# Patient Record
Sex: MALE | Race: WHITE | NOT HISPANIC OR LATINO | Employment: OTHER | URBAN - METROPOLITAN AREA
[De-identification: names, ages, dates, MRNs, and addresses within clinical notes are randomized per-mention and may not be internally consistent; named-entity substitution may affect disease eponyms.]

---

## 2018-01-24 ENCOUNTER — TELEPHONE (OUTPATIENT)
Dept: CARDIAC SURGERY | Facility: CLINIC | Age: 68
End: 2018-01-24

## 2018-02-08 ENCOUNTER — HOSPITAL ENCOUNTER (OUTPATIENT)
Dept: GENERAL RADIOLOGY | Facility: HOSPITAL | Age: 68
Discharge: HOME | End: 2018-02-08
Attending: INTERNAL MEDICINE | Admitting: INTERNAL MEDICINE

## 2018-02-09 ENCOUNTER — HOSPITAL ENCOUNTER (OUTPATIENT)
Dept: RESPIRATORY THERAPY | Facility: HOSPITAL | Age: 68
Discharge: HOME | End: 2018-02-09
Attending: INTERNAL MEDICINE | Admitting: INTERNAL MEDICINE

## 2018-02-26 ENCOUNTER — OUTSIDE FACILITY SERVICE (OUTPATIENT)
Dept: CARDIAC SURGERY | Facility: CLINIC | Age: 68
End: 2018-02-26

## 2018-02-26 PROCEDURE — 33405 REPLACEMENT AORTIC VALVE OPN: CPT | Performed by: PHYSICIAN ASSISTANT

## 2018-02-26 PROCEDURE — 33511 CABG VEIN TWO: CPT | Performed by: THORACIC SURGERY (CARDIOTHORACIC VASCULAR SURGERY)

## 2018-02-26 PROCEDURE — 33405 REPLACEMENT AORTIC VALVE OPN: CPT | Performed by: THORACIC SURGERY (CARDIOTHORACIC VASCULAR SURGERY)

## 2018-02-26 PROCEDURE — 33508 ENDOSCOPIC VEIN HARVEST: CPT | Performed by: THORACIC SURGERY (CARDIOTHORACIC VASCULAR SURGERY)

## 2018-02-26 PROCEDURE — 33508 ENDOSCOPIC VEIN HARVEST: CPT | Performed by: PHYSICIAN ASSISTANT

## 2018-02-26 PROCEDURE — 33511 CABG VEIN TWO: CPT | Performed by: PHYSICIAN ASSISTANT

## 2018-04-13 ENCOUNTER — HOSPITAL ENCOUNTER (OUTPATIENT)
Dept: RESPIRATORY THERAPY | Facility: HOSPITAL | Age: 68
Discharge: HOME | End: 2018-04-13
Attending: NURSE PRACTITIONER | Admitting: NURSE PRACTITIONER

## 2018-05-21 ENCOUNTER — OFFICE VISIT (OUTPATIENT)
Dept: CARDIAC SURGERY | Facility: CLINIC | Age: 68
End: 2018-05-21

## 2018-05-21 VITALS
SYSTOLIC BLOOD PRESSURE: 142 MMHG | RESPIRATION RATE: 20 BRPM | TEMPERATURE: 97.2 F | HEIGHT: 69 IN | WEIGHT: 180.4 LBS | BODY MASS INDEX: 26.72 KG/M2 | OXYGEN SATURATION: 97 % | DIASTOLIC BLOOD PRESSURE: 86 MMHG | HEART RATE: 58 BPM

## 2018-05-21 DIAGNOSIS — Z95.2 S/P AORTIC VALVE REPLACEMENT: Primary | ICD-10-CM

## 2018-05-21 DIAGNOSIS — Z95.1 S/P CABG (CORONARY ARTERY BYPASS GRAFT): ICD-10-CM

## 2018-05-21 PROCEDURE — 99024 POSTOP FOLLOW-UP VISIT: CPT | Performed by: THORACIC SURGERY (CARDIOTHORACIC VASCULAR SURGERY)

## 2018-11-26 ENCOUNTER — HOSPITAL ENCOUNTER (OUTPATIENT)
Dept: LAB | Facility: HOSPITAL | Age: 68
Discharge: HOME | End: 2018-11-26
Attending: INTERNAL MEDICINE | Admitting: INTERNAL MEDICINE

## 2018-11-26 LAB
ALBUMIN SERPL-MCNC: 2.7 G/DL (ref 3.5–4.8)
ALP SERPL-CCNC: 114 IU/L (ref 32–91)
ALT SERPL-CCNC: 19 IU/L (ref 17–63)
ANION GAP SERPL CALC-SCNC: 11.8 MMOL/L (ref 10–20)
AST SERPL-CCNC: 22 IU/L (ref 15–41)
BILIRUB DIRECT SERPL-MCNC: 0.1 MG/DL (ref 0.1–0.5)
BILIRUB SERPL-MCNC: 0.4 MG/DL (ref 0.3–1.2)
BUN SERPL-MCNC: 10 MG/DL (ref 8–20)
BUN/CREAT SERPL: 12.5 (ref 6.2–20.3)
CALCIUM SERPL-MCNC: 8.5 MG/DL (ref 8.9–10.3)
CHLORIDE SERPL-SCNC: 101 MMOL/L (ref 101–111)
CHOLEST SERPL-MCNC: 82 MG/DL
CHOLEST/HDLC SERPL: 2.7 {RATIO}
CK SERPL-CCNC: 43 IU/L (ref 49–397)
CONV CO2: 24 MMOL/L (ref 22–32)
CONV LDL CHOLESTEROL DIRECT: 44 MG/DL (ref 0–100)
CONV TOTAL PROTEIN: 7 G/DL (ref 6.1–7.9)
CREAT UR-MCNC: 0.8 MG/DL (ref 0.7–1.2)
GLUCOSE SERPL-MCNC: 109 MG/DL (ref 65–99)
HDLC SERPL-MCNC: 30 MG/DL
LDLC/HDLC SERPL: 1.5 {RATIO}
LIPID INTERPRETATION: ABNORMAL
POTASSIUM SERPL-SCNC: 3.8 MMOL/L (ref 3.6–5.1)
SODIUM SERPL-SCNC: 133 MMOL/L (ref 136–144)
TRIGL SERPL-MCNC: 42 MG/DL
VLDLC SERPL CALC-MCNC: 8.3 MG/DL

## 2019-02-22 NOTE — TELEPHONE ENCOUNTER
After several attempts to reach the patient I finally spoke to him today. Explained I was calling to work out a date for surgery (CABG) with Dr. Maza. He states that he was going to see his cardiologist today and he would call me back after he saw him.

## 2019-05-23 ENCOUNTER — HOSPITAL ENCOUNTER (OUTPATIENT)
Dept: LAB | Facility: HOSPITAL | Age: 69
Discharge: HOME | End: 2019-05-23
Attending: INTERNAL MEDICINE | Admitting: INTERNAL MEDICINE

## 2019-05-23 LAB
ALBUMIN SERPL-MCNC: 3.6 G/DL (ref 3.5–4.8)
ALP SERPL-CCNC: 120 IU/L (ref 32–91)
ALT SERPL-CCNC: 21 IU/L (ref 17–63)
ANION GAP SERPL CALC-SCNC: 13.8 MMOL/L (ref 10–20)
AST SERPL-CCNC: 23 IU/L (ref 15–41)
BILIRUB DIRECT SERPL-MCNC: 0.2 MG/DL (ref 0.1–0.5)
BILIRUB SERPL-MCNC: 0.8 MG/DL (ref 0.3–1.2)
BUN SERPL-MCNC: 9 MG/DL (ref 8–20)
BUN/CREAT SERPL: 11.3 (ref 6.2–20.3)
CALCIUM SERPL-MCNC: 8.9 MG/DL (ref 8.9–10.3)
CHLORIDE SERPL-SCNC: 100 MMOL/L (ref 101–111)
CHOLEST SERPL-MCNC: 89 MG/DL
CHOLEST/HDLC SERPL: 2.3 {RATIO}
CK SERPL-CCNC: 76 IU/L (ref 49–397)
CONV CO2: 25 MMOL/L (ref 22–32)
CONV LDL CHOLESTEROL DIRECT: 41 MG/DL (ref 0–100)
CONV TOTAL PROTEIN: 6.9 G/DL (ref 6.1–7.9)
CREAT UR-MCNC: 0.8 MG/DL (ref 0.7–1.2)
GLUCOSE SERPL-MCNC: 94 MG/DL (ref 65–99)
HDLC SERPL-MCNC: 39 MG/DL
LDLC/HDLC SERPL: 1.1 {RATIO}
LIPID INTERPRETATION: ABNORMAL
POTASSIUM SERPL-SCNC: 3.8 MMOL/L (ref 3.6–5.1)
SODIUM SERPL-SCNC: 135 MMOL/L (ref 136–144)
TRIGL SERPL-MCNC: 67 MG/DL
VLDLC SERPL CALC-MCNC: 8.5 MG/DL

## 2019-05-31 ENCOUNTER — CONVERSION ENCOUNTER (OUTPATIENT)
Dept: CARDIOLOGY | Facility: CLINIC | Age: 69
End: 2019-05-31

## 2019-06-04 VITALS
WEIGHT: 180.75 LBS | HEART RATE: 58 BPM | DIASTOLIC BLOOD PRESSURE: 84 MMHG | SYSTOLIC BLOOD PRESSURE: 140 MMHG | BODY MASS INDEX: 26.69 KG/M2 | OXYGEN SATURATION: 96 %

## 2019-06-06 NOTE — PROGRESS NOTES
Visit Type:  Follow-up Visit  Primary Care Provider:  [facility]    Chief Complaint:  cad 6mo f/u .    History of Present Illness:  68-year-old white male patient recently admitted to the hospital with an episode of acute influenza flu   patient had an echocardiogram which showed severe aortic stenosis transesophageal echocardiogram showed severe aortic valve calcification with stenosis and LV function was normal   2017   cardiac catheterization 2017 showed left main has no disease diagonal artery ostium 70% disease proximal LAD 20% circumflex artery has a focal lesion causing anywhere from 50-70% disease proximal RCA has somewhat diffuse disease causing 50-60%   stenosis   the right common femoral artery has 60-70% calcified lesion    2018 patient underwent I aortic valve replacement with a bioprosthetic valve   vein graft to the marginal branch and diagonal artery    Patient unfortunately continues to smoke so I advised him to stop smoking     his lipids well controlled   recheck blood pressure 140/84   follow-up in 6 months  with labs and EKG          Vital Signs:    Patient Profile:    68 Years Old Male  Height:     70 inches  Weight:     180.75 pounds  (Measured Weight:  180 lbs. 12 oz.)  BMI:        25.93  Pulse rate: 58 / minute  O2 Sat:     96 %    BP sittin / 84  (left arm)  Cuff size:  regular   Vitals Entered By: Adrianne Quinones CMA (May 31, 2019 10:38 AM)    Medications:  Medications were reviewed with the patient during this visit.    Allergies:   No Known Allergies  Allergies were reviewed with the patient during this visit.    Active Medications (reviewed today):  ATORVASTATIN CALCIUM 20 MG ORAL TABLET (ATORVASTATIN CALCIUM) Take 1 tablet by mouth daily at bedtime  HYDRALAZINE HCL 50 MG ORAL TABLET (HYDRALAZINE HCL) Take one (1) tablet by mouth twice a day  AMLODIPINE BESYLATE 5 MG ORAL TABLET (AMLODIPINE BESYLATE) Take 1 tablet by mouth daily  ASPIR-LOW 81 MG ORAL TABLET  DELAYED RELEASE (ASPIRIN) Take 1 tablet by mouth daily  METOPROLOL TARTRATE 25 MG ORAL TABLET (METOPROLOL TARTRATE) Take one (1) tablet by mouth twice a day    Current Allergies (reviewed today):  No known allergies    Current Medications (including medications started today):   ATORVASTATIN CALCIUM 20 MG ORAL TABLET (ATORVASTATIN CALCIUM) Take 1 tablet by mouth daily at bedtime  HYDRALAZINE HCL 50 MG ORAL TABLET (HYDRALAZINE HCL) Take one (1) tablet by mouth twice a day  AMLODIPINE BESYLATE 5 MG ORAL TABLET (AMLODIPINE BESYLATE) Take 1 tablet by mouth daily  ASPIR-LOW 81 MG ORAL TABLET DELAYED RELEASE (ASPIRIN) Take 1 tablet by mouth daily  METOPROLOL TARTRATE 25 MG ORAL TABLET (METOPROLOL TARTRATE) Take one (1) tablet by mouth twice a day      Past Medical History:     Reviewed history from 2018 and no changes required:        Aortic Stenosis-Severe        Hypertension        No Drug Allergies? T        COPD        Coronary Artery Disease        Peripheral Vascular Disease        Congestive Heart Failure~~Diastolic    Past Surgical History:     Reviewed history from 2018 and no changes required:        Heart Catherization (2017)        C A B G: x2 Vessels; OM1 & Diagonal II (2018)        Aortic Valve Repair (2018)    Family History Summary:      Reviewed history Last on 2018 and no changes required:2019  Daughter - Has Family History of Heart Disease - Stroke and Seizures at 36 y/o - Entered On: 2018    General Comments - FH:  father unknown heart issues,  age 60 cancer  mother congestive heart failure, still alive     Social History:     Reviewed history from 2018 and no changes required:        Patient is a former smoker.        Passive Smoke: Y        Alcohol Use: N        Drug Use: N        Regular Exercise: N            Social History Summary:  Patient is a former smoker.  Passive Smoke: Y  Alcohol Use: N  Drug Use: N  Regular Exercise: N  Social  History Reviewed: 05/31/2019          Risk Factors:     Smoked Tobacco Use:  Former smoker     Cigarettes:  Yes -- 5cigs daily pack(s) per day,Smokeless Tobacco Use:  Never  Passive smoke exposure:  yes  Drug use:  no  Caffeine use:  4+ drinks per day  Alcohol use:  no  Exercise:  no    Family History Risk Factors:     Family History of MI in females < 65 years old:  yes     Family History of MI in males < 55 years old:  no        Review of Systems   General: No fatigue or tiredness  Eyes: No redness  Ear/Nose/Throat: No discharge  Cardiovascular: No chest pain, no palpitations   Respiratory: No shortness of breath  Gastrointestinal: No nausea or vomiting  Genitourinary: No Bleeding  Musculoskeletal: No arthralgia or myalgia  Skin: No rash, no edema   Neurologic: No numbness or tingling, no syncope   Psychiatric: No anxiety or depression  Hematologic/Lymphatic: No abnormal bleeding      Physical Exam    General:      well developed, well nourished, in no acute distress.    Head:      normocephalic and atraumatic.    Neck:      no bruit.    Lungs:      clear bilaterally to auscultation.    Heart:       1/6 systolic murmur at the base  regular rhythm, no rubs and no gallops.    Abdomen:      non-tender.    Msk:       within normal  Pulses:      pulses normal in all 4 extremities.    Extremities:      no pedal edema.    Neurologic:       non focal  Psych:      alert and cooperative; normal mood and affect; normal attention span and concentration.      Diabetes Management Exam:      Foot Exam (with socks and/or shoes not present):        Pulses:           pulses normal in all 4 extremities.        Blood Pressure:  Today's BP: 140/84 mm Hg    Labwork:   Most Recent Lab Results:   LDL: 41 mg/dL 05/23/2019        Impression & Recommendations:    Problem # 1:  Hx of aortic valve replacement (ICD-V43.3) (FMK49-Y77.2)   stable  Orders:  20790-Hrt Vst-Est Level III (CPT-25268)      Problem # 2:  CABG S/P (ICD-V45.81)  (PUF85-Q81.1)   stable  His updated medication list for this problem includes:     Hydralazine Hcl 50 Mg Oral Tablet (Hydralazine hcl) ..... Take one (1) tablet by mouth twice a day     Amlodipine Besylate 5 Mg Oral Tablet (Amlodipine besylate) ..... Take 1 tablet by mouth daily     Aspir-low 81 Mg Oral Tablet Delayed Release (Aspirin) ..... Take 1 tablet by mouth daily     Metoprolol Tartrate 25 Mg Oral Tablet (Metoprolol tartrate) ..... Take one (1) tablet by mouth twice a day    Orders:  35118-Mjr Vst-Est Level III (CPT-86645)      Problem # 3:  Renal insufficiency (ICD-585.9) (WRO70-Z55.9)   followed by PCP  Orders:  38917-Bbm Vst-Est Level III (CPT-59973)      Problem # 4:  Hypertension (YNU84-L23)   needs aggressive blood pressure control  His updated medication list for this problem includes:     Hydralazine Hcl 50 Mg Oral Tablet (Hydralazine hcl) ..... Take one (1) tablet by mouth twice a day     Amlodipine Besylate 5 Mg Oral Tablet (Amlodipine besylate) ..... Take 1 tablet by mouth daily     Metoprolol Tartrate 25 Mg Oral Tablet (Metoprolol tartrate) ..... Take one (1) tablet by mouth twice a day    Orders:  85911-Vek Vst-Est Level III (CPT-64934)      Medications Added to Medication List This Visit:  1)  Amlodipine Besylate 5 Mg Oral Tablet (Amlodipine besylate) .... Take 1 tablet by mouth daily                  Medication Administration    Orders Added:  1)  58285-Kvw Vst-Est Level III [CPT-10511]  ]      Electronically signed by Iglesia Springer MD on 05/31/2019 at 11:27 AM  ________________________________________________________________________       Disclaimer: Converted Note message may not contain all data elements that existed in the legacy source system. Please see [facility] for the original note details.

## 2019-07-22 RX ORDER — HYDRALAZINE HYDROCHLORIDE 50 MG/1
50 TABLET, FILM COATED ORAL EVERY 12 HOURS
Qty: 180 TABLET | Refills: 1 | Status: SHIPPED | OUTPATIENT
Start: 2019-07-22 | End: 2020-01-01

## 2019-07-22 RX ORDER — HYDRALAZINE HYDROCHLORIDE 50 MG/1
TABLET, FILM COATED ORAL EVERY 12 HOURS
COMMUNITY
Start: 2018-09-07 | End: 2019-07-22 | Stop reason: SDUPTHER

## 2019-11-27 DIAGNOSIS — I10 ESSENTIAL HYPERTENSION: Primary | ICD-10-CM

## 2019-11-29 ENCOUNTER — LAB (OUTPATIENT)
Dept: LAB | Facility: HOSPITAL | Age: 69
End: 2019-11-29

## 2019-11-29 DIAGNOSIS — I10 ESSENTIAL HYPERTENSION: ICD-10-CM

## 2019-11-29 LAB
ALBUMIN SERPL-MCNC: 3.9 G/DL (ref 3.5–5.2)
ALP SERPL-CCNC: 125 U/L (ref 39–117)
ALT SERPL W P-5'-P-CCNC: 15 U/L (ref 1–41)
ANION GAP SERPL CALCULATED.3IONS-SCNC: 10.4 MMOL/L (ref 5–15)
AST SERPL-CCNC: 15 U/L (ref 1–40)
BILIRUB CONJ SERPL-MCNC: 0.2 MG/DL (ref 0.2–0.3)
BILIRUB INDIRECT SERPL-MCNC: 0.3 MG/DL
BILIRUB SERPL-MCNC: 0.5 MG/DL (ref 0.2–1.2)
BUN BLD-MCNC: 11 MG/DL (ref 8–23)
BUN/CREAT SERPL: 13.8 (ref 7–25)
CALCIUM SPEC-SCNC: 8.8 MG/DL (ref 8.6–10.5)
CHLORIDE SERPL-SCNC: 97 MMOL/L (ref 98–107)
CHOLEST SERPL-MCNC: 79 MG/DL (ref 0–200)
CK SERPL-CCNC: 45 U/L (ref 20–200)
CO2 SERPL-SCNC: 25.6 MMOL/L (ref 22–29)
CREAT BLD-MCNC: 0.8 MG/DL (ref 0.76–1.27)
GFR SERPL CREATININE-BSD FRML MDRD: 96 ML/MIN/1.73
GLUCOSE BLD-MCNC: 84 MG/DL (ref 65–99)
HDLC SERPL-MCNC: 33 MG/DL (ref 40–60)
LDLC SERPL CALC-MCNC: 36 MG/DL (ref 0–100)
LDLC/HDLC SERPL: 1.1 {RATIO}
POTASSIUM BLD-SCNC: 4 MMOL/L (ref 3.5–5.2)
PROT SERPL-MCNC: 7.3 G/DL (ref 6–8.5)
SODIUM BLD-SCNC: 133 MMOL/L (ref 136–145)
TRIGL SERPL-MCNC: 48 MG/DL (ref 0–150)
VLDLC SERPL-MCNC: 9.6 MG/DL (ref 5–40)

## 2019-11-29 PROCEDURE — 80048 BASIC METABOLIC PNL TOTAL CA: CPT

## 2019-11-29 PROCEDURE — 82550 ASSAY OF CK (CPK): CPT

## 2019-11-29 PROCEDURE — 80061 LIPID PANEL: CPT

## 2019-11-29 PROCEDURE — 80076 HEPATIC FUNCTION PANEL: CPT

## 2019-11-29 PROCEDURE — 36415 COLL VENOUS BLD VENIPUNCTURE: CPT

## 2019-12-02 ENCOUNTER — OFFICE VISIT (OUTPATIENT)
Dept: CARDIOLOGY | Facility: CLINIC | Age: 69
End: 2019-12-02

## 2019-12-02 VITALS
OXYGEN SATURATION: 97 % | WEIGHT: 170 LBS | SYSTOLIC BLOOD PRESSURE: 148 MMHG | HEIGHT: 69 IN | HEART RATE: 53 BPM | DIASTOLIC BLOOD PRESSURE: 82 MMHG | BODY MASS INDEX: 25.18 KG/M2

## 2019-12-02 DIAGNOSIS — I25.10 CORONARY ARTERY DISEASE INVOLVING NATIVE CORONARY ARTERY OF NATIVE HEART WITHOUT ANGINA PECTORIS: Primary | ICD-10-CM

## 2019-12-02 DIAGNOSIS — E78.2 MIXED HYPERLIPIDEMIA: ICD-10-CM

## 2019-12-02 DIAGNOSIS — I35.0 NONRHEUMATIC AORTIC VALVE STENOSIS: ICD-10-CM

## 2019-12-02 DIAGNOSIS — I10 ESSENTIAL HYPERTENSION: ICD-10-CM

## 2019-12-02 PROCEDURE — 99213 OFFICE O/P EST LOW 20 MIN: CPT | Performed by: INTERNAL MEDICINE

## 2019-12-02 NOTE — PROGRESS NOTES
"    Subjective:     Encounter Date:12/02/2019      Patient ID: Axel Ramirez is a 69 y.o. male.    Chief Complaint: follow-up for CAD  History of Present Illness     69-year-old white male patient recently admitted to the hospital with an episode of acute influenza flu   patient had an echocardiogram which showed severe aortic stenosis transesophageal echocardiogram showed severe aortic valve calcification with stenosis and LV function was normal   December 2017   cardiac catheterization December 2017 showed left main has no disease diagonal artery ostium 70% disease proximal LAD 20% circumflex artery has a focal lesion causing anywhere from 50-70% disease proximal RCA has somewhat diffuse disease causing 50-60%   stenosis   the right common femoral artery has 60-70% calcified lesion     February 2018 patient underwent I aortic valve replacement with a bioprosthetic valve   vein graft to the marginal branch and diagonal artery     Patient unfortunately continues to smoke so I advised him to stop smoking      his lipids well controlled     follow-up in 6 months  with labs and EKG      Past Medical History:   Diagnosis Date   • Aortic stenosis    • Congestive heart failure (CHF) (CMS/HCC)     DIASTOLIC   • COPD (chronic obstructive pulmonary disease) (CMS/HCC)    • Coronary artery disease    • Hypertension    • Myocardial infarction (CMS/HCC)    • Peripheral vascular disease (CMS/HCC)    • Valvular disease      Past Surgical History:   Procedure Laterality Date   • AORTIC VALVE REPAIR/REPLACEMENT  02/26/2018    Dr Maza   • CARDIAC CATHETERIZATION  12/29/2017   • CORONARY ARTERY BYPASS GRAFT  02/26/2018    Dr Maza   • TIBIA FRACTURE SURGERY       /82 (BP Location: Left arm, Patient Position: Sitting, Cuff Size: Adult)   Pulse 53   Ht 175.3 cm (69\")   Wt 77.1 kg (170 lb)   SpO2 97%   BMI 25.10 kg/m²   Family History   Problem Relation Age of Onset   • Coronary artery disease Mother    • Cancer Father    • " Stroke Daughter 35   • Seizures Daughter 35       Current Outpatient Medications:   •  amLODIPine (NORVASC) 10 MG tablet, Daily., Disp: , Rfl:   •  aspirin (ASPIR-LOW) 81 MG EC tablet, Daily., Disp: , Rfl:   •  atorvastatin (LIPITOR) 20 MG tablet, ATORVASTATIN CALCIUM 20 MG TABS, Disp: , Rfl:   •  hydrALAZINE (APRESOLINE) 50 MG tablet, Take 1 tablet by mouth Every 12 (Twelve) Hours., Disp: 180 tablet, Rfl: 1  •  lisinopril-hydrochlorothiazide (PRINZIDE,ZESTORETIC) 20-12.5 MG per tablet, Daily., Disp: , Rfl:   •  metoprolol tartrate (LOPRESSOR) 25 MG tablet, Every 12 (Twelve) Hours., Disp: , Rfl:   Social History     Socioeconomic History   • Marital status:      Spouse name: Not on file   • Number of children: Not on file   • Years of education: Not on file   • Highest education level: Not on file   Tobacco Use   • Smoking status: Current Every Day Smoker     Packs/day: 1.00     Types: Cigarettes   • Smokeless tobacco: Never Used   Substance and Sexual Activity   • Alcohol use: Yes   • Drug use: No   • Sexual activity: Defer     No Known Allergies  Review of Systems   Constitution: Negative for fever and malaise/fatigue.   HENT: Negative for congestion and hearing loss.    Eyes: Negative for double vision and visual disturbance.   Cardiovascular: Negative for chest pain, claudication, dyspnea on exertion, leg swelling and syncope.   Respiratory: Negative for cough and shortness of breath.    Endocrine: Negative for cold intolerance.   Skin: Negative for color change and rash.   Musculoskeletal: Negative for arthritis and joint pain.   Gastrointestinal: Negative for abdominal pain and heartburn.   Genitourinary: Negative for hematuria.   Neurological: Negative for excessive daytime sleepiness and dizziness.   Psychiatric/Behavioral: Negative for depression. The patient is not nervous/anxious.    All other systems reviewed and are negative.             Objective:     Physical Exam   Constitutional: He is  oriented to person, place, and time. He appears well-developed and well-nourished.   HENT:   Head: Normocephalic and atraumatic.   Eyes: Conjunctivae are normal.   Neck: JVD present.   Cardiovascular: Normal rate and normal heart sounds.   No murmur heard.  Pulmonary/Chest: Effort normal and breath sounds normal.   Musculoskeletal: He exhibits no edema or deformity.   Neurological: He is alert and oriented to person, place, and time.   Skin: Skin is warm.   Psychiatric: He has a normal mood and affect.       Procedures    Lab Review:       Assessment:          Diagnosis Plan   1. Coronary artery disease involving native coronary artery of native heart without angina pectoris  CK    Comprehensive Metabolic Panel    Lipid Panel   2. Nonrheumatic aortic valve stenosis  CK    Comprehensive Metabolic Panel    Lipid Panel   3. Mixed hyperlipidemia  CK    Comprehensive Metabolic Panel    Lipid Panel   4. Essential hypertension  CK    Comprehensive Metabolic Panel    Lipid Panel          Plan:         CAD status post CABG stable  Aortic valve stenosis status post bioprosthetic valve replacement stable  Continue aggressive control of hypertension dyslipidemia  Patient was advised to stop smoking

## 2020-01-01 ENCOUNTER — APPOINTMENT (OUTPATIENT)
Dept: RADIATION ONCOLOGY | Facility: HOSPITAL | Age: 70
End: 2020-01-01

## 2020-01-01 ENCOUNTER — APPOINTMENT (OUTPATIENT)
Dept: GENERAL RADIOLOGY | Facility: HOSPITAL | Age: 70
End: 2020-01-01

## 2020-01-01 ENCOUNTER — HOSPITAL ENCOUNTER (OUTPATIENT)
Dept: RADIATION ONCOLOGY | Facility: HOSPITAL | Age: 70
Discharge: HOME | End: 2020-11-24

## 2020-01-01 ENCOUNTER — HOSPITAL ENCOUNTER (OUTPATIENT)
Dept: RADIATION ONCOLOGY | Facility: HOSPITAL | Age: 70
Discharge: HOME | End: 2020-12-09

## 2020-01-01 ENCOUNTER — ANESTHESIA EVENT (OUTPATIENT)
Dept: PERIOP | Facility: HOSPITAL | Age: 70
End: 2020-01-01

## 2020-01-01 ENCOUNTER — APPOINTMENT (OUTPATIENT)
Dept: CT IMAGING | Facility: HOSPITAL | Age: 70
End: 2020-01-01

## 2020-01-01 ENCOUNTER — HOSPITAL ENCOUNTER (OUTPATIENT)
Dept: RADIATION ONCOLOGY | Facility: HOSPITAL | Age: 70
Discharge: HOME | End: 2020-12-18

## 2020-01-01 ENCOUNTER — HOSPITAL ENCOUNTER (OUTPATIENT)
Dept: RADIATION ONCOLOGY | Facility: HOSPITAL | Age: 70
Discharge: HOME | End: 2020-12-02

## 2020-01-01 ENCOUNTER — ANESTHESIA (OUTPATIENT)
Dept: GASTROENTEROLOGY | Facility: HOSPITAL | Age: 70
End: 2020-01-01

## 2020-01-01 ENCOUNTER — READMISSION MANAGEMENT (OUTPATIENT)
Dept: CALL CENTER | Facility: HOSPITAL | Age: 70
End: 2020-01-01

## 2020-01-01 ENCOUNTER — HOSPITAL ENCOUNTER (OUTPATIENT)
Dept: RADIATION ONCOLOGY | Facility: HOSPITAL | Age: 70
Discharge: HOME | End: 2020-12-10

## 2020-01-01 ENCOUNTER — HOSPITAL ENCOUNTER (OUTPATIENT)
Dept: RADIATION ONCOLOGY | Facility: HOSPITAL | Age: 70
Discharge: HOME | End: 2020-12-17

## 2020-01-01 ENCOUNTER — HOSPITAL ENCOUNTER (OUTPATIENT)
Dept: RADIATION ONCOLOGY | Facility: HOSPITAL | Age: 70
Discharge: HOME | End: 2020-12-30

## 2020-01-01 ENCOUNTER — ANESTHESIA EVENT (OUTPATIENT)
Dept: GASTROENTEROLOGY | Facility: HOSPITAL | Age: 70
End: 2020-01-01

## 2020-01-01 ENCOUNTER — HOSPITAL ENCOUNTER (OUTPATIENT)
Dept: RADIATION ONCOLOGY | Facility: HOSPITAL | Age: 70
Discharge: HOME | End: 2020-12-14

## 2020-01-01 ENCOUNTER — TELEPHONE (OUTPATIENT)
Dept: RADIATION ONCOLOGY | Facility: HOSPITAL | Age: 70
End: 2020-01-01

## 2020-01-01 ENCOUNTER — HOSPITAL ENCOUNTER (OUTPATIENT)
Dept: RADIATION ONCOLOGY | Facility: HOSPITAL | Age: 70
Discharge: HOME | End: 2020-12-22

## 2020-01-01 ENCOUNTER — HOSPITAL ENCOUNTER (OUTPATIENT)
Dept: RADIATION ONCOLOGY | Facility: HOSPITAL | Age: 70
Setting detail: RADIATION/ONCOLOGY SERIES
End: 2020-01-01

## 2020-01-01 ENCOUNTER — HOSPITAL ENCOUNTER (OUTPATIENT)
Dept: RADIATION ONCOLOGY | Facility: HOSPITAL | Age: 70
Discharge: HOME | End: 2020-11-17

## 2020-01-01 ENCOUNTER — HOSPITAL ENCOUNTER (OUTPATIENT)
Dept: RADIATION ONCOLOGY | Facility: HOSPITAL | Age: 70
Discharge: HOME | End: 2020-11-18

## 2020-01-01 ENCOUNTER — HOSPITAL ENCOUNTER (OUTPATIENT)
Dept: RADIATION ONCOLOGY | Facility: HOSPITAL | Age: 70
Discharge: HOME | End: 2020-12-21

## 2020-01-01 ENCOUNTER — HOSPITAL ENCOUNTER (INPATIENT)
Facility: HOSPITAL | Age: 70
LOS: 8 days | Discharge: HOME | End: 2020-10-30
Attending: EMERGENCY MEDICINE | Admitting: STUDENT IN AN ORGANIZED HEALTH CARE EDUCATION/TRAINING PROGRAM

## 2020-01-01 ENCOUNTER — HOSPITAL ENCOUNTER (OUTPATIENT)
Dept: RADIATION ONCOLOGY | Facility: HOSPITAL | Age: 70
Discharge: HOME | End: 2020-12-07

## 2020-01-01 ENCOUNTER — HOSPITAL ENCOUNTER (OUTPATIENT)
Dept: RADIATION ONCOLOGY | Facility: HOSPITAL | Age: 70
Discharge: HOME | End: 2020-12-28

## 2020-01-01 ENCOUNTER — HOSPITAL ENCOUNTER (OUTPATIENT)
Dept: RADIATION ONCOLOGY | Facility: HOSPITAL | Age: 70
Discharge: HOME | End: 2020-12-23

## 2020-01-01 ENCOUNTER — HOSPITAL ENCOUNTER (INPATIENT)
Facility: HOSPITAL | Age: 70
LOS: 4 days | Discharge: HOME | End: 2020-11-13
Attending: EMERGENCY MEDICINE | Admitting: INTERNAL MEDICINE

## 2020-01-01 ENCOUNTER — HOSPITAL ENCOUNTER (OUTPATIENT)
Dept: RADIATION ONCOLOGY | Facility: HOSPITAL | Age: 70
Discharge: HOME | End: 2020-12-01

## 2020-01-01 ENCOUNTER — ANESTHESIA (OUTPATIENT)
Dept: PERIOP | Facility: HOSPITAL | Age: 70
End: 2020-01-01

## 2020-01-01 ENCOUNTER — APPOINTMENT (OUTPATIENT)
Dept: MRI IMAGING | Facility: HOSPITAL | Age: 70
End: 2020-01-01

## 2020-01-01 ENCOUNTER — HOSPITAL ENCOUNTER (OUTPATIENT)
Dept: RADIATION ONCOLOGY | Facility: HOSPITAL | Age: 70
Discharge: HOME | End: 2020-11-30

## 2020-01-01 ENCOUNTER — LAB REQUISITION (OUTPATIENT)
Dept: LAB | Facility: HOSPITAL | Age: 70
End: 2020-01-01

## 2020-01-01 ENCOUNTER — HOSPITAL ENCOUNTER (OUTPATIENT)
Dept: RADIATION ONCOLOGY | Facility: HOSPITAL | Age: 70
Discharge: HOME | End: 2020-12-11

## 2020-01-01 ENCOUNTER — APPOINTMENT (OUTPATIENT)
Dept: CARDIOLOGY | Facility: HOSPITAL | Age: 70
End: 2020-01-01

## 2020-01-01 ENCOUNTER — HOSPITAL ENCOUNTER (OUTPATIENT)
Dept: RADIATION ONCOLOGY | Facility: HOSPITAL | Age: 70
Discharge: HOME | End: 2020-11-19

## 2020-01-01 ENCOUNTER — HOSPITAL ENCOUNTER (OUTPATIENT)
Dept: RADIATION ONCOLOGY | Facility: HOSPITAL | Age: 70
Discharge: HOME | End: 2020-12-15

## 2020-01-01 ENCOUNTER — HOSPITAL ENCOUNTER (OUTPATIENT)
Dept: RADIATION ONCOLOGY | Facility: HOSPITAL | Age: 70
Discharge: HOME | End: 2020-11-16

## 2020-01-01 ENCOUNTER — DOCUMENTATION (OUTPATIENT)
Dept: RADIATION ONCOLOGY | Facility: HOSPITAL | Age: 70
End: 2020-01-01

## 2020-01-01 ENCOUNTER — HOSPITAL ENCOUNTER (OUTPATIENT)
Dept: RADIATION ONCOLOGY | Facility: HOSPITAL | Age: 70
Discharge: HOME | End: 2020-12-31

## 2020-01-01 ENCOUNTER — OFFICE VISIT (OUTPATIENT)
Dept: CARDIOLOGY | Facility: CLINIC | Age: 70
End: 2020-01-01

## 2020-01-01 ENCOUNTER — HOSPITAL ENCOUNTER (OUTPATIENT)
Dept: PET IMAGING | Facility: HOSPITAL | Age: 70
Discharge: HOME | End: 2020-11-05
Admitting: RADIOLOGY

## 2020-01-01 ENCOUNTER — HOSPITAL ENCOUNTER (OUTPATIENT)
Dept: RADIATION ONCOLOGY | Facility: HOSPITAL | Age: 70
Discharge: HOME | End: 2020-11-25

## 2020-01-01 ENCOUNTER — HOSPITAL ENCOUNTER (OUTPATIENT)
Dept: RADIATION ONCOLOGY | Facility: HOSPITAL | Age: 70
Discharge: HOME | End: 2020-12-16

## 2020-01-01 VITALS
SYSTOLIC BLOOD PRESSURE: 129 MMHG | HEIGHT: 70 IN | WEIGHT: 150 LBS | HEART RATE: 78 BPM | RESPIRATION RATE: 20 BRPM | OXYGEN SATURATION: 96 % | DIASTOLIC BLOOD PRESSURE: 69 MMHG | TEMPERATURE: 97.5 F | BODY MASS INDEX: 21.47 KG/M2

## 2020-01-01 VITALS
DIASTOLIC BLOOD PRESSURE: 70 MMHG | BODY MASS INDEX: 21.91 KG/M2 | HEART RATE: 75 BPM | OXYGEN SATURATION: 93 % | SYSTOLIC BLOOD PRESSURE: 137 MMHG | WEIGHT: 147.93 LBS | TEMPERATURE: 98.5 F | RESPIRATION RATE: 17 BRPM | HEIGHT: 69 IN

## 2020-01-01 VITALS
HEIGHT: 70 IN | HEART RATE: 89 BPM | WEIGHT: 151 LBS | BODY MASS INDEX: 21.62 KG/M2 | SYSTOLIC BLOOD PRESSURE: 143 MMHG | OXYGEN SATURATION: 95 % | DIASTOLIC BLOOD PRESSURE: 74 MMHG | TEMPERATURE: 98.2 F

## 2020-01-01 DIAGNOSIS — C34.90 LUNG CANCER (HCC): ICD-10-CM

## 2020-01-01 DIAGNOSIS — J18.9 PNEUMONIA OF RIGHT LUNG DUE TO INFECTIOUS ORGANISM, UNSPECIFIED PART OF LUNG: ICD-10-CM

## 2020-01-01 DIAGNOSIS — C34.11 MALIGNANT NEOPLASM OF UPPER LOBE OF RIGHT LUNG (HCC): Primary | ICD-10-CM

## 2020-01-01 DIAGNOSIS — R91.8 LUNG MASS: Primary | ICD-10-CM

## 2020-01-01 DIAGNOSIS — R50.9 FEVER, UNSPECIFIED FEVER CAUSE: ICD-10-CM

## 2020-01-01 DIAGNOSIS — E78.2 MIXED HYPERLIPIDEMIA: ICD-10-CM

## 2020-01-01 DIAGNOSIS — Z95.1 PRESENCE OF AORTOCORONARY BYPASS GRAFT: ICD-10-CM

## 2020-01-01 DIAGNOSIS — I35.0 NONRHEUMATIC AORTIC VALVE STENOSIS: ICD-10-CM

## 2020-01-01 DIAGNOSIS — J18.9 PNEUMONIA OF RIGHT LUNG DUE TO INFECTIOUS ORGANISM, UNSPECIFIED PART OF LUNG: Primary | ICD-10-CM

## 2020-01-01 DIAGNOSIS — I25.10 CORONARY ARTERY DISEASE INVOLVING NATIVE CORONARY ARTERY OF NATIVE HEART WITHOUT ANGINA PECTORIS: ICD-10-CM

## 2020-01-01 DIAGNOSIS — Z00.00 ENCOUNTER FOR GENERAL ADULT MEDICAL EXAMINATION WITHOUT ABNORMAL FINDINGS: ICD-10-CM

## 2020-01-01 DIAGNOSIS — C34.11 MALIGNANT NEOPLASM OF UPPER LOBE OF RIGHT LUNG (HCC): ICD-10-CM

## 2020-01-01 DIAGNOSIS — I50.32 CHRONIC DIASTOLIC CONGESTIVE HEART FAILURE (HCC): ICD-10-CM

## 2020-01-01 DIAGNOSIS — I10 ESSENTIAL HYPERTENSION: Primary | Chronic | ICD-10-CM

## 2020-01-01 LAB
ABO GROUP BLD: NORMAL
ALBUMIN SERPL ELPH-MCNC: 2.5 G/DL (ref 2.9–4.4)
ALBUMIN SERPL-MCNC: 2.8 G/DL (ref 3.5–5.2)
ALBUMIN SERPL-MCNC: 2.9 G/DL (ref 3.5–5.2)
ALBUMIN SERPL-MCNC: 3.1 G/DL (ref 3.5–5.2)
ALBUMIN/GLOB SERPL: 0.5 {RATIO} (ref 0.7–1.7)
ALBUMIN/GLOB SERPL: 0.6 G/DL
ALBUMIN/GLOB SERPL: 0.8 G/DL
ALBUMIN/GLOB SERPL: 1 G/DL
ALP SERPL-CCNC: 115 U/L (ref 39–117)
ALP SERPL-CCNC: 126 U/L (ref 39–117)
ALP SERPL-CCNC: 129 U/L (ref 39–117)
ALPHA1 GLOB SERPL ELPH-MCNC: 0.5 G/DL (ref 0–0.4)
ALPHA2 GLOB SERPL ELPH-MCNC: 1.1 G/DL (ref 0.4–1)
ALT SERPL W P-5'-P-CCNC: 10 U/L (ref 1–41)
ALT SERPL W P-5'-P-CCNC: 18 U/L (ref 1–41)
ALT SERPL W P-5'-P-CCNC: 21 U/L (ref 1–41)
ANION GAP SERPL CALCULATED.3IONS-SCNC: 10 MMOL/L (ref 5–15)
ANION GAP SERPL CALCULATED.3IONS-SCNC: 11 MMOL/L (ref 5–15)
ANION GAP SERPL CALCULATED.3IONS-SCNC: 8 MMOL/L (ref 5–15)
ANION GAP SERPL CALCULATED.3IONS-SCNC: 9 MMOL/L (ref 5–15)
APTT PPP: 29.6 SECONDS (ref 24–31)
ARTERIAL PATENCY WRIST A: POSITIVE
AST SERPL-CCNC: 16 U/L (ref 1–40)
AST SERPL-CCNC: 16 U/L (ref 1–40)
AST SERPL-CCNC: 22 U/L (ref 1–40)
ATMOSPHERIC PRESS: ABNORMAL MM[HG]
B PARAPERT DNA SPEC QL NAA+PROBE: NOT DETECTED
B PERT DNA SPEC QL NAA+PROBE: NOT DETECTED
B-GLOBULIN SERPL ELPH-MCNC: 0.9 G/DL (ref 0.7–1.3)
BACTERIA SPEC AEROBE CULT: NORMAL
BACTERIA SPEC RESP CULT: NORMAL
BACTERIA UR QL AUTO: ABNORMAL /HPF
BACTERIA UR QL AUTO: ABNORMAL /HPF
BASE EXCESS BLDA CALC-SCNC: 0 MMOL/L (ref 0–3)
BASOPHILS # BLD AUTO: 0 10*3/MM3 (ref 0–0.2)
BASOPHILS # BLD AUTO: 0.1 10*3/MM3 (ref 0–0.2)
BASOPHILS NFR BLD AUTO: 0.1 % (ref 0–1.5)
BASOPHILS NFR BLD AUTO: 0.3 % (ref 0–1.5)
BASOPHILS NFR BLD AUTO: 0.4 % (ref 0–1.5)
BASOPHILS NFR BLD AUTO: 0.5 % (ref 0–1.5)
BASOPHILS NFR BLD AUTO: 0.6 % (ref 0–1.5)
BASOPHILS NFR BLD AUTO: 0.7 % (ref 0–1.5)
BASOPHILS NFR BLD AUTO: 0.8 % (ref 0–1.5)
BDY SITE: ABNORMAL
BH CV ECHO MEAS - % IVS THICK: 71.8 %
BH CV ECHO MEAS - % LVPW THICK: 31.9 %
BH CV ECHO MEAS - ACS: 1.2 CM
BH CV ECHO MEAS - AO MAX PG (FULL): 26.6 MMHG
BH CV ECHO MEAS - AO MAX PG: 35.2 MMHG
BH CV ECHO MEAS - AO MEAN PG (FULL): 14.2 MMHG
BH CV ECHO MEAS - AO MEAN PG: 18.8 MMHG
BH CV ECHO MEAS - AO ROOT AREA (BSA CORRECTED): 1.8
BH CV ECHO MEAS - AO ROOT AREA: 8.9 CM^2
BH CV ECHO MEAS - AO ROOT DIAM: 3.4 CM
BH CV ECHO MEAS - AO V2 MAX: 296.2 CM/SEC
BH CV ECHO MEAS - AO V2 MEAN: 201.7 CM/SEC
BH CV ECHO MEAS - AO V2 VTI: 51.5 CM
BH CV ECHO MEAS - AVA(I,A): 1.6 CM^2
BH CV ECHO MEAS - AVA(I,D): 1.6 CM^2
BH CV ECHO MEAS - AVA(V,A): 1.5 CM^2
BH CV ECHO MEAS - AVA(V,D): 1.5 CM^2
BH CV ECHO MEAS - BSA(HAYCOCK): 1.8 M^2
BH CV ECHO MEAS - BSA: 1.8 M^2
BH CV ECHO MEAS - BZI_BMI: 21.5 KILOGRAMS/M^2
BH CV ECHO MEAS - BZI_METRIC_HEIGHT: 177.8 CM
BH CV ECHO MEAS - BZI_METRIC_WEIGHT: 68 KG
BH CV ECHO MEAS - EDV(CUBED): 106.4 ML
BH CV ECHO MEAS - EDV(MOD-SP4): 51.6 ML
BH CV ECHO MEAS - EDV(TEICH): 104.4 ML
BH CV ECHO MEAS - EF(CUBED): 87.8 %
BH CV ECHO MEAS - EF(MOD-BP): 67 %
BH CV ECHO MEAS - EF(MOD-SP4): 66.8 %
BH CV ECHO MEAS - EF(TEICH): 81.6 %
BH CV ECHO MEAS - ESV(CUBED): 13 ML
BH CV ECHO MEAS - ESV(MOD-SP4): 17.1 ML
BH CV ECHO MEAS - ESV(TEICH): 19.2 ML
BH CV ECHO MEAS - FS: 50.4 %
BH CV ECHO MEAS - IVS/LVPW: 0.9
BH CV ECHO MEAS - IVSD: 1 CM
BH CV ECHO MEAS - IVSS: 1.8 CM
BH CV ECHO MEAS - LA DIMENSION(2D): 3.6 CM
BH CV ECHO MEAS - LV DIASTOLIC VOL/BSA (35-75): 27.9 ML/M^2
BH CV ECHO MEAS - LV MASS(C)D: 186.4 GRAMS
BH CV ECHO MEAS - LV MASS(C)DI: 100.9 GRAMS/M^2
BH CV ECHO MEAS - LV MASS(C)S: 137.7 GRAMS
BH CV ECHO MEAS - LV MASS(C)SI: 74.5 GRAMS/M^2
BH CV ECHO MEAS - LV MAX PG: 8.7 MMHG
BH CV ECHO MEAS - LV MEAN PG: 4.6 MMHG
BH CV ECHO MEAS - LV SYSTOLIC VOL/BSA (12-30): 9.3 ML/M^2
BH CV ECHO MEAS - LV V1 MAX: 147.3 CM/SEC
BH CV ECHO MEAS - LV V1 MEAN: 98.6 CM/SEC
BH CV ECHO MEAS - LV V1 VTI: 27.3 CM
BH CV ECHO MEAS - LVIDD: 4.7 CM
BH CV ECHO MEAS - LVIDS: 2.4 CM
BH CV ECHO MEAS - LVOT AREA: 3.1 CM^2
BH CV ECHO MEAS - LVOT DIAM: 2 CM
BH CV ECHO MEAS - LVPWD: 1.1 CM
BH CV ECHO MEAS - LVPWS: 1.5 CM
BH CV ECHO MEAS - MV A MAX VEL: 130.6 CM/SEC
BH CV ECHO MEAS - MV DEC SLOPE: 373 CM/SEC^2
BH CV ECHO MEAS - MV DEC TIME: 0.24 SEC
BH CV ECHO MEAS - MV E MAX VEL: 91.2 CM/SEC
BH CV ECHO MEAS - MV E/A: 0.7
BH CV ECHO MEAS - MV MAX PG: 8 MMHG
BH CV ECHO MEAS - MV MEAN PG: 3.7 MMHG
BH CV ECHO MEAS - MV V2 MAX: 141.7 CM/SEC
BH CV ECHO MEAS - MV V2 MEAN: 92.4 CM/SEC
BH CV ECHO MEAS - MV V2 VTI: 38.7 CM
BH CV ECHO MEAS - MVA(VTI): 2.2 CM^2
BH CV ECHO MEAS - PA ACC TIME: 0.11 SEC
BH CV ECHO MEAS - PA MAX PG (FULL): 2.2 MMHG
BH CV ECHO MEAS - PA MAX PG: 5.1 MMHG
BH CV ECHO MEAS - PA MEAN PG (FULL): 1.4 MMHG
BH CV ECHO MEAS - PA MEAN PG: 2.7 MMHG
BH CV ECHO MEAS - PA PR(ACCEL): 30.7 MMHG
BH CV ECHO MEAS - PA V2 MAX: 112.9 CM/SEC
BH CV ECHO MEAS - PA V2 MEAN: 78.3 CM/SEC
BH CV ECHO MEAS - PA V2 VTI: 16.3 CM
BH CV ECHO MEAS - RV MAX PG: 2.9 MMHG
BH CV ECHO MEAS - RV MEAN PG: 1.3 MMHG
BH CV ECHO MEAS - RV V1 MAX: 85.6 CM/SEC
BH CV ECHO MEAS - RV V1 MEAN: 51.3 CM/SEC
BH CV ECHO MEAS - RV V1 VTI: 14 CM
BH CV ECHO MEAS - RVDD: 3 CM
BH CV ECHO MEAS - SI(AO): 247 ML/M^2
BH CV ECHO MEAS - SI(CUBED): 50.6 ML/M^2
BH CV ECHO MEAS - SI(LVOT): 45.5 ML/M^2
BH CV ECHO MEAS - SI(MOD-SP4): 18.7 ML/M^2
BH CV ECHO MEAS - SI(TEICH): 46.1 ML/M^2
BH CV ECHO MEAS - SV(AO): 456.2 ML
BH CV ECHO MEAS - SV(CUBED): 93.4 ML
BH CV ECHO MEAS - SV(LVOT): 84 ML
BH CV ECHO MEAS - SV(MOD-SP4): 34.5 ML
BH CV ECHO MEAS - SV(TEICH): 85.2 ML
BILIRUB SERPL-MCNC: 0.2 MG/DL (ref 0–1.2)
BILIRUB SERPL-MCNC: 0.3 MG/DL (ref 0–1.2)
BILIRUB SERPL-MCNC: 0.4 MG/DL (ref 0–1.2)
BILIRUB UR QL STRIP: NEGATIVE
BILIRUB UR QL STRIP: NEGATIVE
BLD GP AB SCN SERPL QL: NEGATIVE
BUN SERPL-MCNC: 11 MG/DL (ref 8–23)
BUN SERPL-MCNC: 11 MG/DL (ref 8–23)
BUN SERPL-MCNC: 12 MG/DL (ref 8–23)
BUN SERPL-MCNC: 12 MG/DL (ref 8–23)
BUN SERPL-MCNC: 13 MG/DL (ref 8–23)
BUN SERPL-MCNC: 15 MG/DL (ref 8–23)
BUN SERPL-MCNC: 15 MG/DL (ref 8–23)
BUN SERPL-MCNC: 16 MG/DL (ref 8–23)
BUN SERPL-MCNC: 19 MG/DL (ref 8–23)
BUN SERPL-MCNC: 8 MG/DL (ref 8–23)
BUN/CREAT SERPL: 14.3 (ref 7–25)
BUN/CREAT SERPL: 14.9 (ref 7–25)
BUN/CREAT SERPL: 15.4 (ref 7–25)
BUN/CREAT SERPL: 16.4 (ref 7–25)
BUN/CREAT SERPL: 16.7 (ref 7–25)
BUN/CREAT SERPL: 17.4 (ref 7–25)
BUN/CREAT SERPL: 19.1 (ref 7–25)
BUN/CREAT SERPL: 21.6 (ref 7–25)
BUN/CREAT SERPL: 22.7 (ref 7–25)
BUN/CREAT SERPL: 25 (ref 7–25)
C PNEUM DNA NPH QL NAA+NON-PROBE: NOT DETECTED
CALCIUM SPEC-SCNC: 7.9 MG/DL (ref 8.6–10.5)
CALCIUM SPEC-SCNC: 8 MG/DL (ref 8.6–10.5)
CALCIUM SPEC-SCNC: 8.2 MG/DL (ref 8.6–10.5)
CALCIUM SPEC-SCNC: 8.3 MG/DL (ref 8.6–10.5)
CALCIUM SPEC-SCNC: 8.3 MG/DL (ref 8.6–10.5)
CALCIUM SPEC-SCNC: 8.4 MG/DL (ref 8.6–10.5)
CALCIUM SPEC-SCNC: 8.5 MG/DL (ref 8.6–10.5)
CALCIUM SPEC-SCNC: 8.7 MG/DL (ref 8.6–10.5)
CALCIUM SPEC-SCNC: 8.7 MG/DL (ref 8.6–10.5)
CALCIUM SPEC-SCNC: 8.8 MG/DL (ref 8.6–10.5)
CERULOPLASMIN SERPL-MCNC: 26 MG/DL (ref 16–31)
CHLORIDE SERPL-SCNC: 104 MMOL/L (ref 98–107)
CHLORIDE SERPL-SCNC: 93 MMOL/L (ref 98–107)
CHLORIDE SERPL-SCNC: 95 MMOL/L (ref 98–107)
CHLORIDE SERPL-SCNC: 95 MMOL/L (ref 98–107)
CHLORIDE SERPL-SCNC: 96 MMOL/L (ref 98–107)
CHLORIDE SERPL-SCNC: 98 MMOL/L (ref 98–107)
CHLORIDE SERPL-SCNC: 98 MMOL/L (ref 98–107)
CHLORIDE SERPL-SCNC: 99 MMOL/L (ref 98–107)
CLARITY UR: CLEAR
CLARITY UR: CLEAR
CMV DNA SPEC QL NAA+PROBE: NEGATIVE
CMV DNA SPEC QL NAA+PROBE: NEGATIVE
CO2 BLDA-SCNC: 24.4 MMOL/L (ref 22–29)
CO2 SERPL-SCNC: 21 MMOL/L (ref 22–29)
CO2 SERPL-SCNC: 22 MMOL/L (ref 22–29)
CO2 SERPL-SCNC: 23 MMOL/L (ref 22–29)
CO2 SERPL-SCNC: 23 MMOL/L (ref 22–29)
CO2 SERPL-SCNC: 24 MMOL/L (ref 22–29)
CO2 SERPL-SCNC: 24 MMOL/L (ref 22–29)
CO2 SERPL-SCNC: 25 MMOL/L (ref 22–29)
CO2 SERPL-SCNC: 26 MMOL/L (ref 22–29)
CO2 SERPL-SCNC: 28 MMOL/L (ref 22–29)
CO2 SERPL-SCNC: 28 MMOL/L (ref 22–29)
COLOR UR: YELLOW
COLOR UR: YELLOW
COPPER SERPL-MCNC: 199 UG/DL (ref 72–166)
CREAT SERPL-MCNC: 0.56 MG/DL (ref 0.76–1.27)
CREAT SERPL-MCNC: 0.64 MG/DL (ref 0.76–1.27)
CREAT SERPL-MCNC: 0.64 MG/DL (ref 0.76–1.27)
CREAT SERPL-MCNC: 0.66 MG/DL (ref 0.76–1.27)
CREAT SERPL-MCNC: 0.67 MG/DL (ref 0.76–1.27)
CREAT SERPL-MCNC: 0.68 MG/DL (ref 0.76–1.27)
CREAT SERPL-MCNC: 0.69 MG/DL (ref 0.76–1.27)
CREAT SERPL-MCNC: 0.72 MG/DL (ref 0.76–1.27)
CREAT SERPL-MCNC: 0.74 MG/DL (ref 0.76–1.27)
CREAT SERPL-MCNC: 0.78 MG/DL (ref 0.76–1.27)
CREAT SERPL-MCNC: 0.88 MG/DL (ref 0.76–1.27)
CREAT SERPL-MCNC: 0.9 MG/DL (ref 0.76–1.27)
CREAT SERPL-MCNC: 1.03 MG/DL (ref 0.76–1.27)
D-LACTATE SERPL-SCNC: 0.7 MMOL/L (ref 0.5–2)
D-LACTATE SERPL-SCNC: 1 MMOL/L (ref 0.5–2)
D-LACTATE SERPL-SCNC: 1 MMOL/L (ref 0.5–2)
DEPRECATED RDW RBC AUTO: 47.7 FL (ref 37–54)
DEPRECATED RDW RBC AUTO: 48.1 FL (ref 37–54)
DEPRECATED RDW RBC AUTO: 48.1 FL (ref 37–54)
DEPRECATED RDW RBC AUTO: 48.6 FL (ref 37–54)
DEPRECATED RDW RBC AUTO: 50.3 FL (ref 37–54)
DEPRECATED RDW RBC AUTO: 55.1 FL (ref 37–54)
DEPRECATED RDW RBC AUTO: 55.6 FL (ref 37–54)
DEPRECATED RDW RBC AUTO: 56 FL (ref 37–54)
EOSINOPHIL # BLD AUTO: 0 10*3/MM3 (ref 0–0.4)
EOSINOPHIL # BLD AUTO: 0.1 10*3/MM3 (ref 0–0.4)
EOSINOPHIL NFR BLD AUTO: 0 % (ref 0.3–6.2)
EOSINOPHIL NFR BLD AUTO: 0.4 % (ref 0.3–6.2)
EOSINOPHIL NFR BLD AUTO: 0.4 % (ref 0.3–6.2)
EOSINOPHIL NFR BLD AUTO: 0.5 % (ref 0.3–6.2)
EOSINOPHIL NFR BLD AUTO: 0.7 % (ref 0.3–6.2)
EOSINOPHIL NFR BLD AUTO: 0.9 % (ref 0.3–6.2)
ERYTHROCYTE [DISTWIDTH] IN BLOOD BY AUTOMATED COUNT: 16.3 % (ref 12.3–15.4)
ERYTHROCYTE [DISTWIDTH] IN BLOOD BY AUTOMATED COUNT: 16.4 % (ref 12.3–15.4)
ERYTHROCYTE [DISTWIDTH] IN BLOOD BY AUTOMATED COUNT: 16.6 % (ref 12.3–15.4)
ERYTHROCYTE [DISTWIDTH] IN BLOOD BY AUTOMATED COUNT: 16.9 % (ref 12.3–15.4)
ERYTHROCYTE [DISTWIDTH] IN BLOOD BY AUTOMATED COUNT: 18.5 % (ref 12.3–15.4)
ERYTHROCYTE [DISTWIDTH] IN BLOOD BY AUTOMATED COUNT: 18.6 % (ref 12.3–15.4)
ERYTHROCYTE [DISTWIDTH] IN BLOOD BY AUTOMATED COUNT: 18.9 % (ref 12.3–15.4)
FERRITIN SERPL-MCNC: 241.5 NG/ML (ref 30–400)
FLUAV H1 2009 PAND RNA NPH QL NAA+PROBE: NOT DETECTED
FLUAV H1 HA GENE NPH QL NAA+PROBE: NOT DETECTED
FLUAV H3 RNA NPH QL NAA+PROBE: NOT DETECTED
FLUAV SUBTYP SPEC NAA+PROBE: NOT DETECTED
FLUBV RNA ISLT QL NAA+PROBE: NOT DETECTED
FOLATE SERPL-MCNC: 8.91 NG/ML (ref 4.78–24.2)
FUNGUS WND CULT: ABNORMAL
FUNGUS WND CULT: ABNORMAL
GAMMA GLOB SERPL ELPH-MCNC: 2.4 G/DL (ref 0.4–1.8)
GFR SERPL CREATININE-BSD FRML MDRD: 105 ML/MIN/1.73
GFR SERPL CREATININE-BSD FRML MDRD: 108 ML/MIN/1.73
GFR SERPL CREATININE-BSD FRML MDRD: 113 ML/MIN/1.73
GFR SERPL CREATININE-BSD FRML MDRD: 115 ML/MIN/1.73
GFR SERPL CREATININE-BSD FRML MDRD: 117 ML/MIN/1.73
GFR SERPL CREATININE-BSD FRML MDRD: 119 ML/MIN/1.73
GFR SERPL CREATININE-BSD FRML MDRD: 124 ML/MIN/1.73
GFR SERPL CREATININE-BSD FRML MDRD: 124 ML/MIN/1.73
GFR SERPL CREATININE-BSD FRML MDRD: 144 ML/MIN/1.73
GFR SERPL CREATININE-BSD FRML MDRD: 71 ML/MIN/1.73
GFR SERPL CREATININE-BSD FRML MDRD: 83 ML/MIN/1.73
GFR SERPL CREATININE-BSD FRML MDRD: 86 ML/MIN/1.73
GFR SERPL CREATININE-BSD FRML MDRD: 98 ML/MIN/1.73
GLOBULIN SER CALC-MCNC: 4.8 G/DL (ref 2.2–3.9)
GLOBULIN UR ELPH-MCNC: 3 GM/DL
GLOBULIN UR ELPH-MCNC: 3.7 GM/DL
GLOBULIN UR ELPH-MCNC: 4.9 GM/DL
GLUCOSE BLDC GLUCOMTR-MCNC: 96 MG/DL (ref 70–105)
GLUCOSE SERPL-MCNC: 106 MG/DL (ref 65–99)
GLUCOSE SERPL-MCNC: 109 MG/DL (ref 65–99)
GLUCOSE SERPL-MCNC: 122 MG/DL (ref 65–99)
GLUCOSE SERPL-MCNC: 126 MG/DL (ref 65–99)
GLUCOSE SERPL-MCNC: 129 MG/DL (ref 65–99)
GLUCOSE SERPL-MCNC: 136 MG/DL (ref 65–99)
GLUCOSE SERPL-MCNC: 143 MG/DL (ref 65–99)
GLUCOSE SERPL-MCNC: 162 MG/DL (ref 65–99)
GLUCOSE SERPL-MCNC: 98 MG/DL (ref 65–99)
GLUCOSE SERPL-MCNC: 99 MG/DL (ref 65–99)
GLUCOSE UR STRIP-MCNC: NEGATIVE MG/DL
GLUCOSE UR STRIP-MCNC: NEGATIVE MG/DL
GRAM STN SPEC: NORMAL
HADV DNA SPEC NAA+PROBE: NOT DETECTED
HAPTOGLOB SERPL-MCNC: 372 MG/DL (ref 30–200)
HCO3 BLDA-SCNC: 23.4 MMOL/L (ref 21–28)
HCOV 229E RNA SPEC QL NAA+PROBE: NOT DETECTED
HCOV HKU1 RNA SPEC QL NAA+PROBE: NOT DETECTED
HCOV NL63 RNA SPEC QL NAA+PROBE: NOT DETECTED
HCOV OC43 RNA SPEC QL NAA+PROBE: NOT DETECTED
HCT VFR BLD AUTO: 27.6 % (ref 37.5–51)
HCT VFR BLD AUTO: 27.9 % (ref 37.5–51)
HCT VFR BLD AUTO: 28 % (ref 37.5–51)
HCT VFR BLD AUTO: 28.6 % (ref 37.5–51)
HCT VFR BLD AUTO: 28.9 % (ref 37.5–51)
HCT VFR BLD AUTO: 29.3 % (ref 37.5–51)
HCT VFR BLD AUTO: 29.5 % (ref 37.5–51)
HCT VFR BLD AUTO: 30.7 % (ref 37.5–51)
HCT VFR BLD AUTO: 30.9 % (ref 37.5–51)
HCT VFR BLD AUTO: 30.9 % (ref 37.5–51)
HCT VFR BLD AUTO: 31.4 % (ref 37.5–51)
HCT VFR BLD AUTO: 31.6 % (ref 37.5–51)
HEMODILUTION: NO
HGB BLD-MCNC: 10.1 G/DL (ref 13–17.7)
HGB BLD-MCNC: 10.2 G/DL (ref 13–17.7)
HGB BLD-MCNC: 10.4 G/DL (ref 13–17.7)
HGB BLD-MCNC: 9 G/DL (ref 13–17.7)
HGB BLD-MCNC: 9.1 G/DL (ref 13–17.7)
HGB BLD-MCNC: 9.1 G/DL (ref 13–17.7)
HGB BLD-MCNC: 9.4 G/DL (ref 13–17.7)
HGB BLD-MCNC: 9.4 G/DL (ref 13–17.7)
HGB BLD-MCNC: 9.5 G/DL (ref 13–17.7)
HGB BLD-MCNC: 9.5 G/DL (ref 13–17.7)
HGB UR QL STRIP.AUTO: NEGATIVE
HGB UR QL STRIP.AUTO: NEGATIVE
HMPV RNA NPH QL NAA+NON-PROBE: NOT DETECTED
HOLD SPECIMEN: NORMAL
HOLD SPECIMEN: NORMAL
HPIV1 RNA SPEC QL NAA+PROBE: NOT DETECTED
HPIV2 RNA SPEC QL NAA+PROBE: NOT DETECTED
HPIV3 RNA NPH QL NAA+PROBE: NOT DETECTED
HPIV4 P GENE NPH QL NAA+PROBE: NOT DETECTED
HYALINE CASTS UR QL AUTO: ABNORMAL /LPF
HYALINE CASTS UR QL AUTO: ABNORMAL /LPF
IGA SERPL-MCNC: 379 MG/DL (ref 61–437)
IGG SERPL-MCNC: 2533 MG/DL (ref 603–1613)
IGM SERPL-MCNC: 103 MG/DL (ref 20–172)
INR PPP: 1.04 (ref 0.93–1.1)
INR PPP: 1.04 (ref 0.93–1.1)
INTERPRETATION UR IFE-IMP: NORMAL
IRON 24H UR-MRATE: 16 MCG/DL (ref 59–158)
IRON SATN MFR SERPL: 8 % (ref 20–50)
KETONES UR QL STRIP: NEGATIVE
KETONES UR QL STRIP: NEGATIVE
L PNEUMO1 AG UR QL IA: NEGATIVE
LAB AP CASE REPORT: NORMAL
LAB AP DIAGNOSIS COMMENT: NORMAL
LABORATORY COMMENT REPORT: ABNORMAL
LEUKOCYTE ESTERASE UR QL STRIP.AUTO: NEGATIVE
LEUKOCYTE ESTERASE UR QL STRIP.AUTO: NEGATIVE
LV EF 2D ECHO EST: 65 %
LYMPHOCYTES # BLD AUTO: 1.3 10*3/MM3 (ref 0.7–3.1)
LYMPHOCYTES # BLD AUTO: 1.5 10*3/MM3 (ref 0.7–3.1)
LYMPHOCYTES # BLD AUTO: 1.7 10*3/MM3 (ref 0.7–3.1)
LYMPHOCYTES # BLD AUTO: 1.8 10*3/MM3 (ref 0.7–3.1)
LYMPHOCYTES # BLD AUTO: 1.8 10*3/MM3 (ref 0.7–3.1)
LYMPHOCYTES # BLD AUTO: 1.9 10*3/MM3 (ref 0.7–3.1)
LYMPHOCYTES # BLD AUTO: 2.1 10*3/MM3 (ref 0.7–3.1)
LYMPHOCYTES # BLD AUTO: 2.2 10*3/MM3 (ref 0.7–3.1)
LYMPHOCYTES # BLD AUTO: 2.5 10*3/MM3 (ref 0.7–3.1)
LYMPHOCYTES NFR BLD AUTO: 12.2 % (ref 19.6–45.3)
LYMPHOCYTES NFR BLD AUTO: 13.2 % (ref 19.6–45.3)
LYMPHOCYTES NFR BLD AUTO: 13.3 % (ref 19.6–45.3)
LYMPHOCYTES NFR BLD AUTO: 16.4 % (ref 19.6–45.3)
LYMPHOCYTES NFR BLD AUTO: 16.4 % (ref 19.6–45.3)
LYMPHOCYTES NFR BLD AUTO: 16.6 % (ref 19.6–45.3)
LYMPHOCYTES NFR BLD AUTO: 16.7 % (ref 19.6–45.3)
LYMPHOCYTES NFR BLD AUTO: 17 % (ref 19.6–45.3)
LYMPHOCYTES NFR BLD AUTO: 17.3 % (ref 19.6–45.3)
LYMPHOCYTES NFR BLD AUTO: 18.6 % (ref 19.6–45.3)
LYMPHOCYTES NFR BLD AUTO: 21.8 % (ref 19.6–45.3)
LYMPHOCYTES NFR BLD AUTO: 23.1 % (ref 19.6–45.3)
M PNEUMO IGG SER IA-ACNC: NOT DETECTED
M PROTEIN SERPL ELPH-MCNC: 0.9 G/DL
MCH RBC QN AUTO: 27 PG (ref 26.6–33)
MCH RBC QN AUTO: 27.1 PG (ref 26.6–33)
MCH RBC QN AUTO: 27.1 PG (ref 26.6–33)
MCH RBC QN AUTO: 27.2 PG (ref 26.6–33)
MCH RBC QN AUTO: 27.4 PG (ref 26.6–33)
MCH RBC QN AUTO: 27.5 PG (ref 26.6–33)
MCH RBC QN AUTO: 27.6 PG (ref 26.6–33)
MCH RBC QN AUTO: 27.8 PG (ref 26.6–33)
MCHC RBC AUTO-ENTMCNC: 32 G/DL (ref 31.5–35.7)
MCHC RBC AUTO-ENTMCNC: 32.2 G/DL (ref 31.5–35.7)
MCHC RBC AUTO-ENTMCNC: 32.4 G/DL (ref 31.5–35.7)
MCHC RBC AUTO-ENTMCNC: 32.5 G/DL (ref 31.5–35.7)
MCHC RBC AUTO-ENTMCNC: 32.5 G/DL (ref 31.5–35.7)
MCHC RBC AUTO-ENTMCNC: 32.6 G/DL (ref 31.5–35.7)
MCHC RBC AUTO-ENTMCNC: 32.7 G/DL (ref 31.5–35.7)
MCHC RBC AUTO-ENTMCNC: 32.7 G/DL (ref 31.5–35.7)
MCHC RBC AUTO-ENTMCNC: 32.9 G/DL (ref 31.5–35.7)
MCHC RBC AUTO-ENTMCNC: 33 G/DL (ref 31.5–35.7)
MCHC RBC AUTO-ENTMCNC: 33.1 G/DL (ref 31.5–35.7)
MCHC RBC AUTO-ENTMCNC: 33.3 G/DL (ref 31.5–35.7)
MCV RBC AUTO: 82.7 FL (ref 79–97)
MCV RBC AUTO: 83.1 FL (ref 79–97)
MCV RBC AUTO: 83.1 FL (ref 79–97)
MCV RBC AUTO: 83.4 FL (ref 79–97)
MCV RBC AUTO: 83.4 FL (ref 79–97)
MCV RBC AUTO: 83.5 FL (ref 79–97)
MCV RBC AUTO: 83.5 FL (ref 79–97)
MCV RBC AUTO: 83.6 FL (ref 79–97)
MCV RBC AUTO: 83.7 FL (ref 79–97)
MCV RBC AUTO: 84.9 FL (ref 79–97)
MCV RBC AUTO: 85.2 FL (ref 79–97)
MCV RBC AUTO: 85.4 FL (ref 79–97)
MODALITY: ABNORMAL
MONOCYTES # BLD AUTO: 0.5 10*3/MM3 (ref 0.1–0.9)
MONOCYTES # BLD AUTO: 0.5 10*3/MM3 (ref 0.1–0.9)
MONOCYTES # BLD AUTO: 0.7 10*3/MM3 (ref 0.1–0.9)
MONOCYTES # BLD AUTO: 0.8 10*3/MM3 (ref 0.1–0.9)
MONOCYTES # BLD AUTO: 0.9 10*3/MM3 (ref 0.1–0.9)
MONOCYTES # BLD AUTO: 0.9 10*3/MM3 (ref 0.1–0.9)
MONOCYTES # BLD AUTO: 1.2 10*3/MM3 (ref 0.1–0.9)
MONOCYTES # BLD AUTO: 1.4 10*3/MM3 (ref 0.1–0.9)
MONOCYTES NFR BLD AUTO: 10.2 % (ref 5–12)
MONOCYTES NFR BLD AUTO: 5.7 % (ref 5–12)
MONOCYTES NFR BLD AUTO: 6 % (ref 5–12)
MONOCYTES NFR BLD AUTO: 6.3 % (ref 5–12)
MONOCYTES NFR BLD AUTO: 7 % (ref 5–12)
MONOCYTES NFR BLD AUTO: 7.1 % (ref 5–12)
MONOCYTES NFR BLD AUTO: 7.2 % (ref 5–12)
MONOCYTES NFR BLD AUTO: 7.4 % (ref 5–12)
MONOCYTES NFR BLD AUTO: 7.7 % (ref 5–12)
MONOCYTES NFR BLD AUTO: 8.3 % (ref 5–12)
MONOCYTES NFR BLD AUTO: 8.3 % (ref 5–12)
MONOCYTES NFR BLD AUTO: 8.5 % (ref 5–12)
MRSA DNA SPEC QL NAA+PROBE: ABNORMAL
MYCOBACTERIUM SPEC CULT: NORMAL
MYCOBACTERIUM SPEC CULT: NORMAL
NEUTROPHILS NFR BLD AUTO: 11.5 10*3/MM3 (ref 1.7–7)
NEUTROPHILS NFR BLD AUTO: 6.3 10*3/MM3 (ref 1.7–7)
NEUTROPHILS NFR BLD AUTO: 6.8 10*3/MM3 (ref 1.7–7)
NEUTROPHILS NFR BLD AUTO: 69.1 % (ref 42.7–76)
NEUTROPHILS NFR BLD AUTO: 69.3 % (ref 42.7–76)
NEUTROPHILS NFR BLD AUTO: 7 10*3/MM3 (ref 1.7–7)
NEUTROPHILS NFR BLD AUTO: 7.1 10*3/MM3 (ref 1.7–7)
NEUTROPHILS NFR BLD AUTO: 7.4 10*3/MM3 (ref 1.7–7)
NEUTROPHILS NFR BLD AUTO: 7.6 10*3/MM3 (ref 1.7–7)
NEUTROPHILS NFR BLD AUTO: 7.7 10*3/MM3 (ref 1.7–7)
NEUTROPHILS NFR BLD AUTO: 7.9 10*3/MM3 (ref 1.7–7)
NEUTROPHILS NFR BLD AUTO: 72.5 % (ref 42.7–76)
NEUTROPHILS NFR BLD AUTO: 72.8 % (ref 42.7–76)
NEUTROPHILS NFR BLD AUTO: 74 % (ref 42.7–76)
NEUTROPHILS NFR BLD AUTO: 74.2 % (ref 42.7–76)
NEUTROPHILS NFR BLD AUTO: 75.5 % (ref 42.7–76)
NEUTROPHILS NFR BLD AUTO: 76.7 % (ref 42.7–76)
NEUTROPHILS NFR BLD AUTO: 77.6 % (ref 42.7–76)
NEUTROPHILS NFR BLD AUTO: 78.8 % (ref 42.7–76)
NEUTROPHILS NFR BLD AUTO: 79.7 % (ref 42.7–76)
NEUTROPHILS NFR BLD AUTO: 8.1 10*3/MM3 (ref 1.7–7)
NEUTROPHILS NFR BLD AUTO: 80.3 % (ref 42.7–76)
NEUTROPHILS NFR BLD AUTO: 9 10*3/MM3 (ref 1.7–7)
NEUTROPHILS NFR BLD AUTO: 9.9 10*3/MM3 (ref 1.7–7)
NIGHT BLUE STAIN TISS: NORMAL
NIGHT BLUE STAIN TISS: NORMAL
NITRITE UR QL STRIP: NEGATIVE
NITRITE UR QL STRIP: NEGATIVE
NRBC BLD AUTO-RTO: 0 /100 WBC (ref 0–0.2)
NRBC BLD AUTO-RTO: 0.1 /100 WBC (ref 0–0.2)
NT-PROBNP SERPL-MCNC: 2291 PG/ML (ref 0–900)
P JIROVECII DNA # SPEC NAA+PROBE: NEGATIVE {COPIES}/ML
P JIROVECII DNA # SPEC NAA+PROBE: NEGATIVE {COPIES}/ML
PATH REPORT.ADDENDUM SPEC: NORMAL
PATH REPORT.FINAL DX SPEC: NORMAL
PATH REPORT.GROSS SPEC: NORMAL
PCO2 BLDA: 32.8 MM HG (ref 35–48)
PH BLDA: 7.46 PH UNITS (ref 7.35–7.45)
PH UR STRIP.AUTO: 7 [PH] (ref 5–8)
PH UR STRIP.AUTO: 7 [PH] (ref 5–8)
PLATELET # BLD AUTO: 351 10*3/MM3 (ref 140–450)
PLATELET # BLD AUTO: 382 10*3/MM3 (ref 140–450)
PLATELET # BLD AUTO: 387 10*3/MM3 (ref 140–450)
PLATELET # BLD AUTO: 397 10*3/MM3 (ref 140–450)
PLATELET # BLD AUTO: 406 10*3/MM3 (ref 140–450)
PLATELET # BLD AUTO: 411 10*3/MM3 (ref 140–450)
PLATELET # BLD AUTO: 419 10*3/MM3 (ref 140–450)
PLATELET # BLD AUTO: 419 10*3/MM3 (ref 140–450)
PLATELET # BLD AUTO: 456 10*3/MM3 (ref 140–450)
PLATELET # BLD AUTO: 462 10*3/MM3 (ref 140–450)
PLATELET # BLD AUTO: 473 10*3/MM3 (ref 140–450)
PLATELET # BLD AUTO: 482 10*3/MM3 (ref 140–450)
PMV BLD AUTO: 6.2 FL (ref 6–12)
PMV BLD AUTO: 6.3 FL (ref 6–12)
PMV BLD AUTO: 6.4 FL (ref 6–12)
PMV BLD AUTO: 6.5 FL (ref 6–12)
PMV BLD AUTO: 6.6 FL (ref 6–12)
PMV BLD AUTO: 6.6 FL (ref 6–12)
PMV BLD AUTO: 6.7 FL (ref 6–12)
PMV BLD AUTO: 7 FL (ref 6–12)
PO2 BLDA: 60.7 MM HG (ref 83–108)
POTASSIUM SERPL-SCNC: 3.5 MMOL/L (ref 3.5–5.2)
POTASSIUM SERPL-SCNC: 3.5 MMOL/L (ref 3.5–5.2)
POTASSIUM SERPL-SCNC: 3.7 MMOL/L (ref 3.5–5.2)
POTASSIUM SERPL-SCNC: 3.7 MMOL/L (ref 3.5–5.2)
POTASSIUM SERPL-SCNC: 3.8 MMOL/L (ref 3.5–5.2)
POTASSIUM SERPL-SCNC: 3.9 MMOL/L (ref 3.5–5.2)
POTASSIUM SERPL-SCNC: 4.2 MMOL/L (ref 3.5–5.2)
POTASSIUM SERPL-SCNC: 4.3 MMOL/L (ref 3.5–5.2)
POTASSIUM SERPL-SCNC: 4.4 MMOL/L (ref 3.5–5.2)
POTASSIUM SERPL-SCNC: 4.8 MMOL/L (ref 3.5–5.2)
PROCALCITONIN SERPL-MCNC: 0.09 NG/ML (ref 0–0.25)
PROT PATTERN SERPL ELPH-IMP: ABNORMAL
PROT PATTERN SERPL IFE-IMP: ABNORMAL
PROT SERPL-MCNC: 6.1 G/DL (ref 6–8.5)
PROT SERPL-MCNC: 6.5 G/DL (ref 6–8.5)
PROT SERPL-MCNC: 7.3 G/DL (ref 6–8.5)
PROT SERPL-MCNC: 7.8 G/DL (ref 6–8.5)
PROT UR QL STRIP: ABNORMAL
PROT UR QL STRIP: ABNORMAL
PROTHROMBIN TIME: 11.4 SECONDS (ref 9.6–11.7)
PROTHROMBIN TIME: 11.4 SECONDS (ref 9.6–11.7)
QT INTERVAL: 339 MS
QT INTERVAL: 380 MS
RBC # BLD AUTO: 3.3 10*6/MM3 (ref 4.14–5.8)
RBC # BLD AUTO: 3.31 10*6/MM3 (ref 4.14–5.8)
RBC # BLD AUTO: 3.35 10*6/MM3 (ref 4.14–5.8)
RBC # BLD AUTO: 3.44 10*6/MM3 (ref 4.14–5.8)
RBC # BLD AUTO: 3.45 10*6/MM3 (ref 4.14–5.8)
RBC # BLD AUTO: 3.47 10*6/MM3 (ref 4.14–5.8)
RBC # BLD AUTO: 3.48 10*6/MM3 (ref 4.14–5.8)
RBC # BLD AUTO: 3.67 10*6/MM3 (ref 4.14–5.8)
RBC # BLD AUTO: 3.69 10*6/MM3 (ref 4.14–5.8)
RBC # BLD AUTO: 3.69 10*6/MM3 (ref 4.14–5.8)
RBC # BLD AUTO: 3.78 10*6/MM3 (ref 4.14–5.8)
RBC # BLD AUTO: 3.79 10*6/MM3 (ref 4.14–5.8)
RBC # UR: ABNORMAL /HPF
RBC # UR: ABNORMAL /HPF
REF LAB TEST METHOD: ABNORMAL
REF LAB TEST METHOD: ABNORMAL
RETICS # AUTO: 0.04 10*6/MM3 (ref 0.02–0.13)
RETICS/RBC NFR AUTO: 1.27 % (ref 0.7–1.9)
RH BLD: POSITIVE
RHINOVIRUS RNA SPEC NAA+PROBE: NOT DETECTED
RSV RNA NPH QL NAA+NON-PROBE: NOT DETECTED
S PNEUM AG SPEC QL LA: NEGATIVE
SAO2 % BLDCOA: 92.5 % (ref 94–98)
SARS-COV-2 RNA NPH QL NAA+NON-PROBE: NOT DETECTED
SARS-COV-2 RNA NPH QL NAA+NON-PROBE: NOT DETECTED
SODIUM SERPL-SCNC: 129 MMOL/L (ref 136–145)
SODIUM SERPL-SCNC: 130 MMOL/L (ref 136–145)
SODIUM SERPL-SCNC: 131 MMOL/L (ref 136–145)
SODIUM SERPL-SCNC: 131 MMOL/L (ref 136–145)
SODIUM SERPL-SCNC: 132 MMOL/L (ref 136–145)
SODIUM SERPL-SCNC: 132 MMOL/L (ref 136–145)
SODIUM SERPL-SCNC: 134 MMOL/L (ref 136–145)
SP GR UR STRIP: 1.01 (ref 1–1.03)
SP GR UR STRIP: 1.01 (ref 1–1.03)
SPECIMEN SOURCE: NORMAL
SQUAMOUS #/AREA URNS HPF: ABNORMAL /HPF
SQUAMOUS #/AREA URNS HPF: ABNORMAL /HPF
T&S EXPIRATION DATE: NORMAL
TIBC SERPL-MCNC: 197 MCG/DL (ref 298–536)
TRANSFERRIN SERPL-MCNC: 132 MG/DL (ref 200–360)
TROPONIN T SERPL-MCNC: <0.01 NG/ML (ref 0–0.03)
TROPONIN T SERPL-MCNC: <0.01 NG/ML (ref 0–0.03)
UROBILINOGEN UR QL STRIP: ABNORMAL
UROBILINOGEN UR QL STRIP: ABNORMAL
VANCOMYCIN PEAK SERPL-MCNC: 24.2 MCG/ML (ref 20–40)
VANCOMYCIN TROUGH SERPL-MCNC: 10.1 MCG/ML (ref 5–20)
VIRUS SPEC CULT: NORMAL
VIT B12 BLD-MCNC: 610 PG/ML (ref 211–946)
WBC # BLD AUTO: 10.2 10*3/MM3 (ref 3.4–10.8)
WBC # BLD AUTO: 11.2 10*3/MM3 (ref 3.4–10.8)
WBC # BLD AUTO: 11.3 10*3/MM3 (ref 3.4–10.8)
WBC # BLD AUTO: 13.7 10*3/MM3 (ref 3.4–10.8)
WBC # BLD AUTO: 14.6 10*3/MM3 (ref 3.4–10.8)
WBC # BLD AUTO: 9.1 10*3/MM3 (ref 3.4–10.8)
WBC # BLD AUTO: 9.1 10*3/MM3 (ref 3.4–10.8)
WBC # BLD AUTO: 9.2 10*3/MM3 (ref 3.4–10.8)
WBC # BLD AUTO: 9.2 10*3/MM3 (ref 3.4–10.8)
WBC UR QL AUTO: ABNORMAL /HPF
WBC UR QL AUTO: ABNORMAL /HPF
WHOLE BLOOD HOLD SPECIMEN: NORMAL
WHOLE BLOOD HOLD SPECIMEN: NORMAL

## 2020-01-01 PROCEDURE — 88341 IMHCHEM/IMCYTCHM EA ADD ANTB: CPT | Performed by: THORACIC SURGERY (CARDIOTHORACIC VASCULAR SURGERY)

## 2020-01-01 PROCEDURE — 85730 THROMBOPLASTIN TIME PARTIAL: CPT | Performed by: STUDENT IN AN ORGANIZED HEALTH CARE EDUCATION/TRAINING PROGRAM

## 2020-01-01 PROCEDURE — 77334 RADIATION TREATMENT AID(S): CPT | Performed by: RADIOLOGY

## 2020-01-01 PROCEDURE — 77386: CPT | Performed by: RADIOLOGY

## 2020-01-01 PROCEDURE — 87102 FUNGUS ISOLATION CULTURE: CPT | Performed by: INTERNAL MEDICINE

## 2020-01-01 PROCEDURE — 0100U HC BIOFIRE FILMARRAY RESP PANEL 2: CPT | Performed by: INTERNAL MEDICINE

## 2020-01-01 PROCEDURE — 94799 UNLISTED PULMONARY SVC/PX: CPT

## 2020-01-01 PROCEDURE — A9552 F18 FDG: HCPCS | Performed by: RADIOLOGY

## 2020-01-01 PROCEDURE — 77336 RADIATION PHYSICS CONSULT: CPT | Performed by: RADIOLOGY

## 2020-01-01 PROCEDURE — 88108 CYTOPATH CONCENTRATE TECH: CPT | Performed by: INTERNAL MEDICINE

## 2020-01-01 PROCEDURE — 85025 COMPLETE CBC W/AUTO DIFF WBC: CPT | Performed by: NURSE PRACTITIONER

## 2020-01-01 PROCEDURE — 0BB38ZZ EXCISION OF RIGHT MAIN BRONCHUS, VIA NATURAL OR ARTIFICIAL OPENING ENDOSCOPIC: ICD-10-PCS | Performed by: THORACIC SURGERY (CARDIOTHORACIC VASCULAR SURGERY)

## 2020-01-01 PROCEDURE — 25010000002 GADOTERIDOL PER 1 ML: Performed by: INTERNAL MEDICINE

## 2020-01-01 PROCEDURE — 25010000002 PROPOFOL 10 MG/ML EMULSION: Performed by: NURSE ANESTHETIST, CERTIFIED REGISTERED

## 2020-01-01 PROCEDURE — 0BB48ZZ EXCISION OF RIGHT UPPER LOBE BRONCHUS, VIA NATURAL OR ARTIFICIAL OPENING ENDOSCOPIC: ICD-10-PCS | Performed by: THORACIC SURGERY (CARDIOTHORACIC VASCULAR SURGERY)

## 2020-01-01 PROCEDURE — 77300 RADIATION THERAPY DOSE PLAN: CPT

## 2020-01-01 PROCEDURE — 74177 CT ABD & PELVIS W/CONTRAST: CPT

## 2020-01-01 PROCEDURE — 82728 ASSAY OF FERRITIN: CPT | Performed by: NURSE PRACTITIONER

## 2020-01-01 PROCEDURE — 77427 RADIATION TX MANAGEMENT X5: CPT | Performed by: RADIOLOGY

## 2020-01-01 PROCEDURE — 87899 AGENT NOS ASSAY W/OPTIC: CPT | Performed by: NURSE PRACTITIONER

## 2020-01-01 PROCEDURE — 82565 ASSAY OF CREATININE: CPT | Performed by: INTERNAL MEDICINE

## 2020-01-01 PROCEDURE — 63710000001 PREDNISONE PER 1 MG: Performed by: INTERNAL MEDICINE

## 2020-01-01 PROCEDURE — 99232 SBSQ HOSP IP/OBS MODERATE 35: CPT | Performed by: STUDENT IN AN ORGANIZED HEALTH CARE EDUCATION/TRAINING PROGRAM

## 2020-01-01 PROCEDURE — 77386: CPT

## 2020-01-01 PROCEDURE — 78815 PET IMAGE W/CT SKULL-THIGH: CPT

## 2020-01-01 PROCEDURE — 77338 DESIGN MLC DEVICE FOR IMRT: CPT | Performed by: RADIOLOGY

## 2020-01-01 PROCEDURE — 83540 ASSAY OF IRON: CPT | Performed by: NURSE PRACTITIONER

## 2020-01-01 PROCEDURE — 77014 CHG CT GUIDANCE RADIATION THERAPY FLDS PLACEMENT: CPT | Performed by: RADIOLOGY

## 2020-01-01 PROCEDURE — 71045 X-RAY EXAM CHEST 1 VIEW: CPT

## 2020-01-01 PROCEDURE — 82803 BLOOD GASES ANY COMBINATION: CPT

## 2020-01-01 PROCEDURE — 87641 MR-STAPH DNA AMP PROBE: CPT | Performed by: NURSE PRACTITIONER

## 2020-01-01 PROCEDURE — 0B9F8ZX DRAINAGE OF RIGHT LOWER LUNG LOBE, VIA NATURAL OR ARTIFICIAL OPENING ENDOSCOPIC, DIAGNOSTIC: ICD-10-PCS | Performed by: INTERNAL MEDICINE

## 2020-01-01 PROCEDURE — 99214 OFFICE O/P EST MOD 30 MIN: CPT | Performed by: INTERNAL MEDICINE

## 2020-01-01 PROCEDURE — 77470 SPECIAL RADIATION TREATMENT: CPT | Performed by: RADIOLOGY

## 2020-01-01 PROCEDURE — 86900 BLOOD TYPING SEROLOGIC ABO: CPT | Performed by: THORACIC SURGERY (CARDIOTHORACIC VASCULAR SURGERY)

## 2020-01-01 PROCEDURE — 99233 SBSQ HOSP IP/OBS HIGH 50: CPT | Performed by: INTERNAL MEDICINE

## 2020-01-01 PROCEDURE — 90686 IIV4 VACC NO PRSV 0.5 ML IM: CPT | Performed by: INTERNAL MEDICINE

## 2020-01-01 PROCEDURE — 99239 HOSP IP/OBS DSCHRG MGMT >30: CPT | Performed by: STUDENT IN AN ORGANIZED HEALTH CARE EDUCATION/TRAINING PROGRAM

## 2020-01-01 PROCEDURE — 77300 RADIATION THERAPY DOSE PLAN: CPT | Performed by: RADIOLOGY

## 2020-01-01 PROCEDURE — 25010000002 AZITHROMYCIN PER 500 MG: Performed by: EMERGENCY MEDICINE

## 2020-01-01 PROCEDURE — 83880 ASSAY OF NATRIURETIC PEPTIDE: CPT | Performed by: EMERGENCY MEDICINE

## 2020-01-01 PROCEDURE — 25010000002 CEFEPIME PER 500 MG: Performed by: EMERGENCY MEDICINE

## 2020-01-01 PROCEDURE — 84145 PROCALCITONIN (PCT): CPT | Performed by: EMERGENCY MEDICINE

## 2020-01-01 PROCEDURE — 25010000002 PIPERACILLIN SOD-TAZOBACTAM PER 1 G: Performed by: INTERNAL MEDICINE

## 2020-01-01 PROCEDURE — 86901 BLOOD TYPING SEROLOGIC RH(D): CPT | Performed by: THORACIC SURGERY (CARDIOTHORACIC VASCULAR SURGERY)

## 2020-01-01 PROCEDURE — 87496 CYTOMEG DNA AMP PROBE: CPT | Performed by: INTERNAL MEDICINE

## 2020-01-01 PROCEDURE — 85025 COMPLETE CBC W/AUTO DIFF WBC: CPT | Performed by: EMERGENCY MEDICINE

## 2020-01-01 PROCEDURE — 86900 BLOOD TYPING SEROLOGIC ABO: CPT | Performed by: INTERNAL MEDICINE

## 2020-01-01 PROCEDURE — 02HV33Z INSERTION OF INFUSION DEVICE INTO SUPERIOR VENA CAVA, PERCUTANEOUS APPROACH: ICD-10-PCS | Performed by: THORACIC SURGERY (CARDIOTHORACIC VASCULAR SURGERY)

## 2020-01-01 PROCEDURE — 76000 FLUOROSCOPY <1 HR PHYS/QHP: CPT

## 2020-01-01 PROCEDURE — 99222 1ST HOSP IP/OBS MODERATE 55: CPT | Performed by: RADIOLOGY

## 2020-01-01 PROCEDURE — 25010000002 PROPOFOL 10 MG/ML EMULSION: Performed by: ANESTHESIOLOGY

## 2020-01-01 PROCEDURE — 25010000002 ENOXAPARIN PER 10 MG: Performed by: NURSE PRACTITIONER

## 2020-01-01 PROCEDURE — 86901 BLOOD TYPING SEROLOGIC RH(D): CPT | Performed by: STUDENT IN AN ORGANIZED HEALTH CARE EDUCATION/TRAINING PROGRAM

## 2020-01-01 PROCEDURE — 0 IOPAMIDOL PER 1 ML: Performed by: INTERNAL MEDICINE

## 2020-01-01 PROCEDURE — 87205 SMEAR GRAM STAIN: CPT | Performed by: INTERNAL MEDICINE

## 2020-01-01 PROCEDURE — 80202 ASSAY OF VANCOMYCIN: CPT | Performed by: INTERNAL MEDICINE

## 2020-01-01 PROCEDURE — 0202U NFCT DS 22 TRGT SARS-COV-2: CPT | Performed by: EMERGENCY MEDICINE

## 2020-01-01 PROCEDURE — 36600 WITHDRAWAL OF ARTERIAL BLOOD: CPT

## 2020-01-01 PROCEDURE — 25010000002 VANCOMYCIN 1 G RECONSTITUTED SOLUTION 1 EACH VIAL: Performed by: NURSE PRACTITIONER

## 2020-01-01 PROCEDURE — 99222 1ST HOSP IP/OBS MODERATE 55: CPT | Performed by: THORACIC SURGERY (CARDIOTHORACIC VASCULAR SURGERY)

## 2020-01-01 PROCEDURE — 71260 CT THORAX DX C+: CPT

## 2020-01-01 PROCEDURE — 85025 COMPLETE CBC W/AUTO DIFF WBC: CPT | Performed by: INTERNAL MEDICINE

## 2020-01-01 PROCEDURE — 77301 RADIOTHERAPY DOSE PLAN IMRT: CPT | Performed by: RADIOLOGY

## 2020-01-01 PROCEDURE — 99223 1ST HOSP IP/OBS HIGH 75: CPT | Performed by: INTERNAL MEDICINE

## 2020-01-01 PROCEDURE — 94618 PULMONARY STRESS TESTING: CPT

## 2020-01-01 PROCEDURE — 80053 COMPREHEN METABOLIC PANEL: CPT | Performed by: EMERGENCY MEDICINE

## 2020-01-01 PROCEDURE — 25010000002 FENTANYL CITRATE (PF) 100 MCG/2ML SOLUTION: Performed by: ANESTHESIOLOGY

## 2020-01-01 PROCEDURE — 25010000002 MIDAZOLAM PER 1 MG: Performed by: NURSE ANESTHETIST, CERTIFIED REGISTERED

## 2020-01-01 PROCEDURE — 86901 BLOOD TYPING SEROLOGIC RH(D): CPT | Performed by: INTERNAL MEDICINE

## 2020-01-01 PROCEDURE — 77263 THER RADIOLOGY TX PLNG CPLX: CPT | Performed by: RADIOLOGY

## 2020-01-01 PROCEDURE — 25010000002 ENOXAPARIN PER 10 MG: Performed by: INTERNAL MEDICINE

## 2020-01-01 PROCEDURE — 25010000002 EPINEPHRINE 1 MG/10ML SOLUTION PREFILLED SYRINGE: Performed by: INTERNAL MEDICINE

## 2020-01-01 PROCEDURE — 0 FLUDEOXYGLUCOSE F18 SOLUTION: Performed by: RADIOLOGY

## 2020-01-01 PROCEDURE — 93005 ELECTROCARDIOGRAM TRACING: CPT | Performed by: EMERGENCY MEDICINE

## 2020-01-01 PROCEDURE — 93005 ELECTROCARDIOGRAM TRACING: CPT | Performed by: INTERNAL MEDICINE

## 2020-01-01 PROCEDURE — 84466 ASSAY OF TRANSFERRIN: CPT | Performed by: NURSE PRACTITIONER

## 2020-01-01 PROCEDURE — 81001 URINALYSIS AUTO W/SCOPE: CPT | Performed by: EMERGENCY MEDICINE

## 2020-01-01 PROCEDURE — 99232 SBSQ HOSP IP/OBS MODERATE 35: CPT | Performed by: INTERNAL MEDICINE

## 2020-01-01 PROCEDURE — 94640 AIRWAY INHALATION TREATMENT: CPT

## 2020-01-01 PROCEDURE — A9579 GAD-BASE MR CONTRAST NOS,1ML: HCPCS | Performed by: INTERNAL MEDICINE

## 2020-01-01 PROCEDURE — 88341 IMHCHEM/IMCYTCHM EA ADD ANTB: CPT | Performed by: INTERNAL MEDICINE

## 2020-01-01 PROCEDURE — G0008 ADMIN INFLUENZA VIRUS VAC: HCPCS | Performed by: INTERNAL MEDICINE

## 2020-01-01 PROCEDURE — 25010000002 CEFEPIME PER 500 MG: Performed by: NURSE PRACTITIONER

## 2020-01-01 PROCEDURE — 77001 FLUOROGUIDE FOR VEIN DEVICE: CPT | Performed by: THORACIC SURGERY (CARDIOTHORACIC VASCULAR SURGERY)

## 2020-01-01 PROCEDURE — 80048 BASIC METABOLIC PNL TOTAL CA: CPT | Performed by: NURSE PRACTITIONER

## 2020-01-01 PROCEDURE — 83605 ASSAY OF LACTIC ACID: CPT

## 2020-01-01 PROCEDURE — 86900 BLOOD TYPING SEROLOGIC ABO: CPT | Performed by: STUDENT IN AN ORGANIZED HEALTH CARE EDUCATION/TRAINING PROGRAM

## 2020-01-01 PROCEDURE — 25010000002 VANCOMYCIN 10 G RECONSTITUTED SOLUTION: Performed by: INTERNAL MEDICINE

## 2020-01-01 PROCEDURE — 99232 SBSQ HOSP IP/OBS MODERATE 35: CPT | Performed by: THORACIC SURGERY (CARDIOTHORACIC VASCULAR SURGERY)

## 2020-01-01 PROCEDURE — 25010000002 ONDANSETRON PER 1 MG: Performed by: NURSE PRACTITIONER

## 2020-01-01 PROCEDURE — 85610 PROTHROMBIN TIME: CPT | Performed by: STUDENT IN AN ORGANIZED HEALTH CARE EDUCATION/TRAINING PROGRAM

## 2020-01-01 PROCEDURE — 80048 BASIC METABOLIC PNL TOTAL CA: CPT | Performed by: STUDENT IN AN ORGANIZED HEALTH CARE EDUCATION/TRAINING PROGRAM

## 2020-01-01 PROCEDURE — 85045 AUTOMATED RETICULOCYTE COUNT: CPT | Performed by: NURSE PRACTITIONER

## 2020-01-01 PROCEDURE — 87040 BLOOD CULTURE FOR BACTERIA: CPT | Performed by: EMERGENCY MEDICINE

## 2020-01-01 PROCEDURE — 25010000002 FENTANYL CITRATE (PF) 100 MCG/2ML SOLUTION: Performed by: NURSE ANESTHETIST, CERTIFIED REGISTERED

## 2020-01-01 PROCEDURE — 77014: CPT | Performed by: RADIOLOGY

## 2020-01-01 PROCEDURE — 83605 ASSAY OF LACTIC ACID: CPT | Performed by: NURSE PRACTITIONER

## 2020-01-01 PROCEDURE — 25010000002 VANCOMYCIN 1 G RECONSTITUTED SOLUTION 1 EACH VIAL: Performed by: INTERNAL MEDICINE

## 2020-01-01 PROCEDURE — 86850 RBC ANTIBODY SCREEN: CPT | Performed by: INTERNAL MEDICINE

## 2020-01-01 PROCEDURE — 86850 RBC ANTIBODY SCREEN: CPT | Performed by: THORACIC SURGERY (CARDIOTHORACIC VASCULAR SURGERY)

## 2020-01-01 PROCEDURE — 99285 EMERGENCY DEPT VISIT HI MDM: CPT

## 2020-01-01 PROCEDURE — 87206 SMEAR FLUORESCENT/ACID STAI: CPT | Performed by: INTERNAL MEDICINE

## 2020-01-01 PROCEDURE — 86335 IMMUNFIX E-PHORSIS/URINE/CSF: CPT | Performed by: NURSE PRACTITIONER

## 2020-01-01 PROCEDURE — 25010000003 HEPARIN LOCK FLUSH PER 10 UNITS: Performed by: INTERNAL MEDICINE

## 2020-01-01 PROCEDURE — 25010000002 CEFEPIME PER 500 MG: Performed by: INTERNAL MEDICINE

## 2020-01-01 PROCEDURE — 82962 GLUCOSE BLOOD TEST: CPT

## 2020-01-01 PROCEDURE — 80048 BASIC METABOLIC PNL TOTAL CA: CPT | Performed by: INTERNAL MEDICINE

## 2020-01-01 PROCEDURE — 31640 BRONCHOSCOPY W/TUMOR EXCISE: CPT | Performed by: THORACIC SURGERY (CARDIOTHORACIC VASCULAR SURGERY)

## 2020-01-01 PROCEDURE — 99239 HOSP IP/OBS DSCHRG MGMT >30: CPT | Performed by: INTERNAL MEDICINE

## 2020-01-01 PROCEDURE — 88305 TISSUE EXAM BY PATHOLOGIST: CPT | Performed by: THORACIC SURGERY (CARDIOTHORACIC VASCULAR SURGERY)

## 2020-01-01 PROCEDURE — 82565 ASSAY OF CREATININE: CPT | Performed by: NURSE PRACTITIONER

## 2020-01-01 PROCEDURE — 84155 ASSAY OF PROTEIN SERUM: CPT | Performed by: NURSE PRACTITIONER

## 2020-01-01 PROCEDURE — 25010000002 INFLUENZA VAC SPLIT QUAD 0.5 ML SUSPENSION PREFILLED SYRINGE: Performed by: INTERNAL MEDICINE

## 2020-01-01 PROCEDURE — 25010000002 VANCOMYCIN 10 G RECONSTITUTED SOLUTION: Performed by: EMERGENCY MEDICINE

## 2020-01-01 PROCEDURE — 84165 PROTEIN E-PHORESIS SERUM: CPT | Performed by: NURSE PRACTITIONER

## 2020-01-01 PROCEDURE — 25010000002 FUROSEMIDE PER 20 MG: Performed by: INTERNAL MEDICINE

## 2020-01-01 PROCEDURE — 85610 PROTHROMBIN TIME: CPT | Performed by: INTERNAL MEDICINE

## 2020-01-01 PROCEDURE — 88305 TISSUE EXAM BY PATHOLOGIST: CPT | Performed by: INTERNAL MEDICINE

## 2020-01-01 PROCEDURE — 86334 IMMUNOFIX E-PHORESIS SERUM: CPT | Performed by: NURSE PRACTITIONER

## 2020-01-01 PROCEDURE — 77338 DESIGN MLC DEVICE FOR IMRT: CPT

## 2020-01-01 PROCEDURE — 87798 DETECT AGENT NOS DNA AMP: CPT | Performed by: INTERNAL MEDICINE

## 2020-01-01 PROCEDURE — 93306 TTE W/DOPPLER COMPLETE: CPT

## 2020-01-01 PROCEDURE — B518ZZA FLUOROSCOPY OF SUPERIOR VENA CAVA, GUIDANCE: ICD-10-PCS | Performed by: THORACIC SURGERY (CARDIOTHORACIC VASCULAR SURGERY)

## 2020-01-01 PROCEDURE — 97116 GAIT TRAINING THERAPY: CPT

## 2020-01-01 PROCEDURE — 82784 ASSAY IGA/IGD/IGG/IGM EACH: CPT | Performed by: NURSE PRACTITIONER

## 2020-01-01 PROCEDURE — 99232 SBSQ HOSP IP/OBS MODERATE 35: CPT | Performed by: RADIOLOGY

## 2020-01-01 PROCEDURE — C1788 PORT, INDWELLING, IMP: HCPCS | Performed by: THORACIC SURGERY (CARDIOTHORACIC VASCULAR SURGERY)

## 2020-01-01 PROCEDURE — 82525 ASSAY OF COPPER: CPT | Performed by: NURSE PRACTITIONER

## 2020-01-01 PROCEDURE — 87070 CULTURE OTHR SPECIMN AEROBIC: CPT | Performed by: INTERNAL MEDICINE

## 2020-01-01 PROCEDURE — 25010000002 CEFTRIAXONE PER 250 MG: Performed by: EMERGENCY MEDICINE

## 2020-01-01 PROCEDURE — 86900 BLOOD TYPING SEROLOGIC ABO: CPT

## 2020-01-01 PROCEDURE — 87116 MYCOBACTERIA CULTURE: CPT | Performed by: INTERNAL MEDICINE

## 2020-01-01 PROCEDURE — 82390 ASSAY OF CERULOPLASMIN: CPT | Performed by: NURSE PRACTITIONER

## 2020-01-01 PROCEDURE — 36561 INSERT TUNNELED CV CATH: CPT | Performed by: THORACIC SURGERY (CARDIOTHORACIC VASCULAR SURGERY)

## 2020-01-01 PROCEDURE — 87252 VIRUS INOCULATION TISSUE: CPT | Performed by: INTERNAL MEDICINE

## 2020-01-01 PROCEDURE — 86850 RBC ANTIBODY SCREEN: CPT | Performed by: STUDENT IN AN ORGANIZED HEALTH CARE EDUCATION/TRAINING PROGRAM

## 2020-01-01 PROCEDURE — 82607 VITAMIN B-12: CPT | Performed by: NURSE PRACTITIONER

## 2020-01-01 PROCEDURE — 25010000002 PROPOFOL 10 MG/ML EMULSION: Performed by: ANESTHESIOLOGIST ASSISTANT

## 2020-01-01 PROCEDURE — 0BD48ZX EXTRACTION OF RIGHT UPPER LOBE BRONCHUS, VIA NATURAL OR ARTIFICIAL OPENING ENDOSCOPIC, DIAGNOSTIC: ICD-10-PCS | Performed by: INTERNAL MEDICINE

## 2020-01-01 PROCEDURE — 88342 IMHCHEM/IMCYTCHM 1ST ANTB: CPT | Performed by: THORACIC SURGERY (CARDIOTHORACIC VASCULAR SURGERY)

## 2020-01-01 PROCEDURE — 83010 ASSAY OF HAPTOGLOBIN QUANT: CPT | Performed by: NURSE PRACTITIONER

## 2020-01-01 PROCEDURE — 84484 ASSAY OF TROPONIN QUANT: CPT | Performed by: EMERGENCY MEDICINE

## 2020-01-01 PROCEDURE — 77301 RADIOTHERAPY DOSE PLAN IMRT: CPT

## 2020-01-01 PROCEDURE — 70553 MRI BRAIN STEM W/O & W/DYE: CPT

## 2020-01-01 PROCEDURE — 93010 ELECTROCARDIOGRAM REPORT: CPT | Performed by: INTERNAL MEDICINE

## 2020-01-01 PROCEDURE — 88342 IMHCHEM/IMCYTCHM 1ST ANTB: CPT | Performed by: INTERNAL MEDICINE

## 2020-01-01 PROCEDURE — 97162 PT EVAL MOD COMPLEX 30 MIN: CPT

## 2020-01-01 PROCEDURE — 82746 ASSAY OF FOLIC ACID SERUM: CPT | Performed by: NURSE PRACTITIONER

## 2020-01-01 PROCEDURE — 25010000002 EPINEPHRINE PER 0.1 MG: Performed by: THORACIC SURGERY (CARDIOTHORACIC VASCULAR SURGERY)

## 2020-01-01 PROCEDURE — 86901 BLOOD TYPING SEROLOGIC RH(D): CPT

## 2020-01-01 PROCEDURE — 87071 CULTURE AEROBIC QUANT OTHER: CPT | Performed by: INTERNAL MEDICINE

## 2020-01-01 PROCEDURE — 99284 EMERGENCY DEPT VISIT MOD MDM: CPT

## 2020-01-01 PROCEDURE — 94760 N-INVAS EAR/PLS OXIMETRY 1: CPT

## 2020-01-01 PROCEDURE — 80053 COMPREHEN METABOLIC PANEL: CPT | Performed by: INTERNAL MEDICINE

## 2020-01-01 PROCEDURE — 93306 TTE W/DOPPLER COMPLETE: CPT | Performed by: INTERNAL MEDICINE

## 2020-01-01 DEVICE — POWERPORT M.R.I. IMPLANTABLE PORT WITH ATTACHABLE 8F CHRONOFLEX OPEN-ENDED SINGLE-LUMEN VENOUS CATHETER INTERMEDIATE KIT  (WITH SUTURE PLUGS)
Type: IMPLANTABLE DEVICE | Site: CHEST | Status: FUNCTIONAL
Brand: POWERPORT M.R.I., CHRONOFLEX

## 2020-01-01 RX ORDER — FLUMAZENIL 0.1 MG/ML
0.5 INJECTION INTRAVENOUS AS NEEDED
Status: DISCONTINUED | OUTPATIENT
Start: 2020-01-01 | End: 2020-01-01 | Stop reason: HOSPADM

## 2020-01-01 RX ORDER — SODIUM CHLORIDE 0.9 % (FLUSH) 0.9 %
10 SYRINGE (ML) INJECTION AS NEEDED
Status: DISCONTINUED | OUTPATIENT
Start: 2020-01-01 | End: 2020-01-01 | Stop reason: HOSPADM

## 2020-01-01 RX ORDER — PREDNISONE 20 MG/1
20 TABLET ORAL 2 TIMES DAILY WITH MEALS
Status: DISCONTINUED | OUTPATIENT
Start: 2020-01-01 | End: 2020-01-01 | Stop reason: HOSPADM

## 2020-01-01 RX ORDER — FENTANYL CITRATE 50 UG/ML
INJECTION, SOLUTION INTRAMUSCULAR; INTRAVENOUS AS NEEDED
Status: DISCONTINUED | OUTPATIENT
Start: 2020-01-01 | End: 2020-01-01 | Stop reason: SURG

## 2020-01-01 RX ORDER — TORSEMIDE 20 MG/1
10 TABLET ORAL DAILY
Qty: 45 TABLET | Refills: 3 | Status: SHIPPED | OUTPATIENT
Start: 2020-01-01 | End: 2021-01-01

## 2020-01-01 RX ORDER — LIDOCAINE HYDROCHLORIDE 10 MG/ML
INJECTION, SOLUTION EPIDURAL; INFILTRATION; INTRACAUDAL; PERINEURAL AS NEEDED
Status: DISCONTINUED | OUTPATIENT
Start: 2020-01-01 | End: 2020-01-01 | Stop reason: SURG

## 2020-01-01 RX ORDER — ACETAMINOPHEN 650 MG/1
650 SUPPOSITORY RECTAL EVERY 4 HOURS PRN
Status: DISCONTINUED | OUTPATIENT
Start: 2020-01-01 | End: 2020-01-01 | Stop reason: HOSPADM

## 2020-01-01 RX ORDER — LIDOCAINE 50 MG/G
OINTMENT TOPICAL AS NEEDED
Status: DISCONTINUED | OUTPATIENT
Start: 2020-01-01 | End: 2020-01-01 | Stop reason: HOSPADM

## 2020-01-01 RX ORDER — ACETAMINOPHEN 650 MG/1
650 SUPPOSITORY RECTAL ONCE AS NEEDED
Status: DISCONTINUED | OUTPATIENT
Start: 2020-01-01 | End: 2020-01-01 | Stop reason: HOSPADM

## 2020-01-01 RX ORDER — PREDNISONE 20 MG/1
20 TABLET ORAL DAILY
Qty: 5 TABLET | Refills: 0 | Status: SHIPPED | OUTPATIENT
Start: 2020-01-01 | End: 2020-01-01

## 2020-01-01 RX ORDER — FERROUS SULFATE TAB EC 324 MG (65 MG FE EQUIVALENT) 324 (65 FE) MG
324 TABLET DELAYED RESPONSE ORAL
Status: DISCONTINUED | OUTPATIENT
Start: 2020-01-01 | End: 2020-01-01 | Stop reason: HOSPADM

## 2020-01-01 RX ORDER — CHOLECALCIFEROL (VITAMIN D3) 125 MCG
5 CAPSULE ORAL NIGHTLY PRN
Status: DISCONTINUED | OUTPATIENT
Start: 2020-01-01 | End: 2020-01-01 | Stop reason: HOSPADM

## 2020-01-01 RX ORDER — AMOXICILLIN AND CLAVULANATE POTASSIUM 875; 125 MG/1; MG/1
1 TABLET, FILM COATED ORAL 2 TIMES DAILY
Qty: 18 TABLET | Refills: 0 | Status: SHIPPED | OUTPATIENT
Start: 2020-01-01 | End: 2020-01-01

## 2020-01-01 RX ORDER — AMOXICILLIN AND CLAVULANATE POTASSIUM 500; 125 MG/1; MG/1
1 TABLET, FILM COATED ORAL EVERY 8 HOURS SCHEDULED
Status: DISCONTINUED | OUTPATIENT
Start: 2020-01-01 | End: 2020-01-01 | Stop reason: HOSPADM

## 2020-01-01 RX ORDER — ACETAMINOPHEN 325 MG/1
650 TABLET ORAL ONCE AS NEEDED
Status: DISCONTINUED | OUTPATIENT
Start: 2020-01-01 | End: 2020-01-01 | Stop reason: HOSPADM

## 2020-01-01 RX ORDER — LIDOCAINE HYDROCHLORIDE 20 MG/ML
INJECTION, SOLUTION INTRAVENOUS
Status: DISPENSED
Start: 2020-01-01 | End: 2020-01-01

## 2020-01-01 RX ORDER — SODIUM CHLORIDE 9 MG/ML
9 INJECTION, SOLUTION INTRAVENOUS CONTINUOUS
Status: DISCONTINUED | OUTPATIENT
Start: 2020-01-01 | End: 2020-01-01 | Stop reason: HOSPADM

## 2020-01-01 RX ORDER — IPRATROPIUM BROMIDE AND ALBUTEROL SULFATE 2.5; .5 MG/3ML; MG/3ML
3 SOLUTION RESPIRATORY (INHALATION)
Status: DISCONTINUED | OUTPATIENT
Start: 2020-01-01 | End: 2020-01-01 | Stop reason: HOSPADM

## 2020-01-01 RX ORDER — BISACODYL 5 MG/1
5 TABLET, DELAYED RELEASE ORAL DAILY PRN
Status: DISCONTINUED | OUTPATIENT
Start: 2020-01-01 | End: 2020-01-01 | Stop reason: HOSPADM

## 2020-01-01 RX ORDER — ONDANSETRON 4 MG/1
4 TABLET, FILM COATED ORAL EVERY 6 HOURS PRN
Status: DISCONTINUED | OUTPATIENT
Start: 2020-01-01 | End: 2020-01-01 | Stop reason: HOSPADM

## 2020-01-01 RX ORDER — FLUCONAZOLE 100 MG/1
100 TABLET ORAL EVERY 24 HOURS
Status: DISCONTINUED | OUTPATIENT
Start: 2020-01-01 | End: 2020-01-01

## 2020-01-01 RX ORDER — LIDOCAINE HYDROCHLORIDE AND EPINEPHRINE 10; 10 MG/ML; UG/ML
INJECTION, SOLUTION INFILTRATION; PERINEURAL AS NEEDED
Status: DISCONTINUED | OUTPATIENT
Start: 2020-01-01 | End: 2020-01-01 | Stop reason: HOSPADM

## 2020-01-01 RX ORDER — LIDOCAINE 50 MG/G
OINTMENT TOPICAL
Status: DISPENSED
Start: 2020-01-01 | End: 2020-01-01

## 2020-01-01 RX ORDER — NALOXONE HCL 0.4 MG/ML
0.4 VIAL (ML) INJECTION AS NEEDED
Status: DISCONTINUED | OUTPATIENT
Start: 2020-01-01 | End: 2020-01-01 | Stop reason: HOSPADM

## 2020-01-01 RX ORDER — PROMETHAZINE HYDROCHLORIDE 25 MG/1
25 TABLET ORAL ONCE AS NEEDED
Status: DISCONTINUED | OUTPATIENT
Start: 2020-01-01 | End: 2020-01-01 | Stop reason: HOSPADM

## 2020-01-01 RX ORDER — ACETAMINOPHEN 160 MG/5ML
650 SOLUTION ORAL EVERY 4 HOURS PRN
Status: DISCONTINUED | OUTPATIENT
Start: 2020-01-01 | End: 2020-01-01 | Stop reason: HOSPADM

## 2020-01-01 RX ORDER — ATORVASTATIN CALCIUM 20 MG/1
20 TABLET, FILM COATED ORAL
Qty: 90 TABLET | Refills: 1 | Status: SHIPPED | OUTPATIENT
Start: 2020-01-01

## 2020-01-01 RX ORDER — TORSEMIDE 20 MG/1
10 TABLET ORAL DAILY
Qty: 15 TABLET | Refills: 0 | Status: SHIPPED | OUTPATIENT
Start: 2020-01-01 | End: 2020-01-01 | Stop reason: SDUPTHER

## 2020-01-01 RX ORDER — ATORVASTATIN CALCIUM 20 MG/1
20 TABLET, FILM COATED ORAL NIGHTLY
Status: DISCONTINUED | OUTPATIENT
Start: 2020-01-01 | End: 2020-01-01 | Stop reason: HOSPADM

## 2020-01-01 RX ORDER — LIDOCAINE HYDROCHLORIDE 20 MG/ML
JELLY TOPICAL
Status: DISPENSED
Start: 2020-01-01 | End: 2020-01-01

## 2020-01-01 RX ORDER — EPINEPHRINE 1 MG/ML
INJECTION, SOLUTION, CONCENTRATE INTRAVENOUS AS NEEDED
Status: DISCONTINUED | OUTPATIENT
Start: 2020-01-01 | End: 2020-01-01 | Stop reason: HOSPADM

## 2020-01-01 RX ORDER — SODIUM CHLORIDE, SODIUM LACTATE, POTASSIUM CHLORIDE, CALCIUM CHLORIDE 600; 310; 30; 20 MG/100ML; MG/100ML; MG/100ML; MG/100ML
9 INJECTION, SOLUTION INTRAVENOUS CONTINUOUS PRN
Status: DISCONTINUED | OUTPATIENT
Start: 2020-01-01 | End: 2020-01-01 | Stop reason: HOSPADM

## 2020-01-01 RX ORDER — EPINEPHRINE 0.1 MG/ML
SYRINGE (ML) INJECTION
Status: DISPENSED
Start: 2020-01-01 | End: 2020-01-01

## 2020-01-01 RX ORDER — HYDROCODONE BITARTRATE AND ACETAMINOPHEN 5; 325 MG/1; MG/1
1 TABLET ORAL ONCE AS NEEDED
Status: DISCONTINUED | OUTPATIENT
Start: 2020-01-01 | End: 2020-01-01 | Stop reason: HOSPADM

## 2020-01-01 RX ORDER — SPIRONOLACTONE 25 MG/1
25 TABLET ORAL DAILY
Qty: 90 TABLET | Refills: 3 | Status: SHIPPED | OUTPATIENT
Start: 2020-01-01 | End: 2021-11-27

## 2020-01-01 RX ORDER — ROCURONIUM BROMIDE 10 MG/ML
INJECTION, SOLUTION INTRAVENOUS AS NEEDED
Status: DISCONTINUED | OUTPATIENT
Start: 2020-01-01 | End: 2020-01-01 | Stop reason: SURG

## 2020-01-01 RX ORDER — ASPIRIN 81 MG/1
81 TABLET ORAL EVERY 24 HOURS
Start: 2020-01-01

## 2020-01-01 RX ORDER — HYDRALAZINE HYDROCHLORIDE 50 MG/1
50 TABLET, FILM COATED ORAL EVERY 12 HOURS
Qty: 180 TABLET | Refills: 3 | Status: SHIPPED | OUTPATIENT
Start: 2020-01-01

## 2020-01-01 RX ORDER — ACETAMINOPHEN 500 MG
1000 TABLET ORAL ONCE
Status: COMPLETED | OUTPATIENT
Start: 2020-01-01 | End: 2020-01-01

## 2020-01-01 RX ORDER — SPIRONOLACTONE 25 MG/1
25 TABLET ORAL DAILY
Qty: 30 TABLET | Refills: 0 | Status: SHIPPED | OUTPATIENT
Start: 2020-01-01 | End: 2020-01-01 | Stop reason: SDUPTHER

## 2020-01-01 RX ORDER — PROPOFOL 10 MG/ML
VIAL (ML) INTRAVENOUS AS NEEDED
Status: DISCONTINUED | OUTPATIENT
Start: 2020-01-01 | End: 2020-01-01 | Stop reason: SURG

## 2020-01-01 RX ORDER — HYDRALAZINE HYDROCHLORIDE 25 MG/1
50 TABLET, FILM COATED ORAL 2 TIMES DAILY
Status: DISCONTINUED | OUTPATIENT
Start: 2020-01-01 | End: 2020-01-01 | Stop reason: HOSPADM

## 2020-01-01 RX ORDER — FUROSEMIDE 10 MG/ML
20 INJECTION INTRAMUSCULAR; INTRAVENOUS 3 TIMES DAILY
Status: DISCONTINUED | OUTPATIENT
Start: 2020-01-01 | End: 2020-01-01 | Stop reason: HOSPADM

## 2020-01-01 RX ORDER — SODIUM CHLORIDE 0.9 % (FLUSH) 0.9 %
10 SYRINGE (ML) INJECTION EVERY 12 HOURS SCHEDULED
Status: DISCONTINUED | OUTPATIENT
Start: 2020-01-01 | End: 2020-01-01 | Stop reason: HOSPADM

## 2020-01-01 RX ORDER — FERROUS SULFATE TAB EC 324 MG (65 MG FE EQUIVALENT) 324 (65 FE) MG
324 TABLET DELAYED RESPONSE ORAL 2 TIMES DAILY WITH MEALS
Status: DISCONTINUED | OUTPATIENT
Start: 2020-01-01 | End: 2020-01-01 | Stop reason: HOSPADM

## 2020-01-01 RX ORDER — KETAMINE HYDROCHLORIDE 10 MG/ML
INJECTION INTRAMUSCULAR; INTRAVENOUS AS NEEDED
Status: DISCONTINUED | OUTPATIENT
Start: 2020-01-01 | End: 2020-01-01 | Stop reason: SURG

## 2020-01-01 RX ORDER — ASPIRIN 81 MG/1
81 TABLET ORAL DAILY
Status: DISCONTINUED | OUTPATIENT
Start: 2020-01-01 | End: 2020-01-01 | Stop reason: HOSPADM

## 2020-01-01 RX ORDER — IPRATROPIUM BROMIDE AND ALBUTEROL SULFATE 2.5; .5 MG/3ML; MG/3ML
3 SOLUTION RESPIRATORY (INHALATION) EVERY 4 HOURS PRN
Status: DISCONTINUED | OUTPATIENT
Start: 2020-01-01 | End: 2020-01-01 | Stop reason: HOSPADM

## 2020-01-01 RX ORDER — HEPARIN SODIUM (PORCINE) LOCK FLUSH IV SOLN 100 UNIT/ML 100 UNIT/ML
500 SOLUTION INTRAVENOUS ONCE
Status: COMPLETED | OUTPATIENT
Start: 2020-01-01 | End: 2020-01-01

## 2020-01-01 RX ORDER — HYDRALAZINE HYDROCHLORIDE 50 MG/1
TABLET, FILM COATED ORAL
Qty: 180 TABLET | Refills: 0 | Status: SHIPPED | OUTPATIENT
Start: 2020-01-01 | End: 2020-01-01 | Stop reason: SDUPTHER

## 2020-01-01 RX ORDER — ONDANSETRON 2 MG/ML
4 INJECTION INTRAMUSCULAR; INTRAVENOUS ONCE AS NEEDED
Status: DISCONTINUED | OUTPATIENT
Start: 2020-01-01 | End: 2020-01-01 | Stop reason: HOSPADM

## 2020-01-01 RX ORDER — MIDAZOLAM HYDROCHLORIDE 1 MG/ML
INJECTION INTRAMUSCULAR; INTRAVENOUS AS NEEDED
Status: DISCONTINUED | OUTPATIENT
Start: 2020-01-01 | End: 2020-01-01 | Stop reason: SURG

## 2020-01-01 RX ORDER — ACETAMINOPHEN 325 MG/1
650 TABLET ORAL EVERY 4 HOURS PRN
Status: DISCONTINUED | OUTPATIENT
Start: 2020-01-01 | End: 2020-01-01 | Stop reason: HOSPADM

## 2020-01-01 RX ORDER — SODIUM CHLORIDE, SODIUM LACTATE, POTASSIUM CHLORIDE, AND CALCIUM CHLORIDE .6; .31; .03; .02 G/100ML; G/100ML; G/100ML; G/100ML
20 INJECTION, SOLUTION INTRAVENOUS CONTINUOUS
Status: DISCONTINUED | OUTPATIENT
Start: 2020-01-01 | End: 2020-01-01 | Stop reason: HOSPADM

## 2020-01-01 RX ORDER — NICOTINE 21 MG/24HR
1 PATCH, TRANSDERMAL 24 HOURS TRANSDERMAL DAILY
Status: DISCONTINUED | OUTPATIENT
Start: 2020-01-01 | End: 2020-01-01 | Stop reason: HOSPADM

## 2020-01-01 RX ORDER — PREDNISONE 10 MG/1
TABLET ORAL
Qty: 12 TABLET | Refills: 0 | Status: SHIPPED | OUTPATIENT
Start: 2020-01-01 | End: 2020-01-01

## 2020-01-01 RX ORDER — ALUMINA, MAGNESIA, AND SIMETHICONE 2400; 2400; 240 MG/30ML; MG/30ML; MG/30ML
15 SUSPENSION ORAL EVERY 6 HOURS PRN
Status: DISCONTINUED | OUTPATIENT
Start: 2020-01-01 | End: 2020-01-01 | Stop reason: HOSPADM

## 2020-01-01 RX ORDER — EPINEPHRINE 0.1 MG/ML
SYRINGE (ML) INJECTION
Status: DISCONTINUED
Start: 2020-01-01 | End: 2020-01-01 | Stop reason: WASHOUT

## 2020-01-01 RX ORDER — AMLODIPINE BESYLATE 5 MG/1
5 TABLET ORAL DAILY
Status: DISCONTINUED | OUTPATIENT
Start: 2020-01-01 | End: 2020-01-01 | Stop reason: HOSPADM

## 2020-01-01 RX ORDER — ASPIRIN 81 MG/1
81 TABLET ORAL DAILY
Status: DISCONTINUED | OUTPATIENT
Start: 2020-01-01 | End: 2020-01-01

## 2020-01-01 RX ORDER — MORPHINE SULFATE 4 MG/ML
2 INJECTION, SOLUTION INTRAMUSCULAR; INTRAVENOUS
Status: DISCONTINUED | OUTPATIENT
Start: 2020-01-01 | End: 2020-01-01 | Stop reason: HOSPADM

## 2020-01-01 RX ORDER — PANTOPRAZOLE SODIUM 40 MG/1
40 TABLET, DELAYED RELEASE ORAL
Status: DISCONTINUED | OUTPATIENT
Start: 2020-01-01 | End: 2020-01-01 | Stop reason: HOSPADM

## 2020-01-01 RX ORDER — PROMETHAZINE HYDROCHLORIDE 25 MG/1
25 SUPPOSITORY RECTAL ONCE AS NEEDED
Status: DISCONTINUED | OUTPATIENT
Start: 2020-01-01 | End: 2020-01-01 | Stop reason: HOSPADM

## 2020-01-01 RX ORDER — AMOXICILLIN AND CLAVULANATE POTASSIUM 875; 125 MG/1; MG/1
1 TABLET, FILM COATED ORAL EVERY 12 HOURS SCHEDULED
Status: COMPLETED | OUTPATIENT
Start: 2020-01-01 | End: 2020-01-01

## 2020-01-01 RX ORDER — HYDRALAZINE HYDROCHLORIDE 25 MG/1
50 TABLET, FILM COATED ORAL EVERY 12 HOURS SCHEDULED
Status: DISCONTINUED | OUTPATIENT
Start: 2020-01-01 | End: 2020-01-01 | Stop reason: HOSPADM

## 2020-01-01 RX ORDER — ONDANSETRON 2 MG/ML
4 INJECTION INTRAMUSCULAR; INTRAVENOUS EVERY 6 HOURS PRN
Status: DISCONTINUED | OUTPATIENT
Start: 2020-01-01 | End: 2020-01-01 | Stop reason: HOSPADM

## 2020-01-01 RX ORDER — KETOROLAC TROMETHAMINE 15 MG/ML
15 INJECTION, SOLUTION INTRAMUSCULAR; INTRAVENOUS EVERY 6 HOURS PRN
Status: DISCONTINUED | OUTPATIENT
Start: 2020-01-01 | End: 2020-01-01 | Stop reason: HOSPADM

## 2020-01-01 RX ORDER — LIDOCAINE HYDROCHLORIDE 20 MG/ML
INJECTION, SOLUTION INFILTRATION; PERINEURAL AS NEEDED
Status: DISCONTINUED | OUTPATIENT
Start: 2020-01-01 | End: 2020-01-01 | Stop reason: HOSPADM

## 2020-01-01 RX ORDER — FERROUS SULFATE TAB EC 324 MG (65 MG FE EQUIVALENT) 324 (65 FE) MG
324 TABLET DELAYED RESPONSE ORAL 2 TIMES DAILY WITH MEALS
Qty: 60 TABLET | Refills: 2 | Status: ON HOLD | OUTPATIENT
Start: 2020-01-01 | End: 2020-01-01 | Stop reason: SDUPTHER

## 2020-01-01 RX ORDER — EPINEPHRINE 0.1 MG/ML
SYRINGE (ML) INJECTION AS NEEDED
Status: DISCONTINUED | OUTPATIENT
Start: 2020-01-01 | End: 2020-01-01 | Stop reason: HOSPADM

## 2020-01-01 RX ORDER — FERROUS SULFATE TAB EC 324 MG (65 MG FE EQUIVALENT) 324 (65 FE) MG
324 TABLET DELAYED RESPONSE ORAL
Qty: 60 TABLET | Refills: 2 | Status: SHIPPED | OUTPATIENT
Start: 2020-01-01 | End: 2021-01-01

## 2020-01-01 RX ORDER — BISACODYL 10 MG
10 SUPPOSITORY, RECTAL RECTAL DAILY PRN
Status: DISCONTINUED | OUTPATIENT
Start: 2020-01-01 | End: 2020-01-01 | Stop reason: HOSPADM

## 2020-01-01 RX ORDER — HYDRALAZINE HYDROCHLORIDE 50 MG/1
50 TABLET, FILM COATED ORAL EVERY 12 HOURS
Qty: 180 TABLET | Refills: 3 | Status: SHIPPED | OUTPATIENT
Start: 2020-01-01 | End: 2020-01-01 | Stop reason: SDUPTHER

## 2020-01-01 RX ORDER — LORAZEPAM 2 MG/ML
0.5 INJECTION INTRAMUSCULAR
Status: DISCONTINUED | OUTPATIENT
Start: 2020-01-01 | End: 2020-01-01 | Stop reason: HOSPADM

## 2020-01-01 RX ORDER — FERROUS SULFATE TAB EC 324 MG (65 MG FE EQUIVALENT) 324 (65 FE) MG
324 TABLET DELAYED RESPONSE ORAL 2 TIMES DAILY WITH MEALS
Status: DISCONTINUED | OUTPATIENT
Start: 2020-01-01 | End: 2020-01-01

## 2020-01-01 RX ORDER — PANTOPRAZOLE SODIUM 40 MG/1
40 TABLET, DELAYED RELEASE ORAL DAILY
Qty: 30 TABLET | Refills: 1 | Status: SHIPPED | OUTPATIENT
Start: 2020-01-01

## 2020-01-01 RX ORDER — PANTOPRAZOLE SODIUM 40 MG/1
40 TABLET, DELAYED RELEASE ORAL DAILY
Status: DISCONTINUED | OUTPATIENT
Start: 2020-01-01 | End: 2020-01-01 | Stop reason: HOSPADM

## 2020-01-01 RX ORDER — MEPERIDINE HYDROCHLORIDE 25 MG/ML
12.5 INJECTION INTRAMUSCULAR; INTRAVENOUS; SUBCUTANEOUS
Status: DISCONTINUED | OUTPATIENT
Start: 2020-01-01 | End: 2020-01-01 | Stop reason: HOSPADM

## 2020-01-01 RX ORDER — ATORVASTATIN CALCIUM 20 MG/1
20 TABLET, FILM COATED ORAL
Qty: 90 TABLET | Refills: 1 | Status: SHIPPED | OUTPATIENT
Start: 2020-01-01 | End: 2020-01-01 | Stop reason: SDUPTHER

## 2020-01-01 RX ADMIN — CEFTRIAXONE SODIUM 2 G: 10 INJECTION, POWDER, FOR SOLUTION INTRAVENOUS at 22:56

## 2020-01-01 RX ADMIN — PROPOFOL 120 MG: 10 INJECTION, EMULSION INTRAVENOUS at 09:45

## 2020-01-01 RX ADMIN — METOPROLOL TARTRATE 25 MG: 25 TABLET, FILM COATED ORAL at 20:49

## 2020-01-01 RX ADMIN — METOPROLOL TARTRATE 25 MG: 25 TABLET, FILM COATED ORAL at 21:58

## 2020-01-01 RX ADMIN — Medication 10 ML: at 21:59

## 2020-01-01 RX ADMIN — HYDRALAZINE HYDROCHLORIDE 50 MG: 25 TABLET, FILM COATED ORAL at 08:34

## 2020-01-01 RX ADMIN — ONDANSETRON 4 MG: 2 INJECTION INTRAMUSCULAR; INTRAVENOUS at 10:18

## 2020-01-01 RX ADMIN — METOPROLOL TARTRATE 25 MG: 25 TABLET, FILM COATED ORAL at 21:13

## 2020-01-01 RX ADMIN — HYDRALAZINE HYDROCHLORIDE 50 MG: 25 TABLET, FILM COATED ORAL at 09:12

## 2020-01-01 RX ADMIN — IPRATROPIUM BROMIDE AND ALBUTEROL SULFATE 3 ML: 2.5; .5 SOLUTION RESPIRATORY (INHALATION) at 14:48

## 2020-01-01 RX ADMIN — INFLUENZA VIRUS VACCINE 0.5 ML: 15; 15; 15; 15 SUSPENSION INTRAMUSCULAR at 10:24

## 2020-01-01 RX ADMIN — PIPERACILLIN AND TAZOBACTAM 3.38 G: 3; .375 INJECTION, POWDER, LYOPHILIZED, FOR SOLUTION INTRAVENOUS at 14:26

## 2020-01-01 RX ADMIN — PIPERACILLIN AND TAZOBACTAM 3.38 G: 3; .375 INJECTION, POWDER, LYOPHILIZED, FOR SOLUTION INTRAVENOUS at 23:08

## 2020-01-01 RX ADMIN — IPRATROPIUM BROMIDE AND ALBUTEROL SULFATE 3 ML: 2.5; .5 SOLUTION RESPIRATORY (INHALATION) at 11:35

## 2020-01-01 RX ADMIN — KETAMINE HYDROCHLORIDE 10 MG: 10 INJECTION INTRAMUSCULAR; INTRAVENOUS at 09:40

## 2020-01-01 RX ADMIN — Medication 1 PATCH: at 12:21

## 2020-01-01 RX ADMIN — VANCOMYCIN HYDROCHLORIDE 1500 MG: 100 INJECTION, POWDER, LYOPHILIZED, FOR SOLUTION INTRAVENOUS at 12:01

## 2020-01-01 RX ADMIN — AMOXICILLIN AND CLAVULANATE POTASSIUM 1 TABLET: 875; 125 TABLET, FILM COATED ORAL at 21:58

## 2020-01-01 RX ADMIN — PREDNISONE 20 MG: 20 TABLET ORAL at 18:13

## 2020-01-01 RX ADMIN — LIDOCAINE HYDROCHLORIDE 50 MG: 10 INJECTION, SOLUTION EPIDURAL; INFILTRATION; INTRACAUDAL; PERINEURAL at 07:39

## 2020-01-01 RX ADMIN — PROPOFOL 80 MG: 10 INJECTION, EMULSION INTRAVENOUS at 10:06

## 2020-01-01 RX ADMIN — ACETAMINOPHEN 650 MG: 325 TABLET, FILM COATED ORAL at 22:24

## 2020-01-01 RX ADMIN — FUROSEMIDE 20 MG: 20 INJECTION, SOLUTION INTRAMUSCULAR; INTRAVENOUS at 21:13

## 2020-01-01 RX ADMIN — VANCOMYCIN HYDROCHLORIDE 1500 MG: 100 INJECTION, POWDER, LYOPHILIZED, FOR SOLUTION INTRAVENOUS at 14:20

## 2020-01-01 RX ADMIN — ATORVASTATIN CALCIUM 20 MG: 20 TABLET, FILM COATED ORAL at 21:13

## 2020-01-01 RX ADMIN — FUROSEMIDE 20 MG: 20 INJECTION, SOLUTION INTRAMUSCULAR; INTRAVENOUS at 16:47

## 2020-01-01 RX ADMIN — IPRATROPIUM BROMIDE AND ALBUTEROL SULFATE 3 ML: 2.5; .5 SOLUTION RESPIRATORY (INHALATION) at 10:36

## 2020-01-01 RX ADMIN — HYDRALAZINE HYDROCHLORIDE 50 MG: 25 TABLET, FILM COATED ORAL at 08:29

## 2020-01-01 RX ADMIN — ACETAMINOPHEN 650 MG: 325 TABLET, FILM COATED ORAL at 20:50

## 2020-01-01 RX ADMIN — IPRATROPIUM BROMIDE AND ALBUTEROL SULFATE 3 ML: 2.5; .5 SOLUTION RESPIRATORY (INHALATION) at 07:20

## 2020-01-01 RX ADMIN — PROPOFOL 20 MG: 10 INJECTION, EMULSION INTRAVENOUS at 10:04

## 2020-01-01 RX ADMIN — METOPROLOL TARTRATE 25 MG: 25 TABLET, FILM COATED ORAL at 20:50

## 2020-01-01 RX ADMIN — CEFEPIME HYDROCHLORIDE 2 G: 2 INJECTION, POWDER, FOR SOLUTION INTRAVENOUS at 17:29

## 2020-01-01 RX ADMIN — ASPIRIN 81 MG: 81 TABLET, COATED ORAL at 08:34

## 2020-01-01 RX ADMIN — Medication 10 ML: at 21:14

## 2020-01-01 RX ADMIN — HYDRALAZINE HYDROCHLORIDE 50 MG: 25 TABLET, FILM COATED ORAL at 08:37

## 2020-01-01 RX ADMIN — IPRATROPIUM BROMIDE AND ALBUTEROL SULFATE 3 ML: 2.5; .5 SOLUTION RESPIRATORY (INHALATION) at 20:53

## 2020-01-01 RX ADMIN — PREDNISONE 20 MG: 20 TABLET ORAL at 08:09

## 2020-01-01 RX ADMIN — HYDRALAZINE HYDROCHLORIDE 50 MG: 25 TABLET, FILM COATED ORAL at 20:22

## 2020-01-01 RX ADMIN — AMLODIPINE BESYLATE 5 MG: 5 TABLET ORAL at 08:35

## 2020-01-01 RX ADMIN — SODIUM CHLORIDE 1000 ML: 9 INJECTION, SOLUTION INTRAVENOUS at 21:30

## 2020-01-01 RX ADMIN — PREDNISONE 20 MG: 20 TABLET ORAL at 17:01

## 2020-01-01 RX ADMIN — ASPIRIN 81 MG: 81 TABLET, COATED ORAL at 08:09

## 2020-01-01 RX ADMIN — SODIUM CHLORIDE 1000 MG: 900 INJECTION, SOLUTION INTRAVENOUS at 14:09

## 2020-01-01 RX ADMIN — FLUDEOXYGLUCOSE F18 1 DOSE: 300 INJECTION INTRAVENOUS at 11:06

## 2020-01-01 RX ADMIN — METOPROLOL TARTRATE 25 MG: 25 TABLET, FILM COATED ORAL at 12:17

## 2020-01-01 RX ADMIN — METOPROLOL TARTRATE 25 MG: 25 TABLET, FILM COATED ORAL at 09:13

## 2020-01-01 RX ADMIN — Medication 1 PATCH: at 08:07

## 2020-01-01 RX ADMIN — Medication 1 PATCH: at 09:08

## 2020-01-01 RX ADMIN — ASPIRIN 81 MG: 81 TABLET, COATED ORAL at 08:01

## 2020-01-01 RX ADMIN — PREDNISONE 20 MG: 20 TABLET ORAL at 17:45

## 2020-01-01 RX ADMIN — ATORVASTATIN CALCIUM 20 MG: 20 TABLET, FILM COATED ORAL at 20:21

## 2020-01-01 RX ADMIN — PREDNISONE 20 MG: 20 TABLET ORAL at 18:24

## 2020-01-01 RX ADMIN — Medication 10 ML: at 09:13

## 2020-01-01 RX ADMIN — FUROSEMIDE 20 MG: 20 INJECTION, SOLUTION INTRAMUSCULAR; INTRAVENOUS at 08:01

## 2020-01-01 RX ADMIN — ENOXAPARIN SODIUM 40 MG: 40 INJECTION SUBCUTANEOUS at 16:27

## 2020-01-01 RX ADMIN — HYDRALAZINE HYDROCHLORIDE 50 MG: 25 TABLET, FILM COATED ORAL at 21:58

## 2020-01-01 RX ADMIN — PROPOFOL 250 MG: 10 INJECTION, EMULSION INTRAVENOUS at 07:40

## 2020-01-01 RX ADMIN — IPRATROPIUM BROMIDE AND ALBUTEROL SULFATE 3 ML: 2.5; .5 SOLUTION RESPIRATORY (INHALATION) at 13:03

## 2020-01-01 RX ADMIN — FERROUS SULFATE TAB EC 324 MG (65 MG FE EQUIVALENT) 324 MG: 324 (65 FE) TABLET DELAYED RESPONSE at 17:16

## 2020-01-01 RX ADMIN — ONDANSETRON 4 MG: 2 INJECTION INTRAMUSCULAR; INTRAVENOUS at 17:57

## 2020-01-01 RX ADMIN — MIDAZOLAM 1 MG: 1 INJECTION INTRAMUSCULAR; INTRAVENOUS at 09:39

## 2020-01-01 RX ADMIN — PROPOFOL 40 MG: 10 INJECTION, EMULSION INTRAVENOUS at 10:12

## 2020-01-01 RX ADMIN — HYDRALAZINE HYDROCHLORIDE 50 MG: 25 TABLET, FILM COATED ORAL at 09:08

## 2020-01-01 RX ADMIN — ATORVASTATIN CALCIUM 20 MG: 20 TABLET, FILM COATED ORAL at 22:20

## 2020-01-01 RX ADMIN — IPRATROPIUM BROMIDE AND ALBUTEROL SULFATE 3 ML: 2.5; .5 SOLUTION RESPIRATORY (INHALATION) at 18:47

## 2020-01-01 RX ADMIN — FENTANYL CITRATE 100 MCG: 50 INJECTION, SOLUTION INTRAMUSCULAR; INTRAVENOUS at 09:45

## 2020-01-01 RX ADMIN — ATORVASTATIN CALCIUM 20 MG: 20 TABLET, FILM COATED ORAL at 20:35

## 2020-01-01 RX ADMIN — Medication 5 MG: at 20:07

## 2020-01-01 RX ADMIN — METOPROLOL TARTRATE 25 MG: 25 TABLET, FILM COATED ORAL at 20:21

## 2020-01-01 RX ADMIN — IPRATROPIUM BROMIDE AND ALBUTEROL SULFATE 3 ML: 2.5; .5 SOLUTION RESPIRATORY (INHALATION) at 10:57

## 2020-01-01 RX ADMIN — SODIUM CHLORIDE, SODIUM LACTATE, POTASSIUM CHLORIDE, AND CALCIUM CHLORIDE: .6; .31; .03; .02 INJECTION, SOLUTION INTRAVENOUS at 09:34

## 2020-01-01 RX ADMIN — HYDRALAZINE HYDROCHLORIDE 50 MG: 25 TABLET, FILM COATED ORAL at 08:19

## 2020-01-01 RX ADMIN — IPRATROPIUM BROMIDE AND ALBUTEROL SULFATE 3 ML: 2.5; .5 SOLUTION RESPIRATORY (INHALATION) at 15:08

## 2020-01-01 RX ADMIN — ATORVASTATIN CALCIUM 20 MG: 20 TABLET, FILM COATED ORAL at 20:52

## 2020-01-01 RX ADMIN — ASPIRIN 81 MG: 81 TABLET, COATED ORAL at 12:17

## 2020-01-01 RX ADMIN — ENOXAPARIN SODIUM 40 MG: 40 INJECTION SUBCUTANEOUS at 17:07

## 2020-01-01 RX ADMIN — PIPERACILLIN AND TAZOBACTAM 3.38 G: 3; .375 INJECTION, POWDER, LYOPHILIZED, FOR SOLUTION INTRAVENOUS at 22:52

## 2020-01-01 RX ADMIN — IOPAMIDOL 100 ML: 755 INJECTION, SOLUTION INTRAVENOUS at 06:10

## 2020-01-01 RX ADMIN — FERROUS SULFATE TAB EC 324 MG (65 MG FE EQUIVALENT) 324 MG: 324 (65 FE) TABLET DELAYED RESPONSE at 08:19

## 2020-01-01 RX ADMIN — CEFEPIME 2 G: 2 INJECTION, POWDER, FOR SOLUTION INTRAVENOUS at 13:52

## 2020-01-01 RX ADMIN — PIPERACILLIN AND TAZOBACTAM 3.38 G: 3; .375 INJECTION, POWDER, LYOPHILIZED, FOR SOLUTION INTRAVENOUS at 22:21

## 2020-01-01 RX ADMIN — METOPROLOL TARTRATE 25 MG: 25 TABLET, FILM COATED ORAL at 22:22

## 2020-01-01 RX ADMIN — HYDRALAZINE HYDROCHLORIDE 50 MG: 25 TABLET, FILM COATED ORAL at 20:07

## 2020-01-01 RX ADMIN — ATORVASTATIN CALCIUM 20 MG: 20 TABLET, FILM COATED ORAL at 21:58

## 2020-01-01 RX ADMIN — METOPROLOL TARTRATE 25 MG: 25 TABLET, FILM COATED ORAL at 20:51

## 2020-01-01 RX ADMIN — ROCURONIUM BROMIDE 25 MG: 10 INJECTION, SOLUTION INTRAVENOUS at 09:45

## 2020-01-01 RX ADMIN — Medication 5 MG: at 23:18

## 2020-01-01 RX ADMIN — IPRATROPIUM BROMIDE AND ALBUTEROL SULFATE 3 ML: 2.5; .5 SOLUTION RESPIRATORY (INHALATION) at 18:39

## 2020-01-01 RX ADMIN — PREDNISONE 20 MG: 20 TABLET ORAL at 08:01

## 2020-01-01 RX ADMIN — PREDNISONE 20 MG: 20 TABLET ORAL at 08:06

## 2020-01-01 RX ADMIN — HYDRALAZINE HYDROCHLORIDE 50 MG: 25 TABLET, FILM COATED ORAL at 12:17

## 2020-01-01 RX ADMIN — HYDRALAZINE HYDROCHLORIDE 50 MG: 25 TABLET, FILM COATED ORAL at 08:06

## 2020-01-01 RX ADMIN — ROCURONIUM BROMIDE 5 MG: 10 INJECTION, SOLUTION INTRAVENOUS at 10:20

## 2020-01-01 RX ADMIN — METRONIDAZOLE 500 MG: 500 INJECTION, SOLUTION INTRAVENOUS at 21:28

## 2020-01-01 RX ADMIN — AMLODIPINE BESYLATE 5 MG: 5 TABLET ORAL at 09:12

## 2020-01-01 RX ADMIN — METOPROLOL TARTRATE 25 MG: 25 TABLET, FILM COATED ORAL at 09:09

## 2020-01-01 RX ADMIN — AMLODIPINE BESYLATE 5 MG: 5 TABLET ORAL at 08:38

## 2020-01-01 RX ADMIN — FENTANYL CITRATE 25 MCG: 50 INJECTION, SOLUTION INTRAMUSCULAR; INTRAVENOUS at 09:46

## 2020-01-01 RX ADMIN — IPRATROPIUM BROMIDE AND ALBUTEROL SULFATE 3 ML: 2.5; .5 SOLUTION RESPIRATORY (INHALATION) at 15:42

## 2020-01-01 RX ADMIN — Medication 10 ML: at 08:29

## 2020-01-01 RX ADMIN — METOPROLOL TARTRATE 25 MG: 25 TABLET, FILM COATED ORAL at 22:29

## 2020-01-01 RX ADMIN — ACETAMINOPHEN 650 MG: 325 TABLET, FILM COATED ORAL at 12:28

## 2020-01-01 RX ADMIN — METOPROLOL TARTRATE 25 MG: 25 TABLET, FILM COATED ORAL at 08:38

## 2020-01-01 RX ADMIN — ASPIRIN 81 MG: 81 TABLET, COATED ORAL at 09:09

## 2020-01-01 RX ADMIN — AMLODIPINE BESYLATE 5 MG: 5 TABLET ORAL at 08:29

## 2020-01-01 RX ADMIN — CEFEPIME HYDROCHLORIDE 2 G: 2 INJECTION, POWDER, FOR SOLUTION INTRAVENOUS at 23:52

## 2020-01-01 RX ADMIN — PREDNISONE 20 MG: 20 TABLET ORAL at 08:19

## 2020-01-01 RX ADMIN — GADOTERIDOL 15 ML: 279.3 INJECTION, SOLUTION INTRAVENOUS at 18:45

## 2020-01-01 RX ADMIN — HYDRALAZINE HYDROCHLORIDE 50 MG: 25 TABLET, FILM COATED ORAL at 21:13

## 2020-01-01 RX ADMIN — IPRATROPIUM BROMIDE AND ALBUTEROL SULFATE 3 ML: 2.5; .5 SOLUTION RESPIRATORY (INHALATION) at 07:50

## 2020-01-01 RX ADMIN — Medication 10 ML: at 09:24

## 2020-01-01 RX ADMIN — SUGAMMADEX 200 MG: 100 INJECTION, SOLUTION INTRAVENOUS at 10:25

## 2020-01-01 RX ADMIN — HYDRALAZINE HYDROCHLORIDE 50 MG: 25 TABLET, FILM COATED ORAL at 22:20

## 2020-01-01 RX ADMIN — PANTOPRAZOLE SODIUM 40 MG: 40 TABLET, DELAYED RELEASE ORAL at 05:41

## 2020-01-01 RX ADMIN — PIPERACILLIN AND TAZOBACTAM 3.38 G: 3; .375 INJECTION, POWDER, LYOPHILIZED, FOR SOLUTION INTRAVENOUS at 23:12

## 2020-01-01 RX ADMIN — METRONIDAZOLE 500 MG: 500 INJECTION, SOLUTION INTRAVENOUS at 12:38

## 2020-01-01 RX ADMIN — PANTOPRAZOLE SODIUM 40 MG: 40 TABLET, DELAYED RELEASE ORAL at 06:06

## 2020-01-01 RX ADMIN — AMLODIPINE BESYLATE 5 MG: 5 TABLET ORAL at 08:06

## 2020-01-01 RX ADMIN — FERROUS SULFATE TAB EC 324 MG (65 MG FE EQUIVALENT) 324 MG: 324 (65 FE) TABLET DELAYED RESPONSE at 18:24

## 2020-01-01 RX ADMIN — Medication 10 ML: at 08:08

## 2020-01-01 RX ADMIN — PROPOFOL 40 MG: 10 INJECTION, EMULSION INTRAVENOUS at 10:10

## 2020-01-01 RX ADMIN — ATORVASTATIN CALCIUM 20 MG: 20 TABLET, FILM COATED ORAL at 20:07

## 2020-01-01 RX ADMIN — PROPOFOL 10 MG: 10 INJECTION, EMULSION INTRAVENOUS at 09:49

## 2020-01-01 RX ADMIN — METOPROLOL TARTRATE 25 MG: 25 TABLET, FILM COATED ORAL at 22:20

## 2020-01-01 RX ADMIN — Medication 5 MG: at 22:20

## 2020-01-01 RX ADMIN — Medication 10 ML: at 20:23

## 2020-01-01 RX ADMIN — METOPROLOL TARTRATE 25 MG: 25 TABLET, FILM COATED ORAL at 08:06

## 2020-01-01 RX ADMIN — METRONIDAZOLE 500 MG: 500 INJECTION, SOLUTION INTRAVENOUS at 08:37

## 2020-01-01 RX ADMIN — PANTOPRAZOLE SODIUM 40 MG: 40 TABLET, DELAYED RELEASE ORAL at 08:01

## 2020-01-01 RX ADMIN — PROPOFOL 30 MG: 10 INJECTION, EMULSION INTRAVENOUS at 10:16

## 2020-01-01 RX ADMIN — CEFEPIME 2 G: 2 INJECTION, POWDER, FOR SOLUTION INTRAVENOUS at 22:24

## 2020-01-01 RX ADMIN — FERROUS SULFATE TAB EC 324 MG (65 MG FE EQUIVALENT) 324 MG: 324 (65 FE) TABLET DELAYED RESPONSE at 08:01

## 2020-01-01 RX ADMIN — FUROSEMIDE 20 MG: 20 INJECTION, SOLUTION INTRAMUSCULAR; INTRAVENOUS at 15:05

## 2020-01-01 RX ADMIN — PROPOFOL 10 MG: 10 INJECTION, EMULSION INTRAVENOUS at 09:45

## 2020-01-01 RX ADMIN — Medication 10 ML: at 08:36

## 2020-01-01 RX ADMIN — PANTOPRAZOLE SODIUM 40 MG: 40 TABLET, DELAYED RELEASE ORAL at 06:04

## 2020-01-01 RX ADMIN — METOPROLOL TARTRATE 25 MG: 25 TABLET, FILM COATED ORAL at 08:01

## 2020-01-01 RX ADMIN — PREDNISONE 20 MG: 20 TABLET ORAL at 08:37

## 2020-01-01 RX ADMIN — IPRATROPIUM BROMIDE AND ALBUTEROL SULFATE 3 ML: 2.5; .5 SOLUTION RESPIRATORY (INHALATION) at 06:58

## 2020-01-01 RX ADMIN — Medication 10 ML: at 20:38

## 2020-01-01 RX ADMIN — PANTOPRAZOLE SODIUM 40 MG: 40 TABLET, DELAYED RELEASE ORAL at 08:32

## 2020-01-01 RX ADMIN — FUROSEMIDE 20 MG: 20 INJECTION, SOLUTION INTRAMUSCULAR; INTRAVENOUS at 20:52

## 2020-01-01 RX ADMIN — FUROSEMIDE 20 MG: 20 INJECTION, SOLUTION INTRAMUSCULAR; INTRAVENOUS at 08:32

## 2020-01-01 RX ADMIN — FERROUS SULFATE TAB EC 324 MG (65 MG FE EQUIVALENT) 324 MG: 324 (65 FE) TABLET DELAYED RESPONSE at 12:18

## 2020-01-01 RX ADMIN — ENOXAPARIN SODIUM 40 MG: 40 INJECTION SUBCUTANEOUS at 16:40

## 2020-01-01 RX ADMIN — AMLODIPINE BESYLATE 5 MG: 5 TABLET ORAL at 08:34

## 2020-01-01 RX ADMIN — PREDNISONE 20 MG: 20 TABLET ORAL at 08:29

## 2020-01-01 RX ADMIN — METRONIDAZOLE 500 MG: 500 INJECTION, SOLUTION INTRAVENOUS at 04:30

## 2020-01-01 RX ADMIN — FERROUS SULFATE TAB EC 324 MG (65 MG FE EQUIVALENT) 324 MG: 324 (65 FE) TABLET DELAYED RESPONSE at 09:12

## 2020-01-01 RX ADMIN — Medication 10 ML: at 08:00

## 2020-01-01 RX ADMIN — ATORVASTATIN CALCIUM 20 MG: 20 TABLET, FILM COATED ORAL at 22:23

## 2020-01-01 RX ADMIN — FERROUS SULFATE TAB EC 324 MG (65 MG FE EQUIVALENT) 324 MG: 324 (65 FE) TABLET DELAYED RESPONSE at 15:04

## 2020-01-01 RX ADMIN — CEFEPIME HYDROCHLORIDE 2 G: 2 INJECTION, POWDER, FOR SOLUTION INTRAVENOUS at 00:06

## 2020-01-01 RX ADMIN — PREDNISONE 20 MG: 20 TABLET ORAL at 17:40

## 2020-01-01 RX ADMIN — Medication 10 ML: at 22:22

## 2020-01-01 RX ADMIN — AMLODIPINE BESYLATE 5 MG: 5 TABLET ORAL at 08:01

## 2020-01-01 RX ADMIN — METOPROLOL TARTRATE 25 MG: 25 TABLET, FILM COATED ORAL at 20:07

## 2020-01-01 RX ADMIN — HYDRALAZINE HYDROCHLORIDE 50 MG: 25 TABLET, FILM COATED ORAL at 08:32

## 2020-01-01 RX ADMIN — IPRATROPIUM BROMIDE AND ALBUTEROL SULFATE 3 ML: 2.5; .5 SOLUTION RESPIRATORY (INHALATION) at 12:21

## 2020-01-01 RX ADMIN — FERROUS SULFATE TAB EC 324 MG (65 MG FE EQUIVALENT) 324 MG: 324 (65 FE) TABLET DELAYED RESPONSE at 08:32

## 2020-01-01 RX ADMIN — HYDRALAZINE HYDROCHLORIDE 50 MG: 25 TABLET, FILM COATED ORAL at 22:29

## 2020-01-01 RX ADMIN — KETAMINE HYDROCHLORIDE 10 MG: 10 INJECTION INTRAMUSCULAR; INTRAVENOUS at 09:47

## 2020-01-01 RX ADMIN — FUROSEMIDE 20 MG: 20 INJECTION, SOLUTION INTRAMUSCULAR; INTRAVENOUS at 12:18

## 2020-01-01 RX ADMIN — AMOXICILLIN AND CLAVULANATE POTASSIUM 500 MG: 500; 125 TABLET, FILM COATED ORAL at 06:20

## 2020-01-01 RX ADMIN — HYDRALAZINE HYDROCHLORIDE 50 MG: 25 TABLET, FILM COATED ORAL at 22:23

## 2020-01-01 RX ADMIN — MAGNESIUM HYDROXIDE 10 ML: 2400 SUSPENSION ORAL at 18:13

## 2020-01-01 RX ADMIN — PREDNISONE 20 MG: 20 TABLET ORAL at 15:04

## 2020-01-01 RX ADMIN — METOPROLOL TARTRATE 25 MG: 25 TABLET, FILM COATED ORAL at 08:32

## 2020-01-01 RX ADMIN — AMOXICILLIN AND CLAVULANATE POTASSIUM 500 MG: 500; 125 TABLET, FILM COATED ORAL at 21:14

## 2020-01-01 RX ADMIN — PIPERACILLIN AND TAZOBACTAM 3.38 G: 3; .375 INJECTION, POWDER, LYOPHILIZED, FOR SOLUTION INTRAVENOUS at 06:06

## 2020-01-01 RX ADMIN — METRONIDAZOLE 500 MG: 500 INJECTION, SOLUTION INTRAVENOUS at 21:55

## 2020-01-01 RX ADMIN — ATORVASTATIN CALCIUM 20 MG: 20 TABLET, FILM COATED ORAL at 20:49

## 2020-01-01 RX ADMIN — Medication 10 ML: at 22:31

## 2020-01-01 RX ADMIN — FUROSEMIDE 20 MG: 20 INJECTION, SOLUTION INTRAMUSCULAR; INTRAVENOUS at 22:24

## 2020-01-01 RX ADMIN — Medication 1 PATCH: at 08:32

## 2020-01-01 RX ADMIN — PIPERACILLIN AND TAZOBACTAM 3.38 G: 3; .375 INJECTION, POWDER, LYOPHILIZED, FOR SOLUTION INTRAVENOUS at 18:00

## 2020-01-01 RX ADMIN — PANTOPRAZOLE SODIUM 40 MG: 40 TABLET, DELAYED RELEASE ORAL at 12:18

## 2020-01-01 RX ADMIN — HYDRALAZINE HYDROCHLORIDE 50 MG: 25 TABLET, FILM COATED ORAL at 20:50

## 2020-01-01 RX ADMIN — IPRATROPIUM BROMIDE AND ALBUTEROL SULFATE 3 ML: 2.5; .5 SOLUTION RESPIRATORY (INHALATION) at 07:44

## 2020-01-01 RX ADMIN — SODIUM CHLORIDE 9 ML/HR: 0.9 INJECTION, SOLUTION INTRAVENOUS at 07:29

## 2020-01-01 RX ADMIN — PROPOFOL 10 MG: 10 INJECTION, EMULSION INTRAVENOUS at 09:40

## 2020-01-01 RX ADMIN — FENTANYL CITRATE 50 MCG: 50 INJECTION, SOLUTION INTRAMUSCULAR; INTRAVENOUS at 10:09

## 2020-01-01 RX ADMIN — IPRATROPIUM BROMIDE AND ALBUTEROL SULFATE 3 ML: 2.5; .5 SOLUTION RESPIRATORY (INHALATION) at 11:03

## 2020-01-01 RX ADMIN — ENOXAPARIN SODIUM 40 MG: 40 INJECTION SUBCUTANEOUS at 15:03

## 2020-01-01 RX ADMIN — Medication 10 ML: at 20:54

## 2020-01-01 RX ADMIN — FUROSEMIDE 20 MG: 20 INJECTION, SOLUTION INTRAMUSCULAR; INTRAVENOUS at 17:31

## 2020-01-01 RX ADMIN — IPRATROPIUM BROMIDE AND ALBUTEROL SULFATE 3 ML: 2.5; .5 SOLUTION RESPIRATORY (INHALATION) at 07:30

## 2020-01-01 RX ADMIN — Medication 1 PATCH: at 09:13

## 2020-01-01 RX ADMIN — AMLODIPINE BESYLATE 5 MG: 5 TABLET ORAL at 08:09

## 2020-01-01 RX ADMIN — ENOXAPARIN SODIUM 40 MG: 40 INJECTION SUBCUTANEOUS at 18:13

## 2020-01-01 RX ADMIN — PREDNISONE 20 MG: 20 TABLET ORAL at 17:11

## 2020-01-01 RX ADMIN — SODIUM CHLORIDE 500 MG: 9 INJECTION, SOLUTION INTRAVENOUS at 22:56

## 2020-01-01 RX ADMIN — Medication 10 ML: at 12:18

## 2020-01-01 RX ADMIN — Medication 10 ML: at 20:49

## 2020-01-01 RX ADMIN — IPRATROPIUM BROMIDE AND ALBUTEROL SULFATE 3 ML: 2.5; .5 SOLUTION RESPIRATORY (INHALATION) at 04:41

## 2020-01-01 RX ADMIN — HYDRALAZINE HYDROCHLORIDE 50 MG: 25 TABLET, FILM COATED ORAL at 08:01

## 2020-01-01 RX ADMIN — PIPERACILLIN AND TAZOBACTAM 3.38 G: 3; .375 INJECTION, POWDER, LYOPHILIZED, FOR SOLUTION INTRAVENOUS at 05:41

## 2020-01-01 RX ADMIN — PREDNISONE 20 MG: 20 TABLET ORAL at 17:07

## 2020-01-01 RX ADMIN — ATORVASTATIN CALCIUM 20 MG: 20 TABLET, FILM COATED ORAL at 20:50

## 2020-01-01 RX ADMIN — METOPROLOL TARTRATE 25 MG: 25 TABLET, FILM COATED ORAL at 08:19

## 2020-01-01 RX ADMIN — IPRATROPIUM BROMIDE AND ALBUTEROL SULFATE 3 ML: 2.5; .5 SOLUTION RESPIRATORY (INHALATION) at 07:58

## 2020-01-01 RX ADMIN — METRONIDAZOLE 500 MG: 500 INJECTION, SOLUTION INTRAVENOUS at 18:13

## 2020-01-01 RX ADMIN — METOPROLOL TARTRATE 25 MG: 25 TABLET, FILM COATED ORAL at 20:35

## 2020-01-01 RX ADMIN — AMOXICILLIN AND CLAVULANATE POTASSIUM 1 TABLET: 875; 125 TABLET, FILM COATED ORAL at 08:29

## 2020-01-01 RX ADMIN — PIPERACILLIN AND TAZOBACTAM 3.38 G: 3; .375 INJECTION, POWDER, LYOPHILIZED, FOR SOLUTION INTRAVENOUS at 06:04

## 2020-01-01 RX ADMIN — Medication 5 MG: at 21:59

## 2020-01-01 RX ADMIN — VANCOMYCIN HYDROCHLORIDE 1500 MG: 10 INJECTION, POWDER, LYOPHILIZED, FOR SOLUTION INTRAVENOUS at 00:06

## 2020-01-01 RX ADMIN — PIPERACILLIN AND TAZOBACTAM 3.38 G: 3; .375 INJECTION, POWDER, LYOPHILIZED, FOR SOLUTION INTRAVENOUS at 14:00

## 2020-01-01 RX ADMIN — PIPERACILLIN AND TAZOBACTAM 3.38 G: 3; .375 INJECTION, POWDER, LYOPHILIZED, FOR SOLUTION INTRAVENOUS at 05:30

## 2020-01-01 RX ADMIN — PANTOPRAZOLE SODIUM 40 MG: 40 TABLET, DELAYED RELEASE ORAL at 09:09

## 2020-01-01 RX ADMIN — PANTOPRAZOLE SODIUM 40 MG: 40 TABLET, DELAYED RELEASE ORAL at 08:09

## 2020-01-01 RX ADMIN — IPRATROPIUM BROMIDE AND ALBUTEROL SULFATE 3 ML: 2.5; .5 SOLUTION RESPIRATORY (INHALATION) at 12:24

## 2020-01-01 RX ADMIN — CEFEPIME HYDROCHLORIDE 2 G: 2 INJECTION, POWDER, FOR SOLUTION INTRAVENOUS at 15:05

## 2020-01-01 RX ADMIN — FERROUS SULFATE TAB EC 324 MG (65 MG FE EQUIVALENT) 324 MG: 324 (65 FE) TABLET DELAYED RESPONSE at 08:29

## 2020-01-01 RX ADMIN — FENTANYL CITRATE 25 MCG: 50 INJECTION, SOLUTION INTRAMUSCULAR; INTRAVENOUS at 09:39

## 2020-01-01 RX ADMIN — METRONIDAZOLE 500 MG: 500 INJECTION, SOLUTION INTRAVENOUS at 04:31

## 2020-01-01 RX ADMIN — FENTANYL CITRATE 50 MCG: 50 INJECTION, SOLUTION INTRAMUSCULAR; INTRAVENOUS at 10:13

## 2020-01-01 RX ADMIN — FERROUS SULFATE TAB EC 324 MG (65 MG FE EQUIVALENT) 324 MG: 324 (65 FE) TABLET DELAYED RESPONSE at 17:45

## 2020-01-01 RX ADMIN — PREDNISONE 20 MG: 20 TABLET ORAL at 17:16

## 2020-01-01 RX ADMIN — ENOXAPARIN SODIUM 40 MG: 40 INJECTION SUBCUTANEOUS at 16:22

## 2020-01-01 RX ADMIN — Medication 1 PATCH: at 08:02

## 2020-01-01 RX ADMIN — IPRATROPIUM BROMIDE AND ALBUTEROL SULFATE 3 ML: 2.5; .5 SOLUTION RESPIRATORY (INHALATION) at 07:04

## 2020-01-01 RX ADMIN — Medication 500 UNITS: at 13:23

## 2020-01-01 RX ADMIN — FERROUS SULFATE TAB EC 324 MG (65 MG FE EQUIVALENT) 324 MG: 324 (65 FE) TABLET DELAYED RESPONSE at 08:09

## 2020-01-01 RX ADMIN — IPRATROPIUM BROMIDE AND ALBUTEROL SULFATE 3 ML: 2.5; .5 SOLUTION RESPIRATORY (INHALATION) at 11:32

## 2020-01-01 RX ADMIN — AMOXICILLIN AND CLAVULANATE POTASSIUM 1 TABLET: 875; 125 TABLET, FILM COATED ORAL at 20:21

## 2020-01-01 RX ADMIN — PROPOFOL 40 MG: 10 INJECTION, EMULSION INTRAVENOUS at 10:08

## 2020-01-01 RX ADMIN — FERROUS SULFATE TAB EC 324 MG (65 MG FE EQUIVALENT) 324 MG: 324 (65 FE) TABLET DELAYED RESPONSE at 08:06

## 2020-01-01 RX ADMIN — METRONIDAZOLE 500 MG: 500 INJECTION, SOLUTION INTRAVENOUS at 16:40

## 2020-01-01 RX ADMIN — PROPOFOL 20 MG: 10 INJECTION, EMULSION INTRAVENOUS at 09:53

## 2020-01-01 RX ADMIN — METRONIDAZOLE 500 MG: 500 INJECTION, SOLUTION INTRAVENOUS at 15:06

## 2020-01-01 RX ADMIN — PANTOPRAZOLE SODIUM 40 MG: 40 TABLET, DELAYED RELEASE ORAL at 05:29

## 2020-01-01 RX ADMIN — PIPERACILLIN AND TAZOBACTAM 3.38 G: 3; .375 INJECTION, POWDER, LYOPHILIZED, FOR SOLUTION INTRAVENOUS at 14:48

## 2020-01-01 RX ADMIN — IPRATROPIUM BROMIDE AND ALBUTEROL SULFATE 3 ML: 2.5; .5 SOLUTION RESPIRATORY (INHALATION) at 19:50

## 2020-01-01 RX ADMIN — ACETAMINOPHEN 1000 MG: 500 TABLET, FILM COATED ORAL at 21:31

## 2020-01-01 RX ADMIN — HYDRALAZINE HYDROCHLORIDE 50 MG: 25 TABLET, FILM COATED ORAL at 20:52

## 2020-01-01 RX ADMIN — IOPAMIDOL 100 ML: 755 INJECTION, SOLUTION INTRAVENOUS at 17:00

## 2020-01-01 RX ADMIN — PREDNISONE 20 MG: 20 TABLET ORAL at 09:13

## 2020-01-01 RX ADMIN — PIPERACILLIN AND TAZOBACTAM 3.38 G: 3; .375 INJECTION, POWDER, LYOPHILIZED, FOR SOLUTION INTRAVENOUS at 15:03

## 2020-01-01 RX ADMIN — Medication 10 ML: at 20:50

## 2020-01-01 RX ADMIN — IPRATROPIUM BROMIDE AND ALBUTEROL SULFATE 3 ML: 2.5; .5 SOLUTION RESPIRATORY (INHALATION) at 20:03

## 2020-01-01 RX ADMIN — FUROSEMIDE 20 MG: 20 INJECTION, SOLUTION INTRAMUSCULAR; INTRAVENOUS at 08:08

## 2020-01-01 RX ADMIN — FUROSEMIDE 20 MG: 20 INJECTION, SOLUTION INTRAMUSCULAR; INTRAVENOUS at 20:49

## 2020-01-01 RX ADMIN — Medication 10 ML: at 20:09

## 2020-01-01 RX ADMIN — FERROUS SULFATE TAB EC 324 MG (65 MG FE EQUIVALENT) 324 MG: 324 (65 FE) TABLET DELAYED RESPONSE at 09:09

## 2020-01-01 RX ADMIN — CEFEPIME HYDROCHLORIDE 2 G: 2 INJECTION, POWDER, FOR SOLUTION INTRAVENOUS at 09:50

## 2020-01-01 RX ADMIN — CEFEPIME 2 G: 2 INJECTION, POWDER, FOR SOLUTION INTRAVENOUS at 20:51

## 2020-01-01 RX ADMIN — IPRATROPIUM BROMIDE AND ALBUTEROL SULFATE 3 ML: 2.5; .5 SOLUTION RESPIRATORY (INHALATION) at 20:02

## 2020-01-01 RX ADMIN — BISACODYL 5 MG: 5 TABLET, COATED ORAL at 08:01

## 2020-01-01 RX ADMIN — CEFEPIME 2 G: 2 INJECTION, POWDER, FOR SOLUTION INTRAVENOUS at 05:33

## 2020-01-01 RX ADMIN — Medication 10 ML: at 08:32

## 2020-01-01 RX ADMIN — ASPIRIN 81 MG: 81 TABLET, COATED ORAL at 08:19

## 2020-01-01 RX ADMIN — PANTOPRAZOLE SODIUM 40 MG: 40 TABLET, DELAYED RELEASE ORAL at 07:16

## 2020-01-01 RX ADMIN — CEFEPIME 2 G: 2 INJECTION, POWDER, FOR SOLUTION INTRAVENOUS at 12:53

## 2020-01-01 RX ADMIN — FERROUS SULFATE TAB EC 324 MG (65 MG FE EQUIVALENT) 324 MG: 324 (65 FE) TABLET DELAYED RESPONSE at 17:07

## 2020-01-01 RX ADMIN — PANTOPRAZOLE SODIUM 40 MG: 40 TABLET, DELAYED RELEASE ORAL at 05:11

## 2020-01-01 RX ADMIN — AMLODIPINE BESYLATE 5 MG: 5 TABLET ORAL at 12:17

## 2020-01-01 RX ADMIN — METOPROLOL TARTRATE 25 MG: 25 TABLET, FILM COATED ORAL at 08:34

## 2020-01-01 RX ADMIN — ASPIRIN 81 MG: 81 TABLET, COATED ORAL at 08:32

## 2020-01-01 RX ADMIN — CEFAZOLIN SODIUM 2 G: 1 INJECTION, POWDER, FOR SOLUTION INTRAMUSCULAR; INTRAVENOUS at 09:44

## 2020-01-01 RX ADMIN — PIPERACILLIN AND TAZOBACTAM 3.38 G: 3; .375 INJECTION, POWDER, LYOPHILIZED, FOR SOLUTION INTRAVENOUS at 05:47

## 2020-01-01 RX ADMIN — FUROSEMIDE 20 MG: 20 INJECTION, SOLUTION INTRAMUSCULAR; INTRAVENOUS at 17:01

## 2020-01-01 RX ADMIN — SODIUM CHLORIDE 1000 ML: 0.9 INJECTION, SOLUTION INTRAVENOUS at 23:25

## 2020-01-01 RX ADMIN — PROPOFOL 20 MG: 10 INJECTION, EMULSION INTRAVENOUS at 10:19

## 2020-01-01 RX ADMIN — SODIUM CHLORIDE 1000 MG: 900 INJECTION, SOLUTION INTRAVENOUS at 12:32

## 2020-01-01 RX ADMIN — SODIUM CHLORIDE, POTASSIUM CHLORIDE, SODIUM LACTATE AND CALCIUM CHLORIDE 20 ML/HR: 600; 310; 30; 20 INJECTION, SOLUTION INTRAVENOUS at 09:13

## 2020-01-01 RX ADMIN — CEFEPIME 2 G: 2 INJECTION, POWDER, FOR SOLUTION INTRAVENOUS at 05:47

## 2020-01-01 RX ADMIN — METOPROLOL TARTRATE 25 MG: 25 TABLET, FILM COATED ORAL at 08:29

## 2020-01-01 RX ADMIN — ASPIRIN 81 MG: 81 TABLET, COATED ORAL at 08:38

## 2020-01-01 RX ADMIN — PIPERACILLIN AND TAZOBACTAM 3.38 G: 3; .375 INJECTION, POWDER, LYOPHILIZED, FOR SOLUTION INTRAVENOUS at 22:30

## 2020-01-01 RX ADMIN — HYDRALAZINE HYDROCHLORIDE 50 MG: 25 TABLET, FILM COATED ORAL at 20:35

## 2020-01-01 RX ADMIN — HYDRALAZINE HYDROCHLORIDE 50 MG: 25 TABLET, FILM COATED ORAL at 08:09

## 2020-01-01 RX ADMIN — METOPROLOL TARTRATE 25 MG: 25 TABLET, FILM COATED ORAL at 08:09

## 2020-01-01 RX ADMIN — AMLODIPINE BESYLATE 5 MG: 5 TABLET ORAL at 08:19

## 2020-01-01 RX ADMIN — ENOXAPARIN SODIUM 40 MG: 40 INJECTION SUBCUTANEOUS at 17:16

## 2020-01-01 RX ADMIN — PREDNISONE 20 MG: 20 TABLET ORAL at 12:18

## 2020-01-01 RX ADMIN — PROPOFOL 20 MG: 10 INJECTION, EMULSION INTRAVENOUS at 09:58

## 2020-01-01 RX ADMIN — AMLODIPINE BESYLATE 5 MG: 5 TABLET ORAL at 09:09

## 2020-01-01 RX ADMIN — ATORVASTATIN CALCIUM 20 MG: 20 TABLET, FILM COATED ORAL at 22:29

## 2020-01-01 RX ADMIN — Medication 5 MG: at 22:38

## 2020-01-01 RX ADMIN — IPRATROPIUM BROMIDE AND ALBUTEROL SULFATE 3 ML: 2.5; .5 SOLUTION RESPIRATORY (INHALATION) at 11:20

## 2020-05-29 RX ORDER — AMLODIPINE BESYLATE 5 MG/1
TABLET ORAL
Qty: 90 TABLET | Refills: 1 | Status: SHIPPED | OUTPATIENT
Start: 2020-05-29 | End: 2020-01-01 | Stop reason: HOSPADM

## 2020-05-29 RX ORDER — ATORVASTATIN CALCIUM 20 MG/1
TABLET, FILM COATED ORAL
Qty: 90 TABLET | Refills: 1 | Status: ON HOLD | OUTPATIENT
Start: 2020-05-29 | End: 2020-01-01

## 2020-10-22 PROBLEM — R50.9 FEVER: Status: ACTIVE | Noted: 2020-01-01

## 2020-10-23 PROBLEM — I10 HYPERTENSION: Status: ACTIVE | Noted: 2018-01-23

## 2020-10-23 PROBLEM — J18.9 PNEUMONIA OF RIGHT LUNG DUE TO INFECTIOUS ORGANISM: Status: ACTIVE | Noted: 2020-01-01

## 2020-10-23 PROBLEM — I50.9 CONGESTIVE HEART FAILURE (HCC): Status: ACTIVE | Noted: 2018-02-26

## 2020-10-23 PROBLEM — J18.9 CAP (COMMUNITY ACQUIRED PNEUMONIA): Status: ACTIVE | Noted: 2020-01-01

## 2020-10-23 PROBLEM — Z72.0 TOBACCO ABUSE: Chronic | Status: ACTIVE | Noted: 2020-01-01

## 2020-10-23 PROBLEM — Z95.1 PRESENCE OF AORTOCORONARY BYPASS GRAFT: Status: ACTIVE | Noted: 2018-02-26

## 2020-10-23 PROBLEM — Z95.2 HX OF AORTIC VALVE REPLACEMENT: Status: ACTIVE | Noted: 2018-02-26

## 2020-10-24 NOTE — ANESTHESIA PREPROCEDURE EVALUATION
Anesthesia Evaluation     Patient summary reviewed and Nursing notes reviewed   NPO Solid Status: > 6 hours  NPO Liquid Status: > 6 hours           Airway   Mallampati: II  TM distance: >3 FB  Neck ROM: full  No difficulty expected  Dental - normal exam     Pulmonary - normal exam    breath sounds clear to auscultation  (+) pneumonia , COPD,   Cardiovascular - normal exam    ECG reviewed  Rhythm: regular  Rate: normal    (+) hypertension, valvular problems/murmurs, past MI , CAD, CABG >6 Months, CHF , PVD, hyperlipidemia,       Neuro/Psych- negative ROS  GI/Hepatic/Renal/Endo - negative ROS     Musculoskeletal (-) negative ROS    Abdominal  - normal exam    Abdomen: soft.  Bowel sounds: normal.   Substance History - negative use     OB/GYN negative ob/gyn ROS         Other - negative ROS                       Anesthesia Plan    ASA 4     MAC     intravenous induction     Anesthetic plan, all risks, benefits, and alternatives have been provided, discussed and informed consent has been obtained with: patient.  Use of blood products discussed with patient .

## 2020-10-24 NOTE — ANESTHESIA POSTPROCEDURE EVALUATION
Patient: Axel Ramirez    Procedure Summary     Date: 10/24/20 Room / Location: [facility] ENDOSCOPY 1 / [facility] ENDOSCOPY    Anesthesia Start: 1003 Anesthesia Stop: 1028    Procedure: BRONCHOSCOPY with biopsy right upper lobe mass, (N/A Bronchus) Diagnosis:       Pneumonia of right lung due to infectious organism, unspecified part of lung      (Pneumonia of right lung due to infectious organism, unspecified part of lung [J18.9])    Surgeon: Ángela Bean MD Provider: Klaus Patel MD    Anesthesia Type: MAC ASA Status: 4          Anesthesia Type: MAC    Vitals  No vitals data found for the desired time range.          Post Anesthesia Care and Evaluation    Patient location during evaluation: bedside  Patient participation: complete - patient participated  Level of consciousness: awake and alert  Pain score: 1  Pain management: adequate  Airway patency: patent  Anesthetic complications: No anesthetic complications  PONV Status: none  Cardiovascular status: acceptable  Respiratory status: acceptable  Hydration status: acceptable  Post Neuraxial Block status: Motor and sensory function returned to baseline

## 2020-10-28 PROBLEM — R91.8 LUNG MASS: Status: ACTIVE | Noted: 2020-01-01

## 2020-10-30 NOTE — ANESTHESIA PREPROCEDURE EVALUATION
Anesthesia Evaluation     Patient summary reviewed and Nursing notes reviewed   NPO Solid Status: > 8 hours  NPO Liquid Status: > 8 hours           Airway   Mallampati: II  TM distance: >3 FB  Neck ROM: full  No difficulty expected  Dental - normal exam     Pulmonary - normal exam    breath sounds clear to auscultation  (+) pneumonia stable , lung cancer, COPD,   Cardiovascular - normal exam  Exercise tolerance: unable to assess    ECG reviewed  Rhythm: regular  Rate: normal    (+) hypertension, valvular problems/murmurs AS, CABG >6 Months, CHF , PVD, hyperlipidemia,     ROS comment: Hx AVR +CABG  No ECHO avail    Neuro/Psych- negative ROS  GI/Hepatic/Renal/Endo - negative ROS     Musculoskeletal (-) negative ROS    Abdominal  - normal exam   Substance History - negative use     OB/GYN negative ob/gyn ROS         Other - negative ROS                     Anesthesia Plan    ASA 4     general   (MAC vs LMA Gen. Will discuss w Dr Reyes)  intravenous induction     Anesthetic plan, all risks, benefits, and alternatives have been provided, discussed and informed consent has been obtained with: patient.

## 2020-10-30 NOTE — ANESTHESIA POSTPROCEDURE EVALUATION
Patient: Axel Ramirez    Procedure Summary     Date: 10/30/20 Room / Location: [facility] OR 09 / [facility] MAIN OR    Anesthesia Start: 0934 Anesthesia Stop: 1016    Procedure: MEDIPORT INSERTION UNDER FLUOROSCOPIC GUIDENCE (Left ) Diagnosis:       Lung mass      (Lung mass [R91.8])    Surgeon: Nomi Reyse MD Provider: Speedy Rodriguez MD    Anesthesia Type: general ASA Status: 4          Anesthesia Type: general    Vitals  Vitals Value Taken Time   /68 10/30/20 1111   Temp 98 °F (36.7 °C) 10/30/20 1110   Pulse 79 10/30/20 1112   Resp 15 10/30/20 1110   SpO2 98 % 10/30/20 1112   Vitals shown include unvalidated device data.        Post Anesthesia Care and Evaluation    Patient location during evaluation: PACU  Patient participation: complete - patient participated  Level of consciousness: awake  Pain scale: See nurse's notes for pain score.  Pain management: adequate  Airway patency: patent  Anesthetic complications: No anesthetic complications  PONV Status: none  Cardiovascular status: acceptable  Respiratory status: acceptable  Hydration status: acceptable    Comments: Patient seen and examined postoperatively; vital signs stable; SpO2 greater than or equal to 90%; cardiopulmonary status stable; nausea/vomiting adequately controlled; pain adequately controlled; no apparent anesthesia complications; patient discharged from anesthesia care when discharge criteria were met

## 2020-10-31 NOTE — OUTREACH NOTE
Prep Survey      Responses   [facility] facility patient discharged from?  [facility]   Is LACE score < 7 ?  No   Eligibility  Readm Mgmt   Discharge diagnosis  CAP, fever, lung mass   Does the patient have one of the following disease processes/diagnoses(primary or secondary)?  General Surgery [s/p mediport insertion]   Does the patient have Home health ordered?  No   Is there a DME ordered?  No   Comments regarding appointments  Needs f/u scheduled   Prep survey completed?  Yes          Lorri Wilson RN

## 2020-11-02 NOTE — PROGRESS NOTES
Case Management Discharge Note      Final Note: home with family         Selected Continued Care - Discharged on 10/30/2020 Admission date: 10/22/2020 - Discharge disposition: Home or Self Care                 Final Discharge Disposition Code: 01 - home or self-care

## 2020-11-03 NOTE — OUTREACH NOTE
General Surgery Week 1 Survey      Responses   [facility] patient discharged from?  [facility]   Does the patient have one of the following disease processes/diagnoses(primary or secondary)?  General Surgery   Week 1 attempt successful?  Yes   Call start time  1739   Call end time  1748   Discharge diagnosis  CAP, fever, lung mass, bronchoscopy, MEDIPORT INSERTION UNDER FLUOROSCOPIC GUIDENCE   Is patient permission given to speak with other caregiver?  Yes   List who call center can speak with  Rachana, spouse   Person spoke with today (if not patient) and relationship  Patient and spouse   Meds reviewed with patient/caregiver?  Yes   Is the patient having any side effects they believe may be caused by any medication additions or changes?  No   Does the patient have all medications related to this admission filled (includes all antibiotics, pain medications, etc.)  Yes   Is the patient taking all medications as directed (includes completed medication regime)?  Yes   Does the patient have a follow up appointment scheduled with their surgeon?  Yes   Has the patient kept scheduled appointments due by today?  N/A   Has home health visited the patient within 72 hours of discharge?  N/A   Psychosocial issues?  No   Did the patient receive a copy of their discharge instructions?  Yes   Nursing interventions  Reviewed instructions with patient   What is the patient's perception of their health status since discharge?  Improving   Nursing interventions  Nurse provided patient education   Is the patient /caregiver able to teach back basic post-op care?  Keep incision areas clean,dry and protected, Take showers only when approved by MD-sponge bathe until then   Is the patient/caregiver able to teach back signs and symptoms of incisional infection?  Increased redness, swelling or pain at the incisonal site, Increased drainage or bleeding, Incisional warmth, Pus or odor from incision, Fever   Is the patient/caregiver able to teach  back steps to recovery at home?  Set small, achievable goals for return to baseline health, Rest and rebuild strength, gradually increase activity   Is the patient/caregiver able to teach back the hierarchy of who to call/visit for symptoms/problems? PCP, Specialist, Home health nurse, Urgent Care, ED, 911  Yes   Week 1 call completed?  Yes          Latrice Tabares RN

## 2020-11-05 NOTE — PROGRESS NOTES
Case Management/ Note    Patient Name: Axel Ramirez  YOB: 1950  MRN #: [id number]    OSW met with patient and his wife, Rachana at the request of Dr. Tay. He is quiet and pleasant as his wife talks. They do not have a vehicle at this time, and have been using [employer] to get them to appointments. Explained how to access their medical transportation, and explained how [employer] system works. Explained they will want to have a back up plan when using [employer] Offered a in2apps application but they declined this. They were encouraged to call [employer] and the [facility] for grants to help offset any cost to transportation. They were given transportation and oncology resources. They said basic needs are met at this time. OSW assisted them outside and will remain available.     Electronically signed by:   Vesta Marino LCSW, OSW-C  11/05/20, 14:26 EST

## 2020-11-09 PROBLEM — I50.31 DIASTOLIC CHF, ACUTE (HCC): Status: ACTIVE | Noted: 2020-01-01

## 2020-11-09 PROBLEM — I10 ESSENTIAL HYPERTENSION: Chronic | Status: ACTIVE | Noted: 2018-01-23

## 2020-11-09 PROBLEM — C34.91 SQUAMOUS CELL CARCINOMA OF LUNG, RIGHT (HCC): Chronic | Status: ACTIVE | Noted: 2020-01-01

## 2020-11-09 NOTE — H&P
[facility] Medicine Services      Patient Name: Axel Ramirez  : 1950  MRN: [id number]  Primary Care Physician: Sandoval Stevenson FNP  Date of admission: 2020    Patient Care Team:  Sandoval Stevenson FNP as PCP - General  Sandoval Stevenson FNP as PCP - Family Medicine  Axel Lewis MD as Consulting Physician (Hematology and Oncology)          Subjective   History Present Illness     Chief Complaint:   Chief Complaint   Patient presents with   • Fever       Mr. Ramirez is a 70 y.o. male recently diagnosed with right upper lobe mass found to be squamous cell carcinoma. He was discharged on 10/30 after finding of the lung cancer and postobstructive pneumonia. He was supposed to start treatment for his lung cancer today with his oncologist. He stated he developed a fever yesterday. He has had a cough. He has been short of breath but no more than usual. He has been weak. He denied any chest pain.     In the ER the patient had CXR that showed similar appearing large right lung mass with right basilar opacities and interstitial thickening that could represent postobstructive or aspiration pneumonia with a small right pleural effusion. WBC 14.6, temp 100.8, proBNP 2,291. Blood cultures were drawn he was started on cefepime and vancomycin. He was admitted for further treatment of pneumonia. Covid negative.     Review of Systems   Constitution: Positive for fever and malaise/fatigue.   HENT: Negative.    Eyes: Negative.    Cardiovascular: Negative.    Respiratory: Positive for cough and shortness of breath.    Endocrine: Negative.    Hematologic/Lymphatic: Negative.    Skin: Negative.    Musculoskeletal: Negative.    Gastrointestinal: Negative.    Genitourinary: Negative.    Neurological: Negative.    Psychiatric/Behavioral: Negative.    Allergic/Immunologic: Negative.    All other systems reviewed and are negative.          Personal History     Past Medical History:   Past Medical  History:   Diagnosis Date   • Aortic stenosis    • Cancer (CMS/HCC)     Sickle Cell Carcinoma   • Congestive heart failure (CHF) (CMS/HCC)     DIASTOLIC   • COPD (chronic obstructive pulmonary disease) (CMS/HCC)    • Coronary artery disease    • Hypertension    • Myocardial infarction (CMS/HCC)    • Peripheral vascular disease (CMS/HCC)    • Valvular disease        Surgical History:      Past Surgical History:   Procedure Laterality Date   • AORTIC VALVE REPAIR/REPLACEMENT  02/26/2018    Dr Maza   • BRONCHOSCOPY N/A 10/24/2020    Procedure: BRONCHOSCOPY with biopsy right upper lobe mass, and bronchoalveolar lavage right upper lobe;  Surgeon: Ángela Bean MD;  Location: [facility] ENDOSCOPY;  Service: Pulmonary;  Laterality: N/A;  post: right upper lobe mass, pneumonia   • CARDIAC CATHETERIZATION  12/29/2017   • CORONARY ARTERY BYPASS GRAFT  02/26/2018    Dr Maza   • TIBIA FRACTURE SURGERY     • VENOUS ACCESS DEVICE (PORT) INSERTION Left 10/30/2020    Procedure: MEDIPORT INSERTION UNDER FLUOROSCOPIC GUIDENCE;  Surgeon: Nomi Reyes MD;  Location: [facility] MAIN OR;  Service: Cardiothoracic;  Laterality: Left;           Family History: family history includes Cancer in his father; Coronary artery disease in his mother; Seizures (age of onset: 35) in his daughter; Stroke (age of onset: 35) in his daughter. Otherwise pertinent FHx was reviewed and unremarkable.     Social History:  reports that he has been smoking cigarettes. He has been smoking about 1.00 pack per day. He has never used smokeless tobacco. He reports current alcohol use. He reports that he does not use drugs.      Medications:  Prior to Admission medications    Medication Sig Start Date End Date Taking? Authorizing Provider   amLODIPine (NORVASC) 5 MG tablet TAKE 1 TABLET BY MOUTH DAILY 5/29/20   Iglesia Springer MD   aspirin (ASPIR-LOW) 81 MG EC tablet Take 81 mg by mouth Daily. 1/24/18   Provider, MD Paresh   atorvastatin (LIPITOR) 20 MG  tablet TAKE 1 TABLET BY MOUTH AT BEDTIME 5/29/20   Iglesia Springer MD   ferrous sulfate 324 (65 Fe) MG tablet delayed-release EC tablet Take 1 tablet by mouth 2 (Two) Times a Day With Meals. 10/30/20   Arvin Rushing DO   hydrALAZINE (APRESOLINE) 50 MG tablet TAKE 1 TABLET BY MOUTH EVERY 12 HOURS 9/29/20   Iglesia Springer MD   metoprolol tartrate (LOPRESSOR) 25 MG tablet TAKE 1 TABLET BY MOUTH TWICE DAILY 8/26/20   Iglesia Springer MD   pantoprazole (Protonix) 40 MG EC tablet Take 1 tablet by mouth Daily. 10/30/20   Arvin Rushing DO       Allergies:  No Known Allergies    Objective   Objective     Vital Signs  Temp:  [98 °F (36.7 °C)-100.8 °F (38.2 °C)] 98 °F (36.7 °C)  Heart Rate:  [74-98] 75  Resp:  [18-24] 18  BP: (147-172)/(61-81) 147/73  SpO2:  [93 %-97 %] 93 %  on  Flow (L/min):  [2] 2;   Device (Oxygen Therapy): room air  Body mass index is 21.52 kg/m².    Physical Exam  Vitals signs and nursing note reviewed.   Constitutional:       Appearance: Normal appearance. He is well-developed.   HENT:      Head: Normocephalic and atraumatic.   Eyes:      Extraocular Movements: Extraocular movements intact.      Conjunctiva/sclera: Conjunctivae normal.      Pupils: Pupils are equal, round, and reactive to light.   Neck:      Musculoskeletal: Normal range of motion and neck supple.      Thyroid: No thyromegaly.      Vascular: No JVD.      Trachea: No tracheal deviation.   Cardiovascular:      Rate and Rhythm: Normal rate and regular rhythm.      Pulses: Normal pulses.      Heart sounds: Normal heart sounds.   Pulmonary:      Effort: Pulmonary effort is normal. No respiratory distress.      Breath sounds: Examination of the right-upper field reveals decreased breath sounds and rhonchi. Examination of the right-lower field reveals decreased breath sounds and rhonchi. Decreased breath sounds and rhonchi present. No wheezing or rales.   Abdominal:      General: Bowel sounds are  normal. There is no distension.      Palpations: Abdomen is soft.      Tenderness: There is no abdominal tenderness. There is no guarding.   Musculoskeletal: Normal range of motion.   Skin:     General: Skin is warm and dry.   Neurological:      General: No focal deficit present.      Mental Status: He is alert and oriented to person, place, and time. Mental status is at baseline.      Motor: No abnormal muscle tone.   Psychiatric:         Mood and Affect: Mood normal.         Behavior: Behavior normal.         Results Review:  I have personally reviewed most recent cardiac tracings, lab results and radiology images and interpretations and agree with findings.    Results from last 7 days   Lab Units 11/08/20 2324   WBC 10*3/mm3 14.60*   HEMOGLOBIN g/dL 9.1*   HEMATOCRIT % 28.0*   PLATELETS 10*3/mm3 397     Results from last 7 days   Lab Units 11/08/20 2328 11/08/20 2324   SODIUM mmol/L  --  131*   POTASSIUM mmol/L  --  3.5   CHLORIDE mmol/L  --  96*   CO2 mmol/L  --  24.0   BUN mg/dL  --  12   CREATININE mg/dL  --  0.69*   GLUCOSE mg/dL  --  126*   CALCIUM mg/dL  --  8.4*   ALT (SGPT) U/L  --  18   AST (SGOT) U/L  --  16   TROPONIN T ng/mL  --  <0.010   PROBNP pg/mL  --  2,291.0*   LACTATE mmol/L 1.0  --    PROCALCITONIN ng/mL  --  0.09     Estimated Creatinine Clearance: 82.6 mL/min (A) (by C-G formula based on SCr of 0.69 mg/dL (L)).  Brief Urine Lab Results  (Last result in the past 365 days)      Color   Clarity   Blood   Leuk Est   Nitrite   Protein   CREAT   Urine HCG        11/08/20 2339 Yellow Clear Negative Negative Negative 30 mg/dL (1+)               Microbiology Results (last 10 days)     Procedure Component Value - Date/Time    Respiratory Panel PCR w/COVID-19(SARS-CoV-2) PARESH/GRACIELA/KATHY/PAD/COR/MAD/CHRIST In-House, NP Swab in U[facility], 3-4 HR TAT - Swab, Nasopharynx [[id number]]  (Normal) Collected: 11/08/20 2323    Lab Status: Final result Specimen: Swab from Nasopharynx Updated: 11/09/20 0021      ADENOVIRUS, PCR Not Detected     Coronavirus 229E Not Detected     Coronavirus HKU1 Not Detected     Coronavirus NL63 Not Detected     Coronavirus OC43 Not Detected     COVID19 Not Detected     Human Metapneumovirus Not Detected     Human Rhinovirus/Enterovirus Not Detected     Influenza A PCR Not Detected     Influenza A H1 Not Detected     Influenza A H1 2009 PCR Not Detected     Influenza A H3 Not Detected     Influenza B PCR Not Detected     Parainfluenza Virus 1 Not Detected     Parainfluenza Virus 2 Not Detected     Parainfluenza Virus 3 Not Detected     Parainfluenza Virus 4 Not Detected     RSV, PCR Not Detected     Bordetella pertussis pcr Not Detected     Bordetella parapertussis PCR Not Detected     Chlamydophila pneumoniae PCR Not Detected     Mycoplasma pneumo by PCR Not Detected    Narrative:      Fact sheet for providers: https://docs.[employer]/wp-content/uploads/HBD8640-7168-KT9.1-EUA-Provider-Fact-Sheet-3.pdf    Fact sheet for patients: https://docs.[employer]/wp-content/uploads/XJR1673-5352-CY2.1-EUA-Patient-Fact-Sheet-1.pdf          ECG/EMG Results (most recent)     Procedure Component Value Units Date/Time    ECG 12 Lead [[id number]] Collected: 11/08/20 2319     Updated: 11/08/20 2322     QT Interval 380 ms     Narrative:      HEART RATE= 78  bpm  RR Interval= 764  ms  AL Interval= 141  ms  P Horizontal Axis= 18  deg  P Front Axis= 69  deg  QRSD Interval= 97  ms  QT Interval= 380  ms  QRS Axis= 59  deg  T Wave Axis= 79  deg  - NORMAL ECG -  Sinus rhythm  Electronically Signed By:   Date and Time of Study: 2020-11-08 23:19:49                    Xr Chest 1 View    Result Date: 11/9/2020  Similar-appearing large right lung mass with right basilar opacities and interstitial thickening that could represent postobstructive or aspiration pneumonia with a small right pleural effusion.  Electronically Signed By-Malcolm Plaza On:11/9/2020 8:06 AM This report was finalized on [id number] by  Malcolm  "Bola, .    Nm Pet Skull Base To Mid Thigh    Addendum Date: 11/5/2020    Addendum: Voice recognition transcription error in impression number three. It should read \"Pathologically enlarged and hypermetabolic right paratracheal and subcarinal lymph nodes are consistent with nader metastases.\"  Electronically Signed By-Dr. Ivana Mirza MD On:11/5/2020 1:36 PM This report was finalized on [id number] by Dr. Ivana Mirza MD.    Result Date: 11/5/2020   1. Right upper lobe/right hilar lung mass is hypermetabolic and consistent with malignancy. It invades the right upper lobe bronchus. 2. Patchy parenchymal opacities in the right lower lobe appears slightly improved compared to the CT chest from 10/23/2020. Low to intermediate level FDG accumulation is seen within the right lower lobe, favored to represent postobstructive pneumonia. 3. Pathologically enlarged and hypermetabolic right paratracheal and subcarinal lymph nodes consistent with the stomach no metastases. 4. No convincing evidence of metastatic disease within the abdomen, pelvis, neck, or skeleton. 5. Mucous or secretions are thought to obstruct the bronchus intermedius and right middle and lower lobe bronchi. Dense right middle lobe atelectasis. 6. Additional chronic findings as described above.  Electronically Signed By-Dr. Ivana Mirza MD On:11/5/2020 1:23 PM This report was finalized on [id number] by Dr. Ivana Mirza MD.        Estimated Creatinine Clearance: 82.6 mL/min (A) (by C-G formula based on SCr of 0.69 mg/dL (L)).    Assessment/Plan   Assessment/Plan       Active Hospital Problems    Diagnosis  POA   • **Pneumonia of right lung due to infectious organism [J18.9]  Yes     Priority: High   • Squamous cell carcinoma of lung, right (CMS/HCC) [C34.91]  Yes   • Tobacco abuse [Z72.0]  Yes   • Coronary artery disease involving native coronary artery of native heart without angina pectoris [I25.10]  Yes   • Mixed hyperlipidemia [E78.2]  Yes "   • Presence of aortocoronary bypass graft [Z95.1]  Not Applicable   • Hx of aortic valve replacement [Z95.2]  Not Applicable   • Hypertension [I10]  Yes      Resolved Hospital Problems   No resolved problems to display.       Postobstructive pneumonia  -CXR: large right lung mass with right basilar opacities and interstitial thickening that could represent postobstructive or aspiration pneumonia with a small right pleural effusion.  -WBC 14.6, temp 100.8, lactate normal, procalcitonin normal, proBNP 2,291. Pt did trigger sepsis in the ER due to fever but no current signs of sepsis with normal heart rate, normal blood pressure, normal lactate.   -blood cultures drawn, Covid negative  -started on IV cefepime and vancomycin in the ER and will continue  -check urine antigens  -pt previously had postobstructive pneumonia recent admission. Fungal culture grew candidate and was started on diflucan    Squamous cell carcinoma of right upper lobe  -following with oncology outpatient and was supposed to treatment today    Smoking addiction  -cessation encouraged  -nicotine patch    Hypertension  -Continue Norvasc, hydralazine and Lopressor     Hyperlipidemia  -Continue statin therapy     Status post aortic valve replacement and coronary artery bypass grafting  -cont asa, BB, statin       VTE Prophylaxis - SCDs    CODE STATUS:    Code Status and Medical Interventions:   Ordered at: 11/09/20 0853     Level Of Support Discussed With:    Patient     Code Status:    CPR     Medical Interventions (Level of Support Prior to Arrest):    Full       This patient has been examined wearing appropriate Personal Protective Equipment . 11/09/20      I discussed the patient's findings and my recommendations with patient.      Signature: Electronically signed by VINCE Finley, 11/09/20, 12:21 PM EST.    [facility]ist Team          Agree with above mentioned except my evaluation, assessment, plan and treatment  supercedes that of ARNP or PA.        Electronically signed by Ulices Cruz MD, 11/09/20, 2:20 PM EST.    Mr. Ramirez is a 70 y.o. male recently diagnosed with right upper lobe mass found to be squamous cell carcinoma. He was discharged on 10/30 after finding of the lung cancer and postobstructive pneumonia. He was supposed to start treatment for his lung cancer today with his oncologist. He stated he developed a fever yesterday. He has had a cough. He has been short of breath but no more than usual. He has been weak. He denied any chest pain.      In the ER the patient had CXR that showed similar appearing large right lung mass with right basilar opacities and interstitial thickening that could represent postobstructive or aspiration pneumonia with a small right pleural effusion. WBC 14.6, temp 100.8, proBNP 2,291. Blood cultures were drawn he was started on cefepime and vancomycin. He was admitted for further treatment of pneumonia. Covid negative.      Noted.      ROS:  Constitution: Positive for fever and malaise/fatigue.   HENT: Negative.    Eyes: Negative.    Cardiovascular: Negative.    Respiratory: Positive for cough and shortness of breath.    Endocrine: Negative.    Hematologic/Lymphatic: Negative.    Skin: Negative.    Musculoskeletal: Negative.    Gastrointestinal: Negative.    Genitourinary: Negative.    Neurological: Negative.    Psychiatric/Behavioral: Negative.    Allergic/Immunologic: Negative.    All other systems reviewed and are negative.   Noted        Physical Exam   Constitutional: Patient appears well-developed and well-nourished and in no acute distress     HEENT:   Head: Normocephalic and atraumatic.   Eyes:  Pupils are equal, round, and reactive to light. EOM are intact. Sclera are anicteric and non-injected.  Mouth and Throat: Patient has dry mucous membranes. Oropharynx is clear of any erythema or exudate.       Neck: Neck supple.  No thyromegaly present. No lymphadenopathy present. No   masses.     Cardiovascular: Inspection: Increased JVD up to the jaw angle present. Palpation: No parasternal heave. Pedal pulses +1 bilaterally. No leg edema. Auscultation: Regular rate, regular rhythm, S1 normal and S2 normal. reveals no gallop and no friction rub. No Carotid bruit bilaterally.  Sternotomy scar visible    Pulmonary/Chest: Inspection: No distress, no use of accessory muscles. Lungs are clear to auscultation bilaterally. No respiratory distress. No wheezes. No rales.  Limited air entry    Abdomen /Gastrointestinal: Inspection: no distension. Palpation: no masses, no organomegaly. Soft. There is no tenderness. Bowel sounds are normal.     Musculoskeletal: Normal Muscle tone. Age appropriate, no deformities.    Neurological: Patient is alert and oriented to person, place, and time. Cranial nerves II-XII are grossly intact with no focal deficits. Sensori-motor exam is normal. No cerebellar signs.    Skin: Skin is warm. No rash noted. Nails show no clubbing.  No cyanosis or erythema. No bruising.    Emotional Behavior:   Appropriate   Debilities:  Age appropriate      Active Hospital Problems    Diagnosis  POA   • **Postobstructive pneumonia [J18.9]  Yes     Priority: High   • Diastolic CHF, acute (CMS/HCC) [I50.31]  Yes     Priority: Medium   • Squamous cell carcinoma of lung, right (CMS/HCC) [C34.91]  Yes   • Tobacco abuse [Z72.0]  Yes   • Coronary artery disease involving native coronary artery of native heart without angina pectoris [I25.10]  Yes   • Mixed hyperlipidemia [E78.2]  Yes   • Hx of aortic valve replacement [Z95.2]  Not Applicable   • Essential hypertension [I10]  Yes      Resolved Hospital Problems   No resolved problems to display.       Pneumonia:  Antibiotics-goal 5d course for uncomplicated pneumonia  Routine sputum culture not indicated per IDSA guidelines  Negative MRSA screen, high negative predictive value for MRSA pneumonia  Bronchodilators if needed  Oxygen supplementation if  needed to keep sat between 88 to 92%  Expectorants if needed  Antipyretics  Supportive treatment  Follow chest x-ray if needed  Follow labs if appropriate    Consult pulmonology      CHF:  Diuresis-loop diuretics  Spironolactone if tolerated  Thiazides if tolerated  ACE inhibitor if tolerated  Beta-blocker  Check echocardiogram      Smoking:  Counseled to quit smoking  Long-term compliance is suspect  Nicotine replacement while in the hospital      Hypertension:  Continue home medications   Options include-  beta-blockers  Calcium channel blockers  ACE inhibitor  Vasodilators  Low-dose diuretics  PRN medications have not been shown to affect outcomes-to be avoided

## 2020-11-09 NOTE — ED PROVIDER NOTES
Subjective   70-year-old male was just discharged from this facility on October 30, 2000 8:20 day inpatient stay for community-acquired pneumonia and new diagnosis of squamous cell carcinoma of right lung.  Patient has not undergone any treatment for his lung cancer yet.  Patient reports fever 100.2 at home at 9 PM.  Patient took 2 regular strength Tylenol.  Patient denies any known sick contacts, no cough or shortness of air or other symptoms.  Current symptoms are mild, better with Tylenol.          Review of Systems   Constitutional: Positive for fever.   All other systems reviewed and are negative.      Past Medical History:   Diagnosis Date   • Aortic stenosis    • Cancer (CMS/HCC)     Sickle Cell Carcinoma   • Congestive heart failure (CHF) (CMS/HCC)     DIASTOLIC   • COPD (chronic obstructive pulmonary disease) (CMS/HCC)    • Coronary artery disease    • Hypertension    • Myocardial infarction (CMS/HCC)    • Peripheral vascular disease (CMS/HCC)    • Valvular disease        No Known Allergies    Past Surgical History:   Procedure Laterality Date   • AORTIC VALVE REPAIR/REPLACEMENT  02/26/2018    Dr Maza   • BRONCHOSCOPY N/A 10/24/2020    Procedure: BRONCHOSCOPY with biopsy right upper lobe mass, and bronchoalveolar lavage right upper lobe;  Surgeon: Ángela Bean MD;  Location: [facility] ENDOSCOPY;  Service: Pulmonary;  Laterality: N/A;  post: right upper lobe mass, pneumonia   • CARDIAC CATHETERIZATION  12/29/2017   • CORONARY ARTERY BYPASS GRAFT  02/26/2018    Dr Maza   • TIBIA FRACTURE SURGERY     • VENOUS ACCESS DEVICE (PORT) INSERTION Left 10/30/2020    Procedure: MEDIPORT INSERTION UNDER FLUOROSCOPIC GUIDENCE;  Surgeon: Nomi Reyes MD;  Location: [facility] MAIN OR;  Service: Cardiothoracic;  Laterality: Left;       Family History   Problem Relation Age of Onset   • Coronary artery disease Mother    • Cancer Father    • Stroke Daughter 35   • Seizures Daughter 35       Social History     Socioeconomic  History   • Marital status:      Spouse name: Not on file   • Number of children: Not on file   • Years of education: Not on file   • Highest education level: Not on file   Tobacco Use   • Smoking status: Current Every Day Smoker     Packs/day: 1.00     Types: Cigarettes   • Smokeless tobacco: Never Used   Substance and Sexual Activity   • Alcohol use: Yes   • Drug use: No   • Sexual activity: Defer           Objective   Physical Exam  Constitutional:       Appearance: Normal appearance.   HENT:      Head: Normocephalic and atraumatic.      Mouth/Throat:      Mouth: Mucous membranes are dry.   Eyes:      Extraocular Movements: Extraocular movements intact.      Conjunctiva/sclera: Conjunctivae normal.   Neck:      Musculoskeletal: Normal range of motion and neck supple.   Cardiovascular:      Rate and Rhythm: Normal rate and regular rhythm.   Pulmonary:      Effort: Pulmonary effort is normal.      Comments: Mild rhonchi bilaterally, port left chest healing well, no associated erythema or drainage or tenderness  Abdominal:      General: Bowel sounds are normal. There is no distension.      Palpations: Abdomen is soft.      Tenderness: There is no abdominal tenderness.   Musculoskeletal: Normal range of motion.         General: No swelling or tenderness.   Skin:     General: Skin is warm and dry.      Capillary Refill: Capillary refill takes less than 2 seconds.   Neurological:      General: No focal deficit present.      Mental Status: He is alert and oriented to person, place, and time.   Psychiatric:         Mood and Affect: Mood normal.         Behavior: Behavior normal.         Procedures           ED Course  ED Course as of Nov 09 0249   Sun Nov 08, 2020   2324 EKG interpretation: Normal sinus rhythm, rate 78, no acute ST change    [JR]      ED Course User Index  [JR] Alber Cao MD                                           [facility]  Number of Diagnoses or Management Options  Pneumonia of right lung due  to infectious organism, unspecified part of lung:   Diagnosis management comments: Results for orders placed or performed during the hospital encounter of 11/08/20  -Respiratory Panel PCR w/COVID-19(SARS-CoV-2) PARESH/GRACIELA/KATHY/PAD/COR/MAD/CHRIST In-House, NP Swab in UTM/VTM, 3-4 HR TAT - Swab, Nasopharynx  Specimen: Nasopharynx; Swab       Result                      Value             Ref Range           ADENOVIRUS, PCR             Not Detected      Not Detected        Coronavirus 229E            Not Detected      Not Detected        Coronavirus HKU1            Not Detected      Not Detected        Coronavirus NL63            Not Detected      Not Detected        Coronavirus OC43            Not Detected      Not Detected        COVID19                     Not Detected      Not Detected*       Human Metapneumovirus       Not Detected      Not Detected        Human Rhinovirus/Enter*     Not Detected      Not Detected        Influenza A PCR             Not Detected      Not Detected        Influenza A H1              Not Detected      Not Detected        Influenza A H1 2009 PCR     Not Detected      Not Detected        Influenza A H3              Not Detected      Not Detected        Influenza B PCR             Not Detected      Not Detected        Parainfluenza Virus 1       Not Detected      Not Detected        Parainfluenza Virus 2       Not Detected      Not Detected        Parainfluenza Virus 3       Not Detected      Not Detected        Parainfluenza Virus 4       Not Detected      Not Detected        RSV, PCR                    Not Detected      Not Detected        Bordetella pertussis p*     Not Detected      Not Detected        Bordetella parapertuss*     Not Detected      Not Detected        Chlamydophila pneumoni*     Not Detected      Not Detected        Mycoplasma pneumo by P*     Not Detected      Not Detected   -Comprehensive Metabolic Panel  Specimen: Arm, Right; Blood       Result                      Value              Ref Range           Glucose                     126 (H)           65 - 99 mg/dL       BUN                         12                8 - 23 mg/dL        Creatinine                  0.69 (L)          0.76 - 1.27 *       Sodium                      131 (L)           136 - 145 mm*       Potassium                   3.5               3.5 - 5.2 mm*       Chloride                    96 (L)            98 - 107 mmo*       CO2                         24.0              22.0 - 29.0 *       Calcium                     8.4 (L)           8.6 - 10.5 m*       Total Protein               6.5               6.0 - 8.5 g/*       Albumin                     2.80 (L)          3.50 - 5.20 *       ALT (SGPT)                  18                1 - 41 U/L          AST (SGOT)                  16                1 - 40 U/L          Alkaline Phosphatase        126 (H)           39 - 117 U/L        Total Bilirubin             0.3               0.0 - 1.2 mg*       eGFR Non African Amer       113               >60 mL/min/1*       Globulin                    3.7               gm/dL               A/G Ratio                   0.8               g/dL                BUN/Creatinine Ratio        17.4              7.0 - 25.0          Anion Gap                   11.0              5.0 - 15.0 m*  -CBC Auto Differential  Specimen: Arm, Right; Blood       Result                      Value             Ref Range           WBC                         14.60 (H)         3.40 - 10.80*       RBC                         3.30 (L)          4.14 - 5.80 *       Hemoglobin                  9.1 (L)           13.0 - 17.7 *       Hematocrit                  28.0 (L)          37.5 - 51.0 %       MCV                         84.9              79.0 - 97.0 *       MCH                         27.5              26.6 - 33.0 *       MCHC                        32.4              31.5 - 35.7 *       RDW                         18.6 (H)          12.3 - 15.4 %       RDW-SD                       55.6 (H)          37.0 - 54.0 *       MPV                         6.2               6.0 - 12.0 fL       Platelets                   397               140 - 450 10*       Neutrophil %                78.8 (H)          42.7 - 76.0 %       Lymphocyte %                12.2 (L)          19.6 - 45.3 %       Monocyte %                  8.3               5.0 - 12.0 %        Eosinophil %                0.4               0.3 - 6.2 %         Basophil %                  0.3               0.0 - 1.5 %         Neutrophils, Absolute       11.50 (H)         1.70 - 7.00 *       Lymphocytes, Absolute       1.80              0.70 - 3.10 *       Monocytes, Absolute         1.20 (H)          0.10 - 0.90 *       Eosinophils, Absolute       0.10              0.00 - 0.40 *       Basophils, Absolute         0.00              0.00 - 0.20 *       nRBC                        0.0               0.0 - 0.2 /1*  -Procalcitonin  Specimen: Arm, Right; Blood       Result                      Value             Ref Range           Procalcitonin               0.09              0.00 - 0.25 *  -Urinalysis With Culture If Indicated - Urine, Clean Catch  Specimen: Urine, Clean Catch       Result                      Value             Ref Range           Color, UA                   Yellow            Yellow, Straw       Appearance, UA              Clear             Clear               pH, UA                      7.0               5.0 - 8.0           Specific [place], UA        1.013             1.005 - 1.030       Glucose, UA                 Negative          Negative            Ketones, UA                 Negative          Negative            Bilirubin, UA               Negative          Negative            Blood, UA                   Negative          Negative            Protein, UA                                   Negative        30 mg/dL (1+) (A)       Leuk Esterase, UA           Negative          Negative            Nitrite, UA                  Negative          Negative            Urobilinogen, UA            1.0 E.U./dL       0.2 - 1.0 E.*  -BNP  Specimen: Arm, Right; Blood       Result                      Value             Ref Range           proBNP                      2,291.0 (H)       0.0 - 900.0 *  -Troponin  Specimen: Arm, Right; Blood       Result                      Value             Ref Range           Troponin T                  <0.010            0.000 - 0.03*  -Urinalysis, Microscopic Only - Urine, Clean Catch  Specimen: Urine, Clean Catch       Result                      Value             Ref Range           RBC, UA                     6-12 (A)          None Seen /H*       WBC, UA                     0-2 (A)           None Seen /H*       Bacteria, UA                None Seen         None Seen /H*       Squamous Epithelial Ce*     0-2               None Seen, 0*       Hyaline Casts, UA           None Seen         None Seen /L*       Methodology                                                   Automated Microscopy  -POC Lactate  Specimen: Blood       Result                      Value             Ref Range           Lactate                     1.0               0.5 - 2.0 mm*  -ECG 12 Lead       Result                      Value             Ref Range           QT Interval                 380               ms             -Light Blue Top       Result                      Value             Ref Range           Extra Tube                                                    hold for add-on  -Green Top (Gel)       Result                      Value             Ref Range           Extra Tube                                                   -Lavender Top       Result                      Value             Ref Range           Extra Tube                                                    hold for add-on  -Gold Top - SST       Result                      Value             Ref Range           Extra Tube                                                     Hold for add-ons.    Well, vital signs stable, will admit for HCAP       Amount and/or Complexity of Data Reviewed  Clinical lab tests: reviewed  Tests in the radiology section of CPT®: reviewed  Decide to obtain previous medical records or to obtain history from someone other than the patient: yes  Independent visualization of images, tracings, or specimens: yes        Final diagnoses:   Pneumonia of right lung due to infectious organism, unspecified part of lung            Alber Cao MD  11/09/20 0240

## 2020-11-09 NOTE — OUTREACH NOTE
General Surgery Week 2 Survey      Responses   [facility] [place] patient discharged from?  [facility]   Does the patient have one of the following disease processes/diagnoses(primary or secondary)?  General Surgery   Week 2 attempt successful?  No   Revoke  Readmitted          Damaris Abdul RN

## 2020-11-09 NOTE — PLAN OF CARE
Goal Outcome Evaluation:  Plan of Care Reviewed With: patient  Progress: improving  Outcome Summary: patient spiked a fever today and a low o2 sat. O2 applied and tylenol given. Patient feels better. will continue to monitor.

## 2020-11-09 NOTE — PROGRESS NOTES
"Pharmacy Antimicrobial Dosing Service    Subjective:  Axel Ramirez is a 70 y.o.male admitted with postobstructive pneumonia. Pharmacy has been consulted to dose Vancomycin for possible PNA. Recently admitted end of October 2020 and received 1x doses of IV abx for PNA (CTX/Azith), but was not continued on abx. Recently diagnosed with squamous cell carcinoma - supposed to start treatment on 11/9 but had developed a fever and cough.     PMH: Squamous cell carcinoma of R upper lobe, current smoker     11/9 MRSA nares: ordered   10/24 BAL (previous admission): <10k CFU normal chino, candida       Assessment/Plan    1. Day #1 Vancomycin: Goal -600 mcg*h/mL. Received 1500 mg (~22 mg/kg ABW) IV x1 in ED which will be followed by 1000 mg (~15 mg/kg ABW) IV q12h. Levels ordered prior to 4th dose - 11/10 at 0600 and 1300 respectively.     2. Day #1 Cefepime: 2g IV q8h for estCrCl > 60 mL/min.    Will continue to monitor drug levels, renal function, culture and sensitivities, and patient clinical status.       Objective:  Relevant clinical data and objective history reviewed:  177.8 cm (70\")   68 kg (150 lb)   Ideal body weight: 73 kg (160 lb 15 oz)  Body mass index is 21.52 kg/m².        Results from last 7 days   Lab Units 11/08/20  2324   CREATININE mg/dL 0.69*     Estimated Creatinine Clearance: 82.6 mL/min (A) (by C-G formula based on SCr of 0.69 mg/dL (L)).  No intake/output data recorded.    Results from last 7 days   Lab Units 11/08/20  2324   WBC 10*3/mm3 14.60*     Temperature    11/08/20 2305 11/09/20 0408 11/09/20 1232   Temp: (!) 100.8 °F (38.2 °C) 98 °F (36.7 °C) (!) 101.7 °F (38.7 °C)     Baseline culture/source/susceptibility:  Microbiology Results (last 10 days)       Procedure Component Value - Date/Time    Respiratory Panel PCR w/COVID-19(SARS-CoV-2) PARESH/GRACIELA/KATHY/PAD/COR/MAD/CHRIST In-House, NP Swab in [facility]/VTM, 3-4 HR TAT - Swab, Nasopharynx [[id number]]  (Normal) Collected: 11/08/20 2323    Lab " Status: Final result Specimen: Swab from Nasopharynx Updated: 11/09/20 0021     ADENOVIRUS, PCR Not Detected     Coronavirus 229E Not Detected     Coronavirus HKU1 Not Detected     Coronavirus NL63 Not Detected     Coronavirus OC43 Not Detected     COVID19 Not Detected     Human Metapneumovirus Not Detected     Human Rhinovirus/Enterovirus Not Detected     Influenza A PCR Not Detected     Influenza A H1 Not Detected     Influenza A H1 2009 PCR Not Detected     Influenza A H3 Not Detected     Influenza B PCR Not Detected     Parainfluenza Virus 1 Not Detected     Parainfluenza Virus 2 Not Detected     Parainfluenza Virus 3 Not Detected     Parainfluenza Virus 4 Not Detected     RSV, PCR Not Detected     Bordetella pertussis pcr Not Detected     Bordetella parapertussis PCR Not Detected     Chlamydophila pneumoniae PCR Not Detected     Mycoplasma pneumo by PCR Not Detected    Narrative:      Fact sheet for providers: https://docs.[employer]/wp-content/uploads/NQS0002-3861-VW0.1-EUA-Provider-Fact-Sheet-3.pdf    Fact sheet for patients: https://docs.[employer]/wp-content/uploads/RYX4801-4418-YU7.1-EUA-Patient-Fact-Sheet-1.pdf             Anti-Infectives (From admission, onward)      Ordered     Dose/Rate Route Frequency Start Stop    11/09/20 1233  !Vancomycin Level Draw Needed     Ordering Provider: Lisandra Cruz APRN     Does not apply Once 11/10/20 1300      11/09/20 1233  !Vancomycin Level Draw Needed     Ordering Provider: Lisandra Cruz APRN     Does not apply Once 11/10/20 0600      11/09/20 1233  vancomycin (VANCOCIN) 1,000 mg in sodium chloride 0.9 % 250 mL IVPB     Ordering Provider: Lisandra Cruz APRN    1,000 mg Intravenous Every 12 Hours 11/09/20 1400 11/16/20 1359    11/09/20 1222  cefepime 2 gm IVPB in 100 ml NS (MBP)     Ordering Provider: Lisandra Cruz APRN    2 g  25 mL/hr over 4 Hours Intravenous Every 8 Hours 11/09/20 1300 11/11/20 2059    11/09/20 1222  Pharmacy  to dose vancomycin     Ordering Provider: Lisandra Cruz APRN     Does not apply Continuous PRN 11/09/20 1222 11/16/20 1221    11/08/20 2348  vancomycin 1500 mg/500 mL 0.9% NS IVPB ([facility])     Ordering Provider: Alber Cao MD    1,500 mg Intravenous Once 11/09/20 0000 11/09/20 0146    11/08/20 2348  ceFEPime (MAXIPIME) in SWFI 2g/10ml IV PUSH syringe     Ordering Provider: Alber Cao MD    2 g  over 5 Minutes Intravenous Once 11/08/20 2350 11/09/20 0011            Tyra Burnham, Bartolo  11/09/20 12:34 EST

## 2020-11-09 NOTE — PLAN OF CARE
69 yo male, admitting dx of pneumonia.  Pt admitted and oriented to room and unit.  Upon arrival on unit, no s/s of respiratory distress, pt afebrile.  No further acute issues.  Will continue to monitor and observe.    Problem: Adult Inpatient Plan of Care  Goal: Plan of Care Review  Flowsheets (Taken 11/9/2020 0735)  Progress: improving  Plan of Care Reviewed With: patient        Problem: Infection (Pneumonia)  Goal: Resolution of Infection Signs and Symptoms  Outcome: Ongoing, Progressing     Problem: Respiratory Compromise (Pneumonia)  Goal: Effective Oxygenation and Ventilation  Intervention: Optimize Oxygenation and Ventilation  Recent Flowsheet Documentation  Taken 11/9/2020 0408 by Jason Todd, RN  Head of Bed (HOB):   HOB elevated   HOB at 30-45 degrees

## 2020-11-09 NOTE — PROGRESS NOTES
Discharge Planning Assessment   [facility]     Patient Name: Axel Ramirez  MRN: [id number]  Today's Date: 11/9/2020    Admit Date: 11/8/2020    Discharge Needs Assessment     Row Name 11/09/20 1400       Living Environment    Lives With  spouse    Current Living Arrangements  home/apartment/condo    Potentially Unsafe Housing Conditions  unable to assess    Primary Care Provided by  self    Provides Primary Care For  no one, unable/limited ability to care for self    Family Caregiver if Needed  spouse    Quality of Family Relationships  unable to assess    Able to Return to Prior Arrangements  yes       Resource/Environmental Concerns    Resource/Environmental Concerns  none    Transportation Concerns  car, none       Transition Planning    Patient/Family Anticipates Transition to  home with family    Patient/Family Anticipated Services at Transition      Transportation Anticipated  car, drives self;family or friend will provide       Discharge Needs Assessment    Equipment Currently Used at Home  walker, standard;cane, straight    Concerns to be Addressed  discharge planning    Anticipated Changes Related to Illness  inability to care for self    Provided Post Acute Provider List?  N/A    N/A Provider List Comment  no needs identified at this time        Discharge Plan     Row Name 11/09/20 1401       Plan    Plan  anticipate return home with possible oxygen needs    Patient/Family in Agreement with Plan  yes    Plan Comments  spoke to patient in room with ppe(mask and goggles); staying 6 feet away and no longer than 15 mins; patient states he lives with his spouse; he follows with dr strickland, no home health services at this time       Carol naegele rn  Case management  Office number [phone]  Cell phone [phone]

## 2020-11-09 NOTE — CONSULTS
Group: [facility]         CONSULT NOTE    Patient Identification:  Axel Ramirez  70 y.o.  male  1950  [id number]            Requesting physician: Attending physician    Reason for Consultation: Pneumonia        History of Present Illness:  70 y.o. male recently diagnosed with right upper lobe mass found to be squamous cell carcinoma. He was discharged on 10/30 after finding of the lung cancer and postobstructive pneumonia. He was supposed to start treatment for his lung cancer today with his oncologist. He stated he developed a fever yesterday. He has had a cough. He has been short of breath but no more than usual. He has been weak. He denied any chest pain.      In the ER the patient had CXR that showed similar appearing large right lung mass with right basilar opacities and interstitial thickening that could represent postobstructive or aspiration pneumonia with a small right pleural effusion. WBC 14.6, temp 100.8, proBNP 2,291. Blood cultures were drawn he was started on cefepime and vancomycin. He was admitted for further treatment of pneumonia. Covid negative.     Assessment:    Invasive moderately differentiated squamous cell carcinoma   Large right lung mass and mediastinal lymphadenopathy  Lung mass extends to obstruct the right upper lobe bronchus  compression of SVC  Evidence of postobstructive pneumonia with green pus noted during bronchoscopy from right upper lobe     Status post bronchoscopy 10/24/2020     Tree-in-bud nodular opacities and patchy confluent opacities in the right lower lobe with areas of mucous plugging. Differential considerations include pneumonia, aspiration, endobronchial spread of malignancy        PFTs 2/12/2018  FEV1 1.9 L which is 52% predicted, FEV1/FVC is 54, %, %, DLCO 49%          Recommendations:     antibiotics  Currently on cefepime and vancomycin      bronchodilators     oxygen titration, the patient does not have home oxygen    Review  of Sytems:  Review of Systems   Respiratory: Positive for cough and shortness of breath.        Past Medical History:  Past Medical History:   Diagnosis Date   • Aortic stenosis    • Cancer (CMS/HCC)     Sickle Cell Carcinoma   • Congestive heart failure (CHF) (CMS/HCC)     DIASTOLIC   • COPD (chronic obstructive pulmonary disease) (CMS/HCC)    • Coronary artery disease    • Hypertension    • Myocardial infarction (CMS/HCC)    • Peripheral vascular disease (CMS/HCC)    • Valvular disease        Past Surgical History:  Past Surgical History:   Procedure Laterality Date   • AORTIC VALVE REPAIR/REPLACEMENT  02/26/2018    Dr Maza   • BRONCHOSCOPY N/A 10/24/2020    Procedure: BRONCHOSCOPY with biopsy right upper lobe mass, and bronchoalveolar lavage right upper lobe;  Surgeon: Ángela Bean MD;  Location: [facility] ENDOSCOPY;  Service: Pulmonary;  Laterality: N/A;  post: right upper lobe mass, pneumonia   • CARDIAC CATHETERIZATION  12/29/2017   • CORONARY ARTERY BYPASS GRAFT  02/26/2018    Dr Maza   • TIBIA FRACTURE SURGERY     • VENOUS ACCESS DEVICE (PORT) INSERTION Left 10/30/2020    Procedure: MEDIPORT INSERTION UNDER FLUOROSCOPIC GUIDENCE;  Surgeon: Nomi Reyes MD;  Location: [facility] MAIN OR;  Service: Cardiothoracic;  Laterality: Left;        Home Meds:  Medications Prior to Admission   Medication Sig Dispense Refill Last Dose   • amLODIPine (NORVASC) 5 MG tablet TAKE 1 TABLET BY MOUTH DAILY 90 tablet 1    • aspirin (ASPIR-LOW) 81 MG EC tablet Take 81 mg by mouth Daily.      • atorvastatin (LIPITOR) 20 MG tablet TAKE 1 TABLET BY MOUTH AT BEDTIME 90 tablet 1    • ferrous sulfate 324 (65 Fe) MG tablet delayed-release EC tablet Take 1 tablet by mouth 2 (Two) Times a Day With Meals. 60 tablet 2    • hydrALAZINE (APRESOLINE) 50 MG tablet TAKE 1 TABLET BY MOUTH EVERY 12 HOURS 180 tablet 0    • metoprolol tartrate (LOPRESSOR) 25 MG tablet TAKE 1 TABLET BY MOUTH TWICE DAILY 180 tablet 0    • pantoprazole (Protonix) 40 MG  "EC tablet Take 1 tablet by mouth Daily. 30 tablet 1        Allergies:  No Known Allergies    Social History:   Social History     Socioeconomic History   • Marital status:      Spouse name: Not on file   • Number of children: Not on file   • Years of education: Not on file   • Highest education level: Not on file   Tobacco Use   • Smoking status: Current Every Day Smoker     Packs/day: 1.00     Types: Cigarettes   • Smokeless tobacco: Never Used   Substance and Sexual Activity   • Alcohol use: Yes   • Drug use: No   • Sexual activity: Defer       Family History:  Family History   Problem Relation Age of Onset   • Coronary artery disease Mother    • Cancer Father    • Stroke Daughter 35   • Seizures Daughter 35       Physical Exam:  /68   Pulse 95   Temp 98.7 °F (37.1 °C) (Axillary)   Resp 18   Ht 177.8 cm (70\")   Wt 68 kg (150 lb)   SpO2 96%   BMI 21.52 kg/m²  Body mass index is 21.52 kg/m². 96% 68 kg (150 lb)  Physical Exam  Cardiovascular:      Heart sounds: Murmur present.   Pulmonary:      Breath sounds: Wheezing, rhonchi and rales present.         LABS:  Lab Results   Component Value Date    CALCIUM 8.4 (L) 11/08/2020    PHOS 3.8 01/02/2018     Results from last 7 days   Lab Units 11/08/20  2324   SODIUM mmol/L 131*   POTASSIUM mmol/L 3.5   CHLORIDE mmol/L 96*   CO2 mmol/L 24.0   BUN mg/dL 12   CREATININE mg/dL 0.69*   GLUCOSE mg/dL 126*   CALCIUM mg/dL 8.4*   WBC 10*3/mm3 14.60*   HEMOGLOBIN g/dL 9.1*   PLATELETS 10*3/mm3 397   ALT (SGPT) U/L 18   AST (SGOT) U/L 16   PROBNP pg/mL 2,291.0*   PROCALCITONIN ng/mL 0.09     Lab Results   Component Value Date    CKTOTAL 45 11/29/2019    TROPONINT <0.010 11/08/2020     Results from last 7 days   Lab Units 11/08/20  2324   TROPONIN T ng/mL <0.010         Results from last 7 days   Lab Units 11/09/20  1402 11/08/20  2328 11/08/20  2324   PROCALCITONIN ng/mL  --   --  0.09   LACTATE mmol/L 0.7 1.0  --          Results from last 7 days   Lab Units " 11/08/20  2323   ADENOVIRUS DETECTION BY PCR  Not Detected   CORONAVIRUS 229E  Not Detected   CORONAVIRUS HKU1  Not Detected   CORONAVIRUS NL63  Not Detected   CORONAVIRUS OC43  Not Detected   HUMAN METAPNEUMOVIRUS  Not Detected   HUMAN RHINOVIRUS/ENTEROVIRUS  Not Detected   INFLUENZA B PCR  Not Detected   PARAINFLUENZA 1  Not Detected   PARAINFLUENZA VIRUS 2  Not Detected   PARAINFLUENZA VIRUS 3  Not Detected   PARAINFLUENZA VIRUS 4  Not Detected   BORDETELLA PERTUSSIS PCR  Not Detected   CHLAMYDOPHILA PNEUMONIAE PCR  Not Detected   MYCOPLAMA PNEUMO PCR  Not Detected   INFLUENZA A PCR  Not Detected   INFLUENZA A H3  Not Detected   INFLUENZA A H1  Not Detected   RSV, PCR  Not Detected             No results found for: TSH  Estimated Creatinine Clearance: 82.6 mL/min (A) (by C-G formula based on SCr of 0.69 mg/dL (L)).  Results from last 7 days   Lab Units 11/08/20  2339   NITRITE UA  Negative   WBC UA /HPF 0-2*   BACTERIA UA /HPF None Seen   SQUAM EPITHEL UA /HPF 0-2        Imaging:  Imaging Results (Last 24 Hours)     Procedure Component Value Units Date/Time    XR Chest 1 View [[id number]] Collected: 11/09/20 0803     Updated: 11/09/20 0808    Narrative:      DATE OF EXAM:  11/8/2020 11:19 PM     PROCEDURE:  XR CHEST 1 VW-     INDICATIONS:  Fever. Lung cancer.     COMPARISON:  PET/CT 11/5/2020. Chest radiographs 10/30/2020 10/22/2020. CT chest  10/23/2020.     TECHNIQUE:   Single radiographic AP view of the chest was obtained.     FINDINGS:  The study is limited by lordotic patient positioning. Multiple overlying  artifacts. Stable sternotomy wires and postoperative changes from CABG.  Stable left subclavian central venous port catheter. Similar-appearing  large mass in the mid right lung with right upper lobe lucency that  correlates with bullous emphysematous changes. Patchy opacity in nodular  interstitial thickening in the right lung base. Blunting of the right  costophrenic angle. Left lung clear. No  pneumothorax is seen. Unchanged  cardiomegaly mediastinal contours. No acute osseous abnormality is  identified.        Impression:      Similar-appearing large right lung mass with right basilar opacities and  interstitial thickening that could represent postobstructive or  aspiration pneumonia with a small right pleural effusion.     Electronically Signed By-Malcolm Plaza On:11/9/2020 8:06 AM  This report was finalized on [id number] by  Malcolm Plaza, .            Current Meds:   SCHEDULE  amLODIPine, 5 mg, Oral, Daily  aspirin, 81 mg, Oral, Daily  atorvastatin, 20 mg, Oral, Nightly  cefepime, 2 g, Intravenous, Q8H  [START ON 11/10/2020] ferrous sulfate, 324 mg, Oral, Daily With Breakfast  furosemide, 20 mg, Intravenous, TID  hydrALAZINE, 50 mg, Oral, BID  metoprolol tartrate, 25 mg, Oral, BID  nicotine, 1 patch, Transdermal, Daily  pantoprazole, 40 mg, Oral, Daily  sodium chloride, 10 mL, Intravenous, Q12H      Infusions     PRNs  •  acetaminophen **OR** acetaminophen **OR** acetaminophen  •  aluminum-magnesium hydroxide-simethicone  •  bisacodyl  •  bisacodyl  •  influenza vaccine  •  ipratropium-albuterol  •  magnesium hydroxide  •  ondansetron **OR** ondansetron  •  sodium chloride  •  sodium chloride        Ángela Bean MD  11/9/2020  16:01 EST      Much of this encounter note is an electronic transcription/translation of spoken language to printed text using Dragon Software.

## 2020-11-10 NOTE — PROGRESS NOTES
Referral to Heart failure Navigator after chart review for readmission. Order received and placed in epic.     Vanda Davis RN  Complex Case Manager  [facility] Care Coordination  [phone]  [phone]-office  [phone]-fax  [email or web address]

## 2020-11-10 NOTE — PLAN OF CARE
Goal Outcome Evaluation:  Plan of Care Reviewed With: patient  Progress: improving  Outcome Summary: bronchoscopy scheduled for tomorrow with dr. melo. pt recieveing IV ABT. will continue to monitor.

## 2020-11-10 NOTE — PROGRESS NOTES
Daily Progress Note        Postobstructive pneumonia    Coronary artery disease involving native coronary artery of native heart without angina pectoris    Mixed hyperlipidemia    Essential hypertension    Hx of aortic valve replacement    Tobacco abuse    Squamous cell carcinoma of lung, right (CMS/HCC)    Diastolic CHF, acute (CMS/HCC)      Assessment:    Invasive moderately differentiated squamous cell carcinoma   Large right lung mass and mediastinal lymphadenopathy  Lung mass extends to obstruct the right upper lobe bronchus  compression of SVC  Evidence of postobstructive pneumonia with green pus noted during bronchoscopy from right upper lobe     Status post bronchoscopy 10/24/2020     Tree-in-bud nodular opacities and patchy confluent opacities in the right lower lobe with areas of mucous plugging. Differential considerations include pneumonia, aspiration, endobronchial spread of malignancy        PFTs 2/12/2018  FEV1 1.9 L which is 52% predicted, FEV1/FVC is 54, %, %, DLCO 49%          Recommendations:      Bronchoscopy in a.m.     steroids prednisone 20 mg b.i.d.    antibiotics  Currently on cefepime and vancomycin      bronchodilators     oxygen titration, the patient does not have home oxygen     LOS: 1 day     Subjective         Objective     Vital signs for last 24 hours:  Vitals:    11/09/20 2004 11/09/20 2341 11/10/20 0330 11/10/20 1500   BP: 147/81 130/66 148/76 129/74   BP Location: Left arm Left arm Right arm Left arm   Patient Position: Lying Lying Sitting Lying   Pulse: 105 87 93 98   Resp: 18 18 18 18   Temp: 99.7 °F (37.6 °C) 98 °F (36.7 °C) 98.7 °F (37.1 °C) 99.7 °F (37.6 °C)   TempSrc: Oral Oral Oral Oral   SpO2: 95% 93% 94% 93%   Weight:       Height:           Intake/Output last 3 shifts:  I/O last 3 completed shifts:  In: -   Out: 2150 [Urine:2150]  Intake/Output this shift:  I/O this shift:  In: 500 [P.O.:500]  Out: -       Radiology  Imaging Results (Last 24 Hours)     **  No results found for the last 24 hours. **          Labs:  Results from last 7 days   Lab Units 11/10/20  0630   WBC 10*3/mm3 13.70*   HEMOGLOBIN g/dL 9.5*   HEMATOCRIT % 29.3*   PLATELETS 10*3/mm3 387     Results from last 7 days   Lab Units 11/10/20  0630 11/08/20  2324   SODIUM mmol/L 130* 131*   POTASSIUM mmol/L 3.7 3.5   CHLORIDE mmol/L 93* 96*   CO2 mmol/L 28.0 24.0   BUN mg/dL 15 12   CREATININE mg/dL 0.90 0.69*   CALCIUM mg/dL 8.3* 8.4*   BILIRUBIN mg/dL  --  0.3   ALK PHOS U/L  --  126*   ALT (SGPT) U/L  --  18   AST (SGOT) U/L  --  16   GLUCOSE mg/dL 122* 126*         Results from last 7 days   Lab Units 11/08/20  2324   ALBUMIN g/dL 2.80*     Results from last 7 days   Lab Units 11/08/20  2324   TROPONIN T ng/mL <0.010                           Meds:   SCHEDULE  [START ON 11/11/2020] !Vancomycin Level Draw Needed, , Does not apply, Once  [START ON 11/12/2020] !Vancomycin Level Draw Needed, , Does not apply, Once  amLODIPine, 5 mg, Oral, Daily  aspirin, 81 mg, Oral, Daily  atorvastatin, 20 mg, Oral, Nightly  cefepime, 2 g, Intravenous, Q8H  ferrous sulfate, 324 mg, Oral, Daily With Breakfast  furosemide, 20 mg, Intravenous, TID  hydrALAZINE, 50 mg, Oral, BID  metoprolol tartrate, 25 mg, Oral, BID  nicotine, 1 patch, Transdermal, Daily  pantoprazole, 40 mg, Oral, Daily  sodium chloride, 10 mL, Intravenous, Q12H  vancomycin, 1,500 mg, Intravenous, Q24H      Infusions  Pharmacy to dose vancomycin,       PRNs  acetaminophen **OR** acetaminophen **OR** acetaminophen  •  aluminum-magnesium hydroxide-simethicone  •  bisacodyl  •  bisacodyl  •  influenza vaccine  •  ipratropium-albuterol  •  magnesium hydroxide  •  ondansetron **OR** ondansetron  •  Pharmacy to dose vancomycin  •  sodium chloride  •  sodium chloride    Physical Exam:  Physical Exam  Cardiovascular:      Heart sounds: Murmur present.   Pulmonary:      Breath sounds: Wheezing and rales present.         ROS  Review of Systems   Respiratory: Positive  for cough and shortness of breath.              Total time spent with patient greater than: 45 Minutes

## 2020-11-10 NOTE — PROGRESS NOTES
"Pharmacy Antimicrobial Dosing Service    Subjective:  Axel Ramirez is a 70 y.o.male admitted with postobstructive pneumonia. Pharmacy has been consulted to dose Vancomycin for possible PNA. Recently admitted end of October 2020 and received IV abx for PNA (CTX/Azith). Recently diagnosed with squamous cell carcinoma - supposed to start treatment on 11/9 but had developed a fever and cough. Vanc originally d/c on 11/9 but ordered to restart on 11/10.     PMH: Squamous cell carcinoma of R upper lobe, current smoker     11/9 MRSA nares: positive  10/24 BAL (previous admission): <10k CFU normal chino, candida       Assessment/Plan    1. Day #2 Vancomycin: Goal -600 mcg*h/mL. Received 1500 mg (~22 mg/kg ABW) IV x1 followed by 1000 mg (~15 mg/kg) ~13 hrs later then d/c'd. Vanc restarted today. Based on [employer] population kinetics, will proceed with different regimen of 1500 mg (~22 mg/kg ABW) IV q24h instead of lower dose q12h. Levels ordered prior to 5th total dose - 11/11 at 1600 and 11/12 at 1100.     2. Day #2 Cefepime: 2g IV q8h for estCrCl > 60 mL/min.    Will continue to monitor drug levels, renal function, culture and sensitivities, and patient clinical status.       Objective:  Relevant clinical data and objective history reviewed:  177.8 cm (70\")   68 kg (150 lb)   Ideal body weight: 73 kg (160 lb 15 oz)  Body mass index is 21.52 kg/m².        Results from last 7 days   Lab Units 11/10/20  0630 11/08/20  2324   CREATININE mg/dL 0.90 0.69*     Estimated Creatinine Clearance: 73.5 mL/min (by C-G formula based on SCr of 0.9 mg/dL).  I/O last 3 completed shifts:  In: -   Out: 2150 [Urine:2150]    Results from last 7 days   Lab Units 11/10/20  0630 11/08/20  2324   WBC 10*3/mm3 13.70* 14.60*     Temperature    11/09/20 2004 11/09/20 2341 11/10/20 0330   Temp: 99.7 °F (37.6 °C) 98 °F (36.7 °C) 98.7 °F (37.1 °C)     Baseline culture/source/susceptibility:  Microbiology Results (last 10 days)       " Procedure Component Value - Date/Time    MRSA Screen, PCR (Inpatient) - Swab, Nares [[id number]]  (Abnormal) Collected: 11/09/20 1742    Lab Status: Final result Specimen: Swab from Nares Updated: 11/09/20 2006     MRSA PCR MRSA Detected    Legionella Antigen, Urine - Urine, Urine, Clean Catch [[id number]]  (Normal) Collected: 11/08/20 2339    Lab Status: Final result Specimen: Urine, Clean Catch Updated: 11/09/20 1306     LEGIONELLA ANTIGEN, URINE Negative    S. Pneumo Ag Urine or CSF - Urine, Urine, Clean Catch [[id number]]  (Normal) Collected: 11/08/20 2339    Lab Status: Final result Specimen: Urine, Clean Catch Updated: 11/09/20 1307     Strep Pneumo Ag Negative    Blood Culture - Blood, Arm, Right [[id number]] Collected: 11/08/20 2325    Lab Status: Preliminary result Specimen: Blood from Arm, Right Updated: 11/09/20 2330     Blood Culture No growth at 24 hours    Blood Culture - Blood, Arm, Right [[id number]] Collected: 11/08/20 2324    Lab Status: Preliminary result Specimen: Blood from Arm, Right Updated: 11/09/20 2330     Blood Culture No growth at 24 hours    Respiratory Panel PCR w/COVID-19(SARS-CoV-2) PARESH/GRACIELA/KATHY/PAD/COR/MAD/CHRIST In-House, NP Swab in UTM/VTM, 3-4 HR TAT - Swab, Nasopharynx [[id number]]  (Normal) Collected: 11/08/20 2323    Lab Status: Final result Specimen: Swab from Nasopharynx Updated: 11/09/20 0021     ADENOVIRUS, PCR Not Detected     Coronavirus 229E Not Detected     Coronavirus HKU1 Not Detected     Coronavirus NL63 Not Detected     Coronavirus OC43 Not Detected     COVID19 Not Detected     Human Metapneumovirus Not Detected     Human Rhinovirus/Enterovirus Not Detected     Influenza A PCR Not Detected     Influenza A H1 Not Detected     Influenza A H1 2009 PCR Not Detected     Influenza A H3 Not Detected     Influenza B PCR Not Detected     Parainfluenza Virus 1 Not Detected     Parainfluenza Virus 2 Not Detected     Parainfluenza Virus 3 Not Detected     Parainfluenza Virus 4 Not  Detected     RSV, PCR Not Detected     Bordetella pertussis pcr Not Detected     Bordetella parapertussis PCR Not Detected     Chlamydophila pneumoniae PCR Not Detected     Mycoplasma pneumo by PCR Not Detected    Narrative:      Fact sheet for providers: https://docs.[employer]/wp-content/uploads/PYP9684-3664-CG3.1-EUA-Provider-Fact-Sheet-3.pdf    Fact sheet for patients: https://docs.[employer]/wp-content/uploads/VRC2576-4511-YH2.1-EUA-Patient-Fact-Sheet-1.pdf             Anti-Infectives (From admission, onward)      Ordered     Dose/Rate Route Frequency Start Stop    11/10/20 1049  vancomycin 1500 mg/500 mL 0.9% NS IVPB ([facility])     Ordering Provider: Ulices Cruz MD    1,500 mg Intravenous Every 24 Hours 11/10/20 1200 11/15/20 1159    11/10/20 1034  Pharmacy to dose vancomycin     Ordering Provider: Ulices Cruz MD     Does not apply Continuous PRN 11/10/20 1034 11/15/20 1033    11/09/20 1222  cefepime 2 gm IVPB in 100 ml NS ([facility])     Ordering Provider: Lisandra Cruz APRN    2 g  25 mL/hr over 4 Hours Intravenous Every 8 Hours 11/09/20 1300 11/11/20 2059    11/08/20 2348  vancomycin 1500 mg/500 mL 0.9% NS IVPB ([facility])     Ordering Provider: Alber Cao MD    1,500 mg Intravenous Once 11/09/20 0000 11/09/20 0146    11/08/20 2348  ceFEPime (MAXIPIME) in SWFI 2g/10ml IV PUSH syringe     Ordering Provider: Alber Cao MD    2 g  over 5 Minutes Intravenous Once 11/08/20 2350 11/09/20 0011            Tyra Burnham, PharmD  11/10/20 11:19 EST

## 2020-11-10 NOTE — PLAN OF CARE
Pt presented with elevated temperature early in shift, successfully relieved by PRN Tylenol 650 mg.  Pt's presenting with crackles congested lung sounds at shift assessment, pt administered IV Lasix as ordered, IV antibiotics in place with no adverse side effects.  Close monitoring in place of lung sounds, temperature, and O2 sats.  Lung sounds improved as shift went on with clear lung sounds at end of shift.  No further temperature spikes this shift, O2 sats within normal limits, no immediate respiratory distress.  HOB elevated at 30 to 45 degrees, pt educated to cough as much as possible to clear out sputum and prevent worsening of symptoms.  Pt resting well in bed, will continue to monitor and observe.      Problem: Adult Inpatient Plan of Care  Goal: Plan of Care Review  11/10/2020 0715 by Jason Todd RN  Outcome: Ongoing, Progressing  11/10/2020 0714 by Jason Todd RN  Flowsheets (Taken 11/10/2020 0714)  Plan of Care Reviewed With: patient      Problem: Infection (Pneumonia)  Goal: Resolution of Infection Signs and Symptoms  Outcome: Ongoing, Not Progressing       Problem: Respiratory Compromise (Pneumonia)  Goal: Effective Oxygenation and Ventilation  Outcome: Ongoing, Progressing  Intervention: Promote Airway Secretion Clearance  Recent Flowsheet Documentation  Taken 11/9/2020 1901 by Jason Todd RN  Cough And Deep Breathing: done with encouragement  Intervention: Optimize Oxygenation and Ventilation  Recent Flowsheet Documentation  Taken 11/9/2020 1901 by Jason Todd RN  Head of Bed (HOB):   HOB elevated   HOB at 30-45 degrees

## 2020-11-10 NOTE — PROGRESS NOTES
[facility] Medicine Services  INPATIENT PROGRESS NOTE  358/1   Hospitalist Team  LOS 1 days      Patient Care Team:  Sandoval Stevenson FNP as PCP - General  Sandoval Stevenson FNP as PCP - Family Medicine  Axel Lewis MD as Consulting Physician (Hematology and Oncology)      Patient was examined with relevant and adequate PPE keeping in mind the current coronavirus pandemic.    Chief Complaint / Subjective  Chief Complaint   Patient presents with   • Fever       HPI  Mr. Ramirez is a 70 y.o. male recently diagnosed with right upper lobe mass found to be squamous cell carcinoma. He was discharged on 10/30 after finding of the lung cancer and postobstructive pneumonia. He was supposed to start treatment for his lung cancer today with his oncologist. He stated he developed a fever yesterday. He has had a cough. He has been short of breath but no more than usual. He has been weak. He denied any chest pain.      In the ER the patient had CXR that showed similar appearing large right lung mass with right basilar opacities and interstitial thickening that could represent postobstructive or aspiration pneumonia with a small right pleural effusion. WBC 14.6, temp 100.8, proBNP 2,291. Blood cultures were drawn he was started on cefepime and vancomycin. He was admitted for further treatment of pneumonia. Covid negative.            Interval History and ROS:   (4 hpi elements or status of 3 chronic)  Feels better      History taken from: patient    ROS  Constitution: Resolved fever and malaise/fatigue.   HENT: Negative.    Eyes: Negative.    Cardiovascular: Negative.    Respiratory: Positive for cough and shortness of breath.    Endocrine: Negative.    Hematologic/Lymphatic: Negative.    Skin: Negative.    Musculoskeletal: Negative.    Gastrointestinal: Negative.    Genitourinary: Negative.    Neurological: Negative.    Psychiatric/Behavioral: Negative.    Allergic/Immunologic: Negative.    All other  systems reviewed and are negative.           Family History   Problem Relation Age of Onset   • Coronary artery disease Mother    • Cancer Father    • Stroke Daughter 35   • Seizures Daughter 35       Past Medical History:   Diagnosis Date   • Aortic stenosis    • Cancer (CMS/HCC)     Sickle Cell Carcinoma   • Congestive heart failure (CHF) (CMS/HCC)     DIASTOLIC   • COPD (chronic obstructive pulmonary disease) (CMS/HCC)    • Coronary artery disease    • Hypertension    • Myocardial infarction (CMS/HCC)    • Peripheral vascular disease (CMS/HCC)    • Valvular disease        Social History     Socioeconomic History   • Marital status:      Spouse name: Not on file   • Number of children: Not on file   • Years of education: Not on file   • Highest education level: Not on file   Tobacco Use   • Smoking status: Current Every Day Smoker     Packs/day: 1.00     Types: Cigarettes   • Smokeless tobacco: Never Used   Substance and Sexual Activity   • Alcohol use: Yes   • Drug use: No   • Sexual activity: Defer       Prior to Admission medications    Medication Sig Start Date End Date Taking? Authorizing Provider   amLODIPine (NORVASC) 5 MG tablet TAKE 1 TABLET BY MOUTH DAILY 5/29/20   Iglesia Springer MD   aspirin (ASPIR-LOW) 81 MG EC tablet Take 81 mg by mouth Daily. 1/24/18   Provider, MD Paresh   atorvastatin (LIPITOR) 20 MG tablet TAKE 1 TABLET BY MOUTH AT BEDTIME 5/29/20   Iglesia Springer MD   ferrous sulfate 324 (65 Fe) MG tablet delayed-release EC tablet Take 1 tablet by mouth 2 (Two) Times a Day With Meals. 10/30/20   Arvin Rushing DO   hydrALAZINE (APRESOLINE) 50 MG tablet TAKE 1 TABLET BY MOUTH EVERY 12 HOURS 9/29/20   Iglesia Springer MD   metoprolol tartrate (LOPRESSOR) 25 MG tablet TAKE 1 TABLET BY MOUTH TWICE DAILY 8/26/20   Iglesia Springer MD   pantoprazole (Protonix) 40 MG EC tablet Take 1 tablet by mouth Daily. 10/30/20   Arvin Rushing DO     "    Objective    Physical Exam     Vital Signs  Temp:  [98 °F (36.7 °C)-101.7 °F (38.7 °C)] 98.7 °F (37.1 °C)  Heart Rate:  [] 93  Resp:  [18-21] 18  BP: (130-166)/(66-82) 148/76    Oxygen Therapy  SpO2: 94 %  Pulse Oximetry Type: Continuous  Device (Oxygen Therapy): nasal cannula  Flow (L/min): 2  Flowsheet Rows      First Filed Value   Admission Height  177.8 cm (70\") Documented at 11/08/2020 2256   Admission Weight  68 kg (150 lb) Documented at 11/08/2020 2256        Weight change: 0 kg (0 lb)   Intake & Output (last 3 days)       11/07 0701 - 11/08 0700 11/08 0701 - 11/09 0700 11/09 0701 - 11/10 0700 11/10 0701 - 11/11 0700    P.O.    500    Total Intake(mL/kg)    500 (7.4)    Urine (mL/kg/hr)   2150 (1.3)     Total Output   2150     Net   -2150 +500                Lines, Drains & Airways    Active LDAs     Name:   Placement date:   Placement time:   Site:   Days:    Peripheral IV 11/08/20 2324 Right Forearm   11/08/20 2324    Forearm   1    Single Lumen Implantable Port 10/30/20 Left Subclavian   10/30/20    0956    Subclavian   11                Physical Exam:    Physical Exam  Constitutional: Patient appears well-developed and well-nourished and in no acute distress      HEENT:   Head: Normocephalic and atraumatic.   Eyes:  Pupils are equal, round, and reactive to light. EOM are intact. Sclera are anicteric and non-injected.  Mouth and Throat: Patient has dry mucous membranes. Oropharynx is clear of any erythema or exudate.        Neck: Neck supple.  No thyromegaly present. No lymphadenopathy present. No  masses.      Cardiovascular: Inspection: Increased JVD up to the jaw angle present. Palpation: No parasternal heave. Pedal pulses +1 bilaterally. No leg edema. Auscultation: Regular rate, regular rhythm, S1 normal and S2 normal. reveals no gallop and no friction rub. No Carotid bruit bilaterally.  Sternotomy scar visible     Pulmonary/Chest: Inspection: No distress, no use of accessory muscles. " Lungs Rales and rhonchi to auscultation bilaterally. No respiratory distress.Limited air entry     Abdomen /Gastrointestinal: Inspection: no distension. Palpation: no masses, no organomegaly. Soft. There is no tenderness. Bowel sounds are normal.      Musculoskeletal: Normal Muscle tone. Age appropriate, no deformities.     Neurological: Patient is alert and oriented to person, place, and time. Cranial nerves II-XII are grossly intact with no focal deficits. Sensori-motor exam is normal. No cerebellar signs.     Skin: Skin is warm. No rash noted. Nails show no clubbing.  No cyanosis or erythema. No bruising.     Emotional Behavior:   Appropriate       Debilities:  Age appropriate         Wounds (last 24 hours)      LDA Wound     Row Name               [REMOVED] Wound 10/30/20 0948 Left clavicle Incision    Wound - Properties Group Placement Date: 10/30/20  -MH Placement Time: 0948  -MH Present on Hospital Admission: Y  -MH Side: Left  -MH Location: clavicle  -MH Primary Wound Type: Incision  -MH Additional Comments: DRESSING- STERI STRIPS, COVADERM  -MH Removal Date: 11/10/20  -AS Removal Time: 0844  -AS    Retired Wound - Properties Group Date first assessed: 10/30/20  -MH Time first assessed: 0948  -MH Present on Hospital Admission: Y  -MH Side: Left  -MH Location: clavicle  -MH Primary Wound Type: Incision  -MH Additional Comments: DRESSING- STERI STRIPS, COVADERM  -MH Resolution Date: 11/10/20  -AS Resolution Time: 0844 -AS      User Key  (r) = Recorded By, (t) = Taken By, (c) = Cosigned By    Initials Name Provider Type     Francesca Naranjo RN Registered Nurse    AS Mana Bashir RN Registered Nurse          PT Recommendation and Plan             Procedures:        Assessment/Plan with Problem wise       Active Hospital Problems    Diagnosis  POA   • **Postobstructive pneumonia [J18.9]  Yes     Priority: High   • Diastolic CHF, acute (CMS/HCC) [I50.31]  Yes     Priority: Medium   • Squamous cell  carcinoma of lung, right (CMS/HCC) [C34.91]  Yes   • Tobacco abuse [Z72.0]  Yes   • Coronary artery disease involving native coronary artery of native heart without angina pectoris [I25.10]  Yes   • Mixed hyperlipidemia [E78.2]  Yes   • Hx of aortic valve replacement [Z95.2]  Not Applicable   • Essential hypertension [I10]  Yes      Resolved Hospital Problems   No resolved problems to display.        Estimated Creatinine Clearance: 73.5 mL/min (by C-G formula based on SCr of 0.9 mg/dL).    Code Status and Medical Interventions:   Ordered at: 11/09/20 0853     Level Of Support Discussed With:    Patient     Code Status:    CPR     Medical Interventions (Level of Support Prior to Arrest):    Full       MEDICAL DECISION MAKING COMPLEXITY BY PROBLEM:     Pneumonia:  Antibiotics-goal 5d course for uncomplicated pneumonia  Routine sputum culture not indicated per IDSA guidelines  Negative MRSA screen, high negative predictive value for MRSA pneumonia  Bronchodilators if needed  Oxygen supplementation if needed to keep sat between 88 to 92%  Expectorants if needed  Antipyretics  Supportive treatment  Follow chest x-ray if needed  Follow labs if appropriate     Consult pulmonology        CHF:  Diuresis-loop diuretics  Spironolactone if tolerated  Thiazides if tolerated  ACE inhibitor if tolerated  Beta-blocker  Check echocardiogram        Smoking:  Counseled to quit smoking  Long-term compliance is suspect  Nicotine replacement while in the hospital        Hypertension:  Continue home medications   Options include-  beta-blockers  Calcium channel blockers  ACE inhibitor  Vasodilators  Low-dose diuretics  PRN medications have not been shown to affect outcomes-to be avoided     Care coordination with pulmonology.  Await input 11/10/20 10:30 AM EST.    Plan for disposition:            Continued Care and Services - Admitted Since 11/8/2020    Coordination has not been started for this encounter.        Historical & Objective  Data     Results Review:    I reviewed the patient's new lab and radiology results. 11/10/20     Results from last 7 days   Lab Units 11/10/20  0630 11/08/20  2324   WBC 10*3/mm3 13.70* 14.60*   HEMOGLOBIN g/dL 9.5* 9.1*   HEMATOCRIT % 29.3* 28.0*   MCV fL 85.2 84.9   MCH pg 27.5 27.5   MPV fL 6.3 6.2   RDW % 18.5* 18.6*   PLATELETS 10*3/mm3 387 397     Results from last 7 days   Lab Units 11/10/20  0630 11/08/20  2324   SODIUM mmol/L 130* 131*   POTASSIUM mmol/L 3.7 3.5   CHLORIDE mmol/L 93* 96*   CO2 mmol/L 28.0 24.0   BUN mg/dL 15 12   CREATININE mg/dL 0.90 0.69*   CALCIUM mg/dL 8.3* 8.4*   BILIRUBIN mg/dL  --  0.3   ALK PHOS U/L  --  126*   ALT (SGPT) U/L  --  18   AST (SGOT) U/L  --  16   GLUCOSE mg/dL 122* 126*     Inflammatory Biomarkers        Invalid input(s): ESR, D-DIMER QUANTITATIVE,  PROCALCITONIN,    Lab Results   Component Value Date    PHOS 3.8 01/02/2018     No results found for: HGBA1C  Lab Results   Component Value Date    CHOL 79 11/29/2019    TRIG 48 11/29/2019    HDL 33 (L) 11/29/2019    LDL 36 11/29/2019     No results found for: LIPASE            Lab Results   Lab Value Date/Time    FINALDX  10/24/2020 1022     Smears of bronchoalveolar lavage with cytospin preparation:    Occasional markedly atypical squamous cells along with reactive bronchial cells and pulmonary macrophages      in a background of abundant acute inflammation    Findings suspicious for malignancy (see COMMENT)      NORBERTO/tkd       FINALDX  10/24/2020 1019     Lung, right upper lobe mass, biopsy:    Invasive moderately differentiated squamous cell carcinoma (see COMMENT)    NORBERTO/tkd       COMDX  10/24/2020 1022     The markedly atypical squamous cells seen are suspicious for malignancy but because of the abundant acute inflammation, a definitive diagnosis cannot be made. Please correlate with concurrent surgical specimen ([id number]) in which a definitive diagnosis of invasive non-small cell carcinoma was rendered.     NORBERTO/tkd        COMDX  10/24/2020 1019     The biopsy was stained via immunohistochemical technique utilizing appropriate controls for the presence of CK5/6, p63, TTF-1 and napsin. The tumor cells are positive for CK5/6 and p63 while being negative for TTF-1 and napsin. This staining pattern in conjunction with the histologic features is entirely consistent with squamous cell carcinoma.     NORBERTO/tkd        COVID19   Date Value Ref Range Status   11/08/2020 Not Detected Not Detected - Ref. Range Final        Microbiology Results (last 10 days)     Procedure Component Value - Date/Time    MRSA Screen, PCR (Inpatient) - Swab, Nares [[id number]]  (Abnormal) Collected: 11/09/20 1742    Lab Status: Final result Specimen: Swab from Nares Updated: 11/09/20 2006     MRSA PCR MRSA Detected    Legionella Antigen, Urine - Urine, Urine, Clean Catch [[id number]]  (Normal) Collected: 11/08/20 2339    Lab Status: Final result Specimen: Urine, Clean Catch Updated: 11/09/20 1306     LEGIONELLA ANTIGEN, URINE Negative    S. Pneumo Ag Urine or CSF - Urine, Urine, Clean Catch [[id number]]  (Normal) Collected: 11/08/20 2339    Lab Status: Final result Specimen: Urine, Clean Catch Updated: 11/09/20 1307     Strep Pneumo Ag Negative    Blood Culture - Blood, Arm, Right [[id number]] Collected: 11/08/20 2325    Lab Status: Preliminary result Specimen: Blood from Arm, Right Updated: 11/09/20 2330     Blood Culture No growth at 24 hours    Blood Culture - Blood, Arm, Right [[id number]] Collected: 11/08/20 2324    Lab Status: Preliminary result Specimen: Blood from Arm, Right Updated: 11/09/20 2330     Blood Culture No growth at 24 hours    Respiratory Panel PCR w/COVID-19(SARS-CoV-2) PARESH/GRACIELA/KATHY/PAD/COR/MAD/CHRIST In-House, NP Swab in UTM/VTM, 3-4 HR TAT - Swab, Nasopharynx [[id number]]  (Normal) Collected: 11/08/20 2323    Lab Status: Final result Specimen: Swab from Nasopharynx Updated: 11/09/20 0021     ADENOVIRUS, PCR Not Detected     Coronavirus 229E Not  Detected     Coronavirus HKU1 Not Detected     Coronavirus NL63 Not Detected     Coronavirus OC43 Not Detected     COVID19 Not Detected     Human Metapneumovirus Not Detected     Human Rhinovirus/Enterovirus Not Detected     Influenza A PCR Not Detected     Influenza A H1 Not Detected     Influenza A H1 2009 PCR Not Detected     Influenza A H3 Not Detected     Influenza B PCR Not Detected     Parainfluenza Virus 1 Not Detected     Parainfluenza Virus 2 Not Detected     Parainfluenza Virus 3 Not Detected     Parainfluenza Virus 4 Not Detected     RSV, PCR Not Detected     Bordetella pertussis pcr Not Detected     Bordetella parapertussis PCR Not Detected     Chlamydophila pneumoniae PCR Not Detected     Mycoplasma pneumo by PCR Not Detected    Narrative:      Fact sheet for providers: https://docs.[employer]/wp-content/uploads/IYF3087-6294-IF9.1-EUA-Provider-Fact-Sheet-3.pdf    Fact sheet for patients: https://docs.[employer]/wp-content/uploads/OXF3980-6472-YS0.1-EUA-Patient-Fact-Sheet-1.pdf          ECG/EMG Results (most recent)     Procedure Component Value Units Date/Time    ECG 12 Lead [[id number]] Collected: 11/08/20 2319     Updated: 11/08/20 2322     QT Interval 380 ms     Narrative:      HEART RATE= 78  bpm  RR Interval= 764  ms  MO Interval= 141  ms  P Horizontal Axis= 18  deg  P Front Axis= 69  deg  QRSD Interval= 97  ms  QT Interval= 380  ms  QRS Axis= 59  deg  T Wave Axis= 79  deg  - NORMAL ECG -  Sinus rhythm  Electronically Signed By:   Date and Time of Study: 2020-11-08 23:19:49    ECG 12 Lead [[id number]] Collected: 11/09/20 1339     Updated: 11/09/20 1343     QT Interval 339 ms     Narrative:      HEART RATE= 98  bpm  RR Interval= 612  ms  MO Interval= 125  ms  P Horizontal Axis= -44  deg  P Front Axis= 63  deg  QRSD Interval= 88  ms  QT Interval= 339  ms  QRS Axis= 43  deg  T Wave Axis= 84  deg  - BORDERLINE ECG -  Sinus rhythm  Probable left atrial enlargement  Probable left ventricular  hypertrophy  When compared with ECG of 08-Nov-2020 23:19:49,  No significant change  Electronically Signed By:   Date and Time of Study: 2020-11-09 13:39:57    Adult Transthoracic Echo Complete W/ Cont if Necessary Per Protocol [[id number]] Collected: 11/09/20 1447     Updated: 11/09/20 1553     BSA 1.8 m^2      RVIDd 3.0 cm      IVSd 1.0 cm      IVSs 1.8 cm      LVIDd 4.7 cm      LVIDs 2.4 cm      LVPWd 1.1 cm      BH CV ECHO TIKA - LVPWS 1.5 cm      IVS/LVPW 0.9     FS 50.4 %      EDV(Teich) 104.4 ml      ESV(Teich) 19.2 ml      EF(Teich) 81.6 %      EDV(cubed) 106.4 ml      ESV(cubed) 13.0 ml      EF(cubed) 87.8 %      % IVS thick 71.8 %      % LVPW thick 31.9 %      LV mass(C)d 186.4 grams      LV mass(C)dI 100.9 grams/m^2      LV mass(C)s 137.7 grams      LV mass(C)sI 74.5 grams/m^2      SV(Teich) 85.2 ml      SI(Teich) 46.1 ml/m^2      SV(cubed) 93.4 ml      SI(cubed) 50.6 ml/m^2      Ao root diam 3.4 cm      Ao root area 8.9 cm^2      ACS 1.2 cm      LVOT diam 2.0 cm      LVOT area 3.1 cm^2      EDV(MOD-sp4) 51.6 ml      ESV(MOD-sp4) 17.1 ml      EF(MOD-sp4) 66.8 %      SV(MOD-sp4) 34.5 ml      SI(MOD-sp4) 18.7 ml/m^2      Ao root area (BSA corrected) 1.8     LV Rich Vol (BSA corrected) 27.9 ml/m^2      LV Sys Vol (BSA corrected) 9.3 ml/m^2      MV E max cary 91.2 cm/sec      MV A max cary 130.6 cm/sec      MV E/A 0.7     MV V2 max 141.7 cm/sec      MV max PG 8.0 mmHg      MV V2 mean 92.4 cm/sec      MV mean PG 3.7 mmHg      MV V2 VTI 38.7 cm      MVA(VTI) 2.2 cm^2      MV dec slope 373.0 cm/sec^2      MV dec time 0.24 sec      Ao pk cary 296.2 cm/sec      Ao max PG 35.2 mmHg      Ao max PG (full) 26.6 mmHg      Ao V2 mean 201.7 cm/sec      Ao mean PG 18.8 mmHg      Ao mean PG (full) 14.2 mmHg      Ao V2 VTI 51.5 cm      WILBERT(I,A) 1.6 cm^2      WILBERT(I,D) 1.6 cm^2      WILBERT(V,A) 1.5 cm^2      WILBERT(V,D) 1.5 cm^2      LV V1 max PG 8.7 mmHg      LV V1 mean PG 4.6 mmHg      LV V1 max 147.3 cm/sec      LV V1 mean 98.6  "cm/sec      LV V1 VTI 27.3 cm      SV(Ao) 456.2 ml      SI(Ao) 247.0 ml/m^2      SV(LVOT) 84.0 ml      SI(LVOT) 45.5 ml/m^2      PA V2 max 112.9 cm/sec      PA max PG 5.1 mmHg      PA max PG (full) 2.2 mmHg      PA V2 mean 78.3 cm/sec      PA mean PG 2.7 mmHg      PA mean PG (full) 1.4 mmHg      PA V2 VTI 16.3 cm      PA acc time 0.11 sec      RV V1 max PG 2.9 mmHg      RV V1 mean PG 1.3 mmHg      RV V1 max 85.6 cm/sec      RV V1 mean 51.3 cm/sec      RV V1 VTI 14.0 cm      PA pr(Accel) 30.7 mmHg       CV ECHO TIKA - BZI_BMI 21.5 kilograms/m^2       CV ECHO TIKA - BSA(HAYCOCK) 1.8 m^2       CV ECHO TIKA - BZI_METRIC_WEIGHT 68.0 kg       CV ECHO TIKA - BZI_METRIC_HEIGHT 177.8 cm      EF(MOD-bp) 67.0 %      LA dimension(2D) 3.6 cm                     Xr Chest 1 View    Result Date: 11/9/2020  Similar-appearing large right lung mass with right basilar opacities and interstitial thickening that could represent postobstructive or aspiration pneumonia with a small right pleural effusion.  Electronically Signed By-Malcolm Plaza On:11/9/2020 8:06 AM This report was finalized on [id number] by  Malcolm Plaza, .    Nm Pet Skull Base To Mid Thigh    Addendum Date: 11/5/2020    Addendum: Voice recognition transcription error in impression number three. It should read \"Pathologically enlarged and hypermetabolic right paratracheal and subcarinal lymph nodes are consistent with nader metastases.\"  Electronically Signed By-Dr. Ivana Mirza MD On:11/5/2020 1:36 PM This report was finalized on [id number] by Dr. Ivana Mirza MD.    Result Date: 11/5/2020   1. Right upper lobe/right hilar lung mass is hypermetabolic and consistent with malignancy. It invades the right upper lobe bronchus. 2. Patchy parenchymal opacities in the right lower lobe appears slightly improved compared to the CT chest from 10/23/2020. Low to intermediate level FDG accumulation is seen within the right lower lobe, favored to represent " postobstructive pneumonia. 3. Pathologically enlarged and hypermetabolic right paratracheal and subcarinal lymph nodes consistent with the stomach no metastases. 4. No convincing evidence of metastatic disease within the abdomen, pelvis, neck, or skeleton. 5. Mucous or secretions are thought to obstruct the bronchus intermedius and right middle and lower lobe bronchi. Dense right middle lobe atelectasis. 6. Additional chronic findings as described above.  Electronically Signed By-Dr. Ivana Mirza MD On:11/5/2020 1:23 PM This report was finalized on [id number] by Dr. Ivana Mirza MD.      I personally reviewed patient's x-ray films and my findings are: 0    I personally reviewed patient's EKG strip and my findings are: 0    Medication Review:   I have reviewed the patient's current medication list 11/10/20     Scheduled Meds  amLODIPine, 5 mg, Oral, Daily  aspirin, 81 mg, Oral, Daily  atorvastatin, 20 mg, Oral, Nightly  cefepime, 2 g, Intravenous, Q8H  ferrous sulfate, 324 mg, Oral, Daily With Breakfast  furosemide, 20 mg, Intravenous, TID  hydrALAZINE, 50 mg, Oral, BID  metoprolol tartrate, 25 mg, Oral, BID  nicotine, 1 patch, Transdermal, Daily  pantoprazole, 40 mg, Oral, Daily  sodium chloride, 10 mL, Intravenous, Q12H        Meds Infusions       DVT prophylaxis  Mechanical Order History:      Ordered        11/09/20 0853  Place Sequential Compression Device  Once         11/09/20 0853  Maintain Sequential Compression Device  Continuous                 Pharmalogical Order History:     None          Meds PRN  •  acetaminophen **OR** acetaminophen **OR** acetaminophen  •  aluminum-magnesium hydroxide-simethicone  •  bisacodyl  •  bisacodyl  •  influenza vaccine  •  ipratropium-albuterol  •  magnesium hydroxide  •  ondansetron **OR** ondansetron  •  sodium chloride  •  sodium chloride      Ulices Cruz MD  11/10/20  10:30 EST

## 2020-11-10 NOTE — PROGRESS NOTES
"Case Management Readmission Assessment Note       Case Management Readmission Assessment (all recorded)      Readmission Interview     Row Name 11/10/20 1225             Readmission Indications    Is this hospitalization related to the prior hospital diagnosis?  Yes      What was the reason you were admitted?  On 10/22-10/30 patient was admitted to the hospital with fever, pna. right lung mass. At this time due to lymphadenopathy RML was 50% blocked. Patient admitted 11/8 from ER with post obstructive PNA and CHF. Patient had not yet begun treatment for CA found on last admission before he readmitted this time.      Row Name 11/10/20 1225             Recommendation for rehospitalization    Did you speak with your physician prior to coming to the hospital  No \"I came to the ER\"      Who recommended you return to the hospital?  Other (comment) self      Did you seek care elsewhere prior to coming to the hospital?  No      Row Name 11/10/20 1225             Follow up appointment    Do you have a PCP?  Yes Dr Stevenson      Did you have an appointment with PCP/specialist after hospitalization within 7 days?  Yes      Did you keep your appointment?  Yes      Row Name 11/10/20 1225             Medications    Did you have newly prescribed medications at discharge?  Yes      Did you understand the reasons for your medications at discharge and how to take them?  Yes      Did you understand the side effects of your medications?  Yes      Are you taking all of you prescribed medications?  Yes      Row Name 11/10/20 1225             Discharge Instructions    Did you understand your discharge instructions?  Yes      Did your family/caregiver hear your instructions?  No      Were you told to eat a special diet?  No      Were you given a number of someone to call if you had questions or concerns?  Yes      Row Name 11/10/20 1225             Index discharge location/services    Where did you go upon discharge?  Home      Row Name " 11/10/20 1225             Discharge Readiness    On a scale of 1-5 (5 being well prepared), how ready were you for discharge  3 Spoke to patient over the phone and chart review for information      Recommendation based on interview  Education on diagnosis/self management;Goals of care discussion/advanced care planning            Vanda Davis RN  Complex Case Manager  [facility] Care Coordination  [phone]  [phone]-office  [phone]  [email or web address]    Phone communication only - no physical contact with patient or family.

## 2020-11-11 PROBLEM — J18.9 PNEUMONIA OF RIGHT LUNG DUE TO INFECTIOUS ORGANISM: Status: ACTIVE | Noted: 2020-01-01

## 2020-11-11 NOTE — PERIOPERATIVE NURSING NOTE
Pt underwent a Flexible and Rigid Bronchosopy with debulking of Right main bronchial tumor.  No dressings

## 2020-11-11 NOTE — NURSING NOTE
Dr. Reyes in talking with patient explaining procedure. Pt verbalized understanding. Pt to be transported to OPS to await procedure.

## 2020-11-11 NOTE — CONSULTS
Patient Care Team:  Sandoval Stevenson FNP as PCP - General  Sandoval Stevenson FNP as PCP - Family Medicine  Axel Lewis MD as Consulting Physician (Hematology and Oncology)    Chief complaint fever respiratory distress    Subjective   I have reviewed the hospital record and reviewed the accompanying physician notes.  I have discussed the case with the following physicians: Case discussed with Dr. Dowling bronchoscopic images reviewed.  Bronchoscopy reviewed.  I also discussed the case with Dr. Lewis.  Discussed plans for endobronchial debridement so that chemotherapy can be administered without high risk of sepsis.    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  I previously placed a Mediport.  Patient now presents with a fever right lung infiltrates and bronchoscopic evidence of obstruction of the right mainstem bronchus by extension of tumor growth from the right upper lobe bronchus into the bronchus intermedius and right mainstem.  The patient was seen and evaluated urgently during bronchoscopy.  There was purulence distal to the obstruction.  The airway is open distal to the obstruction.    Review of Systems  Not obtained patient under anesthetic.  Past Medical History:   Diagnosis Date   • Aortic stenosis    • Cancer (CMS/HCC)     Sickle Cell Carcinoma   • Congestive heart failure (CHF) (CMS/HCC)     DIASTOLIC   • COPD (chronic obstructive pulmonary disease) (CMS/HCC)    • Coronary artery disease    • Hypertension    • Myocardial infarction (CMS/HCC)    • Peripheral vascular disease (CMS/HCC)    • Valvular disease       Social History     Socioeconomic History   • Marital status:      Spouse name: Not on file   • Number of children: Not on file   • Years of education: Not on file   • Highest education level: Not on file   Tobacco Use   • Smoking status: Current Every Day Smoker     Packs/day: 1.00      Types: Cigarettes   • Smokeless tobacco: Never Used   Substance and Sexual Activity   • Alcohol use: Yes   • Drug use: No   • Sexual activity: Defer    family history includes Cancer in his father; Coronary artery disease in his mother; Seizures (age of onset: 35) in his daughter; Stroke (age of onset: 35) in his daughter.   Past Surgical History:   Procedure Laterality Date   • AORTIC VALVE REPAIR/REPLACEMENT  02/26/2018    Dr Maza   • BRONCHOSCOPY N/A 10/24/2020    Procedure: BRONCHOSCOPY with biopsy right upper lobe mass, and bronchoalveolar lavage right upper lobe;  Surgeon: Ángela Bean MD;  Location: [facility] ENDOSCOPY;  Service: Pulmonary;  Laterality: N/A;  post: right upper lobe mass, pneumonia   • CARDIAC CATHETERIZATION  12/29/2017   • CORONARY ARTERY BYPASS GRAFT  02/26/2018    Dr Maza   • TIBIA FRACTURE SURGERY     • VENOUS ACCESS DEVICE (PORT) INSERTION Left 10/30/2020    Procedure: MEDIPORT INSERTION UNDER FLUOROSCOPIC GUIDENCE;  Surgeon: Nomi Reyes MD;  Location: [facility] MAIN OR;  Service: Cardiothoracic;  Laterality: Left;       Objective      Vital Signs  Temp:  [97.7 °F (36.5 °C)-99.7 °F (37.6 °C)] 97.7 °F (36.5 °C)  Heart Rate:  [] 99  Resp:  [8-22] 12  BP: (112-147)/() 112/75      Intake/Output Summary (Last 24 hours) at 11/11/2020 0838  Last data filed at 11/11/2020 0806  Gross per 24 hour   Intake 1280 ml   Output 1800 ml   Net -520 ml       Physical Exam patient is not intubated.  Awakening from anesthesia.  Normocephalic conjunctiva pink sclera anicteric neck is supple trachea is midline no subcutaneous emphysema no supraclavicular lymphadenopathy reduced breath sounds on the right-hand side clear on the left side.  Normal sinus rhythm.  No JVD no cyanosis.  Abdomen nondistended nontender no masses palpable.  Extremities free of cyanosis clubbing or edema.  Pelvis stable.  Spine is straight.  Neck extension is reasonable.  Moves all extremities.  Awakening from anesthesia.   Psychiatric evaluation deferred.  Neurological examination grossly nonfocal without cranial nerve defects.  Skin Free of any rash or allergic type reaction.    Results Review:  I have personally reviewed the following radiographs and reviewed the radiology reports.  My independent finds are I personally reexamined the patient CT scan.  I reviewed the bronchoscopic images.  There is reviewed the bronchoscopic procedure.    Lab Results:  Lab Results (last 24 hours)     Procedure Component Value Units Date/Time    Pneumocystis PCR - Wash, Lung [[id number]] Collected: 11/11/20 0834    Specimen: Wash from Lung Updated: 11/11/20 0834    Respiratory Culture - Wash, Lung, R [[id number]] Collected: 11/11/20 0747    Specimen: Wash from Lung, R Updated: 11/11/20 0833    Cytomegalovirus (CMV) By PCR - Wash, Lung, R [[id number]] Collected: 11/11/20 0747    Specimen: Wash from Lung, R Updated: 11/11/20 0833    Fungus Culture - Wash, Lung, R [[id number]] Collected: 11/11/20 0747    Specimen: Wash from Lung, R Updated: 11/11/20 0833    AFB Culture - Wash, Lung, R [[id number]] Collected: 11/11/20 0747    Specimen: Wash from Lung, R Updated: 11/11/20 0833    Respiratory Panel, PCR - Wash, Lung, R [[id number]] Collected: 11/11/20 0747    Specimen: Wash from Lung, R Updated: 11/11/20 0833    Non-gynecologic Cytology [[id number]] Collected: 11/11/20 0747    Specimen: Wash from Lung, R Updated: 11/11/20 0823    Creatinine, Serum [[id number]]  (Abnormal) Collected: 11/11/20 0353    Specimen: Blood Updated: 11/11/20 0536     Creatinine 0.64 mg/dL      eGFR Non African Amer 124 mL/min/1.73     Narrative:      GFR Normal >60  Chronic Kidney Disease <60  Kidney Failure <15      Basic Metabolic Panel [[id number]]  (Abnormal) Collected: 11/11/20 0351    Specimen: Blood Updated: 11/11/20 0524     Glucose 143 mg/dL      BUN 19 mg/dL      Creatinine 0.88 mg/dL      Sodium 132 mmol/L      Potassium 3.8 mmol/L      Chloride 95 mmol/L      CO2 28.0 mmol/L       Calcium 8.8 mg/dL      eGFR Non African Amer 86 mL/min/1.73      BUN/Creatinine Ratio 21.6     Anion Gap 9.0 mmol/L     Narrative:      GFR Normal >60  Chronic Kidney Disease <60  Kidney Failure <15      CBC & Differential [[id number]]  (Abnormal) Collected: 11/11/20 0351    Specimen: Blood Updated: 11/11/20 0444    Narrative:      The following orders were created for panel order CBC & Differential.  Procedure                               Abnormality         Status                     ---------                               -----------         ------                     CBC Auto Differential[[id number]]        Abnormal            Final result                 Please view results for these tests on the individual orders.    CBC Auto Differential [[id number]]  (Abnormal) Collected: 11/11/20 0351    Specimen: Blood Updated: 11/11/20 0444     WBC 11.20 10*3/mm3      RBC 3.45 10*6/mm3      Hemoglobin 9.4 g/dL      Hematocrit 29.5 %      MCV 85.4 fL      MCH 27.4 pg      MCHC 32.0 g/dL      RDW 18.9 %      RDW-SD 56.0 fl      MPV 6.5 fL      Platelets 351 10*3/mm3      Neutrophil % 80.3 %      Lymphocyte % 13.3 %      Monocyte % 6.3 %      Eosinophil % 0.0 %      Basophil % 0.1 %      Neutrophils, Absolute 9.00 10*3/mm3      Lymphocytes, Absolute 1.50 10*3/mm3      Monocytes, Absolute 0.70 10*3/mm3      Eosinophils, Absolute 0.00 10*3/mm3      Basophils, Absolute 0.00 10*3/mm3      nRBC 0.1 /100 WBC     Blood Culture - Blood, Arm, Right [[id number]] Collected: 11/08/20 2325    Specimen: Blood from Arm, Right Updated: 11/10/20 2330     Blood Culture No growth at 2 days    Blood Culture - Blood, Arm, Right [[id number]] Collected: 11/08/20 2324    Specimen: Blood from Arm, Right Updated: 11/10/20 2330     Blood Culture No growth at 2 days           Meds    Current Facility-Administered Medications:   •  !Vancomycin Level Draw Needed, , Does not apply, Once, Ulices Cruz MD  •  [START ON 11/12/2020] !Vancomycin Level Draw Needed,  , Does not apply, Once, Ulices Cruz MD  •  acetaminophen (TYLENOL) tablet 650 mg, 650 mg, Oral, Q4H PRN, 650 mg at 11/10/20 2050 **OR** acetaminophen (TYLENOL) 160 MG/5ML solution 650 mg, 650 mg, Oral, Q4H PRN **OR** acetaminophen (TYLENOL) suppository 650 mg, 650 mg, Rectal, Q4H PRN, AchterFelisha garciale, APRN  •  aluminum-magnesium hydroxide-simethicone (MAALOX MAX) 400-400-40 MG/5ML suspension 15 mL, 15 mL, Oral, Q6H PRN, Achterberg, Lisandra, APRN  •  amLODIPine (NORVASC) tablet 5 mg, 5 mg, Oral, Daily, AchterbergLaloLisandra, APRN, 5 mg at 11/10/20 0835  •  aspirin EC tablet 81 mg, 81 mg, Oral, Daily, AchterFelisha garciale, APRN, 81 mg at 11/10/20 0832  •  atorvastatin (LIPITOR) tablet 20 mg, 20 mg, Oral, Nightly, Achterberg, Lisandra, APRN, 20 mg at 11/10/20 2052  •  bisacodyl (DULCOLAX) EC tablet 5 mg, 5 mg, Oral, Daily PRN, LinkterFelisha garciale, APRN  •  bisacodyl (DULCOLAX) suppository 10 mg, 10 mg, Rectal, Daily PRN, AchterFelisha garciale, APRN  •  cefepime 2 gm IVPB in 100 ml NS (MBP), 2 g, Intravenous, Q8H, Felisha Cruzle, APRN, Last Rate: 25 mL/hr at 11/11/20 0547, 2 g at 11/11/20 0547  •  EPINEPHrine (ADRENALIN) 1 MG/10ML injection  - ADS Override Pull, , , ,   •  ferrous sulfate EC tablet 324 mg, 324 mg, Oral, Daily With Breakfast, Ulices Cruz MD, 324 mg at 11/10/20 0832  •  furosemide (LASIX) injection 20 mg, 20 mg, Intravenous, TID, Ulices Cruz MD, 20 mg at 11/10/20 2052  •  hydrALAZINE (APRESOLINE) tablet 50 mg, 50 mg, Oral, BID, Lisandra Cruz, APRN, 50 mg at 11/10/20 2052  •  influenza vac split quad (FLUZONE,FLUARIX,AFLURIA,FLULAVAL) injection 0.5 mL, 0.5 mL, Intramuscular, During Hospitalization, Ulices Cruz MD  •  ipratropium-albuterol (DUO-NEB) nebulizer solution 3 mL, 3 mL, Nebulization, Q4H PRN, Ulices Cruz MD, 3 mL at 11/09/20 1303  •  lidocaine (XYLOCAINE) 2% injection, , , PRN, DrawÁngela MD, 4 mL at 11/11/20 0745  •  lidocaine (XYLOCAINE) 5 % ointment  - ADS Override Pull, , ,  ,   •  lidocaine (XYLOCAINE) 5 % ointment, , , PRN, Ángela Bean MD, 1 application at 11/11/20 0744  •  Lidocaine HCl (Cardiac) PF (XYLOCAINE) 100 MG/5ML injection  - ADS Override Pull, , , ,   •  magnesium hydroxide (MILK OF MAGNESIA) suspension 2400 mg/10mL 10 mL, 10 mL, Oral, Daily PRN, LinkterFelisha garciale, APRN  •  metoprolol tartrate (LOPRESSOR) tablet 25 mg, 25 mg, Oral, BID, Achterjose Lisandra, APRN, 25 mg at 11/10/20 2051  •  nicotine (NICODERM CQ) 21 MG/24HR patch 1 patch, 1 patch, Transdermal, Daily, AchterLisandra garcia, APRN, 1 patch at 11/10/20 0913  •  ondansetron (ZOFRAN) tablet 4 mg, 4 mg, Oral, Q6H PRN **OR** ondansetron (ZOFRAN) injection 4 mg, 4 mg, Intravenous, Q6H PRN, LinkterFelisha garciale, APRN, 4 mg at 11/10/20 1757  •  pantoprazole (PROTONIX) EC tablet 40 mg, 40 mg, Oral, Daily, LinkterFelisha agrciale, APRN, 40 mg at 11/10/20 0832  •  Pharmacy to dose vancomycin, , Does not apply, Continuous PRN, Ulices Cruz MD  •  predniSONE (DELTASONE) tablet 20 mg, 20 mg, Oral, BID With Meals, Ángela Bean MD, 20 mg at 11/10/20 1701  •  sodium chloride 0.9 % flush 10 mL, 10 mL, Intravenous, PRN, Alber Cao MD  •  sodium chloride 0.9 % flush 10 mL, 10 mL, Intravenous, Q12H, Lisandra Cruz APRN, 10 mL at 11/10/20 2054  •  sodium chloride 0.9 % flush 10 mL, 10 mL, Intravenous, PRN, LinkterFelisha garciale, APRN  •  sodium chloride 0.9 % infusion, 9 mL/hr, Intravenous, Continuous, Solo Bender MD, Last Rate: 9 mL/hr at 11/11/20 0729  •  vancomycin 1500 mg/500 mL 0.9% NS IVPB ([facility]), 1,500 mg, Intravenous, Q24H, Ulices Cruz MD, 1,500 mg at 11/10/20 1201        Assessment/Plan     Patient with a large obstructing right upper lobe tumor that is extended into the bronchus intermedius and right mainstem bronchus producing complete airway obstruction.  There is secondary pneumonia and purulence distal to the obstruction.  Patient appears to be a very good candidate for endobronchial tumor debridement to  be followed hopefully shortly by initiation of therapy.  Radiation therapy can also be considered to prevent reocclusion of the airway by tumor.  Dr. Tay of radiation therapy should be involved soon as possible.    Nomi Reyes MD  [facility]  11/11/20  08:38 EST

## 2020-11-11 NOTE — NURSING NOTE
Patient has returned from his radiation treatment. We now have a schedule and will plan accordingly.

## 2020-11-11 NOTE — PLAN OF CARE
Goal Outcome Evaluation:  Plan of Care Reviewed With: patient  Progress: improving   Pt resting well throughout the night with no complaints noted. Bronch scheduled for this AM. Will continue to monitor.

## 2020-11-11 NOTE — ANESTHESIA PROCEDURE NOTES
Airway  Urgency: elective    Date/Time: 11/11/2020 9:48 AM  Airway not difficult    General Information and Staff    Patient location during procedure: OR  Anesthesiologist: Gary Murray MD    Indications and Patient Condition  Indications for airway management: airway protection    Preoxygenated: yes  MILS not maintained throughout  Mask difficulty assessment: 1 - vent by mask    Final Airway Details  Final airway type: endotracheal airway      Successful airway: ETT  Cuffed: yes   Successful intubation technique: direct laryngoscopy  Endotracheal tube insertion site: oral  Blade: Jami  Blade size: 4  ETT size (mm): 7.5  Cormack-Lehane Classification: grade I - full view of glottis  Placement verified by: capnometry and palpation of cuff   Measured from: lips  ETT/EBT  to lips (cm): 21  Number of attempts at approach: 1  Assessment: lips, teeth, and gum same as pre-op and atraumatic intubation    Additional Comments  ASA monitors applied; preoxygenated with 100% FiO2 via anesthesia face mask; induction of general anesthesia; bag-mask ventilation; patient's position optimized; laryngoscopy; cuffed ETT lubricated with lidocaine jelly and placed into the trachea; cuff inflated to seal with minimally occlusive airway cuff pressure; ETT connected to anesthesia circuit; atraumatic/dentition in preoperative condition; ETT secured in place; correct placement in the trachea confirmed by bilateral chest rise, tube condensation, and return of EtCO2 > 30 mmHg x3

## 2020-11-11 NOTE — OP NOTE
BRONCHOSCOPY RIGID, BRONCHOSCOPY  Procedure Report    Patient Name:  Axel Ramirez  YOB: 1950    Date of Surgery:  11/11/2020     Indications: Obstructing right lung cancer producing pneumonia and postobstructive purulence    Pre-op Diagnosis:   Obstructing lung cancer with pneumonia       Post-Op Diagnosis Codes:  Obstructing lung cancer with pneumonia    Procedure/CPT® Codes:      Procedure(s):  BRONCHOSCOPY RIGID with debulking of right main endobronchial tumor  BRONCHOSCOPY    Staff:  Surgeon(s):  Nomi Reyes MD         Anesthesia: General    Estimated Blood Loss: 150 mL    Implants:    Nothing was implanted during the procedure    Specimen:                  Findings: Tumor debulking right mainstem bronchus and bronchus intermedius with establishing patency to the lower and middle lobe.  Moderate blood loss stopped completely at the conclusion of the procedure.    Complications: None    Description of Procedure: The patient had undergone bronchoscopy by Dr. Bean as described in my note.  The airway was completely obstructed from the right upper lobe endobronchial tumor involving the bronchus intermedius and right mainstem bronchus.  Distally there was abundant purulent material.  Tumor debulking was indicated.  The case was discussed with Dr. Geneva Tay and Dr. Lewis.  All were in agreement with proceeding with tumor debulking.    The patient was brought to the operating room general endotracheal anesthesia was induced.  Fiberoptic bronchoscopy was performed in the positioning of the tumor was confirmed in all over members were able to visualize the problem at hand.  I then performed rigid bronchoscopy placing the endoscope alongside the endotracheal tube.  Endotracheal tube was removed and the scope was advanced across the vocal cords.  The scope was advanced down the right mainstem bronchus.  Tumor was cored out with the end of the bronchoscope and with the biopsy forceps.   Moderate bleeding ensued.  The hypopharynx was packed with gauze to enable better ventilation without leak through the rigid bronchoscope.  Repeated suctioning and biopsy forceps debridement clear tumor from the airway.  Epinephrine solution was applied to the area of the tumor to reduce bleeding both before the biopsies began and afterwards.  A total of 2 A of 1-10,000 epinephrine that was diluted in an additional 30 cc of saline were used.  Following clearing of the airway the rigid scope was removed and the patient was intubated with an 8.0 tube.  This allowed better ventilation and visualization with the flexible therapeutic scope.  I cleared residual blood from the airway.  Additional topical epinephrine was applied completing the 2 ampoule administration.  All bleeding it stopped.  The tumor now only occupied about half of the right mainstem bronchus.  I had noted earlier that further biopsies simply resulted in more tumor emanating from the right upper lobe bronchus I did not think it was worth biopsying further as this would likely result in more airway bleeding.  The airway was now functionally patent.  There was no distal purulence.  There was no distal bleeding.  The patient was allowed to ventilate with the endotracheal tube in place and allowed awaken from anesthesia.  He tolerated the procedure well.          Nomi Reyes MD     Date: 11/11/2020  Time: 10:29 EST

## 2020-11-11 NOTE — PROGRESS NOTES
Daily Progress Note        Postobstructive pneumonia    Coronary artery disease involving native coronary artery of native heart without angina pectoris    Mixed hyperlipidemia    Essential hypertension    Hx of aortic valve replacement    Tobacco abuse    Squamous cell carcinoma of lung, right (CMS/HCC)    Diastolic CHF, acute (CMS/HCC)    Pneumonia of right lung due to infectious organism      Assessment:    Invasive moderately differentiated squamous cell carcinoma   Large right lung mass and mediastinal lymphadenopathy  Lung mass extends to obstruct the right upper lobe bronchus  compression of SVC  Evidence of postobstructive pneumonia with green pus noted during bronchoscopy from right upper lobe     Status post bronchoscopy 10/24/2020     Tree-in-bud nodular opacities and patchy confluent opacities in the right lower lobe with areas of mucous plugging. Differential considerations include pneumonia, aspiration, endobronchial spread of malignancy        PFTs 2/12/2018  FEV1 1.9 L which is 52% predicted, FEV1/FVC is 54, %, %, DLCO 49%          Recommendations:    Bronchoscopy completed 11/11/2020  Known squamous cell carcinoma mass extending from the right upper lobe blocking 98% of the left main bronchus  I was able to pass the scope around it and view the right middle lobe and the right lower lobe which was filled with purulent secretions compatible with severe pneumonia postobstructive, Therapeutic cleaning of secretions  I called thoracic surgery to the room during the procedure patient will be moved to the OR for rigid bronchoscopy and debulking of the tumor     steroids prednisone 20 mg b.i.d.    antibiotics  Currently on cefepime and vancomycin  BAL culture      bronchodilators     oxygen titration, the patient does not have home oxygen     LOS: 2 days     Subjective         Objective     Vital signs for last 24 hours:  Vitals:    11/11/20 0815 11/11/20 0820 11/11/20 0825 11/11/20 0840   BP:  116/67 122/67 112/75 117/72   BP Location: Right arm Right arm Left arm Left arm   Patient Position: Lying Lying Lying Lying   Pulse: 95 93 99 96   Resp: 13 16 12 16   Temp:       TempSrc:       SpO2: 98% 94% 94% 94%   Weight:       Height:           Intake/Output last 3 shifts:  I/O last 3 completed shifts:  In: 980 [P.O.:980]  Out: 3150 [Urine:3150]  Intake/Output this shift:  I/O this shift:  In: 300 [I.V.:300]  Out: -       Radiology  Imaging Results (Last 24 Hours)     ** No results found for the last 24 hours. **          Labs:  Results from last 7 days   Lab Units 11/11/20 0351   WBC 10*3/mm3 11.20*   HEMOGLOBIN g/dL 9.4*   HEMATOCRIT % 29.5*   PLATELETS 10*3/mm3 351     Results from last 7 days   Lab Units 11/11/20 0353 11/11/20 0351 11/08/20  2324   SODIUM mmol/L  --  132*   < > 131*   POTASSIUM mmol/L  --  3.8   < > 3.5   CHLORIDE mmol/L  --  95*   < > 96*   CO2 mmol/L  --  28.0   < > 24.0   BUN mg/dL  --  19   < > 12   CREATININE mg/dL 0.64* 0.88   < > 0.69*   CALCIUM mg/dL  --  8.8   < > 8.4*   BILIRUBIN mg/dL  --   --   --  0.3   ALK PHOS U/L  --   --   --  126*   ALT (SGPT) U/L  --   --   --  18   AST (SGOT) U/L  --   --   --  16   GLUCOSE mg/dL  --  143*   < > 126*    < > = values in this interval not displayed.         Results from last 7 days   Lab Units 11/08/20  2324   ALBUMIN g/dL 2.80*     Results from last 7 days   Lab Units 11/08/20  2324   TROPONIN T ng/mL <0.010                           Meds:   SCHEDULE  !Vancomycin Level Draw Needed, , Does not apply, Once  [START ON 11/12/2020] !Vancomycin Level Draw Needed, , Does not apply, Once  amLODIPine, 5 mg, Oral, Daily  aspirin, 81 mg, Oral, Daily  atorvastatin, 20 mg, Oral, Nightly  cefepime, 2 g, Intravenous, Q8H  EPINEPHrine, , ,   ferrous sulfate, 324 mg, Oral, Daily With Breakfast  furosemide, 20 mg, Intravenous, TID  hydrALAZINE, 50 mg, Oral, BID  lidocaine, , ,   Lidocaine HCl (Cardiac) PF, , ,   metoprolol tartrate, 25 mg, Oral,  BID  nicotine, 1 patch, Transdermal, Daily  pantoprazole, 40 mg, Oral, Daily  predniSONE, 20 mg, Oral, BID With Meals  sodium chloride, 10 mL, Intravenous, Q12H  vancomycin, 1,500 mg, Intravenous, Q24H      Infusions  Pharmacy to dose vancomycin,   sodium chloride, 9 mL/hr, Last Rate: 9 mL/hr (11/11/20 0729)      PRNs  acetaminophen **OR** acetaminophen **OR** acetaminophen  •  aluminum-magnesium hydroxide-simethicone  •  bisacodyl  •  bisacodyl  •  influenza vaccine  •  ipratropium-albuterol  •  magnesium hydroxide  •  ondansetron **OR** ondansetron  •  Pharmacy to dose vancomycin  •  sodium chloride  •  sodium chloride    Physical Exam:  Physical Exam  Cardiovascular:      Heart sounds: Murmur present.   Pulmonary:      Breath sounds: Wheezing and rales present.         ROS  Review of Systems   Respiratory: Positive for cough and shortness of breath.              Total time spent with patient greater than: 45 Minutes

## 2020-11-11 NOTE — ANESTHESIA PREPROCEDURE EVALUATION
Anesthesia Evaluation     Patient summary reviewed and Nursing notes reviewed   NPO Solid Status: > 8 hours  NPO Liquid Status: > 8 hours           Airway   Mallampati: II  TM distance: >3 FB  Neck ROM: full  No difficulty expected  Dental - normal exam     Pulmonary - normal exam    breath sounds clear to auscultation  (+) pneumonia , lung cancer, COPD moderate,   Cardiovascular - normal exam  Exercise tolerance: unable to assess    ECG reviewed  Rhythm: regular  Rate: normal    (+) hypertension, valvular problems/murmurs AS, CABG, CHF Diastolic >=55%, hyperlipidemia,     ROS comment: Hx AVR    Interpretation Summary    · Estimated left ventricular EF = 65% Left ventricular systolic function is normal.  · Left ventricular diastolic function is consistent with (grade Ia w/high LAP) impaired relaxation.  · The right atrial cavity is mildly dilated.  · Mild aortic valve stenosis is present.  · Mild to moderate LVOT gradient noted           Neuro/Psych- negative ROS  GI/Hepatic/Renal/Endo - negative ROS     Musculoskeletal (-) negative ROS    Abdominal  - normal exam   Substance History - negative use     OB/GYN negative ob/gyn ROS         Other      history of cancer                    Anesthesia Plan    ASA 4     general     intravenous induction     Anesthetic plan, all risks, benefits, and alternatives have been provided, discussed and informed consent has been obtained with: patient.

## 2020-11-11 NOTE — OP NOTE
Bronchoscopy Procedure Note  z  Procedure:  1. Bronchoscopy, Diagnostic  2. Bronchoscopy, Therapeutic  3. 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  Patient was given the opportunity to ask questions and all concerns were answered.  The bronchocope was inserted into the main airway via the oropharynx. An anatomical survey was done of the main airways and the subsegmental bronchus to at least the first subsegmental level of all five lobes of both lungs.  The findings are reported below.  A bronchoalveolar lavage was performed right lower lobe using aliquots of normal saline instilled into the airways then aspirated back.    Findings:  Bronchoscope passed into oral cavity to the level of the vocal cords.  Lidocaine used for local anesthetic over vocal cords.  Bronchoscope was passed between the vocal cords into the trachea.  All airways were visualized to at least the first subsegment level of all 5 lobes of both lungs.  Airways were of normal size and caliber.  No endobronchial lesions seen.      Known squamous cell carcinoma mass extending from the right upper lobe blocking 98% of the left main bronchus  I was able to pass the scope around it and view the right middle lobe and the right lower lobe which was filled with  purulent secretions compatible with severe pneumonia postobstructive    Therapeutic cleaning of secretions  I called thoracic surgery to the room during the procedure patient will be moved to the OR for rigid bronchoscopy and debulking of the tumor        Patient tolerated procedure well        Estimated Blood Loss:  Minimal           Specimens:  Sent purulent fluid                Complications:  None; patient tolerated the procedure well.           Disposition: PACU - hemodynamically stable.      Patient tolerated the procedure well.    Ángela Bean MD  11/11/2020  08:59 EST

## 2020-11-11 NOTE — NURSING NOTE
Dr. Reyes was on the floor and talked with nurse how patient is supposed to be going to Radiation therapy today. Nurse called cancer care center and left message for call be so we can work together on getting patient transferred over to the center for radiation. Nurse also called wife and gave her an update on what is going.

## 2020-11-11 NOTE — PLAN OF CARE
Goal Outcome Evaluation:  Plan of Care Reviewed With: patient  Progress: improving  Outcome Summary: patient went for bronchs with tumor removal. patient also started radiation today and will continue radiation treatments daily.

## 2020-11-11 NOTE — ANESTHESIA POSTPROCEDURE EVALUATION
Patient: Axel Ramirez    Procedure Summary     Date: 11/11/20 Room / Location: [facility] OR 08 / [facility] MAIN OR    Anesthesia Start: 0941 Anesthesia Stop: 1033    Procedures:       BRONCHOSCOPY RIGID with debulking of right main endobronchial tumor (N/A Bronchus)      BRONCHOSCOPY (N/A Bronchus) Diagnosis:     Surgeon: Nomi Reyes MD Provider: Tommie Shepard MD    Anesthesia Type: general ASA Status: 4          Anesthesia Type: general    Vitals  Vitals Value Taken Time   /71 11/11/20 1116   Temp 98.6 °F (37 °C) 11/11/20 1116   Pulse 110 11/11/20 1119   Resp 18 11/11/20 1116   SpO2 96 % 11/11/20 1119   Vitals shown include unvalidated device data.        Post Anesthesia Care and Evaluation    Patient location during evaluation: PACU  Patient participation: complete - patient participated  Level of consciousness: awake  Pain scale: See nurse's notes for pain score.  Pain management: adequate  Airway patency: patent  Anesthetic complications: No anesthetic complications  PONV Status: none  Cardiovascular status: acceptable  Respiratory status: acceptable  Hydration status: acceptable    Comments: Patient seen and examined postoperatively; vital signs stable; SpO2 greater than or equal to 90%; cardiopulmonary status stable; nausea/vomiting adequately controlled; pain adequately controlled; no apparent anesthesia complications; patient discharged from anesthesia care when discharge criteria were met

## 2020-11-11 NOTE — ANESTHESIA POSTPROCEDURE EVALUATION
Patient: Axel Ramirez    Procedure Summary     Date: 11/11/20 Room / Location: [facility] ENDOSCOPY 1 / [facility] ENDOSCOPY    Anesthesia Start: 0736 Anesthesia Stop: 0807    Procedure: BRONCHOSCOPY with bronchial washing (N/A Bronchus) Diagnosis:       Pneumonia of right lung due to infectious organism, unspecified part of lung      (Pneumonia of right lung due to infectious organism, unspecified part of lung [J18.9])    Surgeon: Ángela Bean MD Provider: Solo Bender MD    Anesthesia Type: general ASA Status: 4          Anesthesia Type: general    Vitals  Vitals Value Taken Time   /72 11/11/20 0840   Temp     Pulse 96 11/11/20 0840   Resp 16 11/11/20 0840   SpO2 94 % 11/11/20 0840           Post Anesthesia Care and Evaluation    Patient location during evaluation: PACU  Patient participation: complete - patient participated  Level of consciousness: awake  Pain scale: See nurse's notes for pain score.  Pain management: adequate  Airway patency: patent  Anesthetic complications: No anesthetic complications  PONV Status: none  Cardiovascular status: acceptable  Respiratory status: acceptable  Hydration status: acceptable    Comments: Patient seen and examined postoperatively; vital signs stable; SpO2 greater than or equal to 90%; cardiopulmonary status stable; nausea/vomiting adequately controlled; pain adequately controlled; no apparent anesthesia complications; patient discharged from anesthesia care when discharge criteria were met

## 2020-11-11 NOTE — ANESTHESIA PREPROCEDURE EVALUATION
Anesthesia Evaluation     Patient summary reviewed and Nursing notes reviewed   NPO Solid Status: > 8 hours  NPO Liquid Status: > 8 hours           Airway   Mallampati: II  TM distance: >3 FB  Neck ROM: full  No difficulty expected  Dental - normal exam   (+) edentulous    Pulmonary    (+) pneumonia , lung cancer, COPD, decreased breath sounds,   Cardiovascular - normal exam    ECG reviewed    (+) hypertension, valvular problems/murmurs, past MI , CAD, CHF , PVD, hyperlipidemia,       Neuro/Psych  GI/Hepatic/Renal/Endo      Musculoskeletal     Abdominal  - normal exam    Bowel sounds: normal.   Substance History      OB/GYN          Other      history of cancer                    Anesthesia Plan    ASA 4     general     intravenous induction     Anesthetic plan, all risks, benefits, and alternatives have been provided, discussed and informed consent has been obtained with: patient.

## 2020-11-12 NOTE — PROGRESS NOTES
Continued Stay Note  [facility]     Patient Name: Axel Ramirez  MRN: [id number]  Today's Date: 11/12/2020    Admit Date: 11/8/2020    Discharge Plan     Row Name 11/12/20 1604       Plan    Plan  anticipate return home with possible oxygen needs    Plan Comments  SW spoke to patient in room with ppe(mask and goggles); staying 6 feet away and no longer than 15 mins; social work assessed need for transportation and explained that resources are potentially available through [place] Medicaid ([facility]), [facility], or [facility] through [employer]. SW also explained that pt is to be seen by PT and if IP Rehab is needed, limited options for placement. A few rehab facilities may assist with transportation to radiation appointments depending on number of courses needed. SW to follow up 11/13.        JANNETTE Vera, LSW    Office: [phone]  Cell: [phone]  Fax: [phone]  E-mail: [email or web address]

## 2020-11-12 NOTE — PROGRESS NOTES
"Pharmacy Antimicrobial Dosing Service    Subjective:  Axel Ramirez is a 70 y.o.male admitted with postobstructive pneumonia. Pharmacy has been consulted to dose Vancomycin for possible PNA. Recently admitted end of October 2020 and received IV abx for PNA (CTX/Azith). Recently diagnosed with squamous cell carcinoma - supposed to start treatment on 11/9 but had developed a fever and cough. Vanc originally d/c on 11/9 but ordered to restart on 11/10.     PMH: Squamous cell carcinoma of R upper lobe, current smoker     11/9 MRSA nares: positive  10/24 BAL (previous admission): <10k CFU normal chino, candida       Assessment/Plan    1. Day #4 Vancomycin: Goal -600 mcg*h/mL. Current dose 1500 mg (~22 mg/kg ABW) q24h. Peak resulted at 24.2 mcg/ml on 11/11 18:12 while the trough resulted at 10.10 mcg/ml on 11/12 11:05. The AUC was calculated at 390 mcg/ml. Will give 1000 mg (~15 mg/kg ABW) q12h. Follow-up peak and trough are scheduled 11/14 at 05:00 and 12:00 respectively.     2. Day #4 Cefepime: 2g IV q8h for estCrCl > 60 mL/min.    Will continue to monitor drug levels, renal function, culture and sensitivities, and patient clinical status.       Objective:  Relevant clinical data and objective history reviewed:  177.8 cm (70\")   68 kg (150 lb)   Ideal body weight: 73 kg (160 lb 15 oz)  Body mass index is 21.52 kg/m².    Results from last 7 days   Lab Units 11/12/20  1105 11/11/20  1812   VANCOMYCIN PK mcg/mL  --  24.20   VANCOMYCIN TR mcg/mL 10.10  --      Results from last 7 days   Lab Units 11/12/20  0519 11/11/20  0353 11/11/20  0351   CREATININE mg/dL 1.03 0.64* 0.88     Estimated Creatinine Clearance: 64.2 mL/min (by C-G formula based on SCr of 1.03 mg/dL).  I/O last 3 completed shifts:  In: 1450 [P.O.:650; I.V.:800]  Out: 2500 [Urine:2350; Blood:150]    Results from last 7 days   Lab Units 11/11/20  0351 11/10/20  0630 11/08/20  2324   WBC 10*3/mm3 11.20* 13.70* 14.60*     Temperature    11/11/20 1910 " 11/12/20 0320 11/12/20 1138   Temp: 97.7 °F (36.5 °C) 97.5 °F (36.4 °C) 97.9 °F (36.6 °C)     Baseline culture/source/susceptibility:  Microbiology Results (last 10 days)       Procedure Component Value - Date/Time    AFB Culture - Wash, Lung, R [[id number]] Collected: 11/11/20 0747    Lab Status: Preliminary result Specimen: Wash from Lung, R Updated: 11/11/20 1025     AFB Stain No acid fast bacilli seen    Respiratory Culture - Wash, Lung, R [[id number]] Collected: 11/11/20 0747    Lab Status: Preliminary result Specimen: Wash from Lung, R Updated: 11/12/20 1010     Respiratory Culture Light growth (2+) Normal Respiratory Katia: NO S.aureus/MRSA or Pseudomonas aeruginosa     Gram Stain Rare (1+) Epithelial cells per low power field      Moderate (3+) WBCs per low power field      Few (2+) Mixed bacterial morphotypes seen on Gram Stain      Rare (1+) Yeast    Respiratory Panel, PCR - Wash, Lung, R [[id number]]  (Normal) Collected: 11/11/20 0747    Lab Status: Final result Specimen: Wash from Lung, R Updated: 11/11/20 0941     ADENOVIRUS, PCR Not Detected     Coronavirus 229E Not Detected     Coronavirus HKU1 Not Detected     Coronavirus NL63 Not Detected     Coronavirus OC43 Not Detected     Human Metapneumovirus Not Detected     Human Rhinovirus/Enterovirus Not Detected     Influenza B PCR Not Detected     Parainfluenza Virus 1 Not Detected     Parainfluenza Virus 2 Not Detected     Parainfluenza Virus 3 Not Detected     Parainfluenza Virus 4 Not Detected     Bordetella pertussis pcr Not Detected     Influenza A H1 2009 PCR Not Detected     Chlamydophila pneumoniae PCR Not Detected     Mycoplasma pneumo by PCR Not Detected     Influenza A PCR Not Detected     Influenza A H3 Not Detected     Influenza A H1 Not Detected     RSV, PCR Not Detected     Bordetella parapertussis PCR Not Detected    Narrative:      The coronavirus on the RVP is NOT COVID-19 and is NOT indicative of infection with COVID-19.     MRSA Screen,  PCR (Inpatient) - Swab, Nares [[id number]]  (Abnormal) Collected: 11/09/20 1742    Lab Status: Final result Specimen: Swab from Nares Updated: 11/09/20 2006     MRSA PCR MRSA Detected    Legionella Antigen, Urine - Urine, Urine, Clean Catch [[id number]]  (Normal) Collected: 11/08/20 2339    Lab Status: Final result Specimen: Urine, Clean Catch Updated: 11/09/20 1306     LEGIONELLA ANTIGEN, URINE Negative    S. Pneumo Ag Urine or CSF - Urine, Urine, Clean Catch [[id number]]  (Normal) Collected: 11/08/20 2339    Lab Status: Final result Specimen: Urine, Clean Catch Updated: 11/09/20 1307     Strep Pneumo Ag Negative    Blood Culture - Blood, Arm, Right [[id number]] Collected: 11/08/20 2325    Lab Status: Preliminary result Specimen: Blood from Arm, Right Updated: 11/11/20 2330     Blood Culture No growth at 3 days    Blood Culture - Blood, Arm, Right [[id number]] Collected: 11/08/20 2324    Lab Status: Preliminary result Specimen: Blood from Arm, Right Updated: 11/11/20 2330     Blood Culture No growth at 3 days    Respiratory Panel PCR w/COVID-19(SARS-CoV-2) PARESH/GRACIELA/KATHY/PAD/COR/MAD/CHRIST In-House, NP Swab in UTM/VTM, 3-4 HR TAT - Swab, Nasopharynx [[id number]]  (Normal) Collected: 11/08/20 2323    Lab Status: Final result Specimen: Swab from Nasopharynx Updated: 11/09/20 0021     ADENOVIRUS, PCR Not Detected     Coronavirus 229E Not Detected     Coronavirus HKU1 Not Detected     Coronavirus NL63 Not Detected     Coronavirus OC43 Not Detected     COVID19 Not Detected     Human Metapneumovirus Not Detected     Human Rhinovirus/Enterovirus Not Detected     Influenza A PCR Not Detected     Influenza A H1 Not Detected     Influenza A H1 2009 PCR Not Detected     Influenza A H3 Not Detected     Influenza B PCR Not Detected     Parainfluenza Virus 1 Not Detected     Parainfluenza Virus 2 Not Detected     Parainfluenza Virus 3 Not Detected     Parainfluenza Virus 4 Not Detected     RSV, PCR Not Detected     Bordetella pertussis pcr  Not Detected     Bordetella parapertussis PCR Not Detected     Chlamydophila pneumoniae PCR Not Detected     Mycoplasma pneumo by PCR Not Detected    Narrative:      Fact sheet for providers: https://docs.[employer]/wp-content/uploads/RWN8358-3240-BG0.1-EUA-Provider-Fact-Sheet-3.pdf    Fact sheet for patients: https://docs.[employer]/wp-content/uploads/FYJ8814-4871-CC5.1-EUA-Patient-Fact-Sheet-1.pdf             Anti-Infectives (From admission, onward)      Ordered     Dose/Rate Route Frequency Start Stop    11/10/20 1124  !Vancomycin Level Draw Needed     Ordering Provider: Ángela Bean MD     Does not apply Once 11/12/20 1100      11/11/20 1444  !Vancomycin Level Draw Needed     Ordering Provider: Ulices Cruz MD     Does not apply Once 11/11/20 1800      11/11/20 1345  cefepime 2 gm IVPB in 100 ml NS (MBP)     Carmen Ac, PharmD reviewed the order on 11/11/20 1348.   Ordering Provider: Ulices Cruz MD    2 g  25 mL/hr over 4 Hours Intravenous Every 8 Hours 11/11/20 1600 11/16/20 0759    11/10/20 1049  vancomycin 1500 mg/500 mL 0.9% NS IVPB ([facility])     Ordering Provider: Ángela Bean MD    1,500 mg Intravenous Every 24 Hours 11/10/20 1200 11/15/20 1159    11/10/20 1034  Pharmacy to dose vancomycin     Ordering Provider: Ángela Bean MD     Does not apply Continuous PRN 11/10/20 1034 11/15/20 1033    11/08/20 2348  vancomycin 1500 mg/500 mL 0.9% NS IVPB ([facility])     Ordering Provider: Alber Cao MD    1,500 mg Intravenous Once 11/09/20 0000 11/09/20 0146    11/08/20 2348  ceFEPime (MAXIPIME) in SWFI 2g/10ml IV PUSH syringe     Ordering Provider: Alber Cao MD    2 g  over 5 Minutes Intravenous Once 11/08/20 2350 11/09/20 0011            Berry Pérez, Pharmacy Intern  11/12/20 12:06 EST

## 2020-11-12 NOTE — PLAN OF CARE
Goal Outcome Evaluation:  Plan of Care Reviewed With: patient  Progress: no change  Outcome Summary: Patient went to radiation today. Returned from radiation without change in condition. Patient reminded to turn frequently. Will continue to monitor patient. PT to work with patient.

## 2020-11-12 NOTE — PLAN OF CARE
Problem: Adult Inpatient Plan of Care  Goal: Plan of Care Review  Recent Flowsheet Documentation  Taken 11/12/2020 8351 by Annette Goode, TOÑO  Plan of Care Reviewed With: patient    Pt is a pleasant 71 yo male ADM to the hospital with PNA, lung mass, receiving radiation treatment and antibiotic treatment. Pt lives with spouse 3 [address], Rail to  enter home. Prior pt was independent with mobility, dressing, bathing, walking in home and community without AD and driving self. Today pt was alert and oriented x 4. Pt had no c/o pain. Pt was independent with bed mobility. Pt was conditional independent with FWW sit to stand. Pt needed assist with medical equipment, 02 tank and IV pole to ambulate 350 feet with FWW.  No PT indicated. Recommendation is D/C home. PPE: Mask, gloves, eyeshield.

## 2020-11-12 NOTE — THERAPY EVALUATION
Patient Name: Axel Ramirez  : 1950    MRN: [id number]                              Today's Date: 2020       Admit Date: 2020    Visit Dx:     ICD-10-CM ICD-9-CM   1. Pneumonia of right lung due to infectious organism, unspecified part of lung  J18.9 483.8   2. Lung cancer (CMS/HCC)  C34.90 162.9     Patient Active Problem List   Diagnosis   • Coronary artery disease involving native coronary artery of native heart without angina pectoris   • Nonrheumatic aortic valve stenosis   • Mixed hyperlipidemia   • Essential hypertension   • Fever   • Presence of aortocoronary bypass graft   • Congestive heart failure (CMS/HCC)   • Hx of aortic valve replacement   • CAP (community acquired pneumonia)   • Tobacco abuse   • Postobstructive pneumonia   • Lung mass   • Squamous cell carcinoma of lung, right (CMS/HCC)   • Diastolic CHF, acute (CMS/HCC)   • Pneumonia of right lung due to infectious organism     Past Medical History:   Diagnosis Date   • Aortic stenosis    • Cancer (CMS/HCC)     Sickle Cell Carcinoma   • Congestive heart failure (CHF) (CMS/HCC)     DIASTOLIC   • COPD (chronic obstructive pulmonary disease) (CMS/HCC)    • Coronary artery disease    • Hypertension    • Myocardial infarction (CMS/HCC)    • Peripheral vascular disease (CMS/HCC)    • Valvular disease      Past Surgical History:   Procedure Laterality Date   • AORTIC VALVE REPAIR/REPLACEMENT  2018    Dr Maza   • BRONCHOSCOPY N/A 10/24/2020    Procedure: BRONCHOSCOPY with biopsy right upper lobe mass, and bronchoalveolar lavage right upper lobe;  Surgeon: Ángela Bean MD;  Location: [facility] ENDOSCOPY;  Service: Pulmonary;  Laterality: N/A;  post: right upper lobe mass, pneumonia   • BRONCHOSCOPY N/A 2020    Procedure: BRONCHOSCOPY with bronchial washing;  Surgeon: Ángela Bean MD;  Location: [facility] ENDOSCOPY;  Service: Pulmonary;  Laterality: N/A;  Post: lung mass, pneumonia   • CARDIAC CATHETERIZATION  2017   •  CORONARY ARTERY BYPASS GRAFT  02/26/2018    Dr Maza   • TIBIA FRACTURE SURGERY     • VENOUS ACCESS DEVICE (PORT) INSERTION Left 10/30/2020    Procedure: MEDIPORT INSERTION UNDER FLUOROSCOPIC GUIDENCE;  Surgeon: Nomi Reyes MD;  Location: [facility] MAIN OR;  Service: Cardiothoracic;  Laterality: Left;     General Information     Row Name 11/12/20 1552          Physical Therapy Time and Intention    Document Type  evaluation  -     Mode of Treatment  physical therapy  -     Row Name 11/12/20 1552          General Information    Patient Profile Reviewed  yes  -     Prior Level of Function  grooming;dressing;independent:;bathing;transfer;gait;driving  -     Row Name 11/12/20 1552          Living Environment    Lives With  spouse  -     Row Name 11/12/20 1552          Home Main Entrance    Number of Stairs, Main Entrance  three  -     Stair Railings, Main Entrance  railing on right side (ascending)  -     Row Name 11/12/20 1552          Stairs Within Home, Primary    Number of Stairs, Within Home, Primary  none  -     Stair Railings, Within Home, Primary  none  -     Row Name 11/12/20 1552          Cognition    Orientation Status (Cognition)  oriented x 4  -     Row Name 11/12/20 1552          Safety Issues, Functional Mobility    Impairments Affecting Function (Mobility)  --  -       User Key  (r) = Recorded By, (t) = Taken By, (c) = Cosigned By    Initials Name Provider Type     Annette Goode PT Physical Therapist        Mobility     Row Name 11/12/20 1556          Bed Mobility    Bed Mobility  rolling left;supine-sit;sit-supine;scooting/bridging  -     Rolling Left [place] (Bed Mobility)  independent  -     Scooting/Bridging [place] (Bed Mobility)  independent  -     Supine-Sit [place] (Bed Mobility)  independent  -     Sit-Supine [place] (Bed Mobility)  independent  -     Row Name 11/12/20 3584          Sit-Stand Transfer    Sit-Stand [place] (Transfers)   modified independence  -WC     Row Name 11/12/20 1553          Gait/Stairs (Locomotion)    [place] Level (Gait)  modified independence  -     Distance in Feet (Gait)  350 feet  -     Bilateral Gait Deviations  forward flexed posture  -       User Key  (r) = Recorded By, (t) = Taken By, (c) = Cosigned By    Initials Name Provider Type     Annette Goode, PT Physical Therapist        Obj/Interventions     Row Name 11/12/20 1554          Range of Motion Comprehensive    General Range of Motion  bilateral lower extremity ROM WFL  -WC     Row Name 11/12/20 1554          Strength Comprehensive (MMT)    Comment, General Manual Muscle Testing (MMT) Assessment  4+/5 grossly SOSA LE's  -WC     Row Name 11/12/20 5354          Balance    Balance Assessment  sitting static balance;sitting dynamic balance;standing static balance;standing dynamic balance  -     Static Sitting Balance  WFL  -     Dynamic Sitting Balance  WFL  -     Static Standing Balance  WFL  -     Dynamic Standing Balance  mild impairment  -     Balance Interventions  sitting;standing;sit to stand;supported;static;dynamic;minimal challenge  -       User Key  (r) = Recorded By, (t) = Taken By, (c) = Cosigned By    Initials Name Provider Type     Annette Goode, PT Physical Therapist        Goals/Plan    No documentation.       Clinical Impression     Row Name 11/12/20 4376          Pain    Additional Documentation  Pain Scale: FACES Pre/Post-Treatment (Group)  -WC     Row Name 11/12/20 3916          Pain Scale: FACES Pre/Post-Treatment    Pain: FACES Scale, Pretreatment  0-->no hurt  -     Posttreatment Pain Rating  0-->no hurt  -WC     Row Name 11/12/20 6245          Plan of Care Review    Plan of Care Reviewed With  patient  -WC     Row Name 11/12/20 5250          Therapy Assessment/Plan (PT)    Criteria for Skilled Interventions Met (PT)  no problems identified which require skilled intervention  -WC     Row Name 11/12/20 5238        "   Vital Signs    Pre Patient Position  Supine  -WC     Intra Patient Position  Standing  -WC     Post Patient Position  Supine  -WC     Row Name 11/12/20 9558          Positioning and Restraints    Pre-Treatment Position  in bed  -WC     Post Treatment Position  bed  -WC       User Key  (r) = Recorded By, (t) = Taken By, (c) = Cosigned By    Initials Name Provider Type    Annette Garces, PT Physical Therapist        Outcome Measures     Row Name 11/12/20 6363          How much help from another person do you currently need...    Turning from your back to your side while in flat bed without using bedrails?  4  -WC     Moving from lying on back to sitting on the side of a flat bed without bedrails?  4  -WC     Moving to and from a bed to a chair (including a wheelchair)?  4  -WC     Standing up from a chair using your arms (e.g., wheelchair, bedside chair)?  4  -WC     Climbing 3-5 steps with a railing?  4  -WC     To walk in hospital room?  4  -WC     AM-PAC 6 Clicks Score (PT)  24  -     Row Name 11/12/20 8727          Tinetti Assessment    T[place]etti Assessment  yes  -WC     Sitting Balance  1  -WC     Arises  2  -WC     Attempts to Rise  2  -WC     Immediate Standing Balance (first 5 sec)  1  -WC     Standing Balance  1  -WC     Sternal Nudge (feet close together)  2  -WC     Eyes Closed (feet close together)  0  -WC     Turning 360 Degrees- Steps  1  -WC     Turning 360 Degrees- Steadiness  1  -WC     Sitting Down  2  -WC     [place]etti Balance Score  13  -WC     Gait Initiation (immediate after told \"go\")  1  -WC     Step Length- Right Swing  1  -WC     Step Length- Left Swing  1  -WC     Foot Clearance- Right Foot  1  -WC     Foot Clearance- Left Foot  1  -WC     Step Symmetry  1  -WC     Step Continuity  1  -WC     Path (excursion)  1  -WC     Trunk  0  -     Base of Support  1  -     Gait Score  9  -     [place] Total Score  22  -     Row Name 11/12/20 3846          Functional Assessment    Outcome " Measure Options  AM-PAC 6 Clicks Basic Mobility (PT);[place]  -WC       User Key  (r) = Recorded By, (t) = Taken By, (c) = Cosigned By    Initials Name Provider Type    WC Annette Goode PT Physical Therapist        Physical Therapy Education                 Title: PT OT SLP Therapies (In Progress)     Topic: Physical Therapy (In Progress)     Point: Mobility training (Done)     Learning Progress Summary           Patient Acceptance, E,TB, VU by  at 11/12/2020 1557                   Point: Home exercise program (Not Started)     Learner Progress:  Not documented in this visit.          Point: Body mechanics (Done)     Learning Progress Summary           Patient Acceptance, E,TB, VU by  at 11/12/2020 1557                   Point: Precautions (Done)     Learning Progress Summary           Patient Acceptance, E,TB, VU by  at 11/12/2020 1557                               User Key     Initials Effective Dates Name Provider Type [facility] 01/07/20 -  Annette Goode PT Physical Therapist PT              PT Recommendation and Plan  Pt is a pleasant 69 yo male ADM to the hospital with PNA, lung mass, receiving radiation treatment and antibiotic treatment. Pt lives with spouse 3 [address], Rail to  enter home. Prior pt was independent with mobility, dressing, bathing, walking in home and community without AD and driving self. Today pt was alert and oriented x 4. Pt had no c/o pain. Pt was independent with bed mobility. Pt was conditional independent with FWW sit to stand. Pt needed assist with medical equipment, 02 tank and IV pole to ambulate 350 feet with FWW.  No PT indicated.     Recommendation is D/C home. PPE: Mask, gloves, eyeshield.      Plan of Care Reviewed With: patient     Time Calculation:   PT Charges     Row Name 11/12/20 1608             Time Calculation    Start Time  1528  -      Stop Time  1548  -      Time Calculation (min)  20 min  -WC      PT Received On  11/12/20  -         Time  Calculation- PT    TCU Minutes- PT  15 min  -        User Key  (r) = Recorded By, (t) = Taken By, (c) = Cosigned By    Initials Name Provider Type    WC Annette Goode, TOÑO Physical Therapist        Therapy Charges for Today     Code Description Service Date Service Provider Modifiers Qty    24475205738 HC PT EVAL MOD COMPLEXITY 3 11/12/2020 Annette Goode, PT GP 1    43731291655 HC GAIT TRAINING EA 15 MIN 11/12/2020 Annette Goode, PT GP 1          PT G-Codes  Outcome Measure Options: AM-PAC 6 Clicks Basic Mobility (PT), Tinetti  AM-PAC 6 Clicks Score (PT): 24  [place] Total Score: 22    Annette Goode PT  11/12/2020

## 2020-11-12 NOTE — CONSULTS
"Heart Failure Program  Nurse Navigator  Discharge Planning    Patient Name:Axel Ramirez  :1950  Cardiologist:Morgan  Current Admission Date: 2020   Previous Admission: 10/2/20  Admission frequency: 2 admissions in 6 months    Heart Failure history per record:    Symptoms on admission:c/o SOA and fever with a cough.  Pt readmission form 10/22/20  Admission with new lung mass diagnosis.  Pt advises of heart history CABG surgery and valve replacement \"about 3 years ago\". Pt is a current smoker, advises of medication adherence. Expresses limited knowledge of heart failure diagnosis.        Admission Weight:  Flowsheet Rows      First Filed Value   Admission Height  177.8 cm (70\") Documented at 2020   Admission Weight  68 kg (150 lb) Documented at 2020 225            Current Home Medications:  Prior to Admission medications    Medication Sig Start Date End Date Taking? Authorizing Provider   amLODIPine (NORVASC) 5 MG tablet TAKE 1 TABLET BY MOUTH DAILY 20   Iglesia Springer MD   ferrous sulfate 324 (65 Fe) MG tablet delayed-release EC tablet Take 1 tablet by mouth 2 (Two) Times a Day With Meals. 10/30/20   Arvin Rushing DO   hydrALAZINE (APRESOLINE) 50 MG tablet TAKE 1 TABLET BY MOUTH EVERY 12 HOURS 20   Iglesia Springer MD   metoprolol tartrate (LOPRESSOR) 25 MG tablet TAKE 1 TABLET BY MOUTH TWICE DAILY 20   Iglesia Springer MD   pantoprazole (Protonix) 40 MG EC tablet Take 1 tablet by mouth Daily. 10/30/20   Arvin Rushing DO       Social history:   Pt lives with wife, spouse provides transportation.  No issues with medications or taking them. Pt unsure of last time he saw his cardiologist     Smoking status:current    Diagnostics Testing:  proBNP level: 2291    Echocardiogram:  Results for orders placed during the hospital encounter of 20   Adult Transthoracic Echo Complete W/ Cont if Necessary Per Protocol    Narrative · " Estimated left ventricular EF = 65% Left ventricular systolic function   is normal.  · Left ventricular diastolic function is consistent with (grade Ia w/high   LAP) impaired relaxation.  · The right atrial cavity is mildly dilated.  · Mild aortic valve stenosis is present.  · Mild to moderate LVOT gradient noted            Patient Assessment:   Pt sitting in bed, resp even and unlabored, no SOA with conversation.  No pedal edema     Current O2: 3L [place]  Home O2:      Education provided to patient:  yes- Heart Failure disease education  yes -Symptom identification/management  yes -Daily Weights  pending- Diet education  n/a- Fluid restriction (if ordered)  pending- Activity education  yes- Medication education  pending- Smoking cessation  yes- Follow-up Appointments    Acceptance of learning: acceptable cooperative teachback    Heart Failure education interactive teaching session time: 30 minutes    Identified needs/barriers:   Limited knowledge    Intervention:   Education given    Patient goal:

## 2020-11-12 NOTE — PLAN OF CARE
Goal Outcome Evaluation:  Plan of Care Reviewed With: patient  Progress: no change  Outcome Summary: Patient rested well through the night. Has some blanchable redness on is sacrum. Turns well with reminders. Will continue to monitor.

## 2020-11-12 NOTE — PROGRESS NOTES
[facility] Medicine Services  INPATIENT PROGRESS NOTE  358/1   Hospitalist Team  LOS 3 days      Patient Care Team:  Sandoval Stevenson FNP as PCP - General  Sandoval Stevenson FNP as PCP - Family Medicine  Axel Lewis MD as Consulting Physician (Hematology and Oncology)      Patient was examined with relevant and adequate PPE keeping in mind the current coronavirus pandemic.    Chief Complaint / Subjective  Chief Complaint   Patient presents with   • Fever       HPI  Mr. Ramirez is a 70 y.o. male recently diagnosed with right upper lobe mass found to be squamous cell carcinoma. He was discharged on 10/30 after finding of the lung cancer and postobstructive pneumonia. He was supposed to start treatment for his lung cancer today with his oncologist. He stated he developed a fever yesterday. He has had a cough. He has been short of breath but no more than usual. He has been weak. He denied any chest pain.      In the ER the patient had CXR that showed similar appearing large right lung mass with right basilar opacities and interstitial thickening that could represent postobstructive or aspiration pneumonia with a small right pleural effusion. WBC 14.6, temp 100.8, proBNP 2,291. Blood cultures were drawn he was started on cefepime and vancomycin. He was admitted for further treatment of pneumonia. Covid negative.        Fever           Interval History and ROS:   (4 hpi elements or status of 3 chronic)  Feels better  Post tumor debulking and recanalization of bronchus 11/11/20, 8:11 PM EST  No complaints.  Plan for radiation today 11/12/20, 6:54 PM EST      History taken from: patient    Review of Systems   Constitution: Negative for fever.   HENT: Negative.    Eyes: Negative.    Cardiovascular: Negative.    Respiratory: Negative.    Endocrine: Negative.    Hematologic/Lymphatic: Negative.    Skin: Negative.    Musculoskeletal: Negative.    Gastrointestinal: Negative.    Genitourinary: Negative.     Neurological: Negative.    Psychiatric/Behavioral: Negative.    Allergic/Immunologic: Negative.    All other systems reviewed and are negative.            Family History   Problem Relation Age of Onset   • Coronary artery disease Mother    • Cancer Father    • Stroke Daughter 35   • Seizures Daughter 35       Past Medical History:   Diagnosis Date   • Aortic stenosis    • Cancer (CMS/HCC)     Sickle Cell Carcinoma   • Congestive heart failure (CHF) (CMS/HCC)     DIASTOLIC   • COPD (chronic obstructive pulmonary disease) (CMS/HCC)    • Coronary artery disease    • Hypertension    • Myocardial infarction (CMS/HCC)    • Peripheral vascular disease (CMS/HCC)    • Valvular disease        Social History     Socioeconomic History   • Marital status:      Spouse name: Not on file   • Number of children: Not on file   • Years of education: Not on file   • Highest education level: Not on file   Tobacco Use   • Smoking status: Current Every Day Smoker     Packs/day: 1.00     Types: Cigarettes   • Smokeless tobacco: Never Used   Substance and Sexual Activity   • Alcohol use: Yes   • Drug use: No   • Sexual activity: Defer       Prior to Admission medications    Medication Sig Start Date End Date Taking? Authorizing Provider   amLODIPine (NORVASC) 5 MG tablet TAKE 1 TABLET BY MOUTH DAILY 5/29/20   Iglesia Springer MD   aspirin (ASPIR-LOW) 81 MG EC tablet Take 81 mg by mouth Daily. 1/24/18   Provider, MD Paresh   atorvastatin (LIPITOR) 20 MG tablet TAKE 1 TABLET BY MOUTH AT BEDTIME 5/29/20   Iglesia Springer MD   ferrous sulfate 324 (65 Fe) MG tablet delayed-release EC tablet Take 1 tablet by mouth 2 (Two) Times a Day With Meals. 10/30/20   Arvin Rushing DO   hydrALAZINE (APRESOLINE) 50 MG tablet TAKE 1 TABLET BY MOUTH EVERY 12 HOURS 9/29/20   Iglesia Springer MD   metoprolol tartrate (LOPRESSOR) 25 MG tablet TAKE 1 TABLET BY MOUTH TWICE DAILY 8/26/20   Iglesia Springer  "MD   pantoprazole (Protonix) 40 MG EC tablet Take 1 tablet by mouth Daily. 10/30/20   Arvin Rushing DO        Objective    Physical Exam     Vital Signs  Temp:  [97.5 °F (36.4 °C)-97.9 °F (36.6 °C)] 97.9 °F (36.6 °C)  Heart Rate:  [68-76] 68  Resp:  [14-18] 16  BP: ()/(49-65) 93/49    Oxygen Therapy  SpO2: 96 %  Pulse Oximetry Type: Intermittent  Device (Oxygen Therapy): nasal cannula  Flow (L/min): 3  ETCO2 (mmHg): 33 mmHg  Flowsheet Rows      First Filed Value   Admission Height  177.8 cm (70\") Documented at 11/08/2020 2256   Admission Weight  68 kg (150 lb) Documented at 11/08/2020 2256        Weight change:    Intake & Output (last 3 days)       11/09 0701 - 11/10 0700 11/10 0701 - 11/11 0700 11/11 0701 - 11/12 0700 11/12 0701 - 11/13 0700    P.O.  980 650     I.V. (mL/kg)   800 (11.8)     Total Intake(mL/kg)  980 (14.4) 1450 (21.3)     Urine (mL/kg/hr) 2150 (1.3) 1800 (1.1) 1550 (0.9) 1000 (1.2)    Stool    0    Blood   150     Total Output 2150 1800 1700 1000    Net -2150 -820 -250 -1000            Urine Unmeasured Occurrence  1 x 5 x     Stool Unmeasured Occurrence    1 x        Lines, Drains & Airways    Active LDAs     Name:   Placement date:   Placement time:   Site:   Days:    Peripheral IV 11/08/20 2324 Right Forearm   11/08/20 2324    Forearm   1    Single Lumen Implantable Port 10/30/20 Left Subclavian   10/30/20    0956    Subclavian   11                Physical Exam:    Physical Exam  Constitutional: Patient appears well-developed and well-nourished and in no acute distress      HEENT:   Head: Normocephalic and atraumatic.   Eyes:  Pupils are equal, round, and reactive to light. EOM are intact. Sclera are anicteric and non-injected.  Mouth and Throat: Patient has dry mucous membranes. Oropharynx is clear of any erythema or exudate.        Neck: Neck supple.  No thyromegaly present. No lymphadenopathy present. No  masses.      Cardiovascular: Inspection: Increased JVD up to the jaw angle " present. Palpation: No parasternal heave. Pedal pulses +1 bilaterally. No leg edema. Auscultation: Regular rate, regular rhythm, S1 normal and S2 normal. reveals no gallop and no friction rub. No Carotid bruit bilaterally.  Sternotomy scar visible     Pulmonary/Chest: Inspection: No distress, no use of accessory muscles. Lungs Rales and rhonchi to auscultation bilaterally. No respiratory distress.  Air entry better bilaterally     Abdomen /Gastrointestinal: Inspection: no distension. Palpation: no masses, no organomegaly. Soft. There is no tenderness. Bowel sounds are normal.      Musculoskeletal: Normal Muscle tone. Age appropriate, no deformities.     Neurological: Patient is alert and oriented to person, place, and time. Cranial nerves II-XII are grossly intact with no focal deficits. Sensori-motor exam is normal. No cerebellar signs.     Skin: Skin is warm. No rash noted. Nails show no clubbing.  No cyanosis or erythema. No bruising.     Emotional Behavior:   Appropriate       Debilities:  Age appropriate    Reviewed, no change in above data from the prior day.      PT Recommendation and Plan             Procedures:        Assessment/Plan with Problem wise       Active Hospital Problems    Diagnosis  POA   • **Postobstructive pneumonia [J18.9]  Yes     Priority: High   • Diastolic CHF, acute (CMS/HCC) [I50.31]  Yes     Priority: Medium   • Squamous cell carcinoma of lung, right (CMS/HCC) [C34.91]  Yes   • Pneumonia of right lung due to infectious organism [J18.9]  Unknown   • Tobacco abuse [Z72.0]  Yes   • Coronary artery disease involving native coronary artery of native heart without angina pectoris [I25.10]  Yes   • Mixed hyperlipidemia [E78.2]  Yes   • Hx of aortic valve replacement [Z95.2]  Not Applicable   • Essential hypertension [I10]  Yes      Resolved Hospital Problems   No resolved problems to display.        Estimated Creatinine Clearance: 64.2 mL/min (by C-G formula based on SCr of 1.03  mg/dL).    Code Status and Medical Interventions:   Ordered at: 11/09/20 0853     Level Of Support Discussed With:    Patient     Code Status:    CPR     Medical Interventions (Level of Support Prior to Arrest):    Full       MEDICAL DECISION MAKING COMPLEXITY BY PROBLEM:     Pneumonia:  Antibiotics-goal 5d course for uncomplicated pneumonia  Routine sputum culture not indicated per IDSA guidelines  Negative MRSA screen, high negative predictive value for MRSA pneumonia  Bronchodilators if needed  Oxygen supplementation if needed to keep sat between 88 to 92%  Expectorants if needed  Antipyretics  Supportive treatment  Follow chest x-ray if needed  Follow labs if appropriate     Consult pulmonology        CHF:  Diuresis-loop diuretics  Spironolactone if tolerated  Thiazides if tolerated  ACE inhibitor if tolerated  Beta-blocker  Check echocardiogram        Smoking:  Counseled to quit smoking  Long-term compliance is suspect  Nicotine replacement while in the hospital        Hypertension:  Continue home medications   Options include-  beta-blockers  Calcium channel blockers  ACE inhibitor  Vasodilators  Low-dose diuretics  PRN medications have not been shown to affect outcomes-to be avoided     Care coordination with pulmonology.  Await input 11/10/20 10:30 AM EST.    Appreciate input cardiothoracic surgery    Pending placement discharge arrangements    Plan for disposition:            Continued Care and Services - Admitted Since 11/8/2020    Coordination has not been started for this encounter.        Historical & Objective Data     Results Review:    I reviewed the patient's new lab and radiology results. 11/12/20     Results from last 7 days   Lab Units 11/11/20  0351 11/10/20  0630 11/08/20  2324   WBC 10*3/mm3 11.20* 13.70* 14.60*   HEMOGLOBIN g/dL 9.4* 9.5* 9.1*   HEMATOCRIT % 29.5* 29.3* 28.0*   MCV fL 85.4 85.2 84.9   MCH pg 27.4 27.5 27.5   MPV fL 6.5 6.3 6.2   RDW % 18.9* 18.5* 18.6*   PLATELETS 10*3/mm3  351 387 397     Results from last 7 days   Lab Units 11/12/20  0519 11/11/20  0353 11/11/20  0351 11/10/20  0630 11/08/20  2324   SODIUM mmol/L  --   --  132* 130* 131*   POTASSIUM mmol/L  --   --  3.8 3.7 3.5   CHLORIDE mmol/L  --   --  95* 93* 96*   CO2 mmol/L  --   --  28.0 28.0 24.0   BUN mg/dL  --   --  19 15 12   CREATININE mg/dL 1.03 0.64* 0.88 0.90 0.69*   CALCIUM mg/dL  --   --  8.8 8.3* 8.4*   BILIRUBIN mg/dL  --   --   --   --  0.3   ALK PHOS U/L  --   --   --   --  126*   ALT (SGPT) U/L  --   --   --   --  18   AST (SGOT) U/L  --   --   --   --  16   GLUCOSE mg/dL  --   --  143* 122* 126*     Inflammatory Biomarkers        Invalid input(s): ESR, D-DIMER QUANTITATIVE,  PROCALCITONIN,    Lab Results   Component Value Date    PHOS 3.8 01/02/2018     No results found for: HGBA1C  Lab Results   Component Value Date    CHOL 79 11/29/2019    TRIG 48 11/29/2019    HDL 33 (L) 11/29/2019    LDL 36 11/29/2019     No results found for: LIPASE            Lab Results   Lab Value Date/Time    FINALDX  11/11/2020 0958     Tumor mass, right mainstem bronchus, biopsy:    Invasive moderately differentiated squamous cell carcinoma (see COMMENT)    NORBERTO/tkd       FINALDX  11/11/2020 0747     Smears of bronchial washings with cytospin preparation:    Occasional markedly atypical cells consistent with non-small cell carcinoma and exhibiting cytomorphologic      features most suggestive of squamous cell carcinoma     Background consists of acute inflammation with occasional fungal spores and hyphae morphologically most      consistent with Candida species    NORBERTO/tkd       COMDX  11/11/2020 0958     The biopsy was stained via immunohistochemical technique utilizing appropriate controls for the presence of p63, p40, CK5/6, napsin and TTF-1. The tumor cells are positive for CK5/6, p63 and p40 while being negative for TTF-1 and napsin. This staining pattern in conjunction with the histologic features is entirely consistent with  squamous cell carcinoma. The tumor is focally necrotic. There is no lymph-vascular space invasion identified.      NORBERTO/tkd       COMDX  11/11/2020 0747     Please correlate with concurrent surgical pathology specimen ([id number]).    NORBERTO/tkd        COVID19   Date Value Ref Range Status   11/08/2020 Not Detected Not Detected - Ref. Range Final        Microbiology Results (last 10 days)     Procedure Component Value - Date/Time    AFB Culture - Wash, Lung, R [[id number]] Collected: 11/11/20 0747    Lab Status: Preliminary result Specimen: Wash from Lung, R Updated: 11/11/20 1025     AFB Stain No acid fast bacilli seen    Respiratory Culture - Wash, Lung, R [[id number]] Collected: 11/11/20 0747    Lab Status: Preliminary result Specimen: Wash from Lung, R Updated: 11/12/20 1010     Respiratory Culture Light growth (2+) Normal Respiratory Katia: NO S.aureus/MRSA or Pseudomonas aeruginosa     Gram Stain Rare (1+) Epithelial cells per low power field      Moderate (3+) WBCs per low power field      Few (2+) Mixed bacterial morphotypes seen on Gram Stain      Rare (1+) Yeast    Respiratory Panel, PCR - Wash, Lung, R [[id number]]  (Normal) Collected: 11/11/20 0747    Lab Status: Final result Specimen: Wash from Lung, R Updated: 11/11/20 0941     ADENOVIRUS, PCR Not Detected     Coronavirus 229E Not Detected     Coronavirus HKU1 Not Detected     Coronavirus NL63 Not Detected     Coronavirus OC43 Not Detected     Human Metapneumovirus Not Detected     Human Rhinovirus/Enterovirus Not Detected     Influenza B PCR Not Detected     Parainfluenza Virus 1 Not Detected     Parainfluenza Virus 2 Not Detected     Parainfluenza Virus 3 Not Detected     Parainfluenza Virus 4 Not Detected     Bordetella pertussis pcr Not Detected     Influenza A H1 2009 PCR Not Detected     Chlamydophila pneumoniae PCR Not Detected     Mycoplasma pneumo by PCR Not Detected     Influenza A PCR Not Detected     Influenza A H3 Not Detected     Influenza A H1  Not Detected     RSV, PCR Not Detected     Bordetella parapertussis PCR Not Detected    Narrative:      The coronavirus on the RVP is NOT COVID-19 and is NOT indicative of infection with COVID-19.     MRSA Screen, PCR (Inpatient) - Swab, Nares [[id number]]  (Abnormal) Collected: 11/09/20 1742    Lab Status: Final result Specimen: Swab from Nares Updated: 11/09/20 2006     MRSA PCR MRSA Detected    Legionella Antigen, Urine - Urine, Urine, Clean Catch [[id number]]  (Normal) Collected: 11/08/20 2339    Lab Status: Final result Specimen: Urine, Clean Catch Updated: 11/09/20 1306     LEGIONELLA ANTIGEN, URINE Negative    S. Pneumo Ag Urine or CSF - Urine, Urine, Clean Catch [[id number]]  (Normal) Collected: 11/08/20 2339    Lab Status: Final result Specimen: Urine, Clean Catch Updated: 11/09/20 1307     Strep Pneumo Ag Negative    Blood Culture - Blood, Arm, Right [[id number]] Collected: 11/08/20 2325    Lab Status: Preliminary result Specimen: Blood from Arm, Right Updated: 11/11/20 2330     Blood Culture No growth at 3 days    Blood Culture - Blood, Arm, Right [[id number]] Collected: 11/08/20 2324    Lab Status: Preliminary result Specimen: Blood from Arm, Right Updated: 11/11/20 2330     Blood Culture No growth at 3 days    Respiratory Panel PCR w/COVID-19(SARS-CoV-2) PARESH/GRACIELA/KATHY/PAD/COR/MAD/CHRIST In-House, NP Swab in UTM/VTM, 3-4 HR TAT - Swab, Nasopharynx [[id number]]  (Normal) Collected: 11/08/20 2323    Lab Status: Final result Specimen: Swab from Nasopharynx Updated: 11/09/20 0021     ADENOVIRUS, PCR Not Detected     Coronavirus 229E Not Detected     Coronavirus HKU1 Not Detected     Coronavirus NL63 Not Detected     Coronavirus OC43 Not Detected     COVID19 Not Detected     Human Metapneumovirus Not Detected     Human Rhinovirus/Enterovirus Not Detected     Influenza A PCR Not Detected     Influenza A H1 Not Detected     Influenza A H1 2009 PCR Not Detected     Influenza A H3 Not Detected     Influenza B PCR Not  Detected     Parainfluenza Virus 1 Not Detected     Parainfluenza Virus 2 Not Detected     Parainfluenza Virus 3 Not Detected     Parainfluenza Virus 4 Not Detected     RSV, PCR Not Detected     Bordetella pertussis pcr Not Detected     Bordetella parapertussis PCR Not Detected     Chlamydophila pneumoniae PCR Not Detected     Mycoplasma pneumo by PCR Not Detected    Narrative:      Fact sheet for providers: https://docs.[employer]/wp-content/uploads/FVH8448-3842-YS2.1-EUA-Provider-Fact-Sheet-3.pdf    Fact sheet for patients: https://docs.[employer]/wp-content/uploads/ZZP6715-6596-FU6.1-EUA-Patient-Fact-Sheet-1.pdf          ECG/EMG Results (most recent)     Procedure Component Value Units Date/Time    ECG 12 Lead [[id number]] Collected: 11/08/20 2319     Updated: 11/10/20 1556     QT Interval 380 ms     Narrative:      HEART RATE= 78  bpm  RR Interval= 764  ms  OH Interval= 141  ms  P Horizontal Axis= 18  deg  P Front Axis= 69  deg  QRSD Interval= 97  ms  QT Interval= 380  ms  QRS Axis= 59  deg  T Wave Axis= 79  deg  - NORMAL ECG -  Sinus rhythm  When compared with ECG of 22-Oct-2020 21:08:21,  Significant axis, voltage or hypertrophy change  Electronically Signed By: Alber Cao (Anthony) 10-Nov-2020 15:55:06  Date and Time of Study: 2020-11-08 23:19:49    Adult Transthoracic Echo Complete W/ Cont if Necessary Per Protocol [[id number]] Collected: 11/09/20 1447     Updated: 11/10/20 1901     BSA 1.8 m^2      RVIDd 3.0 cm      IVSd 1.0 cm      IVSs 1.8 cm      LVIDd 4.7 cm      LVIDs 2.4 cm      LVPWd 1.1 cm      BH CV ECHO TIKA - LVPWS 1.5 cm      IVS/LVPW 0.9     FS 50.4 %      EDV(Teich) 104.4 ml      ESV(Teich) 19.2 ml      EF(Teich) 81.6 %      EDV(cubed) 106.4 ml      ESV(cubed) 13.0 ml      EF(cubed) 87.8 %      % IVS thick 71.8 %      % LVPW thick 31.9 %      LV mass(C)d 186.4 grams      LV mass(C)dI 100.9 grams/m^2      LV mass(C)s 137.7 grams      LV mass(C)sI 74.5 grams/m^2      SV(Teich) 85.2 ml       SI(Teich) 46.1 ml/m^2      SV(cubed) 93.4 ml      SI(cubed) 50.6 ml/m^2      Ao root diam 3.4 cm      Ao root area 8.9 cm^2      ACS 1.2 cm      LVOT diam 2.0 cm      LVOT area 3.1 cm^2      EDV(MOD-sp4) 51.6 ml      ESV(MOD-sp4) 17.1 ml      EF(MOD-sp4) 66.8 %      SV(MOD-sp4) 34.5 ml      SI(MOD-sp4) 18.7 ml/m^2      Ao root area (BSA corrected) 1.8     LV Rich Vol (BSA corrected) 27.9 ml/m^2      LV Sys Vol (BSA corrected) 9.3 ml/m^2      MV E max cary 91.2 cm/sec      MV A max cary 130.6 cm/sec      MV E/A 0.7     MV V2 max 141.7 cm/sec      MV max PG 8.0 mmHg      MV V2 mean 92.4 cm/sec      MV mean PG 3.7 mmHg      MV V2 VTI 38.7 cm      MVA(VTI) 2.2 cm^2      MV dec slope 373.0 cm/sec^2      MV dec time 0.24 sec      Ao pk cary 296.2 cm/sec      Ao max PG 35.2 mmHg      Ao max PG (full) 26.6 mmHg      Ao V2 mean 201.7 cm/sec      Ao mean PG 18.8 mmHg      Ao mean PG (full) 14.2 mmHg      Ao V2 VTI 51.5 cm      WILBERT(I,A) 1.6 cm^2      WILBERT(I,D) 1.6 cm^2      WILBERT(V,A) 1.5 cm^2      WILBERT(V,D) 1.5 cm^2      LV V1 max PG 8.7 mmHg      LV V1 mean PG 4.6 mmHg      LV V1 max 147.3 cm/sec      LV V1 mean 98.6 cm/sec      LV V1 VTI 27.3 cm      SV(Ao) 456.2 ml      SI(Ao) 247.0 ml/m^2      SV(LVOT) 84.0 ml      SI(LVOT) 45.5 ml/m^2      PA V2 max 112.9 cm/sec      PA max PG 5.1 mmHg      PA max PG (full) 2.2 mmHg      PA V2 mean 78.3 cm/sec      PA mean PG 2.7 mmHg      PA mean PG (full) 1.4 mmHg      PA V2 VTI 16.3 cm      PA acc time 0.11 sec      RV V1 max PG 2.9 mmHg      RV V1 mean PG 1.3 mmHg      RV V1 max 85.6 cm/sec      RV V1 mean 51.3 cm/sec      RV V1 VTI 14.0 cm      PA pr(Accel) 30.7 mmHg       CV ECHO TIKA - BZI_BMI 21.5 kilograms/m^2       CV ECHO TIKA - BSA(HAYCOCK) 1.8 m^2       CV ECHO TIKA - BZI_METRIC_WEIGHT 68.0 kg       CV ECHO TIKA - BZI_METRIC_HEIGHT 177.8 cm      EF(MOD-bp) 67.0 %      LA dimension(2D) 3.6 cm      Echo EF Estimated 65 %     Narrative:      · Estimated left ventricular EF =  "65% Left ventricular systolic function   is normal.  · Left ventricular diastolic function is consistent with (grade Ia w/high   LAP) impaired relaxation.  · The right atrial cavity is mildly dilated.  · Mild aortic valve stenosis is present.  · Mild to moderate LVOT gradient noted       ECG 12 Lead [[id number]] Collected: 11/09/20 1339     Updated: 11/11/20 1756     QT Interval 339 ms     Narrative:      HEART RATE= 98  bpm  RR Interval= 612  ms  IA Interval= 125  ms  P Horizontal Axis= -44  deg  P Front Axis= 63  deg  QRSD Interval= 88  ms  QT Interval= 339  ms  QRS Axis= 43  deg  T Wave Axis= 84  deg  - BORDERLINE ECG -  Sinus rhythm  Probable left atrial enlargement  Probable left ventricular hypertrophy  When compared with ECG of 08-Nov-2020 23:19:49,  No significant change  Electronically Signed By: Rebeca Jacobs ([facility]) 11-Nov-2020 17:47:15  Date and Time of Study: 2020-11-09 13:39:57               Results for orders placed during the hospital encounter of 11/08/20   Adult Transthoracic Echo Complete W/ Cont if Necessary Per Protocol    Narrative · Estimated left ventricular EF = 65% Left ventricular systolic function   is normal.  · Left ventricular diastolic function is consistent with (grade Ia w/high   LAP) impaired relaxation.  · The right atrial cavity is mildly dilated.  · Mild aortic valve stenosis is present.  · Mild to moderate LVOT gradient noted          Xr Chest 1 View    Result Date: 11/9/2020  Similar-appearing large right lung mass with right basilar opacities and interstitial thickening that could represent postobstructive or aspiration pneumonia with a small right pleural effusion.  Electronically Signed By-Malcolm Plaza On:11/9/2020 8:06 AM This report was finalized on [id number] by  Malcolm Plaza, .    Nm Pet Skull Base To Mid Thigh    Addendum Date: 11/5/2020    Addendum: Voice recognition transcription error in impression number three. It should read \"Pathologically enlarged and " "hypermetabolic right paratracheal and subcarinal lymph nodes are consistent with nader metastases.\"  Electronically Signed By-Dr. Ivana Mirza MD On:11/5/2020 1:36 PM This report was finalized on [id number] by Dr. Ivana Mirza MD.    Result Date: 11/5/2020   1. Right upper lobe/right hilar lung mass is hypermetabolic and consistent with malignancy. It invades the right upper lobe bronchus. 2. Patchy parenchymal opacities in the right lower lobe appears slightly improved compared to the CT chest from 10/23/2020. Low to intermediate level FDG accumulation is seen within the right lower lobe, favored to represent postobstructive pneumonia. 3. Pathologically enlarged and hypermetabolic right paratracheal and subcarinal lymph nodes consistent with the stomach no metastases. 4. No convincing evidence of metastatic disease within the abdomen, pelvis, neck, or skeleton. 5. Mucous or secretions are thought to obstruct the bronchus intermedius and right middle and lower lobe bronchi. Dense right middle lobe atelectasis. 6. Additional chronic findings as described above.  Electronically Signed By-Dr. Ivana Mirza MD On:11/5/2020 1:23 PM This report was finalized on [id number] by Dr. Ivana Mirza MD.      I personally reviewed patient's x-ray films and my findings are: 0    I personally reviewed patient's EKG strip and my findings are: 0    Medication Review:   I have reviewed the patient's current medication list 11/12/20     Scheduled Meds  amLODIPine, 5 mg, Oral, Daily  amoxicillin-clavulanate, 1 tablet, Oral, Q8H  aspirin, 81 mg, Oral, Daily  atorvastatin, 20 mg, Oral, Nightly  ferrous sulfate, 324 mg, Oral, Daily With Breakfast  furosemide, 20 mg, Intravenous, TID  hydrALAZINE, 50 mg, Oral, BID  metoprolol tartrate, 25 mg, Oral, BID  nicotine, 1 patch, Transdermal, Daily  pantoprazole, 40 mg, Oral, Daily  predniSONE, 20 mg, Oral, BID With Meals  sodium chloride, 10 mL, Intravenous, Q12H        Meds " Infusions  Pharmacy to dose vancomycin,   sodium chloride, 9 mL/hr, Last Rate: 9 mL/hr (11/11/20 0729)        DVT prophylaxis  Mechanical Order History:      Ordered        11/09/20 0853  Place Sequential Compression Device  Once         11/09/20 0853  Maintain Sequential Compression Device  Continuous                 Pharmalogical Order History:     None          Meds PRN  •  acetaminophen **OR** acetaminophen **OR** acetaminophen  •  aluminum-magnesium hydroxide-simethicone  •  bisacodyl  •  bisacodyl  •  influenza vaccine  •  ipratropium-albuterol  •  magnesium hydroxide  •  ondansetron **OR** ondansetron  •  Pharmacy to dose vancomycin  •  sodium chloride  •  sodium chloride      Ulices Cruz MD  11/12/20  18:54 EST

## 2020-11-12 NOTE — PROGRESS NOTES
Daily Progress Note        Postobstructive pneumonia    Coronary artery disease involving native coronary artery of native heart without angina pectoris    Mixed hyperlipidemia    Essential hypertension    Hx of aortic valve replacement    Tobacco abuse    Squamous cell carcinoma of lung, right (CMS/HCC)    Diastolic CHF, acute (CMS/HCC)    Pneumonia of right lung due to infectious organism      Assessment:    Invasive moderately differentiated squamous cell carcinoma   Large right lung mass and mediastinal lymphadenopathy  Lung mass extends to obstruct the right upper lobe bronchus  compression of SVC  Evidence of postobstructive pneumonia with green pus noted during bronchoscopy from right upper lobe    Bronchoscopy completed 11/11/2020  Known squamous cell carcinoma mass extending from the right upper lobe blocking 98% of the left main bronchus  I was able to pass the scope around it and view the right middle lobe and the right lower lobe which was filled with purulent secretions compatible with severe pneumonia postobstructive, Therapeutic cleaning of secretions    Bronchoscopy rigid with debulking of right main endobronchial tumor 11/11/20  Tumor debulking right mainstem bronchus and bronchus intermedius with establishing patency to the lower and middle lobe.    bronchoscopy 10/24/2020   Tree-in-bud nodular opacities and patchy confluent opacities in the right lower lobe with areas of mucous plugging. Differential considerations include pneumonia, aspiration, endobronchial spread of malignancy        PFTs 2/12/2018  FEV1 1.9 L which is 52% predicted, FEV1/FVC is 54, %, %, DLCO 49%      Recommendations:    Steroids  Wean prednisone 20 mg b.i.d.    Antibiotics DC IV cefepime and vancomycin   start p.o. Augmentin 500 mg t.i.d. for 10 days     continue to monitor BAL culture  Bronchodilators  Oxygen titration as needed     LOS: 3 days     Subjective         Objective     Vital signs for last 24  hours:  Vitals:    11/11/20 1138 11/11/20 1910 11/12/20 0320 11/12/20 0752   BP: 97/60 109/65 118/62 136/65   BP Location: Left arm Left arm Left arm Left arm   Patient Position: Lying Lying Lying Sitting   Pulse: 104 76 75 72   Resp: 20 18 14 16   Temp: 97.5 °F (36.4 °C) 97.7 °F (36.5 °C) 97.5 °F (36.4 °C)    TempSrc: Oral Oral Oral    SpO2: 91% 96% 98% 97%   Weight:       Height:           Intake/Output last 3 shifts:  I/O last 3 completed shifts:  In: 1450 [P.O.:650; I.V.:800]  Out: 2500 [Urine:2350; Blood:150]  Intake/Output this shift:  I/O this shift:  In: -   Out: 600 [Urine:600]      Radiology  Imaging Results (Last 24 Hours)     ** No results found for the last 24 hours. **          Labs:  Results from last 7 days   Lab Units 11/11/20  0351   WBC 10*3/mm3 11.20*   HEMOGLOBIN g/dL 9.4*   HEMATOCRIT % 29.5*   PLATELETS 10*3/mm3 351     Results from last 7 days   Lab Units 11/12/20  0519  11/11/20 0351 11/08/20  2324   SODIUM mmol/L  --   --  132*   < > 131*   POTASSIUM mmol/L  --   --  3.8   < > 3.5   CHLORIDE mmol/L  --   --  95*   < > 96*   CO2 mmol/L  --   --  28.0   < > 24.0   BUN mg/dL  --   --  19   < > 12   CREATININE mg/dL 1.03   < > 0.88   < > 0.69*   CALCIUM mg/dL  --   --  8.8   < > 8.4*   BILIRUBIN mg/dL  --   --   --   --  0.3   ALK PHOS U/L  --   --   --   --  126*   ALT (SGPT) U/L  --   --   --   --  18   AST (SGOT) U/L  --   --   --   --  16   GLUCOSE mg/dL  --   --  143*   < > 126*    < > = values in this interval not displayed.         Results from last 7 days   Lab Units 11/08/20  2324   ALBUMIN g/dL 2.80*     Results from last 7 days   Lab Units 11/08/20  2324   TROPONIN T ng/mL <0.010                           Meds:   SCHEDULE  !Vancomycin Level Draw Needed, , Does not apply, Once  !Vancomycin Level Draw Needed, , Does not apply, Once  amLODIPine, 5 mg, Oral, Daily  aspirin, 81 mg, Oral, Daily  atorvastatin, 20 mg, Oral, Nightly  cefepime, 2 g, Intravenous, Q8H  ferrous sulfate, 324 mg,  Oral, Daily With Breakfast  furosemide, 20 mg, Intravenous, TID  hydrALAZINE, 50 mg, Oral, BID  metoprolol tartrate, 25 mg, Oral, BID  nicotine, 1 patch, Transdermal, Daily  pantoprazole, 40 mg, Oral, Daily  predniSONE, 20 mg, Oral, BID With Meals  sodium chloride, 10 mL, Intravenous, Q12H  vancomycin, 1,500 mg, Intravenous, Q24H      Infusions  Pharmacy to dose vancomycin,   sodium chloride, 9 mL/hr, Last Rate: 9 mL/hr (11/11/20 0729)      PRNs  acetaminophen **OR** acetaminophen **OR** acetaminophen  •  aluminum-magnesium hydroxide-simethicone  •  bisacodyl  •  bisacodyl  •  influenza vaccine  •  ipratropium-albuterol  •  magnesium hydroxide  •  ondansetron **OR** ondansetron  •  Pharmacy to dose vancomycin  •  sodium chloride  •  sodium chloride    Physical Exam:  Physical Exam  Vitals signs reviewed.   Cardiovascular:      Heart sounds: Murmur present.   Pulmonary:      Breath sounds: Wheezing present.   Skin:     General: Skin is warm and dry.   Neurological:      Mental Status: He is alert and oriented to person, place, and time.         ROS  Review of Systems   Constitutional: Positive for activity change and fatigue.             s

## 2020-11-12 NOTE — PROGRESS NOTES
[facility] Medicine Services  INPATIENT PROGRESS NOTE  358/1   Hospitalist Team  LOS 2 days      Patient Care Team:  Sandoval Stevenson FNP as PCP - General  Sandoval Stevenson FNP as PCP - Family Medicine  Axel Lewis MD as Consulting Physician (Hematology and Oncology)      Patient was examined with relevant and adequate PPE keeping in mind the current coronavirus pandemic.    Chief Complaint / Subjective  Chief Complaint   Patient presents with   • Fever       HPI  Mr. Ramirez is a 70 y.o. male recently diagnosed with right upper lobe mass found to be squamous cell carcinoma. He was discharged on 10/30 after finding of the lung cancer and postobstructive pneumonia. He was supposed to start treatment for his lung cancer today with his oncologist. He stated he developed a fever yesterday. He has had a cough. He has been short of breath but no more than usual. He has been weak. He denied any chest pain.      In the ER the patient had CXR that showed similar appearing large right lung mass with right basilar opacities and interstitial thickening that could represent postobstructive or aspiration pneumonia with a small right pleural effusion. WBC 14.6, temp 100.8, proBNP 2,291. Blood cultures were drawn he was started on cefepime and vancomycin. He was admitted for further treatment of pneumonia. Covid negative.        Fever           Interval History and ROS:   (4 hpi elements or status of 3 chronic)  Feels better  Post tumor debulking and recanalization of bronchus 11/11/20, 8:11 PM EST      History taken from: patient    Review of Systems   Constitution: Negative for fever.   HENT: Negative.    Eyes: Negative.    Cardiovascular: Negative.    Respiratory: Negative.    Endocrine: Negative.    Hematologic/Lymphatic: Negative.    Skin: Negative.    Musculoskeletal: Negative.    Gastrointestinal: Negative.    Genitourinary: Negative.    Neurological: Negative.    Psychiatric/Behavioral: Negative.     Allergic/Immunologic: Negative.    All other systems reviewed and are negative.            Family History   Problem Relation Age of Onset   • Coronary artery disease Mother    • Cancer Father    • Stroke Daughter 35   • Seizures Daughter 35       Past Medical History:   Diagnosis Date   • Aortic stenosis    • Cancer (CMS/HCC)     Sickle Cell Carcinoma   • Congestive heart failure (CHF) (CMS/HCC)     DIASTOLIC   • COPD (chronic obstructive pulmonary disease) (CMS/HCC)    • Coronary artery disease    • Hypertension    • Myocardial infarction (CMS/HCC)    • Peripheral vascular disease (CMS/HCC)    • Valvular disease        Social History     Socioeconomic History   • Marital status:      Spouse name: Not on file   • Number of children: Not on file   • Years of education: Not on file   • Highest education level: Not on file   Tobacco Use   • Smoking status: Current Every Day Smoker     Packs/day: 1.00     Types: Cigarettes   • Smokeless tobacco: Never Used   Substance and Sexual Activity   • Alcohol use: Yes   • Drug use: No   • Sexual activity: Defer       Prior to Admission medications    Medication Sig Start Date End Date Taking? Authorizing Provider   amLODIPine (NORVASC) 5 MG tablet TAKE 1 TABLET BY MOUTH DAILY 5/29/20   Iglesia Springer MD   aspirin (ASPIR-LOW) 81 MG EC tablet Take 81 mg by mouth Daily. 1/24/18   Provider, MD Paresh   atorvastatin (LIPITOR) 20 MG tablet TAKE 1 TABLET BY MOUTH AT BEDTIME 5/29/20   Iglesia Springer MD   ferrous sulfate 324 (65 Fe) MG tablet delayed-release EC tablet Take 1 tablet by mouth 2 (Two) Times a Day With Meals. 10/30/20   Arvin Rushing DO   hydrALAZINE (APRESOLINE) 50 MG tablet TAKE 1 TABLET BY MOUTH EVERY 12 HOURS 9/29/20   Iglesia Springer MD   metoprolol tartrate (LOPRESSOR) 25 MG tablet TAKE 1 TABLET BY MOUTH TWICE DAILY 8/26/20   Iglesia Springer MD   pantoprazole (Protonix) 40 MG EC tablet Take 1 tablet by  "mouth Daily. 10/30/20   Arvin Rushing, DO        Objective    Physical Exam     Vital Signs  Temp:  [97.1 °F (36.2 °C)-99.2 °F (37.3 °C)] 97.7 °F (36.5 °C)  Heart Rate:  [] 76  Resp:  [8-24] 18  BP: ()/() 109/65    Oxygen Therapy  SpO2: 96 %  Pulse Oximetry Type: Intermittent  Device (Oxygen Therapy): nasal cannula  Flow (L/min): 3  ETCO2 (mmHg): 33 mmHg  Flowsheet Rows      First Filed Value   Admission Height  177.8 cm (70\") Documented at 11/08/2020 2256   Admission Weight  68 kg (150 lb) Documented at 11/08/2020 2256        Weight change:    Intake & Output (last 3 days)       11/09 0701 - 11/10 0700 11/10 0701 - 11/11 0700 11/11 0701 - 11/12 0700    P.O.  980 650    I.V. (mL/kg)   800 (11.8)    Total Intake(mL/kg)  980 (14.4) 1450 (21.3)    Urine (mL/kg/hr) 2150 (1.3) 1800 (1.1) 550 (0.6)    Blood   150    Total Output 2150 1800 700    Net -2150 -820 +750           Urine Unmeasured Occurrence  1 x 5 x        Lines, Drains & Airways    Active LDAs     Name:   Placement date:   Placement time:   Site:   Days:    Peripheral IV 11/08/20 2324 Right Forearm   11/08/20    2324    Forearm   1    Single Lumen Implantable Port 10/30/20 Left Subclavian   10/30/20    0956    Subclavian   11                Physical Exam:    Physical Exam  Constitutional: Patient appears well-developed and well-nourished and in no acute distress      HEENT:   Head: Normocephalic and atraumatic.   Eyes:  Pupils are equal, round, and reactive to light. EOM are intact. Sclera are anicteric and non-injected.  Mouth and Throat: Patient has dry mucous membranes. Oropharynx is clear of any erythema or exudate.        Neck: Neck supple.  No thyromegaly present. No lymphadenopathy present. No  masses.      Cardiovascular: Inspection: Increased JVD up to the jaw angle present. Palpation: No parasternal heave. Pedal pulses +1 bilaterally. No leg edema. Auscultation: Regular rate, regular rhythm, S1 normal and S2 normal. reveals no " gallop and no friction rub. No Carotid bruit bilaterally.  Sternotomy scar visible     Pulmonary/Chest: Inspection: No distress, no use of accessory muscles. Lungs Rales and rhonchi to auscultation bilaterally. No respiratory distress.  Air entry better bilaterally     Abdomen /Gastrointestinal: Inspection: no distension. Palpation: no masses, no organomegaly. Soft. There is no tenderness. Bowel sounds are normal.      Musculoskeletal: Normal Muscle tone. Age appropriate, no deformities.     Neurological: Patient is alert and oriented to person, place, and time. Cranial nerves II-XII are grossly intact with no focal deficits. Sensori-motor exam is normal. No cerebellar signs.     Skin: Skin is warm. No rash noted. Nails show no clubbing.  No cyanosis or erythema. No bruising.     Emotional Behavior:   Appropriate       Debilities:  Age appropriate          PT Recommendation and Plan             Procedures:        Assessment/Plan with Problem wise       Active Hospital Problems    Diagnosis  POA   • **Postobstructive pneumonia [J18.9]  Yes     Priority: High   • Diastolic CHF, acute (CMS/[facility]) [I50.31]  Yes     Priority: Medium   • Squamous cell carcinoma of lung, right (CMS/HCC) [C34.91]  Yes   • Pneumonia of right lung due to infectious organism [J18.9]  Unknown   • Tobacco abuse [Z72.0]  Yes   • Coronary artery disease involving native coronary artery of native heart without angina pectoris [I25.10]  Yes   • Mixed hyperlipidemia [E78.2]  Yes   • Hx of aortic valve replacement [Z95.2]  Not Applicable   • Essential hypertension [I10]  Yes      Resolved Hospital Problems   No resolved problems to display.        Estimated Creatinine Clearance: 82.6 mL/min (A) (by C-G formula based on SCr of 0.64 mg/dL (L)).    Code Status and Medical Interventions:   Ordered at: 11/09/20 0853     Level Of Support Discussed With:    Patient     Code Status:    CPR     Medical Interventions (Level of Support Prior to Arrest):    Full        MEDICAL DECISION MAKING COMPLEXITY BY PROBLEM:     Pneumonia:  Antibiotics-goal 5d course for uncomplicated pneumonia  Routine sputum culture not indicated per IDSA guidelines  Negative MRSA screen, high negative predictive value for MRSA pneumonia  Bronchodilators if needed  Oxygen supplementation if needed to keep sat between 88 to 92%  Expectorants if needed  Antipyretics  Supportive treatment  Follow chest x-ray if needed  Follow labs if appropriate     Consult pulmonology        CHF:  Diuresis-loop diuretics  Spironolactone if tolerated  Thiazides if tolerated  ACE inhibitor if tolerated  Beta-blocker  Check echocardiogram        Smoking:  Counseled to quit smoking  Long-term compliance is suspect  Nicotine replacement while in the hospital        Hypertension:  Continue home medications   Options include-  beta-blockers  Calcium channel blockers  ACE inhibitor  Vasodilators  Low-dose diuretics  PRN medications have not been shown to affect outcomes-to be avoided     Care coordination with pulmonology.  Await input 11/10/20 10:30 AM EST.    Appreciate input cardiothoracic surgery    Plan for disposition:            Continued Care and Services - Admitted Since 11/8/2020    Coordination has not been started for this encounter.        Historical & Objective Data     Results Review:    I reviewed the patient's new lab and radiology results. 11/11/20     Results from last 7 days   Lab Units 11/11/20  0351 11/10/20  0630 11/08/20  2324   WBC 10*3/mm3 11.20* 13.70* 14.60*   HEMOGLOBIN g/dL 9.4* 9.5* 9.1*   HEMATOCRIT % 29.5* 29.3* 28.0*   MCV fL 85.4 85.2 84.9   MCH pg 27.4 27.5 27.5   MPV fL 6.5 6.3 6.2   RDW % 18.9* 18.5* 18.6*   PLATELETS 10*3/mm3 351 387 397     Results from last 7 days   Lab Units 11/11/20  0353 11/11/20  0351 11/10/20  0630 11/08/20  2324   SODIUM mmol/L  --  132* 130* 131*   POTASSIUM mmol/L  --  3.8 3.7 3.5   CHLORIDE mmol/L  --  95* 93* 96*   CO2 mmol/L  --  28.0 28.0 24.0   BUN mg/dL   --  19 15 12   CREATININE mg/dL 0.64* 0.88 0.90 0.69*   CALCIUM mg/dL  --  8.8 8.3* 8.4*   BILIRUBIN mg/dL  --   --   --  0.3   ALK PHOS U/L  --   --   --  126*   ALT (SGPT) U/L  --   --   --  18   AST (SGOT) U/L  --   --   --  16   GLUCOSE mg/dL  --  143* 122* 126*     Inflammatory Biomarkers        Invalid input(s): ESR, D-DIMER QUANTITATIVE,  PROCALCITONIN,    Lab Results   Component Value Date    PHOS 3.8 01/02/2018     No results found for: HGBA1C  Lab Results   Component Value Date    CHOL 79 11/29/2019    TRIG 48 11/29/2019    HDL 33 (L) 11/29/2019    LDL 36 11/29/2019     No results found for: LIPASE            Lab Results   Lab Value Date/Time    FINALDX  10/24/2020 1022     Smears of bronchoalveolar lavage with cytospin preparation:    Occasional markedly atypical squamous cells along with reactive bronchial cells and pulmonary macrophages      in a background of abundant acute inflammation    Findings suspicious for malignancy (see COMMENT)      NORBERTO/tkd       FINALDX  10/24/2020 1019     Lung, right upper lobe mass, biopsy:    Invasive moderately differentiated squamous cell carcinoma (see COMMENT)    NORBERTO/tkd       COMDX  10/24/2020 1022     The markedly atypical squamous cells seen are suspicious for malignancy but because of the abundant acute inflammation, a definitive diagnosis cannot be made. Please correlate with concurrent surgical specimen ([id number]) in which a definitive diagnosis of invasive non-small cell carcinoma was rendered.     NORBERTO/tkd       COMDX  10/24/2020 1019     The biopsy was stained via immunohistochemical technique utilizing appropriate controls for the presence of CK5/6, p63, TTF-1 and napsin. The tumor cells are positive for CK5/6 and p63 while being negative for TTF-1 and napsin. This staining pattern in conjunction with the histologic features is entirely consistent with squamous cell carcinoma.     NORBERTO/tkd        COVID19   Date Value Ref Range Status   11/08/2020 Not  Detected Not Detected - Ref. Range Final        Microbiology Results (last 10 days)     Procedure Component Value - Date/Time    AFB Culture - Wash, Lung, R [[id number]] Collected: 11/11/20 0747    Lab Status: Preliminary result Specimen: Wash from Lung, R Updated: 11/11/20 1025     AFB Stain No acid fast bacilli seen    Respiratory Culture - Wash, Lung, R [[id number]] Collected: 11/11/20 0747    Lab Status: Preliminary result Specimen: Wash from Lung, R Updated: 11/11/20 1116     Gram Stain Rare (1+) Epithelial cells per low power field      Moderate (3+) WBCs per low power field      Few (2+) Mixed bacterial morphotypes seen on Gram Stain      Rare (1+) Yeast    Respiratory Panel, PCR - Wash, Lung, R [[id number]]  (Normal) Collected: 11/11/20 0747    Lab Status: Final result Specimen: Wash from Lung, R Updated: 11/11/20 0941     ADENOVIRUS, PCR Not Detected     Coronavirus 229E Not Detected     Coronavirus HKU1 Not Detected     Coronavirus NL63 Not Detected     Coronavirus OC43 Not Detected     Human Metapneumovirus Not Detected     Human Rhinovirus/Enterovirus Not Detected     Influenza B PCR Not Detected     Parainfluenza Virus 1 Not Detected     Parainfluenza Virus 2 Not Detected     Parainfluenza Virus 3 Not Detected     Parainfluenza Virus 4 Not Detected     Bordetella pertussis pcr Not Detected     Influenza A H1 2009 PCR Not Detected     Chlamydophila pneumoniae PCR Not Detected     Mycoplasma pneumo by PCR Not Detected     Influenza A PCR Not Detected     Influenza A H3 Not Detected     Influenza A H1 Not Detected     RSV, PCR Not Detected     Bordetella parapertussis PCR Not Detected    Narrative:      The coronavirus on the RVP is NOT COVID-19 and is NOT indicative of infection with COVID-19.     MRSA Screen, PCR (Inpatient) - Swab, Nares [[id number]]  (Abnormal) Collected: 11/09/20 1742    Lab Status: Final result Specimen: Swab from Nares Updated: 11/09/20 2006     MRSA PCR MRSA Detected    Legionella  Antigen, Urine - Urine, Urine, Clean Catch [[id number]]  (Normal) Collected: 11/08/20 2339    Lab Status: Final result Specimen: Urine, Clean Catch Updated: 11/09/20 1306     LEGIONELLA ANTIGEN, URINE Negative    S. Pneumo Ag Urine or CSF - Urine, Urine, Clean Catch [[id number]]  (Normal) Collected: 11/08/20 2339    Lab Status: Final result Specimen: Urine, Clean Catch Updated: 11/09/20 1307     Strep Pneumo Ag Negative    Blood Culture - Blood, Arm, Right [[id number]] Collected: 11/08/20 2325    Lab Status: Preliminary result Specimen: Blood from Arm, Right Updated: 11/10/20 2330     Blood Culture No growth at 2 days    Blood Culture - Blood, Arm, Right [[id number]] Collected: 11/08/20 2324    Lab Status: Preliminary result Specimen: Blood from Arm, Right Updated: 11/10/20 2330     Blood Culture No growth at 2 days    Respiratory Panel PCR w/COVID-19(SARS-CoV-2) PARESH/GRACIELA/KATHY/PAD/COR/MAD/CHRIST In-House, NP Swab in UTM/VTM, 3-4 HR TAT - Swab, Nasopharynx [[id number]]  (Normal) Collected: 11/08/20 2323    Lab Status: Final result Specimen: Swab from Nasopharynx Updated: 11/09/20 0021     ADENOVIRUS, PCR Not Detected     Coronavirus 229E Not Detected     Coronavirus HKU1 Not Detected     Coronavirus NL63 Not Detected     Coronavirus OC43 Not Detected     COVID19 Not Detected     Human Metapneumovirus Not Detected     Human Rhinovirus/Enterovirus Not Detected     Influenza A PCR Not Detected     Influenza A H1 Not Detected     Influenza A H1 2009 PCR Not Detected     Influenza A H3 Not Detected     Influenza B PCR Not Detected     Parainfluenza Virus 1 Not Detected     Parainfluenza Virus 2 Not Detected     Parainfluenza Virus 3 Not Detected     Parainfluenza Virus 4 Not Detected     RSV, PCR Not Detected     Bordetella pertussis pcr Not Detected     Bordetella parapertussis PCR Not Detected     Chlamydophila pneumoniae PCR Not Detected     Mycoplasma pneumo by PCR Not Detected    Narrative:      Fact sheet for providers:  https://docs.[employer]/wp-content/uploads/BUQ6494-1491-AD8.1-EUA-Provider-Fact-Sheet-3.pdf    Fact sheet for patients: https://docs.[employer]/wp-content/uploads/MUR9258-2468-XC1.1-EUA-Patient-Fact-Sheet-1.pdf          ECG/EMG Results (most recent)     Procedure Component Value Units Date/Time    ECG 12 Lead [[id number]] Collected: 11/08/20 2319     Updated: 11/10/20 1556     QT Interval 380 ms     Narrative:      HEART RATE= 78  bpm  RR Interval= 764  ms  WI Interval= 141  ms  P Horizontal Axis= 18  deg  P Front Axis= 69  deg  QRSD Interval= 97  ms  QT Interval= 380  ms  QRS Axis= 59  deg  T Wave Axis= 79  deg  - NORMAL ECG -  Sinus rhythm  When compared with ECG of 22-Oct-2020 21:08:21,  Significant axis, voltage or hypertrophy change  Electronically Signed By: Alber Cao) 10-Nov-2020 15:55:06  Date and Time of Study: 2020-11-08 23:19:49    Adult Transthoracic Echo Complete W/ Cont if Necessary Per Protocol [[id number]] Collected: 11/09/20 1447     Updated: 11/10/20 1901     BSA 1.8 m^2      RVIDd 3.0 cm      IVSd 1.0 cm      IVSs 1.8 cm      LVIDd 4.7 cm      LVIDs 2.4 cm      LVPWd 1.1 cm      BH CV ECHO TIKA - LVPWS 1.5 cm      IVS/LVPW 0.9     FS 50.4 %      EDV(Teich) 104.4 ml      ESV(Teich) 19.2 ml      EF(Teich) 81.6 %      EDV(cubed) 106.4 ml      ESV(cubed) 13.0 ml      EF(cubed) 87.8 %      % IVS thick 71.8 %      % LVPW thick 31.9 %      LV mass(C)d 186.4 grams      LV mass(C)dI 100.9 grams/m^2      LV mass(C)s 137.7 grams      LV mass(C)sI 74.5 grams/m^2      SV(Teich) 85.2 ml      SI(Teich) 46.1 ml/m^2      SV(cubed) 93.4 ml      SI(cubed) 50.6 ml/m^2      Ao root diam 3.4 cm      Ao root area 8.9 cm^2      ACS 1.2 cm      LVOT diam 2.0 cm      LVOT area 3.1 cm^2      EDV(MOD-sp4) 51.6 ml      ESV(MOD-sp4) 17.1 ml      EF(MOD-sp4) 66.8 %      SV(MOD-sp4) 34.5 ml      SI(MOD-sp4) 18.7 ml/m^2      Ao root area (BSA corrected) 1.8     LV Rich Vol (BSA corrected) 27.9 ml/m^2      LV Sys Vol  (BSA corrected) 9.3 ml/m^2      MV E max cary 91.2 cm/sec      MV A max cary 130.6 cm/sec      MV E/A 0.7     MV V2 max 141.7 cm/sec      MV max PG 8.0 mmHg      MV V2 mean 92.4 cm/sec      MV mean PG 3.7 mmHg      MV V2 VTI 38.7 cm      MVA(VTI) 2.2 cm^2      MV dec slope 373.0 cm/sec^2      MV dec time 0.24 sec      Ao pk cary 296.2 cm/sec      Ao max PG 35.2 mmHg      Ao max PG (full) 26.6 mmHg      Ao V2 mean 201.7 cm/sec      Ao mean PG 18.8 mmHg      Ao mean PG (full) 14.2 mmHg      Ao V2 VTI 51.5 cm      WILBERT(I,A) 1.6 cm^2      WILBERT(I,D) 1.6 cm^2      WILBERT(V,A) 1.5 cm^2      WILBERT(V,D) 1.5 cm^2      LV V1 max PG 8.7 mmHg      LV V1 mean PG 4.6 mmHg      LV V1 max 147.3 cm/sec      LV V1 mean 98.6 cm/sec      LV V1 VTI 27.3 cm      SV(Ao) 456.2 ml      SI(Ao) 247.0 ml/m^2      SV(LVOT) 84.0 ml      SI(LVOT) 45.5 ml/m^2      PA V2 max 112.9 cm/sec      PA max PG 5.1 mmHg      PA max PG (full) 2.2 mmHg      PA V2 mean 78.3 cm/sec      PA mean PG 2.7 mmHg      PA mean PG (full) 1.4 mmHg      PA V2 VTI 16.3 cm      PA acc time 0.11 sec      RV V1 max PG 2.9 mmHg      RV V1 mean PG 1.3 mmHg      RV V1 max 85.6 cm/sec      RV V1 mean 51.3 cm/sec      RV V1 VTI 14.0 cm      PA pr(Accel) 30.7 mmHg       CV ECHO TIKA - BZI_BMI 21.5 kilograms/m^2       CV ECHO TIKA - BSA(PaulinaCOCK) 1.8 m^2       CV ECHO TIKA - BZI_METRIC_WEIGHT 68.0 kg       CV ECHO TIKA - BZI_METRIC_HEIGHT 177.8 cm      EF(MOD-bp) 67.0 %      LA dimension(2D) 3.6 cm      Echo EF Estimated 65 %     Narrative:      · Estimated left ventricular EF = 65% Left ventricular systolic function   is normal.  · Left ventricular diastolic function is consistent with (grade Ia w/high   LAP) impaired relaxation.  · The right atrial cavity is mildly dilated.  · Mild aortic valve stenosis is present.  · Mild to moderate LVOT gradient noted       ECG 12 Lead [[id number]] Collected: 11/09/20 1339     Updated: 11/11/20 5796     QT Interval 339 ms     Narrative:      HEART  "RATE= 98  bpm  RR Interval= 612  ms  MN Interval= 125  ms  P Horizontal Axis= -44  deg  P Front Axis= 63  deg  QRSD Interval= 88  ms  QT Interval= 339  ms  QRS Axis= 43  deg  T Wave Axis= 84  deg  - BORDERLINE ECG -  Sinus rhythm  Probable left atrial enlargement  Probable left ventricular hypertrophy  When compared with ECG of 08-Nov-2020 23:19:49,  No significant change  Electronically Signed By: Rebeca Jacobs ([facility]) 11-Nov-2020 17:47:15  Date and Time of Study: 2020-11-09 13:39:57               Results for orders placed during the hospital encounter of 11/08/20   Adult Transthoracic Echo Complete W/ Cont if Necessary Per Protocol    Narrative · Estimated left ventricular EF = 65% Left ventricular systolic function   is normal.  · Left ventricular diastolic function is consistent with (grade Ia w/high   LAP) impaired relaxation.  · The right atrial cavity is mildly dilated.  · Mild aortic valve stenosis is present.  · Mild to moderate LVOT gradient noted          Xr Chest 1 View    Result Date: 11/9/2020  Similar-appearing large right lung mass with right basilar opacities and interstitial thickening that could represent postobstructive or aspiration pneumonia with a small right pleural effusion.  Electronically Signed By-Malcolm Plaza On:11/9/2020 8:06 AM This report was finalized on [id number] by  Malcolm Plaza, .    Nm Pet Skull Base To Mid Thigh    Addendum Date: 11/5/2020    Addendum: Voice recognition transcription error in impression number three. It should read \"Pathologically enlarged and hypermetabolic right paratracheal and subcarinal lymph nodes are consistent with nader metastases.\"  Electronically Signed By-Dr. Ivana Mirza MD On:11/5/2020 1:36 PM This report was finalized on [id number] by Dr. Ivana Mirza MD.    Result Date: 11/5/2020   1. Right upper lobe/right hilar lung mass is hypermetabolic and consistent with malignancy. It invades the right upper lobe bronchus. 2. Patchy " parenchymal opacities in the right lower lobe appears slightly improved compared to the CT chest from 10/23/2020. Low to intermediate level FDG accumulation is seen within the right lower lobe, favored to represent postobstructive pneumonia. 3. Pathologically enlarged and hypermetabolic right paratracheal and subcarinal lymph nodes consistent with the stomach no metastases. 4. No convincing evidence of metastatic disease within the abdomen, pelvis, neck, or skeleton. 5. Mucous or secretions are thought to obstruct the bronchus intermedius and right middle and lower lobe bronchi. Dense right middle lobe atelectasis. 6. Additional chronic findings as described above.  Electronically Signed By-Dr. Ivana Mirza MD On:11/5/2020 1:23 PM This report was finalized on [id number] by Dr. Ivana Mirza MD.      I personally reviewed patient's x-ray films and my findings are: 0    I personally reviewed patient's EKG strip and my findings are: 0    Medication Review:   I have reviewed the patient's current medication list 11/11/20     Scheduled Meds  [START ON 11/12/2020] !Vancomycin Level Draw Needed, , Does not apply, Once  !Vancomycin Level Draw Needed, , Does not apply, Once  amLODIPine, 5 mg, Oral, Daily  aspirin, 81 mg, Oral, Daily  atorvastatin, 20 mg, Oral, Nightly  cefepime, 2 g, Intravenous, Q8H  ferrous sulfate, 324 mg, Oral, Daily With Breakfast  furosemide, 20 mg, Intravenous, TID  hydrALAZINE, 50 mg, Oral, BID  metoprolol tartrate, 25 mg, Oral, BID  nicotine, 1 patch, Transdermal, Daily  pantoprazole, 40 mg, Oral, Daily  predniSONE, 20 mg, Oral, BID With Meals  sodium chloride, 10 mL, Intravenous, Q12H  vancomycin, 1,500 mg, Intravenous, Q24H        Meds Infusions  Pharmacy to dose vancomycin,   sodium chloride, 9 mL/hr, Last Rate: 9 mL/hr (11/11/20 0729)        DVT prophylaxis  Mechanical Order History:      Ordered        11/09/20 0853  Place Sequential Compression Device  Once         11/09/20 0853   Maintain Sequential Compression Device  Continuous                 Pharmalogical Order History:     None          Meds PRN  acetaminophen **OR** acetaminophen **OR** acetaminophen  •  aluminum-magnesium hydroxide-simethicone  •  bisacodyl  •  bisacodyl  •  influenza vaccine  •  ipratropium-albuterol  •  magnesium hydroxide  •  ondansetron **OR** ondansetron  •  Pharmacy to dose vancomycin  •  sodium chloride  •  sodium chloride      Ulices Cruz MD  11/11/20  20:11 EST

## 2020-11-12 NOTE — PROGRESS NOTES
Exercise Oximetry    Patient Name:Axel Ramirez   MRN: [id number]   Date: 11/12/20             ROOM AIR BASELINE   SpO2% 84   Heart Rate 101   Blood Pressure      EXERCISE ON ROOM AIR SpO2% EXERCISE ON O2 @ 2 LPM SpO2%   1 MINUTE  1 MINUTE    2 MINUTES  2 MINUTES Placed on 2 LPM 88   3 MINUTES  3 MINUTES    4 MINUTES  4 MINUTES  92   5 MINUTES  5 MINUTES    6 MINUTES  6 MINUTES 94              Distance Walked   Distance Walked   Dyspnea (S[place] Scale)   Dyspnea (S[place]as Scale)   Fatigue ([place]as Scale)   Fatigue ([place]as Scale)   SpO2% Post Exercise   SpO2% Post Exercise  96   HR Post Exercise   HR Post Exercise  83   Time to Recovery   Time to Recovery       Comments: Pt started on room air at rest.  Placed on 2 LPM of Oxygen.  Pt recovered and was able to maintain his O2 saturation on 2 LPM.

## 2020-11-13 NOTE — PROGRESS NOTES
Thoracic surgery progress note    Chief complaint dyspnea    The patient is markedly improved following rigid bronchoscopy tumor debridement and subsequent radiation therapy.  He is no longer having any significant hemoptysis.  His breathing is much more comfortable.  He has had no fevers chills or night sweats.  He is tolerating his diet.  He is not experiencing dysphagia.  He has no significant throat discomfort.    Chest expansion symmetrical.  No subcutaneous emphysema.  Trachea is midline.    No new radiographs for review.  Afebrile vital signs stable.  White blood cell count 11.2.  Medications reviewed.  Continues on Augmentin.  Continues on prednisone.    Case discussed with Dr. Kathy Lyn for discharge home.  Follow-up with thoracic surgery as needed otherwise follow-up with pulmonary medicine and radiation oncology.    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  Underlying COPD  Very large right upper lobe lung cancer to be treated with chemotherapy and radiation therapy.  Possible future surgical consideration.

## 2020-11-13 NOTE — PROGRESS NOTES
Discharge Planning Assessment   Johann     Patient Name: Axel Ramirez  MRN: [id number]  Today's Date: 11/13/2020    Admit Date: 11/8/2020          Plan    Plan  home with oxygen thru [facility]    Plan Comments  6 min walk on 11/12 with patient needing oxygen; order placed and patient is ok with [facility] to supply       Carol naegele rn  Case management  Office number [phone]  Cell phone [phone]

## 2020-11-13 NOTE — PROGRESS NOTES
Continued Stay Note  [facility]     Patient Name: Axel Ramirez  MRN: [id number]  Today's Date: 11/13/2020    Admit Date: 11/8/2020    Discharge Plan     Row Name 11/13/20 1020       Plan    Plan  anticipate return home with possible oxygen needs    Plan Comments  SW attempted to reach out to OSW at [facility] via e-mail to determine if transportation could be arranged via [employer]. OSW replied that [facility] does not have a current contract with [employer]. SW attempted to schedule rides via [employer] via phone call and was informed that the pt's Medicaid is a QMB policy and pt is not eligible for Medicaid transportation benefit. SW called pt's wife to inquire about family and friend supports for transportation. Wife states that family (sister-in-law) is willing to either transport herself or pay for transport.        Adrianne Gates [facility], [facility]    Office: [phone]  Cell: [phone]  Fax: [phone]  [email or web address]: [email or web address]

## 2020-11-13 NOTE — DISCHARGE SUMMARY
Date of Admission: 11/8/2020  358/1    Date of Discharge:  11/13/2020    Length of stay:  LOS: 4 days     Patient was examined with relevant and adequate PPE keeping in mind the current coronavirus pandemic.      Presenting Problem/History of Present Illness   Present on Admission:  • Postobstructive pneumonia  • Coronary artery disease involving native coronary artery of native heart without angina pectoris  • Mixed hyperlipidemia  • Essential hypertension  • Tobacco abuse  • Squamous cell carcinoma of lung, right (CMS/HCC)  • Diastolic CHF, acute (CMS/HCC)        Hospital Course  Chief complaint:  Chief Complaint   Patient presents with   • Fever       Axel Ramirez 70 y.o. male.       Mr. Ramirez is a 70 y.o. male recently diagnosed with right upper lobe mass found to be squamous cell carcinoma. He was discharged on 10/30 after finding of the lung cancer and postobstructive pneumonia. He was supposed to start treatment for his lung cancer today with his oncologist. He stated he developed a fever yesterday. He has had a cough. He has been short of breath but no more than usual. He has been weak. He denied any chest pain.      In the ER the patient had CXR that showed similar appearing large right lung mass with right basilar opacities and interstitial thickening that could represent postobstructive or aspiration pneumonia with a small right pleural effusion. WBC 14.6, temp 100.8, proBNP 2,291. Blood cultures were drawn he was started on cefepime and vancomycin. He was admitted for further treatment of pneumonia. Covid negative.          Patient admitted with postobstructive pneumonia.  He was seen in consult by Dr. Bean and Dr. Reyes.  He had bronchoscopy and debulking of the tumor resulting in recanalization of the obstructing bronchus.  He had mal infection beyond that.  He has received antibiotics and is better.  He is receiving radiation.  He needs to follow with oncology for possible chemotherapy.   Prognosis is guarded.  I have added a diuretic regimen on account of his pleural effusions.  PCP to monitor electrolytes periodically    ROS  Constitution: Positive for fever and malaise/fatigue.   HENT: Negative.    Eyes: Negative.    Cardiovascular: Negative.    Respiratory: Positive for cough and shortness of breath.    Endocrine: Negative.    Hematologic/Lymphatic: Negative.    Skin: Negative.    Musculoskeletal: Negative.    Gastrointestinal: Negative.    Genitourinary: Negative.    Neurological: Negative.    Psychiatric/Behavioral: Negative.    Allergic/Immunologic: Negative.    All other systems reviewed and are negative.   Noted      Family History   Problem Relation Age of Onset   • Coronary artery disease Mother    • Cancer Father    • Stroke Daughter 35   • Seizures Daughter 35        Past Medical History:   Diagnosis Date   • Aortic stenosis    • Cancer (CMS/HCC)     Sickle Cell Carcinoma   • Congestive heart failure (CHF) (CMS/HCC)     DIASTOLIC   • COPD (chronic obstructive pulmonary disease) (CMS/HCC)    • Coronary artery disease    • Hypertension    • Myocardial infarction (CMS/HCC)    • Peripheral vascular disease (CMS/HCC)    • Valvular disease        Past Surgical History:   Procedure Laterality Date   • AORTIC VALVE REPAIR/REPLACEMENT  02/26/2018    Dr Maza   • BRONCHOSCOPY N/A 10/24/2020    Procedure: BRONCHOSCOPY with biopsy right upper lobe mass, and bronchoalveolar lavage right upper lobe;  Surgeon: Ángela Bean MD;  Location: [facility] ENDOSCOPY;  Service: Pulmonary;  Laterality: N/A;  post: right upper lobe mass, pneumonia   • BRONCHOSCOPY N/A 11/11/2020    Procedure: BRONCHOSCOPY with bronchial washing;  Surgeon: Ángela Bean MD;  Location: [facility] ENDOSCOPY;  Service: Pulmonary;  Laterality: N/A;  Post: lung mass, pneumonia   • CARDIAC CATHETERIZATION  12/29/2017   • CORONARY ARTERY BYPASS GRAFT  02/26/2018    Dr Maza   • TIBIA FRACTURE SURGERY     • VENOUS ACCESS DEVICE (PORT) INSERTION Left  10/30/2020    Procedure: MEDIPORT INSERTION UNDER FLUOROSCOPIC GUIDENCE;  Surgeon: Nomi Reyes MD;  Location: [facility] MAIN OR;  Service: Cardiothoracic;  Laterality: Left;       Social History     Socioeconomic History   • Marital status:      Spouse name: Not on file   • Number of children: Not on file   • Years of education: Not on file   • Highest education level: Not on file   Tobacco Use   • Smoking status: Current Every Day Smoker     Packs/day: 1.00     Types: Cigarettes   • Smokeless tobacco: Never Used   Substance and Sexual Activity   • Alcohol use: Yes   • Drug use: No   • Sexual activity: Defer       Vital Signs  Temp:  [97.5 °F (36.4 °C)-97.9 °F (36.6 °C)] 97.5 °F (36.4 °C)  Heart Rate:  [68-78] 78  Resp:  [16-20] 20  BP: ()/(49-70) 129/69  Weight change:     Physical Exam:  Physical Exam  Constitutional: Patient appears well-developed and well-nourished and in no acute distress      HEENT:   Head: Normocephalic and atraumatic.   Eyes:  Pupils are equal, round, and reactive to light. EOM are intact. Sclera are anicteric and non-injected.  Mouth and Throat: Patient has dry mucous membranes. Oropharynx is clear of any erythema or exudate.        Neck: Neck supple.  No thyromegaly present. No lymphadenopathy present. No  masses.      Cardiovascular: Inspection: Increased JVD up to the jaw angle present. Palpation: No parasternal heave. Pedal pulses +1 bilaterally. No leg edema. Auscultation: Regular rate, regular rhythm, S1 normal and S2 normal. reveals no gallop and no friction rub. No Carotid bruit bilaterally.  Sternotomy scar visible     Pulmonary/Chest: Inspection: No distress, no use of accessory muscles. Lungs are clear to auscultation bilaterally. No respiratory distress. No wheezes. No rales.  Limited air entry     Abdomen /Gastrointestinal: Inspection: no distension. Palpation: no masses, no organomegaly. Soft. There is no tenderness. Bowel sounds are normal.       Musculoskeletal: Normal Muscle tone. Age appropriate, no deformities.     Neurological: Patient is alert and oriented to person, place, and time. Cranial nerves II-XII are grossly intact with no focal deficits. Sensori-motor exam is normal. No cerebellar signs.     Skin: Skin is warm. No rash noted. Nails show no clubbing.  No cyanosis or erythema. No bruising.     Emotional Behavior:   Appropriate       Debilities:  Age appropriate  Reviewed, no change in above data from the prior day.        Discharge Diagnosis:     Active Hospital Problems    Diagnosis  POA   • **Postobstructive pneumonia [J18.9]  Yes     Priority: High   • Diastolic CHF, acute (CMS/[facility]) [I50.31]  Yes     Priority: Medium   • Squamous cell carcinoma of lung, right (CMS/[facility]) [C34.91]  Yes   • Pneumonia of right lung due to infectious organism [J18.9]  Unknown   • Tobacco abuse [Z72.0]  Yes   • Coronary artery disease involving native coronary artery of native heart without angina pectoris [I25.10]  Yes   • Mixed hyperlipidemia [E78.2]  Yes   • Hx of aortic valve replacement [Z95.2]  Not Applicable   • Essential hypertension [I10]  Yes      Resolved Hospital Problems   No resolved problems to display.       Estimated Creatinine Clearance: 82.6 mL/min (A) (by C-G formula based on SCr of 0.72 mg/dL (L)).    Discharge Disposition    Continued Care and Services - Admitted Since 11/8/2020    Coordination has not been started for this encounter.             PT Recommendation and Plan          Home or Self Care           Discharge Medications      New Medications      Instructions Start Date   amoxicillin-clavulanate 875-125 MG per tablet  Commonly known as: Augmentin   1 tablet, Oral, 2 Times Daily      predniSONE 20 MG tablet  Commonly known as: DELTASONE   20 mg, Oral, Daily      spironolactone 25 MG tablet  Commonly known as: Aldactone   25 mg, Oral, Daily      torsemide 20 MG tablet  Commonly known as: Demadex   10 mg, Oral, Daily         Changes  to Medications      Instructions Start Date   ferrous sulfate 324 (65 Fe) MG tablet delayed-release EC tablet  What changed: when to take this   324 mg, Oral, Daily With Breakfast      metoprolol tartrate 25 MG tablet  Commonly known as: LOPRESSOR  What changed: how much to take   50 mg, Oral, 2 Times Daily         Continue These Medications      Instructions Start Date   aspirin 81 MG EC tablet   81 mg, Oral, Every 24 Hours      atorvastatin 20 MG tablet  Commonly known as: LIPITOR   20 mg, Oral, Every Night at Bedtime      hydrALAZINE 50 MG tablet  Commonly known as: APRESOLINE   TAKE 1 TABLET BY MOUTH EVERY 12 HOURS      pantoprazole 40 MG EC tablet  Commonly known as: Protonix   40 mg, Oral, Daily         Stop These Medications    amLODIPine 5 MG tablet  Commonly known as: NORVASC          Discharge medications personally reviewed by me and med rec done by me personally.  11/13/20, 10:12 AM EST        Consults:   Consults     Date and Time Order Name Status Description    11/9/2020 1417 Inpatient Pulmonology Consult Completed     11/9/2020 0250 Inpatient Hospitalist Consult Completed     10/24/2020 1033 Inpatient Radiation Oncology Consult Completed     10/24/2020 1033 Inpatient Thoracic Surgery Consult Completed     10/23/2020 1041 Inpatient Pulmonology Consult Completed     10/22/2020 2332 Hospitalist (on-call MD unless specified) Completed           Procedures Performed:  Procedure(s):  BRONCHOSCOPY RIGID with debulking of right main endobronchial tumor  BRONCHOSCOPY       Pertinent Test Results:   Results from last 7 days   Lab Units 11/11/20  0351 11/10/20  0630 11/08/20  2324   WBC 10*3/mm3 11.20* 13.70* 14.60*   HEMOGLOBIN g/dL 9.4* 9.5* 9.1*   HEMATOCRIT % 29.5* 29.3* 28.0*   MCV fL 85.4 85.2 84.9   MCH pg 27.4 27.5 27.5   PLATELETS 10*3/mm3 351 387 397     Results from last 7 days   Lab Units 11/13/20  0359 11/12/20  0519 11/11/20 0353 11/11/20  0351 11/10/20  0630 11/08/20  2324   SODIUM mmol/L  --    --   --  132* 130* 131*   POTASSIUM mmol/L  --   --   --  3.8 3.7 3.5   CHLORIDE mmol/L  --   --   --  95* 93* 96*   CO2 mmol/L  --   --   --  28.0 28.0 24.0   BUN mg/dL  --   --   --  19 15 12   CREATININE mg/dL 0.72* 1.03 0.64* 0.88 0.90 0.69*   CALCIUM mg/dL  --   --   --  8.8 8.3* 8.4*   BILIRUBIN mg/dL  --   --   --   --   --  0.3   ALK PHOS U/L  --   --   --   --   --  126*   ALT (SGPT) U/L  --   --   --   --   --  18   AST (SGOT) U/L  --   --   --   --   --  16   GLUCOSE mg/dL  --   --   --  143* 122* 126*     Lab Results   Component Value Date    CALCIUM 8.8 11/11/2020    PHOS 3.8 01/02/2018     No results found for: HGBA1C  Lab Results   Component Value Date    CHOL 79 11/29/2019    TRIG 48 11/29/2019    HDL 33 (L) 11/29/2019    LDL 36 11/29/2019     No results found for: LIPASE        Lab Results   Lab Value Date/Time    FINALDX  11/11/2020 0958     Tumor mass, right mainstem bronchus, biopsy:    Invasive moderately differentiated squamous cell carcinoma (see COMMENT)    NORBERTO/tkd       FINALDX  11/11/2020 0747     Smears of bronchial washings with cytospin preparation:    Occasional markedly atypical cells consistent with non-small cell carcinoma and exhibiting cytomorphologic      features most suggestive of squamous cell carcinoma     Background consists of acute inflammation with occasional fungal spores and hyphae morphologically most      consistent with Candida species    NORBERTO/tkd       COMDX  11/11/2020 0958     The biopsy was stained via immunohistochemical technique utilizing appropriate controls for the presence of p63, p40, CK5/6, napsin and TTF-1. The tumor cells are positive for CK5/6, p63 and p40 while being negative for TTF-1 and napsin. This staining pattern in conjunction with the histologic features is entirely consistent with squamous cell carcinoma. The tumor is focally necrotic. There is no lymph-vascular space invasion identified.      NORBERTO/tkd       COMDX  11/11/2020 0747     Please  correlate with concurrent surgical pathology specimen ([id number]).    NORBERTO/tkd        Inflammatory Biomarkers        Invalid input(s): ESR, D-DIMER QUANTITATIVE,  PROCALCITONIN  COVID19   Date Value Ref Range Status   11/08/2020 Not Detected Not Detected - Ref. Range Final        Microbiology Results (last 10 days)     Procedure Component Value - Date/Time    AFB Culture - Wash, Lung, R [[id number]] Collected: 11/11/20 0747    Lab Status: Preliminary result Specimen: Wash from Lung, R Updated: 11/11/20 1025     AFB Stain No acid fast bacilli seen    Respiratory Culture - Wash, Lung, R [[id number]] Collected: 11/11/20 0747    Lab Status: Final result Specimen: Wash from Lung, R Updated: 11/13/20 0906     Respiratory Culture Light growth (2+) Normal Respiratory Katia: NO S.aureus/MRSA or Pseudomonas aeruginosa     Gram Stain Rare (1+) Epithelial cells per low power field      Moderate (3+) WBCs per low power field      Few (2+) Mixed bacterial morphotypes seen on Gram Stain      Rare (1+) Yeast    Respiratory Panel, PCR - Wash, Lung, R [[id number]]  (Normal) Collected: 11/11/20 0747    Lab Status: Final result Specimen: Wash from Lung, R Updated: 11/11/20 0941     ADENOVIRUS, PCR Not Detected     Coronavirus 229E Not Detected     Coronavirus HKU1 Not Detected     Coronavirus NL63 Not Detected     Coronavirus OC43 Not Detected     Human Metapneumovirus Not Detected     Human Rhinovirus/Enterovirus Not Detected     Influenza B PCR Not Detected     Parainfluenza Virus 1 Not Detected     Parainfluenza Virus 2 Not Detected     Parainfluenza Virus 3 Not Detected     Parainfluenza Virus 4 Not Detected     Bordetella pertussis pcr Not Detected     Influenza A H1 2009 PCR Not Detected     Chlamydophila pneumoniae PCR Not Detected     Mycoplasma pneumo by PCR Not Detected     Influenza A PCR Not Detected     Influenza A H3 Not Detected     Influenza A H1 Not Detected     RSV, PCR Not Detected     Bordetella parapertussis PCR Not  Detected    Narrative:      The coronavirus on the RVP is NOT COVID-19 and is NOT indicative of infection with COVID-19.     MRSA Screen, PCR (Inpatient) - Swab, Nares [[id number]]  (Abnormal) Collected: 11/09/20 1742    Lab Status: Final result Specimen: Swab from Nares Updated: 11/09/20 2006     MRSA PCR MRSA Detected    Legionella Antigen, Urine - Urine, Urine, Clean Catch [[id number]]  (Normal) Collected: 11/08/20 2339    Lab Status: Final result Specimen: Urine, Clean Catch Updated: 11/09/20 1306     LEGIONELLA ANTIGEN, URINE Negative    S. Pneumo Ag Urine or CSF - Urine, Urine, Clean Catch [[id number]]  (Normal) Collected: 11/08/20 2339    Lab Status: Final result Specimen: Urine, Clean Catch Updated: 11/09/20 1307     Strep Pneumo Ag Negative    Blood Culture - Blood, Arm, Right [[id number]] Collected: 11/08/20 2325    Lab Status: Preliminary result Specimen: Blood from Arm, Right Updated: 11/12/20 2330     Blood Culture No growth at 4 days    Blood Culture - Blood, Arm, Right [[id number]] Collected: 11/08/20 2324    Lab Status: Preliminary result Specimen: Blood from Arm, Right Updated: 11/12/20 2330     Blood Culture No growth at 4 days    Respiratory Panel PCR w/COVID-19(SARS-CoV-2) PARESH/GRACIELA/KATHY/PAD/COR/MAD/CHRIST In-House, NP Swab in UTM/VTM, 3-4 HR TAT - Swab, Nasopharynx [[id number]]  (Normal) Collected: 11/08/20 2323    Lab Status: Final result Specimen: Swab from Nasopharynx Updated: 11/09/20 0021     ADENOVIRUS, PCR Not Detected     Coronavirus 229E Not Detected     Coronavirus HKU1 Not Detected     Coronavirus NL63 Not Detected     Coronavirus OC43 Not Detected     COVID19 Not Detected     Human Metapneumovirus Not Detected     Human Rhinovirus/Enterovirus Not Detected     Influenza A PCR Not Detected     Influenza A H1 Not Detected     Influenza A H1 2009 PCR Not Detected     Influenza A H3 Not Detected     Influenza B PCR Not Detected     Parainfluenza Virus 1 Not Detected     Parainfluenza Virus 2 Not  Detected     Parainfluenza Virus 3 Not Detected     Parainfluenza Virus 4 Not Detected     RSV, PCR Not Detected     Bordetella pertussis pcr Not Detected     Bordetella parapertussis PCR Not Detected     Chlamydophila pneumoniae PCR Not Detected     Mycoplasma pneumo by PCR Not Detected    Narrative:      Fact sheet for providers: https://docs.[employer]/wp-content/uploads/XMC2335-8667-HV2.1-EUA-Provider-Fact-Sheet-3.pdf    Fact sheet for patients: https://docs.[employer]/wp-content/uploads/XCI3593-2594-QC9.1-EUA-Patient-Fact-Sheet-1.pdf          ECG/EMG Results (most recent)     Procedure Component Value Units Date/Time    ECG 12 Lead [[id number]] Collected: 11/08/20 2319     Updated: 11/10/20 1556     QT Interval 380 ms     Narrative:      HEART RATE= 78  bpm  RR Interval= 764  ms  MT Interval= 141  ms  P Horizontal Axis= 18  deg  P Front Axis= 69  deg  QRSD Interval= 97  ms  QT Interval= 380  ms  QRS Axis= 59  deg  T Wave Axis= 79  deg  - NORMAL ECG -  Sinus rhythm  When compared with ECG of 22-Oct-2020 21:08:21,  Significant axis, voltage or hypertrophy change  Electronically Signed By: Alber Cao (Anthony) 10-Nov-2020 15:55:06  Date and Time of Study: 2020-11-08 23:19:49    Adult Transthoracic Echo Complete W/ Cont if Necessary Per Protocol [[id number]] Collected: 11/09/20 1447     Updated: 11/10/20 1901     BSA 1.8 m^2      RVIDd 3.0 cm      IVSd 1.0 cm      IVSs 1.8 cm      LVIDd 4.7 cm      LVIDs 2.4 cm      LVPWd 1.1 cm      BH CV ECHO TIKA - LVPWS 1.5 cm      IVS/LVPW 0.9     FS 50.4 %      EDV(Teich) 104.4 ml      ESV(Teich) 19.2 ml      EF(Teich) 81.6 %      EDV(cubed) 106.4 ml      ESV(cubed) 13.0 ml      EF(cubed) 87.8 %      % IVS thick 71.8 %      % LVPW thick 31.9 %      LV mass(C)d 186.4 grams      LV mass(C)dI 100.9 grams/m^2      LV mass(C)s 137.7 grams      LV mass(C)sI 74.5 grams/m^2      SV(Teich) 85.2 ml      SI(Teich) 46.1 ml/m^2      SV(cubed) 93.4 ml      SI(cubed) 50.6 ml/m^2      Ao  root diam 3.4 cm      Ao root area 8.9 cm^2      ACS 1.2 cm      LVOT diam 2.0 cm      LVOT area 3.1 cm^2      EDV(MOD-sp4) 51.6 ml      ESV(MOD-sp4) 17.1 ml      EF(MOD-sp4) 66.8 %      SV(MOD-sp4) 34.5 ml      SI(MOD-sp4) 18.7 ml/m^2      Ao root area (BSA corrected) 1.8     LV Rich Vol (BSA corrected) 27.9 ml/m^2      LV Sys Vol (BSA corrected) 9.3 ml/m^2      MV E max cary 91.2 cm/sec      MV A max cary 130.6 cm/sec      MV E/A 0.7     MV V2 max 141.7 cm/sec      MV max PG 8.0 mmHg      MV V2 mean 92.4 cm/sec      MV mean PG 3.7 mmHg      MV V2 VTI 38.7 cm      MVA(VTI) 2.2 cm^2      MV dec slope 373.0 cm/sec^2      MV dec time 0.24 sec      Ao pk cary 296.2 cm/sec      Ao max PG 35.2 mmHg      Ao max PG (full) 26.6 mmHg      Ao V2 mean 201.7 cm/sec      Ao mean PG 18.8 mmHg      Ao mean PG (full) 14.2 mmHg      Ao V2 VTI 51.5 cm      WILBERT(I,A) 1.6 cm^2      WILBERT(I,D) 1.6 cm^2      WILBERT(V,A) 1.5 cm^2      WILBERT(V,D) 1.5 cm^2      LV V1 max PG 8.7 mmHg      LV V1 mean PG 4.6 mmHg      LV V1 max 147.3 cm/sec      LV V1 mean 98.6 cm/sec      LV V1 VTI 27.3 cm      SV(Ao) 456.2 ml      SI(Ao) 247.0 ml/m^2      SV(LVOT) 84.0 ml      SI(LVOT) 45.5 ml/m^2      PA V2 max 112.9 cm/sec      PA max PG 5.1 mmHg      PA max PG (full) 2.2 mmHg      PA V2 mean 78.3 cm/sec      PA mean PG 2.7 mmHg      PA mean PG (full) 1.4 mmHg      PA V2 VTI 16.3 cm      PA acc time 0.11 sec      RV V1 max PG 2.9 mmHg      RV V1 mean PG 1.3 mmHg      RV V1 max 85.6 cm/sec      RV V1 mean 51.3 cm/sec      RV V1 VTI 14.0 cm      PA pr(Accel) 30.7 mmHg       CV ECHO TIKA - BZI_BMI 21.5 kilograms/m^2       CV ECHO TIKA - BSA(HAYCOCK) 1.8 m^2       CV ECHO TIKA - BZI_METRIC_WEIGHT 68.0 kg       CV ECHO TIKA - BZI_METRIC_HEIGHT 177.8 cm      EF(MOD-bp) 67.0 %      LA dimension(2D) 3.6 cm      Echo EF Estimated 65 %     Narrative:      · Estimated left ventricular EF = 65% Left ventricular systolic function   is normal.  · Left ventricular diastolic  "function is consistent with (grade Ia w/high   LAP) impaired relaxation.  · The right atrial cavity is mildly dilated.  · Mild aortic valve stenosis is present.  · Mild to moderate LVOT gradient noted       ECG 12 Lead [[id number]] Collected: 11/09/20 1339     Updated: 11/11/20 1756     QT Interval 339 ms     Narrative:      HEART RATE= 98  bpm  RR Interval= 612  ms  CO Interval= 125  ms  P Horizontal Axis= -44  deg  P Front Axis= 63  deg  QRSD Interval= 88  ms  QT Interval= 339  ms  QRS Axis= 43  deg  T Wave Axis= 84  deg  - BORDERLINE ECG -  Sinus rhythm  Probable left atrial enlargement  Probable left ventricular hypertrophy  When compared with ECG of 08-Nov-2020 23:19:49,  No significant change  Electronically Signed By: Rebeca Jacobs ([facility]) 11-Nov-2020 17:47:15  Date and Time of Study: 2020-11-09 13:39:57               Results for orders placed during the hospital encounter of 11/08/20   Adult Transthoracic Echo Complete W/ Cont if Necessary Per Protocol    Narrative · Estimated left ventricular EF = 65% Left ventricular systolic function   is normal.  · Left ventricular diastolic function is consistent with (grade Ia w/high   LAP) impaired relaxation.  · The right atrial cavity is mildly dilated.  · Mild aortic valve stenosis is present.  · Mild to moderate LVOT gradient noted          Xr Chest 1 View    Result Date: 11/9/2020  Similar-appearing large right lung mass with right basilar opacities and interstitial thickening that could represent postobstructive or aspiration pneumonia with a small right pleural effusion.  Electronically Signed By-Malcolm Plaza On:11/9/2020 8:06 AM This report was finalized on [id number] by  Malcolm Plaza, .    Nm Pet Skull Base To Mid Thigh    Addendum Date: 11/5/2020    Addendum: Voice recognition transcription error in impression number three. It should read \"Pathologically enlarged and hypermetabolic right paratracheal and subcarinal lymph nodes are consistent with " "nader metastases.\"  Electronically Signed By-Dr. Ivana Mirza MD On:11/5/2020 1:36 PM This report was finalized on [id number] by Dr. Ivana Mirza MD.    Result Date: 11/5/2020   1. Right upper lobe/right hilar lung mass is hypermetabolic and consistent with malignancy. It invades the right upper lobe bronchus. 2. Patchy parenchymal opacities in the right lower lobe appears slightly improved compared to the CT chest from 10/23/2020. Low to intermediate level FDG accumulation is seen within the right lower lobe, favored to represent postobstructive pneumonia. 3. Pathologically enlarged and hypermetabolic right paratracheal and subcarinal lymph nodes consistent with the stomach no metastases. 4. No convincing evidence of metastatic disease within the abdomen, pelvis, neck, or skeleton. 5. Mucous or secretions are thought to obstruct the bronchus intermedius and right middle and lower lobe bronchi. Dense right middle lobe atelectasis. 6. Additional chronic findings as described above.  Electronically Signed By-Dr. Ivana Mirza MD On:11/5/2020 1:23 PM This report was finalized on [id number] by Dr. Ivana Mirza MD.      Xrays, labs reviewed personally by me.  11/13/20  10:12 AM EST      Condition on Discharge:    Stable    Discharge Diet:   Dietary Orders (From admission, onward)     Start     Ordered    11/11/20 1149  Diet Regular  Diet Effective Now     Question:  Diet / Texture / Consistency  Answer:  Regular    11/11/20 1148                Activity at Discharge:   Activity Instructions     Activity as Tolerated            Follow-up Appointments    Future Appointments   Date Time Provider Department Center   11/16/2020  9:00 AM [facility] None   11/17/2020  9:00 AM [facility] None   11/17/2020  9:15 AM Alber Tay MD [facility] None   11/18/2020  9:00 AM [facility] None   11/19/2020  9:00 AM [facility] " None   11/20/2020  9:00 AM  [facility] None   11/22/2020  9:00 AM  [facility] None   11/23/2020  9:00 AM  [facility] None   11/24/2020  9:00 AM  [facility] None   11/24/2020 10:00 AM Nomi Reyes MD [facility] None   11/25/2020  9:00 AM  [facility] None   11/30/2020  9:00 AM  [facility] RO None   12/1/2020  9:00 AM  [facility] RO None   12/2/2020  9:00 AM  [facility] RO None   12/3/2020  9:00 AM  [facility] RO None   12/4/2020  9:00 AM  [facility] RO None   12/7/2020  9:00 AM  [facility] RO None   12/8/2020  9:00 AM  [facility] RO None   12/9/2020  9:00 AM  [facility] RO None   12/10/2020  9:00 AM  [facility] RO None   12/11/2020  9:00 AM  [facility] RO None   12/14/2020  9:00 AM  [facility] RO None   12/15/2020  9:00 AM  [facility] RO None   12/16/2020  9:00 AM  [facility] RO None   12/17/2020  9:00 AM  [facility] RO None   12/18/2020  9:00 AM  [facility] RO None   12/20/2020  9:00 AM  [facility] RO None   12/21/2020  9:00 AM  [facility] RO None   12/22/2020  9:00 AM  [facility] RO None   12/23/2020  9:00 AM [facility] RO None   12/28/2020  9:00 AM [facility] RO None   12/29/2020  9:00 AM  [facility] RO None       Additional Instructions for the Follow-ups that You Need to Schedule     Discharge Follow-up with PCP   As directed       Currently Documented PCP:    Sandoval Stevenson FNP    PCP Phone Number:    [phone]     Follow Up Details: If no PCP,  call MD finder at [phone]         Discharge Follow-up with Specified Provider: Oncology; 1 Week   As directed      To: Oncology    Follow Up: 1 Week    Follow Up Details: Chemotherapy           Follow-up Information     Ángela Bean MD Follow up in 1 week(s).    Specialty: Pulmonary Disease  Contact information:  [address] [facility]  [address]  [place] [address]  [phone]             Sandoval Stevenson FNP .    Specialty: Family Medicine  Why: If no PCP, call MD finder at [phone]  Contact information:  [address]  [address]  [place] [address] [phone]             Sandoval Stevenson FNP .    Specialty: Family Medicine  Why: If no PCP, call MD finder at [phone]  Contact information:  [address]  [address]  [place] [address] [phone]                    Test Results Pending at Discharge  Pending Labs     Order Current Status    Cytomegalovirus (CMV) By PCR - Wash, Lung, R In process    Fungus Culture - Wash, Lung, R In process    Pneumocystis PCR - Wash, Lung In process    AFB Culture - Wash, Lung, R Preliminary result    Blood Culture - Blood, Arm, Right Preliminary result    Blood Culture - Blood, Arm, Right Preliminary result           Risk for Readmission (LACE) Score: 12 (11/13/2020  6:00 AM)        Time: I spent  more than 35 minutes on this discharge activity which included: face-to-face encounter with the patient, reviewing the data in the system, coordination of the care with the nursing staff as well as consultants, documentation, and entering orders.   Care coordination with nursing and case management for discharge planning        Ulices Cruz MD  11/13/20  10:12 EST

## 2020-11-13 NOTE — DISCHARGE PLACEMENT REQUEST
"Maxi Flores (70 y.o. Male)     Date of Birth Social Security Number Address Home Phone MRN    1950  [id number]  [place] [address] [phone] [id number]    Confucianist Marital Status          None        Admission Date Admission Type Admitting Provider Attending Provider Department, Room/Bed    11/8/20 Emergency Uliecs Cruz MD Gill, Ravi, MD [facility] 3C MEDICAL INPATIENT, 358/1    Discharge Date Discharge Disposition Discharge Destination         Home or Self Care              Attending Provider: Ulices Cruz MD    Allergies: No Known Allergies    Isolation: None   Infection: MRSA No Isolation this Admit (11/10/20)   Code Status: CPR    Ht: 177.8 cm (70\")   Wt: 68 kg (150 lb)    Admission Cmt: None   Principal Problem: Postobstructive pneumonia [J18.9] More...                 Active Insurance as of 11/8/2020     Primary Coverage     Payor Plan Insurance Group Employer/Plan Group    MEDICARE MEDICARE A & B      Payor Plan Address Payor Plan Phone Number Payor Plan Fax Number Effective Dates    [address] [phone]  9/1/2015 - None Entered    formerly [facility] [id number]       Subscriber Name Subscriber Birth Date Member ID       MAXI FLORES 1950 [id number]           Secondary Coverage     Payor Plan Insurance Group Employer/Plan Group    [place]      Payor Plan Address Payor Plan Phone Number Payor Plan Fax Number Effective Dates    [address]   1/1/2019 - None Entered    [place] [address]       Subscriber Name Subscriber Birth Date Member ID       MAXI FLORES 1950 [id number]                 Emergency Contacts      (Rel.) Home Phone Work Phone Mobile Phone    Rachana Flores (Spouse) [phone] -- [phone]              "

## 2020-11-13 NOTE — PLAN OF CARE
Problem: Adult Inpatient Plan of Care  Goal: Absence of Hospital-Acquired Illness or Injury  Intervention: Identify and Manage Fall Risk  Recent Flowsheet Documentation  Taken 11/12/2020 1955 by Basil Spivey LPN  Safety Promotion/Fall Prevention:   toileting scheduled   room organization consistent   safety round/check completed   nonskid shoes/slippers when out of bed   muscle strengthening facilitated   mobility aid in reach  Intervention: Prevent Skin Injury  Recent Flowsheet Documentation  Taken 11/12/2020 1955 by Basil Spivey LPN  Body Position: position changed independently  Intervention: Prevent Infection  Recent Flowsheet Documentation  Taken 11/12/2020 1955 by Basil Spivey LPN  Infection Prevention:   personal protective equipment utilized   rest/sleep promoted   single patient room provided   visitors restricted/screened  Goal: Optimal Comfort and Wellbeing  Intervention: Provide Person-Centered Care  Recent Flowsheet Documentation  Taken 11/12/2020 1955 by Basil Spivey LPN  Trust Relationship/Rapport:   care explained   questions encouraged   questions answered     Problem: Respiratory Compromise (Pneumonia)  Goal: Effective Oxygenation and Ventilation  Intervention: Promote Airway Secretion Clearance  Recent Flowsheet Documentation  Taken 11/12/2020 1955 by Basil Spivey LPN  Cough And Deep Breathing: done independently per patient  Intervention: Optimize Oxygenation and Ventilation  Recent Flowsheet Documentation  Taken 11/12/2020 1955 by Basil Spivey LPN  Head of Bed (HOB): HOB at 20-30 degrees   Goal Outcome Evaluation:  Plan of Care Reviewed With: patient  Progress: no change

## 2020-11-13 NOTE — PROGRESS NOTES
Daily Progress Note        Postobstructive pneumonia    Coronary artery disease involving native coronary artery of native heart without angina pectoris    Mixed hyperlipidemia    Essential hypertension    Hx of aortic valve replacement    Tobacco abuse    Squamous cell carcinoma of lung, right (CMS/HCC)    Diastolic CHF, acute (CMS/HCC)    Pneumonia of right lung due to infectious organism      Assessment:    Invasive moderately differentiated squamous cell carcinoma   Large right lung mass and mediastinal lymphadenopathy  Lung mass extends to obstruct the right upper lobe bronchus  compression of SVC  Evidence of postobstructive pneumonia with green pus noted during bronchoscopy from right upper lobe    Bronchoscopy completed 11/11/2020  Known squamous cell carcinoma mass extending from the right upper lobe blocking 98% of the left main bronchus  I was able to pass the scope around it and view the right middle lobe and the right lower lobe which was filled with purulent secretions compatible with severe pneumonia postobstructive, Therapeutic cleaning of secretions    Bronchoscopy rigid with debulking of right main endobronchial tumor 11/11/20  Tumor debulking right mainstem bronchus and bronchus intermedius with establishing patency to the lower and middle lobe.    bronchoscopy 10/24/2020   Tree-in-bud nodular opacities and patchy confluent opacities in the right lower lobe with areas of mucous plugging. Differential considerations include pneumonia, aspiration, endobronchial spread of malignancy        PFTs 2/12/2018  FEV1 1.9 L which is 52% predicted, FEV1/FVC is 54, %, %, DLCO 49%      Recommendations:    Steroids  Wean prednisone 20 mg b.i.d.    Antibiotics DC IV cefepime and vancomycin   start p.o. Augmentin 500 mg t.i.d. for 10 days     continue to monitor BAL culture  Bronchodilators  Oxygen titration as needed     LOS: 4 days     Subjective         Objective     Vital signs for last 24  hours:  Vitals:    11/12/20 1138 11/12/20 1904 11/13/20 0347 11/13/20 0804   BP: 93/49 125/67 158/70 129/69   BP Location: Left arm Left arm Left arm Left arm   Patient Position: Sitting Lying Lying Sitting   Pulse: 68 76 73 78   Resp: 16 16 16 20   Temp: 97.9 °F (36.6 °C) 97.6 °F (36.4 °C) 97.5 °F (36.4 °C)    TempSrc: Oral Oral Oral    SpO2: 96% 98% 98% 96%   Weight:       Height:           Intake/Output last 3 shifts:  I/O last 3 completed shifts:  In: 480 [P.O.:480]  Out: 2700 [Urine:2700]  Intake/Output this shift:  I/O this shift:  In: 350 [P.O.:350]  Out: 150 [Urine:150]      Radiology  Imaging Results (Last 24 Hours)     ** No results found for the last 24 hours. **          Labs:  Results from last 7 days   Lab Units 11/11/20 0351   WBC 10*3/mm3 11.20*   HEMOGLOBIN g/dL 9.4*   HEMATOCRIT % 29.5*   PLATELETS 10*3/mm3 351     Results from last 7 days   Lab Units 11/13/20  0359  11/11/20 0351 11/08/20  2324   SODIUM mmol/L  --   --  132*   < > 131*   POTASSIUM mmol/L  --   --  3.8   < > 3.5   CHLORIDE mmol/L  --   --  95*   < > 96*   CO2 mmol/L  --   --  28.0   < > 24.0   BUN mg/dL  --   --  19   < > 12   CREATININE mg/dL 0.72*   < > 0.88   < > 0.69*   CALCIUM mg/dL  --   --  8.8   < > 8.4*   BILIRUBIN mg/dL  --   --   --   --  0.3   ALK PHOS U/L  --   --   --   --  126*   ALT (SGPT) U/L  --   --   --   --  18   AST (SGOT) U/L  --   --   --   --  16   GLUCOSE mg/dL  --   --  143*   < > 126*    < > = values in this interval not displayed.         Results from last 7 days   Lab Units 11/08/20  2324   ALBUMIN g/dL 2.80*     Results from last 7 days   Lab Units 11/08/20  2324   TROPONIN T ng/mL <0.010                           Meds:   SCHEDULE    Infusions  No current facility-administered medications for this encounter.     PRNs      Physical Exam:  Physical Exam  Vitals signs reviewed.   Cardiovascular:      Heart sounds: Murmur present.   Pulmonary:      Breath sounds: Wheezing present.   Skin:     General:  Skin is warm and dry.   Neurological:      Mental Status: He is alert and oriented to person, place, and time.         ROS  Review of Systems   Constitutional: Positive for activity change and fatigue.             s

## 2020-11-13 NOTE — SIGNIFICANT NOTE
11/13/20 1330   Discharge of Care   Discharge Mode wheel chair   Discharged Accompanied by spouse   Discharge Contact Information if Applicable  [phone]   Discharge Teaching Done  Yes   Learning Method Explanation;Teach Back     Patient verbalizes understand of D/C paperwork. Highlighted all follow up appts and radiation treatments. Family to provide rides for radiation/appts. Home oxygen delivered to pt. Education provided. Port de-accessed.

## 2020-11-14 NOTE — OUTREACH NOTE
Prep Survey      Responses   [facility]?  [facility]   Is LACE score < 7 ?  No   Eligibility  Readm Mgmt   Discharge diagnosis  Postobstructive PNA, lung mass [He had bronchoscopy and debulking of the tumor resulting in recanalization of the obstructing bronchus]   Does the patient have one of the following disease processes/diagnoses(primary or secondary)?  COPD/Pneumonia   Does the patient have Home health ordered?  No   Is there a DME ordered?  Yes   What DME was ordered?  [facility] for home oxygen   Comments regarding appointments  [facility] f/u scheduled   Prep survey completed?  Yes          Lorri Wilson RN

## 2020-11-16 NOTE — PROGRESS NOTES
Discharge Planning Assessment   [facility]     Patient Name: Axel Ramirez  MRN: [id number]  Today's Date: 11/16/2020    Admit Date: 11/8/2020          Plan    Final Discharge Disposition Code  01 - home or self-care    Final Note  return home       Carol naegele rn  Case management  Office number [phone]  Cell phone [phone]

## 2020-11-18 NOTE — OUTREACH NOTE
COPD/PN Week 1 Survey      Responses   [facility] patient discharged from?  [facility]   Does the patient have one of the following disease processes/diagnoses(primary or secondary)?  COPD/Pneumonia   Was the primary reason for admission:  Pneumonia   Week 1 attempt successful?  Yes   Call start time  1710   Rescheduled  Rescheduled-pt requested [Patient at doctor's appointment at time of call]   Call end time  1714   Discharge diagnosis  Postobstructive PNA, lung mass   Is patient permission given to speak with other caregiver?  Yes   Person spoke with today (if not patient) and relationship  Patient and spouse          Becka Narvaez RN

## 2020-12-01 NOTE — OUTREACH NOTE
COPD/PN Week 3 Survey      Responses   [facility] patient discharged from?  [facility]   Does the patient have one of the following disease processes/diagnoses(primary or secondary)?  COPD/Pneumonia   Was the primary reason for admission:  Pneumonia   Week 3 attempt successful?  Yes   Call start time  1649   Call end time  1656   Discharge diagnosis  Postobstructive PNA, lung cancer   Is patient permission given to speak with other caregiver?  Yes   List who call center can speak with  Rachana, spouse   Person spoke with today (if not patient) and relationship  patient   Meds reviewed with patient/caregiver?  Yes   Is the patient taking all medications as directed (includes completed medication regime)?  Yes   Medication comments  Wife reports that patient had adverse reaction to chemotherapy today, but got to come home.    Does the patient have a primary care provider?   Yes   Comments regarding PCP  PCP Sandoval RAMOS.    Has the patient kept scheduled appointments due by today?  Yes   Has home health visited the patient within 72 hours of discharge?  N/A   What DME was ordered?  [facility] for home oxygen   Has all DME been delivered?  Yes   DME comments  Wife reports that she will contact PCP office for order for W/C.    Pulse Ox monitoring  None   Psychosocial issues?  No   Did the patient receive a copy of their discharge instructions?  Yes   Nursing interventions  Reviewed instructions with patient   What is the patient's perception of their health status since discharge?  Same   Is the patient/caregiver able to teach back the hierarchy of who to call/visit for symptoms/problems? PCP, Specialist, Home health nurse, Urgent Care, ED, 911  Yes   Is the patient/caregiver able to teach back signs and symptoms of worsening condition:  Fever/chills, Shortness of breath, Chest pain   Is the patient/caregiver able to teach back importance of completing antibiotic course of treatment?  Yes   Week 3 call completed?  Yes           Latrice Tabares, RN

## 2020-12-02 NOTE — PROGRESS NOTES
Subjective:     Encounter Date:12/02/2020      Patient ID: Axel Ramirez is a 70 y.o. male.    Chief Complaint: Hosp f/u, CAD, and med refills    History of Present Illness        70-year-old white male patient recently admitted to the hospital with an episode of acute influenza flu   patient had an echocardiogram which showed severe aortic stenosis transesophageal echocardiogram showed severe aortic valve calcification with stenosis and LV function was normal December 2017      cardiac catheterization December 2017 showed left main has no disease diagonal artery ostium 70% disease proximal LAD 20% circumflex artery has a focal lesion causing anywhere from 50-70% disease proximal RCA has somewhat diffuse disease causing 50-60%   stenosis   the right common femoral artery has 60-70% calcified lesion     February 2018 patient underwent I aortic valve replacement with a bioprosthetic valve   vein graft to the marginal branch and diagonal artery     Patient was advised to stop smoking completely     Had recurrent problems with pneumonia last month  Questionable lung CA  Cardiac wise doing well  #2020 echocardiogram EF 65% diastolic LV dysfunction right atrium is mildly dilated mild aortic valve stenosis mild to moderate LVOT gradient  Follow-up with pulmonary  Follow-up in 6 months       The following portions of the patient's history were reviewed and updated as appropriate: Allergies current medications past family history past medical history past social history past surgical history problem list and review of systems  Past Medical History:   Diagnosis Date   • Aortic stenosis    • Cancer (CMS/HCC)     Sickle Cell Carcinoma   • Congestive heart failure (CHF) (CMS/HCC)     DIASTOLIC   • COPD (chronic obstructive pulmonary disease) (CMS/HCC)    • Coronary artery disease    • Hypertension    • Myocardial infarction (CMS/HCC)    • Peripheral vascular disease (CMS/HCC)    • Valvular disease      Past Surgical  "History:   Procedure Laterality Date   • AORTIC VALVE REPAIR/REPLACEMENT  02/26/2018    Dr Maza   • BRONCHOSCOPY N/A 10/24/2020    Procedure: BRONCHOSCOPY with biopsy right upper lobe mass, and bronchoalveolar lavage right upper lobe;  Surgeon: Ángela Bean MD;  Location: [facility] ENDOSCOPY;  Service: Pulmonary;  Laterality: N/A;  post: right upper lobe mass, pneumonia   • BRONCHOSCOPY N/A 11/11/2020    Procedure: BRONCHOSCOPY with bronchial washing;  Surgeon: Ángela Bean MD;  Location: [facility] ENDOSCOPY;  Service: Pulmonary;  Laterality: N/A;  Post: lung mass, pneumonia   • BRONCHOSCOPY N/A 11/11/2020    Procedure: BRONCHOSCOPY RIGID with debulking of right main endobronchial tumor;  Surgeon: Nomi Reyes MD;  Location: [facility] MAIN OR;  Service: Cardiothoracic;  Laterality: N/A;   • BRONCHOSCOPY N/A 11/11/2020    Procedure: BRONCHOSCOPY;  Surgeon: Nomi Reyes MD;  Location: [facility] MAIN OR;  Service: Cardiothoracic;  Laterality: N/A;   • CARDIAC CATHETERIZATION  12/29/2017   • CORONARY ARTERY BYPASS GRAFT  02/26/2018    Dr Maza   • TIBIA FRACTURE SURGERY     • VENOUS ACCESS DEVICE (PORT) INSERTION Left 10/30/2020    Procedure: MEDIPORT INSERTION UNDER FLUOROSCOPIC GUIDENCE;  Surgeon: Nomi Reyes MD;  Location: [facility] MAIN OR;  Service: Cardiothoracic;  Laterality: Left;     /74 (BP Location: Left arm, Patient Position: Sitting, Cuff Size: Adult)   Pulse 89   Temp 98.2 °F (36.8 °C) (Infrared)   Ht 177.8 cm (70\")   Wt 68.5 kg (151 lb)   SpO2 95%   BMI 21.67 kg/m²   Family History   Problem Relation Age of Onset   • Coronary artery disease Mother    • Cancer Father    • Stroke Daughter 35   • Seizures Daughter 35       Current Outpatient Medications:   •  aspirin (aspirin) 81 MG EC tablet, Take 1 tablet by mouth Daily., Disp:  , Rfl:   •  atorvastatin (LIPITOR) 20 MG tablet, Take 1 tablet by mouth every night at bedtime., Disp: 90 tablet, Rfl: 1  •  ferrous sulfate 324 (65 Fe) MG tablet " delayed-release EC tablet, Take 1 tablet by mouth Daily With Breakfast., Disp: 60 tablet, Rfl: 2  •  hydrALAZINE (APRESOLINE) 50 MG tablet, Take 1 tablet by mouth Every 12 (Twelve) Hours., Disp: 180 tablet, Rfl: 3  •  lidocaine-prilocaine (EMLA) 2.5-2.5 % cream, , Disp: , Rfl:   •  metoprolol tartrate (LOPRESSOR) 25 MG tablet, Take 2 tablets by mouth 2 (Two) Times a Day for 360 days., Disp: 120 tablet, Rfl: 11  •  ondansetron (ZOFRAN) 8 MG tablet, Take 1 tablet by mouth Every 8 (Eight) Hours As Needed for Nausea or Vomiting., Disp: 20 tablet, Rfl: 1  •  pantoprazole (Protonix) 40 MG EC tablet, Take 1 tablet by mouth Daily., Disp: 30 tablet, Rfl: 1  •  spironolactone (Aldactone) 25 MG tablet, Take 1 tablet by mouth Daily for 360 days., Disp: 90 tablet, Rfl: 3  •  torsemide (Demadex) 20 MG tablet, Take 0.5 tablets by mouth Daily for 30 days., Disp: 45 tablet, Rfl: 3  •  HYDROcodone-acetaminophen (NORCO) 5-325 MG per tablet, Take 1 tablet by mouth Every 6 (Six) Hours As Needed for Moderate Pain ., Disp: 28 tablet, Rfl: 0  Social History     Socioeconomic History   • Marital status:      Spouse name: Not on file   • Number of children: Not on file   • Years of education: Not on file   • Highest education level: Not on file   Tobacco Use   • Smoking status: Former Smoker     Packs/day: 1.00     Types: Cigarettes     Quit date: 10/2020     Years since quittin.1   • Smokeless tobacco: Never Used   Substance and Sexual Activity   • Alcohol use: Yes   • Drug use: No   • Sexual activity: Defer     No Known Allergies  Review of Systems   Constitution: Negative for chills, fever and malaise/fatigue.   Cardiovascular: Positive for dyspnea on exertion. Negative for chest pain, leg swelling, palpitations and syncope.   Respiratory: Positive for shortness of breath.    Skin: Negative for rash.   Neurological: Positive for dizziness and light-headedness. Negative for numbness.              Objective:     Constitutional:        Appearance: Well-developed.   Eyes:      General: No scleral icterus.     Conjunctiva/sclera: Conjunctivae normal.   HENT:      Head: Normocephalic and atraumatic.    Mouth/Throat:      Mouth: No oral lesions.      Pharynx: Uvula midline.   Neck:      Musculoskeletal: Neck supple.      Thyroid: No thyromegaly.      Vascular: No carotid bruit or JVD.      Trachea: Trachea normal.   Pulmonary:      Effort: Pulmonary effort is normal.      Breath sounds: Rhonchi present.   Cardiovascular:      Normal rate. Regular rhythm.      Murmurs: There is a systolic murmur.      No gallop.   Pulses:     Intact distal pulses.   Abdominal:      General: Bowel sounds are normal.      Palpations: Abdomen is soft.   Musculoskeletal: Normal range of motion.   Skin:     General: Skin is warm. There is no cyanosis.   Neurological:      Mental Status: Alert and oriented to person, place, and time.      Comments: No focal deficits   Psychiatric:         Behavior: Behavior is cooperative.         Procedures    Lab Review:       Assessment:          Diagnosis Plan   1. Essential hypertension  Comprehensive Metabolic Panel    CK    Lipid Panel   2. Coronary artery disease involving native coronary artery of native heart without angina pectoris  Comprehensive Metabolic Panel    CK    Lipid Panel   3. Nonrheumatic aortic valve stenosis  Comprehensive Metabolic Panel    CK    Lipid Panel   4. Mixed hyperlipidemia  Comprehensive Metabolic Panel    CK    Lipid Panel   5. Presence of aortocoronary bypass graft  Comprehensive Metabolic Panel    CK    Lipid Panel   6. Chronic diastolic congestive heart failure (CMS/HCC)  Comprehensive Metabolic Panel    CK    Lipid Panel          Plan:       CAD currently stable needs to modify cardiac risk factors aggressively  Diastolic congestive heart failure currently euvolemic  Needs aggressive control of hypertension dyslipidemia  Pulmonary problems with recurrent pneumonia possible CHF followed by  pulmonary and primary care

## 2020-12-09 NOTE — OUTREACH NOTE
COPD/PN Week 4 Survey      Responses   [facility] patient discharged from?  [facility]   Does the patient have one of the following disease processes/diagnoses(primary or secondary)?  COPD/Pneumonia   Was the primary reason for admission:  Pneumonia   Week 4 attempt successful?  No          Mendoza Montejo RN

## 2021-01-01 ENCOUNTER — APPOINTMENT (OUTPATIENT)
Dept: GENERAL RADIOLOGY | Facility: HOSPITAL | Age: 71
End: 2021-01-01

## 2021-01-01 ENCOUNTER — HOSPITAL ENCOUNTER (OUTPATIENT)
Dept: RADIATION ONCOLOGY | Facility: HOSPITAL | Age: 71
Discharge: HOME | End: 2021-01-04

## 2021-01-01 ENCOUNTER — HOSPITAL ENCOUNTER (EMERGENCY)
Facility: HOSPITAL | Age: 71
Discharge: NURSING, REHAB OR LONG-TERM CARE | End: 2021-03-09
Attending: EMERGENCY MEDICINE | Admitting: EMERGENCY MEDICINE

## 2021-01-01 ENCOUNTER — OFFICE VISIT (OUTPATIENT)
Dept: RADIATION ONCOLOGY | Facility: HOSPITAL | Age: 71
End: 2021-01-01

## 2021-01-01 ENCOUNTER — HOSPITAL ENCOUNTER (OUTPATIENT)
Dept: RADIATION ONCOLOGY | Facility: HOSPITAL | Age: 71
Discharge: HOME | End: 2021-01-05

## 2021-01-01 ENCOUNTER — APPOINTMENT (OUTPATIENT)
Dept: CARDIOLOGY | Facility: HOSPITAL | Age: 71
End: 2021-01-01

## 2021-01-01 ENCOUNTER — PATIENT OUTREACH (OUTPATIENT)
Dept: CASE MANAGEMENT | Facility: OTHER | Age: 71
End: 2021-01-01

## 2021-01-01 ENCOUNTER — RADIATION ONCOLOGY WEEKLY ASSESSMENT (OUTPATIENT)
Dept: RADIATION ONCOLOGY | Facility: HOSPITAL | Age: 71
End: 2021-01-01

## 2021-01-01 ENCOUNTER — APPOINTMENT (OUTPATIENT)
Dept: MRI IMAGING | Facility: HOSPITAL | Age: 71
End: 2021-01-01

## 2021-01-01 ENCOUNTER — APPOINTMENT (OUTPATIENT)
Dept: CT IMAGING | Facility: HOSPITAL | Age: 71
End: 2021-01-01

## 2021-01-01 ENCOUNTER — TELEPHONE (OUTPATIENT)
Dept: RADIATION ONCOLOGY | Facility: HOSPITAL | Age: 71
End: 2021-01-01

## 2021-01-01 ENCOUNTER — READMISSION MANAGEMENT (OUTPATIENT)
Dept: CALL CENTER | Facility: HOSPITAL | Age: 71
End: 2021-01-01

## 2021-01-01 ENCOUNTER — HOSPITAL ENCOUNTER (OUTPATIENT)
Dept: MRI IMAGING | Facility: HOSPITAL | Age: 71
Discharge: HOME | End: 2021-05-13
Admitting: RADIOLOGY

## 2021-01-01 ENCOUNTER — EPISODE CHANGES (OUTPATIENT)
Dept: CASE MANAGEMENT | Facility: OTHER | Age: 71
End: 2021-01-01

## 2021-01-01 ENCOUNTER — OFFICE VISIT (OUTPATIENT)
Dept: NEUROSURGERY | Facility: CLINIC | Age: 71
End: 2021-01-01

## 2021-01-01 ENCOUNTER — HOSPITAL ENCOUNTER (OUTPATIENT)
Dept: RADIATION ONCOLOGY | Facility: HOSPITAL | Age: 71
Discharge: HOME | End: 2021-01-06

## 2021-01-01 ENCOUNTER — HOSPITAL ENCOUNTER (INPATIENT)
Facility: HOSPITAL | Age: 71
LOS: 18 days | Discharge: NURSING, REHAB OR LONG-TERM CARE | End: 2021-03-08
Attending: INTERNAL MEDICINE | Admitting: INTERNAL MEDICINE

## 2021-01-01 ENCOUNTER — HOSPITAL ENCOUNTER (INPATIENT)
Facility: HOSPITAL | Age: 71
LOS: 15 days | Discharge: HOSPICE | End: 2021-06-22
Attending: EMERGENCY MEDICINE | Admitting: FAMILY MEDICINE

## 2021-01-01 ENCOUNTER — DOCUMENTATION (OUTPATIENT)
Dept: RADIATION ONCOLOGY | Facility: HOSPITAL | Age: 71
End: 2021-01-01

## 2021-01-01 ENCOUNTER — OFFICE VISIT (OUTPATIENT)
Dept: CARDIOLOGY | Facility: CLINIC | Age: 71
End: 2021-01-01

## 2021-01-01 ENCOUNTER — HOSPITAL ENCOUNTER (OUTPATIENT)
Dept: RADIATION ONCOLOGY | Facility: HOSPITAL | Age: 71
Setting detail: RADIATION/ONCOLOGY SERIES
End: 2021-01-01

## 2021-01-01 VITALS
SYSTOLIC BLOOD PRESSURE: 123 MMHG | HEART RATE: 75 BPM | RESPIRATION RATE: 18 BRPM | DIASTOLIC BLOOD PRESSURE: 67 MMHG | OXYGEN SATURATION: 98 % | TEMPERATURE: 97.5 F

## 2021-01-01 VITALS
DIASTOLIC BLOOD PRESSURE: 63 MMHG | WEIGHT: 160 LBS | HEIGHT: 70 IN | HEART RATE: 110 BPM | SYSTOLIC BLOOD PRESSURE: 98 MMHG | OXYGEN SATURATION: 94 % | BODY MASS INDEX: 22.9 KG/M2

## 2021-01-01 VITALS
TEMPERATURE: 97.8 F | RESPIRATION RATE: 20 BRPM | WEIGHT: 126 LBS | HEART RATE: 97 BPM | OXYGEN SATURATION: 96 % | DIASTOLIC BLOOD PRESSURE: 85 MMHG | HEIGHT: 70 IN | SYSTOLIC BLOOD PRESSURE: 136 MMHG | BODY MASS INDEX: 18.04 KG/M2

## 2021-01-01 VITALS
HEART RATE: 91 BPM | BODY MASS INDEX: 21.02 KG/M2 | WEIGHT: 146.8 LBS | SYSTOLIC BLOOD PRESSURE: 139 MMHG | OXYGEN SATURATION: 98 % | DIASTOLIC BLOOD PRESSURE: 70 MMHG | TEMPERATURE: 97.1 F | HEIGHT: 70 IN

## 2021-01-01 VITALS
HEIGHT: 70 IN | OXYGEN SATURATION: 100 % | TEMPERATURE: 97.8 F | DIASTOLIC BLOOD PRESSURE: 72 MMHG | WEIGHT: 130 LBS | BODY MASS INDEX: 18.61 KG/M2 | HEART RATE: 80 BPM | RESPIRATION RATE: 18 BRPM | SYSTOLIC BLOOD PRESSURE: 126 MMHG

## 2021-01-01 VITALS
WEIGHT: 125 LBS | TEMPERATURE: 97.6 F | SYSTOLIC BLOOD PRESSURE: 102 MMHG | RESPIRATION RATE: 17 BRPM | HEIGHT: 70 IN | OXYGEN SATURATION: 100 % | DIASTOLIC BLOOD PRESSURE: 66 MMHG | HEART RATE: 71 BPM | BODY MASS INDEX: 17.9 KG/M2

## 2021-01-01 VITALS
BODY MASS INDEX: 22.9 KG/M2 | WEIGHT: 160 LBS | HEART RATE: 76 BPM | HEIGHT: 70 IN | DIASTOLIC BLOOD PRESSURE: 70 MMHG | SYSTOLIC BLOOD PRESSURE: 104 MMHG

## 2021-01-01 VITALS
RESPIRATION RATE: 18 BRPM | TEMPERATURE: 98.5 F | BODY MASS INDEX: 24.06 KG/M2 | SYSTOLIC BLOOD PRESSURE: 173 MMHG | HEART RATE: 84 BPM | HEIGHT: 68 IN | WEIGHT: 158.73 LBS | OXYGEN SATURATION: 100 % | DIASTOLIC BLOOD PRESSURE: 71 MMHG

## 2021-01-01 VITALS
BODY MASS INDEX: 17.9 KG/M2 | HEART RATE: 69 BPM | OXYGEN SATURATION: 98 % | DIASTOLIC BLOOD PRESSURE: 66 MMHG | HEIGHT: 70 IN | TEMPERATURE: 98.3 F | RESPIRATION RATE: 16 BRPM | WEIGHT: 125 LBS | SYSTOLIC BLOOD PRESSURE: 99 MMHG

## 2021-01-01 DIAGNOSIS — E78.2 MIXED HYPERLIPIDEMIA: ICD-10-CM

## 2021-01-01 DIAGNOSIS — C79.31 BRAIN METASTASIS: Primary | ICD-10-CM

## 2021-01-01 DIAGNOSIS — J96.21 ACUTE ON CHRONIC RESPIRATORY FAILURE WITH HYPOXIA (HCC): ICD-10-CM

## 2021-01-01 DIAGNOSIS — C34.91 SQUAMOUS CELL CARCINOMA OF LUNG, RIGHT (HCC): Chronic | ICD-10-CM

## 2021-01-01 DIAGNOSIS — E44.0 MODERATE MALNUTRITION (HCC): ICD-10-CM

## 2021-01-01 DIAGNOSIS — I10 ESSENTIAL HYPERTENSION: Primary | Chronic | ICD-10-CM

## 2021-01-01 DIAGNOSIS — Z72.0 TOBACCO ABUSE: Chronic | ICD-10-CM

## 2021-01-01 DIAGNOSIS — C34.91 SQUAMOUS CELL CARCINOMA OF LUNG, RIGHT (HCC): ICD-10-CM

## 2021-01-01 DIAGNOSIS — R06.03 RESPIRATORY DISTRESS: ICD-10-CM

## 2021-01-01 DIAGNOSIS — I25.10 CORONARY ARTERY DISEASE INVOLVING NATIVE CORONARY ARTERY OF NATIVE HEART WITHOUT ANGINA PECTORIS: ICD-10-CM

## 2021-01-01 DIAGNOSIS — J18.9 PNEUMONIA DUE TO INFECTIOUS ORGANISM, UNSPECIFIED LATERALITY, UNSPECIFIED PART OF LUNG: Primary | ICD-10-CM

## 2021-01-01 DIAGNOSIS — I50.9 ACUTE CONGESTIVE HEART FAILURE, UNSPECIFIED HEART FAILURE TYPE (HCC): ICD-10-CM

## 2021-01-01 DIAGNOSIS — C71.4 MALIGNANT NEOPLASM OF OCCIPITAL LOBE (HCC): Primary | ICD-10-CM

## 2021-01-01 DIAGNOSIS — J44.1 COPD WITH ACUTE EXACERBATION (HCC): ICD-10-CM

## 2021-01-01 DIAGNOSIS — I35.0 NONRHEUMATIC AORTIC VALVE STENOSIS: ICD-10-CM

## 2021-01-01 DIAGNOSIS — R06.00 DYSPNEA, UNSPECIFIED TYPE: Primary | ICD-10-CM

## 2021-01-01 DIAGNOSIS — C34.91 MALIGNANT NEOPLASM OF RIGHT LUNG, UNSPECIFIED PART OF LUNG (HCC): ICD-10-CM

## 2021-01-01 DIAGNOSIS — J98.01 BRONCHOSPASM: ICD-10-CM

## 2021-01-01 DIAGNOSIS — C34.91 SQUAMOUS CELL CARCINOMA OF LUNG, RIGHT (HCC): Primary | ICD-10-CM

## 2021-01-01 LAB
ABO GROUP BLD: NORMAL
ABO GROUP BLD: NORMAL
ALBUMIN SERPL-MCNC: 2.3 G/DL (ref 3.5–5.2)
ALBUMIN SERPL-MCNC: 2.4 G/DL (ref 3.5–5.2)
ALBUMIN SERPL-MCNC: 2.4 G/DL (ref 3.5–5.2)
ALBUMIN SERPL-MCNC: 2.5 G/DL (ref 3.5–5.2)
ALBUMIN SERPL-MCNC: 2.7 G/DL (ref 3.5–5.2)
ALBUMIN SERPL-MCNC: 2.7 G/DL (ref 3.5–5.2)
ALBUMIN SERPL-MCNC: 2.8 G/DL (ref 3.5–5.2)
ALBUMIN SERPL-MCNC: 2.9 G/DL (ref 3.5–5.2)
ALBUMIN SERPL-MCNC: 3 G/DL (ref 3.5–5.2)
ALBUMIN SERPL-MCNC: 3.1 G/DL (ref 3.5–5.2)
ALBUMIN SERPL-MCNC: 3.2 G/DL (ref 3.5–5.2)
ALBUMIN SERPL-MCNC: 3.3 G/DL (ref 3.5–5.2)
ALBUMIN/GLOB SERPL: 0.6 G/DL
ALBUMIN/GLOB SERPL: 0.7 G/DL
ALBUMIN/GLOB SERPL: 0.8 G/DL
ALP SERPL-CCNC: 102 U/L (ref 39–117)
ALP SERPL-CCNC: 104 U/L (ref 39–117)
ALP SERPL-CCNC: 104 U/L (ref 39–117)
ALP SERPL-CCNC: 106 U/L (ref 39–117)
ALP SERPL-CCNC: 109 U/L (ref 39–117)
ALP SERPL-CCNC: 113 U/L (ref 39–117)
ALP SERPL-CCNC: 117 U/L (ref 39–117)
ALP SERPL-CCNC: 118 U/L (ref 39–117)
ALP SERPL-CCNC: 119 U/L (ref 39–117)
ALP SERPL-CCNC: 122 U/L (ref 39–117)
ALP SERPL-CCNC: 127 U/L (ref 39–117)
ALP SERPL-CCNC: 128 U/L (ref 39–117)
ALP SERPL-CCNC: 136 U/L (ref 39–117)
ALP SERPL-CCNC: 146 U/L (ref 39–117)
ALP SERPL-CCNC: 151 U/L (ref 39–117)
ALP SERPL-CCNC: 160 U/L (ref 39–117)
ALP SERPL-CCNC: 162 U/L (ref 39–117)
ALP SERPL-CCNC: 171 U/L (ref 39–117)
ALP SERPL-CCNC: 292 U/L (ref 39–117)
ALP SERPL-CCNC: 89 U/L (ref 39–117)
ALP SERPL-CCNC: 93 U/L (ref 39–117)
ALP SERPL-CCNC: 94 U/L (ref 39–117)
ALT SERPL W P-5'-P-CCNC: 19 U/L (ref 1–41)
ALT SERPL W P-5'-P-CCNC: 20 U/L (ref 1–41)
ALT SERPL W P-5'-P-CCNC: 22 U/L (ref 1–41)
ALT SERPL W P-5'-P-CCNC: 23 U/L (ref 1–41)
ALT SERPL W P-5'-P-CCNC: 24 U/L (ref 1–41)
ALT SERPL W P-5'-P-CCNC: 25 U/L (ref 1–41)
ALT SERPL W P-5'-P-CCNC: 26 U/L (ref 1–41)
ALT SERPL W P-5'-P-CCNC: 27 U/L (ref 1–41)
ALT SERPL W P-5'-P-CCNC: 29 U/L (ref 1–41)
ALT SERPL W P-5'-P-CCNC: 31 U/L (ref 1–41)
ALT SERPL W P-5'-P-CCNC: 33 U/L (ref 1–41)
ALT SERPL W P-5'-P-CCNC: 36 U/L (ref 1–41)
ALT SERPL W P-5'-P-CCNC: 37 U/L (ref 1–41)
ALT SERPL W P-5'-P-CCNC: 38 U/L (ref 1–41)
ALT SERPL W P-5'-P-CCNC: 45 U/L (ref 1–41)
ALT SERPL W P-5'-P-CCNC: 64 U/L (ref 1–41)
ALT SERPL W P-5'-P-CCNC: 87 U/L (ref 1–41)
ANION GAP SERPL CALCULATED.3IONS-SCNC: 10 MMOL/L (ref 5–15)
ANION GAP SERPL CALCULATED.3IONS-SCNC: 11 MMOL/L (ref 5–15)
ANION GAP SERPL CALCULATED.3IONS-SCNC: 12 MMOL/L (ref 5–15)
ANION GAP SERPL CALCULATED.3IONS-SCNC: 14 MMOL/L (ref 5–15)
ANION GAP SERPL CALCULATED.3IONS-SCNC: 15 MMOL/L (ref 5–15)
ANION GAP SERPL CALCULATED.3IONS-SCNC: 17 MMOL/L (ref 5–15)
ANION GAP SERPL CALCULATED.3IONS-SCNC: 6 MMOL/L (ref 5–15)
ANION GAP SERPL CALCULATED.3IONS-SCNC: 7 MMOL/L (ref 5–15)
ANION GAP SERPL CALCULATED.3IONS-SCNC: 8 MMOL/L (ref 5–15)
ANION GAP SERPL CALCULATED.3IONS-SCNC: 9 MMOL/L (ref 5–15)
ANISOCYTOSIS BLD QL: ABNORMAL
ANISOCYTOSIS BLD QL: NORMAL
ANISOCYTOSIS BLD QL: NORMAL
APTT PPP: 27.7 SECONDS (ref 24–31)
ARTERIAL PATENCY WRIST A: ABNORMAL
ARTERIAL PATENCY WRIST A: ABNORMAL
ARTERIAL PATENCY WRIST A: POSITIVE
AST SERPL-CCNC: 20 U/L (ref 1–40)
AST SERPL-CCNC: 20 U/L (ref 1–40)
AST SERPL-CCNC: 21 U/L (ref 1–40)
AST SERPL-CCNC: 23 U/L (ref 1–40)
AST SERPL-CCNC: 23 U/L (ref 1–40)
AST SERPL-CCNC: 24 U/L (ref 1–40)
AST SERPL-CCNC: 24 U/L (ref 1–40)
AST SERPL-CCNC: 26 U/L (ref 1–40)
AST SERPL-CCNC: 27 U/L (ref 1–40)
AST SERPL-CCNC: 27 U/L (ref 1–40)
AST SERPL-CCNC: 32 U/L (ref 1–40)
AST SERPL-CCNC: 33 U/L (ref 1–40)
AST SERPL-CCNC: 33 U/L (ref 1–40)
AST SERPL-CCNC: 35 U/L (ref 1–40)
AST SERPL-CCNC: 36 U/L (ref 1–40)
AST SERPL-CCNC: 36 U/L (ref 1–40)
AST SERPL-CCNC: 38 U/L (ref 1–40)
AST SERPL-CCNC: 39 U/L (ref 1–40)
AST SERPL-CCNC: 41 U/L (ref 1–40)
AST SERPL-CCNC: 43 U/L (ref 1–40)
AST SERPL-CCNC: 44 U/L (ref 1–40)
AST SERPL-CCNC: 58 U/L (ref 1–40)
ATMOSPHERIC PRESS: ABNORMAL MM[HG]
B PARAPERT DNA SPEC QL NAA+PROBE: NOT DETECTED
B PERT DNA SPEC QL NAA+PROBE: NOT DETECTED
BACTERIA SPEC AEROBE CULT: NO GROWTH
BACTERIA SPEC AEROBE CULT: NORMAL
BACTERIA SPEC RESP CULT: NO GROWTH
BACTERIA UR QL AUTO: ABNORMAL /HPF
BASE EXCESS BLDA CALC-SCNC: -1.2 MMOL/L (ref 0–3)
BASE EXCESS BLDA CALC-SCNC: -1.3 MMOL/L (ref 0–3)
BASE EXCESS BLDA CALC-SCNC: -1.5 MMOL/L (ref 0–3)
BASE EXCESS BLDA CALC-SCNC: -1.5 MMOL/L (ref 0–3)
BASE EXCESS BLDA CALC-SCNC: -1.7 MMOL/L (ref 0–3)
BASE EXCESS BLDA CALC-SCNC: -3.8 MMOL/L (ref 0–3)
BASE EXCESS BLDA CALC-SCNC: -5.4 MMOL/L (ref 0–3)
BASE EXCESS BLDA CALC-SCNC: -6.1 MMOL/L (ref 0–3)
BASE EXCESS BLDA CALC-SCNC: -6.8 MMOL/L (ref 0–3)
BASE EXCESS BLDA CALC-SCNC: -7.6 MMOL/L (ref 0–3)
BASE EXCESS BLDA CALC-SCNC: 3.8 MMOL/L (ref 0–3)
BASE EXCESS BLDA CALC-SCNC: 4.9 MMOL/L (ref 0–3)
BASOPHILS # BLD AUTO: 0 10*3/MM3 (ref 0–0.2)
BASOPHILS # BLD AUTO: 0.1 10*3/MM3 (ref 0–0.2)
BASOPHILS # BLD AUTO: 0.1 10*3/MM3 (ref 0–0.2)
BASOPHILS # BLD MANUAL: 0.08 10*3/MM3 (ref 0–0.2)
BASOPHILS NFR BLD AUTO: 0 % (ref 0–1.5)
BASOPHILS NFR BLD AUTO: 0.1 % (ref 0–1.5)
BASOPHILS NFR BLD AUTO: 0.2 % (ref 0–1.5)
BASOPHILS NFR BLD AUTO: 0.3 % (ref 0–1.5)
BASOPHILS NFR BLD AUTO: 0.4 % (ref 0–1.5)
BASOPHILS NFR BLD AUTO: 1 % (ref 0–1.5)
BASOPHILS NFR BLD AUTO: 1.1 % (ref 0–1.5)
BDY SITE: ABNORMAL
BH BB BLOOD EXPIRATION DATE: NORMAL
BH BB BLOOD EXPIRATION DATE: NORMAL
BH BB BLOOD TYPE BARCODE: 5100
BH BB BLOOD TYPE BARCODE: 5100
BH BB DISPENSE STATUS: NORMAL
BH BB DISPENSE STATUS: NORMAL
BH BB PRODUCT CODE: NORMAL
BH BB PRODUCT CODE: NORMAL
BH BB UNIT NUMBER: NORMAL
BH BB UNIT NUMBER: NORMAL
BH CV ECHO MEAS - ACS: 1.8 CM
BH CV ECHO MEAS - AO MAX PG (FULL): 21.6 MMHG
BH CV ECHO MEAS - AO MAX PG: 25.3 MMHG
BH CV ECHO MEAS - AO MEAN PG (FULL): 11.7 MMHG
BH CV ECHO MEAS - AO MEAN PG: 13.4 MMHG
BH CV ECHO MEAS - AO ROOT AREA (BSA CORRECTED): 1.7
BH CV ECHO MEAS - AO ROOT AREA: 7.2 CM^2
BH CV ECHO MEAS - AO ROOT DIAM: 3 CM
BH CV ECHO MEAS - AO V2 MAX: 241.2 CM/SEC
BH CV ECHO MEAS - AO V2 MEAN: 165 CM/SEC
BH CV ECHO MEAS - AO V2 VTI: 42.1 CM
BH CV ECHO MEAS - AVA(I,A): 1.1 CM^2
BH CV ECHO MEAS - AVA(I,D): 1.1 CM^2
BH CV ECHO MEAS - AVA(V,A): 1.1 CM^2
BH CV ECHO MEAS - AVA(V,D): 1.1 CM^2
BH CV ECHO MEAS - BSA(HAYCOCK): 1.8 M^2
BH CV ECHO MEAS - BSA: 1.8 M^2
BH CV ECHO MEAS - BZI_BMI: 22.5 KILOGRAMS/M^2
BH CV ECHO MEAS - BZI_METRIC_HEIGHT: 172.7 CM
BH CV ECHO MEAS - BZI_METRIC_WEIGHT: 67.1 KG
BH CV ECHO MEAS - EDV(CUBED): 75.6 ML
BH CV ECHO MEAS - EDV(MOD-SP4): 79.7 ML
BH CV ECHO MEAS - EDV(TEICH): 79.8 ML
BH CV ECHO MEAS - EF(CUBED): 68.8 %
BH CV ECHO MEAS - EF(MOD-BP): 63 %
BH CV ECHO MEAS - EF(TEICH): 60.7 %
BH CV ECHO MEAS - ESV(CUBED): 23.6 ML
BH CV ECHO MEAS - ESV(TEICH): 31.4 ML
BH CV ECHO MEAS - FS: 32.1 %
BH CV ECHO MEAS - IVS/LVPW: 1.1
BH CV ECHO MEAS - IVSD: 1.1 CM
BH CV ECHO MEAS - LA DIMENSION(2D): 3.5 CM
BH CV ECHO MEAS - LV DIASTOLIC VOL/BSA (35-75): 44.3 ML/M^2
BH CV ECHO MEAS - LV MASS(C)D: 145.9 GRAMS
BH CV ECHO MEAS - LV MASS(C)DI: 81.1 GRAMS/M^2
BH CV ECHO MEAS - LV MAX PG: 3.6 MMHG
BH CV ECHO MEAS - LV MEAN PG: 1.7 MMHG
BH CV ECHO MEAS - LV V1 MAX: 95.3 CM/SEC
BH CV ECHO MEAS - LV V1 MEAN: 60.9 CM/SEC
BH CV ECHO MEAS - LV V1 VTI: 17.7 CM
BH CV ECHO MEAS - LVIDD: 4.2 CM
BH CV ECHO MEAS - LVIDS: 2.9 CM
BH CV ECHO MEAS - LVOT AREA: 2.7 CM^2
BH CV ECHO MEAS - LVOT DIAM: 1.9 CM
BH CV ECHO MEAS - LVPWD: 0.97 CM
BH CV ECHO MEAS - MV A MAX VEL: 129.3 CM/SEC
BH CV ECHO MEAS - MV DEC SLOPE: 570.3 CM/SEC^2
BH CV ECHO MEAS - MV DEC TIME: 0.14 SEC
BH CV ECHO MEAS - MV E MAX VEL: 82 CM/SEC
BH CV ECHO MEAS - MV E/A: 0.63
BH CV ECHO MEAS - MV MAX PG: 6 MMHG
BH CV ECHO MEAS - MV MEAN PG: 2.2 MMHG
BH CV ECHO MEAS - MV V2 MAX: 122.5 CM/SEC
BH CV ECHO MEAS - MV V2 MEAN: 69 CM/SEC
BH CV ECHO MEAS - MV V2 VTI: 23.2 CM
BH CV ECHO MEAS - MVA(VTI): 2.1 CM^2
BH CV ECHO MEAS - PA MAX PG (FULL): 2.9 MMHG
BH CV ECHO MEAS - PA MAX PG: 5.2 MMHG
BH CV ECHO MEAS - PA MEAN PG (FULL): 1.7 MMHG
BH CV ECHO MEAS - PA MEAN PG: 2.9 MMHG
BH CV ECHO MEAS - PA V2 MAX: 113.9 CM/SEC
BH CV ECHO MEAS - PA V2 MEAN: 81.1 CM/SEC
BH CV ECHO MEAS - PA V2 VTI: 16.6 CM
BH CV ECHO MEAS - RV MAX PG: 2.3 MMHG
BH CV ECHO MEAS - RV MEAN PG: 1.2 MMHG
BH CV ECHO MEAS - RV V1 MAX: 75.4 CM/SEC
BH CV ECHO MEAS - RV V1 MEAN: 50 CM/SEC
BH CV ECHO MEAS - RV V1 VTI: 10.7 CM
BH CV ECHO MEAS - RVDD: 2.7 CM
BH CV ECHO MEAS - SI(AO): 167.6 ML/M^2
BH CV ECHO MEAS - SI(CUBED): 28.9 ML/M^2
BH CV ECHO MEAS - SI(LVOT): 26.7 ML/M^2
BH CV ECHO MEAS - SI(TEICH): 26.9 ML/M^2
BH CV ECHO MEAS - SV(AO): 301.3 ML
BH CV ECHO MEAS - SV(CUBED): 52 ML
BH CV ECHO MEAS - SV(LVOT): 48 ML
BH CV ECHO MEAS - SV(TEICH): 48.4 ML
BH CV ECHO MEAS - TR MAX VEL: 169.3 CM/SEC
BILIRUB SERPL-MCNC: 0.3 MG/DL (ref 0–1.2)
BILIRUB SERPL-MCNC: 0.4 MG/DL (ref 0–1.2)
BILIRUB SERPL-MCNC: 0.5 MG/DL (ref 0–1.2)
BILIRUB SERPL-MCNC: 0.6 MG/DL (ref 0–1.2)
BILIRUB SERPL-MCNC: 0.6 MG/DL (ref 0–1.2)
BILIRUB SERPL-MCNC: 0.7 MG/DL (ref 0–1.2)
BILIRUB SERPL-MCNC: 0.8 MG/DL (ref 0–1.2)
BILIRUB SERPL-MCNC: 1 MG/DL (ref 0–1.2)
BILIRUB SERPL-MCNC: 1 MG/DL (ref 0–1.2)
BILIRUB UR QL STRIP: NEGATIVE
BLASTS NFR BLD MANUAL: 1 % (ref 0–0)
BLASTS NFR BLD MANUAL: 1 % (ref 0–0)
BLASTS NFR BLD MANUAL: 2 % (ref 0–0)
BLD GP AB SCN SERPL QL: NEGATIVE
BLD GP AB SCN SERPL QL: NEGATIVE
BODY TEMPERATURE: 102.7 C
BUN SERPL-MCNC: 11 MG/DL (ref 8–23)
BUN SERPL-MCNC: 13 MG/DL (ref 8–23)
BUN SERPL-MCNC: 13 MG/DL (ref 8–23)
BUN SERPL-MCNC: 14 MG/DL (ref 8–23)
BUN SERPL-MCNC: 15 MG/DL (ref 8–23)
BUN SERPL-MCNC: 16 MG/DL (ref 8–23)
BUN SERPL-MCNC: 16 MG/DL (ref 8–23)
BUN SERPL-MCNC: 17 MG/DL (ref 8–23)
BUN SERPL-MCNC: 18 MG/DL (ref 8–23)
BUN SERPL-MCNC: 20 MG/DL (ref 8–23)
BUN SERPL-MCNC: 23 MG/DL (ref 8–23)
BUN SERPL-MCNC: 24 MG/DL (ref 8–23)
BUN SERPL-MCNC: 24 MG/DL (ref 8–23)
BUN SERPL-MCNC: 25 MG/DL (ref 8–23)
BUN SERPL-MCNC: 26 MG/DL (ref 8–23)
BUN SERPL-MCNC: 30 MG/DL (ref 8–23)
BUN SERPL-MCNC: 30 MG/DL (ref 8–23)
BUN SERPL-MCNC: 32 MG/DL (ref 8–23)
BUN SERPL-MCNC: 33 MG/DL (ref 8–23)
BUN SERPL-MCNC: 34 MG/DL (ref 8–23)
BUN SERPL-MCNC: 36 MG/DL (ref 8–23)
BUN SERPL-MCNC: 38 MG/DL (ref 8–23)
BUN SERPL-MCNC: 39 MG/DL (ref 8–23)
BUN SERPL-MCNC: 44 MG/DL (ref 8–23)
BUN SERPL-MCNC: 46 MG/DL (ref 8–23)
BUN SERPL-MCNC: 47 MG/DL (ref 8–23)
BUN/CREAT SERPL: 13.1 (ref 7–25)
BUN/CREAT SERPL: 17.5 (ref 7–25)
BUN/CREAT SERPL: 17.9 (ref 7–25)
BUN/CREAT SERPL: 21.9 (ref 7–25)
BUN/CREAT SERPL: 22.8 (ref 7–25)
BUN/CREAT SERPL: 24.2 (ref 7–25)
BUN/CREAT SERPL: 25 (ref 7–25)
BUN/CREAT SERPL: 27.6 (ref 7–25)
BUN/CREAT SERPL: 27.7 (ref 7–25)
BUN/CREAT SERPL: 28.3 (ref 7–25)
BUN/CREAT SERPL: 28.3 (ref 7–25)
BUN/CREAT SERPL: 29.5 (ref 7–25)
BUN/CREAT SERPL: 30.3 (ref 7–25)
BUN/CREAT SERPL: 32.1 (ref 7–25)
BUN/CREAT SERPL: 32.7 (ref 7–25)
BUN/CREAT SERPL: 32.9 (ref 7–25)
BUN/CREAT SERPL: 33.7 (ref 7–25)
BUN/CREAT SERPL: 33.8 (ref 7–25)
BUN/CREAT SERPL: 34.4 (ref 7–25)
BUN/CREAT SERPL: 34.4 (ref 7–25)
BUN/CREAT SERPL: 35.1 (ref 7–25)
BUN/CREAT SERPL: 36.1 (ref 7–25)
BUN/CREAT SERPL: 39.3 (ref 7–25)
BUN/CREAT SERPL: 39.7 (ref 7–25)
BUN/CREAT SERPL: 42.9 (ref 7–25)
BUN/CREAT SERPL: 46.8 (ref 7–25)
BUN/CREAT SERPL: 50 (ref 7–25)
BUN/CREAT SERPL: 51.3 (ref 7–25)
BUN/CREAT SERPL: 51.4 (ref 7–25)
BUN/CREAT SERPL: 56.1 (ref 7–25)
BUN/CREAT SERPL: 59.4 (ref 7–25)
C PNEUM DNA NPH QL NAA+NON-PROBE: NOT DETECTED
C3 FRG RBC-MCNC: ABNORMAL
C3 FRG RBC-MCNC: NORMAL
CA-I BLDA-SCNC: 1.07 MMOL/L (ref 1.15–1.33)
CA-I SERPL ISE-MCNC: 1.19 MMOL/L (ref 1.2–1.3)
CALCIUM SPEC-SCNC: 7.7 MG/DL (ref 8.6–10.5)
CALCIUM SPEC-SCNC: 7.8 MG/DL (ref 8.6–10.5)
CALCIUM SPEC-SCNC: 7.9 MG/DL (ref 8.6–10.5)
CALCIUM SPEC-SCNC: 8.1 MG/DL (ref 8.6–10.5)
CALCIUM SPEC-SCNC: 8.2 MG/DL (ref 8.6–10.5)
CALCIUM SPEC-SCNC: 8.2 MG/DL (ref 8.6–10.5)
CALCIUM SPEC-SCNC: 8.3 MG/DL (ref 8.6–10.5)
CALCIUM SPEC-SCNC: 8.4 MG/DL (ref 8.6–10.5)
CALCIUM SPEC-SCNC: 8.5 MG/DL (ref 8.6–10.5)
CALCIUM SPEC-SCNC: 8.5 MG/DL (ref 8.6–10.5)
CALCIUM SPEC-SCNC: 8.6 MG/DL (ref 8.6–10.5)
CALCIUM SPEC-SCNC: 8.7 MG/DL (ref 8.6–10.5)
CALCIUM SPEC-SCNC: 8.8 MG/DL (ref 8.6–10.5)
CALCIUM SPEC-SCNC: 8.9 MG/DL (ref 8.6–10.5)
CALCIUM SPEC-SCNC: 9 MG/DL (ref 8.6–10.5)
CALCIUM SPEC-SCNC: 9.1 MG/DL (ref 8.6–10.5)
CALCIUM SPEC-SCNC: 9.2 MG/DL (ref 8.6–10.5)
CALCIUM SPEC-SCNC: 9.2 MG/DL (ref 8.6–10.5)
CHLORIDE SERPL-SCNC: 100 MMOL/L (ref 98–107)
CHLORIDE SERPL-SCNC: 101 MMOL/L (ref 98–107)
CHLORIDE SERPL-SCNC: 101 MMOL/L (ref 98–107)
CHLORIDE SERPL-SCNC: 102 MMOL/L (ref 98–107)
CHLORIDE SERPL-SCNC: 103 MMOL/L (ref 98–107)
CHLORIDE SERPL-SCNC: 104 MMOL/L (ref 98–107)
CHLORIDE SERPL-SCNC: 105 MMOL/L (ref 98–107)
CHLORIDE SERPL-SCNC: 105 MMOL/L (ref 98–107)
CHLORIDE SERPL-SCNC: 107 MMOL/L (ref 98–107)
CHLORIDE SERPL-SCNC: 108 MMOL/L (ref 98–107)
CHLORIDE SERPL-SCNC: 109 MMOL/L (ref 98–107)
CHLORIDE SERPL-SCNC: 110 MMOL/L (ref 98–107)
CHLORIDE SERPL-SCNC: 111 MMOL/L (ref 98–107)
CHLORIDE SERPL-SCNC: 111 MMOL/L (ref 98–107)
CHLORIDE SERPL-SCNC: 94 MMOL/L (ref 98–107)
CHLORIDE SERPL-SCNC: 96 MMOL/L (ref 98–107)
CHLORIDE SERPL-SCNC: 97 MMOL/L (ref 98–107)
CHLORIDE SERPL-SCNC: 99 MMOL/L (ref 98–107)
CHLORIDE SERPL-SCNC: 99 MMOL/L (ref 98–107)
CLARITY UR: CLEAR
CMV IGM SERPL IA-ACNC: <30 AU/ML (ref 0–29.9)
CO2 BLDA-SCNC: 16.2 MMOL/L (ref 22–29)
CO2 BLDA-SCNC: 17.4 MMOL/L (ref 22–29)
CO2 BLDA-SCNC: 20 MMOL/L (ref 22–29)
CO2 BLDA-SCNC: 21.3 MMOL/L (ref 22–29)
CO2 BLDA-SCNC: 22.3 MMOL/L (ref 22–29)
CO2 BLDA-SCNC: 22.4 MMOL/L (ref 22–29)
CO2 BLDA-SCNC: 22.6 MMOL/L (ref 22–29)
CO2 BLDA-SCNC: 23.4 MMOL/L (ref 22–29)
CO2 BLDA-SCNC: 24 MMOL/L (ref 22–29)
CO2 BLDA-SCNC: 24.2 MMOL/L (ref 22–29)
CO2 BLDA-SCNC: 27.3 MMOL/L (ref 22–29)
CO2 BLDA-SCNC: 28.7 MMOL/L (ref 22–29)
CO2 SERPL-SCNC: 16 MMOL/L (ref 22–29)
CO2 SERPL-SCNC: 17 MMOL/L (ref 22–29)
CO2 SERPL-SCNC: 19 MMOL/L (ref 22–29)
CO2 SERPL-SCNC: 20 MMOL/L (ref 22–29)
CO2 SERPL-SCNC: 22 MMOL/L (ref 22–29)
CO2 SERPL-SCNC: 23 MMOL/L (ref 22–29)
CO2 SERPL-SCNC: 23 MMOL/L (ref 22–29)
CO2 SERPL-SCNC: 24 MMOL/L (ref 22–29)
CO2 SERPL-SCNC: 25 MMOL/L (ref 22–29)
CO2 SERPL-SCNC: 26 MMOL/L (ref 22–29)
CO2 SERPL-SCNC: 27 MMOL/L (ref 22–29)
CO2 SERPL-SCNC: 27 MMOL/L (ref 22–29)
CO2 SERPL-SCNC: 28 MMOL/L (ref 22–29)
CO2 SERPL-SCNC: 28 MMOL/L (ref 22–29)
CO2 SERPL-SCNC: 29 MMOL/L (ref 22–29)
CO2 SERPL-SCNC: 29 MMOL/L (ref 22–29)
CO2 SERPL-SCNC: 30 MMOL/L (ref 22–29)
CO2 SERPL-SCNC: 30 MMOL/L (ref 22–29)
CO2 SERPL-SCNC: 31 MMOL/L (ref 22–29)
CO2 SERPL-SCNC: 31 MMOL/L (ref 22–29)
CO2 SERPL-SCNC: 32 MMOL/L (ref 22–29)
CO2 SERPL-SCNC: 32 MMOL/L (ref 22–29)
COLOR UR: YELLOW
COPPER SERPL-MCNC: 122 UG/DL (ref 69–132)
CREAT BLDA-MCNC: 0.8 MG/DL (ref 0.6–1.3)
CREAT SERPL-MCNC: 0.53 MG/DL (ref 0.76–1.27)
CREAT SERPL-MCNC: 0.55 MG/DL (ref 0.76–1.27)
CREAT SERPL-MCNC: 0.56 MG/DL (ref 0.76–1.27)
CREAT SERPL-MCNC: 0.57 MG/DL (ref 0.76–1.27)
CREAT SERPL-MCNC: 0.58 MG/DL (ref 0.76–1.27)
CREAT SERPL-MCNC: 0.58 MG/DL (ref 0.76–1.27)
CREAT SERPL-MCNC: 0.6 MG/DL (ref 0.76–1.27)
CREAT SERPL-MCNC: 0.61 MG/DL (ref 0.76–1.27)
CREAT SERPL-MCNC: 0.61 MG/DL (ref 0.76–1.27)
CREAT SERPL-MCNC: 0.62 MG/DL (ref 0.76–1.27)
CREAT SERPL-MCNC: 0.63 MG/DL (ref 0.76–1.27)
CREAT SERPL-MCNC: 0.63 MG/DL (ref 0.76–1.27)
CREAT SERPL-MCNC: 0.64 MG/DL (ref 0.76–1.27)
CREAT SERPL-MCNC: 0.65 MG/DL (ref 0.76–1.27)
CREAT SERPL-MCNC: 0.7 MG/DL (ref 0.76–1.27)
CREAT SERPL-MCNC: 0.7 MG/DL (ref 0.76–1.27)
CREAT SERPL-MCNC: 0.72 MG/DL (ref 0.76–1.27)
CREAT SERPL-MCNC: 0.73 MG/DL (ref 0.76–1.27)
CREAT SERPL-MCNC: 0.76 MG/DL (ref 0.76–1.27)
CREAT SERPL-MCNC: 0.76 MG/DL (ref 0.76–1.27)
CREAT SERPL-MCNC: 0.77 MG/DL (ref 0.76–1.27)
CREAT SERPL-MCNC: 0.82 MG/DL (ref 0.76–1.27)
CREAT SERPL-MCNC: 0.83 MG/DL (ref 0.76–1.27)
CREAT SERPL-MCNC: 0.84 MG/DL (ref 0.76–1.27)
CREAT SERPL-MCNC: 0.93 MG/DL (ref 0.76–1.27)
CREAT SERPL-MCNC: 0.94 MG/DL (ref 0.76–1.27)
CREAT SERPL-MCNC: 0.94 MG/DL (ref 0.76–1.27)
CREAT SERPL-MCNC: 0.96 MG/DL (ref 0.76–1.27)
CREAT SERPL-MCNC: 0.99 MG/DL (ref 0.76–1.27)
CREAT SERPL-MCNC: 1.01 MG/DL (ref 0.76–1.27)
CROSSMATCH INTERPRETATION: NORMAL
CROSSMATCH INTERPRETATION: NORMAL
CRP SERPL-MCNC: 17.44 MG/DL (ref 0–0.5)
D DIMER PPP FEU-MCNC: 1.98 MG/L (FEU) (ref 0–0.59)
D DIMER PPP FEU-MCNC: 2.98 MG/L (FEU) (ref 0–0.59)
D-LACTATE SERPL-SCNC: 0.8 MMOL/L (ref 0.5–2)
D-LACTATE SERPL-SCNC: 0.9 MMOL/L (ref 0.5–2)
D-LACTATE SERPL-SCNC: 1.1 MMOL/L (ref 0.5–2)
D-LACTATE SERPL-SCNC: 1.3 MMOL/L (ref 0.5–2)
D-LACTATE SERPL-SCNC: 1.4 MMOL/L (ref 0.5–2)
D-LACTATE SERPL-SCNC: 1.7 MMOL/L (ref 0.5–2)
DACRYOCYTES BLD QL SMEAR: ABNORMAL
DACRYOCYTES BLD QL SMEAR: NORMAL
DEPRECATED RDW RBC AUTO: 55.1 FL (ref 37–54)
DEPRECATED RDW RBC AUTO: 56.4 FL (ref 37–54)
DEPRECATED RDW RBC AUTO: 56.4 FL (ref 37–54)
DEPRECATED RDW RBC AUTO: 57.3 FL (ref 37–54)
DEPRECATED RDW RBC AUTO: 66.1 FL (ref 37–54)
DEPRECATED RDW RBC AUTO: 66.1 FL (ref 37–54)
DEPRECATED RDW RBC AUTO: 67.4 FL (ref 37–54)
DEPRECATED RDW RBC AUTO: 67.8 FL (ref 37–54)
DEPRECATED RDW RBC AUTO: 68.3 FL (ref 37–54)
DEPRECATED RDW RBC AUTO: 68.3 FL (ref 37–54)
DEPRECATED RDW RBC AUTO: 69.1 FL (ref 37–54)
DEPRECATED RDW RBC AUTO: 69.6 FL (ref 37–54)
DEPRECATED RDW RBC AUTO: 81.4 FL (ref 37–54)
DEPRECATED RDW RBC AUTO: 83.1 FL (ref 37–54)
DEPRECATED RDW RBC AUTO: 83.1 FL (ref 37–54)
DEPRECATED RDW RBC AUTO: 84 FL (ref 37–54)
DEPRECATED RDW RBC AUTO: 84.9 FL (ref 37–54)
DEPRECATED RDW RBC AUTO: 85.3 FL (ref 37–54)
DEPRECATED RDW RBC AUTO: 86.6 FL (ref 37–54)
DEPRECATED RDW RBC AUTO: 87.1 FL (ref 37–54)
DEPRECATED RDW RBC AUTO: 87.5 FL (ref 37–54)
DEPRECATED RDW RBC AUTO: 88.8 FL (ref 37–54)
DEPRECATED RDW RBC AUTO: 89.7 FL (ref 37–54)
DEPRECATED RDW RBC AUTO: 92.8 FL (ref 37–54)
DEPRECATED RDW RBC AUTO: 93.6 FL (ref 37–54)
DEPRECATED RDW RBC AUTO: 94.5 FL (ref 37–54)
DEPRECATED RDW RBC AUTO: 97.1 FL (ref 37–54)
DEPRECATED RDW RBC AUTO: 98.4 FL (ref 37–54)
DEPRECATED RDW RBC AUTO: 98.9 FL (ref 37–54)
DOHLE BODIES: PRESENT
EBV VCA IGM SER IA-ACNC: <36 U/ML (ref 0–35.9)
EOSINOPHIL # BLD AUTO: 0 10*3/MM3 (ref 0–0.4)
EOSINOPHIL # BLD MANUAL: 0.06 10*3/MM3 (ref 0–0.4)
EOSINOPHIL # BLD MANUAL: 0.06 10*3/MM3 (ref 0–0.4)
EOSINOPHIL # BLD MANUAL: 0.08 10*3/MM3 (ref 0–0.4)
EOSINOPHIL # BLD MANUAL: 0.16 10*3/MM3 (ref 0–0.4)
EOSINOPHIL NFR BLD AUTO: 0 % (ref 0.3–6.2)
EOSINOPHIL NFR BLD AUTO: 0.1 % (ref 0.3–6.2)
EOSINOPHIL NFR BLD AUTO: 0.3 % (ref 0.3–6.2)
EOSINOPHIL NFR BLD AUTO: 0.3 % (ref 0.3–6.2)
EOSINOPHIL NFR BLD MANUAL: 1 % (ref 0.3–6.2)
EOSINOPHIL NFR BLD MANUAL: 2 % (ref 0.3–6.2)
EPO SERPL-ACNC: 362 MIU/ML (ref 2.6–18.5)
ERYTHROCYTE [DISTWIDTH] IN BLOOD BY AUTOMATED COUNT: 14.4 % (ref 12.3–15.4)
ERYTHROCYTE [DISTWIDTH] IN BLOOD BY AUTOMATED COUNT: 14.5 % (ref 12.3–15.4)
ERYTHROCYTE [DISTWIDTH] IN BLOOD BY AUTOMATED COUNT: 14.8 % (ref 12.3–15.4)
ERYTHROCYTE [DISTWIDTH] IN BLOOD BY AUTOMATED COUNT: 14.8 % (ref 12.3–15.4)
ERYTHROCYTE [DISTWIDTH] IN BLOOD BY AUTOMATED COUNT: 18.5 % (ref 12.3–15.4)
ERYTHROCYTE [DISTWIDTH] IN BLOOD BY AUTOMATED COUNT: 18.6 % (ref 12.3–15.4)
ERYTHROCYTE [DISTWIDTH] IN BLOOD BY AUTOMATED COUNT: 18.7 % (ref 12.3–15.4)
ERYTHROCYTE [DISTWIDTH] IN BLOOD BY AUTOMATED COUNT: 18.7 % (ref 12.3–15.4)
ERYTHROCYTE [DISTWIDTH] IN BLOOD BY AUTOMATED COUNT: 19 % (ref 12.3–15.4)
ERYTHROCYTE [DISTWIDTH] IN BLOOD BY AUTOMATED COUNT: 19 % (ref 12.3–15.4)
ERYTHROCYTE [DISTWIDTH] IN BLOOD BY AUTOMATED COUNT: 19.1 % (ref 12.3–15.4)
ERYTHROCYTE [DISTWIDTH] IN BLOOD BY AUTOMATED COUNT: 19.3 % (ref 12.3–15.4)
ERYTHROCYTE [DISTWIDTH] IN BLOOD BY AUTOMATED COUNT: 22.3 % (ref 12.3–15.4)
ERYTHROCYTE [DISTWIDTH] IN BLOOD BY AUTOMATED COUNT: 23.3 % (ref 12.3–15.4)
ERYTHROCYTE [DISTWIDTH] IN BLOOD BY AUTOMATED COUNT: 23.3 % (ref 12.3–15.4)
ERYTHROCYTE [DISTWIDTH] IN BLOOD BY AUTOMATED COUNT: 24.3 % (ref 12.3–15.4)
ERYTHROCYTE [DISTWIDTH] IN BLOOD BY AUTOMATED COUNT: 24.4 % (ref 12.3–15.4)
ERYTHROCYTE [DISTWIDTH] IN BLOOD BY AUTOMATED COUNT: 24.5 % (ref 12.3–15.4)
ERYTHROCYTE [DISTWIDTH] IN BLOOD BY AUTOMATED COUNT: 24.7 % (ref 12.3–15.4)
ERYTHROCYTE [DISTWIDTH] IN BLOOD BY AUTOMATED COUNT: 24.8 % (ref 12.3–15.4)
ERYTHROCYTE [DISTWIDTH] IN BLOOD BY AUTOMATED COUNT: 24.8 % (ref 12.3–15.4)
ERYTHROCYTE [DISTWIDTH] IN BLOOD BY AUTOMATED COUNT: 24.9 % (ref 12.3–15.4)
ERYTHROCYTE [DISTWIDTH] IN BLOOD BY AUTOMATED COUNT: 25.1 % (ref 12.3–15.4)
ERYTHROCYTE [DISTWIDTH] IN BLOOD BY AUTOMATED COUNT: 25.7 % (ref 12.3–15.4)
ERYTHROCYTE [DISTWIDTH] IN BLOOD BY AUTOMATED COUNT: 25.9 % (ref 12.3–15.4)
ERYTHROCYTE [DISTWIDTH] IN BLOOD BY AUTOMATED COUNT: 26 % (ref 12.3–15.4)
ERYTHROCYTE [DISTWIDTH] IN BLOOD BY AUTOMATED COUNT: 26.3 % (ref 12.3–15.4)
ERYTHROCYTE [DISTWIDTH] IN BLOOD BY AUTOMATED COUNT: 26.4 % (ref 12.3–15.4)
ERYTHROCYTE [DISTWIDTH] IN BLOOD BY AUTOMATED COUNT: 26.6 % (ref 12.3–15.4)
FERRITIN SERPL-MCNC: 2658 NG/ML (ref 30–400)
FERRITIN SERPL-MCNC: 3721 NG/ML (ref 30–400)
FLUAV SUBTYP SPEC NAA+PROBE: NOT DETECTED
FLUBV RNA ISLT QL NAA+PROBE: NOT DETECTED
FOLATE SERPL-MCNC: 13.7 NG/ML (ref 4.78–24.2)
GFR SERPL CREATININE-BSD FRML MDRD: 100 ML/MIN/1.73
GFR SERPL CREATININE-BSD FRML MDRD: 101 ML/MIN/1.73
GFR SERPL CREATININE-BSD FRML MDRD: 101 ML/MIN/1.73
GFR SERPL CREATININE-BSD FRML MDRD: 106 ML/MIN/1.73
GFR SERPL CREATININE-BSD FRML MDRD: 108 ML/MIN/1.73
GFR SERPL CREATININE-BSD FRML MDRD: 111 ML/MIN/1.73
GFR SERPL CREATININE-BSD FRML MDRD: 111 ML/MIN/1.73
GFR SERPL CREATININE-BSD FRML MDRD: 121 ML/MIN/1.73
GFR SERPL CREATININE-BSD FRML MDRD: 124 ML/MIN/1.73
GFR SERPL CREATININE-BSD FRML MDRD: 126 ML/MIN/1.73
GFR SERPL CREATININE-BSD FRML MDRD: 126 ML/MIN/1.73
GFR SERPL CREATININE-BSD FRML MDRD: 128 ML/MIN/1.73
GFR SERPL CREATININE-BSD FRML MDRD: 131 ML/MIN/1.73
GFR SERPL CREATININE-BSD FRML MDRD: 131 ML/MIN/1.73
GFR SERPL CREATININE-BSD FRML MDRD: 133 ML/MIN/1.73
GFR SERPL CREATININE-BSD FRML MDRD: 139 ML/MIN/1.73
GFR SERPL CREATININE-BSD FRML MDRD: 139 ML/MIN/1.73
GFR SERPL CREATININE-BSD FRML MDRD: 141 ML/MIN/1.73
GFR SERPL CREATININE-BSD FRML MDRD: 144 ML/MIN/1.73
GFR SERPL CREATININE-BSD FRML MDRD: 147 ML/MIN/1.73
GFR SERPL CREATININE-BSD FRML MDRD: 73 ML/MIN/1.73
GFR SERPL CREATININE-BSD FRML MDRD: 75 ML/MIN/1.73
GFR SERPL CREATININE-BSD FRML MDRD: 77 ML/MIN/1.73
GFR SERPL CREATININE-BSD FRML MDRD: 79 ML/MIN/1.73
GFR SERPL CREATININE-BSD FRML MDRD: 79 ML/MIN/1.73
GFR SERPL CREATININE-BSD FRML MDRD: 80 ML/MIN/1.73
GFR SERPL CREATININE-BSD FRML MDRD: 90 ML/MIN/1.73
GFR SERPL CREATININE-BSD FRML MDRD: 92 ML/MIN/1.73
GFR SERPL CREATININE-BSD FRML MDRD: 93 ML/MIN/1.73
GFR SERPL CREATININE-BSD FRML MDRD: >150 ML/MIN/1.73
GIANT PLATELETS: ABNORMAL
GLOBULIN UR ELPH-MCNC: 3.5 GM/DL
GLOBULIN UR ELPH-MCNC: 3.6 GM/DL
GLOBULIN UR ELPH-MCNC: 3.7 GM/DL
GLOBULIN UR ELPH-MCNC: 3.7 GM/DL
GLOBULIN UR ELPH-MCNC: 3.9 GM/DL
GLOBULIN UR ELPH-MCNC: 4 GM/DL
GLOBULIN UR ELPH-MCNC: 4.1 GM/DL
GLOBULIN UR ELPH-MCNC: 4.2 GM/DL
GLOBULIN UR ELPH-MCNC: 4.2 GM/DL
GLOBULIN UR ELPH-MCNC: 4.3 GM/DL
GLOBULIN UR ELPH-MCNC: 4.4 GM/DL
GLOBULIN UR ELPH-MCNC: 4.4 GM/DL
GLOBULIN UR ELPH-MCNC: 4.5 GM/DL
GLOBULIN UR ELPH-MCNC: 4.5 GM/DL
GLUCOSE BLDC GLUCOMTR-MCNC: 101 MG/DL (ref 70–105)
GLUCOSE BLDC GLUCOMTR-MCNC: 104 MG/DL (ref 70–105)
GLUCOSE BLDC GLUCOMTR-MCNC: 106 MG/DL (ref 70–105)
GLUCOSE BLDC GLUCOMTR-MCNC: 107 MG/DL (ref 70–105)
GLUCOSE BLDC GLUCOMTR-MCNC: 107 MG/DL (ref 70–105)
GLUCOSE BLDC GLUCOMTR-MCNC: 109 MG/DL (ref 70–105)
GLUCOSE BLDC GLUCOMTR-MCNC: 110 MG/DL (ref 74–100)
GLUCOSE BLDC GLUCOMTR-MCNC: 112 MG/DL (ref 70–105)
GLUCOSE BLDC GLUCOMTR-MCNC: 112 MG/DL (ref 70–105)
GLUCOSE BLDC GLUCOMTR-MCNC: 113 MG/DL (ref 70–105)
GLUCOSE BLDC GLUCOMTR-MCNC: 114 MG/DL (ref 70–105)
GLUCOSE BLDC GLUCOMTR-MCNC: 116 MG/DL (ref 70–105)
GLUCOSE BLDC GLUCOMTR-MCNC: 120 MG/DL (ref 70–105)
GLUCOSE BLDC GLUCOMTR-MCNC: 122 MG/DL (ref 70–105)
GLUCOSE BLDC GLUCOMTR-MCNC: 123 MG/DL (ref 70–105)
GLUCOSE BLDC GLUCOMTR-MCNC: 124 MG/DL (ref 70–105)
GLUCOSE BLDC GLUCOMTR-MCNC: 124 MG/DL (ref 70–105)
GLUCOSE BLDC GLUCOMTR-MCNC: 125 MG/DL (ref 70–105)
GLUCOSE BLDC GLUCOMTR-MCNC: 127 MG/DL (ref 70–105)
GLUCOSE BLDC GLUCOMTR-MCNC: 127 MG/DL (ref 70–105)
GLUCOSE BLDC GLUCOMTR-MCNC: 128 MG/DL (ref 70–105)
GLUCOSE BLDC GLUCOMTR-MCNC: 128 MG/DL (ref 70–105)
GLUCOSE BLDC GLUCOMTR-MCNC: 130 MG/DL (ref 70–105)
GLUCOSE BLDC GLUCOMTR-MCNC: 131 MG/DL (ref 70–105)
GLUCOSE BLDC GLUCOMTR-MCNC: 131 MG/DL (ref 70–105)
GLUCOSE BLDC GLUCOMTR-MCNC: 132 MG/DL (ref 70–105)
GLUCOSE BLDC GLUCOMTR-MCNC: 132 MG/DL (ref 70–105)
GLUCOSE BLDC GLUCOMTR-MCNC: 133 MG/DL (ref 70–105)
GLUCOSE BLDC GLUCOMTR-MCNC: 134 MG/DL (ref 70–105)
GLUCOSE BLDC GLUCOMTR-MCNC: 134 MG/DL (ref 70–105)
GLUCOSE BLDC GLUCOMTR-MCNC: 135 MG/DL (ref 70–105)
GLUCOSE BLDC GLUCOMTR-MCNC: 136 MG/DL (ref 70–105)
GLUCOSE BLDC GLUCOMTR-MCNC: 137 MG/DL (ref 70–105)
GLUCOSE BLDC GLUCOMTR-MCNC: 137 MG/DL (ref 70–105)
GLUCOSE BLDC GLUCOMTR-MCNC: 139 MG/DL (ref 70–105)
GLUCOSE BLDC GLUCOMTR-MCNC: 139 MG/DL (ref 70–105)
GLUCOSE BLDC GLUCOMTR-MCNC: 140 MG/DL (ref 70–105)
GLUCOSE BLDC GLUCOMTR-MCNC: 141 MG/DL (ref 70–105)
GLUCOSE BLDC GLUCOMTR-MCNC: 142 MG/DL (ref 70–105)
GLUCOSE BLDC GLUCOMTR-MCNC: 143 MG/DL (ref 70–105)
GLUCOSE BLDC GLUCOMTR-MCNC: 143 MG/DL (ref 70–105)
GLUCOSE BLDC GLUCOMTR-MCNC: 145 MG/DL (ref 70–105)
GLUCOSE BLDC GLUCOMTR-MCNC: 145 MG/DL (ref 70–105)
GLUCOSE BLDC GLUCOMTR-MCNC: 146 MG/DL (ref 70–105)
GLUCOSE BLDC GLUCOMTR-MCNC: 147 MG/DL (ref 70–105)
GLUCOSE BLDC GLUCOMTR-MCNC: 147 MG/DL (ref 70–105)
GLUCOSE BLDC GLUCOMTR-MCNC: 148 MG/DL (ref 70–105)
GLUCOSE BLDC GLUCOMTR-MCNC: 149 MG/DL (ref 70–105)
GLUCOSE BLDC GLUCOMTR-MCNC: 151 MG/DL (ref 70–105)
GLUCOSE BLDC GLUCOMTR-MCNC: 152 MG/DL (ref 70–105)
GLUCOSE BLDC GLUCOMTR-MCNC: 153 MG/DL (ref 70–105)
GLUCOSE BLDC GLUCOMTR-MCNC: 154 MG/DL (ref 70–105)
GLUCOSE BLDC GLUCOMTR-MCNC: 154 MG/DL (ref 70–105)
GLUCOSE BLDC GLUCOMTR-MCNC: 155 MG/DL (ref 70–105)
GLUCOSE BLDC GLUCOMTR-MCNC: 155 MG/DL (ref 70–105)
GLUCOSE BLDC GLUCOMTR-MCNC: 155 MG/DL (ref 74–100)
GLUCOSE BLDC GLUCOMTR-MCNC: 155 MG/DL (ref 74–100)
GLUCOSE BLDC GLUCOMTR-MCNC: 160 MG/DL (ref 70–105)
GLUCOSE BLDC GLUCOMTR-MCNC: 163 MG/DL (ref 70–105)
GLUCOSE BLDC GLUCOMTR-MCNC: 167 MG/DL (ref 70–105)
GLUCOSE BLDC GLUCOMTR-MCNC: 169 MG/DL (ref 70–105)
GLUCOSE BLDC GLUCOMTR-MCNC: 170 MG/DL (ref 70–105)
GLUCOSE BLDC GLUCOMTR-MCNC: 174 MG/DL (ref 70–105)
GLUCOSE BLDC GLUCOMTR-MCNC: 188 MG/DL (ref 70–105)
GLUCOSE BLDC GLUCOMTR-MCNC: 193 MG/DL (ref 70–105)
GLUCOSE BLDC GLUCOMTR-MCNC: 203 MG/DL (ref 70–105)
GLUCOSE BLDC GLUCOMTR-MCNC: 206 MG/DL (ref 70–105)
GLUCOSE BLDC GLUCOMTR-MCNC: 229 MG/DL (ref 70–105)
GLUCOSE BLDC GLUCOMTR-MCNC: 272 MG/DL (ref 70–105)
GLUCOSE BLDC GLUCOMTR-MCNC: 71 MG/DL (ref 70–105)
GLUCOSE BLDC GLUCOMTR-MCNC: 75 MG/DL (ref 70–105)
GLUCOSE BLDC GLUCOMTR-MCNC: 82 MG/DL (ref 70–105)
GLUCOSE BLDC GLUCOMTR-MCNC: 84 MG/DL (ref 70–105)
GLUCOSE BLDC GLUCOMTR-MCNC: 85 MG/DL (ref 70–105)
GLUCOSE BLDC GLUCOMTR-MCNC: 86 MG/DL (ref 70–105)
GLUCOSE BLDC GLUCOMTR-MCNC: 87 MG/DL (ref 70–105)
GLUCOSE BLDC GLUCOMTR-MCNC: 91 MG/DL (ref 70–105)
GLUCOSE BLDC GLUCOMTR-MCNC: 92 MG/DL (ref 70–105)
GLUCOSE BLDC GLUCOMTR-MCNC: 93 MG/DL (ref 70–105)
GLUCOSE BLDC GLUCOMTR-MCNC: 96 MG/DL (ref 70–105)
GLUCOSE BLDC GLUCOMTR-MCNC: 97 MG/DL (ref 70–105)
GLUCOSE BLDC GLUCOMTR-MCNC: 99 MG/DL (ref 70–105)
GLUCOSE SERPL-MCNC: 100 MG/DL (ref 65–99)
GLUCOSE SERPL-MCNC: 101 MG/DL (ref 65–99)
GLUCOSE SERPL-MCNC: 102 MG/DL (ref 65–99)
GLUCOSE SERPL-MCNC: 103 MG/DL (ref 65–99)
GLUCOSE SERPL-MCNC: 103 MG/DL (ref 65–99)
GLUCOSE SERPL-MCNC: 107 MG/DL (ref 65–99)
GLUCOSE SERPL-MCNC: 111 MG/DL (ref 65–99)
GLUCOSE SERPL-MCNC: 115 MG/DL (ref 65–99)
GLUCOSE SERPL-MCNC: 116 MG/DL (ref 65–99)
GLUCOSE SERPL-MCNC: 123 MG/DL (ref 65–99)
GLUCOSE SERPL-MCNC: 127 MG/DL (ref 65–99)
GLUCOSE SERPL-MCNC: 127 MG/DL (ref 65–99)
GLUCOSE SERPL-MCNC: 133 MG/DL (ref 65–99)
GLUCOSE SERPL-MCNC: 133 MG/DL (ref 65–99)
GLUCOSE SERPL-MCNC: 135 MG/DL (ref 65–99)
GLUCOSE SERPL-MCNC: 137 MG/DL (ref 65–99)
GLUCOSE SERPL-MCNC: 145 MG/DL (ref 65–99)
GLUCOSE SERPL-MCNC: 145 MG/DL (ref 65–99)
GLUCOSE SERPL-MCNC: 167 MG/DL (ref 65–99)
GLUCOSE SERPL-MCNC: 73 MG/DL (ref 65–99)
GLUCOSE SERPL-MCNC: 79 MG/DL (ref 65–99)
GLUCOSE SERPL-MCNC: 84 MG/DL (ref 65–99)
GLUCOSE SERPL-MCNC: 84 MG/DL (ref 65–99)
GLUCOSE SERPL-MCNC: 87 MG/DL (ref 65–99)
GLUCOSE SERPL-MCNC: 89 MG/DL (ref 65–99)
GLUCOSE SERPL-MCNC: 89 MG/DL (ref 65–99)
GLUCOSE SERPL-MCNC: 92 MG/DL (ref 65–99)
GLUCOSE SERPL-MCNC: 93 MG/DL (ref 65–99)
GLUCOSE SERPL-MCNC: 99 MG/DL (ref 65–99)
GLUCOSE UR STRIP-MCNC: NEGATIVE MG/DL
GRAM STN SPEC: NORMAL
HADV DNA SPEC NAA+PROBE: NOT DETECTED
HAPTOGLOB SERPL-MCNC: 294 MG/DL (ref 30–200)
HAPTOGLOB SERPL-MCNC: 465 MG/DL (ref 30–200)
HCO3 BLDA-SCNC: 15.5 MMOL/L (ref 21–28)
HCO3 BLDA-SCNC: 16.5 MMOL/L (ref 21–28)
HCO3 BLDA-SCNC: 19 MMOL/L (ref 21–28)
HCO3 BLDA-SCNC: 20.1 MMOL/L (ref 21–28)
HCO3 BLDA-SCNC: 21.2 MMOL/L (ref 21–28)
HCO3 BLDA-SCNC: 21.5 MMOL/L (ref 21–28)
HCO3 BLDA-SCNC: 21.7 MMOL/L (ref 21–28)
HCO3 BLDA-SCNC: 22.3 MMOL/L (ref 21–28)
HCO3 BLDA-SCNC: 22.9 MMOL/L (ref 21–28)
HCO3 BLDA-SCNC: 23.1 MMOL/L (ref 21–28)
HCO3 BLDA-SCNC: 26.3 MMOL/L (ref 21–28)
HCO3 BLDA-SCNC: 27.7 MMOL/L (ref 21–28)
HCOV 229E RNA SPEC QL NAA+PROBE: NOT DETECTED
HCOV HKU1 RNA SPEC QL NAA+PROBE: NOT DETECTED
HCOV NL63 RNA SPEC QL NAA+PROBE: NOT DETECTED
HCOV OC43 RNA SPEC QL NAA+PROBE: NOT DETECTED
HCT VFR BLD AUTO: 21 % (ref 37.5–51)
HCT VFR BLD AUTO: 21.1 % (ref 37.5–51)
HCT VFR BLD AUTO: 21.3 % (ref 37.5–51)
HCT VFR BLD AUTO: 22.7 % (ref 37.5–51)
HCT VFR BLD AUTO: 23.1 % (ref 37.5–51)
HCT VFR BLD AUTO: 23.5 % (ref 37.5–51)
HCT VFR BLD AUTO: 23.7 % (ref 37.5–51)
HCT VFR BLD AUTO: 24.4 % (ref 37.5–51)
HCT VFR BLD AUTO: 24.6 % (ref 37.5–51)
HCT VFR BLD AUTO: 24.8 % (ref 37.5–51)
HCT VFR BLD AUTO: 24.8 % (ref 37.5–51)
HCT VFR BLD AUTO: 25.2 % (ref 37.5–51)
HCT VFR BLD AUTO: 25.5 % (ref 37.5–51)
HCT VFR BLD AUTO: 25.9 % (ref 37.5–51)
HCT VFR BLD AUTO: 26.5 % (ref 37.5–51)
HCT VFR BLD AUTO: 26.7 % (ref 37.5–51)
HCT VFR BLD AUTO: 26.7 % (ref 37.5–51)
HCT VFR BLD AUTO: 26.9 % (ref 37.5–51)
HCT VFR BLD AUTO: 27.1 % (ref 37.5–51)
HCT VFR BLD AUTO: 27.3 % (ref 37.5–51)
HCT VFR BLD AUTO: 27.5 % (ref 37.5–51)
HCT VFR BLD AUTO: 27.8 % (ref 37.5–51)
HCT VFR BLD AUTO: 27.9 % (ref 37.5–51)
HCT VFR BLD AUTO: 28 % (ref 37.5–51)
HCT VFR BLD AUTO: 28.1 % (ref 37.5–51)
HCT VFR BLD AUTO: 28.2 % (ref 37.5–51)
HCT VFR BLD AUTO: 28.2 % (ref 37.5–51)
HCT VFR BLD AUTO: 28.4 % (ref 37.5–51)
HCT VFR BLD AUTO: 28.6 % (ref 37.5–51)
HCT VFR BLD AUTO: 28.7 % (ref 37.5–51)
HCT VFR BLD AUTO: 28.8 % (ref 37.5–51)
HCT VFR BLD AUTO: 28.9 % (ref 37.5–51)
HCT VFR BLDA CALC: 27 % (ref 38–51)
HEMOCCULT STL QL IA: NEGATIVE
HEMODILUTION: NO
HGB BLD-MCNC: 10.2 G/DL (ref 13–17.7)
HGB BLD-MCNC: 6.9 G/DL (ref 13–17.7)
HGB BLD-MCNC: 7.2 G/DL (ref 13–17.7)
HGB BLD-MCNC: 7.2 G/DL (ref 13–17.7)
HGB BLD-MCNC: 7.4 G/DL (ref 13–17.7)
HGB BLD-MCNC: 7.8 G/DL (ref 13–17.7)
HGB BLD-MCNC: 7.8 G/DL (ref 13–17.7)
HGB BLD-MCNC: 7.9 G/DL (ref 13–17.7)
HGB BLD-MCNC: 7.9 G/DL (ref 13–17.7)
HGB BLD-MCNC: 8 G/DL (ref 13–17.7)
HGB BLD-MCNC: 8.4 G/DL (ref 13–17.7)
HGB BLD-MCNC: 8.5 G/DL (ref 13–17.7)
HGB BLD-MCNC: 8.6 G/DL (ref 13–17.7)
HGB BLD-MCNC: 8.6 G/DL (ref 13–17.7)
HGB BLD-MCNC: 8.7 G/DL (ref 13–17.7)
HGB BLD-MCNC: 8.9 G/DL (ref 13–17.7)
HGB BLD-MCNC: 9 G/DL (ref 13–17.7)
HGB BLD-MCNC: 9 G/DL (ref 13–17.7)
HGB BLD-MCNC: 9.1 G/DL (ref 13–17.7)
HGB BLD-MCNC: 9.1 G/DL (ref 13–17.7)
HGB BLD-MCNC: 9.2 G/DL (ref 13–17.7)
HGB BLD-MCNC: 9.3 G/DL (ref 13–17.7)
HGB BLD-MCNC: 9.4 G/DL (ref 13–17.7)
HGB BLD-MCNC: 9.4 G/DL (ref 13–17.7)
HGB BLD-MCNC: 9.6 G/DL (ref 13–17.7)
HGB BLD-MCNC: 9.7 G/DL (ref 13–17.7)
HGB BLD-MCNC: 9.7 G/DL (ref 13–17.7)
HGB BLD-MCNC: 9.8 G/DL (ref 13–17.7)
HGB BLDA-MCNC: 9.1 G/DL (ref 12–17)
HGB UR QL STRIP.AUTO: ABNORMAL
HMPV RNA NPH QL NAA+NON-PROBE: NOT DETECTED
HOLD SPECIMEN: NORMAL
HPIV1 RNA SPEC QL NAA+PROBE: NOT DETECTED
HPIV2 RNA SPEC QL NAA+PROBE: NOT DETECTED
HPIV3 RNA NPH QL NAA+PROBE: NOT DETECTED
HPIV4 P GENE NPH QL NAA+PROBE: NOT DETECTED
HYALINE CASTS UR QL AUTO: ABNORMAL /LPF
INHALED O2 CONCENTRATION: 100 %
INHALED O2 CONCENTRATION: 24 %
INHALED O2 CONCENTRATION: 28 %
INHALED O2 CONCENTRATION: 32 %
INHALED O2 CONCENTRATION: 40 %
INHALED O2 CONCENTRATION: 40 %
INHALED O2 CONCENTRATION: 45 %
INHALED O2 CONCENTRATION: 60 %
INHALED O2 CONCENTRATION: 80 %
INR PPP: 1.05 (ref 0.93–1.1)
IRON 24H UR-MRATE: 21 MCG/DL (ref 59–158)
IRON 24H UR-MRATE: 42 MCG/DL (ref 59–158)
IRON SATN MFR SERPL: 12 % (ref 20–50)
IRON SATN MFR SERPL: 29 % (ref 20–50)
KETONES UR QL STRIP: ABNORMAL
LAB AP CASE REPORT: NORMAL
LARGE PLATELETS: ABNORMAL
LARGE PLATELETS: NORMAL
LEUKOCYTE ESTERASE UR QL STRIP.AUTO: NEGATIVE
LV EF 2D ECHO EST: 65 %
LYMPHOCYTES # BLD AUTO: 0.4 10*3/MM3 (ref 0.7–3.1)
LYMPHOCYTES # BLD AUTO: 0.6 10*3/MM3 (ref 0.7–3.1)
LYMPHOCYTES # BLD AUTO: 0.7 10*3/MM3 (ref 0.7–3.1)
LYMPHOCYTES # BLD AUTO: 0.8 10*3/MM3 (ref 0.7–3.1)
LYMPHOCYTES # BLD AUTO: 1.1 10*3/MM3 (ref 0.7–3.1)
LYMPHOCYTES # BLD AUTO: 1.2 10*3/MM3 (ref 0.7–3.1)
LYMPHOCYTES # BLD MANUAL: 0.15 10*3/MM3 (ref 0.7–3.1)
LYMPHOCYTES # BLD MANUAL: 0.26 10*3/MM3 (ref 0.7–3.1)
LYMPHOCYTES # BLD MANUAL: 0.28 10*3/MM3 (ref 0.7–3.1)
LYMPHOCYTES # BLD MANUAL: 0.3 10*3/MM3 (ref 0.7–3.1)
LYMPHOCYTES # BLD MANUAL: 0.33 10*3/MM3 (ref 0.7–3.1)
LYMPHOCYTES # BLD MANUAL: 0.35 10*3/MM3 (ref 0.7–3.1)
LYMPHOCYTES # BLD MANUAL: 0.43 10*3/MM3 (ref 0.7–3.1)
LYMPHOCYTES # BLD MANUAL: 0.44 10*3/MM3 (ref 0.7–3.1)
LYMPHOCYTES # BLD MANUAL: 0.48 10*3/MM3 (ref 0.7–3.1)
LYMPHOCYTES # BLD MANUAL: 0.55 10*3/MM3 (ref 0.7–3.1)
LYMPHOCYTES # BLD MANUAL: 0.57 10*3/MM3 (ref 0.7–3.1)
LYMPHOCYTES # BLD MANUAL: 0.6 10*3/MM3 (ref 0.7–3.1)
LYMPHOCYTES # BLD MANUAL: 0.68 10*3/MM3 (ref 0.7–3.1)
LYMPHOCYTES # BLD MANUAL: 0.72 10*3/MM3 (ref 0.7–3.1)
LYMPHOCYTES # BLD MANUAL: 0.74 10*3/MM3 (ref 0.7–3.1)
LYMPHOCYTES # BLD MANUAL: 0.74 10*3/MM3 (ref 0.7–3.1)
LYMPHOCYTES # BLD MANUAL: 0.83 10*3/MM3 (ref 0.7–3.1)
LYMPHOCYTES # BLD MANUAL: 0.88 10*3/MM3 (ref 0.7–3.1)
LYMPHOCYTES # BLD MANUAL: 0.95 10*3/MM3 (ref 0.7–3.1)
LYMPHOCYTES # BLD MANUAL: 0.96 10*3/MM3 (ref 0.7–3.1)
LYMPHOCYTES # BLD MANUAL: 1.01 10*3/MM3 (ref 0.7–3.1)
LYMPHOCYTES # BLD MANUAL: 1.02 10*3/MM3 (ref 0.7–3.1)
LYMPHOCYTES # BLD MANUAL: 1.06 10*3/MM3 (ref 0.7–3.1)
LYMPHOCYTES # BLD MANUAL: 1.15 10*3/MM3 (ref 0.7–3.1)
LYMPHOCYTES # BLD MANUAL: 1.34 10*3/MM3 (ref 0.7–3.1)
LYMPHOCYTES NFR BLD AUTO: 10.5 % (ref 19.6–45.3)
LYMPHOCYTES NFR BLD AUTO: 12.9 % (ref 19.6–45.3)
LYMPHOCYTES NFR BLD AUTO: 6 % (ref 19.6–45.3)
LYMPHOCYTES NFR BLD AUTO: 6.1 % (ref 19.6–45.3)
LYMPHOCYTES NFR BLD AUTO: 6.5 % (ref 19.6–45.3)
LYMPHOCYTES NFR BLD AUTO: 8.3 % (ref 19.6–45.3)
LYMPHOCYTES NFR BLD MANUAL: 1 % (ref 19.6–45.3)
LYMPHOCYTES NFR BLD MANUAL: 1 % (ref 5–12)
LYMPHOCYTES NFR BLD MANUAL: 1 % (ref 5–12)
LYMPHOCYTES NFR BLD MANUAL: 10 % (ref 19.6–45.3)
LYMPHOCYTES NFR BLD MANUAL: 10 % (ref 5–12)
LYMPHOCYTES NFR BLD MANUAL: 11 % (ref 19.6–45.3)
LYMPHOCYTES NFR BLD MANUAL: 11 % (ref 19.6–45.3)
LYMPHOCYTES NFR BLD MANUAL: 11 % (ref 5–12)
LYMPHOCYTES NFR BLD MANUAL: 12 % (ref 19.6–45.3)
LYMPHOCYTES NFR BLD MANUAL: 14 % (ref 19.6–45.3)
LYMPHOCYTES NFR BLD MANUAL: 14 % (ref 19.6–45.3)
LYMPHOCYTES NFR BLD MANUAL: 14 % (ref 5–12)
LYMPHOCYTES NFR BLD MANUAL: 14 % (ref 5–12)
LYMPHOCYTES NFR BLD MANUAL: 15 % (ref 19.6–45.3)
LYMPHOCYTES NFR BLD MANUAL: 18 % (ref 19.6–45.3)
LYMPHOCYTES NFR BLD MANUAL: 2 % (ref 19.6–45.3)
LYMPHOCYTES NFR BLD MANUAL: 2 % (ref 19.6–45.3)
LYMPHOCYTES NFR BLD MANUAL: 2 % (ref 5–12)
LYMPHOCYTES NFR BLD MANUAL: 22 % (ref 5–12)
LYMPHOCYTES NFR BLD MANUAL: 3 % (ref 19.6–45.3)
LYMPHOCYTES NFR BLD MANUAL: 3 % (ref 5–12)
LYMPHOCYTES NFR BLD MANUAL: 4 % (ref 19.6–45.3)
LYMPHOCYTES NFR BLD MANUAL: 5 % (ref 19.6–45.3)
LYMPHOCYTES NFR BLD MANUAL: 5 % (ref 5–12)
LYMPHOCYTES NFR BLD MANUAL: 5 % (ref 5–12)
LYMPHOCYTES NFR BLD MANUAL: 6 % (ref 19.6–45.3)
LYMPHOCYTES NFR BLD MANUAL: 6 % (ref 19.6–45.3)
LYMPHOCYTES NFR BLD MANUAL: 7 % (ref 19.6–45.3)
LYMPHOCYTES NFR BLD MANUAL: 7 % (ref 5–12)
LYMPHOCYTES NFR BLD MANUAL: 8 % (ref 19.6–45.3)
LYMPHOCYTES NFR BLD MANUAL: 8 % (ref 5–12)
LYMPHOCYTES NFR BLD MANUAL: 9 % (ref 19.6–45.3)
M PNEUMO IGG SER IA-ACNC: NOT DETECTED
MACROCYTES BLD QL SMEAR: ABNORMAL
MACROCYTES BLD QL SMEAR: NORMAL
MAGNESIUM SERPL-MCNC: 1.4 MG/DL (ref 1.6–2.4)
MAGNESIUM SERPL-MCNC: 1.8 MG/DL (ref 1.6–2.4)
MAGNESIUM SERPL-MCNC: 1.9 MG/DL (ref 1.6–2.4)
MAGNESIUM SERPL-MCNC: 2.6 MG/DL (ref 1.6–2.4)
MCH RBC QN AUTO: 33.7 PG (ref 26.6–33)
MCH RBC QN AUTO: 33.9 PG (ref 26.6–33)
MCH RBC QN AUTO: 34.3 PG (ref 26.6–33)
MCH RBC QN AUTO: 34.5 PG (ref 26.6–33)
MCH RBC QN AUTO: 34.6 PG (ref 26.6–33)
MCH RBC QN AUTO: 34.6 PG (ref 26.6–33)
MCH RBC QN AUTO: 34.7 PG (ref 26.6–33)
MCH RBC QN AUTO: 34.7 PG (ref 26.6–33)
MCH RBC QN AUTO: 34.8 PG (ref 26.6–33)
MCH RBC QN AUTO: 34.8 PG (ref 26.6–33)
MCH RBC QN AUTO: 35.1 PG (ref 26.6–33)
MCH RBC QN AUTO: 35.1 PG (ref 26.6–33)
MCH RBC QN AUTO: 35.2 PG (ref 26.6–33)
MCH RBC QN AUTO: 35.2 PG (ref 26.6–33)
MCH RBC QN AUTO: 35.3 PG (ref 26.6–33)
MCH RBC QN AUTO: 35.4 PG (ref 26.6–33)
MCH RBC QN AUTO: 35.4 PG (ref 26.6–33)
MCH RBC QN AUTO: 35.7 PG (ref 26.6–33)
MCH RBC QN AUTO: 35.7 PG (ref 26.6–33)
MCH RBC QN AUTO: 35.8 PG (ref 26.6–33)
MCH RBC QN AUTO: 36.5 PG (ref 26.6–33)
MCH RBC QN AUTO: 37 PG (ref 26.6–33)
MCH RBC QN AUTO: 37.1 PG (ref 26.6–33)
MCH RBC QN AUTO: 37.7 PG (ref 26.6–33)
MCH RBC QN AUTO: 38.4 PG (ref 26.6–33)
MCHC RBC AUTO-ENTMCNC: 32.2 G/DL (ref 31.5–35.7)
MCHC RBC AUTO-ENTMCNC: 32.6 G/DL (ref 31.5–35.7)
MCHC RBC AUTO-ENTMCNC: 32.7 G/DL (ref 31.5–35.7)
MCHC RBC AUTO-ENTMCNC: 32.7 G/DL (ref 31.5–35.7)
MCHC RBC AUTO-ENTMCNC: 32.9 G/DL (ref 31.5–35.7)
MCHC RBC AUTO-ENTMCNC: 33 G/DL (ref 31.5–35.7)
MCHC RBC AUTO-ENTMCNC: 33.1 G/DL (ref 31.5–35.7)
MCHC RBC AUTO-ENTMCNC: 33.2 G/DL (ref 31.5–35.7)
MCHC RBC AUTO-ENTMCNC: 33.2 G/DL (ref 31.5–35.7)
MCHC RBC AUTO-ENTMCNC: 33.4 G/DL (ref 31.5–35.7)
MCHC RBC AUTO-ENTMCNC: 33.5 G/DL (ref 31.5–35.7)
MCHC RBC AUTO-ENTMCNC: 33.5 G/DL (ref 31.5–35.7)
MCHC RBC AUTO-ENTMCNC: 33.6 G/DL (ref 31.5–35.7)
MCHC RBC AUTO-ENTMCNC: 33.8 G/DL (ref 31.5–35.7)
MCHC RBC AUTO-ENTMCNC: 33.9 G/DL (ref 31.5–35.7)
MCHC RBC AUTO-ENTMCNC: 34 G/DL (ref 31.5–35.7)
MCHC RBC AUTO-ENTMCNC: 34.2 G/DL (ref 31.5–35.7)
MCHC RBC AUTO-ENTMCNC: 34.6 G/DL (ref 31.5–35.7)
MCHC RBC AUTO-ENTMCNC: 34.7 G/DL (ref 31.5–35.7)
MCHC RBC AUTO-ENTMCNC: 34.8 G/DL (ref 31.5–35.7)
MCHC RBC AUTO-ENTMCNC: 35.6 G/DL (ref 31.5–35.7)
MCV RBC AUTO: 101.4 FL (ref 79–97)
MCV RBC AUTO: 101.6 FL (ref 79–97)
MCV RBC AUTO: 102.4 FL (ref 79–97)
MCV RBC AUTO: 102.4 FL (ref 79–97)
MCV RBC AUTO: 102.8 FL (ref 79–97)
MCV RBC AUTO: 103.1 FL (ref 79–97)
MCV RBC AUTO: 104 FL (ref 79–97)
MCV RBC AUTO: 104.2 FL (ref 79–97)
MCV RBC AUTO: 104.5 FL (ref 79–97)
MCV RBC AUTO: 104.6 FL (ref 79–97)
MCV RBC AUTO: 104.8 FL (ref 79–97)
MCV RBC AUTO: 105 FL (ref 79–97)
MCV RBC AUTO: 105.1 FL (ref 79–97)
MCV RBC AUTO: 105.4 FL (ref 79–97)
MCV RBC AUTO: 105.4 FL (ref 79–97)
MCV RBC AUTO: 105.7 FL (ref 79–97)
MCV RBC AUTO: 105.8 FL (ref 79–97)
MCV RBC AUTO: 106.5 FL (ref 79–97)
MCV RBC AUTO: 106.5 FL (ref 79–97)
MCV RBC AUTO: 106.7 FL (ref 79–97)
MCV RBC AUTO: 106.7 FL (ref 79–97)
MCV RBC AUTO: 107.1 FL (ref 79–97)
MCV RBC AUTO: 107.1 FL (ref 79–97)
MCV RBC AUTO: 107.4 FL (ref 79–97)
MCV RBC AUTO: 107.8 FL (ref 79–97)
MCV RBC AUTO: 108.6 FL (ref 79–97)
MCV RBC AUTO: 108.8 FL (ref 79–97)
MCV RBC AUTO: 109.6 FL (ref 79–97)
MCV RBC AUTO: 109.7 FL (ref 79–97)
MCV RBC AUTO: 109.7 FL (ref 79–97)
MCV RBC AUTO: 110.5 FL (ref 79–97)
METAMYELOCYTES NFR BLD MANUAL: 1 % (ref 0–0)
METAMYELOCYTES NFR BLD MANUAL: 2 % (ref 0–0)
METAMYELOCYTES NFR BLD MANUAL: 5 % (ref 0–0)
METAMYELOCYTES NFR BLD MANUAL: 6 % (ref 0–0)
METAMYELOCYTES NFR BLD MANUAL: 6 % (ref 0–0)
METAMYELOCYTES NFR BLD MANUAL: 9 % (ref 0–0)
MODALITY: ABNORMAL
MONOCYTES # BLD AUTO: 0.08 10*3/MM3 (ref 0.1–0.9)
MONOCYTES # BLD AUTO: 0.11 10*3/MM3 (ref 0.1–0.9)
MONOCYTES # BLD AUTO: 0.12 10*3/MM3 (ref 0.1–0.9)
MONOCYTES # BLD AUTO: 0.19 10*3/MM3 (ref 0.1–0.9)
MONOCYTES # BLD AUTO: 0.25 10*3/MM3 (ref 0.1–0.9)
MONOCYTES # BLD AUTO: 0.26 10*3/MM3 (ref 0.1–0.9)
MONOCYTES # BLD AUTO: 0.29 10*3/MM3 (ref 0.1–0.9)
MONOCYTES # BLD AUTO: 0.3 10*3/MM3 (ref 0.1–0.9)
MONOCYTES # BLD AUTO: 0.36 10*3/MM3 (ref 0.1–0.9)
MONOCYTES # BLD AUTO: 0.44 10*3/MM3 (ref 0.1–0.9)
MONOCYTES # BLD AUTO: 0.51 10*3/MM3 (ref 0.1–0.9)
MONOCYTES # BLD AUTO: 0.53 10*3/MM3 (ref 0.1–0.9)
MONOCYTES # BLD AUTO: 0.55 10*3/MM3 (ref 0.1–0.9)
MONOCYTES # BLD AUTO: 0.6 10*3/MM3 (ref 0.1–0.9)
MONOCYTES # BLD AUTO: 0.62 10*3/MM3 (ref 0.1–0.9)
MONOCYTES # BLD AUTO: 0.62 10*3/MM3 (ref 0.1–0.9)
MONOCYTES # BLD AUTO: 0.7 10*3/MM3 (ref 0.1–0.9)
MONOCYTES # BLD AUTO: 0.74 10*3/MM3 (ref 0.1–0.9)
MONOCYTES # BLD AUTO: 0.74 10*3/MM3 (ref 0.1–0.9)
MONOCYTES # BLD AUTO: 0.8 10*3/MM3 (ref 0.1–0.9)
MONOCYTES # BLD AUTO: 0.8 10*3/MM3 (ref 0.1–0.9)
MONOCYTES # BLD AUTO: 0.82 10*3/MM3 (ref 0.1–0.9)
MONOCYTES # BLD AUTO: 0.9 10*3/MM3 (ref 0.1–0.9)
MONOCYTES # BLD AUTO: 0.9 10*3/MM3 (ref 0.1–0.9)
MONOCYTES # BLD AUTO: 0.97 10*3/MM3 (ref 0.1–0.9)
MONOCYTES # BLD AUTO: 0.98 10*3/MM3 (ref 0.1–0.9)
MONOCYTES # BLD AUTO: 1.13 10*3/MM3 (ref 0.1–0.9)
MONOCYTES # BLD AUTO: 1.16 10*3/MM3 (ref 0.1–0.9)
MONOCYTES # BLD AUTO: 1.3 10*3/MM3 (ref 0.1–0.9)
MONOCYTES # BLD AUTO: 1.36 10*3/MM3 (ref 0.1–0.9)
MONOCYTES NFR BLD AUTO: 11.7 % (ref 5–12)
MONOCYTES NFR BLD AUTO: 13.8 % (ref 5–12)
MONOCYTES NFR BLD AUTO: 7.7 % (ref 5–12)
MONOCYTES NFR BLD AUTO: 8.1 % (ref 5–12)
MONOCYTES NFR BLD AUTO: 8.7 % (ref 5–12)
MONOCYTES NFR BLD AUTO: 9.5 % (ref 5–12)
MRSA DNA SPEC QL NAA+PROBE: ABNORMAL
MRSA DNA SPEC QL NAA+PROBE: NORMAL
MYELOCYTES NFR BLD MANUAL: 1 % (ref 0–0)
MYELOCYTES NFR BLD MANUAL: 2 % (ref 0–0)
MYELOCYTES NFR BLD MANUAL: 3 % (ref 0–0)
MYELOCYTES NFR BLD MANUAL: 4 % (ref 0–0)
MYELOCYTES NFR BLD MANUAL: 6 % (ref 0–0)
MYELOCYTES NFR BLD MANUAL: 6 % (ref 0–0)
NEUTROPHILS # BLD AUTO: 11.73 10*3/MM3 (ref 1.7–7)
NEUTROPHILS # BLD AUTO: 3.6 10*3/MM3 (ref 1.7–7)
NEUTROPHILS # BLD AUTO: 3.97 10*3/MM3 (ref 1.7–7)
NEUTROPHILS # BLD AUTO: 4.8 10*3/MM3 (ref 1.7–7)
NEUTROPHILS # BLD AUTO: 5.15 10*3/MM3 (ref 1.7–7)
NEUTROPHILS # BLD AUTO: 5.48 10*3/MM3 (ref 1.7–7)
NEUTROPHILS # BLD AUTO: 5.55 10*3/MM3 (ref 1.7–7)
NEUTROPHILS # BLD AUTO: 5.85 10*3/MM3 (ref 1.7–7)
NEUTROPHILS # BLD AUTO: 6.56 10*3/MM3 (ref 1.7–7)
NEUTROPHILS # BLD AUTO: 6.64 10*3/MM3 (ref 1.7–7)
NEUTROPHILS # BLD AUTO: 6.72 10*3/MM3 (ref 1.7–7)
NEUTROPHILS # BLD AUTO: 6.97 10*3/MM3 (ref 1.7–7)
NEUTROPHILS # BLD AUTO: 7.13 10*3/MM3 (ref 1.7–7)
NEUTROPHILS # BLD AUTO: 7.22 10*3/MM3 (ref 1.7–7)
NEUTROPHILS # BLD AUTO: 7.25 10*3/MM3 (ref 1.7–7)
NEUTROPHILS # BLD AUTO: 7.58 10*3/MM3 (ref 1.7–7)
NEUTROPHILS # BLD AUTO: 7.74 10*3/MM3 (ref 1.7–7)
NEUTROPHILS # BLD AUTO: 7.82 10*3/MM3 (ref 1.7–7)
NEUTROPHILS # BLD AUTO: 8.1 10*3/MM3 (ref 1.7–7)
NEUTROPHILS # BLD AUTO: 8.14 10*3/MM3 (ref 1.7–7)
NEUTROPHILS # BLD AUTO: 8.59 10*3/MM3 (ref 1.7–7)
NEUTROPHILS # BLD AUTO: 9.57 10*3/MM3 (ref 1.7–7)
NEUTROPHILS # BLD AUTO: 9.8 10*3/MM3 (ref 1.7–7)
NEUTROPHILS # BLD AUTO: 9.92 10*3/MM3 (ref 1.7–7)
NEUTROPHILS # BLD AUTO: 9.96 10*3/MM3 (ref 1.7–7)
NEUTROPHILS NFR BLD AUTO: 10.5 10*3/MM3 (ref 1.7–7)
NEUTROPHILS NFR BLD AUTO: 11.4 10*3/MM3 (ref 1.7–7)
NEUTROPHILS NFR BLD AUTO: 4.7 10*3/MM3 (ref 1.7–7)
NEUTROPHILS NFR BLD AUTO: 5.9 10*3/MM3 (ref 1.7–7)
NEUTROPHILS NFR BLD AUTO: 6.5 10*3/MM3 (ref 1.7–7)
NEUTROPHILS NFR BLD AUTO: 75.1 % (ref 42.7–76)
NEUTROPHILS NFR BLD AUTO: 77.6 % (ref 42.7–76)
NEUTROPHILS NFR BLD AUTO: 8.4 10*3/MM3 (ref 1.7–7)
NEUTROPHILS NFR BLD AUTO: 81.8 % (ref 42.7–76)
NEUTROPHILS NFR BLD AUTO: 82.1 % (ref 42.7–76)
NEUTROPHILS NFR BLD AUTO: 85.2 % (ref 42.7–76)
NEUTROPHILS NFR BLD AUTO: 85.6 % (ref 42.7–76)
NEUTROPHILS NFR BLD MANUAL: 50 % (ref 42.7–76)
NEUTROPHILS NFR BLD MANUAL: 53 % (ref 42.7–76)
NEUTROPHILS NFR BLD MANUAL: 54 % (ref 42.7–76)
NEUTROPHILS NFR BLD MANUAL: 59 % (ref 42.7–76)
NEUTROPHILS NFR BLD MANUAL: 59 % (ref 42.7–76)
NEUTROPHILS NFR BLD MANUAL: 60 % (ref 42.7–76)
NEUTROPHILS NFR BLD MANUAL: 61 % (ref 42.7–76)
NEUTROPHILS NFR BLD MANUAL: 65 % (ref 42.7–76)
NEUTROPHILS NFR BLD MANUAL: 67 % (ref 42.7–76)
NEUTROPHILS NFR BLD MANUAL: 67 % (ref 42.7–76)
NEUTROPHILS NFR BLD MANUAL: 70 % (ref 42.7–76)
NEUTROPHILS NFR BLD MANUAL: 71 % (ref 42.7–76)
NEUTROPHILS NFR BLD MANUAL: 71 % (ref 42.7–76)
NEUTROPHILS NFR BLD MANUAL: 72 % (ref 42.7–76)
NEUTROPHILS NFR BLD MANUAL: 73 % (ref 42.7–76)
NEUTROPHILS NFR BLD MANUAL: 74 % (ref 42.7–76)
NEUTROPHILS NFR BLD MANUAL: 75 % (ref 42.7–76)
NEUTROPHILS NFR BLD MANUAL: 76 % (ref 42.7–76)
NEUTROPHILS NFR BLD MANUAL: 78 % (ref 42.7–76)
NEUTROPHILS NFR BLD MANUAL: 79 % (ref 42.7–76)
NEUTROPHILS NFR BLD MANUAL: 81 % (ref 42.7–76)
NEUTS BAND NFR BLD MANUAL: 1 % (ref 0–5)
NEUTS BAND NFR BLD MANUAL: 10 % (ref 0–5)
NEUTS BAND NFR BLD MANUAL: 11 % (ref 0–5)
NEUTS BAND NFR BLD MANUAL: 12 % (ref 0–5)
NEUTS BAND NFR BLD MANUAL: 13 % (ref 0–5)
NEUTS BAND NFR BLD MANUAL: 14 % (ref 0–5)
NEUTS BAND NFR BLD MANUAL: 14 % (ref 0–5)
NEUTS BAND NFR BLD MANUAL: 16 % (ref 0–5)
NEUTS BAND NFR BLD MANUAL: 18 % (ref 0–5)
NEUTS BAND NFR BLD MANUAL: 19 % (ref 0–5)
NEUTS BAND NFR BLD MANUAL: 22 % (ref 0–5)
NEUTS BAND NFR BLD MANUAL: 26 % (ref 0–5)
NEUTS BAND NFR BLD MANUAL: 27 % (ref 0–5)
NEUTS BAND NFR BLD MANUAL: 31 % (ref 0–5)
NEUTS BAND NFR BLD MANUAL: 6 % (ref 0–5)
NEUTS BAND NFR BLD MANUAL: 6 % (ref 0–5)
NEUTS BAND NFR BLD MANUAL: 8 % (ref 0–5)
NEUTS BAND NFR BLD MANUAL: 9 % (ref 0–5)
NEUTS BAND NFR BLD MANUAL: 9 % (ref 0–5)
NITRITE UR QL STRIP: NEGATIVE
NRBC BLD AUTO-RTO: 0.1 /100 WBC (ref 0–0.2)
NRBC BLD AUTO-RTO: 0.2 /100 WBC (ref 0–0.2)
NRBC BLD AUTO-RTO: 0.6 /100 WBC (ref 0–0.2)
NRBC BLD AUTO-RTO: 0.8 /100 WBC (ref 0–0.2)
NRBC BLD AUTO-RTO: 0.9 /100 WBC (ref 0–0.2)
NRBC BLD AUTO-RTO: 1.6 /100 WBC (ref 0–0.2)
NRBC SPEC MANUAL: 1 /100 WBC (ref 0–0.2)
NRBC SPEC MANUAL: 2 /100 WBC (ref 0–0.2)
NRBC SPEC MANUAL: 3 /100 WBC (ref 0–0.2)
NRBC SPEC MANUAL: 3 /100 WBC (ref 0–0.2)
NRBC SPEC MANUAL: 5 /100 WBC (ref 0–0.2)
NT-PROBNP SERPL-MCNC: 1702 PG/ML (ref 0–900)
NT-PROBNP SERPL-MCNC: 689.9 PG/ML (ref 0–900)
NT-PROBNP SERPL-MCNC: 919.7 PG/ML (ref 0–900)
NT-PROBNP SERPL-MCNC: ABNORMAL PG/ML (ref 0–900)
PATH REPORT.FINAL DX SPEC: NORMAL
PATHOLOGY REVIEW: YES
PATHOLOGY REVIEW: YES
PCO2 BLDA: 22.2 MM HG (ref 35–48)
PCO2 BLDA: 28.6 MM HG (ref 35–48)
PCO2 BLDA: 29.2 MM HG (ref 35–48)
PCO2 BLDA: 30.1 MM HG (ref 35–48)
PCO2 BLDA: 32.3 MM HG (ref 35–48)
PCO2 BLDA: 34.1 MM HG (ref 35–48)
PCO2 BLDA: 34.4 MM HG (ref 35–48)
PCO2 BLDA: 34.9 MM HG (ref 35–48)
PCO2 BLDA: 36 MM HG (ref 35–48)
PCO2 BLDA: 36.6 MM HG (ref 35–48)
PCO2 BLDA: 37.1 MM HG (ref 35–48)
PCO2 BLDA: 38.4 MM HG (ref 35–48)
PCO2 TEMP ADJ BLD: 40.4 MM HG (ref 35–48)
PEEP RESPIRATORY: 5 CM[H2O]
PH BLDA: 7.33 PH UNITS (ref 7.35–7.45)
PH BLDA: 7.34 PH UNITS (ref 7.35–7.45)
PH BLDA: 7.36 PH UNITS (ref 7.35–7.45)
PH BLDA: 7.36 PH UNITS (ref 7.35–7.45)
PH BLDA: 7.41 PH UNITS (ref 7.35–7.45)
PH BLDA: 7.42 PH UNITS (ref 7.35–7.45)
PH BLDA: 7.42 PH UNITS (ref 7.35–7.45)
PH BLDA: 7.45 PH UNITS (ref 7.35–7.45)
PH BLDA: 7.46 PH UNITS (ref 7.35–7.45)
PH BLDA: 7.49 PH UNITS (ref 7.35–7.45)
PH BLDA: 7.51 PH UNITS (ref 7.35–7.45)
PH BLDA: 7.52 PH UNITS (ref 7.35–7.45)
PH UR STRIP.AUTO: 6 [PH] (ref 5–8)
PH, TEMP CORRECTED: 7.37 PH UNITS (ref 7.35–7.45)
PHOSPHATE SERPL-MCNC: 2.3 MG/DL (ref 2.5–4.5)
PHOSPHATE SERPL-MCNC: 2.5 MG/DL (ref 2.5–4.5)
PLASMA CELL PREC NFR BLD MANUAL: 1 % (ref 0–0)
PLAT MORPH BLD: NORMAL
PLATELET # BLD AUTO: 103 10*3/MM3 (ref 140–450)
PLATELET # BLD AUTO: 106 10*3/MM3 (ref 140–450)
PLATELET # BLD AUTO: 106 10*3/MM3 (ref 140–450)
PLATELET # BLD AUTO: 112 10*3/MM3 (ref 140–450)
PLATELET # BLD AUTO: 115 10*3/MM3 (ref 140–450)
PLATELET # BLD AUTO: 118 10*3/MM3 (ref 140–450)
PLATELET # BLD AUTO: 132 10*3/MM3 (ref 140–450)
PLATELET # BLD AUTO: 147 10*3/MM3 (ref 140–450)
PLATELET # BLD AUTO: 148 10*3/MM3 (ref 140–450)
PLATELET # BLD AUTO: 161 10*3/MM3 (ref 140–450)
PLATELET # BLD AUTO: 180 10*3/MM3 (ref 140–450)
PLATELET # BLD AUTO: 181 10*3/MM3 (ref 140–450)
PLATELET # BLD AUTO: 193 10*3/MM3 (ref 140–450)
PLATELET # BLD AUTO: 202 10*3/MM3 (ref 140–450)
PLATELET # BLD AUTO: 218 10*3/MM3 (ref 140–450)
PLATELET # BLD AUTO: 226 10*3/MM3 (ref 140–450)
PLATELET # BLD AUTO: 243 10*3/MM3 (ref 140–450)
PLATELET # BLD AUTO: 243 10*3/MM3 (ref 140–450)
PLATELET # BLD AUTO: 260 10*3/MM3 (ref 140–450)
PLATELET # BLD AUTO: 276 10*3/MM3 (ref 140–450)
PLATELET # BLD AUTO: 286 10*3/MM3 (ref 140–450)
PLATELET # BLD AUTO: 299 10*3/MM3 (ref 140–450)
PLATELET # BLD AUTO: 335 10*3/MM3 (ref 140–450)
PLATELET # BLD AUTO: 346 10*3/MM3 (ref 140–450)
PLATELET # BLD AUTO: 57 10*3/MM3 (ref 140–450)
PLATELET # BLD AUTO: 60 10*3/MM3 (ref 140–450)
PLATELET # BLD AUTO: 73 10*3/MM3 (ref 140–450)
PLATELET # BLD AUTO: 78 10*3/MM3 (ref 140–450)
PLATELET # BLD AUTO: 87 10*3/MM3 (ref 140–450)
PLATELET # BLD AUTO: 92 10*3/MM3 (ref 140–450)
PLATELET # BLD AUTO: 97 10*3/MM3 (ref 140–450)
PMV BLD AUTO: 7.3 FL (ref 6–12)
PMV BLD AUTO: 7.3 FL (ref 6–12)
PMV BLD AUTO: 7.4 FL (ref 6–12)
PMV BLD AUTO: 7.4 FL (ref 6–12)
PMV BLD AUTO: 7.6 FL (ref 6–12)
PMV BLD AUTO: 7.6 FL (ref 6–12)
PMV BLD AUTO: 7.7 FL (ref 6–12)
PMV BLD AUTO: 7.8 FL (ref 6–12)
PMV BLD AUTO: 7.9 FL (ref 6–12)
PMV BLD AUTO: 7.9 FL (ref 6–12)
PMV BLD AUTO: 8 FL (ref 6–12)
PMV BLD AUTO: 8.1 FL (ref 6–12)
PMV BLD AUTO: 8.2 FL (ref 6–12)
PMV BLD AUTO: 8.3 FL (ref 6–12)
PMV BLD AUTO: 8.4 FL (ref 6–12)
PMV BLD AUTO: 8.6 FL (ref 6–12)
PMV BLD AUTO: 8.7 FL (ref 6–12)
PMV BLD AUTO: 8.7 FL (ref 6–12)
PMV BLD AUTO: 8.9 FL (ref 6–12)
PMV BLD AUTO: 9.1 FL (ref 6–12)
PMV BLD AUTO: 9.2 FL (ref 6–12)
PMV BLD AUTO: 9.3 FL (ref 6–12)
PMV BLD AUTO: 9.6 FL (ref 6–12)
PMV BLD AUTO: 9.7 FL (ref 6–12)
PMV BLD AUTO: 9.9 FL (ref 6–12)
PO2 BLDA: 153.5 MM HG (ref 83–108)
PO2 BLDA: 284.7 MM HG (ref 83–108)
PO2 BLDA: 49.4 MM HG (ref 83–108)
PO2 BLDA: 58.8 MM HG (ref 83–108)
PO2 BLDA: 60.4 MM HG (ref 83–108)
PO2 BLDA: 62 MM HG (ref 83–108)
PO2 BLDA: 66.4 MM HG (ref 83–108)
PO2 BLDA: 74.9 MM HG (ref 83–108)
PO2 BLDA: 75.7 MM HG (ref 83–108)
PO2 BLDA: 76.5 MM HG (ref 83–108)
PO2 BLDA: 77.1 MM HG (ref 83–108)
PO2 BLDA: 82.9 MM HG (ref 83–108)
PO2 TEMP ADJ BLD: 68.8 MM HG (ref 83–108)
POIKILOCYTOSIS BLD QL SMEAR: ABNORMAL
POIKILOCYTOSIS BLD QL SMEAR: NORMAL
POIKILOCYTOSIS BLD QL SMEAR: NORMAL
POLYCHROMASIA BLD QL SMEAR: ABNORMAL
POLYCHROMASIA BLD QL SMEAR: NORMAL
POTASSIUM BLDA-SCNC: 2.7 MMOL/L (ref 3.5–4.5)
POTASSIUM SERPL-SCNC: 2.9 MMOL/L (ref 3.5–5.2)
POTASSIUM SERPL-SCNC: 3 MMOL/L (ref 3.5–5.2)
POTASSIUM SERPL-SCNC: 3 MMOL/L (ref 3.5–5.2)
POTASSIUM SERPL-SCNC: 3.1 MMOL/L (ref 3.5–5.2)
POTASSIUM SERPL-SCNC: 3.4 MMOL/L (ref 3.5–5.2)
POTASSIUM SERPL-SCNC: 3.6 MMOL/L (ref 3.5–5.2)
POTASSIUM SERPL-SCNC: 3.6 MMOL/L (ref 3.5–5.2)
POTASSIUM SERPL-SCNC: 3.7 MMOL/L (ref 3.5–5.2)
POTASSIUM SERPL-SCNC: 3.8 MMOL/L (ref 3.5–5.2)
POTASSIUM SERPL-SCNC: 3.9 MMOL/L (ref 3.5–5.2)
POTASSIUM SERPL-SCNC: 3.9 MMOL/L (ref 3.5–5.2)
POTASSIUM SERPL-SCNC: 4 MMOL/L (ref 3.5–5.2)
POTASSIUM SERPL-SCNC: 4.1 MMOL/L (ref 3.5–5.2)
POTASSIUM SERPL-SCNC: 4.2 MMOL/L (ref 3.5–5.2)
POTASSIUM SERPL-SCNC: 4.3 MMOL/L (ref 3.5–5.2)
POTASSIUM SERPL-SCNC: 4.4 MMOL/L (ref 3.5–5.2)
POTASSIUM SERPL-SCNC: 4.5 MMOL/L (ref 3.5–5.2)
POTASSIUM SERPL-SCNC: 4.5 MMOL/L (ref 3.5–5.2)
POTASSIUM SERPL-SCNC: 4.6 MMOL/L (ref 3.5–5.2)
POTASSIUM SERPL-SCNC: 4.6 MMOL/L (ref 3.5–5.2)
POTASSIUM SERPL-SCNC: 4.7 MMOL/L (ref 3.5–5.2)
POTASSIUM SERPL-SCNC: 4.9 MMOL/L (ref 3.5–5.2)
POTASSIUM SERPL-SCNC: 5 MMOL/L (ref 3.5–5.2)
PROCALCITONIN SERPL-MCNC: 0.12 NG/ML (ref 0–0.25)
PROCALCITONIN SERPL-MCNC: 0.14 NG/ML (ref 0–0.25)
PROCALCITONIN SERPL-MCNC: 3.94 NG/ML (ref 0–0.25)
PROMYELOCYTES NFR BLD MANUAL: 1 % (ref 0–0)
PROMYELOCYTES NFR BLD MANUAL: 1 % (ref 0–0)
PROMYELOCYTES NFR BLD MANUAL: 2 % (ref 0–0)
PROMYELOCYTES NFR BLD MANUAL: 3 % (ref 0–0)
PROT SERPL-MCNC: 5.8 G/DL (ref 6–8.5)
PROT SERPL-MCNC: 5.9 G/DL (ref 6–8.5)
PROT SERPL-MCNC: 5.9 G/DL (ref 6–8.5)
PROT SERPL-MCNC: 6.2 G/DL (ref 6–8.5)
PROT SERPL-MCNC: 6.3 G/DL (ref 6–8.5)
PROT SERPL-MCNC: 6.3 G/DL (ref 6–8.5)
PROT SERPL-MCNC: 6.4 G/DL (ref 6–8.5)
PROT SERPL-MCNC: 6.5 G/DL (ref 6–8.5)
PROT SERPL-MCNC: 6.6 G/DL (ref 6–8.5)
PROT SERPL-MCNC: 6.7 G/DL (ref 6–8.5)
PROT SERPL-MCNC: 6.8 G/DL (ref 6–8.5)
PROT SERPL-MCNC: 6.9 G/DL (ref 6–8.5)
PROT SERPL-MCNC: 6.9 G/DL (ref 6–8.5)
PROT SERPL-MCNC: 7 G/DL (ref 6–8.5)
PROT SERPL-MCNC: 7 G/DL (ref 6–8.5)
PROT SERPL-MCNC: 7.1 G/DL (ref 6–8.5)
PROT SERPL-MCNC: 7.2 G/DL (ref 6–8.5)
PROT SERPL-MCNC: 7.2 G/DL (ref 6–8.5)
PROT SERPL-MCNC: 7.4 G/DL (ref 6–8.5)
PROT SERPL-MCNC: 7.6 G/DL (ref 6–8.5)
PROT SERPL-MCNC: 7.8 G/DL (ref 6–8.5)
PROT UR QL STRIP: ABNORMAL
PROTHROMBIN TIME: 11.5 SECONDS (ref 9.6–11.7)
PSV: 10 CMH2O
QT INTERVAL: 291 MS
QT INTERVAL: 339 MS
QT INTERVAL: 340 MS
QT INTERVAL: 345 MS
QT INTERVAL: 347 MS
QT INTERVAL: 356 MS
QT INTERVAL: 358 MS
QT INTERVAL: 371 MS
QT INTERVAL: 371 MS
QT INTERVAL: 377 MS
QT INTERVAL: 379 MS
QT INTERVAL: 381 MS
QT INTERVAL: 381 MS
QT INTERVAL: 394 MS
QT INTERVAL: 395 MS
QT INTERVAL: 426 MS
RBC # BLD AUTO: 1.9 10*6/MM3 (ref 4.14–5.8)
RBC # BLD AUTO: 1.94 10*6/MM3 (ref 4.14–5.8)
RBC # BLD AUTO: 1.95 10*6/MM3 (ref 4.14–5.8)
RBC # BLD AUTO: 2.07 10*6/MM3 (ref 4.14–5.8)
RBC # BLD AUTO: 2.21 10*6/MM3 (ref 4.14–5.8)
RBC # BLD AUTO: 2.28 10*6/MM3 (ref 4.14–5.8)
RBC # BLD AUTO: 2.3 10*6/MM3 (ref 4.14–5.8)
RBC # BLD AUTO: 2.39 10*6/MM3 (ref 4.14–5.8)
RBC # BLD AUTO: 2.4 10*6/MM3 (ref 4.14–5.8)
RBC # BLD AUTO: 2.41 10*6/MM3 (ref 4.14–5.8)
RBC # BLD AUTO: 2.44 10*6/MM3 (ref 4.14–5.8)
RBC # BLD AUTO: 2.44 10*6/MM3 (ref 4.14–5.8)
RBC # BLD AUTO: 2.49 10*6/MM3 (ref 4.14–5.8)
RBC # BLD AUTO: 2.51 10*6/MM3 (ref 4.14–5.8)
RBC # BLD AUTO: 2.55 10*6/MM3 (ref 4.14–5.8)
RBC # BLD AUTO: 2.57 10*6/MM3 (ref 4.14–5.8)
RBC # BLD AUTO: 2.59 10*6/MM3 (ref 4.14–5.8)
RBC # BLD AUTO: 2.6 10*6/MM3 (ref 4.14–5.8)
RBC # BLD AUTO: 2.63 10*6/MM3 (ref 4.14–5.8)
RBC # BLD AUTO: 2.63 10*6/MM3 (ref 4.14–5.8)
RBC # BLD AUTO: 2.64 10*6/MM3 (ref 4.14–5.8)
RBC # BLD AUTO: 2.65 10*6/MM3 (ref 4.14–5.8)
RBC # BLD AUTO: 2.67 10*6/MM3 (ref 4.14–5.8)
RBC # BLD AUTO: 2.67 10*6/MM3 (ref 4.14–5.8)
RBC # BLD AUTO: 2.68 10*6/MM3 (ref 4.14–5.8)
RBC # BLD AUTO: 2.68 10*6/MM3 (ref 4.14–5.8)
RBC # BLD AUTO: 2.72 10*6/MM3 (ref 4.14–5.8)
RBC # BLD AUTO: 2.72 10*6/MM3 (ref 4.14–5.8)
RBC # BLD AUTO: 2.75 10*6/MM3 (ref 4.14–5.8)
RBC # BLD AUTO: 2.76 10*6/MM3 (ref 4.14–5.8)
RBC # BLD AUTO: 2.79 10*6/MM3 (ref 4.14–5.8)
RBC # UR: ABNORMAL /HPF
REF LAB TEST METHOD: ABNORMAL
RESPIRATORY RATE: 20
RESPIRATORY RATE: 20
RETICS # AUTO: 0.04 10*6/MM3 (ref 0.02–0.13)
RETICS # AUTO: 0.07 10*6/MM3 (ref 0.02–0.13)
RETICS/RBC NFR AUTO: 1.93 % (ref 0.7–1.9)
RETICS/RBC NFR AUTO: 3.44 % (ref 0.7–1.9)
RH BLD: POSITIVE
RH BLD: POSITIVE
RHINOVIRUS RNA SPEC NAA+PROBE: DETECTED
RHINOVIRUS RNA SPEC NAA+PROBE: NOT DETECTED
RHINOVIRUS RNA SPEC NAA+PROBE: NOT DETECTED
RSV RNA NPH QL NAA+NON-PROBE: NOT DETECTED
SAO2 % BLDCOA: 88.5 % (ref 94–98)
SAO2 % BLDCOA: 89 % (ref 94–98)
SAO2 % BLDCOA: 90.3 % (ref 94–98)
SAO2 % BLDCOA: 92.5 % (ref 94–98)
SAO2 % BLDCOA: 93.5 % (ref 94–98)
SAO2 % BLDCOA: 94.4 % (ref 94–98)
SAO2 % BLDCOA: 95.7 % (ref 94–98)
SAO2 % BLDCOA: 96 % (ref 94–98)
SAO2 % BLDCOA: 96.6 % (ref 94–98)
SAO2 % BLDCOA: 96.9 % (ref 94–98)
SAO2 % BLDCOA: 99.4 % (ref 94–98)
SAO2 % BLDCOA: 99.9 % (ref 94–98)
SARS-COV-2 RNA NPH QL NAA+NON-PROBE: NOT DETECTED
SCAN SLIDE: NORMAL
SMALL PLATELETS BLD QL SMEAR: ABNORMAL
SMALL PLATELETS BLD QL SMEAR: ADEQUATE
SODIUM BLD-SCNC: 143 MMOL/L (ref 138–146)
SODIUM SERPL-SCNC: 129 MMOL/L (ref 136–145)
SODIUM SERPL-SCNC: 130 MMOL/L (ref 136–145)
SODIUM SERPL-SCNC: 131 MMOL/L (ref 136–145)
SODIUM SERPL-SCNC: 132 MMOL/L (ref 136–145)
SODIUM SERPL-SCNC: 133 MMOL/L (ref 136–145)
SODIUM SERPL-SCNC: 134 MMOL/L (ref 136–145)
SODIUM SERPL-SCNC: 135 MMOL/L (ref 136–145)
SODIUM SERPL-SCNC: 136 MMOL/L (ref 136–145)
SODIUM SERPL-SCNC: 136 MMOL/L (ref 136–145)
SODIUM SERPL-SCNC: 137 MMOL/L (ref 136–145)
SODIUM SERPL-SCNC: 137 MMOL/L (ref 136–145)
SODIUM SERPL-SCNC: 139 MMOL/L (ref 136–145)
SODIUM SERPL-SCNC: 140 MMOL/L (ref 136–145)
SODIUM SERPL-SCNC: 141 MMOL/L (ref 136–145)
SODIUM SERPL-SCNC: 142 MMOL/L (ref 136–145)
SODIUM SERPL-SCNC: 143 MMOL/L (ref 136–145)
SODIUM SERPL-SCNC: 145 MMOL/L (ref 136–145)
SODIUM SERPL-SCNC: 147 MMOL/L (ref 136–145)
SODIUM SERPL-SCNC: 147 MMOL/L (ref 136–145)
SODIUM SERPL-SCNC: 149 MMOL/L (ref 136–145)
SP GR UR STRIP: 1.02 (ref 1–1.03)
SQUAMOUS #/AREA URNS HPF: ABNORMAL /HPF
T&S EXPIRATION DATE: NORMAL
T&S EXPIRATION DATE: NORMAL
T4 FREE SERPL-MCNC: 1.81 NG/DL (ref 0.93–1.7)
TIBC SERPL-MCNC: 146 MCG/DL (ref 298–536)
TIBC SERPL-MCNC: 180 MCG/DL (ref 298–536)
TOXIC GRANULATION: ABNORMAL
TOXIC GRANULATION: NORMAL
TRANSFERRIN SERPL-MCNC: 121 MG/DL (ref 200–360)
TRANSFERRIN SERPL-MCNC: 98 MG/DL (ref 200–360)
TROPONIN T SERPL-MCNC: 0.01 NG/ML (ref 0–0.03)
TROPONIN T SERPL-MCNC: <0.01 NG/ML (ref 0–0.03)
TROPONIN T SERPL-MCNC: <0.01 NG/ML (ref 0–0.03)
TSH SERPL DL<=0.05 MIU/L-ACNC: 0.21 UIU/ML (ref 0.27–4.2)
UNIT  ABO: NORMAL
UNIT  ABO: NORMAL
UNIT  RH: NORMAL
UNIT  RH: NORMAL
UROBILINOGEN UR QL STRIP: ABNORMAL
VANCOMYCIN PEAK SERPL-MCNC: 14.1 MCG/ML (ref 20–40)
VANCOMYCIN TROUGH SERPL-MCNC: 14.4 MCG/ML (ref 5–20)
VANCOMYCIN TROUGH SERPL-MCNC: 15 MCG/ML (ref 5–20)
VANCOMYCIN TROUGH SERPL-MCNC: 16.9 MCG/ML (ref 5–20)
VANCOMYCIN TROUGH SERPL-MCNC: 18.8 MCG/ML (ref 5–20)
VANCOMYCIN TROUGH SERPL-MCNC: 20.9 MCG/ML (ref 5–20)
VARIANT LYMPHS NFR BLD MANUAL: 1 % (ref 0–5)
VARIANT LYMPHS NFR BLD MANUAL: 2 % (ref 0–5)
VARIANT LYMPHS NFR BLD MANUAL: 2 % (ref 0–5)
VARIANT LYMPHS NFR BLD MANUAL: 3 % (ref 0–5)
VENTILATOR MODE: ABNORMAL
VIT B12 BLD-MCNC: >2000 PG/ML (ref 211–946)
VT ON VENT VENT: 500 ML
VT ON VENT VENT: 500 ML
WBC # BLD AUTO: 10.1 10*3/MM3 (ref 3.4–10.8)
WBC # BLD AUTO: 10.3 10*3/MM3 (ref 3.4–10.8)
WBC # BLD AUTO: 10.9 10*3/MM3 (ref 3.4–10.8)
WBC # BLD AUTO: 11 10*3/MM3 (ref 3.4–10.8)
WBC # BLD AUTO: 12.1 10*3/MM3 (ref 3.4–10.8)
WBC # BLD AUTO: 12.2 10*3/MM3 (ref 3.4–10.8)
WBC # BLD AUTO: 12.3 10*3/MM3 (ref 3.4–10.8)
WBC # BLD AUTO: 13.8 10*3/MM3 (ref 3.4–10.8)
WBC # BLD AUTO: 14 10*3/MM3 (ref 3.4–10.8)
WBC # BLD AUTO: 5.3 10*3/MM3 (ref 3.4–10.8)
WBC # BLD AUTO: 6.2 10*3/MM3 (ref 3.4–10.8)
WBC # BLD AUTO: 6.2 10*3/MM3 (ref 3.4–10.8)
WBC # BLD AUTO: 6.3 10*3/MM3 (ref 3.4–10.8)
WBC # BLD AUTO: 6.3 10*3/MM3 (ref 3.4–10.8)
WBC # BLD AUTO: 6.4 10*3/MM3 (ref 3.4–10.8)
WBC # BLD AUTO: 7.2 10*3/MM3 (ref 3.4–10.8)
WBC # BLD AUTO: 7.6 10*3/MM3 (ref 3.4–10.8)
WBC # BLD AUTO: 7.7 10*3/MM3 (ref 3.4–10.8)
WBC # BLD AUTO: 8.2 10*3/MM3 (ref 3.4–10.8)
WBC # BLD AUTO: 8.3 10*3/MM3 (ref 3.4–10.8)
WBC # BLD AUTO: 8.4 10*3/MM3 (ref 3.4–10.8)
WBC # BLD AUTO: 8.6 10*3/MM3 (ref 3.4–10.8)
WBC # BLD AUTO: 8.8 10*3/MM3 (ref 3.4–10.8)
WBC # BLD AUTO: 9.2 10*3/MM3 (ref 3.4–10.8)
WBC # BLD AUTO: 9.3 10*3/MM3 (ref 3.4–10.8)
WBC # BLD AUTO: 9.6 10*3/MM3 (ref 3.4–10.8)
WBC # BLD AUTO: 9.8 10*3/MM3 (ref 3.4–10.8)
WBC MORPH BLD: NORMAL
WBC UR QL AUTO: ABNORMAL /HPF
WHOLE BLOOD HOLD SPECIMEN: NORMAL

## 2021-01-01 PROCEDURE — 63710000001 PREDNISONE PER 5 MG: Performed by: INTERNAL MEDICINE

## 2021-01-01 PROCEDURE — 85025 COMPLETE CBC W/AUTO DIFF WBC: CPT | Performed by: INTERNAL MEDICINE

## 2021-01-01 PROCEDURE — 84100 ASSAY OF PHOSPHORUS: CPT | Performed by: DIETITIAN, REGISTERED

## 2021-01-01 PROCEDURE — 71045 X-RAY EXAM CHEST 1 VIEW: CPT

## 2021-01-01 PROCEDURE — 25010000002 ONDANSETRON PER 1 MG: Performed by: NURSE PRACTITIONER

## 2021-01-01 PROCEDURE — 25010000002 VANCOMYCIN 1 G RECONSTITUTED SOLUTION 1 EACH VIAL: Performed by: INTERNAL MEDICINE

## 2021-01-01 PROCEDURE — 63710000001 INSULIN LISPRO (HUMAN) PER 5 UNITS: Performed by: FAMILY MEDICINE

## 2021-01-01 PROCEDURE — 85025 COMPLETE CBC W/AUTO DIFF WBC: CPT | Performed by: NURSE PRACTITIONER

## 2021-01-01 PROCEDURE — 82962 GLUCOSE BLOOD TEST: CPT

## 2021-01-01 PROCEDURE — 25010000003 AMPICILLIN-SULBACTAM PER 1.5 G: Performed by: NURSE PRACTITIONER

## 2021-01-01 PROCEDURE — 85007 BL SMEAR W/DIFF WBC COUNT: CPT | Performed by: INTERNAL MEDICINE

## 2021-01-01 PROCEDURE — 25010000002 CEFEPIME PER 500 MG: Performed by: INTERNAL MEDICINE

## 2021-01-01 PROCEDURE — 25010000002 VANCOMYCIN 10 G RECONSTITUTED SOLUTION: Performed by: NURSE PRACTITIONER

## 2021-01-01 PROCEDURE — 25010000002 MIDAZOLAM PER 1 MG

## 2021-01-01 PROCEDURE — 82803 BLOOD GASES ANY COMBINATION: CPT

## 2021-01-01 PROCEDURE — 80202 ASSAY OF VANCOMYCIN: CPT | Performed by: NURSE PRACTITIONER

## 2021-01-01 PROCEDURE — 85007 BL SMEAR W/DIFF WBC COUNT: CPT | Performed by: NURSE PRACTITIONER

## 2021-01-01 PROCEDURE — 25010000002 GADOTERIDOL PER 1 ML: Performed by: RADIOLOGY

## 2021-01-01 PROCEDURE — 94799 UNLISTED PULMONARY SVC/PX: CPT

## 2021-01-01 PROCEDURE — 82728 ASSAY OF FERRITIN: CPT | Performed by: INTERNAL MEDICINE

## 2021-01-01 PROCEDURE — 74230 X-RAY XM SWLNG FUNCJ C+: CPT

## 2021-01-01 PROCEDURE — 25010000002 PROCHLORPERAZINE 10 MG/2ML SOLUTION: Performed by: INTERNAL MEDICINE

## 2021-01-01 PROCEDURE — 99231 SBSQ HOSP IP/OBS SF/LOW 25: CPT | Performed by: INTERNAL MEDICINE

## 2021-01-01 PROCEDURE — 0 IOPAMIDOL PER 1 ML: Performed by: PHYSICIAN ASSISTANT

## 2021-01-01 PROCEDURE — 25010000002 HYDROMORPHONE PER 4 MG

## 2021-01-01 PROCEDURE — 83010 ASSAY OF HAPTOGLOBIN QUANT: CPT | Performed by: NURSE PRACTITIONER

## 2021-01-01 PROCEDURE — 85007 BL SMEAR W/DIFF WBC COUNT: CPT | Performed by: EMERGENCY MEDICINE

## 2021-01-01 PROCEDURE — 80048 BASIC METABOLIC PNL TOTAL CA: CPT | Performed by: NURSE PRACTITIONER

## 2021-01-01 PROCEDURE — 97530 THERAPEUTIC ACTIVITIES: CPT

## 2021-01-01 PROCEDURE — 99233 SBSQ HOSP IP/OBS HIGH 50: CPT | Performed by: INTERNAL MEDICINE

## 2021-01-01 PROCEDURE — 25010000002 FUROSEMIDE PER 20 MG: Performed by: INTERNAL MEDICINE

## 2021-01-01 PROCEDURE — 83880 ASSAY OF NATRIURETIC PEPTIDE: CPT | Performed by: EMERGENCY MEDICINE

## 2021-01-01 PROCEDURE — 83735 ASSAY OF MAGNESIUM: CPT | Performed by: INTERNAL MEDICINE

## 2021-01-01 PROCEDURE — 92526 ORAL FUNCTION THERAPY: CPT

## 2021-01-01 PROCEDURE — 25010000002 METHYLPREDNISOLONE PER 40 MG: Performed by: INTERNAL MEDICINE

## 2021-01-01 PROCEDURE — 25010000003 POTASSIUM CHLORIDE 10 MEQ/100ML SOLUTION: Performed by: NURSE PRACTITIONER

## 2021-01-01 PROCEDURE — 63710000001 PREDNISONE PER 1 MG: Performed by: INTERNAL MEDICINE

## 2021-01-01 PROCEDURE — 36600 WITHDRAWAL OF ARTERIAL BLOOD: CPT

## 2021-01-01 PROCEDURE — 99232 SBSQ HOSP IP/OBS MODERATE 35: CPT | Performed by: FAMILY MEDICINE

## 2021-01-01 PROCEDURE — 25010000002 EPOETIN ALFA-EPBX 40000 UNIT/ML SOLUTION: Performed by: NURSE PRACTITIONER

## 2021-01-01 PROCEDURE — 83605 ASSAY OF LACTIC ACID: CPT

## 2021-01-01 PROCEDURE — 93005 ELECTROCARDIOGRAM TRACING: CPT | Performed by: INTERNAL MEDICINE

## 2021-01-01 PROCEDURE — 85045 AUTOMATED RETICULOCYTE COUNT: CPT | Performed by: NURSE PRACTITIONER

## 2021-01-01 PROCEDURE — 87070 CULTURE OTHR SPECIMN AEROBIC: CPT | Performed by: NURSE PRACTITIONER

## 2021-01-01 PROCEDURE — 25010000003 POTASSIUM CHLORIDE PER 2 MEQ: Performed by: INTERNAL MEDICINE

## 2021-01-01 PROCEDURE — 93005 ELECTROCARDIOGRAM TRACING: CPT

## 2021-01-01 PROCEDURE — 97116 GAIT TRAINING THERAPY: CPT

## 2021-01-01 PROCEDURE — 0DH67UZ INSERTION OF FEEDING DEVICE INTO STOMACH, VIA NATURAL OR ARTIFICIAL OPENING: ICD-10-PCS | Performed by: INTERNAL MEDICINE

## 2021-01-01 PROCEDURE — 77014 CHG CT GUIDANCE RADIATION THERAPY FLDS PLACEMENT: CPT | Performed by: RADIOLOGY

## 2021-01-01 PROCEDURE — FACE2FACE: Performed by: RADIOLOGY

## 2021-01-01 PROCEDURE — 87641 MR-STAPH DNA AMP PROBE: CPT | Performed by: INTERNAL MEDICINE

## 2021-01-01 PROCEDURE — 80053 COMPREHEN METABOLIC PANEL: CPT | Performed by: INTERNAL MEDICINE

## 2021-01-01 PROCEDURE — 25010000002 ENOXAPARIN PER 10 MG: Performed by: FAMILY MEDICINE

## 2021-01-01 PROCEDURE — 85610 PROTHROMBIN TIME: CPT | Performed by: NURSE PRACTITIONER

## 2021-01-01 PROCEDURE — 25010000003 AMPICILLIN-SULBACTAM PER 1.5 G: Performed by: INTERNAL MEDICINE

## 2021-01-01 PROCEDURE — 82746 ASSAY OF FOLIC ACID SERUM: CPT | Performed by: INTERNAL MEDICINE

## 2021-01-01 PROCEDURE — 74018 RADEX ABDOMEN 1 VIEW: CPT

## 2021-01-01 PROCEDURE — A9579 GAD-BASE MR CONTRAST NOS,1ML: HCPCS | Performed by: RADIOLOGY

## 2021-01-01 PROCEDURE — 82565 ASSAY OF CREATININE: CPT

## 2021-01-01 PROCEDURE — 25010000002 LORAZEPAM PER 2 MG: Performed by: FAMILY MEDICINE

## 2021-01-01 PROCEDURE — 80053 COMPREHEN METABOLIC PANEL: CPT | Performed by: EMERGENCY MEDICINE

## 2021-01-01 PROCEDURE — 99221 1ST HOSP IP/OBS SF/LOW 40: CPT | Performed by: SPECIALIST

## 2021-01-01 PROCEDURE — 25010000002 FENTANYL CITRATE (PF) 2500 MCG/50ML SOLUTION: Performed by: EMERGENCY MEDICINE

## 2021-01-01 PROCEDURE — 85018 HEMOGLOBIN: CPT | Performed by: NURSE PRACTITIONER

## 2021-01-01 PROCEDURE — 25010000002 DEXAMETHASONE PER 1 MG: Performed by: INTERNAL MEDICINE

## 2021-01-01 PROCEDURE — 83880 ASSAY OF NATRIURETIC PEPTIDE: CPT | Performed by: INTERNAL MEDICINE

## 2021-01-01 PROCEDURE — 25010000002 PROPOFOL 10 MG/ML EMULSION

## 2021-01-01 PROCEDURE — 70450 CT HEAD/BRAIN W/O DYE: CPT

## 2021-01-01 PROCEDURE — 80202 ASSAY OF VANCOMYCIN: CPT | Performed by: INTERNAL MEDICINE

## 2021-01-01 PROCEDURE — 93005 ELECTROCARDIOGRAM TRACING: CPT | Performed by: NURSE PRACTITIONER

## 2021-01-01 PROCEDURE — 25010000002 METHYLPREDNISOLONE PER 125 MG: Performed by: FAMILY MEDICINE

## 2021-01-01 PROCEDURE — 93010 ELECTROCARDIOGRAM REPORT: CPT | Performed by: INTERNAL MEDICINE

## 2021-01-01 PROCEDURE — 82668 ASSAY OF ERYTHROPOIETIN: CPT | Performed by: NURSE PRACTITIONER

## 2021-01-01 PROCEDURE — 77334 RADIATION TREATMENT AID(S): CPT | Performed by: RADIOLOGY

## 2021-01-01 PROCEDURE — 83540 ASSAY OF IRON: CPT | Performed by: NURSE PRACTITIONER

## 2021-01-01 PROCEDURE — 87205 SMEAR GRAM STAIN: CPT | Performed by: NURSE PRACTITIONER

## 2021-01-01 PROCEDURE — 83880 ASSAY OF NATRIURETIC PEPTIDE: CPT | Performed by: PHYSICIAN ASSISTANT

## 2021-01-01 PROCEDURE — 85018 HEMOGLOBIN: CPT | Performed by: FAMILY MEDICINE

## 2021-01-01 PROCEDURE — 25010000002 EPOETIN ALFA-EPBX 10000 UNIT/ML SOLUTION: Performed by: INTERNAL MEDICINE

## 2021-01-01 PROCEDURE — 84439 ASSAY OF FREE THYROXINE: CPT | Performed by: INTERNAL MEDICINE

## 2021-01-01 PROCEDURE — 94760 N-INVAS EAR/PLS OXIMETRY 1: CPT

## 2021-01-01 PROCEDURE — 94002 VENT MGMT INPAT INIT DAY: CPT

## 2021-01-01 PROCEDURE — 25010000002 FUROSEMIDE PER 20 MG: Performed by: FAMILY MEDICINE

## 2021-01-01 PROCEDURE — 25010000002 ONDANSETRON PER 1 MG: Performed by: INTERNAL MEDICINE

## 2021-01-01 PROCEDURE — 97535 SELF CARE MNGMENT TRAINING: CPT

## 2021-01-01 PROCEDURE — 97165 OT EVAL LOW COMPLEX 30 MIN: CPT

## 2021-01-01 PROCEDURE — 99231 SBSQ HOSP IP/OBS SF/LOW 25: CPT | Performed by: NURSE PRACTITIONER

## 2021-01-01 PROCEDURE — 99222 1ST HOSP IP/OBS MODERATE 55: CPT | Performed by: RADIOLOGY

## 2021-01-01 PROCEDURE — 74022 RADEX COMPL AQT ABD SERIES: CPT

## 2021-01-01 PROCEDURE — 84145 PROCALCITONIN (PCT): CPT | Performed by: INTERNAL MEDICINE

## 2021-01-01 PROCEDURE — 3E0G76Z INTRODUCTION OF NUTRITIONAL SUBSTANCE INTO UPPER GI, VIA NATURAL OR ARTIFICIAL OPENING: ICD-10-PCS | Performed by: INTERNAL MEDICINE

## 2021-01-01 PROCEDURE — P9016 RBC LEUKOCYTES REDUCED: HCPCS

## 2021-01-01 PROCEDURE — 85018 HEMOGLOBIN: CPT

## 2021-01-01 PROCEDURE — 99232 SBSQ HOSP IP/OBS MODERATE 35: CPT | Performed by: HOSPITALIST

## 2021-01-01 PROCEDURE — 82565 ASSAY OF CREATININE: CPT | Performed by: NURSE PRACTITIONER

## 2021-01-01 PROCEDURE — 97110 THERAPEUTIC EXERCISES: CPT

## 2021-01-01 PROCEDURE — 94640 AIRWAY INHALATION TREATMENT: CPT

## 2021-01-01 PROCEDURE — 86901 BLOOD TYPING SEROLOGIC RH(D): CPT | Performed by: NURSE PRACTITIONER

## 2021-01-01 PROCEDURE — 93306 TTE W/DOPPLER COMPLETE: CPT

## 2021-01-01 PROCEDURE — 25010000002 CEFTRIAXONE PER 250 MG: Performed by: EMERGENCY MEDICINE

## 2021-01-01 PROCEDURE — 82728 ASSAY OF FERRITIN: CPT | Performed by: NURSE PRACTITIONER

## 2021-01-01 PROCEDURE — 25010000002 FUROSEMIDE PER 20 MG: Performed by: EMERGENCY MEDICINE

## 2021-01-01 PROCEDURE — A9579 GAD-BASE MR CONTRAST NOS,1ML: HCPCS | Performed by: INTERNAL MEDICINE

## 2021-01-01 PROCEDURE — 85379 FIBRIN DEGRADATION QUANT: CPT | Performed by: FAMILY MEDICINE

## 2021-01-01 PROCEDURE — 85007 BL SMEAR W/DIFF WBC COUNT: CPT

## 2021-01-01 PROCEDURE — 86140 C-REACTIVE PROTEIN: CPT | Performed by: INTERNAL MEDICINE

## 2021-01-01 PROCEDURE — 25010000002 MAGNESIUM SULFATE 2 GM/50ML SOLUTION: Performed by: NURSE PRACTITIONER

## 2021-01-01 PROCEDURE — 77336 RADIATION PHYSICS CONSULT: CPT | Performed by: RADIOLOGY

## 2021-01-01 PROCEDURE — 25010000002 HALOPERIDOL LACTATE PER 5 MG: Performed by: NURSE PRACTITIONER

## 2021-01-01 PROCEDURE — 25010000003 HEPARIN LOCK FLUSH PER 10 UNITS: Performed by: EMERGENCY MEDICINE

## 2021-01-01 PROCEDURE — 87086 URINE CULTURE/COLONY COUNT: CPT | Performed by: NURSE PRACTITIONER

## 2021-01-01 PROCEDURE — 85045 AUTOMATED RETICULOCYTE COUNT: CPT | Performed by: INTERNAL MEDICINE

## 2021-01-01 PROCEDURE — 0202U NFCT DS 22 TRGT SARS-COV-2: CPT | Performed by: INTERNAL MEDICINE

## 2021-01-01 PROCEDURE — 86900 BLOOD TYPING SEROLOGIC ABO: CPT | Performed by: NURSE PRACTITIONER

## 2021-01-01 PROCEDURE — 77301 RADIOTHERAPY DOSE PLAN IMRT: CPT | Performed by: RADIOLOGY

## 2021-01-01 PROCEDURE — 25010000002 IRON SUCROSE PER 1 MG: Performed by: NURSE PRACTITIONER

## 2021-01-01 PROCEDURE — 84443 ASSAY THYROID STIM HORMONE: CPT | Performed by: INTERNAL MEDICINE

## 2021-01-01 PROCEDURE — 85014 HEMATOCRIT: CPT | Performed by: FAMILY MEDICINE

## 2021-01-01 PROCEDURE — 93005 ELECTROCARDIOGRAM TRACING: CPT | Performed by: EMERGENCY MEDICINE

## 2021-01-01 PROCEDURE — 83605 ASSAY OF LACTIC ACID: CPT | Performed by: INTERNAL MEDICINE

## 2021-01-01 PROCEDURE — 92611 MOTION FLUOROSCOPY/SWALLOW: CPT

## 2021-01-01 PROCEDURE — 25010000002 MORPHINE PER 10 MG: Performed by: NURSE PRACTITIONER

## 2021-01-01 PROCEDURE — 25010000002 VANCOMYCIN 750 MG RECONSTITUTED SOLUTION 1 EACH VIAL: Performed by: NURSE PRACTITIONER

## 2021-01-01 PROCEDURE — 70551 MRI BRAIN STEM W/O DYE: CPT

## 2021-01-01 PROCEDURE — 25010000002 FENTANYL CITRATE (PF) 100 MCG/2ML SOLUTION

## 2021-01-01 PROCEDURE — 77338 DESIGN MLC DEVICE FOR IMRT: CPT | Performed by: RADIOLOGY

## 2021-01-01 PROCEDURE — 86850 RBC ANTIBODY SCREEN: CPT | Performed by: NURSE PRACTITIONER

## 2021-01-01 PROCEDURE — 94003 VENT MGMT INPAT SUBQ DAY: CPT

## 2021-01-01 PROCEDURE — 97162 PT EVAL MOD COMPLEX 30 MIN: CPT

## 2021-01-01 PROCEDURE — 31500 INSERT EMERGENCY AIRWAY: CPT

## 2021-01-01 PROCEDURE — 99222 1ST HOSP IP/OBS MODERATE 55: CPT | Performed by: INTERNAL MEDICINE

## 2021-01-01 PROCEDURE — 25010000002 METHYLPREDNISOLONE PER 40 MG: Performed by: FAMILY MEDICINE

## 2021-01-01 PROCEDURE — 86900 BLOOD TYPING SEROLOGIC ABO: CPT

## 2021-01-01 PROCEDURE — 70552 MRI BRAIN STEM W/DYE: CPT

## 2021-01-01 PROCEDURE — 85014 HEMATOCRIT: CPT | Performed by: NURSE PRACTITIONER

## 2021-01-01 PROCEDURE — 99231 SBSQ HOSP IP/OBS SF/LOW 25: CPT | Performed by: SPECIALIST

## 2021-01-01 PROCEDURE — 25010000002 VANCOMYCIN 10 G RECONSTITUTED SOLUTION: Performed by: INTERNAL MEDICINE

## 2021-01-01 PROCEDURE — 85007 BL SMEAR W/DIFF WBC COUNT: CPT | Performed by: FAMILY MEDICINE

## 2021-01-01 PROCEDURE — 99233 SBSQ HOSP IP/OBS HIGH 50: CPT | Performed by: FAMILY MEDICINE

## 2021-01-01 PROCEDURE — 99213 OFFICE O/P EST LOW 20 MIN: CPT | Performed by: INTERNAL MEDICINE

## 2021-01-01 PROCEDURE — 25010000002 HYDROMORPHONE PER 4 MG: Performed by: STUDENT IN AN ORGANIZED HEALTH CARE EDUCATION/TRAINING PROGRAM

## 2021-01-01 PROCEDURE — 25010000002 PIPERACILLIN SOD-TAZOBACTAM PER 1 G: Performed by: FAMILY MEDICINE

## 2021-01-01 PROCEDURE — 96374 THER/PROPH/DIAG INJ IV PUSH: CPT

## 2021-01-01 PROCEDURE — 99214 OFFICE O/P EST MOD 30 MIN: CPT | Performed by: RADIOLOGY

## 2021-01-01 PROCEDURE — 99232 SBSQ HOSP IP/OBS MODERATE 35: CPT | Performed by: NURSE PRACTITIONER

## 2021-01-01 PROCEDURE — 25010000003 HEPARIN LOCK FLUSH PER 10 UNITS: Performed by: HOSPITALIST

## 2021-01-01 PROCEDURE — 25010000002 SUCCINYLCHOLINE PER 20 MG

## 2021-01-01 PROCEDURE — 99291 CRITICAL CARE FIRST HOUR: CPT

## 2021-01-01 PROCEDURE — 82330 ASSAY OF CALCIUM: CPT

## 2021-01-01 PROCEDURE — 82607 VITAMIN B-12: CPT | Performed by: INTERNAL MEDICINE

## 2021-01-01 PROCEDURE — 99213 OFFICE O/P EST LOW 20 MIN: CPT | Performed by: SURGERY

## 2021-01-01 PROCEDURE — 25010000002 KETOROLAC TROMETHAMINE PER 15 MG: Performed by: NURSE PRACTITIONER

## 2021-01-01 PROCEDURE — 74176 CT ABD & PELVIS W/O CONTRAST: CPT

## 2021-01-01 PROCEDURE — 25010000002 ALTEPLASE 2 MG RECONSTITUTED SOLUTION 1 EACH VIAL: Performed by: FAMILY MEDICINE

## 2021-01-01 PROCEDURE — 99284 EMERGENCY DEPT VISIT MOD MDM: CPT

## 2021-01-01 PROCEDURE — 85730 THROMBOPLASTIN TIME PARTIAL: CPT | Performed by: NURSE PRACTITIONER

## 2021-01-01 PROCEDURE — 77427 RADIATION TX MANAGEMENT X5: CPT | Performed by: RADIOLOGY

## 2021-01-01 PROCEDURE — 77300 RADIATION THERAPY DOSE PLAN: CPT | Performed by: RADIOLOGY

## 2021-01-01 PROCEDURE — 97166 OT EVAL MOD COMPLEX 45 MIN: CPT

## 2021-01-01 PROCEDURE — 84145 PROCALCITONIN (PCT): CPT | Performed by: EMERGENCY MEDICINE

## 2021-01-01 PROCEDURE — 71275 CT ANGIOGRAPHY CHEST: CPT

## 2021-01-01 PROCEDURE — 25010000002 VANCOMYCIN 10 G RECONSTITUTED SOLUTION: Performed by: PHYSICIAN ASSISTANT

## 2021-01-01 PROCEDURE — 0BH17EZ INSERTION OF ENDOTRACHEAL AIRWAY INTO TRACHEA, VIA NATURAL OR ARTIFICIAL OPENING: ICD-10-PCS | Performed by: EMERGENCY MEDICINE

## 2021-01-01 PROCEDURE — 81001 URINALYSIS AUTO W/SCOPE: CPT | Performed by: NURSE PRACTITIONER

## 2021-01-01 PROCEDURE — 84132 ASSAY OF SERUM POTASSIUM: CPT | Performed by: INTERNAL MEDICINE

## 2021-01-01 PROCEDURE — 87040 BLOOD CULTURE FOR BACTERIA: CPT | Performed by: EMERGENCY MEDICINE

## 2021-01-01 PROCEDURE — 82525 ASSAY OF COPPER: CPT | Performed by: INTERNAL MEDICINE

## 2021-01-01 PROCEDURE — 25010000002 MIDAZOLAM PER 1 MG: Performed by: INTERNAL MEDICINE

## 2021-01-01 PROCEDURE — 25010000002 AZITHROMYCIN PER 500 MG: Performed by: EMERGENCY MEDICINE

## 2021-01-01 PROCEDURE — 25010000002 GADOTERIDOL PER 1 ML: Performed by: INTERNAL MEDICINE

## 2021-01-01 PROCEDURE — 77372 SRS LINEAR BASED: CPT | Performed by: RADIOLOGY

## 2021-01-01 PROCEDURE — 77386: CPT | Performed by: RADIOLOGY

## 2021-01-01 PROCEDURE — 85025 COMPLETE CBC W/AUTO DIFF WBC: CPT

## 2021-01-01 PROCEDURE — 85025 COMPLETE CBC W/AUTO DIFF WBC: CPT | Performed by: EMERGENCY MEDICINE

## 2021-01-01 PROCEDURE — 25010000002 MIDAZOLAM 50 MG/10ML SOLUTION: Performed by: INTERNAL MEDICINE

## 2021-01-01 PROCEDURE — 25010000003 MAGNESIUM SULFATE 4 GM/100ML SOLUTION: Performed by: INTERNAL MEDICINE

## 2021-01-01 PROCEDURE — 84145 PROCALCITONIN (PCT): CPT | Performed by: PHYSICIAN ASSISTANT

## 2021-01-01 PROCEDURE — 63710000001 PROMETHAZINE PER 25 MG: Performed by: FAMILY MEDICINE

## 2021-01-01 PROCEDURE — 84484 ASSAY OF TROPONIN QUANT: CPT | Performed by: PHYSICIAN ASSISTANT

## 2021-01-01 PROCEDURE — 36415 COLL VENOUS BLD VENIPUNCTURE: CPT

## 2021-01-01 PROCEDURE — 87040 BLOOD CULTURE FOR BACTERIA: CPT | Performed by: INTERNAL MEDICINE

## 2021-01-01 PROCEDURE — 83540 ASSAY OF IRON: CPT | Performed by: INTERNAL MEDICINE

## 2021-01-01 PROCEDURE — 85025 COMPLETE CBC W/AUTO DIFF WBC: CPT | Performed by: FAMILY MEDICINE

## 2021-01-01 PROCEDURE — 86645 CMV ANTIBODY IGM: CPT | Performed by: NURSE PRACTITIONER

## 2021-01-01 PROCEDURE — 25010000002 METHYLPREDNISOLONE PER 125 MG

## 2021-01-01 PROCEDURE — 25010000002 LORAZEPAM PER 2 MG: Performed by: INTERNAL MEDICINE

## 2021-01-01 PROCEDURE — 85379 FIBRIN DEGRADATION QUANT: CPT | Performed by: PHYSICIAN ASSISTANT

## 2021-01-01 PROCEDURE — 99221 1ST HOSP IP/OBS SF/LOW 40: CPT | Performed by: NURSE PRACTITIONER

## 2021-01-01 PROCEDURE — 25010000002 PHENYLEPHRINE 10 MG/ML SOLUTION: Performed by: INTERNAL MEDICINE

## 2021-01-01 PROCEDURE — 84466 ASSAY OF TRANSFERRIN: CPT | Performed by: INTERNAL MEDICINE

## 2021-01-01 PROCEDURE — 25010000002 METHYLPREDNISOLONE PER 40 MG: Performed by: NURSE PRACTITIONER

## 2021-01-01 PROCEDURE — 51702 INSERT TEMP BLADDER CATH: CPT

## 2021-01-01 PROCEDURE — 86923 COMPATIBILITY TEST ELECTRIC: CPT

## 2021-01-01 PROCEDURE — 70553 MRI BRAIN STEM W/O & W/DYE: CPT

## 2021-01-01 PROCEDURE — 99232 SBSQ HOSP IP/OBS MODERATE 35: CPT | Performed by: INTERNAL MEDICINE

## 2021-01-01 PROCEDURE — 82330 ASSAY OF CALCIUM: CPT | Performed by: FAMILY MEDICINE

## 2021-01-01 PROCEDURE — 97164 PT RE-EVAL EST PLAN CARE: CPT

## 2021-01-01 PROCEDURE — 83735 ASSAY OF MAGNESIUM: CPT | Performed by: DIETITIAN, REGISTERED

## 2021-01-01 PROCEDURE — 80053 COMPREHEN METABOLIC PANEL: CPT

## 2021-01-01 PROCEDURE — 25010000002 CEFEPIME PER 500 MG: Performed by: PHYSICIAN ASSISTANT

## 2021-01-01 PROCEDURE — 77432 STEREOTACTIC RADIATION TRMT: CPT | Performed by: RADIOLOGY

## 2021-01-01 PROCEDURE — 25010000002 HYDROMORPHONE PER 4 MG: Performed by: NURSE PRACTITIONER

## 2021-01-01 PROCEDURE — 99222 1ST HOSP IP/OBS MODERATE 55: CPT | Performed by: PHYSICIAN ASSISTANT

## 2021-01-01 PROCEDURE — 84484 ASSAY OF TROPONIN QUANT: CPT | Performed by: EMERGENCY MEDICINE

## 2021-01-01 PROCEDURE — 99238 HOSP IP/OBS DSCHRG MGMT 30/<: CPT | Performed by: HOSPITALIST

## 2021-01-01 PROCEDURE — 87040 BLOOD CULTURE FOR BACTERIA: CPT

## 2021-01-01 PROCEDURE — 80051 ELECTROLYTE PANEL: CPT

## 2021-01-01 PROCEDURE — 5A1945Z RESPIRATORY VENTILATION, 24-96 CONSECUTIVE HOURS: ICD-10-PCS | Performed by: INTERNAL MEDICINE

## 2021-01-01 PROCEDURE — 0202U NFCT DS 22 TRGT SARS-COV-2: CPT | Performed by: EMERGENCY MEDICINE

## 2021-01-01 PROCEDURE — 97112 NEUROMUSCULAR REEDUCATION: CPT

## 2021-01-01 PROCEDURE — 82274 ASSAY TEST FOR BLOOD FECAL: CPT | Performed by: FAMILY MEDICINE

## 2021-01-01 PROCEDURE — 84466 ASSAY OF TRANSFERRIN: CPT | Performed by: NURSE PRACTITIONER

## 2021-01-01 PROCEDURE — 93306 TTE W/DOPPLER COMPLETE: CPT | Performed by: INTERNAL MEDICINE

## 2021-01-01 PROCEDURE — 77263 THER RADIOLOGY TX PLNG CPLX: CPT | Performed by: RADIOLOGY

## 2021-01-01 PROCEDURE — 0202U NFCT DS 22 TRGT SARS-COV-2: CPT | Performed by: PHYSICIAN ASSISTANT

## 2021-01-01 PROCEDURE — 36430 TRANSFUSION BLD/BLD COMPNT: CPT

## 2021-01-01 PROCEDURE — 86665 EPSTEIN-BARR CAPSID VCA: CPT | Performed by: NURSE PRACTITIONER

## 2021-01-01 PROCEDURE — 92610 EVALUATE SWALLOWING FUNCTION: CPT

## 2021-01-01 PROCEDURE — 25010000002 HALOPERIDOL LACTATE PER 5 MG

## 2021-01-01 PROCEDURE — 80048 BASIC METABOLIC PNL TOTAL CA: CPT | Performed by: FAMILY MEDICINE

## 2021-01-01 PROCEDURE — 0 IOPAMIDOL PER 1 ML: Performed by: FAMILY MEDICINE

## 2021-01-01 RX ORDER — METOPROLOL TARTRATE 50 MG/1
50 TABLET, FILM COATED ORAL EVERY 12 HOURS SCHEDULED
Status: DISCONTINUED | OUTPATIENT
Start: 2021-01-01 | End: 2021-01-01 | Stop reason: HOSPADM

## 2021-01-01 RX ORDER — POTASSIUM CHLORIDE 7.45 MG/ML
10 INJECTION INTRAVENOUS
Status: DISCONTINUED | OUTPATIENT
Start: 2021-01-01 | End: 2021-01-01 | Stop reason: HOSPADM

## 2021-01-01 RX ORDER — DOCUSATE SODIUM 100 MG/1
100 CAPSULE, LIQUID FILLED ORAL 2 TIMES DAILY
Status: DISCONTINUED | OUTPATIENT
Start: 2021-01-01 | End: 2021-01-01

## 2021-01-01 RX ORDER — GABAPENTIN 300 MG/1
300 CAPSULE ORAL 3 TIMES DAILY
Status: DISCONTINUED | OUTPATIENT
Start: 2021-01-01 | End: 2021-01-01 | Stop reason: HOSPADM

## 2021-01-01 RX ORDER — SODIUM CHLORIDE 0.9 % (FLUSH) 0.9 %
10 SYRINGE (ML) INJECTION AS NEEDED
Status: DISCONTINUED | OUTPATIENT
Start: 2021-01-01 | End: 2021-01-01 | Stop reason: HOSPADM

## 2021-01-01 RX ORDER — ACETYLCYSTEINE 200 MG/ML
2 SOLUTION ORAL; RESPIRATORY (INHALATION)
Status: DISCONTINUED | OUTPATIENT
Start: 2021-01-01 | End: 2021-01-01

## 2021-01-01 RX ORDER — GUAIFENESIN 600 MG/1
600 TABLET, EXTENDED RELEASE ORAL EVERY 12 HOURS SCHEDULED
Status: DISCONTINUED | OUTPATIENT
Start: 2021-01-01 | End: 2021-01-01

## 2021-01-01 RX ORDER — METOPROLOL TARTRATE 5 MG/5ML
INJECTION INTRAVENOUS
Status: COMPLETED
Start: 2021-01-01 | End: 2021-01-01

## 2021-01-01 RX ORDER — MAGNESIUM SULFATE HEPTAHYDRATE 40 MG/ML
2 INJECTION, SOLUTION INTRAVENOUS AS NEEDED
Status: DISCONTINUED | OUTPATIENT
Start: 2021-01-01 | End: 2021-01-01 | Stop reason: HOSPADM

## 2021-01-01 RX ORDER — HYDROMORPHONE HCL 110MG/55ML
2 PATIENT CONTROLLED ANALGESIA SYRINGE INTRAVENOUS ONCE
Status: COMPLETED | OUTPATIENT
Start: 2021-01-01 | End: 2021-01-01

## 2021-01-01 RX ORDER — METHYLPREDNISOLONE SODIUM SUCCINATE 125 MG/2ML
INJECTION, POWDER, LYOPHILIZED, FOR SOLUTION INTRAMUSCULAR; INTRAVENOUS
Status: COMPLETED
Start: 2021-01-01 | End: 2021-01-01

## 2021-01-01 RX ORDER — ZINC OXIDE 16 %
OINTMENT (GRAM) TOPICAL 2 TIMES DAILY
Start: 2021-01-01 | End: 2021-01-01

## 2021-01-01 RX ORDER — ETOMIDATE 2 MG/ML
INJECTION INTRAVENOUS
Status: COMPLETED
Start: 2021-01-01 | End: 2021-01-01

## 2021-01-01 RX ORDER — TORSEMIDE 20 MG/1
10 TABLET ORAL DAILY
COMMUNITY
End: 2021-01-01 | Stop reason: HOSPADM

## 2021-01-01 RX ORDER — FERROUS SULFATE TAB EC 324 MG (65 MG FE EQUIVALENT) 324 (65 FE) MG
324 TABLET DELAYED RESPONSE ORAL
Status: DISCONTINUED | OUTPATIENT
Start: 2021-01-01 | End: 2021-01-01 | Stop reason: HOSPADM

## 2021-01-01 RX ORDER — HYDROMORPHONE HCL 110MG/55ML
PATIENT CONTROLLED ANALGESIA SYRINGE INTRAVENOUS
Status: COMPLETED
Start: 2021-01-01 | End: 2021-01-01

## 2021-01-01 RX ORDER — IPRATROPIUM BROMIDE AND ALBUTEROL SULFATE 2.5; .5 MG/3ML; MG/3ML
3 SOLUTION RESPIRATORY (INHALATION)
Status: DISCONTINUED | OUTPATIENT
Start: 2021-01-01 | End: 2021-01-01

## 2021-01-01 RX ORDER — METHYLPREDNISOLONE SODIUM SUCCINATE 40 MG/ML
40 INJECTION, POWDER, LYOPHILIZED, FOR SOLUTION INTRAMUSCULAR; INTRAVENOUS EVERY 8 HOURS
Status: DISCONTINUED | OUTPATIENT
Start: 2021-01-01 | End: 2021-01-01 | Stop reason: HOSPADM

## 2021-01-01 RX ORDER — HYDROCODONE BITARTRATE AND ACETAMINOPHEN 5; 325 MG/1; MG/1
2 TABLET ORAL EVERY 6 HOURS PRN
Status: DISCONTINUED | OUTPATIENT
Start: 2021-01-01 | End: 2021-01-01 | Stop reason: HOSPADM

## 2021-01-01 RX ORDER — METHYLPREDNISOLONE SODIUM SUCCINATE 125 MG/2ML
125 INJECTION, POWDER, LYOPHILIZED, FOR SOLUTION INTRAMUSCULAR; INTRAVENOUS ONCE
Status: COMPLETED | OUTPATIENT
Start: 2021-01-01 | End: 2021-01-01

## 2021-01-01 RX ORDER — ACETAMINOPHEN 650 MG/1
650 SUPPOSITORY RECTAL EVERY 6 HOURS PRN
Status: DISCONTINUED | OUTPATIENT
Start: 2021-01-01 | End: 2021-01-01

## 2021-01-01 RX ORDER — MIDAZOLAM HYDROCHLORIDE 1 MG/ML
2 INJECTION INTRAMUSCULAR; INTRAVENOUS ONCE
Status: COMPLETED | OUTPATIENT
Start: 2021-01-01 | End: 2021-01-01

## 2021-01-01 RX ORDER — PANTOPRAZOLE SODIUM 40 MG/1
40 TABLET, DELAYED RELEASE ORAL DAILY
Status: DISCONTINUED | OUTPATIENT
Start: 2021-01-01 | End: 2021-01-01 | Stop reason: HOSPADM

## 2021-01-01 RX ORDER — METOPROLOL TARTRATE 5 MG/5ML
5 INJECTION INTRAVENOUS EVERY 6 HOURS PRN
Status: DISCONTINUED | OUTPATIENT
Start: 2021-01-01 | End: 2021-01-01 | Stop reason: HOSPADM

## 2021-01-01 RX ORDER — ONDANSETRON 2 MG/ML
4 INJECTION INTRAMUSCULAR; INTRAVENOUS EVERY 6 HOURS PRN
Status: DISCONTINUED | OUTPATIENT
Start: 2021-01-01 | End: 2021-01-01 | Stop reason: HOSPADM

## 2021-01-01 RX ORDER — BUDESONIDE 0.5 MG/2ML
0.5 INHALANT ORAL
Status: DISCONTINUED | OUTPATIENT
Start: 2021-01-01 | End: 2021-01-01 | Stop reason: HOSPADM

## 2021-01-01 RX ORDER — CALCIUM CARBONATE 200(500)MG
2 TABLET,CHEWABLE ORAL 2 TIMES DAILY PRN
Qty: 60 TABLET | Refills: 0 | Status: SHIPPED | OUTPATIENT
Start: 2021-01-01

## 2021-01-01 RX ORDER — MEGESTROL ACETATE 40 MG/ML
200 SUSPENSION ORAL 3 TIMES DAILY
COMMUNITY

## 2021-01-01 RX ORDER — LABETALOL HYDROCHLORIDE 5 MG/ML
20 INJECTION, SOLUTION INTRAVENOUS EVERY 6 HOURS PRN
Status: DISCONTINUED | OUTPATIENT
Start: 2021-01-01 | End: 2021-01-01 | Stop reason: HOSPADM

## 2021-01-01 RX ORDER — SPIRONOLACTONE 25 MG/1
25 TABLET ORAL DAILY
Status: DISCONTINUED | OUTPATIENT
Start: 2021-01-01 | End: 2021-01-01

## 2021-01-01 RX ORDER — METOPROLOL TARTRATE 50 MG/1
50 TABLET, FILM COATED ORAL 2 TIMES DAILY
Status: DISCONTINUED | OUTPATIENT
Start: 2021-01-01 | End: 2021-01-01

## 2021-01-01 RX ORDER — FUROSEMIDE 10 MG/ML
20 INJECTION INTRAMUSCULAR; INTRAVENOUS EVERY 12 HOURS
Status: DISCONTINUED | OUTPATIENT
Start: 2021-01-01 | End: 2021-01-01 | Stop reason: HOSPADM

## 2021-01-01 RX ORDER — BUPRENORPHINE HYDROCHLORIDE AND NALOXONE HYDROCHLORIDE DIHYDRATE 8; 2 MG/1; MG/1
1 TABLET SUBLINGUAL DAILY
Status: DISCONTINUED | OUTPATIENT
Start: 2021-01-01 | End: 2021-01-01

## 2021-01-01 RX ORDER — BISACODYL 10 MG
10 SUPPOSITORY, RECTAL RECTAL DAILY
Status: DISCONTINUED | OUTPATIENT
Start: 2021-01-01 | End: 2021-01-01

## 2021-01-01 RX ORDER — AMOXICILLIN 250 MG
2 CAPSULE ORAL 2 TIMES DAILY
Status: DISCONTINUED | OUTPATIENT
Start: 2021-01-01 | End: 2021-01-01 | Stop reason: HOSPADM

## 2021-01-01 RX ORDER — METHYLPREDNISOLONE SODIUM SUCCINATE 40 MG/ML
20 INJECTION, POWDER, LYOPHILIZED, FOR SOLUTION INTRAMUSCULAR; INTRAVENOUS DAILY
Status: DISCONTINUED | OUTPATIENT
Start: 2021-01-01 | End: 2021-01-01

## 2021-01-01 RX ORDER — GUAIFENESIN 600 MG/1
600 TABLET, EXTENDED RELEASE ORAL EVERY 12 HOURS SCHEDULED
Status: DISCONTINUED | OUTPATIENT
Start: 2021-01-01 | End: 2021-01-01 | Stop reason: HOSPADM

## 2021-01-01 RX ORDER — ASPIRIN 81 MG/1
81 TABLET ORAL DAILY
Status: DISCONTINUED | OUTPATIENT
Start: 2021-01-01 | End: 2021-01-01 | Stop reason: HOSPADM

## 2021-01-01 RX ORDER — PREDNISONE 20 MG/1
20 TABLET ORAL DAILY
Status: COMPLETED | OUTPATIENT
Start: 2021-01-01 | End: 2021-01-01

## 2021-01-01 RX ORDER — ARGININE/GLUTAMINE/CALCIUM BMB 7G-7G-1.5G
1 POWDER IN PACKET (EA) ORAL 2 TIMES DAILY
Qty: 60 EACH | Refills: 0 | Status: SHIPPED | OUTPATIENT
Start: 2021-01-01

## 2021-01-01 RX ORDER — LORAZEPAM 2 MG/ML
1 INJECTION INTRAMUSCULAR EVERY 4 HOURS PRN
Status: DISCONTINUED | OUTPATIENT
Start: 2021-01-01 | End: 2021-01-01

## 2021-01-01 RX ORDER — HYDROMORPHONE HCL 110MG/55ML
1 PATIENT CONTROLLED ANALGESIA SYRINGE INTRAVENOUS
Status: DISCONTINUED | OUTPATIENT
Start: 2021-01-01 | End: 2021-01-01

## 2021-01-01 RX ORDER — MORPHINE SULFATE 4 MG/ML
2 INJECTION, SOLUTION INTRAMUSCULAR; INTRAVENOUS ONCE
Status: COMPLETED | OUTPATIENT
Start: 2021-01-01 | End: 2021-01-01

## 2021-01-01 RX ORDER — ATORVASTATIN CALCIUM 40 MG/1
40 TABLET, FILM COATED ORAL NIGHTLY
Status: DISCONTINUED | OUTPATIENT
Start: 2021-01-01 | End: 2021-01-01 | Stop reason: HOSPADM

## 2021-01-01 RX ORDER — MEGESTROL ACETATE 40 MG/ML
200 SUSPENSION ORAL 3 TIMES DAILY
Status: DISCONTINUED | OUTPATIENT
Start: 2021-01-01 | End: 2021-01-01 | Stop reason: HOSPADM

## 2021-01-01 RX ORDER — CALCIUM GLUCONATE 20 MG/ML
2 INJECTION, SOLUTION INTRAVENOUS AS NEEDED
Status: DISCONTINUED | OUTPATIENT
Start: 2021-01-01 | End: 2021-01-01 | Stop reason: HOSPADM

## 2021-01-01 RX ORDER — PREDNISONE 10 MG/1
10 TABLET ORAL
Status: DISCONTINUED | OUTPATIENT
Start: 2021-01-01 | End: 2021-01-01 | Stop reason: HOSPADM

## 2021-01-01 RX ORDER — LURASIDONE HYDROCHLORIDE 40 MG/1
40 TABLET, FILM COATED ORAL DAILY
COMMUNITY

## 2021-01-01 RX ORDER — INSULIN LISPRO 100 [IU]/ML
0-7 INJECTION, SOLUTION INTRAVENOUS; SUBCUTANEOUS 3 TIMES DAILY PRN
Status: DISCONTINUED | OUTPATIENT
Start: 2021-01-01 | End: 2021-01-01 | Stop reason: HOSPADM

## 2021-01-01 RX ORDER — MORPHINE SULFATE 4 MG/ML
2 INJECTION, SOLUTION INTRAMUSCULAR; INTRAVENOUS EVERY 4 HOURS PRN
Status: DISCONTINUED | OUTPATIENT
Start: 2021-01-01 | End: 2021-01-01

## 2021-01-01 RX ORDER — CHOLECALCIFEROL (VITAMIN D3) 125 MCG
5 CAPSULE ORAL NIGHTLY PRN
Status: DISCONTINUED | OUTPATIENT
Start: 2021-01-01 | End: 2021-01-01 | Stop reason: HOSPADM

## 2021-01-01 RX ORDER — HEPARIN SODIUM (PORCINE) LOCK FLUSH IV SOLN 100 UNIT/ML 100 UNIT/ML
500 SOLUTION INTRAVENOUS ONCE
Status: COMPLETED | OUTPATIENT
Start: 2021-01-01 | End: 2021-01-01

## 2021-01-01 RX ORDER — MAGNESIUM SULFATE HEPTAHYDRATE 40 MG/ML
4 INJECTION, SOLUTION INTRAVENOUS AS NEEDED
Status: DISCONTINUED | OUTPATIENT
Start: 2021-01-01 | End: 2021-01-01 | Stop reason: HOSPADM

## 2021-01-01 RX ORDER — FUROSEMIDE 10 MG/ML
40 INJECTION INTRAMUSCULAR; INTRAVENOUS ONCE
Status: COMPLETED | OUTPATIENT
Start: 2021-01-01 | End: 2021-01-01

## 2021-01-01 RX ORDER — HYDROCODONE BITARTRATE AND ACETAMINOPHEN 5; 325 MG/1; MG/1
1 TABLET ORAL EVERY 6 HOURS PRN
Status: DISCONTINUED | OUTPATIENT
Start: 2021-01-01 | End: 2021-01-01 | Stop reason: HOSPADM

## 2021-01-01 RX ORDER — METHYLPREDNISOLONE SODIUM SUCCINATE 125 MG/2ML
60 INJECTION, POWDER, LYOPHILIZED, FOR SOLUTION INTRAMUSCULAR; INTRAVENOUS EVERY 6 HOURS
Status: DISCONTINUED | OUTPATIENT
Start: 2021-01-01 | End: 2021-01-01

## 2021-01-01 RX ORDER — HALOPERIDOL 5 MG/ML
2 INJECTION INTRAMUSCULAR EVERY 6 HOURS PRN
Status: DISCONTINUED | OUTPATIENT
Start: 2021-01-01 | End: 2021-01-01

## 2021-01-01 RX ORDER — GUAIFENESIN 600 MG/1
600 TABLET, EXTENDED RELEASE ORAL EVERY 12 HOURS SCHEDULED
Start: 2021-01-01

## 2021-01-01 RX ORDER — LORAZEPAM 2 MG/ML
1 INJECTION INTRAMUSCULAR ONCE
Status: COMPLETED | OUTPATIENT
Start: 2021-01-01 | End: 2021-01-01

## 2021-01-01 RX ORDER — MULTIPLE VITAMINS W/ MINERALS TAB 9MG-400MCG
1 TAB ORAL DAILY
Status: DISCONTINUED | OUTPATIENT
Start: 2021-01-01 | End: 2021-01-01 | Stop reason: HOSPADM

## 2021-01-01 RX ORDER — DEXTROSE MONOHYDRATE 25 G/50ML
25 INJECTION, SOLUTION INTRAVENOUS
Status: DISCONTINUED | OUTPATIENT
Start: 2021-01-01 | End: 2021-01-01 | Stop reason: HOSPADM

## 2021-01-01 RX ORDER — MULTIPLE VITAMINS W/ MINERALS TAB 9MG-400MCG
1 TAB ORAL DAILY
COMMUNITY

## 2021-01-01 RX ORDER — METOPROLOL TARTRATE 5 MG/5ML
2.5 INJECTION INTRAVENOUS EVERY 6 HOURS
Status: DISCONTINUED | OUTPATIENT
Start: 2021-01-01 | End: 2021-01-01

## 2021-01-01 RX ORDER — DEXTROSE AND SODIUM CHLORIDE 5; .45 G/100ML; G/100ML
75 INJECTION, SOLUTION INTRAVENOUS CONTINUOUS
Status: DISCONTINUED | OUTPATIENT
Start: 2021-01-01 | End: 2021-01-01

## 2021-01-01 RX ORDER — IPRATROPIUM BROMIDE AND ALBUTEROL SULFATE 2.5; .5 MG/3ML; MG/3ML
3 SOLUTION RESPIRATORY (INHALATION)
Status: DISCONTINUED | OUTPATIENT
Start: 2021-01-01 | End: 2021-01-01 | Stop reason: HOSPADM

## 2021-01-01 RX ORDER — PREDNISONE 10 MG/1
10 TABLET ORAL
Start: 2021-01-01

## 2021-01-01 RX ORDER — GABAPENTIN 100 MG/1
100 CAPSULE ORAL 3 TIMES DAILY
Status: DISCONTINUED | OUTPATIENT
Start: 2021-01-01 | End: 2021-01-01

## 2021-01-01 RX ORDER — ALBUTEROL SULFATE 2.5 MG/3ML
2.5 SOLUTION RESPIRATORY (INHALATION)
Status: DISCONTINUED | OUTPATIENT
Start: 2021-01-01 | End: 2021-01-01 | Stop reason: HOSPADM

## 2021-01-01 RX ORDER — ASPIRIN 81 MG/1
81 TABLET, CHEWABLE ORAL DAILY
Status: DISCONTINUED | OUTPATIENT
Start: 2021-01-01 | End: 2021-01-01 | Stop reason: HOSPADM

## 2021-01-01 RX ORDER — MAGNESIUM SULFATE HEPTAHYDRATE 40 MG/ML
2 INJECTION, SOLUTION INTRAVENOUS AS NEEDED
Status: DISCONTINUED | OUTPATIENT
Start: 2021-01-01 | End: 2021-01-01

## 2021-01-01 RX ORDER — ALBUTEROL SULFATE 2.5 MG/3ML
2.5 SOLUTION RESPIRATORY (INHALATION)
Qty: 100 EACH | Refills: 0 | Status: SHIPPED | OUTPATIENT
Start: 2021-01-01

## 2021-01-01 RX ORDER — DOCUSATE SODIUM 100 MG/1
100 CAPSULE, LIQUID FILLED ORAL 2 TIMES DAILY
Status: DISCONTINUED | OUTPATIENT
Start: 2021-01-01 | End: 2021-01-01 | Stop reason: HOSPADM

## 2021-01-01 RX ORDER — BISACODYL 5 MG/1
10 TABLET, DELAYED RELEASE ORAL DAILY
Status: DISPENSED | OUTPATIENT
Start: 2021-01-01 | End: 2021-01-01

## 2021-01-01 RX ORDER — DEXMEDETOMIDINE HYDROCHLORIDE 4 UG/ML
.2-1.5 INJECTION, SOLUTION INTRAVENOUS
Status: DISCONTINUED | OUTPATIENT
Start: 2021-01-01 | End: 2021-01-01

## 2021-01-01 RX ORDER — BUPRENORPHINE HYDROCHLORIDE AND NALOXONE HYDROCHLORIDE DIHYDRATE 8; 2 MG/1; MG/1
1.5 TABLET SUBLINGUAL DAILY
Status: DISCONTINUED | OUTPATIENT
Start: 2021-01-01 | End: 2021-01-01 | Stop reason: HOSPADM

## 2021-01-01 RX ORDER — PROMETHAZINE HYDROCHLORIDE 25 MG/1
25 TABLET ORAL EVERY 4 HOURS PRN
COMMUNITY

## 2021-01-01 RX ORDER — ASPIRIN 81 MG/1
81 TABLET ORAL DAILY
Status: DISCONTINUED | OUTPATIENT
Start: 2021-01-01 | End: 2021-01-01

## 2021-01-01 RX ORDER — PROCHLORPERAZINE EDISYLATE 5 MG/ML
5 INJECTION INTRAMUSCULAR; INTRAVENOUS ONCE
Status: COMPLETED | OUTPATIENT
Start: 2021-01-01 | End: 2021-01-01

## 2021-01-01 RX ORDER — HYDROXYZINE HYDROCHLORIDE 25 MG/1
25 TABLET, FILM COATED ORAL 3 TIMES DAILY PRN
Qty: 90 TABLET | Refills: 0 | Status: SHIPPED | OUTPATIENT
Start: 2021-01-01

## 2021-01-01 RX ORDER — IPRATROPIUM BROMIDE AND ALBUTEROL SULFATE 2.5; .5 MG/3ML; MG/3ML
3 SOLUTION RESPIRATORY (INHALATION) EVERY 4 HOURS PRN
Status: DISCONTINUED | OUTPATIENT
Start: 2021-01-01 | End: 2021-01-01 | Stop reason: HOSPADM

## 2021-01-01 RX ORDER — ACETAMINOPHEN 325 MG/1
650 TABLET ORAL EVERY 6 HOURS PRN
Status: DISCONTINUED | OUTPATIENT
Start: 2021-01-01 | End: 2021-01-01

## 2021-01-01 RX ORDER — MIDAZOLAM HYDROCHLORIDE 1 MG/ML
2 INJECTION INTRAMUSCULAR; INTRAVENOUS ONCE AS NEEDED
Status: COMPLETED | OUTPATIENT
Start: 2021-01-01 | End: 2021-01-01

## 2021-01-01 RX ORDER — RISPERIDONE 0.5 MG/1
0.25 TABLET, ORALLY DISINTEGRATING ORAL EVERY 12 HOURS SCHEDULED
Status: DISCONTINUED | OUTPATIENT
Start: 2021-01-01 | End: 2021-01-01

## 2021-01-01 RX ORDER — SUCCINYLCHOLINE CHLORIDE 20 MG/ML
INJECTION INTRAMUSCULAR; INTRAVENOUS
Status: COMPLETED
Start: 2021-01-01 | End: 2021-01-01

## 2021-01-01 RX ORDER — LURASIDONE HYDROCHLORIDE 40 MG/1
40 TABLET, FILM COATED ORAL DAILY
Status: DISCONTINUED | OUTPATIENT
Start: 2021-01-01 | End: 2021-01-01 | Stop reason: HOSPADM

## 2021-01-01 RX ORDER — INSULIN LISPRO 100 [IU]/ML
0-7 INJECTION, SOLUTION INTRAVENOUS; SUBCUTANEOUS
Status: DISCONTINUED | OUTPATIENT
Start: 2021-01-01 | End: 2021-01-01 | Stop reason: HOSPADM

## 2021-01-01 RX ORDER — FUROSEMIDE 10 MG/ML
20 INJECTION INTRAMUSCULAR; INTRAVENOUS ONCE
Status: COMPLETED | OUTPATIENT
Start: 2021-01-01 | End: 2021-01-01

## 2021-01-01 RX ORDER — POLYETHYLENE GLYCOL 3350 17 G/17G
17 POWDER, FOR SOLUTION ORAL DAILY PRN
Status: DISCONTINUED | OUTPATIENT
Start: 2021-01-01 | End: 2021-01-01 | Stop reason: HOSPADM

## 2021-01-01 RX ORDER — NICOTINE POLACRILEX 4 MG
15 LOZENGE BUCCAL
Status: DISCONTINUED | OUTPATIENT
Start: 2021-01-01 | End: 2021-01-01 | Stop reason: HOSPADM

## 2021-01-01 RX ORDER — BISACODYL 10 MG
10 SUPPOSITORY, RECTAL RECTAL DAILY PRN
Status: DISCONTINUED | OUTPATIENT
Start: 2021-01-01 | End: 2021-01-01

## 2021-01-01 RX ORDER — CALCIUM GLUCONATE 20 MG/ML
1 INJECTION, SOLUTION INTRAVENOUS AS NEEDED
Status: DISCONTINUED | OUTPATIENT
Start: 2021-01-01 | End: 2021-01-01 | Stop reason: HOSPADM

## 2021-01-01 RX ORDER — MIDAZOLAM HYDROCHLORIDE 1 MG/ML
2 INJECTION, SOLUTION INTRAVENOUS
Status: DISCONTINUED | OUTPATIENT
Start: 2021-01-01 | End: 2021-01-01

## 2021-01-01 RX ORDER — POLYETHYLENE GLYCOL 3350 17 G/17G
17 POWDER, FOR SOLUTION ORAL DAILY
Start: 2021-01-01

## 2021-01-01 RX ORDER — POLYETHYLENE GLYCOL 3350 17 G/17G
17 POWDER, FOR SOLUTION ORAL DAILY
Status: DISCONTINUED | OUTPATIENT
Start: 2021-01-01 | End: 2021-01-01 | Stop reason: HOSPADM

## 2021-01-01 RX ORDER — HYDROCODONE BITARTRATE AND ACETAMINOPHEN 5; 325 MG/1; MG/1
1 TABLET ORAL EVERY 6 HOURS PRN
Qty: 90 TABLET | Refills: 0 | Status: SHIPPED | OUTPATIENT
Start: 2021-01-01 | End: 2021-07-15

## 2021-01-01 RX ORDER — ACETAMINOPHEN 325 MG/1
650 TABLET ORAL EVERY 6 HOURS PRN
Status: DISCONTINUED | OUTPATIENT
Start: 2021-01-01 | End: 2021-01-01 | Stop reason: HOSPADM

## 2021-01-01 RX ORDER — POTASSIUM CHLORIDE 1.5 G/1.77G
40 POWDER, FOR SOLUTION ORAL AS NEEDED
Status: DISCONTINUED | OUTPATIENT
Start: 2021-01-01 | End: 2021-01-01 | Stop reason: HOSPADM

## 2021-01-01 RX ORDER — PROPOFOL 10 MG/ML
VIAL (ML) INTRAVENOUS
Status: COMPLETED
Start: 2021-01-01 | End: 2021-01-01

## 2021-01-01 RX ORDER — METHYLPREDNISOLONE SODIUM SUCCINATE 40 MG/ML
20 INJECTION, POWDER, LYOPHILIZED, FOR SOLUTION INTRAMUSCULAR; INTRAVENOUS ONCE
Status: COMPLETED | OUTPATIENT
Start: 2021-01-01 | End: 2021-01-01

## 2021-01-01 RX ORDER — IPRATROPIUM BROMIDE AND ALBUTEROL SULFATE 2.5; .5 MG/3ML; MG/3ML
3 SOLUTION RESPIRATORY (INHALATION) ONCE
Status: COMPLETED | OUTPATIENT
Start: 2021-01-01 | End: 2021-01-01

## 2021-01-01 RX ORDER — BISACODYL 10 MG
10 SUPPOSITORY, RECTAL RECTAL DAILY PRN
Status: DISCONTINUED | OUTPATIENT
Start: 2021-01-01 | End: 2021-01-01 | Stop reason: HOSPADM

## 2021-01-01 RX ORDER — DIPHENOXYLATE HYDROCHLORIDE AND ATROPINE SULFATE 2.5; .025 MG/1; MG/1
TABLET ORAL DAILY
COMMUNITY
End: 2021-01-01

## 2021-01-01 RX ORDER — FENTANYL CITRATE-0.9 % NACL/PF 10 MCG/ML
50-300 PLASTIC BAG, INJECTION (ML) INTRAVENOUS
Status: DISCONTINUED | OUTPATIENT
Start: 2021-01-01 | End: 2021-01-01

## 2021-01-01 RX ORDER — HYDROMORPHONE HCL 110MG/55ML
2 PATIENT CONTROLLED ANALGESIA SYRINGE INTRAVENOUS ONCE
Status: DISCONTINUED | OUTPATIENT
Start: 2021-01-01 | End: 2021-01-01 | Stop reason: SDUPTHER

## 2021-01-01 RX ORDER — CHOLECALCIFEROL (VITAMIN D3) 125 MCG
5 CAPSULE ORAL NIGHTLY PRN
Start: 2021-01-01

## 2021-01-01 RX ORDER — ACETAMINOPHEN 650 MG/1
650 SUPPOSITORY RECTAL EVERY 6 HOURS PRN
Status: DISCONTINUED | OUTPATIENT
Start: 2021-01-01 | End: 2021-01-01 | Stop reason: HOSPADM

## 2021-01-01 RX ORDER — METHYLPREDNISOLONE SODIUM SUCCINATE 125 MG/2ML
60 INJECTION, POWDER, LYOPHILIZED, FOR SOLUTION INTRAMUSCULAR; INTRAVENOUS EVERY 6 HOURS
Status: DISCONTINUED | OUTPATIENT
Start: 2021-01-01 | End: 2021-01-01 | Stop reason: SDUPTHER

## 2021-01-01 RX ORDER — CALCIUM CARBONATE 200(500)MG
2 TABLET,CHEWABLE ORAL 2 TIMES DAILY PRN
Status: DISCONTINUED | OUTPATIENT
Start: 2021-01-01 | End: 2021-01-01 | Stop reason: HOSPADM

## 2021-01-01 RX ORDER — MAGNESIUM SULFATE HEPTAHYDRATE 40 MG/ML
4 INJECTION, SOLUTION INTRAVENOUS AS NEEDED
Status: DISCONTINUED | OUTPATIENT
Start: 2021-01-01 | End: 2021-01-01

## 2021-01-01 RX ORDER — LORAZEPAM 2 MG/ML
0.5 INJECTION INTRAMUSCULAR EVERY 4 HOURS PRN
Status: DISCONTINUED | OUTPATIENT
Start: 2021-01-01 | End: 2021-01-01 | Stop reason: HOSPADM

## 2021-01-01 RX ORDER — ACETAMINOPHEN 500 MG
1000 TABLET ORAL EVERY 4 HOURS PRN
Status: DISCONTINUED | OUTPATIENT
Start: 2021-01-01 | End: 2021-01-01 | Stop reason: HOSPADM

## 2021-01-01 RX ORDER — PHENYLEPHRINE HCL IN 0.9% NACL 0.5 MG/5ML
.5-3 SYRINGE (ML) INTRAVENOUS
Status: DISCONTINUED | OUTPATIENT
Start: 2021-01-01 | End: 2021-01-01

## 2021-01-01 RX ORDER — HYDROCODONE BITARTRATE AND ACETAMINOPHEN 5; 325 MG/1; MG/1
1 TABLET ORAL EVERY 6 HOURS PRN
Status: DISCONTINUED | OUTPATIENT
Start: 2021-01-01 | End: 2021-01-01

## 2021-01-01 RX ORDER — MORPHINE SULFATE 10 MG/.5ML
20 SOLUTION ORAL
Status: DISCONTINUED | OUTPATIENT
Start: 2021-01-01 | End: 2021-01-01 | Stop reason: HOSPADM

## 2021-01-01 RX ORDER — METHYLPREDNISOLONE SODIUM SUCCINATE 40 MG/ML
20 INJECTION, POWDER, LYOPHILIZED, FOR SOLUTION INTRAMUSCULAR; INTRAVENOUS EVERY 12 HOURS SCHEDULED
Status: DISCONTINUED | OUTPATIENT
Start: 2021-01-01 | End: 2021-01-01

## 2021-01-01 RX ORDER — IPRATROPIUM BROMIDE AND ALBUTEROL SULFATE 2.5; .5 MG/3ML; MG/3ML
3 SOLUTION RESPIRATORY (INHALATION)
Status: DISCONTINUED | OUTPATIENT
Start: 2021-01-01 | End: 2021-01-01 | Stop reason: SDUPTHER

## 2021-01-01 RX ORDER — SPIRONOLACTONE 25 MG/1
25 TABLET ORAL DAILY
Status: DISCONTINUED | OUTPATIENT
Start: 2021-01-01 | End: 2021-01-01 | Stop reason: HOSPADM

## 2021-01-01 RX ORDER — HALOPERIDOL 5 MG/ML
2 INJECTION INTRAMUSCULAR ONCE
Status: COMPLETED | OUTPATIENT
Start: 2021-01-01 | End: 2021-01-01

## 2021-01-01 RX ORDER — LORAZEPAM 2 MG/ML
0.5 INJECTION INTRAMUSCULAR EVERY 4 HOURS PRN
Qty: 100 ML | Refills: 0 | Status: SHIPPED | OUTPATIENT
Start: 2021-01-01 | End: 2021-12-06

## 2021-01-01 RX ORDER — HYDROXYZINE HYDROCHLORIDE 25 MG/1
25 TABLET, FILM COATED ORAL 3 TIMES DAILY PRN
Status: DISCONTINUED | OUTPATIENT
Start: 2021-01-01 | End: 2021-01-01 | Stop reason: HOSPADM

## 2021-01-01 RX ORDER — KETOROLAC TROMETHAMINE 15 MG/ML
15 INJECTION, SOLUTION INTRAMUSCULAR; INTRAVENOUS EVERY 6 HOURS PRN
Status: DISPENSED | OUTPATIENT
Start: 2021-01-01 | End: 2021-01-01

## 2021-01-01 RX ORDER — ALBUTEROL SULFATE 90 UG/1
2 AEROSOL, METERED RESPIRATORY (INHALATION) EVERY 6 HOURS PRN
COMMUNITY
Start: 2021-01-01 | End: 2021-01-01 | Stop reason: HOSPADM

## 2021-01-01 RX ORDER — POTASSIUM CHLORIDE 29.8 MG/ML
20 INJECTION INTRAVENOUS
Status: COMPLETED | OUTPATIENT
Start: 2021-01-01 | End: 2021-01-01

## 2021-01-01 RX ORDER — ACETAMINOPHEN 500 MG
1000 TABLET ORAL EVERY 6 HOURS PRN
Status: DISCONTINUED | OUTPATIENT
Start: 2021-01-01 | End: 2021-01-01

## 2021-01-01 RX ORDER — BISACODYL 5 MG/1
5 TABLET, DELAYED RELEASE ORAL DAILY PRN
Status: DISCONTINUED | OUTPATIENT
Start: 2021-01-01 | End: 2021-01-01 | Stop reason: HOSPADM

## 2021-01-01 RX ORDER — ACETAMINOPHEN 500 MG
1000 TABLET ORAL ONCE
Status: COMPLETED | OUTPATIENT
Start: 2021-01-01 | End: 2021-01-01

## 2021-01-01 RX ORDER — PSEUDOEPHEDRINE HCL 30 MG
100 TABLET ORAL 2 TIMES DAILY
Start: 2021-01-01

## 2021-01-01 RX ORDER — HALOPERIDOL 5 MG/ML
2 INJECTION INTRAMUSCULAR EVERY 6 HOURS PRN
Status: DISCONTINUED | OUTPATIENT
Start: 2021-01-01 | End: 2021-01-01 | Stop reason: HOSPADM

## 2021-01-01 RX ORDER — FAMOTIDINE 10 MG/ML
20 INJECTION, SOLUTION INTRAVENOUS EVERY 12 HOURS SCHEDULED
Status: DISCONTINUED | OUTPATIENT
Start: 2021-01-01 | End: 2021-01-01

## 2021-01-01 RX ORDER — HALOPERIDOL 5 MG/ML
INJECTION INTRAMUSCULAR
Status: COMPLETED
Start: 2021-01-01 | End: 2021-01-01

## 2021-01-01 RX ORDER — MORPHINE SULFATE 10 MG/.5ML
20 SOLUTION ORAL
Qty: 180 ML | Refills: 0 | Status: SHIPPED | OUTPATIENT
Start: 2021-01-01 | End: 2021-07-15

## 2021-01-01 RX ORDER — PROMETHAZINE HYDROCHLORIDE 25 MG/1
25 TABLET ORAL EVERY 4 HOURS PRN
Status: DISCONTINUED | OUTPATIENT
Start: 2021-01-01 | End: 2021-01-01 | Stop reason: HOSPADM

## 2021-01-01 RX ORDER — LANSOPRAZOLE
30 KIT EVERY MORNING
Status: DISCONTINUED | OUTPATIENT
Start: 2021-01-01 | End: 2021-01-01 | Stop reason: HOSPADM

## 2021-01-01 RX ORDER — ACETAMINOPHEN 650 MG/1
650 SUPPOSITORY RECTAL EVERY 6 HOURS PRN
Start: 2021-01-01

## 2021-01-01 RX ORDER — ARGININE/GLUTAMINE/CALCIUM BMB 7G-7G-1.5G
1 POWDER IN PACKET (EA) ORAL 2 TIMES DAILY
Status: DISCONTINUED | OUTPATIENT
Start: 2021-01-01 | End: 2021-01-01 | Stop reason: HOSPADM

## 2021-01-01 RX ORDER — FENTANYL CITRATE 50 UG/ML
INJECTION, SOLUTION INTRAMUSCULAR; INTRAVENOUS
Status: COMPLETED
Start: 2021-01-01 | End: 2021-01-01

## 2021-01-01 RX ORDER — BUPRENORPHINE HYDROCHLORIDE AND NALOXONE HYDROCHLORIDE DIHYDRATE 8; 2 MG/1; MG/1
1 TABLET SUBLINGUAL DAILY
Status: COMPLETED | OUTPATIENT
Start: 2021-01-01 | End: 2021-01-01

## 2021-01-01 RX ORDER — HALOPERIDOL 5 MG/ML
5 INJECTION INTRAMUSCULAR ONCE
Status: COMPLETED | OUTPATIENT
Start: 2021-01-01 | End: 2021-01-01

## 2021-01-01 RX ORDER — BISACODYL 10 MG
10 SUPPOSITORY, RECTAL RECTAL DAILY PRN
Start: 2021-01-01

## 2021-01-01 RX ORDER — MIDAZOLAM HYDROCHLORIDE 1 MG/ML
INJECTION INTRAMUSCULAR; INTRAVENOUS
Status: COMPLETED
Start: 2021-01-01 | End: 2021-01-01

## 2021-01-01 RX ORDER — ZINC OXIDE 16 %
OINTMENT (GRAM) TOPICAL 2 TIMES DAILY
Qty: 60 G | Refills: 0 | Status: SHIPPED | OUTPATIENT
Start: 2021-01-01

## 2021-01-01 RX ORDER — PREDNISONE 10 MG/1
10 TABLET ORAL DAILY
Status: COMPLETED | OUTPATIENT
Start: 2021-01-01 | End: 2021-01-01

## 2021-01-01 RX ORDER — PANTOPRAZOLE SODIUM 40 MG/10ML
40 INJECTION, POWDER, LYOPHILIZED, FOR SOLUTION INTRAVENOUS
Status: DISCONTINUED | OUTPATIENT
Start: 2021-01-01 | End: 2021-01-01

## 2021-01-01 RX ORDER — DEXAMETHASONE SODIUM PHOSPHATE 4 MG/ML
8 INJECTION, SOLUTION INTRA-ARTICULAR; INTRALESIONAL; INTRAMUSCULAR; INTRAVENOUS; SOFT TISSUE ONCE
Status: COMPLETED | OUTPATIENT
Start: 2021-01-01 | End: 2021-01-01

## 2021-01-01 RX ORDER — IPRATROPIUM BROMIDE AND ALBUTEROL SULFATE 2.5; .5 MG/3ML; MG/3ML
3 SOLUTION RESPIRATORY (INHALATION) EVERY 4 HOURS PRN
Qty: 360 ML | Refills: 0 | Status: SHIPPED | OUTPATIENT
Start: 2021-01-01

## 2021-01-01 RX ORDER — POTASSIUM CHLORIDE 20 MEQ/1
40 TABLET, EXTENDED RELEASE ORAL AS NEEDED
Status: DISCONTINUED | OUTPATIENT
Start: 2021-01-01 | End: 2021-01-01 | Stop reason: HOSPADM

## 2021-01-01 RX ADMIN — GUAIFENESIN 600 MG: 600 TABLET, EXTENDED RELEASE ORAL at 09:31

## 2021-01-01 RX ADMIN — IPRATROPIUM BROMIDE AND ALBUTEROL SULFATE 3 ML: 2.5; .5 SOLUTION RESPIRATORY (INHALATION) at 19:50

## 2021-01-01 RX ADMIN — PANTOPRAZOLE SODIUM 40 MG: 40 TABLET, DELAYED RELEASE ORAL at 10:04

## 2021-01-01 RX ADMIN — Medication 10 ML: at 10:04

## 2021-01-01 RX ADMIN — NYSTATIN 500000 UNITS: 500000 SUSPENSION ORAL at 20:50

## 2021-01-01 RX ADMIN — IPRATROPIUM BROMIDE AND ALBUTEROL SULFATE 3 ML: 2.5; .5 SOLUTION RESPIRATORY (INHALATION) at 18:21

## 2021-01-01 RX ADMIN — BUDESONIDE 0.5 MG: 0.5 INHALANT RESPIRATORY (INHALATION) at 18:28

## 2021-01-01 RX ADMIN — METOPROLOL TARTRATE 25 MG: 25 TABLET, FILM COATED ORAL at 09:32

## 2021-01-01 RX ADMIN — AMPICILLIN SODIUM AND SULBACTAM SODIUM 3 G: 2; 1 INJECTION, POWDER, FOR SOLUTION INTRAMUSCULAR; INTRAVENOUS at 09:22

## 2021-01-01 RX ADMIN — SERTRALINE HYDROCHLORIDE 50 MG: 50 TABLET ORAL at 09:43

## 2021-01-01 RX ADMIN — PANTOPRAZOLE SODIUM 40 MG: 40 INJECTION, POWDER, FOR SOLUTION INTRAVENOUS at 06:20

## 2021-01-01 RX ADMIN — MEGESTROL ACETATE 200 MG: 40 SUSPENSION ORAL at 09:17

## 2021-01-01 RX ADMIN — GABAPENTIN 300 MG: 300 CAPSULE ORAL at 17:38

## 2021-01-01 RX ADMIN — POTASSIUM CHLORIDE 40 MEQ: 1.5 POWDER, FOR SOLUTION ORAL at 15:13

## 2021-01-01 RX ADMIN — IPRATROPIUM BROMIDE AND ALBUTEROL SULFATE 3 ML: 2.5; .5 SOLUTION RESPIRATORY (INHALATION) at 02:36

## 2021-01-01 RX ADMIN — CEFEPIME HYDROCHLORIDE 2 G: 2 INJECTION, POWDER, FOR SOLUTION INTRAVENOUS at 11:48

## 2021-01-01 RX ADMIN — AMPICILLIN SODIUM AND SULBACTAM SODIUM 3 G: 2; 1 INJECTION, POWDER, FOR SOLUTION INTRAMUSCULAR; INTRAVENOUS at 21:03

## 2021-01-01 RX ADMIN — LURASIDONE HYDROCHLORIDE 40 MG: 40 TABLET, FILM COATED ORAL at 09:35

## 2021-01-01 RX ADMIN — GUAIFENESIN 600 MG: 600 TABLET, EXTENDED RELEASE ORAL at 21:29

## 2021-01-01 RX ADMIN — ONDANSETRON 4 MG: 2 INJECTION INTRAMUSCULAR; INTRAVENOUS at 09:44

## 2021-01-01 RX ADMIN — GABAPENTIN 300 MG: 300 CAPSULE ORAL at 15:07

## 2021-01-01 RX ADMIN — DOCUSATE SODIUM 100 MG: 50 LIQUID ORAL at 09:58

## 2021-01-01 RX ADMIN — DOCUSATE SODIUM 100 MG: 100 CAPSULE, LIQUID FILLED ORAL at 10:32

## 2021-01-01 RX ADMIN — Medication 10 ML: at 10:34

## 2021-01-01 RX ADMIN — IPRATROPIUM BROMIDE AND ALBUTEROL SULFATE 3 ML: 2.5; .5 SOLUTION RESPIRATORY (INHALATION) at 19:02

## 2021-01-01 RX ADMIN — GABAPENTIN 300 MG: 300 CAPSULE ORAL at 21:42

## 2021-01-01 RX ADMIN — INSULIN LISPRO 2 UNITS: 100 INJECTION, SOLUTION INTRAVENOUS; SUBCUTANEOUS at 17:59

## 2021-01-01 RX ADMIN — IPRATROPIUM BROMIDE AND ALBUTEROL SULFATE 3 ML: 2.5; .5 SOLUTION RESPIRATORY (INHALATION) at 11:17

## 2021-01-01 RX ADMIN — SERTRALINE HYDROCHLORIDE 50 MG: 50 TABLET ORAL at 08:12

## 2021-01-01 RX ADMIN — IPRATROPIUM BROMIDE AND ALBUTEROL SULFATE 3 ML: 2.5; .5 SOLUTION RESPIRATORY (INHALATION) at 08:08

## 2021-01-01 RX ADMIN — GUAIFENESIN 600 MG: 600 TABLET, EXTENDED RELEASE ORAL at 21:12

## 2021-01-01 RX ADMIN — FUROSEMIDE 20 MG: 10 INJECTION, SOLUTION INTRAMUSCULAR; INTRAVENOUS at 09:02

## 2021-01-01 RX ADMIN — MULTIPLE VITAMINS W/ MINERALS TAB 1 TABLET: TAB at 09:17

## 2021-01-01 RX ADMIN — SODIUM CHLORIDE 1.5 MCG/KG/HR: 9 INJECTION, SOLUTION INTRAVENOUS at 09:39

## 2021-01-01 RX ADMIN — INSULIN LISPRO 2 UNITS: 100 INJECTION, SOLUTION INTRAVENOUS; SUBCUTANEOUS at 17:15

## 2021-01-01 RX ADMIN — VANCOMYCIN HYDROCHLORIDE 1250 MG: 10 INJECTION, POWDER, LYOPHILIZED, FOR SOLUTION INTRAVENOUS at 16:42

## 2021-01-01 RX ADMIN — HYDROMORPHONE HYDROCHLORIDE 1 MG: 2 INJECTION, SOLUTION INTRAMUSCULAR; INTRAVENOUS; SUBCUTANEOUS at 07:35

## 2021-01-01 RX ADMIN — BUDESONIDE 0.5 MG: 0.5 INHALANT RESPIRATORY (INHALATION) at 07:33

## 2021-01-01 RX ADMIN — IPRATROPIUM BROMIDE AND ALBUTEROL SULFATE 3 ML: 2.5; .5 SOLUTION RESPIRATORY (INHALATION) at 11:00

## 2021-01-01 RX ADMIN — HYDROCODONE BITARTRATE AND ACETAMINOPHEN 2 TABLET: 5; 325 TABLET ORAL at 19:58

## 2021-01-01 RX ADMIN — FUROSEMIDE 20 MG: 10 INJECTION, SOLUTION INTRAMUSCULAR; INTRAVENOUS at 10:38

## 2021-01-01 RX ADMIN — DEXTROSE AND SODIUM CHLORIDE 75 ML/HR: 5; 450 INJECTION, SOLUTION INTRAVENOUS at 15:18

## 2021-01-01 RX ADMIN — VANCOMYCIN HYDROCHLORIDE 1000 MG: 1 INJECTION, POWDER, LYOPHILIZED, FOR SOLUTION INTRAVENOUS at 17:49

## 2021-01-01 RX ADMIN — SPIRONOLACTONE 25 MG: 25 TABLET, FILM COATED ORAL at 10:10

## 2021-01-01 RX ADMIN — HYDROMORPHONE HYDROCHLORIDE 1 MG: 2 INJECTION, SOLUTION INTRAMUSCULAR; INTRAVENOUS; SUBCUTANEOUS at 16:56

## 2021-01-01 RX ADMIN — MULTIPLE VITAMINS W/ MINERALS TAB 1 TABLET: TAB at 10:32

## 2021-01-01 RX ADMIN — SERTRALINE HYDROCHLORIDE 50 MG: 50 TABLET ORAL at 09:23

## 2021-01-01 RX ADMIN — METOPROLOL TARTRATE 50 MG: 50 TABLET ORAL at 08:25

## 2021-01-01 RX ADMIN — GABAPENTIN 300 MG: 300 CAPSULE ORAL at 21:44

## 2021-01-01 RX ADMIN — ASPIRIN 81 MG: 81 TABLET, CHEWABLE ORAL at 08:25

## 2021-01-01 RX ADMIN — PROMETHAZINE HYDROCHLORIDE 25 MG: 25 TABLET ORAL at 19:50

## 2021-01-01 RX ADMIN — PANTOPRAZOLE SODIUM 40 MG: 40 TABLET, DELAYED RELEASE ORAL at 09:22

## 2021-01-01 RX ADMIN — LURASIDONE HYDROCHLORIDE 40 MG: 40 TABLET, FILM COATED ORAL at 10:32

## 2021-01-01 RX ADMIN — POLYETHYLENE GLYCOL 3350 17 G: 17 POWDER, FOR SOLUTION ORAL at 09:33

## 2021-01-01 RX ADMIN — POLYETHYLENE GLYCOL 3350 17 G: 17 POWDER, FOR SOLUTION ORAL at 09:20

## 2021-01-01 RX ADMIN — EPOETIN ALFA-EPBX 40000 UNITS: 10000 INJECTION, SOLUTION INTRAVENOUS; SUBCUTANEOUS at 14:18

## 2021-01-01 RX ADMIN — METHYLPREDNISOLONE SODIUM SUCCINATE 40 MG: 40 INJECTION, POWDER, FOR SOLUTION INTRAMUSCULAR; INTRAVENOUS at 05:16

## 2021-01-01 RX ADMIN — ASPIRIN 81 MG: 81 TABLET, COATED ORAL at 10:02

## 2021-01-01 RX ADMIN — GABAPENTIN 300 MG: 300 CAPSULE ORAL at 21:14

## 2021-01-01 RX ADMIN — MIDAZOLAM 2 MG: 1 INJECTION INTRAMUSCULAR; INTRAVENOUS at 12:29

## 2021-01-01 RX ADMIN — SODIUM CHLORIDE 3 G: 900 INJECTION INTRAVENOUS at 01:03

## 2021-01-01 RX ADMIN — METHYLPREDNISOLONE SODIUM SUCCINATE 20 MG: 40 INJECTION, POWDER, FOR SOLUTION INTRAMUSCULAR; INTRAVENOUS at 21:42

## 2021-01-01 RX ADMIN — METOPROLOL TARTRATE 5 MG: 5 INJECTION INTRAVENOUS at 15:37

## 2021-01-01 RX ADMIN — MEGESTROL ACETATE 200 MG: 40 SUSPENSION ORAL at 17:05

## 2021-01-01 RX ADMIN — GABAPENTIN 300 MG: 300 CAPSULE ORAL at 09:22

## 2021-01-01 RX ADMIN — PANTOPRAZOLE SODIUM 40 MG: 40 TABLET, DELAYED RELEASE ORAL at 08:13

## 2021-01-01 RX ADMIN — GABAPENTIN 300 MG: 300 CAPSULE ORAL at 15:37

## 2021-01-01 RX ADMIN — DOCUSATE SODIUM 100 MG: 100 CAPSULE, LIQUID FILLED ORAL at 09:57

## 2021-01-01 RX ADMIN — FUROSEMIDE 20 MG: 10 INJECTION, SOLUTION INTRAMUSCULAR; INTRAVENOUS at 23:19

## 2021-01-01 RX ADMIN — BUDESONIDE 0.5 MG: 0.5 INHALANT RESPIRATORY (INHALATION) at 07:58

## 2021-01-01 RX ADMIN — CEFEPIME HYDROCHLORIDE 2 G: 2 INJECTION, POWDER, FOR SOLUTION INTRAVENOUS at 03:05

## 2021-01-01 RX ADMIN — GUAIFENESIN 600 MG: 600 TABLET, EXTENDED RELEASE ORAL at 21:55

## 2021-01-01 RX ADMIN — MORPHINE SULFATE 2 MG: 4 INJECTION INTRAVENOUS at 06:20

## 2021-01-01 RX ADMIN — MEGESTROL ACETATE 200 MG: 40 SUSPENSION ORAL at 08:58

## 2021-01-01 RX ADMIN — ASPIRIN 81 MG: 81 TABLET, COATED ORAL at 09:20

## 2021-01-01 RX ADMIN — HYDROMORPHONE HYDROCHLORIDE 1 MG: 2 INJECTION, SOLUTION INTRAMUSCULAR; INTRAVENOUS; SUBCUTANEOUS at 09:06

## 2021-01-01 RX ADMIN — SPIRONOLACTONE 25 MG: 25 TABLET, FILM COATED ORAL at 08:54

## 2021-01-01 RX ADMIN — Medication: at 09:24

## 2021-01-01 RX ADMIN — PANTOPRAZOLE SODIUM 40 MG: 40 INJECTION, POWDER, FOR SOLUTION INTRAVENOUS at 05:09

## 2021-01-01 RX ADMIN — COLLAGENASE SANTYL: 250 OINTMENT TOPICAL at 09:31

## 2021-01-01 RX ADMIN — ATORVASTATIN CALCIUM 40 MG: 40 TABLET, FILM COATED ORAL at 21:29

## 2021-01-01 RX ADMIN — MEGESTROL ACETATE 200 MG: 40 SUSPENSION ORAL at 21:14

## 2021-01-01 RX ADMIN — MORPHINE SULFATE 2 MG: 4 INJECTION INTRAVENOUS at 19:56

## 2021-01-01 RX ADMIN — INSULIN LISPRO 3 UNITS: 100 INJECTION, SOLUTION INTRAVENOUS; SUBCUTANEOUS at 17:32

## 2021-01-01 RX ADMIN — METOPROLOL TARTRATE 50 MG: 50 TABLET ORAL at 21:29

## 2021-01-01 RX ADMIN — MULTIPLE VITAMINS W/ MINERALS TAB 1 TABLET: TAB at 10:02

## 2021-01-01 RX ADMIN — GUAIFENESIN 300 MG: 100 SOLUTION ORAL at 20:27

## 2021-01-01 RX ADMIN — IPRATROPIUM BROMIDE AND ALBUTEROL SULFATE 3 ML: 2.5; .5 SOLUTION RESPIRATORY (INHALATION) at 07:23

## 2021-01-01 RX ADMIN — METHYLPREDNISOLONE SODIUM SUCCINATE 60 MG: 125 INJECTION, POWDER, FOR SOLUTION INTRAMUSCULAR; INTRAVENOUS at 04:15

## 2021-01-01 RX ADMIN — ONDANSETRON 4 MG: 2 INJECTION INTRAMUSCULAR; INTRAVENOUS at 06:43

## 2021-01-01 RX ADMIN — Medication: at 09:01

## 2021-01-01 RX ADMIN — LURASIDONE HYDROCHLORIDE 40 MG: 40 TABLET, FILM COATED ORAL at 09:42

## 2021-01-01 RX ADMIN — AMPICILLIN SODIUM AND SULBACTAM SODIUM 3 G: 2; 1 INJECTION, POWDER, FOR SOLUTION INTRAMUSCULAR; INTRAVENOUS at 20:26

## 2021-01-01 RX ADMIN — SENNOSIDES AND DOCUSATE SODIUM 2 TABLET: 8.6; 5 TABLET ORAL at 21:12

## 2021-01-01 RX ADMIN — SPIRONOLACTONE 25 MG: 25 TABLET ORAL at 08:30

## 2021-01-01 RX ADMIN — GUAIFENESIN 300 MG: 100 SOLUTION ORAL at 16:07

## 2021-01-01 RX ADMIN — GABAPENTIN 300 MG: 300 CAPSULE ORAL at 16:06

## 2021-01-01 RX ADMIN — SODIUM CHLORIDE 3 G: 900 INJECTION INTRAVENOUS at 13:25

## 2021-01-01 RX ADMIN — SENNOSIDES AND DOCUSATE SODIUM 2 TABLET: 8.6; 5 TABLET ORAL at 09:35

## 2021-01-01 RX ADMIN — BUPRENORPHINE AND NALOXONE 1 TABLET: 8; 2 TABLET SUBLINGUAL at 10:28

## 2021-01-01 RX ADMIN — Medication: at 09:35

## 2021-01-01 RX ADMIN — AMPICILLIN SODIUM AND SULBACTAM SODIUM 3 G: 2; 1 INJECTION, POWDER, FOR SOLUTION INTRAMUSCULAR; INTRAVENOUS at 08:55

## 2021-01-01 RX ADMIN — IPRATROPIUM BROMIDE AND ALBUTEROL SULFATE 3 ML: 2.5; .5 SOLUTION RESPIRATORY (INHALATION) at 14:36

## 2021-01-01 RX ADMIN — GUAIFENESIN 600 MG: 600 TABLET, EXTENDED RELEASE ORAL at 21:40

## 2021-01-01 RX ADMIN — LURASIDONE HYDROCHLORIDE 40 MG: 40 TABLET, FILM COATED ORAL at 09:52

## 2021-01-01 RX ADMIN — DOCUSATE SODIUM 100 MG: 100 CAPSULE, LIQUID FILLED ORAL at 09:22

## 2021-01-01 RX ADMIN — MEGESTROL ACETATE 200 MG: 40 SUSPENSION ORAL at 08:13

## 2021-01-01 RX ADMIN — Medication 10 ML: at 21:41

## 2021-01-01 RX ADMIN — HYDROXYZINE HYDROCHLORIDE 25 MG: 25 TABLET, FILM COATED ORAL at 20:27

## 2021-01-01 RX ADMIN — HYDROMORPHONE HYDROCHLORIDE 1 MG: 2 INJECTION, SOLUTION INTRAMUSCULAR; INTRAVENOUS; SUBCUTANEOUS at 01:26

## 2021-01-01 RX ADMIN — IPRATROPIUM BROMIDE AND ALBUTEROL SULFATE 3 ML: 2.5; .5 SOLUTION RESPIRATORY (INHALATION) at 18:19

## 2021-01-01 RX ADMIN — SODIUM CHLORIDE 0.2 MCG/KG/HR: 9 INJECTION, SOLUTION INTRAVENOUS at 12:34

## 2021-01-01 RX ADMIN — ACETYLCYSTEINE 2 ML: 200 SOLUTION ORAL; RESPIRATORY (INHALATION) at 07:42

## 2021-01-01 RX ADMIN — GUAIFENESIN 600 MG: 600 TABLET, EXTENDED RELEASE ORAL at 09:28

## 2021-01-01 RX ADMIN — Medication: at 10:05

## 2021-01-01 RX ADMIN — SERTRALINE HYDROCHLORIDE 50 MG: 50 TABLET ORAL at 08:55

## 2021-01-01 RX ADMIN — FERROUS SULFATE TAB EC 324 MG (65 MG FE EQUIVALENT) 324 MG: 324 (65 FE) TABLET DELAYED RESPONSE at 10:04

## 2021-01-01 RX ADMIN — DOCUSATE SODIUM 100 MG: 100 CAPSULE, LIQUID FILLED ORAL at 20:49

## 2021-01-01 RX ADMIN — IPRATROPIUM BROMIDE AND ALBUTEROL SULFATE 3 ML: 2.5; .5 SOLUTION RESPIRATORY (INHALATION) at 07:33

## 2021-01-01 RX ADMIN — KETOROLAC TROMETHAMINE 15 MG: 15 INJECTION, SOLUTION INTRAMUSCULAR; INTRAVENOUS at 12:51

## 2021-01-01 RX ADMIN — GABAPENTIN 300 MG: 300 CAPSULE ORAL at 08:14

## 2021-01-01 RX ADMIN — SPIRONOLACTONE 25 MG: 25 TABLET, FILM COATED ORAL at 08:14

## 2021-01-01 RX ADMIN — GUAIFENESIN 600 MG: 600 TABLET, EXTENDED RELEASE ORAL at 14:24

## 2021-01-01 RX ADMIN — LURASIDONE HYDROCHLORIDE 40 MG: 40 TABLET, FILM COATED ORAL at 09:55

## 2021-01-01 RX ADMIN — MIDAZOLAM HYDROCHLORIDE 2 MG/HR: 5 INJECTION, SOLUTION INTRAMUSCULAR; INTRAVENOUS at 20:47

## 2021-01-01 RX ADMIN — ATORVASTATIN CALCIUM 40 MG: 40 TABLET, FILM COATED ORAL at 20:52

## 2021-01-01 RX ADMIN — IPRATROPIUM BROMIDE AND ALBUTEROL SULFATE 3 ML: 2.5; .5 SOLUTION RESPIRATORY (INHALATION) at 03:24

## 2021-01-01 RX ADMIN — MEGESTROL ACETATE 200 MG: 40 SUSPENSION ORAL at 09:36

## 2021-01-01 RX ADMIN — MULTIPLE VITAMINS W/ MINERALS TAB 1 TABLET: TAB at 08:48

## 2021-01-01 RX ADMIN — GABAPENTIN 300 MG: 300 CAPSULE ORAL at 20:27

## 2021-01-01 RX ADMIN — POTASSIUM CHLORIDE 10 MEQ: 7.46 INJECTION, SOLUTION INTRAVENOUS at 11:51

## 2021-01-01 RX ADMIN — Medication: at 20:56

## 2021-01-01 RX ADMIN — ONDANSETRON 4 MG: 2 INJECTION INTRAMUSCULAR; INTRAVENOUS at 13:26

## 2021-01-01 RX ADMIN — IPRATROPIUM BROMIDE AND ALBUTEROL SULFATE 3 ML: 2.5; .5 SOLUTION RESPIRATORY (INHALATION) at 18:28

## 2021-01-01 RX ADMIN — MORPHINE SULFATE 20 MG: 10 SOLUTION ORAL at 10:03

## 2021-01-01 RX ADMIN — AMPICILLIN SODIUM AND SULBACTAM SODIUM 3 G: 2; 1 INJECTION, POWDER, FOR SOLUTION INTRAMUSCULAR; INTRAVENOUS at 01:02

## 2021-01-01 RX ADMIN — GABAPENTIN 300 MG: 300 CAPSULE ORAL at 08:45

## 2021-01-01 RX ADMIN — GUAIFENESIN 300 MG: 100 SOLUTION ORAL at 15:38

## 2021-01-01 RX ADMIN — BUPRENORPHINE AND NALOXONE 1 TABLET: 8; 2 TABLET SUBLINGUAL at 08:30

## 2021-01-01 RX ADMIN — ASPIRIN 81 MG: 81 TABLET, COATED ORAL at 09:56

## 2021-01-01 RX ADMIN — IPRATROPIUM BROMIDE AND ALBUTEROL SULFATE 3 ML: 2.5; .5 SOLUTION RESPIRATORY (INHALATION) at 23:22

## 2021-01-01 RX ADMIN — GUAIFENESIN 300 MG: 100 SOLUTION ORAL at 17:38

## 2021-01-01 RX ADMIN — FENTANYL CITRATE 50 MCG: 50 INJECTION, SOLUTION INTRAMUSCULAR; INTRAVENOUS at 12:46

## 2021-01-01 RX ADMIN — WATER: 1 INJECTION INTRAMUSCULAR; INTRAVENOUS; SUBCUTANEOUS at 13:48

## 2021-01-01 RX ADMIN — CEFEPIME HYDROCHLORIDE 2 G: 2 INJECTION, POWDER, FOR SOLUTION INTRAVENOUS at 11:57

## 2021-01-01 RX ADMIN — Medication 2 MG: at 05:40

## 2021-01-01 RX ADMIN — AMPICILLIN SODIUM AND SULBACTAM SODIUM 3 G: 2; 1 INJECTION, POWDER, FOR SOLUTION INTRAMUSCULAR; INTRAVENOUS at 02:52

## 2021-01-01 RX ADMIN — Medication 10 ML: at 08:25

## 2021-01-01 RX ADMIN — METHYLPREDNISOLONE SODIUM SUCCINATE 60 MG: 125 INJECTION, POWDER, FOR SOLUTION INTRAMUSCULAR; INTRAVENOUS at 16:43

## 2021-01-01 RX ADMIN — ENOXAPARIN SODIUM 40 MG: 40 INJECTION SUBCUTANEOUS at 15:07

## 2021-01-01 RX ADMIN — GUAIFENESIN 600 MG: 600 TABLET, EXTENDED RELEASE ORAL at 21:44

## 2021-01-01 RX ADMIN — Medication 10 ML: at 20:53

## 2021-01-01 RX ADMIN — AMPICILLIN SODIUM AND SULBACTAM SODIUM 3 G: 2; 1 INJECTION, POWDER, FOR SOLUTION INTRAMUSCULAR; INTRAVENOUS at 09:55

## 2021-01-01 RX ADMIN — IBUPROFEN 400 MG: 100 SUSPENSION ORAL at 18:33

## 2021-01-01 RX ADMIN — COLLAGENASE SANTYL: 250 OINTMENT TOPICAL at 09:02

## 2021-01-01 RX ADMIN — ENOXAPARIN SODIUM 40 MG: 40 INJECTION SUBCUTANEOUS at 17:09

## 2021-01-01 RX ADMIN — MAGNESIUM HYDROXIDE 10 ML: 2400 SUSPENSION ORAL at 10:33

## 2021-01-01 RX ADMIN — MEGESTROL ACETATE 200 MG: 40 SUSPENSION ORAL at 09:46

## 2021-01-01 RX ADMIN — SPIRONOLACTONE 25 MG: 25 TABLET, FILM COATED ORAL at 09:36

## 2021-01-01 RX ADMIN — Medication: at 22:00

## 2021-01-01 RX ADMIN — HYDROCODONE BITARTRATE AND ACETAMINOPHEN 1 TABLET: 5; 325 TABLET ORAL at 23:51

## 2021-01-01 RX ADMIN — LANSOPRAZOLE 30 MG: KIT at 05:54

## 2021-01-01 RX ADMIN — MULTIPLE VITAMINS W/ MINERALS TAB 1 TABLET: TAB at 09:35

## 2021-01-01 RX ADMIN — SERTRALINE HYDROCHLORIDE 50 MG: 50 TABLET ORAL at 10:33

## 2021-01-01 RX ADMIN — AMPICILLIN SODIUM AND SULBACTAM SODIUM 3 G: 2; 1 INJECTION, POWDER, FOR SOLUTION INTRAMUSCULAR; INTRAVENOUS at 03:02

## 2021-01-01 RX ADMIN — METOPROLOL TARTRATE 50 MG: 50 TABLET ORAL at 09:52

## 2021-01-01 RX ADMIN — IPRATROPIUM BROMIDE AND ALBUTEROL SULFATE 3 ML: 2.5; .5 SOLUTION RESPIRATORY (INHALATION) at 03:13

## 2021-01-01 RX ADMIN — LABETALOL 20 MG/4 ML (5 MG/ML) INTRAVENOUS SYRINGE 20 MG: at 15:44

## 2021-01-01 RX ADMIN — NYSTATIN 500000 UNITS: 500000 SUSPENSION ORAL at 09:22

## 2021-01-01 RX ADMIN — SERTRALINE HYDROCHLORIDE 50 MG: 50 TABLET ORAL at 09:31

## 2021-01-01 RX ADMIN — NYSTATIN 500000 UNITS: 500000 SUSPENSION ORAL at 12:42

## 2021-01-01 RX ADMIN — GABAPENTIN 300 MG: 300 CAPSULE ORAL at 17:09

## 2021-01-01 RX ADMIN — GABAPENTIN 300 MG: 300 CAPSULE ORAL at 17:48

## 2021-01-01 RX ADMIN — GABAPENTIN 300 MG: 300 CAPSULE ORAL at 10:04

## 2021-01-01 RX ADMIN — IPRATROPIUM BROMIDE AND ALBUTEROL SULFATE 3 ML: 2.5; .5 SOLUTION RESPIRATORY (INHALATION) at 18:05

## 2021-01-01 RX ADMIN — FUROSEMIDE 20 MG: 10 INJECTION, SOLUTION INTRAMUSCULAR; INTRAVENOUS at 23:00

## 2021-01-01 RX ADMIN — GABAPENTIN 100 MG: 100 CAPSULE ORAL at 09:55

## 2021-01-01 RX ADMIN — SODIUM CHLORIDE 1000 MG: 900 INJECTION, SOLUTION INTRAVENOUS at 09:31

## 2021-01-01 RX ADMIN — HYDROMORPHONE HYDROCHLORIDE 1 MG: 2 INJECTION, SOLUTION INTRAMUSCULAR; INTRAVENOUS; SUBCUTANEOUS at 06:14

## 2021-01-01 RX ADMIN — FUROSEMIDE 20 MG: 10 INJECTION, SOLUTION INTRAMUSCULAR; INTRAVENOUS at 12:32

## 2021-01-01 RX ADMIN — BUPRENORPHINE AND NALOXONE 1.5 TABLET: 8; 2 TABLET SUBLINGUAL at 08:48

## 2021-01-01 RX ADMIN — VANCOMYCIN HYDROCHLORIDE 1000 MG: 1 INJECTION, POWDER, LYOPHILIZED, FOR SOLUTION INTRAVENOUS at 17:59

## 2021-01-01 RX ADMIN — SERTRALINE HYDROCHLORIDE 50 MG: 50 TABLET ORAL at 08:59

## 2021-01-01 RX ADMIN — SERTRALINE HYDROCHLORIDE 50 MG: 50 TABLET ORAL at 08:25

## 2021-01-01 RX ADMIN — Medication: at 08:57

## 2021-01-01 RX ADMIN — GUAIFENESIN 300 MG: 100 SOLUTION ORAL at 05:33

## 2021-01-01 RX ADMIN — LANSOPRAZOLE 30 MG: KIT at 06:23

## 2021-01-01 RX ADMIN — SPIRONOLACTONE 25 MG: 25 TABLET ORAL at 16:05

## 2021-01-01 RX ADMIN — Medication 10 ML: at 08:45

## 2021-01-01 RX ADMIN — IOPAMIDOL 100 ML: 755 INJECTION, SOLUTION INTRAVENOUS at 12:05

## 2021-01-01 RX ADMIN — HALOPERIDOL LACTATE 5 MG: 5 INJECTION, SOLUTION INTRAMUSCULAR at 23:29

## 2021-01-01 RX ADMIN — HYDROCODONE BITARTRATE AND ACETAMINOPHEN 1 TABLET: 5; 325 TABLET ORAL at 17:48

## 2021-01-01 RX ADMIN — MORPHINE SULFATE 20 MG: 10 SOLUTION ORAL at 18:32

## 2021-01-01 RX ADMIN — FUROSEMIDE 20 MG: 10 INJECTION, SOLUTION INTRAMUSCULAR; INTRAVENOUS at 23:48

## 2021-01-01 RX ADMIN — EPOETIN ALFA-EPBX 40000 UNITS: 40000 INJECTION, SOLUTION INTRAVENOUS; SUBCUTANEOUS at 16:43

## 2021-01-01 RX ADMIN — SERTRALINE HYDROCHLORIDE 50 MG: 50 TABLET ORAL at 09:30

## 2021-01-01 RX ADMIN — GABAPENTIN 300 MG: 300 CAPSULE ORAL at 09:43

## 2021-01-01 RX ADMIN — SPIRONOLACTONE 25 MG: 25 TABLET ORAL at 09:31

## 2021-01-01 RX ADMIN — ASPIRIN 81 MG: 81 TABLET, COATED ORAL at 09:55

## 2021-01-01 RX ADMIN — METHYLPREDNISOLONE SODIUM SUCCINATE 40 MG: 40 INJECTION, POWDER, FOR SOLUTION INTRAMUSCULAR; INTRAVENOUS at 21:11

## 2021-01-01 RX ADMIN — METOPROLOL TARTRATE 5 MG: 5 INJECTION INTRAVENOUS at 00:43

## 2021-01-01 RX ADMIN — VANCOMYCIN HYDROCHLORIDE 750 MG: 750 INJECTION, POWDER, LYOPHILIZED, FOR SOLUTION INTRAVENOUS at 02:58

## 2021-01-01 RX ADMIN — IPRATROPIUM BROMIDE AND ALBUTEROL SULFATE 3 ML: 2.5; .5 SOLUTION RESPIRATORY (INHALATION) at 22:49

## 2021-01-01 RX ADMIN — IPRATROPIUM BROMIDE AND ALBUTEROL SULFATE 3 ML: 2.5; .5 SOLUTION RESPIRATORY (INHALATION) at 03:23

## 2021-01-01 RX ADMIN — AMPICILLIN SODIUM AND SULBACTAM SODIUM 3 G: 2; 1 INJECTION, POWDER, FOR SOLUTION INTRAMUSCULAR; INTRAVENOUS at 21:40

## 2021-01-01 RX ADMIN — GABAPENTIN 300 MG: 300 CAPSULE ORAL at 15:57

## 2021-01-01 RX ADMIN — SUCCINYLCHOLINE CHLORIDE 100 MG: 20 INJECTION, SOLUTION INTRAMUSCULAR; INTRAVENOUS at 12:44

## 2021-01-01 RX ADMIN — LURASIDONE HYDROCHLORIDE 40 MG: 40 TABLET, FILM COATED ORAL at 08:30

## 2021-01-01 RX ADMIN — METOPROLOL TARTRATE 25 MG: 25 TABLET, FILM COATED ORAL at 16:09

## 2021-01-01 RX ADMIN — MEGESTROL ACETATE 200 MG: 40 SUSPENSION ORAL at 16:51

## 2021-01-01 RX ADMIN — SODIUM CHLORIDE 3 G: 900 INJECTION INTRAVENOUS at 12:56

## 2021-01-01 RX ADMIN — METOPROLOL TARTRATE 50 MG: 50 TABLET ORAL at 20:28

## 2021-01-01 RX ADMIN — METOPROLOL TARTRATE 50 MG: 50 TABLET ORAL at 09:28

## 2021-01-01 RX ADMIN — IPRATROPIUM BROMIDE AND ALBUTEROL SULFATE 3 ML: 2.5; .5 SOLUTION RESPIRATORY (INHALATION) at 07:42

## 2021-01-01 RX ADMIN — AMPICILLIN SODIUM AND SULBACTAM SODIUM 3 G: 2; 1 INJECTION, POWDER, FOR SOLUTION INTRAMUSCULAR; INTRAVENOUS at 15:07

## 2021-01-01 RX ADMIN — INSULIN LISPRO 2 UNITS: 100 INJECTION, SOLUTION INTRAVENOUS; SUBCUTANEOUS at 18:17

## 2021-01-01 RX ADMIN — METHYLPREDNISOLONE SODIUM SUCCINATE 20 MG: 40 INJECTION, POWDER, FOR SOLUTION INTRAMUSCULAR; INTRAVENOUS at 22:03

## 2021-01-01 RX ADMIN — VANCOMYCIN HYDROCHLORIDE 1000 MG: 1 INJECTION, POWDER, LYOPHILIZED, FOR SOLUTION INTRAVENOUS at 05:16

## 2021-01-01 RX ADMIN — VANCOMYCIN HYDROCHLORIDE 1000 MG: 1 INJECTION, POWDER, LYOPHILIZED, FOR SOLUTION INTRAVENOUS at 03:41

## 2021-01-01 RX ADMIN — PIPERACILLIN SODIUM,TAZOBACTAM SODIUM 4.5 G: 4; .5 INJECTION, POWDER, FOR SOLUTION INTRAVENOUS at 13:02

## 2021-01-01 RX ADMIN — PANTOPRAZOLE SODIUM 40 MG: 40 TABLET, DELAYED RELEASE ORAL at 09:35

## 2021-01-01 RX ADMIN — FUROSEMIDE 40 MG: 10 INJECTION, SOLUTION INTRAMUSCULAR; INTRAVENOUS at 10:51

## 2021-01-01 RX ADMIN — COLLAGENASE SANTYL: 250 OINTMENT TOPICAL at 08:15

## 2021-01-01 RX ADMIN — METHYLPREDNISOLONE SODIUM SUCCINATE 125 MG: 125 INJECTION, POWDER, LYOPHILIZED, FOR SOLUTION INTRAMUSCULAR; INTRAVENOUS at 09:09

## 2021-01-01 RX ADMIN — IPRATROPIUM BROMIDE AND ALBUTEROL SULFATE 3 ML: 2.5; .5 SOLUTION RESPIRATORY (INHALATION) at 07:56

## 2021-01-01 RX ADMIN — PREDNISONE 10 MG: 10 TABLET ORAL at 09:43

## 2021-01-01 RX ADMIN — GUAIFENESIN 600 MG: 600 TABLET, EXTENDED RELEASE ORAL at 08:13

## 2021-01-01 RX ADMIN — GABAPENTIN 300 MG: 300 CAPSULE ORAL at 09:52

## 2021-01-01 RX ADMIN — FERROUS SULFATE TAB EC 324 MG (65 MG FE EQUIVALENT) 324 MG: 324 (65 FE) TABLET DELAYED RESPONSE at 08:25

## 2021-01-01 RX ADMIN — AMPICILLIN SODIUM AND SULBACTAM SODIUM 3 G: 2; 1 INJECTION, POWDER, FOR SOLUTION INTRAMUSCULAR; INTRAVENOUS at 02:28

## 2021-01-01 RX ADMIN — DEXTROSE AND SODIUM CHLORIDE 75 ML/HR: 5; 450 INJECTION, SOLUTION INTRAVENOUS at 12:39

## 2021-01-01 RX ADMIN — AMPICILLIN SODIUM AND SULBACTAM SODIUM 3 G: 2; 1 INJECTION, POWDER, FOR SOLUTION INTRAMUSCULAR; INTRAVENOUS at 14:02

## 2021-01-01 RX ADMIN — ENOXAPARIN SODIUM 40 MG: 40 INJECTION SUBCUTANEOUS at 17:05

## 2021-01-01 RX ADMIN — ASPIRIN 81 MG: 81 TABLET, COATED ORAL at 09:42

## 2021-01-01 RX ADMIN — SPIRONOLACTONE 25 MG: 25 TABLET ORAL at 08:45

## 2021-01-01 RX ADMIN — SERTRALINE HYDROCHLORIDE 50 MG: 50 TABLET ORAL at 10:02

## 2021-01-01 RX ADMIN — POLYETHYLENE GLYCOL 3350 17 G: 17 POWDER, FOR SOLUTION ORAL at 10:33

## 2021-01-01 RX ADMIN — GUAIFENESIN 600 MG: 600 TABLET, EXTENDED RELEASE ORAL at 21:03

## 2021-01-01 RX ADMIN — COLLAGENASE SANTYL: 250 OINTMENT TOPICAL at 01:49

## 2021-01-01 RX ADMIN — DOCUSATE SODIUM 100 MG: 100 CAPSULE, LIQUID FILLED ORAL at 08:55

## 2021-01-01 RX ADMIN — PANTOPRAZOLE SODIUM 40 MG: 40 TABLET, DELAYED RELEASE ORAL at 09:31

## 2021-01-01 RX ADMIN — SPIRONOLACTONE 25 MG: 25 TABLET ORAL at 08:59

## 2021-01-01 RX ADMIN — MEGESTROL ACETATE 200 MG: 40 SUSPENSION ORAL at 10:04

## 2021-01-01 RX ADMIN — BUDESONIDE 0.5 MG: 0.5 INHALANT RESPIRATORY (INHALATION) at 18:45

## 2021-01-01 RX ADMIN — GABAPENTIN 300 MG: 300 CAPSULE ORAL at 20:56

## 2021-01-01 RX ADMIN — IPRATROPIUM BROMIDE AND ALBUTEROL SULFATE 3 ML: 2.5; .5 SOLUTION RESPIRATORY (INHALATION) at 11:18

## 2021-01-01 RX ADMIN — AMPICILLIN SODIUM AND SULBACTAM SODIUM 3 G: 2; 1 INJECTION, POWDER, FOR SOLUTION INTRAMUSCULAR; INTRAVENOUS at 13:11

## 2021-01-01 RX ADMIN — POTASSIUM CHLORIDE 40 MEQ: 1.5 POWDER, FOR SOLUTION ORAL at 14:24

## 2021-01-01 RX ADMIN — GABAPENTIN 300 MG: 300 CAPSULE ORAL at 10:33

## 2021-01-01 RX ADMIN — PREDNISONE 10 MG: 10 TABLET ORAL at 08:25

## 2021-01-01 RX ADMIN — GABAPENTIN 300 MG: 300 CAPSULE ORAL at 10:03

## 2021-01-01 RX ADMIN — GUAIFENESIN 600 MG: 600 TABLET, EXTENDED RELEASE ORAL at 20:52

## 2021-01-01 RX ADMIN — PHENYLEPHRINE HYDROCHLORIDE 0.5 MCG/KG/MIN: 10 INJECTION INTRAVENOUS at 18:46

## 2021-01-01 RX ADMIN — Medication: at 20:26

## 2021-01-01 RX ADMIN — ACETAMINOPHEN 1000 MG: 500 TABLET, FILM COATED ORAL at 21:54

## 2021-01-01 RX ADMIN — ACETYLCYSTEINE 2 ML: 200 SOLUTION ORAL; RESPIRATORY (INHALATION) at 07:57

## 2021-01-01 RX ADMIN — METHYLPREDNISOLONE SODIUM SUCCINATE 20 MG: 40 INJECTION, POWDER, FOR SOLUTION INTRAMUSCULAR; INTRAVENOUS at 09:06

## 2021-01-01 RX ADMIN — ACETYLCYSTEINE 2 ML: 200 SOLUTION ORAL; RESPIRATORY (INHALATION) at 07:45

## 2021-01-01 RX ADMIN — SODIUM CHLORIDE 1.1 MCG/KG/HR: 9 INJECTION, SOLUTION INTRAVENOUS at 01:48

## 2021-01-01 RX ADMIN — PIPERACILLIN SODIUM,TAZOBACTAM SODIUM 4.5 G: 4; .5 INJECTION, POWDER, FOR SOLUTION INTRAVENOUS at 20:50

## 2021-01-01 RX ADMIN — LORAZEPAM 1 MG: 2 INJECTION INTRAMUSCULAR; INTRAVENOUS at 05:43

## 2021-01-01 RX ADMIN — COLLAGENASE SANTYL: 250 OINTMENT TOPICAL at 08:57

## 2021-01-01 RX ADMIN — IPRATROPIUM BROMIDE AND ALBUTEROL SULFATE 3 ML: 2.5; .5 SOLUTION RESPIRATORY (INHALATION) at 15:55

## 2021-01-01 RX ADMIN — AMPICILLIN SODIUM AND SULBACTAM SODIUM 3 G: 2; 1 INJECTION, POWDER, FOR SOLUTION INTRAMUSCULAR; INTRAVENOUS at 15:01

## 2021-01-01 RX ADMIN — SERTRALINE HYDROCHLORIDE 50 MG: 50 TABLET ORAL at 09:01

## 2021-01-01 RX ADMIN — DOCUSATE SODIUM 100 MG: 100 CAPSULE, LIQUID FILLED ORAL at 08:48

## 2021-01-01 RX ADMIN — GUAIFENESIN 600 MG: 600 TABLET, EXTENDED RELEASE ORAL at 10:32

## 2021-01-01 RX ADMIN — GUAIFENESIN 300 MG: 100 SOLUTION ORAL at 09:02

## 2021-01-01 RX ADMIN — METHYLPREDNISOLONE SODIUM SUCCINATE 40 MG: 40 INJECTION, POWDER, FOR SOLUTION INTRAMUSCULAR; INTRAVENOUS at 12:47

## 2021-01-01 RX ADMIN — INSULIN LISPRO 2 UNITS: 100 INJECTION, SOLUTION INTRAVENOUS; SUBCUTANEOUS at 12:44

## 2021-01-01 RX ADMIN — Medication 10 ML: at 06:04

## 2021-01-01 RX ADMIN — DOCUSATE SODIUM 100 MG: 100 CAPSULE, LIQUID FILLED ORAL at 21:29

## 2021-01-01 RX ADMIN — MEGESTROL ACETATE 200 MG: 40 SUSPENSION ORAL at 21:54

## 2021-01-01 RX ADMIN — DEXAMETHASONE SODIUM PHOSPHATE 8 MG: 4 INJECTION, SOLUTION INTRAMUSCULAR; INTRAVENOUS at 12:51

## 2021-01-01 RX ADMIN — METOPROLOL TARTRATE 25 MG: 25 TABLET, FILM COATED ORAL at 07:50

## 2021-01-01 RX ADMIN — PREDNISONE 20 MG: 20 TABLET ORAL at 08:55

## 2021-01-01 RX ADMIN — MEGESTROL ACETATE 200 MG: 40 SUSPENSION ORAL at 20:27

## 2021-01-01 RX ADMIN — RISPERIDONE 0.25 MG: 0.5 TABLET, ORALLY DISINTEGRATING ORAL at 08:41

## 2021-01-01 RX ADMIN — LURASIDONE HYDROCHLORIDE 40 MG: 40 TABLET, FILM COATED ORAL at 09:17

## 2021-01-01 RX ADMIN — GUAIFENESIN 600 MG: 600 TABLET, EXTENDED RELEASE ORAL at 09:36

## 2021-01-01 RX ADMIN — DOCUSATE SODIUM 100 MG: 100 CAPSULE, LIQUID FILLED ORAL at 20:09

## 2021-01-01 RX ADMIN — MEGESTROL ACETATE 200 MG: 40 SUSPENSION ORAL at 15:02

## 2021-01-01 RX ADMIN — Medication: at 21:47

## 2021-01-01 RX ADMIN — SODIUM CHLORIDE 3 G: 900 INJECTION INTRAVENOUS at 12:37

## 2021-01-01 RX ADMIN — BUPRENORPHINE AND NALOXONE 1.5 TABLET: 8; 2 TABLET SUBLINGUAL at 08:59

## 2021-01-01 RX ADMIN — CEFEPIME HYDROCHLORIDE 2 G: 2 INJECTION, POWDER, FOR SOLUTION INTRAVENOUS at 20:33

## 2021-01-01 RX ADMIN — SODIUM CHLORIDE 3 G: 900 INJECTION INTRAVENOUS at 01:57

## 2021-01-01 RX ADMIN — BUPRENORPHINE AND NALOXONE 1.5 TABLET: 8; 2 TABLET SUBLINGUAL at 08:14

## 2021-01-01 RX ADMIN — ACETYLCYSTEINE 2 ML: 200 SOLUTION ORAL; RESPIRATORY (INHALATION) at 19:50

## 2021-01-01 RX ADMIN — HEPARIN 500 UNITS: 100 SYRINGE at 14:39

## 2021-01-01 RX ADMIN — GABAPENTIN 300 MG: 300 CAPSULE ORAL at 15:50

## 2021-01-01 RX ADMIN — METHYLPREDNISOLONE SODIUM SUCCINATE 40 MG: 40 INJECTION, POWDER, FOR SOLUTION INTRAMUSCULAR; INTRAVENOUS at 22:41

## 2021-01-01 RX ADMIN — GABAPENTIN 300 MG: 300 CAPSULE ORAL at 21:55

## 2021-01-01 RX ADMIN — GABAPENTIN 300 MG: 300 CAPSULE ORAL at 08:30

## 2021-01-01 RX ADMIN — IPRATROPIUM BROMIDE AND ALBUTEROL SULFATE 3 ML: 2.5; .5 SOLUTION RESPIRATORY (INHALATION) at 06:35

## 2021-01-01 RX ADMIN — AMPICILLIN SODIUM AND SULBACTAM SODIUM 3 G: 2; 1 INJECTION, POWDER, FOR SOLUTION INTRAMUSCULAR; INTRAVENOUS at 10:25

## 2021-01-01 RX ADMIN — DOCUSATE SODIUM 100 MG: 100 CAPSULE, LIQUID FILLED ORAL at 21:32

## 2021-01-01 RX ADMIN — MEGESTROL ACETATE 200 MG: 40 SUSPENSION ORAL at 09:42

## 2021-01-01 RX ADMIN — IPRATROPIUM BROMIDE AND ALBUTEROL SULFATE 3 ML: 2.5; .5 SOLUTION RESPIRATORY (INHALATION) at 02:56

## 2021-01-01 RX ADMIN — SPIRONOLACTONE 25 MG: 25 TABLET ORAL at 09:57

## 2021-01-01 RX ADMIN — IPRATROPIUM BROMIDE AND ALBUTEROL SULFATE 3 ML: 2.5; .5 SOLUTION RESPIRATORY (INHALATION) at 06:40

## 2021-01-01 RX ADMIN — ACETYLCYSTEINE 2 ML: 200 SOLUTION ORAL; RESPIRATORY (INHALATION) at 07:23

## 2021-01-01 RX ADMIN — ACETAMINOPHEN 650 MG: 325 TABLET ORAL at 10:11

## 2021-01-01 RX ADMIN — Medication: at 08:49

## 2021-01-01 RX ADMIN — FENTANYL CITRATE 150 MCG/HR: 0.05 INJECTION, SOLUTION INTRAMUSCULAR; INTRAVENOUS at 09:10

## 2021-01-01 RX ADMIN — Medication 1 PACKET: at 08:49

## 2021-01-01 RX ADMIN — AMPICILLIN SODIUM AND SULBACTAM SODIUM 3 G: 2; 1 INJECTION, POWDER, FOR SOLUTION INTRAMUSCULAR; INTRAVENOUS at 03:46

## 2021-01-01 RX ADMIN — BISACODYL 10 MG: 10 SUPPOSITORY RECTAL at 14:05

## 2021-01-01 RX ADMIN — AZITHROMYCIN 500 MG: 500 INJECTION, POWDER, LYOPHILIZED, FOR SOLUTION INTRAVENOUS at 07:06

## 2021-01-01 RX ADMIN — HALOPERIDOL LACTATE 2 MG: 5 INJECTION, SOLUTION INTRAMUSCULAR at 05:54

## 2021-01-01 RX ADMIN — FUROSEMIDE 20 MG: 10 INJECTION, SOLUTION INTRAMUSCULAR; INTRAVENOUS at 14:17

## 2021-01-01 RX ADMIN — ATORVASTATIN CALCIUM 40 MG: 40 TABLET, FILM COATED ORAL at 21:42

## 2021-01-01 RX ADMIN — SODIUM CHLORIDE 3 G: 900 INJECTION INTRAVENOUS at 22:02

## 2021-01-01 RX ADMIN — GUAIFENESIN 600 MG: 600 TABLET, EXTENDED RELEASE ORAL at 20:50

## 2021-01-01 RX ADMIN — Medication 1 PACKET: at 21:44

## 2021-01-01 RX ADMIN — DOXYCYCLINE 100 MG: 100 INJECTION, POWDER, LYOPHILIZED, FOR SOLUTION INTRAVENOUS at 05:37

## 2021-01-01 RX ADMIN — GABAPENTIN 300 MG: 300 CAPSULE ORAL at 16:03

## 2021-01-01 RX ADMIN — IPRATROPIUM BROMIDE AND ALBUTEROL SULFATE 3 ML: 2.5; .5 SOLUTION RESPIRATORY (INHALATION) at 15:46

## 2021-01-01 RX ADMIN — SODIUM CHLORIDE 1.5 MCG/KG/HR: 9 INJECTION, SOLUTION INTRAVENOUS at 16:50

## 2021-01-01 RX ADMIN — IOPAMIDOL 100 ML: 755 INJECTION, SOLUTION INTRAVENOUS at 12:19

## 2021-01-01 RX ADMIN — DOCUSATE SODIUM 100 MG: 50 LIQUID ORAL at 20:27

## 2021-01-01 RX ADMIN — IPRATROPIUM BROMIDE AND ALBUTEROL SULFATE 3 ML: 2.5; .5 SOLUTION RESPIRATORY (INHALATION) at 22:40

## 2021-01-01 RX ADMIN — BUPRENORPHINE AND NALOXONE 1.5 TABLET: 8; 2 TABLET SUBLINGUAL at 10:31

## 2021-01-01 RX ADMIN — IPRATROPIUM BROMIDE AND ALBUTEROL SULFATE 3 ML: 2.5; .5 SOLUTION RESPIRATORY (INHALATION) at 16:04

## 2021-01-01 RX ADMIN — VANCOMYCIN HYDROCHLORIDE 1000 MG: 1 INJECTION, POWDER, LYOPHILIZED, FOR SOLUTION INTRAVENOUS at 15:57

## 2021-01-01 RX ADMIN — SODIUM CHLORIDE 3 G: 900 INJECTION INTRAVENOUS at 01:21

## 2021-01-01 RX ADMIN — MIDAZOLAM HYDROCHLORIDE 4 MG/HR: 5 INJECTION, SOLUTION INTRAMUSCULAR; INTRAVENOUS at 03:13

## 2021-01-01 RX ADMIN — ASPIRIN 81 MG: 81 TABLET, COATED ORAL at 09:31

## 2021-01-01 RX ADMIN — METOPROLOL TARTRATE 25 MG: 25 TABLET, FILM COATED ORAL at 10:28

## 2021-01-01 RX ADMIN — INSULIN LISPRO 2 UNITS: 100 INJECTION, SOLUTION INTRAVENOUS; SUBCUTANEOUS at 18:31

## 2021-01-01 RX ADMIN — EPOETIN ALFA-EPBX 40000 UNITS: 40000 INJECTION, SOLUTION INTRAVENOUS; SUBCUTANEOUS at 11:51

## 2021-01-01 RX ADMIN — IPRATROPIUM BROMIDE AND ALBUTEROL SULFATE 3 ML: 2.5; .5 SOLUTION RESPIRATORY (INHALATION) at 18:44

## 2021-01-01 RX ADMIN — DOCUSATE SODIUM 100 MG: 50 LIQUID ORAL at 20:18

## 2021-01-01 RX ADMIN — DOCUSATE SODIUM 100 MG: 100 CAPSULE, LIQUID FILLED ORAL at 09:28

## 2021-01-01 RX ADMIN — MORPHINE SULFATE 2 MG: 4 INJECTION INTRAVENOUS at 19:36

## 2021-01-01 RX ADMIN — GUAIFENESIN 600 MG: 600 TABLET, EXTENDED RELEASE ORAL at 20:17

## 2021-01-01 RX ADMIN — GABAPENTIN 300 MG: 300 CAPSULE ORAL at 09:02

## 2021-01-01 RX ADMIN — GUAIFENESIN 600 MG: 600 TABLET, EXTENDED RELEASE ORAL at 08:59

## 2021-01-01 RX ADMIN — MEGESTROL ACETATE 200 MG: 40 SUSPENSION ORAL at 15:07

## 2021-01-01 RX ADMIN — RISPERIDONE 0.25 MG: 0.5 TABLET, ORALLY DISINTEGRATING ORAL at 22:15

## 2021-01-01 RX ADMIN — AMPICILLIN SODIUM AND SULBACTAM SODIUM 3 G: 2; 1 INJECTION, POWDER, FOR SOLUTION INTRAMUSCULAR; INTRAVENOUS at 01:40

## 2021-01-01 RX ADMIN — AMPICILLIN SODIUM AND SULBACTAM SODIUM 3 G: 2; 1 INJECTION, POWDER, FOR SOLUTION INTRAMUSCULAR; INTRAVENOUS at 03:00

## 2021-01-01 RX ADMIN — IPRATROPIUM BROMIDE AND ALBUTEROL SULFATE 3 ML: 2.5; .5 SOLUTION RESPIRATORY (INHALATION) at 02:29

## 2021-01-01 RX ADMIN — ATORVASTATIN CALCIUM 40 MG: 40 TABLET, FILM COATED ORAL at 20:17

## 2021-01-01 RX ADMIN — IPRATROPIUM BROMIDE AND ALBUTEROL SULFATE 3 ML: 2.5; .5 SOLUTION RESPIRATORY (INHALATION) at 06:51

## 2021-01-01 RX ADMIN — ASPIRIN 81 MG: 81 TABLET, COATED ORAL at 08:12

## 2021-01-01 RX ADMIN — AMPICILLIN SODIUM AND SULBACTAM SODIUM 3 G: 2; 1 INJECTION, POWDER, FOR SOLUTION INTRAMUSCULAR; INTRAVENOUS at 01:16

## 2021-01-01 RX ADMIN — PREDNISONE 30 MG: 20 TABLET ORAL at 08:49

## 2021-01-01 RX ADMIN — FUROSEMIDE 20 MG: 10 INJECTION, SOLUTION INTRAMUSCULAR; INTRAVENOUS at 22:10

## 2021-01-01 RX ADMIN — NYSTATIN 500000 UNITS: 500000 SUSPENSION ORAL at 12:56

## 2021-01-01 RX ADMIN — IPRATROPIUM BROMIDE AND ALBUTEROL SULFATE 3 ML: 2.5; .5 SOLUTION RESPIRATORY (INHALATION) at 19:38

## 2021-01-01 RX ADMIN — Medication: at 14:17

## 2021-01-01 RX ADMIN — GABAPENTIN 300 MG: 300 CAPSULE ORAL at 20:18

## 2021-01-01 RX ADMIN — METOPROLOL TARTRATE 50 MG: 50 TABLET ORAL at 08:45

## 2021-01-01 RX ADMIN — PANTOPRAZOLE SODIUM 40 MG: 40 INJECTION, POWDER, FOR SOLUTION INTRAVENOUS at 05:52

## 2021-01-01 RX ADMIN — Medication: at 20:51

## 2021-01-01 RX ADMIN — POLYETHYLENE GLYCOL 3350 17 G: 17 POWDER, FOR SOLUTION ORAL at 08:13

## 2021-01-01 RX ADMIN — SENNOSIDES AND DOCUSATE SODIUM 2 TABLET: 8.6; 5 TABLET ORAL at 22:05

## 2021-01-01 RX ADMIN — SODIUM CHLORIDE 1.5 MCG/KG/HR: 9 INJECTION, SOLUTION INTRAVENOUS at 01:41

## 2021-01-01 RX ADMIN — Medication: at 10:53

## 2021-01-01 RX ADMIN — ACETYLCYSTEINE 2 ML: 200 SOLUTION ORAL; RESPIRATORY (INHALATION) at 19:02

## 2021-01-01 RX ADMIN — IPRATROPIUM BROMIDE AND ALBUTEROL SULFATE 3 ML: 2.5; .5 SOLUTION RESPIRATORY (INHALATION) at 03:38

## 2021-01-01 RX ADMIN — IPRATROPIUM BROMIDE AND ALBUTEROL SULFATE 3 ML: 2.5; .5 SOLUTION RESPIRATORY (INHALATION) at 07:26

## 2021-01-01 RX ADMIN — NYSTATIN 500000 UNITS: 500000 SUSPENSION ORAL at 12:44

## 2021-01-01 RX ADMIN — GUAIFENESIN 600 MG: 600 TABLET, EXTENDED RELEASE ORAL at 22:04

## 2021-01-01 RX ADMIN — METHYLPREDNISOLONE SODIUM SUCCINATE 60 MG: 125 INJECTION, POWDER, FOR SOLUTION INTRAMUSCULAR; INTRAVENOUS at 16:58

## 2021-01-01 RX ADMIN — DOCUSATE SODIUM 100 MG: 100 CAPSULE, LIQUID FILLED ORAL at 09:01

## 2021-01-01 RX ADMIN — POTASSIUM CHLORIDE 10 MEQ: 7.46 INJECTION, SOLUTION INTRAVENOUS at 06:22

## 2021-01-01 RX ADMIN — Medication: at 22:05

## 2021-01-01 RX ADMIN — MIDAZOLAM 2 MG: 1 INJECTION INTRAMUSCULAR; INTRAVENOUS at 13:56

## 2021-01-01 RX ADMIN — SODIUM CHLORIDE 1250 MG: 9 INJECTION, SOLUTION INTRAVENOUS at 20:29

## 2021-01-01 RX ADMIN — METOPROLOL TARTRATE 25 MG: 25 TABLET, FILM COATED ORAL at 15:13

## 2021-01-01 RX ADMIN — ATORVASTATIN CALCIUM 40 MG: 40 TABLET, FILM COATED ORAL at 21:06

## 2021-01-01 RX ADMIN — DOCUSATE SODIUM 100 MG: 50 LIQUID ORAL at 08:45

## 2021-01-01 RX ADMIN — GABAPENTIN 300 MG: 300 CAPSULE ORAL at 09:31

## 2021-01-01 RX ADMIN — POLYETHYLENE GLYCOL 3350 17 G: 17 POWDER, FOR SOLUTION ORAL at 09:41

## 2021-01-01 RX ADMIN — AMPICILLIN SODIUM AND SULBACTAM SODIUM 3 G: 2; 1 INJECTION, POWDER, FOR SOLUTION INTRAMUSCULAR; INTRAVENOUS at 21:32

## 2021-01-01 RX ADMIN — Medication 1 PACKET: at 10:38

## 2021-01-01 RX ADMIN — PROPOFOL 50 MCG/KG/MIN: 10 INJECTION, EMULSION INTRAVENOUS at 12:45

## 2021-01-01 RX ADMIN — SODIUM CHLORIDE 1.5 MCG/KG/HR: 9 INJECTION, SOLUTION INTRAVENOUS at 16:57

## 2021-01-01 RX ADMIN — MORPHINE SULFATE 20 MG: 10 SOLUTION ORAL at 09:13

## 2021-01-01 RX ADMIN — METOPROLOL TARTRATE 25 MG: 25 TABLET, FILM COATED ORAL at 22:05

## 2021-01-01 RX ADMIN — POLYETHYLENE GLYCOL 3350 17 G: 17 POWDER, FOR SOLUTION ORAL at 08:15

## 2021-01-01 RX ADMIN — LURASIDONE HYDROCHLORIDE 40 MG: 40 TABLET, FILM COATED ORAL at 09:20

## 2021-01-01 RX ADMIN — LURASIDONE HYDROCHLORIDE 40 MG: 40 TABLET, FILM COATED ORAL at 09:32

## 2021-01-01 RX ADMIN — IPRATROPIUM BROMIDE AND ALBUTEROL SULFATE 3 ML: 2.5; .5 SOLUTION RESPIRATORY (INHALATION) at 14:38

## 2021-01-01 RX ADMIN — CEFEPIME HYDROCHLORIDE 2 G: 2 INJECTION, POWDER, FOR SOLUTION INTRAVENOUS at 03:59

## 2021-01-01 RX ADMIN — Medication: at 20:52

## 2021-01-01 RX ADMIN — PANTOPRAZOLE SODIUM 40 MG: 40 INJECTION, POWDER, FOR SOLUTION INTRAVENOUS at 06:02

## 2021-01-01 RX ADMIN — GABAPENTIN 300 MG: 300 CAPSULE ORAL at 15:38

## 2021-01-01 RX ADMIN — SODIUM CHLORIDE 1.5 MCG/KG/HR: 9 INJECTION, SOLUTION INTRAVENOUS at 07:03

## 2021-01-01 RX ADMIN — METOPROLOL TARTRATE 25 MG: 25 TABLET, FILM COATED ORAL at 09:56

## 2021-01-01 RX ADMIN — HALOPERIDOL LACTATE 2 MG: 5 INJECTION, SOLUTION INTRAMUSCULAR at 15:01

## 2021-01-01 RX ADMIN — PANTOPRAZOLE SODIUM 40 MG: 40 TABLET, DELAYED RELEASE ORAL at 09:43

## 2021-01-01 RX ADMIN — Medication 1 PACKET: at 09:45

## 2021-01-01 RX ADMIN — IPRATROPIUM BROMIDE AND ALBUTEROL SULFATE 3 ML: 2.5; .5 SOLUTION RESPIRATORY (INHALATION) at 19:28

## 2021-01-01 RX ADMIN — MULTIPLE VITAMINS W/ MINERALS TAB 1 TABLET: TAB at 09:31

## 2021-01-01 RX ADMIN — SPIRONOLACTONE 25 MG: 25 TABLET, FILM COATED ORAL at 10:02

## 2021-01-01 RX ADMIN — PANTOPRAZOLE SODIUM 40 MG: 40 TABLET, DELAYED RELEASE ORAL at 10:02

## 2021-01-01 RX ADMIN — IPRATROPIUM BROMIDE AND ALBUTEROL SULFATE 3 ML: 2.5; .5 SOLUTION RESPIRATORY (INHALATION) at 22:56

## 2021-01-01 RX ADMIN — IPRATROPIUM BROMIDE AND ALBUTEROL SULFATE 3 ML: 2.5; .5 SOLUTION RESPIRATORY (INHALATION) at 14:40

## 2021-01-01 RX ADMIN — SODIUM CHLORIDE 1.5 MCG/KG/HR: 9 INJECTION, SOLUTION INTRAVENOUS at 12:38

## 2021-01-01 RX ADMIN — BUPRENORPHINE AND NALOXONE 1.5 TABLET: 8; 2 TABLET SUBLINGUAL at 09:17

## 2021-01-01 RX ADMIN — IPRATROPIUM BROMIDE AND ALBUTEROL SULFATE 3 ML: 2.5; .5 SOLUTION RESPIRATORY (INHALATION) at 19:10

## 2021-01-01 RX ADMIN — IPRATROPIUM BROMIDE AND ALBUTEROL SULFATE 3 ML: 2.5; .5 SOLUTION RESPIRATORY (INHALATION) at 03:16

## 2021-01-01 RX ADMIN — DOXYCYCLINE 100 MG: 100 INJECTION, POWDER, LYOPHILIZED, FOR SOLUTION INTRAVENOUS at 17:03

## 2021-01-01 RX ADMIN — BUDESONIDE 0.5 MG: 0.5 INHALANT RESPIRATORY (INHALATION) at 18:18

## 2021-01-01 RX ADMIN — BUDESONIDE 0.5 MG: 0.5 INHALANT RESPIRATORY (INHALATION) at 18:27

## 2021-01-01 RX ADMIN — PREDNISONE 10 MG: 10 TABLET ORAL at 08:45

## 2021-01-01 RX ADMIN — HYDROMORPHONE HYDROCHLORIDE 1 MG: 2 INJECTION, SOLUTION INTRAMUSCULAR; INTRAVENOUS; SUBCUTANEOUS at 02:50

## 2021-01-01 RX ADMIN — VANCOMYCIN HYDROCHLORIDE 1000 MG: 1 INJECTION, POWDER, LYOPHILIZED, FOR SOLUTION INTRAVENOUS at 16:17

## 2021-01-01 RX ADMIN — POTASSIUM PHOSPHATE, MONOBASIC AND POTASSIUM PHOSPHATE, DIBASIC 15 MMOL: 224; 236 INJECTION, SOLUTION, CONCENTRATE INTRAVENOUS at 09:57

## 2021-01-01 RX ADMIN — GUAIFENESIN 600 MG: 600 TABLET, EXTENDED RELEASE ORAL at 20:45

## 2021-01-01 RX ADMIN — HYDROMORPHONE HYDROCHLORIDE 2 MG: 2 INJECTION INTRAMUSCULAR; INTRAVENOUS; SUBCUTANEOUS at 20:22

## 2021-01-01 RX ADMIN — Medication 1 PACKET: at 09:07

## 2021-01-01 RX ADMIN — BUPRENORPHINE AND NALOXONE 1 TABLET: 8; 2 TABLET SUBLINGUAL at 11:20

## 2021-01-01 RX ADMIN — METHYLPREDNISOLONE SODIUM SUCCINATE 60 MG: 125 INJECTION, POWDER, FOR SOLUTION INTRAMUSCULAR; INTRAVENOUS at 03:23

## 2021-01-01 RX ADMIN — CEFEPIME HYDROCHLORIDE 2 G: 2 INJECTION, POWDER, FOR SOLUTION INTRAVENOUS at 13:30

## 2021-01-01 RX ADMIN — DOCUSATE SODIUM 100 MG: 100 CAPSULE, LIQUID FILLED ORAL at 21:03

## 2021-01-01 RX ADMIN — GUAIFENESIN 600 MG: 600 TABLET, EXTENDED RELEASE ORAL at 20:27

## 2021-01-01 RX ADMIN — METOPROLOL TARTRATE 2.5 MG: 5 INJECTION INTRAVENOUS at 22:04

## 2021-01-01 RX ADMIN — Medication: at 20:12

## 2021-01-01 RX ADMIN — Medication 10 ML: at 09:21

## 2021-01-01 RX ADMIN — SPIRONOLACTONE 25 MG: 25 TABLET, FILM COATED ORAL at 09:31

## 2021-01-01 RX ADMIN — ENOXAPARIN SODIUM 40 MG: 40 INJECTION SUBCUTANEOUS at 16:06

## 2021-01-01 RX ADMIN — BUPRENORPHINE AND NALOXONE 1.5 TABLET: 8; 2 TABLET SUBLINGUAL at 09:22

## 2021-01-01 RX ADMIN — IPRATROPIUM BROMIDE AND ALBUTEROL SULFATE 3 ML: 2.5; .5 SOLUTION RESPIRATORY (INHALATION) at 03:35

## 2021-01-01 RX ADMIN — IPRATROPIUM BROMIDE AND ALBUTEROL SULFATE 3 ML: 2.5; .5 SOLUTION RESPIRATORY (INHALATION) at 11:07

## 2021-01-01 RX ADMIN — SENNOSIDES AND DOCUSATE SODIUM 2 TABLET: 8.6; 5 TABLET ORAL at 09:45

## 2021-01-01 RX ADMIN — GUAIFENESIN 300 MG: 100 SOLUTION ORAL at 05:50

## 2021-01-01 RX ADMIN — BISACODYL 10 MG: 5 TABLET, COATED ORAL at 09:32

## 2021-01-01 RX ADMIN — GUAIFENESIN 300 MG: 100 SOLUTION ORAL at 15:13

## 2021-01-01 RX ADMIN — IPRATROPIUM BROMIDE AND ALBUTEROL SULFATE 3 ML: 2.5; .5 SOLUTION RESPIRATORY (INHALATION) at 15:17

## 2021-01-01 RX ADMIN — IPRATROPIUM BROMIDE AND ALBUTEROL SULFATE 3 ML: 2.5; .5 SOLUTION RESPIRATORY (INHALATION) at 07:45

## 2021-01-01 RX ADMIN — Medication 10 ML: at 05:16

## 2021-01-01 RX ADMIN — GUAIFENESIN 600 MG: 600 TABLET, EXTENDED RELEASE ORAL at 08:49

## 2021-01-01 RX ADMIN — LANSOPRAZOLE 30 MG: KIT at 06:02

## 2021-01-01 RX ADMIN — GABAPENTIN 300 MG: 300 CAPSULE ORAL at 20:51

## 2021-01-01 RX ADMIN — PREDNISONE 30 MG: 20 TABLET ORAL at 11:40

## 2021-01-01 RX ADMIN — IPRATROPIUM BROMIDE AND ALBUTEROL SULFATE 3 ML: 2.5; .5 SOLUTION RESPIRATORY (INHALATION) at 19:34

## 2021-01-01 RX ADMIN — MIDAZOLAM 2 MG: 1 INJECTION INTRAMUSCULAR; INTRAVENOUS at 15:34

## 2021-01-01 RX ADMIN — VANCOMYCIN HYDROCHLORIDE 750 MG: 750 INJECTION, POWDER, LYOPHILIZED, FOR SOLUTION INTRAVENOUS at 16:19

## 2021-01-01 RX ADMIN — LURASIDONE HYDROCHLORIDE 40 MG: 40 TABLET, FILM COATED ORAL at 09:47

## 2021-01-01 RX ADMIN — Medication 5 MG: at 03:22

## 2021-01-01 RX ADMIN — IPRATROPIUM BROMIDE AND ALBUTEROL SULFATE 3 ML: 2.5; .5 SOLUTION RESPIRATORY (INHALATION) at 03:00

## 2021-01-01 RX ADMIN — ACETAMINOPHEN 650 MG: 325 TABLET, FILM COATED ORAL at 13:10

## 2021-01-01 RX ADMIN — GABAPENTIN 300 MG: 300 CAPSULE ORAL at 16:09

## 2021-01-01 RX ADMIN — GUAIFENESIN 600 MG: 600 TABLET, EXTENDED RELEASE ORAL at 20:09

## 2021-01-01 RX ADMIN — GABAPENTIN 300 MG: 300 CAPSULE ORAL at 09:01

## 2021-01-01 RX ADMIN — POTASSIUM CHLORIDE 40 MEQ: 1500 TABLET, EXTENDED RELEASE ORAL at 09:03

## 2021-01-01 RX ADMIN — ACETAMINOPHEN 1000 MG: 500 TABLET, FILM COATED ORAL at 12:30

## 2021-01-01 RX ADMIN — BUPRENORPHINE AND NALOXONE 1.5 TABLET: 8; 2 TABLET SUBLINGUAL at 08:11

## 2021-01-01 RX ADMIN — BUPRENORPHINE AND NALOXONE 1.5 TABLET: 8; 2 TABLET SUBLINGUAL at 09:21

## 2021-01-01 RX ADMIN — AMPICILLIN SODIUM AND SULBACTAM SODIUM 3 G: 2; 1 INJECTION, POWDER, FOR SOLUTION INTRAMUSCULAR; INTRAVENOUS at 14:30

## 2021-01-01 RX ADMIN — Medication 1 PACKET: at 21:11

## 2021-01-01 RX ADMIN — IPRATROPIUM BROMIDE AND ALBUTEROL SULFATE 3 ML: 2.5; .5 SOLUTION RESPIRATORY (INHALATION) at 11:52

## 2021-01-01 RX ADMIN — GABAPENTIN 300 MG: 300 CAPSULE ORAL at 21:29

## 2021-01-01 RX ADMIN — GUAIFENESIN 600 MG: 600 TABLET, EXTENDED RELEASE ORAL at 20:49

## 2021-01-01 RX ADMIN — SODIUM CHLORIDE 1.5 MCG/KG/HR: 9 INJECTION, SOLUTION INTRAVENOUS at 22:40

## 2021-01-01 RX ADMIN — NYSTATIN 500000 UNITS: 500000 SUSPENSION ORAL at 08:48

## 2021-01-01 RX ADMIN — DOCUSATE SODIUM 100 MG: 100 CAPSULE, LIQUID FILLED ORAL at 21:14

## 2021-01-01 RX ADMIN — IPRATROPIUM BROMIDE AND ALBUTEROL SULFATE 3 ML: 2.5; .5 SOLUTION RESPIRATORY (INHALATION) at 14:39

## 2021-01-01 RX ADMIN — FENTANYL CITRATE 100 MCG/HR: 0.05 INJECTION, SOLUTION INTRAMUSCULAR; INTRAVENOUS at 22:12

## 2021-01-01 RX ADMIN — METOPROLOL TARTRATE 50 MG: 50 TABLET ORAL at 21:14

## 2021-01-01 RX ADMIN — MIDAZOLAM HYDROCHLORIDE 2 MG: 1 INJECTION INTRAMUSCULAR; INTRAVENOUS at 12:29

## 2021-01-01 RX ADMIN — LORAZEPAM 0.5 MG: 2 INJECTION INTRAMUSCULAR; INTRAVENOUS at 17:10

## 2021-01-01 RX ADMIN — ASPIRIN 81 MG: 81 TABLET, CHEWABLE ORAL at 09:31

## 2021-01-01 RX ADMIN — FUROSEMIDE 20 MG: 10 INJECTION, SOLUTION INTRAMUSCULAR; INTRAVENOUS at 00:16

## 2021-01-01 RX ADMIN — HYDROXYZINE HYDROCHLORIDE 25 MG: 25 TABLET, FILM COATED ORAL at 17:44

## 2021-01-01 RX ADMIN — BUDESONIDE 0.5 MG: 0.5 INHALANT RESPIRATORY (INHALATION) at 19:27

## 2021-01-01 RX ADMIN — Medication 10 ML: at 08:30

## 2021-01-01 RX ADMIN — DOXYCYCLINE 100 MG: 100 INJECTION, POWDER, LYOPHILIZED, FOR SOLUTION INTRAVENOUS at 05:25

## 2021-01-01 RX ADMIN — LORAZEPAM 1 MG: 2 INJECTION INTRAMUSCULAR; INTRAVENOUS at 16:06

## 2021-01-01 RX ADMIN — MULTIPLE VITAMINS W/ MINERALS TAB 1 TABLET: TAB at 09:22

## 2021-01-01 RX ADMIN — IPRATROPIUM BROMIDE AND ALBUTEROL SULFATE 3 ML: 2.5; .5 SOLUTION RESPIRATORY (INHALATION) at 03:04

## 2021-01-01 RX ADMIN — AMPICILLIN SODIUM AND SULBACTAM SODIUM 3 G: 2; 1 INJECTION, POWDER, FOR SOLUTION INTRAMUSCULAR; INTRAVENOUS at 09:44

## 2021-01-01 RX ADMIN — GUAIFENESIN 600 MG: 600 TABLET, EXTENDED RELEASE ORAL at 08:25

## 2021-01-01 RX ADMIN — ENOXAPARIN SODIUM 40 MG: 40 INJECTION SUBCUTANEOUS at 16:18

## 2021-01-01 RX ADMIN — IPRATROPIUM BROMIDE AND ALBUTEROL SULFATE 3 ML: 2.5; .5 SOLUTION RESPIRATORY (INHALATION) at 11:45

## 2021-01-01 RX ADMIN — IPRATROPIUM BROMIDE AND ALBUTEROL SULFATE 3 ML: 2.5; .5 SOLUTION RESPIRATORY (INHALATION) at 11:35

## 2021-01-01 RX ADMIN — Medication: at 21:16

## 2021-01-01 RX ADMIN — GABAPENTIN 300 MG: 300 CAPSULE ORAL at 09:28

## 2021-01-01 RX ADMIN — SODIUM CHLORIDE 500 ML: 9 INJECTION, SOLUTION INTRAVENOUS at 15:50

## 2021-01-01 RX ADMIN — LORAZEPAM 1 MG: 2 INJECTION INTRAMUSCULAR; INTRAVENOUS at 01:38

## 2021-01-01 RX ADMIN — SERTRALINE HYDROCHLORIDE 50 MG: 50 TABLET ORAL at 09:17

## 2021-01-01 RX ADMIN — GUAIFENESIN 600 MG: 600 TABLET, EXTENDED RELEASE ORAL at 09:56

## 2021-01-01 RX ADMIN — GABAPENTIN 300 MG: 300 CAPSULE ORAL at 20:17

## 2021-01-01 RX ADMIN — GABAPENTIN 300 MG: 300 CAPSULE ORAL at 15:01

## 2021-01-01 RX ADMIN — GUAIFENESIN 300 MG: 100 SOLUTION ORAL at 06:01

## 2021-01-01 RX ADMIN — IPRATROPIUM BROMIDE AND ALBUTEROL SULFATE 3 ML: 2.5; .5 SOLUTION RESPIRATORY (INHALATION) at 23:06

## 2021-01-01 RX ADMIN — Medication: at 08:52

## 2021-01-01 RX ADMIN — Medication: at 11:52

## 2021-01-01 RX ADMIN — METOPROLOL TARTRATE 50 MG: 50 TABLET ORAL at 10:33

## 2021-01-01 RX ADMIN — FAMOTIDINE 20 MG: 10 INJECTION INTRAVENOUS at 08:54

## 2021-01-01 RX ADMIN — METHYLPREDNISOLONE SODIUM SUCCINATE 60 MG: 125 INJECTION, POWDER, FOR SOLUTION INTRAMUSCULAR; INTRAVENOUS at 21:44

## 2021-01-01 RX ADMIN — SODIUM CHLORIDE 300 MG: 9 INJECTION, SOLUTION INTRAVENOUS at 13:25

## 2021-01-01 RX ADMIN — POTASSIUM CHLORIDE 10 MEQ: 7.46 INJECTION, SOLUTION INTRAVENOUS at 09:39

## 2021-01-01 RX ADMIN — ATORVASTATIN CALCIUM 40 MG: 40 TABLET, FILM COATED ORAL at 21:13

## 2021-01-01 RX ADMIN — ENOXAPARIN SODIUM 40 MG: 40 INJECTION SUBCUTANEOUS at 16:17

## 2021-01-01 RX ADMIN — MEGESTROL ACETATE 200 MG: 40 SUSPENSION ORAL at 10:02

## 2021-01-01 RX ADMIN — POTASSIUM CHLORIDE 10 MEQ: 7.46 INJECTION, SOLUTION INTRAVENOUS at 08:36

## 2021-01-01 RX ADMIN — POTASSIUM CHLORIDE 40 MEQ: 1.5 POWDER, FOR SOLUTION ORAL at 09:57

## 2021-01-01 RX ADMIN — COLLAGENASE SANTYL 1 APPLICATION: 250 OINTMENT TOPICAL at 09:59

## 2021-01-01 RX ADMIN — BARIUM SULFATE 50 ML: 400 SUSPENSION ORAL at 10:30

## 2021-01-01 RX ADMIN — IPRATROPIUM BROMIDE AND ALBUTEROL SULFATE 3 ML: 2.5; .5 SOLUTION RESPIRATORY (INHALATION) at 14:37

## 2021-01-01 RX ADMIN — METHYLPREDNISOLONE SODIUM SUCCINATE 125 MG: 125 INJECTION, POWDER, FOR SOLUTION INTRAMUSCULAR; INTRAVENOUS at 09:09

## 2021-01-01 RX ADMIN — FUROSEMIDE 20 MG: 10 INJECTION, SOLUTION INTRAMUSCULAR; INTRAVENOUS at 10:19

## 2021-01-01 RX ADMIN — PANTOPRAZOLE SODIUM 40 MG: 40 TABLET, DELAYED RELEASE ORAL at 09:17

## 2021-01-01 RX ADMIN — HYDROXYZINE HYDROCHLORIDE 25 MG: 25 TABLET, FILM COATED ORAL at 13:09

## 2021-01-01 RX ADMIN — Medication: at 21:08

## 2021-01-01 RX ADMIN — AMPICILLIN SODIUM AND SULBACTAM SODIUM 3 G: 2; 1 INJECTION, POWDER, FOR SOLUTION INTRAMUSCULAR; INTRAVENOUS at 15:38

## 2021-01-01 RX ADMIN — VANCOMYCIN HYDROCHLORIDE 1000 MG: 1 INJECTION, POWDER, LYOPHILIZED, FOR SOLUTION INTRAVENOUS at 18:20

## 2021-01-01 RX ADMIN — DOCUSATE SODIUM 100 MG: 50 LIQUID ORAL at 08:59

## 2021-01-01 RX ADMIN — IPRATROPIUM BROMIDE AND ALBUTEROL SULFATE 3 ML: 2.5; .5 SOLUTION RESPIRATORY (INHALATION) at 10:40

## 2021-01-01 RX ADMIN — GUAIFENESIN 300 MG: 100 SOLUTION ORAL at 21:06

## 2021-01-01 RX ADMIN — HALOPERIDOL LACTATE 2 MG: 5 INJECTION, SOLUTION INTRAMUSCULAR at 03:58

## 2021-01-01 RX ADMIN — COLLAGENASE SANTYL: 250 OINTMENT TOPICAL at 08:30

## 2021-01-01 RX ADMIN — SODIUM CHLORIDE 1.5 MCG/KG/HR: 9 INJECTION, SOLUTION INTRAVENOUS at 13:59

## 2021-01-01 RX ADMIN — ACETYLCYSTEINE 2 ML: 200 SOLUTION ORAL; RESPIRATORY (INHALATION) at 07:26

## 2021-01-01 RX ADMIN — VANCOMYCIN HYDROCHLORIDE 1000 MG: 1 INJECTION, POWDER, LYOPHILIZED, FOR SOLUTION INTRAVENOUS at 17:02

## 2021-01-01 RX ADMIN — LURASIDONE HYDROCHLORIDE 40 MG: 40 TABLET, FILM COATED ORAL at 09:56

## 2021-01-01 RX ADMIN — HYDROCODONE BITARTRATE AND ACETAMINOPHEN 1 TABLET: 5; 325 TABLET ORAL at 00:02

## 2021-01-01 RX ADMIN — SERTRALINE HYDROCHLORIDE 50 MG: 50 TABLET ORAL at 09:36

## 2021-01-01 RX ADMIN — FUROSEMIDE 20 MG: 10 INJECTION, SOLUTION INTRAMUSCULAR; INTRAVENOUS at 09:31

## 2021-01-01 RX ADMIN — ASPIRIN 81 MG: 81 TABLET, COATED ORAL at 08:49

## 2021-01-01 RX ADMIN — GABAPENTIN 300 MG: 300 CAPSULE ORAL at 20:09

## 2021-01-01 RX ADMIN — SODIUM CHLORIDE 3 G: 900 INJECTION INTRAVENOUS at 18:30

## 2021-01-01 RX ADMIN — IPRATROPIUM BROMIDE AND ALBUTEROL SULFATE 3 ML: 2.5; .5 SOLUTION RESPIRATORY (INHALATION) at 23:47

## 2021-01-01 RX ADMIN — HALOPERIDOL 2 MG: 5 INJECTION INTRAMUSCULAR at 11:41

## 2021-01-01 RX ADMIN — AMPICILLIN SODIUM AND SULBACTAM SODIUM 3 G: 2; 1 INJECTION, POWDER, FOR SOLUTION INTRAMUSCULAR; INTRAVENOUS at 01:30

## 2021-01-01 RX ADMIN — GUAIFENESIN 600 MG: 600 TABLET, EXTENDED RELEASE ORAL at 09:22

## 2021-01-01 RX ADMIN — MEGESTROL ACETATE 200 MG: 40 SUSPENSION ORAL at 15:37

## 2021-01-01 RX ADMIN — FUROSEMIDE 20 MG: 10 INJECTION, SOLUTION INTRAMUSCULAR; INTRAVENOUS at 22:25

## 2021-01-01 RX ADMIN — GUAIFENESIN 600 MG: 600 TABLET, EXTENDED RELEASE ORAL at 09:20

## 2021-01-01 RX ADMIN — Medication 5 MG: at 20:30

## 2021-01-01 RX ADMIN — POTASSIUM CHLORIDE 40 MEQ: 1.5 POWDER, FOR SOLUTION ORAL at 10:11

## 2021-01-01 RX ADMIN — SPIRONOLACTONE 25 MG: 25 TABLET ORAL at 08:25

## 2021-01-01 RX ADMIN — GABAPENTIN 300 MG: 300 CAPSULE ORAL at 16:05

## 2021-01-01 RX ADMIN — BUPRENORPHINE AND NALOXONE 1.5 TABLET: 8; 2 TABLET SUBLINGUAL at 12:23

## 2021-01-01 RX ADMIN — HYDROXYZINE HYDROCHLORIDE 25 MG: 25 TABLET, FILM COATED ORAL at 20:45

## 2021-01-01 RX ADMIN — POLYETHYLENE GLYCOL 3350 17 G: 17 POWDER, FOR SOLUTION ORAL at 14:16

## 2021-01-01 RX ADMIN — IPRATROPIUM BROMIDE AND ALBUTEROL SULFATE 3 ML: 2.5; .5 SOLUTION RESPIRATORY (INHALATION) at 15:15

## 2021-01-01 RX ADMIN — IPRATROPIUM BROMIDE AND ALBUTEROL SULFATE 3 ML: 2.5; .5 SOLUTION RESPIRATORY (INHALATION) at 23:41

## 2021-01-01 RX ADMIN — IPRATROPIUM BROMIDE AND ALBUTEROL SULFATE 3 ML: 2.5; .5 SOLUTION RESPIRATORY (INHALATION) at 03:07

## 2021-01-01 RX ADMIN — SODIUM CHLORIDE 300 MG: 9 INJECTION, SOLUTION INTRAVENOUS at 15:01

## 2021-01-01 RX ADMIN — MEGESTROL ACETATE 200 MG: 40 SUSPENSION ORAL at 21:47

## 2021-01-01 RX ADMIN — SPIRONOLACTONE 25 MG: 25 TABLET ORAL at 09:58

## 2021-01-01 RX ADMIN — IPRATROPIUM BROMIDE AND ALBUTEROL SULFATE 3 ML: 2.5; .5 SOLUTION RESPIRATORY (INHALATION) at 10:52

## 2021-01-01 RX ADMIN — MEGESTROL ACETATE 200 MG: 40 SUSPENSION ORAL at 21:40

## 2021-01-01 RX ADMIN — IPRATROPIUM BROMIDE AND ALBUTEROL SULFATE 3 ML: 2.5; .5 SOLUTION RESPIRATORY (INHALATION) at 19:36

## 2021-01-01 RX ADMIN — MORPHINE SULFATE 2 MG: 4 INJECTION INTRAVENOUS at 00:43

## 2021-01-01 RX ADMIN — GUAIFENESIN 600 MG: 600 TABLET, EXTENDED RELEASE ORAL at 21:32

## 2021-01-01 RX ADMIN — IPRATROPIUM BROMIDE AND ALBUTEROL SULFATE 3 ML: 2.5; .5 SOLUTION RESPIRATORY (INHALATION) at 23:05

## 2021-01-01 RX ADMIN — Medication 10 ML: at 20:19

## 2021-01-01 RX ADMIN — Medication 1 PACKET: at 09:55

## 2021-01-01 RX ADMIN — BUPRENORPHINE AND NALOXONE 1 TABLET: 8; 2 TABLET SUBLINGUAL at 09:58

## 2021-01-01 RX ADMIN — EPOETIN ALFA-EPBX 40000 UNITS: 40000 INJECTION, SOLUTION INTRAVENOUS; SUBCUTANEOUS at 10:38

## 2021-01-01 RX ADMIN — ASPIRIN 81 MG: 81 TABLET, CHEWABLE ORAL at 09:58

## 2021-01-01 RX ADMIN — ACETYLCYSTEINE 2 ML: 200 SOLUTION ORAL; RESPIRATORY (INHALATION) at 19:38

## 2021-01-01 RX ADMIN — IPRATROPIUM BROMIDE AND ALBUTEROL SULFATE 3 ML: 2.5; .5 SOLUTION RESPIRATORY (INHALATION) at 22:30

## 2021-01-01 RX ADMIN — MEGESTROL ACETATE 200 MG: 40 SUSPENSION ORAL at 17:09

## 2021-01-01 RX ADMIN — BUDESONIDE 0.5 MG: 0.5 INHALANT RESPIRATORY (INHALATION) at 08:08

## 2021-01-01 RX ADMIN — PANTOPRAZOLE SODIUM 40 MG: 40 INJECTION, POWDER, FOR SOLUTION INTRAVENOUS at 06:12

## 2021-01-01 RX ADMIN — Medication 5 MG: at 20:46

## 2021-01-01 RX ADMIN — SPIRONOLACTONE 25 MG: 25 TABLET, FILM COATED ORAL at 09:22

## 2021-01-01 RX ADMIN — BUPRENORPHINE AND NALOXONE 1.5 TABLET: 8; 2 TABLET SUBLINGUAL at 08:54

## 2021-01-01 RX ADMIN — GABAPENTIN 300 MG: 300 CAPSULE ORAL at 08:48

## 2021-01-01 RX ADMIN — HEPARIN 500 UNITS: 100 SYRINGE at 18:21

## 2021-01-01 RX ADMIN — SERTRALINE HYDROCHLORIDE 50 MG: 50 TABLET ORAL at 09:02

## 2021-01-01 RX ADMIN — IPRATROPIUM BROMIDE AND ALBUTEROL SULFATE 3 ML: 2.5; .5 SOLUTION RESPIRATORY (INHALATION) at 05:08

## 2021-01-01 RX ADMIN — IPRATROPIUM BROMIDE AND ALBUTEROL SULFATE 3 ML: 2.5; .5 SOLUTION RESPIRATORY (INHALATION) at 11:53

## 2021-01-01 RX ADMIN — Medication 10 ML: at 19:58

## 2021-01-01 RX ADMIN — GABAPENTIN 300 MG: 300 CAPSULE ORAL at 09:32

## 2021-01-01 RX ADMIN — CEFEPIME HYDROCHLORIDE 2 G: 2 INJECTION, POWDER, FOR SOLUTION INTRAVENOUS at 12:25

## 2021-01-01 RX ADMIN — ENOXAPARIN SODIUM 40 MG: 40 INJECTION SUBCUTANEOUS at 16:58

## 2021-01-01 RX ADMIN — MEGESTROL ACETATE 200 MG: 40 SUSPENSION ORAL at 20:50

## 2021-01-01 RX ADMIN — IPRATROPIUM BROMIDE AND ALBUTEROL SULFATE 3 ML: 2.5; .5 SOLUTION RESPIRATORY (INHALATION) at 11:08

## 2021-01-01 RX ADMIN — Medication 10 ML: at 09:52

## 2021-01-01 RX ADMIN — AMPICILLIN SODIUM AND SULBACTAM SODIUM 3 G: 2; 1 INJECTION, POWDER, FOR SOLUTION INTRAMUSCULAR; INTRAVENOUS at 02:49

## 2021-01-01 RX ADMIN — ENOXAPARIN SODIUM 40 MG: 40 INJECTION SUBCUTANEOUS at 15:02

## 2021-01-01 RX ADMIN — AMPICILLIN SODIUM AND SULBACTAM SODIUM 3 G: 2; 1 INJECTION, POWDER, FOR SOLUTION INTRAMUSCULAR; INTRAVENOUS at 21:12

## 2021-01-01 RX ADMIN — GUAIFENESIN 600 MG: 600 TABLET, EXTENDED RELEASE ORAL at 10:03

## 2021-01-01 RX ADMIN — PREDNISONE 10 MG: 10 TABLET ORAL at 08:30

## 2021-01-01 RX ADMIN — FAMOTIDINE 20 MG: 10 INJECTION INTRAVENOUS at 21:01

## 2021-01-01 RX ADMIN — LURASIDONE HYDROCHLORIDE 40 MG: 40 TABLET, FILM COATED ORAL at 09:02

## 2021-01-01 RX ADMIN — PANTOPRAZOLE SODIUM 40 MG: 40 INJECTION, POWDER, FOR SOLUTION INTRAVENOUS at 06:04

## 2021-01-01 RX ADMIN — FUROSEMIDE 20 MG: 10 INJECTION, SOLUTION INTRAMUSCULAR; INTRAVENOUS at 11:42

## 2021-01-01 RX ADMIN — POLYETHYLENE GLYCOL 3350 17 G: 17 POWDER, FOR SOLUTION ORAL at 08:47

## 2021-01-01 RX ADMIN — HYDROXYZINE HYDROCHLORIDE 25 MG: 25 TABLET, FILM COATED ORAL at 09:35

## 2021-01-01 RX ADMIN — SODIUM CHLORIDE 1 MCG/KG/HR: 9 INJECTION, SOLUTION INTRAVENOUS at 18:31

## 2021-01-01 RX ADMIN — IPRATROPIUM BROMIDE AND ALBUTEROL SULFATE 3 ML: 2.5; .5 SOLUTION RESPIRATORY (INHALATION) at 15:03

## 2021-01-01 RX ADMIN — METOPROLOL TARTRATE 50 MG: 50 TABLET ORAL at 21:06

## 2021-01-01 RX ADMIN — IPRATROPIUM BROMIDE AND ALBUTEROL SULFATE 3 ML: 2.5; .5 SOLUTION RESPIRATORY (INHALATION) at 07:20

## 2021-01-01 RX ADMIN — BISACODYL 10 MG: 10 SUPPOSITORY RECTAL at 16:44

## 2021-01-01 RX ADMIN — POLYETHYLENE GLYCOL 3350 17 G: 17 POWDER, FOR SOLUTION ORAL at 09:28

## 2021-01-01 RX ADMIN — Medication: at 21:40

## 2021-01-01 RX ADMIN — GABAPENTIN 300 MG: 300 CAPSULE ORAL at 16:18

## 2021-01-01 RX ADMIN — FUROSEMIDE 20 MG: 10 INJECTION, SOLUTION INTRAMUSCULAR; INTRAVENOUS at 10:02

## 2021-01-01 RX ADMIN — BUPRENORPHINE AND NALOXONE 1.5 TABLET: 8; 2 TABLET SUBLINGUAL at 09:36

## 2021-01-01 RX ADMIN — SENNOSIDES AND DOCUSATE SODIUM 2 TABLET: 8.6; 5 TABLET ORAL at 21:55

## 2021-01-01 RX ADMIN — IPRATROPIUM BROMIDE AND ALBUTEROL SULFATE 3 ML: 2.5; .5 SOLUTION RESPIRATORY (INHALATION) at 18:45

## 2021-01-01 RX ADMIN — GABAPENTIN 300 MG: 300 CAPSULE ORAL at 16:07

## 2021-01-01 RX ADMIN — Medication 10 ML: at 09:56

## 2021-01-01 RX ADMIN — IPRATROPIUM BROMIDE AND ALBUTEROL SULFATE 3 ML: 2.5; .5 SOLUTION RESPIRATORY (INHALATION) at 19:15

## 2021-01-01 RX ADMIN — Medication 10 ML: at 08:56

## 2021-01-01 RX ADMIN — Medication 10 ML: at 09:28

## 2021-01-01 RX ADMIN — METHYLPREDNISOLONE SODIUM SUCCINATE 60 MG: 125 INJECTION, POWDER, FOR SOLUTION INTRAMUSCULAR; INTRAVENOUS at 20:52

## 2021-01-01 RX ADMIN — GUAIFENESIN 600 MG: 600 TABLET, EXTENDED RELEASE ORAL at 09:47

## 2021-01-01 RX ADMIN — SPIRONOLACTONE 25 MG: 25 TABLET ORAL at 09:02

## 2021-01-01 RX ADMIN — POLYETHYLENE GLYCOL 3350 17 G: 17 POWDER, FOR SOLUTION ORAL at 08:55

## 2021-01-01 RX ADMIN — DOCUSATE SODIUM 100 MG: 100 CAPSULE, LIQUID FILLED ORAL at 09:52

## 2021-01-01 RX ADMIN — SPIRONOLACTONE 25 MG: 25 TABLET ORAL at 09:32

## 2021-01-01 RX ADMIN — ASPIRIN 81 MG: 81 TABLET, CHEWABLE ORAL at 08:30

## 2021-01-01 RX ADMIN — DOCUSATE SODIUM 100 MG: 100 CAPSULE, LIQUID FILLED ORAL at 20:52

## 2021-01-01 RX ADMIN — FUROSEMIDE 20 MG: 10 INJECTION, SOLUTION INTRAMUSCULAR; INTRAVENOUS at 23:23

## 2021-01-01 RX ADMIN — Medication 10 ML: at 09:36

## 2021-01-01 RX ADMIN — AMPICILLIN SODIUM AND SULBACTAM SODIUM 3 G: 2; 1 INJECTION, POWDER, FOR SOLUTION INTRAMUSCULAR; INTRAVENOUS at 08:59

## 2021-01-01 RX ADMIN — POLYETHYLENE GLYCOL 3350 17 G: 17 POWDER, FOR SOLUTION ORAL at 09:21

## 2021-01-01 RX ADMIN — FUROSEMIDE 40 MG: 10 INJECTION, SOLUTION INTRAMUSCULAR; INTRAVENOUS at 09:03

## 2021-01-01 RX ADMIN — ACETAMINOPHEN 650 MG: 650 SUPPOSITORY RECTAL at 13:30

## 2021-01-01 RX ADMIN — SERTRALINE HYDROCHLORIDE 50 MG: 50 TABLET ORAL at 09:56

## 2021-01-01 RX ADMIN — SPIRONOLACTONE 25 MG: 25 TABLET ORAL at 09:29

## 2021-01-01 RX ADMIN — IPRATROPIUM BROMIDE AND ALBUTEROL SULFATE 3 ML: 2.5; .5 SOLUTION RESPIRATORY (INHALATION) at 14:51

## 2021-01-01 RX ADMIN — ENOXAPARIN SODIUM 40 MG: 40 INJECTION SUBCUTANEOUS at 15:37

## 2021-01-01 RX ADMIN — IPRATROPIUM BROMIDE AND ALBUTEROL SULFATE 3 ML: 2.5; .5 SOLUTION RESPIRATORY (INHALATION) at 23:40

## 2021-01-01 RX ADMIN — ENOXAPARIN SODIUM 40 MG: 40 INJECTION SUBCUTANEOUS at 15:57

## 2021-01-01 RX ADMIN — METHYLPREDNISOLONE SODIUM SUCCINATE 20 MG: 40 INJECTION, POWDER, FOR SOLUTION INTRAMUSCULAR; INTRAVENOUS at 08:41

## 2021-01-01 RX ADMIN — GABAPENTIN 300 MG: 300 CAPSULE ORAL at 21:47

## 2021-01-01 RX ADMIN — SENNOSIDES AND DOCUSATE SODIUM 2 TABLET: 8.6; 5 TABLET ORAL at 20:45

## 2021-01-01 RX ADMIN — METHYLPREDNISOLONE SODIUM SUCCINATE 20 MG: 40 INJECTION, POWDER, FOR SOLUTION INTRAMUSCULAR; INTRAVENOUS at 23:21

## 2021-01-01 RX ADMIN — Medication: at 10:04

## 2021-01-01 RX ADMIN — Medication 10 ML: at 20:51

## 2021-01-01 RX ADMIN — Medication: at 08:26

## 2021-01-01 RX ADMIN — AMPICILLIN SODIUM AND SULBACTAM SODIUM 3 G: 2; 1 INJECTION, POWDER, FOR SOLUTION INTRAMUSCULAR; INTRAVENOUS at 10:10

## 2021-01-01 RX ADMIN — SODIUM CHLORIDE 1.5 MCG/KG/HR: 9 INJECTION, SOLUTION INTRAVENOUS at 21:00

## 2021-01-01 RX ADMIN — BUPRENORPHINE AND NALOXONE 1 TABLET: 8; 2 TABLET SUBLINGUAL at 08:59

## 2021-01-01 RX ADMIN — GABAPENTIN 300 MG: 300 CAPSULE ORAL at 08:59

## 2021-01-01 RX ADMIN — FAMOTIDINE 20 MG: 10 INJECTION INTRAVENOUS at 20:02

## 2021-01-01 RX ADMIN — PROCHLORPERAZINE EDISYLATE 5 MG: 5 INJECTION INTRAMUSCULAR; INTRAVENOUS at 12:47

## 2021-01-01 RX ADMIN — GABAPENTIN 300 MG: 300 CAPSULE ORAL at 15:02

## 2021-01-01 RX ADMIN — BUDESONIDE 0.5 MG: 0.5 INHALANT RESPIRATORY (INHALATION) at 18:05

## 2021-01-01 RX ADMIN — AMPICILLIN SODIUM AND SULBACTAM SODIUM 3 G: 2; 1 INJECTION, POWDER, FOR SOLUTION INTRAMUSCULAR; INTRAVENOUS at 14:58

## 2021-01-01 RX ADMIN — AMPICILLIN SODIUM AND SULBACTAM SODIUM 3 G: 2; 1 INJECTION, POWDER, FOR SOLUTION INTRAMUSCULAR; INTRAVENOUS at 08:43

## 2021-01-01 RX ADMIN — SPIRONOLACTONE 25 MG: 25 TABLET, FILM COATED ORAL at 08:10

## 2021-01-01 RX ADMIN — DOCUSATE SODIUM 100 MG: 100 CAPSULE, LIQUID FILLED ORAL at 21:47

## 2021-01-01 RX ADMIN — MULTIPLE VITAMINS W/ MINERALS TAB 1 TABLET: TAB at 09:20

## 2021-01-01 RX ADMIN — FAMOTIDINE 20 MG: 10 INJECTION INTRAVENOUS at 21:46

## 2021-01-01 RX ADMIN — DOCUSATE SODIUM 100 MG: 50 LIQUID ORAL at 08:30

## 2021-01-01 RX ADMIN — AMPICILLIN SODIUM AND SULBACTAM SODIUM 3 G: 2; 1 INJECTION, POWDER, FOR SOLUTION INTRAMUSCULAR; INTRAVENOUS at 09:37

## 2021-01-01 RX ADMIN — Medication: at 10:21

## 2021-01-01 RX ADMIN — Medication 10 ML: at 20:52

## 2021-01-01 RX ADMIN — SERTRALINE HYDROCHLORIDE 50 MG: 50 TABLET ORAL at 10:04

## 2021-01-01 RX ADMIN — IPRATROPIUM BROMIDE AND ALBUTEROL SULFATE 3 ML: 2.5; .5 SOLUTION RESPIRATORY (INHALATION) at 08:11

## 2021-01-01 RX ADMIN — IPRATROPIUM BROMIDE AND ALBUTEROL SULFATE 3 ML: 2.5; .5 SOLUTION RESPIRATORY (INHALATION) at 18:18

## 2021-01-01 RX ADMIN — IPRATROPIUM BROMIDE AND ALBUTEROL SULFATE 3 ML: 2.5; .5 SOLUTION RESPIRATORY (INHALATION) at 02:57

## 2021-01-01 RX ADMIN — POTASSIUM CHLORIDE 40 MEQ: 1.5 POWDER, FOR SOLUTION ORAL at 17:41

## 2021-01-01 RX ADMIN — GUAIFENESIN 600 MG: 600 TABLET, EXTENDED RELEASE ORAL at 09:02

## 2021-01-01 RX ADMIN — METOPROLOL TARTRATE 50 MG: 50 TABLET ORAL at 08:59

## 2021-01-01 RX ADMIN — ATORVASTATIN CALCIUM 40 MG: 40 TABLET, FILM COATED ORAL at 20:09

## 2021-01-01 RX ADMIN — IPRATROPIUM BROMIDE AND ALBUTEROL SULFATE 3 ML: 2.5; .5 SOLUTION RESPIRATORY (INHALATION) at 06:32

## 2021-01-01 RX ADMIN — IPRATROPIUM BROMIDE AND ALBUTEROL SULFATE 3 ML: 2.5; .5 SOLUTION RESPIRATORY (INHALATION) at 03:03

## 2021-01-01 RX ADMIN — IPRATROPIUM BROMIDE AND ALBUTEROL SULFATE 3 ML: 2.5; .5 SOLUTION RESPIRATORY (INHALATION) at 23:28

## 2021-01-01 RX ADMIN — IPRATROPIUM BROMIDE AND ALBUTEROL SULFATE 3 ML: 2.5; .5 SOLUTION RESPIRATORY (INHALATION) at 15:34

## 2021-01-01 RX ADMIN — SODIUM CHLORIDE 1500 ML: 9 INJECTION, SOLUTION INTRAVENOUS at 16:22

## 2021-01-01 RX ADMIN — HALOPERIDOL LACTATE 2 MG: 5 INJECTION, SOLUTION INTRAMUSCULAR at 01:00

## 2021-01-01 RX ADMIN — IPRATROPIUM BROMIDE AND ALBUTEROL SULFATE 3 ML: 2.5; .5 SOLUTION RESPIRATORY (INHALATION) at 19:08

## 2021-01-01 RX ADMIN — BUPRENORPHINE AND NALOXONE 1.5 TABLET: 8; 2 TABLET SUBLINGUAL at 09:43

## 2021-01-01 RX ADMIN — GABAPENTIN 300 MG: 300 CAPSULE ORAL at 16:51

## 2021-01-01 RX ADMIN — ASPIRIN 81 MG: 81 TABLET, CHEWABLE ORAL at 09:28

## 2021-01-01 RX ADMIN — SERTRALINE HYDROCHLORIDE 50 MG: 50 TABLET ORAL at 08:48

## 2021-01-01 RX ADMIN — PIPERACILLIN SODIUM AND TAZOBACTAM SODIUM 4.5 G: 4; .5 INJECTION, POWDER, LYOPHILIZED, FOR SOLUTION INTRAVENOUS at 12:45

## 2021-01-01 RX ADMIN — FERROUS SULFATE TAB EC 324 MG (65 MG FE EQUIVALENT) 324 MG: 324 (65 FE) TABLET DELAYED RESPONSE at 10:33

## 2021-01-01 RX ADMIN — MEGESTROL ACETATE 200 MG: 40 SUSPENSION ORAL at 09:31

## 2021-01-01 RX ADMIN — PANTOPRAZOLE SODIUM 40 MG: 40 TABLET, DELAYED RELEASE ORAL at 10:32

## 2021-01-01 RX ADMIN — IPRATROPIUM BROMIDE AND ALBUTEROL SULFATE 3 ML: 2.5; .5 SOLUTION RESPIRATORY (INHALATION) at 19:26

## 2021-01-01 RX ADMIN — Medication 10 ML: at 20:27

## 2021-01-01 RX ADMIN — IPRATROPIUM BROMIDE AND ALBUTEROL SULFATE 3 ML: 2.5; .5 SOLUTION RESPIRATORY (INHALATION) at 11:11

## 2021-01-01 RX ADMIN — DOCUSATE SODIUM 100 MG: 100 CAPSULE, LIQUID FILLED ORAL at 08:25

## 2021-01-01 RX ADMIN — SERTRALINE HYDROCHLORIDE 50 MG: 50 TABLET ORAL at 08:30

## 2021-01-01 RX ADMIN — DOCUSATE SODIUM 100 MG: 100 CAPSULE, LIQUID FILLED ORAL at 09:02

## 2021-01-01 RX ADMIN — POTASSIUM CHLORIDE 10 MEQ: 7.46 INJECTION, SOLUTION INTRAVENOUS at 10:39

## 2021-01-01 RX ADMIN — Medication: at 21:04

## 2021-01-01 RX ADMIN — FENTANYL CITRATE 50 MCG/HR: 0.05 INJECTION, SOLUTION INTRAMUSCULAR; INTRAVENOUS at 13:41

## 2021-01-01 RX ADMIN — IPRATROPIUM BROMIDE AND ALBUTEROL SULFATE 3 ML: 2.5; .5 SOLUTION RESPIRATORY (INHALATION) at 19:31

## 2021-01-01 RX ADMIN — LURASIDONE HYDROCHLORIDE 40 MG: 40 TABLET, FILM COATED ORAL at 09:58

## 2021-01-01 RX ADMIN — PANTOPRAZOLE SODIUM 40 MG: 40 INJECTION, POWDER, FOR SOLUTION INTRAVENOUS at 05:33

## 2021-01-01 RX ADMIN — VANCOMYCIN HYDROCHLORIDE 1000 MG: 1 INJECTION, POWDER, LYOPHILIZED, FOR SOLUTION INTRAVENOUS at 05:52

## 2021-01-01 RX ADMIN — POTASSIUM CHLORIDE 10 MEQ: 7.46 INJECTION, SOLUTION INTRAVENOUS at 11:39

## 2021-01-01 RX ADMIN — MORPHINE SULFATE 2 MG: 4 INJECTION INTRAVENOUS at 12:38

## 2021-01-01 RX ADMIN — LORAZEPAM 1 MG: 2 INJECTION INTRAMUSCULAR; INTRAVENOUS at 14:37

## 2021-01-01 RX ADMIN — VANCOMYCIN HYDROCHLORIDE 1000 MG: 1 INJECTION, POWDER, LYOPHILIZED, FOR SOLUTION INTRAVENOUS at 05:43

## 2021-01-01 RX ADMIN — IPRATROPIUM BROMIDE AND ALBUTEROL SULFATE 3 ML: 2.5; .5 SOLUTION RESPIRATORY (INHALATION) at 04:01

## 2021-01-01 RX ADMIN — PREDNISONE 20 MG: 20 TABLET ORAL at 08:59

## 2021-01-01 RX ADMIN — SERTRALINE HYDROCHLORIDE 50 MG: 50 TABLET ORAL at 10:32

## 2021-01-01 RX ADMIN — Medication 1 PACKET: at 09:01

## 2021-01-01 RX ADMIN — ASPIRIN 81 MG: 81 TABLET, CHEWABLE ORAL at 09:52

## 2021-01-01 RX ADMIN — IPRATROPIUM BROMIDE AND ALBUTEROL SULFATE 3 ML: 2.5; .5 SOLUTION RESPIRATORY (INHALATION) at 11:03

## 2021-01-01 RX ADMIN — MEGESTROL ACETATE 200 MG: 40 SUSPENSION ORAL at 08:56

## 2021-01-01 RX ADMIN — GABAPENTIN 300 MG: 300 CAPSULE ORAL at 09:23

## 2021-01-01 RX ADMIN — GABAPENTIN 300 MG: 300 CAPSULE ORAL at 10:02

## 2021-01-01 RX ADMIN — ACETAMINOPHEN 650 MG: 650 SUPPOSITORY RECTAL at 16:05

## 2021-01-01 RX ADMIN — SERTRALINE HYDROCHLORIDE 50 MG: 50 TABLET ORAL at 09:52

## 2021-01-01 RX ADMIN — IPRATROPIUM BROMIDE AND ALBUTEROL SULFATE 3 ML: 2.5; .5 SOLUTION RESPIRATORY (INHALATION) at 08:42

## 2021-01-01 RX ADMIN — SODIUM CHLORIDE 500 ML: 9 INJECTION, SOLUTION INTRAVENOUS at 18:30

## 2021-01-01 RX ADMIN — LURASIDONE HYDROCHLORIDE 40 MG: 40 TABLET, FILM COATED ORAL at 09:23

## 2021-01-01 RX ADMIN — HALOPERIDOL LACTATE 2 MG: 5 INJECTION, SOLUTION INTRAMUSCULAR at 22:51

## 2021-01-01 RX ADMIN — GABAPENTIN 300 MG: 300 CAPSULE ORAL at 21:06

## 2021-01-01 RX ADMIN — DOCUSATE SODIUM 100 MG: 100 CAPSULE, LIQUID FILLED ORAL at 20:18

## 2021-01-01 RX ADMIN — DOCUSATE SODIUM 100 MG: 50 LIQUID ORAL at 21:06

## 2021-01-01 RX ADMIN — IPRATROPIUM BROMIDE AND ALBUTEROL SULFATE 3 ML: 2.5; .5 SOLUTION RESPIRATORY (INHALATION) at 23:36

## 2021-01-01 RX ADMIN — METHYLPREDNISOLONE SODIUM SUCCINATE 40 MG: 40 INJECTION, POWDER, FOR SOLUTION INTRAMUSCULAR; INTRAVENOUS at 15:01

## 2021-01-01 RX ADMIN — BUDESONIDE 0.5 MG: 0.5 INHALANT RESPIRATORY (INHALATION) at 18:50

## 2021-01-01 RX ADMIN — MEGESTROL ACETATE 200 MG: 40 SUSPENSION ORAL at 20:47

## 2021-01-01 RX ADMIN — FUROSEMIDE 20 MG: 10 INJECTION, SOLUTION INTRAMUSCULAR; INTRAVENOUS at 23:51

## 2021-01-01 RX ADMIN — IPRATROPIUM BROMIDE AND ALBUTEROL SULFATE 3 ML: 2.5; .5 SOLUTION RESPIRATORY (INHALATION) at 11:14

## 2021-01-01 RX ADMIN — GUAIFENESIN 300 MG: 100 SOLUTION ORAL at 09:58

## 2021-01-01 RX ADMIN — IPRATROPIUM BROMIDE AND ALBUTEROL SULFATE 3 ML: 2.5; .5 SOLUTION RESPIRATORY (INHALATION) at 23:33

## 2021-01-01 RX ADMIN — MEGESTROL ACETATE 200 MG: 40 SUSPENSION ORAL at 21:03

## 2021-01-01 RX ADMIN — GABAPENTIN 300 MG: 300 CAPSULE ORAL at 22:04

## 2021-01-01 RX ADMIN — SPIRONOLACTONE 25 MG: 25 TABLET ORAL at 09:52

## 2021-01-01 RX ADMIN — LURASIDONE HYDROCHLORIDE 40 MG: 40 TABLET, FILM COATED ORAL at 08:59

## 2021-01-01 RX ADMIN — GABAPENTIN 300 MG: 300 CAPSULE ORAL at 09:17

## 2021-01-01 RX ADMIN — VANCOMYCIN HYDROCHLORIDE 1000 MG: 1 INJECTION, POWDER, LYOPHILIZED, FOR SOLUTION INTRAVENOUS at 17:09

## 2021-01-01 RX ADMIN — GUAIFENESIN 300 MG: 100 SOLUTION ORAL at 06:03

## 2021-01-01 RX ADMIN — GUAIFENESIN 600 MG: 600 TABLET, EXTENDED RELEASE ORAL at 08:55

## 2021-01-01 RX ADMIN — PREDNISONE 10 MG: 10 TABLET ORAL at 09:31

## 2021-01-01 RX ADMIN — MEGESTROL ACETATE 200 MG: 40 SUSPENSION ORAL at 21:32

## 2021-01-01 RX ADMIN — LORAZEPAM 0.5 MG: 2 INJECTION INTRAMUSCULAR; INTRAVENOUS at 10:03

## 2021-01-01 RX ADMIN — BUDESONIDE 0.5 MG: 0.5 INHALANT RESPIRATORY (INHALATION) at 07:02

## 2021-01-01 RX ADMIN — IPRATROPIUM BROMIDE AND ALBUTEROL SULFATE 3 ML: 2.5; .5 SOLUTION RESPIRATORY (INHALATION) at 07:10

## 2021-01-01 RX ADMIN — BUDESONIDE 0.5 MG: 0.5 INHALANT RESPIRATORY (INHALATION) at 06:45

## 2021-01-01 RX ADMIN — IPRATROPIUM BROMIDE AND ALBUTEROL SULFATE 3 ML: 2.5; .5 SOLUTION RESPIRATORY (INHALATION) at 23:25

## 2021-01-01 RX ADMIN — DOCUSATE SODIUM 100 MG: 50 LIQUID ORAL at 22:05

## 2021-01-01 RX ADMIN — LURASIDONE HYDROCHLORIDE 40 MG: 40 TABLET, FILM COATED ORAL at 10:04

## 2021-01-01 RX ADMIN — VANCOMYCIN HYDROCHLORIDE 1000 MG: 1 INJECTION, POWDER, LYOPHILIZED, FOR SOLUTION INTRAVENOUS at 05:08

## 2021-01-01 RX ADMIN — GABAPENTIN 300 MG: 300 CAPSULE ORAL at 09:35

## 2021-01-01 RX ADMIN — ASPIRIN 81 MG: 81 TABLET, COATED ORAL at 09:35

## 2021-01-01 RX ADMIN — METHYLPREDNISOLONE SODIUM SUCCINATE 40 MG: 40 INJECTION, POWDER, FOR SOLUTION INTRAMUSCULAR; INTRAVENOUS at 03:51

## 2021-01-01 RX ADMIN — NYSTATIN 500000 UNITS: 500000 SUSPENSION ORAL at 21:14

## 2021-01-01 RX ADMIN — BUDESONIDE 0.5 MG: 0.5 INHALANT RESPIRATORY (INHALATION) at 07:44

## 2021-01-01 RX ADMIN — SODIUM CHLORIDE 1.4 MCG/KG/HR: 9 INJECTION, SOLUTION INTRAVENOUS at 07:34

## 2021-01-01 RX ADMIN — GABAPENTIN 300 MG: 300 CAPSULE ORAL at 08:25

## 2021-01-01 RX ADMIN — Medication: at 21:33

## 2021-01-01 RX ADMIN — MORPHINE SULFATE 2 MG: 4 INJECTION INTRAVENOUS at 20:33

## 2021-01-01 RX ADMIN — SPIRONOLACTONE 25 MG: 25 TABLET, FILM COATED ORAL at 10:33

## 2021-01-01 RX ADMIN — ONDANSETRON 4 MG: 2 INJECTION INTRAMUSCULAR; INTRAVENOUS at 03:51

## 2021-01-01 RX ADMIN — PANTOPRAZOLE SODIUM 40 MG: 40 TABLET, DELAYED RELEASE ORAL at 09:47

## 2021-01-01 RX ADMIN — GABAPENTIN 300 MG: 300 CAPSULE ORAL at 17:05

## 2021-01-01 RX ADMIN — POLYETHYLENE GLYCOL 3350 17 G: 17 POWDER, FOR SOLUTION ORAL at 10:32

## 2021-01-01 RX ADMIN — HYDROMORPHONE HYDROCHLORIDE 1 MG: 2 INJECTION, SOLUTION INTRAMUSCULAR; INTRAVENOUS; SUBCUTANEOUS at 21:57

## 2021-01-01 RX ADMIN — INSULIN LISPRO 2 UNITS: 100 INJECTION, SOLUTION INTRAVENOUS; SUBCUTANEOUS at 17:50

## 2021-01-01 RX ADMIN — NYSTATIN 500000 UNITS: 500000 SUSPENSION ORAL at 17:09

## 2021-01-01 RX ADMIN — LURASIDONE HYDROCHLORIDE 40 MG: 40 TABLET, FILM COATED ORAL at 08:25

## 2021-01-01 RX ADMIN — GABAPENTIN 300 MG: 300 CAPSULE ORAL at 21:03

## 2021-01-01 RX ADMIN — METOPROLOL TARTRATE 50 MG: 50 TABLET ORAL at 09:02

## 2021-01-01 RX ADMIN — BISACODYL 10 MG: 5 TABLET, COATED ORAL at 09:55

## 2021-01-01 RX ADMIN — NYSTATIN 500000 UNITS: 500000 SUSPENSION ORAL at 08:13

## 2021-01-01 RX ADMIN — AMPICILLIN SODIUM AND SULBACTAM SODIUM 3 G: 2; 1 INJECTION, POWDER, FOR SOLUTION INTRAMUSCULAR; INTRAVENOUS at 09:36

## 2021-01-01 RX ADMIN — POTASSIUM CHLORIDE 20 MEQ: 400 INJECTION, SOLUTION INTRAVENOUS at 17:01

## 2021-01-01 RX ADMIN — BUDESONIDE 0.5 MG: 0.5 INHALANT RESPIRATORY (INHALATION) at 20:02

## 2021-01-01 RX ADMIN — SERTRALINE HYDROCHLORIDE 50 MG: 50 TABLET ORAL at 09:47

## 2021-01-01 RX ADMIN — SPIRONOLACTONE 25 MG: 25 TABLET, FILM COATED ORAL at 09:44

## 2021-01-01 RX ADMIN — PREDNISONE 10 MG: 10 TABLET ORAL at 08:13

## 2021-01-01 RX ADMIN — GABAPENTIN 300 MG: 300 CAPSULE ORAL at 21:40

## 2021-01-01 RX ADMIN — LURASIDONE HYDROCHLORIDE 40 MG: 40 TABLET, FILM COATED ORAL at 09:31

## 2021-01-01 RX ADMIN — AMPICILLIN SODIUM AND SULBACTAM SODIUM 3 G: 2; 1 INJECTION, POWDER, FOR SOLUTION INTRAMUSCULAR; INTRAVENOUS at 08:13

## 2021-01-01 RX ADMIN — BUDESONIDE 0.5 MG: 0.5 INHALANT RESPIRATORY (INHALATION) at 06:51

## 2021-01-01 RX ADMIN — BUDESONIDE 0.5 MG: 0.5 INHALANT RESPIRATORY (INHALATION) at 19:36

## 2021-01-01 RX ADMIN — IPRATROPIUM BROMIDE AND ALBUTEROL SULFATE 3 ML: 2.5; .5 SOLUTION RESPIRATORY (INHALATION) at 01:34

## 2021-01-01 RX ADMIN — NYSTATIN 500000 UNITS: 500000 SUSPENSION ORAL at 08:56

## 2021-01-01 RX ADMIN — SERTRALINE HYDROCHLORIDE 50 MG: 50 TABLET ORAL at 09:22

## 2021-01-01 RX ADMIN — VANCOMYCIN HYDROCHLORIDE 1000 MG: 1 INJECTION, POWDER, LYOPHILIZED, FOR SOLUTION INTRAVENOUS at 16:01

## 2021-01-01 RX ADMIN — GUAIFENESIN 600 MG: 600 TABLET, EXTENDED RELEASE ORAL at 09:32

## 2021-01-01 RX ADMIN — SODIUM CHLORIDE 1.5 MCG/KG/HR: 9 INJECTION, SOLUTION INTRAVENOUS at 02:49

## 2021-01-01 RX ADMIN — Medication: at 21:35

## 2021-01-01 RX ADMIN — HYDROCODONE BITARTRATE AND ACETAMINOPHEN 1 TABLET: 5; 325 TABLET ORAL at 15:47

## 2021-01-01 RX ADMIN — Medication 1 PACKET: at 22:04

## 2021-01-01 RX ADMIN — VANCOMYCIN HYDROCHLORIDE 1000 MG: 1 INJECTION, POWDER, LYOPHILIZED, FOR SOLUTION INTRAVENOUS at 04:42

## 2021-01-01 RX ADMIN — GABAPENTIN 300 MG: 300 CAPSULE ORAL at 08:11

## 2021-01-01 RX ADMIN — FUROSEMIDE 20 MG: 10 INJECTION, SOLUTION INTRAMUSCULAR; INTRAVENOUS at 14:18

## 2021-01-01 RX ADMIN — Medication 5 MG: at 00:02

## 2021-01-01 RX ADMIN — ASPIRIN 81 MG: 81 TABLET, CHEWABLE ORAL at 08:45

## 2021-01-01 RX ADMIN — VANCOMYCIN HYDROCHLORIDE 1000 MG: 1 INJECTION, POWDER, LYOPHILIZED, FOR SOLUTION INTRAVENOUS at 03:29

## 2021-01-01 RX ADMIN — DOCUSATE SODIUM 100 MG: 100 CAPSULE, LIQUID FILLED ORAL at 10:33

## 2021-01-01 RX ADMIN — ACETYLCYSTEINE 4 ML: 200 SOLUTION ORAL; RESPIRATORY (INHALATION) at 18:19

## 2021-01-01 RX ADMIN — SENNOSIDES AND DOCUSATE SODIUM 2 TABLET: 8.6; 5 TABLET ORAL at 21:40

## 2021-01-01 RX ADMIN — MULTIPLE VITAMINS W/ MINERALS TAB 1 TABLET: TAB at 09:47

## 2021-01-01 RX ADMIN — SERTRALINE HYDROCHLORIDE 50 MG: 50 TABLET ORAL at 08:45

## 2021-01-01 RX ADMIN — AMPICILLIN SODIUM AND SULBACTAM SODIUM 3 G: 2; 1 INJECTION, POWDER, FOR SOLUTION INTRAMUSCULAR; INTRAVENOUS at 14:41

## 2021-01-01 RX ADMIN — DOXYCYCLINE 100 MG: 100 INJECTION, POWDER, LYOPHILIZED, FOR SOLUTION INTRAVENOUS at 07:20

## 2021-01-01 RX ADMIN — BUPRENORPHINE AND NALOXONE 1 TABLET: 8; 2 TABLET SUBLINGUAL at 09:32

## 2021-01-01 RX ADMIN — BUDESONIDE 0.5 MG: 0.5 INHALANT RESPIRATORY (INHALATION) at 19:35

## 2021-01-01 RX ADMIN — NYSTATIN 500000 UNITS: 500000 SUSPENSION ORAL at 21:58

## 2021-01-01 RX ADMIN — ACETYLCYSTEINE 2 ML: 200 SOLUTION ORAL; RESPIRATORY (INHALATION) at 19:27

## 2021-01-01 RX ADMIN — ASPIRIN 81 MG: 81 TABLET, CHEWABLE ORAL at 10:33

## 2021-01-01 RX ADMIN — Medication 1 PACKET: at 21:18

## 2021-01-01 RX ADMIN — POTASSIUM CHLORIDE 10 MEQ: 7.46 INJECTION, SOLUTION INTRAVENOUS at 12:39

## 2021-01-01 RX ADMIN — Medication: at 09:37

## 2021-01-01 RX ADMIN — IPRATROPIUM BROMIDE AND ALBUTEROL SULFATE 3 ML: 2.5; .5 SOLUTION RESPIRATORY (INHALATION) at 12:25

## 2021-01-01 RX ADMIN — METOPROLOL TARTRATE 25 MG: 25 TABLET, FILM COATED ORAL at 16:05

## 2021-01-01 RX ADMIN — METHYLPREDNISOLONE SODIUM SUCCINATE 20 MG: 40 INJECTION, POWDER, FOR SOLUTION INTRAMUSCULAR; INTRAVENOUS at 09:56

## 2021-01-01 RX ADMIN — MEGESTROL ACETATE 200 MG: 40 SUSPENSION ORAL at 17:02

## 2021-01-01 RX ADMIN — NYSTATIN 500000 UNITS: 500000 SUSPENSION ORAL at 08:57

## 2021-01-01 RX ADMIN — AMPICILLIN SODIUM AND SULBACTAM SODIUM 3 G: 2; 1 INJECTION, POWDER, FOR SOLUTION INTRAMUSCULAR; INTRAVENOUS at 22:04

## 2021-01-01 RX ADMIN — GABAPENTIN 300 MG: 300 CAPSULE ORAL at 16:58

## 2021-01-01 RX ADMIN — DOCUSATE SODIUM 100 MG: 100 CAPSULE, LIQUID FILLED ORAL at 08:14

## 2021-01-01 RX ADMIN — GABAPENTIN 300 MG: 300 CAPSULE ORAL at 09:47

## 2021-01-01 RX ADMIN — VANCOMYCIN HYDROCHLORIDE 1000 MG: 1 INJECTION, POWDER, LYOPHILIZED, FOR SOLUTION INTRAVENOUS at 16:38

## 2021-01-01 RX ADMIN — HYDROMORPHONE HYDROCHLORIDE 2 MG: 2 INJECTION, SOLUTION INTRAMUSCULAR; INTRAVENOUS; SUBCUTANEOUS at 05:40

## 2021-01-01 RX ADMIN — GUAIFENESIN 600 MG: 600 TABLET, EXTENDED RELEASE ORAL at 21:14

## 2021-01-01 RX ADMIN — KETOROLAC TROMETHAMINE 15 MG: 15 INJECTION, SOLUTION INTRAMUSCULAR; INTRAVENOUS at 14:28

## 2021-01-01 RX ADMIN — PIPERACILLIN SODIUM,TAZOBACTAM SODIUM 4.5 G: 4; .5 INJECTION, POWDER, FOR SOLUTION INTRAVENOUS at 04:15

## 2021-01-01 RX ADMIN — NYSTATIN 500000 UNITS: 500000 SUSPENSION ORAL at 18:07

## 2021-01-01 RX ADMIN — ACETYLCYSTEINE 2 ML: 200 SOLUTION ORAL; RESPIRATORY (INHALATION) at 19:10

## 2021-01-01 RX ADMIN — BUPRENORPHINE AND NALOXONE 1.5 TABLET: 8; 2 TABLET SUBLINGUAL at 09:47

## 2021-01-01 RX ADMIN — FUROSEMIDE 20 MG: 10 INJECTION, SOLUTION INTRAMUSCULAR; INTRAVENOUS at 11:05

## 2021-01-01 RX ADMIN — AMPICILLIN SODIUM AND SULBACTAM SODIUM 3 G: 2; 1 INJECTION, POWDER, FOR SOLUTION INTRAMUSCULAR; INTRAVENOUS at 14:46

## 2021-01-01 RX ADMIN — INSULIN LISPRO 4 UNITS: 100 INJECTION, SOLUTION INTRAVENOUS; SUBCUTANEOUS at 12:47

## 2021-01-01 RX ADMIN — CEFEPIME HYDROCHLORIDE 2 G: 2 INJECTION, POWDER, FOR SOLUTION INTRAVENOUS at 21:46

## 2021-01-01 RX ADMIN — IPRATROPIUM BROMIDE AND ALBUTEROL SULFATE 3 ML: 2.5; .5 SOLUTION RESPIRATORY (INHALATION) at 11:15

## 2021-01-01 RX ADMIN — LORAZEPAM 1 MG: 2 INJECTION INTRAMUSCULAR; INTRAVENOUS at 10:22

## 2021-01-01 RX ADMIN — DOCUSATE SODIUM 100 MG: 100 CAPSULE, LIQUID FILLED ORAL at 09:32

## 2021-01-01 RX ADMIN — FUROSEMIDE 20 MG: 10 INJECTION, SOLUTION INTRAMUSCULAR; INTRAVENOUS at 22:05

## 2021-01-01 RX ADMIN — IPRATROPIUM BROMIDE AND ALBUTEROL SULFATE 3 ML: 2.5; .5 SOLUTION RESPIRATORY (INHALATION) at 07:09

## 2021-01-01 RX ADMIN — ATORVASTATIN CALCIUM 40 MG: 40 TABLET, FILM COATED ORAL at 20:27

## 2021-01-01 RX ADMIN — BUDESONIDE 0.5 MG: 0.5 INHALANT RESPIRATORY (INHALATION) at 07:14

## 2021-01-01 RX ADMIN — ASPIRIN 81 MG: 81 TABLET, COATED ORAL at 09:23

## 2021-01-01 RX ADMIN — MULTIPLE VITAMINS W/ MINERALS TAB 1 TABLET: TAB at 09:43

## 2021-01-01 RX ADMIN — FERROUS SULFATE TAB EC 324 MG (65 MG FE EQUIVALENT) 324 MG: 324 (65 FE) TABLET DELAYED RESPONSE at 09:52

## 2021-01-01 RX ADMIN — SERTRALINE HYDROCHLORIDE 50 MG: 50 TABLET ORAL at 09:58

## 2021-01-01 RX ADMIN — Medication: at 21:55

## 2021-01-01 RX ADMIN — Medication 10 ML: at 08:59

## 2021-01-01 RX ADMIN — IPRATROPIUM BROMIDE AND ALBUTEROL SULFATE 3 ML: 2.5; .5 SOLUTION RESPIRATORY (INHALATION) at 19:58

## 2021-01-01 RX ADMIN — PHENYLEPHRINE HYDROCHLORIDE 1.7 MCG/KG/MIN: 10 INJECTION INTRAVENOUS at 06:17

## 2021-01-01 RX ADMIN — Medication 10 ML: at 22:02

## 2021-01-01 RX ADMIN — Medication 1 PACKET: at 21:32

## 2021-01-01 RX ADMIN — MAGNESIUM SULFATE HEPTAHYDRATE 4 G: 4 INJECTION, SOLUTION INTRAVENOUS at 18:31

## 2021-01-01 RX ADMIN — MEGESTROL ACETATE 200 MG: 40 SUSPENSION ORAL at 15:58

## 2021-01-01 RX ADMIN — GUAIFENESIN 600 MG: 600 TABLET, EXTENDED RELEASE ORAL at 11:30

## 2021-01-01 RX ADMIN — AMPICILLIN SODIUM AND SULBACTAM SODIUM 3 G: 2; 1 INJECTION, POWDER, FOR SOLUTION INTRAMUSCULAR; INTRAVENOUS at 14:50

## 2021-01-01 RX ADMIN — HALOPERIDOL LACTATE 2 MG: 5 INJECTION, SOLUTION INTRAMUSCULAR at 18:45

## 2021-01-01 RX ADMIN — Medication 10 ML: at 20:17

## 2021-01-01 RX ADMIN — POTASSIUM CHLORIDE 10 MEQ: 7.46 INJECTION, SOLUTION INTRAVENOUS at 05:22

## 2021-01-01 RX ADMIN — GUAIFENESIN 300 MG: 100 SOLUTION ORAL at 11:41

## 2021-01-01 RX ADMIN — Medication 10 ML: at 09:57

## 2021-01-01 RX ADMIN — BUPRENORPHINE AND NALOXONE 1.5 TABLET: 8; 2 TABLET SUBLINGUAL at 10:02

## 2021-01-01 RX ADMIN — IPRATROPIUM BROMIDE AND ALBUTEROL SULFATE 3 ML: 2.5; .5 SOLUTION RESPIRATORY (INHALATION) at 15:29

## 2021-01-01 RX ADMIN — Medication 10 ML: at 09:31

## 2021-01-01 RX ADMIN — POTASSIUM CHLORIDE 20 MEQ: 400 INJECTION, SOLUTION INTRAVENOUS at 15:29

## 2021-01-01 RX ADMIN — POLYETHYLENE GLYCOL 3350 17 G: 17 POWDER, FOR SOLUTION ORAL at 09:35

## 2021-01-01 RX ADMIN — METOPROLOL TARTRATE 50 MG: 50 TABLET ORAL at 20:51

## 2021-01-01 RX ADMIN — SPIRONOLACTONE 25 MG: 25 TABLET, FILM COATED ORAL at 10:32

## 2021-01-01 RX ADMIN — ASPIRIN 81 MG: 81 TABLET, COATED ORAL at 10:04

## 2021-01-01 RX ADMIN — FUROSEMIDE 20 MG: 10 INJECTION, SOLUTION INTRAMUSCULAR; INTRAVENOUS at 22:12

## 2021-01-01 RX ADMIN — GABAPENTIN 300 MG: 300 CAPSULE ORAL at 17:02

## 2021-01-01 RX ADMIN — PREDNISONE 10 MG: 10 TABLET ORAL at 09:52

## 2021-01-01 RX ADMIN — LORAZEPAM 1 MG: 2 INJECTION INTRAMUSCULAR; INTRAVENOUS at 08:11

## 2021-01-01 RX ADMIN — GADOTERIDOL 15 ML: 279.3 INJECTION, SOLUTION INTRAVENOUS at 12:16

## 2021-01-01 RX ADMIN — MEGESTROL ACETATE 200 MG: 40 SUSPENSION ORAL at 22:05

## 2021-01-01 RX ADMIN — BARIUM SULFATE 183 ML: 960 POWDER, FOR SUSPENSION ORAL at 10:30

## 2021-01-01 RX ADMIN — Medication: at 13:48

## 2021-01-01 RX ADMIN — DOCUSATE SODIUM 100 MG: 100 CAPSULE, LIQUID FILLED ORAL at 21:44

## 2021-01-01 RX ADMIN — IPRATROPIUM BROMIDE AND ALBUTEROL SULFATE 3 ML: 2.5; .5 SOLUTION RESPIRATORY (INHALATION) at 07:44

## 2021-01-01 RX ADMIN — AMPICILLIN SODIUM AND SULBACTAM SODIUM 3 G: 2; 1 INJECTION, POWDER, FOR SOLUTION INTRAMUSCULAR; INTRAVENOUS at 10:35

## 2021-01-01 RX ADMIN — FENTANYL CITRATE 150 MCG/HR: 0.05 INJECTION, SOLUTION INTRAMUSCULAR; INTRAVENOUS at 03:45

## 2021-01-01 RX ADMIN — GABAPENTIN 300 MG: 300 CAPSULE ORAL at 20:50

## 2021-01-01 RX ADMIN — DOXYCYCLINE 100 MG: 100 INJECTION, POWDER, LYOPHILIZED, FOR SOLUTION INTRAVENOUS at 18:54

## 2021-01-01 RX ADMIN — LABETALOL 20 MG/4 ML (5 MG/ML) INTRAVENOUS SYRINGE 20 MG: at 03:58

## 2021-01-01 RX ADMIN — SODIUM CHLORIDE 3 G: 900 INJECTION INTRAVENOUS at 19:57

## 2021-01-01 RX ADMIN — NYSTATIN 500000 UNITS: 500000 SUSPENSION ORAL at 17:05

## 2021-01-01 RX ADMIN — METHYLPREDNISOLONE SODIUM SUCCINATE 20 MG: 40 INJECTION, POWDER, FOR SOLUTION INTRAMUSCULAR; INTRAVENOUS at 09:32

## 2021-01-01 RX ADMIN — AMPICILLIN SODIUM AND SULBACTAM SODIUM 3 G: 2; 1 INJECTION, POWDER, FOR SOLUTION INTRAMUSCULAR; INTRAVENOUS at 10:48

## 2021-01-01 RX ADMIN — BUPRENORPHINE AND NALOXONE 1 TABLET: 8; 2 TABLET SUBLINGUAL at 09:56

## 2021-01-01 RX ADMIN — LORAZEPAM 0.5 MG: 2 INJECTION INTRAMUSCULAR; INTRAVENOUS at 19:40

## 2021-01-01 RX ADMIN — LURASIDONE HYDROCHLORIDE 40 MG: 40 TABLET, FILM COATED ORAL at 09:28

## 2021-01-01 RX ADMIN — IPRATROPIUM BROMIDE AND ALBUTEROL SULFATE 3 ML: 2.5; .5 SOLUTION RESPIRATORY (INHALATION) at 23:15

## 2021-01-01 RX ADMIN — DOCUSATE SODIUM 100 MG: 100 CAPSULE, LIQUID FILLED ORAL at 08:10

## 2021-01-01 RX ADMIN — HALOPERIDOL LACTATE 2 MG: 5 INJECTION, SOLUTION INTRAMUSCULAR at 08:57

## 2021-01-01 RX ADMIN — ASPIRIN 81 MG: 81 TABLET, COATED ORAL at 08:55

## 2021-01-01 RX ADMIN — Medication: at 08:16

## 2021-01-01 RX ADMIN — BUDESONIDE 0.5 MG: 0.5 INHALANT RESPIRATORY (INHALATION) at 06:35

## 2021-01-01 RX ADMIN — MULTIPLE VITAMINS W/ MINERALS TAB 1 TABLET: TAB at 08:13

## 2021-01-01 RX ADMIN — ENOXAPARIN SODIUM 40 MG: 40 INJECTION SUBCUTANEOUS at 16:42

## 2021-01-01 RX ADMIN — Medication 10 ML: at 22:06

## 2021-01-01 RX ADMIN — AMPICILLIN SODIUM AND SULBACTAM SODIUM 3 G: 2; 1 INJECTION, POWDER, FOR SOLUTION INTRAMUSCULAR; INTRAVENOUS at 21:44

## 2021-01-01 RX ADMIN — Medication 1 PACKET: at 12:03

## 2021-01-01 RX ADMIN — SERTRALINE HYDROCHLORIDE 50 MG: 50 TABLET ORAL at 08:13

## 2021-01-01 RX ADMIN — GABAPENTIN 300 MG: 300 CAPSULE ORAL at 10:32

## 2021-01-01 RX ADMIN — Medication 5 MG: at 21:54

## 2021-01-01 RX ADMIN — GABAPENTIN 300 MG: 300 CAPSULE ORAL at 08:54

## 2021-01-01 RX ADMIN — MULTIPLE VITAMINS W/ MINERALS TAB 1 TABLET: TAB at 08:11

## 2021-01-01 RX ADMIN — GABAPENTIN 300 MG: 300 CAPSULE ORAL at 21:12

## 2021-01-01 RX ADMIN — POTASSIUM CHLORIDE 20 MEQ: 400 INJECTION, SOLUTION INTRAVENOUS at 12:34

## 2021-01-01 RX ADMIN — GABAPENTIN 300 MG: 300 CAPSULE ORAL at 16:38

## 2021-01-01 RX ADMIN — MEGESTROL ACETATE 200 MG: 40 SUSPENSION ORAL at 08:48

## 2021-01-01 RX ADMIN — NYSTATIN 500000 UNITS: 500000 SUSPENSION ORAL at 18:16

## 2021-01-01 RX ADMIN — DOCUSATE SODIUM 100 MG: 100 CAPSULE, LIQUID FILLED ORAL at 20:50

## 2021-01-01 RX ADMIN — GUAIFENESIN 300 MG: 100 SOLUTION ORAL at 22:05

## 2021-01-01 RX ADMIN — CEFEPIME HYDROCHLORIDE 2 G: 2 INJECTION, POWDER, FOR SOLUTION INTRAVENOUS at 20:02

## 2021-01-01 RX ADMIN — GABAPENTIN 300 MG: 300 CAPSULE ORAL at 15:43

## 2021-01-01 RX ADMIN — ETOMIDATE 20 MG: 40 INJECTION, SOLUTION INTRAVENOUS at 12:39

## 2021-01-01 RX ADMIN — IPRATROPIUM BROMIDE AND ALBUTEROL SULFATE 3 ML: 2.5; .5 SOLUTION RESPIRATORY (INHALATION) at 07:14

## 2021-01-01 RX ADMIN — GUAIFENESIN 600 MG: 600 TABLET, EXTENDED RELEASE ORAL at 10:04

## 2021-01-01 RX ADMIN — FAMOTIDINE 20 MG: 10 INJECTION INTRAVENOUS at 20:33

## 2021-01-01 RX ADMIN — Medication 10 ML: at 08:14

## 2021-01-01 RX ADMIN — IPRATROPIUM BROMIDE AND ALBUTEROL SULFATE 3 ML: 2.5; .5 SOLUTION RESPIRATORY (INHALATION) at 15:19

## 2021-01-01 RX ADMIN — BARIUM SULFATE 50 ML: 400 SUSPENSION ORAL at 11:29

## 2021-01-01 RX ADMIN — MEGESTROL ACETATE 200 MG: 40 SUSPENSION ORAL at 16:18

## 2021-01-01 RX ADMIN — Medication: at 08:46

## 2021-01-01 RX ADMIN — GABAPENTIN 300 MG: 300 CAPSULE ORAL at 15:13

## 2021-01-01 RX ADMIN — Medication 1 PACKET: at 09:00

## 2021-01-01 RX ADMIN — IPRATROPIUM BROMIDE AND ALBUTEROL SULFATE 3 ML: 2.5; .5 SOLUTION RESPIRATORY (INHALATION) at 10:29

## 2021-01-01 RX ADMIN — IPRATROPIUM BROMIDE AND ALBUTEROL SULFATE 3 ML: 2.5; .5 SOLUTION RESPIRATORY (INHALATION) at 06:49

## 2021-01-01 RX ADMIN — METOPROLOL TARTRATE 25 MG: 25 TABLET, FILM COATED ORAL at 20:17

## 2021-01-01 RX ADMIN — BUDESONIDE 0.5 MG: 0.5 INHALANT RESPIRATORY (INHALATION) at 07:30

## 2021-01-01 RX ADMIN — FUROSEMIDE 20 MG: 10 INJECTION, SOLUTION INTRAMUSCULAR; INTRAVENOUS at 11:20

## 2021-01-01 RX ADMIN — METOPROLOL TARTRATE 2.5 MG: 5 INJECTION INTRAVENOUS at 05:04

## 2021-01-01 RX ADMIN — Medication: at 20:20

## 2021-01-01 RX ADMIN — INSULIN LISPRO 2 UNITS: 100 INJECTION, SOLUTION INTRAVENOUS; SUBCUTANEOUS at 11:42

## 2021-01-01 RX ADMIN — Medication 10 ML: at 21:12

## 2021-01-01 RX ADMIN — AMPICILLIN SODIUM AND SULBACTAM SODIUM 3 G: 2; 1 INJECTION, POWDER, FOR SOLUTION INTRAMUSCULAR; INTRAVENOUS at 02:55

## 2021-01-01 RX ADMIN — LURASIDONE HYDROCHLORIDE 40 MG: 40 TABLET, FILM COATED ORAL at 10:02

## 2021-01-01 RX ADMIN — FUROSEMIDE 20 MG: 10 INJECTION, SOLUTION INTRAMUSCULAR; INTRAVENOUS at 22:31

## 2021-01-01 RX ADMIN — MEGESTROL ACETATE 200 MG: 40 SUSPENSION ORAL at 16:38

## 2021-01-01 RX ADMIN — ASPIRIN 81 MG: 81 TABLET, COATED ORAL at 10:32

## 2021-01-01 RX ADMIN — FERROUS SULFATE TAB EC 324 MG (65 MG FE EQUIVALENT) 324 MG: 324 (65 FE) TABLET DELAYED RESPONSE at 09:28

## 2021-01-01 RX ADMIN — SENNOSIDES AND DOCUSATE SODIUM 2 TABLET: 8.6; 5 TABLET ORAL at 09:20

## 2021-01-01 RX ADMIN — ATORVASTATIN CALCIUM 40 MG: 40 TABLET, FILM COATED ORAL at 20:50

## 2021-01-01 RX ADMIN — Medication 1 PACKET: at 09:37

## 2021-01-01 RX ADMIN — BUDESONIDE 0.5 MG: 0.5 INHALANT RESPIRATORY (INHALATION) at 06:49

## 2021-01-01 RX ADMIN — LURASIDONE HYDROCHLORIDE 40 MG: 40 TABLET, FILM COATED ORAL at 10:33

## 2021-01-01 RX ADMIN — GABAPENTIN 300 MG: 300 CAPSULE ORAL at 16:17

## 2021-01-01 RX ADMIN — VANCOMYCIN HYDROCHLORIDE 1000 MG: 1 INJECTION, POWDER, LYOPHILIZED, FOR SOLUTION INTRAVENOUS at 05:04

## 2021-01-01 RX ADMIN — AMPICILLIN SODIUM AND SULBACTAM SODIUM 3 G: 2; 1 INJECTION, POWDER, FOR SOLUTION INTRAMUSCULAR; INTRAVENOUS at 21:52

## 2021-01-01 RX ADMIN — ASPIRIN 81 MG: 81 TABLET, COATED ORAL at 08:59

## 2021-01-01 RX ADMIN — ENOXAPARIN SODIUM 40 MG: 40 INJECTION SUBCUTANEOUS at 16:51

## 2021-01-01 RX ADMIN — PREDNISONE 10 MG: 10 TABLET ORAL at 09:02

## 2021-01-01 RX ADMIN — BUDESONIDE 0.5 MG: 0.5 INHALANT RESPIRATORY (INHALATION) at 18:54

## 2021-01-01 RX ADMIN — LANSOPRAZOLE 30 MG: KIT at 08:26

## 2021-01-01 RX ADMIN — PREDNISONE 10 MG: 10 TABLET ORAL at 09:28

## 2021-01-01 RX ADMIN — IPRATROPIUM BROMIDE AND ALBUTEROL SULFATE 3 ML: 2.5; .5 SOLUTION RESPIRATORY (INHALATION) at 18:48

## 2021-01-01 RX ADMIN — VANCOMYCIN HYDROCHLORIDE 750 MG: 750 INJECTION, POWDER, LYOPHILIZED, FOR SOLUTION INTRAVENOUS at 17:05

## 2021-01-01 RX ADMIN — IPRATROPIUM BROMIDE AND ALBUTEROL SULFATE 3 ML: 2.5; .5 SOLUTION RESPIRATORY (INHALATION) at 19:01

## 2021-01-01 RX ADMIN — HYDROCODONE BITARTRATE AND ACETAMINOPHEN 1 TABLET: 5; 325 TABLET ORAL at 20:30

## 2021-01-01 RX ADMIN — Medication 10 ML: at 09:03

## 2021-01-01 RX ADMIN — LURASIDONE HYDROCHLORIDE 40 MG: 40 TABLET, FILM COATED ORAL at 08:54

## 2021-01-01 RX ADMIN — AMPICILLIN SODIUM AND SULBACTAM SODIUM 3 G: 2; 1 INJECTION, POWDER, FOR SOLUTION INTRAMUSCULAR; INTRAVENOUS at 01:34

## 2021-01-01 RX ADMIN — NYSTATIN 500000 UNITS: 500000 SUSPENSION ORAL at 21:03

## 2021-01-01 RX ADMIN — COLLAGENASE SANTYL 1 APPLICATION: 250 OINTMENT TOPICAL at 08:47

## 2021-01-01 RX ADMIN — DOXYCYCLINE 100 MG: 100 INJECTION, POWDER, LYOPHILIZED, FOR SOLUTION INTRAVENOUS at 06:59

## 2021-01-01 RX ADMIN — IPRATROPIUM BROMIDE AND ALBUTEROL SULFATE 3 ML: 2.5; .5 SOLUTION RESPIRATORY (INHALATION) at 16:26

## 2021-01-01 RX ADMIN — IPRATROPIUM BROMIDE AND ALBUTEROL SULFATE 3 ML: 2.5; .5 SOLUTION RESPIRATORY (INHALATION) at 14:52

## 2021-01-01 RX ADMIN — ASPIRIN 81 MG: 81 TABLET, COATED ORAL at 09:17

## 2021-01-01 RX ADMIN — VANCOMYCIN HYDROCHLORIDE 1000 MG: 1 INJECTION, POWDER, LYOPHILIZED, FOR SOLUTION INTRAVENOUS at 05:07

## 2021-01-01 RX ADMIN — Medication: at 21:14

## 2021-01-01 RX ADMIN — Medication: at 09:34

## 2021-01-01 RX ADMIN — AMPICILLIN SODIUM AND SULBACTAM SODIUM 3 G: 2; 1 INJECTION, POWDER, FOR SOLUTION INTRAMUSCULAR; INTRAVENOUS at 03:54

## 2021-01-01 RX ADMIN — MEGESTROL ACETATE 200 MG: 40 SUSPENSION ORAL at 16:19

## 2021-01-01 RX ADMIN — CALCIUM CARBONATE (ANTACID) CHEW TAB 500 MG 2 TABLET: 500 CHEW TAB at 16:04

## 2021-01-01 RX ADMIN — METOPROLOL TARTRATE 50 MG: 50 TABLET ORAL at 20:09

## 2021-01-01 RX ADMIN — MIDAZOLAM HYDROCHLORIDE 2 MG/HR: 5 INJECTION, SOLUTION INTRAMUSCULAR; INTRAVENOUS at 18:50

## 2021-01-01 RX ADMIN — BUDESONIDE 0.5 MG: 0.5 INHALANT RESPIRATORY (INHALATION) at 19:21

## 2021-01-01 RX ADMIN — IPRATROPIUM BROMIDE AND ALBUTEROL SULFATE 3 ML: 2.5; .5 SOLUTION RESPIRATORY (INHALATION) at 23:11

## 2021-01-01 RX ADMIN — SPIRONOLACTONE 25 MG: 25 TABLET, FILM COATED ORAL at 09:47

## 2021-01-01 RX ADMIN — METHYLPREDNISOLONE SODIUM SUCCINATE 60 MG: 125 INJECTION, POWDER, FOR SOLUTION INTRAMUSCULAR; INTRAVENOUS at 09:22

## 2021-01-01 RX ADMIN — SODIUM CHLORIDE 3 G: 900 INJECTION INTRAVENOUS at 06:16

## 2021-01-01 RX ADMIN — GUAIFENESIN 600 MG: 600 TABLET, EXTENDED RELEASE ORAL at 09:52

## 2021-01-01 RX ADMIN — GABAPENTIN 300 MG: 300 CAPSULE ORAL at 09:56

## 2021-01-01 RX ADMIN — BUDESONIDE 0.5 MG: 0.5 INHALANT RESPIRATORY (INHALATION) at 07:06

## 2021-01-01 RX ADMIN — Medication 10 ML: at 09:55

## 2021-01-01 RX ADMIN — AMPICILLIN SODIUM AND SULBACTAM SODIUM 3 G: 2; 1 INJECTION, POWDER, FOR SOLUTION INTRAMUSCULAR; INTRAVENOUS at 21:34

## 2021-01-01 RX ADMIN — ENOXAPARIN SODIUM 40 MG: 40 INJECTION SUBCUTANEOUS at 17:03

## 2021-01-01 RX ADMIN — IPRATROPIUM BROMIDE AND ALBUTEROL SULFATE 3 ML: 2.5; .5 SOLUTION RESPIRATORY (INHALATION) at 15:25

## 2021-01-01 RX ADMIN — PANTOPRAZOLE SODIUM 40 MG: 40 TABLET, DELAYED RELEASE ORAL at 08:55

## 2021-01-01 RX ADMIN — GUAIFENESIN 300 MG: 100 SOLUTION ORAL at 20:18

## 2021-01-01 RX ADMIN — FUROSEMIDE 20 MG: 10 INJECTION, SOLUTION INTRAMUSCULAR; INTRAVENOUS at 23:21

## 2021-01-01 RX ADMIN — LORAZEPAM 1 MG: 2 INJECTION INTRAMUSCULAR; INTRAVENOUS at 18:00

## 2021-01-01 RX ADMIN — SERTRALINE HYDROCHLORIDE 50 MG: 50 TABLET ORAL at 09:32

## 2021-01-01 RX ADMIN — BUPRENORPHINE AND NALOXONE 1.5 TABLET: 8; 2 TABLET SUBLINGUAL at 10:04

## 2021-01-01 RX ADMIN — CEFEPIME HYDROCHLORIDE 2 G: 2 INJECTION, POWDER, FOR SOLUTION INTRAVENOUS at 21:01

## 2021-01-01 RX ADMIN — BUDESONIDE 0.5 MG: 0.5 INHALANT RESPIRATORY (INHALATION) at 19:08

## 2021-01-01 RX ADMIN — HYDROMORPHONE HYDROCHLORIDE 1 MG: 2 INJECTION, SOLUTION INTRAMUSCULAR; INTRAVENOUS; SUBCUTANEOUS at 13:56

## 2021-01-01 RX ADMIN — PREDNISONE 10 MG: 10 TABLET ORAL at 09:58

## 2021-01-01 RX ADMIN — ALBUTEROL SULFATE 2.5 MG: 2.5 SOLUTION RESPIRATORY (INHALATION) at 09:14

## 2021-01-01 RX ADMIN — NYSTATIN 500000 UNITS: 500000 SUSPENSION ORAL at 11:21

## 2021-01-01 RX ADMIN — GABAPENTIN 300 MG: 300 CAPSULE ORAL at 16:44

## 2021-01-01 RX ADMIN — GABAPENTIN 300 MG: 300 CAPSULE ORAL at 22:05

## 2021-01-01 RX ADMIN — Medication 5 MG: at 22:06

## 2021-01-01 RX ADMIN — IPRATROPIUM BROMIDE AND ALBUTEROL SULFATE 3 ML: 2.5; .5 SOLUTION RESPIRATORY (INHALATION) at 15:30

## 2021-01-01 RX ADMIN — ONDANSETRON 4 MG: 2 INJECTION INTRAMUSCULAR; INTRAVENOUS at 21:54

## 2021-01-01 RX ADMIN — LORAZEPAM 1 MG: 2 INJECTION INTRAMUSCULAR; INTRAVENOUS at 18:32

## 2021-01-01 RX ADMIN — IPRATROPIUM BROMIDE AND ALBUTEROL SULFATE 3 ML: 2.5; .5 SOLUTION RESPIRATORY (INHALATION) at 10:35

## 2021-01-01 RX ADMIN — ASPIRIN 81 MG: 81 TABLET, COATED ORAL at 09:47

## 2021-01-01 RX ADMIN — VANCOMYCIN HYDROCHLORIDE 750 MG: 750 INJECTION, POWDER, LYOPHILIZED, FOR SOLUTION INTRAVENOUS at 03:23

## 2021-01-01 RX ADMIN — EPOETIN ALFA-EPBX 40000 UNITS: 10000 INJECTION, SOLUTION INTRAVENOUS; SUBCUTANEOUS at 12:08

## 2021-01-01 RX ADMIN — AMPICILLIN SODIUM AND SULBACTAM SODIUM 3 G: 2; 1 INJECTION, POWDER, FOR SOLUTION INTRAMUSCULAR; INTRAVENOUS at 21:00

## 2021-01-01 RX ADMIN — IPRATROPIUM BROMIDE AND ALBUTEROL SULFATE 3 ML: 2.5; .5 SOLUTION RESPIRATORY (INHALATION) at 15:00

## 2021-01-01 RX ADMIN — IPRATROPIUM BROMIDE AND ALBUTEROL SULFATE 3 ML: 2.5; .5 SOLUTION RESPIRATORY (INHALATION) at 16:10

## 2021-01-01 RX ADMIN — PREDNISONE 10 MG: 10 TABLET ORAL at 10:34

## 2021-01-01 RX ADMIN — Medication 10 ML: at 21:14

## 2021-01-01 RX ADMIN — LURASIDONE HYDROCHLORIDE 40 MG: 40 TABLET, FILM COATED ORAL at 08:45

## 2021-01-01 RX ADMIN — GABAPENTIN 300 MG: 300 CAPSULE ORAL at 09:58

## 2021-01-01 RX ADMIN — FENTANYL CITRATE 150 MCG/HR: 0.05 INJECTION, SOLUTION INTRAMUSCULAR; INTRAVENOUS at 20:49

## 2021-01-01 RX ADMIN — FUROSEMIDE 40 MG: 10 INJECTION, SOLUTION INTRAMUSCULAR; INTRAVENOUS at 06:00

## 2021-01-01 RX ADMIN — FUROSEMIDE 20 MG: 10 INJECTION, SOLUTION INTRAMUSCULAR; INTRAVENOUS at 10:47

## 2021-01-01 RX ADMIN — BUDESONIDE 0.5 MG: 0.5 INHALANT RESPIRATORY (INHALATION) at 08:42

## 2021-01-01 RX ADMIN — Medication 5 MG: at 19:58

## 2021-01-01 RX ADMIN — MAGNESIUM HYDROXIDE 10 ML: 2400 SUSPENSION ORAL at 16:44

## 2021-01-01 RX ADMIN — GUAIFENESIN 600 MG: 600 TABLET, EXTENDED RELEASE ORAL at 09:17

## 2021-01-01 RX ADMIN — LORAZEPAM 0.5 MG: 2 INJECTION INTRAMUSCULAR; INTRAVENOUS at 03:12

## 2021-01-01 RX ADMIN — HALOPERIDOL LACTATE 2 MG: 5 INJECTION, SOLUTION INTRAMUSCULAR at 11:41

## 2021-01-01 RX ADMIN — MEGESTROL ACETATE 200 MG: 40 SUSPENSION ORAL at 16:06

## 2021-01-01 RX ADMIN — PANTOPRAZOLE SODIUM 40 MG: 40 TABLET, DELAYED RELEASE ORAL at 08:59

## 2021-01-01 RX ADMIN — MORPHINE SULFATE 20 MG: 10 SOLUTION ORAL at 08:33

## 2021-01-01 RX ADMIN — SPIRONOLACTONE 25 MG: 25 TABLET, FILM COATED ORAL at 08:49

## 2021-01-01 RX ADMIN — METHYLPREDNISOLONE SODIUM SUCCINATE 20 MG: 40 INJECTION, POWDER, FOR SOLUTION INTRAMUSCULAR; INTRAVENOUS at 09:57

## 2021-01-01 RX ADMIN — EPOETIN ALFA-EPBX 40000 UNITS: 10000 INJECTION, SOLUTION INTRAVENOUS; SUBCUTANEOUS at 11:42

## 2021-01-01 RX ADMIN — BUDESONIDE 0.5 MG: 0.5 INHALANT RESPIRATORY (INHALATION) at 06:32

## 2021-01-01 RX ADMIN — SODIUM CHLORIDE 3 G: 900 INJECTION INTRAVENOUS at 09:56

## 2021-01-01 RX ADMIN — WATER 1 G: 100 INJECTION, SOLUTION INTRAVENOUS at 07:06

## 2021-01-01 RX ADMIN — MEGESTROL ACETATE 200 MG: 40 SUSPENSION ORAL at 21:12

## 2021-01-01 RX ADMIN — AMPICILLIN SODIUM AND SULBACTAM SODIUM 3 G: 2; 1 INJECTION, POWDER, FOR SOLUTION INTRAMUSCULAR; INTRAVENOUS at 20:44

## 2021-01-01 RX ADMIN — IPRATROPIUM BROMIDE AND ALBUTEROL SULFATE 3 ML: 2.5; .5 SOLUTION RESPIRATORY (INHALATION) at 15:37

## 2021-01-01 RX ADMIN — METOPROLOL TARTRATE 25 MG: 25 TABLET, FILM COATED ORAL at 21:42

## 2021-01-01 RX ADMIN — Medication 1 PACKET: at 10:31

## 2021-01-01 RX ADMIN — SODIUM CHLORIDE 1250 MG: 9 INJECTION, SOLUTION INTRAVENOUS at 12:25

## 2021-01-01 RX ADMIN — Medication: at 09:45

## 2021-01-01 RX ADMIN — SPIRONOLACTONE 25 MG: 25 TABLET, FILM COATED ORAL at 09:21

## 2021-01-01 RX ADMIN — IPRATROPIUM BROMIDE AND ALBUTEROL SULFATE 3 ML: 2.5; .5 SOLUTION RESPIRATORY (INHALATION) at 12:31

## 2021-01-01 RX ADMIN — MULTIPLE VITAMINS W/ MINERALS TAB 1 TABLET: TAB at 08:59

## 2021-01-01 RX ADMIN — METHYLPREDNISOLONE SODIUM SUCCINATE 40 MG: 40 INJECTION, POWDER, FOR SOLUTION INTRAMUSCULAR; INTRAVENOUS at 12:56

## 2021-01-01 RX ADMIN — MICONAZOLE NITRATE: 20 POWDER TOPICAL at 16:03

## 2021-01-01 RX ADMIN — BUDESONIDE 0.5 MG: 0.5 INHALANT RESPIRATORY (INHALATION) at 07:26

## 2021-01-01 RX ADMIN — SODIUM CHLORIDE 1.5 MCG/KG/HR: 9 INJECTION, SOLUTION INTRAVENOUS at 05:37

## 2021-01-01 RX ADMIN — GADOTERIDOL 15 ML: 279.3 INJECTION, SOLUTION INTRAVENOUS at 09:40

## 2021-01-01 RX ADMIN — POTASSIUM CHLORIDE 10 MEQ: 7.46 INJECTION, SOLUTION INTRAVENOUS at 13:19

## 2021-01-01 RX ADMIN — MAGNESIUM SULFATE HEPTAHYDRATE 2 G: 40 INJECTION, SOLUTION INTRAVENOUS at 06:37

## 2021-01-01 RX ADMIN — RISPERIDONE 0.25 MG: 0.5 TABLET, ORALLY DISINTEGRATING ORAL at 09:05

## 2021-01-01 RX ADMIN — METOPROLOL TARTRATE 50 MG: 50 TABLET ORAL at 20:17

## 2021-01-01 RX ADMIN — HYDROCODONE BITARTRATE AND ACETAMINOPHEN 1 TABLET: 5; 325 TABLET ORAL at 20:54

## 2021-01-01 RX ADMIN — PANTOPRAZOLE SODIUM 40 MG: 40 INJECTION, POWDER, FOR SOLUTION INTRAVENOUS at 06:16

## 2021-01-01 RX ADMIN — MULTIPLE VITAMINS W/ MINERALS TAB 1 TABLET: TAB at 08:54

## 2021-01-01 RX ADMIN — CEFEPIME HYDROCHLORIDE 2 G: 2 INJECTION, POWDER, FOR SOLUTION INTRAVENOUS at 04:05

## 2021-01-01 RX ADMIN — SPIRONOLACTONE 25 MG: 25 TABLET, FILM COATED ORAL at 08:59

## 2021-01-01 RX ADMIN — ENOXAPARIN SODIUM 40 MG: 40 INJECTION SUBCUTANEOUS at 16:38

## 2021-01-01 RX ADMIN — METOPROLOL TARTRATE 50 MG: 50 TABLET ORAL at 08:30

## 2021-01-01 RX ADMIN — IPRATROPIUM BROMIDE AND ALBUTEROL SULFATE 3 ML: 2.5; .5 SOLUTION RESPIRATORY (INHALATION) at 07:30

## 2021-01-01 RX ADMIN — CEFEPIME HYDROCHLORIDE 2 G: 2 INJECTION, POWDER, FOR SOLUTION INTRAVENOUS at 03:41

## 2021-01-01 RX ADMIN — PREDNISONE 10 MG: 10 TABLET ORAL at 08:59

## 2021-01-01 RX ADMIN — DOXYCYCLINE 100 MG: 100 INJECTION, POWDER, LYOPHILIZED, FOR SOLUTION INTRAVENOUS at 19:17

## 2021-01-01 RX ADMIN — Medication: at 09:03

## 2021-01-01 RX ADMIN — Medication 1 PACKET: at 21:58

## 2021-01-01 RX ADMIN — ASPIRIN 81 MG: 81 TABLET, COATED ORAL at 08:14

## 2021-01-01 RX ADMIN — Medication 10 ML: at 21:04

## 2021-01-01 RX ADMIN — METHYLPREDNISOLONE SODIUM SUCCINATE 20 MG: 40 INJECTION, POWDER, FOR SOLUTION INTRAMUSCULAR; INTRAVENOUS at 19:57

## 2021-01-01 RX ADMIN — IPRATROPIUM BROMIDE AND ALBUTEROL SULFATE 3 ML: 2.5; .5 SOLUTION RESPIRATORY (INHALATION) at 15:48

## 2021-01-01 RX ADMIN — Medication: at 21:15

## 2021-01-01 RX ADMIN — GABAPENTIN 300 MG: 300 CAPSULE ORAL at 21:32

## 2021-01-01 RX ADMIN — SODIUM CHLORIDE 3 G: 900 INJECTION INTRAVENOUS at 05:55

## 2021-01-01 RX ADMIN — PANTOPRAZOLE SODIUM 40 MG: 40 INJECTION, POWDER, FOR SOLUTION INTRAVENOUS at 05:57

## 2021-01-01 RX ADMIN — FUROSEMIDE 20 MG: 10 INJECTION, SOLUTION INTRAMUSCULAR; INTRAVENOUS at 11:09

## 2021-01-01 RX ADMIN — GUAIFENESIN 600 MG: 600 TABLET, EXTENDED RELEASE ORAL at 10:02

## 2021-01-01 RX ADMIN — LURASIDONE HYDROCHLORIDE 40 MG: 40 TABLET, FILM COATED ORAL at 08:13

## 2021-01-01 RX ADMIN — LURASIDONE HYDROCHLORIDE 40 MG: 40 TABLET, FILM COATED ORAL at 08:48

## 2021-01-01 RX ADMIN — FAMOTIDINE 20 MG: 10 INJECTION INTRAVENOUS at 08:57

## 2021-01-01 RX ADMIN — Medication 1 PACKET: at 22:53

## 2021-01-01 RX ADMIN — ATORVASTATIN CALCIUM 40 MG: 40 TABLET, FILM COATED ORAL at 22:05

## 2021-01-01 RX ADMIN — VANCOMYCIN HYDROCHLORIDE 1000 MG: 1 INJECTION, POWDER, LYOPHILIZED, FOR SOLUTION INTRAVENOUS at 18:14

## 2021-01-01 RX ADMIN — FUROSEMIDE 20 MG: 10 INJECTION, SOLUTION INTRAMUSCULAR; INTRAVENOUS at 22:46

## 2021-01-01 RX ADMIN — AMPICILLIN SODIUM AND SULBACTAM SODIUM 3 G: 2; 1 INJECTION, POWDER, FOR SOLUTION INTRAMUSCULAR; INTRAVENOUS at 08:14

## 2021-01-01 RX ADMIN — AMPICILLIN SODIUM AND SULBACTAM SODIUM 3 G: 2; 1 INJECTION, POWDER, FOR SOLUTION INTRAMUSCULAR; INTRAVENOUS at 20:52

## 2021-01-01 RX ADMIN — POLYETHYLENE GLYCOL 3350 17 G: 17 POWDER, FOR SOLUTION ORAL at 08:58

## 2021-01-01 RX ADMIN — IPRATROPIUM BROMIDE AND ALBUTEROL SULFATE 3 ML: 2.5; .5 SOLUTION RESPIRATORY (INHALATION) at 07:02

## 2021-01-01 RX ADMIN — ENOXAPARIN SODIUM 40 MG: 40 INJECTION SUBCUTANEOUS at 17:48

## 2021-01-01 RX ADMIN — GUAIFENESIN 600 MG: 600 TABLET, EXTENDED RELEASE ORAL at 09:43

## 2021-01-01 RX ADMIN — SPIRONOLACTONE 25 MG: 25 TABLET ORAL at 10:33

## 2021-01-01 RX ADMIN — POTASSIUM CHLORIDE 10 MEQ: 7.46 INJECTION, SOLUTION INTRAVENOUS at 08:14

## 2021-01-01 RX ADMIN — GUAIFENESIN 600 MG: 600 TABLET, EXTENDED RELEASE ORAL at 10:33

## 2021-01-01 RX ADMIN — VANCOMYCIN HYDROCHLORIDE 1000 MG: 1 INJECTION, POWDER, LYOPHILIZED, FOR SOLUTION INTRAVENOUS at 05:41

## 2021-01-01 RX ADMIN — PANTOPRAZOLE SODIUM 40 MG: 40 INJECTION, POWDER, FOR SOLUTION INTRAVENOUS at 06:23

## 2021-01-01 RX ADMIN — POTASSIUM CHLORIDE 10 MEQ: 7.46 INJECTION, SOLUTION INTRAVENOUS at 10:37

## 2021-01-01 RX ADMIN — GABAPENTIN 300 MG: 300 CAPSULE ORAL at 20:45

## 2021-01-01 RX ADMIN — MEGESTROL ACETATE 200 MG: 40 SUSPENSION ORAL at 09:21

## 2021-01-01 RX ADMIN — Medication 10 ML: at 05:33

## 2021-01-01 RX ADMIN — GUAIFENESIN 600 MG: 600 TABLET, EXTENDED RELEASE ORAL at 21:47

## 2021-01-01 RX ADMIN — AMPICILLIN SODIUM AND SULBACTAM SODIUM 3 G: 2; 1 INJECTION, POWDER, FOR SOLUTION INTRAMUSCULAR; INTRAVENOUS at 20:46

## 2021-01-01 RX ADMIN — HYDROCODONE BITARTRATE AND ACETAMINOPHEN 2 TABLET: 5; 325 TABLET ORAL at 18:22

## 2021-01-01 RX ADMIN — COLLAGENASE SANTYL: 250 OINTMENT TOPICAL at 09:57

## 2021-01-01 RX ADMIN — ASPIRIN 81 MG: 81 TABLET, COATED ORAL at 09:32

## 2021-01-01 RX ADMIN — GUAIFENESIN 600 MG: 600 TABLET, EXTENDED RELEASE ORAL at 21:13

## 2021-01-01 RX ADMIN — MULTIPLE VITAMINS W/ MINERALS TAB 1 TABLET: TAB at 10:04

## 2021-01-01 RX ADMIN — ASPIRIN 81 MG: 81 TABLET, CHEWABLE ORAL at 08:59

## 2021-01-01 RX ADMIN — GUAIFENESIN 600 MG: 600 TABLET, EXTENDED RELEASE ORAL at 22:42

## 2021-01-01 RX ADMIN — VANCOMYCIN HYDROCHLORIDE 750 MG: 750 INJECTION, POWDER, LYOPHILIZED, FOR SOLUTION INTRAVENOUS at 03:43

## 2021-01-01 RX ADMIN — METOPROLOL TARTRATE 2.5 MG: 5 INJECTION INTRAVENOUS at 17:53

## 2021-01-01 RX ADMIN — GABAPENTIN 300 MG: 300 CAPSULE ORAL at 17:25

## 2021-01-01 RX ADMIN — IPRATROPIUM BROMIDE AND ALBUTEROL SULFATE 3 ML: 2.5; .5 SOLUTION RESPIRATORY (INHALATION) at 18:52

## 2021-01-01 RX ADMIN — IPRATROPIUM BROMIDE AND ALBUTEROL SULFATE 3 ML: 2.5; .5 SOLUTION RESPIRATORY (INHALATION) at 07:06

## 2021-01-01 RX ADMIN — IPRATROPIUM BROMIDE AND ALBUTEROL SULFATE 3 ML: 2.5; .5 SOLUTION RESPIRATORY (INHALATION) at 07:00

## 2021-01-01 RX ADMIN — BUDESONIDE 0.5 MG: 0.5 INHALANT RESPIRATORY (INHALATION) at 08:11

## 2021-01-01 RX ADMIN — FAMOTIDINE 20 MG: 10 INJECTION INTRAVENOUS at 08:02

## 2021-01-01 RX ADMIN — Medication 10 ML: at 09:47

## 2021-01-01 RX ADMIN — PANTOPRAZOLE SODIUM 40 MG: 40 TABLET, DELAYED RELEASE ORAL at 08:48

## 2021-01-01 RX ADMIN — ASPIRIN 81 MG: 81 TABLET, CHEWABLE ORAL at 09:02

## 2021-01-01 RX ADMIN — FAMOTIDINE 20 MG: 10 INJECTION INTRAVENOUS at 08:14

## 2021-01-01 RX ADMIN — IPRATROPIUM BROMIDE AND ALBUTEROL SULFATE 3 ML: 2.5; .5 SOLUTION RESPIRATORY (INHALATION) at 19:16

## 2021-01-01 RX ADMIN — DOXYCYCLINE 100 MG: 100 INJECTION, POWDER, LYOPHILIZED, FOR SOLUTION INTRAVENOUS at 18:14

## 2021-01-01 RX ADMIN — POTASSIUM CHLORIDE 10 MEQ: 7.46 INJECTION, SOLUTION INTRAVENOUS at 15:26

## 2021-01-01 RX ADMIN — IPRATROPIUM BROMIDE AND ALBUTEROL SULFATE 3 ML: 2.5; .5 SOLUTION RESPIRATORY (INHALATION) at 22:59

## 2021-01-01 RX ADMIN — NYSTATIN 500000 UNITS: 500000 SUSPENSION ORAL at 11:41

## 2021-01-12 NOTE — PROGRESS NOTES
Patient Name: Axel Ramirez   Date: 2021  : 1950       MRN: [id number]     Referring Physician: Axel Lewis MD  DX: Right lung cancer, SCC, stage III    COMPLETION NOTE      Primary Radiation Oncologist: Alber Tay MD    PRIMARY SITE AND HISTOPATHOLOGY:   Right lung cancer, squamous cell carcinoma      STAGE: IIIC      SIGNIFICANT LAB AND X-RAY FINDINGS: Started emergent palliative XRT at the request of Thoracic surgery with chemo starting as an outpatient. # of fractions increased to 33 to account for this early start.      PLAN OF TREATMENT: Curative intent      DATE STARTED: 2020   DATE COMPLETED:  2021      TOTAL DOSE: 66 Gy   DOSE/FRACTION: 2 Gy per fraction.  ENERGY: 6X   STATUS OF TUMOR/PATIENT AT COMPLETION OF RADIOTHERAPY:  Pain has improved; Started on Suboxone and has greatly improved. Tolerated therapy very well.      TOLERANCE: Grade I fatigue and skin reaction.  Esophagitis was not endorsed until the near end of treatment and is only grade I--Latrice[employer] Magic as needed.      DISPOSITION: 1 month f/u  Imaging and consideration of adjuvant immunotherapy as per Dr. Lewis.      Current Outpatient Medications:   •  aspirin (aspirin) 81 MG EC tablet, Take 1 tablet by mouth Daily., Disp:  , Rfl:   •  atorvastatin (LIPITOR) 20 MG tablet, Take 1 tablet by mouth every night at bedtime., Disp: 90 tablet, Rfl: 1  •  buprenorphine-naloxone (SUBOXONE) 8-2 MG per SL tablet, DISSOLVE ONE TABLET UNDER THE TONGUE EVERY DAY, Disp: , Rfl:   •  ferrous sulfate 324 (65 Fe) MG tablet delayed-release EC tablet, Take 1 tablet by mouth Daily With Breakfast., Disp: 60 tablet, Rfl: 2  •  gabapentin (NEURONTIN) 300 MG capsule, Take 300 mg by mouth 3 (Three) Times a Day., Disp: , Rfl:   •  hydrALAZINE (APRESOLINE) 50 MG tablet, Take 1 tablet by mouth Every 12 (Twelve) Hours., Disp: 180 tablet, Rfl: 3  •  lidocaine-prilocaine (EMLA) 2.5-2.5 % cream, , Disp: , Rfl:   •  metoprolol tartrate (LOPRESSOR) 25  MG tablet, Take 2 tablets by mouth 2 (Two) Times a Day for 360 days., Disp: 120 tablet, Rfl: 11  •  nystatin (MYCOSTATIN) 724314 UNIT/ML suspension, , Disp: , Rfl:   •  ondansetron (ZOFRAN) 8 MG tablet, Take 1 tablet by mouth Every 8 (Eight) Hours As Needed for Nausea or Vomiting., Disp: 20 tablet, Rfl: 1  •  pantoprazole (Protonix) 40 MG EC tablet, Take 1 tablet by mouth Daily., Disp: 30 tablet, Rfl: 1  •  spironolactone (Aldactone) 25 MG tablet, Take 1 tablet by mouth Daily for 360 days., Disp: 90 tablet, Rfl: 3  •  torsemide (Demadex) 20 MG tablet, Take 0.5 tablets by mouth Daily for 30 days., Disp: 45 tablet, Rfl: 3  •  Ventolin  (90 Base) MCG/ACT inhaler, INHALE TWO PUFFS BY MOUTH EVERY 6 HOURS, Disp: , Rfl:     KPS: 70             Attending Radiation Oncologist’s Note: cc: Axel Lewis MD     This document was entered by: Alber Tay MD     Approved Electronically By:  Alber Tay MD, 1/12/2021, 09:58 EST                              Confidentiality of this medical record shall be maintained except when use or disclosure is required or permitted by law, regulation or written authorization by the patient.

## 2021-02-04 NOTE — PROGRESS NOTES
FOLLOW-UP NOTE    Name: Axel Ramirez  YOB: 1950  MRN #: [id number]  Date of Service: 2/4/2021  Referring Provider: Axel Lewis MD  Primary Care Provider: Sandoval Stevenson FNP    DIAGNOSIS: Right lung Stage IIIC Squamous Cell Carcinoma  1. Squamous cell carcinoma of lung, right (CMS/HCC)      REASON FOR VISIT: Post chemoradiation one month follow-up    RADIATION TREATMENT COURSE:66 Gy in 33 fractions, completed 01/06/2021.    HISTORY OF PRESENT ILLNESS: The patient is a 70 y.o. year old male who completed therapy as noted above.  He did start breathing better during his course of treatment and experienced improved pain and only grade I esophagitis.    He notes that Suboxone has helped him greatly.    Dr. Lewis has an appt today and has an appointment on 2/9/2021.    He tells me that he has not finished chemo.  We are requesting records and to learn if he has plans for imaging and adjuvant immunotherapy.    No chest pain today.  States he has very infrequently now.    No headaches.  Appetite is down and ensures/boost samples are given today.    The following portions of the patient's history were reviewed and updated as appropriate: allergies, current medications, past family history, past medical history, past social history, past surgical history and problem list. Reviewed with the patient and remain unchanged.    PAST MEDICAL HISTORY:  he  has a past medical history of Aortic stenosis, Cancer (CMS/HCC), Congestive heart failure (CHF) (CMS/HCC), COPD (chronic obstructive pulmonary disease) (CMS/HCC), Coronary artery disease, Hypertension, Lung cancer (CMS/HCC), Myocardial infarction (CMS/HCC), Peripheral vascular disease (CMS/HCC), and Valvular disease.  MEDICATIONS:   Current Outpatient Medications:   •  aspirin (aspirin) 81 MG EC tablet, Take 1 tablet by mouth Daily., Disp:  , Rfl:   •  atorvastatin (LIPITOR) 20 MG tablet, Take 1 tablet by mouth every night at bedtime., Disp: 90 tablet,  Rfl: 1  •  buprenorphine-naloxone (SUBOXONE) 8-2 MG per SL tablet, DISSOLVE ONE TABLET UNDER THE TONGUE EVERY DAY, Disp: , Rfl:   •  ferrous sulfate 324 (65 Fe) MG tablet delayed-release EC tablet, Take 1 tablet by mouth Daily With Breakfast., Disp: 60 tablet, Rfl: 2  •  gabapentin (NEURONTIN) 300 MG capsule, Take 300 mg by mouth 3 (Three) Times a Day., Disp: , Rfl:   •  hydrALAZINE (APRESOLINE) 50 MG tablet, Take 1 tablet by mouth Every 12 (Twelve) Hours., Disp: 180 tablet, Rfl: 3  •  lidocaine-prilocaine (EMLA) 2.5-2.5 % cream, , Disp: , Rfl:   •  metoprolol tartrate (LOPRESSOR) 25 MG tablet, Take 2 tablets by mouth 2 (Two) Times a Day for 360 days., Disp: 120 tablet, Rfl: 11  •  ondansetron (ZOFRAN) 8 MG tablet, Take 1 tablet by mouth Every 8 (Eight) Hours As Needed for Nausea or Vomiting., Disp: 20 tablet, Rfl: 1  •  pantoprazole (Protonix) 40 MG EC tablet, Take 1 tablet by mouth Daily., Disp: 30 tablet, Rfl: 1  •  spironolactone (Aldactone) 25 MG tablet, Take 1 tablet by mouth Daily for 360 days., Disp: 90 tablet, Rfl: 3  •  Ventolin  (90 Base) MCG/ACT inhaler, INHALE TWO PUFFS BY MOUTH EVERY 6 HOURS, Disp: , Rfl:   •  nystatin (MYCOSTATIN) 511160 UNIT/ML suspension, , Disp: , Rfl:   •  torsemide (Demadex) 20 MG tablet, Take 0.5 tablets by mouth Daily for 30 days., Disp: 45 tablet, Rfl: 3  ALLERGIES: No Known Allergies  PAST SURGICAL HISTORY: he has a past surgical history that includes Aortic valve replacement (02/26/2018); Coronary artery bypass graft (02/26/2018); Tibia fracture surgery; Cardiac catheterization (12/29/2017); Bronchoscopy (N/A, 10/24/2020); Venous Access Device (Port) (Left, 10/30/2020); Bronchoscopy (N/A, 11/11/2020); Bronchoscopy (N/A, 11/11/2020); and Bronchoscopy (N/A, 11/11/2020).  PREVIOUS RADIOTHERAPY OR CHEMOTHERAPY: yes  FAMILY HISTORY: his family history includes Cancer in his father; Coronary artery disease in his mother; Seizures (age of onset: 35) in his daughter; Stroke  "(age of onset: 35) in his daughter.  SOCIAL HISTORY: he  reports that he quit smoking about 4 months ago. His smoking use included cigarettes. He smoked 1.00 pack per day. He has never used smokeless tobacco. He reports current alcohol use. He reports that he does not use drugs.  PAIN AND PAIN MANAGEMENT: pain 2/10 reported today.  NUTRITIONAL STATUS:    Decreased appetite; boost/ensure given.       ECOG: (2) Ambulatory and capable of self care, unable to carry out work activity, up and about > 50% or waking hours       Review of Systems:   General: No fevers, chills, weight change, or drenching night sweats. Skin: No rashes or jaundice.  HEENT: No change in vision or hearing, no headaches.  Neck: No dysphagia or masses.  Heme/Lymph: No easy bruising or bleeding.  Respiratory System: No worsening shortness of breath and now new cough.  Cardiovascular: No chest pain, palpitations, or dyspnea on exertion.  - Pacemaker. GI: No nausea, vomiting, diarrhea, melena, or hematochezia.  : No dysuria or hematuria.  Endocrine: No heat or cold intolerance. Musculoskeletal: No myalgias or arthralgias.  Neuro: No weakness, numbness, syncope, or seizures. Psych: No mood changes or depression. Ext: Denies swelling.        Objective     Vitals:  Vitals:    02/04/21 0805   BP: 139/70   Pulse: 91   Temp: 97.1 °F (36.2 °C)   TempSrc: Infrared   SpO2: 98%  Comment: 1 Liter NC   Weight: 66.6 kg (146 lb 12.8 oz)   Height: 177.8 cm (70\")   PainSc: 0-No pain       PHYSICAL EXAM:  GENERAL: in no apparent distress, sitting comfortably in room.    HEENT: normocephalic, atraumatic. Pupils are equal, round, reactive to light. Sclera anicteric. Conjunctiva not injected. Oropharynx without erythema, ulcerations or thrush.   NECK: Supple with no masses.  LYMPHATIC: no cervical, supraclavicular or axillary adenopathy appreciated bilaterally.   CARDIOVASCULAR: S1 & S2 detected; no murmurs, rubs or gallops.  CHEST: clear to auscultation bilaterally; " no wheezes, crackles or rubs. Work of breathing normal.  ABDOMEN: bowel sounds present. Abdomen is soft, nontender, nondistended.   MUSCULOSKELETAL: no tenderness to palpation along the spine or scapulae. Normal range of motion.  EXTREMITIES: no clubbing, cyanosis, edema.  SKIN: no erythema, rashes, ulcerations noted.   NEUROLOGIC: cranial nerves II-XII grossly intact bilaterally. No focal neurologic deficits.  PSYCHIATRIC:  alert, aware, and appropriate.      PERTINENT IMAGING/PATHOLOGY/LABS (Medical Decision Making):     COORDINATION OF CARE: A copy of this note is sent to the referring provider.    PATHOLOGY (Reviewed):     IMAGING (Reviewed): No interval studies.    LABS (Reviewed):  Hematology WBC   Date Value Ref Range Status   11/11/2020 11.20 (H) 3.40 - 10.80 10*3/mm3 Final     RBC   Date Value Ref Range Status   11/11/2020 3.45 (L) 4.14 - 5.80 10*6/mm3 Final     Hemoglobin   Date Value Ref Range Status   11/11/2020 9.4 (L) 13.0 - 17.7 g/dL Final     Hematocrit   Date Value Ref Range Status   11/11/2020 29.5 (L) 37.5 - 51.0 % Final     Platelets   Date Value Ref Range Status   11/11/2020 351 140 - 450 10*3/mm3 Final      Chemistry   Lab Results   Component Value Date    GLUCOSE 162 (H) 12/29/2020    BUN 11 12/29/2020    CREATININE 0.67 (L) 12/29/2020    EGFRIFNONA 117 12/29/2020    BCR 16.4 12/29/2020    K 3.7 12/29/2020    CO2 26.0 12/29/2020    CALCIUM 7.9 (L) 12/29/2020    PROTENTOTREF 7.3 10/24/2020    ALBUMIN 3.10 (L) 12/29/2020    LABIL2 0.5 (L) 10/24/2020    AST 22 12/29/2020    ALT 21 12/29/2020         Assessment/Plan     ASSESSMENT AND PLAN:    1. Squamous cell carcinoma of lung, right (CMS/HCC)       Stage III NSCLC  -s/p chemoradiation  -patient thinks more chemo vs. Immunotherapy.  Call placed to Dr. Lewis for notes/review/discussion.  He has had BM biopsy last week. Need those records/path/labs.    No residual XRT issues.      This assessment comes from my review of the imaging, pathology,  physician notes and other pertinent information as mentioned.    DISPOSITION: 4 month f/u        CC: MD Sandoval Harmon FNP John A Cox, MD  2/4/2021  8:04 AM EST

## 2021-02-18 PROBLEM — J18.9 PNEUMONIA DUE TO INFECTIOUS ORGANISM: Status: ACTIVE | Noted: 2021-01-01

## 2021-02-19 PROBLEM — E44.0 MODERATE MALNUTRITION (HCC): Status: ACTIVE | Noted: 2021-01-01

## 2021-02-22 PROBLEM — J96.21 ACUTE ON CHRONIC RESPIRATORY FAILURE WITH HYPOXIA (HCC): Status: ACTIVE | Noted: 2021-01-01

## 2021-02-22 PROBLEM — R41.0 DELIRIUM: Status: ACTIVE | Noted: 2021-01-01

## 2021-02-22 PROBLEM — J44.1 COPD WITH ACUTE EXACERBATION (HCC): Status: ACTIVE | Noted: 2021-01-01

## 2021-03-01 NOTE — THERAPY TREATMENT NOTE
.Subjective: Pt agreeable to therapeutic plan of care.    Objective:     Bed mobility - Min-A supine to sit with hob elevated   Transfers - Min-A and with rolling walker  Ambulation - 14 feet Mod-A and with rolling walker    Pain: 0 VAS  Education: Provided education on importance of mobility and skilled verbal / tactile cueing throughout intervention.     Assessment: Axel Ramirez presents with functional mobility impairments which indicate the need for skilled intervention. Tolerating session today without incident. Pt was able to progress to short distance gait training today.  Pt on 3L 02 while amb and was mildly short of air.  Will continue to follow and progress as tolerated.     Plan/Recommendations:   Pt would benefit from Inpatient Rehabilitation placement at discharge from facility and requires no DME at discharge.   Pt desires Inpatient Rehabilitation placement at discharge. Pt cooperative; agreeable to therapeutic recommendations and plan of care.     Basic Mobility 6-click:  Rollin = Total, A lot = 2, A little = 3; 4 = None  Supine>Sit:   1 = Total, A lot = 2, A little = 3; 4 = None   Sit>Stand with arms:  1 = Total, A lot = 2, A little = 3; 4 = None  Bed>Chair:   1 = Total, A lot = 2, A little = 3; 4 = None  Ambulate in room:  1 = Total, A lot = 2, A little = 3; 4 = None  3-5 Steps with railin = Total, A lot = 2, A little = 3; 4 = None  Score: 15    Modified Lottie: 4 = Moderately severe disability (Unable to attend to own bodily needs without assistance, and unable to walk unassisted)     Post-Tx Position: Up in Chair, Alarms activated and Call light and personal items within reach  PPE: gloves, surgical mask, eyewear protection, gown

## 2021-03-01 NOTE — CONSULTS
Nutrition Services    Patient Name: Axel Ramirez  YOB: 1950  MRN: [id number]  Admission date: 2/18/2021    Recommend removing large-bore feeding tube prior to completing video swallow study.  Pt's current NG is quite large, which may be impeding swallowing efforts.     If, after tube is removed, pt still fails planned VFSS tomorrow 3/2, then consider replacing with small-bore tube (such as Dobhoff), or consider pursuing PEG for long-term use.     If continuing on liquid diet, consider whether full liquid may be appropriate to allow for broader array of foods/supplements (thickened per ST recommendations.)     For now, will continue Nutren 1.5 @ 65mL/hour + 60mL/hour H2O flush.    PPE Documentation        PPE Worn By Provider Mask, eyewear, gown, gloves   PPE Worn By Patient  None     CLINICAL NUTRITION ASSESSMENT       Reason for Assessment 3/1: Follow up protocol   2/25: Follow up protocol   2/23/21: Follow up protocol  2/19/21: Tube feed consult     H&P:    Past Medical History:   Diagnosis Date   • Aortic stenosis    • Cancer (CMS/HCC)     Sickle Cell Carcinoma   • Congestive heart failure (CHF) (CMS/HCC)     DIASTOLIC   • COPD (chronic obstructive pulmonary disease) (CMS/HCC)    • Coronary artery disease    • Hypertension    • Lung cancer (CMS/HCC)    • Myocardial infarction (CMS/HCC)    • Peripheral vascular disease (CMS/HCC)    • Valvular disease        Past Surgical History:   Procedure Laterality Date   • AORTIC VALVE REPAIR/REPLACEMENT  02/26/2018    Dr Maza   • BRONCHOSCOPY N/A 10/24/2020    Procedure: BRONCHOSCOPY with biopsy right upper lobe mass, and bronchoalveolar lavage right upper lobe;  Surgeon: Ángela Bean MD;  Location: [facility] ENDOSCOPY;  Service: Pulmonary;  Laterality: N/A;  post: right upper lobe mass, pneumonia   • BRONCHOSCOPY N/A 11/11/2020    Procedure: BRONCHOSCOPY with bronchial washing;  Surgeon: Ángela Bean MD;  Location: [facility] ENDOSCOPY;  Service: Pulmonary;   Laterality: N/A;  Post: lung mass, pneumonia   • BRONCHOSCOPY N/A 11/11/2020    Procedure: BRONCHOSCOPY RIGID with debulking of right main endobronchial tumor;  Surgeon: Nomi Reyes MD;  Location: [facility] MAIN OR;  Service: Cardiothoracic;  Laterality: N/A;   • BRONCHOSCOPY N/A 11/11/2020    Procedure: BRONCHOSCOPY;  Surgeon: Nomi Reyes MD;  Location: [facility] MAIN OR;  Service: Cardiothoracic;  Laterality: N/A;   • CARDIAC CATHETERIZATION  12/29/2017   • CORONARY ARTERY BYPASS GRAFT  02/26/2018    Dr Maza   • TIBIA FRACTURE SURGERY     • VENOUS ACCESS DEVICE (PORT) INSERTION Left 10/30/2020    Procedure: MEDIPORT INSERTION UNDER FLUOROSCOPIC GUIDENCE;  Surgeon: Nomi Reyes MD;  Location: [facility] MAIN OR;  Service: Cardiothoracic;  Laterality: Left;        Current Problems:   Per hospitalist note:   Acute respiratory failure requiring mechanical ventilation  -Secondary to pneumonia, COPD exacerbation  -Extubated 2/20/2021 now on room air  -On IV Zosyn, IV Solu-Medrol 20 mg being tapered.  -Supplemental oxygen  -Appreciate pulmonary input     Pneumonia  -CT PE protocol 2/18/2021: No PE, patchy airspace disease within bilateral lower lobes, right upper lobe mass invading right upper lobe bronchus and obstructing it.  Increased narrowing or obstruction right middle lobe bronchus  -Blood cultures no growth to date  -Respiratory cultures negative on the 19th  -On IV Zosyn  -Positive rhinovirus 2/24/2021 not present on admission.  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-s/p chemoradiation completed 1/2021 and now on chemotherapy (carbo/Taxol) dosed 2/9 with Neulasta support      H/o CABG/aortic valve bioprosthetic replacement,   Add aspirin statin  Resume home metoprolol     Diastolic congestive heart failure  IV Lasix 20 twice daily  Resume home Aldactone     Anemia, GOGO, MDS,  thrombocytopenia  -Chemotherapy-induced  -Status post 1 unit PRBCs 2/19/2021  -On Retacrit, and iron sucrose  Hematology is following     Hypokalemia and hypomagnesemia replace per protocol     Urinary retention  In and out cath as needed  Abnormal urinalysis but urine culture negative     Constipation  CT abdomen pelvis reviewed.  Laxatives added.     Delirium, possibly withdrawal from gabapentin and/or Suboxone  -CT head showed no acute findings.  -As needed Haldol -Resume home lurasidone and Zoloft  -Resume home Neurontin and Suboxone  -MRI brain if not improving  -Psych input appreciated.  -better     Has prominent lid lag: check free t4 and tsh     Abnormal MRI brain showingis a single thick-walled ring-enhancing lesion in the medial aspect of the left occipital lobe measuring 1.1 x 1.0 cm. There was  vasogenic edema on yesterday's MRI of the brain in this location. This  represents metastatic disease until proven otherwise. A small primary  glioma or brain abscess can have a similar imaging appearance.   Neurosurgery agreeable on plan.  Radiation oncologist recommended:stereotactic radiosurgery to the occipital mass to be done as an outpatient.---He is scheduled for radiation simulation on Monday 3/1/21 with plan for likely treatment on 3/3/21.        Disposition. DC Plan: Declined IP Rehab. New VNA referral pending. [facility] following for backup (Currently unable to provide ST if needed)        Nutrition/Diet History         Narrative     3/1: Visited pt and SO for follow up. Pt continues on TF as main source of nutrition, though he is making an effort to eat the nectar thick clear liquid diet -- pt was working on nectar thick apple juice during visit. TF continues to infuse as well, at goal rate 65mL/hour. Pt tolerating TF, but hopeful he can resume PO soon.  Pt open to Magic Cups and Boost Pudding if diet advances to at least full liquids -- will follow up for possible advancement after VFSS tomorrow.   "Current NG tube is larger-bore tube and ST concern for interfering with swallow function; this writer agrees based on size of tube -- would benefit from removal and replacement of smaller tube if indicated. See recommendation above.    2/25: Pt seen for follow up; pt in bed sleeping - TF infusing at goal 30mL/hour with tolerance. Discussed with RN as well - RN states pt wife tearful today, is concerned about pt's ongoing inability to swallow. ST continues to follow and will follow up to determine if able to resume any PO. Palliative care has been consulted to discuss whether long-term TF is desired. Will continue to follow.     2/23: RD visited patient room today where RN and patient CNA in room. RD asked RN about possible feeding tube placement and RN stated they have not been able to get a tube down, and cannot try anymore at the moment due to vomiting. CNA stated she does not think patient would keep tube in anyway. Will continue to monitor, as patient is extubated and NPO. This makes day 5 without nutrition and patient is diagnosed malnourished. May consider GI consult for PEG placement if continued inability to place tube. Patient still agitated.       2/19: s/w RN Manoj, pt failed first weaning trial but plans to try again, and if fails, will place feeding tube.  RD visited at bedside, pt intubated/sedated. Significant other at bedside reports pt has been losing weight over the past 1-2 years but had been started on megace and appetite improved. She reports he eats 2 meals daily and was drinking 2-3 ensures at home daily although she expressed he did not like them. RD explained possibility of TF due to compromised airway and will continue to follow for appropriate interventions     Functional Status Needs some assistance with ADLs     Food Allergies NKFA     Factors Affecting   Nutritional Intake Cancer  Dysphagia     Anthropometrics        Current Height, Weight Height: 177.8 cm (70\")  Weight: (bed not working " "worrk order has been put in) (03/01/21 0256)        Admit Height, Weight     Flowsheet Rows      First Filed Value   Admission Height  177.8 cm (70\") Documented at 02/18/2021 1048   Admission Weight  67.1 kg (148 lb) Documented at 02/18/2021 1048             Ideal Body Weight (IBW) 166 lb   % Ideal Body Weight 86%       Usual Body Weight UTD   % Usual Body Weight UTD   Wt Change Observation 3/1: Weight up by 13.4% since last assessment -- will continue to monitor.   2/25: -15.6% in 1 week -- severe wt loss (likely related to lack of enteral access -- now has nutrition in place)   2/23: Patient has lost >3% body weight in one week = severe  2/19: 11.7% loss of body weight in 14 months = not considered significant in time frame   Weight Hx    Wt Readings from Last 30 Encounters:   02/27/21 0559 65 kg (143 lb 4.8 oz)   02/26/21 0547 57.6 kg (126 lb 15.8 oz)   02/25/21 0512 57.6 kg (126 lb 15.8 oz)   02/23/21 0349 66.1 kg (145 lb 11.6 oz)   02/22/21 0418 66 kg (145 lb 8.1 oz)   02/21/21 0517 66.9 kg (147 lb 7.8 oz)   02/20/21 0111 66.8 kg (147 lb 4.3 oz)   02/18/21 1807 68.2 kg (150 lb 5.7 oz)   02/18/21 1048 67.1 kg (148 lb)   02/04/21 0805 66.6 kg (146 lb 12.8 oz)   01/05/21 0918 68.5 kg (151 lb)   12/29/20 0938 68.1 kg (150 lb 3.2 oz)   12/22/20 0913 69.6 kg (153 lb 6.4 oz)   12/15/20 0929 68.6 kg (151 lb 3.2 oz)   12/08/20 0915 68.1 kg (150 lb 3.2 oz)   12/02/20 1257 68.5 kg (151 lb)   12/01/20 0936 68 kg (150 lb)   11/24/20 0941 68.9 kg (152 lb)   11/19/20 0956 66.7 kg (147 lb)   11/09/20 1549 68 kg (150 lb)   11/08/20 2256 68 kg (150 lb)   10/30/20 0354 67.1 kg (147 lb 14.9 oz)   10/29/20 0500 70.1 kg (154 lb 8.7 oz)   10/27/20 0508 69.9 kg (154 lb 1.6 oz)   10/22/20 2059 68 kg (150 lb)   10/22/20 2044 68 kg (150 lb)   12/02/19 1001 77.1 kg (170 lb)   05/31/19 1037 82 kg (180 lb 12 oz)   05/21/18 1237 81.8 kg (180 lb 6.4 oz)        BMI kg/m2 Body mass index is 20.56 kg/m².       Labs/Medications         Pertinent " "Labs Reviewed and C/W clinical course    Results from last 7 days   Lab Units 03/01/21  0617 02/28/21  0548 02/27/21  0555   SODIUM mmol/L 142 143 145   POTASSIUM mmol/L 3.8 4.5 4.1   CHLORIDE mmol/L 104 107 109*   CO2 mmol/L 31.0* 29.0 27.0   BUN mg/dL 38* 39* 46*   CREATININE mg/dL 0.64* 0.76 0.82   CALCIUM mg/dL 8.6 8.8 9.2   BILIRUBIN mg/dL 0.4 0.4 0.4   ALK PHOS U/L 160* 151* 146*   ALT (SGPT) U/L 20 20 22   AST (SGOT) U/L 23 26 21   GLUCOSE mg/dL 111* 127* 135*     Results from last 7 days   Lab Units 03/01/21  0617  02/26/21  0610 02/25/21  0615  02/23/21  0616   MAGNESIUM mg/dL  --   --  2.6* 1.9  --  1.8   PHOSPHORUS mg/dL  --   --  2.3* 2.5   < >  --    HEMOGLOBIN g/dL 9.2*   < > 9.7* 9.8*   < > 9.1*   HEMOGLOBIN, POC   --   --   --   --    < >  --    HEMATOCRIT % 28.0*   < > 28.9* 28.8*   < > 26.7*   HEMATOCRIT POC   --   --   --   --    < >  --     < > = values in this interval not displayed.     COVID19   Date Value Ref Range Status   02/24/2021 Not Detected Not Detected - Ref. Range Final     No results found for: HGBA1C      Pertinent Medications Aspirin, lipitor, colace, retacrit, lasix, neurontin, guaifenesin, latuda, lopressor, protonix, deltasone, zoloft, aldactone     Physical Findings        Overall Physical   Appearance, MSA 3/1: Continues to appear malnourished (last NFPE 2/19), with prominent clavicles and obvious facial wasting on observation  2/25: Remains malnourished-appearing (NFPE done 2/19)   2/23: RD did not physically observe at this date.  2/19: RD completed NFPE, see MSA   -  Edema  +1 generalized edema      Gastrointestinal BM yesterday 2/28     Tubes NG feeding tube in place      Oral/Mouth Cavity Aspiration risk, ST following and remains unable to take PO     Skin Stage II gluteal area; unstageable wound to sacral spine       Estimated/Assessed Needs    Previous assessment remains adequate 3/1   Energy Requirements    Height for Calculation  Height: 177.8 cm (70\")   Weight for " Calculation 68.2 kg (150 lb)   Method for Estimation  30-35 kcal/kg   EST Needs (kcal/day) 2046 - 2387 kcals/day       Protein Requirements    Weight for Calculation 68.2 kg (150 lb)      EST Protein Needs (g/kg) 1.5 g/kg   EST Daily Needs (g/day) 102 g/day       Fluid Requirements     Estimated Needs (mL/day) 9853-7653 mL/day    CHF dx, adjust based on hydration status       Fluid Deficit    Current Na Level (mEq/L)    Desired Na Level (mEq/L)    Estimated Fluid Deficit (L)       Current Nutrition Orders & Evaluation of Received Nutrient/Fluid Intake       Oral Nutrition     Current PO Diet Diet Liquids; Clear Liquid; Thickened Liquids (Nectar)   Supplement None   PO Evaluation     % PO Intake Minimal intake due to low-calorie nature of the diet - pt is attempting to eat the thickened liquids provided    -  Enteral Nutrition    Enteral Route NG   TF Modular None   TF Delivery Method 22 hours/day continuous   Current Ordered TF  Nutren 1.5 @ 65mL/hour   Current Ordered H2O flush  60mL/hour    TF Observation  Infusing at goal    EN Evaluation     TF Changes No changes today    TF Residual Minimal    TF Tolerance Good    Average EN Delivered -       Parenteral Nutrition     TPN Route -   Current Ordered TPN VOL  -   Dextrose (g/kcals)  -   Amino Acid (g/kcals) -   Lipids (mL/Concentration/FREQ)  -   MVI Frequency  -   Trace Element Frequency  -   TPN Observation  -    TPN Evaluation    Total # Days on TPN -   -  Clinical Course    Nutrition Course Details PO Diet:  2/18 to present 2/25 NPO  2/26 started Clear liquid, Nectar thick liquids    EN support:  Started Nutren 1.5 on 2/24/21; continues on this formula today 3/1     Nutritional Risk Screening        NRS-2002 Score   -       Nutrition Diagnosis         Nutrition Dx Problem 1 Moderate chronic disease malnutrition r/t increased energy intake in the context of cancer AEB findings of muscle wasting and fat loss as noted on NFPE      Nutrition Dx Problem 2 Predicted  inadequate energy intake r/t intubation AEB NPO due to mechanical ventilation       Intervention Goal         Intervention Goal(s) Pt continues good tolerance to EN  Goals of care clarified with regard to ongoing nutrition plan     Nutrition Intervention        RD/Tech Action Will continue TF at goal for now  (See recommendation above for removal of NGT prior to VFSS -- current tube is larger-bore tube and ST concern for interfering with swallow function; this writer agrees based on size of tube -- would benefit from removal and replacement of smaller tube if indicated.)      Nutrition Prescription          Diet Prescription NPO   Supplement Prescription None   -  Enteral Prescription Continue TF at goal: Nutren 1.5 at 65 mL/hr x22 hrs (assumes interruptions for ADLs) provides 2145 kcal (31 kcals/kg), 97 g protein (95%), 1087 mL free H2O+water flushes, Rx flushes, IVF    Continue to provide 60mL/hour free water autoflush.       TPN Prescription -     Monitor/Evaluation        Monitor Results of swallow study; TF tolerance; PO intake/tolerance; weight changes; GI function      Electronically signed by:  Rachana Zacarias RD  03/01/21 15:13 EST

## 2021-03-01 NOTE — THERAPY RE-EVALUATION
Outpatient Speech Language Pathology   Adult Swallow Initial Evaluation      Johann     Patient Name: Axel Ramirez  : 1950  MRN: [id number]  Today's Date: 3/1/2021         Visit Date: 2021   Patient Active Problem List   Diagnosis   • Coronary artery disease involving native coronary artery of native heart without angina pectoris   • Nonrheumatic aortic valve stenosis   • Mixed hyperlipidemia   • Essential hypertension   • Fever   • Presence of aortocoronary bypass graft   • Congestive heart failure (CMS/HCC)   • Hx of aortic valve replacement   • CAP (community acquired pneumonia)   • Tobacco abuse   • Postobstructive pneumonia   • Lung mass   • Squamous cell carcinoma of lung, right (CMS/HCC)   • Diastolic CHF, acute (CMS/HCC)   • Pneumonia of right lung due to infectious organism   • Pneumonia due to infectious organism   • Moderate malnutrition (CMS/HCC)   • Acute on chronic respiratory failure with hypoxia (CMS/HCC)   • COPD with acute exacerbation (CMS/HCC)   • Delirium        Past Medical History:   Diagnosis Date   • Aortic stenosis    • Cancer (CMS/HCC)     Sickle Cell Carcinoma   • Congestive heart failure (CHF) (CMS/HCC)     DIASTOLIC   • COPD (chronic obstructive pulmonary disease) (CMS/HCC)    • Coronary artery disease    • Hypertension    • Lung cancer (CMS/HCC)    • Myocardial infarction (CMS/HCC)    • Peripheral vascular disease (CMS/HCC)    • Valvular disease         Past Surgical History:   Procedure Laterality Date   • AORTIC VALVE REPAIR/REPLACEMENT  2018    Dr Maza   • BRONCHOSCOPY N/A 10/24/2020    Procedure: BRONCHOSCOPY with biopsy right upper lobe mass, and bronchoalveolar lavage right upper lobe;  Surgeon: Ángela Bean MD;  Location: [facility] ENDOSCOPY;  Service: Pulmonary;  Laterality: N/A;  post: right upper lobe mass, pneumonia   • BRONCHOSCOPY N/A 2020    Procedure: BRONCHOSCOPY with bronchial washing;  Surgeon: Ángela Bean MD;  Location: [facility] ENDOSCOPY;   Service: Pulmonary;  Laterality: N/A;  Post: lung mass, pneumonia   • BRONCHOSCOPY N/A 11/11/2020    Procedure: BRONCHOSCOPY RIGID with debulking of right main endobronchial tumor;  Surgeon: Nomi Reyes MD;  Location: [facility] MAIN OR;  Service: Cardiothoracic;  Laterality: N/A;   • BRONCHOSCOPY N/A 11/11/2020    Procedure: BRONCHOSCOPY;  Surgeon: Nomi Reyes MD;  Location: [facility] MAIN OR;  Service: Cardiothoracic;  Laterality: N/A;   • CARDIAC CATHETERIZATION  12/29/2017   • CORONARY ARTERY BYPASS GRAFT  02/26/2018    Dr Maza   • TIBIA FRACTURE SURGERY     • VENOUS ACCESS DEVICE (PORT) INSERTION Left 10/30/2020    Procedure: MEDIPORT INSERTION UNDER FLUOROSCOPIC GUIDENCE;  Surgeon: Nomi Reyes MD;  Location: [facility] MAIN OR;  Service: Cardiothoracic;  Laterality: Left;         Visit Dx:     ICD-10-CM ICD-9-CM   1. Pneumonia due to infectious organism, unspecified laterality, unspecified part of lung  J18.9 486   2. Respiratory distress  R06.03 786.09   3. Acute on chronic respiratory failure with hypoxia (CMS/[facility])  J96.21 518.84     799.02            Patient was not wearing a face mask during this therapy encounter. Therapist used appropriate personal protective equipment including gown, eye protection, mask and gloves.  Mask used was standard procedure mask. Appropriate PPE was worn during the entire therapy session. Hand hygiene was completed before and after therapy session. Patient is in enhanced droplet precautions. Pt's daughter was present for session. Skilled ST intervention conducted this date targeting dysphagia.  Pt alert and amenable to treatment.  The patient denies pain today during session. Pt on 2.5 liters/min via nasal cannula during session. Pt currently prescribed a clear, Nectar Thickened Liquid Diet with NGT in place as well.     Treatment narrative/results:        SLP Adult Swallow Evaluation     Row Name 03/01/21 1400       Rehab Evaluation    Document Type  re-evaluation   "(Pended)   -AF    Subjective Information  no complaints  (Pended)   -AF    Patient Observations  alert;cooperative;agree to therapy  (Pended)   -AF    Patient Effort  good  (Pended)   -AF    Comment  Pt seen this date for re-evaluation to assess pt's appropriateness for a VFSS. Pt positioned upright in bed upon ST entry to room. Pt alert and oriented. He was able to state his name, date of birth and ID daughter who was present. Pt noted to demonstrate weak to aphonic vocal quality, but no wet vocal quality is noted prior to trials. Pt given trials of ice chips by spoon x 2. Pt was able to form an adequate labial seal over spoon. Pt appears able to adequately manipulate bolus and initiate a timely swallow. On both trials of this consistency no overt of s/s aspiration or difficulty noted on trials of this consistency. Pt given trials of water by spoon x 2. Pt able to form an adequate labial seal around spoon to pull liquid from spoon. Pt was able to initiate a swallow which did appear to be timely.  No overt s/s of aspiration noted following trials of water by spoon. Pt then given trials of water by straw x 2. Adequate labial seal made and he was able to independently pull bolus from straw and intiate a timely swallow. Pt is noted to have wet vocal quality following by trials of water by straw. Pt was cued to cough when wet vocal quality was noted. This did appear to clear \"wet' vocal quality noted.     Recommend: Continue NPO w/ VFSS following date in the morning. Pt reports being more alert in the morning prior to taking medication.  Discussed with patient, daughter and nursing staff. All in agreement with plan.  (Pended)   -AF       General Information    Patient Profile Reviewed  yes  (Pended)   -AF    Pertinent History Of Current Problem  Pt admitted w/ SOA. Pt and family want to establish PO diet asap new VFSS recommended.  (Pended)   -AF    Current Method of Nutrition  NPO;nasogastric feedings  (Pended)   -AF "    Prior Level of Function-Swallowing  no diet consistency restrictions;regular textures;thin liquids  (Pended)   -AF    Plans/Goals Discussed with  family;patient  (Pended)   -AF    Barriers to Rehab  medically complex  (Pended)   -AF       Oral Motor Structure and Function    Dentition Assessment  edentulous  (Pended)   -AF    Secretion Management  WNL/WFL  (Pended)   -AF    Mucosal Quality  moist, healthy  (Pended)   -AF       Oral Musculature and Cranial Nerve Assessment    Oral Motor General Assessment  generalized oral motor weakness  (Pended)   -AF       General Eating/Swallowing Observations    Respiratory Support Currently in Use  nasal cannula  (Pended)   -AF    Eating/Swallowing Skills  fed by SLP  (Pended)   -AF    Positioning During Eating  upright 90 degree;upright in bed  (Pended)   -AF       MBS/VFSS    Utensils Used  spoon;straw  (Pended)   -AF    Consistencies Trialed  ice chips;thin liquids;nectar/syrup-thick liquids  (Pended)   -AF       Recommendations    Therapy Frequency (Swallow)  PRN  (Pended)   -AF    Predicted Duration Therapy Intervention (Days)  until discharge  (Pended)   -AF    SLP Diet Recommendation  clear liquid diet;nectar thick liquids  (Pended)   -AF    Recommended Diagnostics  reassess via VFSS (MBS)  (Pended)   -AF    Recommended Precautions and Strategies  upright posture during/after eating;multiple swallows per sip of liquid;liquid via spoon only;general aspiration precautions;reflux precautions;assist with feeding  (Pended)   -AF    Oral Care Recommendations  Oral Care BID/PRN  (Pended)   -AF    SLP Rec. for Method of Medication Administration  meds via alternate route  (Pended)   -AF    Monitor for Signs of Aspiration  yes;notify SLP if any concerns  (Pended)   -AF    Anticipated Discharge Disposition (SLP)  inpatient rehabilitation facility  (Pended)   -AF       Swallow Goals (SLP)    Oral Nutrition/Hydration Goal Selection (SLP)  oral nutrition/hydration, SLP goal 1;oral  nutrition/hydration, SLP goal 2;oral nutrition/hydration, SLP goal (free text)  (Pended)   -AF       Oral Nutrition/Hydration Goal 1 (SLP)    Oral Nutrition/Hydration Goal 1, SLP  initiate and tolerate L/R diet without oral stage difficulties or complications associated with aspiration   (Pended)   -AF    Time Frame (Oral Nutrition/Hydration Goal 1, SLP)  by discharge  (Pended)   -AF    Barriers (Oral Nutrition/Hydration Goal 1, SLP)  See above note.  (Pended)   -AF    Progress/Outcomes (Oral Nutrition/Hydration Goal 1, SLP)  goal ongoing  (Pended)   -AF       Oral Nutrition/Hydration Goal 2 (SLP)    Oral Nutrition/Hydration Goal 2, SLP  Pt will have full meal assessment within 48 hours  (Pended)   -AF    Time Frame (Oral Nutrition/Hydration Goal 2, SLP)  2 days  (Pended)   -AF    Barriers (Oral Nutrition/Hydration Goal 2, SLP)  Patietn seen this date at lunch.  Patient alert and cooperative for meal assessment.  Patient given multiple different NTL on tray.  Daughter did a good job of giving him small sips.  One mild cough noted following initial NTL drink.  No further s/s of aspiration noted  (Pended)   -AF    Progress/Outcomes (Oral Nutrition/Hydration Goal 2, SLP)  goal ongoing  (Pended)   -AF    Row Name 02/26/21 1300          Recommendations    SLP Diet Recommendation  NPO;ice chips between meals after oral care, with supervision ice chips/wter by tsp per nursing staff  -    Recommended Diagnostics  reassess via clinical swallow evaluation;reassess via VFSS ([facility])  -    Oral Care Recommendations  Oral Care BID/PRN  -    SLP Rec. for Method of Medication Administration  meds via alternate route  -       Swallow Goals (SLP)    Oral Nutrition/Hydration Goal Selection (SLP)  oral nutrition/hydration, SLP goal 1  -MM (r) AF (t) MM (c)       Oral Nutrition/Hydration Goal 1 (SLP)    Oral Nutrition/Hydration Goal 1, SLP  initiate and tolerate L/R diet without oral stage difficulties or complications associated  with aspiration   -MM (r) AF (t) MM (c)    Time Frame (Oral Nutrition/Hydration Goal 1, SLP)  by discharge  -MM (r) AF (t) MM (c)    Barriers (Oral Nutrition/Hydration Goal 1, SLP) ST recommends continuing current po diet for now and proceed with VFSS in am. Pt/family in agreement.  -MM (r) AF (t) MM (c)    Progress/Outcomes (Oral Nutrition/Hydration Goal 1, SLP)  goal ongoing  -MM (r) AF (t) MM (c)                                           Time Calculation:                     Deedee Watson, Speech Therapy Student  3/1/2021

## 2021-03-01 NOTE — PLAN OF CARE
Goal Outcome Evaluation:  Pt does not have any complaints of pain. Is complaint with all he is asked including Q2 turns. Male external catheter appears to be helping pt to stay dry.

## 2021-03-01 NOTE — THERAPY TREATMENT NOTE
Subjective: Pt agreeable to therapeutic plan of care.  Cognition: oriented to Person and Place    Objective:     Bed Mobility: CGA and utilizing compensatory strategy. SUP>sit CGA & setup, cues.  Functional Transfers: Min-A, with rolling walker and utilizing compensatory strategy  Functional Ambulation: Min-A and with rolling walker. Walks around bed to chair w/ assist of one at gait belt, using RW, mod cues. Min (A) to steer RW & maintain upright balance. Assist of another to manage lines.    Lower Body Dressing: Min-A  ADL Position: edge of bed sitting  ADL Comments: Dons socks EOB    Grooming and Feeding: Min-A and Dependent  ADL Position: edge of bed sitting and supported sitting  ADL Comments: Has NG feeding tube which he is unable to manage. Is allowed nectar thick liquids PO w/ spoon & is able to with cues to consume several spoonfulls both EOB & once seated. He completes setup of the beverage w/ skilled setup & cues. Washes face, hands with setup & cues.    Pain: 0 VAS  Education: Provided education on importance of mobility and skilled verbal / tactile cueing throughout intervention.     Assessment: Axel Ramirez presents with ADL impairments below baseline abilities which indicate the need for continued skilled intervention while inpatient. Tolerating session today without incident. Pt has difficulty initiating & completing tasks due to cognitive decline but is motivated & walks short distance w/ min (A) & RW. Will continue to follow and progress as tolerated. Recommend IP rehab at d/c. 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 Radiation will target the small lesions.    Plan/Recommendations:   Pt would benefit from Inpatient Rehabilitation placement at discharge from facility.   Pt desires Inpatient Rehabilitation placement at discharge. Pt cooperative; agreeable to therapeutic recommendations and plan of care.     Modified [place]: 4 = Moderately severe disability  (Unable to attend to own bodily needs without assistance, and unable to walk unassisted)     Post-Tx Position: Up in Chair, Alarms activated and Call light and personal items within reach  PPE: gloves, surgical mask, eyewear protection, gown

## 2021-03-01 NOTE — NURSING NOTE
Pt seen today for follow up of Left buttocks pressure injury and MASD to coccyx / cleft.      Pt is not incontinent of stool and urine.  Previous treatment was santyl / silicone foam dresing.    Unstageable pressure injury to left buttocks measures 0.8x0.8x02cm covered with 100% thin yellow slough. periwound clean/intact.     Coccyx/ cleft with masd linear ulceration measuring 1x0.1x0.1cm. wound bed pink/moist/clean. periwound clear/ntact.     Recommendations:  DC santyl to left buttocks. Start zinc moisture barrier to sacral/cocccyx/buttocks/perirectal area bid and prn.   Continue skin at risk pressure injury prevention strategies.

## 2021-03-01 NOTE — PROGRESS NOTES
[facility] Medicine Services Daily Progress Note      Hospitalist Team  LOS 10 days      Patient Care Team:  Sandoval Stevenson FNP as PCP - General  Sandoval Stevenson FNP as PCP - Family Medicine  Axel Lewis MD as Consulting Physician (Hematology and Oncology)  Iglesia Springer MD as Consulting Physician (Cardiology)    Patient Location: 2116/1      Subjective   Subjective     Chief Complaint / Subjective  Chief Complaint   Patient presents with   • Shortness of Breath         Brief Synopsis of Hospital Course/HPI  HPI taken from medical chart review as patient is oriented x2 but is anxious and agitated and cannot elaborate on what led to his hospitalization   Mr. Ramirez is a 70 y.o. male known history of COPD on 2L O2 continuously, history of lung cancer status post chemoradiation is still receiving chemo, was brought by EMS to [facility] ED 2/18/21 with complaints of shortness of breath. He initially was on 4 L and required to be on 8 L in the emergency room. He continued to decline to respiratory failure and required intubation in the ER.  CT showed bilateral infiltrates with increased narrowing of right middle lobe bronchus secondary to known mass.  He was started on broad spectrum antibiotics.  He was admitted to the ICU for further treatment of acute respiratory failure and pneumonia.      Since admission the patient has been gradually weaned off mechanical ventilation, extubated 2/20/2021 now on room air.  He did have some hypotension that required vasopressors that have since been weaned off. He was felt stable for transfer out of ICU 2/22/21 and the hospitalist group was consulted for medical management. He remains confused and agitated with sitter at bedside. The patient and family report he has not slept in 2 days and would like something to help him sleep.       Date::    2/23  Patient is nonverbal.  Sitting in recliner having hard time breathing and tachypneic with  "rales at the bases on exam  He is using Yunker to suction secretions.  IV Lasix ordered x1  Chest x-ray and ABG ordered    2/24/2021  Patient seen and examined earlier today  He wants his Suboxone back which was started.  Increase also Neurontin to his regular dose.  Had nasogastric tube for feeding and awaiting dietitian recommendation  Tachycardia and hypertensive in the starting him back on his home metoprolol.        2/25  Patient spiked fever overnight and had repeat blood cultures done  Vancomycin started but MRSA screen negative and then discontinued.  Respiratory panelShowedRhinovirus/enterovirus  Patient slightly drowsy but able to answer questions with little weak voice  Started on tube feeding    2/26    Patient had abnormal MRI.  We will ask neurosurgery for evaluation.  Hypernatremia noted and free water is increased/Nasogastric tube  Denies any new deficits.  He is wanting to drink Coca-Cola but only thickened liquids are allowed.  Discussed findings with his wife.  Radiation oncology also consulted    2/27  C/o generalized aches  Has prominent lid lag: check free t4 and tsh     2/28  No significant complaints today, tolerating TFs       Review of Systems   All other systems reviewed and are negative.        Objective   Objective      Vital Signs  Temp:  [97.3 °F (36.3 °C)-98.5 °F (36.9 °C)] 97.6 °F (36.4 °C)  Heart Rate:  [] 82  Resp:  [12-18] 18  BP: (115-142)/(53-84) 115/53  Oxygen Therapy  SpO2: 94 %  Pulse Oximetry Type: Continuous  Device (Oxygen Therapy): nasal cannula  Device (Oxygen Therapy): nasal cannula  Flow (L/min): 4  Oxygen Concentration (%): 36  Oximetry Probe Site Changed: No  Flowsheet Rows      First Filed Value   Admission Height  177.8 cm (70\") Documented at 02/18/2021 1048   Admission Weight  67.1 kg (148 lb) Documented at 02/18/2021 1048        Intake & Output (last 3 days)       02/26 0701 - 02/27 0700 02/27 0701 - 02/28 0700 02/28 0701 - 03/01 0700    P.O. 720 560     " Other  1348 1107    NG/GT  2166 1890    Total Intake(mL/kg) 720 (11.1) 4074 (62.7) 2997 (46.1)    Urine (mL/kg/hr) 825 (0.5)  700 (0.6)    Stool 0  0    Total Output 825  700    Net -105 +4074 +2297           Urine Unmeasured Occurrence 401 x 5 x     Stool Unmeasured Occurrence 1 x 5 x 1 x        Lines, Drains & Airways    Active LDAs     Name:   Placement date:   Placement time:   Site:   Days:    Peripheral IV 02/21/21 0736 Distal;Posterior;Right Forearm   02/21/21    0736    Forearm   2    Single Lumen Implantable Port 10/30/20 Left Subclavian   10/30/20    0956    Subclavian   116                  Physical Exam:    Physical Exam  Awake alert but able to respond to questions but has weak voice.  Mild respiratory distress tachypnea  Cachectic  HEENT normocephalic no JVD no lymphadenopathy pupils equal reactive to light mucous membrane moist  Nasogastric tube in place.  Cardiovascular S1-2 heard no extra sounds distant  Respiratory diminished breath sounds bilaterally with bilateral rales at the base  GI abdomen soft  Musculoskeletal right lower extremity neurovascular  Neurological nonfocal exam but patient oriented x2  Psych patient cooperative  Skin dry and warm         Wounds (last 24 hours)      LDA Wound     Row Name 02/28/21 2000 02/28/21 1621 02/28/21 1205       Wound 02/18/21 1900 Right gluteal Pressure Injury    Wound - Properties Group Placement Date: 02/18/21  -OSMAN Placement Time: 1900  -OSMAN Present on Hospital Admission: Y  -OSMAN Side: Right  -OSMAN Location: gluteal  -OSMAN Primary Wound Type: Pressure inj  -OSMAN Stage, Pressure Injury : Stage 2  -OSMAN    Dressing Appearance  --  --  no drainage  -CB    Closure  Open to air  -EM  Open to air  -CB  Open to air  -CB    Base  dressing in place, unable to visualize  -EM  dressing in place, unable to visualize  -CB  non-blanchable;subcutaneous;red;gray  -CB    Periwound Temperature  cool  -EM  cool  -CB  cool  -CB    Periwound Skin Turgor  firm  -EM  firm  -CB  firm   -CB    Edges  irregular  -EM  irregular  -CB  irregular  -CB    Drainage Amount  none  -EM  none  -CB  none  -CB    Care, Wound  --  --  cleansed with;sterile normal saline;collagenase agent applied  -CB    Dressing Care  --  --  dressing applied;foam mepilex  -CB    Periwound Care  dry periwound area maintained  -EM  --  barrier ointment applied  -CB    Retired Wound - Properties Group Date first assessed: 02/18/21  -OSMAN Time first assessed: 1900  -OSMAN Present on Hospital Admission: Y  -OSMAN Side: Right  -OSMAN Location: gluteal  -OSMAN Primary Wound Type: Pressure inj  -OSMAN       Wound 02/27/21 0000 medial sacral spine Pressure Injury    Wound - Properties Group Placement Date: 02/27/21  -EM Placement Time: 0000  -EM Present on Hospital Admission: --  -EM, unknown  Orientation: medial  -EM Location: sacral spine  -EM Primary Wound Type: Pressure inj  -EM Stage, Pressure Injury : unstageable  -EM, poss eschar     Dressing Appearance  dry;intact  -EM  --  --    Closure  BRANT  -EM  --  --    Base  non-blanchable;other (see comments) grey eschar like area with open spots  -EM  --  --    Periwound  gray  -EM  --  --    Periwound Care  dry periwound area maintained  -EM  --  --    Retired Wound - Properties Group Date first assessed: 02/27/21  -EM Time first assessed: 0000  -EM Present on Hospital Admission: --  -EM, unknown  Location: sacral spine  -EM Primary Wound Type: Pressure inj  -EM       Wound 02/18/21 1044 Bilateral posterior elbow Abrasion    Wound - Properties Group Placement Date: 02/18/21  -EM Placement Time: 1044  -EM Side: Bilateral  -EM Orientation: posterior  -EM Location: elbow  -EM Primary Wound Type: Abrasion  -EM, old scab     Dressing Appearance  dry;intact  -EM  --  --    Base  scab  -EM  --  --    Retired Wound - Properties Group Date first assessed: 02/18/21  -EM Time first assessed: 1044  -EM Side: Bilateral  -EM Location: elbow  -EM Primary Wound Type: Abrasion  -EM, old scab     Row Name 02/28/21 0806  02/28/21 0358          Wound 02/18/21 1900 Right gluteal Pressure Injury    Wound - Properties Group Placement Date: 02/18/21  -OSMAN Placement Time: 1900  -OSMAN Present on Hospital Admission: Y  -OSMAN Side: Right  -OSMAN Location: gluteal  -OSMAN Primary Wound Type: Pressure inj  -OSMAN Stage, Pressure Injury : Stage 2  -OSMAN    Dressing Appearance  no drainage;dry;intact  -CB  other (see comments) soiled  -EM     Closure  BRANT  -CB  --     Base  --  gray;other (see comments);non-blanchable elephant like rough skin around open area, poss eschar  -EM     Periwound  --  other (see comments) grey, elephant like rough skin similar to eschar  -EM     Periwound Temperature  --  cool  -EM     Periwound Skin Turgor  --  firm  -EM     Edges  --  irregular;open  -EM     Drainage Amount  none  -CB  --     Care, Wound  --  cleansed with;soap and water;dressing removed  -EM     Dressing Care  --  dressing changed;foam;silicone  -EM     Periwound Care  --  barrier ointment applied;dry periwound area maintained  -EM     Retired Wound - Properties Group Date first assessed: 02/18/21  -OSMAN Time first assessed: 1900  -OSMAN Present on Hospital Admission: Y  -OSMAN Side: Right  -OSMAN Location: gluteal  -OSMAN Primary Wound Type: Pressure inj  -OSMAN       Wound 02/27/21 0000 medial sacral spine Pressure Injury    Wound - Properties Group Placement Date: 02/27/21  -EM Placement Time: 0000  -EM Present on Hospital Admission: --  -EM, unknown  Orientation: medial  -EM Location: sacral spine  -EM Primary Wound Type: Pressure inj  -EM Stage, Pressure Injury : unstageable  -EM, poss eschar     Dressing Appearance  --  other (see comments) soiled  -EM     Base  --  non-blanchable;other (see comments);gray grey eschar like appearance with open areas  -EM     Periwound  --  non-blanchable;gray  -EM     Periwound Temperature  --  cool  -EM     Periwound Skin Turgor  --  firm  -EM     Edges  --  irregular  -EM     Care, Wound  --  cleansed with;soap and water;dressing removed   -EM     Dressing Care  --  dressing changed;foam;silicone  -EM     Periwound Care  --  absorptive dressing applied;topical treatment applied;dry periwound area maintained  -EM     Retired Wound - Properties Group Date first assessed: 02/27/21  -EM Time first assessed: 0000  -EM Present on Hospital Admission: --  -EM, unknown  Location: sacral spine  -EM Primary Wound Type: Pressure inj  -EM       Wound 02/18/21 1044 Bilateral posterior elbow Abrasion    Wound - Properties Group Placement Date: 02/18/21  -EM Placement Time: 1044  -EM Side: Bilateral  -EM Orientation: posterior  -EM Location: elbow  -EM Primary Wound Type: Abrasion  -EM, old scab     Retired Wound - Properties Group Date first assessed: 02/18/21  -EM Time first assessed: 1044  -EM Side: Bilateral  -EM Location: elbow  -EM Primary Wound Type: Abrasion  -EM, old scab       User Key  (r) = Recorded By, (t) = Taken By, (c) = Cosigned By    Initials Name Provider Type    Lara Mendenhall, RN Registered Nurse    Kennedi Gandhi, RN Registered Nurse    Berenice Cuevas RN Registered Nurse          Procedures:              Results Review:     I reviewed the patient's new clinical results.      Lab Results (last 24 hours)     Procedure Component Value Units Date/Time    POC Glucose Once [[id number]]  (Normal) Collected: 02/28/21 2327    Specimen: Blood Updated: 02/28/21 2336     Glucose 104 mg/dL      Comment: Serial Number: [id number]:  [id number]       Blood Culture - Blood, Hand, Left [[id number]] Collected: 02/24/21 2109    Specimen: Blood from Hand, Left Updated: 02/28/21 2130     Blood Culture No growth at 4 days    POC Glucose Once [[id number]]  (Abnormal) Collected: 02/28/21 1606    Specimen: Blood Updated: 02/28/21 1607     Glucose 136 mg/dL      Comment: Serial Number: [id number]:  [id number]       POC Glucose Once [[id number]]  (Abnormal) Collected: 02/28/21 1118    Specimen: Blood Updated: 02/28/21 1124     Glucose 140 mg/dL       Comment: Serial Number: [id number]:  [id number]       Comprehensive Metabolic Panel [[id number]]  (Abnormal) Collected: 02/28/21 0548    Specimen: Blood Updated: 02/28/21 0634     Glucose 127 mg/dL      BUN 39 mg/dL      Creatinine 0.76 mg/dL      Sodium 143 mmol/L      Potassium 4.5 mmol/L      Comment: Slight hemolysis detected by analyzer. Results may be affected.        Chloride 107 mmol/L      CO2 29.0 mmol/L      Calcium 8.8 mg/dL      Total Protein 6.3 g/dL      Albumin 2.80 g/dL      ALT (SGPT) 20 U/L      Comment: Specimen hemolyzed.  Results may be affected.        AST (SGOT) 26 U/L      Comment: Slight hemolysis detected by analyzer. Results may be affected.        Alkaline Phosphatase 151 U/L      Total Bilirubin 0.4 mg/dL      eGFR Non African Amer 101 mL/min/1.73      Globulin 3.5 gm/dL      A/G Ratio 0.8 g/dL      BUN/Creatinine Ratio 51.3     Anion Gap 7.0 mmol/L     Narrative:      GFR Normal >60  Chronic Kidney Disease <60  Kidney Failure <15      CBC & Differential [[id number]]  (Abnormal) Collected: 02/28/21 0548    Specimen: Blood Updated: 02/28/21 0603    Narrative:      The following orders were created for panel order CBC & Differential.  Procedure                               Abnormality         Status                     ---------                               -----------         ------                     Scan Slide[[id number]]                                                                  CBC Auto Differential[[id number]]        Abnormal            Final result                 Please view results for these tests on the individual orders.    CBC Auto Differential [[id number]]  (Abnormal) Collected: 02/28/21 0548    Specimen: Blood Updated: 02/28/21 0603     WBC 12.20 10*3/mm3      RBC 2.67 10*6/mm3      Hemoglobin 9.2 g/dL      Hematocrit 28.6 %      .1 fL      MCH 34.5 pg      MCHC 32.2 g/dL      RDW 24.3 %      RDW-SD 84.9 fl      MPV 9.9 fL      Platelets 147 10*3/mm3       Neutrophil % 85.6 %      Lymphocyte % 6.5 %      Monocyte % 7.7 %      Eosinophil % 0.0 %      Basophil % 0.2 %      Neutrophils, Absolute 10.50 10*3/mm3      Lymphocytes, Absolute 0.80 10*3/mm3      Monocytes, Absolute 0.90 10*3/mm3      Eosinophils, Absolute 0.00 10*3/mm3      Basophils, Absolute 0.00 10*3/mm3      nRBC 0.9 /100 WBC         No results found for: HGBA1C        Results from last 7 days   Lab Units 02/23/21  1529   PH, ARTERIAL pH units 7.465*   PO2 ART mm Hg 82.9*   PCO2, ARTERIAL mm Hg 30.1*   HCO3 ART mmol/L 21.7     No results found for: LIPASE  Lab Results   Component Value Date    CHOL 79 11/29/2019    TRIG 48 11/29/2019    HDL 33 (L) 11/29/2019    LDL 36 11/29/2019       Lab Results   Lab Value Date/Time    FINALDX  11/11/2020 0958     Tumor mass, right mainstem bronchus, biopsy:    Invasive moderately differentiated squamous cell carcinoma (see COMMENT)    NORBERTO/tkd       FINALDX  11/11/2020 0747     Smears of bronchial washings with cytospin preparation:    Occasional markedly atypical cells consistent with non-small cell carcinoma and exhibiting cytomorphologic      features most suggestive of squamous cell carcinoma     Background consists of acute inflammation with occasional fungal spores and hyphae morphologically most      consistent with Candida species    NORBERTO/tkd       COMDX  11/11/2020 0958     The biopsy was stained via immunohistochemical technique utilizing appropriate controls for the presence of p63, p40, CK5/6, napsin and TTF-1. The tumor cells are positive for CK5/6, p63 and p40 while being negative for TTF-1 and napsin. This staining pattern in conjunction with the histologic features is entirely consistent with squamous cell carcinoma. The tumor is focally necrotic. There is no lymph-vascular space invasion identified.      NORBERTO/tkd       COMDX  11/11/2020 0761     Please correlate with concurrent surgical pathology specimen ([id number]).    NORBERTO/tkd          Microbiology  Results (last 10 days)     Procedure Component Value - Date/Time    Blood Culture - Blood, Hand, Left [[id number]] Collected: 02/24/21 2109    Lab Status: Preliminary result Specimen: Blood from Hand, Left Updated: 02/28/21 2130     Blood Culture No growth at 4 days    MRSA Screen, PCR (Inpatient) - Swab, Nares [[id number]]  (Normal) Collected: 02/24/21 1952    Lab Status: Final result Specimen: Swab from Nares Updated: 02/24/21 2112     MRSA PCR No MRSA Detected    COVID PRE-OP / PRE-PROCEDURE SCREENING ORDER (NO ISOLATION) - Swab, Nasopharynx [[id number]]  (Abnormal) Collected: 02/24/21 1951    Lab Status: Final result Specimen: Swab from Nasopharynx Updated: 02/24/21 2339    Narrative:      The following orders were created for panel order COVID PRE-OP / PRE-PROCEDURE SCREENING ORDER (NO ISOLATION) - Swab, Nasopharynx.  Procedure                               Abnormality         Status                     ---------                               -----------         ------                     Respiratory Panel PCR w/...[[id number]]  Abnormal            Final result                 Please view results for these tests on the individual orders.    Respiratory Panel PCR w/COVID-19(SARS-CoV-2) PARESH/GRACIELA/KATHY/PAD/COR/MAD/CHRIST In-House, NP Swab in UTM/VTM, 3-4 HR TAT - Swab, Nasopharynx [[id number]]  (Abnormal) Collected: 02/24/21 1951    Lab Status: Final result Specimen: Swab from Nasopharynx Updated: 02/24/21 2339     ADENOVIRUS, PCR Not Detected     Coronavirus 229E Not Detected     Coronavirus HKU1 Not Detected     Coronavirus NL63 Not Detected     Coronavirus OC43 Not Detected     COVID19 Not Detected     Human Metapneumovirus Not Detected     Human Rhinovirus/Enterovirus Detected     Influenza A PCR Not Detected     Influenza B PCR Not Detected     Parainfluenza Virus 1 Not Detected     Parainfluenza Virus 2 Not Detected     Parainfluenza Virus 3 Not Detected     Parainfluenza Virus 4 Not Detected     RSV, PCR Not Detected      Bordetella pertussis pcr Not Detected     Bordetella parapertussis PCR Not Detected     Chlamydophila pneumoniae PCR Not Detected     Mycoplasma pneumo by PCR Not Detected    Narrative:      Fact sheet for providers: https://docs.[employer]/wp-content/uploads/GPV7565-4540-VY6.1-EUA-Provider-Fact-Sheet-3.pdf    Fact sheet for patients: https://docs.[employer]/wp-content/uploads/ZKM3482-3519-XI2.1-EUA-Patient-Fact-Sheet-1.pdf    Test performed by PCR.    Urine Culture - Urine, Urine, Catheter In/Out [[id number]]  (Normal) Collected: 02/20/21 1906    Lab Status: Final result Specimen: Urine, Catheter In/Out Updated: 02/21/21 1951     Urine Culture No growth    Respiratory Culture - Sputum, ET Suction [[id number]] Collected: 02/19/21 1222    Lab Status: Final result Specimen: Sputum from ET Suction Updated: 02/21/21 0909     Respiratory Culture No growth     Gram Stain Few (2+) Epithelial cells per low power field      Many (4+) WBCs per low power field      No organisms seen          ECG/EMG Results (most recent)     Procedure Component Value Units Date/Time    ECG 12 Lead [[id number]] Collected: 02/18/21 1050     Updated: 02/19/21 1421     QT Interval 291 ms     Narrative:      HEART RATE= 130  bpm  RR Interval= 462  ms  WI Interval= 132  ms  P Horizontal Axis= -23  deg  P Front Axis= 79  deg  QRSD Interval= 83  ms  QT Interval= 291  ms  QRS Axis= 55  deg  T Wave Axis=   deg  - ABNORMAL ECG -  Sinus tachycardia  Left atrial enlargement  Anteroseptal infarct, age indeterminate  When compared with ECG of 09-Nov-2020 13:39:57,  Significant rate increase  Significant axis, voltage or hypertrophy change  Electronically Signed By: Higinio Flores (KATHY) 19-Feb-2021 14:11:44  Date and Time of Study: 2021-02-18 10:50:09    ECG 12 Lead [[id number]] Collected: 02/24/21 0611     Updated: 02/24/21 0612     QT Interval 358 ms     Narrative:      HEART RATE= 121  bpm  RR Interval= 496  ms  WI Interval= 120  ms  P Horizontal  Axis= -25  deg  P Front Axis= 64  deg  QRSD Interval= 108  ms  QT Interval= 358  ms  QRS Axis= -6  deg  T Wave Axis= 85  deg  - ABNORMAL ECG -  Sinus tachycardia  LAE, consider biatrial enlargement  Prolonged QT interval  When compared with ECG of 23-Feb-2021 7:44:24,  Nonspecific significant change  Electronically Signed By:   Date and Time of Study: 2021-02-24 06:11:17    ECG 12 Lead [[id number]] Collected: 02/20/21 1846     Updated: 02/24/21 0721     QT Interval 340 ms     Narrative:      HEART RATE= 119  bpm  RR Interval= 496  ms  VA Interval=   ms  P Horizontal Axis=   deg  P Front Axis=   deg  QRSD Interval= 87  ms  QT Interval= 340  ms  QRS Axis= 66  deg  T Wave Axis= 75  deg  - ABNORMAL ECG -  Atrial flutter with predominant 2:1 AV block  Ventricular premature complex  Consider anterior infarct  When compared with ECG of 18-Feb-2021 10:50:09,  Nonspecific significant change  Electronically Signed By: Fuad Montero (KATHY) 24-Feb-2021 07:14:30  Date and Time of Study: 2021-02-20 18:46:19    ECG 12 Lead [[id number]] Collected: 02/21/21 0947     Updated: 02/24/21 0731     QT Interval 379 ms     Narrative:      HEART RATE= 100  bpm  RR Interval= 600  ms  VA Interval= 136  ms  P Horizontal Axis= -16  deg  P Front Axis= 98  deg  QRSD Interval= 95  ms  QT Interval= 379  ms  QRS Axis= 63  deg  T Wave Axis= 70  deg  - BORDERLINE ECG -  Sinus tachycardia  Consider left ventricular hypertrophy  When compared with ECG of 20-Feb-2021 18:46:19,  Significant axis, voltage or hypertrophy change  Electronically Signed By: Fuad Montero (KATHY) 24-Feb-2021 07:19:26  Date and Time of Study: 2021-02-21 09:47:53    ECG 12 Lead [[id number]] Collected: 02/25/21 0552     Updated: 02/25/21 0554     QT Interval 356 ms     Narrative:      HEART RATE= 106  bpm  RR Interval= 568  ms  VA Interval= 112  ms  P Horizontal Axis= -38  deg  P Front Axis= 63  deg  QRSD Interval= 106  ms  QT Interval= 356  ms  QRS Axis= -8  deg  T Wave Axis= 80  deg  -  ABNORMAL ECG -  Sinus tachycardia  LAE, consider biatrial enlargement  Anteroseptal infarct, age indeterminate  When compared with ECG of 24-Feb-2021 6:11:17,  Significant repolarization change  Electronically Signed By:   Date and Time of Study: 2021-02-25 05:52:57    ECG 12 Lead [[id number]] Collected: 02/23/21 0744     Updated: 02/26/21 1535     QT Interval 395 ms     Narrative:      HEART RATE= 111  bpm  RR Interval= 540  ms  NJ Interval= 156  ms  P Horizontal Axis= -42  deg  P Front Axis= 79  deg  QRSD Interval= 122  ms  QT Interval= 395  ms  QRS Axis= -14  deg  T Wave Axis= 86  deg  - ABNORMAL ECG -  Sinus tachycardia  Biatrial enlargement  Evolving anterior infarct since 22-Feb-2021 7:19:57  When compared with ECG of 22-Feb-2021 7:19:57,  New or worsened ischemia or infarction  Significant axis, voltage or hypertrophy change  Electronically Signed By: Braden Mcdaniel ([facility]) 26-Feb-2021 15:31:30  Date and Time of Study: 2021-02-23 07:44:24    ECG 12 Lead [[id number]] Collected: 02/26/21 0544     Updated: 02/26/21 1945     QT Interval 345 ms     Narrative:      HEART RATE= 125  bpm  RR Interval= 496  ms  NJ Interval= 110  ms  P Horizontal Axis= -34  deg  P Front Axis= 73  deg  QRSD Interval= 107  ms  QT Interval= 345  ms  QRS Axis= -4  deg  T Wave Axis= 92  deg  - ABNORMAL ECG -  Sinus tachycardia  Atrial premature complexes  LAE, consider biatrial enlargement  When compared with ECG of 25-Feb-2021 5:52:57,  No significant change  Electronically Signed By: Rebeca Jacobs ([facility]) 26-Feb-2021 19:35:13  Date and Time of Study: 2021-02-26 05:44:41    ECG 12 Lead [[id number]] Collected: 02/22/21 0719     Updated: 02/28/21 1657     QT Interval 394 ms     Narrative:      HEART RATE= 110  bpm  RR Interval= 544  ms  NJ Interval= 142  ms  P Horizontal Axis= -26  deg  P Front Axis= 70  deg  QRSD Interval= 122  ms  QT Interval= 394  ms  QRS Axis= -14  deg  T Wave Axis= 86  deg  - ABNORMAL ECG -  Sinus  tachycardia  Biatrial enlargement  Left ventricular hypertrophy  When compared with ECG of 21-Feb-2021 9:47:53,  No significant change  Electronically Signed By: Iglesia Springer ([facility]) 28-Feb-2021 16:56:20  Date and Time of Study: 2021-02-22 07:19:57    ECG 12 Lead [[id number]] Collected: 02/27/21 2326     Updated: 02/28/21 1759     QT Interval 381 ms     Narrative:      HEART RATE= 90  bpm  RR Interval= 668  ms  OH Interval= 114  ms  P Horizontal Axis= -41  deg  P Front Axis= 70  deg  QRSD Interval= 84  ms  QT Interval= 381  ms  QRS Axis= 59  deg  T Wave Axis= 51  deg  - ABNORMAL ECG -  Sinus rhythm  Left atrial enlargement  Anteroseptal infarct, age indeterminate  When compared with ECG of 27-Feb-2021 5:51:38,  Significant axis, voltage or hypertrophy change  Electronically Signed By: Rebeca Jacobs ([facility]) 28-Feb-2021 17:49:16  Date and Time of Study: 2021-02-27 23:26:41    ECG 12 Lead [[id number]] Collected: 02/27/21 0551     Updated: 02/28/21 1759     QT Interval 347 ms     Narrative:      HEART RATE= 103  bpm  RR Interval= 584  ms  OH Interval= 108  ms  P Horizontal Axis= -36  deg  P Front Axis= 63  deg  QRSD Interval= 85  ms  QT Interval= 347  ms  QRS Axis= 50  deg  T Wave Axis= -4  deg  - BORDERLINE ECG -  Sinus tachycardia  Left atrial enlargement  Probable left ventricular hypertrophy  When compared with ECG of 26-Feb-2021 5:44:41,  Significant rate decrease  Significant axis, voltage or hypertrophy change  Electronically Signed By: Rebeca Jacobs ([facility]) 28-Feb-2021 17:49:24  Date and Time of Study: 2021-02-27 05:51:38               Results for orders placed during the hospital encounter of 11/08/20   Adult Transthoracic Echo Complete W/ Cont if Necessary Per Protocol    Narrative · Estimated left ventricular EF = 65% Left ventricular systolic function   is normal.  · Left ventricular diastolic function is consistent with (grade Ia w/high   LAP) impaired relaxation.  · The right atrial cavity  is mildly dilated.  · Mild aortic valve stenosis is present.  · Mild to moderate LVOT gradient noted          Ct Abdomen Pelvis Without Contrast    Result Date: 2/24/2021  1.Large amount of formed stool in the rectum suspicious for constipation. Disimpaction may be necessary. There is also an above average amount of formed stool in the proximal colon. 2.Layering hyperdense material in the gallbladder, which could be seen with spurious excretion of contrast from prior contrast enhanced studies, particularly if there is renal failure. This could also be related to biliary sludge and/or gallstones. 3.Emphysema with bilateral lower lobe airspace opacities, similar to the prior chest CT, which could be related to pneumonia or aspiration. Opacities in the right lower lobe appears slightly decreased. There is minimal consolidation and trace pleural effusion in the posterior left thorax. 4.Probable hepatic and renal cysts, as before. 5.Calcific atherosclerosis. 6.Enteric tube terminates in the stomach.    Electronically Signed By-Adi Delgado MD On:2/24/2021 7:48 PM This report was finalized on [id number] by  Adi Delgado MD.    Xr Abdomen 2+ Vw With Chest 1 Vw    Result Date: 2/23/2021   1. Rectosigmoid colon fecal impaction. 2. No pneumatosis or free air. 3. Large mass in the right upper lobe representing malignancy until proven otherwise. 4. Patchy right basilar consolidation representing either subsegmental atelectasis or pneumonia.  Electronically Signed By-Brock Kerr MD On:2/23/2021 11:53 AM This report was finalized on [id number] by  Brock Kerr MD.    Mri Brain Without Contrast    Result Date: 2/25/2021   1. Signal intensity changes within the left temporal occipital lobe, as described above, worrisome for cortical metastatic deposit with surrounding vasogenic edema, over that of evolving ischemia. Additionally, question leptomeningeal thickening or potential metastatic infiltration over the posterior  cerebral convexities. MRI brain with contrast would be most helpful in this distinction.   Electronically Signed By-Ivana Mirza MD On:2/25/2021 2:54 PM This report was finalized on [id number] by  Ivana Mirza MD.    Mri Brain With Contrast    Result Date: 2/26/2021  There is a single thick-walled ring-enhancing lesion in the medial aspect of the left occipital lobe measuring 1.1 x 1.0 cm. There was vasogenic edema on yesterday's MRI of the brain in this location. This represents metastatic disease until proven otherwise. A small primary glioma or brain abscess can have a similar imaging appearance.  Electronically Signed By-Brock Kerr MD On:2/26/2021 10:19 AM This report was finalized on [id number] by  Brock Kerr MD.    Fl Video Swallow With Speech Single Contrast    Result Date: 2/26/2021  Technically successful modified barium swallow examination.  Electronically Signed By-Brock Kerr MD On:2/26/2021 12:43 PM This report was finalized on [id number] by  Brock Kerr MD.    Xr Chest 1 View    Result Date: 2/23/2021  1.Interval extubation. 2.Decreased right basilar opacities with mild persistent opacities which could represent residual atelectasis or infiltrate. 3.Right lung mass, as before.  Electronically Signed By-Adi Delgado MD On:2/23/2021 5:57 PM This report was finalized on [id number] by  Adi Delgado MD.    Xr Abdomen Kub    Result Date: 2/24/2021  Enteric tube within stomach.  Electronically Signed By-Mago Flores MD On:2/24/2021 7:33 AM This report was finalized on [id number] by  Mago Flores MD.          Xrays, labs reviewed personally by physician.    Medication Review:   I have reviewed the patient's current medication list      Scheduled Meds  aspirin, 81 mg, Oral, Daily  atorvastatin, 40 mg, Oral, Nightly  budesonide, 0.5 mg, Nebulization, BID - RT  buprenorphine-naloxone, 1 tablet, Sublingual, Daily  collagenase, , Topical, Q24H  docusate, 100 mg, Oral, BID  epoetin  asia-epbx, 40,000 Units, Subcutaneous, Q7 Days  furosemide, 20 mg, Intravenous, Q12H  gabapentin, 300 mg, Oral, TID  guaiFENesin, 300 mg, Oral, Q6H  ipratropium-albuterol, 3 mL, Nebulization, Q4H - RT  lurasidone, 40 mg, Oral, Daily  metoprolol tartrate, 50 mg, Oral, Q12H  pantoprazole, 40 mg, Intravenous, Q AM  predniSONE, 10 mg, Oral, Daily With Breakfast  sertraline, 50 mg, Oral, Daily  spironolactone, 25 mg, Oral, Daily        Meds Infusions       Meds PRN  •  acetaminophen  •  acetaminophen  •  bisacodyl  •  Calcium Gluconate-NaCl **AND** calcium gluconate **AND** Calcium, Ionized  •  haloperidol lactate  •  ibuprofen  •  labetalol  •  magnesium sulfate **OR** magnesium sulfate **OR** magnesium sulfate  •  melatonin  •  metoprolol tartrate  •  ondansetron  •  potassium chloride **OR** potassium chloride **OR** potassium chloride  •  potassium phosphate infusion greater than 15 mMoles **OR** potassium phosphate infusion greater than 15 mMoles **OR** potassium phosphate **OR** sodium phosphate IVPB **OR** sodium phosphate IVPB **OR** sodium phosphate IVPB  •  sodium chloride        Assessment/Plan   Assessment/Plan     Active Hospital Problems    Diagnosis  POA   • **Acute on chronic respiratory failure with hypoxia (CMS/HCC) [J96.21]  Yes   • COPD with acute exacerbation (CMS/HCC) [J44.1]  Yes   • Delirium [R41.0]  Yes   • Moderate malnutrition (CMS/HCC) [E44.0]  Yes   • Pneumonia due to infectious organism [J18.9]  Yes   • Squamous cell carcinoma of lung, right (CMS/HCC) [C34.91]  Yes   • Diastolic CHF, acute (CMS/HCC) [I50.31]  Yes   • Hx of aortic valve replacement [Z95.2]  Not Applicable   • Essential hypertension [I10]  Yes      Resolved Hospital Problems   No resolved problems to display.       MEDICAL DECISION MAKING COMPLEXITY BY PROBLEM:     Acute respiratory failure requiring mechanical ventilation  -Secondary to pneumonia, COPD exacerbation  -Extubated 2/20/2021 now on room air  -On IV Zosyn, IV  Solu-Medrol 20 mg being tapered.  -Supplemental oxygen  -Appreciate pulmonary input     Pneumonia  -CT PE protocol 2/18/2021: No PE, patchy airspace disease within bilateral lower lobes, right upper lobe mass invading right upper lobe bronchus and obstructing it.  Increased narrowing or obstruction right middle lobe bronchus  -Blood cultures no growth to date  -Respiratory cultures negative on the 19th  -On IV Zosyn  -Positive rhinovirus 2/24/2021 not present on admission.  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-s/p chemoradiation completed 1/2021 and now on chemotherapy (carbo/Taxol) dosed 2/9 with Neulasta support      H/o CABG/aortic valve bioprosthetic replacement,   Add aspirin statin  Resume home metoprolol    Diastolic congestive heart failure  IV Lasix 20 twice daily  Resume home Aldactone    Anemia, GOGO, MDS, thrombocytopenia  -Chemotherapy-induced  -Status post 1 unit PRBCs 2/19/2021  -On Retacrit, and iron sucrose  Hematology is following    Hypokalemia and hypomagnesemia replace per protocol    Urinary retention  In and out cath as needed  Abnormal urinalysis but urine culture negative    Constipation  CT abdomen pelvis reviewed.  Laxatives added.    Delirium, possibly withdrawal from gabapentin and/or Suboxone  -CT head showed no acute findings.  -As needed Haldol -Resume home lurasidone and Zoloft  -Resume home Neurontin and Suboxone  -MRI brain if not improving  -Psych input appreciated.  -better    Has prominent lid lag: check free t4 and tsh    Abnormal MRI brain showingis a single thick-walled ring-enhancing lesion in the medial aspect of the left occipital lobe measuring 1.1 x 1.0 cm. There was  vasogenic edema on yesterday's MRI of the brain in this location. This  represents metastatic disease until proven otherwise. A small primary  glioma or brain abscess can have a similar imaging appearance.    Neurosurgery agreeable on plan.  Radiation oncologist recommended:stereotactic radiosurgery to the occipital mass to be done as an outpatient.---He is scheduled for radiation simulation on Monday 3/1/21 with plan for likely treatment on 3/3/21.      Disposition. DC Plan: Declined IP Rehab. New [facility] referral pending. [facility] following for backup (Currently unable to provide ST if needed)    Complex case high risk    VTE Prophylaxis -thrombocytopenia  Mechanical Order History:      Ordered        02/19/21 0228  Place Sequential Compression Device  Once         02/19/21 0228  Maintain Sequential Compression Device  Continuous                 Pharmalogical Order History:     None            Code Status -   Code Status and Medical Interventions:   Ordered at: 02/19/21 0228     Code Status:    CPR     Medical Interventions (Level of Support Prior to Arrest):    Full       This patient has been examined wearing appropriate Personal Protective Equipment and discussed with hospital infection control department. 02/28/21        Discharge Planning  [facility] rehab        Electronically signed by Shwetha Olivares MD, 02/28/21, 23:54 EST.  [facility]ist [facility]

## 2021-03-01 NOTE — PLAN OF CARE
Goal Outcome Evaluation:     Progress: improving  Outcome Summary: Pt mental status remains about the same. On 2.5 liters of oxygen and tube feeds still going at 65. Plans for radiation and video swallow again tomorrow. Will continue to monitor

## 2021-03-01 NOTE — PROGRESS NOTES
Hematology/Oncology Inpatient Progress Note    PATIENT NAME: Axel Ramirez  : 1950  MRN: [id number]    CHIEF COMPLAINT: Hypoxia and respiratory failure     HISTORY OF PRESENT ILLNESS:    70 y.o. male admitted to the ICU through the ED 2021 with hypoxia and respiratory failure requiring intubation.  The patient was intubated and sedated at time of consult and histories were taken from review of records and spouse report.  His spouse reported he had been feeling better and had tolerated recent chemotherapy for his lung cancer.  He was eating well after start of Megace but did develop increasing shortness of air a few days prior to admission without a change in his chronic cough.  She denied known fever, chills, or hemoptysis.  On admission, CT PE protocol was limited due to motion but felt negative for central PE.  There was evidence of pneumonia versus aspiration and known right upper lobe lung mass invading the right upper lobe bronchus with known obstruction and with increased narrowing/obstruction of the right middle lobe bronchus.  There was suggestion of progressive tumor infiltration of the right mainstem bronchus and new left paratracheal adenopathy with stable right paratracheal and subcarinal adenopathy.  CBC revealed WBC 8.4, hemoglobin 8.6, .7, and platelets 106,000.  D-dimer was elevated to 1.98 (0-0.59).  Creatinine was not elevated and LFTs were okay.  Respiratory viral panel was negative and blood cultures were sent.  On 2021 hemoglobin dropped to 7.4 and platelets to 92,000.     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  His lung cancer was initially treated with concomitant weekly chemotherapy (paclitaxel and carboplatin x7) and radiation  therapy from November 2020. He completed radiation therapy on 1/6/2021.  He completed the weekly portion of his chemotherapy on 1/12/2021.  He was seen in follow-up on 2/4/2021 where it was planned to continue chemotherapy with paclitaxel and carboplatin along with Neulasta support every 3 weeks.  He was scheduled for CT scan chest abdomen and pelvis on 2/18/2021 as an outpatient prior to his admission.  He received chemotherapy with Neulasta support on 2/9/2021 at which time CBC revealed WBC 7.62, hemoglobin 9.5, and platelets 223,000.  He was anemic at time of diagnosis of his lung cancer and anemia work-up revealed iron deficiency as well as MDS.  He received oral iron however he was diagnosed with malabsorption and received IV iron in November 2020 which was also repeated in December 2020.  His bone marrow had revealed mild increased iron storage in January 2021.  At his follow-up visit on 2/4/2021 t Retacrit 30,000 units subcu weekly was ordered for his myelodysplastic syndrome while continuing oral iron for his iron deficiency with iron saturation of 17%.  He had not started Retacrit prior to admission pending insurance authorization.     PCP: Sandoval Stevenson FNP    INTERVAL HISTORY:  2/19/2021-received 1 unit pRBCs. Iron 21 (59-1 58), iron saturation 12 (20-50), transferrin 121 (200-3 60), TIBC 180 (298-536), ferritin 2658 (), reticulocytes 1.93 (0.7 0-1.90), haptoglobin 294 (). Started Venofer 300 mg IV x 3 days. Patient extubated, on nasal canula and Precedex.  2/20/2021 -white blood count 8.8, hemoglobin 8, .4, platelets 57,000.  CT head with moderate periventricular white matter changes present likely related to chronic microvascular ischemic change, progressed as compared to the previous study.  Hypodensity present within the left occipital region which appeared more chronic or remote, new as compared to the previous study.  Chest x-ray with improvement in right basilar airspace  disease with resolution of the left airspace disease.  Stable right upper lobe mass.  2/21/2021-Retacrit 40,000 units subcu weekly started for hemoglobin 7.9.  2/22/2021-patient moved out of ICU.  2/23/2021-patient moved to PCU for tachypnea and tachycardia.  02/25/21-CT abdomen and pelvis without showed no evidence of malignancy. There was layering hyperdense material in the gallbladder with spurious excretion of contrast from prior contrast-enhanced study versus sludge/gallstones in differential, constipation, and known pneumonia/aspiration. Respiratory viral panel positive for rhinovirus/enterovirus. Psych consulted and patient started back on home meds including Suboxone, Latuda, and sertraline.  MRI brain without contrast suggestive of left cerebral cortical and leptomeningeal mets.  2/26/2021-hemoglobin 9.7, platelets 112,000, WBC 10.3.  More alert and oriented.  2/27/2021-MRI brain with contrast showed a 1.1 x 1 cm left occipital lobe lesion. Neurosurgery not recommending resection. Rad Onc planning SRS on 3/3 if patient can tolerate planning. Erythropoietin level 362 (2.6-18.5).    2/28/2021-Reviewed MRI brain findings. Patient and spouse both wanting to proceed with radiation therapy.  Platelets improved to 147,000.     History of present illness reviewed since last visit and changes noted on 03/01/21.    Subjective   Complains of some mild shortness of air and cough.    ROS:   Review of Systems   Constitutional: Positive for fatigue. Negative for activity change, chills, fever and unexpected weight change.   HENT: Positive for voice change ( hoarse). Negative for congestion, dental problem, hearing loss, mouth sores, nosebleeds, sore throat and trouble swallowing.    Eyes: Negative for photophobia and visual disturbance.   Respiratory: Positive for cough and shortness of breath. Negative for choking and chest tightness.    Cardiovascular: Negative for chest pain, palpitations and leg swelling.    Gastrointestinal: Negative for abdominal distention, abdominal pain, blood in stool, constipation, diarrhea, nausea and vomiting.   Endocrine: Negative for cold intolerance and heat intolerance.   Genitourinary: Negative for decreased urine volume, difficulty urinating, dysuria, enuresis, frequency, hematuria and urgency.        Incontinence   Musculoskeletal: Negative for arthralgias and gait problem.   Skin: Negative for rash and wound.   Neurological: Positive for weakness. Negative for dizziness, tremors, speech difficulty, light-headedness, numbness and headaches.   Hematological: Negative for adenopathy. Does not bruise/bleed easily.   Psychiatric/Behavioral: Negative for confusion and hallucinations. The patient is not nervous/anxious.    All other systems reviewed and are negative.     MEDICATIONS:    Scheduled Meds:  aspirin, 81 mg, Oral, Daily  atorvastatin, 40 mg, Oral, Nightly  budesonide, 0.5 mg, Nebulization, BID - RT  buprenorphine-naloxone, 1 tablet, Sublingual, Daily  collagenase, , Topical, Q24H  docusate, 100 mg, Oral, BID  epoetin asia-epbx, 40,000 Units, Subcutaneous, Q7 Days  furosemide, 20 mg, Intravenous, Q12H  gabapentin, 300 mg, Oral, TID  guaiFENesin, 300 mg, Oral, Q6H  ipratropium-albuterol, 3 mL, Nebulization, Q4H - RT  lurasidone, 40 mg, Oral, Daily  metoprolol tartrate, 50 mg, Oral, Q12H  pantoprazole, 40 mg, Intravenous, Q AM  predniSONE, 10 mg, Oral, Daily With Breakfast  sertraline, 50 mg, Oral, Daily  spironolactone, 25 mg, Oral, Daily       Continuous Infusions:      PRN Meds:  •  acetaminophen  •  acetaminophen  •  bisacodyl  •  Calcium Gluconate-NaCl **AND** calcium gluconate **AND** Calcium, Ionized  •  haloperidol lactate  •  ibuprofen  •  labetalol  •  magnesium sulfate **OR** magnesium sulfate **OR** magnesium sulfate  •  melatonin  •  metoprolol tartrate  •  ondansetron  •  potassium chloride **OR** potassium chloride **OR** potassium chloride  •  potassium phosphate  "infusion greater than 15 mMoles **OR** potassium phosphate infusion greater than 15 mMoles **OR** potassium phosphate **OR** sodium phosphate IVPB **OR** sodium phosphate IVPB **OR** sodium phosphate IVPB  •  sodium chloride     ALLERGIES:  No Known Allergies    Objective    VITALS:   /64   Pulse 88   Temp 97.2 °F (36.2 °C) (Oral)   Resp 15   Ht 177.8 cm (70\")   Wt 65 kg (143 lb 4.8 oz) Comment: with blankets on bed. RN aware  SpO2 98%   BMI 20.56 kg/m²     PHYSICAL EXAM:  Physical Exam  Vitals signs and nursing note reviewed.   Constitutional:       General: He is not in acute distress.     Appearance: He is well-developed. He is ill-appearing (chronically). He is not diaphoretic.      Interventions: Nasal cannula in place.      Comments: Cachectic    HENT:      Head: Normocephalic and atraumatic.      Comments: Male pattern baldness/alopecia.     Right Ear: External ear normal.      Left Ear: External ear normal.      Nose: Nose normal.      Comments: O2 by NC.  NG tube present     Mouth/Throat:      Mouth: Mucous membranes are dry.      Pharynx: No oropharyngeal exudate or posterior oropharyngeal erythema.      Comments: Edentulous  Eyes:      General: No scleral icterus.     Extraocular Movements: Extraocular movements intact.      Conjunctiva/sclera: Conjunctivae normal.      Pupils: Pupils are equal, round, and reactive to light.   Neck:      Musculoskeletal: Normal range of motion and neck supple.   Cardiovascular:      Rate and Rhythm: Normal rate and regular rhythm.      Heart sounds: Normal heart sounds. No murmur.      Comments: Left chest wall Zsnhzz-[id number]. Monitor leads.  Midline vertical chest wall scar  Pulmonary:      Effort: Pulmonary effort is normal. No respiratory distress.      Breath sounds: Rhonchi ( improving somewhat ) present. No wheezing or rales.   Chest:      Chest wall: No tenderness.      Comments: Left chest wall Tqy[place]-d-xgew accessed.   Abdominal:      General: Bowel " sounds are normal. There is no distension.      Palpations: Abdomen is soft. There is no mass.      Tenderness: There is no abdominal tenderness. There is no guarding.   Genitourinary:     Comments: External urinary catheter  Musculoskeletal: Normal range of motion.         General: No swelling, tenderness or deformity.      Comments: LUE O2 monitor and BP cuff.   Lymphadenopathy:      Cervical: No cervical adenopathy.      Upper Body:      Right upper body: No supraclavicular adenopathy.      Left upper body: No supraclavicular adenopathy.   Skin:     General: Skin is warm and dry.      Coloration: Skin is pale. Skin is not jaundiced.      Findings: No bruising, erythema or rash.      Comments: Pressure bandages to bilateral elbows    Neurological:      General: No focal deficit present.      Mental Status: He is oriented to person, place, and time.      Motor: Weakness (generalized) present.   Psychiatric:         Mood and Affect: Mood normal.         Behavior: Behavior normal.       RECENT LABS:  Lab Results (last 24 hours)     Procedure Component Value Units Date/Time    Manual Differential [[id number]]  (Abnormal) Collected: 03/01/21 0617    Specimen: Blood Updated: 03/01/21 0723     Neutrophil % 72.0 %      Lymphocyte % 11.0 %      Monocyte % 8.0 %      Bands %  6.0 %      Metamyelocyte % 1.0 %      Myelocyte % 1.0 %      Atypical Lymphocyte % 1.0 %      Neutrophils Absolute 7.25 10*3/mm3      Lymphocytes Absolute 1.02 10*3/mm3      Monocytes Absolute 0.74 10*3/mm3      Anisocytosis Slight/1+     Macrocytes Slight/1+     Poikilocytes Slight/1+     RBC Fragments Slight/1+     WBC Morphology Normal     Platelet Morphology Normal    CBC & Differential [[id number]]  (Abnormal) Collected: 03/01/21 0617    Specimen: Blood Updated: 03/01/21 0723    Narrative:      The following orders were created for panel order CBC & Differential.  Procedure                               Abnormality         Status                      ---------                               -----------         ------                     Scan Slide[[id number]]                                       Final result               CBC Auto Differential[[id number]]        Abnormal            Final result                 Please view results for these tests on the individual orders.    CBC Auto Differential [[id number]]  (Abnormal) Collected: 03/01/21 0617    Specimen: Blood Updated: 03/01/21 0723     WBC 9.30 10*3/mm3      RBC 2.63 10*6/mm3      Hemoglobin 9.2 g/dL      Hematocrit 28.0 %      .7 fL      MCH 34.8 pg      MCHC 32.7 g/dL      RDW 24.8 %      RDW-SD 89.7 fl      MPV 9.3 fL      Platelets 148 10*3/mm3     Narrative:      The previously reported component NRBC is no longer being reported. Previous result was 0.5 /100 WBC (Reference Range: 0.0-0.2 /100 WBC) on 3/1/2021 at 0641 EST.    Scan Slide [[id number]] Collected: 03/01/21 0617    Specimen: Blood Updated: 03/01/21 0723     Scan Slide --     Comment: See Manual Differential Results       Comprehensive Metabolic Panel [[id number]]  (Abnormal) Collected: 03/01/21 0617    Specimen: Blood Updated: 03/01/21 0707     Glucose 111 mg/dL      BUN 38 mg/dL      Creatinine 0.64 mg/dL      Sodium 142 mmol/L      Potassium 3.8 mmol/L      Chloride 104 mmol/L      CO2 31.0 mmol/L      Calcium 8.6 mg/dL      Total Protein 6.3 g/dL      Albumin 2.70 g/dL      ALT (SGPT) 20 U/L      AST (SGOT) 23 U/L      Alkaline Phosphatase 160 U/L      Total Bilirubin 0.4 mg/dL      eGFR Non African Amer 124 mL/min/1.73      Globulin 3.6 gm/dL      A/G Ratio 0.8 g/dL      BUN/Creatinine Ratio 59.4     Anion Gap 7.0 mmol/L     Narrative:      GFR Normal >60  Chronic Kidney Disease <60  Kidney Failure <15      POC Glucose Once [[id number]]  (Normal) Collected: 02/28/21 2327    Specimen: Blood Updated: 02/28/21 2336     Glucose 104 mg/dL      Comment: Serial Number: [id number]:  [id number]       Blood Culture - Blood, Hand, Left  [[id number]] Collected: 02/24/21 2109    Specimen: Blood from Hand, Left Updated: 02/28/21 2130     Blood Culture No growth at 4 days    POC Glucose Once [[id number]]  (Abnormal) Collected: 02/28/21 1606    Specimen: Blood Updated: 02/28/21 1607     Glucose 136 mg/dL      Comment: Serial Number: [id number]:  [id number]       POC Glucose Once [[id number]]  (Abnormal) Collected: 02/28/21 1118    Specimen: Blood Updated: 02/28/21 1124     Glucose 140 mg/dL      Comment: Serial Number: [id number]:  [id number]           PENDING RESULTS:  n/a    IMAGING REVIEWED:  No radiology results for the last day  I have reviewed the patient's labs, imaging, reports, and other clinician documentation.    Assessment/Plan   ASSESSMENT  1. 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  CT chest with progression vs infectious/reactive changes. Plan to repeat scans vs request diagnostic bronchoscopy once acute condition improves.  MRI brain showing single left occipital lobe met. Rad Onc planning SRS.   2. Anemia/Myelodysplastic syndrome/GOGO with malabsorption/Chemotherapy-induced anemia-planned to start Retacrit as outpatient for MDS prior to admission.  Known GOGO on oral iron prior to admission. S/p 1 unit pRBC on 2/19/2021.  Iron studies showed continued iron deficiency anemia.  No evidence of hemolysis.  Completed IV iron 2/22/2021 and on Retacrit. Hgb stable.  3. Thrombocytopenia-likely chemotherapy-induced.  No need for further work-up at present.  Platelets improving and no evidence of active bleeding.  4. Acute respiratory failure/Pneumonia/COPD-intubated and sedated on admission.  Started on broad-spectrum antibiotics. Blood cultures NGTD.  Extubated on 2/20/202. Initially afebrile but spiked low-grade temps and recultured with blood cx neg to date. RVP positive for  rhinovirus/enterovirus. Management per pulmonary.  5. Weight loss/loss of appetite-improving as outpatient on Megace.    Failed swallow study and allowed thickened liquid via spoon. Repeat swallow study planned after NGT pulled.  6. Altered mental status/agitation-on Haldol. Psych consulted and he was restarted on home meds including Suboxone, sertraline, and Latuda.  Resolved.  7. Low magnesium/high LFTs-per primary team.     PLAN  1. Follow CBC.   2. SRS planning 3/1 per Dr. Galindo.    3. Continue Retacrit.   4. Resume daily oral iron and Megace when able to take po.  5. Outpatient PET scan.  6. Discussed condition with patient and spouse.  Both are wanting to proceed with SRS as well as further systemic treatment.  They are aware that further evaluation of status of disease is needed after completion of SRS.           I discussed the patients findings and my recommendations with patient.    Electronically signed by Axel Lewis MD, 03/01/21, 8:08 AM HARRIET.

## 2021-03-01 NOTE — PLAN OF CARE
Goal Outcome Evaluation:     Bed mobility - Min-A supine to sit  Transfers - Min-A and with rolling walker  Ambulation - 14 feet Mod-A and with rolling walker    Pt would benefit from Inpatient Rehabilitation placement at discharge from facility and requires no DME at discharge.   Pt desires Inpatient Rehabilitation placement at discharge. Pt cooperative; agreeable to therapeutic recommendations and plan of care.

## 2021-03-02 NOTE — PROGRESS NOTES
"PULMONARY CRITICAL CARE Progress  NOTE      PATIENT IDENTIFICATION:  Name: Axel Ramirez  MRN: [id number]  :  1950     Age: 70 y.o.  Sex: male    DATE OF Note:  3/2/2021   Referring Physician: Shwetha Olivares MD                  Subjective: Cachetic  On 3 L O2, no SOB, no chest pain, no nausea or vomiting, no change in bowel habit, no dysuria,  no new  skin rash or itching.      Objective:  tMax 24 hrs: Temp (24hrs), Av °F (36.7 °C), Min:97.6 °F (36.4 °C), Max:98.8 °F (37.1 °C)      Vitals Ranges:   Temp:  [97.6 °F (36.4 °C)-98.8 °F (37.1 °C)] 97.6 °F (36.4 °C)  Heart Rate:  [73-94] 77  Resp:  [14-20] 18  BP: (109-128)/(64-65) 109/65    Intake and Output Last 3 Shifts:   I/O last 3 completed shifts:  In: 3117 [P.O.:120; Other:1107; NG/GT:1890]  Out: 700 [Urine:700]    Exam:  /65 (BP Location: Right arm, Patient Position: Sitting)   Pulse 77   Temp 97.6 °F (36.4 °C) (Axillary)   Resp 18   Ht 177.8 cm (70\")   Wt 65 kg (143 lb 4.8 oz) Comment: with blankets on bed. RN aware  SpO2 100%   BMI 20.56 kg/m²     General Appearance: Alert ,cachetic    HEENT:  Normocephalic, without obvious abnormality, Conjunctiva/corneas clear,.  Normal external ear canals, Nares normal, no drainage     Neck:  Supple, symmetrical, trachea midline. No JVD.  Lungs /Chest wall:   Bilateral basal rhonchi, respirations unlabored symmetrical wall movement.     Heart:  Regular rate and rhythm, systolic murmur PMI left sternal border  Abdomen: Soft, non-tender, no masses, no organomegaly.    Extremities: Trace edema no clubbing or Cyanosis  SKIN: Left buttock -Unstageable pressure injury   Coccyx/ cleft with masd linear ulceration        Medications:    Current Facility-Administered Medications:   •  acetaminophen (TYLENOL) suppository 650 mg, 650 mg, Rectal, Q6H PRN, Day, Carina, APRN, 650 mg at 21 1605  •  acetaminophen (TYLENOL) tablet 650 mg, 650 mg, Oral, Q6H PRN, Day, Carina, APRN, 650 mg at 21 " 1011  •  aspirin chewable tablet 81 mg, 81 mg, Oral, Daily, Efrem Langley MD, 81 mg at 03/02/21 0845  •  atorvastatin (LIPITOR) tablet 40 mg, 40 mg, Oral, Nightly, Efrem Langley MD, 40 mg at 03/01/21 2106  •  Barium Sulfate 60 % cream 1 teaspoon(s), 1 teaspoon(s), Oral, Once in imaging, Abhishek Lazo MD  •  bisacodyl (DULCOLAX) suppository 10 mg, 10 mg, Rectal, Daily PRN, Efrem Langley MD, 10 mg at 02/26/21 1405  •  budesonide (PULMICORT) nebulizer solution 0.5 mg, 0.5 mg, Nebulization, BID - RT, Lianna Groves MD, 0.5 mg at 03/02/21 0706  •  calcium gluconate 1g/50ml 0.675% NaCl IV SOLN, 1 g, Intravenous, PRN **AND** calcium gluconate 2-0.675 GM/100ML NACL IVPB, 2 g, Intravenous, PRN **AND** Calcium, Ionized, , , PRN, Efrem Langley MD  •  docusate sodium (COLACE) capsule 100 mg, 100 mg, Oral, BID, Abhishek Lazo MD  •  epoetin asia-epbx (RETACRIT) injection 40,000 Units, 40,000 Units, Subcutaneous, Q7 Days, Axel Lewis MD, 40,000 Units at 02/28/21 1142  •  furosemide (LASIX) injection 20 mg, 20 mg, Intravenous, Q12H, Efrem Langley MD, 20 mg at 03/02/21 1120  •  gabapentin (NEURONTIN) capsule 300 mg, 300 mg, Oral, TID, Efrem Langley MD, 300 mg at 03/02/21 0845  •  guaiFENesin (MUCINEX) 12 hr tablet 600 mg, 600 mg, Oral, Q12H, Abhishek Lazo MD, 600 mg at 03/02/21 1130  •  haloperidol lactate (HALDOL) injection 2 mg, 2 mg, Intramuscular, Q6H PRN, Day, Carina, APRN, 2 mg at 02/24/21 0554  •  ibuprofen (ADVIL,MOTRIN) 100 MG/5ML suspension 400 mg, 400 mg, Oral, Q6H PRN, Raysa Alatorre, APRN, 400 mg at 02/24/21 1833  •  ipratropium-albuterol (DUO-NEB) nebulizer solution 3 mL, 3 mL, Nebulization, Q4H - RT, Lianna Groves MD, 3 mL at 03/02/21 0706  •  labetalol (NORMODYNE,TRANDATE) injection 20 mg, 20 mg, Intravenous, Q6H PRN, Day, Carina, APRN, 20 mg at 02/23/21  0358  •  lurasidone (LATUDA) tablet 40 mg, 40 mg, Oral, Daily, Efrem Langley MD, 40 mg at 03/02/21 0845  •  Magnesium Sulfate 2 gram Bolus, followed by 8 gram infusion (total Mg dose 10 grams)- Mg less than or equal to 1mg/dL, 2 g, Intravenous, PRN **OR** Magnesium Sulfate 2 gram / 50mL Infusion (GIVE X 3 BAGS TO EQUAL 6GM TOTAL DOSE) - Mg 1.1 - 1.5 mg/dl, 2 g, Intravenous, PRN **OR** Magnesium Sulfate 4 gram infusion- Mg 1.6-1.9 mg/dL, 4 g, Intravenous, PRN, Efrem Langley MD, Last Rate: 25 mL/hr at 02/25/21 1831, 4 g at 02/25/21 1831  •  melatonin tablet 5 mg, 5 mg, Oral, Nightly PRN, Lisandra Cruz, APRN, 5 mg at 02/26/21 2206  •  metoprolol tartrate (LOPRESSOR) injection 5 mg, 5 mg, Intravenous, Q6H PRN, Juanito Palomo APRN, 5 mg at 02/24/21 1537  •  metoprolol tartrate (LOPRESSOR) tablet 50 mg, 50 mg, Oral, Q12H, Efrem Langley MD, 50 mg at 03/02/21 0845  •  ondansetron (ZOFRAN) injection 4 mg, 4 mg, Intravenous, Q6H PRN, So Damon APRN, 4 mg at 02/23/21 0643  •  pantoprazole (PROTONIX) injection 40 mg, 40 mg, Intravenous, Q AM, Ángela Bean MD, 40 mg at 03/02/21 0552  •  potassium chloride (K-DUR,KLOR-CON) CR tablet 40 mEq, 40 mEq, Oral, PRN **OR** potassium chloride (KLOR-CON) packet 40 mEq, 40 mEq, Oral, PRN, 40 mEq at 02/26/21 1513 **OR** potassium chloride 10 mEq in 100 mL IVPB, 10 mEq, Intravenous, Q1H PRN, Love, Juanito E, APRN, Last Rate: 100 mL/hr at 02/22/21 1526, 10 mEq at 02/22/21 1526  •  potassium phosphate 45 mmol in sodium chloride 0.9 % 500 mL infusion, 45 mmol, Intravenous, PRN **OR** potassium phosphate 30 mmol in sodium chloride 0.9 % 250 mL infusion, 30 mmol, Intravenous, PRN **OR** potassium phosphate 15 mmol in sodium chloride 0.9 % 100 mL infusion, 15 mmol, Intravenous, PRN, 15 mmol at 02/26/21 0957 **OR** sodium phosphates 45 mmol in sodium chloride 0.9 % 500 mL IVPB, 45 mmol, Intravenous, PRN **OR** sodium  phosphates 30 mmol in sodium chloride 0.9 % 250 mL IVPB, 30 mmol, Intravenous, PRN **OR** sodium phosphates 15 mmol in sodium chloride 0.9 % 250 mL IVPB, 15 mmol, Intravenous, PRN, Efrem Langley MD  •  predniSONE (DELTASONE) tablet 10 mg, 10 mg, Oral, Daily With Breakfast, Draw, MD Ángela, 10 mg at 03/02/21 0845  •  sertraline (ZOLOFT) tablet 50 mg, 50 mg, Oral, Daily, Efrem Langley MD, 50 mg at 03/02/21 0845  •  sodium chloride 0.9 % flush 10 mL, 10 mL, Intravenous, PRN, Lianna Groves MD, 10 mL at 03/02/21 0845  •  spironolactone (ALDACTONE) tablet 25 mg, 25 mg, Oral, Daily, Efrem Langley MD, 25 mg at 03/02/21 0845  •  Zinc Oxide 16 % ointment, , Apply externally, BID, Efrem Langley MD, Given at 03/02/21 0846  •  Zinc Oxide 16 % ointment, , Apply externally, PRN, Efrem Langley MD    Data Review:  All labs (24hrs):   Recent Results (from the past 24 hour(s))   POC Glucose Once    Collection Time: 03/01/21  4:49 PM    Specimen: Blood   Result Value Ref Range    Glucose 114 (H) 70 - 105 mg/dL   POC Glucose Once    Collection Time: 03/02/21 12:23 AM    Specimen: Blood   Result Value Ref Range    Glucose 170 (H) 70 - 105 mg/dL   ECG 12 Lead    Collection Time: 03/02/21  3:25 AM   Result Value Ref Range    QT Interval 381 ms   POC Glucose Once    Collection Time: 03/02/21  6:23 AM    Specimen: Blood   Result Value Ref Range    Glucose 112 (H) 70 - 105 mg/dL   Comprehensive Metabolic Panel    Collection Time: 03/02/21  6:27 AM    Specimen: Blood   Result Value Ref Range    Glucose 107 (H) 65 - 99 mg/dL    BUN 36 (H) 8 - 23 mg/dL    Creatinine 0.70 (L) 0.76 - 1.27 mg/dL    Sodium 139 136 - 145 mmol/L    Potassium 4.4 3.5 - 5.2 mmol/L    Chloride 101 98 - 107 mmol/L    CO2 32.0 (H) 22.0 - 29.0 mmol/L    Calcium 8.2 (L) 8.6 - 10.5 mg/dL    Total Protein 7.1 6.0 - 8.5 g/dL    Albumin 2.90 (L) 3.50 - 5.20 g/dL    ALT (SGPT) 22  1 - 41 U/L    AST (SGOT) 32 1 - 40 U/L    Alkaline Phosphatase 162 (H) 39 - 117 U/L    Total Bilirubin 0.4 0.0 - 1.2 mg/dL    eGFR Non African Amer 111 >60 mL/min/1.73    Globulin 4.2 gm/dL    A/G Ratio 0.7 g/dL    BUN/Creatinine Ratio 51.4 (H) 7.0 - 25.0    Anion Gap 6.0 5.0 - 15.0 mmol/L   CBC Auto Differential    Collection Time: 03/02/21  6:27 AM    Specimen: Blood   Result Value Ref Range    WBC 8.80 3.40 - 10.80 10*3/mm3    RBC 2.65 (L) 4.14 - 5.80 10*6/mm3    Hemoglobin 9.3 (L) 13.0 - 17.7 g/dL    Hematocrit 28.2 (L) 37.5 - 51.0 %    .5 (H) 79.0 - 97.0 fL    MCH 35.1 (H) 26.6 - 33.0 pg    MCHC 33.0 31.5 - 35.7 g/dL    RDW 25.9 (H) 12.3 - 15.4 %    RDW-SD 93.6 (H) 37.0 - 54.0 fl    MPV 9.6 6.0 - 12.0 fL    Platelets 181 140 - 450 10*3/mm3   Scan Slide    Collection Time: 03/02/21  6:27 AM    Specimen: Blood   Result Value Ref Range    Scan Slide     Manual Differential    Collection Time: 03/02/21  6:27 AM    Specimen: Blood   Result Value Ref Range    Neutrophil % 73.0 42.7 - 76.0 %    Lymphocyte % 12.0 (L) 19.6 - 45.3 %    Monocyte % 5.0 5.0 - 12.0 %    Bands %  8.0 (H) 0.0 - 5.0 %    Myelocyte % 1.0 (H) 0.0 - 0.0 %    Atypical Lymphocyte % 1.0 0.0 - 5.0 %    Neutrophils Absolute 7.13 (H) 1.70 - 7.00 10*3/mm3    Lymphocytes Absolute 1.06 0.70 - 3.10 10*3/mm3    Monocytes Absolute 0.44 0.10 - 0.90 10*3/mm3    Anisocytosis Slight/1+ None Seen    Macrocytes Slight/1+ None Seen    Polychromasia Slight/1+ None Seen    WBC Morphology Normal Normal    Platelet Morphology Normal Normal        Imaging:  FL Video Swallow With Speech Single Contrast  Narrative: DATE OF EXAM:  3/2/2021 9:15 AM     PROCEDURE:  FL VIDEO SWALLOW W SPEECH SINGLE-CONTRAST-     INDICATIONS:  repeat VFSS as only on nectar thick clear liquid diet; J18.9-Pneumonia,  unspecified organism; R06.03-Acute respiratory distress; J96.21-Acute  and chronic respiratory failure with hypoxia     COMPARISON:  Video swallow study dated 2/26/2021.      TECHNIQUE:   This examination was performed in conjunction with speech pathology.  Lateral video fluoroscopic evaluation of the swallowing mechanism was  performed while correlate administering to the patient various  consistency food items mixed with barium.     Fluoroscopic Time:   2.5 minutes     FINDINGS:  Deep penetration with one trial of nectar thickened liquid. Other  remaining consistencies demonstrating no aspiration or laryngeal  penetration. Residue is present with multiple consistency trialed.      Impression: As above. Please see speech pathology report for detailed evaluation and  dietary recommendations.     Electronically Signed By-Daryl Hunter MD On:3/2/2021 11:39 AM  This report was finalized on [id number] by  Daryl Hunter MD.       ASSESSMENT:    Acute on chronic respiratory failure with hypoxia (CMS/HCC)    Essential hypertension    Hx of aortic valve replacement    Squamous cell carcinoma of lung, right (CMS/HCC)    Diastolic CHF, acute (CMS/HCC)    Pneumonia due to infectious organism    Moderate malnutrition (CMS/HCC)    COPD with acute exacerbation (CMS/HCC)    Delirium  Left buttock -Unstageable pressure injury   Coccyx/theresa cleft with masd linear ulceration   Lesion in the medial aspect of the left occipital lobe   Delirium, possibly withdrawal from gabapentin and/or Suboxone    PLAN:  Wean steroids down   Continue tube feeds   Neurosurgery not recommending resection. Patient and spouse both wanting to proceed with radiation therapy, Rad Onc planning SRS on 3/3 if patient can tolerate   Bronchodilator  Inhaled corticosteroids  Diuresis currently on Aldactone 25 mg and Lasix 20 mg IV every 12  Monitor urinary retention  Electrolytes/ glycemic control  DVT and GI prophylaxis.    Discussed with Dr Yaneli Dumont, APRN   3/2/2021  14:12 EST     I personally have examined  and interviewed the patient. I have reviewed the history, data, problems, assessment and plan with  our NP.  Critical care time in direct medical management (   ) minutes   Lianna Groves MD, D,ABSM  3/2/2021

## 2021-03-02 NOTE — PROGRESS NOTES
Continued Stay Note   Johann     Patient Name: Axel Ramirez  MRN: [id number]  Today's Date: 3/2/2021    Admit Date: 2/18/2021    Discharge Plan     Row Name 03/02/21 1146       Plan    Plan  DC Plan: IP Rehab choices pending. [facility] following (unable to provide ST if needed).    Provided Post Acute Provider List?  Yes    Post Acute Provider List  Inpatient Rehab    Plan Comments  Pt now agreeable to IP Rehab per PT and MD notes. CM f/u with pt and spouse in room for choice and left ECF list with spouse along with CM contact info.     Met with patient in room wearing PPE: mask, face shield/goggles,.      Maintained distance greater than six feet and spent less than 15 minutes in the room.          Luciana Hsu RN

## 2021-03-02 NOTE — PROGRESS NOTES
[facility] Daily Progress Note      Hospitalist Team  LOS 12 days      Patient Care Team:  Sandoval Stevenson FNP as PCP - General  Sandoval Stevenson FNP as PCP - Family Medicine  Axel Lewis MD as Consulting Physician (Hematology and Oncology)  Iglesia Springer MD as Consulting Physician (Cardiology)    Patient Location: 2116/1      Subjective   Subjective     Chief Complaint / Subjective  Chief Complaint   Patient presents with   • Shortness of Breath         Brief Synopsis of Hospital Course/HPI  HPI taken from medical chart review as patient is oriented x2 but is anxious and agitated and cannot elaborate on what led to his hospitalization   Mr. Ramirez is a 70 y.o. male known history of COPD on 2L O2 continuously, history of lung cancer status post chemoradiation is still receiving chemo, was brought by EMS to [facility] ED 2/18/21 with complaints of shortness of breath. He initially was on 4 L and required to be on 8 L in the emergency room. He continued to decline to respiratory failure and required intubation in the ER.  CT showed bilateral infiltrates with increased narrowing of right middle lobe bronchus secondary to known mass.  He was started on broad spectrum antibiotics.  He was admitted to the ICU for further treatment of acute respiratory failure and pneumonia.      Since admission the patient has been gradually weaned off mechanical ventilation, extubated 2/20/2021 now on room air.  He did have some hypotension that required vasopressors that have since been weaned off. He was felt stable for transfer out of ICU 2/22/21 and the hospitalist group was consulted for medical management. He remains confused and agitated with sitter at bedside. The patient and family report he has not slept in 2 days and would like something to help him sleep.       Date::    2/23  Patient is nonverbal.  Sitting in recliner having hard time breathing and tachypneic with  "rales at the bases on exam  He is using Yunker to suction secretions.  IV Lasix ordered x1  Chest x-ray and ABG ordered    2/24/2021  Patient seen and examined earlier today  He wants his Suboxone back which was started.  Increase also Neurontin to his regular dose.  Had nasogastric tube for feeding and awaiting dietitian recommendation  Tachycardia and hypertensive in the starting him back on his home metoprolol.        2/25  Patient spiked fever overnight and had repeat blood cultures done  Vancomycin started but MRSA screen negative and then discontinued.  Respiratory panelShowedRhinovirus/enterovirus  Patient slightly drowsy but able to answer questions with little weak voice  Started on tube feeding    2/26    Patient had abnormal MRI.  We will ask neurosurgery for evaluation.  Hypernatremia noted and free water is increased/Nasogastric tube  Denies any new deficits.  He is wanting to drink Coca-Cola but only thickened liquids are allowed.  Discussed findings with his wife.  Radiation oncology also consulted    2/27  C/o generalized aches  Has prominent lid lag: check free t4 and tsh     2/28  No significant complaints today, tolerating TFs     3/1  Plan for radiation tx   Goals of care clarified with patient's spouse     Review of Systems   All other systems reviewed and are negative.        Objective   Objective      Vital Signs  Temp:  [97.2 °F (36.2 °C)-98.1 °F (36.7 °C)] 97.9 °F (36.6 °C)  Heart Rate:  [73-92] 87  Resp:  [14-20] 19  BP: (110-128)/(63-65) 121/64  Oxygen Therapy  SpO2: 98 %  Pulse Oximetry Type: Continuous  Device (Oxygen Therapy): nasal cannula  Device (Oxygen Therapy): nasal cannula  Flow (L/min): 2  Oxygen Concentration (%): 36  Oximetry Probe Site Changed: No  Flowsheet Rows      First Filed Value   Admission Height  177.8 cm (70\") Documented at 02/18/2021 1048   Admission Weight  67.1 kg (148 lb) Documented at 02/18/2021 1048        Intake & Output (last 3 days)       02/27 0701 - 02/28 " 0700 02/28 0701 - 03/01 0700 03/01 0701 - 03/02 0700    P.O. 560  120    Other 1348 1107     NG/GT 2166 1890     Total Intake(mL/kg) 4074 (62.7) 2997 (46.1) 120 (1.8)    Urine (mL/kg/hr)  1400 (0.9)     Stool  0     Total Output  1400     Net +4074 +1597 +120           Urine Unmeasured Occurrence 5 x  4 x    Stool Unmeasured Occurrence 5 x 1 x         Lines, Drains & Airways    Active LDAs     Name:   Placement date:   Placement time:   Site:   Days:    Peripheral IV 02/21/21 0736 Distal;Posterior;Right Forearm   02/21/21    0736    Forearm   2    Single Lumen Implantable Port 10/30/20 Left Subclavian   10/30/20    0956    Subclavian   116                  Physical Exam:    Physical Exam  Awake alert but able to respond to questions but has weak voice.  Mild respiratory distress tachypnea  Cachectic  HEENT normocephalic no JVD no lymphadenopathy pupils equal reactive to light mucous membrane moist  Nasogastric tube in place.  Cardiovascular S1-2 heard no extra sounds distant  Respiratory diminished breath sounds bilaterally with bilateral rales at the base  GI abdomen soft  Musculoskeletal right lower extremity neurovascular  Neurological nonfocal exam but patient oriented x2  Psych patient cooperative  Skin dry and warm         Wounds (last 24 hours)      LDA Wound     Row Name 03/01/21 1615 03/01/21 1210 03/01/21 0845       Wound 02/18/21 1900 Right gluteal Pressure Injury    Wound - Properties Group Placement Date: 02/18/21  -OSMAN Placement Time: 1900  -OSMAN Present on Hospital Admission: Y  -OSMAN Side: Right  -OSMAN Location: gluteal  -OSMAN Primary Wound Type: Pressure inj  -OSMAN Stage, Pressure Injury : Stage 2  -OSMAN    Dressing Appearance  --  --  dry;intact  -BB    Closure  Open to air  -BB  Open to air  -BB  Open to air  -BB    Base  dressing in place, unable to visualize  -BB  dressing in place, unable to visualize  -BB  dressing in place, unable to visualize  -BB    Drainage Amount  none  -BB  none  -BB  none  -BB     Care, Wound  --  --  cleansed with;sterile water;antimicrobial agent applied  -BB    Dressing Care  --  --  dressing changed  -BB    Retired Wound - Properties Group Date first assessed: 02/18/21  -OSMAN Time first assessed: 1900  -OSMAN Present on Hospital Admission: Y  -OSMAN Side: Right  -OSMAN Location: gluteal  -OSMAN Primary Wound Type: Pressure inj  -OSMAN       Wound 02/27/21 0000 medial sacral spine Pressure Injury    Wound - Properties Group Placement Date: 02/27/21  -EM Placement Time: 0000  -EM Present on Hospital Admission: --  -EM, unknown  Orientation: medial  -EM Location: sacral spine  -EM Primary Wound Type: Pressure inj  -EM Stage, Pressure Injury : unstageable  -EM, poss eschar     Dressing Appearance  --  --  dry;intact  -BB    Closure  BRANT  -BB  BRANT  -BB  BRANT  -BB    Base  non-blanchable  -BB  non-blanchable  -BB  non-blanchable  -BB    Care, Wound  --  --  cleansed with;sterile water  -BB    Dressing Care  --  --  dressing changed  -BB    Periwound Care  --  --  absorptive dressing applied  -BB    Retired Wound - Properties Group Date first assessed: 02/27/21  -EM Time first assessed: 0000  -EM Present on Hospital Admission: --  -EM, unknown  Location: sacral spine  -EM Primary Wound Type: Pressure inj  -EM       Wound 02/18/21 1044 Bilateral posterior elbow Abrasion    Wound - Properties Group Placement Date: 02/18/21  -EM Placement Time: 1044  -EM Side: Bilateral  -EM Orientation: posterior  -EM Location: elbow  -EM Primary Wound Type: Abrasion  -EM, old scab     Dressing Appearance  --  --  dry;intact  -BB    Base  scab  -BB  scab  -BB  scab  -BB    Retired Wound - Properties Group Date first assessed: 02/18/21  -EM Time first assessed: 1044  -EM Side: Bilateral  -EM Location: elbow  -EM Primary Wound Type: Abrasion  -EM, old scab     Row Name 03/01/21 0439             Wound 02/18/21 1900 Right gluteal Pressure Injury    Wound - Properties Group Placement Date: 02/18/21  -OSMAN Placement Time: 1900  -OSMAN Present  on Hospital Admission: Y  -OSMAN Side: Right  -OSMAN Location: gluteal  -OSMAN Primary Wound Type: Pressure inj  -OSMAN Stage, Pressure Injury : Stage 2  -OSMAN    Closure  Open to air  -EM      Base  dressing in place, unable to visualize  -EM      Periwound Temperature  cool  -EM      Periwound Skin Turgor  firm  -EM      Edges  irregular  -EM      Drainage Amount  none  -EM      Retired Wound - Properties Group Date first assessed: 02/18/21  -OSMAN Time first assessed: 1900  -OSMAN Present on Hospital Admission: Y  -OSMAN Side: Right  -OSMAN Location: gluteal  -OSMAN Primary Wound Type: Pressure inj  -OSMAN       Wound 02/27/21 0000 medial sacral spine Pressure Injury    Wound - Properties Group Placement Date: 02/27/21  -EM Placement Time: 0000  -EM Present on Hospital Admission: --  -EM, unknown  Orientation: medial  -EM Location: sacral spine  -EM Primary Wound Type: Pressure inj  -EM Stage, Pressure Injury : unstageable  -EM, poss eschar     Closure  BRANT  -EM      Base  non-blanchable;other (see comments) grey eschar like area with open spots  -EM      Periwound  gray  -EM      Retired Wound - Properties Group Date first assessed: 02/27/21  -EM Time first assessed: 0000  -EM Present on Hospital Admission: --  -EM, unknown  Location: sacral spine  -EM Primary Wound Type: Pressure inj  -EM       Wound 02/18/21 1044 Bilateral posterior elbow Abrasion    Wound - Properties Group Placement Date: 02/18/21  -EM Placement Time: 1044  -EM Side: Bilateral  -EM Orientation: posterior  -EM Location: elbow  -EM Primary Wound Type: Abrasion  -EM, old scab     Base  scab  -EM      Retired Wound - Properties Group Date first assessed: 02/18/21  -EM Time first assessed: 1044  -EM Side: Bilateral  -EM Location: elbow  -EM Primary Wound Type: Abrasion  -EM, old scab       User Key  (r) = Recorded By, (t) = Taken By, (c) = Cosigned By    Initials Name Provider Type    Kennedi Gandhi, RN Registered Nurse    Lexus Smith RN Registered Nurse    OSMAN  Berencie Galdamez, RN Registered Nurse          Procedures:              Results Review:     I reviewed the patient's new clinical results.      Lab Results (last 24 hours)     Procedure Component Value Units Date/Time    POC Glucose Once [[id number]]  (Abnormal) Collected: 03/02/21 0023    Specimen: Blood Updated: 03/02/21 0024     Glucose 170 mg/dL      Comment: Serial Number: [id number]:  [id number]       Blood Culture - Blood, Hand, Left [[id number]] Collected: 02/24/21 2109    Specimen: Blood from Hand, Left Updated: 03/01/21 2130     Blood Culture No growth at 5 days    POC Glucose Once [[id number]]  (Abnormal) Collected: 03/01/21 1649    Specimen: Blood Updated: 03/01/21 1651     Glucose 114 mg/dL      Comment: Serial Number: [id number]:  [id number]       POC Glucose Once [[id number]]  (Abnormal) Collected: 03/01/21 1154    Specimen: Blood Updated: 03/01/21 1207     Glucose 147 mg/dL      Comment: Serial Number: [id number]:  [id number]       Manual Differential [[id number]]  (Abnormal) Collected: 03/01/21 0617    Specimen: Blood Updated: 03/01/21 0723     Neutrophil % 72.0 %      Lymphocyte % 11.0 %      Monocyte % 8.0 %      Bands %  6.0 %      Metamyelocyte % 1.0 %      Myelocyte % 1.0 %      Atypical Lymphocyte % 1.0 %      Neutrophils Absolute 7.25 10*3/mm3      Lymphocytes Absolute 1.02 10*3/mm3      Monocytes Absolute 0.74 10*3/mm3      Anisocytosis Slight/1+     Macrocytes Slight/1+     Poikilocytes Slight/1+     RBC Fragments Slight/1+     WBC Morphology Normal     Platelet Morphology Normal    CBC & Differential [[id number]]  (Abnormal) Collected: 03/01/21 0617    Specimen: Blood Updated: 03/01/21 0723    Narrative:      The following orders were created for panel order CBC & Differential.  Procedure                               Abnormality         Status                     ---------                               -----------         ------                     Scan Slide[[id number]]                                        Final result               CBC Auto Differential[[id number]]        Abnormal            Final result                 Please view results for these tests on the individual orders.    CBC Auto Differential [[id number]]  (Abnormal) Collected: 03/01/21 0617    Specimen: Blood Updated: 03/01/21 0723     WBC 9.30 10*3/mm3      RBC 2.63 10*6/mm3      Hemoglobin 9.2 g/dL      Hematocrit 28.0 %      .7 fL      MCH 34.8 pg      MCHC 32.7 g/dL      RDW 24.8 %      RDW-SD 89.7 fl      MPV 9.3 fL      Platelets 148 10*3/mm3     Narrative:      The previously reported component NRBC is no longer being reported. Previous result was 0.5 /100 WBC (Reference Range: 0.0-0.2 /100 WBC) on 3/1/2021 at 0641 EST.    Scan Slide [[id number]] Collected: 03/01/21 0617    Specimen: Blood Updated: 03/01/21 0723     Scan Slide --     Comment: See Manual Differential Results       Comprehensive Metabolic Panel [[id number]]  (Abnormal) Collected: 03/01/21 0617    Specimen: Blood Updated: 03/01/21 0707     Glucose 111 mg/dL      BUN 38 mg/dL      Creatinine 0.64 mg/dL      Sodium 142 mmol/L      Potassium 3.8 mmol/L      Chloride 104 mmol/L      CO2 31.0 mmol/L      Calcium 8.6 mg/dL      Total Protein 6.3 g/dL      Albumin 2.70 g/dL      ALT (SGPT) 20 U/L      AST (SGOT) 23 U/L      Alkaline Phosphatase 160 U/L      Total Bilirubin 0.4 mg/dL      eGFR Non African Amer 124 mL/min/1.73      Globulin 3.6 gm/dL      A/G Ratio 0.8 g/dL      BUN/Creatinine Ratio 59.4     Anion Gap 7.0 mmol/L     Narrative:      GFR Normal >60  Chronic Kidney Disease <60  Kidney Failure <15          No results found for: HGBA1C        Results from last 7 days   Lab Units 02/23/21  1529   PH, ARTERIAL pH units 7.465*   PO2 ART mm Hg 82.9*   PCO2, ARTERIAL mm Hg 30.1*   HCO3 ART mmol/L 21.7     No results found for: LIPASE  Lab Results   Component Value Date    CHOL 79 11/29/2019    TRIG 48 11/29/2019    HDL 33 (L) 11/29/2019    LDL 36  11/29/2019       Lab Results   Lab Value Date/Time    FINALDX  11/11/2020 0958     Tumor mass, right mainstem bronchus, biopsy:    Invasive moderately differentiated squamous cell carcinoma (see COMMENT)    NORBERTO/tkd       FINALDX  11/11/2020 0747     Smears of bronchial washings with cytospin preparation:    Occasional markedly atypical cells consistent with non-small cell carcinoma and exhibiting cytomorphologic      features most suggestive of squamous cell carcinoma     Background consists of acute inflammation with occasional fungal spores and hyphae morphologically most      consistent with Candida species    NORBERTO/tkd       COMDX  11/11/2020 0958     The biopsy was stained via immunohistochemical technique utilizing appropriate controls for the presence of p63, p40, CK5/6, napsin and TTF-1. The tumor cells are positive for CK5/6, p63 and p40 while being negative for TTF-1 and napsin. This staining pattern in conjunction with the histologic features is entirely consistent with squamous cell carcinoma. The tumor is focally necrotic. There is no lymph-vascular space invasion identified.      NORBERTO/tkd       COMDX  11/11/2020 0747     Please correlate with concurrent surgical pathology specimen ([id number]).    NORBERTO/tkd          Microbiology Results (last 10 days)     Procedure Component Value - Date/Time    Blood Culture - Blood, Hand, Left [[id number]] Collected: 02/24/21 2109    Lab Status: Final result Specimen: Blood from Hand, Left Updated: 03/01/21 2130     Blood Culture No growth at 5 days    MRSA Screen, PCR (Inpatient) - Swab, Nares [[id number]]  (Normal) Collected: 02/24/21 1952    Lab Status: Final result Specimen: Swab from Nares Updated: 02/24/21 2112     MRSA PCR No MRSA Detected    COVID PRE-OP / PRE-PROCEDURE SCREENING ORDER (NO ISOLATION) - Swab, Nasopharynx [[id number]]  (Abnormal) Collected: 02/24/21 1951    Lab Status: Final result Specimen: Swab from Nasopharynx Updated: 02/24/21 2339    Narrative:       The following orders were created for panel order COVID PRE-OP / PRE-PROCEDURE SCREENING ORDER (NO ISOLATION) - Swab, Nasopharynx.  Procedure                               Abnormality         Status                     ---------                               -----------         ------                     Respiratory Panel PCR w/...[[id number]]  Abnormal            Final result                 Please view results for these tests on the individual orders.    Respiratory Panel PCR w/COVID-19(SARS-CoV-2) PARESH/GRACIELA/KATHY/PAD/COR/MAD/CHRIST In-House, NP Swab in UTM/VTM, 3-4 HR TAT - Swab, Nasopharynx [[id number]]  (Abnormal) Collected: 02/24/21 1951    Lab Status: Final result Specimen: Swab from Nasopharynx Updated: 02/24/21 2339     ADENOVIRUS, PCR Not Detected     Coronavirus 229E Not Detected     Coronavirus HKU1 Not Detected     Coronavirus NL63 Not Detected     Coronavirus OC43 Not Detected     COVID19 Not Detected     Human Metapneumovirus Not Detected     Human Rhinovirus/Enterovirus Detected     Influenza A PCR Not Detected     Influenza B PCR Not Detected     Parainfluenza Virus 1 Not Detected     Parainfluenza Virus 2 Not Detected     Parainfluenza Virus 3 Not Detected     Parainfluenza Virus 4 Not Detected     RSV, PCR Not Detected     Bordetella pertussis pcr Not Detected     Bordetella parapertussis PCR Not Detected     Chlamydophila pneumoniae PCR Not Detected     Mycoplasma pneumo by PCR Not Detected    Narrative:      Fact sheet for providers: https://docs.[employer]/wp-content/uploads/LMD3347-7437-TB1.1-EUA-Provider-Fact-Sheet-3.pdf    Fact sheet for patients: https://docs.[employer]/wp-content/uploads/GGH1072-1221-LM9.1-EUA-Patient-Fact-Sheet-1.pdf    Test performed by PCR.    Urine Culture - Urine, Urine, Catheter In/Out [[id number]]  (Normal) Collected: 02/20/21 1906    Lab Status: Final result Specimen: Urine, Catheter In/Out Updated: 02/21/21 1951     Urine Culture No growth          ECG/EMG Results  (most recent)     Procedure Component Value Units Date/Time    ECG 12 Lead [[id number]] Collected: 02/18/21 1050     Updated: 02/19/21 1421     QT Interval 291 ms     Narrative:      HEART RATE= 130  bpm  RR Interval= 462  ms  ND Interval= 132  ms  P Horizontal Axis= -23  deg  P Front Axis= 79  deg  QRSD Interval= 83  ms  QT Interval= 291  ms  QRS Axis= 55  deg  T Wave Axis=   deg  - ABNORMAL ECG -  Sinus tachycardia  Left atrial enlargement  Anteroseptal infarct, age indeterminate  When compared with ECG of 09-Nov-2020 13:39:57,  Significant rate increase  Significant axis, voltage or hypertrophy change  Electronically Signed By: Higinio Flores ([facility]) 19-Feb-2021 14:11:44  Date and Time of Study: 2021-02-18 10:50:09    ECG 12 Lead [[id number]] Collected: 02/24/21 0611     Updated: 02/24/21 0612     QT Interval 358 ms     Narrative:      HEART RATE= 121  bpm  RR Interval= 496  ms  ND Interval= 120  ms  P Horizontal Axis= -25  deg  P Front Axis= 64  deg  QRSD Interval= 108  ms  QT Interval= 358  ms  QRS Axis= -6  deg  T Wave Axis= 85  deg  - ABNORMAL ECG -  Sinus tachycardia  LAE, consider biatrial enlargement  Prolonged QT interval  When compared with ECG of 23-Feb-2021 7:44:24,  Nonspecific significant change  Electronically Signed By:   Date and Time of Study: 2021-02-24 06:11:17    ECG 12 Lead [[id number]] Collected: 02/20/21 1846     Updated: 02/24/21 0721     QT Interval 340 ms     Narrative:      HEART RATE= 119  bpm  RR Interval= 496  ms  ND Interval=   ms  P Horizontal Axis=   deg  P Front Axis=   deg  QRSD Interval= 87  ms  QT Interval= 340  ms  QRS Axis= 66  deg  T Wave Axis= 75  deg  - ABNORMAL ECG -  Atrial flutter with predominant 2:1 AV block  Ventricular premature complex  Consider anterior infarct  When compared with ECG of 18-Feb-2021 10:50:09,  Nonspecific significant change  Electronically Signed By: Fuad Montero ([facility]) 24-Feb-2021 07:14:30  Date and Time of Study: 2021-02-20 18:46:19    ECG 12 Lead  [[id number]] Collected: 02/21/21 0947     Updated: 02/24/21 0731     QT Interval 379 ms     Narrative:      HEART RATE= 100  bpm  RR Interval= 600  ms  IN Interval= 136  ms  P Horizontal Axis= -16  deg  P Front Axis= 98  deg  QRSD Interval= 95  ms  QT Interval= 379  ms  QRS Axis= 63  deg  T Wave Axis= 70  deg  - BORDERLINE ECG -  Sinus tachycardia  Consider left ventricular hypertrophy  When compared with ECG of 20-Feb-2021 18:46:19,  Significant axis, voltage or hypertrophy change  Electronically Signed By: Fuad Montero ([facility]) 24-Feb-2021 07:19:26  Date and Time of Study: 2021-02-21 09:47:53    ECG 12 Lead [[id number]] Collected: 02/25/21 0552     Updated: 02/25/21 0554     QT Interval 356 ms     Narrative:      HEART RATE= 106  bpm  RR Interval= 568  ms  IN Interval= 112  ms  P Horizontal Axis= -38  deg  P Front Axis= 63  deg  QRSD Interval= 106  ms  QT Interval= 356  ms  QRS Axis= -8  deg  T Wave Axis= 80  deg  - ABNORMAL ECG -  Sinus tachycardia  LAE, consider biatrial enlargement  Anteroseptal infarct, age indeterminate  When compared with ECG of 24-Feb-2021 6:11:17,  Significant repolarization change  Electronically Signed By:   Date and Time of Study: 2021-02-25 05:52:57    ECG 12 Lead [[id number]] Collected: 02/23/21 0744     Updated: 02/26/21 1535     QT Interval 395 ms     Narrative:      HEART RATE= 111  bpm  RR Interval= 540  ms  IN Interval= 156  ms  P Horizontal Axis= -42  deg  P Front Axis= 79  deg  QRSD Interval= 122  ms  QT Interval= 395  ms  QRS Axis= -14  deg  T Wave Axis= 86  deg  - ABNORMAL ECG -  Sinus tachycardia  Biatrial enlargement  Evolving anterior infarct since 22-Feb-2021 7:19:57  When compared with ECG of 22-Feb-2021 7:19:57,  New or worsened ischemia or infarction  Significant axis, voltage or hypertrophy change  Electronically Signed By: Braden Mcdaniel ([facility]) 26-Feb-2021 15:31:30  Date and Time of Study: 2021-02-23 07:44:24    ECG 12 Lead [[id number]] Collected: 02/26/21 0544      Updated: 02/26/21 1945     QT Interval 345 ms     Narrative:      HEART RATE= 125  bpm  RR Interval= 496  ms  MA Interval= 110  ms  P Horizontal Axis= -34  deg  P Front Axis= 73  deg  QRSD Interval= 107  ms  QT Interval= 345  ms  QRS Axis= -4  deg  T Wave Axis= 92  deg  - ABNORMAL ECG -  Sinus tachycardia  Atrial premature complexes  LAE, consider biatrial enlargement  When compared with ECG of 25-Feb-2021 5:52:57,  No significant change  Electronically Signed By: Rebeca Jacobs ([facility]) 26-Feb-2021 19:35:13  Date and Time of Study: 2021-02-26 05:44:41    ECG 12 Lead [[id number]] Collected: 02/22/21 0719     Updated: 02/28/21 1657     QT Interval 394 ms     Narrative:      HEART RATE= 110  bpm  RR Interval= 544  ms  MA Interval= 142  ms  P Horizontal Axis= -26  deg  P Front Axis= 70  deg  QRSD Interval= 122  ms  QT Interval= 394  ms  QRS Axis= -14  deg  T Wave Axis= 86  deg  - ABNORMAL ECG -  Sinus tachycardia  Biatrial enlargement  Left ventricular hypertrophy  When compared with ECG of 21-Feb-2021 9:47:53,  No significant change  Electronically Signed By: Iglesia Springer ([facility]) 28-Feb-2021 16:56:20  Date and Time of Study: 2021-02-22 07:19:57    ECG 12 Lead [[id number]] Collected: 02/27/21 2326     Updated: 02/28/21 1759     QT Interval 381 ms     Narrative:      HEART RATE= 90  bpm  RR Interval= 668  ms  MA Interval= 114  ms  P Horizontal Axis= -41  deg  P Front Axis= 70  deg  QRSD Interval= 84  ms  QT Interval= 381  ms  QRS Axis= 59  deg  T Wave Axis= 51  deg  - ABNORMAL ECG -  Sinus rhythm  Left atrial enlargement  Anteroseptal infarct, age indeterminate  When compared with ECG of 27-Feb-2021 5:51:38,  Significant axis, voltage or hypertrophy change  Electronically Signed By: Rebeca Jacobs ([facility]) 28-Feb-2021 17:49:16  Date and Time of Study: 2021-02-27 23:26:41    ECG 12 Lead [[id number]] Collected: 02/27/21 0551     Updated: 02/28/21 1759     QT Interval 347 ms     Narrative:      HEART RATE= 103   bpm  RR Interval= 584  ms  NV Interval= 108  ms  P Horizontal Axis= -36  deg  P Front Axis= 63  deg  QRSD Interval= 85  ms  QT Interval= 347  ms  QRS Axis= 50  deg  T Wave Axis= -4  deg  - BORDERLINE ECG -  Sinus tachycardia  Left atrial enlargement  Probable left ventricular hypertrophy  When compared with ECG of 26-Feb-2021 5:44:41,  Significant rate decrease  Significant axis, voltage or hypertrophy change  Electronically Signed By: Rebeca Jacobs ([facility]) 28-Feb-2021 17:49:24  Date and Time of Study: 2021-02-27 05:51:38    ECG 12 Lead [[id number]] Collected: 03/01/21 0741     Updated: 03/01/21 0742     QT Interval 377 ms     Narrative:      HEART RATE= 87  bpm  RR Interval= 692  ms  NV Interval= 117  ms  P Horizontal Axis= -66  deg  P Front Axis= 65  deg  QRSD Interval= 93  ms  QT Interval= 377  ms  QRS Axis= 57  deg  T Wave Axis= 73  deg  - ABNORMAL ECG -  Sinus rhythm  Left atrial enlargement  Anteroseptal infarct, age indeterminate  When compared with ECG of 27-Feb-2021 23:26:41,  No significant change  Electronically Signed By:   Date and Time of Study: 2021-03-01 07:41:28    ECG 12 Lead [[id number]] Collected: 03/02/21 0325     Updated: 03/02/21 0327     QT Interval 381 ms     Narrative:      HEART RATE= 88  bpm  RR Interval= 680  ms  NV Interval= 122  ms  P Horizontal Axis= -73  deg  P Front Axis= 147  deg  QRSD Interval= 86  ms  QT Interval= 381  ms  QRS Axis= 60  deg  T Wave Axis= 148  deg  - ABNORMAL ECG -  Sinus or ectopic atrial rhythm  Left atrial enlargement  Lateral infarct, old  Anteroseptal infarct, age indeterminate  Electronically Signed By:   Date and Time of Study: 2021-03-02 03:25:22               Results for orders placed during the hospital encounter of 11/08/20   Adult Transthoracic Echo Complete W/ Cont if Necessary Per Protocol    Narrative · Estimated left ventricular EF = 65% Left ventricular systolic function   is normal.  · Left ventricular diastolic function is consistent with  (grade Ia w/high   LAP) impaired relaxation.  · The right atrial cavity is mildly dilated.  · Mild aortic valve stenosis is present.  · Mild to moderate LVOT gradient noted          Ct Abdomen Pelvis Without Contrast    Result Date: 2/24/2021  1.Large amount of formed stool in the rectum suspicious for constipation. Disimpaction may be necessary. There is also an above average amount of formed stool in the proximal colon. 2.Layering hyperdense material in the gallbladder, which could be seen with spurious excretion of contrast from prior contrast enhanced studies, particularly if there is renal failure. This could also be related to biliary sludge and/or gallstones. 3.Emphysema with bilateral lower lobe airspace opacities, similar to the prior chest CT, which could be related to pneumonia or aspiration. Opacities in the right lower lobe appears slightly decreased. There is minimal consolidation and trace pleural effusion in the posterior left thorax. 4.Probable hepatic and renal cysts, as before. 5.Calcific atherosclerosis. 6.Enteric tube terminates in the stomach.    Electronically Signed By-Adi Delgado MD On:2/24/2021 7:48 PM This report was finalized on [id number] by  Adi Delgado MD.    Xr Abdomen 2+ Vw With Chest 1 Vw    Result Date: 2/23/2021   1. Rectosigmoid colon fecal impaction. 2. No pneumatosis or free air. 3. Large mass in the right upper lobe representing malignancy until proven otherwise. 4. Patchy right basilar consolidation representing either subsegmental atelectasis or pneumonia.  Electronically Signed By-Brock Kerr MD On:2/23/2021 11:53 AM This report was finalized on [id number] by  Brock Kerr MD.    Mri Brain Without Contrast    Result Date: 2/25/2021   1. Signal intensity changes within the left temporal occipital lobe, as described above, worrisome for cortical metastatic deposit with surrounding vasogenic edema, over that of evolving ischemia. Additionally, question  leptomeningeal thickening or potential metastatic infiltration over the posterior cerebral convexities. MRI brain with contrast would be most helpful in this distinction.   Electronically Signed By-Ivana Mirza MD On:2/25/2021 2:54 PM This report was finalized on [id number] by  Ivana Miraz MD.    Mri Brain With Contrast    Result Date: 2/26/2021  There is a single thick-walled ring-enhancing lesion in the medial aspect of the left occipital lobe measuring 1.1 x 1.0 cm. There was vasogenic edema on yesterday's MRI of the brain in this location. This represents metastatic disease until proven otherwise. A small primary glioma or brain abscess can have a similar imaging appearance.  Electronically Signed By-Brock Kerr MD On:2/26/2021 10:19 AM This report was finalized on [id number] by  Brock Kerr MD.    Fl Video Swallow With Speech Single Contrast    Result Date: 2/26/2021  Technically successful modified barium swallow examination.  Electronically Signed By-Brock Kerr MD On:2/26/2021 12:43 PM This report was finalized on [id number] by  Brock Kerr MD.    Xr Chest 1 View    Result Date: 2/23/2021  1.Interval extubation. 2.Decreased right basilar opacities with mild persistent opacities which could represent residual atelectasis or infiltrate. 3.Right lung mass, as before.  Electronically Signed By-Adi Delgado MD On:2/23/2021 5:57 PM This report was finalized on [id number] by  Adi Delgado MD.    Xr Abdomen Kub    Result Date: 2/24/2021  Enteric tube within stomach.  Electronically Signed By-Mago Flores MD On:2/24/2021 7:33 AM This report was finalized on [id number] by  Mago Flores MD.          Xrays, labs reviewed personally by physician.    Medication Review:   I have reviewed the patient's current medication list      Scheduled Meds  aspirin, 81 mg, Oral, Daily  atorvastatin, 40 mg, Oral, Nightly  budesonide, 0.5 mg, Nebulization, BID - RT  buprenorphine-naloxone, 1 tablet,  Sublingual, Daily  docusate, 100 mg, Oral, BID  epoetin asia-epbx, 40,000 Units, Subcutaneous, Q7 Days  furosemide, 20 mg, Intravenous, Q12H  gabapentin, 300 mg, Oral, TID  guaiFENesin, 300 mg, Oral, Q6H  ipratropium-albuterol, 3 mL, Nebulization, Q4H - RT  lurasidone, 40 mg, Oral, Daily  metoprolol tartrate, 50 mg, Oral, Q12H  pantoprazole, 40 mg, Intravenous, Q AM  predniSONE, 10 mg, Oral, Daily With Breakfast  sertraline, 50 mg, Oral, Daily  spironolactone, 25 mg, Oral, Daily  Zinc Oxide, , Apply externally, BID        Meds Infusions       Meds PRN  •  acetaminophen  •  acetaminophen  •  bisacodyl  •  Calcium Gluconate-NaCl **AND** calcium gluconate **AND** Calcium, Ionized  •  haloperidol lactate  •  ibuprofen  •  labetalol  •  magnesium sulfate **OR** magnesium sulfate **OR** magnesium sulfate  •  melatonin  •  metoprolol tartrate  •  ondansetron  •  potassium chloride **OR** potassium chloride **OR** potassium chloride  •  potassium phosphate infusion greater than 15 mMoles **OR** potassium phosphate infusion greater than 15 mMoles **OR** potassium phosphate **OR** sodium phosphate IVPB **OR** sodium phosphate IVPB **OR** sodium phosphate IVPB  •  sodium chloride  •  Zinc Oxide        Assessment/Plan   Assessment/Plan     Active Hospital Problems    Diagnosis  POA   • **Acute on chronic respiratory failure with hypoxia (CMS/HCC) [J96.21]  Yes   • COPD with acute exacerbation (CMS/[facility]) [J44.1]  Yes   • Delirium [R41.0]  Yes   • Moderate malnutrition (CMS/HCC) [E44.0]  Yes   • Pneumonia due to infectious organism [J18.9]  Yes   • Squamous cell carcinoma of lung, right (CMS/HCC) [C34.91]  Yes   • Diastolic CHF, acute (CMS/HCC) [I50.31]  Yes   • Hx of aortic valve replacement [Z95.2]  Not Applicable   • Essential hypertension [I10]  Yes      Resolved Hospital Problems   No resolved problems to display.       MEDICAL DECISION MAKING COMPLEXITY BY PROBLEM:     Acute respiratory failure requiring mechanical  ventilation  -Secondary to pneumonia, COPD exacerbation  -Extubated 2/20/2021 now on room air  -On IV Zosyn, IV Solu-Medrol 20 mg being tapered.  -Supplemental oxygen  -Appreciate pulmonary input     Pneumonia  -CT PE protocol 2/18/2021: No PE, patchy airspace disease within bilateral lower lobes, right upper lobe mass invading right upper lobe bronchus and obstructing it.  Increased narrowing or obstruction right middle lobe bronchus  -Blood cultures no growth to date  -Respiratory cultures negative on the 19th  -On IV Zosyn  -Positive rhinovirus 2/24/2021 not present on admission.  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 There was  vasogenic edema on yesterday's MRI of the brain in this location. This  represents metastatic disease until proven otherwise. A small primary  glioma or brain abscess can have a similar imaging appearance.   Neurosurgery agreeable on plan.  Radiation oncologist recommended:stereotactic radiosurgery to the occipital mass to be done as an outpatient.---He is scheduled for radiation simulation on Monday 3/1/21 with plan for likely treatment on 3/3/21.        H/o CABG/aortic valve bioprosthetic replacement,   Add aspirin statin  Resume home metoprolol    Diastolic congestive heart failure  IV Lasix 20 twice daily  Resume home Aldactone    Anemia, GOGO, MDS, thrombocytopenia  -Chemotherapy-induced  -Status post 1 unit PRBCs 2/19/2021  -On Retacrit, and iron sucrose  Hematology is following    Hypokalemia and hypomagnesemia replace per protocol    Urinary retention  In and out cath as needed  Abnormal urinalysis but urine culture  "negative    Constipation  CT abdomen pelvis reviewed.  Laxatives added.    Delirium, possibly withdrawal from gabapentin and/or Suboxone  -CT head showed no acute findings.  -As needed Haldol -Resume home lurasidone and Zoloft  -Resume home Neurontin and Suboxone  -Psych input appreciated.  -better    Has prominent lid lag: check free t4 and tsh    Complex case high risk    VTE Prophylaxis -thrombocytopenia  Mechanical Order History:      Ordered        02/19/21 0228  Place Sequential Compression Device  Once         02/19/21 0228  Maintain Sequential Compression Device  Continuous                 Pharmalogical Order History:     None            Code Status -   Code Status and Medical Interventions:   Ordered at: 02/19/21 0228     Code Status:    CPR     Medical Interventions (Level of Support Prior to Arrest):    Full       This patient has been examined wearing appropriate Personal Protective Equipment and discussed with hospital infection control department.         Discharge Planning  Disposition. DC Plan:   Discussed goals of care with patient's wife over phone 3/1/2021 in regards to aggressive treatments. Patient with poor overall prognosis due to underlying cancer w/ likely new brain met, failed swallowing and underlying medical co-morbidities. Wife reiterated to me that patient and family wants to pursue radiation at this time and feel like \"God will cure my .\" May not recognize that treatments unlikely to be curative at this point and are more likely for palliative intent. Patient and family amenable to IP rehab at this point (initially refused, but patient amenable today). Patient and family would benefit from continued coordination with oncology and palliative care.       Electronically signed by Shwetha Olivares MD, 03/02/21, 03:47 EST.  [facility]ist [facility]      "

## 2021-03-02 NOTE — PROGRESS NOTES
Hematology/Oncology Inpatient Progress Note    PATIENT NAME: Axel Ramirez  : 1950  MRN: [id number]    CHIEF COMPLAINT: Hypoxia and respiratory failure     HISTORY OF PRESENT ILLNESS:    70 y.o. male admitted to the ICU through the ED 2021 with hypoxia and respiratory failure requiring intubation.  The patient was intubated and sedated at time of consult and histories were taken from review of records and spouse report.  His spouse reported he had been feeling better and had tolerated recent chemotherapy for his lung cancer.  He was eating well after start of Megace but did develop increasing shortness of air a few days prior to admission without a change in his chronic cough.  She denied known fever, chills, or hemoptysis.  On admission, CT PE protocol was limited due to motion but felt negative for central PE.  There was evidence of pneumonia versus aspiration and known right upper lobe lung mass invading the right upper lobe bronchus with known obstruction and with increased narrowing/obstruction of the right middle lobe bronchus.  There was suggestion of progressive tumor infiltration of the right mainstem bronchus and new left paratracheal adenopathy with stable right paratracheal and subcarinal adenopathy.  CBC revealed WBC 8.4, hemoglobin 8.6, .7, and platelets 106,000.  D-dimer was elevated to 1.98 (0-0.59).  Creatinine was not elevated and LFTs were okay.  Respiratory viral panel was negative and blood cultures were sent.  On 2021 hemoglobin dropped to 7.4 and platelets to 92,000.     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  His lung cancer was initially treated with concomitant weekly chemotherapy (paclitaxel and carboplatin x7) and radiation  therapy from November 2020. He completed radiation therapy on 1/6/2021.  He completed the weekly portion of his chemotherapy on 1/12/2021.  He was seen in follow-up on 2/4/2021 where it was planned to continue chemotherapy with paclitaxel and carboplatin along with Neulasta support every 3 weeks.  He was scheduled for CT scan chest abdomen and pelvis on 2/18/2021 as an outpatient prior to his admission.  He received chemotherapy with Neulasta support on 2/9/2021 at which time CBC revealed WBC 7.62, hemoglobin 9.5, and platelets 223,000.  He was anemic at time of diagnosis of his lung cancer and anemia work-up revealed iron deficiency as well as MDS.  He received oral iron however he was diagnosed with malabsorption and received IV iron in November 2020 which was also repeated in December 2020.  His bone marrow had revealed mild increased iron storage in January 2021.  At his follow-up visit on 2/4/2021 t Retacrit 30,000 units subcu weekly was ordered for his myelodysplastic syndrome while continuing oral iron for his iron deficiency with iron saturation of 17%.  He had not started Retacrit prior to admission pending insurance authorization.     PCP: Sandoval Stevenson FNP    INTERVAL HISTORY:  2/19/2021-received 1 unit pRBCs. Iron 21 (59-1 58), iron saturation 12 (20-50), transferrin 121 (200-3 60), TIBC 180 (298-536), ferritin 2658 (), reticulocytes 1.93 (0.7 0-1.90), haptoglobin 294 (). Started Venofer 300 mg IV x 3 days. Patient extubated, on nasal canula and Precedex.  2/20/2021 -white blood count 8.8, hemoglobin 8, .4, platelets 57,000.  CT head with moderate periventricular white matter changes present likely related to chronic microvascular ischemic change, progressed as compared to the previous study.  Hypodensity present within the left occipital region which appeared more chronic or remote, new as compared to the previous study.  Chest x-ray with improvement in right basilar airspace  disease with resolution of the left airspace disease.  Stable right upper lobe mass.  2/21/2021-Retacrit 40,000 units subcu weekly started for hemoglobin 7.9.  2/22/2021-patient moved out of ICU.  2/23/2021-patient moved to PCU for tachypnea and tachycardia.  02/25/21-CT abdomen and pelvis without showed no evidence of malignancy. There was layering hyperdense material in the gallbladder with spurious excretion of contrast from prior contrast-enhanced study versus sludge/gallstones in differential, constipation, and known pneumonia/aspiration. Respiratory viral panel positive for rhinovirus/enterovirus. Psych consulted and patient started back on home meds including Suboxone, Latuda, and sertraline.  MRI brain without contrast suggestive of left cerebral cortical and leptomeningeal mets.  2/26/2021-hemoglobin 9.7, platelets 112,000, WBC 10.3.  More alert and oriented.  2/27/2021-MRI brain with contrast showed a 1.1 x 1 cm left occipital lobe lesion. Neurosurgery not recommending resection. Rad Onc planning SRS on 3/3 if patient can tolerate planning. Erythropoietin level 362 (2.6-18.5).    2/28/2021-Reviewed MRI brain findings. Patient and spouse both wanting to proceed with radiation therapy.  Platelets improved to 147,000.   03/02/2021-platelets normalized.    History of present illness reviewed since last visit and changes noted on 03/02/21.    Subjective   Complains of soreness in the throat from the NG tube.    ROS:   Review of Systems   Constitutional: Positive for fatigue. Negative for activity change, chills, fever and unexpected weight change.   HENT: Positive for sore throat and voice change ( hoarse). Negative for congestion, dental problem, hearing loss, mouth sores, nosebleeds and trouble swallowing.    Eyes: Negative for photophobia and visual disturbance.   Respiratory: Positive for cough and shortness of breath. Negative for choking and chest tightness.    Cardiovascular: Negative for chest pain,  palpitations and leg swelling.   Gastrointestinal: Negative for abdominal distention, abdominal pain, blood in stool, constipation, diarrhea, nausea and vomiting.   Endocrine: Negative for cold intolerance and heat intolerance.   Genitourinary: Negative for decreased urine volume, difficulty urinating, dysuria, enuresis, frequency, hematuria and urgency.        Incontinence   Musculoskeletal: Negative for arthralgias and gait problem.   Skin: Negative for rash and wound.   Neurological: Positive for weakness. Negative for dizziness, tremors, speech difficulty, light-headedness, numbness and headaches.   Hematological: Negative for adenopathy. Does not bruise/bleed easily.   Psychiatric/Behavioral: Negative for confusion and hallucinations. The patient is not nervous/anxious.    All other systems reviewed and are negative.     MEDICATIONS:    Scheduled Meds:  aspirin, 81 mg, Oral, Daily  atorvastatin, 40 mg, Oral, Nightly  budesonide, 0.5 mg, Nebulization, BID - RT  buprenorphine-naloxone, 1 tablet, Sublingual, Daily  docusate, 100 mg, Oral, BID  epoetin asia-epbx, 40,000 Units, Subcutaneous, Q7 Days  furosemide, 20 mg, Intravenous, Q12H  gabapentin, 300 mg, Oral, TID  guaiFENesin, 300 mg, Oral, Q6H  ipratropium-albuterol, 3 mL, Nebulization, Q4H - RT  lurasidone, 40 mg, Oral, Daily  metoprolol tartrate, 50 mg, Oral, Q12H  pantoprazole, 40 mg, Intravenous, Q AM  predniSONE, 10 mg, Oral, Daily With Breakfast  sertraline, 50 mg, Oral, Daily  spironolactone, 25 mg, Oral, Daily  Zinc Oxide, , Apply externally, BID       Continuous Infusions:      PRN Meds:  •  acetaminophen  •  acetaminophen  •  bisacodyl  •  Calcium Gluconate-NaCl **AND** calcium gluconate **AND** Calcium, Ionized  •  haloperidol lactate  •  ibuprofen  •  labetalol  •  magnesium sulfate **OR** magnesium sulfate **OR** magnesium sulfate  •  melatonin  •  metoprolol tartrate  •  ondansetron  •  potassium chloride **OR** potassium chloride **OR** potassium  "chloride  •  potassium phosphate infusion greater than 15 mMoles **OR** potassium phosphate infusion greater than 15 mMoles **OR** potassium phosphate **OR** sodium phosphate IVPB **OR** sodium phosphate IVPB **OR** sodium phosphate IVPB  •  sodium chloride  •  Zinc Oxide     ALLERGIES:  No Known Allergies    Objective    VITALS:   /64 (BP Location: Left arm, Patient Position: Lying)   Pulse 92   Temp 98.8 °F (37.1 °C) (Axillary)   Resp 16   Ht 177.8 cm (70\")   Wt 65 kg (143 lb 4.8 oz) Comment: with blankets on bed. RN aware  SpO2 94%   BMI 20.56 kg/m²     PHYSICAL EXAM:  Physical Exam  Vitals signs and nursing note reviewed.   Constitutional:       General: He is not in acute distress.     Appearance: He is well-developed. He is ill-appearing (chronically). He is not diaphoretic.      Interventions: Nasal cannula in place.      Comments: Cachectic    HENT:      Head: Normocephalic and atraumatic.      Comments: Male pattern baldness/alopecia.     Right Ear: External ear normal.      Left Ear: External ear normal.      Nose: Nose normal.      Comments: O2 by NC.  NG tube present     Mouth/Throat:      Mouth: Mucous membranes are dry.      Pharynx: No oropharyngeal exudate or posterior oropharyngeal erythema.      Comments: Edentulous  Eyes:      General: No scleral icterus.     Extraocular Movements: Extraocular movements intact.      Conjunctiva/sclera: Conjunctivae normal.      Pupils: Pupils are equal, round, and reactive to light.   Neck:      Musculoskeletal: Normal range of motion and neck supple.   Cardiovascular:      Rate and Rhythm: Normal rate and regular rhythm.      Heart sounds: Normal heart sounds. No murmur.      Comments: Left chest wall [id number]. Monitor leads.  Midline vertical chest wall scar  Pulmonary:      Effort: Pulmonary effort is normal. No respiratory distress.      Breath sounds: Rhonchi ( improving somewhat ) present. No wheezing or rales.   Chest:      Chest wall: No " tenderness.      Comments: Left chest wall [id number] accessed.   Abdominal:      General: Bowel sounds are normal. There is no distension.      Palpations: Abdomen is soft. There is no mass.      Tenderness: There is no abdominal tenderness. There is no guarding.   Genitourinary:     Comments: Deffered  Musculoskeletal: Normal range of motion.         General: No swelling, tenderness or deformity.      Comments: LUE O2 monitor and BP cuff.   Lymphadenopathy:      Cervical: No cervical adenopathy.      Upper Body:      Right upper body: No supraclavicular adenopathy.      Left upper body: No supraclavicular adenopathy.   Skin:     General: Skin is warm and dry.      Coloration: Skin is pale. Skin is not jaundiced.      Findings: No bruising, erythema or rash.      Comments: Pressure bandages to bilateral elbows    Neurological:      General: No focal deficit present.      Mental Status: He is oriented to person, place, and time.      Motor: Weakness (generalized) present.   Psychiatric:         Mood and Affect: Mood normal.         Behavior: Behavior normal.       RECENT LABS:  Lab Results (last 24 hours)     Procedure Component Value Units Date/Time    Manual Differential [[id number]]  (Abnormal) Collected: 03/02/21 0627    Specimen: Blood Updated: 03/02/21 0715     Neutrophil % 73.0 %      Lymphocyte % 12.0 %      Monocyte % 5.0 %      Bands %  8.0 %      Myelocyte % 1.0 %      Atypical Lymphocyte % 1.0 %      Neutrophils Absolute 7.13 10*3/mm3      Lymphocytes Absolute 1.06 10*3/mm3      Monocytes Absolute 0.44 10*3/mm3      Anisocytosis Slight/1+     Macrocytes Slight/1+     Polychromasia Slight/1+     WBC Morphology Normal     Platelet Morphology Normal    CBC & Differential [[id number]]  (Abnormal) Collected: 03/02/21 0627    Specimen: Blood Updated: 03/02/21 0715    Narrative:      The following orders were created for panel order CBC & Differential.  Procedure                               Abnormality          Status                     ---------                               -----------         ------                     Scan Slide[[id number]]                                       Final result               CBC Auto Differential[[id number]]        Abnormal            Final result                 Please view results for these tests on the individual orders.    CBC Auto Differential [[id number]]  (Abnormal) Collected: 03/02/21 0627    Specimen: Blood Updated: 03/02/21 0715     WBC 8.80 10*3/mm3      RBC 2.65 10*6/mm3      Hemoglobin 9.3 g/dL      Hematocrit 28.2 %      .5 fL      MCH 35.1 pg      MCHC 33.0 g/dL      RDW 25.9 %      RDW-SD 93.6 fl      MPV 9.6 fL      Platelets 181 10*3/mm3     Narrative:      The previously reported component NRBC is no longer being reported. Previous result was 0.8 /100 WBC (Reference Range: 0.0-0.2 /100 WBC) on 3/2/2021 at 0634 EST.    Scan Slide [[id number]] Collected: 03/02/21 0627    Specimen: Blood Updated: 03/02/21 0715     Scan Slide --     Comment: See Manual Differential Results       Comprehensive Metabolic Panel [[id number]]  (Abnormal) Collected: 03/02/21 0627    Specimen: Blood Updated: 03/02/21 0659     Glucose 107 mg/dL      BUN 36 mg/dL      Creatinine 0.70 mg/dL      Sodium 139 mmol/L      Potassium 4.4 mmol/L      Chloride 101 mmol/L      CO2 32.0 mmol/L      Calcium 8.2 mg/dL      Total Protein 7.1 g/dL      Albumin 2.90 g/dL      ALT (SGPT) 22 U/L      AST (SGOT) 32 U/L      Alkaline Phosphatase 162 U/L      Total Bilirubin 0.4 mg/dL      eGFR Non African Amer 111 mL/min/1.73      Globulin 4.2 gm/dL      A/G Ratio 0.7 g/dL      BUN/Creatinine Ratio 51.4     Anion Gap 6.0 mmol/L     Narrative:      GFR Normal >60  Chronic Kidney Disease <60  Kidney Failure <15      POC Glucose Once [[id number]]  (Abnormal) Collected: 03/02/21 0623    Specimen: Blood Updated: 03/02/21 0624     Glucose 112 mg/dL      Comment: Serial Number: [id number]:  [id number]       POC  Glucose Once [[id number]]  (Abnormal) Collected: 03/02/21 0023    Specimen: Blood Updated: 03/02/21 0024     Glucose 170 mg/dL      Comment: Serial Number: [id number]:  [id number]       Blood Culture - Blood, Hand, Left [[id number]] Collected: 02/24/21 2109    Specimen: Blood from Hand, Left Updated: 03/01/21 2130     Blood Culture No growth at 5 days    POC Glucose Once [[id number]]  (Abnormal) Collected: 03/01/21 1649    Specimen: Blood Updated: 03/01/21 1651     Glucose 114 mg/dL      Comment: Serial Number: [id number]:  [id number]       POC Glucose Once [[id number]]  (Abnormal) Collected: 03/01/21 1154    Specimen: Blood Updated: 03/01/21 1207     Glucose 147 mg/dL      Comment: Serial Number: [id number]:  [id number]           PENDING RESULTS:  n/a    IMAGING REVIEWED:  No radiology results for the last day  I have reviewed the patient's labs, imaging, reports, and other clinician documentation.    Assessment/Plan   ASSESSMENT  1. 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  CT chest with progression vs infectious/reactive changes. Plan to repeat scans vs request diagnostic bronchoscopy once acute condition improves.  MRI brain showing single left occipital lobe met. Rad Onc planning SRS 03/03/2021.   2. Anemia/Myelodysplastic syndrome/GOGO with malabsorption/Chemotherapy-induced anemia-planned to start Retacrit as outpatient for MDS prior to admission.  Known GOGO on oral iron prior to admission. S/p 1 unit pRBC on 2/19/2021.  Iron studies showed continued iron deficiency anemia.  No evidence of hemolysis.  Completed IV iron 2/22/2021 and on Retacrit. Hgb stable.  3. Thrombocytopenia-likely chemotherapy-induced.  No need for further work-up at present.  Platelets normalized 03/02/2021.   4. Acute respiratory failure/Pneumonia/COPD-intubated and sedated  on admission.  Started on broad-spectrum antibiotics. Blood cultures NGTD.  Extubated on 2/20/202. Initially afebrile but spiked low-grade temps and recultured with blood cx neg to date. RVP positive for rhinovirus/enterovirus. Management per pulmonary.  5. Weight loss/loss of appetite-improving as outpatient on Megace.    Failed swallow study and allowed thickened liquid via spoon. Repeat swallow study planned after NGT pulled 3/2/21.  6. Altered mental status/agitation-on Haldol. Psych consulted and he was restarted on home meds including Suboxone, sertraline, and Latuda.  Resolved.  7. Low magnesium/high LFTs-per primary team.     PLAN  1. Follow CBC.   2. SRS 3/3 per Dr. Galindo.    3. Continue Retacrit.   4. Repeat swallow study post NG tube removal.  5. Resume daily oral iron and Megace when able to take po.  6. Outpatient PET scan.  7. Discussed condition with patient and spouse.  Both are wanting to proceed with SRS as well as further systemic treatment.  They are aware that further evaluation of status of disease is needed after completion of SRS.           I discussed the patients findings and my recommendations with patient.    Electronically signed by Axel Lewis MD, 03/02/21, 8:10 AM HARRIET.

## 2021-03-02 NOTE — PROGRESS NOTES
Nutrition Services    Patient Name: Axel Ramirez  YOB: 1950  MRN: [id number]  Admission date: 2/18/2021    PPE Documentation        PPE Worn By Provider Did not enter room for progress note 3/2   PPE Worn By Patient  N/A     PROGRESS NOTE      Encounter Information: 3/2: Progress note to check on plan of care, diet advancement, and intake/tolerance. Since yesterday, pt has had video swallow, and ST has recommended pureed foods and nectar thick liquids, which are in place. Spoke with RN who states pt has actually eaten with fairly good intake since diet advanced. No longer has enteral access and will not be continuing on TF at this time - RDN to discontinue TF orders; will add oral supplements pt was interested in during visit yesterday -- Magic Cup and Boost Pudding, which are compatible with current ST recommendations.       PO Diet: Diet Texture; Pureed Diet; Thickened Liquids (Nectar), No Straws   PO Supplements: Boost Pudding BID; Magic Cup TID (added today after diet advanced)   PO Intake:  Intake fair-good at all recent meals        Nutrition support orders: Was previously on Nutren 1.5, but this has since been discontinued   Nutrition support review: Received TF from 2/19/21-3/2/21       Labs (reviewed below): Reviewed and C/W clinical course; will continue to monitor         GI Function:  Small BM today 3/2       Nutrition Intervention: Will continue diet per ST recommendations with Magic Cup TID and Boost Pudding BID.       Results from last 7 days   Lab Units 03/02/21  0627 03/01/21  0617 02/28/21  0548   SODIUM mmol/L 139 142 143   POTASSIUM mmol/L 4.4 3.8 4.5   CHLORIDE mmol/L 101 104 107   CO2 mmol/L 32.0* 31.0* 29.0   BUN mg/dL 36* 38* 39*   CREATININE mg/dL 0.70* 0.64* 0.76   CALCIUM mg/dL 8.2* 8.6 8.8   BILIRUBIN mg/dL 0.4 0.4 0.4   ALK PHOS U/L 162* 160* 151*   ALT (SGPT) U/L 22 20 20   AST (SGOT) U/L 32 23 26   GLUCOSE mg/dL 107* 111* 127*     Results from last 7 days   Lab  Units 03/02/21  0627  02/26/21  0610 02/25/21  0615   MAGNESIUM mg/dL  --   --  2.6* 1.9   PHOSPHORUS mg/dL  --   --  2.3* 2.5   HEMOGLOBIN g/dL 9.3*   < > 9.7* 9.8*   HEMATOCRIT % 28.2*   < > 28.9* 28.8*    < > = values in this interval not displayed.     COVID19   Date Value Ref Range Status   02/24/2021 Not Detected Not Detected - Ref. Range Final     No results found for: HGBA1C    RD to follow up per protocol.    Electronically signed by:  Rachana Zacarias RD  03/02/21 18:09 EST

## 2021-03-02 NOTE — MBS/VFSS/FEES
Acute Care - Speech Language Pathology   Swallow Re-Evaluation  Johann     Patient Name: Axel Ramirez  : 1950  MRN: [id number]  Today's Date: 3/2/2021               Admit Date: 2021    Visit Dx:     ICD-10-CM ICD-9-CM   1. Pneumonia due to infectious organism, unspecified laterality, unspecified part of lung  J18.9 486   2. Respiratory distress  R06.03 786.09   3. Acute on chronic respiratory failure with hypoxia (CMS/HCC)  J96.21 518.84     799.02     Patient Active Problem List   Diagnosis   • Coronary artery disease involving native coronary artery of native heart without angina pectoris   • Nonrheumatic aortic valve stenosis   • Mixed hyperlipidemia   • Essential hypertension   • Fever   • Presence of aortocoronary bypass graft   • Congestive heart failure (CMS/HCC)   • Hx of aortic valve replacement   • CAP (community acquired pneumonia)   • Tobacco abuse   • Postobstructive pneumonia   • Lung mass   • Squamous cell carcinoma of lung, right (CMS/HCC)   • Diastolic CHF, acute (CMS/HCC)   • Pneumonia of right lung due to infectious organism   • Pneumonia due to infectious organism   • Moderate malnutrition (CMS/HCC)   • Acute on chronic respiratory failure with hypoxia (CMS/HCC)   • COPD with acute exacerbation (CMS/HCC)   • Delirium     Past Medical History:   Diagnosis Date   • Aortic stenosis    • Cancer (CMS/HCC)     Sickle Cell Carcinoma   • Congestive heart failure (CHF) (CMS/HCC)     DIASTOLIC   • COPD (chronic obstructive pulmonary disease) (CMS/HCC)    • Coronary artery disease    • Hypertension    • Lung cancer (CMS/HCC)    • Myocardial infarction (CMS/HCC)    • Peripheral vascular disease (CMS/HCC)    • Valvular disease      Past Surgical History:   Procedure Laterality Date   • AORTIC VALVE REPAIR/REPLACEMENT  2018    Dr Maza   • BRONCHOSCOPY N/A 10/24/2020    Procedure: BRONCHOSCOPY with biopsy right upper lobe mass, and bronchoalveolar lavage right upper lobe;  Surgeon:  Ángela Bean MD;  Location: [facility] ENDOSCOPY;  Service: Pulmonary;  Laterality: N/A;  post: right upper lobe mass, pneumonia   • BRONCHOSCOPY N/A 11/11/2020    Procedure: BRONCHOSCOPY with bronchial washing;  Surgeon: Ángela Bean MD;  Location: [facility] ENDOSCOPY;  Service: Pulmonary;  Laterality: N/A;  Post: lung mass, pneumonia   • BRONCHOSCOPY N/A 11/11/2020    Procedure: BRONCHOSCOPY RIGID with debulking of right main endobronchial tumor;  Surgeon: Nomi Reyes MD;  Location: [facility] MAIN OR;  Service: Cardiothoracic;  Laterality: N/A;   • BRONCHOSCOPY N/A 11/11/2020    Procedure: BRONCHOSCOPY;  Surgeon: Nomi Reyes MD;  Location: [facility] MAIN OR;  Service: Cardiothoracic;  Laterality: N/A;   • CARDIAC CATHETERIZATION  12/29/2017   • CORONARY ARTERY BYPASS GRAFT  02/26/2018    Dr aMza   • TIBIA FRACTURE SURGERY     • VENOUS ACCESS DEVICE (PORT) INSERTION Left 10/30/2020    Procedure: MEDIPORT INSERTION UNDER FLUOROSCOPIC GUIDENCE;  Surgeon: Nomi Reyes MD;  Location: [facility] MAIN OR;  Service: Cardiothoracic;  Laterality: Left;        SWALLOW EVALUATION (last 72 hours)      SLP Adult Swallow Evaluation     Row Name 03/02/21 1200          Document Type  re-evaluation  -MF    Subjective Information  no complaints  -MF    Patient Observations  alert;cooperative;agree to therapy  -MF    Patient/Family/Caregiver Comments/Observations  Pt participated well in VFSS.  -MF    Patient Effort  good  -MF    Comment  --       Oral Prep Phase  impaired oral phase of swallowing  -MF    Oral Transit Phase  impaired  -MF    Oral Residue  WFL  -    VFSS Summary  Pt participated in a VFSS this date to objectively re-assess swallow function and to determine readiness for diet advancement. Pt accepted trials of NTL via spoon and cup, puree, and mech soft/mixed. Pt with incomplete anterior mastication of soft solids, overall slow oral transit, and premature spillage of puree to the valleculae in all trials and to the  pyriforms with NTL via cup x1. Penetration during the swallow with NTL via cup x1 that cleared with cued cough. No penetration or aspiration observed with any other trial. Mild to moderate vallecular residue with all trials that reduces with cued dry swallow or NTL wash. Pt cued to use chin tuck with a dry swallow but this was not effective in clearing residue as he cannot fully complete chin tuck. Deficits include decreased back of tongue control, decreased base of tongue retraction, decreased hyolaryngeal excursion, and decreased epiglottic deflection.     REC: pureed diet with NTL via spoon or cup (no straws), double swallow, small single bites and drinks, alternate bites and drinks. Reviewed results and recs with pt and RN. Will continue to follow for dysphagia therapy. Recommend repeat VFSS in 1-2 weeks pending clinical progress.  -MF          Oral Preparatory Phase  poor rotary chew;inadequate manipulation  -MF    Inadequate Manipulation  mechanical soft  -MF    Poor Rotary Chew  mechanical soft  -MF          Impaired Oral Transit Phase  increased A-P transit time  -MF    Increased A-P Transit Time  all consistencies tested  -MF          Initiation of Pharyngeal Swallow  bolus in valleculae;bolus in pyriform sinuses  -    Pharyngeal Phase  impaired pharyngeal phase of swallowing  -MF    Penetration During the Swallow  nectar-thick liquids  -MF    Response to Penetration  response with cue only  -    Rosenbek's Scale  nectar:;3--->level 3;pudding/puree:;mixed:;1--->level 1  -MF    Pharyngeal Residue  all consistencies tested;valleculae  -    Response to Residue  -- reduced with double swallow and NTL wash  -MF          Daily Summary of Progress (SLP)  progress toward functional goals is good  -    SLP Swallowing Diagnosis  mild-moderate;oral dysphagia;pharyngeal dysphagia  -MF    Functional Impact  risk of aspiration/pneumonia  -    Rehab Potential/Prognosis, Swallowing  good, to achieve stated  therapy goals  -    Swallow Criteria for Skilled Therapeutic Interventions Met  demonstrates skilled criteria  -          Therapy Frequency (Swallow)  PRN  -    Predicted Duration Therapy Intervention (Days)  until discharge  -    SLP Diet Recommendation  puree;nectar thick liquids  -    Recommended Diagnostics  reassess via VFSS ([facility] [place])  -    Recommended Precautions and Strategies  upright posture during/after eating;small bites of food and sips of liquid;no straw;multiple swallows per bite of food;alternate between small bites of food and sips of liquid;general aspiration precautions  -    Oral Care Recommendations  Oral Care BID/PRN  -    SLP Rec. for Method of Medication Administration  meds whole;meds crushed;with pudding or applesauce  -    Monitor for Signs of Aspiration  yes;notify SLP if any concerns  -    Anticipated Discharge Disposition (SLP)  inpatient rehabilitation facility  -          Oral Nutrition/Hydration Goal Selection (SLP)  oral nutrition/hydration, SLP goal 1;oral nutrition/hydration, SLP goal 2;oral nutrition/hydration, SLP goal (free text)  -    Pharyngeal Strengthening Exercise Goal Selection (SLP)  pharyngeal strengthening exercise, SLP goal 1;pharyngeal strengthening exercise, SLP goal 2  -    Additional Documentation  pharyngeal strengthening exercise goal selection (SLP)  -          Oral Nutrition/Hydration Goal 1, SLP  initiate and tolerate L/R diet without oral stage difficulties or complications associated with aspiration   -    Time Frame (Oral Nutrition/Hydration Goal 1, SLP)  by discharge  -    Barriers (Oral Nutrition/Hydration Goal 1, SLP)  Diet advanced to puree and NTL following VFSS.  -    Progress/Outcomes (Oral Nutrition/Hydration Goal 1, SLP)  goal ongoing  -          Oral Nutrition/Hydration Goal 2, SLP  Pt will have full meal assessment within 48 hours  -    Time Frame (Oral Nutrition/Hydration Goal 2, SLP)  2 days  -    Barriers  (Oral Nutrition/Hydration Goal 2, SLP)  Completed VFSS today. See above note  -MF    Progress/Outcomes (Oral Nutrition/Hydration Goal 2, SLP)  goal ongoing  -MF          Activity (Pharyngeal Strengthening Goal 1, SLP)  increase tongue base retraction  -MF    Increase Tongue Base Retraction  carlos  -MF    [place]/Accuracy (Pharyngeal Strengthening Goal 1, SLP)  with moderate cues (50-74% accuracy)  -MF    Time Frame (Pharyngeal Strengthening Goal 1, SLP)  by discharge  -MF          Activity (Pharyngeal Strengthening Goal 1, SLP)  increase anterior movement of the hyolaryngeal complex;increase epiglottic  inversion and retroflexion  -MF    Increase Anterior Movement of the Hyolaryngeal Complex  hard effortful swallow;  -MF    Increase Epiglottic Inversion and Retroflexion  hard effortful swallow;  -MF    [place]/Accuracy (Pharyngeal Strengthening Goal 1, SLP)  with moderate cues (50-74% accuracy)  -MF    Time Frame (Pharyngeal Strengthening Goal 2, SLP)  by discharge  -MF      User Key  (r) = Recorded By, (t) = Taken By, (c) = Cosigned By    Initials Name Effective Dates    SM So Ochoa, BERTHA 03/01/19 -     Loretta Bagley, BERTHA 06/17/19 -     AF Deedee Watson, Speech Therapy Student 01/12/21 -           EDUCATION  VFSS review and education was conducted this date. Individuals educated included:  self Education methods/means included VFSS education means: verbal review face to face, playback of VFSS and typed sign with diet recs & compensations.  Education barriers: none identified Further follow up warranted next date and recommended while in house and at next level of care.   Returned to pt's room later this date to provide additional education and typed diet recs.     SLP Recommendation and Plan  SLP Swallowing Diagnosis: mild-moderate, oral dysphagia, pharyngeal dysphagia  SLP Diet Recommendation: puree, nectar thick liquids  Recommended Precautions and Strategies: upright posture during/after  eating, small bites of food and sips of liquid, no straw, multiple swallows per bite of food, alternate between small bites of food and sips of liquid, general aspiration precautions  SLP Rec. for Method of Medication Administration: meds whole, meds crushed, with pudding or applesauce     Monitor for Signs of Aspiration: yes, notify SLP if any concerns  Recommended Diagnostics: reassess via VFSS (MBS)  Swallow Criteria for Skilled Therapeutic Interventions Met: demonstrates skilled criteria  Anticipated Discharge Disposition (SLP): inpatient rehabilitation facility  Rehab Potential/Prognosis, Swallowing: good, to achieve stated therapy goals  Therapy Frequency (Swallow): PRN  Predicted Duration Therapy Intervention (Days): until discharge     Daily Summary of Progress (SLP): progress toward functional goals is good                        SLP GOALS     Row Name 03/02/21 1200 03/01/21 1400          Oral Nutrition/Hydration Goal 1 (SLP)    Oral Nutrition/Hydration Goal 1, SLP  initiate and tolerate L/R diet without oral stage difficulties or complications associated with aspiration   -MF  initiate and tolerate L/R diet without oral stage difficulties or complications associated with aspiration   -SM (r) AF (t) SM (c)     Time Frame (Oral Nutrition/Hydration Goal 1, SLP)  by discharge  -MF  by discharge  -SM (r) AF (t) SM (c)     Barriers (Oral Nutrition/Hydration Goal 1, SLP)  Diet advanced to puree and NTL following VFSS.  -MF  See above note.  -SM (r) AF (t) SM (c)     Progress/Outcomes (Oral Nutrition/Hydration Goal 1, SLP)  goal ongoing  -MF  goal ongoing  -SM (r) AF (t) SM (c)        Oral Nutrition/Hydration Goal 2 (SLP)    Oral Nutrition/Hydration Goal 2, SLP  Pt will have full meal assessment within 48 hours  -MF  Pt will have full meal assessment within 48 hours  -SM (r) AF (t) SM (c)     Time Frame (Oral Nutrition/Hydration Goal 2, SLP)  2 days  -MF  2 days  -SM (r) AF (t) SM (c)     Barriers (Oral  Nutrition/Hydration Goal 2, SLP)  Completed VFSS today. See above note  -MF  Luis seen this date at lunch.  Patient alert and cooperative for meal assessment.  Patient given multiple different NTL on tray.  Daughter did a good job of giving him small sips.  One mild cough noted following initial NTL drink.  No further s/s of aspiration noted  -SM (r) AF (t) SM (c)     Progress/Outcomes (Oral Nutrition/Hydration Goal 2, SLP)  goal ongoing  -MF  goal ongoing  -SM (r) AF (t) SM (c)        Pharyngeal Strengthening Exercise Goal 1 (SLP)    Activity (Pharyngeal Strengthening Goal 1, SLP)  increase tongue base retraction  -MF  --     Increase Tongue Base Retraction  carlos  -MF  --     [place]/Accuracy (Pharyngeal Strengthening Goal 1, SLP)  with moderate cues (50-74% accuracy)  -MF  --     Time Frame (Pharyngeal Strengthening Goal 1, SLP)  by discharge  -MF  --        Pharyngeal Strengthening Exercise Goal 2 (SLP)    Activity (Pharyngeal Strengthening Goal 1, SLP)  increase anterior movement of the hyolaryngeal complex;increase epiglottic  inversion and retroflexion  -MF  --     Increase Anterior Movement of the Hyolaryngeal Complex  hard effortful swallow  -MF  --     Increase Epiglottic Inversion and Retroflexion  hard effortful swallow  -MF  --     [place]/Accuracy (Pharyngeal Strengthening Goal 1, SLP)  with moderate cues (50-74% accuracy)  -MF  --     Time Frame (Pharyngeal Strengthening Goal 2, SLP)  by discharge  -MF  --       User Key  (r) = Recorded By, (t) = Taken By, (c) = Cosigned By    Initials Name Provider Type    So Hyde, SLP Speech and Language Pathologist    Loretta Bagley, BERTHA Speech and Language Pathologist    Deedee Foster, Speech Therapy Student Speech Therapy Student           Patient was not wearing a face mask during this therapy encounter. Therapist used appropriate personal protective equipment including mask, eye protection and gloves.  Mask used was standard  procedure mask. Appropriate PPE was worn during the entire therapy session. Hand hygiene was completed before and after therapy session. Patient is not in enhanced droplet precautions.               Time Calculation:       Therapy Charges for Today     Code Description Service Date Service Provider Modifiers Qty    [id number]  ST MOTION FLUORO EVAL SWALLOW 6 3/2/2021 Loretta Bruno SLP GN 1    [id number] HC ST TREATMENT SWALLOW 2 3/2/2021 Loretta Bruno SLP GN 1               BERTHA Sherman  3/2/2021

## 2021-03-02 NOTE — PLAN OF CARE
Goal Outcome Evaluation:  Plan of Care Reviewed With: patient  Progress: improving  Outcome Summary: Pt NG tube removed and started on pureed diet. Pt tolerating new diet well. He has been getting up to the BSC this shift. Will continue to monitor.

## 2021-03-02 NOTE — PLAN OF CARE
Goal Outcome Evaluation:      Pt participated in a VFSS this date to objectively re-assess swallow function and to determine readiness for diet advancement. Pt accepted trials of NTL via spoon and cup, puree, and mech soft/mixed. Pt with incomplete anterior mastication of soft solids, overall slow oral transit, and premature spillage of puree to the valleculae in all trials and to the pyriforms with NTL via cup x1. Penetration during the swallow with NTL via cup x1 that cleared with cued cough. No penetration or aspiration observed with any other trial. Mild to moderate vallecular residue with all trials that reduces with cued dry swallow or NTL wash. Pt cued to use chin tuck with a dry swallow but this was not effective in clearing residue. Deficits include decreased back of tongue control, decreased base of tongue retraction, decreased hyolaryngeal excursion, and decreased epiglottic deflection.     REC: pureed diet with NTL via spoon or cup (no straws), double swallow, small single bites and drinks, alternate bites and drinks. Reviewed results and recs with pt and RN. Will continue to follow for dysphagia therapy. Pt will need IP rehab at d/c. Recommend repeat VFSS in 1-2 weeks pending clinical progress.

## 2021-03-02 NOTE — PROGRESS NOTES
[facility] Medicine Services Daily Progress Note      Hospitalist Team  LOS 12 days      Patient Care Team:  Sandoval Stevenson FNP as PCP - General  Sandoval Stevenson FNP as PCP - Family Medicine  Axel Lewis MD as Consulting Physician (Hematology and Oncology)  Iglesia Springer MD as Consulting Physician (Cardiology)    Patient Location: 2116/1      Subjective   Subjective     Chief Complaint / Subjective  Chief Complaint   Patient presents with   • Shortness of Breath     Patient reported feeling better today.  Breathing improving.  Going for video swallow and then radiation therapy.    Brief Synopsis of Hospital Course/HPI  HPI taken from medical chart review as patient is oriented x2 but is anxious and agitated and cannot elaborate on what led to his hospitalization   Mr. Ramirez is a 70 y.o. male known history of COPD on 2L O2 continuously, history of lung cancer status post chemoradiation is still receiving chemo, was brought by EMS to [facility] ED 2/18/21 with complaints of shortness of breath. He initially was on 4 L and required to be on 8 L in the emergency room. He continued to decline to respiratory failure and required intubation in the ER.  CT showed bilateral infiltrates with increased narrowing of right middle lobe bronchus secondary to known mass.  He was started on broad spectrum antibiotics.  He was admitted to the ICU for further treatment of acute respiratory failure and pneumonia.      Since admission the patient has been gradually weaned off mechanical ventilation, extubated 2/20/2021 now on room air.  He did have some hypotension that required vasopressors that have since been weaned off. He was felt stable for transfer out of ICU 2/22/21 and the hospitalist group was consulted for medical management. He remains confused and agitated with sitter at bedside. The patient and family report he has not slept in 2 days and would like something to help him  sleep.         Date::      2/23  Patient is nonverbal.  Sitting in recliner having hard time breathing and tachypneic with rales at the bases on exam  He is using Yunker to suction secretions.  IV Lasix ordered x1  Chest x-ray and ABG ordered     2/24/2021  Patient seen and examined earlier today  He wants his Suboxone back which was started.  Increase also Neurontin to his regular dose.  Had nasogastric tube for feeding and awaiting dietitian recommendation  Tachycardia and hypertensive in the starting him back on his home metoprolol.     2/25  Patient spiked fever overnight and had repeat blood cultures done  Vancomycin started but MRSA screen negative and then discontinued.  Respiratory panelShowedRhinovirus/enterovirus  Patient slightly drowsy but able to answer questions with little weak voice  Started on tube feeding     2/26     Patient had abnormal MRI.  We will ask neurosurgery for evaluation.  Hypernatremia noted and free water is increased/Nasogastric tube  Denies any new deficits.  He is wanting to drink Coca-Cola but only thickened liquids are allowed.  Discussed findings with his wife.  Radiation oncology also consulted     2/27  C/o generalized aches  Has prominent lid lag: check free t4 and tsh      2/28  No significant complaints today, tolerating TFs      3/1  Plan for radiation tx   Goals of care clarified with patient's spouse       Date::          Review of Systems   Constitution: Negative for chills and fever.   HENT: Negative for hoarse voice and stridor.    Eyes: Negative for double vision and photophobia.   Cardiovascular: Negative for chest pain and syncope.   Respiratory: Negative for cough and shortness of breath.    Musculoskeletal: Negative for joint pain and stiffness.   Gastrointestinal: Negative for nausea and vomiting.   Genitourinary: Negative for dysuria and flank pain.   Neurological: Negative for focal weakness and light-headedness.   Psychiatric/Behavioral: Negative for altered  "mental status. The patient is not nervous/anxious.          Objective   Objective      Vital Signs  Temp:  [97.6 °F (36.4 °C)-98.8 °F (37.1 °C)] 97.6 °F (36.4 °C)  Heart Rate:  [73-94] 77  Resp:  [14-20] 18  BP: (109-128)/(64-65) 109/65  Oxygen Therapy  SpO2: 100 %  Pulse Oximetry Type: Intermittent  Device (Oxygen Therapy): nasal cannula  Device (Oxygen Therapy): nasal cannula  Flow (L/min): 2  Oxygen Concentration (%): 36  Oximetry Probe Site Changed: No  Flowsheet Rows      First Filed Value   Admission Height  177.8 cm (70\") Documented at 02/18/2021 1048   Admission Weight  67.1 kg (148 lb) Documented at 02/18/2021 1048        Intake & Output (last 3 days)       02/27 0701 - 02/28 0700 02/28 0701 - 03/01 0700 03/01 0701 - 03/02 0700 03/02 0701 - 03/03 0700    P.O. 560  120     Other 1348 1107      NG/GT 2166 1890      Total Intake(mL/kg) 4074 (62.7) 2997 (46.1) 120 (1.8)     Urine (mL/kg/hr)  1400 (0.9)      Stool  0      Total Output  1400      Net +4074 +1597 +120             Urine Unmeasured Occurrence 5 x  5 x 2 x    Stool Unmeasured Occurrence 5 x 1 x  2 x        Lines, Drains & Airways    Active LDAs     Name:   Placement date:   Placement time:   Site:   Days:    Peripheral IV 02/23/21 1300 Anterior;Left Forearm   02/23/21    1300    Forearm   7    External Urinary Catheter   02/28/21    0900    --   2    Single Lumen Implantable Port 10/30/20 Left Subclavian   10/30/20    0956    Subclavian   123                  Physical Exam:    Physical Exam    General: Frail elderly male lying in bed breathing comfortably on 2 L via nasal cannula no acute distress  HEENT: NC/AT, EOMI, mucosa moist  Heart: Regular, rate controlled  Chest: Normal work of breathing, moving air well no wheezing  Abdominal: Soft. NT/ND.   Musculoskeletal: Normal ROM.  No cyanosis. No calf tenderness.  Neurological: Awake and alert, no focal deficits  Skin: Skin is warm and dry. No rash  Psychiatric: Normal mood and affect.         Wounds " (last 24 hours)      LDA Wound     Row Name 03/02/21 1200 03/02/21 0840 03/02/21 0400       Wound 02/18/21 1900 Right gluteal Pressure Injury    Wound - Properties Group Placement Date: 02/18/21  -OSMAN Placement Time: 1900  -OSMAN Present on Hospital Admission: Y  -OSMAN Side: Right  -OSMAN Location: gluteal  -OSMAN Primary Wound Type: Pressure inj  -OSMAN Stage, Pressure Injury : Stage 2  -OSMAN    Closure  Open to air  -AD  Open to air  -AD  Open to air  -JT    Base  --  --  dressing in place, unable to visualize  -JT    Drainage Amount  none  -AD  none  -AD  none  -JT    Retired Wound - Properties Group Date first assessed: 02/18/21  -OSMAN Time first assessed: 1900  -OSMAN Present on Hospital Admission: Y  -OSMAN Side: Right  -OSMAN Location: gluteal  -OSMAN Primary Wound Type: Pressure inj  -OSMAN       Wound 02/27/21 0000 medial sacral spine Pressure Injury    Wound - Properties Group Placement Date: 02/27/21  -EM Placement Time: 0000  -EM Present on Hospital Admission: --  -EM, unknown  Orientation: medial  -EM Location: sacral spine  -EM Primary Wound Type: Pressure inj  -EM Stage, Pressure Injury : unstageable  -EM, poss eschar     Closure  Open to air  -AD  BRANT  -AD  BRANT  -JT    Base  non-blanchable  -AD  non-blanchable  -AD  non-blanchable  -JT    Retired Wound - Properties Group Date first assessed: 02/27/21  -EM Time first assessed: 0000  -EM Present on Hospital Admission: --  -EM, unknown  Location: sacral spine  -EM Primary Wound Type: Pressure inj  -EM       Wound 02/18/21 1044 Bilateral posterior elbow Abrasion    Wound - Properties Group Placement Date: 02/18/21  -EM Placement Time: 1044  -EM Side: Bilateral  -EM Orientation: posterior  -EM Location: elbow  -EM Primary Wound Type: Abrasion  -EM, old scab     Base  scab  -AD  scab  -AD  scab  -JT    Retired Wound - Properties Group Date first assessed: 02/18/21  -EM Time first assessed: 1044  -EM Side: Bilateral  -EM Location: elbow  -EM Primary Wound Type: Abrasion  -EM, old scab      Row Name 03/02/21 0000 03/01/21 2000 03/01/21 1615       Wound 02/18/21 1900 Right gluteal Pressure Injury    Wound - Properties Group Placement Date: 02/18/21  -OSMAN Placement Time: 1900  -OSMAN Present on Hospital Admission: Y  -OSMAN Side: Right  -OSMAN Location: gluteal  -OSMAN Primary Wound Type: Pressure inj  -OSMAN Stage, Pressure Injury : Stage 2  -OSMAN    Closure  Open to air  -JT  Open to air  -JT  Open to air  -BB    Base  dressing in place, unable to visualize  -JT  dressing in place, unable to visualize  -JT  dressing in place, unable to visualize  -BB    Drainage Amount  none  -JT  none  -JT  none  -BB    Retired Wound - Properties Group Date first assessed: 02/18/21  -OSMAN Time first assessed: 1900  -OSMAN Present on Hospital Admission: Y  -OSMAN Side: Right  -OSMAN Location: gluteal  -OSMAN Primary Wound Type: Pressure inj  -OSMAN       Wound 02/27/21 0000 medial sacral spine Pressure Injury    Wound - Properties Group Placement Date: 02/27/21  -EM Placement Time: 0000  -EM Present on Hospital Admission: --  -EM, unknown  Orientation: medial  -EM Location: sacral spine  -EM Primary Wound Type: Pressure inj  -EM Stage, Pressure Injury : unstageable  -EM, poss eschar     Closure  BRANT  -JT  BRANT  -JT  BRANT  -BB    Base  non-blanchable  -JT  non-blanchable  -JT  non-blanchable  -BB    Retired Wound - Properties Group Date first assessed: 02/27/21  -EM Time first assessed: 0000  -EM Present on Hospital Admission: --  -EM, unknown  Location: sacral spine  -EM Primary Wound Type: Pressure inj  -EM       Wound 02/18/21 1044 Bilateral posterior elbow Abrasion    Wound - Properties Group Placement Date: 02/18/21  -EM Placement Time: 1044  -EM Side: Bilateral  -EM Orientation: posterior  -EM Location: elbow  -EM Primary Wound Type: Abrasion  -EM, old scab     Base  scab  -JT  scab  -JT  scab  -BB    Retired Wound - Properties Group Date first assessed: 02/18/21  -EM Time first assessed: 1044  -EM Side: Bilateral  -EM Location: elbow  -EM Primary  Wound Type: Abrasion  -EM, old scab       User Key  (r) = Recorded By, (t) = Taken By, (c) = Cosigned By    Initials Name Provider Type    Kennedi Gandhi, RN Registered Nurse    Lexus Smith, RN Registered Nurse    Lurdes Andres, RN Registered Nurse    Anastasiia Abraham, RN Registered Nurse    Berenice Cuevas, RN Registered Nurse          Procedures:              Results Review:     I reviewed the patient's new clinical results.      Lab Results (last 24 hours)     Procedure Component Value Units Date/Time    Manual Differential [[id number]]  (Abnormal) Collected: 03/02/21 0627    Specimen: Blood Updated: 03/02/21 0715     Neutrophil % 73.0 %      Lymphocyte % 12.0 %      Monocyte % 5.0 %      Bands %  8.0 %      Myelocyte % 1.0 %      Atypical Lymphocyte % 1.0 %      Neutrophils Absolute 7.13 10*3/mm3      Lymphocytes Absolute 1.06 10*3/mm3      Monocytes Absolute 0.44 10*3/mm3      Anisocytosis Slight/1+     Macrocytes Slight/1+     Polychromasia Slight/1+     WBC Morphology Normal     Platelet Morphology Normal    CBC & Differential [[id number]]  (Abnormal) Collected: 03/02/21 0627    Specimen: Blood Updated: 03/02/21 0715    Narrative:      The following orders were created for panel order CBC & Differential.  Procedure                               Abnormality         Status                     ---------                               -----------         ------                     Scan Slide[[id number]]                                       Final result               CBC Auto Differential[[id number]]        Abnormal            Final result                 Please view results for these tests on the individual orders.    CBC Auto Differential [[id number]]  (Abnormal) Collected: 03/02/21 0627    Specimen: Blood Updated: 03/02/21 0715     WBC 8.80 10*3/mm3      RBC 2.65 10*6/mm3      Hemoglobin 9.3 g/dL      Hematocrit 28.2 %      .5 fL      MCH 35.1 pg      MCHC 33.0 g/dL      RDW 25.9 %       RDW-SD 93.6 fl      MPV 9.6 fL      Platelets 181 10*3/mm3     Narrative:      The previously reported component NRBC is no longer being reported. Previous result was 0.8 /100 WBC (Reference Range: 0.0-0.2 /100 WBC) on 3/2/2021 at 0634 EST.    Scan Slide [[id number]] Collected: 03/02/21 0627    Specimen: Blood Updated: 03/02/21 0715     Scan Slide --     Comment: See Manual Differential Results       Comprehensive Metabolic Panel [[id number]]  (Abnormal) Collected: 03/02/21 0627    Specimen: Blood Updated: 03/02/21 0659     Glucose 107 mg/dL      BUN 36 mg/dL      Creatinine 0.70 mg/dL      Sodium 139 mmol/L      Potassium 4.4 mmol/L      Chloride 101 mmol/L      CO2 32.0 mmol/L      Calcium 8.2 mg/dL      Total Protein 7.1 g/dL      Albumin 2.90 g/dL      ALT (SGPT) 22 U/L      AST (SGOT) 32 U/L      Alkaline Phosphatase 162 U/L      Total Bilirubin 0.4 mg/dL      eGFR Non African Amer 111 mL/min/1.73      Globulin 4.2 gm/dL      A/G Ratio 0.7 g/dL      BUN/Creatinine Ratio 51.4     Anion Gap 6.0 mmol/L     Narrative:      GFR Normal >60  Chronic Kidney Disease <60  Kidney Failure <15      POC Glucose Once [[id number]]  (Abnormal) Collected: 03/02/21 0623    Specimen: Blood Updated: 03/02/21 0624     Glucose 112 mg/dL      Comment: Serial Number: [id number]:  [id number]       POC Glucose Once [[id number]]  (Abnormal) Collected: 03/02/21 0023    Specimen: Blood Updated: 03/02/21 0024     Glucose 170 mg/dL      Comment: Serial Number: [id number]:  [id number]       Blood Culture - Blood, Hand, Left [[id number]] Collected: 02/24/21 2109    Specimen: Blood from Hand, Left Updated: 03/01/21 2130     Blood Culture No growth at 5 days    POC Glucose Once [[id number]]  (Abnormal) Collected: 03/01/21 1649    Specimen: Blood Updated: 03/01/21 1651     Glucose 114 mg/dL      Comment: Serial Number: [id number]:  [id number]           No results found for: HGBA1C        Results from last 7 days   Lab Units  02/23/21  1529   PH, ARTERIAL pH units 7.465*   PO2 ART mm Hg 82.9*   PCO2, ARTERIAL mm Hg 30.1*   HCO3 ART mmol/L 21.7     No results found for: LIPASE  Lab Results   Component Value Date    CHOL 79 11/29/2019    TRIG 48 11/29/2019    HDL 33 (L) 11/29/2019    LDL 36 11/29/2019       Lab Results   Lab Value Date/Time    FINALDX  11/11/2020 0958     Tumor mass, right mainstem bronchus, biopsy:    Invasive moderately differentiated squamous cell carcinoma (see COMMENT)    NORBERTO/tkd       FINALDX  11/11/2020 0747     Smears of bronchial washings with cytospin preparation:    Occasional markedly atypical cells consistent with non-small cell carcinoma and exhibiting cytomorphologic      features most suggestive of squamous cell carcinoma     Background consists of acute inflammation with occasional fungal spores and hyphae morphologically most      consistent with Candida species    NORBERTO/tkd       COMDX  11/11/2020 0958     The biopsy was stained via immunohistochemical technique utilizing appropriate controls for the presence of p63, p40, CK5/6, napsin and TTF-1. The tumor cells are positive for CK5/6, p63 and p40 while being negative for TTF-1 and napsin. This staining pattern in conjunction with the histologic features is entirely consistent with squamous cell carcinoma. The tumor is focally necrotic. There is no lymph-vascular space invasion identified.      NORBERTO/tkd       COMDX  11/11/2020 0747     Please correlate with concurrent surgical pathology specimen ([id number]).    NORBERTO/tkd          Microbiology Results (last 10 days)     Procedure Component Value - Date/Time    Blood Culture - Blood, Hand, Left [[id number]] Collected: 02/24/21 2109    Lab Status: Final result Specimen: Blood from Hand, Left Updated: 03/01/21 2130     Blood Culture No growth at 5 days    MRSA Screen, PCR (Inpatient) - Swab, Nares [[id number]]  (Normal) Collected: 02/24/21 1952    Lab Status: Final result Specimen: Swab from Nares Updated: 02/24/21  2112     MRSA PCR No MRSA Detected    COVID PRE-OP / PRE-PROCEDURE SCREENING ORDER (NO ISOLATION) - Swab, Nasopharynx [[id number]]  (Abnormal) Collected: 02/24/21 1951    Lab Status: Final result Specimen: Swab from Nasopharynx Updated: 02/24/21 2339    Narrative:      The following orders were created for panel order COVID PRE-OP / PRE-PROCEDURE SCREENING ORDER (NO ISOLATION) - Swab, Nasopharynx.  Procedure                               Abnormality         Status                     ---------                               -----------         ------                     Respiratory Panel PCR w/...[[id number]]  Abnormal            Final result                 Please view results for these tests on the individual orders.    Respiratory Panel PCR w/COVID-19(SARS-CoV-2) PARESH/GRACIELA/KATHY/PAD/COR/MAD/CHRIST In-House, NP Swab in UTM/VTM, 3-4 HR TAT - Swab, Nasopharynx [[id number]]  (Abnormal) Collected: 02/24/21 1951    Lab Status: Final result Specimen: Swab from Nasopharynx Updated: 02/24/21 2339     ADENOVIRUS, PCR Not Detected     Coronavirus 229E Not Detected     Coronavirus HKU1 Not Detected     Coronavirus NL63 Not Detected     Coronavirus OC43 Not Detected     COVID19 Not Detected     Human Metapneumovirus Not Detected     Human Rhinovirus/Enterovirus Detected     Influenza A PCR Not Detected     Influenza B PCR Not Detected     Parainfluenza Virus 1 Not Detected     Parainfluenza Virus 2 Not Detected     Parainfluenza Virus 3 Not Detected     Parainfluenza Virus 4 Not Detected     RSV, PCR Not Detected     Bordetella pertussis pcr Not Detected     Bordetella parapertussis PCR Not Detected     Chlamydophila pneumoniae PCR Not Detected     Mycoplasma pneumo by PCR Not Detected    Narrative:      Fact sheet for providers: https://docs.[employer]/wp-content/uploads/AHY9666-0618-FY0.1-EUA-Provider-Fact-Sheet-3.pdf    Fact sheet for patients:  https://docs.[employer]/wp-content/uploads/GAG7044-0072-TR7.1-EUA-Patient-Fact-Sheet-1.pdf    Test performed by PCR.    Urine Culture - Urine, Urine, Catheter In/Out [[id number]]  (Normal) Collected: 02/20/21 1906    Lab Status: Final result Specimen: Urine, Catheter In/Out Updated: 02/21/21 1951     Urine Culture No growth          ECG/EMG Results (most recent)     Procedure Component Value Units Date/Time    ECG 12 Lead [[id number]] Collected: 02/18/21 1050     Updated: 02/19/21 1421     QT Interval 291 ms     Narrative:      HEART RATE= 130  bpm  RR Interval= 462  ms  DC Interval= 132  ms  P Horizontal Axis= -23  deg  P Front Axis= 79  deg  QRSD Interval= 83  ms  QT Interval= 291  ms  QRS Axis= 55  deg  T Wave Axis=   deg  - ABNORMAL ECG -  Sinus tachycardia  Left atrial enlargement  Anteroseptal infarct, age indeterminate  When compared with ECG of 09-Nov-2020 13:39:57,  Significant rate increase  Significant axis, voltage or hypertrophy change  Electronically Signed By: Higinio Flores ([facility]) 19-Feb-2021 14:11:44  Date and Time of Study: 2021-02-18 10:50:09    ECG 12 Lead [[id number]] Collected: 02/24/21 0611     Updated: 02/24/21 0612     QT Interval 358 ms     Narrative:      HEART RATE= 121  bpm  RR Interval= 496  ms  DC Interval= 120  ms  P Horizontal Axis= -25  deg  P Front Axis= 64  deg  QRSD Interval= 108  ms  QT Interval= 358  ms  QRS Axis= -6  deg  T Wave Axis= 85  deg  - ABNORMAL ECG -  Sinus tachycardia  LAE, consider biatrial enlargement  Prolonged QT interval  When compared with ECG of 23-Feb-2021 7:44:24,  Nonspecific significant change  Electronically Signed By:   Date and Time of Study: 2021-02-24 06:11:17    ECG 12 Lead [[id number]] Collected: 02/20/21 1846     Updated: 02/24/21 0721     QT Interval 340 ms     Narrative:      HEART RATE= 119  bpm  RR Interval= 496  ms  DC Interval=   ms  P Horizontal Axis=   deg  P Front Axis=   deg  QRSD Interval= 87  ms  QT Interval= 340  ms  QRS Axis= 66   deg  T Wave Axis= 75  deg  - ABNORMAL ECG -  Atrial flutter with predominant 2:1 AV block  Ventricular premature complex  Consider anterior infarct  When compared with ECG of 18-Feb-2021 10:50:09,  Nonspecific significant change  Electronically Signed By: Fuad Montero ([facility]) 24-Feb-2021 07:14:30  Date and Time of Study: 2021-02-20 18:46:19    ECG 12 Lead [[id number]] Collected: 02/21/21 0947     Updated: 02/24/21 0731     QT Interval 379 ms     Narrative:      HEART RATE= 100  bpm  RR Interval= 600  ms  KY Interval= 136  ms  P Horizontal Axis= -16  deg  P Front Axis= 98  deg  QRSD Interval= 95  ms  QT Interval= 379  ms  QRS Axis= 63  deg  T Wave Axis= 70  deg  - BORDERLINE ECG -  Sinus tachycardia  Consider left ventricular hypertrophy  When compared with ECG of 20-Feb-2021 18:46:19,  Significant axis, voltage or hypertrophy change  Electronically Signed By: Fuad Montero ([facility]) 24-Feb-2021 07:19:26  Date and Time of Study: 2021-02-21 09:47:53    ECG 12 Lead [[id number]] Collected: 02/25/21 0552     Updated: 02/25/21 0554     QT Interval 356 ms     Narrative:      HEART RATE= 106  bpm  RR Interval= 568  ms  KY Interval= 112  ms  P Horizontal Axis= -38  deg  P Front Axis= 63  deg  QRSD Interval= 106  ms  QT Interval= 356  ms  QRS Axis= -8  deg  T Wave Axis= 80  deg  - ABNORMAL ECG -  Sinus tachycardia  LAE, consider biatrial enlargement  Anteroseptal infarct, age indeterminate  When compared with ECG of 24-Feb-2021 6:11:17,  Significant repolarization change  Electronically Signed By:   Date and Time of Study: 2021-02-25 05:52:57    ECG 12 Lead [[id number]] Collected: 02/23/21 0744     Updated: 02/26/21 1535     QT Interval 395 ms     Narrative:      HEART RATE= 111  bpm  RR Interval= 540  ms  KY Interval= 156  ms  P Horizontal Axis= -42  deg  P Front Axis= 79  deg  QRSD Interval= 122  ms  QT Interval= 395  ms  QRS Axis= -14  deg  T Wave Axis= 86  deg  - ABNORMAL ECG -  Sinus tachycardia  Biatrial enlargement  Evolving  anterior infarct since 22-Feb-2021 7:19:57  When compared with ECG of 22-Feb-2021 7:19:57,  New or worsened ischemia or infarction  Significant axis, voltage or hypertrophy change  Electronically Signed By: Braden Mcdaniel ([facility]) 26-Feb-2021 15:31:30  Date and Time of Study: 2021-02-23 07:44:24    ECG 12 Lead [[id number]] Collected: 02/26/21 0544     Updated: 02/26/21 1945     QT Interval 345 ms     Narrative:      HEART RATE= 125  bpm  RR Interval= 496  ms  OH Interval= 110  ms  P Horizontal Axis= -34  deg  P Front Axis= 73  deg  QRSD Interval= 107  ms  QT Interval= 345  ms  QRS Axis= -4  deg  T Wave Axis= 92  deg  - ABNORMAL ECG -  Sinus tachycardia  Atrial premature complexes  LAE, consider biatrial enlargement  When compared with ECG of 25-Feb-2021 5:52:57,  No significant change  Electronically Signed By: Rebeca Jacobs ([facility]) 26-Feb-2021 19:35:13  Date and Time of Study: 2021-02-26 05:44:41    ECG 12 Lead [[id number]] Collected: 02/22/21 0719     Updated: 02/28/21 1657     QT Interval 394 ms     Narrative:      HEART RATE= 110  bpm  RR Interval= 544  ms  OH Interval= 142  ms  P Horizontal Axis= -26  deg  P Front Axis= 70  deg  QRSD Interval= 122  ms  QT Interval= 394  ms  QRS Axis= -14  deg  T Wave Axis= 86  deg  - ABNORMAL ECG -  Sinus tachycardia  Biatrial enlargement  Left ventricular hypertrophy  When compared with ECG of 21-Feb-2021 9:47:53,  No significant change  Electronically Signed By: Iglesia Springer ([facility]) 28-Feb-2021 16:56:20  Date and Time of Study: 2021-02-22 07:19:57    ECG 12 Lead [[id number]] Collected: 02/27/21 2326     Updated: 02/28/21 1759     QT Interval 381 ms     Narrative:      HEART RATE= 90  bpm  RR Interval= 668  ms  OH Interval= 114  ms  P Horizontal Axis= -41  deg  P Front Axis= 70  deg  QRSD Interval= 84  ms  QT Interval= 381  ms  QRS Axis= 59  deg  T Wave Axis= 51  deg  - ABNORMAL ECG -  Sinus rhythm  Left atrial enlargement  Anteroseptal infarct, age  indeterminate  When compared with ECG of 27-Feb-2021 5:51:38,  Significant axis, voltage or hypertrophy change  Electronically Signed By: Rebeca Jacobs ([facility]) 28-Feb-2021 17:49:16  Date and Time of Study: 2021-02-27 23:26:41    ECG 12 Lead [[id number]] Collected: 02/27/21 0551     Updated: 02/28/21 1759     QT Interval 347 ms     Narrative:      HEART RATE= 103  bpm  RR Interval= 584  ms  ID Interval= 108  ms  P Horizontal Axis= -36  deg  P Front Axis= 63  deg  QRSD Interval= 85  ms  QT Interval= 347  ms  QRS Axis= 50  deg  T Wave Axis= -4  deg  - BORDERLINE ECG -  Sinus tachycardia  Left atrial enlargement  Probable left ventricular hypertrophy  When compared with ECG of 26-Feb-2021 5:44:41,  Significant rate decrease  Significant axis, voltage or hypertrophy change  Electronically Signed By: Rebeca Jacobs ([facility]) 28-Feb-2021 17:49:24  Date and Time of Study: 2021-02-27 05:51:38    ECG 12 Lead [[id number]] Collected: 03/02/21 0325     Updated: 03/02/21 0327     QT Interval 381 ms     Narrative:      HEART RATE= 88  bpm  RR Interval= 680  ms  ID Interval= 122  ms  P Horizontal Axis= -73  deg  P Front Axis= 147  deg  QRSD Interval= 86  ms  QT Interval= 381  ms  QRS Axis= 60  deg  T Wave Axis= 148  deg  - ABNORMAL ECG -  Sinus or ectopic atrial rhythm  Left atrial enlargement  Lateral infarct, old  Anteroseptal infarct, age indeterminate  Electronically Signed By:   Date and Time of Study: 2021-03-02 03:25:22    ECG 12 Lead [[id number]] Collected: 03/01/21 0741     Updated: 03/02/21 1009     QT Interval 377 ms     Narrative:      HEART RATE= 87  bpm  RR Interval= 692  ms  ID Interval= 117  ms  P Horizontal Axis= -66  deg  P Front Axis= 65  deg  QRSD Interval= 93  ms  QT Interval= 377  ms  QRS Axis= 57  deg  T Wave Axis= 73  deg  - ABNORMAL ECG -  Sinus rhythm  Left atrial enlargement  Anteroseptal infarct, age indeterminate  When compared with ECG of 27-Feb-2021 23:26:41,  No significant  change  Electronically Signed By: Adi Carroll ([facility]) 02-Mar-2021 10:03:42  Date and Time of Study: 2021-03-01 07:41:28               Results for orders placed during the hospital encounter of 11/08/20   Adult Transthoracic Echo Complete W/ Cont if Necessary Per Protocol    Narrative · Estimated left ventricular EF = 65% Left ventricular systolic function   is normal.  · Left ventricular diastolic function is consistent with (grade Ia w/high   LAP) impaired relaxation.  · The right atrial cavity is mildly dilated.  · Mild aortic valve stenosis is present.  · Mild to moderate LVOT gradient noted          Ct Abdomen Pelvis Without Contrast    Result Date: 2/24/2021  1.Large amount of formed stool in the rectum suspicious for constipation. Disimpaction may be necessary. There is also an above average amount of formed stool in the proximal colon. 2.Layering hyperdense material in the gallbladder, which could be seen with spurious excretion of contrast from prior contrast enhanced studies, particularly if there is renal failure. This could also be related to biliary sludge and/or gallstones. 3.Emphysema with bilateral lower lobe airspace opacities, similar to the prior chest CT, which could be related to pneumonia or aspiration. Opacities in the right lower lobe appears slightly decreased. There is minimal consolidation and trace pleural effusion in the posterior left thorax. 4.Probable hepatic and renal cysts, as before. 5.Calcific atherosclerosis. 6.Enteric tube terminates in the stomach.    Electronically Signed By-Adi Delgado MD On:2/24/2021 7:48 PM This report was finalized on [id number] by  Adi Delgado MD.    Xr Abdomen 2+ Vw With Chest 1 Vw    Result Date: 2/23/2021   1. Rectosigmoid colon fecal impaction. 2. No pneumatosis or free air. 3. Large mass in the right upper lobe representing malignancy until proven otherwise. 4. Patchy right basilar consolidation representing either subsegmental atelectasis or  pneumonia.  Electronically Signed By-Brock Kerr MD On:2/23/2021 11:53 AM This report was finalized on [id number] by  Brock Kerr MD.    Mri Brain Without Contrast    Result Date: 2/25/2021   1. Signal intensity changes within the left temporal occipital lobe, as described above, worrisome for cortical metastatic deposit with surrounding vasogenic edema, over that of evolving ischemia. Additionally, question leptomeningeal thickening or potential metastatic infiltration over the posterior cerebral convexities. MRI brain with contrast would be most helpful in this distinction.   Electronically Signed By-Ivana Mirza MD On:2/25/2021 2:54 PM This report was finalized on [id number] by  Ivana Mirza MD.    Mri Brain With Contrast    Result Date: 2/26/2021  There is a single thick-walled ring-enhancing lesion in the medial aspect of the left occipital lobe measuring 1.1 x 1.0 cm. There was vasogenic edema on yesterday's MRI of the brain in this location. This represents metastatic disease until proven otherwise. A small primary glioma or brain abscess can have a similar imaging appearance.  Electronically Signed By-Brock Kerr MD On:2/26/2021 10:19 AM This report was finalized on [id number] by  Brock Kerr MD.    Fl Video Swallow With Speech Single Contrast    Result Date: 3/2/2021  As above. Please see speech pathology report for detailed evaluation and dietary recommendations.  Electronically Signed By-Daryl Hunter MD On:3/2/2021 11:39 AM This report was finalized on [id number] by  Daryl Hunter MD.    Fl Video Swallow With Speech Single Contrast    Result Date: 2/26/2021  Technically successful modified barium swallow examination.  Electronically Signed By-Brock Kerr MD On:2/26/2021 12:43 PM This report was finalized on [id number] by  Brock Kerr MD.    Xr Chest 1 View    Result Date: 2/23/2021  1.Interval extubation. 2.Decreased right basilar opacities with mild persistent opacities  which could represent residual atelectasis or infiltrate. 3.Right lung mass, as before.  Electronically Signed By-Adi Delgado MD On:2/23/2021 5:57 PM This report was finalized on [id number] by  Adi Delgado MD.    Xr Abdomen Kub    Result Date: 2/24/2021  Enteric tube within stomach.  Electronically Signed By-Mago Flores MD On:2/24/2021 7:33 AM This report was finalized on [id number] by  Mago Flores MD.          Xrays, labs reviewed personally by physician.    Medication Review:   I have reviewed the patient's current medication list      Scheduled Meds  aspirin, 81 mg, Oral, Daily  atorvastatin, 40 mg, Oral, Nightly  Barium Sulfate, 1 teaspoon(s), Oral, Once in imaging  budesonide, 0.5 mg, Nebulization, BID - RT  docusate sodium, 100 mg, Oral, BID  epoetin asia-epbx, 40,000 Units, Subcutaneous, Q7 Days  furosemide, 20 mg, Intravenous, Q12H  gabapentin, 300 mg, Oral, TID  guaiFENesin, 600 mg, Oral, Q12H  ipratropium-albuterol, 3 mL, Nebulization, Q4H - RT  lurasidone, 40 mg, Oral, Daily  metoprolol tartrate, 50 mg, Oral, Q12H  pantoprazole, 40 mg, Intravenous, Q AM  predniSONE, 10 mg, Oral, Daily With Breakfast  sertraline, 50 mg, Oral, Daily  spironolactone, 25 mg, Oral, Daily  Zinc Oxide, , Apply externally, BID        Meds Infusions       Meds PRN  •  acetaminophen  •  acetaminophen  •  bisacodyl  •  Calcium Gluconate-NaCl **AND** calcium gluconate **AND** Calcium, Ionized  •  haloperidol lactate  •  ibuprofen  •  labetalol  •  magnesium sulfate **OR** magnesium sulfate **OR** magnesium sulfate  •  melatonin  •  metoprolol tartrate  •  ondansetron  •  potassium chloride **OR** potassium chloride **OR** potassium chloride  •  potassium phosphate infusion greater than 15 mMoles **OR** potassium phosphate infusion greater than 15 mMoles **OR** potassium phosphate **OR** sodium phosphate IVPB **OR** sodium phosphate IVPB **OR** sodium phosphate IVPB  •  sodium chloride  •  Zinc  Oxide        Assessment/Plan   Assessment/Plan     Active Hospital Problems    Diagnosis  POA   • **Acute on chronic respiratory failure with hypoxia (CMS/HCC) [J96.21]  Yes   • COPD with acute exacerbation (CMS/HCC) [J44.1]  Yes   • Delirium [R41.0]  Yes   • Moderate malnutrition (CMS/HCC) [E44.0]  Yes   • Pneumonia due to infectious organism [J18.9]  Yes   • Squamous cell carcinoma of lung, right (CMS/[facility]) [C34.91]  Yes   • Diastolic CHF, acute (CMS/[facility]) [I50.31]  Yes   • Hx of aortic valve replacement [Z95.2]  Not Applicable   • Essential hypertension [I10]  Yes      Resolved Hospital Problems   No resolved problems to display.       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  Relatively euvolemic at this time  -Diuresis as tolerated  -Daily weights    Urinary retention-straight cath as needed    Delirium-patient appears to be back with appropriate mentation, patient off gabapentin and Suboxone for period of time,  resumed  -Antipsychotic as needed  -Psych consult    Anemia/MDS/thrombocytopenia-possibly secondary to chemotherapy  -Managed per hematology    VTE Prophylaxis -   Mechanical Order History:      Ordered        02/19/21 0228  Place Sequential Compression Device  Once         02/19/21 0228  Maintain Sequential Compression Device  Continuous                 Pharmalogical Order History:     None            Code Status -   Code Status and Medical Interventions:   Ordered at: 02/19/21 0228     Code Status:    CPR     Medical Interventions (Level of Support Prior to Arrest):    Full       This patient has been examined wearing appropriate Personal Protective Equipment and discussed with hospital infection control department. 03/02/21        Discharge Planning  pending        Electronically signed by Abhishek Lazo MD, 03/02/21, 15:14 EST.  [facility] Hospitalist Team

## 2021-03-03 NOTE — PROGRESS NOTES
[facility] Medicine Services Daily Progress Note      Hospitalist Team  LOS 13 days      Patient Care Team:  Sandoval Stevenson FNP as PCP - General  Sandoval Stevenson FNP as PCP - Family Medicine  Axel Lewis MD as Consulting Physician (Hematology and Oncology)  Iglesia Springer MD as Consulting Physician (Cardiology)    Patient Location: [facility]      Subjective   Subjective     Chief Complaint / Subjective  Chief Complaint   Patient presents with   • Shortness of Breath     Patient diet advanced after swallow study yesterday but had episode of emesis this morning with no signs of aspiration.  Patient reported nausea resolved after emesis.  Patient seen by palliative care elected to make himself DNR.  Continuing radiation treatment per radiation oncology.  Patient stable to transfer to medical inpatient floor.    Brief Synopsis of Hospital Course/HPI  HPI taken from medical chart review as patient is oriented x2 but is anxious and agitated and cannot elaborate on what led to his hospitalization   Mr. Ramirez is a 70 y.o. male known history of COPD on 2L O2 continuously, history of lung cancer status post chemoradiation is still receiving chemo, was brought by EMS to [facility] ED 2/18/21 with complaints of shortness of breath. He initially was on 4 L and required to be on 8 L in the emergency room. He continued to decline to respiratory failure and required intubation in the ER.  CT showed bilateral infiltrates with increased narrowing of right middle lobe bronchus secondary to known mass.  He was started on broad spectrum antibiotics.  He was admitted to the ICU for further treatment of acute respiratory failure and pneumonia.      Since admission the patient has been gradually weaned off mechanical ventilation, extubated 2/20/2021 now on room air.  He did have some hypotension that required vasopressors that have since been weaned off. He was felt stable for transfer out of ICU  2/22/21 and the hospitalist group was consulted for medical management. He remains confused and agitated with sitter at bedside. The patient and family report he has not slept in 2 days and would like something to help him sleep.         Date::      2/23  Patient is nonverbal.  Sitting in recliner having hard time breathing and tachypneic with rales at the bases on exam  He is using Yunker to suction secretions.  IV Lasix ordered x1  Chest x-ray and ABG ordered     2/24/2021  Patient seen and examined earlier today  He wants his Suboxone back which was started.  Increase also Neurontin to his regular dose.  Had nasogastric tube for feeding and awaiting dietitian recommendation  Tachycardia and hypertensive in the starting him back on his home metoprolol.     2/25  Patient spiked fever overnight and had repeat blood cultures done  Vancomycin started but MRSA screen negative and then discontinued.  Respiratory panelShowedRhinovirus/enterovirus  Patient slightly drowsy but able to answer questions with little weak voice  Started on tube feeding     2/26     Patient had abnormal MRI.  We will ask neurosurgery for evaluation.  Hypernatremia noted and free water is increased/Nasogastric tube  Denies any new deficits.  He is wanting to drink Coca-Cola but only thickened liquids are allowed.  Discussed findings with his wife.  Radiation oncology also consulted     2/27  C/o generalized aches  Has prominent lid lag: check free t4 and tsh      2/28  No significant complaints today, tolerating TFs      3/1  Plan for radiation tx   Goals of care clarified with patient's spouse     3/2/2021: Patient reported feeling better today.  Breathing improving.  Going for video swallow and then radiation therapy.    Date::          Review of Systems   Constitution: Negative for chills and fever.   HENT: Negative for hoarse voice and stridor.    Eyes: Negative for double vision and photophobia.   Cardiovascular: Negative for chest pain and  "syncope.   Respiratory: Negative for cough and shortness of breath.    Musculoskeletal: Negative for joint pain and stiffness.   Gastrointestinal: Positive for nausea. Negative for vomiting.   Genitourinary: Negative for dysuria and flank pain.   Neurological: Negative for focal weakness and light-headedness.   Psychiatric/Behavioral: Negative for altered mental status. The patient is not nervous/anxious.          Objective   Objective      Vital Signs  Temp:  [97.5 °F (36.4 °C)-98.6 °F (37 °C)] 97.5 °F (36.4 °C)  Heart Rate:  [68-90] 75  Resp:  [14-18] 18  BP: (103-125)/(59-69) 123/67  Oxygen Therapy  SpO2: 98 %  Pulse Oximetry Type: Continuous  Device (Oxygen Therapy): nasal cannula  Device (Oxygen Therapy): nasal cannula  Flow (L/min): 3  Oxygen Concentration (%): 36  Oximetry Probe Site Changed: No  Flowsheet Rows      First Filed Value   Admission Height  177.8 cm (70\") Documented at 02/18/2021 1048   Admission Weight  67.1 kg (148 lb) Documented at 02/18/2021 1048        Intake & Output (last 3 days)       02/28 0701 - 03/01 0700 03/01 0701 - 03/02 0700 03/02 0701 - 03/03 0700 03/03 0701 - 03/04 0700    P.O.  120 240     Other 1107       NG/GT 1890       Total Intake(mL/kg) 2997 (46.1) 120 (1.8) 240 (3.7)     Urine (mL/kg/hr) 1400 (0.9)  400 (0.3)     Stool 0       Total Output 1400  400     Net +1597 +120 -160             Urine Unmeasured Occurrence  5 x 4 x 1 x    Stool Unmeasured Occurrence 1 x  2 x     Emesis Unmeasured Occurrence    1 x        Lines, Drains & Airways    Active LDAs     Name:   Placement date:   Placement time:   Site:   Days:    Peripheral IV 02/23/21 1300 Anterior;Left Forearm   02/23/21    1300    Forearm   7    External Urinary Catheter   02/28/21    0900    --   2    Single Lumen Implantable Port 10/30/20 Left Subclavian   10/30/20    0956    Subclavian   123                  Physical Exam:    Physical Exam    General: Frail elderly male lying in bed breathing comfortably on 3 L via " nasal cannula no acute distress  HEENT: NC/AT, EOMI, mucosa moist  Heart: Regular, rate controlled  Chest: Normal work of breathing, moving air well no wheezing  Abdominal: Soft. NT/ND.   Musculoskeletal: Normal ROM.  No cyanosis. No calf tenderness.  Neurological: Awake and alert, no focal deficits  Skin: Skin is warm and dry. No rash  Psychiatric: Normal mood and affect.         Wounds (last 24 hours)      LDA Wound     Row Name 03/03/21 1215 03/03/21 0918 03/03/21 0917       Wound 02/18/21 1900 Right gluteal Pressure Injury    Wound - Properties Group Placement Date: 02/18/21  -OSMAN Placement Time: 1900  -OSMAN Present on Hospital Admission: Y  -OSMAN Side: Right  -OSMAN Location: gluteal  -OSMAN Primary Wound Type: Pressure inj  -OSMAN Stage, Pressure Injury : Stage 2  -OSMAN    Wound Image  --  Images linked: 1  -MASON  --    Closure  Open to air  -MASON  --  --    Base  pink;dry open, dry - zinc oxide applied   -MASON  --  --    Drainage Amount  none  -MASON  --  --    Retired Wound - Properties Group Date first assessed: 02/18/21  -OSMAN Time first assessed: 1900  -OSMAN Present on Hospital Admission: Y  -OSMAN Side: Right  -OSMAN Location: gluteal  -OSMAN Primary Wound Type: Pressure inj  -OSMAN       Wound 02/27/21 0000 medial sacral spine Pressure Injury    Wound - Properties Group Placement Date: 02/27/21  -EM Placement Time: 0000  -EM Present on Hospital Admission: --  -EM, unknown  Orientation: medial  -EM Location: sacral spine  -EM Primary Wound Type: Pressure inj  -EM Stage, Pressure Injury : unstageable  -EM, poss eschar     Wound Image  --  --  Images linked: 1  -MASON    Closure  Open to air  -MASON  --  --    Base  non-blanchable;purple  -MASON  --  --    Retired Wound - Properties Group Date first assessed: 02/27/21  -EM Time first assessed: 0000  -EM Present on Hospital Admission: --  -EM, unknown  Location: sacral spine  -EM Primary Wound Type: Pressure inj  -EM       Wound 02/18/21 1044 Bilateral posterior elbow Abrasion    Wound - Properties Group  Placement Date: 02/18/21  -EM Placement Time: 1044  -EM Side: Bilateral  -EM Orientation: posterior  -EM Location: elbow  -EM Primary Wound Type: Abrasion  -EM, old scab     Base  scab  -MASON  --  --    Retired Wound - Properties Group Date first assessed: 02/18/21  -EM Time first assessed: 1044  -EM Side: Bilateral  -EM Location: elbow  -EM Primary Wound Type: Abrasion  -EM, old scab     Row Name 03/03/21 0800 03/03/21 0400 03/03/21 0000       Wound 02/18/21 1900 Right gluteal Pressure Injury    Wound - Properties Group Placement Date: 02/18/21  -OSMAN Placement Time: 1900  -OSMAN Present on Hospital Admission: Y  -OSMAN Side: Right  -OSMAN Location: gluteal  -OSMAN Primary Wound Type: Pressure inj  -OSMAN Stage, Pressure Injury : Stage 2  -OSMAN    Dressing Appearance  open to air  -MASON  --  --    Closure  Open to air  -MASON  Open to air  -JT  Open to air  -JT    Base  pink;dry open, dry - zinc oxide applied   -MASON  --  --    Drainage Amount  none  -MASON  none  -JT  none  -JT    Care, Wound  barrier applied  -MASON  --  --    Retired Wound - Properties Group Date first assessed: 02/18/21  -OSMAN Time first assessed: 1900  -OSMAN Present on Hospital Admission: Y  -OSMAN Side: Right  -OSMAN Location: gluteal  -OSMAN Primary Wound Type: Pressure inj  -OSMAN       Wound 02/27/21 0000 medial sacral spine Pressure Injury    Wound - Properties Group Placement Date: 02/27/21  -EM Placement Time: 0000  -EM Present on Hospital Admission: --  -EM, unknown  Orientation: medial  -EM Location: sacral spine  -EM Primary Wound Type: Pressure inj  -EM Stage, Pressure Injury : unstageable  -EM, poss eschar     Closure  Open to air  -MASON  Open to air  -JT  Open to air  -JT    Base  non-blanchable;purple  -MASON  non-blanchable  -JT  non-blanchable  -JT    Care, Wound  barrier applied  -MASON  --  --    Retired Wound - Properties Group Date first assessed: 02/27/21  -EM Time first assessed: 0000  -EM Present on Hospital Admission: --  -EM, unknown  Location: sacral spine  -EM Primary Wound  Type: Pressure inj  -EM       Wound 02/18/21 1044 Bilateral posterior elbow Abrasion    Wound - Properties Group Placement Date: 02/18/21  -EM Placement Time: 1044  -EM Side: Bilateral  -EM Orientation: posterior  -EM Location: elbow  -EM Primary Wound Type: Abrasion  -EM, old scab     Dressing Appearance  dry;intact  -MASON  --  --    Base  scab  -MASON  scab  -JT  scab  -JT    Retired Wound - Properties Group Date first assessed: 02/18/21  -EM Time first assessed: 1044  -EM Side: Bilateral  -EM Location: elbow  -EM Primary Wound Type: Abrasion  -EM, old scab     Row Name 03/02/21 2000 03/02/21 1606          Wound 02/18/21 1900 Right gluteal Pressure Injury    Wound - Properties Group Placement Date: 02/18/21  -OSMAN Placement Time: 1900  -OSMAN Present on Hospital Admission: Y  -OSMAN Side: Right  -OSMAN Location: gluteal  -OSMAN Primary Wound Type: Pressure inj  -OSMAN Stage, Pressure Injury : Stage 2  -OSMAN    Closure  Open to air  -JT  Open to air  -AD     Drainage Amount  none  -JT  none  -AD     Retired Wound - Properties Group Date first assessed: 02/18/21  -OSMAN Time first assessed: 1900  -OSMAN Present on Hospital Admission: Y  -OSMAN Side: Right  -OSMAN Location: gluteal  -OSMAN Primary Wound Type: Pressure inj  -OSMAN       Wound 02/27/21 0000 medial sacral spine Pressure Injury    Wound - Properties Group Placement Date: 02/27/21  -EM Placement Time: 0000  -EM Present on Hospital Admission: --  -EM, unknown  Orientation: medial  -EM Location: sacral spine  -EM Primary Wound Type: Pressure inj  -EM Stage, Pressure Injury : unstageable  -EM, poss eschar     Closure  Open to air  -JT  Open to air  -AD     Base  non-blanchable  -JT  non-blanchable  -AD     Retired Wound - Properties Group Date first assessed: 02/27/21  -EM Time first assessed: 0000  -EM Present on Hospital Admission: --  -EM, unknown  Location: sacral spine  -EM Primary Wound Type: Pressure inj  -EM       Wound 02/18/21 1044 Bilateral posterior elbow Abrasion    Wound - Properties  Group Placement Date: 02/18/21  -EM Placement Time: 1044  -EM Side: Bilateral  -EM Orientation: posterior  -EM Location: elbow  -EM Primary Wound Type: Abrasion  -EM, old scab     Base  scab  -JT  scab  -AD     Retired Wound - Properties Group Date first assessed: 02/18/21  -EM Time first assessed: 1044  -EM Side: Bilateral  -EM Location: elbow  -EM Primary Wound Type: Abrasion  -EM, old scab       User Key  (r) = Recorded By, (t) = Taken By, (c) = Cosigned By    Initials Name Provider Type    Luciana Tamayo RN Registered Nurse    Kennedi Gandhi, RN Registered Nurse    Lurdes Andres, RN Registered Nurse    Anastasiia Abraham, RN Registered Nurse    Berenice Cuevas RN Registered Nurse          Procedures:              Results Review:     I reviewed the patient's new clinical results.      Lab Results (last 24 hours)     Procedure Component Value Units Date/Time    Manual Differential [[id number]]  (Abnormal) Collected: 03/03/21 0551    Specimen: Blood Updated: 03/03/21 0727     Neutrophil % 75.0 %      Lymphocyte % 15.0 %      Monocyte % 8.0 %      Eosinophil % 1.0 %      Atypical Lymphocyte % 1.0 %      Neutrophils Absolute 4.80 10*3/mm3      Lymphocytes Absolute 0.96 10*3/mm3      Monocytes Absolute 0.51 10*3/mm3      Eosinophils Absolute 0.06 10*3/mm3      Anisocytosis Slight/1+     Macrocytes Slight/1+     Poikilocytes Slight/1+     WBC Morphology Normal     Platelet Morphology Normal    CBC & Differential [[id number]]  (Abnormal) Collected: 03/03/21 0551    Specimen: Blood Updated: 03/03/21 0727    Narrative:      The following orders were created for panel order CBC & Differential.  Procedure                               Abnormality         Status                     ---------                               -----------         ------                     Scan Slide[[id number]]                                       Final result               CBC Auto Differential[[id number]]        Abnormal             Final result                 Please view results for these tests on the individual orders.    CBC Auto Differential [[id number]]  (Abnormal) Collected: 03/03/21 0551    Specimen: Blood Updated: 03/03/21 0727     WBC 6.40 10*3/mm3      RBC 2.63 10*6/mm3      Hemoglobin 9.3 g/dL      Hematocrit 28.1 %      .5 fL      MCH 35.2 pg      MCHC 33.0 g/dL      RDW 26.0 %      RDW-SD 92.8 fl      MPV 9.2 fL      Platelets 202 10*3/mm3     Narrative:      The previously reported component NRBC is no longer being reported. Previous result was 0.8 /100 WBC (Reference Range: 0.0-0.2 /100 WBC) on 3/3/2021 at 0611 EST.    Scan Slide [[id number]] Collected: 03/03/21 0551    Specimen: Blood Updated: 03/03/21 0727     Scan Slide --     Comment: See Manual Differential Results       Comprehensive Metabolic Panel [[id number]]  (Abnormal) Collected: 03/03/21 0551    Specimen: Blood Updated: 03/03/21 0622     Glucose 103 mg/dL      BUN 30 mg/dL      Creatinine 0.70 mg/dL      Sodium 139 mmol/L      Potassium 3.6 mmol/L      Chloride 101 mmol/L      CO2 31.0 mmol/L      Calcium 8.4 mg/dL      Total Protein 7.2 g/dL      Albumin 2.80 g/dL      ALT (SGPT) 29 U/L      AST (SGOT) 44 U/L      Alkaline Phosphatase 117 U/L      Total Bilirubin 0.5 mg/dL      eGFR Non African Amer 111 mL/min/1.73      Globulin 4.4 gm/dL      A/G Ratio 0.6 g/dL      BUN/Creatinine Ratio 42.9     Anion Gap 7.0 mmol/L     Narrative:      GFR Normal >60  Chronic Kidney Disease <60  Kidney Failure <15          No results found for: HGBA1C            No results found for: LIPASE  Lab Results   Component Value Date    CHOL 79 11/29/2019    TRIG 48 11/29/2019    HDL 33 (L) 11/29/2019    LDL 36 11/29/2019       Lab Results   Lab Value Date/Time    FINALDX  11/11/2020 0958     Tumor mass, right mainstem bronchus, biopsy:    Invasive moderately differentiated squamous cell carcinoma (see COMMENT)    NORBERTO/tkd       FINALDX  11/11/2020 0747     Smears of bronchial  washings with cytospin preparation:    Occasional markedly atypical cells consistent with non-small cell carcinoma and exhibiting cytomorphologic      features most suggestive of squamous cell carcinoma     Background consists of acute inflammation with occasional fungal spores and hyphae morphologically most      consistent with Candida species    NORBERTO/tkd       COMDX  11/11/2020 0958     The biopsy was stained via immunohistochemical technique utilizing appropriate controls for the presence of p63, p40, CK5/6, napsin and TTF-1. The tumor cells are positive for CK5/6, p63 and p40 while being negative for TTF-1 and napsin. This staining pattern in conjunction with the histologic features is entirely consistent with squamous cell carcinoma. The tumor is focally necrotic. There is no lymph-vascular space invasion identified.      NORBERTO/tkd       COMDX  11/11/2020 0747     Please correlate with concurrent surgical pathology specimen ([id number]).    NORBERTO/tkd          Microbiology Results (last 10 days)     Procedure Component Value - Date/Time    Blood Culture - Blood, Hand, Left [[id number]] Collected: 02/24/21 2109    Lab Status: Final result Specimen: Blood from Hand, Left Updated: 03/01/21 2130     Blood Culture No growth at 5 days    MRSA Screen, PCR (Inpatient) - Swab, Nares [[id number]]  (Normal) Collected: 02/24/21 1952    Lab Status: Final result Specimen: Swab from Nares Updated: 02/24/21 2112     MRSA PCR No MRSA Detected    COVID PRE-OP / PRE-PROCEDURE SCREENING ORDER (NO ISOLATION) - Swab, Nasopharynx [[id number]]  (Abnormal) Collected: 02/24/21 1951    Lab Status: Final result Specimen: Swab from Nasopharynx Updated: 02/24/21 2339    Narrative:      The following orders were created for panel order COVID PRE-OP / PRE-PROCEDURE SCREENING ORDER (NO ISOLATION) - Swab, Nasopharynx.  Procedure                               Abnormality         Status                     ---------                                -----------         ------                     Respiratory Panel PCR w/...[[id number]]  Abnormal            Final result                 Please view results for these tests on the individual orders.    Respiratory Panel PCR w/COVID-19(SARS-CoV-2) PARESH/GRACIELA/KATHY/PAD/COR/MAD/CHRIST In-House, NP Swab in UTM/VTM, 3-4 HR TAT - Swab, Nasopharynx [[id number]]  (Abnormal) Collected: 02/24/21 1951    Lab Status: Final result Specimen: Swab from Nasopharynx Updated: 02/24/21 2339     ADENOVIRUS, PCR Not Detected     Coronavirus 229E Not Detected     Coronavirus HKU1 Not Detected     Coronavirus NL63 Not Detected     Coronavirus OC43 Not Detected     COVID19 Not Detected     Human Metapneumovirus Not Detected     Human Rhinovirus/Enterovirus Detected     Influenza A PCR Not Detected     Influenza B PCR Not Detected     Parainfluenza Virus 1 Not Detected     Parainfluenza Virus 2 Not Detected     Parainfluenza Virus 3 Not Detected     Parainfluenza Virus 4 Not Detected     RSV, PCR Not Detected     Bordetella pertussis pcr Not Detected     Bordetella parapertussis PCR Not Detected     Chlamydophila pneumoniae PCR Not Detected     Mycoplasma pneumo by PCR Not Detected    Narrative:      Fact sheet for providers: https://docs.[employer]On/wp-content/uploads/GPJ1395-2167-GH3.1-EUA-Provider-Fact-Sheet-3.pdf    Fact sheet for patients: https://docs.[employer]/wp-content/uploads/SXL1950-0072-VA7.1-EUA-Patient-Fact-Sheet-1.pdf    Test performed by PCR.          ECG/EMG Results (most recent)     Procedure Component Value Units Date/Time    ECG 12 Lead [[id number]] Collected: 02/18/21 1050     Updated: 02/19/21 1421     QT Interval 291 ms     Narrative:      HEART RATE= 130  bpm  RR Interval= 462  ms  OR Interval= 132  ms  P Horizontal Axis= -23  deg  P Front Axis= 79  deg  QRSD Interval= 83  ms  QT Interval= 291  ms  QRS Axis= 55  deg  T Wave Axis=   deg  - ABNORMAL ECG -  Sinus tachycardia  Left atrial enlargement  Anteroseptal infarct,  age indeterminate  When compared with ECG of 09-Nov-2020 13:39:57,  Significant rate increase  Significant axis, voltage or hypertrophy change  Electronically Signed By: Higinio Flores ([facility]) 19-Feb-2021 14:11:44  Date and Time of Study: 2021-02-18 10:50:09    ECG 12 Lead [[id number]] Collected: 02/20/21 1846     Updated: 02/24/21 0721     QT Interval 340 ms     Narrative:      HEART RATE= 119  bpm  RR Interval= 496  ms  MN Interval=   ms  P Horizontal Axis=   deg  P Front Axis=   deg  QRSD Interval= 87  ms  QT Interval= 340  ms  QRS Axis= 66  deg  T Wave Axis= 75  deg  - ABNORMAL ECG -  Atrial flutter with predominant 2:1 AV block  Ventricular premature complex  Consider anterior infarct  When compared with ECG of 18-Feb-2021 10:50:09,  Nonspecific significant change  Electronically Signed By: Fuad Montero ([facility]) 24-Feb-2021 07:14:30  Date and Time of Study: 2021-02-20 18:46:19    ECG 12 Lead [[id number]] Collected: 02/21/21 0947     Updated: 02/24/21 0731     QT Interval 379 ms     Narrative:      HEART RATE= 100  bpm  RR Interval= 600  ms  MN Interval= 136  ms  P Horizontal Axis= -16  deg  P Front Axis= 98  deg  QRSD Interval= 95  ms  QT Interval= 379  ms  QRS Axis= 63  deg  T Wave Axis= 70  deg  - BORDERLINE ECG -  Sinus tachycardia  Consider left ventricular hypertrophy  When compared with ECG of 20-Feb-2021 18:46:19,  Significant axis, voltage or hypertrophy change  Electronically Signed By: Fuad Montero ([facility]) 24-Feb-2021 07:19:26  Date and Time of Study: 2021-02-21 09:47:53    ECG 12 Lead [[id number]] Collected: 02/25/21 0552     Updated: 02/25/21 0554     QT Interval 356 ms     Narrative:      HEART RATE= 106  bpm  RR Interval= 568  ms  MN Interval= 112  ms  P Horizontal Axis= -38  deg  P Front Axis= 63  deg  QRSD Interval= 106  ms  QT Interval= 356  ms  QRS Axis= -8  deg  T Wave Axis= 80  deg  - ABNORMAL ECG -  Sinus tachycardia  LAE, consider biatrial enlargement  Anteroseptal infarct, age indeterminate  When  compared with ECG of 24-Feb-2021 6:11:17,  Significant repolarization change  Electronically Signed By:   Date and Time of Study: 2021-02-25 05:52:57    ECG 12 Lead [[id number]] Collected: 02/23/21 0744     Updated: 02/26/21 1535     QT Interval 395 ms     Narrative:      HEART RATE= 111  bpm  RR Interval= 540  ms  AL Interval= 156  ms  P Horizontal Axis= -42  deg  P Front Axis= 79  deg  QRSD Interval= 122  ms  QT Interval= 395  ms  QRS Axis= -14  deg  T Wave Axis= 86  deg  - ABNORMAL ECG -  Sinus tachycardia  Biatrial enlargement  Evolving anterior infarct since 22-Feb-2021 7:19:57  When compared with ECG of 22-Feb-2021 7:19:57,  New or worsened ischemia or infarction  Significant axis, voltage or hypertrophy change  Electronically Signed By: Braden Mcdaniel ([facility]) 26-Feb-2021 15:31:30  Date and Time of Study: 2021-02-23 07:44:24    ECG 12 Lead [[id number]] Collected: 02/26/21 0544     Updated: 02/26/21 1945     QT Interval 345 ms     Narrative:      HEART RATE= 125  bpm  RR Interval= 496  ms  AL Interval= 110  ms  P Horizontal Axis= -34  deg  P Front Axis= 73  deg  QRSD Interval= 107  ms  QT Interval= 345  ms  QRS Axis= -4  deg  T Wave Axis= 92  deg  - ABNORMAL ECG -  Sinus tachycardia  Atrial premature complexes  LAE, consider biatrial enlargement  When compared with ECG of 25-Feb-2021 5:52:57,  No significant change  Electronically Signed By: Rebeca Jacobs ([facility]) 26-Feb-2021 19:35:13  Date and Time of Study: 2021-02-26 05:44:41    ECG 12 Lead [[id number]] Collected: 02/22/21 0719     Updated: 02/28/21 1657     QT Interval 394 ms     Narrative:      HEART RATE= 110  bpm  RR Interval= 544  ms  AL Interval= 142  ms  P Horizontal Axis= -26  deg  P Front Axis= 70  deg  QRSD Interval= 122  ms  QT Interval= 394  ms  QRS Axis= -14  deg  T Wave Axis= 86  deg  - ABNORMAL ECG -  Sinus tachycardia  Biatrial enlargement  Left ventricular hypertrophy  When compared with ECG of 21-Feb-2021 9:47:53,  No significant  change  Electronically Signed By: Iglesia Springer ([facility]) 28-Feb-2021 16:56:20  Date and Time of Study: 2021-02-22 07:19:57    ECG 12 Lead [[id number]] Collected: 02/27/21 2326     Updated: 02/28/21 1759     QT Interval 381 ms     Narrative:      HEART RATE= 90  bpm  RR Interval= 668  ms  ME Interval= 114  ms  P Horizontal Axis= -41  deg  P Front Axis= 70  deg  QRSD Interval= 84  ms  QT Interval= 381  ms  QRS Axis= 59  deg  T Wave Axis= 51  deg  - ABNORMAL ECG -  Sinus rhythm  Left atrial enlargement  Anteroseptal infarct, age indeterminate  When compared with ECG of 27-Feb-2021 5:51:38,  Significant axis, voltage or hypertrophy change  Electronically Signed By: Rebeca Jacobs ([facility]) 28-Feb-2021 17:49:16  Date and Time of Study: 2021-02-27 23:26:41    ECG 12 Lead [[id number]] Collected: 02/27/21 0551     Updated: 02/28/21 1759     QT Interval 347 ms     Narrative:      HEART RATE= 103  bpm  RR Interval= 584  ms  ME Interval= 108  ms  P Horizontal Axis= -36  deg  P Front Axis= 63  deg  QRSD Interval= 85  ms  QT Interval= 347  ms  QRS Axis= 50  deg  T Wave Axis= -4  deg  - BORDERLINE ECG -  Sinus tachycardia  Left atrial enlargement  Probable left ventricular hypertrophy  When compared with ECG of 26-Feb-2021 5:44:41,  Significant rate decrease  Significant axis, voltage or hypertrophy change  Electronically Signed By: Rebeca Jacobs ([facility]) 28-Feb-2021 17:49:24  Date and Time of Study: 2021-02-27 05:51:38    ECG 12 Lead [[id number]] Collected: 03/01/21 0741     Updated: 03/02/21 1009     QT Interval 377 ms     Narrative:      HEART RATE= 87  bpm  RR Interval= 692  ms  ME Interval= 117  ms  P Horizontal Axis= -66  deg  P Front Axis= 65  deg  QRSD Interval= 93  ms  QT Interval= 377  ms  QRS Axis= 57  deg  T Wave Axis= 73  deg  - ABNORMAL ECG -  Sinus rhythm  Left atrial enlargement  Anteroseptal infarct, age indeterminate  When compared with ECG of 27-Feb-2021 23:26:41,  No significant change  Electronically  Signed By: Adi Carroll ([facility]) 02-Mar-2021 10:03:42  Date and Time of Study: 2021-03-01 07:41:28    ECG 12 Lead [[id number]] Collected: 02/24/21 0611     Updated: 03/02/21 1937     QT Interval 358 ms     Narrative:      HEART RATE= 121  bpm  RR Interval= 496  ms  UT Interval= 120  ms  P Horizontal Axis= -25  deg  P Front Axis= 64  deg  QRSD Interval= 108  ms  QT Interval= 358  ms  QRS Axis= -6  deg  T Wave Axis= 85  deg  - ABNORMAL ECG -  Sinus tachycardia  LAE, consider biatrial enlargement  Prolonged QT interval  When compared with ECG of 23-Feb-2021 7:44:24,  Nonspecific significant change  Electronically Signed By: Iker Gr ([facility]) 02-Mar-2021 19:31:29  Date and Time of Study: 2021-02-24 06:11:17    ECG 12 Lead [[id number]] Collected: 03/03/21 0342     Updated: 03/03/21 0344     QT Interval 426 ms     Narrative:      HEART RATE= 75  bpm  RR Interval= 804  ms  UT Interval= 125  ms  P Horizontal Axis= -32  deg  P Front Axis= 69  deg  QRSD Interval= 98  ms  QT Interval= 426  ms  QRS Axis= 70  deg  T Wave Axis= 83  deg  - ABNORMAL ECG -  Sinus rhythm  Left atrial enlargement  Anteroseptal infarct, age indeterminate  Electronically Signed By:   Date and Time of Study: 2021-03-03 03:42:53    ECG 12 Lead [[id number]] Collected: 03/02/21 0325     Updated: 03/03/21 0820     QT Interval 381 ms     Narrative:      HEART RATE= 88  bpm  RR Interval= 680  ms  UT Interval= 122  ms  P Horizontal Axis= -73  deg  P Front Axis= 147  deg  QRSD Interval= 86  ms  QT Interval= 381  ms  QRS Axis= 60  deg  T Wave Axis= 148  deg  - ABNORMAL ECG -  Sinus or ectopic atrial rhythm  Left atrial enlargement  Anteroseptal infarct, age indeterminate  Lateral wall also involved  When compared with ECG of 01-Mar-2021 7:41:28,  No significant change  Electronically Signed By: Adi Carroll ([facility]) 03-Mar-2021 08:09:38  Date and Time of Study: 2021-03-02 03:25:22               Results for orders placed during the hospital encounter of 11/08/20    Adult Transthoracic Echo Complete W/ Cont if Necessary Per Protocol    Narrative · Estimated left ventricular EF = 65% Left ventricular systolic function   is normal.  · Left ventricular diastolic function is consistent with (grade Ia w/high   LAP) impaired relaxation.  · The right atrial cavity is mildly dilated.  · Mild aortic valve stenosis is present.  · Mild to moderate LVOT gradient noted          Ct Abdomen Pelvis Without Contrast    Result Date: 2/24/2021  1.Large amount of formed stool in the rectum suspicious for constipation. Disimpaction may be necessary. There is also an above average amount of formed stool in the proximal colon. 2.Layering hyperdense material in the gallbladder, which could be seen with spurious excretion of contrast from prior contrast enhanced studies, particularly if there is renal failure. This could also be related to biliary sludge and/or gallstones. 3.Emphysema with bilateral lower lobe airspace opacities, similar to the prior chest CT, which could be related to pneumonia or aspiration. Opacities in the right lower lobe appears slightly decreased. There is minimal consolidation and trace pleural effusion in the posterior left thorax. 4.Probable hepatic and renal cysts, as before. 5.Calcific atherosclerosis. 6.Enteric tube terminates in the stomach.    Electronically Signed By-Adi Delgado MD On:2/24/2021 7:48 PM This report was finalized on [id number] by  Adi Delgado MD.    Mri Brain Without Contrast    Result Date: 2/25/2021   1. Signal intensity changes within the left temporal occipital lobe, as described above, worrisome for cortical metastatic deposit with surrounding vasogenic edema, over that of evolving ischemia. Additionally, question leptomeningeal thickening or potential metastatic infiltration over the posterior cerebral convexities. MRI brain with contrast would be most helpful in this distinction.   Electronically Signed By-Ivana Mirza MD On:2/25/2021  2:54 PM This report was finalized on [id number] by  Ivana Mirza MD.    Mri Brain With Contrast    Result Date: 2/26/2021  There is a single thick-walled ring-enhancing lesion in the medial aspect of the left occipital lobe measuring 1.1 x 1.0 cm. There was vasogenic edema on yesterday's MRI of the brain in this location. This represents metastatic disease until proven otherwise. A small primary glioma or brain abscess can have a similar imaging appearance.  Electronically Signed By-Brock Kerr MD On:2/26/2021 10:19 AM This report was finalized on [id number] by  Brock Kerr MD.    Fl Video Swallow With Speech Single Contrast    Result Date: 3/2/2021  As above. Please see speech pathology report for detailed evaluation and dietary recommendations.  Electronically Signed By-Daryl Hunter MD On:3/2/2021 11:39 AM This report was finalized on [id number] by  Daryl Hunter MD.    Fl Video Swallow With Speech Single Contrast    Result Date: 2/26/2021  Technically successful modified barium swallow examination.  Electronically Signed By-Brock Kerr MD On:2/26/2021 12:43 PM This report was finalized on [id number] by  Brock Kerr MD.    Xr Chest 1 View    Result Date: 2/23/2021  1.Interval extubation. 2.Decreased right basilar opacities with mild persistent opacities which could represent residual atelectasis or infiltrate. 3.Right lung mass, as before.  Electronically Signed By-Adi Delgado MD On:2/23/2021 5:57 PM This report was finalized on [id number] by  Adi Delgado MD.    Xr Abdomen Kub    Result Date: 2/24/2021  Enteric tube within stomach.  Electronically Signed By-Mago Flores MD On:2/24/2021 7:33 AM This report was finalized on [id number] by  Mago Flores MD.          Xrays, labs reviewed personally by physician.    Medication Review:   I have reviewed the patient's current medication list      Scheduled Meds  aspirin, 81 mg, Oral, Daily  atorvastatin, 40 mg, Oral, Nightly  Barium  Sulfate, 1 teaspoon(s), Oral, Once in imaging  budesonide, 0.5 mg, Nebulization, BID - RT  docusate sodium, 100 mg, Oral, BID  epoetin asia-epbx, 40,000 Units, Subcutaneous, Q7 Days  furosemide, 20 mg, Intravenous, Q12H  gabapentin, 300 mg, Oral, TID  guaiFENesin, 600 mg, Oral, Q12H  ipratropium-albuterol, 3 mL, Nebulization, Q4H - RT  lurasidone, 40 mg, Oral, Daily  metoprolol tartrate, 50 mg, Oral, Q12H  pantoprazole, 40 mg, Intravenous, Q AM  predniSONE, 10 mg, Oral, Daily With Breakfast  sertraline, 50 mg, Oral, Daily  spironolactone, 25 mg, Oral, Daily  Zinc Oxide, , Apply externally, BID        Meds Infusions       Meds PRN  •  acetaminophen  •  acetaminophen  •  bisacodyl  •  Calcium Gluconate-NaCl **AND** calcium gluconate **AND** Calcium, Ionized  •  haloperidol lactate  •  ibuprofen  •  labetalol  •  magnesium sulfate **OR** magnesium sulfate **OR** magnesium sulfate  •  melatonin  •  metoprolol tartrate  •  ondansetron  •  potassium chloride **OR** potassium chloride **OR** potassium chloride  •  potassium phosphate infusion greater than 15 mMoles **OR** potassium phosphate infusion greater than 15 mMoles **OR** potassium phosphate **OR** sodium phosphate IVPB **OR** sodium phosphate IVPB **OR** sodium phosphate IVPB  •  sodium chloride  •  Zinc Oxide        Assessment/Plan   Assessment/Plan     Active Hospital Problems    Diagnosis  POA   • **Acute on chronic respiratory failure with hypoxia (CMS/HCC) [J96.21]  Yes   • COPD with acute exacerbation (CMS/HCC) [J44.1]  Yes   • Delirium [R41.0]  Yes   • Moderate malnutrition (CMS/HCC) [E44.0]  Yes   • Pneumonia due to infectious organism [J18.9]  Yes   • Squamous cell carcinoma of lung, right (CMS/HCC) [C34.91]  Yes   • Diastolic CHF, acute (CMS/HCC) [I50.31]  Yes   • Hx of aortic valve replacement [Z95.2]  Not Applicable   • Essential hypertension [I10]  Yes      Resolved Hospital Problems   No resolved problems to display.       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  Relatively euvolemic at this time  -Diuresis as tolerated  -Daily weights    Urinary retention-straight cath as needed    Malnutrition-patient with swallowing difficulties and did not tolerate advance diet  -Continue working with speech therapy  -Nutrition critical given cancer treatment  -Nutrition consulted    Delirium-patient appears to be back with appropriate mentation, patient off gabapentin and Suboxone for period of time, resumed  -Antipsychotic as needed  -Psych consult    Anemia/MDS/thrombocytopenia-possibly secondary to chemotherapy  -Managed per hematology    VTE Prophylaxis -   Mechanical Order History:      Ordered        02/19/21 0228  Place Sequential Compression Device  Once         02/19/21 0228  Maintain Sequential Compression Device  Continuous                 Pharmalogical Order History:     None            Code Status -   Code  Status and Medical Interventions:   Ordered at: 03/03/21 1110     Limited Support to NOT Include:    Intubation     Code Status:    No CPR     Medical Interventions (Level of Support Prior to Arrest):    Limited       This patient has been examined wearing appropriate Personal Protective Equipment and discussed with hospital infection control department. 03/03/21        Discharge Planning  pending        Electronically signed by Abhishek Lazo MD, 03/03/21, 13:04 EST.  [facility] Hospitalist Team

## 2021-03-03 NOTE — DISCHARGE PLACEMENT REQUEST
"Maxi Ramirez (70 y.o. Male)     Date of Birth Social Security Number Address Home Phone MRN    1950  [id number]  [place] [address] [phone] [id number]    [employer] Marital Status          Jew        Admission Date Admission Type Admitting Provider Attending Provider Department, Room/Bed    2/18/21 Emergency Shwetha Olivares MD Farley, Timothy Michael, MD [facility], 2116/1    Discharge Date Discharge Disposition Discharge Destination                       Attending Provider: Abhishek Lazo MD    Allergies: No Known Allergies    Isolation: Droplet   Infection: MRSA/History Only (02/19/21), Rhinovirus  (02/24/21)   Code Status: CPR    Ht: 177.8 cm (70\")   Wt: 65 kg (143 lb 4.8 oz)    Admission Cmt: None   Principal Problem: Acute on chronic respiratory failure with hypoxia (CMS/HCC) [J96.21]                 Active Insurance as of 2/18/2021     Primary Coverage     Payor Plan Insurance Group Employer/Plan Group    ANTHEM MEDICARE REPLACEMENT ANTHEM MEDICARE ADVANTAGE [id number]     Payor Plan Address Payor Plan Phone Number Payor Plan Fax Number Effective Dates    [address] [phone]  1/1/2021 - None Entered    [facility] [address]       Subscriber Name Subscriber Birth Date Member ID       MAXI RAMIREZ 1950 [id number]           Secondary Coverage     Payor Plan Insurance Group Employer/Plan Group    [place]      Payor Plan Address Payor Plan Phone Number Payor Plan Fax Number Effective Dates    [address]   1/1/2019 - None Entered    [place] [address]       Subscriber Name Subscriber Birth Date Member ID       MAXI RAMIREZ 1950 [id number]                 Emergency Contacts      (Rel.) Home Phone Work Phone Mobile Phone    Rachana Ramirez (Spouse) [phone] -- [phone]              "

## 2021-03-03 NOTE — PROGRESS NOTES
Continued Stay Note  HUA Johann     Patient Name: Axel Ramirez  MRN: [id number]  Today's Date: 3/3/2021    Admit Date: 2/18/2021    Discharge Plan     Row Name 03/03/21 0943       Plan    Plan Comments  Notified by SW that pt has again decided to go home with home health. Voicemail received from pt spouse requesting wheelchair for DC- CM notified [facility] rep to inquire if specific documentation is needed for this to be approved. Pending reply. Attempted to contact [facility] for Home health services, but number states it is not in service. [facility] ([facility]) has informed that she will accept pt as a last resort, but is still unable to provide ST care at this time.          Expected Discharge Date and Time     Expected Discharge Date Expected Discharge Time    Mar 5, 2021         Phone communication or documentation only - no physical contact with patient or family.      Luciana Hsu RN

## 2021-03-03 NOTE — PROGRESS NOTES
"Patient Name: Axel Ramirez Date: 3/3/2021       : 1950 Physicians: Axel Martinezanna Galindo        MRN #: [id number] Diagnosis: Brain mets                  RADIATION ON TREATMENT VISIT NOTE - BRAIN    Treatment Summary    Treatment Site Ref. ID Energy Dose/Fx (cGy) #Fx Dose Correction (cGy) Total Dose (cGy) Start Date End Date Elapsed Days   LtOccipitSRS LtOccipitSRS 6X 2,000  0 2,000 3/3/2021 3/3/2021 0     SRS today.  Patient will need outpatient MRI brain.  General:           Review of Systems      [x] No new complaints [] Headache   []AM [] PM [] Seizure activity  [] Memory loss [] Gait disturbance [] Nausea  [] Vomiting [] Speech changes [] Visual changes  [] Weakness [] Skin itching [] Hair loss  [] Skin soreness with pain  [] Fatigue,  severity: ----------------  [] Pain,  severity: ----------------, location:   Steroid regimen: On steroids, see below.  Anti-seizure regimen:   Pain regimen:    Skin regimen: NONE     Comments/Notes:  Notes that SRS mask was tight and breathing a challenge towards the end but feeling ok afterwards.    KPS: 70%       Vital Signs: /67   Pulse 75   Temp 97.5 °F (36.4 °C) (Oral)   Resp 18   Ht 177.8 cm (70\")   Wt 65 kg (143 lb 4.8 oz) Comment: with blankets on bed. RN aware  SpO2 98%   BMI 20.56 kg/m²     Weight:   Wt Readings from Last 3 Encounters:   21 66.6 kg (146 lb 12.8 oz)   21 68.5 kg (151 lb)   20 68.1 kg (150 lb 3.2 oz)       Medication:   Current Facility-Administered Medications:   •  acetaminophen (TYLENOL) suppository 650 mg, 650 mg, Rectal, Q6H PRN, Day, Carina, APRN, 650 mg at 21 1605  •  acetaminophen (TYLENOL) tablet 650 mg, 650 mg, Oral, Q6H PRN, Day, Carina, APRN, 650 mg at 21 1011  •  aspirin chewable tablet 81 mg, 81 mg, Oral, Daily, Efrem Langley MD, 81 mg at 21 0902  •  atorvastatin (LIPITOR) tablet 40 mg, 40 mg, Oral, Nightly, Efrem Langley MD, 40 mg at " 03/02/21 2129  •  Barium Sulfate 60 % cream 1 teaspoon(s), 1 teaspoon(s), Oral, Once in imaging, Abhishek Lazo MD  •  bisacodyl (DULCOLAX) suppository 10 mg, 10 mg, Rectal, Daily PRN, Shivam, Efrem Evans MD, 10 mg at 02/26/21 1405  •  budesonide (PULMICORT) nebulizer solution 0.5 mg, 0.5 mg, Nebulization, BID - RT, Lianna Groves MD, 0.5 mg at 03/03/21 0651  •  calcium gluconate 1g/50ml 0.675% NaCl IV SOLN, 1 g, Intravenous, PRN **AND** calcium gluconate 2-0.675 GM/100ML NACL IVPB, 2 g, Intravenous, PRN **AND** Calcium, Ionized, , , PRN, Shivam, Efrem Evans MD  •  docusate sodium (COLACE) capsule 100 mg, 100 mg, Oral, BID, Abhishek Lazo MD, 100 mg at 03/03/21 0902  •  epoetin asia-epbx (RETACRIT) injection 40,000 Units, 40,000 Units, Subcutaneous, Q7 Days, Axel Lewis MD, 40,000 Units at 02/28/21 1142  •  furosemide (LASIX) injection 20 mg, 20 mg, Intravenous, Q12H, Efrem Langley MD, 20 mg at 03/03/21 0902  •  gabapentin (NEURONTIN) capsule 300 mg, 300 mg, Oral, TID, Efrem Langley MD, 300 mg at 03/03/21 0902  •  guaiFENesin (MUCINEX) 12 hr tablet 600 mg, 600 mg, Oral, Q12H, Abhishek Lazo MD, 600 mg at 03/03/21 0902  •  haloperidol lactate (HALDOL) injection 2 mg, 2 mg, Intramuscular, Q6H PRN, Day, Carina, APRN, 2 mg at 02/24/21 0554  •  ibuprofen (ADVIL,MOTRIN) 100 MG/5ML suspension 400 mg, 400 mg, Oral, Q6H PRN, Raysa Alatorre, APRN, 400 mg at 02/24/21 1833  •  ipratropium-albuterol (DUO-NEB) nebulizer solution 3 mL, 3 mL, Nebulization, Q4H - RT, Lianna Groves MD, 3 mL at 03/03/21 1052  •  labetalol (NORMODYNE,TRANDATE) injection 20 mg, 20 mg, Intravenous, Q6H PRN, Carina Muñoz APRN, 20 mg at 02/23/21 0358  •  lurasidone (LATUDA) tablet 40 mg, 40 mg, Oral, Daily, Efrem Langley MD, 40 mg at 03/03/21 0902  •  Magnesium Sulfate 2 gram Bolus, followed by 8 gram infusion (total Mg dose 10  grams)- Mg less than or equal to 1mg/dL, 2 g, Intravenous, PRN **OR** Magnesium Sulfate 2 gram / 50mL Infusion (GIVE X 3 BAGS TO EQUAL 6GM TOTAL DOSE) - Mg 1.1 - 1.5 mg/dl, 2 g, Intravenous, PRN **OR** Magnesium Sulfate 4 gram infusion- Mg 1.6-1.9 mg/dL, 4 g, Intravenous, PRN, Efrem Langley MD, Last Rate: 25 mL/hr at 02/25/21 1831, 4 g at 02/25/21 1831  •  melatonin tablet 5 mg, 5 mg, Oral, Nightly PRN, Lisandra Cruz APRN, 5 mg at 02/26/21 2206  •  metoprolol tartrate (LOPRESSOR) injection 5 mg, 5 mg, Intravenous, Q6H PRN, Juanito Palomo APRN, 5 mg at 02/24/21 1537  •  metoprolol tartrate (LOPRESSOR) tablet 50 mg, 50 mg, Oral, Q12H, Efrem Langley MD, 50 mg at 03/03/21 0902  •  ondansetron (ZOFRAN) injection 4 mg, 4 mg, Intravenous, Q6H PRN, So Damon APRN, 4 mg at 03/03/21 0944  •  pantoprazole (PROTONIX) injection 40 mg, 40 mg, Intravenous, Q AM, Ángela Bean MD, 40 mg at 03/03/21 0620  •  potassium chloride (K-DUR,KLOR-CON) CR tablet 40 mEq, 40 mEq, Oral, PRN, 40 mEq at 03/03/21 0903 **OR** potassium chloride (KLOR-CON) packet 40 mEq, 40 mEq, Oral, PRN, 40 mEq at 02/26/21 1513 **OR** potassium chloride 10 mEq in 100 mL IVPB, 10 mEq, Intravenous, Q1H PRN, Juanito Palomo APRZACHARY, Last Rate: 100 mL/hr at 02/22/21 1526, 10 mEq at 02/22/21 1526  •  potassium phosphate 45 mmol in sodium chloride 0.9 % 500 mL infusion, 45 mmol, Intravenous, PRN **OR** potassium phosphate 30 mmol in sodium chloride 0.9 % 250 mL infusion, 30 mmol, Intravenous, PRN **OR** potassium phosphate 15 mmol in sodium chloride 0.9 % 100 mL infusion, 15 mmol, Intravenous, PRN, 15 mmol at 02/26/21 0957 **OR** sodium phosphates 45 mmol in sodium chloride 0.9 % 500 mL IVPB, 45 mmol, Intravenous, PRN **OR** sodium phosphates 30 mmol in sodium chloride 0.9 % 250 mL IVPB, 30 mmol, Intravenous, PRN **OR** sodium phosphates 15 mmol in sodium chloride 0.9 % 250 mL IVPB, 15 mmol, Intravenous, PRN,  Efrem Langley MD  •  predniSONE (DELTASONE) tablet 10 mg, 10 mg, Oral, Daily With Breakfast, Ángela Bean MD, 10 mg at 03/03/21 0902  •  sertraline (ZOLOFT) tablet 50 mg, 50 mg, Oral, Daily, Efrem Langley MD, 50 mg at 03/03/21 0902  •  sodium chloride 0.9 % flush 10 mL, 10 mL, Intravenous, PRN, Lianna Groves MD, 10 mL at 03/03/21 0903  •  spironolactone (ALDACTONE) tablet 25 mg, 25 mg, Oral, Daily, Efrem Langley MD, 25 mg at 03/03/21 0902  •  Zinc Oxide 16 % ointment, , Apply externally, BID, Efrem Langley MD, Given at 03/03/21 0903  •  Zinc Oxide 16 % ointment, , Apply externally, PRN, Efrem Langely MD       LABS (Reviewed):  Hematology WBC   Date Value Ref Range Status   03/03/2021 6.40 3.40 - 10.80 10*3/mm3 Final     RBC   Date Value Ref Range Status   03/03/2021 2.63 (L) 4.14 - 5.80 10*6/mm3 Final     Hemoglobin   Date Value Ref Range Status   03/03/2021 9.3 (L) 13.0 - 17.7 g/dL Final     Hematocrit   Date Value Ref Range Status   03/03/2021 28.1 (L) 37.5 - 51.0 % Final     Platelets   Date Value Ref Range Status   03/03/2021 202 140 - 450 10*3/mm3 Final      Chemistry   Lab Results   Component Value Date    GLUCOSE 103 (H) 03/03/2021    BUN 30 (H) 03/03/2021    CREATININE 0.70 (L) 03/03/2021    EGFRIFNONA 111 03/03/2021    BCR 42.9 (H) 03/03/2021    K 3.6 03/03/2021    CO2 31.0 (H) 03/03/2021    CALCIUM 8.4 (L) 03/03/2021    PROTENTOTREF 7.3 10/24/2020    ALBUMIN 2.80 (L) 03/03/2021    LABIL2 0.5 (L) 10/24/2020    AST 44 (H) 03/03/2021    ALT 29 03/03/2021         Physical Exam:         Neurology: [x] CNII-XII grossly intact    [x] Strength: intact  [] Reflexes:     [] Encephalopathy:     [] Memory impairment:     [] Neuropathy: motor/sensory:   Auditory/Ear: [] Otitis, external ear (non-infectious):   Ocular/Visual: [] PERRLA/EOMI:   Skin:  stGstrstastdstest:st st1st Comments/Notes:     [x] Review of labs, images, dosimetry,  dose delivered, & treatment parameters.    Comments:     [x] Patient treatment setup reviewed.    Comments:     Recommendations: continue steroids  Tolerated SRS ok  Covering for Dr. Galindo  Outpatient PET/CT planned per Dr. Lewis's note.    [x] Continue RT  [] Change RT Plan [] Hold RT, length:        Approved Electronically By:  No name on file., 3/3/2021, 13:49 EST          Confidentiality of this medical record shall be maintained except when use or disclosure is required or permitted by law, regulation or written authorization by the patient.

## 2021-03-03 NOTE — CONSULTS
Nutrition Services    Patient Name: Axel Ramirez  YOB: 1950  MRN: [id number]  Admission date: 2/18/2021    Will continue diet and oral nutrition supplements as ordered.     PPE Documentation        PPE Worn By Provider Mask, eyewear, gown, gloves   PPE Worn By Patient  None     CLINICAL NUTRITION ASSESSMENT       Reason for Assessment 3/3: Follow up protocol   3/1: Follow up protocol   2/25: Follow up protocol   2/23/21: Follow up protocol  2/19/21: Tube feed consult     H&P:    Past Medical History:   Diagnosis Date   • Aortic stenosis    • Cancer (CMS/HCC)     Sickle Cell Carcinoma   • Congestive heart failure (CHF) (CMS/HCC)     DIASTOLIC   • COPD (chronic obstructive pulmonary disease) (CMS/HCC)    • Coronary artery disease    • Hypertension    • Lung cancer (CMS/HCC)    • Myocardial infarction (CMS/HCC)    • Peripheral vascular disease (CMS/HCC)    • Valvular disease        Past Surgical History:   Procedure Laterality Date   • AORTIC VALVE REPAIR/REPLACEMENT  02/26/2018    Dr Maza   • BRONCHOSCOPY N/A 10/24/2020    Procedure: BRONCHOSCOPY with biopsy right upper lobe mass, and bronchoalveolar lavage right upper lobe;  Surgeon: Ángela Bean MD;  Location: [facility] ENDOSCOPY;  Service: Pulmonary;  Laterality: N/A;  post: right upper lobe mass, pneumonia   • BRONCHOSCOPY N/A 11/11/2020    Procedure: BRONCHOSCOPY with bronchial washing;  Surgeon: Ángela Bean MD;  Location: [facility] ENDOSCOPY;  Service: Pulmonary;  Laterality: N/A;  Post: lung mass, pneumonia   • BRONCHOSCOPY N/A 11/11/2020    Procedure: BRONCHOSCOPY RIGID with debulking of right main endobronchial tumor;  Surgeon: Nomi Reyes MD;  Location: [facility] MAIN OR;  Service: Cardiothoracic;  Laterality: N/A;   • BRONCHOSCOPY N/A 11/11/2020    Procedure: BRONCHOSCOPY;  Surgeon: Nomi Reyes MD;  Location: [facility] MAIN OR;  Service: Cardiothoracic;  Laterality: N/A;   • CARDIAC CATHETERIZATION  12/29/2017   • CORONARY ARTERY BYPASS  GRAFT  02/26/2018    Dr Maza   • TIBIA FRACTURE SURGERY     • VENOUS ACCESS DEVICE (PORT) INSERTION Left 10/30/2020    Procedure: MEDIPORT INSERTION UNDER FLUOROSCOPIC GUIDENCE;  Surgeon: Nomi Reyes MD;  Location: [facility];  Service: Cardiothoracic;  Laterality: Left;        Current Problems:   Per hospitalist note:   Acute hypoxic respiratory failure-patient required mechanical ventilation, believed to be secondary to pneumonia and COPD exacerbation  -Extubated 2/20/2021 intermittently on room air  -Steroids and antibiotics per pulmonology  -Oxygen as needed  -Pulmonology continues monitor     Pneumonia-patient with right-sided mass in addition to signs of infiltrates  -Blood cultures no growth to date  -Positive rhinovirus  -Respiratory cultures negative as of the 19th  -Off antibiotics currently     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-patient undergoing chemoradiation  -Oncology consulted  -Symptom control  -Daily CBC  -Abnormal MRI showing thick-walled ring-enhancing lesion in the left occipital lobe concerning for metastatic disease  -Neurosurgery involved  -Radiation oncology consulted managing radiation treatments  -Palliative care consulted patient change CODE STATUS to DNR     Coronary artery disease-history of CABG no chest pain at this time  -Maintenance medication  -Telemetry     Heart failure-diastolic in nature.  Relatively euvolemic at this time  -Diuresis as tolerated  -Daily weights     Urinary retention-straight cath as needed     Malnutrition-patient with swallowing difficulties and did not tolerate advance diet  -Continue working with speech therapy  -Nutrition critical given cancer treatment  -Nutrition consulted     Delirium-patient appears to be back with appropriate mentation, patient off gabapentin and Suboxone for period of time, resumed  -Antipsychotic as needed  -Psych consult     Anemia/MDS/thrombocytopenia-possibly secondary to  chemotherapy  -Managed per hematology        Nutrition/Diet History         Narrative     3/3: Pt continues on pureed diet with NTL and is eating/tolerating overall -- though did have an episode of N/V earlier today while in session with ST.  Despite this, overall intake continues to improve, and pt happy to have PO diet in place again. Pt accepting/trying oral supplements - will continue these for now.     3/1: Visited pt and SO for follow up. Pt continues on TF as main source of nutrition, though he is making an effort to eat the nectar thick clear liquid diet -- pt was working on nectar thick apple juice during visit. TF continues to infuse as well, at goal rate 65mL/hour. Pt tolerating TF, but hopeful he can resume PO soon.  Pt open to Magic Cups and Boost Pudding if diet advances to at least full liquids -- will follow up for possible advancement after VFSS tomorrow.  Current NG tube is larger-bore tube and ST concern for interfering with swallow function; this writer agrees based on size of tube -- would benefit from removal and replacement of smaller tube if indicated. See recommendation above.    2/25: Pt seen for follow up; pt in bed sleeping - TF infusing at goal 30mL/hour with tolerance. Discussed with RN as well - RN states pt wife tearful today, is concerned about pt's ongoing inability to swallow. ST continues to follow and will follow up to determine if able to resume any PO. Palliative care has been consulted to discuss whether long-term TF is desired. Will continue to follow.     2/23: RD visited patient room today where RN and patient CNA in room. RD asked RN about possible feeding tube placement and RN stated they have not been able to get a tube down, and cannot try anymore at the moment due to vomiting. CNA stated she does not think patient would keep tube in anyway. Will continue to monitor, as patient is extubated and NPO. This makes day 5 without nutrition and patient is diagnosed  "malnourished. May consider GI consult for PEG placement if continued inability to place tube. Patient still agitated.     2/19: s/w RN Manoj, pt failed first weaning trial but plans to try again, and if fails, will place feeding tube.  RD visited at bedside, pt intubated/sedated. Significant other at bedside reports pt has been losing weight over the past 1-2 years but had been started on megace and appetite improved. She reports he eats 2 meals daily and was drinking 2-3 ensures at home daily although she expressed he did not like them. RD explained possibility of TF due to compromised airway and will continue to follow for appropriate interventions     Functional Status Needs some assistance with ADLs     Food Allergies NKFA     Factors Affecting   Nutritional Intake Cancer  Dysphagia     Anthropometrics        Current Height, Weight Height: 177.8 cm (70\")  Weight: (bed not working worrk order has been put in) (03/01/21 0256)        Admit Height, Weight     Flowsheet Rows      First Filed Value   Admission Height  177.8 cm (70\") Documented at 02/18/2021 1048   Admission Weight  67.1 kg (148 lb) Documented at 02/18/2021 1048             Ideal Body Weight (IBW) 166 lb   % Ideal Body Weight 86%       Usual Body Weight UTD   % Usual Body Weight UTD   Wt Change Observation 3/3: No new weight since prior assessment   3/1: Weight up by 13.4% since last assessment -- will continue to monitor.   2/25: -15.6% in 1 week -- severe wt loss (likely related to lack of enteral access -- now has nutrition in place)   2/23: Patient has lost >3% body weight in one week = severe  2/19: 11.7% loss of body weight in 14 months = not considered significant in time frame   Weight Hx    Wt Readings from Last 30 Encounters:   02/27/21 0559 65 kg (143 lb 4.8 oz)   02/26/21 0547 57.6 kg (126 lb 15.8 oz)   02/25/21 0512 57.6 kg (126 lb 15.8 oz)   02/23/21 0349 66.1 kg (145 lb 11.6 oz)   02/22/21 0418 66 kg (145 lb 8.1 oz)   02/21/21 0517 66.9 " kg (147 lb 7.8 oz)   02/20/21 0111 66.8 kg (147 lb 4.3 oz)   02/18/21 1807 68.2 kg (150 lb 5.7 oz)   02/18/21 1048 67.1 kg (148 lb)   02/04/21 0805 66.6 kg (146 lb 12.8 oz)   01/05/21 0918 68.5 kg (151 lb)   12/29/20 0938 68.1 kg (150 lb 3.2 oz)   12/22/20 0913 69.6 kg (153 lb 6.4 oz)   12/15/20 0929 68.6 kg (151 lb 3.2 oz)   12/08/20 0915 68.1 kg (150 lb 3.2 oz)   12/02/20 1257 68.5 kg (151 lb)   12/01/20 0936 68 kg (150 lb)   11/24/20 0941 68.9 kg (152 lb)   11/19/20 0956 66.7 kg (147 lb)   11/09/20 1549 68 kg (150 lb)   11/08/20 2256 68 kg (150 lb)   10/30/20 0354 67.1 kg (147 lb 14.9 oz)   10/29/20 0500 70.1 kg (154 lb 8.7 oz)   10/27/20 0508 69.9 kg (154 lb 1.6 oz)   10/22/20 2059 68 kg (150 lb)   10/22/20 2044 68 kg (150 lb)   12/02/19 1001 77.1 kg (170 lb)   05/31/19 1037 82 kg (180 lb 12 oz)   05/21/18 1237 81.8 kg (180 lb 6.4 oz)        BMI kg/m2 Body mass index is 20.56 kg/m².       Labs/Medications         Pertinent Labs Reviewed and C/W clinical course    Results from last 7 days   Lab Units 03/03/21  0551 03/02/21  0627 03/01/21  0617   SODIUM mmol/L 139 139 142   POTASSIUM mmol/L 3.6 4.4 3.8   CHLORIDE mmol/L 101 101 104   CO2 mmol/L 31.0* 32.0* 31.0*   BUN mg/dL 30* 36* 38*   CREATININE mg/dL 0.70* 0.70* 0.64*   CALCIUM mg/dL 8.4* 8.2* 8.6   BILIRUBIN mg/dL 0.5 0.4 0.4   ALK PHOS U/L 117 162* 160*   ALT (SGPT) U/L 29 22 20   AST (SGOT) U/L 44* 32 23   GLUCOSE mg/dL 103* 107* 111*     Results from last 7 days   Lab Units 03/03/21  0551  02/26/21  0610 02/25/21  0615   MAGNESIUM mg/dL  --   --  2.6* 1.9   PHOSPHORUS mg/dL  --   --  2.3* 2.5   HEMOGLOBIN g/dL 9.3*   < > 9.7* 9.8*   HEMATOCRIT % 28.1*   < > 28.9* 28.8*    < > = values in this interval not displayed.     COVID19   Date Value Ref Range Status   02/24/2021 Not Detected Not Detected - Ref. Range Final     No results found for: HGBA1C      Pertinent Medications Aspirin, lipitor, colace, retacrit, lasix, neurontin, guaifenesin, latuda,  "lopressor, protonix, deltasone, zoloft, aldactone     Physical Findings        Overall Physical   Appearance, MSA 3/3: No significant change in appearance since prior visit   3/1: Continues to appear malnourished (last NFPE 2/19), with prominent clavicles and obvious facial wasting on observation  2/25: Remains malnourished-appearing (NFPE done 2/19)   2/23: RD did not physically observe at this date.  2/19: RD completed NFPE, see MSA   -  Edema  +1 generalized edema      Gastrointestinal BM yesterday 3/2     Tubes No longer has NG feeding access      Oral/Mouth Cavity ST following -- now approved for pureed diet and nectar thick liquids     Skin Stage II gluteal area; unstageable wound to sacral spine       Estimated/Assessed Needs    Previous assessment remains adequate 3/3   Energy Requirements    Height for Calculation  Height: 177.8 cm (70\")   Weight for Calculation 68.2 kg (150 lb)   Method for Estimation  30-35 kcal/kg   EST Needs (kcal/day) 2046 - 2387 kcals/day       Protein Requirements    Weight for Calculation 68.2 kg (150 lb)      EST Protein Needs (g/kg) 1.5 g/kg   EST Daily Needs (g/day) 102 g/day       Fluid Requirements     Estimated Needs (mL/day) 9598-6793 mL/day    CHF dx, adjust based on hydration status       Fluid Deficit    Current Na Level (mEq/L)    Desired Na Level (mEq/L)    Estimated Fluid Deficit (L)       Current Nutrition Orders & Evaluation of Received Nutrient/Fluid Intake       Oral Nutrition     Current PO Diet Diet Texture; Pureed Diet; Thickened Liquids (Nectar), No Straws   Supplement Boost Pudding BID; Magic Cup TID   PO Evaluation     % PO Intake 3/3: Eating with fair intake on pureed diet; eating many of the supplements sent to him (Magic Cups, Boost Pudding) - pt excited to be back on oral diet    -  Enteral Nutrition    Enteral Route No longer has enteral access 3/3   TF Modular -   TF Delivery Method -   Current Ordered TF  -   Current Ordered H2O flush  -    TF " Observation  -   EN Evaluation     TF Changes -    TF Residual -    TF Tolerance -    Average EN Delivered -       Parenteral Nutrition     TPN Route -   Current Ordered TPN VOL  -   Dextrose (g/kcals)  -   Amino Acid (g/kcals) -   Lipids (mL/Concentration/FREQ)  -   MVI Frequency  -   Trace Element Frequency  -   TPN Observation  -    TPN Evaluation    Total # Days on TPN -   -  Clinical Course    Nutrition Course Details PO Diet:  2/18 to present 2/25 NPO  2/26 started Clear liquid, Nectar thick liquids  3/2 started pureed diet with nectar thick liquids    EN support:  Started Nutren 1.5 on 2/24/21; continued on this formula until 3/2     Nutritional Risk Screening        NRS-2002 Score   -       Nutrition Diagnosis         Nutrition Dx Problem 1 Moderate chronic disease malnutrition r/t increased energy intake in the context of cancer AEB findings of muscle wasting and fat loss as noted on NFPE      Nutrition Dx Problem 2 Predicted inadequate energy intake r/t intubation AEB NPO due to mechanical ventilation       Intervention Goal         Intervention Goal(s) Pt continues good tolerance to puree with NTL  Weight stable or increasing      Nutrition Intervention        RD/Tech Action Will continue diet and supplements as ordered     Nutrition Prescription          Diet Prescription Pureed with nectar thick liquids    Supplement Prescription Magic Cup TID; Boost Pudding BID   -  Enteral Prescription -       TPN Prescription -     Monitor/Evaluation        Monitor Intake, supplement intake, weight changes, GI function      Electronically signed by:  Rachana Zacarias RD  03/03/21 15:22 EST

## 2021-03-03 NOTE — PLAN OF CARE
Pt refused this AM, has radiation treatment this PM and is being downgraded to MIPs per nursing. PT to follow up tomorrow if available.  Haider Hickman PT, DPT

## 2021-03-03 NOTE — PLAN OF CARE
Goal Outcome Evaluation:  Patient alert. Transfer from [facility]. Will continue to monitor.

## 2021-03-03 NOTE — PROGRESS NOTES
"Continued Stay Note   Johann     Patient Name: Axel Ramirez  MRN: [id number]  Today's Date: 3/3/2021    Admit Date: 2/18/2021    Discharge Plan     Row Name 03/03/21 0956       Plan    Plan Comments  s/w wife via telephone to inform that this CM has begun the process for wheelchair at [place]. She states he will also need BSC for home. She mentions that he wont likely dc today and that they plan for pt to go to rehab and she wants CM to find out how much insurance will cover in which buildings close to home. Informed that rehab will need prior auth and reiterated the ECF list provided 3/2 with highlighted in network facilities. Encouraged to discuss with family and utilized medicare.gov for choices. Spouse states she is working and \"works all the time,\" but that she will try to look through the list today and notify with a rehab choice.       Expected Discharge Date and Time     Expected Discharge Date Expected Discharge Time    Mar 5, 2021         Phone communication or documentation only - no physical contact with patient or family.      Luciana Hsu RN    "

## 2021-03-03 NOTE — CONSULTS
DNR / Aggressive Measures    Palliative care  met with pt to assess for goals of care.  No family at bedside and pt is alert and oriented x4.  Palliative NP has previously seen pt, see prior documentation.  Goals discussed and pt reports that he still wants to move forward with further aggressive measures, continuing to treat his cancer as aggressively as possible.  Code status discussed and pt reports that he does want to be a DNR.  Discussed plans to review POST form and pt was agreeable to completing form.  Will also discuss further with family.  Emotional support provided.  Thank you for the consult.  Will continue to follow.    Palliative Care Assessment    PC Encounter Information  Palliative Care Patient?: yes  Referral Date: 02/25/21  Referral Time: 1401  Date of Initial Encounter with a Palliative Care Provider: 02/26/21  Time of initial encounter with a Palliative Care Provider: 1100  Time Required for Initial Referral: 30 mins  Additional Visit/Time Required to Achieve Results: 30 mins  Patient Unit at Time of Referral: PCU  Patient Unit Specialty at Time of Referral: telemetry  Primary Diagnosis Leading to PC Consult: cancer (solid tumor)  Code Status at Time of Consult: full  Palliative Care Team Members Involved in Consultation: nurse practitioner  Symptom Distress  Last Bowel Movement: 02/28/21  Pain Assessment  Nonverbal Indicators of Pain: nonverbal indicators absent  CPOT Facial Expression: 0-->relaxed, neutral  CPOT Body Movements: 0-->absence of movements  CPOT Muscle Tension: 0-->relaxed  Ventilator Compliance/Vocalization: 0-->talking in normal tone or no sound  CPOT Score: 0  Pain Location: chest(lungs)  Screening Status/Interventions Data  Psychosocial Needs: pos  Psychosocial Needs Intervened: yes  Spiritual Needs: unable  Goals of Care/ACP: pos  Goals of Care/ACP Intervened: yes  Health Care Directives/Treatment Preferences  POLST/MOST Initiated: no  Pre-existing AND/MOST/POLST  Order: No  Advance Directive Status: Patient does not have advance directive  Outcomes  Code Status After Consult: DNR/DNI  Interventions  Pain Management Interventions: see MAR  Oral Care: lip lubricant applied, mouth wash rinse, oral rinse provided, swabbed with antiseptic solution, tongue brushed  Sleep/Rest Enhancement: awakenings minimized, consistent schedule promoted  Family/Support System Care: support provided, self-care encouraged

## 2021-03-03 NOTE — PLAN OF CARE
OT tx attempted, however pt PT, pt declined and moving floors. Pt to have radiation in PM. OT to f/u next service date if appropriate. OT did not enter room.     Carmen Medel, ANDREEA, OTR

## 2021-03-03 NOTE — PROGRESS NOTES
"Patient Name: Axel Ramirez Date: 3/3/2021       : 1950 Physicians: Axel Martinezanna Galindo        MRN #: [id number] Diagnosis: Brain mets                  RADIATION ON TREATMENT VISIT NOTE - BRAIN    Treatment Summary    Treatment Site Ref. ID Energy Dose/Fx (cGy) #Fx Dose Correction (cGy) Total Dose (cGy) Start Date End Date Elapsed Days   LtOccipitSRS LtOccipitSRS 6X 2,000  0 2,000 3/3/2021 3/3/2021 0     SRS today.  Patient will need outpatient MRI brain.  General:           Review of Systems      [x] No new complaints [] Headache   []AM [] PM [] Seizure activity  [] Memory loss [] Gait disturbance [] Nausea  [] Vomiting [] Speech changes [] Visual changes  [] Weakness [] Skin itching [] Hair loss  [] Skin soreness with pain  [] Fatigue,  severity: ----------------  [] Pain,  severity: ----------------, location:   Steroid regimen: On steroids, see below.  Anti-seizure regimen:   Pain regimen:    Skin regimen: NONE     Comments/Notes:  Notes that SRS mask was tight and breathing a challenge towards the end but feeling ok afterwards.    KPS: 70%       Vital Signs: /67   Pulse 75   Temp 97.5 °F (36.4 °C) (Oral)   Resp 18   Ht 177.8 cm (70\")   Wt 65 kg (143 lb 4.8 oz) Comment: with blankets on bed. RN aware  SpO2 98%   BMI 20.56 kg/m²     Weight:   Wt Readings from Last 3 Encounters:   21 66.6 kg (146 lb 12.8 oz)   21 68.5 kg (151 lb)   20 68.1 kg (150 lb 3.2 oz)       Medication:   Current Facility-Administered Medications:   •  acetaminophen (TYLENOL) suppository 650 mg, 650 mg, Rectal, Q6H PRN, Day, Carina, APRN, 650 mg at 21 1605  •  acetaminophen (TYLENOL) tablet 650 mg, 650 mg, Oral, Q6H PRN, Day, Carina, APRN, 650 mg at 21 1011  •  aspirin chewable tablet 81 mg, 81 mg, Oral, Daily, Efrem Langley MD, 81 mg at 21 0902  •  atorvastatin (LIPITOR) tablet 40 mg, 40 mg, Oral, Nightly, Efrem Langley MD, 40 mg at " 03/02/21 2129  •  Barium Sulfate 60 % cream 1 teaspoon(s), 1 teaspoon(s), Oral, Once in imaging, Abhishek Lazo MD  •  bisacodyl (DULCOLAX) suppository 10 mg, 10 mg, Rectal, Daily PRN, Shivam, Efrem Evans MD, 10 mg at 02/26/21 1405  •  budesonide (PULMICORT) nebulizer solution 0.5 mg, 0.5 mg, Nebulization, BID - RT, Lianna Groves MD, 0.5 mg at 03/03/21 0651  •  calcium gluconate 1g/50ml 0.675% NaCl IV SOLN, 1 g, Intravenous, PRN **AND** calcium gluconate 2-0.675 GM/100ML NACL IVPB, 2 g, Intravenous, PRN **AND** Calcium, Ionized, , , PRN, Shivam, Efrem Evans MD  •  docusate sodium (COLACE) capsule 100 mg, 100 mg, Oral, BID, Abhishek Lazo MD, 100 mg at 03/03/21 0902  •  epoetin asia-epbx (RETACRIT) injection 40,000 Units, 40,000 Units, Subcutaneous, Q7 Days, Axel Lewis MD, 40,000 Units at 02/28/21 1142  •  furosemide (LASIX) injection 20 mg, 20 mg, Intravenous, Q12H, Efrem Langley MD, 20 mg at 03/03/21 0902  •  gabapentin (NEURONTIN) capsule 300 mg, 300 mg, Oral, TID, Efrem Langley MD, 300 mg at 03/03/21 0902  •  guaiFENesin (MUCINEX) 12 hr tablet 600 mg, 600 mg, Oral, Q12H, Abhishek Lazo MD, 600 mg at 03/03/21 0902  •  haloperidol lactate (HALDOL) injection 2 mg, 2 mg, Intramuscular, Q6H PRN, Day, Carina, APRN, 2 mg at 02/24/21 0554  •  ibuprofen (ADVIL,MOTRIN) 100 MG/5ML suspension 400 mg, 400 mg, Oral, Q6H PRN, Raysa Alatorre, APRN, 400 mg at 02/24/21 1833  •  ipratropium-albuterol (DUO-NEB) nebulizer solution 3 mL, 3 mL, Nebulization, Q4H - RT, Lianna Groves MD, 3 mL at 03/03/21 1052  •  labetalol (NORMODYNE,TRANDATE) injection 20 mg, 20 mg, Intravenous, Q6H PRN, Carina Muñoz APRN, 20 mg at 02/23/21 0358  •  lurasidone (LATUDA) tablet 40 mg, 40 mg, Oral, Daily, Efrem Langley MD, 40 mg at 03/03/21 0902  •  Magnesium Sulfate 2 gram Bolus, followed by 8 gram infusion (total Mg dose 10  grams)- Mg less than or equal to 1mg/dL, 2 g, Intravenous, PRN **OR** Magnesium Sulfate 2 gram / 50mL Infusion (GIVE X 3 BAGS TO EQUAL 6GM TOTAL DOSE) - Mg 1.1 - 1.5 mg/dl, 2 g, Intravenous, PRN **OR** Magnesium Sulfate 4 gram infusion- Mg 1.6-1.9 mg/dL, 4 g, Intravenous, PRN, Efrem Langley MD, Last Rate: 25 mL/hr at 02/25/21 1831, 4 g at 02/25/21 1831  •  melatonin tablet 5 mg, 5 mg, Oral, Nightly PRN, Lisandra Cruz APRN, 5 mg at 02/26/21 2206  •  metoprolol tartrate (LOPRESSOR) injection 5 mg, 5 mg, Intravenous, Q6H PRN, Juanito Palomo APRN, 5 mg at 02/24/21 1537  •  metoprolol tartrate (LOPRESSOR) tablet 50 mg, 50 mg, Oral, Q12H, Efrem Langley MD, 50 mg at 03/03/21 0902  •  ondansetron (ZOFRAN) injection 4 mg, 4 mg, Intravenous, Q6H PRN, So Damon APRN, 4 mg at 03/03/21 0944  •  pantoprazole (PROTONIX) injection 40 mg, 40 mg, Intravenous, Q AM, Ángela Bean MD, 40 mg at 03/03/21 0620  •  potassium chloride (K-DUR,KLOR-CON) CR tablet 40 mEq, 40 mEq, Oral, PRN, 40 mEq at 03/03/21 0903 **OR** potassium chloride (KLOR-CON) packet 40 mEq, 40 mEq, Oral, PRN, 40 mEq at 02/26/21 1513 **OR** potassium chloride 10 mEq in 100 mL IVPB, 10 mEq, Intravenous, Q1H PRN, Juanito Palomo APRZACHARY, Last Rate: 100 mL/hr at 02/22/21 1526, 10 mEq at 02/22/21 1526  •  potassium phosphate 45 mmol in sodium chloride 0.9 % 500 mL infusion, 45 mmol, Intravenous, PRN **OR** potassium phosphate 30 mmol in sodium chloride 0.9 % 250 mL infusion, 30 mmol, Intravenous, PRN **OR** potassium phosphate 15 mmol in sodium chloride 0.9 % 100 mL infusion, 15 mmol, Intravenous, PRN, 15 mmol at 02/26/21 0957 **OR** sodium phosphates 45 mmol in sodium chloride 0.9 % 500 mL IVPB, 45 mmol, Intravenous, PRN **OR** sodium phosphates 30 mmol in sodium chloride 0.9 % 250 mL IVPB, 30 mmol, Intravenous, PRN **OR** sodium phosphates 15 mmol in sodium chloride 0.9 % 250 mL IVPB, 15 mmol, Intravenous, PRN,  Efrem Langley MD  •  predniSONE (DELTASONE) tablet 10 mg, 10 mg, Oral, Daily With Breakfast, Ángela Bean MD, 10 mg at 03/03/21 0902  •  sertraline (ZOLOFT) tablet 50 mg, 50 mg, Oral, Daily, Efrem Langley MD, 50 mg at 03/03/21 0902  •  sodium chloride 0.9 % flush 10 mL, 10 mL, Intravenous, PRN, Lianna Groves MD, 10 mL at 03/03/21 0903  •  spironolactone (ALDACTONE) tablet 25 mg, 25 mg, Oral, Daily, Efrem Langley MD, 25 mg at 03/03/21 0902  •  Zinc Oxide 16 % ointment, , Apply externally, BID, Efrem Langley MD, Given at 03/03/21 0903  •  Zinc Oxide 16 % ointment, , Apply externally, PRN, Efrem Langley MD       LABS (Reviewed):  Hematology WBC   Date Value Ref Range Status   03/03/2021 6.40 3.40 - 10.80 10*3/mm3 Final     RBC   Date Value Ref Range Status   03/03/2021 2.63 (L) 4.14 - 5.80 10*6/mm3 Final     Hemoglobin   Date Value Ref Range Status   03/03/2021 9.3 (L) 13.0 - 17.7 g/dL Final     Hematocrit   Date Value Ref Range Status   03/03/2021 28.1 (L) 37.5 - 51.0 % Final     Platelets   Date Value Ref Range Status   03/03/2021 202 140 - 450 10*3/mm3 Final      Chemistry   Lab Results   Component Value Date    GLUCOSE 103 (H) 03/03/2021    BUN 30 (H) 03/03/2021    CREATININE 0.70 (L) 03/03/2021    EGFRIFNONA 111 03/03/2021    BCR 42.9 (H) 03/03/2021    K 3.6 03/03/2021    CO2 31.0 (H) 03/03/2021    CALCIUM 8.4 (L) 03/03/2021    PROTENTOTREF 7.3 10/24/2020    ALBUMIN 2.80 (L) 03/03/2021    LABIL2 0.5 (L) 10/24/2020    AST 44 (H) 03/03/2021    ALT 29 03/03/2021         Physical Exam:         Neurology: [x] CNII-XII grossly intact    [x] Strength: intact  [] Reflexes:     [] Encephalopathy:     [] Memory impairment:     [] Neuropathy: motor/sensory:   Auditory/Ear: [] Otitis, external ear (non-infectious):   Ocular/Visual: [] PERRLA/EOMI:   Skin:  stGstrstastdstest:st st1st Comments/Notes:     [x] Review of labs, images, dosimetry,  dose delivered, & treatment parameters.    Comments:     [x] Patient treatment setup reviewed.    Comments:     Recommendations: continue steroids  Tolerated SRS ok  Covering for Dr. Galindo  Outpatient PET/CT planned per Dr. Lewis's note.    [x] Continue RT  [] Change RT Plan [] Hold RT, length:        Approved Electronically By:  No name on file., 3/3/2021, 13:49 EST          Confidentiality of this medical record shall be maintained except when use or disclosure is required or permitted by law, regulation or written authorization by the patient.

## 2021-03-03 NOTE — PROGRESS NOTES
Acute Care - Speech Language Pathology   Swallow Treatment Note  Johann     Patient Name: Axel Ramirez  : 1950  MRN: [id number]  Today's Date: 3/3/2021             Note reviewed by clinician and in agreement with information and plan of care.       Admit Date: 2021    Visit Dx:     ICD-10-CM ICD-9-CM   1. Pneumonia due to infectious organism, unspecified laterality, unspecified part of lung  J18.9 486   2. Respiratory distress  R06.03 786.09   3. Acute on chronic respiratory failure with hypoxia (CMS/HCC)  J96.21 518.84     799.02     Patient Active Problem List   Diagnosis   • Coronary artery disease involving native coronary artery of native heart without angina pectoris   • Nonrheumatic aortic valve stenosis   • Mixed hyperlipidemia   • Essential hypertension   • Fever   • Presence of aortocoronary bypass graft   • Congestive heart failure (CMS/HCC)   • Hx of aortic valve replacement   • CAP (community acquired pneumonia)   • Tobacco abuse   • Postobstructive pneumonia   • Lung mass   • Squamous cell carcinoma of lung, right (CMS/HCC)   • Diastolic CHF, acute (CMS/HCC)   • Pneumonia of right lung due to infectious organism   • Pneumonia due to infectious organism   • Moderate malnutrition (CMS/HCC)   • Acute on chronic respiratory failure with hypoxia (CMS/HCC)   • COPD with acute exacerbation (CMS/HCC)   • Delirium     Past Medical History:   Diagnosis Date   • Aortic stenosis    • Cancer (CMS/HCC)     Sickle Cell Carcinoma   • Congestive heart failure (CHF) (CMS/HCC)     DIASTOLIC   • COPD (chronic obstructive pulmonary disease) (CMS/HCC)    • Coronary artery disease    • Hypertension    • Lung cancer (CMS/HCC)    • Myocardial infarction (CMS/HCC)    • Peripheral vascular disease (CMS/HCC)    • Valvular disease      Past Surgical History:   Procedure Laterality Date   • AORTIC VALVE REPAIR/REPLACEMENT  2018    Dr Maza   • BRONCHOSCOPY N/A 10/24/2020    Procedure: BRONCHOSCOPY with  biopsy right upper lobe mass, and bronchoalveolar lavage right upper lobe;  Surgeon: Ángela Bean MD;  Location: [facility] ENDOSCOPY;  Service: Pulmonary;  Laterality: N/A;  post: right upper lobe mass, pneumonia   • BRONCHOSCOPY N/A 11/11/2020    Procedure: BRONCHOSCOPY with bronchial washing;  Surgeon: Ángela Bean MD;  Location: [facility] ENDOSCOPY;  Service: Pulmonary;  Laterality: N/A;  Post: lung mass, pneumonia   • BRONCHOSCOPY N/A 11/11/2020    Procedure: BRONCHOSCOPY RIGID with debulking of right main endobronchial tumor;  Surgeon: Nomi Reyes MD;  Location: [facility] MAIN OR;  Service: Cardiothoracic;  Laterality: N/A;   • BRONCHOSCOPY N/A 11/11/2020    Procedure: BRONCHOSCOPY;  Surgeon: Nomi Reyes MD;  Location: [facility] MAIN OR;  Service: Cardiothoracic;  Laterality: N/A;   • CARDIAC CATHETERIZATION  12/29/2017   • CORONARY ARTERY BYPASS GRAFT  02/26/2018    Dr Maza   • TIBIA FRACTURE SURGERY     • VENOUS ACCESS DEVICE (PORT) INSERTION Left 10/30/2020    Procedure: MEDIPORT INSERTION UNDER FLUOROSCOPIC GUIDENCE;  Surgeon: Nomi Reyes MD;  Location: [facility] MAIN OR;  Service: Cardiothoracic;  Laterality: Left;        SWALLOW EVALUATION (last 72 hours)      SLP Adult Swallow Evaluation     Row Name 03/03/21 1000          Document Type  therapy note (daily note)  (Pended)   -AF    Subjective Information  no complaints  (Pended)   -AF    Patient Observations  alert;cooperative;agree to therapy  (Pended)   -AF    Patient/Family/Caregiver Comments/Observations  Pt seen for skilled dysphagia therapy during breakfast. Pt indicated he was hungry and ready to eat prior to beginning session.    Patient Effort  good  (Pended)   -AF    Comment  Pt seen this date for skilled dysphagia therapy during a meal. ST positioned pt upright prior to beginning meal. Pt began meal fed by ST. Pt given two spoons of cream of wheat. Pt given verbal reminder to implement double swallow after each bite of food. Pt  "demonstrated spontaneous double swallow on second bite. Pt then took four bites of eggs given verbal cue after first bite to implement double swallow and effortful swallow. Pt demonstrated spontaneous double swallow on last three bites of eggs. Pt given two sips of nectar thick apple juice. Pt demonstrated spontaneous double swallows on NTL. Pt given one bite of magic cup ice cream and then indicated that he felt like \"he could throw up.\" Pt proceeded to vomit and meal was discontinued. Pt appears to be tolerating diet well from a oral and pharyngeal stand point. ST will continue to follow to insure tolerance of least restrictive diet for optimum nutrition.  (Pended)   -AF          Patient Profile Reviewed  yes  (Pended)   -AF    Pertinent History Of Current Problem  --    Current Method of Nutrition  nectar/syrup-thick liquids;pureed  (Pended)   -AF    Prior Level of Function-Swallowing  no diet consistency restrictions;regular textures;thin liquids  (Pended)   -AF    Plans/Goals Discussed with  patient  (Pended)   -AF    Barriers to Rehab  medically complex  (Pended)   -AF    Oral Nutrition/Hydration Goal 1, SLP  initiate and tolerate L/R diet without oral stage difficulties or complications associated with aspiration   (Pended)   -AF    Time Frame (Oral Nutrition/Hydration Goal 1, SLP)  by discharge  (Pended)   -AF    Barriers (Oral Nutrition/Hydration Goal 1, SLP)  See above note.  (Pended)   -AF    Progress/Outcomes (Oral Nutrition/Hydration Goal 1, SLP)  goal ongoing  (Pended)   -AF          Oral Nutrition/Hydration Goal 2, SLP  Pt will have full meal assessment within 48 hours  (Pended)   -AF    Time Frame (Oral Nutrition/Hydration Goal 2, SLP)  2 days  (Pended)   -AF    Barriers (Oral Nutrition/Hydration Goal 2, SLP)  Pt seen this date for skilled therapy at breakfast. See above note.  (Pended)   -AF    Progress/Outcomes (Oral Nutrition/Hydration Goal 2, SLP)  goal ongoing  (Pended)   -AF          Activity " (Pharyngeal Strengthening Goal 1, SLP)  increase tongue base retraction  (Pended)   -AF    Increase Tongue Base Retraction  carlos  (Pended)   -AF    [place]/Accuracy (Pharyngeal Strengthening Goal 1, SLP)  with moderate cues (50-74% accuracy)  (Pended)   -AF    Time Frame (Pharyngeal Strengthening Goal 1, SLP)  by discharge  (Pended)   -AF          Activity (Pharyngeal Strengthening Goal 1, SLP)  increase anterior movement of the hyolaryngeal complex;increase epiglottic  inversion and retroflexion  (Pended)   -AF    Increase Anterior Movement of the Hyolaryngeal Complex  hard effortful swallow  (Pended)   -AF    Increase Epiglottic Inversion and Retroflexion  hard effortful swallow  (Pended)   -AF    [place]/Accuracy (Pharyngeal Strengthening Goal 1, SLP)  with moderate cues (50-74% accuracy)  (Pended)   -AF    Time Frame (Pharyngeal Strengthening Goal 2, SLP)  by discharge  (Pended)   -AF    Barriers (Pharyngeal Strengthening Goal 2, SLP)  Pt did well with effortful swallow and double swallow when given verbal cues this session.  (Pended)   -AF      User Key  (r) = Recorded By, (t) = Taken By, (c) = Cosigned By    Initials Name Effective Dates     So Ochoa, SLP 03/01/19 -     MF Loretta Bruno, SLP 06/17/19 -     Deedee Foster, Speech Therapy Student 01/12/21 -         Patient was not wearing a face mask during this therapy encounter. Therapist used appropriate personal protective equipment including gown, eye protection, mask and gloves.  Mask used was standard procedure mask. Appropriate PPE was worn during the entire therapy session. Hand hygiene was completed before and after therapy session. Patient is in droplet precautions.       EDUCATION  The patient has been educated in the following areas:   Dysphagia (Swallowing Impairment) effortful swallow.    SLP Recommendation and Plan      ST recommends pt continue a puree/NTL diet at this time  Cont above safe swallow compensations  (double swallow, effortful swallow, etc.)  ST will continue to follow as per plan of care.     SLP GOALS     Row Name 03/03/21 1000          Oral Nutrition/Hydration Goal 1, SLP  initiate and tolerate L/R diet without oral stage difficulties or complications associated with aspiration   (Pended)   -AF    Time Frame (Oral Nutrition/Hydration Goal 1, SLP)  by discharge  (Pended)   -AF    Barriers (Oral Nutrition/Hydration Goal 1, SLP)  See above note.  (Pended)   -AF    Progress/Outcomes (Oral Nutrition/Hydration Goal 1, SLP)  goal ongoing  (Pended)   -AF          Oral Nutrition/Hydration Goal 2, SLP  Pt will have full meal assessment within 48 hours  (Pended)   -AF    Time Frame (Oral Nutrition/Hydration Goal 2, SLP)  2 days  (Pended)   -AF    Barriers (Oral Nutrition/Hydration Goal 2, SLP)  Pt seen this date for skilled therapy at breakfast. See above note.  (Pended)   -AF    Progress/Outcomes (Oral Nutrition/Hydration Goal 2, SLP)  goal ongoing  (Pended)   -AF          Activity (Pharyngeal Strengthening Goal 1, SLP)  increase tongue base retraction  (Pended)   -AF    Increase Tongue Base Retraction  carlos  (Pended)   -AF    [place]/Accuracy (Pharyngeal Strengthening Goal 1, SLP)  with moderate cues (50-74% accuracy)  (Pended)   -AF    Time Frame (Pharyngeal Strengthening Goal 1, SLP)  by discharge  (Pended)   -AF          Activity (Pharyngeal Strengthening Goal 1, SLP)  increase anterior movement of the hyolaryngeal complex;increase epiglottic  inversion and retroflexion  (Pended)   -AF    Increase Anterior Movement of the Hyolaryngeal Complex  hard effortful swallow  (Pended)   -AF    Increase Epiglottic Inversion and Retroflexion  hard effortful swallow  (Pended)   -AF    [place]/Accuracy (Pharyngeal Strengthening Goal 1, SLP)  with moderate cues (50-74% accuracy)  (Pended)   -AF    Time Frame (Pharyngeal Strengthening Goal 2, SLP)  by discharge  (Pended)   -AF    Barriers (Pharyngeal Strengthening  Goal 2, SLP)  Pt did well with effortful swallow when given verbal cues this session.  (Pended)   -AF      User Key  (r) = Recorded By, (t) = Taken By, (c) = Cosigned By    Initials Name Provider Type    So Hyde, SLP Speech and Language Pathologist    Loretta Bagley, BERTHA Speech and Language Pathologist    Deedee Foster, Speech Therapy Student Speech Therapy Student             Time Calculation:                Deedee Watson, Speech Therapy Student  3/3/2021

## 2021-03-03 NOTE — PROGRESS NOTES
"PULMONARY CRITICAL CARE Progress  NOTE      PATIENT IDENTIFICATION:  Name: Axel Ramirez  MRN: [id number]  :  1950     Age: 70 y.o.  Sex: male    DATE OF Note:  3/3/2021   Referring Physician: Abhishek Lazo MD                  Subjective: Cachetic  Feeling better, less SOB, On 2 L O2, no SOB, had radiation today,  no nausea or vomiting, no change in bowel habit, no dysuria,  no new  skin rash or itching.      Objective:  tMax 24 hrs: Temp (24hrs), Av.6 °F (36.4 °C), Min:97.4 °F (36.3 °C), Max:97.8 °F (36.6 °C)      Vitals Ranges:   Temp:  [97.4 °F (36.3 °C)-97.8 °F (36.6 °C)] 97.4 °F (36.3 °C)  Heart Rate:  [68-90] 90  Resp:  [14-18] 17  BP: (103-125)/(59-76) 118/76    Intake and Output Last 3 Shifts:   I/O last 3 completed shifts:  In: 240 [P.O.:240]  Out: 400 [Urine:400]    Exam:  /76 (BP Location: Left arm, Patient Position: Lying)   Pulse 90   Temp 97.4 °F (36.3 °C) (Oral)   Resp 17   Ht 177.8 cm (70\")   Wt 65 kg (143 lb 4.8 oz) Comment: with blankets on bed. RN aware  SpO2 95%   BMI 20.56 kg/m²     General Appearance: Alert ,cachetic    HEENT:  Normocephalic, without obvious abnormality, Conjunctiva/corneas clear,.  Normal external ear canals, Nares normal, no drainage     Neck:  Supple, symmetrical, trachea midline. No JVD.  Lungs /Chest wall:   Bilateral basal rhonchi, respirations unlabored symmetrical wall movement.     Heart:  Regular rate and rhythm, systolic murmur PMI left sternal border  Abdomen: Soft, non-tender, no masses, no organomegaly.    Extremities: Trace edema no clubbing or Cyanosis  SKIN: Left buttock -Unstageable pressure injury   Coccyx/theresa cleft with masd linear ulceration        Medications:    Current Facility-Administered Medications:   •  acetaminophen (TYLENOL) suppository 650 mg, 650 mg, Rectal, Q6H PRN, Day, Carina, APRN, 650 mg at 21 1605  •  acetaminophen (TYLENOL) tablet 650 mg, 650 mg, Oral, Q6H PRN, Day, Carina, APRN, 650 mg at " 02/24/21 1011  •  aspirin chewable tablet 81 mg, 81 mg, Oral, Daily, Efrem Langley MD, 81 mg at 03/03/21 0902  •  atorvastatin (LIPITOR) tablet 40 mg, 40 mg, Oral, Nightly, Efrem Langley MD, 40 mg at 03/02/21 2129  •  Barium Sulfate 60 % cream 1 teaspoon(s), 1 teaspoon(s), Oral, Once in imaging, Abhishek Lazo MD  •  bisacodyl (DULCOLAX) suppository 10 mg, 10 mg, Rectal, Daily PRN, Efrem Langley MD, 10 mg at 02/26/21 1405  •  budesonide (PULMICORT) nebulizer solution 0.5 mg, 0.5 mg, Nebulization, BID - RT, Lianna Groves MD, 0.5 mg at 03/03/21 0651  •  calcium gluconate 1g/50ml 0.675% NaCl IV SOLN, 1 g, Intravenous, PRN **AND** calcium gluconate 2-0.675 GM/100ML NACL IVPB, 2 g, Intravenous, PRN **AND** Calcium, Ionized, , , PRN, Efrem Langley MD  •  docusate sodium (COLACE) capsule 100 mg, 100 mg, Oral, BID, Abhishek Lazo MD, 100 mg at 03/03/21 0902  •  epoetin asia-epbx (RETACRIT) injection 40,000 Units, 40,000 Units, Subcutaneous, Q7 Days, Axel Lewis MD, 40,000 Units at 02/28/21 1142  •  furosemide (LASIX) injection 20 mg, 20 mg, Intravenous, Q12H, Efrem Langley MD, 20 mg at 03/03/21 0902  •  gabapentin (NEURONTIN) capsule 300 mg, 300 mg, Oral, TID, Efrem Langley MD, 300 mg at 03/03/21 0902  •  guaiFENesin (MUCINEX) 12 hr tablet 600 mg, 600 mg, Oral, Q12H, Abhishek Lazo MD, 600 mg at 03/03/21 0902  •  haloperidol lactate (HALDOL) injection 2 mg, 2 mg, Intramuscular, Q6H PRN, Carina Muñoz APRN, 2 mg at 02/24/21 0554  •  HYDROcodone-acetaminophen (NORCO) 5-325 MG per tablet 1 tablet, 1 tablet, Oral, Q6H PRN, Abhishek Lazo MD  •  ibuprofen (ADVIL,MOTRIN) 100 MG/5ML suspension 400 mg, 400 mg, Oral, Q6H PRN, Raysa Alatorre APRN, 400 mg at 02/24/21 1833  •  ipratropium-albuterol (DUO-NEB) nebulizer solution 3 mL, 3 mL, Nebulization, Q4H - RT, Yaneli,  MD Lianna, 3 mL at 03/03/21 1534  •  labetalol (NORMODYNE,TRANDATE) injection 20 mg, 20 mg, Intravenous, Q6H PRN, Carina Muñoz APRN, 20 mg at 02/23/21 0358  •  lurasidone (LATUDA) tablet 40 mg, 40 mg, Oral, Daily, ShivamEfrem MD, 40 mg at 03/03/21 0902  •  Magnesium Sulfate 2 gram Bolus, followed by 8 gram infusion (total Mg dose 10 grams)- Mg less than or equal to 1mg/dL, 2 g, Intravenous, PRN **OR** Magnesium Sulfate 2 gram / 50mL Infusion (GIVE X 3 BAGS TO EQUAL 6GM TOTAL DOSE) - Mg 1.1 - 1.5 mg/dl, 2 g, Intravenous, PRN **OR** Magnesium Sulfate 4 gram infusion- Mg 1.6-1.9 mg/dL, 4 g, Intravenous, PRN, ShivamEfrem MD, Last Rate: 25 mL/hr at 02/25/21 1831, 4 g at 02/25/21 1831  •  melatonin tablet 5 mg, 5 mg, Oral, Nightly PRN, Lisandra Cruz, APRN, 5 mg at 02/26/21 2206  •  metoprolol tartrate (LOPRESSOR) injection 5 mg, 5 mg, Intravenous, Q6H PRN, Juanito Palomo, APRN, 5 mg at 02/24/21 1537  •  metoprolol tartrate (LOPRESSOR) tablet 50 mg, 50 mg, Oral, Q12H, Efrem Langley MD, 50 mg at 03/03/21 0902  •  ondansetron (ZOFRAN) injection 4 mg, 4 mg, Intravenous, Q6H PRN, So Damon, APRN, 4 mg at 03/03/21 0944  •  pantoprazole (PROTONIX) injection 40 mg, 40 mg, Intravenous, Q AM, Ángela Bean MD, 40 mg at 03/03/21 0620  •  potassium chloride (K-DUR,KLOR-CON) CR tablet 40 mEq, 40 mEq, Oral, PRN, 40 mEq at 03/03/21 0903 **OR** potassium chloride (KLOR-CON) packet 40 mEq, 40 mEq, Oral, PRN, 40 mEq at 02/26/21 1513 **OR** potassium chloride 10 mEq in 100 mL IVPB, 10 mEq, Intravenous, Q1H PRN, Juanito Palomo, APRN, Last Rate: 100 mL/hr at 02/22/21 1526, 10 mEq at 02/22/21 1526  •  potassium phosphate 45 mmol in sodium chloride 0.9 % 500 mL infusion, 45 mmol, Intravenous, PRN **OR** potassium phosphate 30 mmol in sodium chloride 0.9 % 250 mL infusion, 30 mmol, Intravenous, PRN **OR** potassium phosphate 15 mmol in sodium chloride 0.9 % 100 mL  infusion, 15 mmol, Intravenous, PRN, 15 mmol at 02/26/21 0957 **OR** sodium phosphates 45 mmol in sodium chloride 0.9 % 500 mL IVPB, 45 mmol, Intravenous, PRN **OR** sodium phosphates 30 mmol in sodium chloride 0.9 % 250 mL IVPB, 30 mmol, Intravenous, PRN **OR** sodium phosphates 15 mmol in sodium chloride 0.9 % 250 mL IVPB, 15 mmol, Intravenous, PRN, Efrem Langley MD  •  predniSONE (DELTASONE) tablet 10 mg, 10 mg, Oral, Daily With Breakfast, Ángela Bean MD, 10 mg at 03/03/21 0902  •  sertraline (ZOLOFT) tablet 50 mg, 50 mg, Oral, Daily, Efrem Langley MD, 50 mg at 03/03/21 0902  •  sodium chloride 0.9 % flush 10 mL, 10 mL, Intravenous, PRN, Lianna Groves MD, 10 mL at 03/03/21 0903  •  spironolactone (ALDACTONE) tablet 25 mg, 25 mg, Oral, Daily, Efrem Langley MD, 25 mg at 03/03/21 0902  •  Zinc Oxide 16 % ointment, , Apply externally, BID, Efrem Langley MD, Given at 03/03/21 0903  •  Zinc Oxide 16 % ointment, , Apply externally, PRN, Efrem Langley MD    Data Review:  All labs (24hrs):   Recent Results (from the past 24 hour(s))   ECG 12 Lead    Collection Time: 03/03/21  3:42 AM   Result Value Ref Range    QT Interval 426 ms   Comprehensive Metabolic Panel    Collection Time: 03/03/21  5:51 AM    Specimen: Blood   Result Value Ref Range    Glucose 103 (H) 65 - 99 mg/dL    BUN 30 (H) 8 - 23 mg/dL    Creatinine 0.70 (L) 0.76 - 1.27 mg/dL    Sodium 139 136 - 145 mmol/L    Potassium 3.6 3.5 - 5.2 mmol/L    Chloride 101 98 - 107 mmol/L    CO2 31.0 (H) 22.0 - 29.0 mmol/L    Calcium 8.4 (L) 8.6 - 10.5 mg/dL    Total Protein 7.2 6.0 - 8.5 g/dL    Albumin 2.80 (L) 3.50 - 5.20 g/dL    ALT (SGPT) 29 1 - 41 U/L    AST (SGOT) 44 (H) 1 - 40 U/L    Alkaline Phosphatase 117 39 - 117 U/L    Total Bilirubin 0.5 0.0 - 1.2 mg/dL    eGFR Non African Amer 111 >60 mL/min/1.73    Globulin 4.4 gm/dL    A/G Ratio 0.6 g/dL    BUN/Creatinine  Ratio 42.9 (H) 7.0 - 25.0    Anion Gap 7.0 5.0 - 15.0 mmol/L   CBC Auto Differential    Collection Time: 03/03/21  5:51 AM    Specimen: Blood   Result Value Ref Range    WBC 6.40 3.40 - 10.80 10*3/mm3    RBC 2.63 (L) 4.14 - 5.80 10*6/mm3    Hemoglobin 9.3 (L) 13.0 - 17.7 g/dL    Hematocrit 28.1 (L) 37.5 - 51.0 %    .5 (H) 79.0 - 97.0 fL    MCH 35.2 (H) 26.6 - 33.0 pg    MCHC 33.0 31.5 - 35.7 g/dL    RDW 26.0 (H) 12.3 - 15.4 %    RDW-SD 92.8 (H) 37.0 - 54.0 fl    MPV 9.2 6.0 - 12.0 fL    Platelets 202 140 - 450 10*3/mm3   Scan Slide    Collection Time: 03/03/21  5:51 AM    Specimen: Blood   Result Value Ref Range    Scan Slide     Manual Differential    Collection Time: 03/03/21  5:51 AM    Specimen: Blood   Result Value Ref Range    Neutrophil % 75.0 42.7 - 76.0 %    Lymphocyte % 15.0 (L) 19.6 - 45.3 %    Monocyte % 8.0 5.0 - 12.0 %    Eosinophil % 1.0 0.3 - 6.2 %    Atypical Lymphocyte % 1.0 0.0 - 5.0 %    Neutrophils Absolute 4.80 1.70 - 7.00 10*3/mm3    Lymphocytes Absolute 0.96 0.70 - 3.10 10*3/mm3    Monocytes Absolute 0.51 0.10 - 0.90 10*3/mm3    Eosinophils Absolute 0.06 0.00 - 0.40 10*3/mm3    Anisocytosis Slight/1+ None Seen    Macrocytes Slight/1+ None Seen    Poikilocytes Slight/1+ None Seen    WBC Morphology Normal Normal    Platelet Morphology Normal Normal        Imaging:  FL Video Swallow With Speech Single Contrast  Narrative: DATE OF EXAM:  3/2/2021 9:15 AM     PROCEDURE:  FL VIDEO SWALLOW W SPEECH SINGLE-CONTRAST-     INDICATIONS:  repeat VFSS as only on nectar thick clear liquid diet; J18.9-Pneumonia,  unspecified organism; R06.03-Acute respiratory distress; J96.21-Acute  and chronic respiratory failure with hypoxia     COMPARISON:  Video swallow study dated 2/26/2021.     TECHNIQUE:   This examination was performed in conjunction with speech pathology.  Lateral video fluoroscopic evaluation of the swallowing mechanism was  performed while correlate administering to the patient  various  consistency food items mixed with barium.     Fluoroscopic Time:   2.5 minutes     FINDINGS:  Deep penetration with one trial of nectar thickened liquid. Other  remaining consistencies demonstrating no aspiration or laryngeal  penetration. Residue is present with multiple consistency trialed.      Impression: As above. Please see speech pathology report for detailed evaluation and  dietary recommendations.     Electronically Signed By-Daryl Hunter MD On:3/2/2021 11:39 AM  This report was finalized on [id number] by  Daryl Hunter MD.       ASSESSMENT:    Acute on chronic respiratory failure with hypoxia (CMS/HCC)    Essential hypertension    Hx of aortic valve replacement    Squamous cell carcinoma of lung, right (CMS/HCC)    Diastolic CHF, acute (CMS/HCC)    Pneumonia due to infectious organism    Moderate malnutrition (CMS/HCC)    COPD with acute exacerbation (CMS/HCC)    Delirium  Left buttock -Unstageable pressure injury   Coccyx/ cleft with masd linear ulceration   Lesion in the medial aspect of the left occipital lobe   Delirium, possibly withdrawal from gabapentin and/or Suboxone    PLAN:  Radiation today  Wean steroids down   Continue diet as tolerated-currently on Pureed with thickened liq and supplements   Bronchodilator  Inhaled corticosteroids  Diuresis currently on Aldactone 25 mg and Lasix 20 mg IV every 12  Monitor urinary retention  Electrolytes/ glycemic control  DVT and GI prophylaxis.    Discussed with Dr Yaneli Dumont, VIN[facility]   3/3/2021  17:13 EST     I personally have examined  and interviewed the patient. I have reviewed the history, data, problems, assessment and plan with our NP.  Critical care time in direct medical management (   ) minutes   Lianna Groves MD, D,ABSM  3/3/2021

## 2021-03-03 NOTE — PROGRESS NOTES
Hematology/Oncology Inpatient Progress Note    PATIENT NAME: Axel Ramirez  : 1950  MRN: [id number]    CHIEF COMPLAINT: Hypoxia and respiratory failure     HISTORY OF PRESENT ILLNESS:    70 y.o. male admitted to the ICU through the ED 2021 with hypoxia and respiratory failure requiring intubation.  The patient was intubated and sedated at time of consult and histories were taken from review of records and spouse report.  His spouse reported he had been feeling better and had tolerated recent chemotherapy for his lung cancer.  He was eating well after start of Megace but did develop increasing shortness of air a few days prior to admission without a change in his chronic cough.  She denied known fever, chills, or hemoptysis.  On admission, CT PE protocol was limited due to motion but felt negative for central PE.  There was evidence of pneumonia versus aspiration and known right upper lobe lung mass invading the right upper lobe bronchus with known obstruction and with increased narrowing/obstruction of the right middle lobe bronchus.  There was suggestion of progressive tumor infiltration of the right mainstem bronchus and new left paratracheal adenopathy with stable right paratracheal and subcarinal adenopathy.  CBC revealed WBC 8.4, hemoglobin 8.6, .7, and platelets 106,000.  D-dimer was elevated to 1.98 (0-0.59).  Creatinine was not elevated and LFTs were okay.  Respiratory viral panel was negative and blood cultures were sent.  On 2021 hemoglobin dropped to 7.4 and platelets to 92,000.     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  His lung cancer was initially treated with concomitant weekly chemotherapy (paclitaxel and carboplatin x7) and radiation  therapy from November 2020. He completed radiation therapy on 1/6/2021.  He completed the weekly portion of his chemotherapy on 1/12/2021.  He was seen in follow-up on 2/4/2021 where it was planned to continue chemotherapy with paclitaxel and carboplatin along with Neulasta support every 3 weeks.  He was scheduled for CT scan chest abdomen and pelvis on 2/18/2021 as an outpatient prior to his admission.  He received chemotherapy with Neulasta support on 2/9/2021 at which time CBC revealed WBC 7.62, hemoglobin 9.5, and platelets 223,000.  He was anemic at time of diagnosis of his lung cancer and anemia work-up revealed iron deficiency as well as MDS.  He received oral iron however he was diagnosed with malabsorption and received IV iron in November 2020 which was also repeated in December 2020.  His bone marrow had revealed mild increased iron storage in January 2021.  At his follow-up visit on 2/4/2021 t Retacrit 30,000 units subcu weekly was ordered for his myelodysplastic syndrome while continuing oral iron for his iron deficiency with iron saturation of 17%.  He had not started Retacrit prior to admission pending insurance authorization.     PCP: Sandoval Stevenson FNP    INTERVAL HISTORY:  2/19/2021-received 1 unit pRBCs. Iron 21 (59-1 58), iron saturation 12 (20-50), transferrin 121 (200-3 60), TIBC 180 (298-536), ferritin 2658 (), reticulocytes 1.93 (0.7 0-1.90), haptoglobin 294 (). Started Venofer 300 mg IV x 3 days. Patient extubated, on nasal canula and Precedex.  2/20/2021 -white blood count 8.8, hemoglobin 8, .4, platelets 57,000.  CT head with moderate periventricular white matter changes present likely related to chronic microvascular ischemic change, progressed as compared to the previous study.  Hypodensity present within the left occipital region which appeared more chronic or remote, new as compared to the previous study.  Chest x-ray with improvement in right basilar airspace  disease with resolution of the left airspace disease.  Stable right upper lobe mass.  2/21/2021-Retacrit 40,000 units subcu weekly started for hemoglobin 7.9.  2/22/2021-patient moved out of ICU.  2/23/2021-patient moved to PCU for tachypnea and tachycardia.  02/25/21-CT abdomen and pelvis without showed no evidence of malignancy. There was layering hyperdense material in the gallbladder with spurious excretion of contrast from prior contrast-enhanced study versus sludge/gallstones in differential, constipation, and known pneumonia/aspiration. Respiratory viral panel positive for rhinovirus/enterovirus. Psych consulted and patient started back on home meds including Suboxone, Latuda, and sertraline.  MRI brain without contrast suggestive of left cerebral cortical and leptomeningeal mets.  2/26/2021-hemoglobin 9.7, platelets 112,000, WBC 10.3.  More alert and oriented.  2/27/2021-MRI brain with contrast showed a 1.1 x 1 cm left occipital lobe lesion. Neurosurgery not recommending resection. Rad Onc planning SRS on 3/3 if patient can tolerate planning. Erythropoietin level 362 (2.6-18.5).    2/28/2021-Reviewed MRI brain findings. Patient and spouse both wanting to proceed with radiation therapy.  Platelets improved to 147,000.   03/02/2021-platelets normalized.  No aspiration on swallow study.  NG tube removed and patient started on puréed diet.    History of present illness reviewed since last visit and changes noted on 03/03/21.    Subjective   Claims to be eating well.    ROS:   Review of Systems   Constitutional: Positive for fatigue. Negative for activity change, chills, fever and unexpected weight change.   HENT: Positive for voice change ( hoarse). Negative for congestion, dental problem, hearing loss, mouth sores, nosebleeds, sore throat and trouble swallowing.    Eyes: Negative for photophobia and visual disturbance.   Respiratory: Positive for cough. Negative for choking, chest tightness and shortness of  breath.    Cardiovascular: Negative for chest pain, palpitations and leg swelling.   Gastrointestinal: Negative for abdominal distention, abdominal pain, blood in stool, constipation, diarrhea, nausea and vomiting.   Endocrine: Negative for cold intolerance and heat intolerance.   Genitourinary: Negative for decreased urine volume, difficulty urinating, dysuria, enuresis, frequency, hematuria and urgency.        Incontinence   Musculoskeletal: Negative for arthralgias and gait problem.   Skin: Negative for rash and wound.   Neurological: Positive for weakness. Negative for dizziness, tremors, speech difficulty, light-headedness, numbness and headaches.   Hematological: Negative for adenopathy. Does not bruise/bleed easily.   Psychiatric/Behavioral: Negative for confusion and hallucinations. The patient is not nervous/anxious.    All other systems reviewed and are negative.     MEDICATIONS:    Scheduled Meds:  aspirin, 81 mg, Oral, Daily  atorvastatin, 40 mg, Oral, Nightly  Barium Sulfate, 1 teaspoon(s), Oral, Once in imaging  budesonide, 0.5 mg, Nebulization, BID - RT  docusate sodium, 100 mg, Oral, BID  epoetin asia-epbx, 40,000 Units, Subcutaneous, Q7 Days  furosemide, 20 mg, Intravenous, Q12H  gabapentin, 300 mg, Oral, TID  guaiFENesin, 600 mg, Oral, Q12H  ipratropium-albuterol, 3 mL, Nebulization, Q4H - RT  lurasidone, 40 mg, Oral, Daily  metoprolol tartrate, 50 mg, Oral, Q12H  pantoprazole, 40 mg, Intravenous, Q AM  predniSONE, 10 mg, Oral, Daily With Breakfast  sertraline, 50 mg, Oral, Daily  spironolactone, 25 mg, Oral, Daily  Zinc Oxide, , Apply externally, BID       Continuous Infusions:      PRN Meds:  •  acetaminophen  •  acetaminophen  •  bisacodyl  •  Calcium Gluconate-NaCl **AND** calcium gluconate **AND** Calcium, Ionized  •  haloperidol lactate  •  ibuprofen  •  labetalol  •  magnesium sulfate **OR** magnesium sulfate **OR** magnesium sulfate  •  melatonin  •  metoprolol tartrate  •  ondansetron  •   "potassium chloride **OR** potassium chloride **OR** potassium chloride  •  potassium phosphate infusion greater than 15 mMoles **OR** potassium phosphate infusion greater than 15 mMoles **OR** potassium phosphate **OR** sodium phosphate IVPB **OR** sodium phosphate IVPB **OR** sodium phosphate IVPB  •  sodium chloride  •  Zinc Oxide     ALLERGIES:  No Known Allergies    Objective    VITALS:   /67   Pulse 82   Temp 97.7 °F (36.5 °C)   Resp 18   Ht 177.8 cm (70\")   Wt 65 kg (143 lb 4.8 oz) Comment: with blankets on bed. RN aware  SpO2 99%   BMI 20.56 kg/m²     PHYSICAL EXAM:  Physical Exam  Vitals signs and nursing note reviewed.   Constitutional:       General: He is not in acute distress.     Appearance: He is well-developed. He is ill-appearing (chronically). He is not diaphoretic.      Interventions: Nasal cannula in place.      Comments: Cachectic    HENT:      Head: Normocephalic and atraumatic.      Comments: Male pattern baldness/alopecia.     Right Ear: External ear normal.      Left Ear: External ear normal.      Nose: Nose normal.      Comments: O2 by NC.       Mouth/Throat:      Mouth: Mucous membranes are dry.      Pharynx: No oropharyngeal exudate or posterior oropharyngeal erythema.      Comments: Edentulous  Eyes:      General: No scleral icterus.     Extraocular Movements: Extraocular movements intact.      Conjunctiva/sclera: Conjunctivae normal.      Pupils: Pupils are equal, round, and reactive to light.   Neck:      Musculoskeletal: Normal range of motion and neck supple.   Cardiovascular:      Rate and Rhythm: Normal rate and regular rhythm.      Heart sounds: Normal heart sounds. No murmur.      Comments: Left chest wall Gdshoj-l-View. Monitor leads.  Midline vertical chest wall scar  Pulmonary:      Effort: Pulmonary effort is normal. No respiratory distress.      Breath sounds: Rhonchi ( improving somewhat ) present. No wheezing or rales.   Chest:      Chest wall: No tenderness. "      Comments: Left chest wall Dihdvh-v-[id number] accessed.   Abdominal:      General: Bowel sounds are normal. There is no distension.      Palpations: Abdomen is soft. There is no mass.      Tenderness: There is no abdominal tenderness. There is no guarding.   Genitourinary:     Comments: Deffered  Musculoskeletal: Normal range of motion.         General: No swelling, tenderness or deformity.      Comments: LUE O2 monitor and RUE BP cuff.   Lymphadenopathy:      Cervical: No cervical adenopathy.      Upper Body:      Right upper body: No supraclavicular adenopathy.      Left upper body: No supraclavicular adenopathy.   Skin:     General: Skin is warm and dry.      Coloration: Skin is pale. Skin is not jaundiced.      Findings: No bruising, erythema or rash.      Comments: Pressure bandages to bilateral elbows    Neurological:      General: No focal deficit present.      Mental Status: He is oriented to person, place, and time.      Motor: Weakness (generalized) present.   Psychiatric:         Mood and Affect: Mood normal.         Behavior: Behavior normal.       RECENT LABS:  Lab Results (last 24 hours)     Procedure Component Value Units Date/Time    Manual Differential [[id number]]  (Abnormal) Collected: 03/03/21 0551    Specimen: Blood Updated: 03/03/21 0727     Neutrophil % 75.0 %      Lymphocyte % 15.0 %      Monocyte % 8.0 %      Eosinophil % 1.0 %      Atypical Lymphocyte % 1.0 %      Neutrophils Absolute 4.80 10*3/mm3      Lymphocytes Absolute 0.96 10*3/mm3      Monocytes Absolute 0.51 10*3/mm3      Eosinophils Absolute 0.06 10*3/mm3      Anisocytosis Slight/1+     Macrocytes Slight/1+     Poikilocytes Slight/1+     WBC Morphology Normal     Platelet Morphology Normal    CBC & Differential [[id number]]  (Abnormal) Collected: 03/03/21 0551    Specimen: Blood Updated: 03/03/21 0727    Narrative:      The following orders were created for panel order CBC & Differential.  Procedure                                Abnormality         Status                     ---------                               -----------         ------                     Scan Slide[[id number]]                                       Final result               CBC Auto Differential[[id number]]        Abnormal            Final result                 Please view results for these tests on the individual orders.    CBC Auto Differential [[id number]]  (Abnormal) Collected: 03/03/21 0551    Specimen: Blood Updated: 03/03/21 0727     WBC 6.40 10*3/mm3      RBC 2.63 10*6/mm3      Hemoglobin 9.3 g/dL      Hematocrit 28.1 %      .5 fL      MCH 35.2 pg      MCHC 33.0 g/dL      RDW 26.0 %      RDW-SD 92.8 fl      MPV 9.2 fL      Platelets 202 10*3/mm3     Narrative:      The previously reported component NRBC is no longer being reported. Previous result was 0.8 /100 WBC (Reference Range: 0.0-0.2 /100 WBC) on 3/3/2021 at 0611 EST.    Scan Slide [[id number]] Collected: 03/03/21 0551    Specimen: Blood Updated: 03/03/21 0727     Scan Slide --     Comment: See Manual Differential Results       Comprehensive Metabolic Panel [[id number]]  (Abnormal) Collected: 03/03/21 0551    Specimen: Blood Updated: 03/03/21 0622     Glucose 103 mg/dL      BUN 30 mg/dL      Creatinine 0.70 mg/dL      Sodium 139 mmol/L      Potassium 3.6 mmol/L      Chloride 101 mmol/L      CO2 31.0 mmol/L      Calcium 8.4 mg/dL      Total Protein 7.2 g/dL      Albumin 2.80 g/dL      ALT (SGPT) 29 U/L      AST (SGOT) 44 U/L      Alkaline Phosphatase 117 U/L      Total Bilirubin 0.5 mg/dL      eGFR Non African Amer 111 mL/min/1.73      Globulin 4.4 gm/dL      A/G Ratio 0.6 g/dL      BUN/Creatinine Ratio 42.9     Anion Gap 7.0 mmol/L     Narrative:      GFR Normal >60  Chronic Kidney Disease <60  Kidney Failure <15          PENDING RESULTS:  n/a    IMAGING REVIEWED:  Fl Video Swallow With Speech Single Contrast    Result Date: 3/2/2021  As above. Please see speech pathology report for detailed  evaluation and dietary recommendations.  Electronically Signed By-Daryl Hunter MD On:3/2/2021 11:39 AM This report was finalized on [id number] by  Daryl Hunter MD.    I have reviewed the patient's labs, imaging, reports, and other clinician documentation.    Assessment/Plan   ASSESSMENT  1. 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  CT chest with progression vs infectious/reactive changes. Plan to repeat scans vs request diagnostic bronchoscopy once acute condition improves.  MRI brain showing single left occipital lobe met. Rad Onc planning SRS 03/03/2021.   2. Anemia/Myelodysplastic syndrome/GOGO with malabsorption/Chemotherapy-induced anemia-planned to start Retacrit as outpatient for MDS prior to admission.  Known GOGO on oral iron prior to admission. S/p 1 unit pRBC on 2/19/2021.  Iron studies showed continued iron deficiency anemia.  No evidence of hemolysis.  Completed IV iron 2/22/2021 and on Retacrit. Hgb stable.  3. Thrombocytopenia-likely chemotherapy-induced.  No need for further work-up at present.  Platelets normalized 03/02/2021.   4. Acute respiratory failure/Pneumonia/COPD-intubated and sedated on admission.  Started on broad-spectrum antibiotics. Blood cultures NGTD.  Extubated on 2/20/202. Initially afebrile but spiked low-grade temps and recultured with blood cx neg to date. RVP positive for rhinovirus/enterovirus. Management per pulmonary.  5. Weight loss/loss of appetite-improving as outpatient on Megace.    NG tube removed and patient on puréed diet now.  6. Altered mental status/agitation-on Haldol. Psych consulted and he was restarted on home meds including Suboxone, sertraline, and Latuda.  Resolved.  7. Low magnesium/high LFTs-per primary team.     PLAN  1. Follow CBC.   2. SRS 3/3 per Dr. Galindo.    3. Continue Retacrit.    4. Resume daily oral iron and Megace when able to take po.  5. Outpatient PET scan.  6. Discussed condition with patient and spouse.  Both are wanting to proceed with SRS as well as further systemic treatment.  They are aware that further evaluation of status of disease is needed after completion of SRS.           I discussed the patients findings and my recommendations with patient.    Electronically signed by Axel Lewis MD, 03/03/21, 8:12 AM EST.

## 2021-03-04 NOTE — PLAN OF CARE
Bed mobility - SBA  Transfers - CGA to Min-A  Ambulation - 10 feet Min-A arm in arm, would benefit from RWX    Assessment: Axel Ramirez presents with functional mobility impairments which indicate the need for skilled intervention. Fair tolerance to today's session, without incident. He performs bed mobility and several sit<>stand and multi step transfers to change linens and attempt use of BSC. He is very soft-spoken with extremely flat affect. Will continue to follow and progress as tolerated.     Plan/Recommendations:   Pt would benefit from Inpatient Rehabilitation placement at discharge from facility and requires no DME at discharge.   Pt desires Inpatient Rehabilitation placement at discharge. Pt cooperative; agreeable to therapeutic recommendations and plan of care.

## 2021-03-04 NOTE — PLAN OF CARE
RN stated pt has decided to pursue hospice route.  Per note,  pt expressed that he didn't wish to go through any further aggressive treatments and reported that his ultimate goal is to return home with his wife.  OT will sign off and discharge from caseload.  If aadditional services needed in future please reconsult. OT did not enter room.

## 2021-03-04 NOTE — PROGRESS NOTES
Discharge Planning Assessment   Johann     Patient Name: Axel Ramirez  MRN: [id number]  Today's Date: 3/4/2021    Admit Date: 2/18/2021          Plan    Plan Comments  went in to patient room with ppe(mask and goggles); attempted to talk about discharge plan and he asked for me to call spouse; i had to leave a voice mail for her to return call for discharge options       Carol naegele rn  Case management  Office number [phone]  Cell phone [phone]

## 2021-03-04 NOTE — THERAPY TREATMENT NOTE
Subjective: Pt agreeable to therapeutic plan of care.    Objective:     Bed mobility - SBA  Transfers - CGA to Min-A  Ambulation - 10 feet Min-A arm in arm, would benefit from RWX    Pain:  Does not rate pain.    Education: Provided education on importance of mobility and skilled verbal / tactile cueing throughout intervention.     Assessment: Axel Ramirez presents with functional mobility impairments which indicate the need for skilled intervention. Fair tolerance to today's session, without incident. He performs bed mobility and several sit<>stand and multi step transfers to change linens and attempt use of BSC. He is very soft-spoken with extremely flat affect. Will continue to follow and progress as tolerated.     Plan/Recommendations:   Pt would benefit from Inpatient Rehabilitation placement at discharge from facility and requires no DME at discharge.   Pt desires Inpatient Rehabilitation placement at discharge. Pt cooperative; agreeable to therapeutic recommendations and plan of care.     Basic Mobility 6-click:  Rollin = Total, A lot = 2, A little = 3; 4 = None  Supine>Sit:   1 = Total, A lot = 2, A little = 3; 4 = None   Sit>Stand with arms:  1 = Total, A lot = 2, A little = 3; 4 = None  Bed>Chair:   1 = Total, A lot = 2, A little = 3; 4 = None  Ambulate in room:  1 = Total, A lot = 2, A little = 3; 4 = None  3-5 Steps with railin = Total, A lot = 2, A little = 3; 4 = None  Score: 18    Modified [place]: 4 = Moderately severe disability (Unable to attend to own bodily needs without assistance, and unable to walk unassisted)     Post-Tx Position: Supine with HOB Elevated, call light within reach.   PPE: gloves, surgical mask, eyewear protection

## 2021-03-04 NOTE — SIGNIFICANT NOTE
Palliative SW contact  for spiritual support    PT voiced that he is in a time of need. This is due to him dying and requesting prayer for spiritual support. PT fearful of the process of dying, but has peace with what is awaiting home after this life. The unknown of the experience of dying. PT is concerned about how wife will process his dying and passing. Wife not at bedside during visit and he says she should be in around 5pm today. PT reports he wants to be home and be with his family.        03/04/21 1349   Coping/Psychosocial   Observed Emotional State calm;cooperative   Verbalized Emotional State fear  (the process of dying)   Trust Relationship/Rapport thoughts/feelings acknowledged;empathic listening provided   Family/Support Persons family   Involvement in Care not present at bedside   Additional Documentation Spiritual Care (Group)   Spiritual Care   Spiritual Care Visit Type follow-up   Spiritual Care Source other (see comments)  (Palliative Care PT)   Receptivity to Spiritual Care visit welcomed   Spiritual Care Request spiritual/moral support;prayer support;end-of-life issue(s) support  (fear of dying, prayer)   Spiritual Care Interventions supportive conversation provided;theological discussion provided;prayer support provided   Response to Spiritual Care thanks expressed;receptive of support;engaged in conversation;expressing self, indicated difficulty  (hard to hear PT)   Use of Spiritual Resources prayer   Spiritual Care Follow-Up follow-up planned regularly for general support

## 2021-03-04 NOTE — PLAN OF CARE
Goal Outcome Evaluation:  Plan of Care Reviewed With: patient  Progress: no change  Outcome Summary: Patient has done well tonight, q2 turn and moved to agility bed, pt remains on 2l o2 with no new complaints at this time, pt recieved radiation yesterday.

## 2021-03-04 NOTE — PROGRESS NOTES
DNR / Aggressive vs Comfort Measures    Palliative care  met with pt to complete POST form and finalize pt's code status wishes.  Through discussion pt expressed that he didn't wish to go through any further aggressive treatments and reported that his ultimate goal is to return home with his wife.  Pt had some questions about what he needed to get home in his current condition and hospice was discussed.  Pt reported that sounded like what he wanted.  Due to the direction that the conversation had taken the POST form was not completed.        Pt's wife was called, but she did not answer.  Voicemail left.  Message sent to pt's oncology providers to notify of pt's expressed desires and to advise on what to do next.  Would recommend visit from hospice agency to explain in detail what hospice services would look like for the pt, with cooperation of pt's wife.  Will continue to await for recommendations from oncology before further plans are made.  Pt remains a DNR.  Emotional support provided.  Will continue to follow.

## 2021-03-04 NOTE — PLAN OF CARE
Problem: Skin Injury Risk Increased  Goal: Skin Health and Integrity  Outcome: Ongoing, Progressing     Problem: Fall Injury Risk  Goal: Absence of Fall and Fall-Related Injury  Outcome: Ongoing, Progressing     Problem: Restraint, Nonbehavioral (Nonviolent)  Goal: Discontinuation Criteria Achieved  Outcome: Ongoing, Progressing  Goal: Personal Dignity and Safety Maintained  Outcome: Ongoing, Progressing     Problem: Adult Inpatient Plan of Care  Goal: Plan of Care Review  Outcome: Ongoing, Progressing  Goal: Patient-Specific Goal (Individualized)  Outcome: Ongoing, Progressing  Goal: Absence of Hospital-Acquired Illness or Injury  Outcome: Ongoing, Progressing  Goal: Optimal Comfort and Wellbeing  Outcome: Ongoing, Progressing  Goal: Readiness for Transition of Care  Outcome: Ongoing, Progressing     Problem: Communication Impairment (Mechanical Ventilation, Invasive)  Goal: Effective Communication  Outcome: Ongoing, Progressing     Problem: Device-Related Complication Risk (Mechanical Ventilation, Invasive)  Goal: Optimal Device Function  Outcome: Ongoing, Progressing     Problem: Inability to Wean (Mechanical Ventilation, Invasive)  Goal: Mechanical Ventilation Liberation  Outcome: Ongoing, Progressing     Problem: Nutrition Impairment (Mechanical Ventilation, Invasive)  Goal: Optimal Nutrition Delivery  Outcome: Ongoing, Progressing     Problem: Skin and Tissue Injury (Mechanical Ventilation, Invasive)  Goal: Absence of Device-Related Skin and Tissue Injury  Outcome: Ongoing, Progressing     Problem: Ventilator-Induced Lung Injury (Mechanical Ventilation, Invasive)  Goal: Absence of Ventilator-Induced Lung Injury  Outcome: Ongoing, Progressing     Problem: Aspiration (Enteral Nutrition)  Goal: Absence of Aspiration Signs and Symptoms  Outcome: Ongoing, Progressing     Problem: Device-Related Complication Risk (Enteral Nutrition)  Goal: Safe, Effective Therapy Delivery  Outcome: Ongoing, Progressing      Problem: Feeding Intolerance (Enteral Nutrition)  Goal: Feeding Tolerance  Outcome: Ongoing, Progressing     Problem: Malnutrition  Goal: Improved Nutritional Intake  Outcome: Ongoing, Progressing   Goal Outcome Evaluation:

## 2021-03-04 NOTE — PROGRESS NOTES
Hematology/Oncology Inpatient Progress Note    PATIENT NAME: Axel Ramirez  : 1950  MRN: [id number]    CHIEF COMPLAINT: Hypoxia and respiratory failure     HISTORY OF PRESENT ILLNESS:    70 y.o. male admitted to the ICU through the ED 2021 with hypoxia and respiratory failure requiring intubation.  The patient was intubated and sedated at time of consult and histories were taken from review of records and spouse report.  His spouse reported he had been feeling better and had tolerated recent chemotherapy for his lung cancer.  He was eating well after start of Megace but did develop increasing shortness of air a few days prior to admission without a change in his chronic cough.  She denied known fever, chills, or hemoptysis.  On admission, CT PE protocol was limited due to motion but felt negative for central PE.  There was evidence of pneumonia versus aspiration and known right upper lobe lung mass invading the right upper lobe bronchus with known obstruction and with increased narrowing/obstruction of the right middle lobe bronchus.  There was suggestion of progressive tumor infiltration of the right mainstem bronchus and new left paratracheal adenopathy with stable right paratracheal and subcarinal adenopathy.  CBC revealed WBC 8.4, hemoglobin 8.6, .7, and platelets 106,000.  D-dimer was elevated to 1.98 (0-0.59).  Creatinine was not elevated and LFTs were okay.  Respiratory viral panel was negative and blood cultures were sent.  On 2021 hemoglobin dropped to 7.4 and platelets to 92,000.     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  His lung cancer was initially treated with concomitant weekly chemotherapy (paclitaxel and carboplatin x7) and radiation  therapy from November 2020. He completed radiation therapy on 1/6/2021.  He completed the weekly portion of his chemotherapy on 1/12/2021.  He was seen in follow-up on 2/4/2021 where it was planned to continue chemotherapy with paclitaxel and carboplatin along with Neulasta support every 3 weeks.  He was scheduled for CT scan chest abdomen and pelvis on 2/18/2021 as an outpatient prior to his admission.  He received chemotherapy with Neulasta support on 2/9/2021 at which time CBC revealed WBC 7.62, hemoglobin 9.5, and platelets 223,000.  He was anemic at time of diagnosis of his lung cancer and anemia work-up revealed iron deficiency as well as MDS.  He received oral iron however he was diagnosed with malabsorption and received IV iron in November 2020 which was also repeated in December 2020.  His bone marrow had revealed mild increased iron storage in January 2021.  At his follow-up visit on 2/4/2021 t Retacrit 30,000 units subcu weekly was ordered for his myelodysplastic syndrome while continuing oral iron for his iron deficiency with iron saturation of 17%.  He had not started Retacrit prior to admission pending insurance authorization.     PCP: Sandoval Stevenson FNP    INTERVAL HISTORY:  2/19/2021-received 1 unit pRBCs. Iron 21 (59-1 58), iron saturation 12 (20-50), transferrin 121 (200-3 60), TIBC 180 (298-536), ferritin 2658 (), reticulocytes 1.93 (0.7 0-1.90), haptoglobin 294 (). Started Venofer 300 mg IV x 3 days. Patient extubated, on nasal canula and Precedex.  2/20/2021 -white blood count 8.8, hemoglobin 8, .4, platelets 57,000.  CT head with moderate periventricular white matter changes present likely related to chronic microvascular ischemic change, progressed as compared to the previous study.  Hypodensity present within the left occipital region which appeared more chronic or remote, new as compared to the previous study.  Chest x-ray with improvement in right basilar airspace  disease with resolution of the left airspace disease.  Stable right upper lobe mass.  2/21/2021-Retacrit 40,000 units subcu weekly started for hemoglobin 7.9.  2/22/2021-patient moved out of ICU.  2/23/2021-patient moved to PCU for tachypnea and tachycardia.  02/25/21-CT abdomen and pelvis without showed no evidence of malignancy. There was layering hyperdense material in the gallbladder with spurious excretion of contrast from prior contrast-enhanced study versus sludge/gallstones in differential, constipation, and known pneumonia/aspiration. Respiratory viral panel positive for rhinovirus/enterovirus. Psych consulted and patient started back on home meds including Suboxone, Latuda, and sertraline.  MRI brain without contrast suggestive of left cerebral cortical and leptomeningeal mets.  2/26/2021-hemoglobin 9.7, platelets 112,000, WBC 10.3.  More alert and oriented.  2/27/2021-MRI brain with contrast showed a 1.1 x 1 cm left occipital lobe lesion. Neurosurgery not recommending resection. Rad Onc planning SRS on 3/3 if patient can tolerate planning. Erythropoietin level 362 (2.6-18.5).    2/28/2021-Reviewed MRI brain findings. Patient and spouse both wanting to proceed with radiation therapy.  Platelets improved to 147,000.   03/02/2021-platelets normalized.  No aspiration on swallow study.  NG tube removed and patient started on puréed diet.  3/3/2021-patient received SRS radiation to left occiput 2000 cGy.    History of present illness reviewed since last visit and changes noted on 03/04/21.    Subjective   No complaints.    ROS:   Review of Systems   Constitutional: Positive for fatigue. Negative for activity change, chills, fever and unexpected weight change.   HENT: Positive for voice change ( hoarse). Negative for congestion, dental problem, hearing loss, mouth sores, nosebleeds, sore throat and trouble swallowing.    Eyes: Negative for photophobia and visual disturbance.   Respiratory: Negative for cough,  choking, chest tightness and shortness of breath.    Cardiovascular: Negative for chest pain, palpitations and leg swelling.   Gastrointestinal: Negative for abdominal distention, abdominal pain, blood in stool, constipation, diarrhea, nausea and vomiting.   Endocrine: Negative for cold intolerance and heat intolerance.   Genitourinary: Negative for decreased urine volume, difficulty urinating, dysuria, enuresis, frequency, hematuria and urgency.        Incontinence   Musculoskeletal: Negative for arthralgias and gait problem.   Skin: Negative for rash and wound.   Neurological: Positive for weakness. Negative for dizziness, tremors, speech difficulty, light-headedness, numbness and headaches.   Hematological: Negative for adenopathy. Does not bruise/bleed easily.   Psychiatric/Behavioral: Negative for confusion and hallucinations. The patient is not nervous/anxious.    All other systems reviewed and are negative.     MEDICATIONS:    Scheduled Meds:  aspirin, 81 mg, Oral, Daily  atorvastatin, 40 mg, Oral, Nightly  Barium Sulfate, 1 teaspoon(s), Oral, Once in imaging  budesonide, 0.5 mg, Nebulization, BID - RT  docusate sodium, 100 mg, Oral, BID  epoetin asia-epbx, 40,000 Units, Subcutaneous, Q7 Days  furosemide, 20 mg, Intravenous, Q12H  gabapentin, 300 mg, Oral, TID  guaiFENesin, 600 mg, Oral, Q12H  ipratropium-albuterol, 3 mL, Nebulization, Q4H - RT  lurasidone, 40 mg, Oral, Daily  metoprolol tartrate, 50 mg, Oral, Q12H  pantoprazole, 40 mg, Intravenous, Q AM  predniSONE, 10 mg, Oral, Daily With Breakfast  sertraline, 50 mg, Oral, Daily  spironolactone, 25 mg, Oral, Daily  Zinc Oxide, , Apply externally, BID       Continuous Infusions:      PRN Meds:  •  acetaminophen  •  acetaminophen  •  bisacodyl  •  Calcium Gluconate-NaCl **AND** calcium gluconate **AND** Calcium, Ionized  •  haloperidol lactate  •  HYDROcodone-acetaminophen  •  ibuprofen  •  labetalol  •  magnesium sulfate **OR** magnesium sulfate **OR**  "magnesium sulfate  •  melatonin  •  metoprolol tartrate  •  ondansetron  •  potassium chloride **OR** potassium chloride **OR** potassium chloride  •  potassium phosphate infusion greater than 15 mMoles **OR** potassium phosphate infusion greater than 15 mMoles **OR** potassium phosphate **OR** sodium phosphate IVPB **OR** sodium phosphate IVPB **OR** sodium phosphate IVPB  •  sodium chloride  •  Zinc Oxide     ALLERGIES:  No Known Allergies    Objective    VITALS:   BP 93/56 (BP Location: Left arm, Patient Position: Lying)   Pulse 84   Temp 97.6 °F (36.4 °C) (Axillary)   Resp 15   Ht 177.8 cm (70\")   Wt 65.1 kg (143 lb 8.3 oz)   SpO2 97%   BMI 20.59 kg/m²     PHYSICAL EXAM:  Physical Exam  Vitals signs and nursing note reviewed.   Constitutional:       General: He is not in acute distress.     Appearance: He is well-developed. He is ill-appearing (chronically). He is not diaphoretic.      Interventions: Nasal cannula in place.      Comments: Cachectic    HENT:      Head: Normocephalic and atraumatic.      Comments: Male pattern baldness/alopecia.     Right Ear: External ear normal.      Left Ear: External ear normal.      Nose: Nose normal.      Comments: O2 by NC.       Mouth/Throat:      Mouth: Mucous membranes are dry.      Pharynx: No oropharyngeal exudate or posterior oropharyngeal erythema.      Comments: Edentulous  Eyes:      General: No scleral icterus.     Extraocular Movements: Extraocular movements intact.      Conjunctiva/sclera: Conjunctivae normal.      Pupils: Pupils are equal, round, and reactive to light.   Neck:      Musculoskeletal: Normal range of motion and neck supple.   Cardiovascular:      Rate and Rhythm: Normal rate and regular rhythm.      Heart sounds: Normal heart sounds. No murmur.      Comments: Left chest wall Bcezaw-a-Ctxp. Monitor leads.  Midline vertical chest wall scar  Pulmonary:      Effort: Pulmonary effort is normal. No respiratory distress.      Breath sounds: " Rhonchi ( improving somewhat ) present. No wheezing or rales.   Chest:      Chest wall: No tenderness.      Comments: Left chest wall [id number]-v-[id number] accessed.   Abdominal:      General: Bowel sounds are normal. There is no distension.      Palpations: Abdomen is soft. There is no mass.      Tenderness: There is no abdominal tenderness. There is no guarding.   Genitourinary:     Comments: Deffered  Musculoskeletal: Normal range of motion.         General: No swelling, tenderness or deformity.      Comments: LUE IV   Lymphadenopathy:      Cervical: No cervical adenopathy.      Upper Body:      Right upper body: No supraclavicular adenopathy.      Left upper body: No supraclavicular adenopathy.   Skin:     General: Skin is warm and dry.      Coloration: Skin is pale. Skin is not jaundiced.      Findings: No bruising, erythema or rash.      Comments: Pressure bandages to bilateral elbows    Neurological:      General: No focal deficit present.      Mental Status: He is oriented to person, place, and time.      Motor: Weakness (generalized) present.   Psychiatric:         Mood and Affect: Mood normal.         Behavior: Behavior normal.       RECENT LABS:  Lab Results (last 24 hours)     Procedure Component Value Units Date/Time    CBC & Differential [[id number]]  (Abnormal) Collected: 03/04/21 0348    Specimen: Blood Updated: 03/04/21 0621    Narrative:      The following orders were created for panel order CBC & Differential.  Procedure                               Abnormality         Status                     ---------                               -----------         ------                     Scan Slide[[id number]]                                       Final result               CBC Auto Differential[[id number]]        Abnormal            Final result                 Please view results for these tests on the individual orders.    CBC Auto Differential [[id number]]  (Abnormal) Collected: 03/04/21 0348    Specimen: Blood  Updated: 03/04/21 0621     WBC 5.30 10*3/mm3      RBC 2.51 10*6/mm3      Hemoglobin 9.3 g/dL      Hematocrit 26.9 %      .1 fL      MCH 37.1 pg      MCHC 34.6 g/dL      RDW 25.7 %      RDW-SD 94.5 fl      MPV 8.7 fL      Platelets 243 10*3/mm3     Narrative:      The previously reported component NRBC is no longer being reported. Previous result was 0.8 /100 WBC (Reference Range: 0.0-0.2 /100 WBC) on 3/4/2021 at 0433 EST.    Scan Slide [[id number]] Collected: 03/04/21 0348    Specimen: Blood Updated: 03/04/21 0621     Scan Slide --     Comment: See Manual Differential Results       Manual Differential [[id number]]  (Abnormal) Collected: 03/04/21 0348    Specimen: Blood Updated: 03/04/21 0621     Neutrophil % 59.0 %      Lymphocyte % 18.0 %      Monocyte % 14.0 %      Bands %  9.0 %      Neutrophils Absolute 3.60 10*3/mm3      Lymphocytes Absolute 0.95 10*3/mm3      Monocytes Absolute 0.74 10*3/mm3      nRBC 2.0 /100 WBC      Anisocytosis Mod/2+     Macrocytes Slight/1+     Poikilocytes Slight/1+     Polychromasia Slight/1+     RBC Fragments Slight/1+     WBC Morphology Normal     Platelet Morphology Normal    Comprehensive Metabolic Panel [[id number]]  (Abnormal) Collected: 03/04/21 0348    Specimen: Blood Updated: 03/04/21 0442     Glucose 99 mg/dL      BUN 34 mg/dL      Creatinine 1.01 mg/dL      Sodium 139 mmol/L      Potassium 3.9 mmol/L      Chloride 100 mmol/L      CO2 32.0 mmol/L      Calcium 8.7 mg/dL      Total Protein 6.8 g/dL      Albumin 2.80 g/dL      ALT (SGPT) 31 U/L      AST (SGOT) 36 U/L      Alkaline Phosphatase 113 U/L      Total Bilirubin 0.5 mg/dL      eGFR Non African Amer 73 mL/min/1.73      Globulin 4.0 gm/dL      A/G Ratio 0.7 g/dL      BUN/Creatinine Ratio 33.7     Anion Gap 7.0 mmol/L     Narrative:      GFR Normal >60  Chronic Kidney Disease <60  Kidney Failure <15          PENDING RESULTS:  n/a    IMAGING REVIEWED:  Fl Video Swallow With Speech Single Contrast    Result Date:  3/2/2021  As above. Please see speech pathology report for detailed evaluation and dietary recommendations.  Electronically Signed By-Daryl Hunter MD On:3/2/2021 11:39 AM This report was finalized on [id number] by  Daryl Hunter MD.    I have reviewed the patient's labs, imaging, reports, and other clinician documentation.    Assessment/Plan   ASSESSMENT  1. 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  CT chest with progression vs infectious/reactive changes. Plan to repeat scans vs request diagnostic bronchoscopy once acute condition improves.  MRI brain showing single left occipital lobe met. Rad 2000 cGy SRS given 03/03/2021.   2. Anemia/Myelodysplastic syndrome/GOGO with malabsorption/Chemotherapy-induced anemia-planned to start Retacrit as outpatient for MDS prior to admission.  Known GOGO on oral iron prior to admission. S/p 1 unit pRBC on 2/19/2021.  Iron studies showed continued iron deficiency anemia.  No evidence of hemolysis.  Completed IV iron 2/22/2021 and on Retacrit. Hgb stable.  Oral iron restarted 3/4/2021.  3. Thrombocytopenia-likely chemotherapy-induced.  No need for further work-up at present.  Platelets normalized 03/02/2021.   4. Acute respiratory failure/Pneumonia/COPD-intubated and sedated on admission.  Started on broad-spectrum antibiotics. Blood cultures NGTD.  Extubated on 2/20/202. Initially afebrile but spiked low-grade temps and recultured with blood cx neg to date. RVP positive for rhinovirus/enterovirus. Management per pulmonary.  5. Weight loss/loss of appetite-improving as outpatient on Megace.    NG tube removed and patient on puréed diet now.  6. Altered mental status/agitation-on Haldol. Psych consulted and he was restarted on home meds including Suboxone, sertraline, and Latuda.  Resolved.  7. Low magnesium/high LFTs-per  primary team.     PLAN  1. Follow CBC.   2. Outpatient MRI brain.    3. Continue Retacrit.   4. Resume daily oral iron.  5. Outpatient PET scan.           I discussed the patients findings and my recommendations with patient.    Electronically signed by Axel Lewis MD, 03/04/21, 7:50 AM EST.

## 2021-03-04 NOTE — PROGRESS NOTES
[facility] Medicine Services Daily Progress Note      Hospitalist Team  LOS 14 days      Patient Care Team:  Sandoval Stevenson FNP as PCP - General  Sandoval Stevenson FNP as PCP - Family Medicine  Axel Lewis MD as Consulting Physician (Hematology and Oncology)  Iglesia Springer MD as Consulting Physician (Cardiology)    Patient Location: [facility]      Subjective   Subjective     Chief Complaint / Subjective  Chief Complaint   Patient presents with   • Shortness of Breath     Patient reports no new symptoms this morning.  Breathing comfortably and denies chest pain.  No nausea.  Patient will need to work with speech therapy to trial diet again.  Discussed with patient that if he wishes to continue aggressive therapy and is unable to tolerate a p.o. diet will need to discuss replacing Dobbhoff tube and discussing G-tube.  Patient was hesitant to agree to this, palliative care to further discuss with patient to ensure understanding.    Brief Synopsis of Hospital Course/HPI  HPI taken from medical chart review as patient is oriented x2 but is anxious and agitated and cannot elaborate on what led to his hospitalization   Mr. Ramirez is a 70 y.o. male known history of COPD on 2L O2 continuously, history of lung cancer status post chemoradiation is still receiving chemo, was brought by EMS to [facility] ED 2/18/21 with complaints of shortness of breath. He initially was on 4 L and required to be on 8 L in the emergency room. He continued to decline to respiratory failure and required intubation in the ER.  CT showed bilateral infiltrates with increased narrowing of right middle lobe bronchus secondary to known mass.  He was started on broad spectrum antibiotics.  He was admitted to the ICU for further treatment of acute respiratory failure and pneumonia.      Since admission the patient has been gradually weaned off mechanical ventilation, extubated 2/20/2021 now on room air.  He did have  some hypotension that required vasopressors that have since been weaned off. He was felt stable for transfer out of ICU 2/22/21 and the hospitalist group was consulted for medical management. He remains confused and agitated with sitter at bedside. The patient and family report he has not slept in 2 days and would like something to help him sleep.         Date::      2/23  Patient is nonverbal.  Sitting in recliner having hard time breathing and tachypneic with rales at the bases on exam  He is using Yunker to suction secretions.  IV Lasix ordered x1  Chest x-ray and ABG ordered     2/24/2021  Patient seen and examined earlier today  He wants his Suboxone back which was started.  Increase also Neurontin to his regular dose.  Had nasogastric tube for feeding and awaiting dietitian recommendation  Tachycardia and hypertensive in the starting him back on his home metoprolol.     2/25  Patient spiked fever overnight and had repeat blood cultures done  Vancomycin started but MRSA screen negative and then discontinued.  Respiratory panelShowedRhinovirus/enterovirus  Patient slightly drowsy but able to answer questions with little weak voice  Started on tube feeding     2/26     Patient had abnormal MRI.  We will ask neurosurgery for evaluation.  Hypernatremia noted and free water is increased/Nasogastric tube  Denies any new deficits.  He is wanting to drink Coca[employer]a but only thickened liquids are allowed.  Discussed findings with his wife.  Radiation oncology also consulted     2/27  C/o generalized aches  Has prominent lid lag: check free t4 and tsh      2/28  No significant complaints today, tolerating TFs      3/1  Plan for radiation tx   Goals of care clarified with patient's spouse     3/2/2021: Patient reported feeling better today.  Breathing improving.  Going for video swallow and then radiation therapy.    3/3/2021: Patient diet advanced after swallow study yesterday but had episode of emesis this morning with  "no signs of aspiration.  Patient reported nausea resolved after emesis.  Patient seen by palliative care elected to make himself DNR.  Continuing radiation treatment per radiation oncology.  Patient stable to transfer to medical inpatient floor.    Date::          Review of Systems   Constitution: Negative for chills and fever.   HENT: Negative for hoarse voice and stridor.    Eyes: Negative for double vision and photophobia.   Cardiovascular: Negative for chest pain and syncope.   Respiratory: Negative for cough and shortness of breath.    Musculoskeletal: Negative for joint pain and stiffness.   Gastrointestinal: Positive for nausea. Negative for vomiting.   Genitourinary: Negative for dysuria and flank pain.   Neurological: Negative for focal weakness and light-headedness.   Psychiatric/Behavioral: Negative for altered mental status. The patient is not nervous/anxious.          Objective   Objective      Vital Signs  Temp:  [97.4 °F (36.3 °C)-99 °F (37.2 °C)] 99 °F (37.2 °C)  Heart Rate:  [] 97  Resp:  [15-18] 17  BP: ()/(56-76) 98/63  Oxygen Therapy  SpO2: 95 %  Pulse Oximetry Type: Intermittent  Device (Oxygen Therapy): nasal cannula  Device (Oxygen Therapy): nasal cannula  Flow (L/min): 2  Oxygen Concentration (%): 36  Oximetry Probe Site Changed: No  Flowsheet Rows      First Filed Value   Admission Height  177.8 cm (70\") Documented at 02/18/2021 1048   Admission Weight  67.1 kg (148 lb) Documented at 02/18/2021 1048        Intake & Output (last 3 days)       03/01 0701 - 03/02 0700 03/02 0701 - 03/03 0700 03/03 0701 - 03/04 0700 03/04 0701 - 03/05 0700    P.O. 120 240 240 250    Other        NG/GT        Total Intake(mL/kg) 120 (1.8) 240 (3.7) 240 (3.7) 250 (3.8)    Urine (mL/kg/hr)  400 (0.3)      Stool        Total Output  400      Net +120 -160 +240 +250            Urine Unmeasured Occurrence 5 x 4 x 1 x 1 x    Stool Unmeasured Occurrence  2 x      Emesis Unmeasured Occurrence   1 x     "     Lines, Drains & Airways    Active LDAs     Name:   Placement date:   Placement time:   Site:   Days:    Peripheral IV 02/23/21 1300 Anterior;Left Forearm   02/23/21    1300    Forearm   7    External Urinary Catheter   02/28/21    0900    --   2    Single Lumen Implantable Port 10/30/20 Left Subclavian   10/30/20    0956    Subclavian   123                  Physical Exam:    Physical Exam    General: Frail elderly male lying in bed breathing comfortably on 2 L via nasal cannula no acute distress  HEENT: NC/AT, EOMI, mucosa moist  Heart: Regular, rate controlled  Chest: Normal work of breathing, moving air well no wheezing  Abdominal: Soft. NT/ND.   Musculoskeletal: Normal ROM.  No cyanosis. No calf tenderness.  Neurological: Awake and alert, no focal deficits  Skin: Skin is warm and dry. No rash  Psychiatric: Normal mood and affect.         Wounds (last 24 hours)      LDA Wound     Row Name 03/03/21 1940             Wound 02/18/21 1900 Right gluteal Pressure Injury    Wound - Properties Group Placement Date: 02/18/21  -OSMAN Placement Time: 1900  -OSMAN Present on Hospital Admission: Y  -OSMAN Side: Right  -OSMAN Location: gluteal  -OSMAN Primary Wound Type: Pressure inj  -OSMAN Stage, Pressure Injury : Stage 2  -OSMAN    Dressing Appearance  open to air  -MW      Closure  Open to air  -MW      Base  blanchable;clean;dry;red;pink  -MW      Care, Wound  barrier applied  -MW      Retired Wound - Properties Group Date first assessed: 02/18/21  -OSMAN Time first assessed: 1900  -OSMAN Present on Hospital Admission: Y  -OSMAN Side: Right  -OSMAN Location: gluteal  -OSMAN Primary Wound Type: Pressure inj  -OSMAN       Wound 02/27/21 0000 medial sacral spine Pressure Injury    Wound - Properties Group Placement Date: 02/27/21  -EM Placement Time: 0000  -EM Present on Hospital Admission: --  -EM, unknown  Orientation: medial  -EM Location: sacral spine  -EM Primary Wound Type: Pressure inj  -EM Stage, Pressure Injury : unstageable  -EM, poss eschar      Dressing Appearance  open to air  -MW      Base  non-blanchable;blanchable;dry;pink;red  -MW      Periwound  gray;pink  -MW      Periwound Temperature  warm  -MW      Care, Wound  barrier applied  -MW      Retired Wound - Properties Group Date first assessed: 02/27/21  -EM Time first assessed: 0000  -EM Present on Hospital Admission: --  -EM, unknown  Location: sacral spine  -EM Primary Wound Type: Pressure inj  -EM       Wound 02/18/21 1044 Bilateral posterior elbow Abrasion    Wound - Properties Group Placement Date: 02/18/21  -EM Placement Time: 1044  -EM Side: Bilateral  -EM Orientation: posterior  -EM Location: elbow  -EM Primary Wound Type: Abrasion  -EM, old scab     Dressing Appearance  open to air  -MW      Base  scab  -MW      Retired Wound - Properties Group Date first assessed: 02/18/21  -EM Time first assessed: 1044  -EM Side: Bilateral  -EM Location: elbow  -EM Primary Wound Type: Abrasion  -EM, old scab       User Key  (r) = Recorded By, (t) = Taken By, (c) = Cosigned By    Initials Name Provider Type    Kennedi Gandhi, RN Registered Nurse    Taylor Bundy, RN Registered Nurse    Berenice Cuevas, RN Registered Nurse          Procedures:              Results Review:     I reviewed the patient's new clinical results.      Lab Results (last 24 hours)     Procedure Component Value Units Date/Time    CBC & Differential [[id number]]  (Abnormal) Collected: 03/04/21 0348    Specimen: Blood Updated: 03/04/21 0621    Narrative:      The following orders were created for panel order CBC & Differential.  Procedure                               Abnormality         Status                     ---------                               -----------         ------                     Scan Slide[[id number]]                                       Final result               CBC Auto Differential[[id number]]        Abnormal            Final result                 Please view results for these tests on the individual  orders.    CBC Auto Differential [[id number]]  (Abnormal) Collected: 03/04/21 0348    Specimen: Blood Updated: 03/04/21 0621     WBC 5.30 10*3/mm3      RBC 2.51 10*6/mm3      Hemoglobin 9.3 g/dL      Hematocrit 26.9 %      .1 fL      MCH 37.1 pg      MCHC 34.6 g/dL      RDW 25.7 %      RDW-SD 94.5 fl      MPV 8.7 fL      Platelets 243 10*3/mm3     Narrative:      The previously reported component NRBC is no longer being reported. Previous result was 0.8 /100 WBC (Reference Range: 0.0-0.2 /100 WBC) on 3/4/2021 at 0433 EST.    Scan Slide [[id number]] Collected: 03/04/21 0348    Specimen: Blood Updated: 03/04/21 0621     Scan Slide --     Comment: See Manual Differential Results       Manual Differential [[id number]]  (Abnormal) Collected: 03/04/21 0348    Specimen: Blood Updated: 03/04/21 0621     Neutrophil % 59.0 %      Lymphocyte % 18.0 %      Monocyte % 14.0 %      Bands %  9.0 %      Neutrophils Absolute 3.60 10*3/mm3      Lymphocytes Absolute 0.95 10*3/mm3      Monocytes Absolute 0.74 10*3/mm3      nRBC 2.0 /100 WBC      Anisocytosis Mod/2+     Macrocytes Slight/1+     Poikilocytes Slight/1+     Polychromasia Slight/1+     RBC Fragments Slight/1+     WBC Morphology Normal     Platelet Morphology Normal    Comprehensive Metabolic Panel [[id number]]  (Abnormal) Collected: 03/04/21 0348    Specimen: Blood Updated: 03/04/21 0442     Glucose 99 mg/dL      BUN 34 mg/dL      Creatinine 1.01 mg/dL      Sodium 139 mmol/L      Potassium 3.9 mmol/L      Chloride 100 mmol/L      CO2 32.0 mmol/L      Calcium 8.7 mg/dL      Total Protein 6.8 g/dL      Albumin 2.80 g/dL      ALT (SGPT) 31 U/L      AST (SGOT) 36 U/L      Alkaline Phosphatase 113 U/L      Total Bilirubin 0.5 mg/dL      eGFR Non African Amer 73 mL/min/1.73      Globulin 4.0 gm/dL      A/G Ratio 0.7 g/dL      BUN/Creatinine Ratio 33.7     Anion Gap 7.0 mmol/L     Narrative:      GFR Normal >60  Chronic Kidney Disease <60  Kidney Failure <15          No results  found for: HGBA1C            No results found for: LIPASE  Lab Results   Component Value Date    CHOL 79 11/29/2019    TRIG 48 11/29/2019    HDL 33 (L) 11/29/2019    LDL 36 11/29/2019       Lab Results   Lab Value Date/Time    FINALDX  11/11/2020 0958     Tumor mass, right mainstem bronchus, biopsy:    Invasive moderately differentiated squamous cell carcinoma (see COMMENT)    NORBERTO/tkd       FINALDX  11/11/2020 0747     Smears of bronchial washings with cytospin preparation:    Occasional markedly atypical cells consistent with non-small cell carcinoma and exhibiting cytomorphologic      features most suggestive of squamous cell carcinoma     Background consists of acute inflammation with occasional fungal spores and hyphae morphologically most      consistent with Candida species    NORBERTO/tkd       COMDX  11/11/2020 0958     The biopsy was stained via immunohistochemical technique utilizing appropriate controls for the presence of p63, p40, CK5/6, napsin and TTF-1. The tumor cells are positive for CK5/6, p63 and p40 while being negative for TTF-1 and napsin. This staining pattern in conjunction with the histologic features is entirely consistent with squamous cell carcinoma. The tumor is focally necrotic. There is no lymph-vascular space invasion identified.      NORBERTO/tkd       COMDX  11/11/2020 0747     Please correlate with concurrent surgical pathology specimen ([id number]).    NORBERTO/tkd          Microbiology Results (last 10 days)     Procedure Component Value - Date/Time    Blood Culture - Blood, Hand, Left [[id number]] Collected: 02/24/21 2109    Lab Status: Final result Specimen: Blood from Hand, Left Updated: 03/01/21 2130     Blood Culture No growth at 5 days    MRSA Screen, PCR (Inpatient) - Swab, Nares [[id number]]  (Normal) Collected: 02/24/21 1952    Lab Status: Final result Specimen: Swab from Nares Updated: 02/24/21 2112     MRSA PCR No MRSA Detected    COVID PRE-OP / PRE-PROCEDURE SCREENING ORDER (NO  ISOLATION) - Swab, Nasopharynx [[id number]]  (Abnormal) Collected: 02/24/21 1951    Lab Status: Final result Specimen: Swab from Nasopharynx Updated: 02/24/21 2339    Narrative:      The following orders were created for panel order COVID PRE-OP / PRE-PROCEDURE SCREENING ORDER (NO ISOLATION) - Swab, Nasopharynx.  Procedure                               Abnormality         Status                     ---------                               -----------         ------                     Respiratory Panel PCR w/...[[id number]]  Abnormal            Final result                 Please view results for these tests on the individual orders.    Respiratory Panel PCR w/COVID-19(SARS-CoV-2) PARESH/GRACIELA/KATHY/PAD/COR/MAD/CHRIST In-House, NP Swab in UTM/VTM, 3-4 HR TAT - Swab, Nasopharynx [[id number]]  (Abnormal) Collected: 02/24/21 1951    Lab Status: Final result Specimen: Swab from Nasopharynx Updated: 02/24/21 2339     ADENOVIRUS, PCR Not Detected     Coronavirus 229E Not Detected     Coronavirus HKU1 Not Detected     Coronavirus NL63 Not Detected     Coronavirus OC43 Not Detected     COVID19 Not Detected     Human Metapneumovirus Not Detected     Human Rhinovirus/Enterovirus Detected     Influenza A PCR Not Detected     Influenza B PCR Not Detected     Parainfluenza Virus 1 Not Detected     Parainfluenza Virus 2 Not Detected     Parainfluenza Virus 3 Not Detected     Parainfluenza Virus 4 Not Detected     RSV, PCR Not Detected     Bordetella pertussis pcr Not Detected     Bordetella parapertussis PCR Not Detected     Chlamydophila pneumoniae PCR Not Detected     Mycoplasma pneumo by PCR Not Detected    Narrative:      Fact sheet for providers: https://docs.[employer]/wp-content/uploads/JNB6919-0020-AH8.1-EUA-Provider-Fact-Sheet-3.pdf    Fact sheet for patients: https://docs.[employer]/wp-content/uploads/ZUZ3153-0094-QW8.1-EUA-Patient-Fact-Sheet-1.pdf    Test performed by PCR.          ECG/EMG Results (most recent)     Procedure  Component Value Units Date/Time    ECG 12 Lead [[id number]] Collected: 02/18/21 1050     Updated: 02/19/21 1421     QT Interval 291 ms     Narrative:      HEART RATE= 130  bpm  RR Interval= 462  ms  IA Interval= 132  ms  P Horizontal Axis= -23  deg  P Front Axis= 79  deg  QRSD Interval= 83  ms  QT Interval= 291  ms  QRS Axis= 55  deg  T Wave Axis=   deg  - ABNORMAL ECG -  Sinus tachycardia  Left atrial enlargement  Anteroseptal infarct, age indeterminate  When compared with ECG of 09-Nov-2020 13:39:57,  Significant rate increase  Significant axis, voltage or hypertrophy change  Electronically Signed By: Higinio Flores (KATHY) 19-Feb-2021 14:11:44  Date and Time of Study: 2021-02-18 10:50:09    ECG 12 Lead [[id number]] Collected: 02/20/21 1846     Updated: 02/24/21 0721     QT Interval 340 ms     Narrative:      HEART RATE= 119  bpm  RR Interval= 496  ms  IA Interval=   ms  P Horizontal Axis=   deg  P Front Axis=   deg  QRSD Interval= 87  ms  QT Interval= 340  ms  QRS Axis= 66  deg  T Wave Axis= 75  deg  - ABNORMAL ECG -  Atrial flutter with predominant 2:1 AV block  Ventricular premature complex  Consider anterior infarct  When compared with ECG of 18-Feb-2021 10:50:09,  Nonspecific significant change  Electronically Signed By: Fuad Montero ([facility]) 24-Feb-2021 07:14:30  Date and Time of Study: 2021-02-20 18:46:19    ECG 12 Lead [[id number]] Collected: 02/21/21 0947     Updated: 02/24/21 0731     QT Interval 379 ms     Narrative:      HEART RATE= 100  bpm  RR Interval= 600  ms  IA Interval= 136  ms  P Horizontal Axis= -16  deg  P Front Axis= 98  deg  QRSD Interval= 95  ms  QT Interval= 379  ms  QRS Axis= 63  deg  T Wave Axis= 70  deg  - BORDERLINE ECG -  Sinus tachycardia  Consider left ventricular hypertrophy  When compared with ECG of 20-Feb-2021 18:46:19,  Significant axis, voltage or hypertrophy change  Electronically Signed By: Fuad Montero ([facility]) 24-Feb-2021 07:19:26  Date and Time of Study: 2021-02-21 09:47:53     ECG 12 Lead [[id number]] Collected: 02/25/21 0552     Updated: 02/25/21 0554     QT Interval 356 ms     Narrative:      HEART RATE= 106  bpm  RR Interval= 568  ms  MO Interval= 112  ms  P Horizontal Axis= -38  deg  P Front Axis= 63  deg  QRSD Interval= 106  ms  QT Interval= 356  ms  QRS Axis= -8  deg  T Wave Axis= 80  deg  - ABNORMAL ECG -  Sinus tachycardia  LAE, consider biatrial enlargement  Anteroseptal infarct, age indeterminate  When compared with ECG of 24-Feb-2021 6:11:17,  Significant repolarization change  Electronically Signed By:   Date and Time of Study: 2021-02-25 05:52:57    ECG 12 Lead [[id number]] Collected: 02/23/21 0744     Updated: 02/26/21 1535     QT Interval 395 ms     Narrative:      HEART RATE= 111  bpm  RR Interval= 540  ms  MO Interval= 156  ms  P Horizontal Axis= -42  deg  P Front Axis= 79  deg  QRSD Interval= 122  ms  QT Interval= 395  ms  QRS Axis= -14  deg  T Wave Axis= 86  deg  - ABNORMAL ECG -  Sinus tachycardia  Biatrial enlargement  Evolving anterior infarct since 22-Feb-2021 7:19:57  When compared with ECG of 22-Feb-2021 7:19:57,  New or worsened ischemia or infarction  Significant axis, voltage or hypertrophy change  Electronically Signed By: Braden Mcdaniel ([facility]) 26-Feb-2021 15:31:30  Date and Time of Study: 2021-02-23 07:44:24    ECG 12 Lead [[id number]] Collected: 02/26/21 0544     Updated: 02/26/21 1945     QT Interval 345 ms     Narrative:      HEART RATE= 125  bpm  RR Interval= 496  ms  MO Interval= 110  ms  P Horizontal Axis= -34  deg  P Front Axis= 73  deg  QRSD Interval= 107  ms  QT Interval= 345  ms  QRS Axis= -4  deg  T Wave Axis= 92  deg  - ABNORMAL ECG -  Sinus tachycardia  Atrial premature complexes  LAE, consider biatrial enlargement  When compared with ECG of 25-Feb-2021 5:52:57,  No significant change  Electronically Signed By: Rebeca Jacobs ([facility]) 26-Feb-2021 19:35:13  Date and Time of Study: 2021-02-26 05:44:41    ECG 12 Lead [[id number]] Collected:  02/22/21 0719     Updated: 02/28/21 1657     QT Interval 394 ms     Narrative:      HEART RATE= 110  bpm  RR Interval= 544  ms  TN Interval= 142  ms  P Horizontal Axis= -26  deg  P Front Axis= 70  deg  QRSD Interval= 122  ms  QT Interval= 394  ms  QRS Axis= -14  deg  T Wave Axis= 86  deg  - ABNORMAL ECG -  Sinus tachycardia  Biatrial enlargement  Left ventricular hypertrophy  When compared with ECG of 21-Feb-2021 9:47:53,  No significant change  Electronically Signed By: Iglesia Springer ([facility]) 28-Feb-2021 16:56:20  Date and Time of Study: 2021-02-22 07:19:57    ECG 12 Lead [[id number]] Collected: 02/27/21 2326     Updated: 02/28/21 1759     QT Interval 381 ms     Narrative:      HEART RATE= 90  bpm  RR Interval= 668  ms  TN Interval= 114  ms  P Horizontal Axis= -41  deg  P Front Axis= 70  deg  QRSD Interval= 84  ms  QT Interval= 381  ms  QRS Axis= 59  deg  T Wave Axis= 51  deg  - ABNORMAL ECG -  Sinus rhythm  Left atrial enlargement  Anteroseptal infarct, age indeterminate  When compared with ECG of 27-Feb-2021 5:51:38,  Significant axis, voltage or hypertrophy change  Electronically Signed By: Rebeca Jacobs ([facility]) 28-Feb-2021 17:49:16  Date and Time of Study: 2021-02-27 23:26:41    ECG 12 Lead [[id number]] Collected: 02/27/21 0551     Updated: 02/28/21 1759     QT Interval 347 ms     Narrative:      HEART RATE= 103  bpm  RR Interval= 584  ms  TN Interval= 108  ms  P Horizontal Axis= -36  deg  P Front Axis= 63  deg  QRSD Interval= 85  ms  QT Interval= 347  ms  QRS Axis= 50  deg  T Wave Axis= -4  deg  - BORDERLINE ECG -  Sinus tachycardia  Left atrial enlargement  Probable left ventricular hypertrophy  When compared with ECG of 26-Feb-2021 5:44:41,  Significant rate decrease  Significant axis, voltage or hypertrophy change  Electronically Signed By: Rebeca Jacobs ([facility]) 28-Feb-2021 17:49:24  Date and Time of Study: 2021-02-27 05:51:38    ECG 12 Lead [[id number]] Collected: 03/01/21 0741     Updated:  03/02/21 1009     QT Interval 377 ms     Narrative:      HEART RATE= 87  bpm  RR Interval= 692  ms  ND Interval= 117  ms  P Horizontal Axis= -66  deg  P Front Axis= 65  deg  QRSD Interval= 93  ms  QT Interval= 377  ms  QRS Axis= 57  deg  T Wave Axis= 73  deg  - ABNORMAL ECG -  Sinus rhythm  Left atrial enlargement  Anteroseptal infarct, age indeterminate  When compared with ECG of 27-Feb-2021 23:26:41,  No significant change  Electronically Signed By: Adi Carroll ([facility]) 02-Mar-2021 10:03:42  Date and Time of Study: 2021-03-01 07:41:28    ECG 12 Lead [[id number]] Collected: 02/24/21 0611     Updated: 03/02/21 1937     QT Interval 358 ms     Narrative:      HEART RATE= 121  bpm  RR Interval= 496  ms  ND Interval= 120  ms  P Horizontal Axis= -25  deg  P Front Axis= 64  deg  QRSD Interval= 108  ms  QT Interval= 358  ms  QRS Axis= -6  deg  T Wave Axis= 85  deg  - ABNORMAL ECG -  Sinus tachycardia  LAE, consider biatrial enlargement  Prolonged QT interval  When compared with ECG of 23-Feb-2021 7:44:24,  Nonspecific significant change  Electronically Signed By: Iker Gr ([facility]) 02-Mar-2021 19:31:29  Date and Time of Study: 2021-02-24 06:11:17    ECG 12 Lead [[id number]] Collected: 03/03/21 0342     Updated: 03/03/21 0344     QT Interval 426 ms     Narrative:      HEART RATE= 75  bpm  RR Interval= 804  ms  ND Interval= 125  ms  P Horizontal Axis= -32  deg  P Front Axis= 69  deg  QRSD Interval= 98  ms  QT Interval= 426  ms  QRS Axis= 70  deg  T Wave Axis= 83  deg  - ABNORMAL ECG -  Sinus rhythm  Left atrial enlargement  Anteroseptal infarct, age indeterminate  Electronically Signed By:   Date and Time of Study: 2021-03-03 03:42:53    ECG 12 Lead [[id number]] Collected: 03/02/21 0325     Updated: 03/03/21 0820     QT Interval 381 ms     Narrative:      HEART RATE= 88  bpm  RR Interval= 680  ms  ND Interval= 122  ms  P Horizontal Axis= -73  deg  P Front Axis= 147  deg  QRSD Interval= 86  ms  QT Interval= 381  ms  QRS Axis=  60  deg  T Wave Axis= 148  deg  - ABNORMAL ECG -  Sinus or ectopic atrial rhythm  Left atrial enlargement  Anteroseptal infarct, age indeterminate  Lateral wall also involved  When compared with ECG of 01-Mar-2021 7:41:28,  No significant change  Electronically Signed By: Adi Carroll ([facility]) 03-Mar-2021 08:09:38  Date and Time of Study: 2021-03-02 03:25:22               Results for orders placed during the hospital encounter of 11/08/20   Adult Transthoracic Echo Complete W/ Cont if Necessary Per Protocol    Narrative · Estimated left ventricular EF = 65% Left ventricular systolic function   is normal.  · Left ventricular diastolic function is consistent with (grade Ia w/high   LAP) impaired relaxation.  · The right atrial cavity is mildly dilated.  · Mild aortic valve stenosis is present.  · Mild to moderate LVOT gradient noted          Ct Abdomen Pelvis Without Contrast    Result Date: 2/24/2021  1.Large amount of formed stool in the rectum suspicious for constipation. Disimpaction may be necessary. There is also an above average amount of formed stool in the proximal colon. 2.Layering hyperdense material in the gallbladder, which could be seen with spurious excretion of contrast from prior contrast enhanced studies, particularly if there is renal failure. This could also be related to biliary sludge and/or gallstones. 3.Emphysema with bilateral lower lobe airspace opacities, similar to the prior chest CT, which could be related to pneumonia or aspiration. Opacities in the right lower lobe appears slightly decreased. There is minimal consolidation and trace pleural effusion in the posterior left thorax. 4.Probable hepatic and renal cysts, as before. 5.Calcific atherosclerosis. 6.Enteric tube terminates in the stomach.    Electronically Signed By-Adi Delgado MD On:2/24/2021 7:48 PM This report was finalized on [id number] by  Adi Delgado MD.    Mri Brain Without Contrast    Result Date: 2/25/2021   1.  Signal intensity changes within the left temporal occipital lobe, as described above, worrisome for cortical metastatic deposit with surrounding vasogenic edema, over that of evolving ischemia. Additionally, question leptomeningeal thickening or potential metastatic infiltration over the posterior cerebral convexities. MRI brain with contrast would be most helpful in this distinction.   Electronically Signed By-Ivana Mirza MD On:2/25/2021 2:54 PM This report was finalized on [id number] by  Ivana Mirza MD.    Mri Brain With Contrast    Result Date: 2/26/2021  There is a single thick-walled ring-enhancing lesion in the medial aspect of the left occipital lobe measuring 1.1 x 1.0 cm. There was vasogenic edema on yesterday's MRI of the brain in this location. This represents metastatic disease until proven otherwise. A small primary glioma or brain abscess can have a similar imaging appearance.  Electronically Signed By-Brock Kerr MD On:2/26/2021 10:19 AM This report was finalized on [id number] by  Brock Kerr MD.    Fl Video Swallow With Speech Single Contrast    Result Date: 3/2/2021  As above. Please see speech pathology report for detailed evaluation and dietary recommendations.  Electronically Signed By-Daryl Hunter MD On:3/2/2021 11:39 AM This report was finalized on [id number] by  Daryl Hunter MD.    Fl Video Swallow With Speech Single Contrast    Result Date: 2/26/2021  Technically successful modified barium swallow examination.  Electronically Signed By-Brock Kerr MD On:2/26/2021 12:43 PM This report was finalized on [id number] by  Brock Kerr MD.          Xrays, labs reviewed personally by physician.    Medication Review:   I have reviewed the patient's current medication list      Scheduled Meds  aspirin, 81 mg, Oral, Daily  atorvastatin, 40 mg, Oral, Nightly  Barium Sulfate, 1 teaspoon(s), Oral, Once in imaging  budesonide, 0.5 mg, Nebulization, BID - RT  docusate sodium, 100  mg, Oral, BID  epoetin asia-epbx, 40,000 Units, Subcutaneous, Q7 Days  ferrous sulfate, 324 mg, Oral, Daily With Breakfast  furosemide, 20 mg, Intravenous, Q12H  gabapentin, 300 mg, Oral, TID  guaiFENesin, 600 mg, Oral, Q12H  ipratropium-albuterol, 3 mL, Nebulization, Q4H - RT  [START ON 3/5/2021] lansoprazole, 30 mg, Oral, QAM  lurasidone, 40 mg, Oral, Daily  metoprolol tartrate, 50 mg, Oral, Q12H  predniSONE, 10 mg, Oral, Daily With Breakfast  sertraline, 50 mg, Oral, Daily  spironolactone, 25 mg, Oral, Daily  Zinc Oxide, , Apply externally, BID        Meds Infusions       Meds PRN  •  acetaminophen  •  acetaminophen  •  bisacodyl  •  Calcium Gluconate-NaCl **AND** calcium gluconate **AND** Calcium, Ionized  •  haloperidol lactate  •  HYDROcodone-acetaminophen  •  ibuprofen  •  labetalol  •  magnesium sulfate **OR** magnesium sulfate **OR** magnesium sulfate  •  melatonin  •  metoprolol tartrate  •  ondansetron  •  potassium chloride **OR** potassium chloride **OR** potassium chloride  •  potassium phosphate infusion greater than 15 mMoles **OR** potassium phosphate infusion greater than 15 mMoles **OR** potassium phosphate **OR** sodium phosphate IVPB **OR** sodium phosphate IVPB **OR** sodium phosphate IVPB  •  sodium chloride  •  Zinc Oxide        Assessment/Plan   Assessment/Plan     Active Hospital Problems    Diagnosis  POA   • **Acute on chronic respiratory failure with hypoxia (CMS/HCC) [J96.21]  Yes   • COPD with acute exacerbation (CMS/HCC) [J44.1]  Yes   • Delirium [R41.0]  Yes   • Moderate malnutrition (CMS/HCC) [E44.0]  Yes   • Pneumonia due to infectious organism [J18.9]  Yes   • Squamous cell carcinoma of lung, right (CMS/HCC) [C34.91]  Yes   • Diastolic CHF, acute (CMS/HCC) [I50.31]  Yes   • Hx of aortic valve replacement [Z95.2]  Not Applicable   • Essential hypertension [I10]  Yes      Resolved Hospital Problems   No resolved problems to display.       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  Relatively euvolemic at this time  -Diuresis as tolerated  -Daily weights    Urinary retention-straight cath as needed    Malnutrition-patient with swallowing difficulties and did not tolerate advance diet  -Continue working with speech therapy  -Nutrition critical given cancer treatment  -Nutrition consulted  -Had discussion with patient regarding nutritional needs if unable to tolerate p.o. diet    Delirium-patient appears to be back with appropriate mentation, patient off gabapentin and Suboxone for period of time, resumed  -Antipsychotic as needed  -Psych consult    Anemia/MDS/thrombocytopenia-possibly secondary to chemotherapy  -Managed per hematology    VTE Prophylaxis -   Mechanical Order History:      Ordered        02/19/21 0228  Place Sequential Compression Device  Once         02/19/21 0228  Maintain Sequential Compression Device  Continuous                  Pharmalogical Order History:     None            Code Status -   Code Status and Medical Interventions:   Ordered at: 03/03/21 1110     Limited Support to NOT Include:    Intubation     Code Status:    No CPR     Medical Interventions (Level of Support Prior to Arrest):    Limited       This patient has been examined wearing appropriate Personal Protective Equipment and discussed with hospital infection control department. 03/04/21        Discharge Planning  pending        Electronically signed by Abhishek Lazo MD, 03/04/21, 12:55 EST.  [facility] Hospitalist Team

## 2021-03-05 NOTE — PROGRESS NOTES
[facility] Medicine Services Daily Progress Note      Hospitalist Team  LOS 15 days      Patient Care Team:  Sandoval Stevenson FNP as PCP - General  Sandoval Stevenson FNP as PCP - Family Medicine  Axel Lewis MD as Consulting Physician (Hematology and Oncology)  Iglesia Springer MD as Consulting Physician (Cardiology)    Patient Location: [facility]      Subjective   Subjective     Chief Complaint / Subjective  Chief Complaint   Patient presents with   • Shortness of Breath     Patient reports no new symptoms today.  Breathing comfortably no pain no nausea or vomiting.  Patient is electing to transition to hospice care, consult placed and going to have family meeting on 3/6/2021.    Brief Synopsis of Hospital Course/HPI  HPI taken from medical chart review as patient is oriented x2 but is anxious and agitated and cannot elaborate on what led to his hospitalization   Mr. Ramirez is a 70 y.o. male known history of COPD on 2L O2 continuously, history of lung cancer status post chemoradiation is still receiving chemo, was brought by EMS to [facility] ED 2/18/21 with complaints of shortness of breath. He initially was on 4 L and required to be on 8 L in the emergency room. He continued to decline to respiratory failure and required intubation in the ER.  CT showed bilateral infiltrates with increased narrowing of right middle lobe bronchus secondary to known mass.  He was started on broad spectrum antibiotics.  He was admitted to the ICU for further treatment of acute respiratory failure and pneumonia.      Since admission the patient has been gradually weaned off mechanical ventilation, extubated 2/20/2021 now on room air.  He did have some hypotension that required vasopressors that have since been weaned off. He was felt stable for transfer out of ICU 2/22/21 and the hospitalist group was consulted for medical management. He remains confused and agitated with sitter at bedside. The patient  and family report he has not slept in 2 days and would like something to help him sleep.         Date::      2/23  Patient is nonverbal.  Sitting in recliner having hard time breathing and tachypneic with rales at the bases on exam  He is using Yunker to suction secretions.  IV Lasix ordered x1  Chest x-ray and ABG ordered     2/24/2021  Patient seen and examined earlier today  He wants his Suboxone back which was started.  Increase also Neurontin to his regular dose.  Had nasogastric tube for feeding and awaiting dietitian recommendation  Tachycardia and hypertensive in the starting him back on his home metoprolol.     2/25  Patient spiked fever overnight and had repeat blood cultures done  Vancomycin started but MRSA screen negative and then discontinued.  Respiratory panelShowedRhinovirus/enterovirus  Patient slightly drowsy but able to answer questions with little weak voice  Started on tube feeding     2/26     Patient had abnormal MRI.  We will ask neurosurgery for evaluation.  Hypernatremia noted and free water is increased/Nasogastric tube  Denies any new deficits.  He is wanting to drink Coca-Cola but only thickened liquids are allowed.  Discussed findings with his wife.  Radiation oncology also consulted     2/27  C/o generalized aches  Has prominent lid lag: check free t4 and tsh      2/28  No significant complaints today, tolerating TFs      3/1  Plan for radiation tx   Goals of care clarified with patient's spouse     3/2/2021: Patient reported feeling better today.  Breathing improving.  Going for video swallow and then radiation therapy.    3/3/2021: Patient diet advanced after swallow study yesterday but had episode of emesis this morning with no signs of aspiration.  Patient reported nausea resolved after emesis.  Patient seen by palliative care elected to make himself DNR.  Continuing radiation treatment per radiation oncology.  Patient stable to transfer to medical inpatient floor.    3/4/2021:  "Patient reports no new symptoms this morning.  Breathing comfortably and denies chest pain.  No nausea.  Patient will need to work with speech therapy to trial diet again.  Discussed with patient that if he wishes to continue aggressive therapy and is unable to tolerate a p.o. diet will need to discuss replacing Dobbhoff tube and discussing G-tube.  Patient was hesitant to agree to this, palliative care to further discuss with patient to ensure understanding.    Date::          Review of Systems   Constitution: Negative for chills and fever.   HENT: Negative for hoarse voice and stridor.    Eyes: Negative for double vision and photophobia.   Cardiovascular: Negative for chest pain and syncope.   Respiratory: Negative for cough and shortness of breath.    Musculoskeletal: Negative for joint pain and stiffness.   Gastrointestinal: Positive for nausea. Negative for vomiting.   Genitourinary: Negative for dysuria and flank pain.   Neurological: Negative for focal weakness and light-headedness.   Psychiatric/Behavioral: Negative for altered mental status. The patient is not nervous/anxious.          Objective   Objective      Vital Signs  Temp:  [97.3 °F (36.3 °C)-97.8 °F (36.6 °C)] 97.3 °F (36.3 °C)  Heart Rate:  [] 81  Resp:  [16-20] 20  BP: ()/(60-74) 102/68  Oxygen Therapy  SpO2: 97 %  Pulse Oximetry Type: Intermittent  Device (Oxygen Therapy): nasal cannula  Device (Oxygen Therapy): nasal cannula  Flow (L/min): 3  Oxygen Concentration (%): 36  Oximetry Probe Site Changed: No  Flowsheet Rows      First Filed Value   Admission Height  177.8 cm (70\") Documented at 02/18/2021 1048   Admission Weight  67.1 kg (148 lb) Documented at 02/18/2021 1048        Intake & Output (last 3 days)       03/02 0701 - 03/03 0700 03/03 0701 - 03/04 0700 03/04 0701 - 03/05 0700 03/05 0701 - 03/06 0700    P.O. 240 240 250 0    Total Intake(mL/kg) 240 (3.7) 240 (3.7) 250 (4.3) 0 (0)    Urine (mL/kg/hr) 400 (0.3)       Total " Output 400       Net -160 +240 +250 0            Urine Unmeasured Occurrence 4 x 1 x 2 x     Stool Unmeasured Occurrence 2 x  1 x     Emesis Unmeasured Occurrence  1 x          Lines, Drains & Airways    Active LDAs     Name:   Placement date:   Placement time:   Site:   Days:    Peripheral IV 02/23/21 1300 Anterior;Left Forearm   02/23/21    1300    Forearm   7    External Urinary Catheter   02/28/21    0900    --   2    Single Lumen Implantable Port 10/30/20 Left Subclavian   10/30/20    0956    Subclavian   123                  Physical Exam:    Physical Exam    General: Frail elderly male lying in bed breathing comfortably on 3 L via nasal cannula no acute distress  HEENT: NC/AT, EOMI, mucosa moist  Heart: Regular, rate controlled  Chest: Normal work of breathing, moving air well no wheezing  Abdominal: Soft. NT/ND.   Musculoskeletal: Normal ROM.  No cyanosis. No calf tenderness.  Neurological: Awake and alert, no focal deficits  Skin: Skin is warm and dry. No rash  Psychiatric: Normal mood and affect.         Wounds (last 24 hours)      LDA Wound     Row Name 03/05/21 0730 03/04/21 2000          Wound 02/18/21 1900 Right gluteal Pressure Injury    Wound - Properties Group Placement Date: 02/18/21  -OSMAN Placement Time: 1900  -OSMAN Present on Hospital Admission: Y  -OSMAN Side: Right  -OSMAN Location: gluteal  -OSMAN Primary Wound Type: Pressure inj  -OSMAN Stage, Pressure Injury : Stage 2  -OSMAN    Dressing Appearance  --  open to air  -MW     Base  blanchable;clean;dry;red;pink;non-blanchable  -KF  blanchable;clean;dry;red;pink;non-blanchable  -MW     Periwound Temperature  cool  -KF  cool  -MW     Edges  open;irregular  -KF  open;irregular  -MW     Drainage Amount  none  -KF  none  -MW     Care, Wound  barrier applied  -KF  barrier applied  -MW     Retired Wound - Properties Group Date first assessed: 02/18/21  -OSMAN Time first assessed: 1900  -OSMAN Present on Hospital Admission: Y  -OSMAN Side: Right  -OSMAN Location: gluteal  -OSMAN  Primary Wound Type: Pressure inj  -OSMAN       Wound 02/27/21 0000 medial sacral spine Pressure Injury    Wound - Properties Group Placement Date: 02/27/21  -EM Placement Time: 0000  -EM Present on Hospital Admission: --  -EM, unknown  Orientation: medial  -EM Location: sacral spine  -EM Primary Wound Type: Pressure inj  -EM Stage, Pressure Injury : unstageable  -EM, poss eschar     Dressing Appearance  --  open to air  -MW     Base  non-blanchable;blanchable;dry;pink;red  -KF  non-blanchable;blanchable;dry;pink;red  -MW     Periwound  pink;redness;moist  -KF  pink;redness;moist  -MW     Periwound Temperature  warm  -KF  warm  -MW     Care, Wound  barrier applied  -KF  barrier applied  -MW     Retired Wound - Properties Group Date first assessed: 02/27/21  -EM Time first assessed: 0000  -EM Present on Hospital Admission: --  -EM, unknown  Location: sacral spine  -EM Primary Wound Type: Pressure inj  -EM       Wound 02/18/21 1044 Bilateral posterior elbow Abrasion    Wound - Properties Group Placement Date: 02/18/21  -EM Placement Time: 1044  -EM Side: Bilateral  -EM Orientation: posterior  -EM Location: elbow  -EM Primary Wound Type: Abrasion  -EM, old scab     Dressing Appearance  --  open to air  -MW     Base  scab  -KF  scab  -MW     Retired Wound - Properties Group Date first assessed: 02/18/21  -EM Time first assessed: 1044  -EM Side: Bilateral  -EM Location: elbow  -EM Primary Wound Type: Abrasion  -EM, old scab       User Key  (r) = Recorded By, (t) = Taken By, (c) = Cosigned By    Initials Name Provider Type    Kennedi Gandhi, RN Registered Nurse    Taylor Bundy, RN Registered Nurse    Deborah Driver LPN Licensed Nurse    Berenice Cuevas RN Registered Nurse          Procedures:              Results Review:     I reviewed the patient's new clinical results.      Lab Results (last 24 hours)     Procedure Component Value Units Date/Time    CBC & Differential [[id number]]  (Abnormal) Collected:  03/05/21 0306    Specimen: Blood Updated: 03/05/21 0522    Narrative:      The following orders were created for panel order CBC & Differential.  Procedure                               Abnormality         Status                     ---------                               -----------         ------                     Scan Slide[[id number]]                                       Final result               CBC Auto Differential[[id number]]        Abnormal            Final result                 Please view results for these tests on the individual orders.    Scan Slide [[id number]] Collected: 03/05/21 0306    Specimen: Blood Updated: 03/05/21 0522     Scan Slide --     Comment: See Manual Differential Results       Manual Differential [[id number]]  (Abnormal) Collected: 03/05/21 0306    Specimen: Blood Updated: 03/05/21 0522     Neutrophil % 53.0 %      Lymphocyte % 11.0 %      Monocyte % 22.0 %      Bands %  11.0 %      Metamyelocyte % 1.0 %      Atypical Lymphocyte % 2.0 %      Neutrophils Absolute 3.97 10*3/mm3      Lymphocytes Absolute 0.68 10*3/mm3      Monocytes Absolute 1.36 10*3/mm3      nRBC 2.0 /100 WBC      Anisocytosis Slight/1+     Dacrocytes Slight/1+     Macrocytes Slight/1+     Poikilocytes Slight/1+     Toxic Granulation Slight/1+     Platelet Estimate Adequate     Large Platelets Slight/1+     Giant Platelets Slight/1+    CBC Auto Differential [[id number]]  (Abnormal) Collected: 03/05/21 0306    Specimen: Blood Updated: 03/05/21 0522     WBC 6.20 10*3/mm3      RBC 2.60 10*6/mm3      Hemoglobin 9.7 g/dL      Hematocrit 27.8 %      .7 fL      MCH 37.1 pg      MCHC 34.8 g/dL      RDW 26.6 %      RDW-SD 98.4 fl      MPV 8.7 fL      Platelets 276 10*3/mm3     Narrative:      The previously reported component NRBC is no longer being reported. Previous result was 0.6 /100 WBC (Reference Range: 0.0-0.2 /100 WBC) on 3/5/2021 at 0423 EST.    Comprehensive Metabolic Panel [[id number]]  (Abnormal) Collected:  03/05/21 0306    Specimen: Blood Updated: 03/05/21 0433     Glucose 93 mg/dL      BUN 33 mg/dL      Creatinine 0.96 mg/dL      Sodium 140 mmol/L      Potassium 3.8 mmol/L      Chloride 100 mmol/L      CO2 30.0 mmol/L      Calcium 8.9 mg/dL      Total Protein 7.2 g/dL      Albumin 3.00 g/dL      ALT (SGPT) 33 U/L      AST (SGOT) 36 U/L      Alkaline Phosphatase 119 U/L      Total Bilirubin 0.5 mg/dL      eGFR Non African Amer 77 mL/min/1.73      Globulin 4.2 gm/dL      A/G Ratio 0.7 g/dL      BUN/Creatinine Ratio 34.4     Anion Gap 10.0 mmol/L     Narrative:      GFR Normal >60  Chronic Kidney Disease <60  Kidney Failure <15          No results found for: HGBA1C            No results found for: LIPASE  Lab Results   Component Value Date    CHOL 79 11/29/2019    TRIG 48 11/29/2019    HDL 33 (L) 11/29/2019    LDL 36 11/29/2019       Lab Results   Lab Value Date/Time    FINALDX  11/11/2020 0958     Tumor mass, right mainstem bronchus, biopsy:    Invasive moderately differentiated squamous cell carcinoma (see COMMENT)    NORBERTO/tkd       FINALDX  11/11/2020 0747     Smears of bronchial washings with cytospin preparation:    Occasional markedly atypical cells consistent with non-small cell carcinoma and exhibiting cytomorphologic      features most suggestive of squamous cell carcinoma     Background consists of acute inflammation with occasional fungal spores and hyphae morphologically most      consistent with Candida species    NORBERTO/tkd       COMDX  11/11/2020 0958     The biopsy was stained via immunohistochemical technique utilizing appropriate controls for the presence of p63, p40, CK5/6, napsin and TTF-1. The tumor cells are positive for CK5/6, p63 and p40 while being negative for TTF-1 and napsin. This staining pattern in conjunction with the histologic features is entirely consistent with squamous cell carcinoma. The tumor is focally necrotic. There is no lymph-vascular space invasion identified.      NORBERTO/tkd        COMDX  11/11/2020 0747     Please correlate with concurrent surgical pathology specimen ([id number]).    NORBERTO/tkd          Microbiology Results (last 10 days)     Procedure Component Value - Date/Time    Blood Culture - Blood, Hand, Left [[id number]] Collected: 02/24/21 2109    Lab Status: Final result Specimen: Blood from Hand, Left Updated: 03/01/21 2130     Blood Culture No growth at 5 days    MRSA Screen, PCR (Inpatient) - Swab, Nares [[id number]]  (Normal) Collected: 02/24/21 1952    Lab Status: Final result Specimen: Swab from Nares Updated: 02/24/21 2112     MRSA PCR No MRSA Detected    COVID PRE-OP / PRE-PROCEDURE SCREENING ORDER (NO ISOLATION) - Swab, Nasopharynx [[id number]]  (Abnormal) Collected: 02/24/21 1951    Lab Status: Final result Specimen: Swab from Nasopharynx Updated: 02/24/21 2339    Narrative:      The following orders were created for panel order COVID PRE-OP / PRE-PROCEDURE SCREENING ORDER (NO ISOLATION) - Swab, Nasopharynx.  Procedure                               Abnormality         Status                     ---------                               -----------         ------                     Respiratory Panel PCR w/...[[id number]]  Abnormal            Final result                 Please view results for these tests on the individual orders.    Respiratory Panel PCR w/COVID-19(SARS-CoV-2) PARESH/GRACIELA/KATHY/PAD/COR/MAD/CHRIST In-House, NP Swab in UTM/VTM, 3-4 HR TAT - Swab, Nasopharynx [[id number]]  (Abnormal) Collected: 02/24/21 1951    Lab Status: Final result Specimen: Swab from Nasopharynx Updated: 02/24/21 2339     ADENOVIRUS, PCR Not Detected     Coronavirus 229E Not Detected     Coronavirus HKU1 Not Detected     Coronavirus NL63 Not Detected     Coronavirus OC43 Not Detected     COVID19 Not Detected     Human Metapneumovirus Not Detected     Human Rhinovirus/Enterovirus Detected     Influenza A PCR Not Detected     Influenza B PCR Not Detected     Parainfluenza Virus 1 Not Detected      Parainfluenza Virus 2 Not Detected     Parainfluenza Virus 3 Not Detected     Parainfluenza Virus 4 Not Detected     RSV, PCR Not Detected     Bordetella pertussis pcr Not Detected     Bordetella parapertussis PCR Not Detected     Chlamydophila pneumoniae PCR Not Detected     Mycoplasma pneumo by PCR Not Detected    Narrative:      Fact sheet for providers: https://docs.[employer]/wp-content/uploads/BHU0276-0069-DK5.1-EUA-Provider-Fact-Sheet-3.pdf    Fact sheet for patients: https://docs.[employer]/wp-content/uploads/LIC3661-7576-JC6.1-EUA-Patient-Fact-Sheet-1.pdf    Test performed by PCR.          ECG/EMG Results (most recent)     Procedure Component Value Units Date/Time    ECG 12 Lead [[id number]] Collected: 02/18/21 1050     Updated: 02/19/21 1421     QT Interval 291 ms     Narrative:      HEART RATE= 130  bpm  RR Interval= 462  ms  KS Interval= 132  ms  P Horizontal Axis= -23  deg  P Front Axis= 79  deg  QRSD Interval= 83  ms  QT Interval= 291  ms  QRS Axis= 55  deg  T Wave Axis=   deg  - ABNORMAL ECG -  Sinus tachycardia  Left atrial enlargement  Anteroseptal infarct, age indeterminate  When compared with ECG of 09-Nov-2020 13:39:57,  Significant rate increase  Significant axis, voltage or hypertrophy change  Electronically Signed By: Higinio Flores ([facility]) 19-Feb-2021 14:11:44  Date and Time of Study: 2021-02-18 10:50:09    ECG 12 Lead [[id number]] Collected: 02/20/21 1846     Updated: 02/24/21 0721     QT Interval 340 ms     Narrative:      HEART RATE= 119  bpm  RR Interval= 496  ms  KS Interval=   ms  P Horizontal Axis=   deg  P Front Axis=   deg  QRSD Interval= 87  ms  QT Interval= 340  ms  QRS Axis= 66  deg  T Wave Axis= 75  deg  - ABNORMAL ECG -  Atrial flutter with predominant 2:1 AV block  Ventricular premature complex  Consider anterior infarct  When compared with ECG of 18-Feb-2021 10:50:09,  Nonspecific significant change  Electronically Signed By: Fuad Montero ([facility]) 24-Feb-2021 07:14:30  Date  and Time of Study: 2021-02-20 18:46:19    ECG 12 Lead [[id number]] Collected: 02/21/21 0947     Updated: 02/24/21 0731     QT Interval 379 ms     Narrative:      HEART RATE= 100  bpm  RR Interval= 600  ms  WY Interval= 136  ms  P Horizontal Axis= -16  deg  P Front Axis= 98  deg  QRSD Interval= 95  ms  QT Interval= 379  ms  QRS Axis= 63  deg  T Wave Axis= 70  deg  - BORDERLINE ECG -  Sinus tachycardia  Consider left ventricular hypertrophy  When compared with ECG of 20-Feb-2021 18:46:19,  Significant axis, voltage or hypertrophy change  Electronically Signed By: Fuad Montero ([facility]) 24-Feb-2021 07:19:26  Date and Time of Study: 2021-02-21 09:47:53    ECG 12 Lead [[id number]] Collected: 02/25/21 0552     Updated: 02/25/21 0554     QT Interval 356 ms     Narrative:      HEART RATE= 106  bpm  RR Interval= 568  ms  WY Interval= 112  ms  P Horizontal Axis= -38  deg  P Front Axis= 63  deg  QRSD Interval= 106  ms  QT Interval= 356  ms  QRS Axis= -8  deg  T Wave Axis= 80  deg  - ABNORMAL ECG -  Sinus tachycardia  LAE, consider biatrial enlargement  Anteroseptal infarct, age indeterminate  When compared with ECG of 24-Feb-2021 6:11:17,  Significant repolarization change  Electronically Signed By:   Date and Time of Study: 2021-02-25 05:52:57    ECG 12 Lead [[id number]] Collected: 02/23/21 0744     Updated: 02/26/21 1535     QT Interval 395 ms     Narrative:      HEART RATE= 111  bpm  RR Interval= 540  ms  WY Interval= 156  ms  P Horizontal Axis= -42  deg  P Front Axis= 79  deg  QRSD Interval= 122  ms  QT Interval= 395  ms  QRS Axis= -14  deg  T Wave Axis= 86  deg  - ABNORMAL ECG -  Sinus tachycardia  Biatrial enlargement  Evolving anterior infarct since 22-Feb-2021 7:19:57  When compared with ECG of 22-Feb-2021 7:19:57,  New or worsened ischemia or infarction  Significant axis, voltage or hypertrophy change  Electronically Signed By: Braden Mcdaniel ([facility]) 26-Feb-2021 15:31:30  Date and Time of Study: 2021-02-23 07:44:24     ECG 12 Lead [[id number]] Collected: 02/26/21 0544     Updated: 02/26/21 1945     QT Interval 345 ms     Narrative:      HEART RATE= 125  bpm  RR Interval= 496  ms  MN Interval= 110  ms  P Horizontal Axis= -34  deg  P Front Axis= 73  deg  QRSD Interval= 107  ms  QT Interval= 345  ms  QRS Axis= -4  deg  T Wave Axis= 92  deg  - ABNORMAL ECG -  Sinus tachycardia  Atrial premature complexes  LAE, consider biatrial enlargement  When compared with ECG of 25-Feb-2021 5:52:57,  No significant change  Electronically Signed By: Rebeca Jacobs ([facility]) 26-Feb-2021 19:35:13  Date and Time of Study: 2021-02-26 05:44:41    ECG 12 Lead [[id number]] Collected: 02/22/21 0719     Updated: 02/28/21 1657     QT Interval 394 ms     Narrative:      HEART RATE= 110  bpm  RR Interval= 544  ms  MN Interval= 142  ms  P Horizontal Axis= -26  deg  P Front Axis= 70  deg  QRSD Interval= 122  ms  QT Interval= 394  ms  QRS Axis= -14  deg  T Wave Axis= 86  deg  - ABNORMAL ECG -  Sinus tachycardia  Biatrial enlargement  Left ventricular hypertrophy  When compared with ECG of 21-Feb-2021 9:47:53,  No significant change  Electronically Signed By: Iglesia Springer ([facility]) 28-Feb-2021 16:56:20  Date and Time of Study: 2021-02-22 07:19:57    ECG 12 Lead [[id number]] Collected: 02/27/21 2326     Updated: 02/28/21 1759     QT Interval 381 ms     Narrative:      HEART RATE= 90  bpm  RR Interval= 668  ms  MN Interval= 114  ms  P Horizontal Axis= -41  deg  P Front Axis= 70  deg  QRSD Interval= 84  ms  QT Interval= 381  ms  QRS Axis= 59  deg  T Wave Axis= 51  deg  - ABNORMAL ECG -  Sinus rhythm  Left atrial enlargement  Anteroseptal infarct, age indeterminate  When compared with ECG of 27-Feb-2021 5:51:38,  Significant axis, voltage or hypertrophy change  Electronically Signed By: Rebeca Jacobs ([facility]) 28-Feb-2021 17:49:16  Date and Time of Study: 2021-02-27 23:26:41    ECG 12 Lead [[id number]] Collected: 02/27/21 0551     Updated: 02/28/21 1759     QT  Interval 347 ms     Narrative:      HEART RATE= 103  bpm  RR Interval= 584  ms  NM Interval= 108  ms  P Horizontal Axis= -36  deg  P Front Axis= 63  deg  QRSD Interval= 85  ms  QT Interval= 347  ms  QRS Axis= 50  deg  T Wave Axis= -4  deg  - BORDERLINE ECG -  Sinus tachycardia  Left atrial enlargement  Probable left ventricular hypertrophy  When compared with ECG of 26-Feb-2021 5:44:41,  Significant rate decrease  Significant axis, voltage or hypertrophy change  Electronically Signed By: Rebeca Jacobs ([facility]) 28-Feb-2021 17:49:24  Date and Time of Study: 2021-02-27 05:51:38    ECG 12 Lead [[id number]] Collected: 03/01/21 0741     Updated: 03/02/21 1009     QT Interval 377 ms     Narrative:      HEART RATE= 87  bpm  RR Interval= 692  ms  NM Interval= 117  ms  P Horizontal Axis= -66  deg  P Front Axis= 65  deg  QRSD Interval= 93  ms  QT Interval= 377  ms  QRS Axis= 57  deg  T Wave Axis= 73  deg  - ABNORMAL ECG -  Sinus rhythm  Left atrial enlargement  Anteroseptal infarct, age indeterminate  When compared with ECG of 27-Feb-2021 23:26:41,  No significant change  Electronically Signed By: Adi Carroll ([facility]) 02-Mar-2021 10:03:42  Date and Time of Study: 2021-03-01 07:41:28    ECG 12 Lead [[id number]] Collected: 02/24/21 0611     Updated: 03/02/21 1937     QT Interval 358 ms     Narrative:      HEART RATE= 121  bpm  RR Interval= 496  ms  NM Interval= 120  ms  P Horizontal Axis= -25  deg  P Front Axis= 64  deg  QRSD Interval= 108  ms  QT Interval= 358  ms  QRS Axis= -6  deg  T Wave Axis= 85  deg  - ABNORMAL ECG -  Sinus tachycardia  LAE, consider biatrial enlargement  Prolonged QT interval  When compared with ECG of 23-Feb-2021 7:44:24,  Nonspecific significant change  Electronically Signed By: Iker Gr ([facility]) 02-Mar-2021 19:31:29  Date and Time of Study: 2021-02-24 06:11:17    ECG 12 Lead [[id number]] Collected: 03/03/21 0342     Updated: 03/03/21 0344     QT Interval 426 ms     Narrative:      HEART RATE= 75   bpm  RR Interval= 804  ms  MD Interval= 125  ms  P Horizontal Axis= -32  deg  P Front Axis= 69  deg  QRSD Interval= 98  ms  QT Interval= 426  ms  QRS Axis= 70  deg  T Wave Axis= 83  deg  - ABNORMAL ECG -  Sinus rhythm  Left atrial enlargement  Anteroseptal infarct, age indeterminate  Electronically Signed By:   Date and Time of Study: 2021-03-03 03:42:53    ECG 12 Lead [[id number]] Collected: 03/02/21 0325     Updated: 03/03/21 0820     QT Interval 381 ms     Narrative:      HEART RATE= 88  bpm  RR Interval= 680  ms  MD Interval= 122  ms  P Horizontal Axis= -73  deg  P Front Axis= 147  deg  QRSD Interval= 86  ms  QT Interval= 381  ms  QRS Axis= 60  deg  T Wave Axis= 148  deg  - ABNORMAL ECG -  Sinus or ectopic atrial rhythm  Left atrial enlargement  Anteroseptal infarct, age indeterminate  Lateral wall also involved  When compared with ECG of 01-Mar-2021 7:41:28,  No significant change  Electronically Signed By: Adi Carroll ([facility]) 03-Mar-2021 08:09:38  Date and Time of Study: 2021-03-02 03:25:22               Results for orders placed during the hospital encounter of 11/08/20   Adult Transthoracic Echo Complete W/ Cont if Necessary Per Protocol    Narrative · Estimated left ventricular EF = 65% Left ventricular systolic function   is normal.  · Left ventricular diastolic function is consistent with (grade Ia w/high   LAP) impaired relaxation.  · The right atrial cavity is mildly dilated.  · Mild aortic valve stenosis is present.  · Mild to moderate LVOT gradient noted          Mri Brain Without Contrast    Result Date: 2/25/2021   1. Signal intensity changes within the left temporal occipital lobe, as described above, worrisome for cortical metastatic deposit with surrounding vasogenic edema, over that of evolving ischemia. Additionally, question leptomeningeal thickening or potential metastatic infiltration over the posterior cerebral convexities. MRI brain with contrast would be most helpful in this distinction.    Electronically Signed By-Ivana Mirza MD On:2/25/2021 2:54 PM This report was finalized on [id number] by  Ivana Mirza MD.    Mri Brain With Contrast    Result Date: 2/26/2021  There is a single thick-walled ring-enhancing lesion in the medial aspect of the left occipital lobe measuring 1.1 x 1.0 cm. There was vasogenic edema on yesterday's MRI of the brain in this location. This represents metastatic disease until proven otherwise. A small primary glioma or brain abscess can have a similar imaging appearance.  Electronically Signed By-Brock Kerr MD On:2/26/2021 10:19 AM This report was finalized on [id number] by  Brock Kerr MD.    Fl Video Swallow With Speech Single Contrast    Result Date: 3/2/2021  As above. Please see speech pathology report for detailed evaluation and dietary recommendations.  Electronically Signed By-Daryl Hunter MD On:3/2/2021 11:39 AM This report was finalized on [id number] by  Daryl Hunter MD.    Fl Video Swallow With Speech Single Contrast    Result Date: 2/26/2021  Technically successful modified barium swallow examination.  Electronically Signed By-Brock Kerr MD On:2/26/2021 12:43 PM This report was finalized on [id number] by  Brock Kerr MD.          Xrays, labs reviewed personally by physician.    Medication Review:   I have reviewed the patient's current medication list      Scheduled Meds  aspirin, 81 mg, Oral, Daily  atorvastatin, 40 mg, Oral, Nightly  Barium Sulfate, 1 teaspoon(s), Oral, Once in imaging  budesonide, 0.5 mg, Nebulization, BID - RT  docusate sodium, 100 mg, Oral, BID  epoetin asia-epbx, 40,000 Units, Subcutaneous, Q7 Days  ferrous sulfate, 324 mg, Oral, Daily With Breakfast  furosemide, 20 mg, Intravenous, Q12H  gabapentin, 300 mg, Oral, TID  guaiFENesin, 600 mg, Oral, Q12H  ipratropium-albuterol, 3 mL, Nebulization, Q4H - RT  lansoprazole, 30 mg, Oral, QAM  lurasidone, 40 mg, Oral, Daily  metoprolol tartrate, 50 mg, Oral,  Q12H  predniSONE, 10 mg, Oral, Daily With Breakfast  sertraline, 50 mg, Oral, Daily  spironolactone, 25 mg, Oral, Daily  Zinc Oxide, , Apply externally, BID        Meds Infusions       Meds PRN  •  acetaminophen  •  acetaminophen  •  bisacodyl  •  Calcium Gluconate-NaCl **AND** calcium gluconate **AND** Calcium, Ionized  •  haloperidol lactate  •  HYDROcodone-acetaminophen  •  ibuprofen  •  labetalol  •  magnesium sulfate **OR** magnesium sulfate **OR** magnesium sulfate  •  melatonin  •  metoprolol tartrate  •  ondansetron  •  potassium chloride **OR** potassium chloride **OR** potassium chloride  •  potassium phosphate infusion greater than 15 mMoles **OR** potassium phosphate infusion greater than 15 mMoles **OR** potassium phosphate **OR** sodium phosphate IVPB **OR** sodium phosphate IVPB **OR** sodium phosphate IVPB  •  sodium chloride  •  Zinc Oxide        Assessment/Plan   Assessment/Plan     Active Hospital Problems    Diagnosis  POA   • **Acute on chronic respiratory failure with hypoxia (CMS/HCC) [J96.21]  Yes   • COPD with acute exacerbation (CMS/HCC) [J44.1]  Yes   • Delirium [R41.0]  Yes   • Moderate malnutrition (CMS/HCC) [E44.0]  Yes   • Pneumonia due to infectious organism [J18.9]  Yes   • Squamous cell carcinoma of lung, right (CMS/HCC) [C34.91]  Yes   • Diastolic CHF, acute (CMS/HCC) [I50.31]  Yes   • Hx of aortic valve replacement [Z95.2]  Not Applicable   • Essential hypertension [I10]  Yes      Resolved Hospital Problems   No resolved problems to display.       MEDICAL DECISION MAKING COMPLEXITY BY PROBLEM:     Acute hypoxic respiratory failure-patient required mechanical ventilation, believed to be secondary to pneumonia and COPD exacerbation  -On 3 L at this time  -Extubated 2/20/2021 intermittently on room air  -Steroids and antibiotics per pulmonology, possible de-escalation of care was transition to hospice  -Oxygen as needed-Pulmonology continues monitor    Pneumonia-patient with  right-sided mass in addition to signs of infiltrates  -Blood cultures no growth to date  -Positive rhinovirus  -Respiratory cultures negative as of the 19th  -Off antibiotics currently    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  Relatively euvolemic at this time  -Diuresis as tolerated  -Daily weights    Urinary retention-straight cath as needed    Malnutrition-patient with swallowing difficulties and did not tolerate advance diet  -Continue working with speech therapy until officially transitioning to hospice/possible comfort measures  -Nutrition consulted  -If transitions can advance diet as tolerated    Delirium-patient appears to be back with appropriate mentation, patient off gabapentin and Suboxone for period of time, resumed  -Antipsychotic as needed  -Psych consult    Anemia/MDS/thrombocytopenia-possibly secondary to chemotherapy  -Managed per hematology    VTE Prophylaxis -   Mechanical Order History:      Ordered        02/19/21 0228  Place Sequential Compression Device  Once         02/19/21 0228  Maintain Sequential Compression Device  Continuous                 Pharmalogical Order History:     None            Code Status -   Code Status and Medical Interventions:   Ordered at: 03/03/21 1110     Limited Support to NOT Include:    Intubation     Code Status:    No CPR     Medical Interventions (Level of Support Prior to Arrest):    Limited       This  patient has been examined wearing appropriate Personal Protective Equipment and discussed with hospital infection control department. 03/05/21        Discharge Planning  Follow-up family meeting with hospice on 3/6/2021        Electronically signed by Abhishek Lazo MD, 03/05/21, 14:31 EST.  [facility] Hospitalist Team

## 2021-03-05 NOTE — THERAPY TREATMENT NOTE
Subjective: Pt agreeable to therapeutic plan of care.    Objective:     Bed mobility - Min-A  Transfers - Min-A  Ambulation - 2x10 feet Min-A    Pain: 4 VAS  Education: Provided education on importance of mobility and skilled verbal / tactile cueing throughout intervention.     Assessment: Axel Ramirez presents with functional mobility impairments which indicate the need for skilled intervention. Tolerating session today without incident. Will continue to follow and progress as tolerated.     Plan/Recommendations:   Pt would benefit from Home with family assist at discharge from facility and requires no DME at discharge.   Pt desires Home with family assist at discharge. Pt cooperative; agreeable to therapeutic recommendations and plan of care.     Basic Mobility 6-click:  Rollin = Total, A lot = 2, A little = 3; 4 = None  Supine>Sit:   1 = Total, A lot = 2, A little = 3; 4 = None   Sit>Stand with arms:  1 = Total, A lot = 2, A little = 3; 4 = None  Bed>Chair:   1 = Total, A lot = 2, A little = 3; 4 = None  Ambulate in room:  1 = Total, A lot = 2, A little = 3; 4 = None  3-5 Steps with railin = Total, A lot = 2, A little = 3; 4 = None  Score: 16    Modified [place]: 4 = Moderately severe disability (Unable to attend to own bodily needs without assistance, and unable to walk unassisted)     Post-Tx Position: Supine with HOB Elevated, Alarms activated and Call light and personal items within reach  PPE: gloves, surgical mask, eyewear protection

## 2021-03-05 NOTE — PROGRESS NOTES
DNR / Hospice Referral    Palliative care  met with pt to address goals of care and discuss hospice services.  Pt reports that he does not want to pursue any further oncology treatment and just wants to be at home with his family.  PT's wife was called who reports that she can take the pt home if she has help from her daughter.  Hospice list provided over the phone.  Wife agreeable to speaking with [facility] and getting a plan in place.  Notified care coordination, RN, and hospitalist; [facility] to refer and f/u with pt and wife.  Emotional support provided.  Will continue to follow peripherally.

## 2021-03-05 NOTE — PROGRESS NOTES
Discharge Planning Assessment   Johann     Patient Name: Axel Ramirez  MRN: [id number]  Today's Date: 3/5/2021    Admit Date: 2/18/2021          Plan    Plan  pending meeting of family with [facility] on sat    Plan Comments  per andres with palliative care patient and spouse they would like referral to hospice and [facility] is chosen and per antoine there will be a meeting with family saturday morning to discuss options       Carol naegele rn  Case management  Office number [phone]  Cell phone [phone]

## 2021-03-05 NOTE — PLAN OF CARE
Goal Outcome Evaluation:  Plan of Care Reviewed With: patient  Progress: no change  Outcome Summary: patient has been resting in bed throughout shift. patient deciding to go home with hospice and remain on home O2. will continue to monitor

## 2021-03-05 NOTE — PLAN OF CARE
Patient performed well today.  Able to do bed mobility, come to stand, transfers and gait with Min a for safety,   Plan to return home with hospice and family assist.    PPE: face mask, face shield and gloves

## 2021-03-05 NOTE — THERAPY TREATMENT NOTE
Acute Care - Speech Language Pathology Treatment Note     Johann     Patient Name: Axel Ramirez  : 1950  MRN: [id number]    Today's Date: 3/5/2021                   Admit Date: 2021       Visit Dx:      ICD-10-CM ICD-9-CM   1. Pneumonia due to infectious organism, unspecified laterality, unspecified part of lung  J18.9 486   2. Respiratory distress  R06.03 786.09   3. Acute on chronic respiratory failure with hypoxia (CMS/HCC)  J96.21 518.84     799.02       Patient Active Problem List   Diagnosis   • Coronary artery disease involving native coronary artery of native heart without angina pectoris   • Nonrheumatic aortic valve stenosis   • Mixed hyperlipidemia   • Essential hypertension   • Fever   • Presence of aortocoronary bypass graft   • Congestive heart failure (CMS/HCC)   • Hx of aortic valve replacement   • CAP (community acquired pneumonia)   • Tobacco abuse   • Postobstructive pneumonia   • Lung mass   • Squamous cell carcinoma of lung, right (CMS/HCC)   • Diastolic CHF, acute (CMS/HCC)   • Pneumonia of right lung due to infectious organism   • Pneumonia due to infectious organism   • Moderate malnutrition (CMS/HCC)   • Acute on chronic respiratory failure with hypoxia (CMS/HCC)   • COPD with acute exacerbation (CMS/HCC)   • Delirium       Past Medical History:   Diagnosis Date   • Aortic stenosis    • Cancer (CMS/HCC)     Sickle Cell Carcinoma   • Congestive heart failure (CHF) (CMS/HCC)     DIASTOLIC   • COPD (chronic obstructive pulmonary disease) (CMS/HCC)    • Coronary artery disease    • Hypertension    • Lung cancer (CMS/HCC)    • Myocardial infarction (CMS/HCC)    • Peripheral vascular disease (CMS/HCC)    • Valvular disease        Past Surgical History:   Procedure Laterality Date   • AORTIC VALVE REPAIR/REPLACEMENT  2018    Dr Maza   • BRONCHOSCOPY N/A 10/24/2020    Procedure: BRONCHOSCOPY with biopsy right upper lobe mass, and bronchoalveolar lavage right upper lobe;   Surgeon: Ángela Bean MD;  Location: [facility] ENDOSCOPY;  Service: Pulmonary;  Laterality: N/A;  post: right upper lobe mass, pneumonia   • BRONCHOSCOPY N/A 11/11/2020    Procedure: BRONCHOSCOPY with bronchial washing;  Surgeon: Ángela Bean MD;  Location: [facility] ENDOSCOPY;  Service: Pulmonary;  Laterality: N/A;  Post: lung mass, pneumonia   • BRONCHOSCOPY N/A 11/11/2020    Procedure: BRONCHOSCOPY RIGID with debulking of right main endobronchial tumor;  Surgeon: Nomi Reyes MD;  Location: [facility] MAIN OR;  Service: Cardiothoracic;  Laterality: N/A;   • BRONCHOSCOPY N/A 11/11/2020    Procedure: BRONCHOSCOPY;  Surgeon: Nomi Reyes MD;  Location: [facility] MAIN OR;  Service: Cardiothoracic;  Laterality: N/A;   • CARDIAC CATHETERIZATION  12/29/2017   • CORONARY ARTERY BYPASS GRAFT  02/26/2018    Dr Maza   • TIBIA FRACTURE SURGERY     • VENOUS ACCESS DEVICE (PORT) INSERTION Left 10/30/2020    Procedure: MEDIPORT INSERTION UNDER FLUOROSCOPIC GUIDENCE;  Surgeon: Nomi Reyes MD;  Location: [facility] MAIN OR;  Service: Cardiothoracic;  Laterality: Left;          Patient was not wearing a face mask during this therapy encounter. Therapist used appropriate personal protective equipment including mask, eye protection and gloves.  Mask used was standard procedure mask. Appropriate PPE was worn during the entire therapy session. Hand hygiene was completed before and after therapy session. Patient is not in enhanced droplet precautions.     EDUCATION    The patient has been educated in the following areas:       Home Exercise Program (HEP) Dysphagia (Swallowing Impairment) Modified Diet Instruction.      SLP Recommendation and Plan    Recommend pt remain with current diet (puree/NTL)  Pt to continue safe swallow compensations of effortful/hard swallow, multiple swallow, safe swallow techniques  Noted plans for hospice consult  Box of thickener left in room for patient to use at time of discharge.  ST will continue to  follow as indicated during his stay.         SLP GOALS     Row Name 03/05/21 1400          Oral Nutrition/Hydration Goal 1, SLP  initiate and tolerate L/R diet without oral stage difficulties or complications associated with aspiration   -SM    Time Frame (Oral Nutrition/Hydration Goal 1, SLP)  by discharge  -SM    Barriers (Oral Nutrition/Hydration Goal 1, SLP)  Current diet is puree/NTL  -SM    Progress/Outcomes (Oral Nutrition/Hydration Goal 1, SLP)  goal ongoing  -SM          Oral Nutrition/Hydration Goal 2, SLP  Pt will have full meal assessment within 48 hours  -SM    Time Frame (Oral Nutrition/Hydration Goal 2, SLP)  short term goal (STG)  -SM    Barriers (Oral Nutrition/Hydration Goal 2, SLP)  Pt seen bedside with several food/liquid items on tray. Pt had Magic Cup, pudding and NTL items on tray. PT lethargic/sleeping and did not arouse to take po trials today. Per nurse, pt is taking medications whole with NTL and tolerating well. Pt tolerating current diet well per nurse with no overt s/s of difficulty. SLP left box of Simply Thick Thickener in room for patient to use at home with teaching conducted with nurse. She stated she would review information provided with patient and family. Discussed current diet, rationale and safe swallow compensations. Nurse in agreement with plan. Per chart report, pt now requesting to be discharged to home with Hospice. [facility] consulted and to meet with patient and family tomorrow.   -SM    Progress/Outcomes (Oral Nutrition/Hydration Goal 2, SLP)  goal ongoing  -SM          Activity (Pharyngeal Strengthening Goal 1, SLP)  increase tongue base retraction  -SM    Increase Tongue Base Retraction  carlos  -SM    [place]ams/Accuracy (Pharyngeal Strengthening Goal 1, SLP)  with moderate cues (50-74% accuracy)  -SM    Time Frame (Pharyngeal Strengthening Goal 1, SLP)  by discharge  -SM          Activity (Pharyngeal Strengthening Goal 1, SLP)  increase anterior movement of the  hyolaryngeal complex;increase epiglottic  inversion and retroflexion  -SM    Increase Anterior Movement of the Hyolaryngeal Complex  hard effortful swallow  -SM    Increase Epiglottic Inversion and Retroflexion  hard effortful swallow  -SM    Walker/Accuracy (Pharyngeal Strengthening Goal 1, SLP)  with moderate cues (50-74% accuracy)  -SM    Time Frame (Pharyngeal Strengthening Goal 2, SLP)  by discharge  -SM    Barriers (Pharyngeal Strengthening Goal 2, SLP)  Goal not addressed this date.   -SM      User Key  (r) = Recorded By, (t) = Taken By, (c) = Cosigned By    Initials Name Provider Type    So Hyde, SLP Speech and Language Pathologist    Deedee Foster, Speech Therapy Student Speech Therapy Student                      Time Calculation:                                  BERTHA Vargas  3/5/2021

## 2021-03-05 NOTE — PLAN OF CARE
Goal Outcome Evaluation:  Plan of Care Reviewed With: patient  Progress: no change  Outcome Summary: Patient has done well tonight, reminded to turn, pt deciding to go home with hospice remains on o2, no new complaints at this time.

## 2021-03-05 NOTE — PROGRESS NOTES
Hematology/Oncology Inpatient Progress Note    PATIENT NAME: Axel Ramirez  : 1950  MRN: [id number]    CHIEF COMPLAINT: Hypoxia and respiratory failure     HISTORY OF PRESENT ILLNESS:    70 y.o. male admitted to the ICU through the ED 2021 with hypoxia and respiratory failure requiring intubation.  The patient was intubated and sedated at time of consult and histories were taken from review of records and spouse report.  His spouse reported he had been feeling better and had tolerated recent chemotherapy for his lung cancer.  He was eating well after start of Megace but did develop increasing shortness of air a few days prior to admission without a change in his chronic cough.  She denied known fever, chills, or hemoptysis.  On admission, CT PE protocol was limited due to motion but felt negative for central PE.  There was evidence of pneumonia versus aspiration and known right upper lobe lung mass invading the right upper lobe bronchus with known obstruction and with increased narrowing/obstruction of the right middle lobe bronchus.  There was suggestion of progressive tumor infiltration of the right mainstem bronchus and new left paratracheal adenopathy with stable right paratracheal and subcarinal adenopathy.  CBC revealed WBC 8.4, hemoglobin 8.6, .7, and platelets 106,000.  D-dimer was elevated to 1.98 (0-0.59).  Creatinine was not elevated and LFTs were okay.  Respiratory viral panel was negative and blood cultures were sent.  On 2021 hemoglobin dropped to 7.4 and platelets to 92,000.     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  His lung cancer was initially treated with concomitant weekly chemotherapy (paclitaxel and carboplatin x7) and radiation  therapy from November 2020. He completed radiation therapy on 1/6/2021.  He completed the weekly portion of his chemotherapy on 1/12/2021.  He was seen in follow-up on 2/4/2021 where it was planned to continue chemotherapy with paclitaxel and carboplatin along with Neulasta support every 3 weeks.  He was scheduled for CT scan chest abdomen and pelvis on 2/18/2021 as an outpatient prior to his admission.  He received chemotherapy with Neulasta support on 2/9/2021 at which time CBC revealed WBC 7.62, hemoglobin 9.5, and platelets 223,000.  He was anemic at time of diagnosis of his lung cancer and anemia work-up revealed iron deficiency as well as MDS.  He received oral iron however he was diagnosed with malabsorption and received IV iron in November 2020 which was also repeated in December 2020.  His bone marrow had revealed mild increased iron storage in January 2021.  At his follow-up visit on 2/4/2021 t Retacrit 30,000 units subcu weekly was ordered for his myelodysplastic syndrome while continuing oral iron for his iron deficiency with iron saturation of 17%.  He had not started Retacrit prior to admission pending insurance authorization.     PCP: Sandoval Stevenson FNP    INTERVAL HISTORY:  2/19/2021-received 1 unit pRBCs. Iron 21 (59-1 58), iron saturation 12 (20-50), transferrin 121 (200-3 60), TIBC 180 (298-536), ferritin 2658 (), reticulocytes 1.93 (0.7 0-1.90), haptoglobin 294 (). Started Venofer 300 mg IV x 3 days. Patient extubated, on nasal canula and Precedex.  2/20/2021 -white blood count 8.8, hemoglobin 8, .4, platelets 57,000.  CT head with moderate periventricular white matter changes present likely related to chronic microvascular ischemic change, progressed as compared to the previous study.  Hypodensity present within the left occipital region which appeared more chronic or remote, new as compared to the previous study.  Chest x-ray with improvement in right basilar airspace  disease with resolution of the left airspace disease.  Stable right upper lobe mass.  2/21/2021-Retacrit 40,000 units subcu weekly started for hemoglobin 7.9.  2/22/2021-patient moved out of ICU.  2/23/2021-patient moved to PCU for tachypnea and tachycardia.  02/25/21-CT abdomen and pelvis without showed no evidence of malignancy. There was layering hyperdense material in the gallbladder with spurious excretion of contrast from prior contrast-enhanced study versus sludge/gallstones in differential, constipation, and known pneumonia/aspiration. Respiratory viral panel positive for rhinovirus/enterovirus. Psych consulted and patient started back on home meds including Suboxone, Latuda, and sertraline.  MRI brain without contrast suggestive of left cerebral cortical and leptomeningeal mets.  2/26/2021-hemoglobin 9.7, platelets 112,000, WBC 10.3.  More alert and oriented.  2/27/2021-MRI brain with contrast showed a 1.1 x 1 cm left occipital lobe lesion. Neurosurgery not recommending resection. Rad Onc planning SRS on 3/3 if patient can tolerate planning. Erythropoietin level 362 (2.6-18.5).    2/28/2021-Reviewed MRI brain findings. Patient and spouse both wanting to proceed with radiation therapy.  Platelets improved to 147,000.   03/02/2021-platelets normalized.  No aspiration on swallow study.  NG tube removed and patient started on puréed diet.  3/3/2021-patient received SRS radiation to left occiput 2000 cGy.    History of present illness reviewed since last visit and changes noted on 03/05/21.    Subjective   Claims is going home today.    ROS:   Review of Systems   Constitutional: Positive for fatigue. Negative for activity change, chills, fever and unexpected weight change.   HENT: Negative for congestion, dental problem, hearing loss, mouth sores, nosebleeds, sore throat, trouble swallowing and voice change ( hoarse).    Eyes: Negative for photophobia and visual disturbance.   Respiratory: Negative for cough,  choking, chest tightness and shortness of breath.    Cardiovascular: Negative for chest pain, palpitations and leg swelling.   Gastrointestinal: Negative for abdominal distention, abdominal pain, blood in stool, constipation, diarrhea, nausea and vomiting.   Endocrine: Negative for cold intolerance and heat intolerance.   Genitourinary: Negative for decreased urine volume, difficulty urinating, dysuria, enuresis, frequency, hematuria and urgency.        Incontinence   Musculoskeletal: Negative for arthralgias and gait problem.   Skin: Negative for rash and wound.   Neurological: Positive for weakness. Negative for dizziness, tremors, speech difficulty, light-headedness, numbness and headaches.   Hematological: Negative for adenopathy. Does not bruise/bleed easily.   Psychiatric/Behavioral: Negative for confusion and hallucinations. The patient is not nervous/anxious.    All other systems reviewed and are negative.     MEDICATIONS:    Scheduled Meds:  aspirin, 81 mg, Oral, Daily  atorvastatin, 40 mg, Oral, Nightly  Barium Sulfate, 1 teaspoon(s), Oral, Once in imaging  budesonide, 0.5 mg, Nebulization, BID - RT  docusate sodium, 100 mg, Oral, BID  epoetin asia-epbx, 40,000 Units, Subcutaneous, Q7 Days  ferrous sulfate, 324 mg, Oral, Daily With Breakfast  furosemide, 20 mg, Intravenous, Q12H  gabapentin, 300 mg, Oral, TID  guaiFENesin, 600 mg, Oral, Q12H  ipratropium-albuterol, 3 mL, Nebulization, Q4H - RT  lansoprazole, 30 mg, Oral, QAM  lurasidone, 40 mg, Oral, Daily  metoprolol tartrate, 50 mg, Oral, Q12H  predniSONE, 10 mg, Oral, Daily With Breakfast  sertraline, 50 mg, Oral, Daily  spironolactone, 25 mg, Oral, Daily  Zinc Oxide, , Apply externally, BID       Continuous Infusions:      PRN Meds:  •  acetaminophen  •  acetaminophen  •  bisacodyl  •  Calcium Gluconate-NaCl **AND** calcium gluconate **AND** Calcium, Ionized  •  haloperidol lactate  •  HYDROcodone-acetaminophen  •  ibuprofen  •  labetalol  •  magnesium  "sulfate **OR** magnesium sulfate **OR** magnesium sulfate  •  melatonin  •  metoprolol tartrate  •  ondansetron  •  potassium chloride **OR** potassium chloride **OR** potassium chloride  •  potassium phosphate infusion greater than 15 mMoles **OR** potassium phosphate infusion greater than 15 mMoles **OR** potassium phosphate **OR** sodium phosphate IVPB **OR** sodium phosphate IVPB **OR** sodium phosphate IVPB  •  sodium chloride  •  Zinc Oxide     ALLERGIES:  No Known Allergies    Objective    VITALS:   BP 99/74 (BP Location: Left arm, Patient Position: Lying)   Pulse 102   Temp 97.8 °F (36.6 °C) (Oral)   Resp 20   Ht 177.8 cm (70\")   Wt 58.4 kg (128 lb 12 oz)   SpO2 97%   BMI 18.47 kg/m²     PHYSICAL EXAM:  Physical Exam  Vitals signs and nursing note reviewed.   Constitutional:       General: He is not in acute distress.     Appearance: He is well-developed. He is ill-appearing (chronically). He is not diaphoretic.      Interventions: Nasal cannula in place.      Comments: Cachectic    HENT:      Head: Normocephalic and atraumatic.      Comments: Male pattern baldness/alopecia.     Right Ear: External ear normal.      Left Ear: External ear normal.      Nose: Nose normal.      Comments: O2 by NC.       Mouth/Throat:      Mouth: Mucous membranes are moist.      Pharynx: No oropharyngeal exudate or posterior oropharyngeal erythema.      Comments: Edentulous  Eyes:      General: No scleral icterus.     Extraocular Movements: Extraocular movements intact.      Conjunctiva/sclera: Conjunctivae normal.      Pupils: Pupils are equal, round, and reactive to light.   Neck:      Musculoskeletal: Normal range of motion and neck supple.   Cardiovascular:      Rate and Rhythm: Normal rate and regular rhythm.      Heart sounds: Normal heart sounds. No murmur.      Comments: Left chest wall [id number]. Monitor leads.  Midline vertical chest wall scar  Pulmonary:      Effort: Pulmonary effort is normal. No " respiratory distress.      Breath sounds: Rhonchi ( improving somewhat ) present. No wheezing or rales.   Chest:      Chest wall: No tenderness.      Comments: Left chest wall [id number] accessed.   Abdominal:      General: Bowel sounds are normal. There is no distension.      Palpations: Abdomen is soft. There is no mass.      Tenderness: There is no abdominal tenderness. There is no guarding.   Genitourinary:     Comments: Deffered  Musculoskeletal: Normal range of motion.         General: No swelling, tenderness or deformity.      Comments: LUE IV   Lymphadenopathy:      Cervical: No cervical adenopathy.      Upper Body:      Right upper body: No supraclavicular adenopathy.      Left upper body: No supraclavicular adenopathy.   Skin:     General: Skin is warm and dry.      Coloration: Skin is pale. Skin is not jaundiced.      Findings: No bruising, erythema or rash.      Comments: Pressure bandages to bilateral elbows    Neurological:      General: No focal deficit present.      Mental Status: He is oriented to person, place, and time.      Motor: Weakness (generalized) present.   Psychiatric:         Mood and Affect: Mood normal.         Behavior: Behavior normal.       RECENT LABS:  Lab Results (last 24 hours)     Procedure Component Value Units Date/Time    CBC & Differential [[id number]]  (Abnormal) Collected: 03/05/21 0306    Specimen: Blood Updated: 03/05/21 0522    Narrative:      The following orders were created for panel order CBC & Differential.  Procedure                               Abnormality         Status                     ---------                               -----------         ------                     Scan Slide[[id number]]                                       Final result               CBC Auto Differential[[id number]]        Abnormal            Final result                 Please view results for these tests on the individual orders.    Scan Slide [[id number]] Collected: 03/05/21 0306     Specimen: Blood Updated: 03/05/21 0522     Scan Slide --     Comment: See Manual Differential Results       Manual Differential [[id number]]  (Abnormal) Collected: 03/05/21 0306    Specimen: Blood Updated: 03/05/21 0522     Neutrophil % 53.0 %      Lymphocyte % 11.0 %      Monocyte % 22.0 %      Bands %  11.0 %      Metamyelocyte % 1.0 %      Atypical Lymphocyte % 2.0 %      Neutrophils Absolute 3.97 10*3/mm3      Lymphocytes Absolute 0.68 10*3/mm3      Monocytes Absolute 1.36 10*3/mm3      nRBC 2.0 /100 WBC      Anisocytosis Slight/1+     Dacrocytes Slight/1+     Macrocytes Slight/1+     Poikilocytes Slight/1+     Toxic Granulation Slight/1+     Platelet Estimate Adequate     Large Platelets Slight/1+     Giant Platelets Slight/1+    CBC Auto Differential [[id number]]  (Abnormal) Collected: 03/05/21 0306    Specimen: Blood Updated: 03/05/21 0522     WBC 6.20 10*3/mm3      RBC 2.60 10*6/mm3      Hemoglobin 9.7 g/dL      Hematocrit 27.8 %      .7 fL      MCH 37.1 pg      MCHC 34.8 g/dL      RDW 26.6 %      RDW-SD 98.4 fl      MPV 8.7 fL      Platelets 276 10*3/mm3     Narrative:      The previously reported component NRBC is no longer being reported. Previous result was 0.6 /100 WBC (Reference Range: 0.0-0.2 /100 WBC) on 3/5/2021 at 0423 EST.    Comprehensive Metabolic Panel [[id number]]  (Abnormal) Collected: 03/05/21 0306    Specimen: Blood Updated: 03/05/21 0433     Glucose 93 mg/dL      BUN 33 mg/dL      Creatinine 0.96 mg/dL      Sodium 140 mmol/L      Potassium 3.8 mmol/L      Chloride 100 mmol/L      CO2 30.0 mmol/L      Calcium 8.9 mg/dL      Total Protein 7.2 g/dL      Albumin 3.00 g/dL      ALT (SGPT) 33 U/L      AST (SGOT) 36 U/L      Alkaline Phosphatase 119 U/L      Total Bilirubin 0.5 mg/dL      eGFR Non African Amer 77 mL/min/1.73      Globulin 4.2 gm/dL      A/G Ratio 0.7 g/dL      BUN/Creatinine Ratio 34.4     Anion Gap 10.0 mmol/L     Narrative:      GFR Normal >60  Chronic Kidney Disease  <60  Kidney Failure <15          PENDING RESULTS:  n/a    IMAGING REVIEWED:  No radiology results for the last day  I have reviewed the patient's labs, imaging, reports, and other clinician documentation.    Assessment/Plan   ASSESSMENT  1. 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  CT chest with progression vs infectious/reactive changes. Plan was to repeat scans vs request diagnostic bronchoscopy once acute condition improves but patient thinking of not pursuing aggressive care.  MRI brain showing single left occipital lobe met. Rad 2000 cGy SRS given 03/03/2021.   2. Anemia/Myelodysplastic syndrome/GOGO with malabsorption/Chemotherapy-induced anemia-planned to start Retacrit as outpatient for MDS prior to admission.  Known GOGO on oral iron prior to admission. S/p 1 unit pRBC on 2/19/2021.  Iron studies showed continued iron deficiency anemia.  No evidence of hemolysis.  Completed IV iron 2/22/2021 and on Retacrit. Hgb stable.  Oral iron restarted 3/4/2021.  3. Thrombocytopenia-likely chemotherapy-induced.  No need for further work-up at present.  Platelets normalized 03/02/2021.   4. Acute respiratory failure/Pneumonia/COPD-intubated and sedated on admission.  Started on broad-spectrum antibiotics. Blood cultures NGTD.  Extubated on 2/20/202. Initially afebrile but spiked low-grade temps and recultured with blood cx neg to date. RVP positive for rhinovirus/enterovirus. Management per pulmonary.  5. Weight loss/loss of appetite-improving as outpatient on Megace.    NG tube removed and patient on puréed diet now.  6. Altered mental status/agitation-on Haldol. Psych consulted and he was restarted on home meds including Suboxone, sertraline, and Latuda.  Resolved.  7. Low magnesium/high LFTs-per primary team.     PLAN  1. Follow CBC.   2. Outpatient MRI brain and PET  scan if patient decides.    3. Continue Retacrit.   4. Daily oral iron.           I discussed the patients findings and my recommendations with patient.    Electronically signed by Axel Lewis MD, 03/05/21, 8:10 AM EST.

## 2021-03-05 NOTE — PROGRESS NOTES
"PULMONARY CRITICAL CARE Progress  NOTE      PATIENT IDENTIFICATION:  Name: Axel Ramirez  MRN: [id number]  :  1950     Age: 70 y.o.  Sex: male    DATE OF Note:  3/5/2021   Referring Physician: Abhishek Lazo MD                  Subjective:   Appears comfortable, less SOB, On 2 L O2,   no nausea or vomiting, no change in bowel habit, no dysuria,  no new  skin rash or itching.      Objective:  tMax 24 hrs: Temp (24hrs), Av.9 °F (36.6 °C), Min:97.3 °F (36.3 °C), Max:99 °F (37.2 °C)      Vitals Ranges:   Temp:  [97.3 °F (36.3 °C)-99 °F (37.2 °C)] 97.3 °F (36.3 °C)  Heart Rate:  [] 81  Resp:  [15-20] 20  BP: ()/(60-74) 102/68    Intake and Output Last 3 Shifts:   I/O last 3 completed shifts:  In: 250 [P.O.:250]  Out: -     Exam:  /68 (BP Location: Left arm, Patient Position: Lying)   Pulse 81   Temp 97.3 °F (36.3 °C) (Oral)   Resp 20   Ht 177.8 cm (70\")   Wt 58.4 kg (128 lb 12 oz)   SpO2 97%   BMI 18.47 kg/m²     General Appearance: Alert ,cachetic    HEENT:  Normocephalic, without obvious abnormality, Conjunctiva/corneas clear,.  Normal external ear canals, Nares normal, no drainage     Neck:  Supple, symmetrical, trachea midline. No JVD.  Lungs /Chest wall:   Bilateral basal rhonchi, respirations unlabored symmetrical wall movement.     Heart:  Regular rate and rhythm, systolic murmur PMI left sternal border  Abdomen: Soft, non-tender, no masses, no organomegaly.    Extremities: Trace edema no clubbing or Cyanosis  SKIN: Left buttock -Unstageable pressure injury   Coccyx/ cleft with masd linear ulceration        Medications:    Current Facility-Administered Medications:   •  acetaminophen (TYLENOL) suppository 650 mg, 650 mg, Rectal, Q6H PRN, Day, Carina, APRN, 650 mg at 21 1605  •  acetaminophen (TYLENOL) tablet 650 mg, 650 mg, Oral, Q6H PRN, Wanda, VINCE Lim, 650 mg at 21 1011  •  aspirin chewable tablet 81 mg, 81 mg, Oral, Daily, Efrem Langley " Donato Evans MD, 81 mg at 03/05/21 0825  •  atorvastatin (LIPITOR) tablet 40 mg, 40 mg, Oral, Nightly, Efrem Langley MD, 40 mg at 03/04/21 2052  •  Barium Sulfate 60 % cream 1 teaspoon(s), 1 teaspoon(s), Oral, Once in imaging, Abhishek Lazo MD  •  bisacodyl (DULCOLAX) suppository 10 mg, 10 mg, Rectal, Daily PRN, Efrem Langley MD, 10 mg at 02/26/21 1405  •  budesonide (PULMICORT) nebulizer solution 0.5 mg, 0.5 mg, Nebulization, BID - RT, Lianna Groves MD, 0.5 mg at 03/05/21 0726  •  calcium gluconate 1g/50ml 0.675% NaCl IV SOLN, 1 g, Intravenous, PRN **AND** calcium gluconate 2-0.675 GM/100ML NACL IVPB, 2 g, Intravenous, PRN **AND** Calcium, Ionized, , , PRN, Efrem Langley MD  •  docusate sodium (COLACE) capsule 100 mg, 100 mg, Oral, BID, Abhishek Lazo MD, 100 mg at 03/05/21 0825  •  epoetin asia-epbx (RETACRIT) injection 40,000 Units, 40,000 Units, Subcutaneous, Q7 Days, Axel Lewis MD, 40,000 Units at 02/28/21 1142  •  ferrous sulfate EC tablet 324 mg, 324 mg, Oral, Daily With Breakfast, Axel Lewis MD, 324 mg at 03/05/21 0825  •  furosemide (LASIX) injection 20 mg, 20 mg, Intravenous, Q12H, Efrem Langley MD, 20 mg at 03/05/21 1109  •  gabapentin (NEURONTIN) capsule 300 mg, 300 mg, Oral, TID, Efrem Langley MD, 300 mg at 03/05/21 0825  •  guaiFENesin (MUCINEX) 12 hr tablet 600 mg, 600 mg, Oral, Q12H, Abhishek Lazo MD, 600 mg at 03/05/21 0825  •  haloperidol lactate (HALDOL) injection 2 mg, 2 mg, Intramuscular, Q6H PRN, Carina Muñoz APRN, 2 mg at 02/24/21 0554  •  HYDROcodone-acetaminophen (NORCO) 5-325 MG per tablet 1 tablet, 1 tablet, Oral, Q6H PRN, Abhishek Lazo MD, 1 tablet at 03/04/21 2054  •  ibuprofen (ADVIL,MOTRIN) 100 MG/5ML suspension 400 mg, 400 mg, Oral, Q6H PRN, Raysa Alatorre APRN, 400 mg at 02/24/21 1833  •  ipratropium-albuterol (DUO-NEB)  nebulizer solution 3 mL, 3 mL, Nebulization, Q4H - RT, Lianna Groves MD, 3 mL at 03/05/21 1118  •  labetalol (NORMODYNE,TRANDATE) injection 20 mg, 20 mg, Intravenous, Q6H PRN, DayCarina, APRN, 20 mg at 02/23/21 0358  •  lansoprazole (FIRST) oral suspension 30 mg, 30 mg, Oral, Clifton VILLAGRAN Timothy Michael, MD, 30 mg at 03/05/21 0826  •  lurasidone (LATUDA) tablet 40 mg, 40 mg, Oral, Daily, ShivamEfrem MD, 40 mg at 03/05/21 0825  •  Magnesium Sulfate 2 gram Bolus, followed by 8 gram infusion (total Mg dose 10 grams)- Mg less than or equal to 1mg/dL, 2 g, Intravenous, PRN **OR** Magnesium Sulfate 2 gram / 50mL Infusion (GIVE X 3 BAGS TO EQUAL 6GM TOTAL DOSE) - Mg 1.1 - 1.5 mg/dl, 2 g, Intravenous, PRN **OR** Magnesium Sulfate 4 gram infusion- Mg 1.6-1.9 mg/dL, 4 g, Intravenous, PRN, Efrem Langley MD, Last Rate: 25 mL/hr at 02/25/21 1831, 4 g at 02/25/21 1831  •  melatonin tablet 5 mg, 5 mg, Oral, Nightly PRN, Lisandra Cruz, APRN, 5 mg at 02/26/21 2206  •  metoprolol tartrate (LOPRESSOR) injection 5 mg, 5 mg, Intravenous, Q6H PRN, Juanito Palomo APRN, 5 mg at 02/24/21 1537  •  metoprolol tartrate (LOPRESSOR) tablet 50 mg, 50 mg, Oral, Q12H, Efrem Langley MD, 50 mg at 03/05/21 0825  •  ondansetron (ZOFRAN) injection 4 mg, 4 mg, Intravenous, Q6H PRN, So Damon APRN, 4 mg at 03/03/21 0944  •  potassium chloride (K-DUR,KLOR-CON) CR tablet 40 mEq, 40 mEq, Oral, PRN, 40 mEq at 03/03/21 0903 **OR** potassium chloride (KLOR-CON) packet 40 mEq, 40 mEq, Oral, PRN, 40 mEq at 02/26/21 1513 **OR** potassium chloride 10 mEq in 100 mL IVPB, 10 mEq, Intravenous, Q1H PRN, Juanito Palomo APRN, Last Rate: 100 mL/hr at 02/22/21 1526, 10 mEq at 02/22/21 1526  •  potassium phosphate 45 mmol in sodium chloride 0.9 % 500 mL infusion, 45 mmol, Intravenous, PRN **OR** potassium phosphate 30 mmol in sodium chloride 0.9 % 250 mL infusion, 30 mmol, Intravenous,  PRN **OR** potassium phosphate 15 mmol in sodium chloride 0.9 % 100 mL infusion, 15 mmol, Intravenous, PRN, 15 mmol at 02/26/21 0957 **OR** sodium phosphates 45 mmol in sodium chloride 0.9 % 500 mL IVPB, 45 mmol, Intravenous, PRN **OR** sodium phosphates 30 mmol in sodium chloride 0.9 % 250 mL IVPB, 30 mmol, Intravenous, PRN **OR** sodium phosphates 15 mmol in sodium chloride 0.9 % 250 mL IVPB, 15 mmol, Intravenous, PRN, Efrem Langley MD  •  predniSONE (DELTASONE) tablet 10 mg, 10 mg, Oral, Daily With Breakfast, Ángela Bean MD, 10 mg at 03/05/21 0825  •  sertraline (ZOLOFT) tablet 50 mg, 50 mg, Oral, Daily, Efrem Langley MD, 50 mg at 03/05/21 0825  •  sodium chloride 0.9 % flush 10 mL, 10 mL, Intravenous, PRN, Lianna Groves MD, 10 mL at 03/05/21 0825  •  spironolactone (ALDACTONE) tablet 25 mg, 25 mg, Oral, Daily, Efrem Langley MD, 25 mg at 03/05/21 0825  •  Zinc Oxide 16 % ointment, , Apply externally, BID, Efrem Langley MD, Given at 03/05/21 0826  •  Zinc Oxide 16 % ointment, , Apply externally, PRN, Efrem Langley MD    Data Review:  All labs (24hrs):   Recent Results (from the past 24 hour(s))   Comprehensive Metabolic Panel    Collection Time: 03/05/21  3:06 AM    Specimen: Blood   Result Value Ref Range    Glucose 93 65 - 99 mg/dL    BUN 33 (H) 8 - 23 mg/dL    Creatinine 0.96 0.76 - 1.27 mg/dL    Sodium 140 136 - 145 mmol/L    Potassium 3.8 3.5 - 5.2 mmol/L    Chloride 100 98 - 107 mmol/L    CO2 30.0 (H) 22.0 - 29.0 mmol/L    Calcium 8.9 8.6 - 10.5 mg/dL    Total Protein 7.2 6.0 - 8.5 g/dL    Albumin 3.00 (L) 3.50 - 5.20 g/dL    ALT (SGPT) 33 1 - 41 U/L    AST (SGOT) 36 1 - 40 U/L    Alkaline Phosphatase 119 (H) 39 - 117 U/L    Total Bilirubin 0.5 0.0 - 1.2 mg/dL    eGFR Non African Amer 77 >60 mL/min/1.73    Globulin 4.2 gm/dL    A/G Ratio 0.7 g/dL    BUN/Creatinine Ratio 34.4 (H) 7.0 - 25.0    Anion Gap 10.0  5.0 - 15.0 mmol/L   CBC Auto Differential    Collection Time: 03/05/21  3:06 AM    Specimen: Blood   Result Value Ref Range    WBC 6.20 3.40 - 10.80 10*3/mm3    RBC 2.60 (L) 4.14 - 5.80 10*6/mm3    Hemoglobin 9.7 (L) 13.0 - 17.7 g/dL    Hematocrit 27.8 (L) 37.5 - 51.0 %    .7 (H) 79.0 - 97.0 fL    MCH 37.1 (H) 26.6 - 33.0 pg    MCHC 34.8 31.5 - 35.7 g/dL    RDW 26.6 (H) 12.3 - 15.4 %    RDW-SD 98.4 (H) 37.0 - 54.0 fl    MPV 8.7 6.0 - 12.0 fL    Platelets 276 140 - 450 10*3/mm3   Scan Slide    Collection Time: 03/05/21  3:06 AM    Specimen: Blood   Result Value Ref Range    Scan Slide     Manual Differential    Collection Time: 03/05/21  3:06 AM    Specimen: Blood   Result Value Ref Range    Neutrophil % 53.0 42.7 - 76.0 %    Lymphocyte % 11.0 (L) 19.6 - 45.3 %    Monocyte % 22.0 (H) 5.0 - 12.0 %    Bands %  11.0 (H) 0.0 - 5.0 %    Metamyelocyte % 1.0 (H) 0.0 - 0.0 %    Atypical Lymphocyte % 2.0 0.0 - 5.0 %    Neutrophils Absolute 3.97 1.70 - 7.00 10*3/mm3    Lymphocytes Absolute 0.68 (L) 0.70 - 3.10 10*3/mm3    Monocytes Absolute 1.36 (H) 0.10 - 0.90 10*3/mm3    nRBC 2.0 (H) 0.0 - 0.2 /100 WBC    Anisocytosis Slight/1+ None Seen    Dacrocytes Slight/1+ None Seen    Macrocytes Slight/1+ None Seen    Poikilocytes Slight/1+ None Seen    Toxic Granulation Slight/1+ None Seen    Platelet Estimate Adequate Normal    Large Platelets Slight/1+ None Seen    Giant Platelets Slight/1+ None Seen        Imaging:  FL Video Swallow With Speech Single Contrast  Narrative: DATE OF EXAM:  3/2/2021 9:15 AM     PROCEDURE:  FL VIDEO SWALLOW W SPEECH SINGLE-CONTRAST-     INDICATIONS:  repeat VFSS as only on nectar thick clear liquid diet; J18.9-Pneumonia,  unspecified organism; R06.03-Acute respiratory distress; J96.21-Acute  and chronic respiratory failure with hypoxia     COMPARISON:  Video swallow study dated 2/26/2021.     TECHNIQUE:   This examination was performed in conjunction with speech pathology.  Lateral video  fluoroscopic evaluation of the swallowing mechanism was  performed while correlate administering to the patient various  consistency food items mixed with barium.     Fluoroscopic Time:   2.5 minutes     FINDINGS:  Deep penetration with one trial of nectar thickened liquid. Other  remaining consistencies demonstrating no aspiration or laryngeal  penetration. Residue is present with multiple consistency trialed.      Impression: As above. Please see speech pathology report for detailed evaluation and  dietary recommendations.     Electronically Signed By-Daryl Hunter MD On:3/2/2021 11:39 AM  This report was finalized on [id number] by  Daryl Hunter MD.       ASSESSMENT:    Acute on chronic respiratory failure with hypoxia (CMS/HCC)    Essential hypertension    Hx of aortic valve replacement    Squamous cell carcinoma of lung, right (CMS/HCC)    Diastolic CHF, acute (CMS/HCC)    Pneumonia due to infectious organism    Moderate malnutrition (CMS/HCC)    COPD with acute exacerbation (CMS/HCC)    Delirium  Left buttock -Unstageable pressure injury   Coccyx/ cleft with masd linear ulceration   Lesion in the medial aspect of the left occipital lobe   Delirium, possibly withdrawal from gabapentin and/or Suboxone    PLAN:  Plan is to discharge home with hospice once everything set up, patient does not want any further cancer treatment and wants to go home with family  O2 support   Continue diet as tolerated-currently on Pureed with thickened liq and supplements   Bronchodilator  Inhaled corticosteroids  Diuresis currently on Aldactone 25 mg and Lasix 20 mg IV every 12  Monitor urinary retention  Electrolytes/ glycemic control  DVT and GI prophylaxis.     Discussed with Dr Yaneli Dumont, APRN   3/5/2021    I personally have examined  and interviewed the patient. I have reviewed the history, data, problems, assessment and plan with our NP.  Critical care time in direct medical management (   )  minutes   Lianna Groves MD D,ABSM  1/23/2021

## 2021-03-05 NOTE — PROGRESS NOTES
Nutrition Services    Patient Name: Axel Ramirez  YOB: 1950  MRN: [id number]  Admission date: 2/18/2021    PPE Documentation        PPE Worn By Provider Did not enter room on this encounter    PPE Worn By Patient  N/A     PROGRESS NOTE      Encounter Information: Progress note to monitor PO intakes        PO Diet: Diet Texture; Pureed Diet; Thickened Liquids (Nectar), No Straws   PO Supplements: Boost Pudding BID  Magic Cups BID    PO Intake:  Avg meal intake 50% x 2 meals since last full review        Nutrition support orders: No longer on TF, Received TF from 2/19/21-3/2/21   Nutrition support review: N/A        Labs (reviewed below): Reviewed         GI Function:  Small BM today 3/2       Nutrition Intervention: Continue with oral nutritional supplements as ordered. Will continue to monitor        Results from last 7 days   Lab Units 03/05/21  0306 03/04/21  0348 03/03/21  0551   SODIUM mmol/L 140 139 139   POTASSIUM mmol/L 3.8 3.9 3.6   CHLORIDE mmol/L 100 100 101   CO2 mmol/L 30.0* 32.0* 31.0*   BUN mg/dL 33* 34* 30*   CREATININE mg/dL 0.96 1.01 0.70*   CALCIUM mg/dL 8.9 8.7 8.4*   BILIRUBIN mg/dL 0.5 0.5 0.5   ALK PHOS U/L 119* 113 117   ALT (SGPT) U/L 33 31 29   AST (SGOT) U/L 36 36 44*   GLUCOSE mg/dL 93 99 103*     Results from last 7 days   Lab Units 03/05/21  0306   HEMOGLOBIN g/dL 9.7*   HEMATOCRIT % 27.8*     COVID19   Date Value Ref Range Status   02/24/2021 Not Detected Not Detected - Ref. Range Final     No results found for: HGBA1C    RD to follow up per protocol.    Electronically signed by:  Cheri Diaz RD  03/05/21 08:57 EST

## 2021-03-05 NOTE — PROGRESS NOTES
"PULMONARY CRITICAL CARE Progress  NOTE      PATIENT IDENTIFICATION:  Name: Axel Ramirez  MRN: [id number]  :  1950     Age: 70 y.o.  Sex: male    DATE OF Note:  3/4/2021   Referring Physician: Abhishek Lazo MD                  Subjective: Cachetic  less SOB, On 2 L O2, no SOB, had radiation today,  no nausea or vomiting, no change in bowel habit, no dysuria,  no new  skin rash or itching.      Objective:  tMax 24 hrs: Temp (24hrs), Av.3 °F (36.8 °C), Min:97.6 °F (36.4 °C), Max:99 °F (37.2 °C)      Vitals Ranges:   Temp:  [97.6 °F (36.4 °C)-99 °F (37.2 °C)] 97.6 °F (36.4 °C)  Heart Rate:  [] 81  Resp:  [15-18] 18  BP: ()/(56-65) 95/60    Intake and Output Last 3 Shifts:   I/O last 3 completed shifts:  In: 490 [P.O.:490]  Out: -     Exam:  BP 95/60 (BP Location: Right arm, Patient Position: Lying)   Pulse 81   Temp 97.6 °F (36.4 °C) (Oral)   Resp 18   Ht 177.8 cm (70\")   Wt 65.1 kg (143 lb 8.3 oz)   SpO2 99%   BMI 20.59 kg/m²     General Appearance: Alert ,cachetic    HEENT:  Normocephalic, without obvious abnormality, Conjunctiva/corneas clear,.  Normal external ear canals, Nares normal, no drainage     Neck:  Supple, symmetrical, trachea midline. No JVD.  Lungs /Chest wall:   Bilateral basal rhonchi, respirations unlabored symmetrical wall movement.     Heart:  Regular rate and rhythm, systolic murmur PMI left sternal border  Abdomen: Soft, non-tender, no masses, no organomegaly.    Extremities: Trace edema no clubbing or Cyanosis  SKIN: Left buttock -Unstageable pressure injury   Coccyx/theresa cleft with masd linear ulceration        Medications:    Current Facility-Administered Medications:   •  acetaminophen (TYLENOL) suppository 650 mg, 650 mg, Rectal, Q6H PRN, Day, Carina, APRN, 650 mg at 02/24/21 1605  •  acetaminophen (TYLENOL) tablet 650 mg, 650 mg, Oral, Q6H PRN, Day, VINCE Lim, 650 mg at 21 1011  •  aspirin chewable tablet 81 mg, 81 mg, Oral, Daily, Shivam, " Efrem Evans MD, 81 mg at 03/04/21 0931  •  atorvastatin (LIPITOR) tablet 40 mg, 40 mg, Oral, Nightly, Efrem Langley MD, 40 mg at 03/04/21 2052  •  Barium Sulfate 60 % cream 1 teaspoon(s), 1 teaspoon(s), Oral, Once in imaging, Abhishek Lazo MD  •  bisacodyl (DULCOLAX) suppository 10 mg, 10 mg, Rectal, Daily PRN, Efrem Langley MD, 10 mg at 02/26/21 1405  •  budesonide (PULMICORT) nebulizer solution 0.5 mg, 0.5 mg, Nebulization, BID - RT, Lianna Groves MD, 0.5 mg at 03/04/21 2002  •  calcium gluconate 1g/50ml 0.675% NaCl IV SOLN, 1 g, Intravenous, PRN **AND** calcium gluconate 2-0.675 GM/100ML NACL IVPB, 2 g, Intravenous, PRN **AND** Calcium, Ionized, , , PRN, Efrem Langley MD  •  docusate sodium (COLACE) capsule 100 mg, 100 mg, Oral, BID, Abhishek Lazo MD, 100 mg at 03/04/21 2052  •  epoetin asia-epbx (RETACRIT) injection 40,000 Units, 40,000 Units, Subcutaneous, Q7 Days, Axel Lewis MD, 40,000 Units at 02/28/21 1142  •  ferrous sulfate EC tablet 324 mg, 324 mg, Oral, Daily With Breakfast, Axel Lewis MD, 324 mg at 03/04/21 1004  •  furosemide (LASIX) injection 20 mg, 20 mg, Intravenous, Q12H, Efrem Langley MD, 20 mg at 03/04/21 0931  •  gabapentin (NEURONTIN) capsule 300 mg, 300 mg, Oral, TID, Efrem Langley MD, 300 mg at 03/04/21 2056  •  guaiFENesin (MUCINEX) 12 hr tablet 600 mg, 600 mg, Oral, Q12H, Abhishek Lazo MD, 600 mg at 03/04/21 2052  •  haloperidol lactate (HALDOL) injection 2 mg, 2 mg, Intramuscular, Q6H PRN, Carina Muñoz APRN, 2 mg at 02/24/21 0554  •  HYDROcodone-acetaminophen (NORCO) 5-325 MG per tablet 1 tablet, 1 tablet, Oral, Q6H PRN, Abhishek Lazo MD, 1 tablet at 03/04/21 2054  •  ibuprofen (ADVIL,MOTRIN) 100 MG/5ML suspension 400 mg, 400 mg, Oral, Q6H PRN, Raysa Alatorre APRN, 400 mg at 02/24/21 1833  •  ipratropium-albuterol  (DUO-NEB) nebulizer solution 3 mL, 3 mL, Nebulization, Q4H - RT, Lianna Groves MD, 3 mL at 03/04/21 1958  •  labetalol (NORMODYNE,TRANDATE) injection 20 mg, 20 mg, Intravenous, Q6H PRN, Day, Carina, APRN, 20 mg at 02/23/21 0358  •  [START ON 3/5/2021] lansoprazole (FIRST) oral suspension 30 mg, 30 mg, Oral, Clifton VILLAGRAN Timothy Michael, MD  •  lurasidone (LATUDA) tablet 40 mg, 40 mg, Oral, Daily, Efrem Langley MD, 40 mg at 03/04/21 0931  •  Magnesium Sulfate 2 gram Bolus, followed by 8 gram infusion (total Mg dose 10 grams)- Mg less than or equal to 1mg/dL, 2 g, Intravenous, PRN **OR** Magnesium Sulfate 2 gram / 50mL Infusion (GIVE X 3 BAGS TO EQUAL 6GM TOTAL DOSE) - Mg 1.1 - 1.5 mg/dl, 2 g, Intravenous, PRN **OR** Magnesium Sulfate 4 gram infusion- Mg 1.6-1.9 mg/dL, 4 g, Intravenous, PRN, Efrem Langley MD, Last Rate: 25 mL/hr at 02/25/21 1831, 4 g at 02/25/21 1831  •  melatonin tablet 5 mg, 5 mg, Oral, Nightly PRN, Lisandra Cruz, APRN, 5 mg at 02/26/21 2206  •  metoprolol tartrate (LOPRESSOR) injection 5 mg, 5 mg, Intravenous, Q6H PRN, Juanito Palomo, APRN, 5 mg at 02/24/21 1537  •  metoprolol tartrate (LOPRESSOR) tablet 50 mg, 50 mg, Oral, Q12H, Efrem Langley MD, 50 mg at 03/03/21 2114  •  ondansetron (ZOFRAN) injection 4 mg, 4 mg, Intravenous, Q6H PRN, So Damon, APRN, 4 mg at 03/03/21 0944  •  potassium chloride (K-DUR,KLOR-CON) CR tablet 40 mEq, 40 mEq, Oral, PRN, 40 mEq at 03/03/21 0903 **OR** potassium chloride (KLOR-CON) packet 40 mEq, 40 mEq, Oral, PRN, 40 mEq at 02/26/21 1513 **OR** potassium chloride 10 mEq in 100 mL IVPB, 10 mEq, Intravenous, Q1H PRN, Juanito Palomo APRN, Last Rate: 100 mL/hr at 02/22/21 1526, 10 mEq at 02/22/21 1526  •  potassium phosphate 45 mmol in sodium chloride 0.9 % 500 mL infusion, 45 mmol, Intravenous, PRN **OR** potassium phosphate 30 mmol in sodium chloride 0.9 % 250 mL infusion, 30 mmol,  Intravenous, PRN **OR** potassium phosphate 15 mmol in sodium chloride 0.9 % 100 mL infusion, 15 mmol, Intravenous, PRN, 15 mmol at 02/26/21 0957 **OR** sodium phosphates 45 mmol in sodium chloride 0.9 % 500 mL IVPB, 45 mmol, Intravenous, PRN **OR** sodium phosphates 30 mmol in sodium chloride 0.9 % 250 mL IVPB, 30 mmol, Intravenous, PRN **OR** sodium phosphates 15 mmol in sodium chloride 0.9 % 250 mL IVPB, 15 mmol, Intravenous, PRN, Efrem Langley MD  •  predniSONE (DELTASONE) tablet 10 mg, 10 mg, Oral, Daily With Breakfast, Ángela Bean MD, 10 mg at 03/04/21 0931  •  sertraline (ZOLOFT) tablet 50 mg, 50 mg, Oral, Daily, Efrem Langley MD, 50 mg at 03/04/21 0931  •  sodium chloride 0.9 % flush 10 mL, 10 mL, Intravenous, PRN, Lianna Groves MD, 10 mL at 03/04/21 2053  •  spironolactone (ALDACTONE) tablet 25 mg, 25 mg, Oral, Daily, Efrem Langley MD, 25 mg at 03/04/21 0931  •  Zinc Oxide 16 % ointment, , Apply externally, BID, Efrem Langley MD, Given at 03/04/21 2056  •  Zinc Oxide 16 % ointment, , Apply externally, PRN, Efrem Langley MD    Data Review:  All labs (24hrs):   Recent Results (from the past 24 hour(s))   Comprehensive Metabolic Panel    Collection Time: 03/04/21  3:48 AM    Specimen: Blood   Result Value Ref Range    Glucose 99 65 - 99 mg/dL    BUN 34 (H) 8 - 23 mg/dL    Creatinine 1.01 0.76 - 1.27 mg/dL    Sodium 139 136 - 145 mmol/L    Potassium 3.9 3.5 - 5.2 mmol/L    Chloride 100 98 - 107 mmol/L    CO2 32.0 (H) 22.0 - 29.0 mmol/L    Calcium 8.7 8.6 - 10.5 mg/dL    Total Protein 6.8 6.0 - 8.5 g/dL    Albumin 2.80 (L) 3.50 - 5.20 g/dL    ALT (SGPT) 31 1 - 41 U/L    AST (SGOT) 36 1 - 40 U/L    Alkaline Phosphatase 113 39 - 117 U/L    Total Bilirubin 0.5 0.0 - 1.2 mg/dL    eGFR Non African Amer 73 >60 mL/min/1.73    Globulin 4.0 gm/dL    A/G Ratio 0.7 g/dL    BUN/Creatinine Ratio 33.7 (H) 7.0 - 25.0    Anion  Gap 7.0 5.0 - 15.0 mmol/L   CBC Auto Differential    Collection Time: 03/04/21  3:48 AM    Specimen: Blood   Result Value Ref Range    WBC 5.30 3.40 - 10.80 10*3/mm3    RBC 2.51 (L) 4.14 - 5.80 10*6/mm3    Hemoglobin 9.3 (L) 13.0 - 17.7 g/dL    Hematocrit 26.9 (L) 37.5 - 51.0 %    .1 (H) 79.0 - 97.0 fL    MCH 37.1 (H) 26.6 - 33.0 pg    MCHC 34.6 31.5 - 35.7 g/dL    RDW 25.7 (H) 12.3 - 15.4 %    RDW-SD 94.5 (H) 37.0 - 54.0 fl    MPV 8.7 6.0 - 12.0 fL    Platelets 243 140 - 450 10*3/mm3   Scan Slide    Collection Time: 03/04/21  3:48 AM    Specimen: Blood   Result Value Ref Range    Scan Slide     Manual Differential    Collection Time: 03/04/21  3:48 AM    Specimen: Blood   Result Value Ref Range    Neutrophil % 59.0 42.7 - 76.0 %    Lymphocyte % 18.0 (L) 19.6 - 45.3 %    Monocyte % 14.0 (H) 5.0 - 12.0 %    Bands %  9.0 (H) 0.0 - 5.0 %    Neutrophils Absolute 3.60 1.70 - 7.00 10*3/mm3    Lymphocytes Absolute 0.95 0.70 - 3.10 10*3/mm3    Monocytes Absolute 0.74 0.10 - 0.90 10*3/mm3    nRBC 2.0 (H) 0.0 - 0.2 /100 WBC    Anisocytosis Mod/2+ None Seen    Macrocytes Slight/1+ None Seen    Poikilocytes Slight/1+ None Seen    Polychromasia Slight/1+ None Seen    RBC Fragments Slight/1+ None Seen    WBC Morphology Normal Normal    Platelet Morphology Normal Normal        Imaging:  FL Video Swallow With Speech Single Contrast  Narrative: DATE OF EXAM:  3/2/2021 9:15 AM     PROCEDURE:  FL VIDEO SWALLOW W SPEECH SINGLE-CONTRAST-     INDICATIONS:  repeat VFSS as only on nectar thick clear liquid diet; J18.9-Pneumonia,  unspecified organism; R06.03-Acute respiratory distress; J96.21-Acute  and chronic respiratory failure with hypoxia     COMPARISON:  Video swallow study dated 2/26/2021.     TECHNIQUE:   This examination was performed in conjunction with speech pathology.  Lateral video fluoroscopic evaluation of the swallowing mechanism was  performed while correlate administering to the patient various  consistency food  items mixed with barium.     Fluoroscopic Time:   2.5 minutes     FINDINGS:  Deep penetration with one trial of nectar thickened liquid. Other  remaining consistencies demonstrating no aspiration or laryngeal  penetration. Residue is present with multiple consistency trialed.      Impression: As above. Please see speech pathology report for detailed evaluation and  dietary recommendations.     Electronically Signed By-Daryl Hunter MD On:3/2/2021 11:39 AM  This report was finalized on [id number] by  Daryl Hunter MD.       ASSESSMENT:    Acute on chronic respiratory failure with hypoxia (CMS/HCC)    Essential hypertension    Hx of aortic valve replacement    Squamous cell carcinoma of lung, right (CMS/HCC)    Diastolic CHF, acute (CMS/HCC)    Pneumonia due to infectious organism    Moderate malnutrition (CMS/HCC)    COPD with acute exacerbation (CMS/HCC)    Delirium  Left buttock -Unstageable pressure injury   Coccyx/ cleft with masd linear ulceration   Lesion in the medial aspect of the left occipital lobe   Delirium, possibly withdrawal from gabapentin and/or Suboxone    PLAN:  O2 support   Continue diet as tolerated-currently on Pureed with thickened liq and supplements   Bronchodilator  Inhaled corticosteroids  Diuresis currently on Aldactone 25 mg and Lasix 20 mg IV every 12  Monitor urinary retention  Electrolytes/ glycemic control  DVT and GI prophylaxis.     Critical care time in direct medical management (   ) minutes   Lianna Groves MD, D,ABSM  3/4/2021

## 2021-03-05 NOTE — PLAN OF CARE
Patient performed well today.  Able to do bed mobility, come to stand, transfer and ambulate with min a for safety.    Plan to return home with family assist and hospice.  PPE: face shield, face mask and gloves.

## 2021-03-06 NOTE — PROGRESS NOTES
Continued Stay Note  [facility]     Patient Name: Axel Ramirez  MRN: [id number]  Today's Date: 3/6/2021    Admit Date: 2/18/2021    Discharge Plan     Row Name 03/06/21 1214       Plan    Plan Comments  I see now that [facility] cannot accept pt - I have call out to [facility] -await callback. BSC and MN delivered to rm.    Row Name 03/06/21 5384       Plan    Plan  Home with [facility] and spouse.    Plan Comments  Met with Em/[facility] -sp/pt still want chemo ,so not approp. for hospice. I spoke with pt/sp ,in rm-wearing appropriate PPE and maintaining greater than 6 feet distance from others. Time in rm -less than 10min.They want to go home with HHC and choose [facility]  -need BSC and h.bed - I sent text to MD for needed verbiage.Referral to [facility]. Orders in /placed into Inbasket.          I spoke again with pt/sp ,in rm -wearing appropriate PPE and maintaining greater than 6 feet distance from others. Time in rm -less than 10min.-I explained to spouse that several HHC companies were consulted and declined for various reasons and she understood and is OK with -even though her spouse did choose [facility].  I have called [facility] - to ck status - await callback.            Amanda Weinstein RN

## 2021-03-06 NOTE — PROGRESS NOTES
[facility] Medicine Services Daily Progress Note      Hospitalist Team  LOS 16 days      Patient Care Team:  Sandoval Stevenson FNP as PCP - General  Sandoval Stevenson FNP as PCP - Family Medicine  Axel Lewis MD as Consulting Physician (Hematology and Oncology)  Iglesia Springer MD as Consulting Physician (Cardiology)    Patient Location: [facility]      Subjective   Subjective     Chief Complaint / Subjective  Chief Complaint   Patient presents with   • Shortness of Breath     Patient and family initially scheduled to meet with hospice to discuss discharging home with hospice care.  However on further discussion with patient and his wife he is under the impression that he is continuing chemotherapy in which case I told him he would not be appropriate for hospice care.  Patient and wife appear to have a misunderstanding of goals of hospice care and are under the impression he is continuing active treatment.  Discussed with patient and family that if they were continuing active treatment then he needs to continue with speech therapy and continue current diet, in which case patient may ultimately require G-tube if he is unable to meet his caloric requirements.  Patient to continue working with speech therapy.    Brief Synopsis of Hospital Course/HPI  HPI taken from medical chart review as patient is oriented x2 but is anxious and agitated and cannot elaborate on what led to his hospitalization   Mr. Ramirez is a 70 y.o. male known history of COPD on 2L O2 continuously, history of lung cancer status post chemoradiation is still receiving chemo, was brought by EMS to [facility] ED 2/18/21 with complaints of shortness of breath. He initially was on 4 L and required to be on 8 L in the emergency room. He continued to decline to respiratory failure and required intubation in the ER.  CT showed bilateral infiltrates with increased narrowing of right middle lobe bronchus secondary to known mass.   He was started on broad spectrum antibiotics.  He was admitted to the ICU for further treatment of acute respiratory failure and pneumonia.      Since admission the patient has been gradually weaned off mechanical ventilation, extubated 2/20/2021 now on room air.  He did have some hypotension that required vasopressors that have since been weaned off. He was felt stable for transfer out of ICU 2/22/21 and the hospitalist group was consulted for medical management. He remains confused and agitated with sitter at bedside. The patient and family report he has not slept in 2 days and would like something to help him sleep.         Date::      2/23  Patient is nonverbal.  Sitting in recliner having hard time breathing and tachypneic with rales at the bases on exam  He is using Yunker to suction secretions.  IV Lasix ordered x1  Chest x-ray and ABG ordered     2/24/2021  Patient seen and examined earlier today  He wants his Suboxone back which was started.  Increase also Neurontin to his regular dose.  Had nasogastric tube for feeding and awaiting dietitian recommendation  Tachycardia and hypertensive in the starting him back on his home metoprolol.     2/25  Patient spiked fever overnight and had repeat blood cultures done  Vancomycin started but MRSA screen negative and then discontinued.  Respiratory panelShowedRhinovirus/enterovirus  Patient slightly drowsy but able to answer questions with little weak voice  Started on tube feeding     2/26     Patient had abnormal MRI.  We will ask neurosurgery for evaluation.  Hypernatremia noted and free water is increased/Nasogastric tube  Denies any new deficits.  He is wanting to drink Coca[employer]a but only thickened liquids are allowed.  Discussed findings with his wife.  Radiation oncology also consulted     2/27  C/o generalized aches  Has prominent lid lag: check free t4 and tsh      2/28  No significant complaints today, tolerating TFs      3/1  Plan for radiation tx   Goals  of care clarified with patient's spouse     3/2/2021: Patient reported feeling better today.  Breathing improving.  Going for video swallow and then radiation therapy.    3/3/2021: Patient diet advanced after swallow study yesterday but had episode of emesis this morning with no signs of aspiration.  Patient reported nausea resolved after emesis.  Patient seen by palliative care elected to make himself DNR.  Continuing radiation treatment per radiation oncology.  Patient stable to transfer to medical inpatient floor.    3/4/2021: Patient reports no new symptoms this morning.  Breathing comfortably and denies chest pain.  No nausea.  Patient will need to work with speech therapy to trial diet again.  Discussed with patient that if he wishes to continue aggressive therapy and is unable to tolerate a p.o. diet will need to discuss replacing Dobbhoff tube and discussing G-tube.  Patient was hesitant to agree to this, palliative care to further discuss with patient to ensure understanding.    3/5/2021: Patient reports no new symptoms today.  Breathing comfortably no pain no nausea or vomiting.  Patient is electing to transition to hospice care, consult placed and going to have family meeting on 3/6/2021.    Date::          Review of Systems   Constitutional: Negative for chills and fever.   HENT: Negative for hoarse voice and stridor.    Eyes: Negative for double vision and photophobia.   Cardiovascular: Negative for chest pain and syncope.   Respiratory: Negative for cough and shortness of breath.    Musculoskeletal: Negative for joint pain and stiffness.   Gastrointestinal: Positive for nausea. Negative for vomiting.   Genitourinary: Negative for dysuria and flank pain.   Neurological: Negative for focal weakness and light-headedness.   Psychiatric/Behavioral: Negative for altered mental status. The patient is not nervous/anxious.          Objective   Objective      Vital Signs  Temp:  [97.6 °F (36.4 °C)-98.6 °F (37  "°C)] 98.6 °F (37 °C)  Heart Rate:  [78-97] 85  Resp:  [16-20] 16  BP: (109-122)/(67-73) 110/69  Oxygen Therapy  SpO2: 96 %  Pulse Oximetry Type: Intermittent  Device (Oxygen Therapy): nasal cannula  Device (Oxygen Therapy): nasal cannula  Flow (L/min): 4  Oxygen Concentration (%): 36  Oximetry Probe Site Changed: No  Flowsheet Rows      First Filed Value   Admission Height  177.8 cm (70\") Documented at 02/18/2021 1048   Admission Weight  67.1 kg (148 lb) Documented at 02/18/2021 1048        Intake & Output (last 3 days)       03/03 0701 - 03/04 0700 03/04 0701 - 03/05 0700 03/05 0701 - 03/06 0700 03/06 0701 - 03/07 0700    P.O. 240 250 150 450    Total Intake(mL/kg) 240 (3.7) 250 (4.3) 150 (2.6) 450 (7.7)    Urine (mL/kg/hr)        Total Output        Net +240 +250 +150 +450            Urine Unmeasured Occurrence 1 x 2 x      Stool Unmeasured Occurrence  1 x 1 x     Emesis Unmeasured Occurrence 1 x           Lines, Drains & Airways    Active LDAs     Name:   Placement date:   Placement time:   Site:   Days:    Peripheral IV 02/23/21 1300 Anterior;Left Forearm   02/23/21    1300    Forearm   7    External Urinary Catheter   02/28/21    0900    --   2    Single Lumen Implantable Port 10/30/20 Left Subclavian   10/30/20    0956    Subclavian   123                  Physical Exam:    Physical Exam    General: Frail elderly male lying in bed breathing comfortably on 4 L via nasal cannula no acute distress  HEENT: NC/AT, EOMI, mucosa moist  Heart: Regular, rate controlled  Chest: Normal work of breathing, moving air well no wheezing  Abdominal: Soft. NT/ND.   Musculoskeletal: Normal ROM.  No cyanosis. No calf tenderness.  Neurological: Awake and alert, no focal deficits  Skin: Skin is warm and dry. No rash  Psychiatric: Normal mood and affect.         Wounds (last 24 hours)      LDA Wound     Row Name 03/06/21 0745 03/05/21 1930          Wound 02/18/21 1900 Right gluteal Pressure Injury    Wound - Properties Group " Placement Date: 02/18/21  -OSMAN Placement Time: 1900  -OSMAN Present on Hospital Admission: Y  -OSMAN Side: Right  -OSMAN Location: gluteal  -OSMAN Primary Wound Type: Pressure inj  -OSMAN Stage, Pressure Injury : Stage 2  -OSMAN    Dressing Appearance  open to air  -SW  open to air  -ER     Closure  Open to air  -SW  Open to air  -ER     Base  red  -SW  --     Periwound Temperature  --  cool  -ER     Periwound Skin Turgor  --  soft  -ER     Drainage Amount  none  -SW  none  -ER     Care, Wound  --  barrier applied  -ER     Periwound Care  --  topical treatment applied  -ER     Retired Wound - Properties Group Date first assessed: 02/18/21  -OSMAN Time first assessed: 1900  -OSMAN Present on Hospital Admission: Y  -OSMAN Side: Right  -OSMAN Location: gluteal  -OSMAN Primary Wound Type: Pressure inj  -OSMAN       Wound 02/27/21 0000 medial sacral spine Pressure Injury    Wound - Properties Group Placement Date: 02/27/21  -EM Placement Time: 0000  -EM Present on Hospital Admission: --  -EM, unknown  Orientation: medial  -EM Location: sacral spine  -EM Primary Wound Type: Pressure inj  -EM Stage, Pressure Injury : unstageable  -EM, poss eschar     Dressing Appearance  open to air  -SW  open to air  -ER     Closure  Open to air  -SW  Open to air  -ER     Base  non-blanchable  -SW  non-blanchable  -ER     Periwound  --  redness  -ER     Periwound Temperature  --  warm  -ER     Drainage Amount  none  -SW  --     Care, Wound  --  barrier applied  -ER     Dressing Care  --  open to air  -ER     Retired Wound - Properties Group Date first assessed: 02/27/21  -EM Time first assessed: 0000  -EM Present on Hospital Admission: --  -EM, unknown  Location: sacral spine  -EM Primary Wound Type: Pressure inj  -EM       Wound 02/18/21 1044 Bilateral posterior elbow Abrasion    Wound - Properties Group Placement Date: 02/18/21  -EM Placement Time: 1044  -EM Side: Bilateral  -EM Orientation: posterior  -EM Location: elbow  -EM Primary Wound Type: Abrasion  -EM, old scab      Dressing Appearance  open to air  -SW  open to air  -ER     Closure  Open to air  -SW  --     Base  scab  -SW  scab  -ER     Drainage Amount  none  -SW  --     Retired Wound - Properties Group Date first assessed: 02/18/21  -EM Time first assessed: 1044  -EM Side: Bilateral  -EM Location: elbow  -EM Primary Wound Type: Abrasion  -EM, old scab       User Key  (r) = Recorded By, (t) = Taken By, (c) = Cosigned By    Initials Name Provider Type    ER Rea Hinton RN Registered Nurse    Deepthi Hicks RN Registered Nurse    Kennedi Gandhi, RN Registered Nurse    Berenice Cuevas RN Registered Nurse          Procedures:              Results Review:     I reviewed the patient's new clinical results.      Lab Results (last 24 hours)     ** No results found for the last 24 hours. **        No results found for: HGBA1C            No results found for: LIPASE  Lab Results   Component Value Date    CHOL 79 11/29/2019    TRIG 48 11/29/2019    HDL 33 (L) 11/29/2019    LDL 36 11/29/2019       Lab Results   Lab Value Date/Time    FINALDX  11/11/2020 0958     Tumor mass, right mainstem bronchus, biopsy:    Invasive moderately differentiated squamous cell carcinoma (see COMMENT)    NORBERTO/tkd       FINALDX  11/11/2020 0747     Smears of bronchial washings with cytospin preparation:    Occasional markedly atypical cells consistent with non-small cell carcinoma and exhibiting cytomorphologic      features most suggestive of squamous cell carcinoma     Background consists of acute inflammation with occasional fungal spores and hyphae morphologically most      consistent with Candida species    NORBERTO/tkd       COMDX  11/11/2020 0958     The biopsy was stained via immunohistochemical technique utilizing appropriate controls for the presence of p63, p40, CK5/6, napsin and TTF-1. The tumor cells are positive for CK5/6, p63 and p40 while being negative for TTF-1 and napsin. This staining pattern in conjunction with  the histologic features is entirely consistent with squamous cell carcinoma. The tumor is focally necrotic. There is no lymph-vascular space invasion identified.      NORBERTO/tkd       COMDX  11/11/2020 0747     Please correlate with concurrent surgical pathology specimen ([id number]).    NORBERTO/tkd          Microbiology Results (last 10 days)     Procedure Component Value - Date/Time    Blood Culture - Blood, Hand, Left [[id number]] Collected: 02/24/21 2109    Lab Status: Final result Specimen: Blood from Hand, Left Updated: 03/01/21 2130     Blood Culture No growth at 5 days    MRSA Screen, PCR (Inpatient) - Swab, Nares [[id number]]  (Normal) Collected: 02/24/21 1952    Lab Status: Final result Specimen: Swab from Nares Updated: 02/24/21 2112     MRSA PCR No MRSA Detected    COVID PRE-OP / PRE-PROCEDURE SCREENING ORDER (NO ISOLATION) - Swab, Nasopharynx [[id number]]  (Abnormal) Collected: 02/24/21 1951    Lab Status: Final result Specimen: Swab from Nasopharynx Updated: 02/24/21 2339    Narrative:      The following orders were created for panel order COVID PRE-OP / PRE-PROCEDURE SCREENING ORDER (NO ISOLATION) - Swab, Nasopharynx.  Procedure                               Abnormality         Status                     ---------                               -----------         ------                     Respiratory Panel PCR w/...[[id number]]  Abnormal            Final result                 Please view results for these tests on the individual orders.    Respiratory Panel PCR w/COVID-19(SARS-CoV-2) PARESH/GRACIELA/KATHY/PAD/COR/MAD/CHRIST In-House, NP Swab in UTM/VTM, 3-4 HR TAT - Swab, Nasopharynx [[id number]]  (Abnormal) Collected: 02/24/21 1951    Lab Status: Final result Specimen: Swab from Nasopharynx Updated: 02/24/21 2339     ADENOVIRUS, PCR Not Detected     Coronavirus 229E Not Detected     Coronavirus HKU1 Not Detected     Coronavirus NL63 Not Detected     Coronavirus OC43 Not Detected     COVID19 Not Detected     Human  Metapneumovirus Not Detected     Human Rhinovirus/Enterovirus Detected     Influenza A PCR Not Detected     Influenza B PCR Not Detected     Parainfluenza Virus 1 Not Detected     Parainfluenza Virus 2 Not Detected     Parainfluenza Virus 3 Not Detected     Parainfluenza Virus 4 Not Detected     RSV, PCR Not Detected     Bordetella pertussis pcr Not Detected     Bordetella parapertussis PCR Not Detected     Chlamydophila pneumoniae PCR Not Detected     Mycoplasma pneumo by PCR Not Detected    Narrative:      Fact sheet for providers: https://docs.[employer]/wp-content/uploads/YSB6334-0551-DB0.1-EUA-Provider-Fact-Sheet-3.pdf    Fact sheet for patients: https://docs.[employer]/wp-content/uploads/PTJ0819-2605-CE9.1-EUA-Patient-Fact-Sheet-1.pdf    Test performed by PCR.          ECG/EMG Results (most recent)     Procedure Component Value Units Date/Time    ECG 12 Lead [[id number]] Collected: 02/18/21 1050     Updated: 02/19/21 1421     QT Interval 291 ms     Narrative:      HEART RATE= 130  bpm  RR Interval= 462  ms  IN Interval= 132  ms  P Horizontal Axis= -23  deg  P Front Axis= 79  deg  QRSD Interval= 83  ms  QT Interval= 291  ms  QRS Axis= 55  deg  T Wave Axis=   deg  - ABNORMAL ECG -  Sinus tachycardia  Left atrial enlargement  Anteroseptal infarct, age indeterminate  When compared with ECG of 09-Nov-2020 13:39:57,  Significant rate increase  Significant axis, voltage or hypertrophy change  Electronically Signed By: Higinio Flores ([facility]) 19-Feb-2021 14:11:44  Date and Time of Study: 2021-02-18 10:50:09    ECG 12 Lead [[id number]] Collected: 02/20/21 1846     Updated: 02/24/21 0721     QT Interval 340 ms     Narrative:      HEART RATE= 119  bpm  RR Interval= 496  ms  IN Interval=   ms  P Horizontal Axis=   deg  P Front Axis=   deg  QRSD Interval= 87  ms  QT Interval= 340  ms  QRS Axis= 66  deg  T Wave Axis= 75  deg  - ABNORMAL ECG -  Atrial flutter with predominant 2:1 AV block  Ventricular premature  complex  Consider anterior infarct  When compared with ECG of 18-Feb-2021 10:50:09,  Nonspecific significant change  Electronically Signed By: Fuad Montero ([facility]) 24-Feb-2021 07:14:30  Date and Time of Study: 2021-02-20 18:46:19    ECG 12 Lead [[id number]] Collected: 02/21/21 0947     Updated: 02/24/21 0731     QT Interval 379 ms     Narrative:      HEART RATE= 100  bpm  RR Interval= 600  ms  MS Interval= 136  ms  P Horizontal Axis= -16  deg  P Front Axis= 98  deg  QRSD Interval= 95  ms  QT Interval= 379  ms  QRS Axis= 63  deg  T Wave Axis= 70  deg  - BORDERLINE ECG -  Sinus tachycardia  Consider left ventricular hypertrophy  When compared with ECG of 20-Feb-2021 18:46:19,  Significant axis, voltage or hypertrophy change  Electronically Signed By: Fuad Montero ([facility]) 24-Feb-2021 07:19:26  Date and Time of Study: 2021-02-21 09:47:53    ECG 12 Lead [[id number]] Collected: 02/25/21 0552     Updated: 02/25/21 0554     QT Interval 356 ms     Narrative:      HEART RATE= 106  bpm  RR Interval= 568  ms  MS Interval= 112  ms  P Horizontal Axis= -38  deg  P Front Axis= 63  deg  QRSD Interval= 106  ms  QT Interval= 356  ms  QRS Axis= -8  deg  T Wave Axis= 80  deg  - ABNORMAL ECG -  Sinus tachycardia  LAE, consider biatrial enlargement  Anteroseptal infarct, age indeterminate  When compared with ECG of 24-Feb-2021 6:11:17,  Significant repolarization change  Electronically Signed By:   Date and Time of Study: 2021-02-25 05:52:57    ECG 12 Lead [[id number]] Collected: 02/23/21 0744     Updated: 02/26/21 1535     QT Interval 395 ms     Narrative:      HEART RATE= 111  bpm  RR Interval= 540  ms  MS Interval= 156  ms  P Horizontal Axis= -42  deg  P Front Axis= 79  deg  QRSD Interval= 122  ms  QT Interval= 395  ms  QRS Axis= -14  deg  T Wave Axis= 86  deg  - ABNORMAL ECG -  Sinus tachycardia  Biatrial enlargement  Evolving anterior infarct since 22-Feb-2021 7:19:57  When compared with ECG of 22-Feb-2021 7:19:57,  New or worsened  ischemia or infarction  Significant axis, voltage or hypertrophy change  Electronically Signed By: Braden Mcdaniel ([facility]) 26-Feb-2021 15:31:30  Date and Time of Study: 2021-02-23 07:44:24    ECG 12 Lead [[id number]] Collected: 02/26/21 0544     Updated: 02/26/21 1945     QT Interval 345 ms     Narrative:      HEART RATE= 125  bpm  RR Interval= 496  ms  MA Interval= 110  ms  P Horizontal Axis= -34  deg  P Front Axis= 73  deg  QRSD Interval= 107  ms  QT Interval= 345  ms  QRS Axis= -4  deg  T Wave Axis= 92  deg  - ABNORMAL ECG -  Sinus tachycardia  Atrial premature complexes  LAE, consider biatrial enlargement  When compared with ECG of 25-Feb-2021 5:52:57,  No significant change  Electronically Signed By: Rebeca Jacobs ([facility]) 26-Feb-2021 19:35:13  Date and Time of Study: 2021-02-26 05:44:41    ECG 12 Lead [[id number]] Collected: 02/22/21 0719     Updated: 02/28/21 1657     QT Interval 394 ms     Narrative:      HEART RATE= 110  bpm  RR Interval= 544  ms  MA Interval= 142  ms  P Horizontal Axis= -26  deg  P Front Axis= 70  deg  QRSD Interval= 122  ms  QT Interval= 394  ms  QRS Axis= -14  deg  T Wave Axis= 86  deg  - ABNORMAL ECG -  Sinus tachycardia  Biatrial enlargement  Left ventricular hypertrophy  When compared with ECG of 21-Feb-2021 9:47:53,  No significant change  Electronically Signed By: Iglesia Springer ([facility]) 28-Feb-2021 16:56:20  Date and Time of Study: 2021-02-22 07:19:57    ECG 12 Lead [[id number]] Collected: 02/27/21 2326     Updated: 02/28/21 1759     QT Interval 381 ms     Narrative:      HEART RATE= 90  bpm  RR Interval= 668  ms  MA Interval= 114  ms  P Horizontal Axis= -41  deg  P Front Axis= 70  deg  QRSD Interval= 84  ms  QT Interval= 381  ms  QRS Axis= 59  deg  T Wave Axis= 51  deg  - ABNORMAL ECG -  Sinus rhythm  Left atrial enlargement  Anteroseptal infarct, age indeterminate  When compared with ECG of 27-Feb-2021 5:51:38,  Significant axis, voltage or hypertrophy  change  Electronically Signed By: Rebeca Jacobs ([facility]) 28-Feb-2021 17:49:16  Date and Time of Study: 2021-02-27 23:26:41    ECG 12 Lead [[id number]] Collected: 02/27/21 0551     Updated: 02/28/21 1759     QT Interval 347 ms     Narrative:      HEART RATE= 103  bpm  RR Interval= 584  ms  MA Interval= 108  ms  P Horizontal Axis= -36  deg  P Front Axis= 63  deg  QRSD Interval= 85  ms  QT Interval= 347  ms  QRS Axis= 50  deg  T Wave Axis= -4  deg  - BORDERLINE ECG -  Sinus tachycardia  Left atrial enlargement  Probable left ventricular hypertrophy  When compared with ECG of 26-Feb-2021 5:44:41,  Significant rate decrease  Significant axis, voltage or hypertrophy change  Electronically Signed By: Rebeca Jacobs ([facility]) 28-Feb-2021 17:49:24  Date and Time of Study: 2021-02-27 05:51:38    ECG 12 Lead [[id number]] Collected: 03/01/21 0741     Updated: 03/02/21 1009     QT Interval 377 ms     Narrative:      HEART RATE= 87  bpm  RR Interval= 692  ms  MA Interval= 117  ms  P Horizontal Axis= -66  deg  P Front Axis= 65  deg  QRSD Interval= 93  ms  QT Interval= 377  ms  QRS Axis= 57  deg  T Wave Axis= 73  deg  - ABNORMAL ECG -  Sinus rhythm  Left atrial enlargement  Anteroseptal infarct, age indeterminate  When compared with ECG of 27-Feb-2021 23:26:41,  No significant change  Electronically Signed By: Adi Carroll ([facility]) 02-Mar-2021 10:03:42  Date and Time of Study: 2021-03-01 07:41:28    ECG 12 Lead [[id number]] Collected: 02/24/21 0611     Updated: 03/02/21 1937     QT Interval 358 ms     Narrative:      HEART RATE= 121  bpm  RR Interval= 496  ms  MA Interval= 120  ms  P Horizontal Axis= -25  deg  P Front Axis= 64  deg  QRSD Interval= 108  ms  QT Interval= 358  ms  QRS Axis= -6  deg  T Wave Axis= 85  deg  - ABNORMAL ECG -  Sinus tachycardia  LAE, consider biatrial enlargement  Prolonged QT interval  When compared with ECG of 23-Feb-2021 7:44:24,  Nonspecific significant change  Electronically Signed By: Simón  Iker ([facility]) 02-Mar-2021 19:31:29  Date and Time of Study: 2021-02-24 06:11:17    ECG 12 Lead [[id number]] Collected: 03/03/21 0342     Updated: 03/03/21 0344     QT Interval 426 ms     Narrative:      HEART RATE= 75  bpm  RR Interval= 804  ms  [place] Interval= 125  ms  P Horizontal Axis= -32  deg  P Front Axis= 69  deg  QRSD Interval= 98  ms  QT Interval= 426  ms  QRS Axis= 70  deg  T Wave Axis= 83  deg  - ABNORMAL ECG -  Sinus rhythm  Left atrial enlargement  Anteroseptal infarct, age indeterminate  Electronically Signed By:   Date and Time of Study: 2021-03-03 03:42:53    ECG 12 Lead [[id number]] Collected: 03/02/21 0325     Updated: 03/03/21 0820     QT Interval 381 ms     Narrative:      HEART RATE= 88  bpm  RR Interval= 680  ms  [place] Interval= 122  ms  P Horizontal Axis= -73  deg  P Front Axis= 147  deg  QRSD Interval= 86  ms  QT Interval= 381  ms  QRS Axis= 60  deg  T Wave Axis= 148  deg  - ABNORMAL ECG -  Sinus or ectopic atrial rhythm  Left atrial enlargement  Anteroseptal infarct, age indeterminate  Lateral wall also involved  When compared with ECG of 01-Mar-2021 7:41:28,  No significant change  Electronically Signed By: Adi Carroll ([facility]) 03-Mar-2021 08:09:38  Date and Time of Study: 2021-03-02 03:25:22              Results for orders placed during the hospital encounter of 11/08/20    Adult Transthoracic Echo Complete W/ Cont if Necessary Per Protocol    Interpretation Summary  · Estimated left ventricular EF = 65% Left ventricular systolic function is normal.  · Left ventricular diastolic function is consistent with (grade Ia w/high LAP) impaired relaxation.  · The right atrial cavity is mildly dilated.  · Mild aortic valve stenosis is present.  · Mild to moderate LVOT gradient noted      FL Video Swallow With Speech Single Contrast    Result Date: 3/2/2021  As above. Please see speech pathology report for detailed evaluation and dietary recommendations.  Electronically Signed By-Daryl Hunter MD  On:3/2/2021 11:39 AM This report was finalized on [id number] by  Daryl Hunter MD.          Xrays, labs reviewed personally by physician.    Medication Review:   I have reviewed the patient's current medication list      Scheduled Meds  aspirin, 81 mg, Oral, Daily  atorvastatin, 40 mg, Oral, Nightly  Barium Sulfate, 1 teaspoon(s), Oral, Once in imaging  budesonide, 0.5 mg, Nebulization, BID - RT  docusate sodium, 100 mg, Oral, BID  epoetin asia-epbx, 40,000 Units, Subcutaneous, Q7 Days  ferrous sulfate, 324 mg, Oral, Daily With Breakfast  furosemide, 20 mg, Intravenous, Q12H  gabapentin, 300 mg, Oral, TID  guaiFENesin, 600 mg, Oral, Q12H  ipratropium-albuterol, 3 mL, Nebulization, Q4H - RT  lansoprazole, 30 mg, Oral, QAM  lurasidone, 40 mg, Oral, Daily  metoprolol tartrate, 50 mg, Oral, Q12H  polyethylene glycol, 17 g, Oral, Daily  predniSONE, 10 mg, Oral, Daily With Breakfast  sertraline, 50 mg, Oral, Daily  spironolactone, 25 mg, Oral, Daily  Zinc Oxide, , Apply externally, BID        Meds Infusions       Meds PRN  •  acetaminophen  •  acetaminophen  •  bisacodyl  •  Calcium Gluconate-NaCl **AND** calcium gluconate **AND** Calcium, Ionized  •  haloperidol lactate  •  HYDROcodone-acetaminophen  •  ibuprofen  •  labetalol  •  magnesium sulfate **OR** magnesium sulfate **OR** magnesium sulfate  •  melatonin  •  metoprolol tartrate  •  ondansetron  •  potassium chloride **OR** potassium chloride **OR** potassium chloride  •  potassium phosphate infusion greater than 15 mMoles **OR** potassium phosphate infusion greater than 15 mMoles **OR** potassium phosphate **OR** sodium phosphate IVPB **OR** sodium phosphate IVPB **OR** sodium phosphate IVPB  •  sodium chloride  •  Zinc Oxide        Assessment/Plan   Assessment/Plan     Active Hospital Problems    Diagnosis  POA   • **Acute on chronic respiratory failure with hypoxia (CMS/HCC) [J96.21]  Yes   • COPD with acute exacerbation (CMS/HCC) [J44.1]  Yes   •  Delirium [R41.0]  Yes   • Moderate malnutrition (CMS/HCC) [E44.0]  Yes   • Pneumonia due to infectious organism [J18.9]  Yes   • Squamous cell carcinoma of lung, right (CMS/HCC) [C34.91]  Yes   • Diastolic CHF, acute (CMS/HCC) [I50.31]  Yes   • Hx of aortic valve replacement [Z95.2]  Not Applicable   • Essential hypertension [I10]  Yes      Resolved Hospital Problems   No resolved problems to display.       MEDICAL DECISION MAKING COMPLEXITY BY PROBLEM:     Acute hypoxic respiratory failure-patient required mechanical ventilation, believed to be secondary to pneumonia and COPD exacerbation  -On 4 L at this time  -Extubated 2/20/2021 intermittently on room air  -Steroids and antibiotics per pulmonology, possible de-escalation of care was transition to hospice  -Oxygen as needed-Pulmonology continues monitor    Pneumonia-patient with right-sided mass in addition to signs of infiltrates  -Blood cultures no growth to date  -Positive rhinovirus  -Respiratory cultures negative as of the 19th  -Off antibiotics currently    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-patient now discontinuing active treatment and transitioning to hospice  -Oncology consulted  -Symptom control  -Daily CBC  -Abnormal MRI showing thick-walled ring-enhancing lesion in the left occipital lobe concerning for metastatic disease  -Neurosurgery involved  -Hospice consult placed will have family meeting on 3/6/2021, based on patient and wife's current expectations patient is not appropriate for hospice  -Working towards ultimate discharge, patient with chronic debility and due to comorbidities his head of the bed needs to be elevated more than 30 degrees most of the time due to COPD and  problems with aspiration and requires frequent changes in body position  -Palliative care consulted patient change CODE STATUS to DNR    Coronary artery disease-history of CABG no chest pain at this time  -Maintenance  medication  -Telemetry    Heart failure-diastolic in nature.  Relatively euvolemic at this time  -Diuresis as tolerated  -Daily weights    Urinary retention-straight cath as needed    Malnutrition-patient with swallowing difficulties and did not tolerate advance diet  -Continue working with speech therapy until officially transitioning to hospice/possible comfort measures  -Nutrition consulted  -If transitions can advance diet as tolerated    Delirium-patient appears to be back with appropriate mentation, patient off gabapentin and Suboxone for period of time, resumed  -Antipsychotic as needed  -Psych consult    Anemia/MDS/thrombocytopenia-possibly secondary to chemotherapy  -Managed per hematology    VTE Prophylaxis -   Mechanical Order History:      Ordered        02/19/21 0228  Place Sequential Compression Device  Once         02/19/21 0228  Maintain Sequential Compression Device  Continuous                 Pharmalogical Order History:     None            Code Status -   Code Status and Medical Interventions:   Ordered at: 03/03/21 1110     Limited Support to NOT Include:    Intubation     Code Status:    No CPR     Medical Interventions (Level of Support Prior to Arrest):    Limited       This patient has been examined wearing appropriate Personal Protective Equipment and discussed with hospital infection control department. 03/06/21        Discharge Planning  Patient not appropriate for hospice discussing further discharge possibility        Electronically signed by Abhishek Lazo MD, 03/06/21, 13:02 EST.  [facility]ist Team

## 2021-03-06 NOTE — DISCHARGE PLACEMENT REQUEST
"Maxi Flores (70 y.o. Male)     Date of Birth Social Security Number Address Home Phone MRN    1950  [address]  [place] [address] [phone] [id number]    [employer] Marital Status          Anglican        Admission Date Admission Type Admitting Provider Attending Provider Department, Room/Bed    2/18/21 Emergency Abhishek Lazo MD Farley, Timothy Michael, MD [facility] 3C MEDICAL INPATIENT, 368/1    Discharge Date Discharge Disposition Discharge Destination                       Attending Provider: Abhishek Lazo MD    Allergies: No Known Allergies    Isolation: Droplet   Infection: MRSA/History Only (02/19/21), Rhinovirus  (02/24/21)   Code Status: No CPR    Ht: 177.8 cm (70\")   Wt: 58.4 kg (128 lb 12 oz)    Admission Cmt: None   Principal Problem: Acute on chronic respiratory failure with hypoxia (CMS/HCC) [J96.21]                 Active Insurance as of 2/18/2021     Primary Coverage     Payor Plan Insurance Group Employer/Plan Group    ANTHEM MEDICARE REPLACEMENT ANTHEM MEDICARE ADVANTAGE [id number]     Payor Plan Address Payor Plan Phone Number Payor Plan Fax Number Effective Dates    [address] [phone]  1/1/2021 - None Entered    [place] [address]       Subscriber Name Subscriber Birth Date Member ID       MAXI FLORES 1950 [id number]           Secondary Coverage     Payor Plan Insurance Group Employer/Plan Group    [place]      Payor Plan Address Payor Plan Phone Number Payor Plan Fax Number Effective Dates    [address]   1/1/2019 - None Entered    [place] [address]       Subscriber Name Subscriber Birth Date Member ID       MAXI FLORES 1950 [id number]                 Emergency Contacts      (Rel.) Home Phone Work Phone Mobile Phone    Rachana Flores (Spouse) [phone] -- [phone]              "

## 2021-03-06 NOTE — PLAN OF CARE
Goal Outcome Evaluation:  Plan of Care Reviewed With: patient, significant other  Progress: no change  Outcome Summary: pt will be going home with hospice, SO at bedside, pt resting during shift, pain relief , no distress noted, will cont to monitor.

## 2021-03-06 NOTE — PROGRESS NOTES
"PULMONARY CRITICAL CARE Progress  NOTE      PATIENT IDENTIFICATION:  Name: Axel Ramirez  MRN: [id number]  :  1950     Age: 70 y.o.  Sex: male    DATE OF Note:  3/6/2021   Referring Physician: Abhishek Lazo MD                  Subjective:   Appears comfortable,   On 2 L O2,   no nausea or vomiting, no change in bowel habit, no dysuria,  no new  skin rash or itching.      Objective:  tMax 24 hrs: Temp (24hrs), Av.8 °F (36.6 °C), Min:97.3 °F (36.3 °C), Max:98.6 °F (37 °C)      Vitals Ranges:   Temp:  [97.3 °F (36.3 °C)-98.6 °F (37 °C)] 98.6 °F (37 °C)  Heart Rate:  [81-97] 97  Resp:  [16-20] 17  BP: (102-122)/(67-73) 122/73    Intake and Output Last 3 Shifts:   I/O last 3 completed shifts:  In: 150 [P.O.:150]  Out: -     Exam:  /73 (BP Location: Left arm, Patient Position: Lying)   Pulse 97   Temp 98.6 °F (37 °C) (Oral)   Resp 17   Ht 177.8 cm (70\")   Wt 58.4 kg (128 lb 12 oz)   SpO2 95%   BMI 18.47 kg/m²     General Appearance: Alert ,cachetic    HEENT:  Normocephalic, without obvious abnormality, Conjunctiva/corneas clear,.  Normal external ear canals, Nares normal, no drainage     Neck:  Supple, symmetrical, trachea midline. No JVD.  Lungs /Chest wall:   Bilateral basal rhonchi, respirations unlabored symmetrical wall movement.     Heart:  Regular rate and rhythm, systolic murmur PMI left sternal border  Abdomen: Soft, non-tender, no masses, no organomegaly.    Extremities: Trace edema no clubbing or Cyanosis  SKIN: Left buttock -Unstageable pressure injury   Coccyx/theresa cleft with masd linear ulceration        Medications:    Current Facility-Administered Medications:   •  acetaminophen (TYLENOL) suppository 650 mg, 650 mg, Rectal, Q6H PRN, Day, Carina, APRN, 650 mg at 21 1605  •  acetaminophen (TYLENOL) tablet 650 mg, 650 mg, Oral, Q6H PRN, Day, VINCE Lim, 650 mg at 21 1011  •  aspirin chewable tablet 81 mg, 81 mg, Oral, Daily, Efrem Langley, " MD, 81 mg at 03/06/21 0952  •  atorvastatin (LIPITOR) tablet 40 mg, 40 mg, Oral, Nightly, Efrem Langley MD, 40 mg at 03/05/21 2017  •  Barium Sulfate 60 % cream 1 teaspoon(s), 1 teaspoon(s), Oral, Once in imaging, Abhishek Lazo MD  •  bisacodyl (DULCOLAX) suppository 10 mg, 10 mg, Rectal, Daily PRN, Efrem Langley MD, 10 mg at 02/26/21 1405  •  budesonide (PULMICORT) nebulizer solution 0.5 mg, 0.5 mg, Nebulization, BID - RT, Lianna Groves MD, 0.5 mg at 03/06/21 0842  •  calcium gluconate 1g/50ml 0.675% NaCl IV SOLN, 1 g, Intravenous, PRN **AND** calcium gluconate 2-0.675 GM/100ML NACL IVPB, 2 g, Intravenous, PRN **AND** Calcium, Ionized, , , PRN, Efrem Langley MD  •  docusate sodium (COLACE) capsule 100 mg, 100 mg, Oral, BID, Abhishek Lazo MD, 100 mg at 03/06/21 0952  •  epoetin asia-epbx (RETACRIT) injection 40,000 Units, 40,000 Units, Subcutaneous, Q7 Days, Axel Lewis MD, 40,000 Units at 02/28/21 1142  •  ferrous sulfate EC tablet 324 mg, 324 mg, Oral, Daily With Breakfast, Axel Lewis MD, 324 mg at 03/06/21 0952  •  furosemide (LASIX) injection 20 mg, 20 mg, Intravenous, Q12H, Efrem Langley MD, 20 mg at 03/05/21 2225  •  gabapentin (NEURONTIN) capsule 300 mg, 300 mg, Oral, TID, Efrem Langley MD, 300 mg at 03/06/21 0952  •  guaiFENesin (MUCINEX) 12 hr tablet 600 mg, 600 mg, Oral, Q12H, Abhishek Lazo MD, 600 mg at 03/06/21 0952  •  haloperidol lactate (HALDOL) injection 2 mg, 2 mg, Intramuscular, Q6H PRN, Carina Muñoz APRN, 2 mg at 02/24/21 0554  •  HYDROcodone-acetaminophen (NORCO) 5-325 MG per tablet 1 tablet, 1 tablet, Oral, Q6H PRN, Abhishek Lazo MD, 1 tablet at 03/05/21 2030  •  ibuprofen (ADVIL,MOTRIN) 100 MG/5ML suspension 400 mg, 400 mg, Oral, Q6H PRN, Raysa Alatorre APRN, 400 mg at 02/24/21 1833  •  ipratropium-albuterol (DUO-NEB) nebulizer solution  3 mL, 3 mL, Nebulization, Q4H - RT, Lianna Groves MD, 3 mL at 03/06/21 0842  •  labetalol (NORMODYNE,TRANDATE) injection 20 mg, 20 mg, Intravenous, Q6H PRN, Carina Muñoz, APRN, 20 mg at 02/23/21 0358  •  lansoprazole (FIRST) oral suspension 30 mg, 30 mg, Oral, Clifton VILLAGRAN Timothy Michael, MD, 30 mg at 03/06/21 0554  •  lurasidone (LATUDA) tablet 40 mg, 40 mg, Oral, Daily, Efrem Langley MD, 40 mg at 03/06/21 0952  •  Magnesium Sulfate 2 gram Bolus, followed by 8 gram infusion (total Mg dose 10 grams)- Mg less than or equal to 1mg/dL, 2 g, Intravenous, PRN **OR** Magnesium Sulfate 2 gram / 50mL Infusion (GIVE X 3 BAGS TO EQUAL 6GM TOTAL DOSE) - Mg 1.1 - 1.5 mg/dl, 2 g, Intravenous, PRN **OR** Magnesium Sulfate 4 gram infusion- Mg 1.6-1.9 mg/dL, 4 g, Intravenous, PRN, ShivamEfrem MD, Last Rate: 25 mL/hr at 02/25/21 1831, 4 g at 02/25/21 1831  •  melatonin tablet 5 mg, 5 mg, Oral, Nightly PRN, Lisandra Cruz, APRN, 5 mg at 03/05/21 2030  •  metoprolol tartrate (LOPRESSOR) injection 5 mg, 5 mg, Intravenous, Q6H PRN, Juanito Palomo APRN, 5 mg at 02/24/21 1537  •  metoprolol tartrate (LOPRESSOR) tablet 50 mg, 50 mg, Oral, Q12H, Efrem Langley MD, 50 mg at 03/06/21 0952  •  ondansetron (ZOFRAN) injection 4 mg, 4 mg, Intravenous, Q6H PRN, So Damon, APRN, 4 mg at 03/03/21 0944  •  potassium chloride (K-DUR,KLOR-CON) CR tablet 40 mEq, 40 mEq, Oral, PRN, 40 mEq at 03/03/21 0903 **OR** potassium chloride (KLOR-CON) packet 40 mEq, 40 mEq, Oral, PRN, 40 mEq at 02/26/21 1513 **OR** potassium chloride 10 mEq in 100 mL IVPB, 10 mEq, Intravenous, Q1H PRN, Juanito Palomo APRN, Last Rate: 100 mL/hr at 02/22/21 1526, 10 mEq at 02/22/21 1526  •  potassium phosphate 45 mmol in sodium chloride 0.9 % 500 mL infusion, 45 mmol, Intravenous, PRN **OR** potassium phosphate 30 mmol in sodium chloride 0.9 % 250 mL infusion, 30 mmol, Intravenous, PRN **OR**  potassium phosphate 15 mmol in sodium chloride 0.9 % 100 mL infusion, 15 mmol, Intravenous, PRN, 15 mmol at 02/26/21 0957 **OR** sodium phosphates 45 mmol in sodium chloride 0.9 % 500 mL IVPB, 45 mmol, Intravenous, PRN **OR** sodium phosphates 30 mmol in sodium chloride 0.9 % 250 mL IVPB, 30 mmol, Intravenous, PRN **OR** sodium phosphates 15 mmol in sodium chloride 0.9 % 250 mL IVPB, 15 mmol, Intravenous, PRN, Efrem Langley MD  •  predniSONE (DELTASONE) tablet 10 mg, 10 mg, Oral, Daily With Breakfast, Ángela Bean MD, 10 mg at 03/06/21 0952  •  sertraline (ZOLOFT) tablet 50 mg, 50 mg, Oral, Daily, Efrem Langley MD, 50 mg at 03/06/21 0952  •  sodium chloride 0.9 % flush 10 mL, 10 mL, Intravenous, PRN, Lianna Groves MD, 10 mL at 03/06/21 0952  •  spironolactone (ALDACTONE) tablet 25 mg, 25 mg, Oral, Daily, Efrem Langley MD, 25 mg at 03/06/21 0952  •  Zinc Oxide 16 % ointment, , Apply externally, BID, Efrem Langley MD, Given at 03/05/21 2020  •  Zinc Oxide 16 % ointment, , Apply externally, PRN, Efrem Langley MD    Data Review:  All labs (24hrs):   No results found for this or any previous visit (from the past 24 hour(s)).     Imaging:  FL Video Swallow With Speech Single Contrast  Narrative: DATE OF EXAM:  3/2/2021 9:15 AM     PROCEDURE:  FL VIDEO SWALLOW W SPEECH SINGLE-CONTRAST-     INDICATIONS:  repeat VFSS as only on nectar thick clear liquid diet; J18.9-Pneumonia,  unspecified organism; R06.03-Acute respiratory distress; J96.21-Acute  and chronic respiratory failure with hypoxia     COMPARISON:  Video swallow study dated 2/26/2021.     TECHNIQUE:   This examination was performed in conjunction with speech pathology.  Lateral video fluoroscopic evaluation of the swallowing mechanism was  performed while correlate administering to the patient various  consistency food items mixed with barium.     Fluoroscopic Time:    2.5 minutes     FINDINGS:  Deep penetration with one trial of nectar thickened liquid. Other  remaining consistencies demonstrating no aspiration or laryngeal  penetration. Residue is present with multiple consistency trialed.      Impression: As above. Please see speech pathology report for detailed evaluation and  dietary recommendations.     Electronically Signed By-Daryl Hunter MD On:3/2/2021 11:39 AM  This report was finalized on [id number] by  Daryl Hunter MD.       ASSESSMENT:    Acute on chronic respiratory failure with hypoxia (CMS/HCC)    Essential hypertension    Hx of aortic valve replacement    Squamous cell carcinoma of lung, right (CMS/HCC)    Diastolic CHF, acute (CMS/HCC)    Pneumonia due to infectious organism    Moderate malnutrition (CMS/HCC)    COPD with acute exacerbation (CMS/HCC)    Delirium  Left buttock -Unstageable pressure injury   Coccyx/ cleft with masd linear ulceration   Lesion in the medial aspect of the left occipital lobe   Delirium, possibly withdrawal from gabapentin and/or Suboxone    PLAN:  Continue current plan of care, Speech therapy to see to upgrade diet  Plan is to discharge home with hospice once everything set up, patient does not want any further cancer treatment and wants to go home with family  O2 support   Continue diet as tolerated-currently on Pureed with thickened liq and supplements   Bronchodilator  Inhaled corticosteroids  Diuresis currently on Aldactone 25 mg and Lasix 20 mg IV every 12  Monitor urinary retention  Electrolytes/ glycemic control  DVT and GI prophylaxis.     Discussed with Dr Yaneli Dumont, VINCE   3/6/2021    I personally have examined  and interviewed the patient. I have reviewed the history, data, problems, assessment and plan with our NP.  Critical care time in direct medical management (   ) minutes   Lianna Groves MD, D,ABSM  2021

## 2021-03-06 NOTE — PLAN OF CARE
Goal Outcome Evaluation:  Plan of Care Reviewed With: patient  Progress: no change  Outcome Summary: patient resting in bed throughout shift. hospice met with patient and spouse this am. speech to eval. hospital bed and commode ordered for home. spouse at bedside. no further complaints.

## 2021-03-06 NOTE — PROGRESS NOTES
Hematology/Oncology Inpatient Progress Note    PATIENT NAME: Axel Ramirez  : 1950  MRN: [id number]    CHIEF COMPLAINT: Hypoxia and respiratory failure     HISTORY OF PRESENT ILLNESS:    70 y.o. male admitted to the ICU through the ED 2021 with hypoxia and respiratory failure requiring intubation.  The patient was intubated and sedated at time of consult and histories were taken from review of records and spouse report.  His spouse reported he had been feeling better and had tolerated recent chemotherapy for his lung cancer.  He was eating well after start of Megace but did develop increasing shortness of air a few days prior to admission without a change in his chronic cough.  She denied known fever, chills, or hemoptysis.  On admission, CT PE protocol was limited due to motion but felt negative for central PE.  There was evidence of pneumonia versus aspiration and known right upper lobe lung mass invading the right upper lobe bronchus with known obstruction and with increased narrowing/obstruction of the right middle lobe bronchus.  There was suggestion of progressive tumor infiltration of the right mainstem bronchus and new left paratracheal adenopathy with stable right paratracheal and subcarinal adenopathy.  CBC revealed WBC 8.4, hemoglobin 8.6, .7, and platelets 106,000.  D-dimer was elevated to 1.98 (0-0.59).  Creatinine was not elevated and LFTs were okay.  Respiratory viral panel was negative and blood cultures were sent.  On 2021 hemoglobin dropped to 7.4 and platelets to 92,000.     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  His lung cancer was initially treated with concomitant weekly chemotherapy (paclitaxel and carboplatin x7) and radiation  therapy from November 2020. He completed radiation therapy on 1/6/2021.  He completed the weekly portion of his chemotherapy on 1/12/2021.  He was seen in follow-up on 2/4/2021 where it was planned to continue chemotherapy with paclitaxel and carboplatin along with Neulasta support every 3 weeks.  He was scheduled for CT scan chest abdomen and pelvis on 2/18/2021 as an outpatient prior to his admission.  He received chemotherapy with Neulasta support on 2/9/2021 at which time CBC revealed WBC 7.62, hemoglobin 9.5, and platelets 223,000.  He was anemic at time of diagnosis of his lung cancer and anemia work-up revealed iron deficiency as well as MDS.  He received oral iron however he was diagnosed with malabsorption and received IV iron in November 2020 which was also repeated in December 2020.  His bone marrow had revealed mild increased iron storage in January 2021.  At his follow-up visit on 2/4/2021 t Retacrit 30,000 units subcu weekly was ordered for his myelodysplastic syndrome while continuing oral iron for his iron deficiency with iron saturation of 17%.  He had not started Retacrit prior to admission pending insurance authorization.     PCP: Sandoval Stevenson FNP    INTERVAL HISTORY:  2/19/2021-received 1 unit pRBCs. Iron 21 (59-1 58), iron saturation 12 (20-50), transferrin 121 (200-3 60), TIBC 180 (298-536), ferritin 2658 (), reticulocytes 1.93 (0.7 0-1.90), haptoglobin 294 (). Started Venofer 300 mg IV x 3 days. Patient extubated, on nasal canula and Precedex.  2/20/2021 -white blood count 8.8, hemoglobin 8, .4, platelets 57,000.  CT head with moderate periventricular white matter changes present likely related to chronic microvascular ischemic change, progressed as compared to the previous study.  Hypodensity present within the left occipital region which appeared more chronic or remote, new as compared to the previous study.  Chest x-ray with improvement in right basilar airspace  disease with resolution of the left airspace disease.  Stable right upper lobe mass.  2/21/2021-Retacrit 40,000 units subcu weekly started for hemoglobin 7.9.  2/22/2021-patient moved out of ICU.  2/23/2021-patient moved to PCU for tachypnea and tachycardia.  02/25/21-CT abdomen and pelvis without showed no evidence of malignancy. There was layering hyperdense material in the gallbladder with spurious excretion of contrast from prior contrast-enhanced study versus sludge/gallstones in differential, constipation, and known pneumonia/aspiration. Respiratory viral panel positive for rhinovirus/enterovirus. Psych consulted and patient started back on home meds including Suboxone, Latuda, and sertraline.  MRI brain without contrast suggestive of left cerebral cortical and leptomeningeal mets.  2/26/2021-hemoglobin 9.7, platelets 112,000, WBC 10.3.  More alert and oriented.  2/27/2021-MRI brain with contrast showed a 1.1 x 1 cm left occipital lobe lesion. Neurosurgery not recommending resection. Rad Onc planning SRS on 3/3 if patient can tolerate planning. Erythropoietin level 362 (2.6-18.5).    2/28/2021-Reviewed MRI brain findings. Patient and spouse both wanting to proceed with radiation therapy.  Platelets improved to 147,000.   03/02/2021-platelets normalized.  No aspiration on swallow study.  NG tube removed and patient started on puréed diet.  3/3/2021-patient received SRS radiation to left occiput 2000 cGy.    History of present illness reviewed since last visit and changes noted on 03/06/21.    Subjective   Unsure whether he wants to proceed with any further cancer treatments.  Complains of bedsore and wants to eat regular diet.  Constipated.    ROS:   Review of Systems   Constitutional: Positive for fatigue. Negative for activity change, chills, fever and unexpected weight change.   HENT: Negative for congestion, dental problem, hearing loss, mouth sores, nosebleeds, sore throat, trouble swallowing and voice  change ( hoarse).    Eyes: Negative for photophobia and visual disturbance.   Respiratory: Negative for apnea, cough, choking, chest tightness and shortness of breath.    Cardiovascular: Negative for chest pain, palpitations and leg swelling.   Gastrointestinal: Positive for constipation. Negative for abdominal distention, abdominal pain, blood in stool, diarrhea, nausea and vomiting.   Endocrine: Negative for cold intolerance and heat intolerance.   Genitourinary: Negative for decreased urine volume, difficulty urinating, dysuria, enuresis, frequency, hematuria and urgency.        Incontinence   Musculoskeletal: Negative for arthralgias and gait problem.   Skin: Negative for rash and wound.   Neurological: Positive for weakness. Negative for dizziness, tremors, speech difficulty, light-headedness, numbness and headaches.   Hematological: Negative for adenopathy. Does not bruise/bleed easily.   Psychiatric/Behavioral: Negative for confusion and hallucinations. The patient is not nervous/anxious.    All other systems reviewed and are negative.     MEDICATIONS:    Scheduled Meds:  aspirin, 81 mg, Oral, Daily  atorvastatin, 40 mg, Oral, Nightly  Barium Sulfate, 1 teaspoon(s), Oral, Once in imaging  budesonide, 0.5 mg, Nebulization, BID - RT  docusate sodium, 100 mg, Oral, BID  epoetin asia-epbx, 40,000 Units, Subcutaneous, Q7 Days  ferrous sulfate, 324 mg, Oral, Daily With Breakfast  furosemide, 20 mg, Intravenous, Q12H  gabapentin, 300 mg, Oral, TID  guaiFENesin, 600 mg, Oral, Q12H  ipratropium-albuterol, 3 mL, Nebulization, Q4H - RT  lansoprazole, 30 mg, Oral, QAM  lurasidone, 40 mg, Oral, Daily  metoprolol tartrate, 50 mg, Oral, Q12H  predniSONE, 10 mg, Oral, Daily With Breakfast  sertraline, 50 mg, Oral, Daily  spironolactone, 25 mg, Oral, Daily  Zinc Oxide, , Apply externally, BID       Continuous Infusions:      PRN Meds:  •  acetaminophen  •  acetaminophen  •  bisacodyl  •  Calcium Gluconate-NaCl **AND** calcium  "gluconate **AND** Calcium, Ionized  •  haloperidol lactate  •  HYDROcodone-acetaminophen  •  ibuprofen  •  labetalol  •  magnesium sulfate **OR** magnesium sulfate **OR** magnesium sulfate  •  melatonin  •  metoprolol tartrate  •  ondansetron  •  potassium chloride **OR** potassium chloride **OR** potassium chloride  •  potassium phosphate infusion greater than 15 mMoles **OR** potassium phosphate infusion greater than 15 mMoles **OR** potassium phosphate **OR** sodium phosphate IVPB **OR** sodium phosphate IVPB **OR** sodium phosphate IVPB  •  sodium chloride  •  Zinc Oxide     ALLERGIES:  No Known Allergies    Objective    VITALS:   /69 (BP Location: Left arm, Patient Position: Lying)   Pulse 78   Temp 98.6 °F (37 °C) (Oral)   Resp 18   Ht 177.8 cm (70\")   Wt 58.4 kg (128 lb 12 oz)   SpO2 99%   BMI 18.47 kg/m²     PHYSICAL EXAM:  Physical Exam  Vitals and nursing note reviewed.   Constitutional:       General: He is not in acute distress.     Appearance: He is well-developed. He is not ill-appearing (chronically) or diaphoretic.      Interventions: Nasal cannula in place.      Comments: Cachectic    HENT:      Head: Normocephalic and atraumatic.      Comments: Male pattern baldness/alopecia.     Right Ear: External ear normal.      Left Ear: External ear normal.      Nose: Nose normal.      Comments: O2 by NC.       Mouth/Throat:      Mouth: Mucous membranes are moist.      Pharynx: No oropharyngeal exudate or posterior oropharyngeal erythema.      Comments: Edentulous  Eyes:      General: No scleral icterus.     Extraocular Movements: Extraocular movements intact.      Conjunctiva/sclera: Conjunctivae normal.      Pupils: Pupils are equal, round, and reactive to light.   Cardiovascular:      Rate and Rhythm: Normal rate and regular rhythm.      Heart sounds: Normal heart sounds. No murmur.      Comments: Left chest wall [id number]. Monitor leads.  Midline vertical chest wall scar  Pulmonary:      " Effort: Pulmonary effort is normal. No respiratory distress.      Breath sounds: Rhonchi ( improving somewhat ) present. No wheezing or rales.   Chest:      Chest wall: No tenderness.      Comments: Left chest wall [id number] accessed.   Abdominal:      General: Bowel sounds are normal. There is no distension.      Palpations: Abdomen is soft. There is no mass.      Tenderness: There is no abdominal tenderness. There is no guarding or rebound.   Genitourinary:     Comments: Deffered  Musculoskeletal:         General: No swelling, tenderness or deformity. Normal range of motion.      Cervical back: Normal range of motion and neck supple.      Comments: LUE IV   Lymphadenopathy:      Cervical: No cervical adenopathy.      Upper Body:      Right upper body: No supraclavicular adenopathy.      Left upper body: No supraclavicular adenopathy.   Skin:     General: Skin is warm and dry.      Coloration: Skin is pale. Skin is not jaundiced.      Findings: No bruising, erythema or rash.      Comments: Pressure bandages to bilateral elbows    Neurological:      General: No focal deficit present.      Mental Status: He is oriented to person, place, and time.      Motor: Weakness (generalized) present.   Psychiatric:         Mood and Affect: Mood normal.         Behavior: Behavior normal.       RECENT LABS:  Lab Results (last 24 hours)     ** No results found for the last 24 hours. **        PENDING RESULTS:  n/a    IMAGING REVIEWED:  No radiology results for the last day  I have reviewed the patient's labs, imaging, reports, and other clinician documentation.    Assessment/Plan   ASSESSMENT  1. 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  CT chest with progression vs infectious/reactive changes. Plan was to repeat scans vs request diagnostic bronchoscopy once acute condition  improves but patient thinking of not pursuing aggressive care.  MRI brain showing single left occipital lobe met. Rad 2000 cGy SRS given 03/03/2021.  Patient and spouse undecided on further care.  2. Anemia/Myelodysplastic syndrome/GOGO with malabsorption/Chemotherapy-induced anemia-planned to start Retacrit as outpatient for MDS prior to admission.  Known GOGO on oral iron prior to admission. S/p 1 unit pRBC on 2/19/2021.  Iron studies showed continued iron deficiency anemia.  No evidence of hemolysis.  Completed IV iron 2/22/2021 and on Retacrit. Hgb stable.  Oral iron restarted 3/4/2021.  3. Thrombocytopenia-likely chemotherapy-induced.  No need for further work-up at present.  Platelets normalized 03/02/2021.   4. Acute respiratory failure/Pneumonia/COPD-intubated and sedated on admission.  Started on broad-spectrum antibiotics. Blood cultures NGTD.  Extubated on 2/20/202. Initially afebrile but spiked low-grade temps and recultured with blood cx neg to date. RVP positive for rhinovirus/enterovirus. Management per pulmonary.  5. Weight loss/loss of appetite-improving as outpatient on Megace.    NG tube removed and patient on puréed diet now.  Wants regular diet and something for constipation.  6. Altered mental status/agitation-on Haldol. Psych consulted and he was restarted on home meds including Suboxone, sertraline, and Latuda.  Resolved.  7. Low magnesium/high LFTs-per primary team.  8. Sacral decub-we will have wound care see.     PLAN  1. Follow CBC.   2. Outpatient MRI brain and PET scan if patient decides.    3. Continue Retacrit.   4. Daily oral iron.  5. Treat constipation.  6. Wound care to follow.  7. Advance diet as tolerated.           I discussed the patients findings and my recommendations with patient.    Electronically signed by Axel Lewis MD, 03/06/21, 11:27 AM EST.

## 2021-03-07 NOTE — PLAN OF CARE
Subjective: Pt agreeable to therapeutic plan of care. Patients dtr now in here with a stroke and she was main cg for patient, Wife states she can't handle patient.    Objective:     Bed mobility - Min-A  Transfers - Min-A  Ambulation - 0 feet N/A or Not attempted., patient too worn out to amb, today    Pain: 0 VAS  Education: Provided education on importance of mobility and skilled verbal / tactile cueing throughout intervention.     Assessment: Axel Ramirez presents with functional mobility impairments which indicate the need for skilled intervention. Tolerating session today without incident. Will continue to follow and progress as tolerated.     Plan/Recommendations:   Pt would benefit from Inpatient Rehabilitation placement at discharge from facility and requires no DME at discharge.   Pt desires Inpatient Rehabilitation placement at discharge. Pt cooperative; agreeable to therapeutic recommendations and plan of care.     Basic Mobility 6-click:  Rollin = Total, A lot = 2, A little = 3; 4 = None  Supine>Sit:   1 = Total, A lot = 2, A little = 3; 4 = None   Sit>Stand with arms:  1 = Total, A lot = 2, A little = 3; 4 = None  Bed>Chair:   1 = Total, A lot = 2, A little = 3; 4 = None  Ambulate in room:  1 = Total, A lot = 2, A little = 3; 4 = None  3-5 Steps with railin = Total, A lot = 2, A little = 3; 4 = None  Score: 18    Modified [place]: 4 = Moderately severe disability (Unable to attend to own bodily needs without assistance, and unable to walk unassisted)     Post-Tx Position: Supine with HOB Elevated, Alarms activated and Call light and personal items within reach  PPE: gloves, surgical mask, eyewear protection

## 2021-03-07 NOTE — PLAN OF CARE
Goal Outcome Evaluation:  Pt seen for education with his wife present. Pt is not liking his current diet. His PO intake has been low due to this. Pt is now going to pursue chemotherapy and not be under Hospice. New swallow eval order placed for possible diet upgrade. Offered pt trials of soft solids to assess this and he refused today due to not wanting to come up in the bed. Discussed that liquids could not be upgraded without a follow-up VFSS. Pt and his nurse report that he is stronger now and walking around his room some. This is improved from last week. Pt had previous VFSS on 3-2-21 and discussed a follow-up VFSS tomorrow, as it's been almost a week since the last test. Pt and his wife are eager to have another VFSS in hopes of upgrading liquids and pt will try solids during this test. Much education provided on the rationale of VFSS prior to diet upgrade. Pt and his wife verbalized understanding. It is rec that pt have VFSS in am and ST will follow-uo with recs from this study.

## 2021-03-07 NOTE — PLAN OF CARE
Problem: Skin Injury Risk Increased  Goal: Skin Health and Integrity  Outcome: Ongoing, Progressing  Intervention: Optimize Skin Protection  Recent Flowsheet Documentation  Taken 3/7/2021 0730 by Lurdes Poe LPN  Pressure Reduction Techniques:   frequent weight shift encouraged   weight shift assistance provided  Pressure Reduction Devices: specialty bed utilized  Skin Protection: incontinence pads utilized     Problem: Fall Injury Risk  Goal: Absence of Fall and Fall-Related Injury  Outcome: Ongoing, Progressing  Intervention: Identify and Manage Contributors to Fall Injury Risk  Recent Flowsheet Documentation  Taken 3/7/2021 1512 by Lurdes Poe LPN  Medication Review/Management: medications reviewed  Taken 3/7/2021 0730 by Lurdes Poe LPN  Medication Review/Management: medications reviewed  Intervention: Promote Injury-Free Environment  Recent Flowsheet Documentation  Taken 3/7/2021 1512 by Lurdes Poe LPN  Safety Promotion/Fall Prevention: safety round/check completed  Taken 3/7/2021 0730 by Lurdes Poe LPN  Safety Promotion/Fall Prevention:   activity supervised   assistive device/personal items within reach   fall prevention program maintained   safety round/check completed     Problem: Restraint, Nonbehavioral (Nonviolent)  Goal: Discontinuation Criteria Achieved  Outcome: Ongoing, Progressing  Intervention: Implement Least-restrictive Safety Strategies  Recent Flowsheet Documentation  Taken 3/7/2021 0730 by Lurdes Poe LPN  Medical Device Protection: IV pole/bag removed from visual field  Goal: Personal Dignity and Safety Maintained  Outcome: Ongoing, Progressing     Problem: Adult Inpatient Plan of Care  Goal: Plan of Care Review  Outcome: Ongoing, Progressing  Flowsheets (Taken 3/7/2021 1819)  Outcome Summary: Patient sat up in chair most afternoon. No complaints of pain or SOA. Requested  to come see patient. PT upset about daughter being admitted to [facility]  ([place]). Wife at bedside with patient. Will continue to monitor.  Goal: Patient-Specific Goal (Individualized)  Outcome: Ongoing, Progressing  Goal: Absence of Hospital-Acquired Illness or Injury  Outcome: Ongoing, Progressing  Intervention: Identify and Manage Fall Risk  Recent Flowsheet Documentation  Taken 3/7/2021 1512 by Lurdes Poe LPN  Safety Promotion/Fall Prevention: safety round/check completed  Taken 3/7/2021 0730 by Lurdes Poe LPN  Safety Promotion/Fall Prevention:   activity supervised   assistive device/personal items within reach   fall prevention program maintained   safety round/check completed  Intervention: Prevent and Manage VTE (venous thromboembolism) Risk  Recent Flowsheet Documentation  Taken 3/7/2021 0730 by Lurdes Poe LPN  VTE Prevention/Management: bilateral  Intervention: Prevent Infection  Recent Flowsheet Documentation  Taken 3/7/2021 0730 by Lurdes Poe LPN  Infection Prevention:   personal protective equipment utilized   rest/sleep promoted   single patient room provided   visitors restricted/screened  Goal: Optimal Comfort and Wellbeing  Outcome: Ongoing, Progressing  Goal: Readiness for Transition of Care  Outcome: Ongoing, Progressing     Problem: Communication Impairment (Mechanical Ventilation, Invasive)  Goal: Effective Communication  Outcome: Ongoing, Progressing  Intervention: Ensure Effective Communication  Recent Flowsheet Documentation  Taken 3/7/2021 0730 by Lurdes Poe LPN  Communication Enhancement Strategies: call light answered in person     Problem: Device-Related Complication Risk (Mechanical Ventilation, Invasive)  Goal: Optimal Device Function  Outcome: Ongoing, Progressing  Intervention: Optimize Device Care and Function  Recent Flowsheet Documentation  Taken 3/7/2021 0730 by Lurdes Poe LPN  Airway Safety Measures: suction at bedside     Problem: Inability to Wean (Mechanical Ventilation, Invasive)  Goal: Mechanical Ventilation  Liberation  Outcome: Ongoing, Progressing  Intervention: Promote Extubation and Mechanical Ventilation Liberation  Recent Flowsheet Documentation  Taken 3/7/2021 1512 by Lurdes Poe LPN  Medication Review/Management: medications reviewed  Taken 3/7/2021 0730 by Lurdes Poe LPN  Medication Review/Management: medications reviewed     Problem: Nutrition Impairment (Mechanical Ventilation, Invasive)  Goal: Optimal Nutrition Delivery  Outcome: Ongoing, Progressing     Problem: Skin and Tissue Injury (Mechanical Ventilation, Invasive)  Goal: Absence of Device-Related Skin and Tissue Injury  Outcome: Ongoing, Progressing  Intervention: Maintain Skin and Tissue Health  Recent Flowsheet Documentation  Taken 3/7/2021 0730 by Lurdes Poe LPN  Device Skin Pressure Protection: absorbent pad utilized/changed     Problem: Ventilator-Induced Lung Injury (Mechanical Ventilation, Invasive)  Goal: Absence of Ventilator-Induced Lung Injury  Outcome: Ongoing, Progressing     Problem: Aspiration (Enteral Nutrition)  Goal: Absence of Aspiration Signs and Symptoms  Outcome: Ongoing, Progressing     Problem: Device-Related Complication Risk (Enteral Nutrition)  Goal: Safe, Effective Therapy Delivery  Outcome: Ongoing, Progressing     Problem: Feeding Intolerance (Enteral Nutrition)  Goal: Feeding Tolerance  Outcome: Ongoing, Progressing     Problem: Malnutrition  Goal: Improved Nutritional Intake  Outcome: Ongoing, Progressing   Goal Outcome Evaluation:        Outcome Summary: Patient sat up in chair most afternoon. No complaints of pain or SOA. Requested  to come see patient. PT upset about daughter being [facility] Wife at bedside with patient. Will continue to monitor.

## 2021-03-07 NOTE — PROGRESS NOTES
"PULMONARY CRITICAL CARE Progress  NOTE      PATIENT IDENTIFICATION:  Name: Axel Ramirez  MRN: [id number]  :  1950     Age: 70 y.o.  Sex: male    DATE OF Note:  3/7/2021   Referring Physician: Abhishek Lazo MD                  Subjective:   Appears comfortable, On 2 L O2, no chest pain, no abd pain,  no nausea or vomiting, no change in bowel habit, no dysuria,  no new  skin rash or itching.      Objective:  tMax 24 hrs: Temp (24hrs), Av.1 °F (36.7 °C), Min:97.3 °F (36.3 °C), Max:98.8 °F (37.1 °C)      Vitals Ranges:   Temp:  [97.3 °F (36.3 °C)-98.8 °F (37.1 °C)] 98.8 °F (37.1 °C)  Heart Rate:  [78-86] 79  Resp:  [16-18] 16  BP: (110-112)/(57-69) 112/68    Intake and Output Last 3 Shifts:   I/O last 3 completed shifts:  In: 1585 [P.O.:1585]  Out: -     Exam:  /68 (BP Location: Left arm, Patient Position: Lying)   Pulse 79   Temp 98.8 °F (37.1 °C) (Oral)   Resp 16   Ht 177.8 cm (70\")   Wt 58 kg (127 lb 12.8 oz)   SpO2 97%   BMI 18.34 kg/m²     General Appearance: Alert ,cachetic    HEENT:  Normocephalic, without obvious abnormality, Conjunctiva/corneas clear,.  Normal external ear canals, Nares normal, no drainage     Neck:  Supple, symmetrical, trachea midline. No JVD.  Lungs /Chest wall:   Bilateral basal rhonchi, respirations unlabored symmetrical wall movement.    Heart:  Regular rate and rhythm, systolic murmur PMI left sternal border  Abdomen: Soft, non-tender, no masses, no organomegaly.    Extremities: Trace edema no clubbing or Cyanosis  SKIN: Left buttock -Unstageable pressure injury   Coccyx/theresa cleft with masd linear ulceration        Medications:    Current Facility-Administered Medications:   •  acetaminophen (TYLENOL) suppository 650 mg, 650 mg, Rectal, Q6H PRN, Day, Carina, APRN, 650 mg at 21 1605  •  acetaminophen (TYLENOL) tablet 650 mg, 650 mg, Oral, Q6H PRN, Carina Muñoz APRN, 650 mg at 21 1011  •  aspirin chewable tablet 81 mg, 81 mg, Oral, Daily, " Efrem Langley MD, 81 mg at 03/07/21 0928  •  atorvastatin (LIPITOR) tablet 40 mg, 40 mg, Oral, Nightly, Efrem Langley MD, 40 mg at 03/06/21 2050  •  Barium Sulfate 60 % cream 1 teaspoon(s), 1 teaspoon(s), Oral, Once in imaging, Abhishek Lazo MD  •  bisacodyl (DULCOLAX) suppository 10 mg, 10 mg, Rectal, Daily PRN, Efrem Langley MD, 10 mg at 02/26/21 1405  •  budesonide (PULMICORT) nebulizer solution 0.5 mg, 0.5 mg, Nebulization, BID - RT, Lianna Groves MD, 0.5 mg at 03/07/21 0733  •  calcium gluconate 1g/50ml 0.675% NaCl IV SOLN, 1 g, Intravenous, PRN **AND** calcium gluconate 2-0.675 GM/100ML NACL IVPB, 2 g, Intravenous, PRN **AND** Calcium, Ionized, , , PRN, Efrem Langley MD  •  docusate sodium (COLACE) capsule 100 mg, 100 mg, Oral, BID, Abhishek Lazo MD, 100 mg at 03/07/21 0928  •  epoetin asia-epbx (RETACRIT) injection 40,000 Units, 40,000 Units, Subcutaneous, Q7 Days, Axel Lewis MD, 40,000 Units at 02/28/21 1142  •  ferrous sulfate EC tablet 324 mg, 324 mg, Oral, Daily With Breakfast, Axel Lewis MD, 324 mg at 03/07/21 0928  •  furosemide (LASIX) injection 20 mg, 20 mg, Intravenous, Q12H, Efrem Langley MD, 20 mg at 03/06/21 2246  •  gabapentin (NEURONTIN) capsule 300 mg, 300 mg, Oral, TID, Efrem Langley MD, 300 mg at 03/07/21 0928  •  guaiFENesin (MUCINEX) 12 hr tablet 600 mg, 600 mg, Oral, Q12H, Abhishek Lazo MD, 600 mg at 03/07/21 0928  •  haloperidol lactate (HALDOL) injection 2 mg, 2 mg, Intramuscular, Q6H PRN, Carina Muñoz APRN, 2 mg at 02/24/21 0554  •  HYDROcodone-acetaminophen (NORCO) 5-325 MG per tablet 2 tablet, 2 tablet, Oral, Q6H PRN, Abhishek Lazo MD  •  ibuprofen (ADVIL,MOTRIN) 100 MG/5ML suspension 400 mg, 400 mg, Oral, Q6H PRN, Raysa Alatorre APRN, 400 mg at 02/24/21 1833  •  ipratropium-albuterol (DUO-NEB) nebulizer  solution 3 mL, 3 mL, Nebulization, Q4H - RT, Lianna Groves MD, 3 mL at 03/07/21 0733  •  labetalol (NORMODYNE,TRANDATE) injection 20 mg, 20 mg, Intravenous, Q6H PRN, Carina Muñoz APRN, 20 mg at 02/23/21 0358  •  lansoprazole (FIRST) oral suspension 30 mg, 30 mg, Oral, Clifton VILLAGRAN Timothy Michael, MD, 30 mg at 03/07/21 0602  •  lurasidone (LATUDA) tablet 40 mg, 40 mg, Oral, Daily, Efrem Langley MD, 40 mg at 03/07/21 0928  •  magnesium hydroxide (MILK OF MAGNESIA) suspension 2400 mg/10mL 10 mL, 10 mL, Oral, Daily, Abhishek Lazo MD  •  Magnesium Sulfate 2 gram Bolus, followed by 8 gram infusion (total Mg dose 10 grams)- Mg less than or equal to 1mg/dL, 2 g, Intravenous, PRN **OR** Magnesium Sulfate 2 gram / 50mL Infusion (GIVE X 3 BAGS TO EQUAL 6GM TOTAL DOSE) - Mg 1.1 - 1.5 mg/dl, 2 g, Intravenous, PRN **OR** Magnesium Sulfate 4 gram infusion- Mg 1.6-1.9 mg/dL, 4 g, Intravenous, PRN, Efrem Langley MD, Last Rate: 25 mL/hr at 02/25/21 1831, 4 g at 02/25/21 1831  •  melatonin tablet 5 mg, 5 mg, Oral, Nightly PRN, Lisandra Cruz, APRN, 5 mg at 03/07/21 0002  •  metoprolol tartrate (LOPRESSOR) injection 5 mg, 5 mg, Intravenous, Q6H PRN, Juanito Palomo APRN, 5 mg at 02/24/21 1537  •  metoprolol tartrate (LOPRESSOR) tablet 50 mg, 50 mg, Oral, Q12H, Efrem Langley MD, 50 mg at 03/07/21 0928  •  ondansetron (ZOFRAN) injection 4 mg, 4 mg, Intravenous, Q6H PRN, So Damon APRN, 4 mg at 03/03/21 0944  •  polyethylene glycol (MIRALAX) packet 17 g, 17 g, Oral, Daily, Axel Lewis MD, 17 g at 03/07/21 0928  •  potassium chloride (K-DUR,KLOR-CON) CR tablet 40 mEq, 40 mEq, Oral, PRN, 40 mEq at 03/03/21 0903 **OR** potassium chloride (KLOR-CON) packet 40 mEq, 40 mEq, Oral, PRN, 40 mEq at 02/26/21 1513 **OR** potassium chloride 10 mEq in 100 mL IVPB, 10 mEq, Intravenous, Q1H PRN, Juanito Palomo APRN, Last Rate: 100 mL/hr at 02/22/21 1526,  10 mEq at 02/22/21 1526  •  potassium phosphate 45 mmol in sodium chloride 0.9 % 500 mL infusion, 45 mmol, Intravenous, PRN **OR** potassium phosphate 30 mmol in sodium chloride 0.9 % 250 mL infusion, 30 mmol, Intravenous, PRN **OR** potassium phosphate 15 mmol in sodium chloride 0.9 % 100 mL infusion, 15 mmol, Intravenous, PRN, 15 mmol at 02/26/21 0957 **OR** sodium phosphates 45 mmol in sodium chloride 0.9 % 500 mL IVPB, 45 mmol, Intravenous, PRN **OR** sodium phosphates 30 mmol in sodium chloride 0.9 % 250 mL IVPB, 30 mmol, Intravenous, PRN **OR** sodium phosphates 15 mmol in sodium chloride 0.9 % 250 mL IVPB, 15 mmol, Intravenous, PRN, ShivamEfrem MD  •  predniSONE (DELTASONE) tablet 10 mg, 10 mg, Oral, Daily With Breakfast, Ángela Bean MD, 10 mg at 03/07/21 0928  •  sertraline (ZOLOFT) tablet 50 mg, 50 mg, Oral, Daily, Efrem Langley MD, 50 mg at 03/07/21 0930  •  sodium chloride 0.9 % flush 10 mL, 10 mL, Intravenous, PRN, Lianna Groves MD, 10 mL at 03/07/21 0928  •  spironolactone (ALDACTONE) tablet 25 mg, 25 mg, Oral, Daily, Efrem Langley MD, 25 mg at 03/07/21 0929  •  Zinc Oxide 16 % ointment, , Apply externally, BID, Efrem Langley MD, Given at 03/07/21 1005  •  Zinc Oxide 16 % ointment, , Apply externally, PRN, Efrem Langley MD    Data Review:  All labs (24hrs):   No results found for this or any previous visit (from the past 24 hour(s)).     Imaging:  FL Video Swallow With Speech Single Contrast  Narrative: DATE OF EXAM:  3/2/2021 9:15 AM     PROCEDURE:  FL VIDEO SWALLOW W SPEECH SINGLE-CONTRAST-     INDICATIONS:  repeat VFSS as only on nectar thick clear liquid diet; J18.9-Pneumonia,  unspecified organism; R06.03-Acute respiratory distress; J96.21-Acute  and chronic respiratory failure with hypoxia     COMPARISON:  Video swallow study dated 2/26/2021.     TECHNIQUE:   This examination was performed in  conjunction with speech pathology.  Lateral video fluoroscopic evaluation of the swallowing mechanism was  performed while correlate administering to the patient various  consistency food items mixed with barium.     Fluoroscopic Time:   2.5 minutes     FINDINGS:  Deep penetration with one trial of nectar thickened liquid. Other  remaining consistencies demonstrating no aspiration or laryngeal  penetration. Residue is present with multiple consistency trialed.      Impression: As above. Please see speech pathology report for detailed evaluation and  dietary recommendations.     Electronically Signed By-Daryl Hunter MD On:3/2/2021 11:39 AM  This report was finalized on [id number] by  Daryl Hunter MD.       ASSESSMENT:    Acute on chronic respiratory failure with hypoxia (CMS/HCC)    Essential hypertension    Hx of aortic valve replacement    Squamous cell carcinoma of lung, right (CMS/HCC)    Diastolic CHF, acute (CMS/HCC)    Pneumonia due to infectious organism    Moderate malnutrition (CMS/HCC)    COPD with acute exacerbation (CMS/HCC)    Delirium  Left buttock -Unstageable pressure injury   Coccyx/ cleft with masd linear ulceration   Lesion in the medial aspect of the left occipital lobe   Delirium, possibly withdrawal from gabapentin and/or Suboxone    PLAN:  Continue current plan of care   Speech therapy to see to upgrade diet  Plan is to discharge home with hospice once everything set up, patient does not want any further cancer treatment and wants to go home with family  O2 support   Bronchodilator  Inhaled corticosteroids  Diuresis currently on Aldactone 25 mg and Lasix 20 mg IV every 12  Monitor urinary retention  Electrolytes/ glycemic control  DVT and GI prophylaxis.     Discussed with Dr Yaneli Dumont, APRN   3/7/2021    Poor poor long-term prognosis  I personally have examined  and interviewed the patient. I have reviewed the history, data, problems, assessment and plan with  our NP.  Critical care time in direct medical management (   ) minutes   Lianna Groves MD, D,ABSM  1/23/2021

## 2021-03-07 NOTE — PROGRESS NOTES
Hematology/Oncology Inpatient Progress Note    PATIENT NAME: Axel Ramirez  : 1950  MRN: [id number]    CHIEF COMPLAINT: Hypoxia and respiratory failure     HISTORY OF PRESENT ILLNESS:    70 y.o. male admitted to the ICU through the ED 2021 with hypoxia and respiratory failure requiring intubation.  The patient was intubated and sedated at time of consult and histories were taken from review of records and spouse report.  His spouse reported he had been feeling better and had tolerated recent chemotherapy for his lung cancer.  He was eating well after start of Megace but did develop increasing shortness of air a few days prior to admission without a change in his chronic cough.  She denied known fever, chills, or hemoptysis.  On admission, CT PE protocol was limited due to motion but felt negative for central PE.  There was evidence of pneumonia versus aspiration and known right upper lobe lung mass invading the right upper lobe bronchus with known obstruction and with increased narrowing/obstruction of the right middle lobe bronchus.  There was suggestion of progressive tumor infiltration of the right mainstem bronchus and new left paratracheal adenopathy with stable right paratracheal and subcarinal adenopathy.  CBC revealed WBC 8.4, hemoglobin 8.6, .7, and platelets 106,000.  D-dimer was elevated to 1.98 (0-0.59).  Creatinine was not elevated and LFTs were okay.  Respiratory viral panel was negative and blood cultures were sent.  On 2021 hemoglobin dropped to 7.4 and platelets to 92,000.     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  His lung cancer was initially treated with concomitant weekly chemotherapy (paclitaxel and carboplatin x7) and radiation  therapy from November 2020. He completed radiation therapy on 1/6/2021.  He completed the weekly portion of his chemotherapy on 1/12/2021.  He was seen in follow-up on 2/4/2021 where it was planned to continue chemotherapy with paclitaxel and carboplatin along with Neulasta support every 3 weeks.  He was scheduled for CT scan chest abdomen and pelvis on 2/18/2021 as an outpatient prior to his admission.  He received chemotherapy with Neulasta support on 2/9/2021 at which time CBC revealed WBC 7.62, hemoglobin 9.5, and platelets 223,000.  He was anemic at time of diagnosis of his lung cancer and anemia work-up revealed iron deficiency as well as MDS.  He received oral iron however he was diagnosed with malabsorption and received IV iron in November 2020 which was also repeated in December 2020.  His bone marrow had revealed mild increased iron storage in January 2021.  At his follow-up visit on 2/4/2021 t Retacrit 30,000 units subcu weekly was ordered for his myelodysplastic syndrome while continuing oral iron for his iron deficiency with iron saturation of 17%.  He had not started Retacrit prior to admission pending insurance authorization.     PCP: Sandoval Stevenson FNP    INTERVAL HISTORY:  2/19/2021-received 1 unit pRBCs. Iron 21 (59-1 58), iron saturation 12 (20-50), transferrin 121 (200-3 60), TIBC 180 (298-536), ferritin 2658 (), reticulocytes 1.93 (0.7 0-1.90), haptoglobin 294 (). Started Venofer 300 mg IV x 3 days. Patient extubated, on nasal canula and Precedex.  2/20/2021 -white blood count 8.8, hemoglobin 8, .4, platelets 57,000.  CT head with moderate periventricular white matter changes present likely related to chronic microvascular ischemic change, progressed as compared to the previous study.  Hypodensity present within the left occipital region which appeared more chronic or remote, new as compared to the previous study.  Chest x-ray with improvement in right basilar airspace  disease with resolution of the left airspace disease.  Stable right upper lobe mass.  2/21/2021-Retacrit 40,000 units subcu weekly started for hemoglobin 7.9.  2/22/2021-patient moved out of ICU.  2/23/2021-patient moved to PCU for tachypnea and tachycardia.  02/25/21-CT abdomen and pelvis without showed no evidence of malignancy. There was layering hyperdense material in the gallbladder with spurious excretion of contrast from prior contrast-enhanced study versus sludge/gallstones in differential, constipation, and known pneumonia/aspiration. Respiratory viral panel positive for rhinovirus/enterovirus. Psych consulted and patient started back on home meds including Suboxone, Latuda, and sertraline.  MRI brain without contrast suggestive of left cerebral cortical and leptomeningeal mets.  2/26/2021-hemoglobin 9.7, platelets 112,000, WBC 10.3.  More alert and oriented.  2/27/2021-MRI brain with contrast showed a 1.1 x 1 cm left occipital lobe lesion. Neurosurgery not recommending resection. Rad Onc planning SRS on 3/3 if patient can tolerate planning. Erythropoietin level 362 (2.6-18.5).    2/28/2021-Reviewed MRI brain findings. Patient and spouse both wanting to proceed with radiation therapy.  Platelets improved to 147,000.   03/02/2021-platelets normalized.  No aspiration on swallow study.  NG tube removed and patient started on puréed diet.  3/3/2021-patient received SRS radiation to left occiput 2000 cGy.    History of present illness reviewed since last visit and changes noted on 03/07/21.    Subjective   Complains of stuffy nose.    ROS:   Review of Systems   Constitutional: Positive for fatigue. Negative for activity change, chills, fever and unexpected weight change.   HENT: Positive for sinus pressure. Negative for congestion, dental problem, hearing loss, mouth sores, nosebleeds, sore throat, trouble swallowing and voice change ( hoarse).    Eyes: Negative for photophobia and visual disturbance.    Respiratory: Negative for apnea, cough, choking, chest tightness and shortness of breath.    Cardiovascular: Negative for chest pain, palpitations and leg swelling.   Gastrointestinal: Positive for constipation. Negative for abdominal distention, abdominal pain, blood in stool, diarrhea, nausea and vomiting.   Endocrine: Negative for cold intolerance and heat intolerance.   Genitourinary: Negative for decreased urine volume, difficulty urinating, dysuria, enuresis, frequency, hematuria and urgency.        Incontinence   Musculoskeletal: Negative for arthralgias and gait problem.   Skin: Negative for rash and wound.   Neurological: Positive for weakness. Negative for dizziness, tremors, speech difficulty, light-headedness, numbness and headaches.   Hematological: Negative for adenopathy. Does not bruise/bleed easily.   Psychiatric/Behavioral: Negative for confusion and hallucinations. The patient is not nervous/anxious.    All other systems reviewed and are negative.     MEDICATIONS:    Scheduled Meds:  aspirin, 81 mg, Oral, Daily  atorvastatin, 40 mg, Oral, Nightly  Barium Sulfate, 1 teaspoon(s), Oral, Once in imaging  budesonide, 0.5 mg, Nebulization, BID - RT  docusate sodium, 100 mg, Oral, BID  epoetin asia-epbx, 40,000 Units, Subcutaneous, Q7 Days  ferrous sulfate, 324 mg, Oral, Daily With Breakfast  furosemide, 20 mg, Intravenous, Q12H  gabapentin, 300 mg, Oral, TID  guaiFENesin, 600 mg, Oral, Q12H  ipratropium-albuterol, 3 mL, Nebulization, Q4H - RT  lansoprazole, 30 mg, Oral, QAM  lurasidone, 40 mg, Oral, Daily  magnesium hydroxide, 10 mL, Oral, Daily  metoprolol tartrate, 50 mg, Oral, Q12H  polyethylene glycol, 17 g, Oral, Daily  predniSONE, 10 mg, Oral, Daily With Breakfast  sertraline, 50 mg, Oral, Daily  spironolactone, 25 mg, Oral, Daily  Zinc Oxide, , Apply externally, BID       Continuous Infusions:      PRN Meds:  •  acetaminophen  •  acetaminophen  •  bisacodyl  •  Calcium Gluconate-NaCl **AND**  "calcium gluconate **AND** Calcium, Ionized  •  haloperidol lactate  •  HYDROcodone-acetaminophen  •  ibuprofen  •  labetalol  •  magnesium sulfate **OR** magnesium sulfate **OR** magnesium sulfate  •  melatonin  •  metoprolol tartrate  •  ondansetron  •  potassium chloride **OR** potassium chloride **OR** potassium chloride  •  potassium phosphate infusion greater than 15 mMoles **OR** potassium phosphate infusion greater than 15 mMoles **OR** potassium phosphate **OR** sodium phosphate IVPB **OR** sodium phosphate IVPB **OR** sodium phosphate IVPB  •  sodium chloride  •  Zinc Oxide     ALLERGIES:  No Known Allergies    Objective    VITALS:   /68 (BP Location: Left arm, Patient Position: Lying)   Pulse 79   Temp 98.8 °F (37.1 °C) (Oral)   Resp 16   Ht 177.8 cm (70\")   Wt 58 kg (127 lb 12.8 oz)   SpO2 97%   BMI 18.34 kg/m²     PHYSICAL EXAM:  Physical Exam  Vitals and nursing note reviewed.   Constitutional:       General: He is not in acute distress.     Appearance: He is well-developed. He is not ill-appearing (chronically) or diaphoretic.      Interventions: Nasal cannula in place.      Comments: Cachectic    HENT:      Head: Normocephalic and atraumatic.      Comments: Male pattern baldness/alopecia.     Right Ear: External ear normal.      Left Ear: External ear normal.      Nose: Nose normal.      Comments: O2 by NC.       Mouth/Throat:      Mouth: Mucous membranes are moist.      Pharynx: No oropharyngeal exudate or posterior oropharyngeal erythema.      Comments: Edentulous  Eyes:      General: No scleral icterus.     Extraocular Movements: Extraocular movements intact.      Conjunctiva/sclera: Conjunctivae normal.      Pupils: Pupils are equal, round, and reactive to light.   Cardiovascular:      Rate and Rhythm: Normal rate and regular rhythm.      Heart sounds: Normal heart sounds. No murmur.      Comments: Left chest wall [id number]. Monitor leads.  Midline vertical chest wall " scar  Pulmonary:      Effort: Pulmonary effort is normal. No respiratory distress.      Breath sounds: No stridor. Rhonchi ( improving somewhat ) present. No wheezing or rales.   Chest:      Chest wall: No tenderness.      Comments: Left chest wall [id number] accessed.   Abdominal:      General: Bowel sounds are normal. There is no distension.      Palpations: Abdomen is soft. There is no mass.      Tenderness: There is no abdominal tenderness. There is no guarding or rebound.   Genitourinary:     Comments: Deffered  Musculoskeletal:         General: No swelling, tenderness or deformity. Normal range of motion.      Cervical back: Normal range of motion and neck supple.      Comments: LUE IV   Lymphadenopathy:      Cervical: No cervical adenopathy.      Upper Body:      Right upper body: No supraclavicular adenopathy.      Left upper body: No supraclavicular adenopathy.   Skin:     General: Skin is warm and dry.      Coloration: Skin is pale. Skin is not jaundiced.      Findings: No bruising, erythema or rash.      Comments: Pressure bandages to bilateral elbows    Neurological:      General: No focal deficit present.      Mental Status: He is oriented to person, place, and time.      Motor: Weakness (generalized) present.   Psychiatric:         Mood and Affect: Mood normal.         Behavior: Behavior normal.       RECENT LABS:  Lab Results (last 24 hours)     ** No results found for the last 24 hours. **        PENDING RESULTS:  n/a    IMAGING REVIEWED:  No radiology results for the last day  I have reviewed the patient's labs, imaging, reports, and other clinician documentation.    Assessment/Plan   ASSESSMENT  1. 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  CT chest with progression vs infectious/reactive changes. Plan was to repeat scans vs request diagnostic  bronchoscopy once acute condition improves but patient thinking of not pursuing aggressive care.  MRI brain showing single left occipital lobe met. Rad 2000 cGy SRS given 03/03/2021.  Patient and spouse want further treatment for the cancer as needed.  2. Anemia/Myelodysplastic syndrome/GOGO with malabsorption/Chemotherapy-induced anemia-planned to start Retacrit as outpatient for MDS prior to admission.  Known GOGO on oral iron prior to admission. S/p 1 unit pRBC on 2/19/2021.  Iron studies showed continued iron deficiency anemia.  No evidence of hemolysis.  Completed IV iron 2/22/2021 and on Retacrit. Hgb stable.  Oral iron restarted 3/4/2021.  3. Thrombocytopenia-likely chemotherapy-induced.  No need for further work-up at present.  Platelets normalized 03/02/2021.   4. Acute respiratory failure/Pneumonia/COPD-intubated and sedated on admission.  Started on broad-spectrum antibiotics. Blood cultures NGTD.  Extubated on 2/20/202. Initially afebrile but spiked low-grade temps and recultured with blood cx neg to date. RVP positive for rhinovirus/enterovirus. Management per pulmonary.  5. Weight loss/loss of appetite-improving as outpatient on Megace.    NG tube removed and patient on puréed diet now.  Wants regular diet and something for constipation.  6. Altered mental status/agitation-on Haldol. Psych consulted and he was restarted on home meds including Suboxone, sertraline, and Latuda.  Resolved.  7. Low magnesium/high LFTs-resolved.  8. Sacral decub-we will have wound care see.     PLAN  1. Follow CBC.   2. Outpatient MRI brain and PET scan.    3. Continue Retacrit.   4. Daily oral iron.  5. Treat constipation.  6. Wound care to follow.  7. Advance diet as tolerated.           I discussed the patients findings and my recommendations with patient.    Electronically signed by Axel Lewis MD, 03/07/21, 11:16 AM EST.

## 2021-03-07 NOTE — PLAN OF CARE
Problem: Adult Inpatient Plan of Care  Goal: Plan of Care Review  Recent Flowsheet Documentation  Taken 3/7/2021 1509 by Janet Chapman OT  Progress: improving  Plan of Care Reviewed With:   patient   spouse  Outcome Summary: Pt is 69 y/o M w/ acute decline in basic mobility & ADL taking place over several weeks per spouse. He is able to get OOB w/ min (A) this date & uses BSC w/ cues, setup, & mod (A) for clothing mgmt. Pt has impaired executive functioning though is A&O X3. He has decresaed ADL skill & activity tolerance as well as global weakness. Recommend IP rehab at d/c. PPE worn, mask, gloves, safety glasses, gown.

## 2021-03-07 NOTE — THERAPY TREATMENT NOTE
Subjective: Pt agreeable to therapeutic plan of care. Patients dtr now in here with a stroke and she was main cg for patient, Wife states she can't handle patient.    Objective:     Bed mobility - Min-A  Transfers - Min-A  Ambulation - 0 feet N/A or Not attempted., patient too worn out to amb, today    Pain: 0 VAS  Education: Provided education on importance of mobility and skilled verbal / tactile cueing throughout intervention.     Assessment: Axel Ramirez presents with functional mobility impairments which indicate the need for skilled intervention. Tolerating session today without incident. Will continue to follow and progress as tolerated.     Plan/Recommendations:   Pt would benefit from Inpatient Rehabilitation placement at discharge from facility and requires no DME at discharge.   Pt desires Inpatient Rehabilitation placement at discharge. Pt cooperative; agreeable to therapeutic recommendations and plan of care.     Basic Mobility 6-click:  Rollin = Total, A lot = 2, A little = 3; 4 = None  Supine>Sit:   1 = Total, A lot = 2, A little = 3; 4 = None   Sit>Stand with arms:  1 = Total, A lot = 2, A little = 3; 4 = None  Bed>Chair:   1 = Total, A lot = 2, A little = 3; 4 = None  Ambulate in room:  1 = Total, A lot = 2, A little = 3; 4 = None  3-5 Steps with railin = Total, A lot = 2, A little = 3; 4 = None  Score: 18    Modified [place]: 4 = Moderately severe disability (Unable to attend to own bodily needs without assistance, and unable to walk unassisted)     Post-Tx Position: Supine with HOB Elevated, Alarms activated and Call light and personal items within reach  PPE: gloves, surgical mask, eyewear protection

## 2021-03-07 NOTE — PROGRESS NOTES
Continued Stay Note  [facility]     Patient Name: Axel Ramirez  MRN: [id number]  Today's Date: 3/7/2021    Admit Date: 2/18/2021    Discharge Plan     Row Name 03/07/21 1044       Plan    Plan  PT/OT eval -pending.    Plan Comments  PT/OT re- ordered. Per - pt's dtr is now in ER- therefore spouse wants to try to send pt to rehab - she is unable to care for him.          Expected Discharge Date and Time     Expected Discharge Date Expected Discharge Time    Mar 5, 2021             Amanda Weinstein RN

## 2021-03-07 NOTE — PROGRESS NOTES
Continued Stay Note  [facility]     Patient Name: Axel Ramirez  MRN: [id number]  Today's Date: 3/7/2021    Admit Date: 2/18/2021    Discharge Plan     Row Name 03/07/21 1121       Plan    Plan  [facility]st [facility], [facility]ND [facility], 3RD [facility], will reqiure precert /PAS[facility]    Plan Comments  I spoke with pt/sp ,in rm -wearing appropriate PPE and maintaining greater than 6 feet distance from others. Time in rm -less than 10min. Spouse states she can no longer care for pt ,at home . Their dtr is in ER  and will not be able to help her. She wants him placed - 1st [facility], 2ND [facility], 3RD [facility]- referral to [facility].    Row Name 03/07/21 1044       Plan    Plan  PT/OT eval -pending.    Plan Comments  PT/OT re- ordered. Per - pt's dtr is now in ER- therefore spouse wants to try to send pt to rehab - she is unable to care for him.          Expected Discharge Date and Time     Expected Discharge Date Expected Discharge Time    Mar 5, 2021             Amanda Weinstein RN

## 2021-03-07 NOTE — PLAN OF CARE
Goal Outcome Evaluation:  Plan of Care Reviewed With: patient  Progress: no change  Outcome Summary: pt rested during shift, wife at bedside, no complain, will cont to monitor.

## 2021-03-07 NOTE — THERAPY TREATMENT NOTE
Patient Name: Axel Ramirez  : 1950    MRN: [id number]                              Today's Date: 3/7/2021       Admit Date: 2021    Visit Dx:     ICD-10-CM ICD-9-CM   1. Pneumonia due to infectious organism, unspecified laterality, unspecified part of lung  J18.9 486   2. Respiratory distress  R06.03 786.09   3. Acute on chronic respiratory failure with hypoxia (CMS/HCC)  J96.21 518.84     799.02   4. COPD with acute exacerbation (CMS/HCC)  J44.1 491.21   5. Moderate malnutrition (CMS/HCC)  E44.0 263.0   6. Squamous cell carcinoma of lung, right (CMS/HCC)  C34.91 162.9     Patient Active Problem List   Diagnosis   • Coronary artery disease involving native coronary artery of native heart without angina pectoris   • Nonrheumatic aortic valve stenosis   • Mixed hyperlipidemia   • Essential hypertension   • Fever   • Presence of aortocoronary bypass graft   • Congestive heart failure (CMS/HCC)   • Hx of aortic valve replacement   • CAP (community acquired pneumonia)   • Tobacco abuse   • Postobstructive pneumonia   • Lung mass   • Squamous cell carcinoma of lung, right (CMS/HCC)   • Diastolic CHF, acute (CMS/HCC)   • Pneumonia of right lung due to infectious organism   • Pneumonia due to infectious organism   • Moderate malnutrition (CMS/HCC)   • Acute on chronic respiratory failure with hypoxia (CMS/HCC)   • COPD with acute exacerbation (CMS/HCC)   • Delirium     Past Medical History:   Diagnosis Date   • Aortic stenosis    • Cancer (CMS/HCC)     Sickle Cell Carcinoma   • Congestive heart failure (CHF) (CMS/HCC)     DIASTOLIC   • COPD (chronic obstructive pulmonary disease) (CMS/HCC)    • Coronary artery disease    • Hypertension    • Lung cancer (CMS/HCC)    • Myocardial infarction (CMS/HCC)    • Peripheral vascular disease (CMS/HCC)    • Valvular disease      Past Surgical History:   Procedure Laterality Date   • AORTIC VALVE REPAIR/REPLACEMENT  2018    Dr Maza   • BRONCHOSCOPY N/A 10/24/2020     Procedure: BRONCHOSCOPY with biopsy right upper lobe mass, and bronchoalveolar lavage right upper lobe;  Surgeon: Ángela Bean MD;  Location: [facility] ENDOSCOPY;  Service: Pulmonary;  Laterality: N/A;  post: right upper lobe mass, pneumonia   • BRONCHOSCOPY N/A 11/11/2020    Procedure: BRONCHOSCOPY with bronchial washing;  Surgeon: Ángela Bean MD;  Location: [facility] ENDOSCOPY;  Service: Pulmonary;  Laterality: N/A;  Post: lung mass, pneumonia   • BRONCHOSCOPY N/A 11/11/2020    Procedure: BRONCHOSCOPY RIGID with debulking of right main endobronchial tumor;  Surgeon: Nomi Reyes MD;  Location: [facility] MAIN OR;  Service: Cardiothoracic;  Laterality: N/A;   • BRONCHOSCOPY N/A 11/11/2020    Procedure: BRONCHOSCOPY;  Surgeon: Nomi Reyes MD;  Location: [facility] MAIN OR;  Service: Cardiothoracic;  Laterality: N/A;   • CARDIAC CATHETERIZATION  12/29/2017   • CORONARY ARTERY BYPASS GRAFT  02/26/2018    Dr Maza   • TIBIA FRACTURE SURGERY     • VENOUS ACCESS DEVICE (PORT) INSERTION Left 10/30/2020    Procedure: MEDIPORT INSERTION UNDER FLUOROSCOPIC GUIDENCE;  Surgeon: Nomi Reyes MD;  Location: [facility] MAIN OR;  Service: Cardiothoracic;  Laterality: Left;     General Information     Row Name 03/07/21 1503          General Information    Prior Level of Function  independent:;all household mobility;ADL's  -MH     Existing Precautions/Restrictions  oxygen therapy device and L/min;fall  -     Barriers to Rehab  medically complex;cognitive status  -     Row Name 03/07/21 1503          Living Environment    Lives With  spouse;child(agustín), adult uncertain if dtr lives there but she is now in the ER w/ possible seizure & PNA.  -     Row Name 03/07/21 1503          Home Main Entrance    Number of Stairs, Main Entrance  two  -MH     Row Name 03/07/21 1503          Stairs Within Home, Primary    Number of Stairs, Within Home, Primary  none  -     Row Name 03/07/21 1503          Cognition    Orientation Status  (Cognition)  oriented to;person;place;time;disoriented to;situation;verbal cues/prompts needed for orientation  -     Row Name 03/07/21 1503          Safety Issues, Functional Mobility    Safety Issues Affecting Function (Mobility)  ability to follow commands;impulsivity;awareness of need for assistance;insight into deficits/self-awareness;judgment;problem-solving;safety precaution awareness;sequencing abilities  -     Impairments Affecting Function (Mobility)  balance;cognition;coordination;endurance/activity tolerance;strength;postural/trunk control  -       User Key  (r) = Recorded By, (t) = Taken By, (c) = Cosigned By    Initials Name Provider Type     Janet Chapman, OT Occupational Therapist          Mobility/ADL's     Row Name 03/07/21 1505          Bed Mobility    Bed Mobility  supine-sit  -     Supine-Sit [place] (Bed Mobility)  set up;minimum assist (75% patient effort)  -     Bed Mobility, Safety Issues  cognitive deficits limit understanding;decreased use of arms for pushing/pulling;decreased use of legs for bridging/pushing  -     Assistive Device (Bed Mobility)  head of bed elevated;bed rails  -     Row Name 03/07/21 1505          Transfers    Transfers  sit-stand transfer;toilet transfer;bed-chair transfer  -     Bed-Chair [place] (Transfers)  minimum assist (75% patient effort)  -     Sit-Stand [place] (Transfers)  minimum assist (75% patient effort)  -     [place] Level (Toilet Transfer)  minimum assist (75% patient effort)  -     Assistive Device (Toilet Transfer)  commode, bedside without drop arms  -     Row Name 03/07/21 1505          Toilet Transfer    Type (Toilet Transfer)  stand pivot/stand step  -     Row Name 03/07/21 1505          Functional Mobility    Functional Mobility- Ind. Level  not tested  -     Functional Mobility- Comment  too fatigued to walk in room & RW had been removed as pt was thought to be discharging. Will benefit from  assist of 2 to walk as pt cativity tolerance is very low & legs may buckle.  -     Row Name 03/07/21 1505          Activities of Daily Living    BADL Assessment/Intervention  toileting;grooming;lower body dressing  -     Row Name 03/07/21 1505          Toileting Assessment/Training    [place] Level (Toileting)  adjust/manage clothing;moderate assist (50% patient effort);perform perineal hygiene;set up;verbal cues;nonverbal cues (demo/gesture)  -     Position (Toileting)  unsupported sitting  -     Row Name 03/07/21 1505          Lower Body Dressing Assessment/Training    [place] Level (Lower Body Dressing)  don;socks;maximum assist (25% patient effort)  -     Position (Lower Body Dressing)  edge of bed sitting  -     Row Name 03/07/21 1505          Grooming Assessment/Training    [place] Level (Grooming)  wash face, hands;set up;verbal cues  -     Position (Grooming)  supported sitting  -     Comment (Grooming)  needs redirection due to limited attention & sequencing to complete tasks before moving on to the next one.  -       User Key  (r) = Recorded By, (t) = Taken By, (c) = Cosigned By    Initials Name Provider Type     Janet Chapman OT Occupational Therapist        Obj/Interventions     Row Name 03/07/21 1508          Range of Motion Comprehensive    General Range of Motion  no range of motion deficits identified  -[facility] Name 03/07/21 1508          Strength Comprehensive (MMT)    Comment, General Manual Muscle Testing (MMT) Assessment  BUE grossly 3+/5; BLE grossly 3+/5  -     Row Name 03/07/21 1508          Balance    Balance Assessment  sitting static balance;sitting dynamic balance;standing static balance;standing dynamic balance  -     Static Sitting Balance  WFL  -     Dynamic Sitting Balance  mild impairment  -     Static Standing Balance  mild impairment;supported  -     Dynamic Standing Balance  moderate impairment;supported  -       User Key  (r) =  Recorded By, (t) = Taken By, (c) = Cosigned By    Initials Name Provider Type     Janet Chapman, OT Occupational Therapist        Goals/Plan    No documentation.       Clinical Impression     Row Name 03/07/21 1503          Pain Scale: FACES Pre/Post-Treatment    Pain: FACES Scale, Pretreatment  2-->hurts little bit  -     Posttreatment Pain Rating  2-->hurts little bit generalized chronic pain  -     Row Name 03/07/21 1508          Plan of Care Review    Plan of Care Reviewed With  patient;spouse  -     Progress  improving  -     Outcome Summary  Pt is 71 y/o M w/ acute decline in basic mobility & ADL taking place over several weeks per spouse. He is able to get OOB w/ min (A) this date & uses BSC w/ cues, setup, & mod (A) for clothing mgmt. Pt has impaired executive functioning though is A&O X3. He has decresaed ADL skill & activity tolernance as well as global weakness. Recommend IP rehab at d/c. PPE worn, mask, gloves, safety glasses, gown.  -     Row Name 03/07/21 1508          Therapy Assessment/Plan (OT)    Rehab Potential (OT)  good, to achieve stated therapy goals  -     Criteria for Skilled Therapeutic Interventions Met (OT)  skilled treatment is necessary  -     Therapy Frequency (OT)  3 times/wk  -     Predicted Duration of Therapy Intervention (OT)  until D/C  -     Row Name 03/07/21 1507          Therapy Plan Review/Discharge Plan (OT)    Anticipated Discharge Disposition (OT)  inpatient rehabilitation facility  -     Row Name 03/07/21 1508          Vital Signs    O2 Delivery Pre Treatment  supplemental O2  -     O2 Delivery Intra Treatment  supplemental O2  -     O2 Delivery Post Treatment  supplemental O2  -     Pre Patient Position  Supine  -     Intra Patient Position  Standing  -     Post Patient Position  Sitting  -     Row Name 03/07/21 1508          Positioning and Restraints    Pre-Treatment Position  in bed  -     Post Treatment Position  chair  -     In  Chair  notified nsg;sitting;call light within reach;encouraged to call for assist;exit alarm on;with family/caregiver  -       User Key  (r) = Recorded By, (t) = Taken By, (c) = Cosigned By    Initials Name Provider Type    Janet Ludwig OT Occupational Therapist        Outcome Measures     Row Name 03/07/21 1512          How much help from another is currently needed...    Putting on and taking off regular lower body clothing?  2  -MH     Bathing (including washing, rinsing, and drying)  2  -MH     Toileting (which includes using toilet bed pan or urinal)  2  -MH     Putting on and taking off regular upper body clothing  2  -MH     Taking care of personal grooming (such as brushing teeth)  3  -MH     Eating meals  3  -     AM-PAC 6 Clicks Score (OT)  14  -     Row Name 03/07/21 1512          How much help from another person do you currently need...    Turning from your back to your side while in flat bed without using bedrails?  3  -MH     Moving from lying on back to sitting on the side of a flat bed without bedrails?  3  -MH     Moving to and from a bed to a chair (including a wheelchair)?  3  -MH     Standing up from a chair using your arms (e.g., wheelchair, bedside chair)?  3  -MH     Climbing 3-5 steps with a railing?  1  -MH     To walk in hospital room?  2  -     AM-PAC 6 Clicks Score (PT)  15  -     Row Name 03/07/21 1512          Modified Zapata Scale    Pre-Stroke Modified Lottie Scale  2 - Slight disability.  Unable to carry out all previous activities but able to look after own affairs without assistance.  -     Modified Lottie Scale  4 - Moderately severe disability.  Unable to walk without assistance, and unable to attend to own bodily needs without assistance.  -       User Key  (r) = Recorded By, (t) = Taken By, (c) = Cosigned By    Initials Name Provider Type    Janet Ludwig OT Occupational Therapist        Occupational Therapy Education                 Title: PT OT SLP  Therapies (In Progress)     Topic: Occupational Therapy (Done)     Point: ADL training (Done)     Description:   Instruct learner(s) on proper safety adaptation and remediation techniques during self care or transfers.   Instruct in proper use of assistive devices.              Learning Progress Summary           Patient Acceptance, E,TB, VU by  at 3/7/2021 1513    Acceptance, E,TB,D, VU,DU,NR by NA at 2/23/2021 1141    Comment: role and benefits of OT, BUE AROM in all planes, DC rec, transfer training   Family Acceptance, E,TB, VU by  at 3/7/2021 1513   Other Acceptance, E,TB,D, VU,DU,NR by NA at 2/23/2021 1141    Comment: role and benefits of OT, BUE AROM in all planes, DC rec, transfer training                   Point: Home exercise program (Done)     Description:   Instruct learner(s) on appropriate technique for monitoring, assisting and/or progressing therapeutic exercises/activities.              Learning Progress Summary           Patient Acceptance, E,TB,D, VU,DU,NR by NA at 2/23/2021 1141    Comment: role and benefits of OT, BUE AROM in all planes, DC rec, transfer training   Other Acceptance, E,TB,D, VU,DU,NR by NA at 2/23/2021 1141    Comment: role and benefits of OT, BUE AROM in all planes, DC rec, transfer training                   Point: Precautions (Done)     Description:   Instruct learner(s) on prescribed precautions during self-care and functional transfers.              Learning Progress Summary           Patient Acceptance, E,TB, VU by  at 3/7/2021 1513    Acceptance, E,TB,D, VU,DU,NR by NA at 2/23/2021 1141    Comment: role and benefits of OT, BUE AROM in all planes, DC rec, transfer training   Family Acceptance, E,TB, VU by  at 3/7/2021 1513   Other Acceptance, E,TB,D, VU,DU,NR by NA at 2/23/2021 1141    Comment: role and benefits of OT, BUE AROM in all planes, DC rec, transfer training                   Point: Body mechanics (Done)     Description:   Instruct learner(s) on proper  positioning and spine alignment during self-care, functional mobility activities and/or exercises.              Learning Progress Summary           Patient Acceptance, E,TB, VU by  at 3/7/2021 1513    Acceptance, E,TB,D, VU,DU,NR by  at 2/23/2021 1141    Comment: role and benefits of OT, BUE AROM in all planes, DC rec, transfer training   Family Acceptance, E,TB, VU by  at 3/7/2021 1513   Other Acceptance, E,TB,D, VU,DU,NR by NA at 2/23/2021 1141    Comment: role and benefits of OT, BUE AROM in all planes, DC rec, transfer training                               User Key     Initials Effective Dates Name Provider Type Discipline     09/30/20 -  Carmen Medel OT Occupational Therapist OT     03/01/19 -  Janet Chapman OT Occupational Therapist OT              OT Recommendation and Plan  Therapy Frequency (OT): 3 times/wk  Plan of Care Review  Plan of Care Reviewed With: patient, spouse  Progress: improving  Outcome Summary: Pt is 71 y/o M w/ acute decline in basic mobility & ADL taking place over several weeks per spouse. He is able to get OOB w/ min (A) this date & uses BSC w/ cues, setup, & mod (A) for clothing mgmt. Pt has impaired executive functioning though is A&O X3. He has decresaed ADL skill & activity tolernance as well as global weakness. Recommend IP rehab at d/c. PPE worn, mask, gloves, safety glasses, gown.     Time Calculation:   Time Calculation- OT     Row Name 03/07/21 1514             Time Calculation-     OT Start Time  1334  -      OT Stop Time  1353  -      OT Time Calculation (min)  19 min  -      Total Timed Code Minutes- OT  19 minute(s)  -      OT Received On  03/07/21  -      OT - Next Appointment  03/09/21  -      OT Goal Re-Cert Due Date  03/21/21  -        User Key  (r) = Recorded By, (t) = Taken By, (c) = Cosigned By    Initials Name Provider Type     Janet Chapman OT Occupational Therapist        Therapy Charges for Today     Code Description Service  Date Service Provider Modifiers Qty    [id number] HC OT SELF CARE/MGMT/TRAIN EA 15 MIN 3/7/2021 Janet Chapman OT GO 1               Janet Chapman OT  3/7/2021

## 2021-03-07 NOTE — PROGRESS NOTES
Continued Stay Note  [facility]     Patient Name: Axel Ramirez  MRN: [id number]  Today's Date: 3/7/2021    Admit Date: 2/18/2021    Discharge Plan     Row Name 03/07/21 1832       Plan    Plan  Pt/Family now interested in inpatient rehab at [facility], [place], or [place], referrals pending    Plan Comments  MSW notified by CM that Pt/Family are interested in inpatient rehab at [facility], [place], or [place].  MSW sent referrals via [facility] and informed liaisons.        Ana JACOBOW, W  Weekend   Care Management Dept  Cell [phone]  Weekday Department [phone]

## 2021-03-07 NOTE — DISCHARGE PLACEMENT REQUEST
"Maxi Ramirez (70 y.o. Male)     Date of Birth Social Security Number Address Home Phone MRN    [id number]  [id number]  [place] [address] [phone] [id number]    Confucianism Marital Status          Bahai        Admission Date Admission Type Admitting Provider Attending Provider Department, Room/Bed    2/18/21 Emergency Abhishek Lazo MD Farley, Timothy Michael, MD [facility] 3C MEDICAL INPATIENT, 368/1    Discharge Date Discharge Disposition Discharge Destination                       Attending Provider: Abhishek Lazo MD    Allergies: No Known Allergies    Isolation: Droplet   Infection: MRSA/History Only (02/19/21), Rhinovirus  (02/24/21)   Code Status: No CPR    Ht: 177.8 cm (70\")   Wt: 58 kg (127 lb 12.8 oz)    Admission Cmt: None   Principal Problem: Acute on chronic respiratory failure with hypoxia (CMS/HCC) [J96.21]                 Active Insurance as of 2/18/2021     Primary Coverage     Payor Plan Insurance Group Employer/Plan Group    ANTHEM MEDICARE REPLACEMENT ANTHEM MEDICARE ADVANTAGE [id number]     Payor Plan Address Payor Plan Phone Number Payor Plan Fax Number Effective Dates    [address] [phone]  1/1/2021 - None Entered    [facility] [id number]       Subscriber Name Subscriber Birth Date Member ID       MAXI RAMIREZ 1950 [id number]           Secondary Coverage     Payor Plan Insurance Group Employer/Plan Group    [place]      Payor Plan Address Payor Plan Phone Number Payor Plan Fax Number Effective Dates    [address]   1/1/2019 - None Entered    [place] [address]       Subscriber Name Subscriber Birth Date Member ID       MAXI RAMIREZ 1950 [id number]                 Emergency Contacts      (Rel.) Home Phone Work Phone Mobile Phone    Rachana Ramirez (Spouse) [phone] -- [phone]               History & Physical      Lianna Groves MD at 02/18/21 1623 "          History and physical    PATIENT IDENTIFICATION:  Name: Axel Ramirez  MRN: [id number]  :  1950     Age: 70 y.o.  Sex: male      DATE OF PROCEDURE:  2021                   CHIEF COMPLAINT: Acute respiratory failure    History of Present Illness:   Axel Ramirez is a 70 y.o. male known history of COPD, history of lung cancer status post chemoradiation is still receiving chemo, was brought by EMS because of difficulty breathing initially was on 4 L and required to be on 8 L in the emergency room patient can to have more shortness of breath more coughing, went into respiratory failure required to be intubated by the emergency physician,  No report of any chest pain, or nausea vomiting or aspiration no clots of consciousness as reported by his wife,  Reported that he still losing weight      Review of Systems: Limited from family and staff  Constitutional:  As above   Eyes: negative   ENT/oropharynx: negative   Cardiovascular: negative   Respiratory:  As above   Gastrointestinal: negative   Genitourinary: negative   Neurological: negative   Musculoskeletal: negative   Integument/breast: negative   Endocrine: negative   Allergic/Immunologic: negative     Past Medical History:  Past Medical History:   Diagnosis Date   • Aortic stenosis    • Cancer (CMS/HCC)     Sickle Cell Carcinoma   • Congestive heart failure (CHF) (CMS/HCC)     DIASTOLIC   • COPD (chronic obstructive pulmonary disease) (CMS/HCC)    • Coronary artery disease    • Hypertension    • Lung cancer (CMS/HCC)    • Myocardial infarction (CMS/HCC)    • Peripheral vascular disease (CMS/HCC)    • Valvular disease        Past Surgical History:  Past Surgical History:   Procedure Laterality Date   • AORTIC VALVE REPAIR/REPLACEMENT  2018    Dr Maza   • BRONCHOSCOPY N/A 10/24/2020    Procedure: BRONCHOSCOPY with biopsy right upper lobe mass, and bronchoalveolar lavage right upper lobe;  Surgeon: Ángela Bean MD;  Location: [facility]  ENDOSCOPY;  Service: Pulmonary;  Laterality: N/A;  post: right upper lobe mass, pneumonia   • BRONCHOSCOPY N/A 2020    Procedure: BRONCHOSCOPY with bronchial washing;  Surgeon: Ángela Bean MD;  Location: [facility] ENDOSCOPY;  Service: Pulmonary;  Laterality: N/A;  Post: lung mass, pneumonia   • BRONCHOSCOPY N/A 2020    Procedure: BRONCHOSCOPY RIGID with debulking of right main endobronchial tumor;  Surgeon: Nomi Reyes MD;  Location: [facility] MAIN OR;  Service: Cardiothoracic;  Laterality: N/A;   • BRONCHOSCOPY N/A 2020    Procedure: BRONCHOSCOPY;  Surgeon: Nomi Reyes MD;  Location: [facility] MAIN OR;  Service: Cardiothoracic;  Laterality: N/A;   • CARDIAC CATHETERIZATION  2017   • CORONARY ARTERY BYPASS GRAFT  2018    Dr Maza   • TIBIA FRACTURE SURGERY     • VENOUS ACCESS DEVICE (PORT) INSERTION Left 10/30/2020    Procedure: MEDIPORT INSERTION UNDER FLUOROSCOPIC GUIDENCE;  Surgeon: Nomi Reyes MD;  Location: [facility] MAIN OR;  Service: Cardiothoracic;  Laterality: Left;        Family History:  Family History   Problem Relation Age of Onset   • Coronary artery disease Mother    • Cancer Father    • Stroke Daughter 35   • Seizures Daughter 35        Social History:   Social History     Tobacco Use   • Smoking status: Former Smoker     Packs/day: 1.00     Types: Cigarettes     Quit date: 10/2020     Years since quittin.3   • Smokeless tobacco: Never Used   Substance Use Topics   • Alcohol use: Yes        Allergies:  No Known Allergies    Home Meds:  (Not in a hospital admission)      Objective:  tMax 24 hrs: Temp (24hrs), Av.8 °F (38.8 °C), Min:98.6 °F (37 °C), Max:105 °F (40.6 °C)      Vitals Ranges:   Temp:  [98.6 °F (37 °C)-105 °F (40.6 °C)] 105 °F (40.6 °C)  Heart Rate:  [135-161] 138  Resp:  [28-30] 28  BP: ()/() 90/58  FiO2 (%):  [100 %] 100 %    Intake and Output Last 3 Shifts:   No intake/output data recorded.    Exam:  BP 90/58   Pulse (!) 138    "Temp (!) 105 °F (40.6 °C) (Core)   Resp 28   Ht 177.8 cm (70\")   Wt 67.1 kg (148 lb)   SpO2 100%   BMI 21.24 kg/m²     General Appearance: On the vent sedated response to stimuli  HEENT:  Normocephalic, without obvious abnormality, atraumaticConjunctiva/corneas clear,.  Normal external ear canals, Nares normal, no drainage  Neck:  Supple, symmetrical, trachea midline. No JVD.  Lungs /Chest wall: Diffuse bilateral rhonchi, respirations unlabored symmetrical wall movement.     Heart:  Regular rate and rhythm, systolic murmur PMI left sternal border  Abdomen: Soft, non-tender, no masses, no organomegaly.    Extremities: Trace edema no clubbing or Cyanosis        Data Review:  All labs (24hrs):   Recent Results (from the past 24 hour(s))   ECG 12 Lead    Collection Time: 02/18/21 10:50 AM   Result Value Ref Range    QT Interval 291 ms   Comprehensive Metabolic Panel    Collection Time: 02/18/21 10:56 AM    Specimen: Blood   Result Value Ref Range    Glucose 167 (H) 65 - 99 mg/dL    BUN 11 8 - 23 mg/dL    Creatinine 0.63 (L) 0.76 - 1.27 mg/dL    Sodium 130 (L) 136 - 145 mmol/L    Potassium 4.0 3.5 - 5.2 mmol/L    Chloride 96 (L) 98 - 107 mmol/L    CO2 23.0 22.0 - 29.0 mmol/L    Calcium 9.1 8.6 - 10.5 mg/dL    Total Protein 7.4 6.0 - 8.5 g/dL    Albumin 3.10 (L) 3.50 - 5.20 g/dL    ALT (SGPT) 25 1 - 41 U/L    AST (SGOT) 23 1 - 40 U/L    Alkaline Phosphatase 136 (H) 39 - 117 U/L    Total Bilirubin 0.3 0.0 - 1.2 mg/dL    eGFR Non African Amer 126 >60 mL/min/1.73    Globulin 4.3 gm/dL    A/G Ratio 0.7 g/dL    BUN/Creatinine Ratio 17.5 7.0 - 25.0    Anion Gap 11.0 5.0 - 15.0 mmol/L   CBC Auto Differential    Collection Time: 02/18/21 10:56 AM    Specimen: Blood   Result Value Ref Range    WBC 8.40 3.40 - 10.80 10*3/mm3    RBC 2.41 (L) 4.14 - 5.80 10*6/mm3    Hemoglobin 8.6 (L) 13.0 - 17.7 g/dL    Hematocrit 25.5 (L) 37.5 - 51.0 %    .7 (H) 79.0 - 97.0 fL    MCH 35.7 (H) 26.6 - 33.0 pg    MCHC 33.8 31.5 - 35.7 g/dL "    RDW 23.3 (H) 12.3 - 15.4 %    RDW-SD 84.0 (H) 37.0 - 54.0 fl    MPV 7.9 6.0 - 12.0 fL    Platelets 106 (L) 140 - 450 10*3/mm3    Neutrophil % 77.6 (H) 42.7 - 76.0 %    Lymphocyte % 12.9 (L) 19.6 - 45.3 %    Monocyte % 8.1 5.0 - 12.0 %    Eosinophil % 0.3 0.3 - 6.2 %    Basophil % 1.1 0.0 - 1.5 %    Neutrophils, Absolute 6.50 1.70 - 7.00 10*3/mm3    Lymphocytes, Absolute 1.10 0.70 - 3.10 10*3/mm3    Monocytes, Absolute 0.70 0.10 - 0.90 10*3/mm3    Eosinophils, Absolute 0.00 0.00 - 0.40 10*3/mm3    Basophils, Absolute 0.10 0.00 - 0.20 10*3/mm3    nRBC 0.1 0.0 - 0.2 /100 WBC   Light Blue Top    Collection Time: 02/18/21 10:56 AM   Result Value Ref Range    Extra Tube hold for add-on    Green Top (Gel)    Collection Time: 02/18/21 10:56 AM   Result Value Ref Range    Extra Tube Hold for add-ons.    Lavender Top    Collection Time: 02/18/21 10:56 AM   Result Value Ref Range    Extra Tube done    Gold Top - SST    Collection Time: 02/18/21 10:56 AM   Result Value Ref Range    Extra Tube Hold for add-ons.    Procalcitonin    Collection Time: 02/18/21 10:56 AM    Specimen: Blood   Result Value Ref Range    Procalcitonin 0.14 0.00 - 0.25 ng/mL   Troponin    Collection Time: 02/18/21 10:56 AM    Specimen: Blood   Result Value Ref Range    Troponin T <0.010 0.000 - 0.030 ng/mL   BNP    Collection Time: 02/18/21 10:56 AM    Specimen: Blood   Result Value Ref Range    proBNP 919.7 (H) 0.0 - 900.0 pg/mL   D-dimer, Quantitative    Collection Time: 02/18/21 10:56 AM    Specimen: Blood   Result Value Ref Range    D-Dimer, Quantitative 1.98 (H) 0.00 - 0.59 mg/L (FEU)   Scan Slide    Collection Time: 02/18/21 10:56 AM    Specimen: Blood   Result Value Ref Range    Anisocytosis Slight/1+ None Seen    Poikilocytes Slight/1+ None Seen    Dohle Bodies Present None Seen    Toxic Granulation Slight/1+ None Seen    Platelet Morphology Normal Normal   POC Lactate    Collection Time: 02/18/21 11:00 AM    Specimen: Blood   Result Value Ref  Range    Lactate 1.4 0.5 - 2.0 mmol/L   Respiratory Panel PCR w/COVID-19(SARS-CoV-2) PARESH/GRACIELA/KATHY/PAD/COR/MAD/CHRIST In-House, NP Swab in UTM/VTM, 3-4 HR TAT - Swab, Nasopharynx    Collection Time: 02/18/21 11:03 AM    Specimen: Nasopharynx; Swab   Result Value Ref Range    ADENOVIRUS, PCR Not Detected Not Detected    Coronavirus 229E Not Detected Not Detected    Coronavirus HKU1 Not Detected Not Detected    Coronavirus NL63 Not Detected Not Detected    Coronavirus OC43 Not Detected Not Detected    COVID19 Not Detected Not Detected - Ref. Range    Human Metapneumovirus Not Detected Not Detected    Human Rhinovirus/Enterovirus Not Detected Not Detected    Influenza A PCR Not Detected Not Detected    Influenza B PCR Not Detected Not Detected    Parainfluenza Virus 1 Not Detected Not Detected    Parainfluenza Virus 2 Not Detected Not Detected    Parainfluenza Virus 3 Not Detected Not Detected    Parainfluenza Virus 4 Not Detected Not Detected    RSV, PCR Not Detected Not Detected    Bordetella pertussis pcr Not Detected Not Detected    Bordetella parapertussis PCR Not Detected Not Detected    Chlamydophila pneumoniae PCR Not Detected Not Detected    Mycoplasma pneumo by PCR Not Detected Not Detected   Blood Gas, Arterial -    Collection Time: 02/18/21 12:35 PM    Specimen: Arterial Blood   Result Value Ref Range    Site Left Radial     Brice's Test Positive     pH, Arterial 7.405 7.350 - 7.450 pH units    pCO2, Arterial 36.6 35.0 - 48.0 mm Hg    pO2, Arterial 58.8 (L) 83.0 - 108.0 mm Hg    HCO3, Arterial 22.9 21.0 - 28.0 mmol/L    Base Excess, Arterial -1.5 (L) 0.0 - 3.0 mmol/L    O2 Saturation, Arterial 90.3 (L) 94.0 - 98.0 %    CO2 Content 24.0 22 - 29 mmol/L    Temperature 102.7 C    Barometric Pressure for Blood Gas      Modality NRB     FIO2 100 %    pCO2, Temperature Corrected 40.4 35 - 48 mm Hg    pH, Temp Corrected 7.371 7.350 - 7.450 pH Units    pO2, Temperature Corrected 68.8 (L) 83 - 108 mm Hg    Hemodilution No          Imaging:  XR Chest 1 View  Narrative: DATE OF EXAM:  2/18/2021 12:58 PM     PROCEDURE:  XR CHEST 1 VW-     INDICATIONS:  post intubation; J18.9-Pneumonia, unspecified organism; R06.03-Acute  respiratory distress       COMPARISON:  AP portable chest 11/08/2020. CT chest 12/18/2021.     TECHNIQUE:   Single radiographic view of the chest was obtained.     FINDINGS:  Large right lung mass has a similar appearance to the previous  examination. Bilateral lower lobe airspace disease is present suggestive  of pneumonia or sequelae of aspiration. Left chest wall daniel catheter  remains at the cavoatrial junction. Stable heart size with signs of  median sternotomy, CABG, and valve replacement. ET tube has been placed  in satisfactory position in the mid to upper thoracic trachea. No  visible pneumothorax. Right costophrenic angle is not included in the  field-of-view.     Impression:    1. ET tube placement in satisfactory position in the mid to upper  thoracic trachea.  2. Bilateral lower lobe airspace disease may reflect changes of  pneumonia or sequelae of aspiration.  3. Right midlung mass appears unchanged.     Electronically Signed By-Ivana Mirza MD On:2/18/2021 1:13 PM  This report was finalized on [id number] by  Ivana Mirza MD.  CT Chest Pulmonary Embolism  Narrative: CT CHEST PULMONARY EMBOLISM-     Date of Exam: 2/18/2021 12:01 PM     Indication: PE suspected, low/intermediate prob, positive D-dimer.     Comparison: AP portable chest 11/08/2020. PET/CT 11/05/2020.     Technique: Serial and axial CT images of the chest were obtained  following the uneventful intravenous administration of 100 cc  Isovue-370. contrast. Reconstructions in the coronal and sagittal planes  were also performed.  Automated exposure control and iterative  reconstruction methods were used.     FINDINGS:     The study is significantly degraded by respiratory motion. No central  pulmonary embolism is identified. Attenuation of  the right upper lobe  pulmonary artery and segmental branches likely due to extrinsic  compression by the patient's known right lung mass.     Patchy airspace disease changes are present within the bilateral lower  lobes, left greater than right, which may reflect changes of pneumonia  or sequelae of aspiration. The left lower lobe airspace disease is worse  compared to 11/05/2020, while the right lower lobe airspace disease  appears differently distributed. The left upper lobe remains clear.  Severe emphysematous changes are redemonstrated.     Approximately 9 x 8 cm right upper lobe lung mass is again identified,  invading and obstructing the right upper lobe bronchus. The right upper  lobe is now completely atelectatic, and the atelectasis has progressed  since the prior study.     Right middle lobe atelectasis has increased since prior examination.  There is narrowing and cutoff of the distal right middle lobe bronchus,  which may reflect tumor infiltration. Abnormal soft tissue thickening  surrounds the right mainstem bronchus suggesting tumor infiltration,  measuring up to 5 to 6 mm thickness, new or increased since the prior  exam.     Abnormal subcarinal soft tissue thickening or adenopathy measures up to  19 mm thickness, similar to prior examination.     Left paratracheal adenopathy has developed or increased since previous  PET/CT. 1 cm short axis left paratracheal node has developed.     Left thyroid nodule measuring approximately 11 mm appears stable. Left  chest wall daniel catheter extends the lower SVC. Heart size is normal.  Aortic valve replacement changes are present. Dense coronary artery  calcination is are seen. Small esophageal hiatal hernia is unchanged.        No acute or suspicious osseous abnormalities are identified. There are 2  low-density lesions within the right hepatic lobe which appear  unchanged, are not FDG avid on previous PET/CT, and are compatible with  cysts. Remainder of  included upper abdominal organs are grossly  unremarkable.        Impression:    1. No central pulmonary embolism is seen. The study is significantly  degraded by respiratory motion, limiting evaluation for distal segmental  emboli.  2. Dense patchy airspace disease within the bilateral lower lobes may  reflect changes of infectious pneumonia, or sequelae of aspiration.     3. Right upper lobe lung mass invading the right upper lobe bronchus,  and obstructing it is again noted. There is now increased narrowing or  obstruction of the right middle lobe bronchus.  3. New complete right upper lobe and right middle lobe atelectasis.  4. New or increased soft tissue surrounding the right mainstem bronchus  suggesting slight interval progressive tumor infiltration.  5. New left paratracheal adenopathy suggesting nader disease  progression. Right paratracheal and subcarinal adenopathy is thought to  be stable.  6. Severe emphysema.        Electronically Signed By-Ivana Mirza MD On:2/18/2021 12:18 PM  This report was finalized on [id number] by  Ivana Mirza MD.       ASSESSMENT:  Acute hypoxic respiratory failure  Pneumonia  COPD exacerbation  Squamous cell lung cancer  Diastolic congestive heart failure          PLAN:  Ventilator management  Broad-spectrum antibiotics  Hemodynamic support  We will get oncology consult    Bronchodilator  Inhaled corticosteroids  Steroids  Electrolytes/ glycemic control  DVT and GI prophylaxis.    Lianna Groves MD   2/18/2021  16:24 EST       Electronically signed by Lianna Groves MD at 02/18/21 1636          Physician Progress Notes (last 24 hours) (Notes from 03/06/21 1812 through 03/07/21 1812)      Abhishek Lazo MD at 03/07/21 1129                [facility] Medicine Services Daily Progress Note      Hospitalist Team  LOS 17 days      Patient Care Team:  Sandoval Stevenson FNP as PCP - General  Sandoval Stevenson FNP as PCP - Family Medicine  Debbie  Axel JUNIOR MD as Consulting Physician (Hematology and Oncology)  Iglesia Springer MD as Consulting Physician (Cardiology)    Patient Location: 368/1      Subjective   Subjective     Chief Complaint / Subjective  Chief Complaint   Patient presents with   • Shortness of Breath     Patient having more generalized pain today.  Awaiting reevaluation by speech therapy.  Patient initially set to go home with family to continue active treatment for cancer but found out his daughter is in the emergency department with seizures.  Patient's wife feels she is unable to take care of him would like to discuss rehab at this time.  Case management talking with patient and wife.    Brief Synopsis of Hospital Course/HPI  HPI taken from medical chart review as patient is oriented x2 but is anxious and agitated and cannot elaborate on what led to his hospitalization   Mr. Ramirez is a 70 y.o. male known history of COPD on 2L O2 continuously, history of lung cancer status post chemoradiation is still receiving chemo, was brought by EMS to [facility] ED 2/18/21 with complaints of shortness of breath. He initially was on 4 L and required to be on 8 L in the emergency room. He continued to decline to respiratory failure and required intubation in the ER.  CT showed bilateral infiltrates with increased narrowing of right middle lobe bronchus secondary to known mass.  He was started on broad spectrum antibiotics.  He was admitted to the ICU for further treatment of acute respiratory failure and pneumonia.      Since admission the patient has been gradually weaned off mechanical ventilation, extubated 2/20/2021 now on room air.  He did have some hypotension that required vasopressors that have since been weaned off. He was felt stable for transfer out of ICU 2/22/21 and the hospitalist group was consulted for medical management. He remains confused and agitated with sitter at bedside. The patient and family report he has not slept in 2  days and would like something to help him sleep.         Date::      2/23  Patient is nonverbal.  Sitting in recliner having hard time breathing and tachypneic with rales at the bases on exam  He is using Yunker to suction secretions.  IV Lasix ordered x1  Chest x-ray and ABG ordered     2/24/2021  Patient seen and examined earlier today  He wants his Suboxone back which was started.  Increase also Neurontin to his regular dose.  Had nasogastric tube for feeding and awaiting dietitian recommendation  Tachycardia and hypertensive in the starting him back on his home metoprolol.     2/25  Patient spiked fever overnight and had repeat blood cultures done  Vancomycin started but MRSA screen negative and then discontinued.  Respiratory panelShowedRhinovirus/enterovirus  Patient slightly drowsy but able to answer questions with little weak voice  Started on tube feeding     2/26     Patient had abnormal MRI.  We will ask neurosurgery for evaluation.  Hypernatremia noted and free water is increased/Nasogastric tube  Denies any new deficits.  He is wanting to drink Coca-Cola but only thickened liquids are allowed.  Discussed findings with his wife.  Radiation oncology also consulted     2/27  C/o generalized aches  Has prominent lid lag: check free t4 and tsh      2/28  No significant complaints today, tolerating TFs      3/1  Plan for radiation tx   Goals of care clarified with patient's spouse     3/2/2021: Patient reported feeling better today.  Breathing improving.  Going for video swallow and then radiation therapy.    3/3/2021: Patient diet advanced after swallow study yesterday but had episode of emesis this morning with no signs of aspiration.  Patient reported nausea resolved after emesis.  Patient seen by palliative care elected to make himself DNR.  Continuing radiation treatment per radiation oncology.  Patient stable to transfer to medical inpatient floor.    3/4/2021: Patient reports no new symptoms this  morning.  Breathing comfortably and denies chest pain.  No nausea.  Patient will need to work with speech therapy to trial diet again.  Discussed with patient that if he wishes to continue aggressive therapy and is unable to tolerate a p.o. diet will need to discuss replacing Dobbhoff tube and discussing G-tube.  Patient was hesitant to agree to this, palliative care to further discuss with patient to ensure understanding.    3/5/2021: Patient reports no new symptoms today.  Breathing comfortably no pain no nausea or vomiting.  Patient is electing to transition to hospice care, consult placed and going to have family meeting on 3/6/2021.    3/6/2021: Patient and family initially scheduled to meet with hospice to discuss discharging home with hospice care.  However on further discussion with patient and his wife he is under the impression that he is continuing chemotherapy in which case I told him he would not be appropriate for hospice care.  Patient and wife appear to have a misunderstanding of goals of hospice care and are under the impression he is continuing active treatment.  Discussed with patient and family that if they were continuing active treatment then he needs to continue with speech therapy and continue current diet, in which case patient may ultimately require G-tube if he is unable to meet his caloric requirements.  Patient to continue working with speech therapy.    Date::          Review of Systems   Constitutional: Negative for chills and fever.   HENT: Negative for hoarse voice and stridor.    Eyes: Negative for double vision and photophobia.   Cardiovascular: Negative for chest pain and syncope.   Respiratory: Negative for cough and shortness of breath.    Musculoskeletal: Positive for back pain and joint pain. Negative for stiffness.   Gastrointestinal: Positive for nausea. Negative for vomiting.   Genitourinary: Negative for dysuria and flank pain.   Neurological: Negative for focal weakness  "and light-headedness.   Psychiatric/Behavioral: Negative for altered mental status. The patient is not nervous/anxious.          Objective   Objective      Vital Signs  Temp:  [97.3 °F (36.3 °C)-98.8 °F (37.1 °C)] 98.8 °F (37.1 °C)  Heart Rate:  [79-86] 81  Resp:  [16-18] 18  BP: (110-112)/(57-68) 112/68  Oxygen Therapy  SpO2: 96 %  Pulse Oximetry Type: Intermittent  Device (Oxygen Therapy): nasal cannula  Device (Oxygen Therapy): nasal cannula  Flow (L/min): 3  Oxygen Concentration (%): 36  Oximetry Probe Site Changed: No  Flowsheet Rows      First Filed Value   Admission Height  177.8 cm (70\") Documented at 02/18/2021 1048   Admission Weight  67.1 kg (148 lb) Documented at 02/18/2021 1048        Intake & Output (last 3 days)       03/04 0701 - 03/05 0700 03/05 0701 - 03/06 0700 03/06 0701 - 03/07 0700 03/07 0701 - 03/08 0700    P.O.  320    Total Intake(mL/kg) 250 (4.3) 225 (3.9) 1510 (26) 320 (5.5)    Net +250 +225 +1510 +320            Urine Unmeasured Occurrence 2 x  1 x 1 x    Stool Unmeasured Occurrence 1 x 1 x          Lines, Drains & Airways    Active LDAs     Name:   Placement date:   Placement time:   Site:   Days:    Peripheral IV 02/23/21 1300 Anterior;Left Forearm   02/23/21    1300    Forearm   7    External Urinary Catheter   02/28/21    0900    --   2    Single Lumen Implantable Port 10/30/20 Left Subclavian   10/30/20    0956    Subclavian   123                  Physical Exam:    Physical Exam    General: Frail elderly male lying in bed breathing comfortably on 3 L via nasal cannula no acute distress  HEENT: NC/AT, EOMI, mucosa moist  Heart: Regular, rate controlled  Chest: Normal work of breathing, moving air well no wheezing  Abdominal: Soft. NT/ND.   Musculoskeletal: Normal ROM.  No cyanosis. No calf tenderness.  Neurological: Awake and alert, no focal deficits  Skin: Skin is warm and dry. No rash  Psychiatric: Normal mood and affect.         Wounds (last 24 hours)      LDA Wound     " Row Name 03/06/21 1930             Wound 02/18/21 1900 Right gluteal Pressure Injury    Wound - Properties Group Placement Date: 02/18/21  -OSMAN Placement Time: 1900  -OSMAN Present on Hospital Admission: Y  -OSMAN Side: Right  -OSMAN Location: gluteal  -OSMAN Primary Wound Type: Pressure inj  -OSMAN Stage, Pressure Injury : Stage 2  -OSMAN    Dressing Appearance  open to air  -ER      Closure  Open to air  -ER      Drainage Amount  none  -ER      Care, Wound  barrier applied  -ER      Periwound Care  topical treatment applied  -ER      Retired Wound - Properties Group Date first assessed: 02/18/21  -OSMAN Time first assessed: 1900  -OSMAN Present on Hospital Admission: Y  -OSMAN Side: Right  -OSMAN Location: gluteal  -OSMAN Primary Wound Type: Pressure inj  -OSMAN       Wound 02/27/21 0000 medial sacral spine Pressure Injury    Wound - Properties Group Placement Date: 02/27/21  -EM Placement Time: 0000  -EM Present on Hospital Admission: --  -EM, unknown  Orientation: medial  -EM Location: sacral spine  -EM Primary Wound Type: Pressure inj  -EM Stage, Pressure Injury : unstageable  -EM, poss eschar     Dressing Appearance  open to air  -ER      Closure  Open to air  -ER      Base  non-blanchable  -ER      Periwound  redness  -ER      Periwound Temperature  warm  -ER      Drainage Amount  none  -ER      Care, Wound  barrier applied  -ER      Dressing Care  open to air  -ER      Retired Wound - Properties Group Date first assessed: 02/27/21  -EM Time first assessed: 0000  -EM Present on Hospital Admission: --  -EM, unknown  Location: sacral spine  -EM Primary Wound Type: Pressure inj  -EM       Wound 02/18/21 1044 Bilateral posterior elbow Abrasion    Wound - Properties Group Placement Date: 02/18/21  -EM Placement Time: 1044  -EM Side: Bilateral  -EM Orientation: posterior  -EM Location: elbow  -EM Primary Wound Type: Abrasion  -EM, old scab     Dressing Appearance  open to air  -ER      Closure  Open to air  -ER      Base  scab  -ER      Drainage  Amount  none  -ER      Retired Wound - Properties Group Date first assessed: 02/18/21  -EM Time first assessed: 1044  -EM Side: Bilateral  -EM Location: elbow  -EM Primary Wound Type: Abrasion  -EM, old scab       User Key  (r) = Recorded By, (t) = Taken By, (c) = Cosigned By    Initials Name Provider Type    ER Rea Hinton RN Registered Nurse    Kennedi Gandhi, RN Registered Nurse    Berenice Cuevas RN Registered Nurse          Procedures:              Results Review:     I reviewed the patient's new clinical results.      Lab Results (last 24 hours)     ** No results found for the last 24 hours. **        No results found for: HGBA1C            No results found for: LIPASE  Lab Results   Component Value Date    CHOL 79 11/29/2019    TRIG 48 11/29/2019    HDL 33 (L) 11/29/2019    LDL 36 11/29/2019       Lab Results   Lab Value Date/Time    FINALDX  11/11/2020 0958     Tumor mass, right mainstem bronchus, biopsy:    Invasive moderately differentiated squamous cell carcinoma (see COMMENT)    NORBERTO/tkd       FINALDX  11/11/2020 0747     Smears of bronchial washings with cytospin preparation:    Occasional markedly atypical cells consistent with non-small cell carcinoma and exhibiting cytomorphologic      features most suggestive of squamous cell carcinoma     Background consists of acute inflammation with occasional fungal spores and hyphae morphologically most      consistent with Candida species    NORBERTO/tkd       COMDX  11/11/2020 0958     The biopsy was stained via immunohistochemical technique utilizing appropriate controls for the presence of p63, p40, CK5/6, napsin and TTF-1. The tumor cells are positive for CK5/6, p63 and p40 while being negative for TTF-1 and napsin. This staining pattern in conjunction with the histologic features is entirely consistent with squamous cell carcinoma. The tumor is focally necrotic. There is no lymph-vascular space invasion identified.      NORBERTO/tkd       COMDX   11/11/2020 0747     Please correlate with concurrent surgical pathology specimen ([id number]).    NORBERTO/tkd          Microbiology Results (last 10 days)     ** No results found for the last 240 hours. **          ECG/EMG Results (most recent)     Procedure Component Value Units Date/Time    ECG 12 Lead [[id number]] Collected: 02/18/21 1050     Updated: 02/19/21 1421     QT Interval 291 ms     Narrative:      HEART RATE= 130  bpm  RR Interval= 462  ms  ID Interval= 132  ms  P Horizontal Axis= -23  deg  P Front Axis= 79  deg  QRSD Interval= 83  ms  QT Interval= 291  ms  QRS Axis= 55  deg  T Wave Axis=   deg  - ABNORMAL ECG -  Sinus tachycardia  Left atrial enlargement  Anteroseptal infarct, age indeterminate  When compared with ECG of 09-Nov-2020 13:39:57,  Significant rate increase  Significant axis, voltage or hypertrophy change  Electronically Signed By: Higinio Flores ([facility]) 19-Feb-2021 14:11:44  Date and Time of Study: 2021-02-18 10:50:09    ECG 12 Lead [[id number]] Collected: 02/20/21 1846     Updated: 02/24/21 0721     QT Interval 340 ms     Narrative:      HEART RATE= 119  bpm  RR Interval= 496  ms  ID Interval=   ms  P Horizontal Axis=   deg  P Front Axis=   deg  QRSD Interval= 87  ms  QT Interval= 340  ms  QRS Axis= 66  deg  T Wave Axis= 75  deg  - ABNORMAL ECG -  Atrial flutter with predominant 2:1 AV block  Ventricular premature complex  Consider anterior infarct  When compared with ECG of 18-Feb-2021 10:50:09,  Nonspecific significant change  Electronically Signed By: Fuad Montero ([facility]) 24-Feb-2021 07:14:30  Date and Time of Study: 2021-02-20 18:46:19    ECG 12 Lead [[id number]] Collected: 02/21/21 0947     Updated: 02/24/21 0731     QT Interval 379 ms     Narrative:      HEART RATE= 100  bpm  RR Interval= 600  ms  ID Interval= 136  ms  P Horizontal Axis= -16  deg  P Front Axis= 98  deg  QRSD Interval= 95  ms  QT Interval= 379  ms  QRS Axis= 63  deg  T Wave Axis= 70  deg  - BORDERLINE ECG -  Sinus  tachycardia  Consider left ventricular hypertrophy  When compared with ECG of 20-Feb-2021 18:46:19,  Significant axis, voltage or hypertrophy change  Electronically Signed By: Fuad Montero ([facility]) 24-Feb-2021 07:19:26  Date and Time of Study: 2021-02-21 09:47:53    ECG 12 Lead [[id number]] Collected: 02/25/21 0552     Updated: 02/25/21 0554     QT Interval 356 ms     Narrative:      HEART RATE= 106  bpm  RR Interval= 568  ms  PA Interval= 112  ms  P Horizontal Axis= -38  deg  P Front Axis= 63  deg  QRSD Interval= 106  ms  QT Interval= 356  ms  QRS Axis= -8  deg  T Wave Axis= 80  deg  - ABNORMAL ECG -  Sinus tachycardia  LAE, consider biatrial enlargement  Anteroseptal infarct, age indeterminate  When compared with ECG of 24-Feb-2021 6:11:17,  Significant repolarization change  Electronically Signed By:   Date and Time of Study: 2021-02-25 05:52:57    ECG 12 Lead [[id number]] Collected: 02/23/21 0744     Updated: 02/26/21 1535     QT Interval 395 ms     Narrative:      HEART RATE= 111  bpm  RR Interval= 540  ms  PA Interval= 156  ms  P Horizontal Axis= -42  deg  P Front Axis= 79  deg  QRSD Interval= 122  ms  QT Interval= 395  ms  QRS Axis= -14  deg  T Wave Axis= 86  deg  - ABNORMAL ECG -  Sinus tachycardia  Biatrial enlargement  Evolving anterior infarct since 22-Feb-2021 7:19:57  When compared with ECG of 22-Feb-2021 7:19:57,  New or worsened ischemia or infarction  Significant axis, voltage or hypertrophy change  Electronically Signed By: Braden Mcdaniel ([facility]) 26-Feb-2021 15:31:30  Date and Time of Study: 2021-02-23 07:44:24    ECG 12 Lead [[id number]] Collected: 02/26/21 0544     Updated: 02/26/21 1945     QT Interval 345 ms     Narrative:      HEART RATE= 125  bpm  RR Interval= 496  ms  PA Interval= 110  ms  P Horizontal Axis= -34  deg  P Front Axis= 73  deg  QRSD Interval= 107  ms  QT Interval= 345  ms  QRS Axis= -4  deg  T Wave Axis= 92  deg  - ABNORMAL ECG -  Sinus tachycardia  Atrial premature  complexes  LAE, consider biatrial enlargement  When compared with ECG of 25-Feb-2021 5:52:57,  No significant change  Electronically Signed By: Rebeca Jacobs ([facility]) 26-Feb-2021 19:35:13  Date and Time of Study: 2021-02-26 05:44:41    ECG 12 Lead [[id number]] Collected: 02/22/21 0719     Updated: 02/28/21 1657     QT Interval 394 ms     Narrative:      HEART RATE= 110  bpm  RR Interval= 544  ms  WA Interval= 142  ms  P Horizontal Axis= -26  deg  P Front Axis= 70  deg  QRSD Interval= 122  ms  QT Interval= 394  ms  QRS Axis= -14  deg  T Wave Axis= 86  deg  - ABNORMAL ECG -  Sinus tachycardia  Biatrial enlargement  Left ventricular hypertrophy  When compared with ECG of 21-Feb-2021 9:47:53,  No significant change  Electronically Signed By: Iglesia Springer ([facility]) 28-Feb-2021 16:56:20  Date and Time of Study: 2021-02-22 07:19:57    ECG 12 Lead [[id number]] Collected: 02/27/21 2326     Updated: 02/28/21 1759     QT Interval 381 ms     Narrative:      HEART RATE= 90  bpm  RR Interval= 668  ms  WA Interval= 114  ms  P Horizontal Axis= -41  deg  P Front Axis= 70  deg  QRSD Interval= 84  ms  QT Interval= 381  ms  QRS Axis= 59  deg  T Wave Axis= 51  deg  - ABNORMAL ECG -  Sinus rhythm  Left atrial enlargement  Anteroseptal infarct, age indeterminate  When compared with ECG of 27-Feb-2021 5:51:38,  Significant axis, voltage or hypertrophy change  Electronically Signed By: Rebeca Jacobs ([facility]) 28-Feb-2021 17:49:16  Date and Time of Study: 2021-02-27 23:26:41    ECG 12 Lead [[id number]] Collected: 02/27/21 0551     Updated: 02/28/21 1759     QT Interval 347 ms     Narrative:      HEART RATE= 103  bpm  RR Interval= 584  ms  WA Interval= 108  ms  P Horizontal Axis= -36  deg  P Front Axis= 63  deg  QRSD Interval= 85  ms  QT Interval= 347  ms  QRS Axis= 50  deg  T Wave Axis= -4  deg  - BORDERLINE ECG -  Sinus tachycardia  Left atrial enlargement  Probable left ventricular hypertrophy  When compared with ECG of  26-Feb-2021 5:44:41,  Significant rate decrease  Significant axis, voltage or hypertrophy change  Electronically Signed By: Rebeca Jacobs ([facility]) 28-Feb-2021 17:49:24  Date and Time of Study: 2021-02-27 05:51:38    ECG 12 Lead [[id number]] Collected: 03/01/21 0741     Updated: 03/02/21 1009     QT Interval 377 ms     Narrative:      HEART RATE= 87  bpm  RR Interval= 692  ms  MO Interval= 117  ms  P Horizontal Axis= -66  deg  P Front Axis= 65  deg  QRSD Interval= 93  ms  QT Interval= 377  ms  QRS Axis= 57  deg  T Wave Axis= 73  deg  - ABNORMAL ECG -  Sinus rhythm  Left atrial enlargement  Anteroseptal infarct, age indeterminate  When compared with ECG of 27-Feb-2021 23:26:41,  No significant change  Electronically Signed By: Adi Carroll ([facility]) 02-Mar-2021 10:03:42  Date and Time of Study: 2021-03-01 07:41:28    ECG 12 Lead [[id number]] Collected: 02/24/21 0611     Updated: 03/02/21 1937     QT Interval 358 ms     Narrative:      HEART RATE= 121  bpm  RR Interval= 496  ms  MO Interval= 120  ms  P Horizontal Axis= -25  deg  P Front Axis= 64  deg  QRSD Interval= 108  ms  QT Interval= 358  ms  QRS Axis= -6  deg  T Wave Axis= 85  deg  - ABNORMAL ECG -  Sinus tachycardia  LAE, consider biatrial enlargement  Prolonged QT interval  When compared with ECG of 23-Feb-2021 7:44:24,  Nonspecific significant change  Electronically Signed By: Iker Gr ([facility]) 02-Mar-2021 19:31:29  Date and Time of Study: 2021-02-24 06:11:17    ECG 12 Lead [[id number]] Collected: 03/03/21 0342     Updated: 03/03/21 0344     QT Interval 426 ms     Narrative:      HEART RATE= 75  bpm  RR Interval= 804  ms  MO Interval= 125  ms  P Horizontal Axis= -32  deg  P Front Axis= 69  deg  QRSD Interval= 98  ms  QT Interval= 426  ms  QRS Axis= 70  deg  T Wave Axis= 83  deg  - ABNORMAL ECG -  Sinus rhythm  Left atrial enlargement  Anteroseptal infarct, age indeterminate  Electronically Signed By:   Date and Time of Study: 2021-03-03 03:42:53    ECG 12  Lead [[id number]] Collected: 03/02/21 0325     Updated: 03/03/21 0820     QT Interval 381 ms     Narrative:      HEART RATE= 88  bpm  RR Interval= 680  ms  AR Interval= 122  ms  P Horizontal Axis= -73  deg  P Front Axis= 147  deg  QRSD Interval= 86  ms  QT Interval= 381  ms  QRS Axis= 60  deg  T Wave Axis= 148  deg  - ABNORMAL ECG -  Sinus or ectopic atrial rhythm  Left atrial enlargement  Anteroseptal infarct, age indeterminate  Lateral wall also involved  When compared with ECG of 01-Mar-2021 7:41:28,  No significant change  Electronically Signed By: Adi Carroll ([facility]) 03-Mar-2021 08:09:38  Date and Time of Study: 2021-03-02 03:25:22              Results for orders placed during the hospital encounter of 11/08/20    Adult Transthoracic Echo Complete W/ Cont if Necessary Per Protocol    Interpretation Summary  · Estimated left ventricular EF = 65% Left ventricular systolic function is normal.  · Left ventricular diastolic function is consistent with (grade Ia w/high LAP) impaired relaxation.  · The right atrial cavity is mildly dilated.  · Mild aortic valve stenosis is present.  · Mild to moderate LVOT gradient noted      FL Video Swallow With Speech Single Contrast    Result Date: 3/2/2021  As above. Please see speech pathology report for detailed evaluation and dietary recommendations.  Electronically Signed By-Daryl Hunter MD On:3/2/2021 11:39 AM This report was finalized on [id number] by  Daryl Hunter MD.          Xrays, labs reviewed personally by physician.    Medication Review:   I have reviewed the patient's current medication list      Scheduled Meds  aspirin, 81 mg, Oral, Daily  atorvastatin, 40 mg, Oral, Nightly  Barium Sulfate, 1 teaspoon(s), Oral, Once in imaging  budesonide, 0.5 mg, Nebulization, BID - RT  docusate sodium, 100 mg, Oral, BID  epoetin asia-epbx, 40,000 Units, Subcutaneous, Q7 Days  ferrous sulfate, 324 mg, Oral, Daily With Breakfast  furosemide, 20 mg, Intravenous,  Q12H  gabapentin, 300 mg, Oral, TID  guaiFENesin, 600 mg, Oral, Q12H  ipratropium-albuterol, 3 mL, Nebulization, Q4H - RT  lansoprazole, 30 mg, Oral, QAM  lurasidone, 40 mg, Oral, Daily  magnesium hydroxide, 10 mL, Oral, Daily  metoprolol tartrate, 50 mg, Oral, Q12H  polyethylene glycol, 17 g, Oral, Daily  predniSONE, 10 mg, Oral, Daily With Breakfast  sertraline, 50 mg, Oral, Daily  spironolactone, 25 mg, Oral, Daily  Zinc Oxide, , Apply externally, BID        Meds Infusions       Meds PRN  •  acetaminophen  •  acetaminophen  •  bisacodyl  •  Calcium Gluconate-NaCl **AND** calcium gluconate **AND** Calcium, Ionized  •  haloperidol lactate  •  HYDROcodone-acetaminophen  •  ibuprofen  •  labetalol  •  magnesium sulfate **OR** magnesium sulfate **OR** magnesium sulfate  •  melatonin  •  metoprolol tartrate  •  ondansetron  •  potassium chloride **OR** potassium chloride **OR** potassium chloride  •  potassium phosphate infusion greater than 15 mMoles **OR** potassium phosphate infusion greater than 15 mMoles **OR** potassium phosphate **OR** sodium phosphate IVPB **OR** sodium phosphate IVPB **OR** sodium phosphate IVPB  •  sodium chloride  •  Zinc Oxide        Assessment/Plan   Assessment/Plan     Active Hospital Problems    Diagnosis  POA   • **Acute on chronic respiratory failure with hypoxia (CMS/HCC) [J96.21]  Yes   • COPD with acute exacerbation (CMS/HCC) [J44.1]  Yes   • Delirium [R41.0]  Yes   • Moderate malnutrition (CMS/HCC) [E44.0]  Yes   • Pneumonia due to infectious organism [J18.9]  Yes   • Squamous cell carcinoma of lung, right (CMS/HCC) [C34.91]  Yes   • Diastolic CHF, acute (CMS/HCC) [I50.31]  Yes   • Hx of aortic valve replacement [Z95.2]  Not Applicable   • Essential hypertension [I10]  Yes      Resolved Hospital Problems   No resolved problems to display.       MEDICAL DECISION MAKING COMPLEXITY BY PROBLEM:     Acute hypoxic respiratory failure-patient required mechanical ventilation, believed to  be secondary to pneumonia and COPD exacerbation  -On 4 L at this time  -Extubated 2/20/2021 intermittently on room air  -Steroids and antibiotics per pulmonology, possible de-escalation of care was transition to hospice  -Oxygen as needed  -Pulmonology continues monitor    Pneumonia-patient with right-sided mass in addition to signs of infiltrates  -Blood cultures no growth to date  -Positive rhinovirus  -Respiratory cultures negative as of the 19th  -Off antibiotics currently    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-patient now discontinuing active treatment and transitioning to hospice  -Oncology consulted  -Symptom control  -Daily CBC  -Abnormal MRI showing thick-walled ring-enhancing lesion in the left occipital lobe concerning for metastatic disease  -Neurosurgery involved  -Hospice consult placed will have family meeting on 3/6/2021, based on patient and wife's current expectations patient is not appropriate for hospice  -Working towards ultimate discharge, patient with chronic debility and due to comorbidities his head of the bed needs to be elevated more than 30 degrees most of the time due to COPD and  problems with aspiration and requires frequent changes in body position  -Palliative care consulted patient change CODE STATUS to DNR    Coronary artery disease-history of CABG no chest pain at this time  -Maintenance medication  -Telemetry    Heart failure-diastolic in nature.  Relatively euvolemic at this time  -Diuresis as tolerated  -Daily weights    Urinary retention-straight cath as needed    Malnutrition-patient with swallowing difficulties and did not tolerate advance diet  -Continue working with speech therapy until officially transitioning to hospice/possible comfort measures  -Nutrition consulted  -If transitions can advance diet as tolerated    Delirium-patient appears to be back with appropriate mentation, patient off gabapentin and Suboxone for period of time,  resumed  -Antipsychotic as needed  -Psych consult    Anemia/MDS/thrombocytopenia-possibly secondary to chemotherapy  -Managed per hematology    VTE Prophylaxis -   Mechanical Order History:      Ordered        21 0228  Place Sequential Compression Device  Once         21 0228  Maintain Sequential Compression Device  Continuous                 Pharmalogical Order History:     None            Code Status -   Code Status and Medical Interventions:   Ordered at: 21 1110     Limited Support to NOT Include:    Intubation     Code Status:    No CPR     Medical Interventions (Level of Support Prior to Arrest):    Limited       This patient has been examined wearing appropriate Personal Protective Equipment and discussed with hospital infection control department. 21        Discharge Planning  Patient not appropriate for hospice discussing further discharge possibility, now discussing rehab        Electronically signed by Abhishek Lazo MD, 21, 11:29 EST.  [facility] Hospitalist Team        Electronically signed by Abhishek Lazo MD at 21 1131     Axel Lewis MD at 21 1109          Hematology/Oncology Inpatient Progress Note    PATIENT NAME: Axel Ramirez  : 1950  MRN: [id number]    CHIEF COMPLAINT: Hypoxia and respiratory failure     HISTORY OF PRESENT ILLNESS:    70 y.o. male admitted to the ICU through the ED 2021 with hypoxia and respiratory failure requiring intubation.  The patient was intubated and sedated at time of consult and histories were taken from review of records and spouse report.  His spouse reported he had been feeling better and had tolerated recent chemotherapy for his lung cancer.  He was eating well after start of Megace but did develop increasing shortness of air a few days prior to admission without a change in his chronic cough.  She denied known fever, chills, or hemoptysis.  On admission, CT PE protocol was  limited due to motion but felt negative for central PE.  There was evidence of pneumonia versus aspiration and known right upper lobe lung mass invading the right upper lobe bronchus with known obstruction and with increased narrowing/obstruction of the right middle lobe bronchus.  There was suggestion of progressive tumor infiltration of the right mainstem bronchus and new left paratracheal adenopathy with stable right paratracheal and subcarinal adenopathy.  CBC revealed WBC 8.4, hemoglobin 8.6, .7, and platelets 106,000.  D-dimer was elevated to 1.98 (0-0.59).  Creatinine was not elevated and LFTs were okay.  Respiratory viral panel was negative and blood cultures were sent.  On 2/19/2021 hemoglobin dropped to 7.4 and platelets to 92,000.     02/19/2021  Hematology/Oncology was consulted as the patient is known to our service and followed for stage IIIb invasive moderately differentiated squamous cell carcinoma of the right upper lobe lung diagnosed October 2020 and anemia with GOGO and myelodysplastic syndrome (MDS).  His lung cancer was initially treated with concomitant weekly chemotherapy (paclitaxel and carboplatin x7) and radiation therapy from November 2020. He completed radiation therapy on 1/6/2021.  He completed the weekly portion of his chemotherapy on 1/12/2021.  He was seen in follow-up on 2/4/2021 where it was planned to continue chemotherapy with paclitaxel and carboplatin along with Neulasta support every 3 weeks.  He was scheduled for CT scan chest abdomen and pelvis on 2/18/2021 as an outpatient prior to his admission.  He received chemotherapy with Neulasta support on 2/9/2021 at which time CBC revealed WBC 7.62, hemoglobin 9.5, and platelets 223,000.  He was anemic at time of diagnosis of his lung cancer and anemia work-up revealed iron deficiency as well as MDS.  He received oral iron however he was diagnosed with malabsorption and received IV iron in November 2020 which was also  repeated in December 2020.  His bone marrow had revealed mild increased iron storage in January 2021.  At his follow-up visit on 2/4/2021 t Retacrit 30,000 units subcu weekly was ordered for his myelodysplastic syndrome while continuing oral iron for his iron deficiency with iron saturation of 17%.  He had not started Retacrit prior to admission pending insurance authorization.     PCP: Sandoval Stevenson FNP    INTERVAL HISTORY:  2/19/2021-received 1 unit pRBCs. Iron 21 (59-1 58), iron saturation 12 (20-50), transferrin 121 (200-3 60), TIBC 180 (298-536), ferritin 2658 (), reticulocytes 1.93 (0.7 0-1.90), haptoglobin 294 (). Started Venofer 300 mg IV x 3 days. Patient extubated, on nasal canula and Precedex.  2/20/2021 -white blood count 8.8, hemoglobin 8, .4, platelets 57,000.  CT head with moderate periventricular white matter changes present likely related to chronic microvascular ischemic change, progressed as compared to the previous study.  Hypodensity present within the left occipital region which appeared more chronic or remote, new as compared to the previous study.  Chest x-ray with improvement in right basilar airspace disease with resolution of the left airspace disease.  Stable right upper lobe mass.  2/21/2021-Retacrit 40,000 units subcu weekly started for hemoglobin 7.9.  2/22/2021-patient moved out of ICU.  2/23/2021-patient moved to PCU for tachypnea and tachycardia.  02/25/21-CT abdomen and pelvis without showed no evidence of malignancy. There was layering hyperdense material in the gallbladder with spurious excretion of contrast from prior contrast-enhanced study versus sludge/gallstones in differential, constipation, and known pneumonia/aspiration. Respiratory viral panel positive for rhinovirus/enterovirus. Psych consulted and patient started back on home meds including Suboxone, Latuda, and sertraline.  MRI brain without contrast suggestive of left cerebral cortical and  leptomeningeal mets.  2/26/2021-hemoglobin 9.7, platelets 112,000, WBC 10.3.  More alert and oriented.  2/27/2021-MRI brain with contrast showed a 1.1 x 1 cm left occipital lobe lesion. Neurosurgery not recommending resection. Rad Onc planning SRS on 3/3 if patient can tolerate planning. Erythropoietin level 362 (2.6-18.5).    2/28/2021-Reviewed MRI brain findings. Patient and spouse both wanting to proceed with radiation therapy.  Platelets improved to 147,000.   03/02/2021-platelets normalized.  No aspiration on swallow study.  NG tube removed and patient started on puréed diet.  3/3/2021-patient received SRS radiation to left occiput 2000 cGy.    History of present illness reviewed since last visit and changes noted on 03/07/21.    Subjective   Complains of stuffy nose.    ROS:   Review of Systems   Constitutional: Positive for fatigue. Negative for activity change, chills, fever and unexpected weight change.   HENT: Positive for sinus pressure. Negative for congestion, dental problem, hearing loss, mouth sores, nosebleeds, sore throat, trouble swallowing and voice change ( hoarse).    Eyes: Negative for photophobia and visual disturbance.   Respiratory: Negative for apnea, cough, choking, chest tightness and shortness of breath.    Cardiovascular: Negative for chest pain, palpitations and leg swelling.   Gastrointestinal: Positive for constipation. Negative for abdominal distention, abdominal pain, blood in stool, diarrhea, nausea and vomiting.   Endocrine: Negative for cold intolerance and heat intolerance.   Genitourinary: Negative for decreased urine volume, difficulty urinating, dysuria, enuresis, frequency, hematuria and urgency.        Incontinence   Musculoskeletal: Negative for arthralgias and gait problem.   Skin: Negative for rash and wound.   Neurological: Positive for weakness. Negative for dizziness, tremors, speech difficulty, light-headedness, numbness and headaches.   Hematological: Negative for  "adenopathy. Does not bruise/bleed easily.   Psychiatric/Behavioral: Negative for confusion and hallucinations. The patient is not nervous/anxious.    All other systems reviewed and are negative.     MEDICATIONS:    Scheduled Meds:  aspirin, 81 mg, Oral, Daily  atorvastatin, 40 mg, Oral, Nightly  Barium Sulfate, 1 teaspoon(s), Oral, Once in imaging  budesonide, 0.5 mg, Nebulization, BID - RT  docusate sodium, 100 mg, Oral, BID  epoetin asia-epbx, 40,000 Units, Subcutaneous, Q7 Days  ferrous sulfate, 324 mg, Oral, Daily With Breakfast  furosemide, 20 mg, Intravenous, Q12H  gabapentin, 300 mg, Oral, TID  guaiFENesin, 600 mg, Oral, Q12H  ipratropium-albuterol, 3 mL, Nebulization, Q4H - RT  lansoprazole, 30 mg, Oral, QAM  lurasidone, 40 mg, Oral, Daily  magnesium hydroxide, 10 mL, Oral, Daily  metoprolol tartrate, 50 mg, Oral, Q12H  polyethylene glycol, 17 g, Oral, Daily  predniSONE, 10 mg, Oral, Daily With Breakfast  sertraline, 50 mg, Oral, Daily  spironolactone, 25 mg, Oral, Daily  Zinc Oxide, , Apply externally, BID       Continuous Infusions:      PRN Meds:  •  acetaminophen  •  acetaminophen  •  bisacodyl  •  Calcium Gluconate-NaCl **AND** calcium gluconate **AND** Calcium, Ionized  •  haloperidol lactate  •  HYDROcodone-acetaminophen  •  ibuprofen  •  labetalol  •  magnesium sulfate **OR** magnesium sulfate **OR** magnesium sulfate  •  melatonin  •  metoprolol tartrate  •  ondansetron  •  potassium chloride **OR** potassium chloride **OR** potassium chloride  •  potassium phosphate infusion greater than 15 mMoles **OR** potassium phosphate infusion greater than 15 mMoles **OR** potassium phosphate **OR** sodium phosphate IVPB **OR** sodium phosphate IVPB **OR** sodium phosphate IVPB  •  sodium chloride  •  Zinc Oxide     ALLERGIES:  No Known Allergies    Objective    VITALS:   /68 (BP Location: Left arm, Patient Position: Lying)   Pulse 79   Temp 98.8 °F (37.1 °C) (Oral)   Resp 16   Ht 177.8 cm (70\")   " Wt 58 kg (127 lb 12.8 oz)   SpO2 97%   BMI 18.34 kg/m²     PHYSICAL EXAM:  Physical Exam  Vitals and nursing note reviewed.   Constitutional:       General: He is not in acute distress.     Appearance: He is well-developed. He is not ill-appearing (chronically) or diaphoretic.      Interventions: Nasal cannula in place.      Comments: Cachectic    HENT:      Head: Normocephalic and atraumatic.      Comments: Male pattern baldness/alopecia.     Right Ear: External ear normal.      Left Ear: External ear normal.      Nose: Nose normal.      Comments: O2 by NC.       Mouth/Throat:      Mouth: Mucous membranes are moist.      Pharynx: No oropharyngeal exudate or posterior oropharyngeal erythema.      Comments: Edentulous  Eyes:      General: No scleral icterus.     Extraocular Movements: Extraocular movements intact.      Conjunctiva/sclera: Conjunctivae normal.      Pupils: Pupils are equal, round, and reactive to light.   Cardiovascular:      Rate and Rhythm: Normal rate and regular rhythm.      Heart sounds: Normal heart sounds. No murmur.      Comments: Left chest wall [id number]. Monitor leads.  Midline vertical chest wall scar  Pulmonary:      Effort: Pulmonary effort is normal. No respiratory distress.      Breath sounds: No stridor. Rhonchi ( improving somewhat ) present. No wheezing or rales.   Chest:      Chest wall: No tenderness.      Comments: Left chest wall [id number] accessed.   Abdominal:      General: Bowel sounds are normal. There is no distension.      Palpations: Abdomen is soft. There is no mass.      Tenderness: There is no abdominal tenderness. There is no guarding or rebound.   Genitourinary:     Comments: Deffered  Musculoskeletal:         General: No swelling, tenderness or deformity. Normal range of motion.      Cervical back: Normal range of motion and neck supple.      Comments: LUE IV   Lymphadenopathy:      Cervical: No cervical adenopathy.      Upper Body:      Right upper body:  No supraclavicular adenopathy.      Left upper body: No supraclavicular adenopathy.   Skin:     General: Skin is warm and dry.      Coloration: Skin is pale. Skin is not jaundiced.      Findings: No bruising, erythema or rash.      Comments: Pressure bandages to bilateral elbows    Neurological:      General: No focal deficit present.      Mental Status: He is oriented to person, place, and time.      Motor: Weakness (generalized) present.   Psychiatric:         Mood and Affect: Mood normal.         Behavior: Behavior normal.       RECENT LABS:  Lab Results (last 24 hours)     ** No results found for the last 24 hours. **        PENDING RESULTS:  n/a    IMAGING REVIEWED:  No radiology results for the last day  I have reviewed the patient's labs, imaging, reports, and other clinician documentation.    Assessment/Plan   ASSESSMENT  1. 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  CT chest with progression vs infectious/reactive changes. Plan was to repeat scans vs request diagnostic bronchoscopy once acute condition improves but patient thinking of not pursuing aggressive care.  MRI brain showing single left occipital lobe met. Rad 2000 cGy SRS given 03/03/2021.  Patient and spouse want further treatment for the cancer as needed.  2. Anemia/Myelodysplastic syndrome/GOGO with malabsorption/Chemotherapy-induced anemia-planned to start Retacrit as outpatient for MDS prior to admission.  Known GOGO on oral iron prior to admission. S/p 1 unit pRBC on 2/19/2021.  Iron studies showed continued iron deficiency anemia.  No evidence of hemolysis.  Completed IV iron 2/22/2021 and on Retacrit. Hgb stable.  Oral iron restarted 3/4/2021.  3. Thrombocytopenia-likely chemotherapy-induced.  No need for further work-up at present.  Platelets normalized 03/02/2021.   4. Acute respiratory  failure/Pneumonia/COPD-intubated and sedated on admission.  Started on broad-spectrum antibiotics. Blood cultures NGTD.  Extubated on . Initially afebrile but spiked low-grade temps and recultured with blood cx neg to date. RVP positive for rhinovirus/enterovirus. Management per pulmonary.  5. Weight loss/loss of appetite-improving as outpatient on Megace.    NG tube removed and patient on puréed diet now.  Wants regular diet and something for constipation.  6. Altered mental status/agitation-on Haldol. Psych consulted and he was restarted on home meds including Suboxone, sertraline, and Latuda.  Resolved.  7. Low magnesium/high LFTs-resolved.  8. Sacral decub-we will have wound care see.     PLAN  1. Follow CBC.   2. Outpatient MRI brain and PET scan.    3. Continue Retacrit.   4. Daily oral iron.  5. Treat constipation.  6. Wound care to follow.  7. Advance diet as tolerated.          I discussed the patients findings and my recommendations with patient.    Electronically signed by Axel Lewis MD, 21, 11:16 AM EST.                    Electronically signed by Axel Lewis MD at 21 1116     Lianna Groves MD at 21 1102        PULMONARY CRITICAL CARE [facility]  NOTE      PATIENT IDENTIFICATION:  Name: Axel Ramirez  MRN: [id number]  :  1950     Age: 70 y.o.  Sex: male    DATE OF Note:  3/7/2021   Referring Physician: Abhishek Lazo MD                  Subjective:   Appears comfortable, On 2 L O2, no chest pain, no abd pain,  no nausea or vomiting, no change in bowel habit, no dysuria,  no new  skin rash or itching.      Objective:  tMax 24 hrs: Temp (24hrs), Av.1 °F (36.7 °C), Min:97.3 °F (36.3 °C), Max:98.8 °F (37.1 °C)      Vitals Ranges:   Temp:  [97.3 °F (36.3 °C)-98.8 °F (37.1 °C)] 98.8 °F (37.1 °C)  Heart Rate:  [78-86] 79  Resp:  [16-18] 16  BP: (110-112)/(57-69) 112/68    Intake and Output Last 3 Shifts:   I/O last 3 completed shifts:  In: 4739  "[P.O.:1585]  Out: -     Exam:  /68 (BP Location: Left arm, Patient Position: Lying)   Pulse 79   Temp 98.8 °F (37.1 °C) (Oral)   Resp 16   Ht 177.8 cm (70\")   Wt 58 kg (127 lb 12.8 oz)   SpO2 97%   BMI 18.34 kg/m²     General Appearance: Alert ,cachetic    HEENT:  Normocephalic, without obvious abnormality, Conjunctiva/corneas clear,.  Normal external ear canals, Nares normal, no drainage     Neck:  Supple, symmetrical, trachea midline. No JVD.  Lungs /Chest wall:   Bilateral basal rhonchi, respirations unlabored symmetrical wall movement.    Heart:  Regular rate and rhythm, systolic murmur PMI left sternal border  Abdomen: Soft, non-tender, no masses, no organomegaly.    Extremities: Trace edema no clubbing or Cyanosis  SKIN: Left buttock -Unstageable pressure injury   Coccyx/ cleft with masd linear ulceration        Medications:    Current Facility-Administered Medications:   •  acetaminophen (TYLENOL) suppository 650 mg, 650 mg, Rectal, Q6H PRN, Day, Carina, APRN, 650 mg at 21 1605  •  acetaminophen (TYLENOL) tablet 650 mg, 650 mg, Oral, Q6H PRN, Day, Carina, APRN, 650 mg at 21 1011  •  aspirin chewable tablet 81 mg, 81 mg, Oral, Daily, Efrem Langley MD, 81 mg at 21 0928  •  atorvastatin (LIPITOR) tablet 40 mg, 40 mg, Oral, Nightly, Efrem Langley MD, 40 mg at 21 2050  •  Barium Sulfate 60 % cream 1 teaspoon(s), 1 teaspoon(s), Oral, Once in imaging, Abhishek Lazo MD  •  bisacodyl (DULCOLAX) suppository 10 mg, 10 mg, Rectal, Daily PRN, Efrem Langley MD, 10 mg at 21 1405  •  budesonide (PULMICORT) nebulizer solution 0.5 mg, 0.5 mg, Nebulization, BID - RT, Lianna Groves MD, 0.5 mg at 21 0733  •  calcium gluconate 1g/50ml 0.675% NaCl IV SOLN, 1 g, Intravenous, PRN **AND** calcium gluconate 2-0.675 GM/100ML NACL IVPB, 2 g, Intravenous, PRN **AND** Calcium, Ionized, , , PRN, Shivam, Efrem Sniderib " MD Cristina  •  docusate sodium (COLACE) capsule 100 mg, 100 mg, Oral, BID, Abhishek Lazo MD, 100 mg at 03/07/21 0928  •  epoetin asia-epbx (RETACRIT) injection 40,000 Units, 40,000 Units, Subcutaneous, Q7 Days, Axel Lewis MD, 40,000 Units at 02/28/21 1142  •  ferrous sulfate EC tablet 324 mg, 324 mg, Oral, Daily With Breakfast, Axel Lewis MD, 324 mg at 03/07/21 0928  •  furosemide (LASIX) injection 20 mg, 20 mg, Intravenous, Q12H, Shivam, Efrem Evans MD, 20 mg at 03/06/21 2246  •  gabapentin (NEURONTIN) capsule 300 mg, 300 mg, Oral, TID, Shivam, Efrem Evans MD, 300 mg at 03/07/21 0928  •  guaiFENesin (MUCINEX) 12 hr tablet 600 mg, 600 mg, Oral, Q12H, Abhishek Lazo MD, 600 mg at 03/07/21 0928  •  haloperidol lactate (HALDOL) injection 2 mg, 2 mg, Intramuscular, Q6H PRN, Day, Carina, APRN, 2 mg at 02/24/21 0554  •  HYDROcodone-acetaminophen (NORCO) 5-325 MG per tablet 2 tablet, 2 tablet, Oral, Q6H PRN, Abhishek Lazo MD  •  ibuprofen (ADVIL,MOTRIN) 100 MG/5ML suspension 400 mg, 400 mg, Oral, Q6H PRN, Raysa Alatorre, APRN, 400 mg at 02/24/21 1833  •  ipratropium-albuterol (DUO-NEB) nebulizer solution 3 mL, 3 mL, Nebulization, Q4H - RT, Lianna Groves MD, 3 mL at 03/07/21 0733  •  labetalol (NORMODYNE,TRANDATE) injection 20 mg, 20 mg, Intravenous, Q6H PRN, Day, Carina, APRN, 20 mg at 02/23/21 0358  •  lansoprazole (FIRST) oral suspension 30 mg, 30 mg, Oral, QAM, Abhishek Lazo Nomi, MD, 30 mg at 03/07/21 0602  •  lurasidone (LATUDA) tablet 40 mg, 40 mg, Oral, Daily, Efrem Langley MD, 40 mg at 03/07/21 0928  •  magnesium hydroxide (MILK OF MAGNESIA) suspension 2400 mg/10mL 10 mL, 10 mL, Oral, Daily, Abhishek Lazo MD  •  Magnesium Sulfate 2 gram Bolus, followed by 8 gram infusion (total Mg dose 10 grams)- Mg less than or equal to 1mg/dL, 2 g, Intravenous, PRN **OR** Magnesium Sulfate 2 gram / 50mL Infusion (GIVE  X 3 BAGS TO EQUAL 6GM TOTAL DOSE) - Mg 1.1 - 1.5 mg/dl, 2 g, Intravenous, PRN **OR** Magnesium Sulfate 4 gram infusion- Mg 1.6-1.9 mg/dL, 4 g, Intravenous, PRN, Efrem Langley MD, Last Rate: 25 mL/hr at 02/25/21 1831, 4 g at 02/25/21 1831  •  melatonin tablet 5 mg, 5 mg, Oral, Nightly PRN, Lisandra Cruz, APRN, 5 mg at 03/07/21 0002  •  metoprolol tartrate (LOPRESSOR) injection 5 mg, 5 mg, Intravenous, Q6H PRN, Juanito Palomo APRN, 5 mg at 02/24/21 1537  •  metoprolol tartrate (LOPRESSOR) tablet 50 mg, 50 mg, Oral, Q12H, Efrem Langley MD, 50 mg at 03/07/21 0928  •  ondansetron (ZOFRAN) injection 4 mg, 4 mg, Intravenous, Q6H PRN, So Damon APRN, 4 mg at 03/03/21 0944  •  polyethylene glycol (MIRALAX) packet 17 g, 17 g, Oral, Daily, Axel Lewis MD, 17 g at 03/07/21 0928  •  potassium chloride (K-DUR,KLOR-CON) CR tablet 40 mEq, 40 mEq, Oral, PRN, 40 mEq at 03/03/21 0903 **OR** potassium chloride (KLOR-CON) packet 40 mEq, 40 mEq, Oral, PRN, 40 mEq at 02/26/21 1513 **OR** potassium chloride 10 mEq in 100 mL IVPB, 10 mEq, Intravenous, Q1H PRN, Juanito Palomo APRN, Last Rate: 100 mL/hr at 02/22/21 1526, 10 mEq at 02/22/21 1526  •  potassium phosphate 45 mmol in sodium chloride 0.9 % 500 mL infusion, 45 mmol, Intravenous, PRN **OR** potassium phosphate 30 mmol in sodium chloride 0.9 % 250 mL infusion, 30 mmol, Intravenous, PRN **OR** potassium phosphate 15 mmol in sodium chloride 0.9 % 100 mL infusion, 15 mmol, Intravenous, PRN, 15 mmol at 02/26/21 0957 **OR** sodium phosphates 45 mmol in sodium chloride 0.9 % 500 mL IVPB, 45 mmol, Intravenous, PRN **OR** sodium phosphates 30 mmol in sodium chloride 0.9 % 250 mL IVPB, 30 mmol, Intravenous, PRN **OR** sodium phosphates 15 mmol in sodium chloride 0.9 % 250 mL IVPB, 15 mmol, Intravenous, PRN, Shivam, Efrem Evans MD  •  predniSONE (DELTASONE) tablet 10 mg, 10 mg, Oral, Daily With Breakfast, Draw,  MD Ángela, 10 mg at 03/07/21 0928  •  sertraline (ZOLOFT) tablet 50 mg, 50 mg, Oral, Daily, Efrem Langley MD, 50 mg at 03/07/21 0930  •  sodium chloride 0.9 % flush 10 mL, 10 mL, Intravenous, PRN, Lianna Groves MD, 10 mL at 03/07/21 0928  •  spironolactone (ALDACTONE) tablet 25 mg, 25 mg, Oral, Daily, Efrem Langley MD, 25 mg at 03/07/21 0929  •  Zinc Oxide 16 % ointment, , Apply externally, BID, Efrem Langley MD, Given at 03/07/21 1005  •  Zinc Oxide 16 % ointment, , Apply externally, PRN, Efrem Langley MD    Data Review:  All labs (24hrs):   No results found for this or any previous visit (from the past 24 hour(s)).     Imaging:  FL Video Swallow With Speech Single Contrast  Narrative: DATE OF EXAM:  3/2/2021 9:15 AM     PROCEDURE:  FL VIDEO SWALLOW W SPEECH SINGLE-CONTRAST-     INDICATIONS:  repeat VFSS as only on nectar thick clear liquid diet; J18.9-Pneumonia,  unspecified organism; R06.03-Acute respiratory distress; J96.21-Acute  and chronic respiratory failure with hypoxia     COMPARISON:  Video swallow study dated 2/26/2021.     TECHNIQUE:   This examination was performed in conjunction with speech pathology.  Lateral video fluoroscopic evaluation of the swallowing mechanism was  performed while correlate administering to the patient various  consistency food items mixed with barium.     Fluoroscopic Time:   2.5 minutes     FINDINGS:  Deep penetration with one trial of nectar thickened liquid. Other  remaining consistencies demonstrating no aspiration or laryngeal  penetration. Residue is present with multiple consistency trialed.      Impression: As above. Please see speech pathology report for detailed evaluation and  dietary recommendations.     Electronically Signed By-Daryl Hunter MD On:3/2/2021 11:39 AM  This report was finalized on [id number] by  Daryl Hunter MD.       ASSESSMENT:    Acute on chronic  respiratory failure with hypoxia (CMS/HCC)    Essential hypertension    Hx of aortic valve replacement    Squamous cell carcinoma of lung, right (CMS/HCC)    Diastolic CHF, acute (CMS/HCC)    Pneumonia due to infectious organism    Moderate malnutrition (CMS/HCC)    COPD with acute exacerbation (CMS/HCC)    Delirium  Left buttock -Unstageable pressure injury   Coccyx/ cleft with masd linear ulceration   Lesion in the medial aspect of the left occipital lobe   Delirium, possibly withdrawal from gabapentin and/or Suboxone    PLAN:  Continue current plan of care   Speech therapy to see to upgrade diet  Plan is to discharge home with hospice once everything set up, patient does not want any further cancer treatment and wants to go home with family  O2 support   Bronchodilator  Inhaled corticosteroids  Diuresis currently on Aldactone 25 mg and Lasix 20 mg IV every 12  Monitor urinary retention  Electrolytes/ glycemic control  DVT and GI prophylaxis.     Discussed with Dr Yaneli Dumont, APRN   3/7/2021    Poor poor long-term prognosis  I personally have examined  and interviewed the patient. I have reviewed the history, data, problems, assessment and plan with our NP.  Critical care time in direct medical management (   ) minutes   Lianna Groves MD, D,ABSM  2021     Electronically signed by Lianna Groves MD at 21 1429          Physical Therapy Notes (last 48 hours) (Notes from 21 181 through 21 181)      Norah Rogel, PTA at 21 1530  Version 1 of 1       Subjective: Pt agreeable to therapeutic plan of care. Patients dtr now in here with a stroke and she was main cg for patient, Wife states she can't handle patient.    Objective:     Bed mobility - Min-A  Transfers - Min-A  Ambulation - 0 feet N/A or Not attempted., patient too worn out to amb, today    Pain: 0 VAS  Education: Provided education on importance of mobility and skilled verbal / tactile cueing throughout  intervention.     Assessment: Axel Ramirez presents with functional mobility impairments which indicate the need for skilled intervention. Tolerating session today without incident. Will continue to follow and progress as tolerated.     Plan/Recommendations:   Pt would benefit from Inpatient Rehabilitation placement at discharge from facility and requires no DME at discharge.   Pt desires Inpatient Rehabilitation placement at discharge. Pt cooperative; agreeable to therapeutic recommendations and plan of care.     Basic Mobility 6-click:  Rollin = Total, A lot = 2, A little = 3; 4 = None  Supine>Sit:   1 = Total, A lot = 2, A little = 3; 4 = None   Sit>Stand with arms:  1 = Total, A lot = 2, A little = 3; 4 = None  Bed>Chair:   1 = Total, A lot = 2, A little = 3; 4 = None  Ambulate in room:  1 = Total, A lot = 2, A little = 3; 4 = None  3-5 Steps with railin = Total, A lot = 2, A little = 3; 4 = None  Score: 18    Modified [place]: 4 = Moderately severe disability (Unable to attend to own bodily needs without assistance, and unable to walk unassisted)     Post-Tx Position: Supine with HOB Elevated, Alarms activated and Call light and personal items within reach  PPE: gloves, surgical mask, eyewear protection    Electronically signed by Norah Rogel PTA at 21 1540     Norah Rogel PTA at 21 1530  Version 1 of 1           Subjective: Pt agreeable to therapeutic plan of care. Patients dtr now in here with a stroke and she was main cg for patient, Wife states she can't handle patient.    Objective:     Bed mobility - Min-A  Transfers - Min-A  Ambulation - 0 feet N/A or Not attempted., patient too worn out to amb, today    Pain: 0 VAS  Education: Provided education on importance of mobility and skilled verbal / tactile cueing throughout intervention.     Assessment: Axel Ramirez presents with functional mobility impairments which indicate the need for skilled  intervention. Tolerating session today without incident. Will continue to follow and progress as tolerated.     Plan/Recommendations:   Pt would benefit from Inpatient Rehabilitation placement at discharge from facility and requires no DME at discharge.   Pt desires Inpatient Rehabilitation placement at discharge. Pt cooperative; agreeable to therapeutic recommendations and plan of care.     Basic Mobility 6-click:  Rollin = Total, A lot = 2, A little = 3; 4 = None  Supine>Sit:   1 = Total, A lot = 2, A little = 3; 4 = None   Sit>Stand with arms:  1 = Total, A lot = 2, A little = 3; 4 = None  Bed>Chair:   1 = Total, A lot = 2, A little = 3; 4 = None  Ambulate in room:  1 = Total, A lot = 2, A little = 3; 4 = None  3-5 Steps with railin = Total, A lot = 2, A little = 3; 4 = None  Score: 18    Modified [place]: 4 = Moderately severe disability (Unable to attend to own bodily needs without assistance, and unable to walk unassisted)     Post-Tx Position: Supine with HOB Elevated, Alarms activated and Call light and personal items within reach  PPE: gloves, surgical mask, eyewear protection       Electronically signed by Norah Rogel PTA at 21 1540          Occupational Therapy Notes (last 48 hours) (Notes from 21 181 through 21 181)      Janet Chapman, OT at 21 1516          Patient Name: Axel Ramirez  : 1950    MRN: [id number]                              Today's Date: 3/7/2021       Admit Date: 2021    Visit Dx:     ICD-10-CM ICD-9-CM   1. Pneumonia due to infectious organism, unspecified laterality, unspecified part of lung  J18.9 486   2. Respiratory distress  R06.03 786.09   3. Acute on chronic respiratory failure with hypoxia (CMS/[facility])  J96.21 518.84     799.02   4. COPD with acute exacerbation (CMS/[facility])  J44.1 491.21   5. Moderate malnutrition (CMS/[facility])  E44.0 263.0   6. Squamous cell carcinoma of lung, right (CMS/[facility])  C34.91 162.9     Patient  Active Problem List   Diagnosis   • Coronary artery disease involving native coronary artery of native heart without angina pectoris   • Nonrheumatic aortic valve stenosis   • Mixed hyperlipidemia   • Essential hypertension   • Fever   • Presence of aortocoronary bypass graft   • Congestive heart failure (CMS/HCC)   • Hx of aortic valve replacement   • CAP (community acquired pneumonia)   • Tobacco abuse   • Postobstructive pneumonia   • Lung mass   • Squamous cell carcinoma of lung, right (CMS/HCC)   • Diastolic CHF, acute (CMS/HCC)   • Pneumonia of right lung due to infectious organism   • Pneumonia due to infectious organism   • Moderate malnutrition (CMS/HCC)   • Acute on chronic respiratory failure with hypoxia (CMS/HCC)   • COPD with acute exacerbation (CMS/HCC)   • Delirium     Past Medical History:   Diagnosis Date   • Aortic stenosis    • Cancer (CMS/HCC)     Sickle Cell Carcinoma   • Congestive heart failure (CHF) (CMS/HCC)     DIASTOLIC   • COPD (chronic obstructive pulmonary disease) (CMS/HCC)    • Coronary artery disease    • Hypertension    • Lung cancer (CMS/HCC)    • Myocardial infarction (CMS/HCC)    • Peripheral vascular disease (CMS/HCC)    • Valvular disease      Past Surgical History:   Procedure Laterality Date   • AORTIC VALVE REPAIR/REPLACEMENT  02/26/2018    Dr Maza   • BRONCHOSCOPY N/A 10/24/2020    Procedure: BRONCHOSCOPY with biopsy right upper lobe mass, and bronchoalveolar lavage right upper lobe;  Surgeon: Ángela Bean MD;  Location: [facility] ENDOSCOPY;  Service: Pulmonary;  Laterality: N/A;  post: right upper lobe mass, pneumonia   • BRONCHOSCOPY N/A 11/11/2020    Procedure: BRONCHOSCOPY with bronchial washing;  Surgeon: Ángela Bean MD;  Location: [facility] ENDOSCOPY;  Service: Pulmonary;  Laterality: N/A;  Post: lung mass, pneumonia   • BRONCHOSCOPY N/A 11/11/2020    Procedure: BRONCHOSCOPY RIGID with debulking of right main endobronchial tumor;  Surgeon: Nomi Reyes MD;  Location:  [facility] MAIN OR;  Service: Cardiothoracic;  Laterality: N/A;   • BRONCHOSCOPY N/A 11/11/2020    Procedure: BRONCHOSCOPY;  Surgeon: Nomi Reyes MD;  Location: [facility] MAIN OR;  Service: Cardiothoracic;  Laterality: N/A;   • CARDIAC CATHETERIZATION  12/29/2017   • CORONARY ARTERY BYPASS GRAFT  02/26/2018    Dr Maza   • TIBIA FRACTURE SURGERY     • VENOUS ACCESS DEVICE (PORT) INSERTION Left 10/30/2020    Procedure: MEDIPORT INSERTION UNDER FLUOROSCOPIC GUIDENCE;  Surgeon: Nomi Reyes MD;  Location: [facility] MAIN OR;  Service: Cardiothoracic;  Laterality: Left;     General Information     Row Name 03/07/21 1503          General Information    Prior Level of Function  independent:;all household mobility;ADL's  -     Existing Precautions/Restrictions  oxygen therapy device and L/min;fall  -     Barriers to Rehab  medically complex;cognitive status  -     Row Name 03/07/21 1503          Living Environment    Lives With  spouse;child(agustín), adult uncertain if dtr lives there but she is now in the ER w/ possible seizure & PNA.  -     Row Name 03/07/21 1503          Home Main Entrance    Number of Stairs, Main Entrance  two  -     Row Name 03/07/21 1503          Stairs Within Home, Primary    Number of Stairs, Within Home, Primary  none  -     Row Name 03/07/21 1503          Cognition    Orientation Status (Cognition)  oriented to;person;place;time;disoriented to;situation;verbal cues/prompts needed for orientation  -     Row Name 03/07/21 1503          Safety Issues, Functional Mobility    Safety Issues Affecting Function (Mobility)  ability to follow commands;impulsivity;awareness of need for assistance;insight into deficits/self-awareness;judgment;problem-solving;safety precaution awareness;sequencing abilities  -     Impairments Affecting Function (Mobility)  balance;cognition;coordination;endurance/activity tolerance;strength;postural/trunk control  -       User Key  (r) = Recorded By, (t) =  Taken By, (c) = Cosigned By    Initials Name Provider Type     Janet Chapman, OT Occupational Therapist          Mobility/ADL's     Row Name 03/07/21 1505          Bed Mobility    Bed Mobility  supine-sit  -     Supine-Sit [place] (Bed Mobility)  set up;minimum assist (75% patient effort)  -     Bed Mobility, Safety Issues  cognitive deficits limit understanding;decreased use of arms for pushing/pulling;decreased use of legs for bridging/pushing  -     Assistive Device (Bed Mobility)  head of bed elevated;bed rails  -     Row Name 03/07/21 1505          Transfers    Transfers  sit-stand transfer;toilet transfer;bed-chair transfer  -     Bed-Chair [place] (Transfers)  minimum assist (75% patient effort)  -     Sit-Stand [place] (Transfers)  minimum assist (75% patient effort)  -     [place] Level (Toilet Transfer)  minimum assist (75% patient effort)  -     Assistive Device (Toilet Transfer)  commode, bedside without drop arms  -     Row Name 03/07/21 1505          Toilet Transfer    Type (Toilet Transfer)  stand pivot/stand step  -     Row Name 03/07/21 1505          Functional Mobility    Functional Mobility- Ind. Level  not tested  -     Functional Mobility- Comment  too fatigued to walk in room & RW had been removed as pt was thought to be discharging. Will benefit from assist of 2 to walk as pt cativity tolerance is very low & legs may buckle.  -     Row Name 03/07/21 1505          Activities of Daily Living    BADL Assessment/Intervention  toileting;grooming;lower body dressing  -     Row Name 03/07/21 1505          Toileting Assessment/Training    [place] Level (Toileting)  adjust/manage clothing;moderate assist (50% patient effort);perform perineal hygiene;set up;verbal cues;nonverbal cues (demo/gesture)  -     Position (Toileting)  unsupported sitting  -     Row Name 03/07/21 1505          Lower Body Dressing Assessment/Training    [place] Level  (Lower Body Dressing)  don;socks;maximum assist (25% patient effort)  -     Position (Lower Body Dressing)  edge of bed sitting  -     Row Name 03/07/21 1505          Grooming Assessment/Training    [place] Level (Grooming)  wash face, hands;set up;verbal cues  -     Position (Grooming)  supported sitting  -     Comment (Grooming)  needs redirection due to limited attention & sequencing to complete tasks before moving on to the next one.  -       User Key  (r) = Recorded By, (t) = Taken By, (c) = Cosigned By    Initials Name Provider Type    Janet Ludwig OT Occupational Therapist        Obj/Interventions     Row Name 03/07/21 1508          Range of Motion Comprehensive    General Range of Motion  no range of motion deficits identified  -     Row Name 03/07/21 1508          Strength Comprehensive (MMT)    Comment, General Manual Muscle Testing (MMT) Assessment  BUE grossly 3+/5; BLE grossly 3+/5  -     Row Name 03/07/21 1508          Balance    Balance Assessment  sitting static balance;sitting dynamic balance;standing static balance;standing dynamic balance  -     Static Sitting Balance  WFL  -     Dynamic Sitting Balance  mild impairment  -     Static Standing Balance  mild impairment;supported  -     Dynamic Standing Balance  moderate impairment;supported  -       User Key  (r) = Recorded By, (t) = Taken By, (c) = Cosigned By    Initials Name Provider Type    Janet Ludwig OT Occupational Therapist        Goals/Plan    No documentation.       Clinical Impression     Row Name 03/07/21 1505          Pain Scale: FACES Pre/Post-Treatment    Pain: FACES Scale, Pretreatment  2-->hurts little bit  -     Posttreatment Pain Rating  2-->hurts little bit generalized chronic pain  -     Row Name 03/07/21 1501          Plan of Care Review    Plan of Care Reviewed With  patient;spouse  -     Progress  improving  -     Outcome Summary  Pt is 69 y/o M w/ acute decline in basic  mobility & ADL taking place over several weeks per spouse. He is able to get OOB w/ min (A) this date & [place] [facility] [place], & mod (A[place] for clothing mgmt. Pt has impaired executive functioning though is A&O X3. He has decresaed ADL skill & activity tolernance as well as global weakness. Recommend IP rehab at d/c. PPE worn, mask, gloves, safety glasses, gown.  -     Row Name 03/07/21 1508          Therapy Assessment/Plan (OT)    Rehab Potential (OT)  good, to achieve stated therapy goals  -     Criteria for Skilled Therapeutic Interventions Met (OT)  skilled treatment is necessary  -     Therapy Frequency (OT)  3 times/wk  -     Predicted Duration of Therapy Intervention (OT)  until D/C  -     Row Name 03/07/21 1505          Therapy Plan Review/Discharge Plan (OT)    Anticipated Discharge Disposition (OT)  inpatient rehabilitation facility  -     Row Name 03/07/21 1507          Vital Signs    O2 Delivery Pre Treatment  supplemental O2  -     O2 Delivery Intra Treatment  supplemental O2  -     O2 Delivery Post Treatment  supplemental O2  -     Pre Patient Position  Supine  -     Intra Patient Position  Standing  -     Post Patient Position  Sitting  -     Row Name 03/07/21 1500          Positioning and Restraints    Pre-Treatment Position  in bed  -     Post Treatment Position  chair  -     In Chair  notified nsg;sitting;call light within reach;encouraged to call for assist;exit alarm on;with family/caregiver  -       User Key  (r) = Recorded By, (t) = Taken By, (c) = Cosigned By    Initials Name Provider Type     Janet Chapman, OT Occupational Therapist        Outcome Measures     Row Name 03/07/21 6984          How much help from another is currently needed...    Putting on and taking off regular lower body clothing?  2  -MH     Bathing (including washing, rinsing, and drying)  2  -MH     Toileting (which includes using toilet bed pan or urinal)  2  -MH     Putting on and taking  off regular upper body clothing  2  -MH     Taking care of personal grooming (such as brushing teeth)  3  -MH     Eating meals  3  -     AM-PAC 6 Clicks Score (OT)  14  -     Row Name 03/07/21 1512          How much help from another person do you currently need...    Turning from your back to your side while in flat bed without using bedrails?  3  -MH     Moving from lying on back to sitting on the side of a flat bed without bedrails?  3  -MH     Moving to and from a bed to a chair (including a wheelchair)?  3  -MH     Standing up from a chair using your arms (e.g., wheelchair, bedside chair)?  3  -MH     Climbing 3-5 steps with a railing?  1  -MH     To walk in hospital room?  2  -     AM-PAC 6 Clicks Score (PT)  15  -     Row Name 03/07/21 1512          Modified Lottie Scale    Pre-Stroke Modified [place] Scale  2 - Slight disability.  Unable to carry out all previous activities but able to look after own affairs without assistance.  -     Modified [place] Scale  4 - Moderately severe disability.  Unable to walk without assistance, and unable to attend to own bodily needs without assistance.  -       User Key  (r) = Recorded By, (t) = Taken By, (c) = Cosigned By    Initials Name Provider Type     Janet Chapman OT Occupational Therapist        Occupational Therapy Education                 Title: PT OT SLP Therapies (In Progress)     Topic: Occupational Therapy (Done)     Point: ADL training (Done)     Description:   Instruct learner(s) on proper safety adaptation and remediation techniques during self care or transfers.   Instruct in proper use of assistive devices.              Learning Progress Summary           Patient Acceptance, E,TB, VU by  at 3/7/2021 1513    Acceptance, E,TB,D, VU,DU,NR by NA at 2/23/2021 1141    Comment: role and benefits of OT, BUE AROM in all planes, DC rec, transfer training   Family Acceptance, E,TB, VU by  at 3/7/2021 1513   Other Acceptance, E,TB,D, VU,DU,NR by NA  at 2/23/2021 1141    Comment: role and benefits of OT, BUE AROM in all planes, DC rec, transfer training                   Point: Home exercise program (Done)     Description:   Instruct learner(s) on appropriate technique for monitoring, assisting and/or progressing therapeutic exercises/activities.              Learning Progress Summary           Patient Acceptance, E,TB,D, VU,DU,NR by NA at 2/23/2021 1141    Comment: role and benefits of OT, BUE AROM in all planes, DC rec, transfer training   Other Acceptance, E,TB,D, VU,DU,NR by NA at 2/23/2021 1141    Comment: role and benefits of OT, BUE AROM in all planes, DC rec, transfer training                   Point: Precautions (Done)     Description:   Instruct learner(s) on prescribed precautions during self-care and functional transfers.              Learning Progress Summary           Patient Acceptance, E,TB, VU by  at 3/7/2021 1513    Acceptance, E,TB,D, VU,DU,NR by NA at 2/23/2021 1141    Comment: role and benefits of OT, BUE AROM in all planes, DC rec, transfer training   Family Acceptance, E,TB, VU by  at 3/7/2021 1513   Other Acceptance, E,TB,D, VU,DU,NR by NA at 2/23/2021 1141    Comment: role and benefits of OT, BUE AROM in all planes, DC rec, transfer training                   Point: Body mechanics (Done)     Description:   Instruct learner(s) on proper positioning and spine alignment during self-care, functional mobility activities and/or exercises.              Learning Progress Summary           Patient Acceptance, E,TB, VU by  at 3/7/2021 1513    Acceptance, E,TB,D, VU,DU,NR by NA at 2/23/2021 1141    Comment: role and benefits of OT, BUE AROM in all planes, DC rec, transfer training   Family Acceptance, E,TB, VU by  at 3/7/2021 1513   Other Acceptance, E,TB,D, VU,DU,NR by NA at 2/23/2021 1141    Comment: role and benefits of OT, BUE AROM in all planes, DC rec, transfer training                               User Key     Initials Effective  Dates Name Provider Type Discipline     09/30/20 -  Carmen Medel OT Occupational Therapist OT     03/01/19 -  Janet Chapman OT Occupational Therapist OT              OT Recommendation and Plan  Therapy Frequency (OT): 3 times/wk  Plan of Care Review  Plan of Care Reviewed With: patient, spouse  Progress: improving  Outcome Summary: Pt is 69 y/o M w/ acute decline in basic mobility & ADL taking place over several weeks per spouse. He is able to get OOB w/ min (A) this date & uses [facility] w/ cues, setup, & mod (A) for clothing mgmt. Pt has impaired executive functioning though is A&O X3. He has decresaed ADL skill & activity tolernance as well as global weakness. Recommend IP rehab at d/c. PPE worn, mask, gloves, safety glasses, gown.     Time Calculation:   Time Calculation- OT     Row Name 03/07/21 1514             Time Calculation-     OT Start Time  1334  -      OT Stop Time  1353  -      OT Time Calculation (min)  19 min  -      Total Timed Code Minutes- OT  19 minute(s)  -      OT Received On  03/07/21  -      OT - Next Appointment  03/09/21  -      OT Goal Re-Cert Due Date  03/21/21  -        User Key  (r) = Recorded By, (t) = Taken By, (c) = Cosigned By    Initials Name Provider Type     Janet Chapman OT Occupational Therapist        Therapy Charges for Today     Code Description Service Date Service Provider Modifiers Qty    [id number]  OT SELF CARE/MGMT/TRAIN EA 15 MIN 3/7/2021 Janet Chapman OT GO 1               Janet Chapman OT  3/7/2021    Electronically signed by Janet Chapman OT at 03/07/21 1516     Janet Chapman OT at 03/07/21 1513          Problem: Adult Inpatient Plan of Care  Goal: Plan of Care Review  Recent Flowsheet Documentation  Taken 3/7/2021 1509 by Janet Chapman OT  Progress: improving  Plan of Care Reviewed With:  • patient  • spouse  Outcome Summary: Pt is 69 y/o M w/ acute decline in basic mobility & ADL taking place over several weeks per spouse.  He is able to get OOB w/ min (A) this date & uses [facility] w/ cues, setup, & mod (A) for clothing mgmt. Pt has impaired executive functioning though is A&O X3. He has decresaed ADL skill & activity tolerance as well as global weakness. Recommend IP rehab at d/c. PPE worn, mask, gloves, safety glasses, gown.       Electronically signed by Janet Chapman OT at 03/07/21 2260

## 2021-03-07 NOTE — SIGNIFICANT NOTE
"PT reports that his daughter is now in the hospital and that this affects his ability to go home. Is concerned for his wife and her coping with both him and daughter being in the hospital. PT requested  to pray for wife and for her \"spiritual needs\". Prayed with PT. Wife arrived after prayer and updated on daughters condition. Wife voiced belief that \"God doesn't give you more than you can handle\" and that \"God is able to move mountains for us\". Wife emotional when sharing beliefs and requested prayer for strength from God. Prayed with wife. Wife requested that  go and check on daughter and support her spiritually.        03/07/21 1445   Coping/Psychosocial   Observed Emotional State calm;cooperative   Verbalized Emotional State acceptance   Trust Relationship/Rapport thoughts/feelings acknowledged;empathic listening provided   Family/Support Persons spouse   Involvement in Care at bedside  (arrived at bedside 15 minutes into visit)   Additional Documentation Spiritual Care (Group)   Spiritual Care   Spiritual Care Visit Type other (see comments)  (On-call Page)   Spiritual Care Source patient request (describe)   Receptivity to Spiritual Care visit welcomed   Spiritual Care Request prayer support;family support   Spiritual Care Interventions supportive conversation provided;prayer support provided   Response to Spiritual Care thanks expressed;receptive of support;engaged in conversation;emotion expressed   Use of Spiritual Resources prayer   Spiritual Care Follow-Up will follow closely     "

## 2021-03-07 NOTE — THERAPY TREATMENT NOTE
Acute Care - Speech Language Pathology   Swallow Treatment Note  Johann     Patient Name: Axel Ramirez  : 1950  MRN: [id number]  Today's Date: 3/7/2021               Admit Date: 2021  Patient was not wearing a face mask during this therapy encounter. Therapist used appropriate personal protective equipment including mask, eye protection and gloves.  Mask used was standard procedure mask. Appropriate PPE was worn during the entire therapy session. Hand hygiene was completed before and after therapy session. Patient is not in enhanced droplet precautions.         Visit Dx:     ICD-10-CM ICD-9-CM   1. Pneumonia due to infectious organism, unspecified laterality, unspecified part of lung  J18.9 486   2. Respiratory distress  R06.03 786.09   3. Acute on chronic respiratory failure with hypoxia (CMS/HCC)  J96.21 518.84     799.02   4. COPD with acute exacerbation (CMS/HCC)  J44.1 491.21   5. Moderate malnutrition (CMS/HCC)  E44.0 263.0   6. Squamous cell carcinoma of lung, right (CMS/HCC)  C34.91 162.9     Patient Active Problem List   Diagnosis   • Coronary artery disease involving native coronary artery of native heart without angina pectoris   • Nonrheumatic aortic valve stenosis   • Mixed hyperlipidemia   • Essential hypertension   • Fever   • Presence of aortocoronary bypass graft   • Congestive heart failure (CMS/HCC)   • Hx of aortic valve replacement   • CAP (community acquired pneumonia)   • Tobacco abuse   • Postobstructive pneumonia   • Lung mass   • Squamous cell carcinoma of lung, right (CMS/HCC)   • Diastolic CHF, acute (CMS/HCC)   • Pneumonia of right lung due to infectious organism   • Pneumonia due to infectious organism   • Moderate malnutrition (CMS/HCC)   • Acute on chronic respiratory failure with hypoxia (CMS/HCC)   • COPD with acute exacerbation (CMS/HCC)   • Delirium     Past Medical History:   Diagnosis Date   • Aortic stenosis    • Cancer (CMS/HCC)     Sickle Cell Carcinoma   •  Congestive heart failure (CHF) (CMS/HCC)     DIASTOLIC   • COPD (chronic obstructive pulmonary disease) (CMS/HCC)    • Coronary artery disease    • Hypertension    • Lung cancer (CMS/HCC)    • Myocardial infarction (CMS/HCC)    • Peripheral vascular disease (CMS/HCC)    • Valvular disease      Past Surgical History:   Procedure Laterality Date   • AORTIC VALVE REPAIR/REPLACEMENT  02/26/2018    Dr Maza   • BRONCHOSCOPY N/A 10/24/2020    Procedure: BRONCHOSCOPY with biopsy right upper lobe mass, and bronchoalveolar lavage right upper lobe;  Surgeon: Ángela Bean MD;  Location: [facility] ENDOSCOPY;  Service: Pulmonary;  Laterality: N/A;  post: right upper lobe mass, pneumonia   • BRONCHOSCOPY N/A 11/11/2020    Procedure: BRONCHOSCOPY with bronchial washing;  Surgeon: Ángela Bean MD;  Location: [facility] ENDOSCOPY;  Service: Pulmonary;  Laterality: N/A;  Post: lung mass, pneumonia   • BRONCHOSCOPY N/A 11/11/2020    Procedure: BRONCHOSCOPY RIGID with debulking of right main endobronchial tumor;  Surgeon: Nomi Reyes MD;  Location: [facility] MAIN OR;  Service: Cardiothoracic;  Laterality: N/A;   • BRONCHOSCOPY N/A 11/11/2020    Procedure: BRONCHOSCOPY;  Surgeon: Nomi Reyes MD;  Location: [facility] MAIN OR;  Service: Cardiothoracic;  Laterality: N/A;   • CARDIAC CATHETERIZATION  12/29/2017   • CORONARY ARTERY BYPASS GRAFT  02/26/2018    Dr Maza   • TIBIA FRACTURE SURGERY     • VENOUS ACCESS DEVICE (PORT) INSERTION Left 10/30/2020    Procedure: MEDIPORT INSERTION UNDER FLUOROSCOPIC GUIDENCE;  Surgeon: Nomi Reyes MD;  Location: [facility] MAIN OR;  Service: Cardiothoracic;  Laterality: Left;        SWALLOW EVALUATION (last 72 hours)      SLP Adult Swallow Evaluation     Row Name 03/07/21 1600          Document Type  therapy note (daily note)  -CP    Subjective Information  no complaints  -CP    Patient Observations  alert;cooperative  -CP    Patient/Family/Caregiver Comments/Observations  Pt was alert and responsive.  Pt able to follow all commands and answer questions appropriately.   -CP    Patient Effort  good  -CP    Comment  Pt seen for education with his wife present. Pt is not liking his current diet. His PO intake has been low due to this. Pt is now going to pursue chemotherapy and not be under Hospice. New swallow eval order placed for possible diet upgrade. Offered pt trials of soft solids to assess this and he refused today due to not wanting to come up in the bed. Discussed that liquids could not be upgraded without a follow-up VFSS. Pt and his nurse report that he is stronger now and walking around his room some. This is improved from last week. Pt had previous VFSS on 3-2-21 and discussed a follow-up VFSS tomorrow, as it's been almost a week since the last test. Pt and his wife are eager to have another VFSS in hopes of upgrading liquids and pt will try solids during this test. Much education provided on the rationale of VFSS prior to diet upgrade. Pt and his wife verbalized understanding. It is rec that pt have VFSS in am and ST will follow-uo with recs from this study.   -CP      User Key  (r) = Recorded By, (t) = Taken By, (c) = Cosigned By    Initials Name Effective Dates    CP Jacquelin Gant, SLP 03/01/19 -           EDUCATION  The patient has been educated in the following areas:   Dysphagia (Swallowing Impairment) Modified Diet Instruction.         SLP GOALS     Row Name 03/07/21 1600 03/05/21 1400          Oral Nutrition/Hydration Goal 1 (SLP)    Oral Nutrition/Hydration Goal 1, SLP  initiate and tolerate L/R diet without oral stage difficulties or complications associated with aspiration   -CP  initiate and tolerate L/R diet without oral stage difficulties or complications associated with aspiration   -SM     Time Frame (Oral Nutrition/Hydration Goal 1, SLP)  by discharge  -CP  by discharge  -SM     Barriers (Oral Nutrition/Hydration Goal 1, SLP)  See above note. Will have follow-up VFSSi in am for  possibale diet upgrade.   -CP  Current diet is puree/NTL  -SM     Progress/Outcomes (Oral Nutrition/Hydration Goal 1, SLP)  goal ongoing  -CP  goal ongoing  -SM        Oral Nutrition/Hydration Goal 2 (SLP)    Oral Nutrition/Hydration Goal 2, SLP  --  Pt will have full meal assessment within 48 hours  -SM     Time Frame (Oral Nutrition/Hydration Goal 2, SLP)  --  short term goal (STG)  -SM     Barriers (Oral Nutrition/Hydration Goal 2, SLP)  --  Pt seen bedside with several food/liquid items on tray. Pt had Magic Cup, pudding and NTL items on tray. PT lethargic/sleeping and did not arouse to take po trials today. Per nurse, pt is taking medications whole with NTL and tolerating well. Pt tolerating current diet well per nurse with no overt s/s of difficulty. SLP left box of Simply [employer]ick [employer]ick[employer] in room for patient to use at home. Per chart report, pt now requesting to be discharged to home with Hospice. [facility] consulted and to meet with patient and family tomorrow.   -SM     Progress/Outcomes (Oral Nutrition/Hydration Goal 2, SLP)  --  goal ongoing  -SM        Oral Nutrition/Hydration Goal (SLP)    Oral Nutrition/Hydration Goal, SLP  Pt will have follow-up instrumental swlalow evaluation within 48 hours and prior to going home  -CP  --     Time Frame (Oral Nutrition/Hydration Goal, SLP)  2 days  -CP  --        Pharyngeal Strengthening Exercise Goal 1 (SLP)    Activity (Pharyngeal Strengthening Goal 1, SLP)  --  increase tongue base retraction  -SM     Increase Tongue Base Retraction  --  carlos  -SM     [place]/Accuracy (Pharyngeal Strengthening Goal 1, SLP)  --  with moderate cues (50-74% accuracy)  -SM     Time Frame (Pharyngeal Strengthening Goal 1, SLP)  --  by discharge  -SM        Pharyngeal Strengthening Exercise Goal 2 (SLP)    Activity (Pharyngeal Strengthening Goal 1, SLP)  --  increase anterior movement of the hyolaryngeal complex;increase epiglottic  inversion and retroflexion  -SM     Increase  Anterior Movement of the Hyolaryngeal Complex  --  hard effortful swallow  -SM     Increase Epiglottic Inversion and Retroflexion  --  hard effortful swallow  -SM     [place]/Accuracy (Pharyngeal Strengthening Goal 1, SLP)  --  with moderate cues (50-74% accuracy)  -SM     Time Frame (Pharyngeal Strengthening Goal 2, SLP)  --  by discharge  -SM     Barriers (Pharyngeal Strengthening Goal 2, SLP)  --  Goal not addressed this date.   -SM       User Key  (r) = Recorded By, (t) = Taken By, (c) = Cosigned By    Initials Name Provider Type    Jacquelin Kennedy, SLP Speech and Language Pathologist    So Hyde, SLP Speech and Language Pathologist             Time Calculation:                BERTHA Granger  3/7/2021

## 2021-03-07 NOTE — PROGRESS NOTES
[facility] Daily Progress Note      Hospitalist Team  LOS 17 days      Patient Care Team:  Sandoval Stevenson FNP as PCP - General  Sandoval Stevenson FNP as PCP - Family Medicine  Axel Lewis MD as Consulting Physician (Hematology and Oncology)  Iglesia Springer MD as Consulting Physician (Cardiology)    Patient Location: [facility]      Subjective   Subjective     Chief Complaint / Subjective  Chief Complaint   Patient presents with   • Shortness of Breath     Patient having more generalized pain today.  Awaiting reevaluation by speech therapy.  Patient initially set to go home with family to continue active treatment for cancer but found out his daughter is in the emergency department with seizures.  Patient's wife feels she is unable to take care of him would like to discuss rehab at this time.  Case management talking with patient and wife.    Brief Synopsis of Hospital Course/HPI  HPI taken from medical chart review as patient is oriented x2 but is anxious and agitated and cannot elaborate on what led to his hospitalization   Mr. Ramirez is a 70 y.o. male known history of COPD on 2L O2 continuously, history of lung cancer status post chemoradiation is still receiving chemo, was brought by EMS to [facility] ED 2/18/21 with complaints of shortness of breath. He initially was on 4 L and required to be on 8 L in the emergency room. He continued to decline to respiratory failure and required intubation in the ER.  CT showed bilateral infiltrates with increased narrowing of right middle lobe bronchus secondary to known mass.  He was started on broad spectrum antibiotics.  He was admitted to the ICU for further treatment of acute respiratory failure and pneumonia.      Since admission the patient has been gradually weaned off mechanical ventilation, extubated 2/20/2021 now on room air.  He did have some hypotension that required vasopressors that have since been weaned  off. He was felt stable for transfer out of ICU 2/22/21 and the hospitalist group was consulted for medical management. He remains confused and agitated with sitter at bedside. The patient and family report he has not slept in 2 days and would like something to help him sleep.         Date::      2/23  Patient is nonverbal.  Sitting in recliner having hard time breathing and tachypneic with rales at the bases on exam  He is using Yunker to suction secretions.  IV Lasix ordered x1  Chest x-ray and ABG ordered     2/24/2021  Patient seen and examined earlier today  He wants his Suboxone back which was started.  Increase also Neurontin to his regular dose.  Had nasogastric tube for feeding and awaiting dietitian recommendation  Tachycardia and hypertensive in the starting him back on his home metoprolol.     2/25  Patient spiked fever overnight and had repeat blood cultures done  Vancomycin started but MRSA screen negative and then discontinued.  Respiratory panelShowedRhinovirus/enterovirus  Patient slightly drowsy but able to answer questions with little weak voice  Started on tube feeding     2/26     Patient had abnormal MRI.  We will ask neurosurgery for evaluation.  Hypernatremia noted and free water is increased/Nasogastric tube  Denies any new deficits.  He is wanting to drink Coca[employer] but only thickened liquids are allowed.  Discussed findings with his wife.  Radiation oncology also consulted     2/27  C/o generalized aches  Has prominent lid lag: check free t4 and tsh      2/28  No significant complaints today, tolerating TFs      3/1  Plan for radiation tx   Goals of care clarified with patient's spouse     3/2/2021: Patient reported feeling better today.  Breathing improving.  Going for video swallow and then radiation therapy.    3/3/2021: Patient diet advanced after swallow study yesterday but had episode of emesis this morning with no signs of aspiration.  Patient reported nausea resolved after emesis.   Patient seen by palliative care elected to make himself DNR.  Continuing radiation treatment per radiation oncology.  Patient stable to transfer to medical inpatient floor.    3/4/2021: Patient reports no new symptoms this morning.  Breathing comfortably and denies chest pain.  No nausea.  Patient will need to work with speech therapy to trial diet again.  Discussed with patient that if he wishes to continue aggressive therapy and is unable to tolerate a p.o. diet will need to discuss replacing Dobbhoff tube and discussing G-tube.  Patient was hesitant to agree to this, palliative care to further discuss with patient to ensure understanding.    3/5/2021: Patient reports no new symptoms today.  Breathing comfortably no pain no nausea or vomiting.  Patient is electing to transition to hospice care, consult placed and going to have family meeting on 3/6/2021.    3/6/2021: Patient and family initially scheduled to meet with hospice to discuss discharging home with hospice care.  However on further discussion with patient and his wife he is under the impression that he is continuing chemotherapy in which case I told him he would not be appropriate for hospice care.  Patient and wife appear to have a misunderstanding of goals of hospice care and are under the impression he is continuing active treatment.  Discussed with patient and family that if they were continuing active treatment then he needs to continue with speech therapy and continue current diet, in which case patient may ultimately require G-tube if he is unable to meet his caloric requirements.  Patient to continue working with speech therapy.    Date::          Review of Systems   Constitutional: Negative for chills and fever.   HENT: Negative for hoarse voice and stridor.    Eyes: Negative for double vision and photophobia.   Cardiovascular: Negative for chest pain and syncope.   Respiratory: Negative for cough and shortness of breath.    Musculoskeletal:  "Positive for back pain and joint pain. Negative for stiffness.   Gastrointestinal: Positive for nausea. Negative for vomiting.   Genitourinary: Negative for dysuria and flank pain.   Neurological: Negative for focal weakness and light-headedness.   Psychiatric/Behavioral: Negative for altered mental status. The patient is not nervous/anxious.          Objective   Objective      Vital Signs  Temp:  [97.3 °F (36.3 °C)-98.8 °F (37.1 °C)] 98.8 °F (37.1 °C)  Heart Rate:  [79-86] 81  Resp:  [16-18] 18  BP: (110-112)/(57-68) 112/68  Oxygen Therapy  SpO2: 96 %  Pulse Oximetry Type: Intermittent  Device (Oxygen Therapy): nasal cannula  Device (Oxygen Therapy): nasal cannula  Flow (L/min): 3  Oxygen Concentration (%): 36  Oximetry Probe Site Changed: No  Flowsheet Rows      First Filed Value   Admission Height  177.8 cm (70\") Documented at 02/18/2021 1048   Admission Weight  67.1 kg (148 lb) Documented at 02/18/2021 1048        Intake & Output (last 3 days)       03/04 0701 - 03/05 0700 03/05 0701 - 03/06 0700 03/06 0701 - 03/07 0700 03/07 0701 - 03/08 0700    P.O.  320    Total Intake(mL/kg) 250 (4.3) 225 (3.9) 1510 (26) 320 (5.5)    Net +250 +225 +1510 +320            Urine Unmeasured Occurrence 2 x  1 x 1 x    Stool Unmeasured Occurrence 1 x 1 x          Lines, Drains & Airways    Active LDAs     Name:   Placement date:   Placement time:   Site:   Days:    Peripheral IV 02/23/21 1300 Anterior;Left Forearm   02/23/21    1300    Forearm   7    External Urinary Catheter   02/28/21    0900    --   2    Single Lumen Implantable Port 10/30/20 Left Subclavian   10/30/20    0956    Subclavian   123                  Physical Exam:    Physical Exam    General: Frail elderly male lying in bed breathing comfortably on 3 L via nasal cannula no acute distress  HEENT: NC/AT, EOMI, mucosa moist  Heart: Regular, rate controlled  Chest: Normal work of breathing, moving air well no wheezing  Abdominal: Soft. NT/ND. "   Musculoskeletal: Normal ROM.  No cyanosis. No calf tenderness.  Neurological: Awake and alert, no focal deficits  Skin: Skin is warm and dry. No rash  Psychiatric: Normal mood and affect.         Wounds (last 24 hours)      LDA Wound     Row Name 03/06/21 1930             Wound 02/18/21 1900 Right gluteal Pressure Injury    Wound - Properties Group Placement Date: 02/18/21  -OSMAN Placement Time: 1900  -OSMAN Present on Hospital Admission: Y  -OSMAN Side: Right  -OSMAN Location: gluteal  -OSMAN Primary Wound Type: Pressure inj  -OSMAN Stage, Pressure Injury : Stage 2  -OSMAN    Dressing Appearance  open to air  -ER      Closure  Open to air  -ER      Drainage Amount  none  -ER      Care, Wound  barrier applied  -ER      Periwound Care  topical treatment applied  -ER      Retired Wound - Properties Group Date first assessed: 02/18/21  -OSMAN Time first assessed: 1900  -OSMAN Present on Hospital Admission: Y  -OSMAN Side: Right  -OSMAN Location: gluteal  -OSMAN Primary Wound Type: Pressure inj  -OSMAN       Wound 02/27/21 0000 medial sacral spine Pressure Injury    Wound - Properties Group Placement Date: 02/27/21  -EM Placement Time: 0000  -EM Present on Hospital Admission: --  -EM, unknown  Orientation: medial  -EM Location: sacral spine  -EM Primary Wound Type: Pressure inj  -EM Stage, Pressure Injury : unstageable  -EM, poss eschar     Dressing Appearance  open to air  -ER      Closure  Open to air  -ER      Base  non-blanchable  -ER      Periwound  redness  -ER      Periwound Temperature  warm  -ER      Drainage Amount  none  -ER      Care, Wound  barrier applied  -ER      Dressing Care  open to air  -ER      Retired Wound - Properties Group Date first assessed: 02/27/21  -EM Time first assessed: 0000  -EM Present on Hospital Admission: --  -EM, unknown  Location: sacral spine  -EM Primary Wound Type: Pressure inj  -EM       Wound 02/18/21 1044 Bilateral posterior elbow Abrasion    Wound - Properties Group Placement Date: 02/18/21  -EM Placement  Time: 1044 -EM Side: Bilateral  -EM Orientation: posterior  -EM Location: elbow  -EM Primary Wound Type: Abrasion  -EM, old scab     Dressing Appearance  open to air  -ER      Closure  Open to air  -ER      Base  scab  -ER      Drainage Amount  none  -ER      Retired Wound - Properties Group Date first assessed: 02/18/21  -EM Time first assessed: 1044  -EM Side: Bilateral  -EM Location: elbow  -EM Primary Wound Type: Abrasion  -EM, old scab       User Key  (r) = Recorded By, (t) = Taken By, (c) = Cosigned By    Initials Name Provider Type    ER Rea Hinton, RN Registered Nurse    Kennedi Gandhi RN Registered Nurse    Berenice Cuevas RN Registered Nurse          Procedures:              Results Review:     I reviewed the patient's new clinical results.      Lab Results (last 24 hours)     ** No results found for the last 24 hours. **        No results found for: HGBA1C            No results found for: LIPASE  Lab Results   Component Value Date    CHOL 79 11/29/2019    TRIG 48 11/29/2019    HDL 33 (L) 11/29/2019    LDL 36 11/29/2019       Lab Results   Lab Value Date/Time    FINALDX  11/11/2020 0958     Tumor mass, right mainstem bronchus, biopsy:    Invasive moderately differentiated squamous cell carcinoma (see COMMENT)    NORBERTO/tkd       FINALDX  11/11/2020 0747     Smears of bronchial washings with cytospin preparation:    Occasional markedly atypical cells consistent with non-small cell carcinoma and exhibiting cytomorphologic      features most suggestive of squamous cell carcinoma     Background consists of acute inflammation with occasional fungal spores and hyphae morphologically most      consistent with Candida species    NORBERTO/tkd       COMDX  11/11/2020 0958     The biopsy was stained via immunohistochemical technique utilizing appropriate controls for the presence of p63, p40, CK5/6, napsin and TTF-1. The tumor cells are positive for CK5/6, p63 and p40 while being negative for TTF-1  and napsin. This staining pattern in conjunction with the histologic features is entirely consistent with squamous cell carcinoma. The tumor is focally necrotic. There is no lymph-vascular space invasion identified.      NORBERTO/tkd       COMDX  11/11/2020 0747     Please correlate with concurrent surgical pathology specimen ([id number]).    NORBERTO/tkd          Microbiology Results (last 10 days)     ** No results found for the last 240 hours. **          ECG/EMG Results (most recent)     Procedure Component Value Units Date/Time    ECG 12 Lead [[id number]] Collected: 02/18/21 1050     Updated: 02/19/21 1421     QT Interval 291 ms     Narrative:      HEART RATE= 130  bpm  RR Interval= 462  ms  TX Interval= 132  ms  P Horizontal Axis= -23  deg  P Front Axis= 79  deg  QRSD Interval= 83  ms  QT Interval= 291  ms  QRS Axis= 55  deg  T Wave Axis=   deg  - ABNORMAL ECG -  Sinus tachycardia  Left atrial enlargement  Anteroseptal infarct, age indeterminate  When compared with ECG of 09-Nov-2020 13:39:57,  Significant rate increase  Significant axis, voltage or hypertrophy change  Electronically Signed By: Higinio Flores (KATHY) 19-Feb-2021 14:11:44  Date and Time of Study: 2021-02-18 10:50:09    ECG 12 Lead [[id number]] Collected: 02/20/21 1846     Updated: 02/24/21 0721     QT Interval 340 ms     Narrative:      HEART RATE= 119  bpm  RR Interval= 496  ms  TX Interval=   ms  P Horizontal Axis=   deg  P Front Axis=   deg  QRSD Interval= 87  ms  QT Interval= 340  ms  QRS Axis= 66  deg  T Wave Axis= 75  deg  - ABNORMAL ECG -  Atrial flutter with predominant 2:1 AV block  Ventricular premature complex  Consider anterior infarct  When compared with ECG of 18-Feb-2021 10:50:09,  Nonspecific significant change  Electronically Signed By: Fuad Montero (KATHY) 24-Feb-2021 07:14:30  Date and Time of Study: 2021-02-20 18:46:19    ECG 12 Lead [[id number]] Collected: 02/21/21 0947     Updated: 02/24/21 0731     QT Interval 379 ms     Narrative:       HEART RATE= 100  bpm  RR Interval= 600  ms  NM Interval= 136  ms  P Horizontal Axis= -16  deg  P Front Axis= 98  deg  QRSD Interval= 95  ms  QT Interval= 379  ms  QRS Axis= 63  deg  T Wave Axis= 70  deg  - BORDERLINE ECG -  Sinus tachycardia  Consider left ventricular hypertrophy  When compared with ECG of 20-Feb-2021 18:46:19,  Significant axis, voltage or hypertrophy change  Electronically Signed By: Fuad Montero ([facility]) 24-Feb-2021 07:19:26  Date and Time of Study: 2021-02-21 09:47:53    ECG 12 Lead [[id number]] Collected: 02/25/21 0552     Updated: 02/25/21 0554     QT Interval 356 ms     Narrative:      HEART RATE= 106  bpm  RR Interval= 568  ms  NM Interval= 112  ms  P Horizontal Axis= -38  deg  P Front Axis= 63  deg  QRSD Interval= 106  ms  QT Interval= 356  ms  QRS Axis= -8  deg  T Wave Axis= 80  deg  - ABNORMAL ECG -  Sinus tachycardia  LAE, consider biatrial enlargement  Anteroseptal infarct, age indeterminate  When compared with ECG of 24-Feb-2021 6:11:17,  Significant repolarization change  Electronically Signed By:   Date and Time of Study: 2021-02-25 05:52:57    ECG 12 Lead [[id number]] Collected: 02/23/21 0744     Updated: 02/26/21 1535     QT Interval 395 ms     Narrative:      HEART RATE= 111  bpm  RR Interval= 540  ms  NM Interval= 156  ms  P Horizontal Axis= -42  deg  P Front Axis= 79  deg  QRSD Interval= 122  ms  QT Interval= 395  ms  QRS Axis= -14  deg  T Wave Axis= 86  deg  - ABNORMAL ECG -  Sinus tachycardia  Biatrial enlargement  Evolving anterior infarct since 22-Feb-2021 7:19:57  When compared with ECG of 22-Feb-2021 7:19:57,  New or worsened ischemia or infarction  Significant axis, voltage or hypertrophy change  Electronically Signed By: Braden Mcdaniel ([facility]) 26-Feb-2021 15:31:30  Date and Time of Study: 2021-02-23 07:44:24    ECG 12 Lead [[id number]] Collected: 02/26/21 0544     Updated: 02/26/21 1945     QT Interval 345 ms     Narrative:      HEART RATE= 125  bpm  RR Interval= 496   ms  GA Interval= 110  ms  P Horizontal Axis= -34  deg  P Front Axis= 73  deg  QRSD Interval= 107  ms  QT Interval= 345  ms  QRS Axis= -4  deg  T Wave Axis= 92  deg  - ABNORMAL ECG -  Sinus tachycardia  Atrial premature complexes  LAE, consider biatrial enlargement  When compared with ECG of 25-Feb-2021 5:52:57,  No significant change  Electronically Signed By: Rebeca Jacobs ([facility]) 26-Feb-2021 19:35:13  Date and Time of Study: 2021-02-26 05:44:41    ECG 12 Lead [[id number]] Collected: 02/22/21 0719     Updated: 02/28/21 1657     QT Interval 394 ms     Narrative:      HEART RATE= 110  bpm  RR Interval= 544  ms  GA Interval= 142  ms  P Horizontal Axis= -26  deg  P Front Axis= 70  deg  QRSD Interval= 122  ms  QT Interval= 394  ms  QRS Axis= -14  deg  T Wave Axis= 86  deg  - ABNORMAL ECG -  Sinus tachycardia  Biatrial enlargement  Left ventricular hypertrophy  When compared with ECG of 21-Feb-2021 9:47:53,  No significant change  Electronically Signed By: Iglesia Springer ([facility]) 28-Feb-2021 16:56:20  Date and Time of Study: 2021-02-22 07:19:57    ECG 12 Lead [[id number]] Collected: 02/27/21 2326     Updated: 02/28/21 1759     QT Interval 381 ms     Narrative:      HEART RATE= 90  bpm  RR Interval= 668  ms  GA Interval= 114  ms  P Horizontal Axis= -41  deg  P Front Axis= 70  deg  QRSD Interval= 84  ms  QT Interval= 381  ms  QRS Axis= 59  deg  T Wave Axis= 51  deg  - ABNORMAL ECG -  Sinus rhythm  Left atrial enlargement  Anteroseptal infarct, age indeterminate  When compared with ECG of 27-Feb-2021 5:51:38,  Significant axis, voltage or hypertrophy change  Electronically Signed By: Rebeca Jacobs ([facility]) 28-Feb-2021 17:49:16  Date and Time of Study: 2021-02-27 23:26:41    ECG 12 Lead [[id number]] Collected: 02/27/21 0551     Updated: 02/28/21 1759     QT Interval 347 ms     Narrative:      HEART RATE= 103  bpm  RR Interval= 584  ms  GA Interval= 108  ms  P Horizontal Axis= -36  deg  P Front Axis= 63  deg  QRSD  Interval= 85  ms  QT Interval= 347  ms  QRS Axis= 50  deg  T Wave Axis= -4  deg  - BORDERLINE ECG -  Sinus tachycardia  Left atrial enlargement  Probable left ventricular hypertrophy  When compared with ECG of 26-Feb-2021 5:44:41,  Significant rate decrease  Significant axis, voltage or hypertrophy change  Electronically Signed By: Rebeca Jacobs ([facility]) 28-Feb-2021 17:49:24  Date and Time of Study: 2021-02-27 05:51:38    ECG 12 Lead [[id number]] Collected: 03/01/21 0741     Updated: 03/02/21 1009     QT Interval 377 ms     Narrative:      HEART RATE= 87  bpm  RR Interval= 692  ms  NC Interval= 117  ms  P Horizontal Axis= -66  deg  P Front Axis= 65  deg  QRSD Interval= 93  ms  QT Interval= 377  ms  QRS Axis= 57  deg  T Wave Axis= 73  deg  - ABNORMAL ECG -  Sinus rhythm  Left atrial enlargement  Anteroseptal infarct, age indeterminate  When compared with ECG of 27-Feb-2021 23:26:41,  No significant change  Electronically Signed By: Adi Carroll ([facility]) 02-Mar-2021 10:03:42  Date and Time of Study: 2021-03-01 07:41:28    ECG 12 Lead [[id number]] Collected: 02/24/21 0611     Updated: 03/02/21 1937     QT Interval 358 ms     Narrative:      HEART RATE= 121  bpm  RR Interval= 496  ms  NC Interval= 120  ms  P Horizontal Axis= -25  deg  P Front Axis= 64  deg  QRSD Interval= 108  ms  QT Interval= 358  ms  QRS Axis= -6  deg  T Wave Axis= 85  deg  - ABNORMAL ECG -  Sinus tachycardia  LAE, consider biatrial enlargement  Prolonged QT interval  When compared with ECG of 23-Feb-2021 7:44:24,  Nonspecific significant change  Electronically Signed By: Iker Gr ([facility]) 02-Mar-2021 19:31:29  Date and Time of Study: 2021-02-24 06:11:17    ECG 12 Lead [[id number]] Collected: 03/03/21 0342     Updated: 03/03/21 0344     QT Interval 426 ms     Narrative:      HEART RATE= 75  bpm  RR Interval= 804  ms  NC Interval= 125  ms  P Horizontal Axis= -32  deg  P Front Axis= 69  deg  QRSD Interval= 98  ms  QT Interval= 426  ms  QRS Axis= 70   deg  T Wave Axis= 83  deg  - ABNORMAL ECG -  Sinus rhythm  Left atrial enlargement  Anteroseptal infarct, age indeterminate  Electronically Signed By:   Date and Time of Study: 2021-03-03 03:42:53    ECG 12 Lead [[id number]] Collected: 03/02/21 0325     Updated: 03/03/21 0820     QT Interval 381 ms     Narrative:      HEART RATE= 88  bpm  RR Interval= 680  ms  MS Interval= 122  ms  P Horizontal Axis= -73  deg  P Front Axis= 147  deg  QRSD Interval= 86  ms  QT Interval= 381  ms  QRS Axis= 60  deg  T Wave Axis= 148  deg  - ABNORMAL ECG -  Sinus or ectopic atrial rhythm  Left atrial enlargement  Anteroseptal infarct, age indeterminate  Lateral wall also involved  When compared with ECG of 01-Mar-2021 7:41:28,  No significant change  Electronically Signed By: Adi Carroll ([facility]) 03-Mar-2021 08:09:38  Date and Time of Study: 2021-03-02 03:25:22              Results for orders placed during the hospital encounter of 11/08/20    Adult Transthoracic Echo Complete W/ Cont if Necessary Per Protocol    Interpretation Summary  · Estimated left ventricular EF = 65% Left ventricular systolic function is normal.  · Left ventricular diastolic function is consistent with (grade Ia w/high LAP) impaired relaxation.  · The right atrial cavity is mildly dilated.  · Mild aortic valve stenosis is present.  · Mild to moderate LVOT gradient noted      FL Video Swallow With Speech Single Contrast    Result Date: 3/2/2021  As above. Please see speech pathology report for detailed evaluation and dietary recommendations.  Electronically Signed By-Daryl Hunter MD On:3/2/2021 11:39 AM This report was finalized on [id number] by  Daryl Hunter MD.          Xrays, labs reviewed personally by physician.    Medication Review:   I have reviewed the patient's current medication list      Scheduled Meds  aspirin, 81 mg, Oral, Daily  atorvastatin, 40 mg, Oral, Nightly  Barium Sulfate, 1 teaspoon(s), Oral, Once in imaging  budesonide, 0.5 mg,  Nebulization, BID - RT  docusate sodium, 100 mg, Oral, BID  epoetin asia-epbx, 40,000 Units, Subcutaneous, Q7 Days  ferrous sulfate, 324 mg, Oral, Daily With Breakfast  furosemide, 20 mg, Intravenous, Q12H  gabapentin, 300 mg, Oral, TID  guaiFENesin, 600 mg, Oral, Q12H  ipratropium-albuterol, 3 mL, Nebulization, Q4H - RT  lansoprazole, 30 mg, Oral, QAM  lurasidone, 40 mg, Oral, Daily  magnesium hydroxide, 10 mL, Oral, Daily  metoprolol tartrate, 50 mg, Oral, Q12H  polyethylene glycol, 17 g, Oral, Daily  predniSONE, 10 mg, Oral, Daily With Breakfast  sertraline, 50 mg, Oral, Daily  spironolactone, 25 mg, Oral, Daily  Zinc Oxide, , Apply externally, BID        Meds Infusions       Meds PRN  •  acetaminophen  •  acetaminophen  •  bisacodyl  •  Calcium Gluconate-NaCl **AND** calcium gluconate **AND** Calcium, Ionized  •  haloperidol lactate  •  HYDROcodone-acetaminophen  •  ibuprofen  •  labetalol  •  magnesium sulfate **OR** magnesium sulfate **OR** magnesium sulfate  •  melatonin  •  metoprolol tartrate  •  ondansetron  •  potassium chloride **OR** potassium chloride **OR** potassium chloride  •  potassium phosphate infusion greater than 15 mMoles **OR** potassium phosphate infusion greater than 15 mMoles **OR** potassium phosphate **OR** sodium phosphate IVPB **OR** sodium phosphate IVPB **OR** sodium phosphate IVPB  •  sodium chloride  •  Zinc Oxide        Assessment/Plan   Assessment/Plan     Active Hospital Problems    Diagnosis  POA   • **Acute on chronic respiratory failure with hypoxia (CMS/HCC) [J96.21]  Yes   • COPD with acute exacerbation (CMS/HCC) [J44.1]  Yes   • Delirium [R41.0]  Yes   • Moderate malnutrition (CMS/HCC) [E44.0]  Yes   • Pneumonia due to infectious organism [J18.9]  Yes   • Squamous cell carcinoma of lung, right (CMS/HCC) [C34.91]  Yes   • Diastolic CHF, acute (CMS/HCC) [I50.31]  Yes   • Hx of aortic valve replacement [Z95.2]  Not Applicable   • Essential hypertension [I10]  Yes       Resolved Hospital Problems   No resolved problems to display.       MEDICAL DECISION MAKING COMPLEXITY BY PROBLEM:     Acute hypoxic respiratory failure-patient required mechanical ventilation, believed to be secondary to pneumonia and COPD exacerbation  -On 4 L at this time  -Extubated 2/20/2021 intermittently on room air  -Steroids and antibiotics per pulmonology, possible de-escalation of care was transition to hospice  -Oxygen as needed  -Pulmonology continues monitor    Pneumonia-patient with right-sided mass in addition to signs of infiltrates  -Blood cultures no growth to date  -Positive rhinovirus  -Respiratory cultures negative as of the 19th  -Off antibiotics currently    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-patient now discontinuing active treatment and transitioning to hospice  -Oncology consulted  -Symptom control  -Daily CBC  -Abnormal MRI showing thick-walled ring-enhancing lesion in the left occipital lobe concerning for metastatic disease  -Neurosurgery involved  -Hospice consult placed will have family meeting on 3/6/2021, based on patient and wife's current expectations patient is not appropriate for hospice  -Working towards ultimate discharge, patient with chronic debility and due to comorbidities his head of the bed needs to be elevated more than 30 degrees most of the time due to COPD and  problems with aspiration and requires frequent changes in body position  -Palliative care consulted patient change CODE STATUS to DNR    Coronary artery disease-history of CABG no chest pain at this time  -Maintenance medication  -Telemetry    Heart failure-diastolic in nature.  Relatively euvolemic at this time  -Diuresis as tolerated  -Daily weights    Urinary retention-straight cath as needed    Malnutrition-patient with swallowing difficulties and did not tolerate advance diet  -Continue working with speech therapy until officially transitioning to hospice/possible comfort  measures  -Nutrition consulted  -If transitions can advance diet as tolerated    Delirium-patient appears to be back with appropriate mentation, patient off gabapentin and Suboxone for period of time, resumed  -Antipsychotic as needed  -Psych consult    Anemia/MDS/thrombocytopenia-possibly secondary to chemotherapy  -Managed per hematology    VTE Prophylaxis -   Mechanical Order History:      Ordered        02/19/21 0228  Place Sequential Compression Device  Once         02/19/21 0228  Maintain Sequential Compression Device  Continuous                 Pharmalogical Order History:     None            Code Status -   Code Status and Medical Interventions:   Ordered at: 03/03/21 1110     Limited Support to NOT Include:    Intubation     Code Status:    No CPR     Medical Interventions (Level of Support Prior to Arrest):    Limited       This patient has been examined wearing appropriate Personal Protective Equipment and discussed with hospital infection control department. 03/07/21        Discharge Planning  Patient not appropriate for hospice discussing further discharge possibility, now discussing rehab        Electronically signed by Abhishek Lazo MD, 03/07/21, 11:29 EST.  [facility]ist Team

## 2021-03-08 NOTE — PLAN OF CARE
Goal Outcome Evaluation:  Plan of Care Reviewed With: patient, spouse     Outcome Summary: 69 yo male admitted initially for hypoxic respiratory failure. Intubated from 2/18 to 2/20/21. Pt also dx w/ pna, rhinovirus. Hx of lung ca w/ brain mets (L occipital per MRI). Pt remains weak and cachexic in appearance. He is cooperative and motivated toward therapy. He comes to sitting at eob w/ min assist. He requires min assist to come to stand, and he needs min assist to transfer to chair. Pt remains markedly weak, but his cognition has improved quite a bit since he was initially seen by PT. He is now alert & oriented x 4. Pt is not safe for home at this time. He would be very high risk for injurious falls due to his weakness. Will require IP rehab at d/c.  PPE: gloves, mask, goggles, gown.

## 2021-03-08 NOTE — THERAPY RE-EVALUATION
Patient Name: Axel Ramirez  : 1950    MRN: [id number]                              Today's Date: 3/8/2021       Admit Date: 2021    Visit Dx:     ICD-10-CM ICD-9-CM   1. Pneumonia due to infectious organism, unspecified laterality, unspecified part of lung  J18.9 486   2. Respiratory distress  R06.03 786.09   3. Acute on chronic respiratory failure with hypoxia (CMS/HCC)  J96.21 518.84     799.02   4. COPD with acute exacerbation (CMS/HCC)  J44.1 491.21   5. Moderate malnutrition (CMS/HCC)  E44.0 263.0   6. Squamous cell carcinoma of lung, right (CMS/HCC)  C34.91 162.9     Patient Active Problem List   Diagnosis   • Coronary artery disease involving native coronary artery of native heart without angina pectoris   • Nonrheumatic aortic valve stenosis   • Mixed hyperlipidemia   • Essential hypertension   • Fever   • Presence of aortocoronary bypass graft   • Congestive heart failure (CMS/HCC)   • Hx of aortic valve replacement   • CAP (community acquired pneumonia)   • Tobacco abuse   • Postobstructive pneumonia   • Lung mass   • Squamous cell carcinoma of lung, right (CMS/HCC)   • Diastolic CHF, acute (CMS/HCC)   • Pneumonia of right lung due to infectious organism   • Pneumonia due to infectious organism   • Moderate malnutrition (CMS/HCC)   • Acute on chronic respiratory failure with hypoxia (CMS/HCC)   • COPD with acute exacerbation (CMS/HCC)   • Delirium     Past Medical History:   Diagnosis Date   • Aortic stenosis    • Cancer (CMS/HCC)     Sickle Cell Carcinoma   • Congestive heart failure (CHF) (CMS/HCC)     DIASTOLIC   • COPD (chronic obstructive pulmonary disease) (CMS/HCC)    • Coronary artery disease    • Hypertension    • Lung cancer (CMS/HCC)    • Myocardial infarction (CMS/HCC)    • Peripheral vascular disease (CMS/HCC)    • Valvular disease      Past Surgical History:   Procedure Laterality Date   • AORTIC VALVE REPAIR/REPLACEMENT  2018    Dr Maza   • BRONCHOSCOPY N/A 10/24/2020     Procedure: BRONCHOSCOPY with biopsy right upper lobe mass, and bronchoalveolar lavage right upper lobe;  Surgeon: Ángela Bean MD;  Location: [facility] ENDOSCOPY;  Service: Pulmonary;  Laterality: N/A;  post: right upper lobe mass, pneumonia   • BRONCHOSCOPY N/A 11/11/2020    Procedure: BRONCHOSCOPY with bronchial washing;  Surgeon: Ángela Bean MD;  Location: [facility] ENDOSCOPY;  Service: Pulmonary;  Laterality: N/A;  Post: lung mass, pneumonia   • BRONCHOSCOPY N/A 11/11/2020    Procedure: BRONCHOSCOPY RIGID with debulking of right main endobronchial tumor;  Surgeon: Nomi Reyes MD;  Location: [facility] MAIN OR;  Service: Cardiothoracic;  Laterality: N/A;   • BRONCHOSCOPY N/A 11/11/2020    Procedure: BRONCHOSCOPY;  Surgeon: Nomi Reyes MD;  Location: [facility] MAIN OR;  Service: Cardiothoracic;  Laterality: N/A;   • CARDIAC CATHETERIZATION  12/29/2017   • CORONARY ARTERY BYPASS GRAFT  02/26/2018    Dr Maza   • TIBIA FRACTURE SURGERY     • VENOUS ACCESS DEVICE (PORT) INSERTION Left 10/30/2020    Procedure: MEDIPORT INSERTION UNDER FLUOROSCOPIC GUIDENCE;  Surgeon: Nomi Reyes MD;  Location: [facility] MAIN OR;  Service: Cardiothoracic;  Laterality: Left;     General Information     Row Name 03/08/21 1156          Physical Therapy Time and Intention    Document Type  re-evaluation;other (see comments) being seen for re-eval 2* length of stay  -CM     Mode of Treatment  physical therapy  -CM     Row Name 03/08/21 1156          General Information    Patient Profile Reviewed  yes  -CM     Existing Precautions/Restrictions  oxygen therapy device and L/min;fall  -CM     Barriers to Rehab  medically complex  -CM     Row Name 03/08/21 1156          Living Environment    Lives With  spouse dtr technically stays w/ pt/wife, but she was admitted 3/7 w/ seizure activity and has hx of polysubstance abuse; question her ability to assist in pt care  -CM     Row Name 03/08/21 1156          Cognition    Orientation Status  (Cognition)  oriented to;person;place;situation;time  -CM     Row Name 03/08/21 1156          Safety Issues, Functional Mobility    Impairments Affecting Function (Mobility)  balance;coordination;endurance/activity tolerance;strength;postural/trunk control  -CM       User Key  (r) = Recorded By, (t) = Taken By, (c) = Cosigned By    Initials Name Provider Type    Georgette Stapleton, PT Physical Therapist        Mobility     Row Name 03/08/21 1159          Bed Mobility    Bed Mobility  supine-sit  -CM     Supine-Sit [place] (Bed Mobility)  set up;minimum assist (75% patient effort)  -CM     Assistive Device (Bed Mobility)  head of bed elevated  -CM     Comment (Bed Mobility)  once in sitting, has onset of dizziness and required maintaining sitting for several minutes prior to being able to transition to standing  -CM     Row Name 03/08/21 1159          Bed-Chair Transfer    Bed-Chair [place] (Transfers)  minimum assist (75% patient effort)  -CM     Assistive Device (Bed-Chair Transfers)  other (see comments) hand held, gait belt, non skid socks  -CM     Row Name 03/08/21 1159          Sit-Stand Transfer    Sit-Stand [place] (Transfers)  minimum assist (75% patient effort)  -CM     Assistive Device (Sit-Stand Transfers)  other (see comments) gait belt, non skid socks, hand held assist  -CM     Row Name 03/08/21 1159          Gait/Stairs (Locomotion)    Distance in Feet (Gait)  too unsteady and dizzy to attempt amb this date  -CM       User Key  (r) = Recorded By, (t) = Taken By, (c) = Cosigned By    Initials Name Provider Type    Georgette Stapleton, PT Physical Therapist        Obj/Interventions     Row Name 03/08/21 1200          Range of Motion Comprehensive    General Range of Motion  bilateral upper extremity ROM WFL;bilateral lower extremity ROM WFL  -CM     Comment, General Range of Motion  aarom for BUEs  -CM     Row Name 03/08/21 1200          Strength Comprehensive (MMT)    General Manual  Muscle Testing (MMT) Assessment  upper extremity strength deficits identified;lower extremity strength deficits identified  -CM     Comment, General Manual Muscle Testing (MMT) Assessment  BUEs 3-/5; BLEs 3/5  -CM     Row Name 03/08/21 1200          Balance    Static Sitting Balance  WFL;supported;sitting, edge of bed uses UEs for support  -CM     Dynamic Sitting Balance  mild impairment;supported;sitting, edge of bed  -CM     Static Standing Balance  mild impairment;moderate impairment;supported;standing  -CM     Dynamic Standing Balance  standing;supported;moderate impairment  -CM     Row Name 03/08/21 1200          Sensory Assessment (Somatosensory)    Sensory Assessment (Somatosensory)  unable/difficult to assess  -CM       User Key  (r) = Recorded By, (t) = Taken By, (c) = Cosigned By    Initials Name Provider Type    CM Georgette Williamson, PT Physical Therapist        Goals/Plan     Row Name 03/08/21 1207          Bed Mobility Goal 1 (PT)    Activity/Assistive Device (Bed Mobility Goal 1, PT)  sit to supine/supine to sit  -CM     [place] Level/Cues Needed (Bed Mobility Goal 1, PT)  standby assist  -CM     Time Frame (Bed Mobility Goal 1, PT)  5 days  -CM     Progress/Outcomes (Bed Mobility Goal 1, PT)  good progress toward goal  -CM     Row Name 03/08/21 1207          Transfer Goal 1 (PT)    Activity/Assistive Device (Transfer Goal 1, PT)  transfers, all;walker, rolling  -CM     [place] Level/Cues Needed (Transfer Goal 1, PT)  standby assist  -CM     Time Frame (Transfer Goal 1, PT)  1 week  -CM     Progress/Outcome (Transfer Goal 1, PT)  continuing progress toward goal  -CM     Row Name 03/08/21 1207          Gait Training Goal 1 (PT)    Activity/Assistive Device (Gait Training Goal 1, PT)  gait (walking locomotion);assistive device use;increase endurance/gait distance;decrease fall risk;improve balance and speed;walker, rolling  -CM     [place] Level (Gait Training Goal 1, PT)  minimum assist  (75% or more patient effort);moderate assist (50-74% patient effort)  -CM     Distance (Gait Training Goal 1, PT)  80  -CM     Time Frame (Gait Training Goal 1, PT)  2 weeks  -CM     Progress/Outcome (Gait Training Goal 1, PT)  goal revised this date  -CM       User Key  (r) = Recorded By, (t) = Taken By, (c) = Cosigned By    Initials Name Provider Type    CM Georgette Williamson, PT Physical Therapist        Clinical Impression     Row Name 03/08/21 1201          Pain    Additional Documentation  Pain Scale: Numbers Pre/Post-Treatment (Group)  -CM     Row Name 03/08/21 1201          Pain Scale: Numbers Pre/Post-Treatment    Pretreatment Pain Rating  0/10 - no pain  -CM     Posttreatment Pain Rating  0/10 - no pain  -CM     Pain Intervention(s)  Ambulation/increased activity;Repositioned;Emotional support  -CM     Row Name 03/08/21 1201          Pain Scale: FACES Pre/Post-Treatment    Pain: FACES Scale, Pretreatment  2-->hurts little bit  -CM     Posttreatment Pain Rating  2-->hurts little bit  -CM     Row Name 03/08/21 1201          Plan of Care Review    Plan of Care Reviewed With  patient;spouse  -CM     Outcome Summary  71 yo male admitted initially for hypoxic respiratory failure. Intubated from 2/18 to 2/20/21. Pt also dx w/ pna, rhinovirus. Hx of lung ca w/ brain mets (L occipital per MRI). Pt remains weak and cachexic in appearance. He is cooperative and motivated toward therapy. He comes to sitting at eob w/ min assist. He requires min assist to come to stand, and he needs min assist to transfer to chair. Pt remains markedly weak, but his cognition has improved quite a bit since he was initially seen by PT. He is now alert & oriented x 4. Pt is not safe for home at this time. He would be very high risk for injurious falls due to his weakness. Will require IP rehab at d/c.  PPE: gloves, mask, goggles, gown.  -CM     Row Name 03/08/21 1201          Therapy Assessment/Plan (PT)    Patient/Family Therapy Goals  Statement (PT)  pt now wants rehab; wife notes she cannot care for pt in current condition  -CM     Rehab Potential (PT)  fair, will monitor progress closely  -CM     Criteria for Skilled Interventions Met (PT)  yes;meets criteria;skilled treatment is necessary  -CM     Predicted Duration of Therapy Intervention (PT)  until d/c  -CM     Row Name 03/08/21 1206          Positioning and Restraints    Pre-Treatment Position  in bed  -CM     Post Treatment Position  chair  -CM     In Chair  notified nsg;sitting;call light within reach;encouraged to call for assist;exit alarm on;with family/caregiver reviewed w/ pt and wife the detriments of bedrest  -CM       User Key  (r) = Recorded By, (t) = Taken By, (c) = Cosigned By    Initials Name Provider Type    Georgette Stapleton, PT Physical Therapist        Outcome Measures     Row Name 03/08/21 1208          How much help from another person do you currently need...    Turning from your back to your side while in flat bed without using bedrails?  3  -CM     Moving from lying on back to sitting on the side of a flat bed without bedrails?  3  -CM     Moving to and from a bed to a chair (including a wheelchair)?  3  -CM     Standing up from a chair using your arms (e.g., wheelchair, bedside chair)?  3  -CM     Climbing 3-5 steps with a railing?  1  -CM     To walk in hospital room?  2  -CM     AM-PAC 6 Clicks Score (PT)  15  -CM       User Key  (r) = Recorded By, (t) = Taken By, (c) = Cosigned By    Initials Name Provider Type    Georgette Stapleton, PT Physical Therapist        Physical Therapy Education                 Title: PT OT SLP Therapies (In Progress)     Topic: Physical Therapy (In Progress)     Point: Mobility training (In Progress)     Learning Progress Summary           Patient Acceptance, E,TB, VU by CM at 3/8/2021 1208    Acceptance, E, NR by CR at 2/22/2021 1342   Significant Other Acceptance, E, NR by CR at 2/22/2021 1342                   Point: Home  exercise program (Done)     Learning Progress Summary           Patient Acceptance, E,TB, VU by CM at 3/8/2021 1208                   Point: Body mechanics (Done)     Learning Progress Summary           Patient Acceptance, E,TB, VU by CM at 3/8/2021 1208                   Point: Precautions (Done)     Learning Progress Summary           Patient Acceptance, E,TB, VU by CM at 3/8/2021 1208                               User Key     Initials Effective Dates Name Provider Type Discipline    CM 03/01/19 -  Georgette Williamson, PT Physical Therapist PT    CR 03/01/19 -  Reyes, Carmela, TOÑO Physical Therapist PT              PT Recommendation and Plan     Plan of Care Reviewed With: patient, spouse  Outcome Summary: 71 yo male admitted initially for hypoxic respiratory failure. Intubated from 2/18 to 2/20/21. Pt also dx w/ pna, rhinovirus. Hx of lung ca w/ brain mets (L occipital per MRI). Pt remains weak and cachexic in appearance. He is cooperative and motivated toward therapy. He comes to sitting at eob w/ min assist. He requires min assist to come to stand, and he needs min assist to transfer to chair. Pt remains markedly weak, but his cognition has improved quite a bit since he was initially seen by PT. He is now alert & oriented x 4. Pt is not safe for home at this time. He would be very high risk for injurious falls due to his weakness. Will require IP rehab at d/c.  PPE: gloves, mask, goggles, gown.     Time Calculation:   PT Charges     Row Name 03/08/21 1208             Time Calculation    Start Time  1129  -CM      Stop Time  1148  -CM      Time Calculation (min)  19 min  -CM      PT Received On  03/08/21  -CM      PT - Next Appointment  03/09/21  -CM      PT Goal Re-Cert Due Date  03/22/21  -CM         Time Calculation- PT    Total Timed Code Minutes- PT  8 minute(s)  -CM        User Key  (r) = Recorded By, (t) = Taken By, (c) = Cosigned By    Initials Name Provider Type    Georgette Stapleton, PT Physical  Therapist        Therapy Charges for Today     Code Description Service Date Service Provider Modifiers Qty    [id number]  PT RE-EVAL ESTABLISHED PLAN 2 3/8/2021 Georgette Williamson, PT GP 1    [id number]  PT THERAPEUTIC ACT EA 15 MIN 3/8/2021 Georgette Williamson, PT GP 1          PT G-Codes  Outcome Measure Options: AM-PAC 6 Clicks Basic Mobility (PT), AM-PAC 6 Clicks Daily Activity (OT), Modified [place]  AM-PAC 6 Clicks Score (PT): 15  AM-PAC 6 Clicks Score (OT): 14  Modified [place] Scale: 4 - Moderately severe disability.  Unable to walk without assistance, and unable to attend to own bodily needs without assistance.    Georgette Williamson, PT  3/8/2021

## 2021-03-08 NOTE — PLAN OF CARE
Goal Outcome Evaluation:  Repeat VFSS completed this date. Pt seen upright in the lateral projection and was given trials of NTL by cup x3, puree x2, mechanical ground (mixed) x2, thin liquids by spoon x1, and thin liquids by cup x3. Extended mastication with soft solids noted. Premature spillage to the pyriforms, with reduced hyolaryngeal elevation and epiglottic deflection, resulting in deep penetration during the swallow with thins by cup. No penetration or aspiration observed with any other trial/consistency. Recommend pt initiate a mechanical ground and NTL diet. Pt should take small bites/sips, alternate between bites/sips, double swallow, and be upright at 90 degrees for all PO. Discussed results and recs with pt and pt's family following VFSS. Both expressed agreement and understanding. ST will continue to follow to ensure diet tolerance and make further recs as indicated.     Pt seen at bedside with lunch tray following VFSS. Pt's diet order placed incorrectly following VFSS (regular and NTL). Diet order changed to mechanical ground and NTL. Upon entry, lunch tray included scrambled eggs, hashbrowns, gandara, and NTL juice by cup. SLP removed gandara from meal tray and explained to pt and pt's wife modified diet instructions and safe swallow strategies. Both expressed agreement and understanding. Pt displayed extended but functional mastication with soft solids. No overt s/s of aspiration observed at any time.

## 2021-03-08 NOTE — MBS/VFSS/FEES
Acute Care - Speech Language Pathology   Swallow Re-Evaluation  Johann     Patient Name: Axel Ramirez  : 1950  MRN: [id number]  Today's Date: 3/8/2021               Admit Date: 2021    Visit Dx:     ICD-10-CM ICD-9-CM   1. Pneumonia due to infectious organism, unspecified laterality, unspecified part of lung  J18.9 486   2. Respiratory distress  R06.03 786.09   3. Acute on chronic respiratory failure with hypoxia (CMS/HCC)  J96.21 518.84     799.02   4. COPD with acute exacerbation (CMS/HCC)  J44.1 491.21   5. Moderate malnutrition (CMS/HCC)  E44.0 263.0   6. Squamous cell carcinoma of lung, right (CMS/HCC)  C34.91 162.9     Patient Active Problem List   Diagnosis   • Coronary artery disease involving native coronary artery of native heart without angina pectoris   • Nonrheumatic aortic valve stenosis   • Mixed hyperlipidemia   • Essential hypertension   • Fever   • Presence of aortocoronary bypass graft   • Congestive heart failure (CMS/HCC)   • Hx of aortic valve replacement   • CAP (community acquired pneumonia)   • Tobacco abuse   • Postobstructive pneumonia   • Lung mass   • Squamous cell carcinoma of lung, right (CMS/HCC)   • Diastolic CHF, acute (CMS/HCC)   • Pneumonia of right lung due to infectious organism   • Pneumonia due to infectious organism   • Moderate malnutrition (CMS/HCC)   • Acute on chronic respiratory failure with hypoxia (CMS/HCC)   • COPD with acute exacerbation (CMS/HCC)   • Delirium     Past Medical History:   Diagnosis Date   • Aortic stenosis    • Cancer (CMS/HCC)     Sickle Cell Carcinoma   • Congestive heart failure (CHF) (CMS/HCC)     DIASTOLIC   • COPD (chronic obstructive pulmonary disease) (CMS/HCC)    • Coronary artery disease    • Hypertension    • Lung cancer (CMS/HCC)    • Myocardial infarction (CMS/HCC)    • Peripheral vascular disease (CMS/HCC)    • Valvular disease      Past Surgical History:   Procedure Laterality Date   • AORTIC VALVE REPAIR/REPLACEMENT   02/26/2018    Dr Maza   • BRONCHOSCOPY N/A 10/24/2020    Procedure: BRONCHOSCOPY with biopsy right upper lobe mass, and bronchoalveolar lavage right upper lobe;  Surgeon: Ángela Bean MD;  Location: [facility] ENDOSCOPY;  Service: Pulmonary;  Laterality: N/A;  post: right upper lobe mass, pneumonia   • BRONCHOSCOPY N/A 11/11/2020    Procedure: BRONCHOSCOPY with bronchial washing;  Surgeon: Ángela Bean MD;  Location: [facility] ENDOSCOPY;  Service: Pulmonary;  Laterality: N/A;  Post: lung mass, pneumonia   • BRONCHOSCOPY N/A 11/11/2020    Procedure: BRONCHOSCOPY RIGID with debulking of right main endobronchial tumor;  Surgeon: Nomi Reyes MD;  Location: [facility] MAIN OR;  Service: Cardiothoracic;  Laterality: N/A;   • BRONCHOSCOPY N/A 11/11/2020    Procedure: BRONCHOSCOPY;  Surgeon: Nomi Reyes MD;  Location: [facility] MAIN OR;  Service: Cardiothoracic;  Laterality: N/A;   • CARDIAC CATHETERIZATION  12/29/2017   • CORONARY ARTERY BYPASS GRAFT  02/26/2018    Dr Maza   • TIBIA FRACTURE SURGERY     • VENOUS ACCESS DEVICE (PORT) INSERTION Left 10/30/2020    Procedure: MEDIPORT INSERTION UNDER FLUOROSCOPIC GUIDENCE;  Surgeon: Nomi Reyes MD;  Location: [facility] MAIN OR;  Service: Cardiothoracic;  Laterality: Left;        SWALLOW EVALUATION (last 72 hours)      SLP Adult Swallow Evaluation     Row Name 03/08/21 1201       Rehab Evaluation    Document Type  re-evaluation  -LF    Subjective Information  no complaints  -LF    Patient Observations  alert;cooperative;agree to therapy  -LF    Care Plan Review  evaluation/treatment results reviewed;patient/other agree to care plan  -LF    Care Plan Review, Other Participant(s)  spouse  -LF    Patient Effort  good  -LF    Symptoms Noted During/After Treatment  none  -LF       General Information    Patient Profile Reviewed  yes  -LF    Current Method of Nutrition  pureed;nectar/syrup-thick liquids  -LF    Prior Level of Function-Swallowing  no diet consistency  restrictions;regular textures;thin liquids  -LF       MBS/VFSS Interpretation    Oral Prep Phase  impaired oral phase of swallowing  -LF    Oral Transit Phase  impaired  -LF    Oral Residue  WFL  -LF    VFSS Summary Pt seen upright in the lateral projection and was given trials of NTL by cup x3, puree x2, mechanical ground (mixed) x2, thin liquids by spoon x1, and thin liquids by cup x3.  NTL by cup  Premature spillage to vallecula with third trial. No penetration or aspiration observed at any time. Mild pharyngeal residue  Puree  Pt given applesauce mixed with BA. Premature spillage to vallecula. No penetration or aspiration observed at any time. Mild pharyngeal residue.   Mixed  Pt given peaches in peach juice mixed with BA. Premature spillage of peach juice to the pyriforms. No penetration or aspiration observed at any time. Mild pharyngeal residue.   Thin by spoon  Premature spillage to pyriforms, No penetration or aspiration observed at any time. Mild pharyngeal residue.   Thin by cup  Premature spillage to the pyrifroms. Deep penetration during the swallow observed with 2 out of 3 trials. Pt does not sense penetrated material. Mild pharyngeal residue  Pharyngeal residue  Pt given NTL wash and cued double swallow during study in attempt to clear pharyngeal residue. NTL wash and cued double swallow partially clears pharyngeal residue  Overall  Reduced hyolaryngeal elevation and epiglottic deflection observed across all consistencies.     Recommend pt initiate a mechanical ground and NTL diet. Pt should take small bites/sips, alternate between bites/sips, double swallow, and be upright at 90 degrees for all PO. Discussed results and recs with pt and pt's family following VFSS. Both expressed agreement and understanding. ST will continue to follow to ensure diet tolerance and make further recs as indicated.     -LF       Oral Preparatory Phase    Oral Preparatory Phase  prolonged manipulation  -LF    Prolonged  Manipulation  mixed consistency  -LF       Oral Transit Phase    Impaired Oral Transit Phase  premature spillage of liquids into pharynx  -LF    Premature Spillage of Liquids into Pharynx  all consistencies tested  -LF       Initiation of Pharyngeal Swallow    Initiation of Pharyngeal Swallow  bolus in valleculae;bolus in pyriform sinuses  -LF    Pharyngeal Phase  impaired pharyngeal phase of swallowing  -LF    Penetration During the Swallow  thin liquids  -LF    Response to Penetration  no response  -LF    Rosenbek's Scale  thin:;3--->level 3  -LF    Pharyngeal Residue  valleculae;pyriform sinuses;all consistencies tested  -LF    Response to Residue  cleared residue with cued swallow;cleared residue with liquid wash  -LF       Clinical Impression    SLP Swallowing Diagnosis  mild-moderate;oral dysphagia;pharyngeal dysphagia  -LF    Functional Impact  risk of aspiration/pneumonia  -LF    Rehab Potential/Prognosis, Swallowing  good, to achieve stated therapy goals  -    Swallow Criteria for Skilled Therapeutic Interventions Met  demonstrates skilled criteria  -       Recommendations    Therapy Frequency (Swallow)  PRN  -LF    Predicted Duration Therapy Intervention (Days)  until discharge  -    SLP Diet Recommendation  nectar thick liquids mechanical ground  -LF    Recommended Diagnostics  reassess via VFSS (MBS)  -    Recommended Precautions and Strategies  upright posture during/after eating;small bites of food and sips of liquid;no straw;multiple swallows per bite of food;alternate between small bites of food and sips of liquid;general aspiration precautions  -    Oral Care Recommendations  Oral Care BID/PRN  -LF    SLP Rec. for Method of Medication Administration  meds whole;meds crushed;with pudding or applesauce  -    Monitor for Signs of Aspiration  yes;notify SLP if any concerns  -         User Key  (r) = Recorded By, (t) = Taken By, (c) = Cosigned By    Initials Name Effective Dates    CP  Jacquelin Gant, SLP 03/01/19 -     LF Smita Marquez, SLP 01/02/20 -           EDUCATION  The patient has been educated in the following areas:   Dysphagia (Swallowing Impairment) Modified Diet Instruction.    SLP Recommendation and Plan  SLP Swallowing Diagnosis: mild-moderate, oral dysphagia, pharyngeal dysphagia  SLP Diet Recommendation: nectar thick liquids (mechanical ground)  Recommended Precautions and Strategies: upright posture during/after eating, small bites of food and sips of liquid, no straw, multiple swallows per bite of food, alternate between small bites of food and sips of liquid, general aspiration precautions  SLP Rec. for Method of Medication Administration: meds whole, meds crushed, with pudding or applesauce     Monitor for Signs of Aspiration: yes, notify SLP if any concerns  Recommended Diagnostics: reassess via VFSS ([facility])  Swallow Criteria for Skilled Therapeutic Interventions Met: demonstrates skilled criteria     Rehab Potential/Prognosis, Swallowing: good, to achieve stated therapy goals  Therapy Frequency (Swallow): PRN  Predicted Duration Therapy Intervention (Days): until discharge            SLP GOALS     Row Name 03/08/21 1201       Oral Nutrition/Hydration Goal 1 (SLP)    Oral Nutrition/Hydration Goal 1, SLP  initiate and tolerate L/R diet without oral stage difficulties or complications associated with aspiration   -LF    Time Frame (Oral Nutrition/Hydration Goal 1, SLP)  by discharge  -LF    Barriers (Oral Nutrition/Hydration Goal 1, SLP) See above note  --    Progress/Outcomes (Oral Nutrition/Hydration Goal 1, SLP) Goal ongoing  --       Oral Nutrition/Hydration Goal 2 (SLP)    Oral Nutrition/Hydration Goal 2, SLP  Pt will have full meal assessment within 48 hours  -LF    Time Frame (Oral Nutrition/Hydration Goal 2, SLP)  short term goal (STG)  -LF    Barriers (Oral Nutrition/Hydration Goal 2, SLP)  Pt seen at bedside with lunch tray following VFSS. Pt's diet order placed  incorrectly following VFSS (regular and NTL). Diet order changed to mechanical ground and NTL. Upon entry, lunch tray included scrambled eggs, hashbrowns, gandara, and NTL juice by cup. SLP removed gandara from meal tray and explained to pt and pt's wife modified diet instructions and safe swallow strategies. Both expressed agreement and understanding. Pt displayed extended but functional mastication with soft solids. No overt s/s of aspiration observed at any time.  -LF    Progress/Outcomes (Oral Nutrition/Hydration Goal 2, SLP)  goal ongoing  -LF       Oral Nutrition/Hydration Goal (SLP)    Oral Nutrition/Hydration Goal, SLP  Pt will have follow-up instrumental swlalow evaluation within 48 hours and prior to going home  -LF    Time Frame (Oral Nutrition/Hydration Goal, SLP)  2 days  -LF    Barriers (Oral Nutrition/Hydration Goal, SLP)  see above note. Recommend pt initiate a mechanical ground and NTL diet  -LF    Progress/Outcomes (Oral Nutrition/Hydration Goal, SLP)  goal met  -LF       Pharyngeal Strengthening Exercise Goal 1 (SLP)    Activity (Pharyngeal Strengthening Goal 1, SLP)  increase tongue base retraction  -LF    Increase Tongue Base Retraction  carlos  -LF    Weakley/Accuracy (Pharyngeal Strengthening Goal 1, SLP)  with moderate cues (50-74% accuracy)  -LF    Time Frame (Pharyngeal Strengthening Goal 1, SLP)  by discharge  -LF    Barriers (Pharyngeal Strengthening Goal 1, SLP)  Goal not addressed this date  -LF    Progress/Outcomes (Pharyngeal Strengthening Goal 1, SLP)  goal ongoing  -LF       Pharyngeal Strengthening Exercise Goal 2 (SLP)    Activity (Pharyngeal Strengthening Goal 1, SLP)  increase anterior movement of the hyolaryngeal complex;increase epiglottic  inversion and retroflexion  -LF    Increase Anterior Movement of the Hyolaryngeal Complex  hard effortful swallow  -LF    Increase Epiglottic Inversion and Retroflexion  hard effortful swallow  -LF    Weakley/Accuracy (Pharyngeal  Strengthening Goal 1, SLP)  with moderate cues (50-74% accuracy)  -LF    Time Frame (Pharyngeal Strengthening Goal 2, SLP)  by discharge  -LF    Barriers (Pharyngeal Strengthening Goal 2, SLP)  Goal not addressed this date.   -LF    Progress/Outcomes (Pharyngeal Strengthening Goal 2, SLP)  goal ongoing  -LF      User Key  (r) = Recorded By, (t) = Taken By, (c) = Cosigned By    Initials Name Provider Type    CP Jacquelin Gant, SLP Speech and Language Pathologist    So Hyde, SLP Speech and Language Pathologist    Smita Cm, SLP Speech and Language Pathologist         Patient was not wearing a face mask during this therapy encounter. Therapist used appropriate personal protective equipment including mask, eye protection and gloves.  Mask used was standard procedure mask. Appropriate PPE was worn during the entire therapy session. Hand hygiene was completed before and after therapy session. Patient is not in enhanced droplet precautions.          Time Calculation:                BERTHA Cardoza  3/8/2021

## 2021-03-08 NOTE — PROGRESS NOTES
Daily Progress Note    Acute on chronic respiratory failure with hypoxia (CMS/HCC)    Essential hypertension    Hx of aortic valve replacement    Squamous cell carcinoma of lung, right (CMS/HCC)    Diastolic CHF, acute (CMS/HCC)    Pneumonia due to infectious organism    Moderate malnutrition (CMS/HCC)    COPD with acute exacerbation (CMS/HCC)    Delirium    Assessment    Acute hypoxic respiratory failure  Required intubation 2/18/21 extubated 2/20/21  Pneumonia and rhinovirus  COPD exacerbation  Lung cancer  Abnormal MRI brain 2/26/21 showing is a single thick-walled ring-enhancing lesion in the medial aspect of the left occipital lobe measuring 1.1 x 1.0 cm. 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-s/p chemoradiation completed 1/2021 and now on chemotherapy (carbo/Taxol) dosed 2/9 with Neulasta support  Diastolic congestive heart failure  Thrombocytopenia   Urinary retention  Delirium, possibly withdrawal from gabapentin and/or Suboxone        Plan    We will discuss with patient repeat bronchoscopy for diagnostic purposes   weaning steroids prednisone 10 mg daily  Diuresis currently on Aldactone 25 mg and Lasix 20 mg IV every 12  Monitor labs and electrolytes  DVT  prophylaxis  GI prophylaxis PPI  Speech and nutritionist following       LOS: 18 days     Subjective     Objective     Vital signs for last 24 hours:  Vitals:    03/07/21 1436 03/07/21 2004 03/08/21 0306 03/08/21 0811   BP:  114/65 115/73    BP Location:  Left arm Left arm    Patient Position:  Lying Lying    Pulse: 82 97 77    Resp: 18 16 16    Temp:  97.4 °F (36.3 °C) 97.4 °F (36.3 °C)    TempSrc:  Oral Oral    SpO2: 95% 96% 92% 93%   Weight:   56.7 kg (125 lb)    Height:           Intake/Output last 3 shifts:  I/O last 3 completed shifts:  In: 980 [P.O.:980]  Out: -   Intake/Output this shift:  No intake/output data recorded.      Radiology  Imaging Results (Last  24 Hours)     Procedure Component Value Units Date/Time    FL Video Swallow With Speech Single Contrast [[id number]] Collected: 03/08/21 0951     Updated: 03/08/21 0956    Narrative:      DATE OF EXAM:  3/8/2021 9:18 AM     PROCEDURE:  FL VIDEO SWALLOW W SPEECH SINGLE-CONTRAST-     INDICATIONS:  Dysphagia; J18.9-Pneumonia, unspecified organism; R06.03-Acute  respiratory distress; J96.21-Acute and chronic respiratory failure with  hypoxia; J44.1-Chronic obstructive pulmonary disease with (acute)  exacerbation; E44.0-Moderate protein-calorie malnutrition;  C34.91-Malignant neoplasm of unspecified part of right bronchus or lung     COMPARISON:  Fluoroscopic video swallow dated 2/26/2021     TECHNIQUE:   This examination was performed in conjunction with speech pathology.  Lateral video fluoroscopic evaluation of the swallowing mechanism was  performed while correlate administering to the patient various  consistency food items mixed with barium.     Fluoroscopic Time:   1.5 minutes.     FINDINGS:  There is laryngeal penetration with thin liquid consistency. No evidence  of aspiration with any consistency trialed.        Impression:      As above. Please see speech pathology report for detailed evaluation and  dietary recommendations.     Electronically Signed By-Daryl Hunter MD On:3/8/2021 9:53 AM  This report was finalized on [id number] by  Daryl Hunter MD.          Labs:  Results from last 7 days   Lab Units 03/08/21 0317   WBC 10*3/mm3 6.30   HEMOGLOBIN g/dL 8.6*   HEMATOCRIT % 24.8*   PLATELETS 10*3/mm3 260     Results from last 7 days   Lab Units 03/08/21 0317 03/05/21  0306   SODIUM mmol/L 137 140   POTASSIUM mmol/L 3.0* 3.8   CHLORIDE mmol/L 100 100   CO2 mmol/L 28.0 30.0*   BUN mg/dL 23 33*   CREATININE mg/dL 0.76 0.96   CALCIUM mg/dL 7.7* 8.9   BILIRUBIN mg/dL  --  0.5   ALK PHOS U/L  --  119*   ALT (SGPT) U/L  --  33   AST (SGOT) U/L  --  36   GLUCOSE mg/dL 84 93         Results from last 7 days    Lab Units 03/05/21  0306 03/04/21  0348 03/03/21  0551   ALBUMIN g/dL 3.00* 2.80* 2.80*                               Meds:   SCHEDULE  aspirin, 81 mg, Oral, Daily  atorvastatin, 40 mg, Oral, Nightly  Barium Sulfate, 1 teaspoon(s), Oral, Once in imaging  budesonide, 0.5 mg, Nebulization, BID - RT  docusate sodium, 100 mg, Oral, BID  epoetin asia-epbx, 40,000 Units, Subcutaneous, Q7 Days  ferrous sulfate, 324 mg, Oral, Daily With Breakfast  furosemide, 20 mg, Intravenous, Q12H  gabapentin, 300 mg, Oral, TID  guaiFENesin, 600 mg, Oral, Q12H  ipratropium-albuterol, 3 mL, Nebulization, Q4H - RT  lansoprazole, 30 mg, Oral, QAM  lurasidone, 40 mg, Oral, Daily  magnesium hydroxide, 10 mL, Oral, Daily  metoprolol tartrate, 50 mg, Oral, Q12H  polyethylene glycol, 17 g, Oral, Daily  predniSONE, 10 mg, Oral, Daily With Breakfast  sertraline, 50 mg, Oral, Daily  spironolactone, 25 mg, Oral, Daily  Zinc Oxide, , Apply externally, BID      Infusions     PRNs  •  acetaminophen  •  acetaminophen  •  bisacodyl  •  Calcium Gluconate-NaCl **AND** calcium gluconate **AND** Calcium, Ionized  •  haloperidol lactate  •  HYDROcodone-acetaminophen  •  ibuprofen  •  labetalol  •  magnesium sulfate **OR** magnesium sulfate **OR** magnesium sulfate  •  melatonin  •  metoprolol tartrate  •  ondansetron  •  potassium chloride **OR** potassium chloride **OR** potassium chloride  •  potassium phosphate infusion greater than 15 mMoles **OR** potassium phosphate infusion greater than 15 mMoles **OR** potassium phosphate **OR** sodium phosphate IVPB **OR** sodium phosphate IVPB **OR** sodium phosphate IVPB  •  sodium chloride  •  Zinc Oxide    Physical Exam:  Physical Exam  Vitals reviewed.   Pulmonary:      Breath sounds: Wheezing present.   Skin:     General: Skin is warm and dry.   Neurological:      Mental Status: He is alert and oriented to person, place, and time.         ROS  Review of Systems   Constitutional: Positive for activity change.        I reviewed the patient's new clinical results.      Electronically signed by VINCE Muñoz, 03/08/21 at 09:57 EST.

## 2021-03-08 NOTE — SIGNIFICANT NOTE
"Pt and wife were visited by the speech therapist when I arrived.  The wife reported things were going in the right direction with his eating and discharge to rehab tomorrow.  The wife expressed her concerns for their daughter stating, \"she needs rehab too.\"     03/08/21 1234   Spiritual Care   Spiritual Care Visit Type follow-up   Spiritual Care Source social work referral   Receptivity to Spiritual Care visit welcomed   Spiritual Care Request prayer support;spiritual/moral support   Spiritual Care Interventions prayer support provided;supportive conversation provided   Response to Spiritual Care emotion expressed;engaged in conversation;receptive of support;thanks expressed   Use of Spiritual Resources prayer;spirituality for coping, indicated strong use of   Spiritual Care Follow-Up follow-up planned regularly for general support  (Chaplain Rivera is following)   Coping/Psychosocial Interventions   Environmental Support calm environment promoted     The pt seemed more alert and responsive compared to my last visit with them.  They requested prayer and shared their appreciation of my support.  We ended the visit with prayer and the pt finishing his lunch.  "

## 2021-03-08 NOTE — PROGRESS NOTES
DNR / Aggressive Care    Palliative care team met with pt and wife to assess for any new needs or concerns.  Changes in patients goals, including interest in pursuing aggressive treatment rather than hospice, were reviewed.  Pt's wife reports that they have just had a very difficult time recently and wife is not confident she could care for the pt's needs at home by herself.  Pt's daughter is also currently admitted to this hospital, which was to be the person to assist the wife once he went home.  Pt reports being interested in pursuing aggressive treatment once more, including chemotherapy, as soon as he is done with rehab.    Pt reports he still wishes to be a DNR.  POST form discussed and completed by patient.  Of note, pt also reports that he would not want a feeding tube under any circumstances, as is documented on the POST form.  POST to be validated by palliative NP, and returned to patient once complete.  No other needs expressed at this time.  Will sign off after POST is completed as goals of care have been adequately addressed and pt appears confident in his goals.   continues to follow.  Please reconsult if further needs arise.

## 2021-03-08 NOTE — PLAN OF CARE
Goal Outcome Evaluation:  Plan of Care Reviewed With: patient  Progress: improving  Outcome Summary: Patient has been up most of the night without any complaints. Did administer a smog enema last night for constipation which help patient finally have a BM. Patient and family still are not sure on what they want to do after D/C. Oncology wats a MRI of the brain and PET scan done outpatient. Remains on 2L of oxygen. Patient will be having a video swallow this morning. No new concerns at this time. Will continue to monitor.

## 2021-03-08 NOTE — PROGRESS NOTES
Continued Stay Note  HUA Wills     Patient Name: Axel Ramirez  MRN: [id number]  Today's Date: 3/8/2021    Admit Date: 2/18/2021    Discharge Plan     Row Name 03/08/21 1522       Plan    Plan  DC Plan: [facility] accepted at d/c. Floor to SNF via 1-[id number]-[id number] (15 as of 3/8, valid 24 hours). PASRR approved (Level 1).    Plan Comments  Referral made to liaison (Diann) for [facility]. Liaison confirmed they are able to accept today via floor to SNF ([id number]: 15 as of 3/8, valid 24 hours). Notified MD/RN via secure chat of plan. Pharmacy updated to [facility] [place] for [facility].     Phone communication or documentation only - no physical contact with patient or family.  DINA Vo    Phone: [phone]  Cell: [phone]  Fax: [phone]  [email or web address]

## 2021-03-08 NOTE — PROGRESS NOTES
Hematology/Oncology Inpatient Progress Note    PATIENT NAME: Axel Ramirez  : 1950  MRN: [id number]    CHIEF COMPLAINT: Hypoxia and respiratory failure     HISTORY OF PRESENT ILLNESS:    70 y.o. male admitted to the ICU through the ED 2021 with hypoxia and respiratory failure requiring intubation.  The patient was intubated and sedated at time of consult and histories were taken from review of records and spouse report.  His spouse reported he had been feeling better and had tolerated recent chemotherapy for his lung cancer.  He was eating well after start of Megace but did develop increasing shortness of air a few days prior to admission without a change in his chronic cough.  She denied known fever, chills, or hemoptysis.  On admission, CT PE protocol was limited due to motion but felt negative for central PE.  There was evidence of pneumonia versus aspiration and known right upper lobe lung mass invading the right upper lobe bronchus with known obstruction and with increased narrowing/obstruction of the right middle lobe bronchus.  There was suggestion of progressive tumor infiltration of the right mainstem bronchus and new left paratracheal adenopathy with stable right paratracheal and subcarinal adenopathy.  CBC revealed WBC 8.4, hemoglobin 8.6, .7, and platelets 106,000.  D-dimer was elevated to 1.98 (0-0.59).  Creatinine was not elevated and LFTs were okay.  Respiratory viral panel was negative and blood cultures were sent.  On 2021 hemoglobin dropped to 7.4 and platelets to 92,000.     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  His lung cancer was initially treated with concomitant weekly chemotherapy (paclitaxel and carboplatin x7) and radiation  therapy from November 2020. He completed radiation therapy on 1/6/2021.  He completed the weekly portion of his chemotherapy on 1/12/2021.  He was seen in follow-up on 2/4/2021 where it was planned to continue chemotherapy with paclitaxel and carboplatin along with Neulasta support every 3 weeks.  He was scheduled for CT scan chest abdomen and pelvis on 2/18/2021 as an outpatient prior to his admission.  He received chemotherapy with Neulasta support on 2/9/2021 at which time CBC revealed WBC 7.62, hemoglobin 9.5, and platelets 223,000.  He was anemic at time of diagnosis of his lung cancer and anemia work-up revealed iron deficiency as well as MDS.  He received oral iron however he was diagnosed with malabsorption and received IV iron in November 2020 which was also repeated in December 2020.  His bone marrow had revealed mild increased iron storage in January 2021.  At his follow-up visit on 2/4/2021 t Retacrit 30,000 units subcu weekly was ordered for his myelodysplastic syndrome while continuing oral iron for his iron deficiency with iron saturation of 17%.  He had not started Retacrit prior to admission pending insurance authorization.     PCP: Sandoval Stevenson FNP    INTERVAL HISTORY:  2/19/2021-received 1 unit pRBCs. Iron 21 (59-1 58), iron saturation 12 (20-50), transferrin 121 (200-3 60), TIBC 180 (298-536), ferritin 2658 (), reticulocytes 1.93 (0.7 0-1.90), haptoglobin 294 (). Started Venofer 300 mg IV x 3 days. Patient extubated, on nasal canula and Precedex.  2/20/2021 -white blood count 8.8, hemoglobin 8, .4, platelets 57,000.  CT head with moderate periventricular white matter changes present likely related to chronic microvascular ischemic change, progressed as compared to the previous study.  Hypodensity present within the left occipital region which appeared more chronic or remote, new as compared to the previous study.  Chest x-ray with improvement in right basilar airspace  disease with resolution of the left airspace disease.  Stable right upper lobe mass.  2/21/2021-Retacrit 40,000 units subcu weekly started for hemoglobin 7.9.  2/22/2021-patient moved out of ICU.  2/23/2021-patient moved to PCU for tachypnea and tachycardia.  02/25/21-CT abdomen and pelvis without showed no evidence of malignancy. There was layering hyperdense material in the gallbladder with spurious excretion of contrast from prior contrast-enhanced study versus sludge/gallstones in differential, constipation, and known pneumonia/aspiration. Respiratory viral panel positive for rhinovirus/enterovirus. Psych consulted and patient started back on home meds including Suboxone, Latuda, and sertraline.  MRI brain without contrast suggestive of left cerebral cortical and leptomeningeal mets.  2/26/2021-hemoglobin 9.7, platelets 112,000, WBC 10.3.  More alert and oriented.  2/27/2021-MRI brain with contrast showed a 1.1 x 1 cm left occipital lobe lesion. Neurosurgery not recommending resection. Rad Onc planning SRS on 3/3 if patient can tolerate planning. Erythropoietin level 362 (2.6-18.5).    2/28/2021-Reviewed MRI brain findings. Patient and spouse both wanting to proceed with radiation therapy.  Platelets improved to 147,000.   03/02/2021-platelets normalized.  No aspiration on swallow study.  NG tube removed and patient started on puréed diet.  3/3/2021-patient received SRS radiation to left occiput 2000 cGy.  3/8/2021-had smog enema with good results.  Wants to go to rehab facility.    History of present illness reviewed since last visit and changes noted on 03/08/21.    Subjective   Complains of nasal congestion.    ROS:   Review of Systems   Constitutional: Positive for fatigue. Negative for activity change, chills, fever and unexpected weight change.   HENT: Positive for sinus pressure. Negative for congestion, dental problem, hearing loss, mouth sores, nosebleeds, sore throat, trouble swallowing and voice  change ( hoarse).    Eyes: Negative for photophobia and visual disturbance.   Respiratory: Negative for apnea, cough, choking, chest tightness and shortness of breath.    Cardiovascular: Negative for chest pain, palpitations and leg swelling.   Gastrointestinal: Negative for abdominal distention, abdominal pain, blood in stool, constipation, diarrhea, nausea and vomiting.   Endocrine: Negative for cold intolerance and heat intolerance.   Genitourinary: Negative for decreased urine volume, difficulty urinating, dysuria, enuresis, frequency, hematuria and urgency.        Incontinence   Musculoskeletal: Negative for arthralgias and gait problem.   Skin: Negative for rash and wound.   Neurological: Positive for weakness. Negative for dizziness, tremors, speech difficulty, light-headedness, numbness and headaches.   Hematological: Negative for adenopathy. Does not bruise/bleed easily.   Psychiatric/Behavioral: Negative for confusion and hallucinations. The patient is not nervous/anxious.    All other systems reviewed and are negative.     MEDICATIONS:    Scheduled Meds:  aspirin, 81 mg, Oral, Daily  atorvastatin, 40 mg, Oral, Nightly  Barium Sulfate, 1 teaspoon(s), Oral, Once in imaging  budesonide, 0.5 mg, Nebulization, BID - RT  docusate sodium, 100 mg, Oral, BID  epoetin asia-epbx, 40,000 Units, Subcutaneous, Q7 Days  ferrous sulfate, 324 mg, Oral, Daily With Breakfast  furosemide, 20 mg, Intravenous, Q12H  gabapentin, 300 mg, Oral, TID  guaiFENesin, 600 mg, Oral, Q12H  ipratropium-albuterol, 3 mL, Nebulization, Q4H - RT  lansoprazole, 30 mg, Oral, QAM  lurasidone, 40 mg, Oral, Daily  magnesium hydroxide, 10 mL, Oral, Daily  metoprolol tartrate, 50 mg, Oral, Q12H  polyethylene glycol, 17 g, Oral, Daily  predniSONE, 10 mg, Oral, Daily With Breakfast  sertraline, 50 mg, Oral, Daily  spironolactone, 25 mg, Oral, Daily  Zinc Oxide, , Apply externally, BID       Continuous Infusions:      PRN Meds:  •  acetaminophen  •   "acetaminophen  •  bisacodyl  •  Calcium Gluconate-NaCl **AND** calcium gluconate **AND** Calcium, Ionized  •  haloperidol lactate  •  HYDROcodone-acetaminophen  •  ibuprofen  •  labetalol  •  magnesium sulfate **OR** magnesium sulfate **OR** magnesium sulfate  •  melatonin  •  metoprolol tartrate  •  ondansetron  •  potassium chloride **OR** potassium chloride **OR** potassium chloride  •  potassium phosphate infusion greater than 15 mMoles **OR** potassium phosphate infusion greater than 15 mMoles **OR** potassium phosphate **OR** sodium phosphate IVPB **OR** sodium phosphate IVPB **OR** sodium phosphate IVPB  •  sodium chloride  •  Zinc Oxide     ALLERGIES:  No Known Allergies    Objective    VITALS:   /73 (BP Location: Left arm, Patient Position: Lying)   Pulse 77   Temp 97.4 °F (36.3 °C) (Oral)   Resp 16   Ht 177.8 cm (70\")   Wt 56.7 kg (125 lb)   SpO2 92%   BMI 17.94 kg/m²     PHYSICAL EXAM:  Physical Exam  Vitals and nursing note reviewed.   Constitutional:       General: He is not in acute distress.     Appearance: He is well-developed. He is not ill-appearing (chronically) or diaphoretic.      Interventions: Nasal cannula in place.      Comments: Cachectic    HENT:      Head: Normocephalic and atraumatic.      Comments: Male pattern baldness/alopecia.     Right Ear: External ear normal.      Left Ear: External ear normal.      Nose: Nose normal.      Comments: O2 by NC.       Mouth/Throat:      Mouth: Mucous membranes are moist.      Pharynx: No oropharyngeal exudate or posterior oropharyngeal erythema.      Comments: Edentulous  Eyes:      General: No scleral icterus.     Extraocular Movements: Extraocular movements intact.      Conjunctiva/sclera: Conjunctivae normal.      Pupils: Pupils are equal, round, and reactive to light.   Cardiovascular:      Rate and Rhythm: Normal rate and regular rhythm.      Heart sounds: Normal heart sounds. No murmur. No friction rub.      Comments: Left chest " wall [id number]. Monitor leads.  Midline vertical chest wall scar  Pulmonary:      Effort: Pulmonary effort is normal. No respiratory distress.      Breath sounds: No stridor. Rhonchi ( improving somewhat ) present. No wheezing or rales.   Chest:      Chest wall: No tenderness.      Comments: Left chest wall [id number] accessed.   Abdominal:      General: Bowel sounds are normal. There is no distension.      Palpations: Abdomen is soft. There is no mass.      Tenderness: There is no abdominal tenderness. There is no guarding or rebound.   Genitourinary:     Comments: Deffered  Musculoskeletal:         General: No swelling, tenderness or deformity. Normal range of motion.      Cervical back: Normal range of motion and neck supple.      Comments: LUE IV   Lymphadenopathy:      Cervical: No cervical adenopathy.      Upper Body:      Right upper body: No supraclavicular adenopathy.      Left upper body: No supraclavicular adenopathy.   Skin:     General: Skin is warm and dry.      Coloration: Skin is pale. Skin is not jaundiced.      Findings: No bruising, erythema or rash.      Comments: Pressure bandages to bilateral elbows    Neurological:      General: No focal deficit present.      Mental Status: He is oriented to person, place, and time.      Motor: Weakness (generalized) present.   Psychiatric:         Mood and Affect: Mood normal.         Behavior: Behavior normal.       RECENT LABS:  Lab Results (last 24 hours)     Procedure Component Value Units Date/Time    Calcium, Ionized [[id number]]  (Abnormal) Collected: 03/08/21 0522    Specimen: Blood Updated: 03/08/21 0550     Ionized Calcium 1.19 mmol/L     CBC & Differential [[id number]]  (Abnormal) Collected: 03/08/21 0317    Specimen: Blood Updated: 03/08/21 0534    Narrative:      The following orders were created for panel order CBC & Differential.  Procedure                               Abnormality         Status                     ---------                                -----------         ------                     Scan Slide[[id number]]                                       Final result               CBC Auto Differential[[id number]]        Abnormal            Final result                 Please view results for these tests on the individual orders.    Scan Slide [[id number]] Collected: 03/08/21 0317    Specimen: Blood Updated: 03/08/21 0534     Anisocytosis Slight/1+     Dacrocytes Slight/1+     Macrocytes Slight/1+     Poikilocytes Slight/1+     Polychromasia Slight/1+     RBC Fragments Slight/1+     WBC Morphology Normal     Platelet Estimate Adequate     Large Platelets Slight/1+    Narrative:      Slide Reviewed      CBC Auto Differential [[id number]]  (Abnormal) Collected: 03/08/21 0317    Specimen: Blood Updated: 03/08/21 0534     WBC 6.30 10*3/mm3      RBC 2.28 10*6/mm3      Hemoglobin 8.6 g/dL      Hematocrit 24.8 %      .6 fL      MCH 37.7 pg      MCHC 34.7 g/dL      RDW 26.4 %      RDW-SD 98.9 fl      MPV 7.7 fL      Platelets 260 10*3/mm3      Neutrophil % 75.1 %      Lymphocyte % 10.5 %      Monocyte % 13.8 %      Eosinophil % 0.3 %      Basophil % 0.3 %      Neutrophils, Absolute 4.70 10*3/mm3      Lymphocytes, Absolute 0.70 10*3/mm3      Monocytes, Absolute 0.90 10*3/mm3      Eosinophils, Absolute 0.00 10*3/mm3      Basophils, Absolute 0.00 10*3/mm3      nRBC 0.2 /100 WBC     Narrative:      Appended report. These results have been appended to a previously verified report.    Basic Metabolic Panel [[id number]]  (Abnormal) Collected: 03/08/21 0317    Specimen: Blood Updated: 03/08/21 0449     Glucose 84 mg/dL      BUN 23 mg/dL      Creatinine 0.76 mg/dL      Sodium 137 mmol/L      Potassium 3.0 mmol/L      Chloride 100 mmol/L      CO2 28.0 mmol/L      Calcium 7.7 mg/dL      eGFR Non African Amer 101 mL/min/1.73      BUN/Creatinine Ratio 30.3     Anion Gap 9.0 mmol/L     Narrative:      GFR Normal >60  Chronic Kidney Disease <60  Kidney Failure  <15          PENDING RESULTS:  n/a    IMAGING REVIEWED:  No radiology results for the last day  I have reviewed the patient's labs, imaging, reports, and other clinician documentation.    Assessment/Plan   ASSESSMENT  1. 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  CT chest with progression vs infectious/reactive changes. Plan was to repeat scans vs request diagnostic bronchoscopy once acute condition improves but patient thinking of not pursuing aggressive care.  MRI brain showing single left occipital lobe met. Rad 2000 cGy SRS given 03/03/2021.  Patient and spouse want further treatment for the cancer as needed but wife unable to take care of patient at home at present.  2. Anemia/Myelodysplastic syndrome/GOGO with malabsorption/Chemotherapy-induced anemia-planned to start Retacrit as outpatient for MDS prior to admission.  Known GOGO on oral iron prior to admission. S/p 1 unit pRBC on 2/19/2021.  Iron studies showed continued iron deficiency anemia.  No evidence of hemolysis.  Completed IV iron 2/22/2021 and on Retacrit. Hgb stable.  Oral iron restarted 3/4/2021.  3. Thrombocytopenia-likely chemotherapy-induced.  No need for further work-up at present.  Platelets normalized 03/02/2021.   4. Acute respiratory failure/Pneumonia/COPD-intubated and sedated on admission.  Started on broad-spectrum antibiotics. Blood cultures NGTD.  Extubated on 2/20/202. Initially afebrile but spiked low-grade temps and recultured with blood cx neg to date. RVP positive for rhinovirus/enterovirus. Management per pulmonary.  5. Weight loss/loss of appetite-improving as outpatient on Megace.    NG tube removed and patient on puréed diet now.  Wants regular diet and repeat swallow study scheduled.  6. Altered mental status/agitation-on Haldol. Psych consulted and he was restarted on home  meds including Suboxone, sertraline, and Latuda.  Resolved.  7. Low magnesium/high LFTs-resolved.  8. Sacral decub-wound care to follow.     PLAN  1. Follow CBC.   2. Outpatient MRI brain and PET scan.    3. Continue Retacrit.   4. Daily oral iron.  5. Wound care to follow.  6. Advance diet as tolerated.           I discussed the patients findings and my recommendations with patient.    Electronically signed by Axel Lewis MD, 03/08/21, 8:09 AM EST.

## 2021-03-08 NOTE — NURSING NOTE
Patient seen today for reassessment of sacral PU.  Patient is currently on  YENIFER surface and  He is able to assist witth turns and position changes.  The area is clean and pink and healing well approx size .5x..4no exudatree isnoted.  Surrounding skin is excoriated and red will add miconazole powder with zinc.  Also will add waffle chair cushion for when pataient is oob

## 2021-03-08 NOTE — DISCHARGE SUMMARY
[facility] Medicine Services  DISCHARGE SUMMARY        Prepared For PCP:  Sandoval Stevenson FNP    Patient Name: Axel Ramirez  : 1950  MRN: [id number]      Date of Admission:   2021    Date of Discharge:  3/8/2021    Length of stay:  LOS: 18 days     Hospital Course     Presenting Problem:   Respiratory distress [R06.03]  Pneumonia due to infectious organism, unspecified laterality, unspecified part of lung [J18.9]      Active Hospital Problems    Diagnosis  POA   • **Acute on chronic respiratory failure with hypoxia (CMS/HCC) [J96.21]  Yes   • COPD with acute exacerbation (CMS/HCC) [J44.1]  Yes   • Delirium [R41.0]  Yes   • Moderate malnutrition (CMS/HCC) [E44.0]  Yes   • Pneumonia due to infectious organism [J18.9]  Yes   • Squamous cell carcinoma of lung, right (CMS/HCC) [C34.91]  Yes   • Diastolic CHF, acute (CMS/HCC) [I50.31]  Yes   • Hx of aortic valve replacement [Z95.2]  Not Applicable   • Essential hypertension [I10]  Yes      Resolved Hospital Problems   No resolved problems to display.           Hospital Course:    The patient was initially admitted to the ICU was extubated on 2021.  Acute hypoxemic respiratory failure was thought to be secondary to pneumonia and COPD exacerbation.  Septic shock from pneumonia was treated with antibiotics and vasopressors. The patient was transferred out of the ICU on 2021 and the hospitalist service was consulted for medical management.  Hospitalization was complicated with confusion and dysphagia.  Palliative care was consulted to ascertain goal of care.  The patient did not want PEG tube placement to minimize aspiration.  He wanted to continue oral nutritional intake and understands the risk of aspiration.  The patient did have video swallow eval before discharge on 3/8/2021 which did not show aspiration.  He was discharged to rehab in the afternoon of 3/8/2021.  His wife was present on the day of  discharge.          Problem list addressed during hospitalization:      Acute hypoxic respiratory failure (POA)-patient required mechanical ventilation, believed to be secondary to pneumonia and COPD exacerbation  -Initially admitted to the ICU and hospitalist service consulted 2/22/2021  -On 4 L at this time  -Extubated 2/20/2021 intermittently on room air  -Pulmonology consulted     Pneumonia (POA)-patient with right-sided mass in addition to signs of infiltrates  -Blood cultures no growth to date  -Positive rhinovirus  -Respiratory cultures negative as of the 19th  -Off antibiotics currently     Lung cancer (POA)-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  -Patient does not want PEG tube and would like to continue oral nutritional intake and understands risk of aspiration  -Underwent video swallow eval before discharge on 3/8/2021 which did not show evidence of aspiration     Coronary artery disease-history of CABG no chest pain at this time  -Denied chest pain during hospitalization     Chronic diastolic heart failure:  -Compensated     Urinary retention  -Required-straight cath during hospitalization     Moderate protein calorie malnutrition-patient with swallowing difficulties and did not tolerate advance diet  -Speech therapy follow the patient  -The patient did not want PEG tube placed  -Underwent video swallow eval 3/8/2021--> no evidence of aspiration with any consistencies trialed.   Laryngeal penetration with thin liquid consistency.     Delirium: Resolved  -Psych consult during hospitalization     Anemia/MDS/thrombocytopenia-possibly secondary to chemotherapy  -Hematology oncology consulted during hospitalization          Recommendation for Outpatient Providers:             Reasons For Change In Medications and Indications for New Medications:        Day of Discharge     HPI:     The patient is a 70 years old male with history of chronic hypoxemic respiratory failure, COPD and lung cancer that presented to the emergency room on 2/8/2021 complaining of shortness of air.  His respiratory status deteriorated in the ED and required intubation.  CT of the chest showed bilateral infiltrates and right middle lobe mass.      Vital Signs:   Temp:  [97.4 °F (36.3 °C)-98.3 °F (36.8 °C)] 98.3 °F (36.8 °C)  Heart Rate:  [69-97] 69  Resp:  [16] 16  BP: ()/(65-73) 99/66     Physical Exam:  Physical Exam  HENT:      Head: Normocephalic.   Eyes:      Extraocular Movements: Extraocular movements intact.      Pupils: Pupils are equal, round, and reactive to light.   Cardiovascular:      Rate and Rhythm: Normal rate.   Pulmonary:      Effort: Pulmonary effort is normal.   Abdominal:      General: Bowel sounds are normal.      Palpations: Abdomen is soft.   Musculoskeletal:         General: Normal range of motion.      Cervical back: Normal range of motion.   Skin:     General: Skin is warm.   Neurological:      General: No focal deficit present.      Mental Status: He is alert.   Psychiatric:         Mood and Affect: Mood normal.         Pertinent  and/or Most Recent Results     Results from last 7 days   Lab Units 03/08/21  0317 03/05/21  0306 03/04/21  0348 03/03/21  0551 03/02/21  0627   WBC 10*3/mm3 6.30 6.20 5.30 6.40 8.80   HEMOGLOBIN g/dL 8.6* 9.7* 9.3* 9.3* 9.3*   HEMATOCRIT % 24.8* 27.8* 26.9* 28.1* 28.2*   PLATELETS 10*3/mm3 260 276 243 202 181   SODIUM mmol/L 137 140 139 139 139   POTASSIUM  mmol/L 3.0* 3.8 3.9 3.6 4.4   CHLORIDE mmol/L 100 100 100 101 101   CO2 mmol/L 28.0 30.0* 32.0* 31.0* 32.0*   BUN mg/dL 23 33* 34* 30* 36*   CREATININE mg/dL 0.76 0.96 1.01 0.70* 0.70*   GLUCOSE mg/dL 84 93 99 103* 107*   CALCIUM mg/dL 7.7* 8.9 8.7 8.4* 8.2*     Results from last 7 days   Lab Units 03/05/21  0306 03/04/21  0348 03/03/21  0551 03/02/21  0627   BILIRUBIN mg/dL 0.5 0.5 0.5 0.4   ALK PHOS U/L 119* 113 117 162*   ALT (SGPT) U/L 33 31 29 22   AST (SGOT) U/L 36 36 44* 32           Invalid input(s): TG, LDLCALC, LDLREALC        Brief Urine Lab Results  (Last result in the past 365 days)      Color   Clarity   Blood   Leuk Est   Nitrite   Protein   CREAT   Urine HCG        02/20/21 1906 Yellow Clear Moderate (2+) Negative Negative 30 mg/dL (1+)               Microbiology Results Abnormal     Procedure Component Value - Date/Time    Blood Culture - Blood, Hand, Left [[id number]] Collected: 02/24/21 2109    Lab Status: Final result Specimen: Blood from Hand, Left Updated: 03/01/21 2130     Blood Culture No growth at 5 days    COVID PRE-OP / PRE-PROCEDURE SCREENING ORDER (NO ISOLATION) - Swab, Nasopharynx [[id number]]  (Abnormal) Collected: 02/24/21 1951    Lab Status: Final result Specimen: Swab from Nasopharynx Updated: 02/24/21 2339    Narrative:      The following orders were created for panel order COVID PRE-OP / PRE-PROCEDURE SCREENING ORDER (NO ISOLATION) - Swab, Nasopharynx.  Procedure                               Abnormality         Status                     ---------                               -----------         ------                     Respiratory Panel PCR w/...[[id number]]  Abnormal            Final result                 Please view results for these tests on the individual orders.    Respiratory Panel PCR w/COVID-19(SARS-CoV-2) PARESH/GRACIELA/KATHY/PAD/COR/MAD/CHRIST In-House, NP Swab in UTM/VTM, 3-4 HR TAT - Swab, Nasopharynx [[id number]]  (Abnormal) Collected: 02/24/21 1951    Lab Status: Final result  Specimen: Swab from Nasopharynx Updated: 02/24/21 2339     ADENOVIRUS, PCR Not Detected     Coronavirus 229E Not Detected     Coronavirus HKU1 Not Detected     Coronavirus NL63 Not Detected     Coronavirus OC43 Not Detected     COVID19 Not Detected     Human Metapneumovirus Not Detected     Human Rhinovirus/Enterovirus Detected     Influenza A PCR Not Detected     Influenza B PCR Not Detected     Parainfluenza Virus 1 Not Detected     Parainfluenza Virus 2 Not Detected     Parainfluenza Virus 3 Not Detected     Parainfluenza Virus 4 Not Detected     RSV, PCR Not Detected     Bordetella pertussis pcr Not Detected     Bordetella parapertussis PCR Not Detected     Chlamydophila pneumoniae PCR Not Detected     Mycoplasma pneumo by PCR Not Detected    Narrative:      Fact sheet for providers: https://docs.[employer]/wp-content/uploads/SXP9787-6339-VU9.1-EUA-Provider-Fact-Sheet-3.pdf    Fact sheet for patients: https://docs.[employer]/wp-content/uploads/YKY7362-5182-KH5.1-EUA-Patient-Fact-Sheet-1.pdf    Test performed by PCR.    MRSA Screen, PCR (Inpatient) - Swab, Nares [[id number]]  (Normal) Collected: 02/24/21 1952    Lab Status: Final result Specimen: Swab from Nares Updated: 02/24/21 2112     MRSA PCR No MRSA Detected    Blood Culture - Blood, Blood, Venous Line [[id number]] Collected: 02/18/21 1146    Lab Status: Final result Specimen: Blood, Venous Line Updated: 02/23/21 1200     Blood Culture No growth at 5 days    Blood Culture - Blood, Blood, PICC Line [[id number]] Collected: 02/18/21 1056    Lab Status: Final result Specimen: Blood, PICC Line Updated: 02/23/21 1115     Blood Culture No growth at 5 days    Urine Culture - Urine, Urine, Catheter In/Out [[id number]]  (Normal) Collected: 02/20/21 1906    Lab Status: Final result Specimen: Urine, Catheter In/Out Updated: 02/21/21 1951     Urine Culture No growth    Respiratory Culture - Sputum, ET Suction [[id number]] Collected: 02/19/21 1222    Lab Status: Final  result Specimen: Sputum from ET Suction Updated: 02/21/21 0909     Respiratory Culture No growth     Gram Stain Few (2+) Epithelial cells per low power field      Many (4+) WBCs per low power field      No organisms seen    Respiratory Panel PCR w/COVID-19(SARS-CoV-2) PARESH/GRACIELA/KATHY/PAD/COR/MAD/CHRIST In-House, NP Swab in UTM/VTM, 3-4 HR TAT - Swab, Nasopharynx [[id number]]  (Normal) Collected: 02/18/21 1103    Lab Status: Final result Specimen: Swab from Nasopharynx Updated: 02/18/21 1159     ADENOVIRUS, PCR Not Detected     Coronavirus 229E Not Detected     Coronavirus HKU1 Not Detected     Coronavirus NL63 Not Detected     Coronavirus OC43 Not Detected     COVID19 Not Detected     Human Metapneumovirus Not Detected     Human Rhinovirus/Enterovirus Not Detected     Influenza A PCR Not Detected     Influenza B PCR Not Detected     Parainfluenza Virus 1 Not Detected     Parainfluenza Virus 2 Not Detected     Parainfluenza Virus 3 Not Detected     Parainfluenza Virus 4 Not Detected     RSV, PCR Not Detected     Bordetella pertussis pcr Not Detected     Bordetella parapertussis PCR Not Detected     Chlamydophila pneumoniae PCR Not Detected     Mycoplasma pneumo by PCR Not Detected    Narrative:      Fact sheet for providers: https://docs.Dish.fm/wp-content/uploads/HOD9106-4761-HF4.1-EUA-Provider-Fact-Sheet-3.pdf    Fact sheet for patients: https://docs.Dish.fm/wp-content/uploads/JRP3637-3983-FI8.1-EUA-Patient-Fact-Sheet-1.pdf    Test performed by PCR.          CT Abdomen Pelvis Without Contrast    Result Date: 2/24/2021  Impression: 1.Large amount of formed stool in the rectum suspicious for constipation. Disimpaction may be necessary. There is also an above average amount of formed stool in the proximal colon. 2.Layering hyperdense material in the gallbladder, which could be seen with spurious excretion of contrast from prior contrast enhanced studies, particularly if there is renal failure. This could also be  related to biliary sludge and/or gallstones. 3.Emphysema with bilateral lower lobe airspace opacities, similar to the prior chest CT, which could be related to pneumonia or aspiration. Opacities in the right lower lobe appears slightly decreased. There is minimal consolidation and trace pleural effusion in the posterior left thorax. 4.Probable hepatic and renal cysts, as before. 5.Calcific atherosclerosis. 6.Enteric tube terminates in the stomach.    Electronically Signed By-Adi Delgado MD On:2/24/2021 7:48 PM This report was finalized on [id number] by  Adi Delgado MD.    XR Abdomen 2+ VW with Chest 1 VW    Result Date: 2/23/2021  Impression:  1. Rectosigmoid colon fecal impaction. 2. No pneumatosis or free air. 3. Large mass in the right upper lobe representing malignancy until proven otherwise. 4. Patchy right basilar consolidation representing either subsegmental atelectasis or pneumonia.  Electronically Signed By-Brock Kerr MD On:2/23/2021 11:53 AM This report was finalized on [id number] by  Brock Kerr MD.    CT Head Without Contrast    Result Date: 2/20/2021  Impression: No evidence of hemorrhage, mass effect or midline shift. No acute process identified. Moderate periventricular white matter changes are present likely related to chronic microvascular ischemic change, progressed as compared to the previous study. Hypodensity is present within the left occipital region which appears more chronic or remote, new as compared to the previous study which may related to previous infarct. Given history of malignancy, contrasted MRI evaluation may be warranted to evaluate for underlying mass or lesion with resultant vasogenic edema.  Electronically Signed By-Mago Flores MD On:2/20/2021 7:33 PM This report was finalized on [id number] by  Mago Flores MD.    MRI Brain Without Contrast    Result Date: 2/25/2021  Impression:  1. Signal intensity changes within the left temporal occipital lobe, as  described above, worrisome for cortical metastatic deposit with surrounding vasogenic edema, over that of evolving ischemia. Additionally, question leptomeningeal thickening or potential metastatic infiltration over the posterior cerebral convexities. MRI brain with contrast would be most helpful in this distinction.   Electronically Signed By-Ivana Mirza MD On:2/25/2021 2:54 PM This report was finalized on [id number] by  Ivana Mirza MD.    MRI Brain With Contrast    Result Date: 2/26/2021  Impression: There is a single thick-walled ring-enhancing lesion in the medial aspect of the left occipital lobe measuring 1.1 x 1.0 cm. There was vasogenic edema on yesterday's MRI of the brain in this location. This represents metastatic disease until proven otherwise. A small primary glioma or brain abscess can have a similar imaging appearance.  Electronically Signed By-Brock Kerr MD On:2/26/2021 10:19 AM This report was finalized on [id number] by  Brock Kerr MD.    FL Video Swallow With Speech Single Contrast    Result Date: 3/8/2021  Impression: As above. Please see speech pathology report for detailed evaluation and dietary recommendations.  Electronically Signed By-Daryl Hunter MD On:3/8/2021 9:53 AM This report was finalized on [id number] by  Daryl Hunter MD.    FL Video Swallow With Speech Single Contrast    Result Date: 3/2/2021  Impression: As above. Please see speech pathology report for detailed evaluation and dietary recommendations.  Electronically Signed By-Daryl Hunter MD On:3/2/2021 11:39 AM This report was finalized on [id number] by  Daryl Hunter MD.    FL Video Swallow With Speech Single Contrast    Result Date: 2/26/2021  Impression: Technically successful modified barium swallow examination.  Electronically Signed By-Brock Kerr MD On:2/26/2021 12:43 PM This report was finalized on [id number] by  Brock Kerr MD.    XR Chest 1 View    Result Date:  2/23/2021  Impression: 1.Interval extubation. 2.Decreased right basilar opacities with mild persistent opacities which could represent residual atelectasis or infiltrate. 3.Right lung mass, as before.  Electronically Signed By-Adi Delgado MD On:2/23/2021 5:57 PM This report was finalized on [id number] by  Adi Delgado MD.    XR Chest 1 View    Result Date: 2/20/2021  Impression: Improvement in right basilar airspace disease with resolution of left basilar airspace disease. Stable right upper lobe mass.  Electronically Signed By-Mago Flores MD On:2/20/2021 8:07 AM This report was finalized on [id number] by  Mago Flores MD.    XR Chest 1 View    Result Date: 2/19/2021  Impression: Interval improvement in left basilar airspace disease. Otherwise, no significant change. Redemonstration of right-sided mass and mild right-sided pneumonia.  Electronically Signed By-Mago Flores MD On:2/19/2021 7:39 AM This report was finalized on [id number] by  Mago Flores MD.    XR Chest 1 View    Result Date: 2/18/2021  Impression:  1. ET tube placement in satisfactory position in the mid to upper thoracic trachea. 2. Bilateral lower lobe airspace disease may reflect changes of pneumonia or sequelae of aspiration. 3. Right midlung mass appears unchanged.  Electronically Signed By-Ivana Mirza MD On:2/18/2021 1:13 PM This report was finalized on [id number] by  Ivana Mirza MD.    CT Chest Pulmonary Embolism    Result Date: 2/18/2021  Impression:  1. No central pulmonary embolism is seen. The study is significantly degraded by respiratory motion, limiting evaluation for distal segmental emboli. 2. Dense patchy airspace disease within the bilateral lower lobes may reflect changes of infectious pneumonia, or sequelae of aspiration.  3. Right upper lobe lung mass invading the right upper lobe bronchus, and obstructing it is again noted. There is now increased narrowing or obstruction of the right middle lobe  bronchus. 3. New complete right upper lobe and right middle lobe atelectasis. 4. New or increased soft tissue surrounding the right mainstem bronchus suggesting slight interval progressive tumor infiltration. 5. New left paratracheal adenopathy suggesting nader disease progression. Right paratracheal and subcarinal adenopathy is thought to be stable. 6. Severe emphysema.   Electronically Signed By-Ivana Mirza MD On:2/18/2021 12:18 PM This report was finalized on [id number] by  Ivana Mirza MD.    XR Abdomen KUB    Result Date: 2/24/2021  Impression: Enteric tube within stomach.  Electronically Signed By-Mago Flores MD On:2/24/2021 7:33 AM This report was finalized on [id number] by  Mago Flores MD.              Results for orders placed during the hospital encounter of 11/08/20    Adult Transthoracic Echo Complete W/ Cont if Necessary Per Protocol    Interpretation Summary  · Estimated left ventricular EF = 65% Left ventricular systolic function is normal.  · Left ventricular diastolic function is consistent with (grade Ia w/high LAP) impaired relaxation.  · The right atrial cavity is mildly dilated.  · Mild aortic valve stenosis is present.  · Mild to moderate LVOT gradient noted              Test Results Pending at Discharge        Procedures Performed           Consults:   Consults     Date and Time Order Name Status Description    2/26/2021 11:40 AM Inpatient Neurosurgery Consult Completed     2/26/2021  7:46 AM Inpatient Radiation Oncology Consult Completed     2/23/2021  2:17 PM Inpatient Psychiatrist Consult Completed     2/22/2021  3:05 PM Inpatient Hospitalist Consult      2/19/2021  9:29 AM Hematology & Oncology Inpatient Consult Completed     2/18/2021 12:42 PM Intensivist (on-call MD unless specified) Completed             Discharge Details        Discharge Medications      New Medications      Instructions Start Date   acetaminophen 650 MG suppository  Commonly known as: TYLENOL   650 mg,  Rectal, Every 6 Hours PRN      bisacodyl 10 MG suppository  Commonly known as: DULCOLAX   10 mg, Rectal, Daily PRN      Boudreauxs Butt Paste 16 % ointment  Generic drug: Zinc Oxide   Apply externally, 2 Times Daily      docusate sodium 100 MG capsule   100 mg, Oral, 2 Times Daily      guaiFENesin 600 MG 12 hr tablet  Commonly known as: MUCINEX   600 mg, Oral, Every 12 Hours Scheduled      melatonin 5 MG tablet tablet   5 mg, Oral, Nightly PRN      miconazole 2 % powder  Commonly known as: MICOTIN   Topical, Every 12 Hours Scheduled      polyethylene glycol 17 g packet  Commonly known as: MIRALAX   17 g, Oral, Daily   Start Date: March 9, 2021     predniSONE 10 MG tablet  Commonly known as: DELTASONE   10 mg, Oral, Daily With Breakfast   Start Date: March 9, 2021        Continue These Medications      Instructions Start Date   aspirin 81 MG EC tablet   81 mg, Oral, Every 24 Hours      atorvastatin 20 MG tablet  Commonly known as: LIPITOR   20 mg, Oral, Every Night at Bedtime      buprenorphine-naloxone 8-2 MG per SL tablet  Commonly known as: SUBOXONE   1.5 tablets, Sublingual, Daily      ferrous sulfate 324 (65 Fe) MG tablet delayed-release EC tablet   324 mg, Oral, Daily With Breakfast      gabapentin 300 MG capsule  Commonly known as: NEURONTIN   300 mg, Oral, 3 Times Daily      hydrALAZINE 50 MG tablet  Commonly known as: APRESOLINE   50 mg, Oral, Every 12 Hours      lurasidone 40 MG tablet tablet  Commonly known as: LATUDA   40 mg, Oral, Daily      megestrol 40 MG/ML suspension  Commonly known as: MEGACE   200 mg, Oral, 3 Times Daily      metoprolol tartrate 25 MG tablet  Commonly known as: LOPRESSOR   50 mg, Oral, 2 Times Daily      pantoprazole 40 MG EC tablet  Commonly known as: Protonix   40 mg, Oral, Daily      promethazine 25 MG tablet  Commonly known as: PHENERGAN   25 mg, Oral, Every 4 Hours PRN      sertraline 50 MG tablet  Commonly known as: ZOLOFT   50 mg, Oral, Daily      spironolactone 25 MG  tablet  Commonly known as: Aldactone   25 mg, Oral, Daily      torsemide 20 MG tablet  Commonly known as: DEMADEX   10 mg, Oral, Daily             No Known Allergies      Discharge Disposition:  Home or Self Care    Diet:  Hospital:  Diet Order   Procedures   • Diet Texture; Mechanical Ground; Thickened Liquids (Nectar), No Straws         Discharge Activity: As tolerated        CODE STATUS:    Code Status and Medical Interventions:   Ordered at: 03/03/21 1110     Limited Support to NOT Include:    Intubation     Code Status:    No CPR     Medical Interventions (Level of Support Prior to Arrest):    Limited         Follow-up Appointments  Future Appointments   Date Time Provider Department Center   6/2/2021 12:20 PM Iglesia Springer MD [facility]   6/3/2021  7:30 AM Alber Tay MD [facility] None       Additional Instructions for the Follow-ups that You Need to Schedule     Ambulatory Referral to Home Health   As directed      [facility]    Order Comments: [facility]     Face to Face Visit Date: 2/24/2021    Follow-up provider for Plan of Care?: I treated the patient in an acute care facility and will not continue treatment after discharge.    Follow-up provider: ADA AUSTIN [[id number]]    Reason/Clinical Findings: Post-hospital eval    Describe mobility limitations that make leaving home difficult: Tires easily    Nursing/Therapeutic Services Requested: Other ( to evaluate)    Frequency: 1 Week 1         Ambulatory Referral to Home Health   As directed      Face to Face Visit Date: 3/6/2021    Follow-up provider for Plan of Care?: I treated the patient in an acute care facility and will not continue treatment after discharge.    Follow-up provider: ADA AUSTIN [[id number]]    Reason/Clinical Findings: cancer    Describe mobility limitations that make leaving home difficult: weakness    Nursing/Therapeutic Services Requested: Skilled Nursing Physical Therapy    Skilled  nursing orders: Medication education Pain management Mini-nebs COPD management Mental health    PT orders: Home safety assessment Strengthening Gait Training Transfer training    Weight Bearing Status: As Tolerated    Frequency: 1 Week 1         Discharge Follow-up with PCP   As directed       Currently Documented PCP:    Sandoval Stevenson FNP    PCP Phone Number:    [phone]     Follow Up Details: 2 weeks                 Condition on Discharge:      Stable      This patient has been examined wearing appropriate Personal Protective Equipment and discussed with nursing. 03/08/21      Electronically signed by Mauri Davies DO, 03/08/21, 4:11 PM EST.      Time: I spent  15 minutes on this discharge activity which included face-to-face encounter with the patient/reviewing the data in the system/coordination of the care with the nursing staff as well as consultants/documentation/entering orders.

## 2021-03-08 NOTE — PROGRESS NOTES
Discharge Planning Assessment   Johann     Patient Name: Axel Ramirez  MRN: [id number]  Today's Date: 3/8/2021    Admit Date: 2/18/2021          Plan    Plan  referral made to [facility] for rehab    Patient/Family in Agreement with Plan  yes    Plan Comments  spoke to patient and spouse in room and they would both like rehab placement at [facility]; referral made to [facility]      Patient Forms    Important Message from Medicare ([facility])  Delivered    Delivered to  Support person reviewed with spouse in room and copy given to her; information explained    Method of delivery  In person            Carol naegele rn  Case management  Office number [phone]  Cell phone [phone]

## 2021-03-09 NOTE — OUTREACH NOTE
Prep Survey      Responses   [facility] discharged from?  [facility]   Is LACE score < 7 ?  No   Emergency Room discharge w/ pulse ox?  No   Eligibility  Not Eligible   What are the reasons patient is not eligible?  Other [Discharged to SNF]   Does the patient have one of the following disease processes/diagnoses(primary or secondary)?  COPD/Pneumonia   Prep survey completed?  Yes          Lorri Wilson RN

## 2021-03-09 NOTE — ED PROVIDER NOTES
Subjective   Chief complaint sent from nursing home for shortness of breath    History of present illness 7-year-old male with multiple health problems including COPD and lung cancer had been receiving chemotherapy for the last time was a few weeks ago just released to the hospital yesterday after being diagnosed with pneumonia secondary to a positive viral screen negative for Covid.  The patient was reported to be having increasing shortness of breath and tightness in the chest today.  Patient denies any pain here states he is on 4 L of oxygen all the time and he really feels about the same as usual may be a little bit worse.  Symptoms are mild nothing only makes it better or worse and ongoing all day today.  There is no neck arm or jaw pain no fever chills no leg pain or swelling.  No dizziness or syncope.          Review of Systems   Constitutional: Negative for chills and fever.   HENT: Negative for congestion and sinus pressure.    Eyes: Negative for photophobia and visual disturbance.   Respiratory: Positive for shortness of breath. Negative for chest tightness.    Cardiovascular: Negative for chest pain and leg swelling.   Gastrointestinal: Negative for abdominal pain and vomiting.   Endocrine: Negative for cold intolerance.   Genitourinary: Negative for difficulty urinating and dysuria.   Musculoskeletal: Positive for arthralgias. Negative for back pain.   Skin: Negative for color change and pallor.   Neurological: Negative for dizziness and light-headedness.   Psychiatric/Behavioral: Negative for agitation and behavioral problems.       Past Medical History:   Diagnosis Date   • Aortic stenosis    • Cancer (CMS/HCC)     Sickle Cell Carcinoma   • Congestive heart failure (CHF) (CMS/HCC)     DIASTOLIC   • COPD (chronic obstructive pulmonary disease) (CMS/HCC)    • Coronary artery disease    • Hypertension    • Lung cancer (CMS/HCC)    • Myocardial infarction (CMS/HCC)    • Peripheral vascular disease  (CMS/[facility])    • Valvular disease        No Known Allergies    Past Surgical History:   Procedure Laterality Date   • AORTIC VALVE REPAIR/REPLACEMENT  2018    Dr Maza   • BRONCHOSCOPY N/A 10/24/2020    Procedure: BRONCHOSCOPY with biopsy right upper lobe mass, and bronchoalveolar lavage right upper lobe;  Surgeon: Ángela Bean MD;  Location: [facility] ENDOSCOPY;  Service: Pulmonary;  Laterality: N/A;  post: right upper lobe mass, pneumonia   • BRONCHOSCOPY N/A 2020    Procedure: BRONCHOSCOPY with bronchial washing;  Surgeon: Ángela Bean MD;  Location: [facility] ENDOSCOPY;  Service: Pulmonary;  Laterality: N/A;  Post: lung mass, pneumonia   • BRONCHOSCOPY N/A 2020    Procedure: BRONCHOSCOPY RIGID with debulking of right main endobronchial tumor;  Surgeon: Nomi Reyes MD;  Location: [facility] MAIN OR;  Service: Cardiothoracic;  Laterality: N/A;   • BRONCHOSCOPY N/A 2020    Procedure: BRONCHOSCOPY;  Surgeon: Nomi Reyes MD;  Location: [facility] MAIN OR;  Service: Cardiothoracic;  Laterality: N/A;   • CARDIAC CATHETERIZATION  2017   • CORONARY ARTERY BYPASS GRAFT  2018    Dr Maza   • TIBIA FRACTURE SURGERY     • VENOUS ACCESS DEVICE (PORT) INSERTION Left 10/30/2020    Procedure: MEDIPORT INSERTION UNDER FLUOROSCOPIC GUIDENCE;  Surgeon: Nomi Reyes MD;  Location: [facility] MAIN OR;  Service: Cardiothoracic;  Laterality: Left;       Family History   Problem Relation Age of Onset   • Coronary artery disease Mother    • Cancer Father    • Stroke Daughter 35   • Seizures Daughter 35       Social History     Socioeconomic History   • Marital status:      Spouse name: Not on file   • Number of children: Not on file   • Years of education: Not on file   • Highest education level: Not on file   Tobacco Use   • Smoking status: Former Smoker     Packs/day: 1.00     Types: Cigarettes     Quit date: 10/2020     Years since quittin.4   • Smokeless tobacco: Never Used   Vaping Use   •  Vaping Use: Never used   Substance and Sexual Activity   • Alcohol use: Yes   • Drug use: No   • Sexual activity: Defer     Prior to Admission medications    Medication Sig Start Date End Date Taking? Authorizing Provider   acetaminophen (TYLENOL) 650 MG suppository Insert 1 suppository into the rectum Every 6 (Six) Hours As Needed for Mild Pain , Headache or Fever. 3/8/21   Mauri Davies DO   aspirin (aspirin) 81 MG EC tablet Take 1 tablet by mouth Daily. 11/13/20   Ulices Cruz MD   atorvastatin (LIPITOR) 20 MG tablet Take 1 tablet by mouth every night at bedtime. 12/2/20   Iglesia Springer MD   bisacodyl (DULCOLAX) 10 MG suppository Insert 1 suppository into the rectum Daily As Needed for Constipation. 3/8/21   Mauri Dvaies DO   buprenorphine-naloxone (SUBOXONE) 8-2 MG per SL tablet Place 1.5 tablets under the tongue Daily. 12/12/20   Paresh Oliveira MD   docusate sodium 100 MG capsule Take 1 capsule by mouth 2 (Two) Times a Day. 3/8/21   Mauri Davies DO   ferrous sulfate 324 (65 Fe) MG tablet delayed-release EC tablet Take 1 tablet by mouth Daily With Breakfast. 11/13/20   Ulices Cruz MD   gabapentin (NEURONTIN) 300 MG capsule Take 300 mg by mouth 3 (Three) Times a Day. 12/9/20   Paresh Oliveira MD   guaiFENesin (MUCINEX) 600 MG 12 hr tablet Take 1 tablet by mouth Every 12 (Twelve) Hours. 3/8/21   Mauri Davies DO   hydrALAZINE (APRESOLINE) 50 MG tablet Take 1 tablet by mouth Every 12 (Twelve) Hours. 12/2/20   Iglesia Springer MD   lurasidone (LATUDA) 40 MG tablet tablet Take 40 mg by mouth Daily.    Paresh Oliveira MD   megestrol (MEGACE) 40 MG/ML suspension Take 200 mg by mouth 3 (Three) Times a Day.    Paresh Oliveira MD   melatonin 5 MG tablet tablet Take 1 tablet by mouth At Night As Needed (sleep). 3/8/21   Tiny-Egziabher, Mauri I, DO   metoprolol tartrate (LOPRESSOR) 25 MG tablet Take 2 tablets by mouth 2 (Two)  Times a Day for 360 days. 12/2/20 11/27/21  Iglesia Springer MD   miconazole (MICOTIN) 2 % powder Apply  topically to the appropriate area as directed Every 12 (Twelve) Hours. 3/8/21   Mauri Davies DO   pantoprazole (Protonix) 40 MG EC tablet Take 1 tablet by mouth Daily. 10/30/20   Arvin Rushing DO   polyethylene glycol (MIRALAX) 17 g packet Take 17 g by mouth Daily. 3/9/21   Mauri Davies DO   predniSONE (DELTASONE) 10 MG tablet Take 1 tablet by mouth Daily With Breakfast. 3/9/21   Mauri Davies DO   promethazine (PHENERGAN) 25 MG tablet Take 25 mg by mouth Every 4 (Four) Hours As Needed for Nausea or Vomiting.    ProviderParesh MD   sertraline (ZOLOFT) 50 MG tablet Take 50 mg by mouth Daily.    ProviderParesh MD   spironolactone (Aldactone) 25 MG tablet Take 1 tablet by mouth Daily for 360 days. 12/2/20 11/27/21  Iglesia Springer MD   torsemide (DEMADEX) 20 MG tablet Take 10 mg by mouth Daily.    ProviderParesh MD   Zinc Oxide (Boudreauxs Butt Paste) 16 % ointment Apply  topically 2 (Two) Times a Day. 3/8/21   Mauri Davies DO           Objective   Physical Exam  70-year-old male awake alert no acute distress chronically ill-appearing  HEENT extraocular muscle intact pupils equal react sclera clear mouth clear  Neck supple no adenopathy no meningeal signs no JVD no bruits  Lungs few scattered wheezes and rhonchi throughout but no retraction no use of accessory sats are 98% on 4 L of oxygen.  Respiratory rate of 18  Heart regular without murmur rub  Abdomen soft without tenderness good bowel sounds no pulsatile masses  Extremities pulses are equal throughout upper and lower extremities no edema cords or Homans' sign or evidence of DVT.  Skin warm and dry no rash no petechiae no purpura  Neurologic awake alert follows commands motor strength normal without focal weakness  Procedures           ED Course      Results for orders  placed or performed during the hospital encounter of 03/09/21   Comprehensive Metabolic Panel    Specimen: Blood   Result Value Ref Range    Glucose 116 (H) 65 - 99 mg/dL    BUN 18 8 - 23 mg/dL    Creatinine 0.65 (L) 0.76 - 1.27 mg/dL    Sodium 129 (L) 136 - 145 mmol/L    Potassium 3.7 3.5 - 5.2 mmol/L    Chloride 94 (L) 98 - 107 mmol/L    CO2 26.0 22.0 - 29.0 mmol/L    Calcium 8.5 (L) 8.6 - 10.5 mg/dL    Total Protein 7.0 6.0 - 8.5 g/dL    Albumin 3.00 (L) 3.50 - 5.20 g/dL    ALT (SGPT) 24 1 - 41 U/L    AST (SGOT) 24 1 - 40 U/L    Alkaline Phosphatase 128 (H) 39 - 117 U/L    Total Bilirubin 0.7 0.0 - 1.2 mg/dL    eGFR Non African Amer 121 >60 mL/min/1.73    Globulin 4.0 gm/dL    A/G Ratio 0.8 g/dL    BUN/Creatinine Ratio 27.7 (H) 7.0 - 25.0    Anion Gap 9.0 5.0 - 15.0 mmol/L   Troponin    Specimen: Blood   Result Value Ref Range    Troponin T <0.010 0.000 - 0.030 ng/mL   BNP    Specimen: Blood   Result Value Ref Range    proBNP 689.9 0.0 - 900.0 pg/mL   CBC Auto Differential    Specimen: Blood   Result Value Ref Range    WBC 8.20 3.40 - 10.80 10*3/mm3    RBC 2.67 (L) 4.14 - 5.80 10*6/mm3    Hemoglobin 10.2 (L) 13.0 - 17.7 g/dL    Hematocrit 28.7 (L) 37.5 - 51.0 %    .8 (H) 79.0 - 97.0 fL    MCH 38.4 (H) 26.6 - 33.0 pg    MCHC 35.6 31.5 - 35.7 g/dL    RDW 26.3 (H) 12.3 - 15.4 %    RDW-SD 97.1 (H) 37.0 - 54.0 fl    MPV 7.3 6.0 - 12.0 fL    Platelets 299 140 - 450 10*3/mm3   Scan Slide    Specimen: Blood   Result Value Ref Range    Scan Slide     Blood Gas, Arterial -    Specimen: Arterial Blood   Result Value Ref Range    Site Left Radial     Brice's Test Positive     pH, Arterial 7.518 (H) 7.350 - 7.450 pH units    pCO2, Arterial 32.3 (L) 35.0 - 48.0 mm Hg    pO2, Arterial 76.5 (L) 83.0 - 108.0 mm Hg    HCO3, Arterial 26.3 21.0 - 28.0 mmol/L    Base Excess, Arterial 3.8 (H) 0.0 - 3.0 mmol/L    O2 Saturation, Arterial 96.6 94.0 - 98.0 %    CO2 Content 27.3 22 - 29 mmol/L    Barometric Pressure for Blood Gas       Modality Cannula     FIO2 32 %    Hemodilution No    Manual Differential    Specimen: Blood   Result Value Ref Range    Neutrophil % 81.0 (H) 42.7 - 76.0 %    Lymphocyte % 7.0 (L) 19.6 - 45.3 %    Monocyte % 10.0 5.0 - 12.0 %    Basophil % 1.0 0.0 - 1.5 %    Myelocyte % 1.0 (H) 0.0 - 0.0 %    Neutrophils Absolute 6.64 1.70 - 7.00 10*3/mm3    Lymphocytes Absolute 0.57 (L) 0.70 - 3.10 10*3/mm3    Monocytes Absolute 0.82 0.10 - 0.90 10*3/mm3    Basophils Absolute 0.08 0.00 - 0.20 10*3/mm3    Anisocytosis Mod/2+ None Seen    Macrocytes Slight/1+ None Seen    Poikilocytes Slight/1+ None Seen    Polychromasia Slight/1+ None Seen    RBC Fragments Slight/1+ None Seen    Toxic Granulation Slight/1+ None Seen    Platelet Estimate Adequate Normal    Large Platelets Slight/1+ None Seen   POC Lactate    Specimen: Blood   Result Value Ref Range    Lactate 0.8 0.5 - 2.0 mmol/L   ECG 12 Lead   Result Value Ref Range    QT Interval 371 ms   ECG 12 Lead   Result Value Ref Range    QT Interval 371 ms   Light Blue Top   Result Value Ref Range    Extra Tube hold for add-on    Gold Top - SST   Result Value Ref Range    Extra Tube Hold for add-ons.      FL Video Swallow With Speech Single Contrast    Result Date: 3/8/2021  As above. Please see speech pathology report for detailed evaluation and dietary recommendations.  Electronically Signed By-Daryl Hunter MD On:3/8/2021 9:53 AM This report was finalized on [id number] by  Daryl Hunter MD.    XR Chest 1 View    Result Date: 3/9/2021   1. Stable reticular opacities in lung bases may represent atelectasis or infiltrates. The findings appear similar to 02/23/2021. 2. Right midlung mass is unchanged.  Electronically Signed By-Ivana Mirza MD On:3/9/2021 12:41 PM This report was finalized on [id number] by  Ivana Mirza MD.    Medications   sodium chloride 0.9 % flush 10 mL (has no administration in time range)          EKG my interpretation normal sinus rhythm rate of 94  biatrial enlargement anterior septal infarct QTC of 431 no significant change from previous EKG is abnormal                                  MDM  Number of Diagnoses or Management Options  Dyspnea, unspecified type: established and improving  Diagnosis management comments: Medical decision making.  Patient had IV established placed on a monitor and had the above exam evaluation monitor revealed a normal sinus rhythm.  Patient is EKG without acute changes when reviewed by myself.  Chest x-ray is unchanged has a lung mass that is present as read by radiology and myself.  Patient had a blood gas pH was 7.5 PCO2 of 13 and a PO2 of 76 on 4 L.  The patient's white count was 8.2 hemoglobin 10.2.  Patient's proBNP was normal troponin was negative chemistries unremarkable other than a sodium of 129.  Blood cultures are pending patient on repeat exam was resting comfortably patient sats were 97% on 4 L.  Patient's lactate was 0.8.  Everything looks stable today I see no worsening pneumonia he is hypoxic but this is chronic and his sats are good on 4 L.  He is on his continuously I do not see evidence of acute cardiac ischemia his troponins are negative.  The patient has a known pneumonia it is a viral etiology and is not requiring antibiotics.  I see no evidence of leg pain or swelling no evidence suggest acute DVT or pulmonary embolism or aortic dissection.  We talked about these findings on sense of sepsis and he will be discharged back to the nursing home for continued therapy and rehabilitation stable otherwise unremarkable ER course.  Patient agreeable with this plan       Amount and/or Complexity of Data Reviewed  Clinical lab tests: reviewed  Tests in the radiology section of CPT®: reviewed  Tests in the medicine section of CPT®: reviewed  Independent visualization of images, tracings, or specimens: yes    Risk of Complications, Morbidity, and/or Mortality  Presenting problems: moderate  Diagnostic procedures:  moderate  Management options: moderate        Final diagnoses:   Dyspnea, unspecified type            Alber Lopez MD  03/09/21 [id number]

## 2021-03-09 NOTE — PROGRESS NOTES
Discharge Planning Assessment   Johann     Patient Name: Axel Ramirez  MRN: [id number]  Today's Date: 3/9/2021    Admit Date: 2/18/2021          Plan    Final Discharge Disposition Code  03 - skilled nursing facility (SNF)    Final Note  to [facility] for rehab       Carol naegele rn  Case management  Office number [phone]  Cell phone [phone]

## 2021-03-09 NOTE — DISCHARGE INSTRUCTIONS
Continue home oxygen at 4 L.  Return for fever vomiting creasing shortness of breath altered mental status cannot hold anything down or any other new or worsening problems or concerns

## 2021-03-11 NOTE — OUTREACH NOTE
SNF Follow-up Note      Responses   Acute Facility Discharged From  [facility]   Acute Discharge Date  03/08/21 [Also seen [facility] ED 3/9/21, DC'd back to rehab]   Name of the Skilled Nursing Facility?  [facility]   Purpose of SNF Admission  PT, OT, SN   Who is the insurance provider or payor of patient stay?  Medicare, Medicaid   Progression of Patient?  in ED again 3/9/21, DC'd back to rehab      Called [facility] spoke w/ Felisha who confirms pt still admitted to rehab receiving PT, OT, SN.  Note was seen in [facility] ED 3/9 and discharged in stable condition back to rehab.  Giselle Ballard RN  Ambulatory     3/11/2021, 12:24 EST

## 2021-03-18 NOTE — OUTREACH NOTE
SNF Follow-up Note      Responses   Acute Facility Discharged From  [facility]   Acute Discharge Date  03/08/21   Name of the Skilled Nursing Facility?  [facility]   Purpose of SNF Admission  PT, OT, SN   Who is the insurance provider or payor of patient stay?  Medicare, Medicaid   Progression of Patient?  stable - was in ED 3/9/21 but DC'd back to SNF      Called [facility], pt still admitted no immediate plans for discharge.    Giselle Ballard RN  Ambulatory     3/18/2021, 09:58 EDT

## 2021-03-25 NOTE — OUTREACH NOTE
SNF Follow-up Note      Responses   Acute Facility Discharged From  [facility]   Acute Discharge Date  03/08/21   Name of the Skilled Nursing Facility?  [facility]   Purpose of SNF Admission  PT, OT, SN   Progression of Patient?  still admitted      Called [facility] spoke w/ Renée who confirms patient still admitted. No immediate plan for DC as of meeting this AM.    Giselle Ballard, RN  Ambulatory     3/25/2021, 10:01 EDT         Care Coordination Note  Clarified patient ineligible for  RN-ACM f/up post SNF DC d/t not traditional   Giselle Ballard, RN  Ambulatory   3/26/2021, 14:07 EDT

## 2021-04-26 NOTE — PROGRESS NOTES
FOLLOW-UP NOTE    Name: Axel Ramirez  YOB: 1950  MRN #: [id number]  Date of Service: 4/26/2021  Referring Provider: Axel Lewis MD  Primary Care Provider: Sandoval Stevenson FNP    DIAGNOSIS: Stage IV NSCLC  Initially diagnosed and treated for Stage IIIC Squamous Cell carcinoma with chemoradiation, 66 Gy in 33 fractions, completed 01/06/2021 and then with short interval admission and MRI brain showing single brain met now s/p SRS.  1. Squamous cell carcinoma of lung, right (CMS/HCC)      REASON FOR VISIT: Abnormal MRI brain, Stage IV NSCLC    RADIATION TREATMENT COURSE: 1. Primary lung course as noted above.  2. Lt Occipital metastatic lesion, SRS 20 Gy in 1 fraction, completed 03/03/2021.    HISTORY OF PRESENT ILLNESS: The patient is a 70 y.o. year old male who was seen and treated with SRS as an inpatient by my colleague Dr. Galindo.  The patient has continued to improve clinically from that admission, pneumonia and brain symptoms.  He is now at [facility] and is ambulating and feeling much better; his KPS when we last saw him was closer to 60 and it is now firmly 70.    Interval MRI brain on 04/16/2021 was done and I have been able to review.  Vasogenic edema remains in the region of the lesion; an enhancing lesion is still present measuring 1.47 x 1.04 cm in the left occipital at site of prior SRS.  No new lesions are identified.      Clinically, he is without neuro symptoms now.    Interval PET/CT on 04/16/2021:  IMPRESSION:  1. Right upper lobe mass measuring up to approximately 10 cm in size, similar to the prior exam, with decreased peripheral FDG uptake suggesting interval response to therapy. There is some persistent hypermetabolic activity in the periphery.  2. Decreased size and FDG uptake of mediastinal lymph nodes.  3. FDG avid lesion in the left occipital lobe consistent with metastasis as seen on prior brain MRI.  4. No other definite scintigraphic findings of new distant  metastatic disease.  5. Airspace opacities in the bilateral lower lobes, decreased compared to the prior chest CT, with mild FDG activity, suspected to be related to an infectious or inflammatory process.  6. Collapse of the right upper lobe. Slightly improved aeration of the right middle lobe.      I have been able to review the note from Dr. Lewis's office from 04/20/2021.  I have reached out to the Radiologist who read the study on 04/16/2021 and Dr. Galindo (who planned the SRS plan) to determine next steps such as spec/perfusion MRI study, etc.      The following portions of the patient's history were reviewed and updated as appropriate: allergies, current medications, past family history, past medical history, past social history, past surgical history and problem list. Reviewed with the patient and remain unchanged.    PAST MEDICAL HISTORY:  he  has a past medical history of Aortic stenosis, Cancer (CMS/HCC), Congestive heart failure (CHF) (CMS/HCC), COPD (chronic obstructive pulmonary disease) (CMS/HCC), Coronary artery disease, Hypertension, Lung cancer (CMS/HCC), Myocardial infarction (CMS/HCC), Peripheral vascular disease (CMS/HCC), and Valvular disease.  MEDICATIONS:   Current Outpatient Medications:   •  acetaminophen (TYLENOL) 650 MG suppository, Insert 1 suppository into the rectum Every 6 (Six) Hours As Needed for Mild Pain , Headache or Fever., Disp: , Rfl:   •  aspirin (aspirin) 81 MG EC tablet, Take 1 tablet by mouth Daily., Disp:  , Rfl:   •  atorvastatin (LIPITOR) 20 MG tablet, Take 1 tablet by mouth every night at bedtime., Disp: 90 tablet, Rfl: 1  •  bisacodyl (DULCOLAX) 10 MG suppository, Insert 1 suppository into the rectum Daily As Needed for Constipation., Disp: , Rfl:   •  buprenorphine-naloxone (SUBOXONE) 8-2 MG per SL tablet, Place 1.5 tablets under the tongue Daily., Disp: , Rfl:   •  docusate sodium 100 MG capsule, Take 1 capsule by mouth 2 (Two) Times a Day., Disp: , Rfl:   •   ferrous sulfate 324 (65 Fe) MG tablet delayed-release EC tablet, Take 1 tablet by mouth Daily With Breakfast., Disp: 60 tablet, Rfl: 2  •  gabapentin (NEURONTIN) 300 MG capsule, Take 300 mg by mouth 3 (Three) Times a Day., Disp: , Rfl:   •  guaiFENesin (MUCINEX) 600 MG 12 hr tablet, Take 1 tablet by mouth Every 12 (Twelve) Hours., Disp: , Rfl:   •  hydrALAZINE (APRESOLINE) 50 MG tablet, Take 1 tablet by mouth Every 12 (Twelve) Hours., Disp: 180 tablet, Rfl: 3  •  lurasidone (LATUDA) 40 MG tablet tablet, Take 40 mg by mouth Daily., Disp: , Rfl:   •  megestrol (MEGACE) 40 MG/ML suspension, Take 200 mg by mouth 3 (Three) Times a Day., Disp: , Rfl:   •  melatonin 5 MG tablet tablet, Take 1 tablet by mouth At Night As Needed (sleep)., Disp: , Rfl:   •  metoprolol tartrate (LOPRESSOR) 25 MG tablet, Take 2 tablets by mouth 2 (Two) Times a Day for 360 days., Disp: 120 tablet, Rfl: 11  •  miconazole (MICOTIN) 2 % powder, Apply  topically to the appropriate area as directed Every 12 (Twelve) Hours., Disp: , Rfl:   •  pantoprazole (Protonix) 40 MG EC tablet, Take 1 tablet by mouth Daily., Disp: 30 tablet, Rfl: 1  •  polyethylene glycol (MIRALAX) 17 g packet, Take 17 g by mouth Daily., Disp: , Rfl:   •  predniSONE (DELTASONE) 10 MG tablet, Take 1 tablet by mouth Daily With Breakfast., Disp: , Rfl:   •  promethazine (PHENERGAN) 25 MG tablet, Take 25 mg by mouth Every 4 (Four) Hours As Needed for Nausea or Vomiting., Disp: , Rfl:   •  sertraline (ZOLOFT) 50 MG tablet, Take 50 mg by mouth Daily., Disp: , Rfl:   •  spironolactone (Aldactone) 25 MG tablet, Take 1 tablet by mouth Daily for 360 days., Disp: 90 tablet, Rfl: 3  •  torsemide (DEMADEX) 20 MG tablet, Take 10 mg by mouth Daily., Disp: , Rfl:   •  Zinc Oxide (Boudreauxs Butt Paste) 16 % ointment, Apply  topically 2 (Two) Times a Day., Disp: , Rfl:   ALLERGIES: No Known Allergies  PAST SURGICAL HISTORY: he has a past surgical history that includes Aortic valve replacement  "(02/26/2018); Coronary artery bypass graft (02/26/2018); Tibia fracture surgery; Cardiac catheterization (12/29/2017); Bronchoscopy (N/A, 10/24/2020); Venous Access Device (Port) (Left, 10/30/2020); Bronchoscopy (N/A, 11/11/2020); Bronchoscopy (N/A, 11/11/2020); and Bronchoscopy (N/A, 11/11/2020).  PREVIOUS RADIOTHERAPY OR CHEMOTHERAPY: yes  FAMILY HISTORY: his family history includes Cancer in his father; Coronary artery disease in his mother; Seizures (age of onset: 35) in his daughter; Stroke (age of onset: 35) in his daughter.  SOCIAL HISTORY: he  reports that he quit smoking about 6 months ago. His smoking use included cigarettes. He smoked 1.00 pack per day. He has never used smokeless tobacco. He reports current alcohol use. He reports that he does not use drugs.  PAIN AND PAIN MANAGEMENT: No pain.  Vitals:    04/26/21 1307   BP: 102/66   Pulse: 71   Resp: 17   Temp: 97.6 °F (36.4 °C)   TempSrc: Oral   SpO2: 100%   Weight: 56.7 kg (125 lb)   Height: 177.8 cm (70\")   PainSc: 0-No pain     NUTRITIONAL STATUS:  no issues  KPS: 70:  Cares for self; unable to carry on nomal activity    Review of Systems:   General: No fevers, chills, weight change, or drenching night sweats. Skin: No rashes or jaundice.  HEENT: No change in vision or hearing, no headaches.  Neck: No dysphagia or masses.  Heme/Lymph: No easy bruising or bleeding.  Respiratory System: Improved shortness of breath, limited non-productive cough.  Cardiovascular: No chest pain, palpitations, or dyspnea on exertion.  - Pacemaker. GI: No nausea, vomiting, diarrhea, melena, or hematochezia.  : No dysuria or hematuria.  Endocrine: No heat or cold intolerance. Musculoskeletal: No myalgias or arthralgias.  Neuro: No weakness, numbness, syncope, or seizures. Psych: No mood changes or depression. Ext: Denies swelling.        Objective     Vitals:  Vitals:    04/26/21 1307   BP: 102/66   Pulse: 71   Resp: 17   Temp: 97.6 °F (36.4 °C)   TempSrc: Oral   SpO2: " "100%   Weight: 56.7 kg (125 lb)   Height: 177.8 cm (70\")   PainSc: 0-No pain       PHYSICAL EXAM:  GENERAL: in no apparent distress, sitting comfortably in room.    HEENT: normocephalic, atraumatic. Pupils are equal, round, reactive to light. Sclera anicteric. Conjunctiva not injected. Oropharynx without erythema, ulcerations or thrush.   NECK: Supple with no masses.  LYMPHATIC: no cervical, supraclavicular or axillary adenopathy appreciated bilaterally.   CARDIOVASCULAR: S1 & S2 detected; no murmurs, rubs or gallops.  CHEST: clear to auscultation bilaterally; no wheezes, crackles or rubs. Work of breathing normal.  ABDOMEN: bowel sounds present. Abdomen is soft, nontender, nondistended.   MUSCULOSKELETAL: no tenderness to palpation along the spine or scapulae. Normal range of motion.  EXTREMITIES: no clubbing, cyanosis, edema.  SKIN: no erythema, rashes, ulcerations noted.   NEUROLOGIC: cranial nerves II-XII grossly intact bilaterally. No focal neurologic deficits.  PSYCHIATRIC:  alert, aware, and appropriate.      PERTINENT IMAGING/PATHOLOGY/LABS (Medical Decision Making):     COORDINATION OF CARE: A copy of this note is sent to the referring provider.    PATHOLOGY (Reviewed):     IMAGING (Reviewed): Reviewed, independent review is likely consistent with post-SRS changes and not progression. No new images on the MRI brain.  Wanting to discuss with Dr. Galindo and Dr. Acosta (Neuro-Radiology).  It may require MRI spec/perfusion study which I believe is only available at  at this time.  I will reach out to Dr. Lewis once those results are available.    If not able to get MRI brain.  Will discuss if Steroids/vit E can be given for a 2 week regimen and repeat MRI brain in 6 weeks.    LABS (Reviewed):  Hematology WBC   Date Value Ref Range Status   03/09/2021 8.20 3.40 - 10.80 10*3/mm3 Final     RBC   Date Value Ref Range Status   03/09/2021 2.67 (L) 4.14 - 5.80 10*6/mm3 Final     Hemoglobin   Date Value Ref Range " Status   03/09/2021 10.2 (L) 13.0 - 17.7 g/dL Final     Hematocrit   Date Value Ref Range Status   03/09/2021 28.7 (L) 37.5 - 51.0 % Final     Platelets   Date Value Ref Range Status   03/09/2021 299 140 - 450 10*3/mm3 Final      Chemistry   Lab Results   Component Value Date    GLUCOSE 116 (H) 03/09/2021    BUN 18 03/09/2021    CREATININE 0.65 (L) 03/09/2021    EGFRIFNONA 121 03/09/2021    BCR 27.7 (H) 03/09/2021    K 3.7 03/09/2021    CO2 26.0 03/09/2021    CALCIUM 8.5 (L) 03/09/2021    PROTENTOTREF 7.3 10/24/2020    ALBUMIN 3.00 (L) 03/09/2021    LABIL2 0.5 (L) 10/24/2020    AST 24 03/09/2021    ALT 24 03/09/2021         Assessment/Plan     ASSESSMENT AND PLAN:    1. Squamous cell carcinoma of lung, right (CMS/HCC)       -Stage IV  -S/P SRS and chemoradaition    Reviewed, independent review is potentially consistent with post-SRS changes and not progression but this is without certainty but favoring as clinically improving.. No new images on the MRI brain.  Wanting to discuss with Dr. Galindo and Dr. Acosta (Neuro-Radiology).  It may require MRI spec/perfusion study which I believe is only available at  at this time.  I will reach out to Dr. Lewis once those results are available.    If not able to get MRI brain with spec/perfusion,   Will discuss if Steroids/vit E can be given for a 2 week regimen and repeat MRI brain in 6 weeks.    This assessment comes from my review of the imaging, pathology, physician notes and other pertinent information as mentioned.    DISPOSITION: pending.  MRI spec/perfusion considerations to be discussed with Neuro-Radiology      CC: MD Alber Harmon MD  4/26/2021  1:18 PM EDT      Addendum: Reviewed with Dr. Galindo and Radiology team, Dr. Madi Thomson.  Very difficult to call if treatment related or progression.  Again, patient is clinically improved.  Potential for MRI spec/perfusion study discussed with Dr. Thomson and conclusion that not preferred at this time  and only at [facility].  Plan will be to repeat MRI brain at 6 weeks or with symptoms.  Calling Dr. Lewis to discuss options.  Will discuss if Trental 400 mg bid and Vit E 400 International units BID for the next 3-6 months is an option if treatment/necrosis is evident. Will also confirm if he is going forward with immunotherapy or other systemic options.    Approved by:     Alber Tay MD  04/29/21  07:50 EDT

## 2021-04-29 PROBLEM — C79.31 BRAIN METASTASIS: Status: ACTIVE | Noted: 2021-01-01

## 2021-04-29 NOTE — PROGRESS NOTES
Axel Ramirez is a 67 y.o. male s/p   AVR (23mm Magna) /CABG. He denies any signs or symptoms of myocardial ischemia, cerebrovascular event, infection, or valve malfunction. He reports good exercise tolerance.    Sternum is stable, clean, dry and intact.   RRR. Normal prosthetic heart sound with each beat.  CTA B/L.  No edema, cyanosis, clubbing.  GCS 15, no neurologic deficit.    He appears to be doing well. I advised him to continue sternal precautions until 7/1/18.    We will follow him on a PRN basis.    
No

## 2021-05-12 NOTE — TELEPHONE ENCOUNTER
I spoke with [facility] and gave patients nurse a few times to be worked in for his MRI.  The Nurse is going to check with the transportation schedule and then call me back to confirm the time.         Carolina QUINTERO

## 2021-05-12 NOTE — TELEPHONE ENCOUNTER
Chirag Medrano called back and stated they got transport set up for tomorrow at Noon. I informed them he needed to be here at 1130.  I then called authorization and Mari stated she would start working on the Auth right away.                  Carolina QUINTERO

## 2021-05-18 NOTE — PROGRESS NOTES
"FOLLOW-UP NOTE    Name: Axel Ramirez  YOB: 1950  MRN #: [id number]  Date of Service: 5/18/2021  Referring Provider: Axel Lewis MD  Primary Care Provider: Sandoval Stevenson FNP    DIAGNOSIS:Stage IV NSCLC  Initially diagnosed and treated for Stage IIIC Squamous Cell carcinoma with chemoradiation, 66 Gy in 33 fractions, completed 01/06/2021 and then with short interval admission and MRI brain showing single brain met now s/p SRS, completed 20 Gy in 1 fraction, on 03/03/2021.     1. Brain metastasis (CMS/HCC)    2. Squamous cell carcinoma of lung, right (CMS/HCC)      REASON FOR VISIT: Abnormal MRI brain.    RADIATION TREATMENT COURSE:1. Primary lung course as noted above.  2. Lt Occipital metastatic lesion, SRS 20 Gy in 1 fraction, completed 03/03/2021.    HISTORY OF PRESENT ILLNESS: The patient is a 70 y.o. year old male who is not accompanied by his wife today.  He follows with Dr. Lewis and his team for systemic therapy needs and is being considered for dual immunotherapy +/- chemo from my discussions.    He is noting some \"buttock pain\" and notes the suboxone does help with his global pain and this pain in general.  He has a bed sore that is under active treatment and he notes that it is improving.    In regards to his lung symptoms: His SOB is at baseline. His is walking now.  He notes intermittent SOB with walking. He states he can walk about 1 block. He denies any significant cough or hemoptysis.  He walks with a walker or cane.    In regards to neuro symptoms: He denies any neurologic symptoms despite the abnormal MRI brain. Specifically, he denied any headaches. No vision changes. No n/v.  No falls.  No dizziness or swaying.  No trouble with word finding or speech. No trouble with memory.  He feels tired but no new symptoms.    MRI brain 05/13/2021:  FINDINGS:   There is a mass in the medial left occipital lobe. On axial images it  measures 2.4 x 2 cm. Previously the lesion was 14 x " 12 mm. It has a  craniocaudal height of 2.2 cm.   The lesion demonstrates thick rim  enhancement. There is a moderate-large amount of signal abnormality  consistent with edema around the lesion; this has increased since the  prior exam and is causing mild effacement of the left lateral  ventricular atrium.     No other areas of abnormal enhancement are identified in the brain.  There is moderate patchy and confluent white matter T2 and FLAIR  hyperintensity, similar to the prior. There is mild stable pontine  signal abnormality There is no extra-axial pathology. No gross orbital  abnormality. The pituitary is not enlarged. There is no tonsillar  herniation is no sinus fluid signal.     IMPRESSION:  1. There is a 2.4 cm mass in the left occipital lobe which is consistent  with a metastasis. It is larger than on 04/16/2021. The surrounding  edema has increased since the prior exam.   2. No evidence of new metastatic lesions.  3. There is moderate white matter abnormality, unchanged.    I have discussed this imaging findings with his SRS radiosurgeon--Dr. Galindo and he is aware that he is clinically at his neurologic baseline or largely asymptomatic.  He has recommended referral [facility] for their opinion and review.    The following portions of the patient's history were reviewed and updated as appropriate: allergies, current medications, past family history, past medical history, past social history, past surgical history and problem list. Reviewed with the patient and remain unchanged.    PAST MEDICAL HISTORY:  he  has a past medical history of Aortic stenosis, Cancer (CMS/HCC), Congestive heart failure (CHF) (CMS/HCC), COPD (chronic obstructive pulmonary disease) (CMS/HCC), Coronary artery disease, Hypertension, Lung cancer (CMS/HCC), Myocardial infarction (CMS/HCC), Peripheral vascular disease (CMS/HCC), and Valvular disease.  MEDICATIONS:   Current Outpatient Medications:   •  acetaminophen (TYLENOL) 650 MG  suppository, Insert 1 suppository into the rectum Every 6 (Six) Hours As Needed for Mild Pain , Headache or Fever., Disp: , Rfl:   •  aspirin (aspirin) 81 MG EC tablet, Take 1 tablet by mouth Daily., Disp:  , Rfl:   •  atorvastatin (LIPITOR) 20 MG tablet, Take 1 tablet by mouth every night at bedtime., Disp: 90 tablet, Rfl: 1  •  bisacodyl (DULCOLAX) 10 MG suppository, Insert 1 suppository into the rectum Daily As Needed for Constipation., Disp: , Rfl:   •  buprenorphine-naloxone (SUBOXONE) 8-2 MG per SL tablet, Place 1.5 tablets under the tongue Daily., Disp: , Rfl:   •  docusate sodium 100 MG capsule, Take 1 capsule by mouth 2 (Two) Times a Day., Disp: , Rfl:   •  ferrous sulfate 324 (65 Fe) MG tablet delayed-release EC tablet, Take 1 tablet by mouth Daily With Breakfast., Disp: 60 tablet, Rfl: 2  •  gabapentin (NEURONTIN) 300 MG capsule, Take 300 mg by mouth 3 (Three) Times a Day., Disp: , Rfl:   •  guaiFENesin (MUCINEX) 600 MG 12 hr tablet, Take 1 tablet by mouth Every 12 (Twelve) Hours., Disp: , Rfl:   •  hydrALAZINE (APRESOLINE) 50 MG tablet, Take 1 tablet by mouth Every 12 (Twelve) Hours., Disp: 180 tablet, Rfl: 3  •  lurasidone (LATUDA) 40 MG tablet tablet, Take 40 mg by mouth Daily., Disp: , Rfl:   •  megestrol (MEGACE) 40 MG/ML suspension, Take 200 mg by mouth 3 (Three) Times a Day., Disp: , Rfl:   •  melatonin 5 MG tablet tablet, Take 1 tablet by mouth At Night As Needed (sleep)., Disp: , Rfl:   •  metoprolol tartrate (LOPRESSOR) 25 MG tablet, Take 2 tablets by mouth 2 (Two) Times a Day for 360 days., Disp: 120 tablet, Rfl: 11  •  miconazole (MICOTIN) 2 % powder, Apply  topically to the appropriate area as directed Every 12 (Twelve) Hours., Disp: , Rfl:   •  pantoprazole (Protonix) 40 MG EC tablet, Take 1 tablet by mouth Daily., Disp: 30 tablet, Rfl: 1  •  polyethylene glycol (MIRALAX) 17 g packet, Take 17 g by mouth Daily., Disp: , Rfl:   •  predniSONE (DELTASONE) 10 MG tablet, Take 1 tablet by mouth  "Daily With Breakfast., Disp: , Rfl:   •  promethazine (PHENERGAN) 25 MG tablet, Take 25 mg by mouth Every 4 (Four) Hours As Needed for Nausea or Vomiting., Disp: , Rfl:   •  sertraline (ZOLOFT) 50 MG tablet, Take 50 mg by mouth Daily., Disp: , Rfl:   •  spironolactone (Aldactone) 25 MG tablet, Take 1 tablet by mouth Daily for 360 days., Disp: 90 tablet, Rfl: 3  •  torsemide (DEMADEX) 20 MG tablet, Take 10 mg by mouth Daily., Disp: , Rfl:   •  Zinc Oxide (Boudreauxs Butt Paste) 16 % ointment, Apply  topically 2 (Two) Times a Day., Disp: , Rfl:   ALLERGIES: No Known Allergies  PAST SURGICAL HISTORY: he has a past surgical history that includes Aortic valve replacement (02/26/2018); Coronary artery bypass graft (02/26/2018); Tibia fracture surgery; Cardiac catheterization (12/29/2017); Bronchoscopy (N/A, 10/24/2020); Venous Access Device (Port) (Left, 10/30/2020); Bronchoscopy (N/A, 11/11/2020); Bronchoscopy (N/A, 11/11/2020); and Bronchoscopy (N/A, 11/11/2020).  PREVIOUS RADIOTHERAPY OR CHEMOTHERAPY: yes  FAMILY HISTORY: his family history includes Cancer in his father; Coronary artery disease in his mother; Seizures (age of onset: 35) in his daughter; Stroke (age of onset: 35) in his daughter.  SOCIAL HISTORY: he  reports that he quit smoking about 7 months ago. His smoking use included cigarettes. He smoked 1.00 pack per day. He has never used smokeless tobacco. He reports current alcohol use. He reports that he does not use drugs.  PAIN AND PAIN MANAGEMENT: Axel Ramirez reports a pain score of 9.  Given his pain assessment as noted, treatment options were discussed and the following options were decided upon as a follow-up plan to address the patient's pain: continuation of current treatment plan for pain.    Vitals:    05/18/21 1003   BP: 126/72   Pulse: 80   Resp: 18   Temp: 97.8 °F (36.6 °C)   TempSrc: Oral   SpO2: 100%   Weight: 59 kg (130 lb)   Height: 177.8 cm (70\")   PainSc:   9   PainLoc: Buttocks " "    NUTRITIONAL STATUS:  no issues  KPS: 70 (at [facility])      Review of Systems: Weight is up 5 lbs over the interval.  No new CNS findings.  No new pulmonary findings.    General: No fevers, chills, weight change, or drenching night sweats. Skin: No rashes or jaundice.  HEENT: No change in vision or hearing, no headaches.  Neck: No dysphagia or masses.  Heme/Lymph: No easy bruising or bleeding.  Respiratory System: No chagne in shortness of breath or cough.  Cardiovascular: No chest pain, palpitations, or dyspnea on exertion.  - Pacemaker. GI: No nausea, vomiting, diarrhea, melena, or hematochezia.  : No dysuria or hematuria.  Endocrine: No heat or cold intolerance. Musculoskeletal: No myalgias or arthralgias.  Neuro: No weakness, numbness, syncope, or seizures. Psych: No mood changes or depression. Ext: Denies swelling.        Objective     Vitals:  Vitals:    05/18/21 1003   BP: 126/72   Pulse: 80   Resp: 18   Temp: 97.8 °F (36.6 °C)   TempSrc: Oral   SpO2: 100%   Weight: 59 kg (130 lb)   Height: 177.8 cm (70\")   PainSc:   9   PainLoc: Buttocks       PHYSICAL EXAM:  GENERAL: in no apparent distress, sitting comfortably in room.    HEENT: normocephalic, atraumatic. Pupils are equal, round, reactive to light. Sclera anicteric. Conjunctiva not injected. Oropharynx without erythema, ulcerations or thrush.   NECK: Supple with no masses.  LYMPHATIC: no cervical, supraclavicular or axillary adenopathy appreciated bilaterally.   CARDIOVASCULAR: S1 & S2 detected; no murmurs, rubs or gallops.  CHEST: clear to auscultation bilaterally; no wheezes, crackles or rubs. Work of breathing normal.  ABDOMEN: bowel sounds present. Abdomen is soft, nontender, nondistended.   MUSCULOSKELETAL: no tenderness to palpation along the spine or scapulae. Normal range of motion.  EXTREMITIES: no clubbing, cyanosis, edema.  SKIN: no erythema, rashes, ulcerations noted.   NEUROLOGIC: cranial nerves II-XII grossly intact bilaterally. No " focal neurologic deficits. Stable exam.  PSYCHIATRIC:  alert, aware, and appropriate.      PERTINENT IMAGING/PATHOLOGY/LABS (Medical Decision Making): Reviewed note from Dr. Lewis and spoke with his NP, Kiersten Elizalde and patient is being considered for adjuvant immunotherapy/dual immunotherapy course.    COORDINATION OF CARE: A copy of this note is sent to the referring provider.    PATHOLOGY (Reviewed):     IMAGING (Reviewed): MRI brain reviewed. Prior SRS course reviewed and discussed with his radiosurgery, Dr. Galindo in complete detail today.    LABS (Reviewed):  Hematology WBC   Date Value Ref Range Status   03/09/2021 8.20 3.40 - 10.80 10*3/mm3 Final     RBC   Date Value Ref Range Status   03/09/2021 2.67 (L) 4.14 - 5.80 10*6/mm3 Final     Hemoglobin   Date Value Ref Range Status   03/09/2021 10.2 (L) 13.0 - 17.7 g/dL Final     Hematocrit   Date Value Ref Range Status   03/09/2021 28.7 (L) 37.5 - 51.0 % Final     Platelets   Date Value Ref Range Status   03/09/2021 299 140 - 450 10*3/mm3 Final      Chemistry   Lab Results   Component Value Date    GLUCOSE 116 (H) 03/09/2021    BUN 18 03/09/2021    CREATININE 0.80 05/13/2021    EGFRIFNONA 121 03/09/2021    BCR 27.7 (H) 03/09/2021    K 3.7 03/09/2021    CO2 26.0 03/09/2021    CALCIUM 8.5 (L) 03/09/2021    PROTENTOTREF 7.3 10/24/2020    ALBUMIN 3.00 (L) 03/09/2021    LABIL2 0.5 (L) 10/24/2020    AST 24 03/09/2021    ALT 24 03/09/2021         Assessment/Plan     ASSESSMENT AND PLAN:    1. Brain metastasis (CMS/HCC)    2. Squamous cell carcinoma of lung, right (CMS/HCC)       -Abnormal MRI brain.  Patient is clinically asymptomatic.  Discussed referral to Dr. Luna, Neurosurgery for eval and discussion of resection vs. MRI spec/perfusion (only at [facility] and has been limited opportunity given he is in skilled facility at this time) vs. Starting trental/vit E with immunotherapy and closely monitoring as he is largely asymptomatic at this time.    Will await this eval by  Dr. Luna which I have ordered.  If he is to start Trental/Vit E, I would start him on a Medrol Dosepak and BID dosing of the the Vit E and Trental and follow closely.    Patient aware of plan.      This assessment comes from my review of the imaging, pathology, physician notes and other pertinent information as mentioned.    DISPOSITION: Will need adjuvant dual immunotherapy +/- chemo  I feel that if radionecrosis from his SRS that potential role for Vit E/Trental for up to 6 months is appropriate but   Will await Neurosurgery evaluation.  The swelling and enhancement is atypical for this early post-SRS time period relating to treatment necrosis but not impossible to be seen.  Although when seen this early, typically it has been in my patients who have already had exposure to prior immunotherapy.    Will follow-up with Dr. Luna tomorrow.    CC: MD Dave Harmon MD John A Cox, MD  5/18/2021  10:01 AM EDT

## 2021-05-19 NOTE — PROGRESS NOTES
"Subjective   History of Present Illness: Axel Ramirez is a 70 y.o. male seen for the first time in our neurosurgery clinic on 5/19/2021.  Patient is known to the radiation oncology service.  Case has been discussed with Dr. Tay radiation oncology.  He was admitted the hospital months prior with stage III lung cancer and sepsis.  He was quite ill necessitating significant management and as well a small left occipital solitary met was identified.  The mass was treated with SRS single dose on 3/2021 and since he is continues his convalescence at [facility], with baseline confusion and supplemental oxygen.  Follow-up imaging of the brain with MRI has reported that the solitary met is getting larger with reactive vasogenic edema with stippled enhancement perhaps indicative of both tumor progression and progression of radiation necrosis.  Patient has not been treated with Trental or vitamin E and the patient is being considered for chemotherapy.  Most recent MRI scan 5/13/2021 describes the presence of a 2.4 cm mass in the left occipital lobe consistent with metastasis which is larger than 4/16/2021.  Surrounding edema has increased since prior exam.    History of Present Illness    The following portions of the patient's history were reviewed and updated as appropriate: allergies, current medications, past family history, past medical history, past social history, past surgical history and problem list.    Review of Systems   Constitutional: Negative.    HENT: Negative.    Respiratory: Negative.    Cardiovascular: Negative.    Musculoskeletal: Negative.    Neurological: Negative.        Objective     ./70 (BP Location: Left arm, Patient Position: Sitting, Cuff Size: Adult)   Pulse 76   Ht 177.8 cm (70\")   Wt 72.6 kg (160 lb)   BMI 22.96 kg/m²    Body mass index is 22.96 kg/m².      Physical Exam  Neurologic Exam  Patient in wheelchair, with baseline confusion.  Supplemental oxygen on board.  Responsive " and awake, yet sleepy.  Complains of headache.  PERRLA with EOMI.  Facial sensation is intact with symmetric motor function.  Gag is intact.  5/5 strength throughout with intact primary sensory modalities.  Right incongruent homonymous hemianopsia.    Assessment/Plan   Independent Review of Radiographic Studies:      I personally reviewed the images from the following studies.    Study Result    Narrative & Impression      DATE OF EXAM:   5/13/2021 12:15 PM     PROCEDURE:   MRI BRAIN W WO CONTRAST-     INDICATIONS:   Brain met, eval for progression vs. treatment side effects;  C34.91-Malignant neoplasm of unspecified part of right bronchus or lung     COMPARISON:  [facility] 04/16/2021     TECHNIQUE:   Routine magnetic resonance imaging was obtained of the brain before and  after the administration of 15 mL of ProHance contrast intravenously.     FINDINGS:   There is a mass in the medial left occipital lobe. On axial images it  measures 2.4 x 2 cm. Previously the lesion was 14 x 12 mm. It has a  craniocaudal height of 2.2 cm.   The lesion demonstrates thick rim  enhancement. There is a moderate-large amount of signal abnormality  consistent with edema around the lesion; this has increased since the  prior exam and is causing mild effacement of the left lateral  ventricular atrium.     No other areas of abnormal enhancement are identified in the brain.  There is moderate patchy and confluent white matter T2 and FLAIR  hyperintensity, similar to the prior. There is mild stable pontine  signal abnormality There is no extra-axial pathology. No gross orbital  abnormality. The pituitary is not enlarged. There is no tonsillar  herniation is no sinus fluid signal.     IMPRESSION:  1. There is a 2.4 cm mass in the left occipital lobe which is consistent  with a metastasis. It is larger than on 04/16/2021. The surrounding  edema has increased since the prior exam.   2. No evidence of new metastatic  lesions.  3. There is moderate white matter abnormality, unchanged.                  Medical Decision Making:      EMR and imaging studies have been reviewed.  The patient is seen and examined.    Axel Modi a 70 y.o. male with occipital lobe met which has progressed in size  despite SRS.  I agree with Dr. Tay in that the emlarging mass is probably a combination of both progression of the tumor and radiation necrosis.  Best treatment option for this enlarging mass to be surgical resection which can be achieved if patient is cleared for surgery.  Wife was not available to discuss.  Will make arrangements for evaluation from cardiology for cardiology clearance.  We will schedule for repeat MRI of brain to monitor for progression.  As medically cleared we will schedule for surgical resection.    Procedures      Diagnoses and all orders for this visit:    1. Malignant neoplasm of occipital lobe (CMS/HCC) (Primary)  -     MRI Brain With & Without Contrast; Future  -     Ambulatory Referral to Cardiology  -     MRI Brain With & Without Contrast; Future      Return in about 3 weeks (around 6/9/2021).    This patient was examined wearing appropriate personal protective equipment.          Dave Luna MD  05/19/21  10:27 EDT

## 2021-06-02 NOTE — PROGRESS NOTES
Subjective:     Encounter Date:06/02/2021      Patient ID: Axel Ramirez is a 70 y.o. male.    Chief Complaint: Coronary Artery Disease  History of Present Illness     70-year-old white male patient recently admitted to the hospital with an episode of acute influenza flu   patient had an echocardiogram which showed severe aortic stenosis transesophageal echocardiogram showed severe aortic valve calcification with stenosis and LV function was normal December 2017      cardiac catheterization December 2017 showed left main has no disease diagonal artery ostium 70% disease proximal LAD 20% circumflex artery has a focal lesion causing anywhere from 50-70% disease proximal RCA has somewhat diffuse disease causing 50-60%   stenosis   the right common femoral artery has 60-70% calcified lesion     February 2018 patient underwent I aortic valve replacement with a bioprosthetic valve   vein graft to the marginal branch and diagonal artery     Patient was advised to stop smoking completely     Had recurrent problems with pneumonia last month  Questionable lung CA  Cardiac wise doing well  #2020 echocardiogram EF 65% diastolic LV dysfunction right atrium is mildly dilated mild aortic valve stenosis mild to moderate LVOT gradient  Follow-up with pulmonary  Follow-up in 6 months    Patient comes back for follow-up today cardiac wise he is doing well but unfortunately patient was diagnosed with lung CA and lost significant amount of weight no appetite currently on oxygen  Denies of any chest pain  Cardiac wise we will continue current treatment  Patient is followed by oncology        The following portions of the patient's history were reviewed and updated as appropriate: Allergies current medications past family history past medical history past social history past surgical history problem list and review of systems  Past Medical History:   Diagnosis Date   • Aortic stenosis    • Cancer (CMS/HCC)     Sickle Cell  "6-11-19    I saw Hiram Tapia for follow up of UPS of the right thigh    Background  He had no significant PMH but was found to have a UPS of the right thigh. He has a history of right leg pain with sciatica x 20 years. In October 2017 he had more pain and injured his leg on a truck which eventually led to imaging that revealed a mass. 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      He was started on Doxil/Ifos 3/23/18 and completed 4 cycles with positive response to treatment. Of not he did complete 6 months of Xarelto during this time for incidental PE. He then underwent preoperative XRT. He underwent resection 9/17/18 with <5% viable cells on path and negative margins with no LVI.     Then on surveillance imaging October 2018 he was noted to have lung nodules. He underwent biopsy December 2018 that revealed metastatic sarcoma.    Restaging on 4/2/19  revealed large right and trace left pneumothoraces along with worsening metastatic disease with increased lung nodules, increased lymphadenopathy, and new lytic lesion with associated soft tissue mass in posterior right femoral intertrochanteric region. He was admitted through the ED. Thoracic placed a pigtail but he had re-expansion. Thoracic surgery performed talc pleurodesis 4/3/19. He was discharged home 4/5/19.     He was started Keytruda 4/9/19. He saw ortho for worsening right thigh pain thought 2/2 metastatic lesion in right femur and they discussed nailing. During his pre-op work-up CXR revealed persistent right sided pneumothorax for which he was admitted 4/12/19. Repeat talc pleurodesis performed 4/16/19. He underwent right intramedually pinning 4/15/19.  -       Interval History:    David returns to clinic today with his wife.     He overall is feeling well, definitely as well as 3 weeks ago.     He is however having hemoptysis but less than he had.  He does not note \"lung congestion\" but has more cough when he lies " "Carcinoma   • Congestive heart failure (CHF) (CMS/HCC)     DIASTOLIC   • COPD (chronic obstructive pulmonary disease) (CMS/HCC)    • Coronary artery disease    • Hypertension    • Lung cancer (CMS/HCC)    • Myocardial infarction (CMS/HCC)    • Peripheral vascular disease (CMS/HCC)    • Valvular disease      Past Surgical History:   Procedure Laterality Date   • AORTIC VALVE REPAIR/REPLACEMENT  02/26/2018    Dr Maza   • BRONCHOSCOPY N/A 10/24/2020    Procedure: BRONCHOSCOPY with biopsy right upper lobe mass, and bronchoalveolar lavage right upper lobe;  Surgeon: Ángela Bean MD;  Location: [facility] ENDOSCOPY;  Service: Pulmonary;  Laterality: N/A;  post: right upper lobe mass, pneumonia   • BRONCHOSCOPY N/A 11/11/2020    Procedure: BRONCHOSCOPY with bronchial washing;  Surgeon: Ángela Bean MD;  Location: [facility] ENDOSCOPY;  Service: Pulmonary;  Laterality: N/A;  Post: lung mass, pneumonia   • BRONCHOSCOPY N/A 11/11/2020    Procedure: BRONCHOSCOPY RIGID with debulking of right main endobronchial tumor;  Surgeon: Nomi Reyes MD;  Location: [facility] MAIN OR;  Service: Cardiothoracic;  Laterality: N/A;   • BRONCHOSCOPY N/A 11/11/2020    Procedure: BRONCHOSCOPY;  Surgeon: Nomi Reyes MD;  Location: [facility] MAIN OR;  Service: Cardiothoracic;  Laterality: N/A;   • CARDIAC CATHETERIZATION  12/29/2017   • CORONARY ARTERY BYPASS GRAFT  02/26/2018    Dr Maza   • TIBIA FRACTURE SURGERY     • VENOUS ACCESS DEVICE (PORT) INSERTION Left 10/30/2020    Procedure: MEDIPORT INSERTION UNDER FLUOROSCOPIC GUIDENCE;  Surgeon: Nomi Reyes MD;  Location: [facility] MAIN OR;  Service: Cardiothoracic;  Laterality: Left;     BP 98/63 (BP Location: Right arm, Patient Position: Sitting, Cuff Size: Adult)   Pulse 110   Ht 177.8 cm (70\")   Wt 72.6 kg (160 lb)   SpO2 94%   BMI 22.96 kg/m²   Family History   Problem Relation Age of Onset   • Coronary artery disease Mother    • Cancer Father    • Stroke Daughter 35   • Seizures Daughter " "down.    No KONG and does yard work and moved his boat; doing PT, gets out daily.    He is working full time.    His right leg is still a bit sore but is improving and he is not needing any pain medication. He is up and moving as tolerated.    He denies any breathing concerns and his pleuritic chest pain continues to improve after his last pleurodesis.     He otherwise feels well and complete ROS negative. He feels he tolerated the first Keytruda infusion well.     10-point ROS o/w neg.       On exam he appeared comfortable w normal affect  /80 (BP Location: Right arm, Patient Position: Sitting, Cuff Size: Adult Regular)   Pulse 74   Temp 97.3  F (36.3  C) (Oral)   Resp 16   Ht 1.778 m (5' 10\")   Wt 86.5 kg (190 lb 12.8 oz)   SpO2 98%   BMI 27.38 kg/m    Constitutional: Well-appearing male in no acute distress.  Eyes: No scleral icterus.  ENT: Without lesions or thrush.   NECK: No thyromegaly or mass  Lymphatic: No lymphadenopathy.   Cardiovascular: RRR. No murmurs, gallops, or rubs. No peripheral edema.  Respiratory: Some crackles L base.  ABD:  Abdomen soft, non-tender. No HSMT.   Neurologic: Normal Romberg  Skin: Mild sun effect arms.  PSYCH: Mood good.     -  CBC OK  Other labs pending.    -  No new imaging.    -       Assessment:  1. 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 Biopsy December 2018 confirmed metastatic sarcoma. He started Keytruda 4/9/19 and baseline CT did confirm progressive disease.     Tolerated 3 cycles of Keytruda well. Returns today for cycle 4.      2. Pulm  Recurrent right sided pneumothorax s/p 2 hospitalizations and TALC pleurodesis 4/3 and again 4/16. Per thoracic no need for repeat imaging unless he has worsening symptoms. Does have intermittent scant hemoptysis, hgb stable. "      3. MSK  Restaging CT with lytic lesion with associated soft tissue mass arising from the posterior right femoral intertrochanteric region. He underwent right femur intramedullary pinning 4/15/19 by Dr. Betts. Minimal pain concerns, weight bearing as tolerated, and incisions healing well.  He is using crutches.      He is tolerating Rx well.  I reviewed his imaging and we will plan to restage 6-28 and get the 7-2 labs on 6-28 as well.    Gaurang Johnson M.D.  Professor  Hematology, Oncology and Transplantation                  35       Current Outpatient Medications:   •  acetaminophen (TYLENOL) 650 MG suppository, Insert 1 suppository into the rectum Every 6 (Six) Hours As Needed for Mild Pain , Headache or Fever., Disp: , Rfl:   •  albuterol sulfate  (90 Base) MCG/ACT inhaler, , Disp: , Rfl:   •  aspirin (aspirin) 81 MG EC tablet, Take 1 tablet by mouth Daily., Disp:  , Rfl:   •  atorvastatin (LIPITOR) 20 MG tablet, Take 1 tablet by mouth every night at bedtime., Disp: 90 tablet, Rfl: 1  •  bisacodyl (DULCOLAX) 10 MG suppository, Insert 1 suppository into the rectum Daily As Needed for Constipation., Disp: , Rfl:   •  buprenorphine-naloxone (SUBOXONE) 8-2 MG per SL tablet, Place 1.5 tablets under the tongue Daily., Disp: , Rfl:   •  docusate sodium 100 MG capsule, Take 1 capsule by mouth 2 (Two) Times a Day., Disp: , Rfl:   •  ferrous sulfate 324 (65 Fe) MG tablet delayed-release EC tablet, Take 1 tablet by mouth Daily With Breakfast., Disp: 60 tablet, Rfl: 2  •  gabapentin (NEURONTIN) 300 MG capsule, Take 300 mg by mouth 3 (Three) Times a Day., Disp: , Rfl:   •  guaiFENesin (MUCINEX) 600 MG 12 hr tablet, Take 1 tablet by mouth Every 12 (Twelve) Hours., Disp: , Rfl:   •  hydrALAZINE (APRESOLINE) 50 MG tablet, Take 1 tablet by mouth Every 12 (Twelve) Hours., Disp: 180 tablet, Rfl: 3  •  lurasidone (LATUDA) 40 MG tablet tablet, Take 40 mg by mouth Daily., Disp: , Rfl:   •  megestrol (MEGACE) 40 MG/ML suspension, Take 200 mg by mouth 3 (Three) Times a Day., Disp: , Rfl:   •  melatonin 5 MG tablet tablet, Take 1 tablet by mouth At Night As Needed (sleep)., Disp: , Rfl:   •  metoprolol tartrate (LOPRESSOR) 25 MG tablet, Take 2 tablets by mouth 2 (Two) Times a Day for 360 days., Disp: 120 tablet, Rfl: 11  •  miconazole (MICOTIN) 2 % powder, Apply  topically to the appropriate area as directed Every 12 (Twelve) Hours., Disp: , Rfl:   •  multivitamin (MULTI-VITAMIN PO), Take  by mouth Daily., Disp: , Rfl:   •  pantoprazole (Protonix) 40  MG EC tablet, Take 1 tablet by mouth Daily., Disp: 30 tablet, Rfl: 1  •  polyethylene glycol (MIRALAX) 17 g packet, Take 17 g by mouth Daily., Disp: , Rfl:   •  predniSONE (DELTASONE) 10 MG tablet, Take 1 tablet by mouth Daily With Breakfast., Disp: , Rfl:   •  promethazine (PHENERGAN) 25 MG tablet, Take 25 mg by mouth Every 4 (Four) Hours As Needed for Nausea or Vomiting., Disp: , Rfl:   •  sertraline (ZOLOFT) 50 MG tablet, Take 50 mg by mouth Daily., Disp: , Rfl:   •  spironolactone (Aldactone) 25 MG tablet, Take 1 tablet by mouth Daily for 360 days., Disp: 90 tablet, Rfl: 3  •  torsemide (DEMADEX) 20 MG tablet, Take 10 mg by mouth Daily., Disp: , Rfl:   •  Zinc Oxide (Boudreauxs Butt Paste) 16 % ointment, Apply  topically 2 (Two) Times a Day., Disp: , Rfl:   Social History     Socioeconomic History   • Marital status:      Spouse name: Not on file   • Number of children: Not on file   • Years of education: Not on file   • Highest education level: Not on file   Tobacco Use   • Smoking status: Former Smoker     Packs/day: 1.00     Types: Cigarettes     Quit date: 10/2020     Years since quittin.6   • Smokeless tobacco: Never Used   Vaping Use   • Vaping Use: Never used   Substance and Sexual Activity   • Alcohol use: Yes   • Drug use: No   • Sexual activity: Defer     No Known Allergies  Review of Systems   Constitutional: Negative for fever and malaise/fatigue.   Cardiovascular: Negative for chest pain, dyspnea on exertion and palpitations.   Respiratory: Negative for cough and shortness of breath.    Skin: Negative for rash.   Gastrointestinal: Negative for abdominal pain, nausea and vomiting.   Neurological: Negative for focal weakness and headaches.   All other systems reviewed and are negative.             Objective:     Physical Exam  Alert vitals reviewed tachycardia with regular rate and rhythm noted  On oxygen not in any acute distress neck no JVP elevation lungs bilateral entry few rhonchi  heart sounds S1-S2 regular extremities no edema bilateral pulses present  Procedures    Lab Review:       Assessment:          Diagnosis Plan   1. Essential hypertension     2. Tobacco abuse     3. Squamous cell carcinoma of lung, right (CMS/HCC)     4. Coronary artery disease involving native coronary artery of native heart without angina pectoris     5. Nonrheumatic aortic valve stenosis     6. Mixed hyperlipidemia            Plan:       MDM  Number of Diagnoses or Management Options  Coronary artery disease involving native coronary artery of native heart without angina pectoris: established, improving  Essential hypertension: established, improving  Mixed hyperlipidemia: established, improving  Nonrheumatic aortic valve stenosis: established, improving  Squamous cell carcinoma of lung, right (CMS/HCC): established, worsening  Tobacco abuse: established, improving     Amount and/or Complexity of Data Reviewed  Clinical lab tests: reviewed  Review and summarize past medical records: yes    Risk of Complications, Morbidity, and/or Mortality  Presenting problems: moderate  Management options: moderate    Patient Progress  Patient progress: other (comment)

## 2021-06-07 PROBLEM — J96.20 ACUTE-ON-CHRONIC RESPIRATORY FAILURE (HCC): Status: ACTIVE | Noted: 2021-01-01

## 2021-06-07 PROBLEM — L98.429 STAGE 2 SKIN ULCER OF SACRAL REGION (HCC): Status: ACTIVE | Noted: 2021-01-01

## 2021-06-07 PROBLEM — R06.03 RESPIRATORY DISTRESS: Status: ACTIVE | Noted: 2021-01-01

## 2021-06-07 NOTE — ED PROVIDER NOTES
Subjective   History of Present Illness  Context: 70-year-old male history of the stage IV lung cancer presents with shortness of breath.  He has had no reported fever.  No vomiting no diarrhea.  He has back pain which is chronic but does not complain of new pain.  He had received mini neb treatment per EMS prior to arrival.  Breathing apparently became severe this morning.  He was reported to have saturation of 76% on 4 L at [facility]care facility.  Location: Chest  Quality: Dyspnea  Duration: Today  Timing: Constant  Severity: Severe   Modifying factors: Stage IV lung cancer  Associated signs and symptoms: Back pain which is not new    Review of Systems   Constitutional: Negative for fever and unexpected weight change.   HENT: Negative for congestion and sore throat.    Eyes: Negative for pain.   Respiratory: Positive for shortness of breath. Negative for cough.    Cardiovascular: Negative for chest pain and leg swelling.   Gastrointestinal: Negative for abdominal pain, diarrhea and vomiting.   Genitourinary: Negative for dysuria and urgency.   Musculoskeletal: Positive for back pain.   Skin: Negative for rash.   Neurological: Positive for weakness. Negative for dizziness and headaches.   Psychiatric/Behavioral: Negative for confusion.       Past Medical History:   Diagnosis Date   • Aortic stenosis    • Cancer (CMS/HCC)     Sickle Cell Carcinoma   • Congestive heart failure (CHF) (CMS/HCC)     DIASTOLIC   • COPD (chronic obstructive pulmonary disease) (CMS/HCC)    • Coronary artery disease    • Hypertension    • Lung cancer (CMS/HCC)    • Myocardial infarction (CMS/HCC)    • Peripheral vascular disease (CMS/HCC)    • Valvular disease        No Known Allergies    Past Surgical History:   Procedure Laterality Date   • AORTIC VALVE REPAIR/REPLACEMENT  02/26/2018    Dr Maza   • BRONCHOSCOPY N/A 10/24/2020    Procedure: BRONCHOSCOPY with biopsy right upper lobe mass, and bronchoalveolar lavage right upper lobe;   Surgeon: Ángela Bean MD;  Location: [facility] ENDOSCOPY;  Service: Pulmonary;  Laterality: N/A;  post: right upper lobe mass, pneumonia   • BRONCHOSCOPY N/A 2020    Procedure: BRONCHOSCOPY with bronchial washing;  Surgeon: Ángela Bean MD;  Location: [facility] ENDOSCOPY;  Service: Pulmonary;  Laterality: N/A;  Post: lung mass, pneumonia   • BRONCHOSCOPY N/A 2020    Procedure: BRONCHOSCOPY RIGID with debulking of right main endobronchial tumor;  Surgeon: Nomi Reyes MD;  Location: [facility] MAIN OR;  Service: Cardiothoracic;  Laterality: N/A;   • BRONCHOSCOPY N/A 2020    Procedure: BRONCHOSCOPY;  Surgeon: Nomi Reyes MD;  Location: [facility] MAIN OR;  Service: Cardiothoracic;  Laterality: N/A;   • CARDIAC CATHETERIZATION  2017   • CORONARY ARTERY BYPASS GRAFT  2018    Dr Maza   • TIBIA FRACTURE SURGERY     • VENOUS ACCESS DEVICE (PORT) INSERTION Left 10/30/2020    Procedure: MEDIPORT INSERTION UNDER FLUOROSCOPIC GUIDENCE;  Surgeon: Nomi Reyes MD;  Location: [facility] MAIN OR;  Service: Cardiothoracic;  Laterality: Left;       Family History   Problem Relation Age of Onset   • Coronary artery disease Mother    • Cancer Father    • Stroke Daughter 35   • Seizures Daughter 35       Social History     Socioeconomic History   • Marital status:      Spouse name: Not on file   • Number of children: Not on file   • Years of education: Not on file   • Highest education level: Not on file   Tobacco Use   • Smoking status: Former Smoker     Packs/day: 1.00     Types: Cigarettes     Quit date: 10/2020     Years since quittin.6   • Smokeless tobacco: Never Used   Vaping Use   • Vaping Use: Never used   Substance and Sexual Activity   • Alcohol use: Yes   • Drug use: No   • Sexual activity: Defer     Prior to Admission medications    Medication Sig Start Date End Date Taking? Authorizing Provider   acetaminophen (TYLENOL) 650 MG suppository Insert 1 suppository into the rectum Every 6  (Six) Hours As Needed for Mild Pain , Headache or Fever. 3/8/21   Mauri Davies DO   albuterol sulfate  (90 Base) MCG/ACT inhaler  5/20/21   Paresh Oliveira MD   aspirin (aspirin) 81 MG EC tablet Take 1 tablet by mouth Daily. 11/13/20   Ulices Cruz MD   atorvastatin (LIPITOR) 20 MG tablet Take 1 tablet by mouth every night at bedtime. 12/2/20   Iglesia Springer MD   bisacodyl (DULCOLAX) 10 MG suppository Insert 1 suppository into the rectum Daily As Needed for Constipation. 3/8/21   Mauri Davies DO   buprenorphine-naloxone (SUBOXONE) 8-2 MG per SL tablet Place 1.5 tablets under the tongue Daily. 12/12/20   Paresh Oliveira MD   docusate sodium 100 MG capsule Take 1 capsule by mouth 2 (Two) Times a Day. 3/8/21   Mauri Davies DO   ferrous sulfate 324 (65 Fe) MG tablet delayed-release EC tablet Take 1 tablet by mouth Daily With Breakfast. 11/13/20   Ulices Cruz MD   gabapentin (NEURONTIN) 300 MG capsule Take 300 mg by mouth 3 (Three) Times a Day. 12/9/20   Paresh Oliveira MD   guaiFENesin (MUCINEX) 600 MG 12 hr tablet Take 1 tablet by mouth Every 12 (Twelve) Hours. 3/8/21   Mauri Davies DO   hydrALAZINE (APRESOLINE) 50 MG tablet Take 1 tablet by mouth Every 12 (Twelve) Hours. 12/2/20   Iglesia Springer MD   lurasidone (LATUDA) 40 MG tablet tablet Take 40 mg by mouth Daily.    Paresh Oliveira MD   megestrol (MEGACE) 40 MG/ML suspension Take 200 mg by mouth 3 (Three) Times a Day.    Paresh Oliveira MD   melatonin 5 MG tablet tablet Take 1 tablet by mouth At Night As Needed (sleep). 3/8/21   Mauri Davies DO   metoprolol tartrate (LOPRESSOR) 25 MG tablet Take 2 tablets by mouth 2 (Two) Times a Day for 360 days. 12/2/20 11/27/21  Iglesia Springer MD   miconazole (MICOTIN) 2 % powder Apply  topically to the appropriate area as directed Every 12 (Twelve) Hours. 3/8/21   Mauri Davies, DO    multivitamin (MULTI-VITAMIN PO) Take  by mouth Daily.    Paresh Oliveira MD   pantoprazole (Protonix) 40 MG EC tablet Take 1 tablet by mouth Daily. 10/30/20   Arvin Rushing DO   polyethylene glycol (MIRALAX) 17 g packet Take 17 g by mouth Daily. 3/9/21   Mauri Davies DO   predniSONE (DELTASONE) 10 MG tablet Take 1 tablet by mouth Daily With Breakfast. 3/9/21   Mauri Davies DO   promethazine (PHENERGAN) 25 MG tablet Take 25 mg by mouth Every 4 (Four) Hours As Needed for Nausea or Vomiting.    Paresh Oliveira MD   sertraline (ZOLOFT) 50 MG tablet Take 50 mg by mouth Daily.    Paresh Oliveira MD   spironolactone (Aldactone) 25 MG tablet Take 1 tablet by mouth Daily for 360 days. 12/2/20 11/27/21  Iglesia Springer MD   torsemide (DEMADEX) 20 MG tablet Take 10 mg by mouth Daily.    Paresh Oliveira MD   Zinc Oxide (Boudreauxs Butt Paste) 16 % ointment Apply  topically 2 (Two) Times a Day. 3/8/21   Mauri Davies DO           Objective   Physical Exam  70-year-old male awake alert.  He is generally thin and respiratory distress on arrival.  Pupils equal round react light.  Oropharynx clear neck supple chest reveals decreased breath sounds wheezes.  Cardiovascular regular rate and rhythm.  Abdomen soft nontender.  Extremities no tenderness edema.  Skin without rash noted.  Procedures           ED Course      Results for orders placed or performed during the hospital encounter of 06/07/21   Comprehensive Metabolic Panel    Specimen: Blood   Result Value Ref Range    Glucose 116 (H) 65 - 99 mg/dL    BUN 24 (H) 8 - 23 mg/dL    Creatinine 0.73 (L) 0.76 - 1.27 mg/dL    Sodium 134 (L) 136 - 145 mmol/L    Potassium 4.1 3.5 - 5.2 mmol/L    Chloride 97 (L) 98 - 107 mmol/L    CO2 26.0 22.0 - 29.0 mmol/L    Calcium 9.2 8.6 - 10.5 mg/dL    Total Protein 7.0 6.0 - 8.5 g/dL    Albumin 2.50 (L) 3.50 - 5.20 g/dL    ALT (SGPT) 87 (H) 1 - 41 U/L    AST (SGOT) 58 (H) 1  - 40 U/L    Alkaline Phosphatase 109 39 - 117 U/L    Total Bilirubin 0.7 0.0 - 1.2 mg/dL    eGFR Non African Amer 106 >60 mL/min/1.73    Globulin 4.5 gm/dL    A/G Ratio 0.6 g/dL    BUN/Creatinine Ratio 32.9 (H) 7.0 - 25.0    Anion Gap 11.0 5.0 - 15.0 mmol/L   Troponin    Specimen: Blood   Result Value Ref Range    Troponin T 0.012 0.000 - 0.030 ng/mL   BNP    Specimen: Blood   Result Value Ref Range    proBNP 1,702.0 (H) 0.0 - 900.0 pg/mL   CBC Auto Differential    Specimen: Blood   Result Value Ref Range    WBC 10.10 3.40 - 10.80 10*3/mm3    RBC 2.49 (L) 4.14 - 5.80 10*6/mm3    Hemoglobin 9.2 (L) 13.0 - 17.7 g/dL    Hematocrit 27.3 (L) 37.5 - 51.0 %    .7 (H) 79.0 - 97.0 fL    MCH 37.1 (H) 26.6 - 33.0 pg    MCHC 33.8 31.5 - 35.7 g/dL    RDW 14.8 12.3 - 15.4 %    RDW-SD 56.4 (H) 37.0 - 54.0 fl    MPV 8.6 6.0 - 12.0 fL    Platelets 115 (L) 140 - 450 10*3/mm3   Scan Slide    Specimen: Blood   Result Value Ref Range    Scan Slide     Manual Differential    Specimen: Blood   Result Value Ref Range    Neutrophil % 73.0 42.7 - 76.0 %    Lymphocyte % 10.0 (L) 19.6 - 45.3 %    Monocyte % 3.0 (L) 5.0 - 12.0 %    Bands %  12.0 (H) 0.0 - 5.0 %    Metamyelocyte % 2.0 (H) 0.0 - 0.0 %    Neutrophils Absolute 8.59 (H) 1.70 - 7.00 10*3/mm3    Lymphocytes Absolute 1.01 0.70 - 3.10 10*3/mm3    Monocytes Absolute 0.30 0.10 - 0.90 10*3/mm3    nRBC 2.0 (H) 0.0 - 0.2 /100 WBC    Anisocytosis Slight/1+ None Seen    Macrocytes Slight/1+ None Seen    Toxic Granulation Slight/1+ None Seen    Platelet Morphology Normal Normal   ECG 12 Lead   Result Value Ref Range    QT Interval 339 ms   Light Blue Top   Result Value Ref Range    Extra Tube hold for add-on    Gold Top - SST   Result Value Ref Range    Extra Tube Hold for add-ons.      No radiology results for the last day  Medications   sodium chloride 0.9 % flush 10 mL (has no administration in time range)   ipratropium-albuterol (DUO-NEB) nebulizer solution 3 mL (3 mL Nebulization  "Given 6/7/21 0508)   furosemide (LASIX) injection 40 mg (40 mg Intravenous Given 6/7/21 0600)     /68 (BP Location: Right arm, Patient Position: Sitting)   Pulse 98   Resp 18   Ht 172.7 cm (68\")   Wt 67.3 kg (148 lb 6.4 oz)   SpO2 99%   BMI 22.56 kg/m²                                        MDM  Chart review: Patient is noted to have had office visit last month for occipital lobe brain metastasis.  Apparently has had radiation therapy for this.  Comorbidity: As per past history  Differential: Bronchospasm, pneumonia, congestive heart failure, pneumothorax, bronchial obstruction  My EKG interpretation: Sinus tachycardia biatrial enlargement.  Compared to previous sinus tachycardia now present  Lab: BNP elevated 1700.  Comprehensive metabolic panel glucose 116 BUN 24 creatinine 0.73 White count 10.1 with hemoglobin 9.2 platelet count of 115 troponin normal  Radiology: I reviewed chest x-ray.  There is a large mass right midlung there is diffuse increased lung markings that appear to be consistent with edema  Discussion/treatment: Patient IV placed.  Was given additional DuoNeb placed on increased oxygen via nasal cannula.  Patient had a marked improvement in his respiratory distress with treatment.  He was given Lasix 40 mg IV.  Patient's findings were discussed with him.  He was discussed with Eliz Andrews.  Will be admitted to Dr. De La Cruz' service for further evaluation and care as needed.  He is noted to have documented DNR status.  Respiratory virus panel with Covid will be obtained patient is noted to have 12 bands to metamyelocytes on his differential he has not had this shift on previous CBCs.  He will have cultures and lactic acid obtained.  It is possible that he could have a multifocal pneumonia.  He will be given Rocephin and Zithromax.  A procalcitonin has been obtained and is pending.  Lactic acid was normal at 1.3 procalcitonin was also not elevated at 0.12.  Patient was evaluated using " appropriate PPE      Final diagnoses:   Respiratory distress   Bronchospasm   Acute congestive heart failure, unspecified heart failure type (CMS/HCC)   Malignant neoplasm of right lung, unspecified part of lung (CMS/HCC)       ED Disposition  ED Disposition     ED Disposition Condition Comment    Decision to Admit            No follow-up provider specified.       Medication List      No changes were made to your prescriptions during this visit.          Higinio Flores MD  06/07/21 0625       Higinio Flores MD  06/07/21 0706

## 2021-06-07 NOTE — CASE MANAGEMENT/SOCIAL WORK
Discharge Planning Assessment  [facility]     Patient Name: Axel Ramirez  MRN: [id number]  Today's Date: 6/7/2021    Admit Date: 6/7/2021    Discharge Needs Assessment     Row Name 06/07/21 1754       Living Environment    Lives With  spouse    Name(s) of Who Lives With Patient  Rachana    Unique Family Situation  Pt is from [facility] rehab - need to confirm if rehab    Current Living Arrangements  extended care facility    Primary Care Provided by  other (see comments)    Provides Primary Care For  no one, unable/limited ability to care for self    Family Caregiver if Needed  spouse    Family Caregiver Names  Rachana    Able to Return to Prior Arrangements  other (see comments)    Living Arrangement Comments  Spouse says pt was supposed to go home from  today but instaed was sent to the ED.       Resource/Environmental Concerns    Resource/Environmental Concerns  none    Transportation Concerns  car, none       Transition Planning    Patient/Family Anticipates Transition to  inpatient rehabilitation facility;long-term care facility    Transportation Anticipated  health plan transportation       Discharge Needs Assessment    Readmission Within the Last 30 Days  no previous admission in last 30 days    Equipment Currently Used at Home  oxygen;nebulizer;commode;hospital bed;walker, rolling    Concerns to be Addressed  discharge planning    Anticipated Changes Related to Illness  inability to care for self    Discharge Coordination/Progress  DC Plan: From [facility] rehab? per spouse. Pending clinical course.        Discharge Plan     Row Name 06/07/21 1759       Plan    Plan  DC Plan: From [facility] rehab? per spouse. Pending clinical course.    Plan Comments  Spouse stated pt was due to return home from  but was sent to the ED. Currently on 100% NRB. Consult cardio, pulm, hem/onc and ID pending.        Continued Care and Services - Admitted Since 6/7/2021    Coordination has not been  started for this encounter.         Demographic Summary     Row Name 06/07/21 175       General Information    Admission Type  observation    Arrived From  emergency department    Required Notices Provided  Observation Status Notice    Referral Source  admission list    Reason for Consult  discharge planning    Preferred Language  English     Used During This Interaction  no    General Information Comments  Spoke to pt and spouse, Rachana in room.        Functional Status     Row Name 06/07/21 1759       Functional Status    Usual Activity Tolerance  fair    Current Activity Tolerance  poor       Functional Status, IADL    Medications  completely dependent    Meal Preparation  completely dependent    Housekeeping  completely dependent    Laundry  completely dependent    Shopping  completely dependent       Mental Status    General Appearance WDL  WDL       Mental Status Summary    Recent Changes in Mental Status/Cognitive Functioning  no changes        Met with patient in room wearing PPE: mask, goggles.      Maintained distance greater than six feet and spent less than 15 minutes in the room.               Maninder Bal RN

## 2021-06-07 NOTE — CONSULTS
PULMONARY CRITICAL CARE CONSULT NOTE      PATIENT IDENTIFICATION:  Name: Axel Ramirez  MRN: [id number]  :  1950     Age: 70 y.o.  Sex: male        DATE OF CONSULTATION:  2021  PRIMARY CARE PHYSICIAN    Sandoval Stevenson FNP                  CHIEF COMPLAINT: Shortness of breath     History of Present Illness:   Axel Ramirez is a 70 y.o. male with a history of COPD, diastolic CHF, Aortic stenosis, HTN, CAD with history of MI, PVD, and metastatic lung cancer who presented with sudden onset shortness of breath.  He lives in assisted living nursing home type setting.  EMS was called after patient received a mini neb treatment at the facility but his breathing became more severe, he had a reported saturation of 76% on 4 L at his extended care facility. Brought to the ER and was noted with AE COPD and Healthcare acquired pneumonia and admitted for further treatment. No complaints of SOB today and does have some cough.       Review of Systems:   Constitutional: As above   Eyes: negative   ENT/oropharynx: negative   Cardiovascular: negative   Respiratory: As above   Gastrointestinal: negative   Genitourinary: negative   Neurological: negative   Musculoskeletal: negative   Integument/breast: negative   Endocrine: negative   Allergic/Immunologic: negative     Past Medical History:  Past Medical History:   Diagnosis Date   • Aortic stenosis    • Cancer (CMS/HCC)     Sickle Cell Carcinoma   • Congestive heart failure (CHF) (CMS/HCC)     DIASTOLIC   • COPD (chronic obstructive pulmonary disease) (CMS/HCC)    • Coronary artery disease    • Hypertension    • Lung cancer (CMS/HCC)    • Myocardial infarction (CMS/HCC)    • Peripheral vascular disease (CMS/HCC)    • Pneumonia 2021   • Valvular disease        Past Surgical History:  Past Surgical History:   Procedure Laterality Date   • AORTIC VALVE REPAIR/REPLACEMENT  2018    Dr Maza   • BRONCHOSCOPY N/A 10/24/2020    Procedure: BRONCHOSCOPY with  biopsy right upper lobe mass, and bronchoalveolar lavage right upper lobe;  Surgeon: Ángela Bean MD;  Location: [facility] ENDOSCOPY;  Service: Pulmonary;  Laterality: N/A;  post: right upper lobe mass, pneumonia   • BRONCHOSCOPY N/A 2020    Procedure: BRONCHOSCOPY with bronchial washing;  Surgeon: Ángela Bean MD;  Location: [facility] ENDOSCOPY;  Service: Pulmonary;  Laterality: N/A;  Post: lung mass, pneumonia   • BRONCHOSCOPY N/A 2020    Procedure: BRONCHOSCOPY RIGID with debulking of right main endobronchial tumor;  Surgeon: Nomi Reyes MD;  Location: [facility] MAIN OR;  Service: Cardiothoracic;  Laterality: N/A;   • BRONCHOSCOPY N/A 2020    Procedure: BRONCHOSCOPY;  Surgeon: Nomi Reyes MD;  Location: [facility] MAIN OR;  Service: Cardiothoracic;  Laterality: N/A;   • CARDIAC CATHETERIZATION  2017   • CORONARY ARTERY BYPASS GRAFT  2018    Dr Maza   • TIBIA FRACTURE SURGERY     • VENOUS ACCESS DEVICE (PORT) INSERTION Left 10/30/2020    Procedure: MEDIPORT INSERTION UNDER FLUOROSCOPIC GUIDENCE;  Surgeon: Nomi Reyes MD;  Location: [facility] MAIN OR;  Service: Cardiothoracic;  Laterality: Left;        Family History:  Family History   Problem Relation Age of Onset   • Coronary artery disease Mother    • Cancer Father    • Stroke Daughter 35   • Seizures Daughter 35        Social History:   Social History     Tobacco Use   • Smoking status: Former Smoker     Packs/day: 1.00     Types: Cigarettes     Quit date: 10/2020     Years since quittin.6   • Smokeless tobacco: Never Used   Substance Use Topics   • Alcohol use: Not Currently        Allergies:  No Known Allergies    Home Meds:  Medications Prior to Admission   Medication Sig Dispense Refill Last Dose   • acetaminophen (TYLENOL) 650 MG suppository Insert 1 suppository into the rectum Every 6 (Six) Hours As Needed for Mild Pain , Headache or Fever.      • aspirin (aspirin) 81 MG EC tablet Take 1 tablet by mouth Daily.    6/6/2021 at 0900   • atorvastatin (LIPITOR) 20 MG tablet Take 1 tablet by mouth every night at bedtime. 90 tablet 1 6/6/2021 at 1900   • bisacodyl (DULCOLAX) 10 MG suppository Insert 1 suppository into the rectum Daily As Needed for Constipation.      • buprenorphine-naloxone (SUBOXONE) 8-2 MG per SL tablet Place 1.5 tablets under the tongue Daily.   6/6/2021 at 0900   • docusate sodium 100 MG capsule Take 1 capsule by mouth 2 (Two) Times a Day.   6/6/2021 at 1900   • gabapentin (NEURONTIN) 300 MG capsule Take 300 mg by mouth 3 (Three) Times a Day.   6/6/2021 at 1900   • guaiFENesin (MUCINEX) 600 MG 12 hr tablet Take 1 tablet by mouth Every 12 (Twelve) Hours.   6/6/2021 at 1900   • hydrALAZINE (APRESOLINE) 50 MG tablet Take 1 tablet by mouth Every 12 (Twelve) Hours. 180 tablet 3 6/6/2021 at 1900   • lurasidone (LATUDA) 40 MG tablet tablet Take 40 mg by mouth Daily.   6/6/2021 at 0900   • megestrol (MEGACE) 40 MG/ML suspension Take 200 mg by mouth 3 (Three) Times a Day.   6/6/2021 at 1900   • melatonin 5 MG tablet tablet Take 1 tablet by mouth At Night As Needed (sleep).      • metoprolol tartrate (LOPRESSOR) 25 MG tablet Take 2 tablets by mouth 2 (Two) Times a Day for 360 days. 120 tablet 11 6/6/2021 at 1900   • multivitamin with minerals (multivitamin w/minerals) tablet tablet Take 1 tablet by mouth Daily.   6/6/2021 at 0900   • nystatin (MYCOSTATIN) 793997 UNIT/ML suspension Swish and swallow 500,000 Units 4 (Four) Times a Day.   6/6/2021 at 1900   • pantoprazole (Protonix) 40 MG EC tablet Take 1 tablet by mouth Daily. 30 tablet 1 6/6/2021 at 0900   • polyethylene glycol (MIRALAX) 17 g packet Take 17 g by mouth Daily.   6/6/2021 at 0900   • predniSONE (DELTASONE) 10 MG tablet Take 1 tablet by mouth Daily With Breakfast.   6/6/2021 at 0900   • promethazine (PHENERGAN) 25 MG tablet Take 25 mg by mouth Every 4 (Four) Hours As Needed for Nausea or Vomiting.      • sertraline (ZOLOFT) 50 MG tablet Take 50 mg by mouth  "Daily.   2021 at 0900   • spironolactone (Aldactone) 25 MG tablet Take 1 tablet by mouth Daily for 360 days. 90 tablet 3 2021 at 0900   • torsemide (DEMADEX) 20 MG tablet Take 10 mg by mouth Daily.   2021 at 0900   • albuterol sulfate  (90 Base) MCG/ACT inhaler Inhale 2 puffs Every 6 (Six) Hours As Needed for Wheezing or Shortness of Air.          Objective:  tMax 24 hrs: Temp (24hrs), Av.9 °F (36.6 °C), Min:97.7 °F (36.5 °C), Max:98.1 °F (36.7 °C)      Vitals Ranges:   Temp:  [97.7 °F (36.5 °C)-98.1 °F (36.7 °C)] 97.7 °F (36.5 °C)  Heart Rate:  [] 96  Resp:  [18-31] 18  BP: (105-122)/(68) 105/68    Intake and Output Last 3 Shifts:   No intake/output data recorded.    Exam:  /68 (BP Location: Right arm, Patient Position: Lying)   Pulse 96   Temp 97.7 °F (36.5 °C) (Axillary)   Resp 18   Ht 172.7 cm (68\")   Wt 67.3 kg (148 lb 6.4 oz)   SpO2 99%   BMI 22.56 kg/m²       21  0450   Weight: 67.3 kg (148 lb 6.4 oz)     General Appearance:      HEENT:  Normocephalic, without obvious abnormality, atraumaticConjunctiva/corneas clear,.  Normal external ear canals, Nares normal, no drainage  Neck:  Supple, symmetrical, trachea midline. No JVD.  Lungs /Chest wall:   Bilateral basal rhonchi, respirations unlabored symmetrical wall movement.     Heart:  Regular rate and rhythm, systolic murmur PMI left sternal border  Abdomen: Soft, non-tender, no masses, no organomegaly.    Extremities: Trace edema no clubbing or Cyanosis        Data Review:  All labs (24hrs):   Recent Results (from the past 24 hour(s))   ECG 12 Lead    Collection Time: 21  4:52 AM   Result Value Ref Range    QT Interval 339 ms   Comprehensive Metabolic Panel    Collection Time: 21  5:00 AM    Specimen: Blood   Result Value Ref Range    Glucose 116 (H) 65 - 99 mg/dL    BUN 24 (H) 8 - 23 mg/dL    Creatinine 0.73 (L) 0.76 - 1.27 mg/dL    Sodium 134 (L) 136 - 145 mmol/L    Potassium 4.1 3.5 - 5.2 mmol/L    " Chloride 97 (L) 98 - 107 mmol/L    CO2 26.0 22.0 - 29.0 mmol/L    Calcium 9.2 8.6 - 10.5 mg/dL    Total Protein 7.0 6.0 - 8.5 g/dL    Albumin 2.50 (L) 3.50 - 5.20 g/dL    ALT (SGPT) 87 (H) 1 - 41 U/L    AST (SGOT) 58 (H) 1 - 40 U/L    Alkaline Phosphatase 109 39 - 117 U/L    Total Bilirubin 0.7 0.0 - 1.2 mg/dL    eGFR Non African Amer 106 >60 mL/min/1.73    Globulin 4.5 gm/dL    A/G Ratio 0.6 g/dL    BUN/Creatinine Ratio 32.9 (H) 7.0 - 25.0    Anion Gap 11.0 5.0 - 15.0 mmol/L   Troponin    Collection Time: 06/07/21  5:00 AM    Specimen: Blood   Result Value Ref Range    Troponin T 0.012 0.000 - 0.030 ng/mL   BNP    Collection Time: 06/07/21  5:00 AM    Specimen: Blood   Result Value Ref Range    proBNP 1,702.0 (H) 0.0 - 900.0 pg/mL   CBC Auto Differential    Collection Time: 06/07/21  5:00 AM    Specimen: Blood   Result Value Ref Range    WBC 10.10 3.40 - 10.80 10*3/mm3    RBC 2.49 (L) 4.14 - 5.80 10*6/mm3    Hemoglobin 9.2 (L) 13.0 - 17.7 g/dL    Hematocrit 27.3 (L) 37.5 - 51.0 %    .7 (H) 79.0 - 97.0 fL    MCH 37.1 (H) 26.6 - 33.0 pg    MCHC 33.8 31.5 - 35.7 g/dL    RDW 14.8 12.3 - 15.4 %    RDW-SD 56.4 (H) 37.0 - 54.0 fl    MPV 8.6 6.0 - 12.0 fL    Platelets 115 (L) 140 - 450 10*3/mm3   Light Blue Top    Collection Time: 06/07/21  5:00 AM   Result Value Ref Range    Extra Tube hold for add-on    Gold Top - SST    Collection Time: 06/07/21  5:00 AM   Result Value Ref Range    Extra Tube Hold for add-ons.    Scan Slide    Collection Time: 06/07/21  5:00 AM    Specimen: Blood   Result Value Ref Range    Scan Slide     Manual Differential    Collection Time: 06/07/21  5:00 AM    Specimen: Blood   Result Value Ref Range    Neutrophil % 73.0 42.7 - 76.0 %    Lymphocyte % 10.0 (L) 19.6 - 45.3 %    Monocyte % 3.0 (L) 5.0 - 12.0 %    Bands %  12.0 (H) 0.0 - 5.0 %    Metamyelocyte % 2.0 (H) 0.0 - 0.0 %    Neutrophils Absolute 8.59 (H) 1.70 - 7.00 10*3/mm3    Lymphocytes Absolute 1.01 0.70 - 3.10 10*3/mm3     Monocytes Absolute 0.30 0.10 - 0.90 10*3/mm3    nRBC 2.0 (H) 0.0 - 0.2 /100 WBC    Anisocytosis Slight/1+ None Seen    Macrocytes Slight/1+ None Seen    Toxic Granulation Slight/1+ None Seen    Platelet Morphology Normal Normal   Procalcitonin    Collection Time: 06/07/21  5:00 AM    Specimen: Blood   Result Value Ref Range    Procalcitonin 0.12 0.00 - 0.25 ng/mL   Respiratory Panel PCR w/COVID-19(SARS-CoV-2) PARESH/GRACIELA/KATHY/PAD/COR/MAD/CHRIST In-House, NP Swab in UTM/VTM, 3-4 HR TAT - Swab, Nasopharynx    Collection Time: 06/07/21  6:20 AM    Specimen: Nasopharynx; Swab   Result Value Ref Range    ADENOVIRUS, PCR Not Detected Not Detected    Coronavirus 229E Not Detected Not Detected    Coronavirus HKU1 Not Detected Not Detected    Coronavirus NL63 Not Detected Not Detected    Coronavirus OC43 Not Detected Not Detected    COVID19 Not Detected Not Detected - Ref. Range    Human Metapneumovirus Not Detected Not Detected    Human Rhinovirus/Enterovirus Not Detected Not Detected    Influenza A PCR Not Detected Not Detected    Influenza B PCR Not Detected Not Detected    Parainfluenza Virus 1 Not Detected Not Detected    Parainfluenza Virus 2 Not Detected Not Detected    Parainfluenza Virus 3 Not Detected Not Detected    Parainfluenza Virus 4 Not Detected Not Detected    RSV, PCR Not Detected Not Detected    Bordetella pertussis pcr Not Detected Not Detected    Bordetella parapertussis PCR Not Detected Not Detected    Chlamydophila pneumoniae PCR Not Detected Not Detected    Mycoplasma pneumo by PCR Not Detected Not Detected   POC Lactate    Collection Time: 06/07/21  7:06 AM    Specimen: Blood   Result Value Ref Range    Lactate 1.3 0.5 - 2.0 mmol/L   POC Glucose Once    Collection Time: 06/07/21  8:23 AM    Specimen: Blood   Result Value Ref Range    Glucose 92 70 - 105 mg/dL   Blood Gas, Arterial -    Collection Time: 06/07/21  9:02 AM    Specimen: Arterial Blood   Result Value Ref Range    Site Right Radial     Brice's  Test Positive     pH, Arterial 7.514 (H) 7.350 - 7.450 pH units    pCO2, Arterial 34.4 (L) 35.0 - 48.0 mm Hg    pO2, Arterial 49.4 (L) 83.0 - 108.0 mm Hg    HCO3, Arterial 27.7 21.0 - 28.0 mmol/L    Base Excess, Arterial 4.9 (H) 0.0 - 3.0 mmol/L    O2 Saturation, Arterial 88.5 (L) 94.0 - 98.0 %    CO2 Content 28.7 22 - 29 mmol/L    Barometric Pressure for Blood Gas      Modality Cannula     FIO2 45 %    Hemodilution No    D-dimer, Quantitative    Collection Time: 06/07/21 10:52 AM    Specimen: Blood   Result Value Ref Range    D-Dimer, Quantitative 2.98 (H) 0.00 - 0.59 mg/L (FEU)   POC Glucose Once    Collection Time: 06/07/21 12:24 PM    Specimen: Blood   Result Value Ref Range    Glucose 122 (H) 70 - 105 mg/dL        Imaging:  [unfilled]    ASSESSMENT:  Acute-on-chronic respiratory distress  Pneumonia  COPD exacerbation  Metastatic lung cancer squamous cell  CHF  Opioid dependence  Stage 2 skin ulcer of sacral region      PLAN:  The mass is extending from the original mass, follow-up with oncology  Antibiotics  Bronchodilator  Inhaled corticosteroids  IV steroids  IS/Flutter valve  Electrolytes/ glycemic control  DVT and GI prophylaxis.    Discussed with Dr Yaneli Dumont, APRN   6/7/2021  13:46 EDT      I personally have examined  and interviewed the patient. I have reviewed the history, data, problems, assessment and plan with our NP.  Critical care time in direct medical management (   ) minutes  Electronically signed by Lianna Groves MD D,ABSM, 06/07/21, 5:09 PM EDT.

## 2021-06-07 NOTE — H&P
History and Physical      Patient Care Team:  Sandoval Stevenson FNP as PCP - General  Sandoval Stevenson FNP as PCP - Family Medicine  Axel Lewis MD as Consulting Physician (Hematology and Oncology)  Iglesia Springer MD as Consulting Physician (Cardiology)    Chief complaint worsening shortness of breath/respiratory distress    Subjective     Patient is a 70 y.o. male presents with sudden onset shortness of breath.  He lives in assisted living nursing home type setting.  EMS was called after patient received a mini neb treatment at the facility but his breathing became more severe, he had a reported saturation of 76% on 4 L at his extended care facility.  He subsequently was sent to the emergency department.  I evaluated him there and he was quite somnolent but would at least tell me that he was feeling a little better.  However, by the time he arrived to the floor his sats were worse he was sitting up having a respiratory distress and so I reevaluated him.  We have started at this juncture aggressive pulmonary toilet including albuterol and Atrovent nebulizers 4 times a day, I gave him an extra dose of Lasix because I was told that his systolic blood pressure was about 150, course he was placed on oxygen, ABG was ordered and he is now on a nonrebreather.  He was also found through the ER to have significant elevation of his BNP and received Lasix also in the ER as well.  He was less somnolent when I saw him in his room and said that the nebulizer treatments were helping a little bit.  We have also started him on aggressive methylprednisolone therapy.    Infectious disease, pulmonology and cardiology has been consulted.  Review of Systems   All systems were reviewed and negative except for: Respiratory distress, hypoxia and loose cough.    History  Past Medical History:   Diagnosis Date   • Aortic stenosis    • Cancer (CMS/HCC)     Sickle Cell Carcinoma   • Congestive heart failure (CHF)  (CMS/[facility])     DIASTOLIC   • COPD (chronic obstructive pulmonary disease) (CMS/HCC)    • Coronary artery disease    • Hypertension    • Lung cancer (CMS/HCC)    • Myocardial infarction (CMS/HCC)    • Peripheral vascular disease (CMS/HCC)    • Pneumonia 2021   • Valvular disease      Past Surgical History:   Procedure Laterality Date   • AORTIC VALVE REPAIR/REPLACEMENT  2018    Dr Maza   • BRONCHOSCOPY N/A 10/24/2020    Procedure: BRONCHOSCOPY with biopsy right upper lobe mass, and bronchoalveolar lavage right upper lobe;  Surgeon: Ángela Bean MD;  Location: [facility] ENDOSCOPY;  Service: Pulmonary;  Laterality: N/A;  post: right upper lobe mass, pneumonia   • BRONCHOSCOPY N/A 2020    Procedure: BRONCHOSCOPY with bronchial washing;  Surgeon: Ángela Bean MD;  Location: [facility] ENDOSCOPY;  Service: Pulmonary;  Laterality: N/A;  Post: lung mass, pneumonia   • BRONCHOSCOPY N/A 2020    Procedure: BRONCHOSCOPY RIGID with debulking of right main endobronchial tumor;  Surgeon: Nomi Reyes MD;  Location: [facility] MAIN OR;  Service: Cardiothoracic;  Laterality: N/A;   • BRONCHOSCOPY N/A 2020    Procedure: BRONCHOSCOPY;  Surgeon: Nomi Reyes MD;  Location: [facility] MAIN OR;  Service: Cardiothoracic;  Laterality: N/A;   • CARDIAC CATHETERIZATION  2017   • CORONARY ARTERY BYPASS GRAFT  2018    Dr Maza   • TIBIA FRACTURE SURGERY     • VENOUS ACCESS DEVICE (PORT) INSERTION Left 10/30/2020    Procedure: MEDIPORT INSERTION UNDER FLUOROSCOPIC GUIDENCE;  Surgeon: Nomi Reyes MD;  Location: [facility] MAIN OR;  Service: Cardiothoracic;  Laterality: Left;     Family History   Problem Relation Age of Onset   • Coronary artery disease Mother    • Cancer Father    • Stroke Daughter 35   • Seizures Daughter 35     Social History     Tobacco Use   • Smoking status: Former Smoker     Packs/day: 1.00     Types: Cigarettes     Quit date: 10/2020     Years since quittin.6   • Smokeless  tobacco: Never Used   Vaping Use   • Vaping Use: Never used   Substance Use Topics   • Alcohol use: Not Currently   • Drug use: No     Allergies:  Patient has no known allergies.    Objective     Vital Signs  Temp:  [98 °F (36.7 °C)-98.1 °F (36.7 °C)] 98.1 °F (36.7 °C)  Heart Rate:  [] 110  Resp:  [18-31] 22  BP: (114-122)/(68) 114/68      Physical Exam:      General Appearance:    Alert, cooperative, moderate distress   Head:    Normocephalic, without obvious abnormality, atraumatic   Eyes:           Conjunctivae and sclerae normal, no   icterus, no pallor, corneas clear, PERRLA   Throat:   No oral lesions, no thrush, oral mucosa moist   Neck:   No adenopathy, supple, trachea midline, no thyromegaly, no   carotid bruit, no JVD   Lungs:    Distant, poor adventitial flow, coarse rales bilateral bases    Heart:   Tachycardic   Chest Wall:    No abnormalities observedm, equal excursions   Abdomen:     Normal bowel sounds, no masses, no organomegaly, soft        non-tender, non-distended, no guarding, no rebound                tenderness, scaphoid   Rectal:     Deferred   Extremities:   Moves all extremities , slight pretibial edema, no cyanosis, no             redness   Pulses:   Pulses equivocal but equal bilaterally   Skin:   No bleeding, bruising or rash   Lymph nodes:   No palpable adenopathy   Neurologic:   Cranial nerves 2 - 12 grossly intact, sensation intact, DTR       present and equal bilaterally       Labs:  Lab Results (last 24 hours)     Procedure Component Value Units Date/Time    Blood Gas, Arterial - [[id number]]  (Abnormal) Collected: 06/07/21 0902    Specimen: Arterial Blood Updated: 06/07/21 0905     Site Right Radial     Brice's Test Positive     pH, Arterial 7.514 pH units      pCO2, Arterial 34.4 mm Hg      pO2, Arterial 49.4 mm Hg      HCO3, Arterial 27.7 mmol/L      Base Excess, Arterial 4.9 mmol/L      Comment: Serial Number: [id number]:  [id number]        O2 Saturation, Arterial 88.5 %       CO2 Content 28.7 mmol/L      Barometric Pressure for Blood Gas --     Comment: N/A        Modality Cannula     FIO2 45 %      Hemodilution No    POC Glucose Once [[id number]]  (Normal) Collected: 06/07/21 0823    Specimen: Blood Updated: 06/07/21 0825     Glucose 92 mg/dL      Comment: Serial Number: [id number]:  [id number]       Respiratory Panel PCR w/COVID-19(SARS-CoV-2) PARESH/GRACIELA/KATHY/PAD/COR/MAD/CHRIST In-House, NP Swab in UTM/VTM, 3-4 HR TAT - Swab, Nasopharynx [[id number]]  (Normal) Collected: 06/07/21 0620    Specimen: Swab from Nasopharynx Updated: 06/07/21 0726     ADENOVIRUS, PCR Not Detected     Coronavirus 229E Not Detected     Coronavirus HKU1 Not Detected     Coronavirus NL63 Not Detected     Coronavirus OC43 Not Detected     COVID19 Not Detected     Human Metapneumovirus Not Detected     Human Rhinovirus/Enterovirus Not Detected     Influenza A PCR Not Detected     Influenza B PCR Not Detected     Parainfluenza Virus 1 Not Detected     Parainfluenza Virus 2 Not Detected     Parainfluenza Virus 3 Not Detected     Parainfluenza Virus 4 Not Detected     RSV, PCR Not Detected     Bordetella pertussis pcr Not Detected     Bordetella parapertussis PCR Not Detected     Chlamydophila pneumoniae PCR Not Detected     Mycoplasma pneumo by PCR Not Detected    Narrative:      In the setting of a positive respiratory panel with a viral infection PLUS a negative procalcitonin without other underlying concern for bacterial infection, consider observing off antibiotics or discontinuation of antibiotics and continue supportive care. If the respiratory panel is positive for atypical bacterial infection (Bordetella pertussis, Chlamydophila pneumoniae, or Mycoplasma pneumoniae), consider antibiotic de-escalation to target atypical bacterial infection.    Blood Culture - Blood, Arm, Left [[id number]] Collected: 06/07/21 0705    Specimen: Blood from Arm, Left Updated: 06/07/21 0719    Blood Culture - Blood, Arm, Right  "[[id number]] Collected: 06/07/21 0705    Specimen: Blood from Arm, Right Updated: 06/07/21 0719    POC Lactate [[id number]]  (Normal) Collected: 06/07/21 0706    Specimen: Blood Updated: 06/07/21 0707     Lactate 1.3 mmol/L      Comment: Serial Number: [id number]:  [id number]       Procalcitonin [[id number]]  (Normal) Collected: 06/07/21 0500    Specimen: Blood Updated: 06/07/21 0655     Procalcitonin 0.12 ng/mL     Narrative:      As a Marker for Sepsis (Non-Neonates):     1. <0.5 ng/mL represents a low risk of severe sepsis and/or septic shock.  2. >2 ng/mL represents a high risk of severe sepsis and/or septic shock.    As a Marker for Lower Respiratory Tract Infections that require antibiotic therapy:  PCT on Admission     Antibiotic Therapy             6-12 Hrs later  >0.5                          Strongly Recommended            >0.25 - <0.5             Recommended  0.1 - 0.25                  Discouraged                       Remeasure/reassess PCT  <0.1                         Strongly Discouraged         Remeasure/reassess PCT      As 28 day mortality risk marker: \"Change in Procalcitonin Result\" (>80% or <=80%) if Day 0 (or Day 1) and Day 4 values are available. Refer to http://www.[facility]-pct-calculator.com    Change in PCT <=80%   A decrease of PCT levels below or equal to 80% defines a positive change in PCT test result representing a higher risk for 28-day all-cause mortality of patients diagnosed with severe sepsis or septic shock.    Change in PCT >80%   A decrease of PCT levels of more than 80% defines a negative change in PCT result representing a lower risk for 28-day all-cause mortality of patients diagnosed with severe sepsis or septic shock.    This test is Prognostic not Diagnostic, if elevated correlate with clinical findings before administering antibiotic treatment.      Results may be falsely decreased if patient taking Biotin.     Manual Differential [[id number]]  (Abnormal) Collected: " 06/07/21 0500    Specimen: Blood Updated: 06/07/21 0606     Neutrophil % 73.0 %      Lymphocyte % 10.0 %      Monocyte % 3.0 %      Bands %  12.0 %      Metamyelocyte % 2.0 %      Neutrophils Absolute 8.59 10*3/mm3      Lymphocytes Absolute 1.01 10*3/mm3      Monocytes Absolute 0.30 10*3/mm3      nRBC 2.0 /100 WBC      Anisocytosis Slight/1+     Macrocytes Slight/1+     Toxic Granulation Slight/1+     Platelet Morphology Normal    CBC & Differential [[id number]]  (Abnormal) Collected: 06/07/21 0500    Specimen: Blood Updated: 06/07/21 0606    Narrative:      The following orders were created for panel order CBC & Differential.  Procedure                               Abnormality         Status                     ---------                               -----------         ------                     Scan Slide[[id number]]                                       Final result               CBC Auto Differential[[id number]]        Abnormal            Final result                 Please view results for these tests on the individual orders.    Scan Slide [[id number]] Collected: 06/07/21 0500    Specimen: Blood Updated: 06/07/21 0606     Scan Slide --     Comment: See Manual Differential Results       CBC Auto Differential [[id number]]  (Abnormal) Collected: 06/07/21 0500    Specimen: Blood Updated: 06/07/21 0606     WBC 10.10 10*3/mm3      RBC 2.49 10*6/mm3      Hemoglobin 9.2 g/dL      Hematocrit 27.3 %      .7 fL      MCH 37.1 pg      MCHC 33.8 g/dL      RDW 14.8 %      RDW-SD 56.4 fl      MPV 8.6 fL      Platelets 115 10*3/mm3     Narrative:      The previously reported component NRBC is no longer being reported. Previous result was 0.6 /100 WBC (Reference Range: 0.0-0.2 /100 WBC) on 6/7/2021 at 0519 EDT.    Extra Tubes [[id number]] Collected: 06/07/21 0500    Specimen: Blood, Venous Line Updated: 06/07/21 0601    Narrative:      The following orders were created for panel order Extra Tubes.  Procedure                                Abnormality         Status                     ---------                               -----------         ------                     Light Blue Top[[id number]]                                   Final result               Gold Top - [facility][[id number]]                                   Final result                 Please view results for these tests on the individual orders.    [facility] [[id number]] Collected: 06/07/21 0500    Specimen: Blood Updated: 06/07/21 0601     Extra Tube Hold for add-ons.     Comment: Auto resulted.       Light Blue Top [[id number]] Collected: 06/07/21 0500    Specimen: Blood Updated: 06/07/21 0601     Extra Tube hold for add-on     Comment: Auto resulted       Comprehensive Metabolic Panel [[id number]]  (Abnormal) Collected: 06/07/21 0500    Specimen: Blood Updated: 06/07/21 0541     Glucose 116 mg/dL      BUN 24 mg/dL      Creatinine 0.73 mg/dL      Sodium 134 mmol/L      Potassium 4.1 mmol/L      Chloride 97 mmol/L      CO2 26.0 mmol/L      Calcium 9.2 mg/dL      Total Protein 7.0 g/dL      Albumin 2.50 g/dL      ALT (SGPT) 87 U/L      AST (SGOT) 58 U/L      Alkaline Phosphatase 109 U/L      Total Bilirubin 0.7 mg/dL      eGFR Non African Amer 106 mL/min/1.73      Globulin 4.5 gm/dL      A/G Ratio 0.6 g/dL      BUN/Creatinine Ratio 32.9     Anion Gap 11.0 mmol/L     Narrative:      GFR Normal >60  Chronic Kidney Disease <60  Kidney Failure <15      Troponin [[id number]]  (Normal) Collected: 06/07/21 0500    Specimen: Blood Updated: 06/07/21 0541     Troponin T 0.012 ng/mL     Narrative:      Troponin T Reference Range:  <= 0.03 ng/mL-   Negative for AMI  >0.03 ng/mL-     Abnormal for myocardial necrosis.  Clinicians would have to utilize clinical acumen, EKG, Troponin and serial changes to determine if it is an Acute Myocardial Infarction or myocardial injury due to an underlying chronic condition.       Results may be falsely decreased if patient taking Biotin.      BNP  [[id number]]  (Abnormal) Collected: 06/07/21 0500    Specimen: Blood Updated: 06/07/21 0538     proBNP 1,702.0 pg/mL     Narrative:      Among patients with dyspnea, NT-proBNP is highly sensitive for the detection of acute congestive heart failure. In addition NT-proBNP of <300 pg/ml effectively rules out acute congestive heart failure with 99% negative predictive value.    Results may be falsely decreased if patient taking Biotin.            Radiology:  XR Chest 1 View    Result Date: 6/7/2021  1. Redemonstration of right midlung mass, similar to the prior study. 2. Increased left midlung and left basilar airspace disease that may relate to superimposed pneumonia.  Electronically Signed By-Saleem Ferguson MD On:6/7/2021 7:25 AM This report was finalized on [id number] by  Saleem Ferguson MD.        Results Review:    I reviewed the patient's new clinical results.  I reviewed the patient's new imaging results and agree with the interpretation.    Assessment/Plan       Acute-on-chronic respiratory failure (CMS/HCC)  Probable underlying CHF  Metastatic lung cancer  Healthcare facility acquired pneumonia  Acute exacerbation COPD  Opioid dependence    Patient has been admitted, we can start him on aggressive pulmonary toilet, start DVT prophylaxis, parenteral steroids and antibiotic therapy.  Consulted infectious disease, pulmonology and the patient's usual cardiologist.    I noticed now that he has a systolic blood pressure less than 120s ; so will  hold his antihypertensives for now to see how he responds to therapy.    We will also start steroid coverage for hyperglycemia.    Expected Length of Stay 3-5 days    Patient is a do not really resuscitate per family and facility records.    I discussed the patients findings and my recommendations with patient and nursing staff.     Ignacia De La Cruz DO  06/07/21  09:57 EDT

## 2021-06-07 NOTE — PROGRESS NOTES
Wound Initial Evaluation   AMANDEEP     Patient Name: Axel Ramirez  : 1950  MRN: [id number]  Today's Date: 2021 Room Number: 225/1      Admit Date: 2021  Attending: Ignacia De La Cruz DO    Consult Requested By: Dr De La Cruz    Reason For Consult: pressure injuries    Chief Complaint: 70-year-old male presents to the hospital with shortness of breath.  Patient currently had a ECF.  History significant for stage IV lung cancer.  Patient presents with a sacral pressure injury patient does complain of pain and discomfort with injury he is not really able to give me a timeline of how long the injury has been present.    Visit Dx:    ICD-10-CM ICD-9-CM   1. Respiratory distress  R06.03 786.09   2. Bronchospasm  J98.01 519.11   3. Acute congestive heart failure, unspecified heart failure type (CMS/HCC)  I50.9 428.0   4. Malignant neoplasm of right lung, unspecified part of lung (CMS/HCC)  C34.91 162.9     Patient Active Problem List   Diagnosis   • Coronary artery disease involving native coronary artery of native heart without angina pectoris   • Nonrheumatic aortic valve stenosis   • Mixed hyperlipidemia   • Essential hypertension   • Fever   • Presence of aortocoronary bypass graft   • Congestive heart failure (CMS/HCC)   • Hx of aortic valve replacement   • CAP (community acquired pneumonia)   • Tobacco abuse   • Postobstructive pneumonia   • Lung mass   • Squamous cell carcinoma of lung, right (CMS/HCC)   • Diastolic CHF, acute (CMS/HCC)   • Pneumonia of right lung due to infectious organism   • Pneumonia due to infectious organism   • Moderate malnutrition (CMS/HCC)   • Acute on chronic respiratory failure with hypoxia (CMS/HCC)   • COPD with acute exacerbation (CMS/HCC)   • Delirium   • Brain metastasis (CMS/HCC)   • Acute-on-chronic respiratory failure (CMS/HCC)   • Respiratory distress   • Stage 2 skin ulcer of sacral region (CMS/HCC)       History:   Past Medical History:   Diagnosis Date   •  Aortic stenosis    • Cancer (CMS/HCC)     Sickle Cell Carcinoma   • Congestive heart failure (CHF) (CMS/HCC)     DIASTOLIC   • COPD (chronic obstructive pulmonary disease) (CMS/HCC)    • Coronary artery disease    • Hypertension    • Lung cancer (CMS/HCC)    • Myocardial infarction (CMS/HCC)    • Peripheral vascular disease (CMS/HCC)    • Pneumonia 06/07/2021   • Valvular disease      Past Surgical History:   Procedure Laterality Date   • AORTIC VALVE REPAIR/REPLACEMENT  02/26/2018    Dr Maza   • BRONCHOSCOPY N/A 10/24/2020    Procedure: BRONCHOSCOPY with biopsy right upper lobe mass, and bronchoalveolar lavage right upper lobe;  Surgeon: Ángela Bean MD;  Location: [facility] ENDOSCOPY;  Service: Pulmonary;  Laterality: N/A;  post: right upper lobe mass, pneumonia   • BRONCHOSCOPY N/A 11/11/2020    Procedure: BRONCHOSCOPY with bronchial washing;  Surgeon: Ángela Bean MD;  Location: [facility] ENDOSCOPY;  Service: Pulmonary;  Laterality: N/A;  Post: lung mass, pneumonia   • BRONCHOSCOPY N/A 11/11/2020    Procedure: BRONCHOSCOPY RIGID with debulking of right main endobronchial tumor;  Surgeon: Nomi Reyes MD;  Location: [facility] MAIN OR;  Service: Cardiothoracic;  Laterality: N/A;   • BRONCHOSCOPY N/A 11/11/2020    Procedure: BRONCHOSCOPY;  Surgeon: Nomi Reyes MD;  Location: [facility] MAIN OR;  Service: Cardiothoracic;  Laterality: N/A;   • CARDIAC CATHETERIZATION  12/29/2017   • CORONARY ARTERY BYPASS GRAFT  02/26/2018    Dr Maza   • TIBIA FRACTURE SURGERY     • VENOUS ACCESS DEVICE (PORT) INSERTION Left 10/30/2020    Procedure: MEDIPORT INSERTION UNDER FLUOROSCOPIC GUIDENCE;  Surgeon: Nomi Reyes MD;  Location: [facility] MAIN OR;  Service: Cardiothoracic;  Laterality: Left;     Social History     Socioeconomic History   • Marital status:      Spouse name: Not on file   • Number of children: Not on file   • Years of education: Not on file   • Highest education level: Not on file   Tobacco Use   • Smoking  status: Former Smoker     Packs/day: 1.00     Types: Cigarettes     Quit date: 10/2020     Years since quittin.6   • Smokeless tobacco: Never Used   Vaping Use   • Vaping Use: Never used   Substance and Sexual Activity   • Alcohol use: Not Currently   • Drug use: No   • Sexual activity: Defer       Allergies:  No Known Allergies    Medications:    Current Facility-Administered Medications:   •  acetaminophen (TYLENOL) suppository 650 mg, 650 mg, Rectal, Q6H PRN, Ignacia De La Cruz DO  •  albuterol (PROVENTIL) nebulizer solution 0.083% 2.5 mg/3mL, 2.5 mg, Nebulization, Q2H PRN, Ignacia De La Cruz DO, 2.5 mg at 21 0914  •  aspirin EC tablet 81 mg, 81 mg, Oral, Daily, Ignacia De La Cruz DO, 81 mg at 21 1032  •  bisacodyl (DULCOLAX) suppository 10 mg, 10 mg, Rectal, Daily PRN, Ignacia De La Cruz DO  •  buprenorphine-naloxone (SUBOXONE) 8-2 MG per SL tablet 1.5 tablet, 1.5 tablet, Sublingual, Daily, Ignacia De La Cruz DO, 1.5 tablet at 21 1031  •  dextrose (D50W) 25 g/ 50mL Intravenous Solution 25 g, 25 g, Intravenous, Q15 Min PRN, Ignacia De La Cruz DO  •  dextrose (GLUTOSE) oral gel 15 g, 15 g, Oral, Q15 Min PRN, Ignacia De La Cruz DO  •  docusate sodium (COLACE) capsule 100 mg, 100 mg, Oral, BID, Ignacia De La Cruz DO, 100 mg at 21 1032  •  enoxaparin (LOVENOX) syringe 40 mg, 40 mg, Subcutaneous, Q24H, Ignacia De La Cruz DO  •  gabapentin (NEURONTIN) capsule 300 mg, 300 mg, Oral, TID, Ignacia De La Cruz DO, 300 mg at 21 1032  •  glucagon (human recombinant) (GLUCAGEN DIAGNOSTIC) injection 1 mg, 1 mg, Subcutaneous, Q15 Min PRN, Ignacia De La Cruz DO  •  guaiFENesin (MUCINEX) 12 hr tablet 600 mg, 600 mg, Oral, Q12H, Ignacia De La Cruz DO, 600 mg at 21 1032  •  insulin lispro (ADMELOG) injection 0-7 Units, 0-7 Units, Subcutaneous, TID With Meals, Ignacia De La Cruz DO  •  insulin lispro (ADMELOG) injection 0-7 Units, 0-7 Units, Subcutaneous, TID PRN, Ignacia De La Cruz DO  •  ipratropium-albuterol (DUO-NEB) nebulizer  solution 3 mL, 3 mL, Nebulization, Q4H - RT, Ignacia De La Cruz DO, 3 mL at 06/07/21 1153  •  ipratropium-albuterol (DUO-NEB) nebulizer solution 3 mL, 3 mL, Nebulization, Q1H PRN, Ignacia De La Cruz DO  •  lurasidone (LATUDA) tablet 40 mg, 40 mg, Oral, Daily, Ignacia De La Cruz DO, 40 mg at 06/07/21 1032  •  megestrol acetate (MEGACE) oral suspension 200 mg, 200 mg, Oral, TID, Ignacia De La Cruz DO  •  melatonin tablet 5 mg, 5 mg, Oral, Nightly PRN, Ignacia De La Cruz DO  •  methylPREDNISolone sodium succinate (SOLU-Medrol) injection 60 mg, 60 mg, Intravenous, Q6H, Ignacia De La Cruz DO  •  multivitamin with minerals 1 tablet, 1 tablet, Oral, Daily, Ignacia De La Cruz DO, 1 tablet at 06/07/21 1032  •  nystatin (MYCOSTATIN) 575727 UNIT/ML suspension 500,000 Units, 5 mL, Swish & Swallow, 4x Daily, Ignacia De La Cruz DO, 500,000 Units at 06/07/21 1242  •  pantoprazole (PROTONIX) EC tablet 40 mg, 40 mg, Oral, Daily, Ignacia De La Cruz DO, 40 mg at 06/07/21 1032  •  piperacillin-tazobactam (ZOSYN) IVPB 4.5 g in 100 mL NS (CD), 4.5 g, Intravenous, Q8H, Ignacia De La Cruz DO  •  polyethylene glycol (MIRALAX) packet 17 g, 17 g, Oral, Daily, Ignacia De La Cruz DO, 17 g at 06/07/21 1032  •  promethazine (PHENERGAN) tablet 25 mg, 25 mg, Oral, Q4H PRN, Ignacia De La Cruz DO  •  sertraline (ZOLOFT) tablet 50 mg, 50 mg, Oral, Daily, Ignacia De La Cruz DO, 50 mg at 06/07/21 1032  •  [COMPLETED] Insert peripheral IV, , , Once **AND** sodium chloride 0.9 % flush 10 mL, 10 mL, Intravenous, PRN, Ignacia De La Cruz, DO  •  sodium chloride 0.9 % flush 10 mL, 10 mL, Intravenous, PRN, Ignacia De La Cruz, DO  •  spironolactone (ALDACTONE) tablet 25 mg, 25 mg, Oral, Daily, Ignacia De La Cruz DO, 25 mg at 06/07/21 1032  •  Zinc Oxide 16 % ointment, , Apply externally, BID, Carina Luevano APRN    Results Review:  Lab Results (last 48 hours)     Procedure Component Value Units Date/Time    POC Glucose Once [[id number]]  (Abnormal) Collected: 06/07/21 1224    Specimen: Blood Updated: 06/07/21  1226     Glucose 122 mg/dL      Comment: Serial Number: [id number]:  [id number]       D-dimer, Quantitative [[id number]]  (Abnormal) Collected: 06/07/21 1052    Specimen: Blood Updated: 06/07/21 1150     D-Dimer, Quantitative 2.98 mg/L (FEU)     Narrative:      Reference Range  --------------------------------------------------------------------     < 0.50   Negative Predictive Value  0.50-0.59   Indeterminate    >= 0.60   Probable VTE             A very low percentage of patients with DVT may yield D-Dimer results   below the cut-off of 0.50 mg/L FEU.  This is known to be more   prevalent in patients with distal DVT.             Results of this test should always be interpreted in conjunction with   the patient's medical history, clinical presentation and other   findings.  Clinical diagnosis should not be based on the result of   INNOVANCE D-Dimer alone.    Blood Gas, Arterial - [[id number]]  (Abnormal) Collected: 06/07/21 0902    Specimen: Arterial Blood Updated: 06/07/21 0905     Site Right Radial     Brice's Test Positive     pH, Arterial 7.514 pH units      pCO2, Arterial 34.4 mm Hg      pO2, Arterial 49.4 mm Hg      HCO3, Arterial 27.7 mmol/L      Base Excess, Arterial 4.9 mmol/L      Comment: Serial Number: [id number]:  [id number]        O2 Saturation, Arterial 88.5 %      CO2 Content 28.7 mmol/L      Barometric Pressure for Blood Gas --     Comment: N/A        Modality Cannula     FIO2 45 %      Hemodilution No    POC Glucose Once [[id number]]  (Normal) Collected: 06/07/21 0823    Specimen: Blood Updated: 06/07/21 0825     Glucose 92 mg/dL      Comment: Serial Number: [id number]:  [id number]       Respiratory Panel PCR w/COVID-19(SARS-CoV-2) PARESH/GRACIELA/KATHY/PAD/COR/MAD/CHRIST In-House, NP Swab in [facility], 3-4 HR TAT - Swab, Nasopharynx [[id number]]  (Normal) Collected: 06/07/21 0620    Specimen: Swab from Nasopharynx Updated: 06/07/21 0726     ADENOVIRUS, PCR Not Detected     Coronavirus 229E Not Detected      Coronavirus HKU1 Not Detected     Coronavirus NL63 Not Detected     Coronavirus OC43 Not Detected     COVID19 Not Detected     Human Metapneumovirus Not Detected     Human Rhinovirus/Enterovirus Not Detected     Influenza A PCR Not Detected     Influenza B PCR Not Detected     Parainfluenza Virus 1 Not Detected     Parainfluenza Virus 2 Not Detected     Parainfluenza Virus 3 Not Detected     Parainfluenza Virus 4 Not Detected     RSV, PCR Not Detected     Bordetella pertussis pcr Not Detected     Bordetella parapertussis PCR Not Detected     Chlamydophila pneumoniae PCR Not Detected     Mycoplasma pneumo by PCR Not Detected    Narrative:      In the setting of a positive respiratory panel with a viral infection PLUS a negative procalcitonin without other underlying concern for bacterial infection, consider observing off antibiotics or discontinuation of antibiotics and continue supportive care. If the respiratory panel is positive for atypical bacterial infection (Bordetella pertussis, Chlamydophila pneumoniae, or Mycoplasma pneumoniae), consider antibiotic de-escalation to target atypical bacterial infection.    Blood Culture - Blood, Arm, Left [[id number]] Collected: 06/07/21 0705    Specimen: Blood from Arm, Left Updated: 06/07/21 0719    Blood Culture - Blood, Arm, Right [[id number]] Collected: 06/07/21 0705    Specimen: Blood from Arm, Right Updated: 06/07/21 0719    POC Lactate [[id number]]  (Normal) Collected: 06/07/21 0706    Specimen: Blood Updated: 06/07/21 0707     Lactate 1.3 mmol/L      Comment: Serial Number: [id number]:  [id number]       Procalcitonin [[id number]]  (Normal) Collected: 06/07/21 0500    Specimen: Blood Updated: 06/07/21 0655     Procalcitonin 0.12 ng/mL     Narrative:      As a Marker for Sepsis (Non-Neonates):     1. <0.5 ng/mL represents a low risk of severe sepsis and/or septic shock.  2. >2 ng/mL represents a high risk of severe sepsis and/or septic shock.    As a Marker for  "Lower Respiratory Tract Infections that require antibiotic therapy:  PCT on Admission     Antibiotic Therapy             6-12 Hrs later  >0.5                          Strongly Recommended            >0.25 - <0.5             Recommended  0.1 - 0.25                  Discouraged                       Remeasure/reassess PCT  <0.1                         Strongly Discouraged         Remeasure/reassess PCT      As 28 day mortality risk marker: \"Change in Procalcitonin Result\" (>80% or <=80%) if Day 0 (or Day 1) and Day 4 values are available. Refer to [email or web address]    Change in PCT <=80%   A decrease of PCT levels below or equal to 80% defines a positive change in PCT test result representing a higher risk for 28-day all-cause mortality of patients diagnosed with severe sepsis or septic shock.    Change in PCT >80%   A decrease of PCT levels of more than 80% defines a negative change in PCT result representing a lower risk for 28-day all-cause mortality of patients diagnosed with severe sepsis or septic shock.    This test is Prognostic not Diagnostic, if elevated correlate with clinical findings before administering antibiotic treatment.      Results may be falsely decreased if patient taking Biotin.     Manual Differential [[id number]]  (Abnormal) Collected: 06/07/21 0500    Specimen: Blood Updated: 06/07/21 0606     Neutrophil % 73.0 %      Lymphocyte % 10.0 %      Monocyte % 3.0 %      Bands %  12.0 %      Metamyelocyte % 2.0 %      Neutrophils Absolute 8.59 10*3/mm3      Lymphocytes Absolute 1.01 10*3/mm3      Monocytes Absolute 0.30 10*3/mm3      nRBC 2.0 /100 WBC      Anisocytosis Slight/1+     Macrocytes Slight/1+     Toxic Granulation Slight/1+     Platelet Morphology Normal    CBC & Differential [[id number]]  (Abnormal) Collected: 06/07/21 0500    Specimen: Blood Updated: 06/07/21 0606    Narrative:      The following orders were created for panel order CBC & Differential.  Procedure         "                       Abnormality         Status                     ---------                               -----------         ------                     Scan Slide[[id number]]                                       Final result               CBC Auto Differential[[id number]]        Abnormal            Final result                 Please view results for these tests on the individual orders.    Scan Slide [[id number]] Collected: 06/07/21 0500    Specimen: Blood Updated: 06/07/21 0606     Scan Slide --     Comment: See Manual Differential Results       CBC Auto Differential [[id number]]  (Abnormal) Collected: 06/07/21 0500    Specimen: Blood Updated: 06/07/21 0606     WBC 10.10 10*3/mm3      RBC 2.49 10*6/mm3      Hemoglobin 9.2 g/dL      Hematocrit 27.3 %      .7 fL      MCH 37.1 pg      MCHC 33.8 g/dL      RDW 14.8 %      RDW-SD 56.4 fl      MPV 8.6 fL      Platelets 115 10*3/mm3     Narrative:      The previously reported component NRBC is no longer being reported. Previous result was 0.6 /100 WBC (Reference Range: 0.0-0.2 /100 WBC) on 6/7/2021 at 0519 EDT.    Extra Tubes [[id number]] Collected: 06/07/21 0500    Specimen: Blood, Venous Line Updated: 06/07/21 0601    Narrative:      The following orders were created for panel order Extra Tubes.  Procedure                               Abnormality         Status                     ---------                               -----------         ------                     Light Blue Top[[id number]]                                   Final result               Gold Top - SST[[id number]]                                   Final result                 Please view results for these tests on the individual orders.    Gold Top - SST [[id number]] Collected: 06/07/21 0500    Specimen: Blood Updated: 06/07/21 0601     Extra Tube Hold for add-ons.     Comment: Auto resulted.       Light Blue Top [[id number]] Collected: 06/07/21 0500    Specimen: Blood Updated: 06/07/21 0601     Extra  Tube hold for add-on     Comment: Auto resulted       Comprehensive Metabolic Panel [[id number]]  (Abnormal) Collected: 06/07/21 0500    Specimen: Blood Updated: 06/07/21 0541     Glucose 116 mg/dL      BUN 24 mg/dL      Creatinine 0.73 mg/dL      Sodium 134 mmol/L      Potassium 4.1 mmol/L      Chloride 97 mmol/L      CO2 26.0 mmol/L      Calcium 9.2 mg/dL      Total Protein 7.0 g/dL      Albumin 2.50 g/dL      ALT (SGPT) 87 U/L      AST (SGOT) 58 U/L      Alkaline Phosphatase 109 U/L      Total Bilirubin 0.7 mg/dL      eGFR Non African Amer 106 mL/min/1.73      Globulin 4.5 gm/dL      A/G Ratio 0.6 g/dL      BUN/Creatinine Ratio 32.9     Anion Gap 11.0 mmol/L     Narrative:      GFR Normal >60  Chronic Kidney Disease <60  Kidney Failure <15      Troponin [[id number]]  (Normal) Collected: 06/07/21 0500    Specimen: Blood Updated: 06/07/21 0541     Troponin T 0.012 ng/mL     Narrative:      Troponin T Reference Range:  <= 0.03 ng/mL-   Negative for AMI  >0.03 ng/mL-     Abnormal for myocardial necrosis.  Clinicians would have to utilize clinical acumen, EKG, Troponin and serial changes to determine if it is an Acute Myocardial Infarction or myocardial injury due to an underlying chronic condition.       Results may be falsely decreased if patient taking Biotin.      BNP [[id number]]  (Abnormal) Collected: 06/07/21 0500    Specimen: Blood Updated: 06/07/21 0538     proBNP 1,702.0 pg/mL     Narrative:      Among patients with dyspnea, NT-proBNP is highly sensitive for the detection of acute congestive heart failure. In addition NT-proBNP of <300 pg/ml effectively rules out acute congestive heart failure with 99% negative predictive value.    Results may be falsely decreased if patient taking Biotin.          Imaging Results (Last 72 Hours)     Procedure Component Value Units Date/Time    CT Chest Pulmonary Embolism [[id number]] Collected: 06/07/21 1227     Updated: 06/07/21 1246    Narrative:         DATE OF  EXAM:  6/7/2021 12:17 PM     PROCEDURE:  CT CHEST PULMONARY EMBOLISM-     INDICATIONS:   elevated d-dimer; R06.03-Acute respiratory distress; J98.01-Acute  bronchospasm; I50.9-Heart failure, unspecified; C34.91-Malignant  neoplasm of unspecified part of right bronchus or lung     COMPARISON:   CT chest with contrast 2/18/2021     TECHNIQUE:  Routine transaxial slices were obtained through chest after  administration of intravenous 100 ml of Isovue 370. Reconstructed  coronal and sagittal images were also obtained. Automated exposure  control and iterative reconstruction methods were used.      FINDINGS:  Soft tissues of the lower neck demonstrate left port catheter with tip  terminating in low SVC. Postsurgical changes of sternotomy and CABG with  aortic valve replacement. Pulmonary arteries well-opacified with  contrast. Negative for pulmonary embolus. Right paratracheal lymph node  measuring 10 mm unchanged in image 44. Additional small left  paratracheal lymph nodes stable for example measuring 9 mm on image 54.  Thoracic aortic branch vasculature is patent. No axillary adenopathy.     Redemonstration of large mass involving the right upper lobe which is  similar to the prior study when accounting for technical differences  measuring 9.1 x 7.6 cm, previously 9.3 x 8 cm. This results in complete  right upper lobe collapse and compressive atelectasis, unchanged. There  is again also compressive atelectasis involving the right middle lobe  which is unchanged.     Advanced emphysema. No pneumothorax. There are areas of bronchial wall  thickening and mucous plugging involving the lower lobes which may  relate to aspiration pneumonia.  New area of nodularity in the left  lower lobe measures 12 x 9 mm on image 98. There are areas of  interlobular septal thickening and groundglass opacities involving the  lower lobes and left upper lobe which could relate to superimposed  infection or mild pulmonary edema superimposed on  background of advanced  emphysema.     Three low-density hepatic lesions noted likely cysts for example in the  right hepatic lobe measuring 16 mm and 17 mm on image 138. Small hiatal  hernia. Spleen normal in size. Normal adrenal glands. Pancreas without  findings of pancreatitis. Bilateral renal cysts noted. No  hydronephrosis. Negative for acute fracture.        Impression:      1. Negative for pulmonary embolus.  2. Redemonstration of large 9 cm right upper lobe mass which occludes  the right upper lobe bronchus resulting in complete right upper and  right middle lobe collapse similar to prior study.  3. Bronchial wall thickening with increased mucous plugging involving  lower lobes with patchy areas of airspace disease most concerning for  aspiration pneumonia.  4. Interlobular septal thickening and scattered groundglass opacities  bilaterally which may relate to pulmonary edema and/or infection  superimposed on background of advanced emphysema.  5. Stable mediastinal lymphadenopathy.  6. New 12 mm area of nodularity in left lower lobe may relate to  aspiration/infection, recommend attention on follow-up.     Electronically Signed By-Saleem Ferguson MD On:6/7/2021 12:44 PM  This report was finalized on [id number] by  Saleem Ferguson MD.    XR Chest 1 View [[id number]] Collected: 06/07/21 0723     Updated: 06/07/21 0727    Narrative:         DATE OF EXAM:   6/7/2021 4:55 AM     PROCEDURE:   XR CHEST 1 VW-     INDICATIONS:   soa     COMPARISON:  3/9/2021     TECHNIQUE:   Portable chest radiograph.     FINDINGS:    Right midlung perihilar mass again noted similar to the prior study.  There is a left-sided port catheter with the tip at the cavoatrial  junction. Heart size normal. There is increased left midlung and left  basilar airspace disease may relate to superimposed pneumonia. No  pneumothorax or large effusion. Osseous structures grossly intact.  Postsurgical changes of sternotomy and CABG.       Impression:       1. Redemonstration of right midlung mass, similar to the prior study.  2. Increased left midlung and left basilar airspace disease that may  relate to superimposed pneumonia.     Electronically Signed By-Saleem Ferguson MD On:6/7/2021 7:25 AM  This report was finalized on [id number] by  Saleem Ferguson MD.          Review of Systems:  Review of Systems    Physical Assessment:  Wound 02/18/21 1900 Right gluteal Pressure Injury (Active)   Wound Image   06/07/21 0944   Dressing Appearance open to air 06/07/21 0830   Base dry 06/07/21 0830   Drainage Amount none 06/07/21 0830       Wound 02/27/21 0000 medial sacral spine Pressure Injury (Active)   Wound Image   06/07/21 0942   Dressing Appearance open to air 06/07/21 0830   Base dry 06/07/21 0830   Periwound Temperature warm 06/07/21 0830   Drainage Amount none 06/07/21 0830       Wound 06/07/21 0944 Left gluteal (Active)   Wound Image   06/07/21 0945                          Stage II sacral pressure injuries: There are several partial-thickness injuries to the sacral area injuries are rather low but are superficial and appear to be stable and resolving and healing.  Approximate size the injury is 1 x 2 cm and 1 x 1 cm the other injury is scabbed.  No exudate is noted from the areas they are dry.  They aRe free of any symptoms of infection.    Final dx     sstage 2 sacral PI    Recommendation and Plan  We will recommend treating the areas Julio C's but praised the areas are rather low would be more difficult to secure dressing in place.  This will also be soothing.  Also will add an agility offloading surface this patient was able to turn self but has difficulty staying side to side because of his shortness of breath.    Carina Luevano, APRN   6/7/2021   13:44 EDT

## 2021-06-07 NOTE — CONSULTS
Infectious Diseases Consult Note    Referring Provider: Ignacia De La Cruz DO    Reason for Consultation: Possible pneumonia    Patient Care Team:  Sandoval Stevenson FNP as PCP - General  Sandoval Stevenson FNP as PCP - Family Medicine  Axel Lewis MD as Consulting Physician (Hematology and Oncology)  Iglesia Springer MD as Consulting Physician (Cardiology)    Chief complaint shortness of breath    Subjective     History of present illness:      This is 70-year-old white male with past medical history significant for lung cancer.  Patient was on chemotherapy.  I believe he follows with Dr. Lewis.  The patient was admitted hospital from a nursing home.  He was on 4 L of oxygen.  He became short of breath and was found to be more hypoxic and currently on 15 L of oxygen by nasal cannula.  Patient denied having fever prior to admission and he is afebrile here.  The patient is hemodynamically stable.  The patient is relatively poor historian.    Review of Systems   Review of Systems   Constitutional: Negative.    HENT: Negative.    Eyes: Negative.    Respiratory: Positive for shortness of breath.    Cardiovascular: Negative.    Gastrointestinal: Negative.    Genitourinary: Negative.    Musculoskeletal: Negative.    Skin: Negative.    Neurological: Negative.    Hematological: Negative.    Psychiatric/Behavioral: Negative.        Medications  Medications Prior to Admission   Medication Sig Dispense Refill Last Dose   • acetaminophen (TYLENOL) 650 MG suppository Insert 1 suppository into the rectum Every 6 (Six) Hours As Needed for Mild Pain , Headache or Fever.      • aspirin (aspirin) 81 MG EC tablet Take 1 tablet by mouth Daily.   6/6/2021 at 0900   • atorvastatin (LIPITOR) 20 MG tablet Take 1 tablet by mouth every night at bedtime. 90 tablet 1 6/6/2021 at 1900   • bisacodyl (DULCOLAX) 10 MG suppository Insert 1 suppository into the rectum Daily As Needed for Constipation.      • buprenorphine-naloxone  (SUBOXONE) 8-2 MG per SL tablet Place 1.5 tablets under the tongue Daily.   6/6/2021 at 0900   • docusate sodium 100 MG capsule Take 1 capsule by mouth 2 (Two) Times a Day.   6/6/2021 at 1900   • gabapentin (NEURONTIN) 300 MG capsule Take 300 mg by mouth 3 (Three) Times a Day.   6/6/2021 at 1900   • guaiFENesin (MUCINEX) 600 MG 12 hr tablet Take 1 tablet by mouth Every 12 (Twelve) Hours.   6/6/2021 at 1900   • hydrALAZINE (APRESOLINE) 50 MG tablet Take 1 tablet by mouth Every 12 (Twelve) Hours. 180 tablet 3 6/6/2021 at 1900   • lurasidone (LATUDA) 40 MG tablet tablet Take 40 mg by mouth Daily.   6/6/2021 at 0900   • megestrol (MEGACE) 40 MG/ML suspension Take 200 mg by mouth 3 (Three) Times a Day.   6/6/2021 at 1900   • melatonin 5 MG tablet tablet Take 1 tablet by mouth At Night As Needed (sleep).      • metoprolol tartrate (LOPRESSOR) 25 MG tablet Take 2 tablets by mouth 2 (Two) Times a Day for 360 days. 120 tablet 11 6/6/2021 at 1900   • multivitamin with minerals (multivitamin w/minerals) tablet tablet Take 1 tablet by mouth Daily.   6/6/2021 at 0900   • nystatin (MYCOSTATIN) 235839 UNIT/ML suspension Swish and swallow 500,000 Units 4 (Four) Times a Day.   6/6/2021 at 1900   • pantoprazole (Protonix) 40 MG EC tablet Take 1 tablet by mouth Daily. 30 tablet 1 6/6/2021 at 0900   • polyethylene glycol (MIRALAX) 17 g packet Take 17 g by mouth Daily.   6/6/2021 at 0900   • predniSONE (DELTASONE) 10 MG tablet Take 1 tablet by mouth Daily With Breakfast.   6/6/2021 at 0900   • promethazine (PHENERGAN) 25 MG tablet Take 25 mg by mouth Every 4 (Four) Hours As Needed for Nausea or Vomiting.      • sertraline (ZOLOFT) 50 MG tablet Take 50 mg by mouth Daily.   6/6/2021 at 0900   • spironolactone (Aldactone) 25 MG tablet Take 1 tablet by mouth Daily for 360 days. 90 tablet 3 6/6/2021 at 0900   • torsemide (DEMADEX) 20 MG tablet Take 10 mg by mouth Daily.   6/6/2021 at 0900   • albuterol sulfate  (90 Base) MCG/ACT  inhaler Inhale 2 puffs Every 6 (Six) Hours As Needed for Wheezing or Shortness of Air.          History  Past Medical History:   Diagnosis Date   • Aortic stenosis    • Cancer (CMS/HCC)     Sickle Cell Carcinoma   • Congestive heart failure (CHF) (CMS/HCC)     DIASTOLIC   • COPD (chronic obstructive pulmonary disease) (CMS/HCC)    • Coronary artery disease    • Hypertension    • Lung cancer (CMS/HCC)    • Myocardial infarction (CMS/HCC)    • Peripheral vascular disease (CMS/[facility])    • Pneumonia 06/07/2021   • Valvular disease      Past Surgical History:   Procedure Laterality Date   • AORTIC VALVE REPAIR/REPLACEMENT  02/26/2018    Dr Maza   • BRONCHOSCOPY N/A 10/24/2020    Procedure: BRONCHOSCOPY with biopsy right upper lobe mass, and bronchoalveolar lavage right upper lobe;  Surgeon: Ángela Bean MD;  Location: [facility] ENDOSCOPY;  Service: Pulmonary;  Laterality: N/A;  post: right upper lobe mass, pneumonia   • BRONCHOSCOPY N/A 11/11/2020    Procedure: BRONCHOSCOPY with bronchial washing;  Surgeon: Ángela Bean MD;  Location: [facility] ENDOSCOPY;  Service: Pulmonary;  Laterality: N/A;  Post: lung mass, pneumonia   • BRONCHOSCOPY N/A 11/11/2020    Procedure: BRONCHOSCOPY RIGID with debulking of right main endobronchial tumor;  Surgeon: Nomi Reyes MD;  Location: [facility] MAIN OR;  Service: Cardiothoracic;  Laterality: N/A;   • BRONCHOSCOPY N/A 11/11/2020    Procedure: BRONCHOSCOPY;  Surgeon: Nomi Reyes MD;  Location: [facility] MAIN OR;  Service: Cardiothoracic;  Laterality: N/A;   • CARDIAC CATHETERIZATION  12/29/2017   • CORONARY ARTERY BYPASS GRAFT  02/26/2018    Dr Maza   • TIBIA FRACTURE SURGERY     • VENOUS ACCESS DEVICE (PORT) INSERTION Left 10/30/2020    Procedure: MEDIPORT INSERTION UNDER FLUOROSCOPIC GUIDENCE;  Surgeon: Nomi Reyes MD;  Location: [facility] MAIN OR;  Service: Cardiothoracic;  Laterality: Left;       Family History  Family History   Problem Relation Age of Onset   • Coronary artery  disease Mother    • Cancer Father    • Stroke Daughter 35   • Seizures Daughter 35       Social History   reports that he quit smoking about 8 months ago. His smoking use included cigarettes. He smoked 1.00 pack per day. He has never used smokeless tobacco. He reports previous alcohol use. He reports that he does not use drugs.    Allergies  Patient has no known allergies.    Objective     Vital Signs   Vital Signs (last 24 hours)       06/06 0700  -  06/07 0659 06/07 0700  -  06/07 1606   Most Recent    Temp (°F)   97.7 -  98.1     97.7 (36.5)    Heart Rate 97 -  108    86 -  115     87    Resp 18 -  31    18 -  24     18    BP   122/68    105/68 -  114/68     105/68    SpO2 (%) 99 -  100    85 -  99     99          Physical Exam:  Physical Exam  Vitals and nursing note reviewed.   Constitutional:       General: He is in acute distress.      Appearance: He is well-developed. He is ill-appearing.      Comments: Cachectic   HENT:      Head: Normocephalic and atraumatic.   Eyes:      Pupils: Pupils are equal, round, and reactive to light.   Cardiovascular:      Rate and Rhythm: Normal rate and regular rhythm.      Heart sounds: Normal heart sounds.   Pulmonary:      Effort: No respiratory distress.      Breath sounds: Rales present. No wheezing.      Comments: Diminished breathing sounds  Abdominal:      General: Bowel sounds are normal. There is no distension.      Palpations: Abdomen is soft. There is no mass.      Tenderness: There is no abdominal tenderness. There is no guarding or rebound.   Musculoskeletal:         General: No deformity. Normal range of motion.      Cervical back: Normal range of motion and neck supple.   Skin:     General: Skin is warm.      Findings: No erythema or rash.   Neurological:      Mental Status: He is alert and oriented to person, place, and time.      Cranial Nerves: No cranial nerve deficit.         Microbiology  Microbiology Results (last 10 days)     Procedure Component Value -  Date/Time    Respiratory Panel PCR w/COVID-19(SARS-CoV-2) PARESH/GRACIELA/KATHY/PAD/COR/MAD/CHRIST In-House, NP Swab in UTM/VTM, 3-4 HR TAT - Swab, Nasopharynx [[id number]]  (Normal) Collected: 06/07/21 0620    Lab Status: Final result Specimen: Swab from Nasopharynx Updated: 06/07/21 0726     ADENOVIRUS, PCR Not Detected     Coronavirus 229E Not Detected     Coronavirus HKU1 Not Detected     Coronavirus NL63 Not Detected     Coronavirus OC43 Not Detected     COVID19 Not Detected     Human Metapneumovirus Not Detected     Human Rhinovirus/Enterovirus Not Detected     Influenza A PCR Not Detected     Influenza B PCR Not Detected     Parainfluenza Virus 1 Not Detected     Parainfluenza Virus 2 Not Detected     Parainfluenza Virus 3 Not Detected     Parainfluenza Virus 4 Not Detected     RSV, PCR Not Detected     Bordetella pertussis pcr Not Detected     Bordetella parapertussis PCR Not Detected     Chlamydophila pneumoniae PCR Not Detected     Mycoplasma pneumo by PCR Not Detected    Narrative:      In the setting of a positive respiratory panel with a viral infection PLUS a negative procalcitonin without other underlying concern for bacterial infection, consider observing off antibiotics or discontinuation of antibiotics and continue supportive care. If the respiratory panel is positive for atypical bacterial infection (Bordetella pertussis, Chlamydophila pneumoniae, or Mycoplasma pneumoniae), consider antibiotic de-escalation to target atypical bacterial infection.          Laboratory  Results from last 7 days   Lab Units 06/07/21  0500   WBC 10*3/mm3 10.10   HEMOGLOBIN g/dL 9.2*   HEMATOCRIT % 27.3*   PLATELETS 10*3/mm3 115*     Results from last 7 days   Lab Units 06/07/21  0500   SODIUM mmol/L 134*   POTASSIUM mmol/L 4.1   CHLORIDE mmol/L 97*   CO2 mmol/L 26.0   BUN mg/dL 24*   CREATININE mg/dL 0.73*   GLUCOSE mg/dL 116*   CALCIUM mg/dL 9.2     Results from last 7 days   Lab Units 06/07/21  0500   SODIUM mmol/L 134*    POTASSIUM mmol/L 4.1   CHLORIDE mmol/L 97*   CO2 mmol/L 26.0   BUN mg/dL 24*   CREATININE mg/dL 0.73*   GLUCOSE mg/dL 116*   CALCIUM mg/dL 9.2                   Radiology  Imaging Results (Last 72 Hours)     Procedure Component Value Units Date/Time    CT Chest Pulmonary Embolism [[id number]] Collected: 06/07/21 1227     Updated: 06/07/21 1246    Narrative:         DATE OF EXAM:  6/7/2021 12:17 PM     PROCEDURE:  CT CHEST PULMONARY EMBOLISM-     INDICATIONS:   elevated d-dimer; R06.03-Acute respiratory distress; J98.01-Acute  bronchospasm; I50.9-Heart failure, unspecified; C34.91-Malignant  neoplasm of unspecified part of right bronchus or lung     COMPARISON:   CT chest with contrast 2/18/2021     TECHNIQUE:  Routine transaxial slices were obtained through chest after  administration of intravenous 100 ml of Isovue 370. Reconstructed  coronal and sagittal images were also obtained. Automated exposure  control and iterative reconstruction methods were used.      FINDINGS:  Soft tissues of the lower neck demonstrate left port catheter with tip  terminating in low SVC. Postsurgical changes of sternotomy and CABG with  aortic valve replacement. Pulmonary arteries well-opacified with  contrast. Negative for pulmonary embolus. Right paratracheal lymph node  measuring 10 mm unchanged in image 44. Additional small left  paratracheal lymph nodes stable for example measuring 9 mm on image 54.  Thoracic aortic branch vasculature is patent. No axillary adenopathy.     Redemonstration of large mass involving the right upper lobe which is  similar to the prior study when accounting for technical differences  measuring 9.1 x 7.6 cm, previously 9.3 x 8 cm. This results in complete  right upper lobe collapse and compressive atelectasis, unchanged. There  is again also compressive atelectasis involving the right middle lobe  which is unchanged.     Advanced emphysema. No pneumothorax. There are areas of bronchial wall  thickening  and mucous plugging involving the lower lobes which may  relate to aspiration pneumonia.  New area of nodularity in the left  lower lobe measures 12 x 9 mm on image 98. There are areas of  interlobular septal thickening and groundglass opacities involving the  lower lobes and left upper lobe which could relate to superimposed  infection or mild pulmonary edema superimposed on background of advanced  emphysema.     Three low-density hepatic lesions noted likely cysts for example in the  right hepatic lobe measuring 16 mm and 17 mm on image 138. Small hiatal  hernia. Spleen normal in size. Normal adrenal glands. Pancreas without  findings of pancreatitis. Bilateral renal cysts noted. No  hydronephrosis. Negative for acute fracture.        Impression:      1. Negative for pulmonary embolus.  2. Redemonstration of large 9 cm right upper lobe mass which occludes  the right upper lobe bronchus resulting in complete right upper and  right middle lobe collapse similar to prior study.  3. Bronchial wall thickening with increased mucous plugging involving  lower lobes with patchy areas of airspace disease most concerning for  aspiration pneumonia.  4. Interlobular septal thickening and scattered groundglass opacities  bilaterally which may relate to pulmonary edema and/or infection  superimposed on background of advanced emphysema.  5. Stable mediastinal lymphadenopathy.  6. New 12 mm area of nodularity in left lower lobe may relate to  aspiration/infection, recommend attention on follow-up.     Electronically Signed By-Saleem Ferguson MD On:6/7/2021 12:44 PM  This report was finalized on [id number] by  Saleem Ferguson MD.    XR Chest 1 View [[id number]] Collected: 06/07/21 0723     Updated: 06/07/21 0727    Narrative:         DATE OF EXAM:   6/7/2021 4:55 AM     PROCEDURE:   XR CHEST 1 VW-     INDICATIONS:   soa     COMPARISON:  3/9/2021     TECHNIQUE:   Portable chest radiograph.     FINDINGS:    Right midlung perihilar mass  again noted similar to the prior study.  There is a left-sided port catheter with the tip at the cavoatrial  junction. Heart size normal. There is increased left midlung and left  basilar airspace disease may relate to superimposed pneumonia. No  pneumothorax or large effusion. Osseous structures grossly intact.  Postsurgical changes of sternotomy and CABG.       Impression:      1. Redemonstration of right midlung mass, similar to the prior study.  2. Increased left midlung and left basilar airspace disease that may  relate to superimposed pneumonia.     Electronically Signed By-Saleem Ferguson MD On:6/7/2021 7:25 AM  This report was finalized on [id number] by  Saleem Ferguson MD.          Cardiology      Results Review:  I have reviewed all clinical data, test, lab, and imaging results.       Schedule Meds  aspirin, 81 mg, Oral, Daily  buprenorphine-naloxone, 1.5 tablet, Sublingual, Daily  docusate sodium, 100 mg, Oral, BID  enoxaparin, 40 mg, Subcutaneous, Q24H  gabapentin, 300 mg, Oral, TID  guaiFENesin, 600 mg, Oral, Q12H  insulin lispro, 0-7 Units, Subcutaneous, TID With Meals  ipratropium-albuterol, 3 mL, Nebulization, Q4H - RT  lurasidone, 40 mg, Oral, Daily  megestrol acetate, 200 mg, Oral, TID  methylPREDNISolone sodium succinate, 60 mg, Intravenous, Q6H  multivitamin with minerals, 1 tablet, Oral, Daily  nystatin, 5 mL, Swish & Swallow, 4x Daily  pantoprazole, 40 mg, Oral, Daily  piperacillin-tazobactam, 4.5 g, Intravenous, Q8H  polyethylene glycol, 17 g, Oral, Daily  sertraline, 50 mg, Oral, Daily  spironolactone, 25 mg, Oral, Daily  Zinc Oxide, , Apply externally, BID        Infusion Meds       PRN Meds  •  acetaminophen  •  albuterol  •  bisacodyl  •  dextrose  •  dextrose  •  glucagon (human recombinant)  •  insulin lispro  •  ipratropium-albuterol  •  melatonin  •  promethazine  •  [COMPLETED] Insert peripheral IV **AND** sodium chloride  •  sodium chloride      Assessment/Plan        Assessment    Possible postobstructive pneumonia.  Patient has right large lung mass as a result of lung cancer.  There is no signs of complete collapse of the right upper and right middle lobes    Lung CA was on chemotherapy.      Plan    Continue Zosyn 4.5 g IV every 8 hours  Pulmonary has been consulted and patient may need bronchoscopy  Consult hematology service  A.m. labs  Prognosis is guarded  Case was discussed with him and his wife at the bedside    Anne Allred MD  06/07/21  16:06 EDT

## 2021-06-07 NOTE — CONSULTS
Referring Provider: Dr. De La Cruz  Reason for Consultation: Acute respiratory failure  Acute diastolic congestive heart failure secondary to hypertension  Metastatic lung CA  COPD exacerbation    Patient Care Team:  Sandoval Stevenson FNP as PCP - General  Sandoval Stevenson FNP as PCP - Family Medicine  Axel Lewis MD as Consulting Physician (Hematology and Oncology)  Iglesia Springer MD as Consulting Physician (Cardiology)    Chief complaint shortness of breath        History of present illness:  Axel Ramirez is a 70 y.o. male who presents with shortness of breath.     70-year-old white male patient with known history of bioprosthetic aortic valve replacement 2018 and vein graft to the marginal branch and diagonal artery recently found to have metastatic lung CA now admitted to hospital with acute worsening of respiratory distress  Patient was admitted to hospital with COPD exacerbation but also found to have elevated BNP  Patient denies of any chest pain syncopal episodes no lower extremity edema PND orthopnea  Symptoms started 24 hours prior to the hospitalization  The last echocardiogram EF was normal 2020            Review of Systems   Constitutional: Positive for malaise/fatigue. Negative for fever.   HENT: Negative for congestion and hearing loss.    Eyes: Negative for double vision and visual disturbance.   Cardiovascular: Positive for dyspnea on exertion. Negative for chest pain, claudication, leg swelling and syncope.   Respiratory: Negative for cough and shortness of breath.    Endocrine: Negative for cold intolerance.   Skin: Negative for color change and rash.   Musculoskeletal: Negative for arthritis and joint pain.   Gastrointestinal: Negative for abdominal pain and heartburn.   Genitourinary: Negative for hematuria.   Neurological: Negative for excessive daytime sleepiness and dizziness.   Psychiatric/Behavioral: Negative for depression. The patient is not nervous/anxious.    All  other systems reviewed and are negative.      History  Past Medical History:   Diagnosis Date   • Aortic stenosis    • Cancer (CMS/HCC)     Sickle Cell Carcinoma   • Congestive heart failure (CHF) (CMS/HCC)     DIASTOLIC   • COPD (chronic obstructive pulmonary disease) (CMS/HCC)    • Coronary artery disease    • Hypertension    • Lung cancer (CMS/HCC)    • Myocardial infarction (CMS/HCC)    • Peripheral vascular disease (CMS/HCC)    • Pneumonia 06/07/2021   • Valvular disease        Past Surgical History:   Procedure Laterality Date   • AORTIC VALVE REPAIR/REPLACEMENT  02/26/2018    Dr Maza   • BRONCHOSCOPY N/A 10/24/2020    Procedure: BRONCHOSCOPY with biopsy right upper lobe mass, and bronchoalveolar lavage right upper lobe;  Surgeon: Ángela Bean MD;  Location: [facility] ENDOSCOPY;  Service: Pulmonary;  Laterality: N/A;  post: right upper lobe mass, pneumonia   • BRONCHOSCOPY N/A 11/11/2020    Procedure: BRONCHOSCOPY with bronchial washing;  Surgeon: Ángela Bean MD;  Location: [facility] ENDOSCOPY;  Service: Pulmonary;  Laterality: N/A;  Post: lung mass, pneumonia   • BRONCHOSCOPY N/A 11/11/2020    Procedure: BRONCHOSCOPY RIGID with debulking of right main endobronchial tumor;  Surgeon: Nomi Reyes MD;  Location: [facility] MAIN OR;  Service: Cardiothoracic;  Laterality: N/A;   • BRONCHOSCOPY N/A 11/11/2020    Procedure: BRONCHOSCOPY;  Surgeon: Nomi Reyes MD;  Location: [facility] MAIN OR;  Service: Cardiothoracic;  Laterality: N/A;   • CARDIAC CATHETERIZATION  12/29/2017   • CORONARY ARTERY BYPASS GRAFT  02/26/2018    Dr Maza   • TIBIA FRACTURE SURGERY     • VENOUS ACCESS DEVICE (PORT) INSERTION Left 10/30/2020    Procedure: MEDIPORT INSERTION UNDER FLUOROSCOPIC GUIDENCE;  Surgeon: Nomi Reyes MD;  Location: [facility] MAIN OR;  Service: Cardiothoracic;  Laterality: Left;       Family History   Problem Relation Age of Onset   • Coronary artery disease Mother    • Cancer Father    • Stroke Daughter 35   •  Seizures Daughter 35       Social History     Tobacco Use   • Smoking status: Former Smoker     Packs/day: 1.00     Types: Cigarettes     Quit date: 10/2020     Years since quittin.6   • Smokeless tobacco: Never Used   Vaping Use   • Vaping Use: Never used   Substance Use Topics   • Alcohol use: Not Currently   • Drug use: No        Medications Prior to Admission   Medication Sig Dispense Refill Last Dose   • acetaminophen (TYLENOL) 650 MG suppository Insert 1 suppository into the rectum Every 6 (Six) Hours As Needed for Mild Pain , Headache or Fever.      • aspirin (aspirin) 81 MG EC tablet Take 1 tablet by mouth Daily.   2021 at 0900   • atorvastatin (LIPITOR) 20 MG tablet Take 1 tablet by mouth every night at bedtime. 90 tablet 1 2021 at 1900   • bisacodyl (DULCOLAX) 10 MG suppository Insert 1 suppository into the rectum Daily As Needed for Constipation.      • buprenorphine-naloxone (SUBOXONE) 8-2 MG per SL tablet Place 1.5 tablets under the tongue Daily.   2021 at 0900   • docusate sodium 100 MG capsule Take 1 capsule by mouth 2 (Two) Times a Day.   2021 at 1900   • gabapentin (NEURONTIN) 300 MG capsule Take 300 mg by mouth 3 (Three) Times a Day.   2021 at 1900   • guaiFENesin (MUCINEX) 600 MG 12 hr tablet Take 1 tablet by mouth Every 12 (Twelve) Hours.   2021 at 1900   • hydrALAZINE (APRESOLINE) 50 MG tablet Take 1 tablet by mouth Every 12 (Twelve) Hours. 180 tablet 3 2021 at 1900   • lurasidone (LATUDA) 40 MG tablet tablet Take 40 mg by mouth Daily.   2021 at 0900   • megestrol (MEGACE) 40 MG/ML suspension Take 200 mg by mouth 3 (Three) Times a Day.   2021 at 1900   • melatonin 5 MG tablet tablet Take 1 tablet by mouth At Night As Needed (sleep).      • metoprolol tartrate (LOPRESSOR) 25 MG tablet Take 2 tablets by mouth 2 (Two) Times a Day for 360 days. 120 tablet 11 2021 at 1900   • multivitamin with minerals (multivitamin w/minerals) tablet tablet Take 1 tablet  by mouth Daily.   6/6/2021 at 0900   • nystatin (MYCOSTATIN) 155585 UNIT/ML suspension Swish and swallow 500,000 Units 4 (Four) Times a Day.   6/6/2021 at 1900   • pantoprazole (Protonix) 40 MG EC tablet Take 1 tablet by mouth Daily. 30 tablet 1 6/6/2021 at 0900   • polyethylene glycol (MIRALAX) 17 g packet Take 17 g by mouth Daily.   6/6/2021 at 0900   • predniSONE (DELTASONE) 10 MG tablet Take 1 tablet by mouth Daily With Breakfast.   6/6/2021 at 0900   • promethazine (PHENERGAN) 25 MG tablet Take 25 mg by mouth Every 4 (Four) Hours As Needed for Nausea or Vomiting.      • sertraline (ZOLOFT) 50 MG tablet Take 50 mg by mouth Daily.   6/6/2021 at 0900   • spironolactone (Aldactone) 25 MG tablet Take 1 tablet by mouth Daily for 360 days. 90 tablet 3 6/6/2021 at 0900   • torsemide (DEMADEX) 20 MG tablet Take 10 mg by mouth Daily.   6/6/2021 at 0900   • albuterol sulfate  (90 Base) MCG/ACT inhaler Inhale 2 puffs Every 6 (Six) Hours As Needed for Wheezing or Shortness of Air.            Patient has no known allergies.    Scheduled Meds:aspirin, 81 mg, Oral, Daily  buprenorphine-naloxone, 1.5 tablet, Sublingual, Daily  docusate sodium, 100 mg, Oral, BID  enoxaparin, 40 mg, Subcutaneous, Q24H  gabapentin, 300 mg, Oral, TID  guaiFENesin, 600 mg, Oral, Q12H  insulin lispro, 0-7 Units, Subcutaneous, TID With Meals  ipratropium-albuterol, 3 mL, Nebulization, Q4H - RT  lurasidone, 40 mg, Oral, Daily  megestrol acetate, 200 mg, Oral, TID  methylPREDNISolone sodium succinate, 60 mg, Intravenous, Q6H  multivitamin with minerals, 1 tablet, Oral, Daily  nystatin, 5 mL, Swish & Swallow, 4x Daily  pantoprazole, 40 mg, Oral, Daily  piperacillin-tazobactam, 4.5 g, Intravenous, Q8H  polyethylene glycol, 17 g, Oral, Daily  sertraline, 50 mg, Oral, Daily  spironolactone, 25 mg, Oral, Daily  Zinc Oxide, , Apply externally, BID      Continuous Infusions:   PRN Meds:.•  acetaminophen  •  albuterol  •  bisacodyl  •  dextrose  •   "dextrose  •  glucagon (human recombinant)  •  insulin lispro  •  ipratropium-albuterol  •  melatonin  •  promethazine  •  [COMPLETED] Insert peripheral IV **AND** sodium chloride  •  sodium chloride        VITAL SIGNS  Vitals:    06/07/21 1157 06/07/21 1257 06/07/21 1515 06/07/21 1521   BP:  105/68     BP Location:  Right arm     Patient Position:  Lying     Pulse: 99 96 86 87   Resp: 20 18 18 18   Temp:  97.7 °F (36.5 °C)     TempSrc:  Axillary     SpO2: 96% 99% 99%    Weight:       Height:           Flowsheet Rows      First Filed Value   Admission Height  172.7 cm (68\") Documented at 06/07/2021 0451   Admission Weight  67.3 kg (148 lb 6.4 oz) Documented at 06/07/2021 0450           TELEMETRY: Sinus rhythm    Physical Exam:  Physical Exam     Patient is alert on oxygen respiratory distress noted  Neck no JVP elevation  Lungs bilateral entry few rhonchi  Heart sounds S1-S2 regular  Abdomen soft nontender bowel sounds present  Extremities no edema bilateral pulses present  CNS exam nonfocal        LAB RESULTS (LAST 7 DAYS)    CBC  Results from last 7 days   Lab Units 06/07/21  0500   WBC 10*3/mm3 10.10   RBC 10*6/mm3 2.49*   HEMOGLOBIN g/dL 9.2*   HEMATOCRIT % 27.3*   MCV fL 109.7*   PLATELETS 10*3/mm3 115*       BMP  Results from last 7 days   Lab Units 06/07/21  0500   SODIUM mmol/L 134*   POTASSIUM mmol/L 4.1   CHLORIDE mmol/L 97*   CO2 mmol/L 26.0   BUN mg/dL 24*   CREATININE mg/dL 0.73*   GLUCOSE mg/dL 116*       CMP   Results from last 7 days   Lab Units 06/07/21  0500   SODIUM mmol/L 134*   POTASSIUM mmol/L 4.1   CHLORIDE mmol/L 97*   CO2 mmol/L 26.0   BUN mg/dL 24*   CREATININE mg/dL 0.73*   GLUCOSE mg/dL 116*   ALBUMIN g/dL 2.50*   BILIRUBIN mg/dL 0.7   ALK PHOS U/L 109   AST (SGOT) U/L 58*   ALT (SGPT) U/L 87*         BNP        TROPONIN  Results from last 7 days   Lab Units 06/07/21  0500   TROPONIN T ng/mL 0.012       CoAg        Creatinine Clearance  Estimated Creatinine Clearance: 81.8 mL/min (A) (by " C-G formula based on SCr of 0.73 mg/dL (L)).    ABG  Results from last 7 days   Lab Units 06/07/21  0902   PH, ARTERIAL pH units 7.514*   PCO2, ARTERIAL mm Hg 34.4*   PO2 ART mm Hg 49.4*   O2 SATURATION ART % 88.5*   BASE EXCESS ART mmol/L 4.9*       Radiology  XR Chest 1 View    Result Date: 6/7/2021  1. Redemonstration of right midlung mass, similar to the prior study. 2. Increased left midlung and left basilar airspace disease that may relate to superimposed pneumonia.  Electronically Signed By-Saleem Ferguson MD On:6/7/2021 7:25 AM This report was finalized on [id number] by  Saleem Ferguson MD.    CT Chest Pulmonary Embolism    Result Date: 6/7/2021  1. Negative for pulmonary embolus. 2. Redemonstration of large 9 cm right upper lobe mass which occludes the right upper lobe bronchus resulting in complete right upper and right middle lobe collapse similar to prior study. 3. Bronchial wall thickening with increased mucous plugging involving lower lobes with patchy areas of airspace disease most concerning for aspiration pneumonia. 4. Interlobular septal thickening and scattered groundglass opacities bilaterally which may relate to pulmonary edema and/or infection superimposed on background of advanced emphysema. 5. Stable mediastinal lymphadenopathy. 6. New 12 mm area of nodularity in left lower lobe may relate to aspiration/infection, recommend attention on follow-up.  Electronically Signed By-Saleem Ferguson MD On:6/7/2021 12:44 PM This report was finalized on [id number] by  Saleem Ferguson MD.          EKG          I personally viewed and interpreted the patient's EKG/Telemetry data:    ECHOCARDIOGRAM:    Results for orders placed during the hospital encounter of 11/08/20    Adult Transthoracic Echo Complete W/ Cont if Necessary Per Protocol    Interpretation Summary  · Estimated left ventricular EF = 65% Left ventricular systolic function is normal.  · Left ventricular diastolic function is consistent with (grade  Ia w/high LAP) impaired relaxation.  · The right atrial cavity is mildly dilated.  · Mild aortic valve stenosis is present.  · Mild to moderate LVOT gradient noted      STRESS MYOVIEW:    CARDIAC CATHETERIZATION:    OTHER:         Assessment/Plan       Coronary artery disease involving native coronary artery of native heart without angina pectoris    Nonrheumatic aortic valve stenosis    Essential hypertension    Congestive heart failure (CMS/HCC)    Tobacco abuse    Squamous cell carcinoma of lung, right (CMS/HCC)    Acute-on-chronic respiratory failure (CMS/HCC)    Respiratory distress    Stage 2 skin ulcer of sacral region (CMS/HCC)      Acute diastolic congestive heart failure due to history of hypertension and valvular heart disease  Patient BNP was elevated but most probably primary event is pulmonary with known history of CAD and superimposed pneumonia  Caution with diuresis  Check the echocardiogram  Antibiotics and pulmonary toilet per primary team    I discussed the patients findings and my recommendations with patient and family at bedside    Iglesia Springer MD  06/07/21  15:40 EDT

## 2021-06-08 NOTE — PROGRESS NOTES
LOS: 1 day   Patient Care Team:  Sandoval Stevenson FNP as PCP - General  Sandoval Stevenson FNP as PCP - Family Medicine  Axel Lewis MD as Consulting Physician (Hematology and Oncology)  Iglesia Springer MD as Consulting Physician (Cardiology)    Chief Complaint: Still with cough and shortness of breath but overall less shortness of breath.  Still requiring high flow oxygen up around 6 to 8 L.    Subjective     Interval History: Wife at bedside patient is now hungry and wants to eat    Patient Complaints: Hungry, still with loose wet cough but less short of breath  Patient Denies: No chest pain, no nausea or vomiting  History taken from: patient    Review of Systems:    All systems were reviewed and negative except for: Generalized weakness, positive cough, no hemoptysis, no chest pain fevers chills, positive shortness of breath but improved since admission    Objective     Vital Signs  Temp:  [97.7 °F (36.5 °C)-98.3 °F (36.8 °C)] 98.3 °F (36.8 °C)  Heart Rate:  [] 82  Resp:  [16-24] 18  BP: (100-121)/(61-71) 121/71    Physical Exam:     General Appearance:    Alert, cooperative, in no acute distress, frail and chronically ill-appearing, temporal wasting   Head:    Normocephalic, without obvious abnormality, atraumatic   Eyes:            Conjunctivae and sclerae normal, no   icterus, no pallor, corneas clear, PERRLA   Throat:   No oral lesions, no thrush, oral mucosa moist   Neck:   No adenopathy, supple, trachea midline, no thyromegaly, no   carotid bruit, no JVD   Lungs:    Coarse rhonchi bilateral bases and some rales at bases.  Poor adventitial flow    Heart:    Regular rhythm and normal rate, normal S1 and S2, no            murmur, no gallop, no rub, no click   Chest Wall:    No abnormalities observed   Abdomen:     Normal bowel sounds, no masses, no organomegaly, soft        non-tender, non-distended, no guarding, no rebound                tenderness, scaphoid   Rectal:     Deferred    Extremities:   Moves all extremities well, no edema, no cyanosis, no             redness   Pulses:   Pulses equivocal  But present and equal bilaterally   Skin:   No bleeding, bruising or rash   Lymph nodes:   No palpable adenopathy   Neurologic:   Cranial nerves 2 - 12 grossly intact, sensation intact, DTR       present and equal bilaterally   Radiology:  XR Chest 1 View    Result Date: 6/7/2021  1. Redemonstration of right midlung mass, similar to the prior study. 2. Increased left midlung and left basilar airspace disease that may relate to superimposed pneumonia.  Electronically Signed By-Saleem Ferguson MD On:6/7/2021 7:25 AM This report was finalized on [id number] by  Saleem Ferguson MD.    CT Chest Pulmonary Embolism    Result Date: 6/7/2021  1. Negative for pulmonary embolus. 2. Redemonstration of large 9 cm right upper lobe mass which occludes the right upper lobe bronchus resulting in complete right upper and right middle lobe collapse similar to prior study. 3. Bronchial wall thickening with increased mucous plugging involving lower lobes with patchy areas of airspace disease most concerning for aspiration pneumonia. 4. Interlobular septal thickening and scattered groundglass opacities bilaterally which may relate to pulmonary edema and/or infection superimposed on background of advanced emphysema. 5. Stable mediastinal lymphadenopathy. 6. New 12 mm area of nodularity in left lower lobe may relate to aspiration/infection, recommend attention on follow-up.  Electronically Signed By-Saleem Ferguson MD On:6/7/2021 12:44 PM This report was finalized on [id number] by  Saleem Ferguson MD.         Results Review:     I reviewed the patient's new clinical results.  I reviewed the patient's new imaging results and agree with the interpretation.    Medication Review:   Scheduled Meds:aspirin, 81 mg, Oral, Daily  buprenorphine-naloxone, 1.5 tablet, Sublingual, Daily  docusate sodium, 100 mg, Oral, BID  enoxaparin, 40  mg, Subcutaneous, Q24H  gabapentin, 300 mg, Oral, TID  guaiFENesin, 600 mg, Oral, Q12H  insulin lispro, 0-7 Units, Subcutaneous, TID With Meals  ipratropium-albuterol, 3 mL, Nebulization, Q4H - RT  lurasidone, 40 mg, Oral, Daily  megestrol acetate, 200 mg, Oral, TID  methylPREDNISolone sodium succinate, 60 mg, Intravenous, Q6H  multivitamin with minerals, 1 tablet, Oral, Daily  nystatin, 5 mL, Swish & Swallow, 4x Daily  pantoprazole, 40 mg, Oral, Daily  piperacillin-tazobactam, 4.5 g, Intravenous, Q8H  polyethylene glycol, 17 g, Oral, Daily  sertraline, 50 mg, Oral, Daily  spironolactone, 25 mg, Oral, Daily  Zinc Oxide, , Apply externally, BID      Continuous Infusions:   PRN Meds:.•  acetaminophen  •  albuterol  •  bisacodyl  •  dextrose  •  dextrose  •  glucagon (human recombinant)  •  insulin lispro  •  ipratropium-albuterol  •  melatonin  •  promethazine  •  [COMPLETED] Insert peripheral IV **AND** sodium chloride  •  sodium chloride    Labs:  Lab Results (last 24 hours)     Procedure Component Value Units Date/Time    Manual Differential [[id number]]  (Abnormal) Collected: 06/08/21 0624    Specimen: Blood Updated: 06/08/21 0742     Neutrophil % 76.0 %      Lymphocyte % 3.0 %      Monocyte % 3.0 %      Bands %  16.0 %      Metamyelocyte % 2.0 %      Neutrophils Absolute 8.10 10*3/mm3      Lymphocytes Absolute 0.26 10*3/mm3      Monocytes Absolute 0.26 10*3/mm3      nRBC 1.0 /100 WBC      Macrocytes Slight/1+     WBC Morphology Normal     Platelet Estimate Decreased    CBC & Differential [[id number]]  (Abnormal) Collected: 06/08/21 0624    Specimen: Blood Updated: 06/08/21 0742    Narrative:      The following orders were created for panel order CBC & Differential.  Procedure                               Abnormality         Status                     ---------                               -----------         ------                     Scan Slide[[id number]]                                       Final result                CBC Auto Differential[[id number]]        Abnormal            Final result                 Please view results for these tests on the individual orders.    Scan Slide [[id number]] Collected: 06/08/21 0624    Specimen: Blood Updated: 06/08/21 0742     Scan Slide --     Comment: See Manual Differential Results       CBC Auto Differential [[id number]]  (Abnormal) Collected: 06/08/21 0624    Specimen: Blood Updated: 06/08/21 0742     WBC 8.80 10*3/mm3      RBC 1.94 10*6/mm3      Hemoglobin 7.2 g/dL      Comment: Result checked         Hematocrit 21.1 %      .8 fL      MCH 37.0 pg      MCHC 34.0 g/dL      RDW 14.4 %      RDW-SD 55.1 fl      MPV 8.9 fL      Platelets 103 10*3/mm3     Narrative:      The previously reported component NRBC is no longer being reported. Previous result was 0.3 /100 WBC (Reference Range: 0.0-0.2 /100 WBC) on 6/8/2021 at 0713 EDT.    Blood Culture - Blood, Arm, Left [[id number]] Collected: 06/07/21 0705    Specimen: Blood from Arm, Left Updated: 06/08/21 0730     Blood Culture No growth at 24 hours    Blood Culture - Blood, Arm, Right [[id number]] Collected: 06/07/21 0705    Specimen: Blood from Arm, Right Updated: 06/08/21 0730     Blood Culture No growth at 24 hours    Comprehensive Metabolic Panel [[id number]]  (Abnormal) Collected: 06/08/21 0624    Specimen: Blood Updated: 06/08/21 0718     Glucose 127 mg/dL      BUN 32 mg/dL      Creatinine 0.93 mg/dL      Sodium 139 mmol/L      Potassium 4.6 mmol/L      Chloride 100 mmol/L      CO2 30.0 mmol/L      Calcium 9.0 mg/dL      Total Protein 6.4 g/dL      Albumin 2.50 g/dL      ALT (SGPT) 64 U/L      AST (SGOT) 41 U/L      Alkaline Phosphatase 89 U/L      Total Bilirubin 0.6 mg/dL      eGFR Non African Amer 80 mL/min/1.73      Globulin 3.9 gm/dL      A/G Ratio 0.6 g/dL      BUN/Creatinine Ratio 34.4     Anion Gap 9.0 mmol/L     Narrative:      GFR Normal >60  Chronic Kidney Disease <60  Kidney Failure <15      POC Glucose Once [[id number]]   (Abnormal) Collected: 06/08/21 0708    Specimen: Blood Updated: 06/08/21 0709     Glucose 122 mg/dL      Comment: Serial Number: [id number]:  [id number]       MRSA Screen, PCR (Inpatient) - Swab, Nares [[id number]]  (Abnormal) Collected: 06/07/21 1701    Specimen: Swab from Nares Updated: 06/07/21 1941     MRSA PCR MRSA Detected    POC Glucose Once [[id number]]  (Abnormal) Collected: 06/07/21 1923    Specimen: Blood Updated: 06/07/21 1924     Glucose 203 mg/dL      Comment: Serial Number: [id number]:  [id number]       POC Glucose Once [[id number]]  (Abnormal) Collected: 06/07/21 1711    Specimen: Blood Updated: 06/07/21 1713     Glucose 167 mg/dL      Comment: Serial Number: [id number]:  [id number]              Assessment/Plan      Acute on chronic respiratory distress  Suspect postobstructive pneumonia  COPD exacerbation  Metastatic lung cancer/squamous cell with new finding of of 12 mm nodular density on x-ray  CHF  Opioid dependence  Stage II skin ulcer sacral region    Coronary artery disease involving native coronary artery of native heart without angina pectoris    Nonrheumatic aortic valve stenosis    Essential hypertension    Congestive heart failure (CMS/HCC)    Tobacco abuse    Squamous cell carcinoma of lung, right (CMS/HCC)    Acute-on-chronic respiratory failure (CMS/HCC)    Respiratory distress    Stage 2 skin ulcer of sacral region (CMS/HCC)      Pulmonology, wound care and cardiology inputs greatly appreciated, thank you.  Patient making some clinical improvement today so continue current approach with pulmonary toilet/DVT prophylaxis, parenteral antibiotics and steroids.  We will continue to follow.        Ignacia De La Cruz DO  06/08/21  08:32 EDT

## 2021-06-08 NOTE — PAYOR COMM NOTE
REQUEST FOR INPATIENT PRECERT  RE: AUTH #  [id number]    UTILIZATION REVIEW  RETURN CONTACT:  ALEJANDRO MCGILL RN [facility]  PH: [phone]  FAX: [phone]  [facility]  NPI# [id number]  TAX ID # [id number]  =============================    LOC:Acute Adult-Infection: Pneumonia      Criteria Review   REVIEW SUMMARY     Patient: MAXI RAMIREZ  Review Number: [id number]  Review Status: In Primary     Condition Specific: Yes        OUTCOMES  Outcome Type: Primary           REVIEW DETAILS     Product: LOC:Acute Adult  Subset: Infection: Pneumonia      (Symptom or finding within 24h)         (Excludes PO medications unless noted)          [X] Select Day, One:              [X] Episode Day 1, One:                  [X] ACUTE, All:                      [X] Pneumonia confirmed by imaging                      [X] Finding, >= One:                          [X] O2 sat < 89%(0.89) and < baseline                      [X] Intervention, All:                          [X] Anti-infective (includes PO)                          [X] Oxygenation, One:                              [X] O2 sat <= 91%(0.91) and < baseline requiring supplemental oxygen                          [X] Oximetry or blood gas                          [X] DVT prophylaxis or patient ambulatory     Version: brick&mobile 2020  [employer]® and [employer]®  © 2020 [employer] and/or one of its subsidiaries.  All Rights Reserved.  CPT only © 2019 American Medical Association.  All Rights Reserved.         Maxi Ramirez (70 y.o. Male)     Date of Birth Social Security Number Address Home Phone MRN    1950  [facility] [address] [place] [address] [phone] [id number]    Shinto Marital Status          Episcopalian        Admission Date Admission Type Admitting Provider Attending Provider Department, Room/Bed    6/7/21 Emergency Ignacia De La Cruz, Ignacia Mares,  [facility] AMANDEEP 2B MEDICAL INPATIENT, 225/1    Discharge  "Date Discharge Disposition Discharge Destination                       Attending Provider: Ignacia De La Cruz DO    Allergies: No Known Allergies    Isolation: None   Infection: MRSA No Isolation this Admit (06/08/21)   Code Status: No CPR    Ht: 172.7 cm (68\")   Wt: 67.3 kg (148 lb 6.4 oz)    Admission Cmt: None   Principal Problem: None                Active Insurance as of 6/7/2021     Primary Coverage     Payor Plan Insurance Group Employer/Plan Group    [facility] MEDICARE REPLACEMENT [facility] MEDICARE REPLACEMENT [id number]     Payor Plan Address Payor Plan Phone Number Payor Plan Fax Number Effective Dates    [address] [address]   6/1/2021 - None Entered    [facility] [id number]       Subscriber Name Subscriber Birth Date Member ID       MALCOLM RAMIREZDAMARIS HOLLY 1950 [id number]           Secondary Coverage     Payor Plan Insurance Group Employer/Plan Group    [place]      Payor Plan Address Payor Plan Phone Number Payor Plan Fax Number Effective Dates    [address]   1/1/2019 - None Entered    [facility] [address]       Subscriber Name Subscriber Birth Date Member ID       SANDRA,MAXI HOLLY 1950 [id number]                 Emergency Contacts      (Rel.) Home Phone Work Phone Mobile Phone    Rachana Ramirez (Spouse) [phone] -- [phone]               History & Physical      Igncaia De La Cruz DO at 06/07/21 0957                  History and Physical      Patient Care Team:  Sandoval Stevenson FNP as PCP - General  Sandoval Stevenson FNP as PCP - Family Medicine  Axel Lewis MD as Consulting Physician (Hematology and Oncology)  Iglesia Springer MD as Consulting Physician (Cardiology)    Chief complaint worsening shortness of breath/respiratory distress    Subjective     Patient is a 70 y.o. male presents with sudden onset shortness of breath.  He lives in assisted living nursing home type setting.  EMS was called after patient received a mini neb " treatment at the facility but his breathing became more severe, he had a reported saturation of 76% on 4 L at his extended care facility.  He subsequently was sent to the emergency department.  I evaluated him there and he was quite somnolent but would at least tell me that he was feeling a little better.  However, by the time he arrived to the floor his sats were worse he was sitting up having a respiratory distress and so I reevaluated him.  We have started at this juncture aggressive pulmonary toilet including albuterol and Atrovent nebulizers 4 times a day, I gave him an extra dose of Lasix because I was told that his systolic blood pressure was about 150, course he was placed on oxygen, ABG was ordered and he is now on a nonrebreather.  He was also found through the ER to have significant elevation of his BNP and received Lasix also in the ER as well.  He was less somnolent when I saw him in his room and said that the nebulizer treatments were helping a little bit.  We have also started him on aggressive methylprednisolone therapy.    Infectious disease, pulmonology and cardiology has been consulted.  Review of Systems   All systems were reviewed and negative except for: Respiratory distress, hypoxia and loose cough.    History  Past Medical History:   Diagnosis Date   • Aortic stenosis    • Cancer (CMS/HCC)     Sickle Cell Carcinoma   • Congestive heart failure (CHF) (CMS/HCC)     DIASTOLIC   • COPD (chronic obstructive pulmonary disease) (CMS/HCC)    • Coronary artery disease    • Hypertension    • Lung cancer (CMS/HCC)    • Myocardial infarction (CMS/HCC)    • Peripheral vascular disease (CMS/HCC)    • Pneumonia 06/07/2021   • Valvular disease      Past Surgical History:   Procedure Laterality Date   • AORTIC VALVE REPAIR/REPLACEMENT  02/26/2018    Dr Maza   • BRONCHOSCOPY N/A 10/24/2020    Procedure: BRONCHOSCOPY with biopsy right upper lobe mass, and bronchoalveolar lavage right upper lobe;  Surgeon: Draw,  MD Ángela;  Location: [facility] ENDOSCOPY;  Service: Pulmonary;  Laterality: N/A;  post: right upper lobe mass, pneumonia   • BRONCHOSCOPY N/A 2020    Procedure: BRONCHOSCOPY with bronchial washing;  Surgeon: Ángela Bean MD;  Location: [facility] ENDOSCOPY;  Service: Pulmonary;  Laterality: N/A;  Post: lung mass, pneumonia   • BRONCHOSCOPY N/A 2020    Procedure: BRONCHOSCOPY RIGID with debulking of right main endobronchial tumor;  Surgeon: Nomi Reyes MD;  Location: [facility] MAIN OR;  Service: Cardiothoracic;  Laterality: N/A;   • BRONCHOSCOPY N/A 2020    Procedure: BRONCHOSCOPY;  Surgeon: Nomi Reyes MD;  Location: [facility] MAIN OR;  Service: Cardiothoracic;  Laterality: N/A;   • CARDIAC CATHETERIZATION  2017   • CORONARY ARTERY BYPASS GRAFT  2018    Dr Maza   • TIBIA FRACTURE SURGERY     • VENOUS ACCESS DEVICE (PORT) INSERTION Left 10/30/2020    Procedure: MEDIPORT INSERTION UNDER FLUOROSCOPIC GUIDENCE;  Surgeon: Nomi Reyes MD;  Location: [facility] MAIN OR;  Service: Cardiothoracic;  Laterality: Left;     Family History   Problem Relation Age of Onset   • Coronary artery disease Mother    • Cancer Father    • Stroke Daughter 35   • Seizures Daughter 35     Social History     Tobacco Use   • Smoking status: Former Smoker     Packs/day: 1.00     Types: Cigarettes     Quit date: 10/2020     Years since quittin.6   • Smokeless tobacco: Never Used   Vaping Use   • Vaping Use: Never used   Substance Use Topics   • Alcohol use: Not Currently   • Drug use: No     Allergies:  Patient has no known allergies.    Objective     Vital Signs  Temp:  [98 °F (36.7 °C)-98.1 °F (36.7 °C)] 98.1 °F (36.7 °C)  Heart Rate:  [] 110  Resp:  [18-31] 22  BP: (114-122)/(68) 114/68      Physical Exam:      General Appearance:    Alert, cooperative, moderate distress   Head:    Normocephalic, without obvious abnormality, atraumatic   Eyes:           Conjunctivae and sclerae normal, no   icterus,  no pallor, corneas clear, PERRLA   Throat:   No oral lesions, no thrush, oral mucosa moist   Neck:   No adenopathy, supple, trachea midline, no thyromegaly, no   carotid bruit, no JVD   Lungs:    Distant, poor adventitial flow, coarse rales bilateral bases    Heart:   Tachycardic   Chest Wall:    No abnormalities observedm, equal excursions   Abdomen:     Normal bowel sounds, no masses, no organomegaly, soft        non-tender, non-distended, no guarding, no rebound                tenderness, scaphoid   Rectal:     Deferred   Extremities:   Moves all extremities , slight pretibial edema, no cyanosis, no             redness   Pulses:   Pulses equivocal but equal bilaterally   Skin:   No bleeding, bruising or rash   Lymph nodes:   No palpable adenopathy   Neurologic:   Cranial nerves 2 - 12 grossly intact, sensation intact, DTR       present and equal bilaterally       Labs:  Lab Results (last 24 hours)     Procedure Component Value Units Date/Time    Blood Gas, Arterial - [[id number]]  (Abnormal) Collected: 06/07/21 0902    Specimen: Arterial Blood Updated: 06/07/21 0905     Site Right Radial     Brice's Test Positive     pH, Arterial 7.514 pH units      pCO2, Arterial 34.4 mm Hg      pO2, Arterial 49.4 mm Hg      HCO3, Arterial 27.7 mmol/L      Base Excess, Arterial 4.9 mmol/L      Comment: Serial Number: [id number]:  [id number]        O2 Saturation, Arterial 88.5 %      CO2 Content 28.7 mmol/L      Barometric Pressure for Blood Gas --     Comment: N/A        Modality Cannula     FIO2 45 %      Hemodilution No    POC Glucose Once [[id number]]  (Normal) Collected: 06/07/21 0823    Specimen: Blood Updated: 06/07/21 0825     Glucose 92 mg/dL      Comment: Serial Number: [id number]:  [id number]       Respiratory Panel PCR w/COVID-19(SARS-CoV-2) PARESH/GRACIELA/KATHY/PAD/COR/MAD/CHRIST In-House, NP Swab in UTM/VTM, 3-4 HR TAT - Swab, Nasopharynx [[id number]]  (Normal) Collected: 06/07/21 0620    Specimen: Swab from Nasopharynx  Updated: 06/07/21 0726     ADENOVIRUS, PCR Not Detected     Coronavirus 229E Not Detected     Coronavirus HKU1 Not Detected     Coronavirus NL63 Not Detected     Coronavirus OC43 Not Detected     COVID19 Not Detected     Human Metapneumovirus Not Detected     Human Rhinovirus/Enterovirus Not Detected     Influenza A PCR Not Detected     Influenza B PCR Not Detected     Parainfluenza Virus 1 Not Detected     Parainfluenza Virus 2 Not Detected     Parainfluenza Virus 3 Not Detected     Parainfluenza Virus 4 Not Detected     RSV, PCR Not Detected     Bordetella pertussis pcr Not Detected     Bordetella parapertussis PCR Not Detected     Chlamydophila pneumoniae PCR Not Detected     Mycoplasma pneumo by PCR Not Detected    Narrative:      In the setting of a positive respiratory panel with a viral infection PLUS a negative procalcitonin without other underlying concern for bacterial infection, consider observing off antibiotics or discontinuation of antibiotics and continue supportive care. If the respiratory panel is positive for atypical bacterial infection (Bordetella pertussis, Chlamydophila pneumoniae, or Mycoplasma pneumoniae), consider antibiotic de-escalation to target atypical bacterial infection.    Blood Culture - Blood, Arm, Left [[id number]] Collected: 06/07/21 0705    Specimen: Blood from Arm, Left Updated: 06/07/21 0719    Blood Culture - Blood, Arm, Right [[id number]] Collected: 06/07/21 0705    Specimen: Blood from Arm, Right Updated: 06/07/21 0719    POC Lactate [[id number]]  (Normal) Collected: 06/07/21 0706    Specimen: Blood Updated: 06/07/21 0707     Lactate 1.3 mmol/L      Comment: Serial Number: [id number]:  [id number]       Procalcitonin [[id number]]  (Normal) Collected: 06/07/21 0500    Specimen: Blood Updated: 06/07/21 0655     Procalcitonin 0.12 ng/mL     Narrative:      As a Marker for Sepsis (Non-Neonates):     1. <0.5 ng/mL represents a low risk of severe sepsis and/or septic shock.  2.  ">2 ng/mL represents a high risk of severe sepsis and/or septic shock.    As a Marker for Lower Respiratory Tract Infections that require antibiotic therapy:  PCT on Admission     Antibiotic Therapy             6-12 Hrs later  >0.5                          Strongly Recommended            >0.25 - <0.5             Recommended  0.1 - 0.25                  Discouraged                       Remeasure/reassess PCT  <0.1                         Strongly Discouraged         Remeasure/reassess PCT      As 28 day mortality risk marker: \"Change in Procalcitonin Result\" (>80% or <=80%) if Day 0 (or Day 1) and Day 4 values are available. Refer to http://www.[facility]-pct-calculator.com    Change in PCT <=80%   A decrease of PCT levels below or equal to 80% defines a positive change in PCT test result representing a higher risk for 28-day all-cause mortality of patients diagnosed with severe sepsis or septic shock.    Change in PCT >80%   A decrease of PCT levels of more than 80% defines a negative change in PCT result representing a lower risk for 28-day all-cause mortality of patients diagnosed with severe sepsis or septic shock.    This test is Prognostic not Diagnostic, if elevated correlate with clinical findings before administering antibiotic treatment.      Results may be falsely decreased if patient taking Biotin.     Manual Differential [[id number]]  (Abnormal) Collected: 06/07/21 0500    Specimen: Blood Updated: 06/07/21 0606     Neutrophil % 73.0 %      Lymphocyte % 10.0 %      Monocyte % 3.0 %      Bands %  12.0 %      Metamyelocyte % 2.0 %      Neutrophils Absolute 8.59 10*3/mm3      Lymphocytes Absolute 1.01 10*3/mm3      Monocytes Absolute 0.30 10*3/mm3      nRBC 2.0 /100 WBC      Anisocytosis Slight/1+     Macrocytes Slight/1+     Toxic Granulation Slight/1+     Platelet Morphology Normal    CBC & Differential [[id number]]  (Abnormal) Collected: 06/07/21 0500    Specimen: Blood Updated: 06/07/21 0606    Narrative:      " The following orders were created for panel order CBC & Differential.  Procedure                               Abnormality         Status                     ---------                               -----------         ------                     Scan Slide[[id number]]                                       Final result               CBC Auto Differential[[id number]]        Abnormal            Final result                 Please view results for these tests on the individual orders.    Scan Slide [[id number]] Collected: 06/07/21 0500    Specimen: Blood Updated: 06/07/21 0606     Scan Slide --     Comment: See Manual Differential Results       CBC Auto Differential [[id number]]  (Abnormal) Collected: 06/07/21 0500    Specimen: Blood Updated: 06/07/21 0606     WBC 10.10 10*3/mm3      RBC 2.49 10*6/mm3      Hemoglobin 9.2 g/dL      Hematocrit 27.3 %      .7 fL      MCH 37.1 pg      MCHC 33.8 g/dL      RDW 14.8 %      RDW-SD 56.4 fl      MPV 8.6 fL      Platelets 115 10*3/mm3     Narrative:      The previously reported component NRBC is no longer being reported. Previous result was 0.6 /100 WBC (Reference Range: 0.0-0.2 /100 WBC) on 6/7/2021 at 0519 EDT.    Extra Tubes [[id number]] Collected: 06/07/21 0500    Specimen: Blood, Venous Line Updated: 06/07/21 0601    Narrative:      The following orders were created for panel order Extra Tubes.  Procedure                               Abnormality         Status                     ---------                               -----------         ------                     Light Blue Top[[id number]]                                   Final result               Gold Top - SST[[id number]]                                   Final result                 Please view results for these tests on the individual orders.    Gold Top - SST [[id number]] Collected: 06/07/21 0500    Specimen: Blood Updated: 06/07/21 0601     Extra Tube Hold for add-ons.     Comment: Auto resulted.       Light Blue Top  [[id number]] Collected: 06/07/21 0500    Specimen: Blood Updated: 06/07/21 0601     Extra Tube hold for add-on     Comment: Auto resulted       Comprehensive Metabolic Panel [[id number]]  (Abnormal) Collected: 06/07/21 0500    Specimen: Blood Updated: 06/07/21 0541     Glucose 116 mg/dL      BUN 24 mg/dL      Creatinine 0.73 mg/dL      Sodium 134 mmol/L      Potassium 4.1 mmol/L      Chloride 97 mmol/L      CO2 26.0 mmol/L      Calcium 9.2 mg/dL      Total Protein 7.0 g/dL      Albumin 2.50 g/dL      ALT (SGPT) 87 U/L      AST (SGOT) 58 U/L      Alkaline Phosphatase 109 U/L      Total Bilirubin 0.7 mg/dL      eGFR Non African Amer 106 mL/min/1.73      Globulin 4.5 gm/dL      A/G Ratio 0.6 g/dL      BUN/Creatinine Ratio 32.9     Anion Gap 11.0 mmol/L     Narrative:      GFR Normal >60  Chronic Kidney Disease <60  Kidney Failure <15      Troponin [[id number]]  (Normal) Collected: 06/07/21 0500    Specimen: Blood Updated: 06/07/21 0541     Troponin T 0.012 ng/mL     Narrative:      Troponin T Reference Range:  <= 0.03 ng/mL-   Negative for AMI  >0.03 ng/mL-     Abnormal for myocardial necrosis.  Clinicians would have to utilize clinical acumen, EKG, Troponin and serial changes to determine if it is an Acute Myocardial Infarction or myocardial injury due to an underlying chronic condition.       Results may be falsely decreased if patient taking Biotin.      BNP [[id number]]  (Abnormal) Collected: 06/07/21 0500    Specimen: Blood Updated: 06/07/21 0538     proBNP 1,702.0 pg/mL     Narrative:      Among patients with dyspnea, NT-proBNP is highly sensitive for the detection of acute congestive heart failure. In addition NT-proBNP of <300 pg/ml effectively rules out acute congestive heart failure with 99% negative predictive value.    Results may be falsely decreased if patient taking Biotin.            Radiology:  XR Chest 1 View    Result Date: 6/7/2021  1. Redemonstration of right midlung mass, similar to the prior study.  2. Increased left midlung and left basilar airspace disease that may relate to superimposed pneumonia.  Electronically Signed By-Saleem Ferguson MD On:6/7/2021 7:25 AM This report was finalized on [id number] by  Saleem Ferguson MD.        Results Review:    I reviewed the patient's new clinical results.  I reviewed the patient's new imaging results and agree with the interpretation.    Assessment/Plan       Acute-on-chronic respiratory failure (CMS/HCC)  Probable underlying CHF  Metastatic lung cancer  Healthcare facility acquired pneumonia  Acute exacerbation COPD  Opioid dependence    Patient has been admitted, we can start him on aggressive pulmonary toilet, start DVT prophylaxis, parenteral steroids and antibiotic therapy.  Consulted infectious disease, pulmonology and the patient's usual cardiologist.    I noticed now that he has a systolic blood pressure less than 120s ; so will  hold his antihypertensives for now to see how he responds to therapy.    We will also start steroid coverage for hyperglycemia.    Expected Length of Stay 3-5 days    Patient is a do not really resuscitate per family and facility records.    I discussed the patients findings and my recommendations with patient and nursing staff.     Ignacia De La Cruz DO  06/07/21  09:57 EDT          Electronically signed by Ignacia De La Cruz DO at 06/07/21 1005          Emergency Department Notes      Higinio Flores MD at 06/07/21 7421          Subjective   History of Present Illness  Context: 70-year-old male history of the stage IV lung cancer presents with shortness of breath.  He has had no reported fever.  No vomiting no diarrhea.  He has back pain which is chronic but does not complain of new pain.  He had received mini neb treatment per EMS prior to arrival.  Breathing apparently became severe this morning.  He was reported to have saturation of 76% on 4 L at [facility].  Location: Chest  Quality: Dyspnea  Duration: Today  Timing:  Constant  Severity: Severe   Modifying factors: Stage IV lung cancer  Associated signs and symptoms: Back pain which is not new    Review of Systems   Constitutional: Negative for fever and unexpected weight change.   HENT: Negative for congestion and sore throat.    Eyes: Negative for pain.   Respiratory: Positive for shortness of breath. Negative for cough.    Cardiovascular: Negative for chest pain and leg swelling.   Gastrointestinal: Negative for abdominal pain, diarrhea and vomiting.   Genitourinary: Negative for dysuria and urgency.   Musculoskeletal: Positive for back pain.   Skin: Negative for rash.   Neurological: Positive for weakness. Negative for dizziness and headaches.   Psychiatric/Behavioral: Negative for confusion.       Past Medical History:   Diagnosis Date   • Aortic stenosis    • Cancer (CMS/HCC)     Sickle Cell Carcinoma   • Congestive heart failure (CHF) (CMS/HCC)     DIASTOLIC   • COPD (chronic obstructive pulmonary disease) (CMS/HCC)    • Coronary artery disease    • Hypertension    • Lung cancer (CMS/HCC)    • Myocardial infarction (CMS/HCC)    • Peripheral vascular disease (CMS/HCC)    • Valvular disease        No Known Allergies    Past Surgical History:   Procedure Laterality Date   • AORTIC VALVE REPAIR/REPLACEMENT  02/26/2018    Dr Maza   • BRONCHOSCOPY N/A 10/24/2020    Procedure: BRONCHOSCOPY with biopsy right upper lobe mass, and bronchoalveolar lavage right upper lobe;  Surgeon: Ángela Bean MD;  Location: [facility] ENDOSCOPY;  Service: Pulmonary;  Laterality: N/A;  post: right upper lobe mass, pneumonia   • BRONCHOSCOPY N/A 11/11/2020    Procedure: BRONCHOSCOPY with bronchial washing;  Surgeon: Ángela Bean MD;  Location: [facility] ENDOSCOPY;  Service: Pulmonary;  Laterality: N/A;  Post: lung mass, pneumonia   • BRONCHOSCOPY N/A 11/11/2020    Procedure: BRONCHOSCOPY RIGID with debulking of right main endobronchial tumor;  Surgeon: Nomi Reyes MD;  Location: [facility] MAIN OR;   Service: Cardiothoracic;  Laterality: N/A;   • BRONCHOSCOPY N/A 2020    Procedure: BRONCHOSCOPY;  Surgeon: Nomi Reyes MD;  Location: [facility] MAIN OR;  Service: Cardiothoracic;  Laterality: N/A;   • CARDIAC CATHETERIZATION  2017   • CORONARY ARTERY BYPASS GRAFT  2018    Dr Maza   • TIBIA FRACTURE SURGERY     • VENOUS ACCESS DEVICE (PORT) INSERTION Left 10/30/2020    Procedure: MEDIPORT INSERTION UNDER FLUOROSCOPIC GUIDENCE;  Surgeon: Nomi Reyes MD;  Location: [facility] MAIN OR;  Service: Cardiothoracic;  Laterality: Left;       Family History   Problem Relation Age of Onset   • Coronary artery disease Mother    • Cancer Father    • Stroke Daughter 35   • Seizures Daughter 35       Social History     Socioeconomic History   • Marital status:      Spouse name: Not on file   • Number of children: Not on file   • Years of education: Not on file   • Highest education level: Not on file   Tobacco Use   • Smoking status: Former Smoker     Packs/day: 1.00     Types: Cigarettes     Quit date: 10/2020     Years since quittin.6   • Smokeless tobacco: Never Used   Vaping Use   • Vaping Use: Never used   Substance and Sexual Activity   • Alcohol use: Yes   • Drug use: No   • Sexual activity: Defer     Prior to Admission medications    Medication Sig Start Date End Date Taking? Authorizing Provider   acetaminophen (TYLENOL) 650 MG suppository Insert 1 suppository into the rectum Every 6 (Six) Hours As Needed for Mild Pain , Headache or Fever. 3/8/21   Mauri Davies DO   albuterol sulfate  (90 Base) MCG/ACT inhaler  21   Provider, MD Paresh   aspirin (aspirin) 81 MG EC tablet Take 1 tablet by mouth Daily. 20   Ulices Cruz MD   atorvastatin (LIPITOR) 20 MG tablet Take 1 tablet by mouth every night at bedtime. 20   Iglesia Springer MD   bisacodyl (DULCOLAX) 10 MG suppository Insert 1 suppository into the rectum Daily As Needed for Constipation.  3/8/21   Mauri Davies DO   buprenorphine-naloxone (SUBOXONE) 8-2 MG per SL tablet Place 1.5 tablets under the tongue Daily. 12/12/20   Paresh Oliveira MD   docusate sodium 100 MG capsule Take 1 capsule by mouth 2 (Two) Times a Day. 3/8/21   Mauri Davies DO   ferrous sulfate 324 (65 Fe) MG tablet delayed-release EC tablet Take 1 tablet by mouth Daily With Breakfast. 11/13/20   Ulices Cruz MD   gabapentin (NEURONTIN) 300 MG capsule Take 300 mg by mouth 3 (Three) Times a Day. 12/9/20   Paresh Oliveira MD   guaiFENesin (MUCINEX) 600 MG 12 hr tablet Take 1 tablet by mouth Every 12 (Twelve) Hours. 3/8/21   Mauri Davies DO   hydrALAZINE (APRESOLINE) 50 MG tablet Take 1 tablet by mouth Every 12 (Twelve) Hours. 12/2/20   Iglesia Springer MD   lurasidone (LATUDA) 40 MG tablet tablet Take 40 mg by mouth Daily.    Paresh Oliveira MD   megestrol (MEGACE) 40 MG/ML suspension Take 200 mg by mouth 3 (Three) Times a Day.    Paresh Oliveira MD   melatonin 5 MG tablet tablet Take 1 tablet by mouth At Night As Needed (sleep). 3/8/21   Mauri Davies DO   metoprolol tartrate (LOPRESSOR) 25 MG tablet Take 2 tablets by mouth 2 (Two) Times a Day for 360 days. 12/2/20 11/27/21  Iglesia Springer MD   miconazole (MICOTIN) 2 % powder Apply  topically to the appropriate area as directed Every 12 (Twelve) Hours. 3/8/21   Mauri Davies DO   multivitamin (MULTI-VITAMIN PO) Take  by mouth Daily.    Paresh Oliveira MD   pantoprazole (Protonix) 40 MG EC tablet Take 1 tablet by mouth Daily. 10/30/20   Arvin Rushing,    polyethylene glycol (MIRALAX) 17 g packet Take 17 g by mouth Daily. 3/9/21   Tiny-Egziabher, Mauri I, DO   predniSONE (DELTASONE) 10 MG tablet Take 1 tablet by mouth Daily With Breakfast. 3/9/21   Mauri Davies I, DO   promethazine (PHENERGAN) 25 MG tablet Take 25 mg by mouth Every 4 (Four) Hours As Needed for Nausea  or Vomiting.    ProviderParesh MD   sertraline (ZOLOFT) 50 MG tablet Take 50 mg by mouth Daily.    ProviderParesh MD   spironolactone (Aldactone) 25 MG tablet Take 1 tablet by mouth Daily for 360 days. 12/2/20 11/27/21  Iglesia Springer MD   torsemide (DEMADEX) 20 MG tablet Take 10 mg by mouth Daily.    Provider, MD Paresh   Zinc Oxide (Boudreauxs Butt Paste) 16 % ointment Apply  topically 2 (Two) Times a Day. 3/8/21   Mauri Davies, DO           Objective   Physical Exam  70-year-old male awake alert.  He is generally thin and respiratory distress on arrival.  Pupils equal round react light.  Oropharynx clear neck supple chest reveals decreased breath sounds wheezes.  Cardiovascular regular rate and rhythm.  Abdomen soft nontender.  Extremities no tenderness edema.  Skin without rash noted.  Procedures          ED Course      Results for orders placed or performed during the hospital encounter of 06/07/21   Comprehensive Metabolic Panel    Specimen: Blood   Result Value Ref Range    Glucose 116 (H) 65 - 99 mg/dL    BUN 24 (H) 8 - 23 mg/dL    Creatinine 0.73 (L) 0.76 - 1.27 mg/dL    Sodium 134 (L) 136 - 145 mmol/L    Potassium 4.1 3.5 - 5.2 mmol/L    Chloride 97 (L) 98 - 107 mmol/L    CO2 26.0 22.0 - 29.0 mmol/L    Calcium 9.2 8.6 - 10.5 mg/dL    Total Protein 7.0 6.0 - 8.5 g/dL    Albumin 2.50 (L) 3.50 - 5.20 g/dL    ALT (SGPT) 87 (H) 1 - 41 U/L    AST (SGOT) 58 (H) 1 - 40 U/L    Alkaline Phosphatase 109 39 - 117 U/L    Total Bilirubin 0.7 0.0 - 1.2 mg/dL    eGFR Non African Amer 106 >60 mL/min/1.73    Globulin 4.5 gm/dL    A/G Ratio 0.6 g/dL    BUN/Creatinine Ratio 32.9 (H) 7.0 - 25.0    Anion Gap 11.0 5.0 - 15.0 mmol/L   Troponin    Specimen: Blood   Result Value Ref Range    Troponin T 0.012 0.000 - 0.030 ng/mL   BNP    Specimen: Blood   Result Value Ref Range    proBNP 1,702.0 (H) 0.0 - 900.0 pg/mL   CBC Auto Differential    Specimen: Blood   Result Value Ref Range    WBC  "10.10 3.40 - 10.80 10*3/mm3    RBC 2.49 (L) 4.14 - 5.80 10*6/mm3    Hemoglobin 9.2 (L) 13.0 - 17.7 g/dL    Hematocrit 27.3 (L) 37.5 - 51.0 %    .7 (H) 79.0 - 97.0 fL    MCH 37.1 (H) 26.6 - 33.0 pg    MCHC 33.8 31.5 - 35.7 g/dL    RDW 14.8 12.3 - 15.4 %    RDW-SD 56.4 (H) 37.0 - 54.0 fl    MPV 8.6 6.0 - 12.0 fL    Platelets 115 (L) 140 - 450 10*3/mm3   Scan Slide    Specimen: Blood   Result Value Ref Range    Scan Slide     Manual Differential    Specimen: Blood   Result Value Ref Range    Neutrophil % 73.0 42.7 - 76.0 %    Lymphocyte % 10.0 (L) 19.6 - 45.3 %    Monocyte % 3.0 (L) 5.0 - 12.0 %    Bands %  12.0 (H) 0.0 - 5.0 %    Metamyelocyte % 2.0 (H) 0.0 - 0.0 %    Neutrophils Absolute 8.59 (H) 1.70 - 7.00 10*3/mm3    Lymphocytes Absolute 1.01 0.70 - 3.10 10*3/mm3    Monocytes Absolute 0.30 0.10 - 0.90 10*3/mm3    nRBC 2.0 (H) 0.0 - 0.2 /100 WBC    Anisocytosis Slight/1+ None Seen    Macrocytes Slight/1+ None Seen    Toxic Granulation Slight/1+ None Seen    Platelet Morphology Normal Normal   ECG 12 Lead   Result Value Ref Range    QT Interval 339 ms   Light Blue Top   Result Value Ref Range    Extra Tube hold for add-on    Gold Top - SST   Result Value Ref Range    Extra Tube Hold for add-ons.      No radiology results for the last day  Medications   sodium chloride 0.9 % flush 10 mL (has no administration in time range)   ipratropium-albuterol (DUO-NEB) nebulizer solution 3 mL (3 mL Nebulization Given 6/7/21 0508)   furosemide (LASIX) injection 40 mg (40 mg Intravenous Given 6/7/21 0600)     /68 (BP Location: Right arm, Patient Position: Sitting)   Pulse 98   Resp 18   Ht 172.7 cm (68\")   Wt 67.3 kg (148 lb 6.4 oz)   SpO2 99%   BMI 22.56 kg/m²                                        MDM  Chart review: Patient is noted to have had office visit last month for occipital lobe brain metastasis.  Apparently has had radiation therapy for this.  Comorbidity: As per past history  Differential: " Bronchospasm, pneumonia, congestive heart failure, pneumothorax, bronchial obstruction  My EKG interpretation: Sinus tachycardia biatrial enlargement.  Compared to previous sinus tachycardia now present  Lab: BNP elevated 1700.  Comprehensive metabolic panel glucose 116 BUN 24 creatinine 0.73 White count 10.1 with hemoglobin 9.2 platelet count of 115 troponin normal  Radiology: I reviewed chest x-ray.  There is a large mass right midlung there is diffuse increased lung markings that appear to be consistent with edema  Discussion/treatment: Patient IV placed.  Was given additional DuoNeb placed on increased oxygen via nasal cannula.  Patient had a marked improvement in his respiratory distress with treatment.  He was given Lasix 40 mg IV.  Patient's findings were discussed with him.  He was discussed with Eliz Andrews.  Will be admitted to Dr. De La Cruz' service for further evaluation and care as needed.  He is noted to have documented DNR status.  Respiratory virus panel with Covid will be obtained patient is noted to have 12 bands to metamyelocytes on his differential he has not had this shift on previous CBCs.  He will have cultures and lactic acid obtained.  It is possible that he could have a multifocal pneumonia.  He will be given Rocephin and Zithromax.  A procalcitonin has been obtained and is pending.  Lactic acid was normal at 1.3 procalcitonin was also not elevated at 0.12.  Patient was evaluated using appropriate PPE      Final diagnoses:   Respiratory distress   Bronchospasm   Acute congestive heart failure, unspecified heart failure type (CMS/HCC)   Malignant neoplasm of right lung, unspecified part of lung (CMS/HCC)       ED Disposition  ED Disposition     ED Disposition Condition Comment    Decision to Admit            No follow-up provider specified.       Medication List      No changes were made to your prescriptions during this visit.          Higinio Flores MD  06/07/21 0450       Sandra  Higinio GERBER MD  06/07/21 0706      Electronically signed by Higinio Flores MD at 06/07/21 0706       Vital Signs (last 2 days)     Date/Time   Temp   Temp src   Pulse   Resp   BP   Patient Position   SpO2    06/08/21 0715   --   --   82   18   --   --   98    06/08/21 0710   --   --   78   18   --   --   97    06/08/21 0339   98.3 (36.8)   Oral   72   16   121/71   Lying   100    06/07/21 1921   98 (36.7)   Oral   102   16   100/61   Lying   98    06/07/21 1911   --   --   --   --   --   --   96    06/07/21 1906   --   --   96   16   --   --   96    06/07/21 1901   --   --   87   16   --   --   98    06/07/21 1521   --   --   87   18   --   --   --    06/07/21 1515   --   --   86   18   --   --   99    06/07/21 1257   97.7 (36.5)   Axillary   96   18   105/68   Lying   99    06/07/21 1157   --   --   99   20   --   --   96    06/07/21 1153   --   --   96   20   --   --   96    06/07/21 0917   --   --   110   22   --   --   93    06/07/21 0914   --   --   115   24   --   --   (!) 85    06/07/21 0825   98.1 (36.7)   Oral   95   18   114/68   Lying   96    06/07/21 0748   --   --   89   --   --   --   96    06/07/21 0742   --   --   98   --   --   --   98    06/07/21 0732   98 (36.7)   --   --   --   --   --   --    06/07/21 0511   --   --   98   18   --   --   --    06/07/21 0500   --   --   97   26   --   Lying   99    06/07/21 0451   --   --   108   (!) 31   122/68   Sitting   100               Physician Progress Notes (last 24 hours) (Notes from 06/07/21 0954 through 06/08/21 0954)      Ignacia De La Cruz DO at 06/08/21 0832               LOS: 1 day   Patient Care Team:  Sandoval Stevenson FNP as PCP - General  Sandoval Stevenson FNP as PCP - Family Medicine  Axel Lewis MD as Consulting Physician (Hematology and Oncology)  Iglesia Springer MD as Consulting Physician (Cardiology)    Chief Complaint: Still with cough and shortness of breath but overall less shortness of breath.  Still requiring high flow  oxygen up around 6 to 8 L.    Subjective     Interval History: Wife at bedside patient is now hungry and wants to eat    Patient Complaints: Hungry, still with loose wet cough but less short of breath  Patient Denies: No chest pain, no nausea or vomiting  History taken from: patient    Review of Systems:    All systems were reviewed and negative except for: Generalized weakness, positive cough, no hemoptysis, no chest pain fevers chills, positive shortness of breath but improved since admission    Objective     Vital Signs  Temp:  [97.7 °F (36.5 °C)-98.3 °F (36.8 °C)] 98.3 °F (36.8 °C)  Heart Rate:  [] 82  Resp:  [16-24] 18  BP: (100-121)/(61-71) 121/71    Physical Exam:     General Appearance:    Alert, cooperative, in no acute distress, frail and chronically ill-appearing, temporal wasting   Head:    Normocephalic, without obvious abnormality, atraumatic   Eyes:            Conjunctivae and sclerae normal, no   icterus, no pallor, corneas clear, PERRLA   Throat:   No oral lesions, no thrush, oral mucosa moist   Neck:   No adenopathy, supple, trachea midline, no thyromegaly, no   carotid bruit, no JVD   Lungs:    Coarse rhonchi bilateral bases and some rales at bases.  Poor adventitial flow    Heart:    Regular rhythm and normal rate, normal S1 and S2, no            murmur, no gallop, no rub, no click   Chest Wall:    No abnormalities observed   Abdomen:     Normal bowel sounds, no masses, no organomegaly, soft        non-tender, non-distended, no guarding, no rebound                tenderness, scaphoid   Rectal:     Deferred   Extremities:   Moves all extremities well, no edema, no cyanosis, no             redness   Pulses:   Pulses equivocal  But present and equal bilaterally   Skin:   No bleeding, bruising or rash   Lymph nodes:   No palpable adenopathy   Neurologic:   Cranial nerves 2 - 12 grossly intact, sensation intact, DTR       present and equal bilaterally   Radiology:  XR Chest 1 View    Result  Date: 6/7/2021  1. Redemonstration of right midlung mass, similar to the prior study. 2. Increased left midlung and left basilar airspace disease that may relate to superimposed pneumonia.  Electronically Signed By-Saleem Ferguson MD On:6/7/2021 7:25 AM This report was finalized on [id number] by  Saleem Ferguson MD.    CT Chest Pulmonary Embolism    Result Date: 6/7/2021  1. Negative for pulmonary embolus. 2. Redemonstration of large 9 cm right upper lobe mass which occludes the right upper lobe bronchus resulting in complete right upper and right middle lobe collapse similar to prior study. 3. Bronchial wall thickening with increased mucous plugging involving lower lobes with patchy areas of airspace disease most concerning for aspiration pneumonia. 4. Interlobular septal thickening and scattered groundglass opacities bilaterally which may relate to pulmonary edema and/or infection superimposed on background of advanced emphysema. 5. Stable mediastinal lymphadenopathy. 6. New 12 mm area of nodularity in left lower lobe may relate to aspiration/infection, recommend attention on follow-up.  Electronically Signed By-Saleem Ferguson MD On:6/7/2021 12:44 PM This report was finalized on [id number] by  Saleem Ferguson MD.         Results Review:     I reviewed the patient's new clinical results.  I reviewed the patient's new imaging results and agree with the interpretation.    Medication Review:   Scheduled Meds:aspirin, 81 mg, Oral, Daily  buprenorphine-naloxone, 1.5 tablet, Sublingual, Daily  docusate sodium, 100 mg, Oral, BID  enoxaparin, 40 mg, Subcutaneous, Q24H  gabapentin, 300 mg, Oral, TID  guaiFENesin, 600 mg, Oral, Q12H  insulin lispro, 0-7 Units, Subcutaneous, TID With Meals  ipratropium-albuterol, 3 mL, Nebulization, Q4H - RT  lurasidone, 40 mg, Oral, Daily  megestrol acetate, 200 mg, Oral, TID  methylPREDNISolone sodium succinate, 60 mg, Intravenous, Q6H  multivitamin with minerals, 1 tablet, Oral,  Daily  nystatin, 5 mL, Swish & Swallow, 4x Daily  pantoprazole, 40 mg, Oral, Daily  piperacillin-tazobactam, 4.5 g, Intravenous, Q8H  polyethylene glycol, 17 g, Oral, Daily  sertraline, 50 mg, Oral, Daily  spironolactone, 25 mg, Oral, Daily  Zinc Oxide, , Apply externally, BID      Continuous Infusions:   PRN Meds:.•  acetaminophen  •  albuterol  •  bisacodyl  •  dextrose  •  dextrose  •  glucagon (human recombinant)  •  insulin lispro  •  ipratropium-albuterol  •  melatonin  •  promethazine  •  [COMPLETED] Insert peripheral IV **AND** sodium chloride  •  sodium chloride    Labs:  Lab Results (last 24 hours)     Procedure Component Value Units Date/Time    Manual Differential [[id number]]  (Abnormal) Collected: 06/08/21 0624    Specimen: Blood Updated: 06/08/21 0742     Neutrophil % 76.0 %      Lymphocyte % 3.0 %      Monocyte % 3.0 %      Bands %  16.0 %      Metamyelocyte % 2.0 %      Neutrophils Absolute 8.10 10*3/mm3      Lymphocytes Absolute 0.26 10*3/mm3      Monocytes Absolute 0.26 10*3/mm3      nRBC 1.0 /100 WBC      Macrocytes Slight/1+     WBC Morphology Normal     Platelet Estimate Decreased    CBC & Differential [[id number]]  (Abnormal) Collected: 06/08/21 0624    Specimen: Blood Updated: 06/08/21 0742    Narrative:      The following orders were created for panel order CBC & Differential.  Procedure                               Abnormality         Status                     ---------                               -----------         ------                     Scan Slide[[id number]]                                       Final result               CBC Auto Differential[[id number]]        Abnormal            Final result                 Please view results for these tests on the individual orders.    Scan Slide [[id number]] Collected: 06/08/21 0624    Specimen: Blood Updated: 06/08/21 0742     Scan Slide --     Comment: See Manual Differential Results       CBC Auto Differential [[id number]]  (Abnormal)  Collected: 06/08/21 0624    Specimen: Blood Updated: 06/08/21 0742     WBC 8.80 10*3/mm3      RBC 1.94 10*6/mm3      Hemoglobin 7.2 g/dL      Comment: Result checked         Hematocrit 21.1 %      .8 fL      MCH 37.0 pg      MCHC 34.0 g/dL      RDW 14.4 %      RDW-SD 55.1 fl      MPV 8.9 fL      Platelets 103 10*3/mm3     Narrative:      The previously reported component NRBC is no longer being reported. Previous result was 0.3 /100 WBC (Reference Range: 0.0-0.2 /100 WBC) on 6/8/2021 at 0713 EDT.    Blood Culture - Blood, Arm, Left [[id number]] Collected: 06/07/21 0705    Specimen: Blood from Arm, Left Updated: 06/08/21 0730     Blood Culture No growth at 24 hours    Blood Culture - Blood, Arm, Right [[id number]] Collected: 06/07/21 0705    Specimen: Blood from Arm, Right Updated: 06/08/21 0730     Blood Culture No growth at 24 hours    Comprehensive Metabolic Panel [[id number]]  (Abnormal) Collected: 06/08/21 0624    Specimen: Blood Updated: 06/08/21 0718     Glucose 127 mg/dL      BUN 32 mg/dL      Creatinine 0.93 mg/dL      Sodium 139 mmol/L      Potassium 4.6 mmol/L      Chloride 100 mmol/L      CO2 30.0 mmol/L      Calcium 9.0 mg/dL      Total Protein 6.4 g/dL      Albumin 2.50 g/dL      ALT (SGPT) 64 U/L      AST (SGOT) 41 U/L      Alkaline Phosphatase 89 U/L      Total Bilirubin 0.6 mg/dL      eGFR Non African Amer 80 mL/min/1.73      Globulin 3.9 gm/dL      A/G Ratio 0.6 g/dL      BUN/Creatinine Ratio 34.4     Anion Gap 9.0 mmol/L     Narrative:      GFR Normal >60  Chronic Kidney Disease <60  Kidney Failure <15      POC Glucose Once [[id number]]  (Abnormal) Collected: 06/08/21 0708    Specimen: Blood Updated: 06/08/21 0709     Glucose 122 mg/dL      Comment: Serial Number: [id number]:  [id number]       MRSA Screen, PCR (Inpatient) - Swab, Nares [[id number]]  (Abnormal) Collected: 06/07/21 1701    Specimen: Swab from Nares Updated: 06/07/21 1941     MRSA PCR MRSA Detected    POC Glucose Once  [[id number]]  (Abnormal) Collected: 21    Specimen: Blood Updated: 21     Glucose 203 mg/dL      Comment: Serial Number: [id number]:  [id number]       POC Glucose Once [[id number]]  (Abnormal) Collected: 21    Specimen: Blood Updated: 21     Glucose 167 mg/dL      Comment: Serial Number: [id number]:  [id number]              Assessment/Plan      Acute on chronic respiratory distress  Suspect postobstructive pneumonia  COPD exacerbation  Metastatic lung cancer/squamous cell with new finding of of 12 mm nodular density on x-ray  CHF  Opioid dependence  Stage II skin ulcer sacral region    Coronary artery disease involving native coronary artery of native heart without angina pectoris    Nonrheumatic aortic valve stenosis    Essential hypertension    Congestive heart failure (CMS/HCC)    Tobacco abuse    Squamous cell carcinoma of lung, right (CMS/HCC)    Acute-on-chronic respiratory failure (CMS/HCC)    Respiratory distress    Stage 2 skin ulcer of sacral region (CMS/HCC)      Pulmonology, wound care and cardiology inputs greatly appreciated, thank you.  Patient making some clinical improvement today so continue current approach with pulmonary toilet/DVT prophylaxis, parenteral antibiotics and steroids.  We will continue to follow.        Ignacia De La Cruz DO  21  08:32 EDT          Electronically signed by Ignacia De La Cruz DO at 21 0837     Lianna Groves MD at 21 0808          PULMONARY CRITICAL CARE Progress  NOTE      PATIENT IDENTIFICATION:  Name: Axel Ramirez  MRN: [id number]  :  1950     Age: 70 y.o.  Sex: male    DATE OF Note:  2021   Referring Physician: Ignacia De La Cruz DO                  Subjective:   Feeling better, no new issue, no SOB no chest pain, no nausea or vomiting, no change in bowel habit, no dysuria,  no new  skin rash or itching.      Objective:  tMax 24 hrs: Temp (24hrs), Av °F (36.7 °C), Min:97.7 °F  "(36.5 °C), Max:98.3 °F (36.8 °C)      Vitals Ranges:   Temp:  [97.7 °F (36.5 °C)-98.3 °F (36.8 °C)] 98.3 °F (36.8 °C)  Heart Rate:  [] 82  Resp:  [16-24] 18  BP: (100-121)/(61-71) 121/71    Intake and Output Last 3 Shifts:   I/O last 3 completed shifts:  In: 450 [I.V.:350; IV Piggyback:100]  Out: -     Exam:  /71 (BP Location: Right arm, Patient Position: Lying)   Pulse 82   Temp 98.3 °F (36.8 °C) (Oral)   Resp 18   Ht 172.7 cm (68\")   Wt 67.3 kg (148 lb 6.4 oz)   SpO2 98%   BMI 22.56 kg/m²     General Appearance:     HEENT:  Normocephalic, without obvious abnormality, Conjunctiva/corneas clear,.  Normal external ear canals, Nares normal, no drainage     Neck:  Supple, symmetrical, trachea midline. No JVD.  Lungs /Chest wall:   Bilateral basal rhonchi, respirations unlabored symmetrical wall movement.     Heart:  Regular rate and rhythm, systolic murmur PMI left sternal border  Abdomen: Soft, non-tender, no masses, no organomegaly.    Extremities: Trace edema no clubbing or Cyanosis        Medications:    Current Facility-Administered Medications:   •  acetaminophen (TYLENOL) suppository 650 mg, 650 mg, Rectal, Q6H PRN, Ignacia De La Cruz, DO  •  albuterol (PROVENTIL) nebulizer solution 0.083% 2.5 mg/3mL, 2.5 mg, Nebulization, Q2H PRN, Ignacia De La Cruz, DO, 2.5 mg at 06/07/21 0914  •  aspirin EC tablet 81 mg, 81 mg, Oral, Daily, Ignacia De La Cruz DO, 81 mg at 06/07/21 1032  •  bisacodyl (DULCOLAX) suppository 10 mg, 10 mg, Rectal, Daily PRN, Ignacia De La Cruz A, DO  •  buprenorphine-naloxone (SUBOXONE) 8-2 MG per SL tablet 1.5 tablet, 1.5 tablet, Sublingual, Daily, Ignacia De La Cruz DO, 1.5 tablet at 06/07/21 1031  •  dextrose (D50W) 25 g/ 50mL Intravenous Solution 25 g, 25 g, Intravenous, Q15 Min PRN, Ignacia De La Cruz DO  •  dextrose (GLUTOSE) oral gel 15 g, 15 g, Oral, Q15 Min PRN, Ignacia De La Cruz DO  •  docusate sodium (COLACE) capsule 100 mg, 100 mg, Oral, BID, Ignacia De La Cruz DO, 100 mg at 06/07/21 2049  •  " enoxaparin (LOVENOX) syringe 40 mg, 40 mg, Subcutaneous, Q24H, Ignacia De La Cruz DO, 40 mg at 06/07/21 1658  •  gabapentin (NEURONTIN) capsule 300 mg, 300 mg, Oral, TID, Ignacia De La Cruz, , 300 mg at 06/07/21 2050  •  glucagon (human recombinant) (GLUCAGEN DIAGNOSTIC) injection 1 mg, 1 mg, Subcutaneous, Q15 Min PRN, Ignacia De La Cruz DO  •  guaiFENesin (MUCINEX) 12 hr tablet 600 mg, 600 mg, Oral, Q12H, Ignacia De La Cruz DO, 600 mg at 06/07/21 2049  •  insulin lispro (ADMELOG) injection 0-7 Units, 0-7 Units, Subcutaneous, TID With Meals, Ignacia De La Cruz DO, 2 Units at 06/07/21 1817  •  insulin lispro (ADMELOG) injection 0-7 Units, 0-7 Units, Subcutaneous, TID PRN, Ignacia De La Cruz DO  •  ipratropium-albuterol (DUO-NEB) nebulizer solution 3 mL, 3 mL, Nebulization, Q4H - RT, Ignacia De La Cruz DO, 3 mL at 06/08/21 0710  •  ipratropium-albuterol (DUO-NEB) nebulizer solution 3 mL, 3 mL, Nebulization, Q1H PRN, Ignacia De La Cruz DO  •  lurasidone (LATUDA) tablet 40 mg, 40 mg, Oral, Daily, Ignacia De La Cruz DO, 40 mg at 06/07/21 1032  •  megestrol acetate (MEGACE) oral suspension 200 mg, 200 mg, Oral, TID, Ignacia De La Cruz DO, 200 mg at 06/07/21 2050  •  melatonin tablet 5 mg, 5 mg, Oral, Nightly PRN, Ignacia De La Cruz DO  •  methylPREDNISolone sodium succinate (SOLU-Medrol) injection 60 mg, 60 mg, Intravenous, Q6H, Ignacia De La Cruz DO, 60 mg at 06/08/21 0415  •  multivitamin with minerals 1 tablet, 1 tablet, Oral, Daily, Ignacia De La Cruz DO, 1 tablet at 06/07/21 1032  •  nystatin (MYCOSTATIN) 364646 UNIT/ML suspension 500,000 Units, 5 mL, Swish & Swallow, 4x Daily, Ignacia De La Cruz DO, 500,000 Units at 06/07/21 2050  •  pantoprazole (PROTONIX) EC tablet 40 mg, 40 mg, Oral, Daily, Ignacia De La Cruz DO, 40 mg at 06/07/21 1032  •  piperacillin-tazobactam (ZOSYN) IVPB 4.5 g in 100 mL NS (CD), 4.5 g, Intravenous, Q8H, Ignacia De La Cruz DO, Last Rate: 0 mL/hr at 06/08/21 0050, 4.5 g at 06/08/21 0415  •  polyethylene glycol (MIRALAX) packet 17 g, 17 g,  Oral, Daily, De La Cruz, Ignacia A, DO, 17 g at 06/07/21 1032  •  promethazine (PHENERGAN) tablet 25 mg, 25 mg, Oral, Q4H PRN, De La Cruz, Ignacia A, DO  •  sertraline (ZOLOFT) tablet 50 mg, 50 mg, Oral, Daily, De La Cruz, Ignacia A, DO, 50 mg at 06/07/21 1032  •  [COMPLETED] Insert peripheral IV, , , Once **AND** sodium chloride 0.9 % flush 10 mL, 10 mL, Intravenous, PRN, De La Cruz, Ignacia A, DO  •  sodium chloride 0.9 % flush 10 mL, 10 mL, Intravenous, PRN, De La Cruz, Ignacia A, DO  •  spironolactone (ALDACTONE) tablet 25 mg, 25 mg, Oral, Daily, De La Cruz, Ignacia A, DO, 25 mg at 06/07/21 1032  •  Zinc Oxide 16 % ointment, , Apply externally, BID, Carina Luevano APRN, Given at 06/07/21 2051    Data Review:  All labs (24hrs):   Recent Results (from the past 24 hour(s))   POC Glucose Once    Collection Time: 06/07/21  8:23 AM    Specimen: Blood   Result Value Ref Range    Glucose 92 70 - 105 mg/dL   Blood Gas, Arterial -    Collection Time: 06/07/21  9:02 AM    Specimen: Arterial Blood   Result Value Ref Range    Site Right Radial     Brice's Test Positive     pH, Arterial 7.514 (H) 7.350 - 7.450 pH units    pCO2, Arterial 34.4 (L) 35.0 - 48.0 mm Hg    pO2, Arterial 49.4 (L) 83.0 - 108.0 mm Hg    HCO3, Arterial 27.7 21.0 - 28.0 mmol/L    Base Excess, Arterial 4.9 (H) 0.0 - 3.0 mmol/L    O2 Saturation, Arterial 88.5 (L) 94.0 - 98.0 %    CO2 Content 28.7 22 - 29 mmol/L    Barometric Pressure for Blood Gas      Modality Cannula     FIO2 45 %    Hemodilution No    D-dimer, Quantitative    Collection Time: 06/07/21 10:52 AM    Specimen: Blood   Result Value Ref Range    D-Dimer, Quantitative 2.98 (H) 0.00 - 0.59 mg/L (FEU)   POC Glucose Once    Collection Time: 06/07/21 12:24 PM    Specimen: Blood   Result Value Ref Range    Glucose 122 (H) 70 - 105 mg/dL   MRSA Screen, PCR (Inpatient) - Swab, Nares    Collection Time: 06/07/21  5:01 PM    Specimen: Nares; Swab   Result Value Ref Range    MRSA PCR MRSA Detected (A) No MRSA Detected   POC Glucose Once     Collection Time: 06/07/21  5:11 PM    Specimen: Blood   Result Value Ref Range    Glucose 167 (H) 70 - 105 mg/dL   POC Glucose Once    Collection Time: 06/07/21  7:23 PM    Specimen: Blood   Result Value Ref Range    Glucose 203 (H) 70 - 105 mg/dL   Comprehensive Metabolic Panel    Collection Time: 06/08/21  6:24 AM    Specimen: Blood   Result Value Ref Range    Glucose 127 (H) 65 - 99 mg/dL    BUN 32 (H) 8 - 23 mg/dL    Creatinine 0.93 0.76 - 1.27 mg/dL    Sodium 139 136 - 145 mmol/L    Potassium 4.6 3.5 - 5.2 mmol/L    Chloride 100 98 - 107 mmol/L    CO2 30.0 (H) 22.0 - 29.0 mmol/L    Calcium 9.0 8.6 - 10.5 mg/dL    Total Protein 6.4 6.0 - 8.5 g/dL    Albumin 2.50 (L) 3.50 - 5.20 g/dL    ALT (SGPT) 64 (H) 1 - 41 U/L    AST (SGOT) 41 (H) 1 - 40 U/L    Alkaline Phosphatase 89 39 - 117 U/L    Total Bilirubin 0.6 0.0 - 1.2 mg/dL    eGFR Non African Amer 80 >60 mL/min/1.73    Globulin 3.9 gm/dL    A/G Ratio 0.6 g/dL    BUN/Creatinine Ratio 34.4 (H) 7.0 - 25.0    Anion Gap 9.0 5.0 - 15.0 mmol/L   CBC Auto Differential    Collection Time: 06/08/21  6:24 AM    Specimen: Blood   Result Value Ref Range    WBC 8.80 3.40 - 10.80 10*3/mm3    RBC 1.94 (L) 4.14 - 5.80 10*6/mm3    Hemoglobin 7.2 (L) 13.0 - 17.7 g/dL    Hematocrit 21.1 (L) 37.5 - 51.0 %    .8 (H) 79.0 - 97.0 fL    MCH 37.0 (H) 26.6 - 33.0 pg    MCHC 34.0 31.5 - 35.7 g/dL    RDW 14.4 12.3 - 15.4 %    RDW-SD 55.1 (H) 37.0 - 54.0 fl    MPV 8.9 6.0 - 12.0 fL    Platelets 103 (L) 140 - 450 10*3/mm3   Scan Slide    Collection Time: 06/08/21  6:24 AM    Specimen: Blood   Result Value Ref Range    Scan Slide     Manual Differential    Collection Time: 06/08/21  6:24 AM    Specimen: Blood   Result Value Ref Range    Neutrophil % 76.0 42.7 - 76.0 %    Lymphocyte % 3.0 (L) 19.6 - 45.3 %    Monocyte % 3.0 (L) 5.0 - 12.0 %    Bands %  16.0 (H) 0.0 - 5.0 %    Metamyelocyte % 2.0 (H) 0.0 - 0.0 %    Neutrophils Absolute 8.10 (H) 1.70 - 7.00 10*3/mm3    Lymphocytes Absolute  0.26 (L) 0.70 - 3.10 10*3/mm3    Monocytes Absolute 0.26 0.10 - 0.90 10*3/mm3    nRBC 1.0 (H) 0.0 - 0.2 /100 WBC    Macrocytes Slight/1+ None Seen    WBC Morphology Normal Normal    Platelet Estimate Decreased Normal   POC Glucose Once    Collection Time: 06/08/21  7:08 AM    Specimen: Blood   Result Value Ref Range    Glucose 122 (H) 70 - 105 mg/dL        Imaging:  CT Chest Pulmonary Embolism  Narrative:    DATE OF EXAM:  6/7/2021 12:17 PM     PROCEDURE:  CT CHEST PULMONARY EMBOLISM-     INDICATIONS:   elevated d-dimer; R06.03-Acute respiratory distress; J98.01-Acute  bronchospasm; I50.9-Heart failure, unspecified; C34.91-Malignant  neoplasm of unspecified part of right bronchus or lung     COMPARISON:   CT chest with contrast 2/18/2021     TECHNIQUE:  Routine transaxial slices were obtained through chest after  administration of intravenous 100 ml of Isovue 370. Reconstructed  coronal and sagittal images were also obtained. Automated exposure  control and iterative reconstruction methods were used.      FINDINGS:  Soft tissues of the lower neck demonstrate left port catheter with tip  terminating in low SVC. Postsurgical changes of sternotomy and CABG with  aortic valve replacement. Pulmonary arteries well-opacified with  contrast. Negative for pulmonary embolus. Right paratracheal lymph node  measuring 10 mm unchanged in image 44. Additional small left  paratracheal lymph nodes stable for example measuring 9 mm on image 54.  Thoracic aortic branch vasculature is patent. No axillary adenopathy.     Redemonstration of large mass involving the right upper lobe which is  similar to the prior study when accounting for technical differences  measuring 9.1 x 7.6 cm, previously 9.3 x 8 cm. This results in complete  right upper lobe collapse and compressive atelectasis, unchanged. There  is again also compressive atelectasis involving the right middle lobe  which is unchanged.     Advanced emphysema. No pneumothorax.  There are areas of bronchial wall  thickening and mucous plugging involving the lower lobes which may  relate to aspiration pneumonia.  New area of nodularity in the left  lower lobe measures 12 x 9 mm on image 98. There are areas of  interlobular septal thickening and groundglass opacities involving the  lower lobes and left upper lobe which could relate to superimposed  infection or mild pulmonary edema superimposed on background of advanced  emphysema.     Three low-density hepatic lesions noted likely cysts for example in the  right hepatic lobe measuring 16 mm and 17 mm on image 138. Small hiatal  hernia. Spleen normal in size. Normal adrenal glands. Pancreas without  findings of pancreatitis. Bilateral renal cysts noted. No  hydronephrosis. Negative for acute fracture.      Impression: 1. Negative for pulmonary embolus.  2. Redemonstration of large 9 cm right upper lobe mass which occludes  the right upper lobe bronchus resulting in complete right upper and  right middle lobe collapse similar to prior study.  3. Bronchial wall thickening with increased mucous plugging involving  lower lobes with patchy areas of airspace disease most concerning for  aspiration pneumonia.  4. Interlobular septal thickening and scattered groundglass opacities  bilaterally which may relate to pulmonary edema and/or infection  superimposed on background of advanced emphysema.  5. Stable mediastinal lymphadenopathy.  6. New 12 mm area of nodularity in left lower lobe may relate to  aspiration/infection, recommend attention on follow-up.     Electronically Signed By-Saleem Ferguson MD On:6/7/2021 12:44 PM  This report was finalized on [id number] by  Saleem Ferguson MD.  XR Chest 1 View  Narrative:    DATE OF EXAM:   6/7/2021 4:55 AM     PROCEDURE:   XR CHEST 1 VW-     INDICATIONS:   soa     COMPARISON:  3/9/2021     TECHNIQUE:   Portable chest radiograph.     FINDINGS:    Right midlung perihilar mass again noted similar to the prior  study.  There is a left-sided port catheter with the tip at the cavoatrial  junction. Heart size normal. There is increased left midlung and left  basilar airspace disease may relate to superimposed pneumonia. No  pneumothorax or large effusion. Osseous structures grossly intact.  Postsurgical changes of sternotomy and CABG.     Impression: 1. Redemonstration of right midlung mass, similar to the prior study.  2. Increased left midlung and left basilar airspace disease that may  relate to superimposed pneumonia.     Electronically Signed By-Saleem Ferguson MD On:2021 7:25 AM  This report was finalized on [id number] by  Saleem Ferguson MD.       ASSESSMENT:  Acute-on-chronic respiratory distress  Pneumonia most likely post obstruction  COPD exacerbation  Metastatic lung cancer squamous cell  CHF  Opioid dependence  Stage 2 skin ulcer of sacral region       PLAN:  The mass is extending from the original mass, follow-up with oncology  Antibiotics  Bronchodilator  Inhaled corticosteroids  IV steroids  IS/Flutter valve  Electrolytes/ glycemic control  DVT and GI prophylaxis.   . Poor long-term prognosis    Total Critical care time in direct medical management (   ) minutes  Lianna Groves MD. D, ABSM.     2021  08:08 EDT     Electronically signed by Lianna Groves MD at 21 0809     Carina Luevano APRN at 21 1338          Wound Initial Evaluation   AMANDEEP     Patient Name: Axel Ramirez  : 1950  MRN: [id number]  Today's Date: 2021 Room Number: 225/1      Admit Date: 2021  Attending: Ignacia De La Cruz DO    Consult Requested By: Dr De La Cruz    Reason For Consult: pressure injuries    Chief Complaint: 70-year-old male presents to the hospital with shortness of breath.  Patient currently had a ECF.  History significant for stage IV lung cancer.  Patient presents with a sacral pressure injury patient does complain of pain and discomfort with injury he is not really able to give me a  timeline of how long the injury has been present.    Visit Dx:    ICD-10-CM ICD-9-CM   1. Respiratory distress  R06.03 786.09   2. Bronchospasm  J98.01 519.11   3. Acute congestive heart failure, unspecified heart failure type (CMS/HCC)  I50.9 428.0   4. Malignant neoplasm of right lung, unspecified part of lung (CMS/HCC)  C34.91 162.9     Patient Active Problem List   Diagnosis   • Coronary artery disease involving native coronary artery of native heart without angina pectoris   • Nonrheumatic aortic valve stenosis   • Mixed hyperlipidemia   • Essential hypertension   • Fever   • Presence of aortocoronary bypass graft   • Congestive heart failure (CMS/HCC)   • Hx of aortic valve replacement   • CAP (community acquired pneumonia)   • Tobacco abuse   • Postobstructive pneumonia   • Lung mass   • Squamous cell carcinoma of lung, right (CMS/HCC)   • Diastolic CHF, acute (CMS/HCC)   • Pneumonia of right lung due to infectious organism   • Pneumonia due to infectious organism   • Moderate malnutrition (CMS/HCC)   • Acute on chronic respiratory failure with hypoxia (CMS/HCC)   • COPD with acute exacerbation (CMS/HCC)   • Delirium   • Brain metastasis (CMS/HCC)   • Acute-on-chronic respiratory failure (CMS/HCC)   • Respiratory distress   • Stage 2 skin ulcer of sacral region (CMS/HCC)       History:   Past Medical History:   Diagnosis Date   • Aortic stenosis    • Cancer (CMS/HCC)     Sickle Cell Carcinoma   • Congestive heart failure (CHF) (CMS/HCC)     DIASTOLIC   • COPD (chronic obstructive pulmonary disease) (CMS/HCC)    • Coronary artery disease    • Hypertension    • Lung cancer (CMS/HCC)    • Myocardial infarction (CMS/HCC)    • Peripheral vascular disease (CMS/HCC)    • Pneumonia 06/07/2021   • Valvular disease      Past Surgical History:   Procedure Laterality Date   • AORTIC VALVE REPAIR/REPLACEMENT  02/26/2018    Dr Maza   • BRONCHOSCOPY N/A 10/24/2020    Procedure: BRONCHOSCOPY with biopsy right upper lobe  mass, and bronchoalveolar lavage right upper lobe;  Surgeon: Ángela Bean MD;  Location: [facility] ENDOSCOPY;  Service: Pulmonary;  Laterality: N/A;  post: right upper lobe mass, pneumonia   • BRONCHOSCOPY N/A 2020    Procedure: BRONCHOSCOPY with bronchial washing;  Surgeon: Ángela Bean MD;  Location: [facility] ENDOSCOPY;  Service: Pulmonary;  Laterality: N/A;  Post: lung mass, pneumonia   • BRONCHOSCOPY N/A 2020    Procedure: BRONCHOSCOPY RIGID with debulking of right main endobronchial tumor;  Surgeon: Nomi Reyes MD;  Location: [facility] MAIN OR;  Service: Cardiothoracic;  Laterality: N/A;   • BRONCHOSCOPY N/A 2020    Procedure: BRONCHOSCOPY;  Surgeon: Nomi Reyes MD;  Location: [facility] MAIN OR;  Service: Cardiothoracic;  Laterality: N/A;   • CARDIAC CATHETERIZATION  2017   • CORONARY ARTERY BYPASS GRAFT  2018    Dr Maza   • TIBIA FRACTURE SURGERY     • VENOUS ACCESS DEVICE (PORT) INSERTION Left 10/30/2020    Procedure: MEDIPORT INSERTION UNDER FLUOROSCOPIC GUIDENCE;  Surgeon: Nomi Reyes MD;  Location: [facility] MAIN OR;  Service: Cardiothoracic;  Laterality: Left;     Social History     Socioeconomic History   • Marital status:      Spouse name: Not on file   • Number of children: Not on file   • Years of education: Not on file   • Highest education level: Not on file   Tobacco Use   • Smoking status: Former Smoker     Packs/day: 1.00     Types: Cigarettes     Quit date: 10/2020     Years since quittin.6   • Smokeless tobacco: Never Used   Vaping Use   • Vaping Use: Never used   Substance and Sexual Activity   • Alcohol use: Not Currently   • Drug use: No   • Sexual activity: Defer       Allergies:  No Known Allergies    Medications:    Current Facility-Administered Medications:   •  acetaminophen (TYLENOL) suppository 650 mg, 650 mg, Rectal, Q6H PRN, Ignacia De La Cruz DO  •  albuterol (PROVENTIL) nebulizer solution 0.083% 2.5 mg/3mL, 2.5 mg, Nebulization, Q2H PRN,  Ignacia De La Cruz DO, 2.5 mg at 06/07/21 0914  •  aspirin EC tablet 81 mg, 81 mg, Oral, Daily, Ignacia De La Cruz DO, 81 mg at 06/07/21 1032  •  bisacodyl (DULCOLAX) suppository 10 mg, 10 mg, Rectal, Daily PRN, Ignacia De La Cruz DO  •  buprenorphine-naloxone (SUBOXONE) 8-2 MG per SL tablet 1.5 tablet, 1.5 tablet, Sublingual, Daily, Ignacia De La Cruz DO, 1.5 tablet at 06/07/21 1031  •  dextrose (D50W) 25 g/ 50mL Intravenous Solution 25 g, 25 g, Intravenous, Q15 Min PRN, Ignacia De La Cruz DO  •  dextrose (GLUTOSE) oral gel 15 g, 15 g, Oral, Q15 Min PRN, Ignacia De La Cruz DO  •  docusate sodium (COLACE) capsule 100 mg, 100 mg, Oral, BID, Ignacia De La Cruz DO, 100 mg at 06/07/21 1032  •  enoxaparin (LOVENOX) syringe 40 mg, 40 mg, Subcutaneous, Q24H, Ignacia De La Cruz DO  •  gabapentin (NEURONTIN) capsule 300 mg, 300 mg, Oral, TID, Ignacia De La Cruz DO, 300 mg at 06/07/21 1032  •  glucagon (human recombinant) (GLUCAGEN DIAGNOSTIC) injection 1 mg, 1 mg, Subcutaneous, Q15 Min PRN, Ignacia De La Cruz DO  •  guaiFENesin (MUCINEX) 12 hr tablet 600 mg, 600 mg, Oral, Q12H, Ignacia De La Cruz DO, 600 mg at 06/07/21 1032  •  insulin lispro (ADMELOG) injection 0-7 Units, 0-7 Units, Subcutaneous, TID With Meals, Ignacia De La Cruz DO  •  insulin lispro (ADMELOG) injection 0-7 Units, 0-7 Units, Subcutaneous, TID PRN, Ignacia De La Cruz DO  •  ipratropium-albuterol (DUO-NEB) nebulizer solution 3 mL, 3 mL, Nebulization, Q4H - RT, Ignacia De La Cruz DO, 3 mL at 06/07/21 1153  •  ipratropium-albuterol (DUO-NEB) nebulizer solution 3 mL, 3 mL, Nebulization, Q1H PRN, Ignacia De La Cruz DO  •  lurasidone (LATUDA) tablet 40 mg, 40 mg, Oral, Daily, Ignacia De La Cruz DO, 40 mg at 06/07/21 1032  •  megestrol acetate (MEGACE) oral suspension 200 mg, 200 mg, Oral, TID, Ignacia De La Cruz DO  •  melatonin tablet 5 mg, 5 mg, Oral, Nightly PRN, Ignacia De La Cruz DO  •  methylPREDNISolone sodium succinate (SOLU-Medrol) injection 60 mg, 60 mg, Intravenous, Q6H, Ignacia De La Cruz DO  •   multivitamin with minerals 1 tablet, 1 tablet, Oral, Daily, Ignacia De La Cruz, DO, 1 tablet at 06/07/21 1032  •  nystatin (MYCOSTATIN) 807347 UNIT/ML suspension 500,000 Units, 5 mL, Swish & Swallow, 4x Daily, Ignacia De La Cruz A, DO, 500,000 Units at 06/07/21 1242  •  pantoprazole (PROTONIX) EC tablet 40 mg, 40 mg, Oral, Daily, Ignacia De La Cruz A, DO, 40 mg at 06/07/21 1032  •  piperacillin-tazobactam (ZOSYN) IVPB 4.5 g in 100 mL NS (CD), 4.5 g, Intravenous, Q8H, Ignacia De La Cruz DO  •  polyethylene glycol (MIRALAX) packet 17 g, 17 g, Oral, Daily, Ignacia De La Cruz A, DO, 17 g at 06/07/21 1032  •  promethazine (PHENERGAN) tablet 25 mg, 25 mg, Oral, Q4H PRN, Ignacia De La Cruz, DO  •  sertraline (ZOLOFT) tablet 50 mg, 50 mg, Oral, Daily, Ignacia De La Cruz A, DO, 50 mg at 06/07/21 1032  •  [COMPLETED] Insert peripheral IV, , , Once **AND** sodium chloride 0.9 % flush 10 mL, 10 mL, Intravenous, PRN, Ghazal De La Cruzn A, DO  •  sodium chloride 0.9 % flush 10 mL, 10 mL, Intravenous, PRN, Jono, Ignacia A, DO  •  spironolactone (ALDACTONE) tablet 25 mg, 25 mg, Oral, Daily, Ignacia De La Cruz A, DO, 25 mg at 06/07/21 1032  •  Zinc Oxide 16 % ointment, , Apply externally, BID, Carina Luevano APRN    Results Review:  Lab Results (last 48 hours)     Procedure Component Value Units Date/Time    POC Glucose Once [[id number]]  (Abnormal) Collected: 06/07/21 1224    Specimen: Blood Updated: 06/07/21 1226     Glucose 122 mg/dL      Comment: Serial Number: [id number]:  [id number]       D-dimer, Quantitative [[id number]]  (Abnormal) Collected: 06/07/21 1052    Specimen: Blood Updated: 06/07/21 1150     D-Dimer, Quantitative 2.98 mg/L (FEU)     Narrative:      Reference Range  --------------------------------------------------------------------     < 0.50   Negative Predictive Value  0.50-0.59   Indeterminate    >= 0.60   Probable VTE             A very low percentage of patients with DVT may yield D-Dimer results   below the cut-off of 0.50 mg/L FEU.  This is known to  be more   prevalent in patients with distal DVT.             Results of this test should always be interpreted in conjunction with   the patient's medical history, clinical presentation and other   findings.  Clinical diagnosis should not be based on the result of   INNOVANCE D-Dimer alone.    Blood Gas, Arterial - [[id number]]  (Abnormal) Collected: 06/07/21 0902    Specimen: Arterial Blood Updated: 06/07/21 0905     Site Right Radial     Brice's Test Positive     pH, Arterial 7.514 pH units      pCO2, Arterial 34.4 mm Hg      pO2, Arterial 49.4 mm Hg      HCO3, Arterial 27.7 mmol/L      Base Excess, Arterial 4.9 mmol/L      Comment: Serial Number: [id number]:  [id number]        O2 Saturation, Arterial 88.5 %      CO2 Content 28.7 mmol/L      Barometric Pressure for Blood Gas --     Comment: N/A        Modality Cannula     FIO2 45 %      Hemodilution No    POC Glucose Once [[id number]]  (Normal) Collected: 06/07/21 0823    Specimen: Blood Updated: 06/07/21 0825     Glucose 92 mg/dL      Comment: Serial Number: [id number]:  [id number]       Respiratory Panel PCR w/COVID-19(SARS-CoV-2) PARESH/GRACIELA/KATHY/PAD/COR/MAD/CHRIST In-House, NP Swab in UTM/VTM, 3-4 HR TAT - Swab, Nasopharynx [[id number]]  (Normal) Collected: 06/07/21 0620    Specimen: Swab from Nasopharynx Updated: 06/07/21 0726     ADENOVIRUS, PCR Not Detected     Coronavirus 229E Not Detected     Coronavirus HKU1 Not Detected     Coronavirus NL63 Not Detected     Coronavirus OC43 Not Detected     COVID19 Not Detected     Human Metapneumovirus Not Detected     Human Rhinovirus/Enterovirus Not Detected     Influenza A PCR Not Detected     Influenza B PCR Not Detected     Parainfluenza Virus 1 Not Detected     Parainfluenza Virus 2 Not Detected     Parainfluenza Virus 3 Not Detected     Parainfluenza Virus 4 Not Detected     RSV, PCR Not Detected     Bordetella pertussis pcr Not Detected     Bordetella parapertussis PCR Not Detected     Chlamydophila pneumoniae PCR  "Not Detected     Mycoplasma pneumo by PCR Not Detected    Narrative:      In the setting of a positive respiratory panel with a viral infection PLUS a negative procalcitonin without other underlying concern for bacterial infection, consider observing off antibiotics or discontinuation of antibiotics and continue supportive care. If the respiratory panel is positive for atypical bacterial infection (Bordetella pertussis, Chlamydophila pneumoniae, or Mycoplasma pneumoniae), consider antibiotic de-escalation to target atypical bacterial infection.    Blood Culture - Blood, Arm, Left [[id number]] Collected: 06/07/21 0705    Specimen: Blood from Arm, Left Updated: 06/07/21 0719    Blood Culture - Blood, Arm, Right [[id number]] Collected: 06/07/21 0705    Specimen: Blood from Arm, Right Updated: 06/07/21 0719    POC Lactate [[id number]]  (Normal) Collected: 06/07/21 0706    Specimen: Blood Updated: 06/07/21 0707     Lactate 1.3 mmol/L      Comment: Serial Number: [id number]:  [id number]       Procalcitonin [[id number]]  (Normal) Collected: 06/07/21 0500    Specimen: Blood Updated: 06/07/21 0655     Procalcitonin 0.12 ng/mL     Narrative:      As a Marker for Sepsis (Non-Neonates):     1. <0.5 ng/mL represents a low risk of severe sepsis and/or septic shock.  2. >2 ng/mL represents a high risk of severe sepsis and/or septic shock.    As a Marker for Lower Respiratory Tract Infections that require antibiotic therapy:  PCT on Admission     Antibiotic Therapy             6-12 Hrs later  >0.5                          Strongly Recommended            >0.25 - <0.5             Recommended  0.1 - 0.25                  Discouraged                       Remeasure/reassess PCT  <0.1                         Strongly Discouraged         Remeasure/reassess PCT      As 28 day mortality risk marker: \"Change in Procalcitonin Result\" (>80% or <=80%) if Day 0 (or Day 1) and Day 4 values are available. Refer to " http://www.[facility]-pct-calculator.com    Change in PCT <=80%   A decrease of PCT levels below or equal to 80% defines a positive change in PCT test result representing a higher risk for 28-day all-cause mortality of patients diagnosed with severe sepsis or septic shock.    Change in PCT >80%   A decrease of PCT levels of more than 80% defines a negative change in PCT result representing a lower risk for 28-day all-cause mortality of patients diagnosed with severe sepsis or septic shock.    This test is Prognostic not Diagnostic, if elevated correlate with clinical findings before administering antibiotic treatment.      Results may be falsely decreased if patient taking Biotin.     Manual Differential [[id number]]  (Abnormal) Collected: 06/07/21 0500    Specimen: Blood Updated: 06/07/21 0606     Neutrophil % 73.0 %      Lymphocyte % 10.0 %      Monocyte % 3.0 %      Bands %  12.0 %      Metamyelocyte % 2.0 %      Neutrophils Absolute 8.59 10*3/mm3      Lymphocytes Absolute 1.01 10*3/mm3      Monocytes Absolute 0.30 10*3/mm3      nRBC 2.0 /100 WBC      Anisocytosis Slight/1+     Macrocytes Slight/1+     Toxic Granulation Slight/1+     Platelet Morphology Normal    CBC & Differential [[id number]]  (Abnormal) Collected: 06/07/21 0500    Specimen: Blood Updated: 06/07/21 0606    Narrative:      The following orders were created for panel order CBC & Differential.  Procedure                               Abnormality         Status                     ---------                               -----------         ------                     Scan Slide[[id number]]                                       Final result               CBC Auto Differential[[id number]]        Abnormal            Final result                 Please view results for these tests on the individual orders.    Scan Slide [[id number]] Collected: 06/07/21 0500    Specimen: Blood Updated: 06/07/21 0606     Scan Slide --     Comment: See Manual Differential Results        CBC Auto Differential [[id number]]  (Abnormal) Collected: 06/07/21 0500    Specimen: Blood Updated: 06/07/21 0606     WBC 10.10 10*3/mm3      RBC 2.49 10*6/mm3      Hemoglobin 9.2 g/dL      Hematocrit 27.3 %      .7 fL      MCH 37.1 pg      MCHC 33.8 g/dL      RDW 14.8 %      RDW-SD 56.4 fl      MPV 8.6 fL      Platelets 115 10*3/mm3     Narrative:      The previously reported component NRBC is no longer being reported. Previous result was 0.6 /100 WBC (Reference Range: 0.0-0.2 /100 WBC) on 6/7/2021 at 0519 EDT.    Extra Tubes [[id number]] Collected: 06/07/21 0500    Specimen: Blood, Venous Line Updated: 06/07/21 0601    Narrative:      The following orders were created for panel order Extra Tubes.  Procedure                               Abnormality         Status                     ---------                               -----------         ------                     Light Blue Top[[id number]]                                   Final result               Gold Top - SST[[id number]]                                   Final result                 Please view results for these tests on the individual orders.    Gold Top - SST [[id number]] Collected: 06/07/21 0500    Specimen: Blood Updated: 06/07/21 0601     Extra Tube Hold for add-ons.     Comment: Auto resulted.       Light Blue Top [[id number]] Collected: 06/07/21 0500    Specimen: Blood Updated: 06/07/21 0601     Extra Tube hold for add-on     Comment: Auto resulted       Comprehensive Metabolic Panel [[id number]]  (Abnormal) Collected: 06/07/21 0500    Specimen: Blood Updated: 06/07/21 0541     Glucose 116 mg/dL      BUN 24 mg/dL      Creatinine 0.73 mg/dL      Sodium 134 mmol/L      Potassium 4.1 mmol/L      Chloride 97 mmol/L      CO2 26.0 mmol/L      Calcium 9.2 mg/dL      Total Protein 7.0 g/dL      Albumin 2.50 g/dL      ALT (SGPT) 87 U/L      AST (SGOT) 58 U/L      Alkaline Phosphatase 109 U/L      Total Bilirubin 0.7 mg/dL      eGFR Non  Amer  106 mL/min/1.73      Globulin 4.5 gm/dL      A/G Ratio 0.6 g/dL      BUN/Creatinine Ratio 32.9     Anion Gap 11.0 mmol/L     Narrative:      GFR Normal >60  Chronic Kidney Disease <60  Kidney Failure <15      Troponin [[id number]]  (Normal) Collected: 06/07/21 0500    Specimen: Blood Updated: 06/07/21 0541     Troponin T 0.012 ng/mL     Narrative:      Troponin T Reference Range:  <= 0.03 ng/mL-   Negative for AMI  >0.03 ng/mL-     Abnormal for myocardial necrosis.  Clinicians would have to utilize clinical acumen, EKG, Troponin and serial changes to determine if it is an Acute Myocardial Infarction or myocardial injury due to an underlying chronic condition.       Results may be falsely decreased if patient taking Biotin.      BNP [[id number]]  (Abnormal) Collected: 06/07/21 0500    Specimen: Blood Updated: 06/07/21 0538     proBNP 1,702.0 pg/mL     Narrative:      Among patients with dyspnea, NT-proBNP is highly sensitive for the detection of acute congestive heart failure. In addition NT-proBNP of <300 pg/ml effectively rules out acute congestive heart failure with 99% negative predictive value.    Results may be falsely decreased if patient taking Biotin.          Imaging Results (Last 72 Hours)     Procedure Component Value Units Date/Time    CT Chest Pulmonary Embolism [[id number]] Collected: 06/07/21 1227     Updated: 06/07/21 1246    Narrative:         DATE OF EXAM:  6/7/2021 12:17 PM     PROCEDURE:  CT CHEST PULMONARY EMBOLISM-     INDICATIONS:   elevated d-dimer; R06.03-Acute respiratory distress; J98.01-Acute  bronchospasm; I50.9-Heart failure, unspecified; C34.91-Malignant  neoplasm of unspecified part of right bronchus or lung     COMPARISON:   CT chest with contrast 2/18/2021     TECHNIQUE:  Routine transaxial slices were obtained through chest after  administration of intravenous 100 ml of Isovue 370. Reconstructed  coronal and sagittal images were also obtained. Automated exposure  control and iterative  reconstruction methods were used.      FINDINGS:  Soft tissues of the lower neck demonstrate left port catheter with tip  terminating in low SVC. Postsurgical changes of sternotomy and CABG with  aortic valve replacement. Pulmonary arteries well-opacified with  contrast. Negative for pulmonary embolus. Right paratracheal lymph node  measuring 10 mm unchanged in image 44. Additional small left  paratracheal lymph nodes stable for example measuring 9 mm on image 54.  Thoracic aortic branch vasculature is patent. No axillary adenopathy.     Redemonstration of large mass involving the right upper lobe which is  similar to the prior study when accounting for technical differences  measuring 9.1 x 7.6 cm, previously 9.3 x 8 cm. This results in complete  right upper lobe collapse and compressive atelectasis, unchanged. There  is again also compressive atelectasis involving the right middle lobe  which is unchanged.     Advanced emphysema. No pneumothorax. There are areas of bronchial wall  thickening and mucous plugging involving the lower lobes which may  relate to aspiration pneumonia.  New area of nodularity in the left  lower lobe measures 12 x 9 mm on image 98. There are areas of  interlobular septal thickening and groundglass opacities involving the  lower lobes and left upper lobe which could relate to superimposed  infection or mild pulmonary edema superimposed on background of advanced  emphysema.     Three low-density hepatic lesions noted likely cysts for example in the  right hepatic lobe measuring 16 mm and 17 mm on image 138. Small hiatal  hernia. Spleen normal in size. Normal adrenal glands. Pancreas without  findings of pancreatitis. Bilateral renal cysts noted. No  hydronephrosis. Negative for acute fracture.        Impression:      1. Negative for pulmonary embolus.  2. Redemonstration of large 9 cm right upper lobe mass which occludes  the right upper lobe bronchus resulting in complete right upper  and  right middle lobe collapse similar to prior study.  3. Bronchial wall thickening with increased mucous plugging involving  lower lobes with patchy areas of airspace disease most concerning for  aspiration pneumonia.  4. Interlobular septal thickening and scattered groundglass opacities  bilaterally which may relate to pulmonary edema and/or infection  superimposed on background of advanced emphysema.  5. Stable mediastinal lymphadenopathy.  6. New 12 mm area of nodularity in left lower lobe may relate to  aspiration/infection, recommend attention on follow-up.     Electronically Signed By-Saleem Ferguson MD On:6/7/2021 12:44 PM  This report was finalized on [id number] by  Saleem Ferguson MD.    XR Chest 1 View [[id number]] Collected: 06/07/21 0723     Updated: 06/07/21 0727    Narrative:         DATE OF EXAM:   6/7/2021 4:55 AM     PROCEDURE:   XR CHEST 1 VW-     INDICATIONS:   soa     COMPARISON:  3/9/2021     TECHNIQUE:   Portable chest radiograph.     FINDINGS:    Right midlung perihilar mass again noted similar to the prior study.  There is a left-sided port catheter with the tip at the cavoatrial  junction. Heart size normal. There is increased left midlung and left  basilar airspace disease may relate to superimposed pneumonia. No  pneumothorax or large effusion. Osseous structures grossly intact.  Postsurgical changes of sternotomy and CABG.       Impression:      1. Redemonstration of right midlung mass, similar to the prior study.  2. Increased left midlung and left basilar airspace disease that may  relate to superimposed pneumonia.     Electronically Signed By-Saleem Ferguson MD On:6/7/2021 7:25 AM  This report was finalized on [id number] by  Saleem Ferguson MD.          Review of Systems:  Review of Systems    Physical Assessment:  Wound 02/18/21 1900 Right gluteal Pressure Injury (Active)   Wound Image   06/07/21 0944   Dressing Appearance open to air 06/07/21 0830   Base dry 06/07/21 0830   Drainage  Amount none 06/07/21 0830       Wound 02/27/21 0000 medial sacral spine Pressure Injury (Active)   Wound Image   06/07/21 0942   Dressing Appearance open to air 06/07/21 0830   Base dry 06/07/21 0830   Periwound Temperature warm 06/07/21 0830   Drainage Amount none 06/07/21 0830       Wound 06/07/21 0944 Left gluteal (Active)   Wound Image   06/07/21 0945                          Stage II sacral pressure injuries: There are several partial-thickness injuries to the sacral area injuries are rather low but are superficial and appear to be stable and resolving and healing.  Approximate size the injury is 1 x 2 cm and 1 x 1 cm the other injury is scabbed.  No exudate is noted from the areas they are dry.  They aRe free of any symptoms of infection.    Final dx     sstage 2 sacral PI    Recommendation and Plan  We will recommend treating the areas Julio C's but praised the areas are rather low would be more difficult to secure dressing in place.  This will also be soothing.  Also will add an agility offloading surface this patient was able to turn self but has difficulty staying side to side because of his shortness of breath.    VINCE Carlton   6/7/2021   13:44 EDT      Electronically signed by Carina Luevano APRN at 06/07/21 1353          Consult Notes (last 24 hours) (Notes from 06/07/21 0954 through 06/08/21 0954)      Anne Allred MD at 06/07/21 1606      Consult Orders    1. Inpatient Infectious Diseases Consult [[id number]] ordered by Ignacia De La Cruz DO at 06/07/21 0956               Infectious Diseases Consult Note    Referring Provider: Ignacia De La Cruz DO    Reason for Consultation: Possible pneumonia    Patient Care Team:  Sandoval Stevenson FNP as PCP - General  Sandoval Stevenson FNP as PCP - Family Medicine  Axel Lewis MD as Consulting Physician (Hematology and Oncology)  Iglesia Springer MD as Consulting Physician (Cardiology)    Chief complaint shortness of breath    Subjective      History of present illness:      This is 70-year-old white male with past medical history significant for lung cancer.  Patient was on chemotherapy.  I believe he follows with Dr. Lewis.  The patient was admitted hospital from a nursing home.  He was on 4 L of oxygen.  He became short of breath and was found to be more hypoxic and currently on 15 L of oxygen by nasal cannula.  Patient denied having fever prior to admission and he is afebrile here.  The patient is hemodynamically stable.  The patient is relatively poor historian.    Review of Systems   Review of Systems   Constitutional: Negative.    HENT: Negative.    Eyes: Negative.    Respiratory: Positive for shortness of breath.    Cardiovascular: Negative.    Gastrointestinal: Negative.    Genitourinary: Negative.    Musculoskeletal: Negative.    Skin: Negative.    Neurological: Negative.    Hematological: Negative.    Psychiatric/Behavioral: Negative.        Medications  Medications Prior to Admission   Medication Sig Dispense Refill Last Dose   • acetaminophen (TYLENOL) 650 MG suppository Insert 1 suppository into the rectum Every 6 (Six) Hours As Needed for Mild Pain , Headache or Fever.      • aspirin (aspirin) 81 MG EC tablet Take 1 tablet by mouth Daily.   6/6/2021 at 0900   • atorvastatin (LIPITOR) 20 MG tablet Take 1 tablet by mouth every night at bedtime. 90 tablet 1 6/6/2021 at 1900   • bisacodyl (DULCOLAX) 10 MG suppository Insert 1 suppository into the rectum Daily As Needed for Constipation.      • buprenorphine-naloxone (SUBOXONE) 8-2 MG per SL tablet Place 1.5 tablets under the tongue Daily.   6/6/2021 at 0900   • docusate sodium 100 MG capsule Take 1 capsule by mouth 2 (Two) Times a Day.   6/6/2021 at 1900   • gabapentin (NEURONTIN) 300 MG capsule Take 300 mg by mouth 3 (Three) Times a Day.   6/6/2021 at 1900   • guaiFENesin (MUCINEX) 600 MG 12 hr tablet Take 1 tablet by mouth Every 12 (Twelve) Hours.   6/6/2021 at 1900   • hydrALAZINE  (APRESOLINE) 50 MG tablet Take 1 tablet by mouth Every 12 (Twelve) Hours. 180 tablet 3 6/6/2021 at 1900   • lurasidone (LATUDA) 40 MG tablet tablet Take 40 mg by mouth Daily.   6/6/2021 at 0900   • megestrol (MEGACE) 40 MG/ML suspension Take 200 mg by mouth 3 (Three) Times a Day.   6/6/2021 at 1900   • melatonin 5 MG tablet tablet Take 1 tablet by mouth At Night As Needed (sleep).      • metoprolol tartrate (LOPRESSOR) 25 MG tablet Take 2 tablets by mouth 2 (Two) Times a Day for 360 days. 120 tablet 11 6/6/2021 at 1900   • multivitamin with minerals (multivitamin w/minerals) tablet tablet Take 1 tablet by mouth Daily.   6/6/2021 at 0900   • nystatin (MYCOSTATIN) 731167 UNIT/ML suspension Swish and swallow 500,000 Units 4 (Four) Times a Day.   6/6/2021 at 1900   • pantoprazole (Protonix) 40 MG EC tablet Take 1 tablet by mouth Daily. 30 tablet 1 6/6/2021 at 0900   • polyethylene glycol (MIRALAX) 17 g packet Take 17 g by mouth Daily.   6/6/2021 at 0900   • predniSONE (DELTASONE) 10 MG tablet Take 1 tablet by mouth Daily With Breakfast.   6/6/2021 at 0900   • promethazine (PHENERGAN) 25 MG tablet Take 25 mg by mouth Every 4 (Four) Hours As Needed for Nausea or Vomiting.      • sertraline (ZOLOFT) 50 MG tablet Take 50 mg by mouth Daily.   6/6/2021 at 0900   • spironolactone (Aldactone) 25 MG tablet Take 1 tablet by mouth Daily for 360 days. 90 tablet 3 6/6/2021 at 0900   • torsemide (DEMADEX) 20 MG tablet Take 10 mg by mouth Daily.   6/6/2021 at 0900   • albuterol sulfate  (90 Base) MCG/ACT inhaler Inhale 2 puffs Every 6 (Six) Hours As Needed for Wheezing or Shortness of Air.          History  Past Medical History:   Diagnosis Date   • Aortic stenosis    • Cancer (CMS/HCC)     Sickle Cell Carcinoma   • Congestive heart failure (CHF) (CMS/HCC)     DIASTOLIC   • COPD (chronic obstructive pulmonary disease) (CMS/HCC)    • Coronary artery disease    • Hypertension    • Lung cancer (CMS/HCC)    • Myocardial infarction  (CMS/[facility])    • Peripheral vascular disease (CMS/[facility])    • Pneumonia 06/07/2021   • Valvular disease      Past Surgical History:   Procedure Laterality Date   • AORTIC VALVE REPAIR/REPLACEMENT  02/26/2018    Dr Maza   • BRONCHOSCOPY N/A 10/24/2020    Procedure: BRONCHOSCOPY with biopsy right upper lobe mass, and bronchoalveolar lavage right upper lobe;  Surgeon: Ángela Bean MD;  Location: [facility] ENDOSCOPY;  Service: Pulmonary;  Laterality: N/A;  post: right upper lobe mass, pneumonia   • BRONCHOSCOPY N/A 11/11/2020    Procedure: BRONCHOSCOPY with bronchial washing;  Surgeon: Ángela Bean MD;  Location: [facility] ENDOSCOPY;  Service: Pulmonary;  Laterality: N/A;  Post: lung mass, pneumonia   • BRONCHOSCOPY N/A 11/11/2020    Procedure: BRONCHOSCOPY RIGID with debulking of right main endobronchial tumor;  Surgeon: Nomi Reyes MD;  Location: [facility] MAIN OR;  Service: Cardiothoracic;  Laterality: N/A;   • BRONCHOSCOPY N/A 11/11/2020    Procedure: BRONCHOSCOPY;  Surgeon: Nomi Reyes MD;  Location: [facility] MAIN OR;  Service: Cardiothoracic;  Laterality: N/A;   • CARDIAC CATHETERIZATION  12/29/2017   • CORONARY ARTERY BYPASS GRAFT  02/26/2018    Dr Maza   • TIBIA FRACTURE SURGERY     • VENOUS ACCESS DEVICE (PORT) INSERTION Left 10/30/2020    Procedure: MEDIPORT INSERTION UNDER FLUOROSCOPIC GUIDENCE;  Surgeon: Nomi Reyes MD;  Location: [facility] MAIN OR;  Service: Cardiothoracic;  Laterality: Left;       Family History  Family History   Problem Relation Age of Onset   • Coronary artery disease Mother    • Cancer Father    • Stroke Daughter 35   • Seizures Daughter 35       Social History   reports that he quit smoking about 8 months ago. His smoking use included cigarettes. He smoked 1.00 pack per day. He has never used smokeless tobacco. He reports previous alcohol use. He reports that he does not use drugs.    Allergies  Patient has no known allergies.    Objective     Vital Signs   Vital Signs (last 24 hours)        06/06 0700  -  06/07 0659 06/07 0700  -  06/07 1606   Most Recent    Temp (°F)   97.7 -  98.1     97.7 (36.5)    Heart Rate 97 -  108    86 -  115     87    Resp 18 -  31    18 -  24     18    BP   122/68    105/68 -  114/68     105/68    SpO2 (%) 99 -  100    85 -  99     99          Physical Exam:  Physical Exam  Vitals and nursing note reviewed.   Constitutional:       General: He is in acute distress.      Appearance: He is well-developed. He is ill-appearing.      Comments: Cachectic   HENT:      Head: Normocephalic and atraumatic.   Eyes:      Pupils: Pupils are equal, round, and reactive to light.   Cardiovascular:      Rate and Rhythm: Normal rate and regular rhythm.      Heart sounds: Normal heart sounds.   Pulmonary:      Effort: No respiratory distress.      Breath sounds: Rales present. No wheezing.      Comments: Diminished breathing sounds  Abdominal:      General: Bowel sounds are normal. There is no distension.      Palpations: Abdomen is soft. There is no mass.      Tenderness: There is no abdominal tenderness. There is no guarding or rebound.   Musculoskeletal:         General: No deformity. Normal range of motion.      Cervical back: Normal range of motion and neck supple.   Skin:     General: Skin is warm.      Findings: No erythema or rash.   Neurological:      Mental Status: He is alert and oriented to person, place, and time.      Cranial Nerves: No cranial nerve deficit.         Microbiology  Microbiology Results (last 10 days)     Procedure Component Value - Date/Time    Respiratory Panel PCR w/COVID-19(SARS-CoV-2) PARESH/GRACIELA/KATHY/PAD/COR/MAD/CHRIST In-House, NP Swab in UTM/VTM, 3-4 HR TAT - Swab, Nasopharynx [[id number]]  (Normal) Collected: 06/07/21 0620    Lab Status: Final result Specimen: Swab from Nasopharynx Updated: 06/07/21 0726     ADENOVIRUS, PCR Not Detected     Coronavirus 229E Not Detected     Coronavirus HKU1 Not Detected     Coronavirus NL63 Not Detected     Coronavirus OC43  Not Detected     COVID19 Not Detected     Human Metapneumovirus Not Detected     Human Rhinovirus/Enterovirus Not Detected     Influenza A PCR Not Detected     Influenza B PCR Not Detected     Parainfluenza Virus 1 Not Detected     Parainfluenza Virus 2 Not Detected     Parainfluenza Virus 3 Not Detected     Parainfluenza Virus 4 Not Detected     RSV, PCR Not Detected     Bordetella pertussis pcr Not Detected     Bordetella parapertussis PCR Not Detected     Chlamydophila pneumoniae PCR Not Detected     Mycoplasma pneumo by PCR Not Detected    Narrative:      In the setting of a positive respiratory panel with a viral infection PLUS a negative procalcitonin without other underlying concern for bacterial infection, consider observing off antibiotics or discontinuation of antibiotics and continue supportive care. If the respiratory panel is positive for atypical bacterial infection (Bordetella pertussis, Chlamydophila pneumoniae, or Mycoplasma pneumoniae), consider antibiotic de-escalation to target atypical bacterial infection.          Laboratory  Results from last 7 days   Lab Units 06/07/21  0500   WBC 10*3/mm3 10.10   HEMOGLOBIN g/dL 9.2*   HEMATOCRIT % 27.3*   PLATELETS 10*3/mm3 115*     Results from last 7 days   Lab Units 06/07/21  0500   SODIUM mmol/L 134*   POTASSIUM mmol/L 4.1   CHLORIDE mmol/L 97*   CO2 mmol/L 26.0   BUN mg/dL 24*   CREATININE mg/dL 0.73*   GLUCOSE mg/dL 116*   CALCIUM mg/dL 9.2     Results from last 7 days   Lab Units 06/07/21  0500   SODIUM mmol/L 134*   POTASSIUM mmol/L 4.1   CHLORIDE mmol/L 97*   CO2 mmol/L 26.0   BUN mg/dL 24*   CREATININE mg/dL 0.73*   GLUCOSE mg/dL 116*   CALCIUM mg/dL 9.2                   Radiology  Imaging Results (Last 72 Hours)     Procedure Component Value Units Date/Time    CT Chest Pulmonary Embolism [[id number]] Collected: 06/07/21 1227     Updated: 06/07/21 1246    Narrative:         DATE OF EXAM:  6/7/2021 12:17 PM     PROCEDURE:  CT CHEST PULMONARY  EMBOLISM-     INDICATIONS:   elevated d-dimer; R06.03-Acute respiratory distress; J98.01-Acute  bronchospasm; I50.9-Heart failure, unspecified; C34.91-Malignant  neoplasm of unspecified part of right bronchus or lung     COMPARISON:   CT chest with contrast 2/18/2021     TECHNIQUE:  Routine transaxial slices were obtained through chest after  administration of intravenous 100 ml of Isovue 370. Reconstructed  coronal and sagittal images were also obtained. Automated exposure  control and iterative reconstruction methods were used.      FINDINGS:  Soft tissues of the lower neck demonstrate left port catheter with tip  terminating in low SVC. Postsurgical changes of sternotomy and CABG with  aortic valve replacement. Pulmonary arteries well-opacified with  contrast. Negative for pulmonary embolus. Right paratracheal lymph node  measuring 10 mm unchanged in image 44. Additional small left  paratracheal lymph nodes stable for example measuring 9 mm on image 54.  Thoracic aortic branch vasculature is patent. No axillary adenopathy.     Redemonstration of large mass involving the right upper lobe which is  similar to the prior study when accounting for technical differences  measuring 9.1 x 7.6 cm, previously 9.3 x 8 cm. This results in complete  right upper lobe collapse and compressive atelectasis, unchanged. There  is again also compressive atelectasis involving the right middle lobe  which is unchanged.     Advanced emphysema. No pneumothorax. There are areas of bronchial wall  thickening and mucous plugging involving the lower lobes which may  relate to aspiration pneumonia.  New area of nodularity in the left  lower lobe measures 12 x 9 mm on image 98. There are areas of  interlobular septal thickening and groundglass opacities involving the  lower lobes and left upper lobe which could relate to superimposed  infection or mild pulmonary edema superimposed on background of advanced  emphysema.     Three low-density  hepatic lesions noted likely cysts for example in the  right hepatic lobe measuring 16 mm and 17 mm on image 138. Small hiatal  hernia. Spleen normal in size. Normal adrenal glands. Pancreas without  findings of pancreatitis. Bilateral renal cysts noted. No  hydronephrosis. Negative for acute fracture.        Impression:      1. Negative for pulmonary embolus.  2. Redemonstration of large 9 cm right upper lobe mass which occludes  the right upper lobe bronchus resulting in complete right upper and  right middle lobe collapse similar to prior study.  3. Bronchial wall thickening with increased mucous plugging involving  lower lobes with patchy areas of airspace disease most concerning for  aspiration pneumonia.  4. Interlobular septal thickening and scattered groundglass opacities  bilaterally which may relate to pulmonary edema and/or infection  superimposed on background of advanced emphysema.  5. Stable mediastinal lymphadenopathy.  6. New 12 mm area of nodularity in left lower lobe may relate to  aspiration/infection, recommend attention on follow-up.     Electronically Signed By-Saleem Ferguson MD On:6/7/2021 12:44 PM  This report was finalized on [id number] by  Saleem Ferguson MD.    XR Chest 1 View [[id number]] Collected: 06/07/21 0723     Updated: 06/07/21 0727    Narrative:         DATE OF EXAM:   6/7/2021 4:55 AM     PROCEDURE:   XR CHEST 1 VW-     INDICATIONS:   soa     COMPARISON:  3/9/2021     TECHNIQUE:   Portable chest radiograph.     FINDINGS:    Right midlung perihilar mass again noted similar to the prior study.  There is a left-sided port catheter with the tip at the cavoatrial  junction. Heart size normal. There is increased left midlung and left  basilar airspace disease may relate to superimposed pneumonia. No  pneumothorax or large effusion. Osseous structures grossly intact.  Postsurgical changes of sternotomy and CABG.       Impression:      1. Redemonstration of right midlung mass, similar to  the prior study.  2. Increased left midlung and left basilar airspace disease that may  relate to superimposed pneumonia.     Electronically Signed By-Saleem Ferguson MD On:6/7/2021 7:25 AM  This report was finalized on [id number] by  Saleem Ferguson MD.          Cardiology      Results Review:  I have reviewed all clinical data, test, lab, and imaging results.       Schedule Meds  aspirin, 81 mg, Oral, Daily  buprenorphine-naloxone, 1.5 tablet, Sublingual, Daily  docusate sodium, 100 mg, Oral, BID  enoxaparin, 40 mg, Subcutaneous, Q24H  gabapentin, 300 mg, Oral, TID  guaiFENesin, 600 mg, Oral, Q12H  insulin lispro, 0-7 Units, Subcutaneous, TID With Meals  ipratropium-albuterol, 3 mL, Nebulization, Q4H - RT  lurasidone, 40 mg, Oral, Daily  megestrol acetate, 200 mg, Oral, TID  methylPREDNISolone sodium succinate, 60 mg, Intravenous, Q6H  multivitamin with minerals, 1 tablet, Oral, Daily  nystatin, 5 mL, Swish & Swallow, 4x Daily  pantoprazole, 40 mg, Oral, Daily  piperacillin-tazobactam, 4.5 g, Intravenous, Q8H  polyethylene glycol, 17 g, Oral, Daily  sertraline, 50 mg, Oral, Daily  spironolactone, 25 mg, Oral, Daily  Zinc Oxide, , Apply externally, BID        Infusion Meds       PRN Meds  •  acetaminophen  •  albuterol  •  bisacodyl  •  dextrose  •  dextrose  •  glucagon (human recombinant)  •  insulin lispro  •  ipratropium-albuterol  •  melatonin  •  promethazine  •  [COMPLETED] Insert peripheral IV **AND** sodium chloride  •  sodium chloride      Assessment/Plan       Assessment    Possible postobstructive pneumonia.  Patient has right large lung mass as a result of lung cancer.  There is no signs of complete collapse of the right upper and right middle lobes    Lung CA was on chemotherapy.      Plan    Continue Zosyn 4.5 g IV every 8 hours  Pulmonary has been consulted and patient may need bronchoscopy  Consult hematology service  A.m. labs  Prognosis is guarded  Case was discussed with him and his wife at the  bedside    Anne Allred MD  06/07/21  16:06 EDT      Electronically signed by Anne Allred MD at 06/07/21 1611     Iglesia Springer MD at 06/07/21 1532            Referring Provider: Dr. De La Cruz  Reason for Consultation: Acute respiratory failure  Acute diastolic congestive heart failure secondary to hypertension  Metastatic lung CA  COPD exacerbation    Patient Care Team:  Sandoval Stevenson FNP as PCP - General  Sandoval Stevenson FNP as PCP - Family Medicine  Axel Lewis MD as Consulting Physician (Hematology and Oncology)  Iglesia Springer MD as Consulting Physician (Cardiology)    Chief complaint shortness of breath        History of present illness:  Axel Ramirez is a 70 y.o. male who presents with shortness of breath.     70-year-old white male patient with known history of bioprosthetic aortic valve replacement 2018 and vein graft to the marginal branch and diagonal artery recently found to have metastatic lung CA now admitted to hospital with acute worsening of respiratory distress  Patient was admitted to hospital with COPD exacerbation but also found to have elevated BNP  Patient denies of any chest pain syncopal episodes no lower extremity edema PND orthopnea  Symptoms started 24 hours prior to the hospitalization  The last echocardiogram EF was normal 2020            Review of Systems   Constitutional: Positive for malaise/fatigue. Negative for fever.   HENT: Negative for congestion and hearing loss.    Eyes: Negative for double vision and visual disturbance.   Cardiovascular: Positive for dyspnea on exertion. Negative for chest pain, claudication, leg swelling and syncope.   Respiratory: Negative for cough and shortness of breath.    Endocrine: Negative for cold intolerance.   Skin: Negative for color change and rash.   Musculoskeletal: Negative for arthritis and joint pain.   Gastrointestinal: Negative for abdominal pain and heartburn.   Genitourinary: Negative for  hematuria.   Neurological: Negative for excessive daytime sleepiness and dizziness.   Psychiatric/Behavioral: Negative for depression. The patient is not nervous/anxious.    All other systems reviewed and are negative.      History  Past Medical History:   Diagnosis Date   • Aortic stenosis    • Cancer (CMS/HCC)     Sickle Cell Carcinoma   • Congestive heart failure (CHF) (CMS/HCC)     DIASTOLIC   • COPD (chronic obstructive pulmonary disease) (CMS/HCC)    • Coronary artery disease    • Hypertension    • Lung cancer (CMS/HCC)    • Myocardial infarction (CMS/HCC)    • Peripheral vascular disease (CMS/HCC)    • Pneumonia 06/07/2021   • Valvular disease        Past Surgical History:   Procedure Laterality Date   • AORTIC VALVE REPAIR/REPLACEMENT  02/26/2018    Dr Maza   • BRONCHOSCOPY N/A 10/24/2020    Procedure: BRONCHOSCOPY with biopsy right upper lobe mass, and bronchoalveolar lavage right upper lobe;  Surgeon: Ángela Bean MD;  Location: [facility] ENDOSCOPY;  Service: Pulmonary;  Laterality: N/A;  post: right upper lobe mass, pneumonia   • BRONCHOSCOPY N/A 11/11/2020    Procedure: BRONCHOSCOPY with bronchial washing;  Surgeon: Ángela Bean MD;  Location: [facility] ENDOSCOPY;  Service: Pulmonary;  Laterality: N/A;  Post: lung mass, pneumonia   • BRONCHOSCOPY N/A 11/11/2020    Procedure: BRONCHOSCOPY RIGID with debulking of right main endobronchial tumor;  Surgeon: Nomi Reyes MD;  Location: [facility] MAIN OR;  Service: Cardiothoracic;  Laterality: N/A;   • BRONCHOSCOPY N/A 11/11/2020    Procedure: BRONCHOSCOPY;  Surgeon: Nomi Reyes MD;  Location: [facility] MAIN OR;  Service: Cardiothoracic;  Laterality: N/A;   • CARDIAC CATHETERIZATION  12/29/2017   • CORONARY ARTERY BYPASS GRAFT  02/26/2018    Dr Maza   • TIBIA FRACTURE SURGERY     • VENOUS ACCESS DEVICE (PORT) INSERTION Left 10/30/2020    Procedure: MEDIPORT INSERTION UNDER FLUOROSCOPIC GUIDENCE;  Surgeon: Nomi Reyes MD;  Location: [facility] OR;   Service: Cardiothoracic;  Laterality: Left;       Family History   Problem Relation Age of Onset   • Coronary artery disease Mother    • Cancer Father    • Stroke Daughter 35   • Seizures Daughter 35       Social History     Tobacco Use   • Smoking status: Former Smoker     Packs/day: 1.00     Types: Cigarettes     Quit date: 10/2020     Years since quittin.6   • Smokeless tobacco: Never Used   Vaping Use   • Vaping Use: Never used   Substance Use Topics   • Alcohol use: Not Currently   • Drug use: No        Medications Prior to Admission   Medication Sig Dispense Refill Last Dose   • acetaminophen (TYLENOL) 650 MG suppository Insert 1 suppository into the rectum Every 6 (Six) Hours As Needed for Mild Pain , Headache or Fever.      • aspirin (aspirin) 81 MG EC tablet Take 1 tablet by mouth Daily.   2021 at 0900   • atorvastatin (LIPITOR) 20 MG tablet Take 1 tablet by mouth every night at bedtime. 90 tablet 1 2021 at 1900   • bisacodyl (DULCOLAX) 10 MG suppository Insert 1 suppository into the rectum Daily As Needed for Constipation.      • buprenorphine-naloxone (SUBOXONE) 8-2 MG per SL tablet Place 1.5 tablets under the tongue Daily.   2021 at 0900   • docusate sodium 100 MG capsule Take 1 capsule by mouth 2 (Two) Times a Day.   2021 at 1900   • gabapentin (NEURONTIN) 300 MG capsule Take 300 mg by mouth 3 (Three) Times a Day.   2021 at 1900   • guaiFENesin (MUCINEX) 600 MG 12 hr tablet Take 1 tablet by mouth Every 12 (Twelve) Hours.   2021 at 1900   • hydrALAZINE (APRESOLINE) 50 MG tablet Take 1 tablet by mouth Every 12 (Twelve) Hours. 180 tablet 3 2021 at 1900   • lurasidone (LATUDA) 40 MG tablet tablet Take 40 mg by mouth Daily.   2021 at 0900   • megestrol (MEGACE) 40 MG/ML suspension Take 200 mg by mouth 3 (Three) Times a Day.   2021 at 1900   • melatonin 5 MG tablet tablet Take 1 tablet by mouth At Night As Needed (sleep).      • metoprolol tartrate (LOPRESSOR) 25 MG  tablet Take 2 tablets by mouth 2 (Two) Times a Day for 360 days. 120 tablet 11 6/6/2021 at 1900   • multivitamin with minerals (multivitamin w/minerals) tablet tablet Take 1 tablet by mouth Daily.   6/6/2021 at 0900   • nystatin (MYCOSTATIN) 322649 UNIT/ML suspension Swish and swallow 500,000 Units 4 (Four) Times a Day.   6/6/2021 at 1900   • pantoprazole (Protonix) 40 MG EC tablet Take 1 tablet by mouth Daily. 30 tablet 1 6/6/2021 at 0900   • polyethylene glycol (MIRALAX) 17 g packet Take 17 g by mouth Daily.   6/6/2021 at 0900   • predniSONE (DELTASONE) 10 MG tablet Take 1 tablet by mouth Daily With Breakfast.   6/6/2021 at 0900   • promethazine (PHENERGAN) 25 MG tablet Take 25 mg by mouth Every 4 (Four) Hours As Needed for Nausea or Vomiting.      • sertraline (ZOLOFT) 50 MG tablet Take 50 mg by mouth Daily.   6/6/2021 at 0900   • spironolactone (Aldactone) 25 MG tablet Take 1 tablet by mouth Daily for 360 days. 90 tablet 3 6/6/2021 at 0900   • torsemide (DEMADEX) 20 MG tablet Take 10 mg by mouth Daily.   6/6/2021 at 0900   • albuterol sulfate  (90 Base) MCG/ACT inhaler Inhale 2 puffs Every 6 (Six) Hours As Needed for Wheezing or Shortness of Air.            Patient has no known allergies.    Scheduled Meds:aspirin, 81 mg, Oral, Daily  buprenorphine-naloxone, 1.5 tablet, Sublingual, Daily  docusate sodium, 100 mg, Oral, BID  enoxaparin, 40 mg, Subcutaneous, Q24H  gabapentin, 300 mg, Oral, TID  guaiFENesin, 600 mg, Oral, Q12H  insulin lispro, 0-7 Units, Subcutaneous, TID With Meals  ipratropium-albuterol, 3 mL, Nebulization, Q4H - RT  lurasidone, 40 mg, Oral, Daily  megestrol acetate, 200 mg, Oral, TID  methylPREDNISolone sodium succinate, 60 mg, Intravenous, Q6H  multivitamin with minerals, 1 tablet, Oral, Daily  nystatin, 5 mL, Swish & Swallow, 4x Daily  pantoprazole, 40 mg, Oral, Daily  piperacillin-tazobactam, 4.5 g, Intravenous, Q8H  polyethylene glycol, 17 g, Oral, Daily  sertraline, 50 mg, Oral,  "Daily  spironolactone, 25 mg, Oral, Daily  Zinc Oxide, , Apply externally, BID      Continuous Infusions:   PRN Meds:.•  acetaminophen  •  albuterol  •  bisacodyl  •  dextrose  •  dextrose  •  glucagon (human recombinant)  •  insulin lispro  •  ipratropium-albuterol  •  melatonin  •  promethazine  •  [COMPLETED] Insert peripheral IV **AND** sodium chloride  •  sodium chloride        VITAL SIGNS  Vitals:    06/07/21 1157 06/07/21 1257 06/07/21 1515 06/07/21 1521   BP:  105/68     BP Location:  Right arm     Patient Position:  Lying     Pulse: 99 96 86 87   Resp: 20 18 18 18   Temp:  97.7 °F (36.5 °C)     TempSrc:  Axillary     SpO2: 96% 99% 99%    Weight:       Height:           Flowsheet Rows      First Filed Value   Admission Height  172.7 cm (68\") Documented at 06/07/2021 0451   Admission Weight  67.3 kg (148 lb 6.4 oz) Documented at 06/07/2021 0450           TELEMETRY: Sinus rhythm    Physical Exam:  Physical Exam     Patient is alert on oxygen respiratory distress noted  Neck no JVP elevation  Lungs bilateral entry few rhonchi  Heart sounds S1-S2 regular  Abdomen soft nontender bowel sounds present  Extremities no edema bilateral pulses present  CNS exam nonfocal        LAB RESULTS (LAST 7 DAYS)    CBC  Results from last 7 days   Lab Units 06/07/21  0500   WBC 10*3/mm3 10.10   RBC 10*6/mm3 2.49*   HEMOGLOBIN g/dL 9.2*   HEMATOCRIT % 27.3*   MCV fL 109.7*   PLATELETS 10*3/mm3 115*       BMP  Results from last 7 days   Lab Units 06/07/21  0500   SODIUM mmol/L 134*   POTASSIUM mmol/L 4.1   CHLORIDE mmol/L 97*   CO2 mmol/L 26.0   BUN mg/dL 24*   CREATININE mg/dL 0.73*   GLUCOSE mg/dL 116*       CMP   Results from last 7 days   Lab Units 06/07/21  0500   SODIUM mmol/L 134*   POTASSIUM mmol/L 4.1   CHLORIDE mmol/L 97*   CO2 mmol/L 26.0   BUN mg/dL 24*   CREATININE mg/dL 0.73*   GLUCOSE mg/dL 116*   ALBUMIN g/dL 2.50*   BILIRUBIN mg/dL 0.7   ALK PHOS U/L 109   AST (SGOT) U/L 58*   ALT (SGPT) U/L 87*         BNP    "     TROPONIN  Results from last 7 days   Lab Units 06/07/21  0500   TROPONIN T ng/mL 0.012       CoAg        Creatinine Clearance  Estimated Creatinine Clearance: 81.8 mL/min (A) (by C-G formula based on SCr of 0.73 mg/dL (L)).    ABG  Results from last 7 days   Lab Units 06/07/21  0902   PH, ARTERIAL pH units 7.514*   PCO2, ARTERIAL mm Hg 34.4*   PO2 ART mm Hg 49.4*   O2 SATURATION ART % 88.5*   BASE EXCESS ART mmol/L 4.9*       Radiology  XR Chest 1 View    Result Date: 6/7/2021  1. Redemonstration of right midlung mass, similar to the prior study. 2. Increased left midlung and left basilar airspace disease that may relate to superimposed pneumonia.  Electronically Signed By-Saleem Ferguson MD On:6/7/2021 7:25 AM This report was finalized on [id number] by  Saleem Ferguson MD.    CT Chest Pulmonary Embolism    Result Date: 6/7/2021  1. Negative for pulmonary embolus. 2. Redemonstration of large 9 cm right upper lobe mass which occludes the right upper lobe bronchus resulting in complete right upper and right middle lobe collapse similar to prior study. 3. Bronchial wall thickening with increased mucous plugging involving lower lobes with patchy areas of airspace disease most concerning for aspiration pneumonia. 4. Interlobular septal thickening and scattered groundglass opacities bilaterally which may relate to pulmonary edema and/or infection superimposed on background of advanced emphysema. 5. Stable mediastinal lymphadenopathy. 6. New 12 mm area of nodularity in left lower lobe may relate to aspiration/infection, recommend attention on follow-up.  Electronically Signed By-Saleem Ferguson MD On:6/7/2021 12:44 PM This report was finalized on [id number] by  Saleem Ferguson MD.          EKG          I personally viewed and interpreted the patient's EKG/Telemetry data:    ECHOCARDIOGRAM:    Results for orders placed during the hospital encounter of 11/08/20    Adult Transthoracic Echo Complete W/ Cont if Necessary Per  Protocol    Interpretation Summary  · Estimated left ventricular EF = 65% Left ventricular systolic function is normal.  · Left ventricular diastolic function is consistent with (grade Ia w/high LAP) impaired relaxation.  · The right atrial cavity is mildly dilated.  · Mild aortic valve stenosis is present.  · Mild to moderate LVOT gradient noted      STRESS MYOVIEW:    CARDIAC CATHETERIZATION:    OTHER:         Assessment/Plan       Coronary artery disease involving native coronary artery of native heart without angina pectoris    Nonrheumatic aortic valve stenosis    Essential hypertension    Congestive heart failure (CMS/HCC)    Tobacco abuse    Squamous cell carcinoma of lung, right (CMS/HCC)    Acute-on-chronic respiratory failure (CMS/HCC)    Respiratory distress    Stage 2 skin ulcer of sacral region (CMS/HCC)      Acute diastolic congestive heart failure due to history of hypertension and valvular heart disease  Patient BNP was elevated but most probably primary event is pulmonary with known history of CAD and superimposed pneumonia  Caution with diuresis  Check the echocardiogram  Antibiotics and pulmonary toilet per primary team    I discussed the patients findings and my recommendations with patient and family at bedside    Iglesia Springer MD  21  15:40 EDT                Electronically signed by Iglesia Springer MD at 21 1541     Lianna Groves MD at 21 1346      Consult Orders    1. Inpatient Pulmonology Consult [[id number]] ordered by Ignacia De La Cruz DO at 21 0856               PULMONARY CRITICAL CARE CONSULT NOTE      PATIENT IDENTIFICATION:  Name: Axel Ramirez  MRN: [id number]  :  1950     Age: 70 y.o.  Sex: male        DATE OF CONSULTATION:  2021  PRIMARY CARE PHYSICIAN    Sandoval Stevenson FNP                  CHIEF COMPLAINT: Shortness of breath     History of Present Illness:   Axel Ramirez is a 70 y.o. male with a history of  COPD, diastolic CHF, Aortic stenosis, HTN, CAD with history of MI, PVD, and metastatic lung cancer who presented with sudden onset shortness of breath.  He lives in assisted living nursing home type setting.  EMS was called after patient received a mini neb treatment at the facility but his breathing became more severe, he had a reported saturation of 76% on 4 L at his extended care facility. Brought to the ER and was noted with AE COPD and Healthcare acquired pneumonia and admitted for further treatment. No complaints of SOB today and does have some cough.       Review of Systems:   Constitutional: As above   Eyes: negative   ENT/oropharynx: negative   Cardiovascular: negative   Respiratory: As above   Gastrointestinal: negative   Genitourinary: negative   Neurological: negative   Musculoskeletal: negative   Integument/breast: negative   Endocrine: negative   Allergic/Immunologic: negative     Past Medical History:  Past Medical History:   Diagnosis Date   • Aortic stenosis    • Cancer (CMS/HCC)     Sickle Cell Carcinoma   • Congestive heart failure (CHF) (CMS/HCC)     DIASTOLIC   • COPD (chronic obstructive pulmonary disease) (CMS/HCC)    • Coronary artery disease    • Hypertension    • Lung cancer (CMS/HCC)    • Myocardial infarction (CMS/HCC)    • Peripheral vascular disease (CMS/HCC)    • Pneumonia 06/07/2021   • Valvular disease        Past Surgical History:  Past Surgical History:   Procedure Laterality Date   • AORTIC VALVE REPAIR/REPLACEMENT  02/26/2018    Dr Maza   • BRONCHOSCOPY N/A 10/24/2020    Procedure: BRONCHOSCOPY with biopsy right upper lobe mass, and bronchoalveolar lavage right upper lobe;  Surgeon: Ángela Bean MD;  Location: [facility] ENDOSCOPY;  Service: Pulmonary;  Laterality: N/A;  post: right upper lobe mass, pneumonia   • BRONCHOSCOPY N/A 11/11/2020    Procedure: BRONCHOSCOPY with bronchial washing;  Surgeon: Ángela Bean MD;  Location: [facility] ENDOSCOPY;  Service: Pulmonary;  Laterality: N/A;   Post: lung mass, pneumonia   • BRONCHOSCOPY N/A 2020    Procedure: BRONCHOSCOPY RIGID with debulking of right main endobronchial tumor;  Surgeon: Nomi Reyes MD;  Location: [facility] MAIN OR;  Service: Cardiothoracic;  Laterality: N/A;   • BRONCHOSCOPY N/A 2020    Procedure: BRONCHOSCOPY;  Surgeon: Nomi Reyes MD;  Location: [facility] MAIN OR;  Service: Cardiothoracic;  Laterality: N/A;   • CARDIAC CATHETERIZATION  2017   • CORONARY ARTERY BYPASS GRAFT  2018    Dr Maza   • TIBIA FRACTURE SURGERY     • VENOUS ACCESS DEVICE (PORT) INSERTION Left 10/30/2020    Procedure: MEDIPORT INSERTION UNDER FLUOROSCOPIC GUIDENCE;  Surgeon: Nomi Reyes MD;  Location: [facility] MAIN OR;  Service: Cardiothoracic;  Laterality: Left;        Family History:  Family History   Problem Relation Age of Onset   • Coronary artery disease Mother    • Cancer Father    • Stroke Daughter 35   • Seizures Daughter 35        Social History:   Social History     Tobacco Use   • Smoking status: Former Smoker     Packs/day: 1.00     Types: Cigarettes     Quit date: 10/2020     Years since quittin.6   • Smokeless tobacco: Never Used   Substance Use Topics   • Alcohol use: Not Currently        Allergies:  No Known Allergies    Home Meds:  Medications Prior to Admission   Medication Sig Dispense Refill Last Dose   • acetaminophen (TYLENOL) 650 MG suppository Insert 1 suppository into the rectum Every 6 (Six) Hours As Needed for Mild Pain , Headache or Fever.      • aspirin (aspirin) 81 MG EC tablet Take 1 tablet by mouth Daily.   2021 at 0900   • atorvastatin (LIPITOR) 20 MG tablet Take 1 tablet by mouth every night at bedtime. 90 tablet 1 2021 at 1900   • bisacodyl (DULCOLAX) 10 MG suppository Insert 1 suppository into the rectum Daily As Needed for Constipation.      • buprenorphine-naloxone (SUBOXONE) 8-2 MG per SL tablet Place 1.5 tablets under the tongue Daily.   2021 at 0900   • docusate sodium 100  MG capsule Take 1 capsule by mouth 2 (Two) Times a Day.   6/6/2021 at 1900   • gabapentin (NEURONTIN) 300 MG capsule Take 300 mg by mouth 3 (Three) Times a Day.   6/6/2021 at 1900   • guaiFENesin (MUCINEX) 600 MG 12 hr tablet Take 1 tablet by mouth Every 12 (Twelve) Hours.   6/6/2021 at 1900   • hydrALAZINE (APRESOLINE) 50 MG tablet Take 1 tablet by mouth Every 12 (Twelve) Hours. 180 tablet 3 6/6/2021 at 1900   • lurasidone (LATUDA) 40 MG tablet tablet Take 40 mg by mouth Daily.   6/6/2021 at 0900   • megestrol (MEGACE) 40 MG/ML suspension Take 200 mg by mouth 3 (Three) Times a Day.   6/6/2021 at 1900   • melatonin 5 MG tablet tablet Take 1 tablet by mouth At Night As Needed (sleep).      • metoprolol tartrate (LOPRESSOR) 25 MG tablet Take 2 tablets by mouth 2 (Two) Times a Day for 360 days. 120 tablet 11 6/6/2021 at 1900   • multivitamin with minerals (multivitamin w/minerals) tablet tablet Take 1 tablet by mouth Daily.   6/6/2021 at 0900   • nystatin (MYCOSTATIN) 121931 UNIT/ML suspension Swish and swallow 500,000 Units 4 (Four) Times a Day.   6/6/2021 at 1900   • pantoprazole (Protonix) 40 MG EC tablet Take 1 tablet by mouth Daily. 30 tablet 1 6/6/2021 at 0900   • polyethylene glycol (MIRALAX) 17 g packet Take 17 g by mouth Daily.   6/6/2021 at 0900   • predniSONE (DELTASONE) 10 MG tablet Take 1 tablet by mouth Daily With Breakfast.   6/6/2021 at 0900   • promethazine (PHENERGAN) 25 MG tablet Take 25 mg by mouth Every 4 (Four) Hours As Needed for Nausea or Vomiting.      • sertraline (ZOLOFT) 50 MG tablet Take 50 mg by mouth Daily.   6/6/2021 at 0900   • spironolactone (Aldactone) 25 MG tablet Take 1 tablet by mouth Daily for 360 days. 90 tablet 3 6/6/2021 at 0900   • torsemide (DEMADEX) 20 MG tablet Take 10 mg by mouth Daily.   6/6/2021 at 0900   • albuterol sulfate  (90 Base) MCG/ACT inhaler Inhale 2 puffs Every 6 (Six) Hours As Needed for Wheezing or Shortness of Air.          Objective:  tMax 24 hrs:  "Temp (24hrs), Av.9 °F (36.6 °C), Min:97.7 °F (36.5 °C), Max:98.1 °F (36.7 °C)      Vitals Ranges:   Temp:  [97.7 °F (36.5 °C)-98.1 °F (36.7 °C)] 97.7 °F (36.5 °C)  Heart Rate:  [] 96  Resp:  [18-31] 18  BP: (105-122)/(68) 105/68    Intake and Output Last 3 Shifts:   No intake/output data recorded.    Exam:  /68 (BP Location: Right arm, Patient Position: Lying)   Pulse 96   Temp 97.7 °F (36.5 °C) (Axillary)   Resp 18   Ht 172.7 cm (68\")   Wt 67.3 kg (148 lb 6.4 oz)   SpO2 99%   BMI 22.56 kg/m²       21  0450   Weight: 67.3 kg (148 lb 6.4 oz)     General Appearance:      HEENT:  Normocephalic, without obvious abnormality, atraumaticConjunctiva/corneas clear,.  Normal external ear canals, Nares normal, no drainage  Neck:  Supple, symmetrical, trachea midline. No JVD.  Lungs /Chest wall:   Bilateral basal rhonchi, respirations unlabored symmetrical wall movement.     Heart:  Regular rate and rhythm, systolic murmur PMI left sternal border  Abdomen: Soft, non-tender, no masses, no organomegaly.    Extremities: Trace edema no clubbing or Cyanosis        Data Review:  All labs (24hrs):   Recent Results (from the past 24 hour(s))   ECG 12 Lead    Collection Time: 21  4:52 AM   Result Value Ref Range    QT Interval 339 ms   Comprehensive Metabolic Panel    Collection Time: 21  5:00 AM    Specimen: Blood   Result Value Ref Range    Glucose 116 (H) 65 - 99 mg/dL    BUN 24 (H) 8 - 23 mg/dL    Creatinine 0.73 (L) 0.76 - 1.27 mg/dL    Sodium 134 (L) 136 - 145 mmol/L    Potassium 4.1 3.5 - 5.2 mmol/L    Chloride 97 (L) 98 - 107 mmol/L    CO2 26.0 22.0 - 29.0 mmol/L    Calcium 9.2 8.6 - 10.5 mg/dL    Total Protein 7.0 6.0 - 8.5 g/dL    Albumin 2.50 (L) 3.50 - 5.20 g/dL    ALT (SGPT) 87 (H) 1 - 41 U/L    AST (SGOT) 58 (H) 1 - 40 U/L    Alkaline Phosphatase 109 39 - 117 U/L    Total Bilirubin 0.7 0.0 - 1.2 mg/dL    eGFR Non African Amer 106 >60 mL/min/1.73    Globulin 4.5 gm/dL    A/G Ratio " 0.6 g/dL    BUN/Creatinine Ratio 32.9 (H) 7.0 - 25.0    Anion Gap 11.0 5.0 - 15.0 mmol/L   Troponin    Collection Time: 06/07/21  5:00 AM    Specimen: Blood   Result Value Ref Range    Troponin T 0.012 0.000 - 0.030 ng/mL   BNP    Collection Time: 06/07/21  5:00 AM    Specimen: Blood   Result Value Ref Range    proBNP 1,702.0 (H) 0.0 - 900.0 pg/mL   CBC Auto Differential    Collection Time: 06/07/21  5:00 AM    Specimen: Blood   Result Value Ref Range    WBC 10.10 3.40 - 10.80 10*3/mm3    RBC 2.49 (L) 4.14 - 5.80 10*6/mm3    Hemoglobin 9.2 (L) 13.0 - 17.7 g/dL    Hematocrit 27.3 (L) 37.5 - 51.0 %    .7 (H) 79.0 - 97.0 fL    MCH 37.1 (H) 26.6 - 33.0 pg    MCHC 33.8 31.5 - 35.7 g/dL    RDW 14.8 12.3 - 15.4 %    RDW-SD 56.4 (H) 37.0 - 54.0 fl    MPV 8.6 6.0 - 12.0 fL    Platelets 115 (L) 140 - 450 10*3/mm3   Light Blue Top    Collection Time: 06/07/21  5:00 AM   Result Value Ref Range    Extra Tube hold for add-on    Gold Top - SST    Collection Time: 06/07/21  5:00 AM   Result Value Ref Range    Extra Tube Hold for add-ons.    Scan Slide    Collection Time: 06/07/21  5:00 AM    Specimen: Blood   Result Value Ref Range    Scan Slide     Manual Differential    Collection Time: 06/07/21  5:00 AM    Specimen: Blood   Result Value Ref Range    Neutrophil % 73.0 42.7 - 76.0 %    Lymphocyte % 10.0 (L) 19.6 - 45.3 %    Monocyte % 3.0 (L) 5.0 - 12.0 %    Bands %  12.0 (H) 0.0 - 5.0 %    Metamyelocyte % 2.0 (H) 0.0 - 0.0 %    Neutrophils Absolute 8.59 (H) 1.70 - 7.00 10*3/mm3    Lymphocytes Absolute 1.01 0.70 - 3.10 10*3/mm3    Monocytes Absolute 0.30 0.10 - 0.90 10*3/mm3    nRBC 2.0 (H) 0.0 - 0.2 /100 WBC    Anisocytosis Slight/1+ None Seen    Macrocytes Slight/1+ None Seen    Toxic Granulation Slight/1+ None Seen    Platelet Morphology Normal Normal   Procalcitonin    Collection Time: 06/07/21  5:00 AM    Specimen: Blood   Result Value Ref Range    Procalcitonin 0.12 0.00 - 0.25 ng/mL   Respiratory Panel PCR  w/COVID-19(SARS-CoV-2) PARESH/GRACIELA/KATHY/PAD/COR/MAD/CHRIST In-House, NP Swab in UTM/VTM, 3-4 HR TAT - Swab, Nasopharynx    Collection Time: 06/07/21  6:20 AM    Specimen: Nasopharynx; Swab   Result Value Ref Range    ADENOVIRUS, PCR Not Detected Not Detected    Coronavirus 229E Not Detected Not Detected    Coronavirus HKU1 Not Detected Not Detected    Coronavirus NL63 Not Detected Not Detected    Coronavirus OC43 Not Detected Not Detected    COVID19 Not Detected Not Detected - Ref. Range    Human Metapneumovirus Not Detected Not Detected    Human Rhinovirus/Enterovirus Not Detected Not Detected    Influenza A PCR Not Detected Not Detected    Influenza B PCR Not Detected Not Detected    Parainfluenza Virus 1 Not Detected Not Detected    Parainfluenza Virus 2 Not Detected Not Detected    Parainfluenza Virus 3 Not Detected Not Detected    Parainfluenza Virus 4 Not Detected Not Detected    RSV, PCR Not Detected Not Detected    Bordetella pertussis pcr Not Detected Not Detected    Bordetella parapertussis PCR Not Detected Not Detected    Chlamydophila pneumoniae PCR Not Detected Not Detected    Mycoplasma pneumo by PCR Not Detected Not Detected   POC Lactate    Collection Time: 06/07/21  7:06 AM    Specimen: Blood   Result Value Ref Range    Lactate 1.3 0.5 - 2.0 mmol/L   POC Glucose Once    Collection Time: 06/07/21  8:23 AM    Specimen: Blood   Result Value Ref Range    Glucose 92 70 - 105 mg/dL   Blood Gas, Arterial -    Collection Time: 06/07/21  9:02 AM    Specimen: Arterial Blood   Result Value Ref Range    Site Right Radial     Brice's Test Positive     pH, Arterial 7.514 (H) 7.350 - 7.450 pH units    pCO2, Arterial 34.4 (L) 35.0 - 48.0 mm Hg    pO2, Arterial 49.4 (L) 83.0 - 108.0 mm Hg    HCO3, Arterial 27.7 21.0 - 28.0 mmol/L    Base Excess, Arterial 4.9 (H) 0.0 - 3.0 mmol/L    O2 Saturation, Arterial 88.5 (L) 94.0 - 98.0 %    CO2 Content 28.7 22 - 29 mmol/L    Barometric Pressure for Blood Gas      Modality Cannula      FIO2 45 %    Hemodilution No    D-dimer, Quantitative    Collection Time: 06/07/21 10:52 AM    Specimen: Blood   Result Value Ref Range    D-Dimer, Quantitative 2.98 (H) 0.00 - 0.59 mg/L (FEU)   POC Glucose Once    Collection Time: 06/07/21 12:24 PM    Specimen: Blood   Result Value Ref Range    Glucose 122 (H) 70 - 105 mg/dL        Imaging:  [unfilled]    ASSESSMENT:  Acute-on-chronic respiratory distress  Pneumonia  COPD exacerbation  Metastatic lung cancer squamous cell  CHF  Opioid dependence  Stage 2 skin ulcer of sacral region      PLAN:  The mass is extending from the original mass, follow-up with oncology  Antibiotics  Bronchodilator  Inhaled corticosteroids  IV steroids  IS/Flutter valve  Electrolytes/ glycemic control  DVT and GI prophylaxis.    Discussed with Dr Yaneli Dumont, APRN   6/7/2021  13:46 EDT      I personally have examined  and interviewed the patient. I have reviewed the history, data, problems, assessment and plan with our NP.  Critical care time in direct medical management (   ) minutes  Electronically signed by Lianna Groves MD, D,ABSM, 06/07/21, 5:09 PM EDT.             Electronically signed by Lianna Groves MD at 06/07/21 5649

## 2021-06-08 NOTE — PROGRESS NOTES
Infectious Diseases Progress Note      LOS: 1 day   Patient Care Team:  Sandoval Stevenson FNP as PCP - General  Sandoval Stevenson FNP as PCP - Family Medicine  Axel Lewis MD as Consulting Physician (Hematology and Oncology)  Iglesia Springer MD as Consulting Physician (Cardiology)    Chief Complaint: Shortness of breath, fatigue    Subjective       The patient has been afebrile for the last 24 hours.  The patient is on 10 L of oxygen by mask, hemodynamically stable, and is tolerating antimicrobial therapy.      Review of Systems:   Review of Systems   Constitutional: Positive for fatigue.   HENT: Negative.    Eyes: Negative.    Respiratory: Positive for shortness of breath.    Cardiovascular: Negative.    Gastrointestinal: Negative.    Endocrine: Negative.    Genitourinary: Negative.    Musculoskeletal: Negative.    Skin: Negative.    Neurological: Negative.    Psychiatric/Behavioral: Negative.    All other systems reviewed and are negative.       Objective     Vital Signs  Temp:  [97.4 °F (36.3 °C)-98.3 °F (36.8 °C)] 97.4 °F (36.3 °C)  Heart Rate:  [] 90  Resp:  [16-18] 18  BP: ()/(57-71) 99/57    Physical Exam:  Physical Exam  Vitals and nursing note reviewed.   Constitutional:       General: He is not in acute distress.     Appearance: He is well-developed. He is ill-appearing. He is not diaphoretic.      Comments: Cachectic   HENT:      Head: Normocephalic and atraumatic.      Mouth/Throat:      Comments: Mild oral thrush  Eyes:      Extraocular Movements: Extraocular movements intact.      Conjunctiva/sclera: Conjunctivae normal.      Pupils: Pupils are equal, round, and reactive to light.   Cardiovascular:      Rate and Rhythm: Normal rate and regular rhythm.      Heart sounds: Normal heart sounds, S1 normal and S2 normal.   Pulmonary:      Effort: Pulmonary effort is normal. No respiratory distress.      Breath sounds: No stridor. Rales present. No wheezing.      Comments:  Diminished throughout  Abdominal:      General: Bowel sounds are normal. There is no distension.      Palpations: Abdomen is soft. There is no mass.      Tenderness: There is no abdominal tenderness. There is no guarding.   Musculoskeletal:         General: No deformity. Normal range of motion.      Cervical back: Neck supple.   Skin:     General: Skin is warm and dry.      Coloration: Skin is not pale.      Findings: No erythema or rash.      Comments: Port in place   Neurological:      Mental Status: He is alert and oriented to person, place, and time.      Cranial Nerves: No cranial nerve deficit.   Psychiatric:         Mood and Affect: Mood normal.          Results Review:    I have reviewed all clinical data, test, lab, and imaging results.     Radiology  Adult Transthoracic Echo Complete W/ Cont if Necessary Per Protocol    Result Date: 6/8/2021  · Estimated left ventricular EF = 65% Left ventricular systolic function is normal. · Left ventricular diastolic dysfunction is noted. · There is a prosthetic aortic valve present. · Estimated right ventricular systolic pressure from tricuspid regurgitation is normal (<35 mmHg).        Cardiology    Laboratory    Results from last 7 days   Lab Units 06/08/21  0624 06/07/21  0500   WBC 10*3/mm3 8.80 10.10   HEMOGLOBIN g/dL 7.2* 9.2*   HEMATOCRIT % 21.1* 27.3*   PLATELETS 10*3/mm3 103* 115*     Results from last 7 days   Lab Units 06/08/21  0624 06/07/21  0500   SODIUM mmol/L 139 134*   POTASSIUM mmol/L 4.6 4.1   CHLORIDE mmol/L 100 97*   CO2 mmol/L 30.0* 26.0   BUN mg/dL 32* 24*   CREATININE mg/dL 0.93 0.73*   GLUCOSE mg/dL 127* 116*   ALBUMIN g/dL 2.50* 2.50*   BILIRUBIN mg/dL 0.6 0.7   ALK PHOS U/L 89 109   AST (SGOT) U/L 41* 58*   ALT (SGPT) U/L 64* 87*   CALCIUM mg/dL 9.0 9.2                 Microbiology   Microbiology Results (last 10 days)     Procedure Component Value - Date/Time    MRSA Screen, PCR (Inpatient) - Swab, Nares [[id number]]  (Abnormal) Collected:  06/07/21 1701    Lab Status: Final result Specimen: Swab from Nares Updated: 06/07/21 1941     MRSA PCR MRSA Detected    Blood Culture - Blood, Arm, Left [[id number]] Collected: 06/07/21 0705    Lab Status: Preliminary result Specimen: Blood from Arm, Left Updated: 06/08/21 0730     Blood Culture No growth at 24 hours    Blood Culture - Blood, Arm, Right [[id number]] Collected: 06/07/21 0705    Lab Status: Preliminary result Specimen: Blood from Arm, Right Updated: 06/08/21 0730     Blood Culture No growth at 24 hours    Respiratory Panel PCR w/COVID-19(SARS-CoV-2) PARESH/GRACIELA/KATHY/PAD/[facility]ST In-House, NP Swab in UTM/VTM, 3-4 HR TAT - Swab, Nasopharynx [[id number]]  (Normal) Collected: 06/07/21 0620    Lab Status: Final result Specimen: Swab from Nasopharynx Updated: 06/07/21 0726     ADENOVIRUS, PCR Not Detected     Coronavirus 229E Not Detected     Coronavirus HKU1 Not Detected     Coronavirus NL63 Not Detected     Coronavirus OC43 Not Detected     COVID19 Not Detected     Human Metapneumovirus Not Detected     Human Rhinovirus/Enterovirus Not Detected     Influenza A PCR Not Detected     Influenza B PCR Not Detected     Parainfluenza Virus 1 Not Detected     Parainfluenza Virus 2 Not Detected     Parainfluenza Virus 3 Not Detected     Parainfluenza Virus 4 Not Detected     RSV, PCR Not Detected     Bordetella pertussis pcr Not Detected     Bordetella parapertussis PCR Not Detected     Chlamydophila pneumoniae PCR Not Detected     Mycoplasma pneumo by PCR Not Detected    Narrative:      In the setting of a positive respiratory panel with a viral infection PLUS a negative procalcitonin without other underlying concern for bacterial infection, consider observing off antibiotics or discontinuation of antibiotics and continue supportive care. If the respiratory panel is positive for atypical bacterial infection (Bordetella pertussis, Chlamydophila pneumoniae, or Mycoplasma pneumoniae), consider antibiotic de-escalation  to target atypical bacterial infection.          Medication Review:       Schedule Meds  ampicillin-sulbactam, 3 g, Intravenous, Q6H  aspirin, 81 mg, Oral, Daily  buprenorphine-naloxone, 1.5 tablet, Sublingual, Daily  docusate sodium, 100 mg, Oral, BID  enoxaparin, 40 mg, Subcutaneous, Q24H  epoetin asia-epbx, 40,000 Units, Subcutaneous, Weekly  gabapentin, 300 mg, Oral, TID  guaiFENesin, 600 mg, Oral, Q12H  insulin lispro, 0-7 Units, Subcutaneous, TID With Meals  ipratropium-albuterol, 3 mL, Nebulization, Q4H - RT  lurasidone, 40 mg, Oral, Daily  megestrol acetate, 200 mg, Oral, TID  methylPREDNISolone sodium succinate, 60 mg, Intravenous, Q6H  multivitamin with minerals, 1 tablet, Oral, Daily  nystatin, 5 mL, Swish & Swallow, 4x Daily  pantoprazole, 40 mg, Oral, Daily  polyethylene glycol, 17 g, Oral, Daily  sertraline, 50 mg, Oral, Daily  spironolactone, 25 mg, Oral, Daily  vancomycin, 20 mg/kg, Intravenous, Once  [START ON 6/9/2021] vancomycin, 750 mg, Intravenous, Q12H  Zinc Oxide, , Apply externally, BID        Infusion Meds  Pharmacy to dose vancomycin,         PRN Meds  •  acetaminophen  •  albuterol  •  bisacodyl  •  dextrose  •  dextrose  •  glucagon (human recombinant)  •  insulin lispro  •  ipratropium-albuterol  •  melatonin  •  Pharmacy to dose vancomycin  •  promethazine  •  [COMPLETED] Insert peripheral IV **AND** sodium chloride  •  sodium chloride        Assessment/Plan       Antimicrobial Therapy   1.  Zosyn      day  2.  Unasyn      day  3.  Vancomycin      day  4.      Day  5.      Day      Assessment     Possible postobstructive pneumonia.  Patient has right large lung mass as a result of lung cancer.  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    Pancytopenia-likely related to chemotherapy     Lung CA was on chemotherapy    Oral thrush        Plan     Discontinue Zosyn   Start IV Unasyn 3 g every 6 hours  Start IV vancomycin-asked pharmacy to monitor and dose  Continue nystatin  Depending on clinical course patient may need bronchoscopy  Continue supportive care  A.m. labs  Prognosis is guarded  Case was discussed with him and his wife at the bedside      Herminia Stanton, VINCE  06/08/21  14:31 EDT

## 2021-06-08 NOTE — PLAN OF CARE
Problem: Adult Inpatient Plan of Care  Goal: Plan of Care Review  Outcome: Ongoing, Progressing  Goal: Patient-Specific Goal (Individualized)  Outcome: Ongoing, Progressing  Goal: Absence of Hospital-Acquired Illness or Injury  Outcome: Ongoing, Progressing  Intervention: Identify and Manage Fall Risk  Recent Flowsheet Documentation  Taken 6/8/2021 1300 by Quin Mejia RN  Safety Promotion/Fall Prevention: safety round/check completed  Taken 6/8/2021 1100 by Quin Mejia RN  Safety Promotion/Fall Prevention: safety round/check completed  Taken 6/8/2021 0900 by Quin Mejia RN  Safety Promotion/Fall Prevention: safety round/check completed  Taken 6/8/2021 0701 by Quin Mejia RN  Safety Promotion/Fall Prevention:   activity supervised   assistive device/personal items within reach   clutter free environment maintained   nonskid shoes/slippers when out of bed  Goal: Optimal Comfort and Wellbeing  Outcome: Ongoing, Progressing  Goal: Readiness for Transition of Care  Outcome: Ongoing, Progressing     Problem: Fall Injury Risk  Goal: Absence of Fall and Fall-Related Injury  Outcome: Ongoing, Progressing  Intervention: Promote Injury-Free Environment  Recent Flowsheet Documentation  Taken 6/8/2021 1300 by Quin Mejia RN  Safety Promotion/Fall Prevention: safety round/check completed  Taken 6/8/2021 1100 by Quin Mejia RN  Safety Promotion/Fall Prevention: safety round/check completed  Taken 6/8/2021 0900 by Quin Mejia RN  Safety Promotion/Fall Prevention: safety round/check completed  Taken 6/8/2021 0701 by Quin Mejia RN  Safety Promotion/Fall Prevention:   activity supervised   assistive device/personal items within reach   clutter free environment maintained   nonskid shoes/slippers when out of bed     Problem: Skin Injury Risk Increased  Goal: Skin Health and Integrity  Outcome: Ongoing, Progressing     Problem: Adjustment to Illness (Heart  Failure)  Goal: Optimal Coping  Outcome: Ongoing, Progressing     Problem: Arrhythmia/Dysrhythmia (Heart Failure)  Goal: Stable Heart Rate and Rhythm  Outcome: Ongoing, Progressing     Problem: Cardiac Output Decreased (Heart Failure)  Goal: Optimal Cardiac Output  Outcome: Ongoing, Progressing     Problem: Fluid Imbalance (Heart Failure)  Goal: Fluid Balance  Outcome: Ongoing, Progressing     Problem: Functional Ability Impaired (Heart Failure)  Goal: Optimal Functional Ability  Outcome: Ongoing, Progressing     Problem: Oral Intake Inadequate (Heart Failure)  Goal: Optimal Nutrition Intake  Outcome: Ongoing, Progressing     Problem: Respiratory Compromise (Heart Failure)  Goal: Effective Oxygenation and Ventilation  Outcome: Ongoing, Progressing     Problem: Sleep Disordered Breathing (Heart Failure)  Goal: Effective Breathing Pattern During Sleep  Outcome: Ongoing, Progressing     Problem: Gas Exchange Impaired  Goal: Optimal Gas Exchange  Outcome: Ongoing, Progressing   Goal Outcome Evaluation:

## 2021-06-08 NOTE — PROGRESS NOTES
"Pharmacy Antimicrobial Dosing Service    Subjective:  Axel Ramirez is a 70 y.o.male admitted with acute-on-chronic respiratory failure. Pharmacy has been consulted to dose Vancomycin for possible PNA.    PMH: CHF, COPD, squamous cell carcinoma of R lung      Assessment/Plan    1. Day #1 Vancomycin: Goal -600 mcg*h/mL. Vancomycin 1250 mg (20 mg/kg) loading dose ordered followed by 750 mg q12h maintenance regimen. Levels to be collected following 3rd dose of this regimen.    2. Day #1 Ampicillin/Sulbactam: 3 g IV q6h for estCrCl > 30 mL/min.    Will continue to monitor drug levels, renal function, culture and sensitivities, and patient clinical status.       Objective:  Relevant clinical data and objective history reviewed:  172.7 cm (68\")   67.3 kg (148 lb 6.4 oz)   Ideal body weight: 68.4 kg (150 lb 12.7 oz)  Body mass index is 22.56 kg/m².        Results from last 7 days   Lab Units 06/08/21  0624 06/07/21  0500   CREATININE mg/dL 0.93 0.73*     Estimated Creatinine Clearance: 70.4 mL/min (by C-G formula based on SCr of 0.93 mg/dL).  I/O last 3 completed shifts:  In: 450 [I.V.:350; IV Piggyback:100]  Out: -     Results from last 7 days   Lab Units 06/08/21  0624 06/07/21  0500   WBC 10*3/mm3 8.80 10.10     Temperature    06/07/21 1921 06/08/21 0339 06/08/21 1154   Temp: 98 °F (36.7 °C) 98.3 °F (36.8 °C) 97.4 °F (36.3 °C)     Baseline culture/source/susceptibility:  Microbiology Results (last 10 days)       Procedure Component Value - Date/Time    MRSA Screen, PCR (Inpatient) - Swab, Nares [[id number]]  (Abnormal) Collected: 06/07/21 1701    Lab Status: Final result Specimen: Swab from Nares Updated: 06/07/21 1941     MRSA PCR MRSA Detected    Blood Culture - Blood, Arm, Left [[id number]] Collected: 06/07/21 0705    Lab Status: Preliminary result Specimen: Blood from Arm, Left Updated: 06/08/21 0730     Blood Culture No growth at 24 hours    Blood Culture - Blood, Arm, Right [[id number]] Collected: 06/07/21 " 0705    Lab Status: Preliminary result Specimen: Blood from Arm, Right Updated: 06/08/21 0730     Blood Culture No growth at 24 hours    Respiratory Panel PCR w/COVID-19(SARS-CoV-2) PAR[facility]/GRACIELA/KATHY/PAD/[facility] In-House, NP Swab in UTM/VTM, 3-4 HR TAT - Swab, Nasopharynx [[id number]]  (Normal) Collected: 06/07/21 0620    Lab Status: Final result Specimen: Swab from Nasopharynx Updated: 06/07/21 0726     ADENOVIRUS, PCR Not Detected     Coronavirus 229E Not Detected     Coronavirus HKU1 Not Detected     Coronavirus NL63 Not Detected     Coronavirus OC43 Not Detected     COVID19 Not Detected     Human Metapneumovirus Not Detected     Human Rhinovirus/Enterovirus Not Detected     Influenza A PCR Not Detected     Influenza B PCR Not Detected     Parainfluenza Virus 1 Not Detected     Parainfluenza Virus 2 Not Detected     Parainfluenza Virus 3 Not Detected     Parainfluenza Virus 4 Not Detected     RSV, PCR Not Detected     Bordetella pertussis pcr Not Detected     Bordetella parapertussis PCR Not Detected     Chlamydophila pneumoniae PCR Not Detected     Mycoplasma pneumo by PCR Not Detected    Narrative:      In the setting of a positive respiratory panel with a viral infection PLUS a negative procalcitonin without other underlying concern for bacterial infection, consider observing off antibiotics or discontinuation of antibiotics and continue supportive care. If the respiratory panel is positive for atypical bacterial infection (Bordetella pertussis, Chlamydophila pneumoniae, or Mycoplasma pneumoniae), consider antibiotic de-escalation to target atypical bacterial infection.              Anti-Infectives (From admission, onward)      Ordered     Dose/Rate Route Frequency Start Stop    06/08/21 1412  vancomycin 750 mg in sodium chloride 0.9 % 250 mL IVPB     Ordering Provider: Herminia Stanton APRN    750 mg  over 45 Minutes Intravenous Every 12 Hours 06/09/21 0300 06/18/21 0259    06/08/21 1342   ampicillin-sulbactam (UNASYN) 3 g in sodium chloride 0.9 % 100 mL IVPB-MBP     Ordering Provider: Herminia Stanton APRN    3 g Intravenous Every 6 Hours 06/08/21 2000 06/18/21 1959 06/08/21 1412  vancomycin 1250 mg/250 mL 0.9% NS IVPB ([facility])     Ordering Provider: Herminia Stanton APRN    20 mg/kg × 67.3 kg  over 75 Minutes Intravenous Once 06/08/21 1500      06/08/21 1347  Pharmacy to dose vancomycin     Ordering Provider: Herminia Stanton APRN     Does not apply Continuous PRN 06/08/21 1346 06/18/21 1345    06/07/21 1059  piperacillin-tazobactam (ZOSYN) IVPB 4.5 g in 100 mL NS (CD)     Ordering Provider: Ignacia De La Cruz DO    4.5 g  over 30 Minutes Intravenous Once 06/07/21 1145 06/07/21 1326    06/07/21 0625  cefTRIAXone (ROCEPHIN) in SWFI 1 gram/10ml IV PUSH syringe     Ordering Provider: Higinio Flores MD    1 g  over 5 Minutes Intravenous Once 06/07/21 0627 06/07/21 0711    06/07/21 0625  azithromycin 500 mg IVPB in 250 mL NS     Ordering Provider: Higinio Flores MD    500 mg  over 60 Minutes Intravenous Once 06/07/21 0626 06/07/21 0806            Jong Parekh, PharmD  06/08/21 14:12 EDT

## 2021-06-08 NOTE — PROGRESS NOTES
"PULMONARY CRITICAL CARE Progress  NOTE      PATIENT IDENTIFICATION:  Name: Axel Ramirez  MRN: [id number]  :  1950     Age: 70 y.o.  Sex: male    DATE OF Note:  2021   Referring Physician: Ignacia De La Cruz DO                  Subjective:   Feeling better, no new issue, no SOB no chest pain, no nausea or vomiting, no change in bowel habit, no dysuria,  no new  skin rash or itching.      Objective:  tMax 24 hrs: Temp (24hrs), Av °F (36.7 °C), Min:97.7 °F (36.5 °C), Max:98.3 °F (36.8 °C)      Vitals Ranges:   Temp:  [97.7 °F (36.5 °C)-98.3 °F (36.8 °C)] 98.3 °F (36.8 °C)  Heart Rate:  [] 82  Resp:  [16-24] 18  BP: (100-121)/(61-71) 121/71    Intake and Output Last 3 Shifts:   I/O last 3 completed shifts:  In: 450 [I.V.:350; IV Piggyback:100]  Out: -     Exam:  /71 (BP Location: Right arm, Patient Position: Lying)   Pulse 82   Temp 98.3 °F (36.8 °C) (Oral)   Resp 18   Ht 172.7 cm (68\")   Wt 67.3 kg (148 lb 6.4 oz)   SpO2 98%   BMI 22.56 kg/m²     General Appearance:     HEENT:  Normocephalic, without obvious abnormality, Conjunctiva/corneas clear,.  Normal external ear canals, Nares normal, no drainage     Neck:  Supple, symmetrical, trachea midline. No JVD.  Lungs /Chest wall:   Bilateral basal rhonchi, respirations unlabored symmetrical wall movement.     Heart:  Regular rate and rhythm, systolic murmur PMI left sternal border  Abdomen: Soft, non-tender, no masses, no organomegaly.    Extremities: Trace edema no clubbing or Cyanosis        Medications:    Current Facility-Administered Medications:   •  acetaminophen (TYLENOL) suppository 650 mg, 650 mg, Rectal, Q6H PRN, Ignacia De La Cruz DO  •  albuterol (PROVENTIL) nebulizer solution 0.083% 2.5 mg/3mL, 2.5 mg, Nebulization, Q2H PRN, Ignacia De La Cruz DO, 2.5 mg at 21 0914  •  aspirin EC tablet 81 mg, 81 mg, Oral, Daily, Ignacia De La Cruz DO, 81 mg at 21 1032  •  bisacodyl (DULCOLAX) suppository 10 mg, 10 mg, Rectal, Daily " PRN, Ignacia De La Cruz DO  •  buprenorphine-naloxone (SUBOXONE) 8-2 MG per SL tablet 1.5 tablet, 1.5 tablet, Sublingual, Daily, Ignacia De La Cruz DO, 1.5 tablet at 06/07/21 1031  •  dextrose (D50W) 25 g/ 50mL Intravenous Solution 25 g, 25 g, Intravenous, Q15 Min PRN, Ignacia De La Cruz DO  •  dextrose (GLUTOSE) oral gel 15 g, 15 g, Oral, Q15 Min PRN, Ignacia De La Cruz DO  •  docusate sodium (COLACE) capsule 100 mg, 100 mg, Oral, BID, Ignacia De La Cruz DO, 100 mg at 06/07/21 2049  •  enoxaparin (LOVENOX) syringe 40 mg, 40 mg, Subcutaneous, Q24H, Ignacia De La Cruz DO, 40 mg at 06/07/21 1658  •  gabapentin (NEURONTIN) capsule 300 mg, 300 mg, Oral, TID, Ignacia De La Cruz DO, 300 mg at 06/07/21 2050  •  glucagon (human recombinant) (GLUCAGEN DIAGNOSTIC) injection 1 mg, 1 mg, Subcutaneous, Q15 Min PRN, Ignacia De La Cruz DO  •  guaiFENesin (MUCINEX) 12 hr tablet 600 mg, 600 mg, Oral, Q12H, Ignacia De La Cruz DO, 600 mg at 06/07/21 2049  •  insulin lispro (ADMELOG) injection 0-7 Units, 0-7 Units, Subcutaneous, TID With Meals, Ignacia De La Cruz DO, 2 Units at 06/07/21 1817  •  insulin lispro (ADMELOG) injection 0-7 Units, 0-7 Units, Subcutaneous, TID PRN, Ignacia De La Cruz DO  •  ipratropium-albuterol (DUO-NEB) nebulizer solution 3 mL, 3 mL, Nebulization, Q4H - RT, Ignacia De La Cruz DO, 3 mL at 06/08/21 0710  •  ipratropium-albuterol (DUO-NEB) nebulizer solution 3 mL, 3 mL, Nebulization, Q1H PRN, Ignacia De La Cruz DO  •  lurasidone (LATUDA) tablet 40 mg, 40 mg, Oral, Daily, Ignacia De La Cruz DO, 40 mg at 06/07/21 1032  •  megestrol acetate (MEGACE) oral suspension 200 mg, 200 mg, Oral, TID, Ignacia De La Cruz DO, 200 mg at 06/07/21 2050  •  melatonin tablet 5 mg, 5 mg, Oral, Nightly PRN, Ignacia De La Cruz DO  •  methylPREDNISolone sodium succinate (SOLU-Medrol) injection 60 mg, 60 mg, Intravenous, Q6H, Ignacia De La Cruz DO, 60 mg at 06/08/21 0415  •  multivitamin with minerals 1 tablet, 1 tablet, Oral, Daily, Ignacia De La Cruz DO, 1 tablet at 06/07/21 1032  •   nystatin (MYCOSTATIN) 039815 UNIT/ML suspension 500,000 Units, 5 mL, Swish & Swallow, 4x Daily, Ignacia De La Cruz DO, 500,000 Units at 06/07/21 2050  •  pantoprazole (PROTONIX) EC tablet 40 mg, 40 mg, Oral, Daily, Ignacia De La Cruz A, DO, 40 mg at 06/07/21 1032  •  piperacillin-tazobactam (ZOSYN) IVPB 4.5 g in 100 mL NS (CD), 4.5 g, Intravenous, Q8H, Ignacia De La Cruz A, DO, Last Rate: 0 mL/hr at 06/08/21 0050, 4.5 g at 06/08/21 0415  •  polyethylene glycol (MIRALAX) packet 17 g, 17 g, Oral, Daily, Ignacia De La Cruz, DO, 17 g at 06/07/21 1032  •  promethazine (PHENERGAN) tablet 25 mg, 25 mg, Oral, Q4H PRN, Ignacia De La Cruz, DO  •  sertraline (ZOLOFT) tablet 50 mg, 50 mg, Oral, Daily, Ignacia De La Cruz A, DO, 50 mg at 06/07/21 1032  •  [COMPLETED] Insert peripheral IV, , , Once **AND** sodium chloride 0.9 % flush 10 mL, 10 mL, Intravenous, PRN, Ghazal De La Cruzn A, DO  •  sodium chloride 0.9 % flush 10 mL, 10 mL, Intravenous, PRN, Ignacia De La Cruz A, DO  •  spironolactone (ALDACTONE) tablet 25 mg, 25 mg, Oral, Daily, Ignacia De La Cruz A, DO, 25 mg at 06/07/21 1032  •  Zinc Oxide 16 % ointment, , Apply externally, BID, Carina Luevano APRN, Given at 06/07/21 2051    Data Review:  All labs (24hrs):   Recent Results (from the past 24 hour(s))   POC Glucose Once    Collection Time: 06/07/21  8:23 AM    Specimen: Blood   Result Value Ref Range    Glucose 92 70 - 105 mg/dL   Blood Gas, Arterial -    Collection Time: 06/07/21  9:02 AM    Specimen: Arterial Blood   Result Value Ref Range    Site Right Radial     Brice's Test Positive     pH, Arterial 7.514 (H) 7.350 - 7.450 pH units    pCO2, Arterial 34.4 (L) 35.0 - 48.0 mm Hg    pO2, Arterial 49.4 (L) 83.0 - 108.0 mm Hg    HCO3, Arterial 27.7 21.0 - 28.0 mmol/L    Base Excess, Arterial 4.9 (H) 0.0 - 3.0 mmol/L    O2 Saturation, Arterial 88.5 (L) 94.0 - 98.0 %    CO2 Content 28.7 22 - 29 mmol/L    Barometric Pressure for Blood Gas      Modality Cannula     FIO2 45 %    Hemodilution No    D-dimer, Quantitative     Collection Time: 06/07/21 10:52 AM    Specimen: Blood   Result Value Ref Range    D-Dimer, Quantitative 2.98 (H) 0.00 - 0.59 mg/L (FEU)   POC Glucose Once    Collection Time: 06/07/21 12:24 PM    Specimen: Blood   Result Value Ref Range    Glucose 122 (H) 70 - 105 mg/dL   MRSA Screen, PCR (Inpatient) - Swab, Nares    Collection Time: 06/07/21  5:01 PM    Specimen: Nares; Swab   Result Value Ref Range    MRSA PCR MRSA Detected (A) No MRSA Detected   POC Glucose Once    Collection Time: 06/07/21  5:11 PM    Specimen: Blood   Result Value Ref Range    Glucose 167 (H) 70 - 105 mg/dL   POC Glucose Once    Collection Time: 06/07/21  7:23 PM    Specimen: Blood   Result Value Ref Range    Glucose 203 (H) 70 - 105 mg/dL   Comprehensive Metabolic Panel    Collection Time: 06/08/21  6:24 AM    Specimen: Blood   Result Value Ref Range    Glucose 127 (H) 65 - 99 mg/dL    BUN 32 (H) 8 - 23 mg/dL    Creatinine 0.93 0.76 - 1.27 mg/dL    Sodium 139 136 - 145 mmol/L    Potassium 4.6 3.5 - 5.2 mmol/L    Chloride 100 98 - 107 mmol/L    CO2 30.0 (H) 22.0 - 29.0 mmol/L    Calcium 9.0 8.6 - 10.5 mg/dL    Total Protein 6.4 6.0 - 8.5 g/dL    Albumin 2.50 (L) 3.50 - 5.20 g/dL    ALT (SGPT) 64 (H) 1 - 41 U/L    AST (SGOT) 41 (H) 1 - 40 U/L    Alkaline Phosphatase 89 39 - 117 U/L    Total Bilirubin 0.6 0.0 - 1.2 mg/dL    eGFR Non African Amer 80 >60 mL/min/1.73    Globulin 3.9 gm/dL    A/G Ratio 0.6 g/dL    BUN/Creatinine Ratio 34.4 (H) 7.0 - 25.0    Anion Gap 9.0 5.0 - 15.0 mmol/L   CBC Auto Differential    Collection Time: 06/08/21  6:24 AM    Specimen: Blood   Result Value Ref Range    WBC 8.80 3.40 - 10.80 10*3/mm3    RBC 1.94 (L) 4.14 - 5.80 10*6/mm3    Hemoglobin 7.2 (L) 13.0 - 17.7 g/dL    Hematocrit 21.1 (L) 37.5 - 51.0 %    .8 (H) 79.0 - 97.0 fL    MCH 37.0 (H) 26.6 - 33.0 pg    MCHC 34.0 31.5 - 35.7 g/dL    RDW 14.4 12.3 - 15.4 %    RDW-SD 55.1 (H) 37.0 - 54.0 fl    MPV 8.9 6.0 - 12.0 fL    Platelets 103 (L) 140 - 450  10*3/mm3   Scan Slide    Collection Time: 06/08/21  6:24 AM    Specimen: Blood   Result Value Ref Range    Scan Slide     Manual Differential    Collection Time: 06/08/21  6:24 AM    Specimen: Blood   Result Value Ref Range    Neutrophil % 76.0 42.7 - 76.0 %    Lymphocyte % 3.0 (L) 19.6 - 45.3 %    Monocyte % 3.0 (L) 5.0 - 12.0 %    Bands %  16.0 (H) 0.0 - 5.0 %    Metamyelocyte % 2.0 (H) 0.0 - 0.0 %    Neutrophils Absolute 8.10 (H) 1.70 - 7.00 10*3/mm3    Lymphocytes Absolute 0.26 (L) 0.70 - 3.10 10*3/mm3    Monocytes Absolute 0.26 0.10 - 0.90 10*3/mm3    nRBC 1.0 (H) 0.0 - 0.2 /100 WBC    Macrocytes Slight/1+ None Seen    WBC Morphology Normal Normal    Platelet Estimate Decreased Normal   POC Glucose Once    Collection Time: 06/08/21  7:08 AM    Specimen: Blood   Result Value Ref Range    Glucose 122 (H) 70 - 105 mg/dL        Imaging:  CT Chest Pulmonary Embolism  Narrative:    DATE OF EXAM:  6/7/2021 12:17 PM     PROCEDURE:  CT CHEST PULMONARY EMBOLISM-     INDICATIONS:   elevated d-dimer; R06.03-Acute respiratory distress; J98.01-Acute  bronchospasm; I50.9-Heart failure, unspecified; C34.91-Malignant  neoplasm of unspecified part of right bronchus or lung     COMPARISON:   CT chest with contrast 2/18/2021     TECHNIQUE:  Routine transaxial slices were obtained through chest after  administration of intravenous 100 ml of Isovue 370. Reconstructed  coronal and sagittal images were also obtained. Automated exposure  control and iterative reconstruction methods were used.      FINDINGS:  Soft tissues of the lower neck demonstrate left port catheter with tip  terminating in low SVC. Postsurgical changes of sternotomy and CABG with  aortic valve replacement. Pulmonary arteries well-opacified with  contrast. Negative for pulmonary embolus. Right paratracheal lymph node  measuring 10 mm unchanged in image 44. Additional small left  paratracheal lymph nodes stable for example measuring 9 mm on image 54.  Thoracic aortic  branch vasculature is patent. No axillary adenopathy.     Redemonstration of large mass involving the right upper lobe which is  similar to the prior study when accounting for technical differences  measuring 9.1 x 7.6 cm, previously 9.3 x 8 cm. This results in complete  right upper lobe collapse and compressive atelectasis, unchanged. There  is again also compressive atelectasis involving the right middle lobe  which is unchanged.     Advanced emphysema. No pneumothorax. There are areas of bronchial wall  thickening and mucous plugging involving the lower lobes which may  relate to aspiration pneumonia.  New area of nodularity in the left  lower lobe measures 12 x 9 mm on image 98. There are areas of  interlobular septal thickening and groundglass opacities involving the  lower lobes and left upper lobe which could relate to superimposed  infection or mild pulmonary edema superimposed on background of advanced  emphysema.     Three low-density hepatic lesions noted likely cysts for example in the  right hepatic lobe measuring 16 mm and 17 mm on image 138. Small hiatal  hernia. Spleen normal in size. Normal adrenal glands. Pancreas without  findings of pancreatitis. Bilateral renal cysts noted. No  hydronephrosis. Negative for acute fracture.      Impression: 1. Negative for pulmonary embolus.  2. Redemonstration of large 9 cm right upper lobe mass which occludes  the right upper lobe bronchus resulting in complete right upper and  right middle lobe collapse similar to prior study.  3. Bronchial wall thickening with increased mucous plugging involving  lower lobes with patchy areas of airspace disease most concerning for  aspiration pneumonia.  4. Interlobular septal thickening and scattered groundglass opacities  bilaterally which may relate to pulmonary edema and/or infection  superimposed on background of advanced emphysema.  5. Stable mediastinal lymphadenopathy.  6. New 12 mm area of nodularity in left lower  lobe may relate to  aspiration/infection, recommend attention on follow-up.     Electronically Signed By-Saleem Ferguson MD On:6/7/2021 12:44 PM  This report was finalized on [id number] by  Saleem Ferguson MD.  XR Chest 1 View  Narrative:    DATE OF EXAM:   6/7/2021 4:55 AM     PROCEDURE:   XR CHEST 1 VW-     INDICATIONS:   soa     COMPARISON:  3/9/2021     TECHNIQUE:   Portable chest radiograph.     FINDINGS:    Right midlung perihilar mass again noted similar to the prior study.  There is a left-sided port catheter with the tip at the cavoatrial  junction. Heart size normal. There is increased left midlung and left  basilar airspace disease may relate to superimposed pneumonia. No  pneumothorax or large effusion. Osseous structures grossly intact.  Postsurgical changes of sternotomy and CABG.     Impression: 1. Redemonstration of right midlung mass, similar to the prior study.  2. Increased left midlung and left basilar airspace disease that may  relate to superimposed pneumonia.     Electronically Signed By-Saleem Ferguson MD On:6/7/2021 7:25 AM  This report was finalized on [id number] by  Saleem Ferguson MD.       ASSESSMENT:  Acute-on-chronic respiratory distress  Pneumonia most likely post obstruction  COPD exacerbation  Metastatic lung cancer squamous cell  CHF  Opioid dependence  Stage 2 skin ulcer of sacral region       PLAN:  The mass is extending from the original mass, follow-up with oncology  Antibiotics  Bronchodilator  Inhaled corticosteroids  IV steroids  IS/Flutter valve  Electrolytes/ glycemic control  DVT and GI prophylaxis.   . Poor long-term prognosis    Total Critical care time in direct medical management (   ) minutes  Lianna Groves MD. D, ABSM.     6/8/2021  08:08 EDT

## 2021-06-08 NOTE — CASE MANAGEMENT/SOCIAL WORK
Continued Stay Note  [facility]     Patient Name: Axel Ramirez  MRN: [id number]  Today's Date: 6/8/2021    Admit Date: 6/7/2021    Discharge Plan     Row Name 06/08/21 0828       Plan    Plan  DC Plan: From [facility]. Per liaison, may return if needed. Would require pre-cert. No PASRR required. Pending clinical course.        Phone communication or documentation only - no physical contact with patient or family.    Adrianne Gates [facility] – [place], [place]    Office: [phone]  Cell: [phone]  Fax: [phone]  E-mail: [email or web address]

## 2021-06-08 NOTE — PROGRESS NOTES
Reason for follow-up: Respiratory failure  Acute diastolic congestive heart failure  History of AVR  Lung CA     Patient Care Team:  Sandoval Stevenson FNP as PCP - General  Sandoval Stevenson FNP as PCP - Family Medicine  Axel Lewis MD as Consulting Physician (Hematology and Oncology)  Iglesia Springer MD as Consulting Physician (Cardiology)    Subjective .   Feels slightly better     Review of Systems   Constitutional: Positive for malaise/fatigue. Negative for fever.   HENT: Negative for congestion and hearing loss.    Eyes: Negative for double vision and visual disturbance.   Cardiovascular: Negative for chest pain, claudication, dyspnea on exertion, leg swelling and syncope.   Respiratory: Positive for cough and shortness of breath.    Endocrine: Negative for cold intolerance.   Skin: Negative for color change and rash.   Musculoskeletal: Negative for arthritis and joint pain.   Gastrointestinal: Negative for abdominal pain and heartburn.   Genitourinary: Negative for hematuria.   Neurological: Negative for excessive daytime sleepiness and dizziness.   Psychiatric/Behavioral: Negative for depression. The patient is not nervous/anxious.    All other systems reviewed and are negative.      Patient has no known allergies.    Scheduled Meds:aspirin, 81 mg, Oral, Daily  buprenorphine-naloxone, 1.5 tablet, Sublingual, Daily  docusate sodium, 100 mg, Oral, BID  enoxaparin, 40 mg, Subcutaneous, Q24H  gabapentin, 300 mg, Oral, TID  guaiFENesin, 600 mg, Oral, Q12H  insulin lispro, 0-7 Units, Subcutaneous, TID With Meals  ipratropium-albuterol, 3 mL, Nebulization, Q4H - RT  lurasidone, 40 mg, Oral, Daily  megestrol acetate, 200 mg, Oral, TID  methylPREDNISolone sodium succinate, 60 mg, Intravenous, Q6H  multivitamin with minerals, 1 tablet, Oral, Daily  nystatin, 5 mL, Swish & Swallow, 4x Daily  pantoprazole, 40 mg, Oral, Daily  piperacillin-tazobactam, 4.5 g, Intravenous, Q8H  polyethylene glycol, 17  "g, Oral, Daily  sertraline, 50 mg, Oral, Daily  spironolactone, 25 mg, Oral, Daily  Zinc Oxide, , Apply externally, BID      Continuous Infusions:   PRN Meds:.•  acetaminophen  •  albuterol  •  bisacodyl  •  dextrose  •  dextrose  •  glucagon (human recombinant)  •  insulin lispro  •  ipratropium-albuterol  •  melatonin  •  promethazine  •  [COMPLETED] Insert peripheral IV **AND** sodium chloride  •  sodium chloride    Objective   Looks comfortable with oxygen    VITAL SIGNS  Vitals:    06/07/21 1921 06/08/21 0339 06/08/21 0710 06/08/21 0715   BP: 100/61 121/71     BP Location: Right arm Right arm     Patient Position: Lying Lying     Pulse: 102 72 78 82   Resp: 16 16 18 18   Temp: 98 °F (36.7 °C) 98.3 °F (36.8 °C)     TempSrc: Oral Oral     SpO2: 98% 100% 97% 98%   Weight:       Height:           Flowsheet Rows      First Filed Value   Admission Height  172.7 cm (68\") Documented at 06/07/2021 0451   Admission Weight  67.3 kg (148 lb 6.4 oz) Documented at 06/07/2021 0450           TELEMETRY: Sinus rhythm    Physical Exam:  Constitutional:       Appearance: Well-developed.      Comments: On oxygen   Eyes:      General: No scleral icterus.     Conjunctiva/sclera: Conjunctivae normal.   HENT:      Head: Normocephalic and atraumatic.    Mouth/Throat:      Mouth: No oral lesions.      Pharynx: Uvula midline.   Neck:      Thyroid: No thyromegaly.      Vascular: No carotid bruit or JVD.      Trachea: Trachea normal.   Pulmonary:      Effort: Pulmonary effort is normal.      Breath sounds: Bibasilar Rhonchi present.   Cardiovascular:      Normal rate. Regular rhythm.      No gallop.   Pulses:     Intact distal pulses.   Abdominal:      General: Bowel sounds are normal.      Palpations: Abdomen is soft.   Musculoskeletal: Normal range of motion.      Cervical back: Neck supple. Skin:     General: Skin is warm. There is no cyanosis.   Neurological:      Mental Status: Alert and oriented to person, place, and time.      " Comments: No focal deficits   Psychiatric:         Behavior: Behavior is cooperative.                  LAB RESULTS (LAST 7 DAYS)    CBC  Results from last 7 days   Lab Units 06/08/21  0624 06/07/21  0500   WBC 10*3/mm3 8.80 10.10   RBC 10*6/mm3 1.94* 2.49*   HEMOGLOBIN g/dL 7.2* 9.2*   HEMATOCRIT % 21.1* 27.3*   MCV fL 108.8* 109.7*   PLATELETS 10*3/mm3 103* 115*       BMP  Results from last 7 days   Lab Units 06/08/21  0624 06/07/21  0500   SODIUM mmol/L 139 134*   POTASSIUM mmol/L 4.6 4.1   CHLORIDE mmol/L 100 97*   CO2 mmol/L 30.0* 26.0   BUN mg/dL 32* 24*   CREATININE mg/dL 0.93 0.73*   GLUCOSE mg/dL 127* 116*       CMP   Results from last 7 days   Lab Units 06/08/21  0624 06/07/21  0500   SODIUM mmol/L 139 134*   POTASSIUM mmol/L 4.6 4.1   CHLORIDE mmol/L 100 97*   CO2 mmol/L 30.0* 26.0   BUN mg/dL 32* 24*   CREATININE mg/dL 0.93 0.73*   GLUCOSE mg/dL 127* 116*   ALBUMIN g/dL 2.50* 2.50*   BILIRUBIN mg/dL 0.6 0.7   ALK PHOS U/L 89 109   AST (SGOT) U/L 41* 58*   ALT (SGPT) U/L 64* 87*         BNP        TROPONIN  Results from last 7 days   Lab Units 06/07/21  0500   TROPONIN T ng/mL 0.012       CoAg        Creatinine Clearance  Estimated Creatinine Clearance: 70.4 mL/min (by C-G formula based on SCr of 0.93 mg/dL).    ABG  Results from last 7 days   Lab Units 06/07/21  0902   PH, ARTERIAL pH units 7.514*   PCO2, ARTERIAL mm Hg 34.4*   PO2 ART mm Hg 49.4*   O2 SATURATION ART % 88.5*   BASE EXCESS ART mmol/L 4.9*       Radiology  XR Chest 1 View    Result Date: 6/7/2021  1. Redemonstration of right midlung mass, similar to the prior study. 2. Increased left midlung and left basilar airspace disease that may relate to superimposed pneumonia.  Electronically Signed By-Saleem Ferguson MD On:6/7/2021 7:25 AM This report was finalized on [id number] by  Saleem Ferguson MD.    CT Chest Pulmonary Embolism    Result Date: 6/7/2021  1. Negative for pulmonary embolus. 2. Redemonstration of large 9 cm right upper lobe mass  which occludes the right upper lobe bronchus resulting in complete right upper and right middle lobe collapse similar to prior study. 3. Bronchial wall thickening with increased mucous plugging involving lower lobes with patchy areas of airspace disease most concerning for aspiration pneumonia. 4. Interlobular septal thickening and scattered groundglass opacities bilaterally which may relate to pulmonary edema and/or infection superimposed on background of advanced emphysema. 5. Stable mediastinal lymphadenopathy. 6. New 12 mm area of nodularity in left lower lobe may relate to aspiration/infection, recommend attention on follow-up.  Electronically Signed By-Saleem Ferguson MD On:6/7/2021 12:44 PM This report was finalized on [id number] by  Saleem Ferguson MD.            EKG        I personally viewed and interpreted the patient's EKG/Telemetry data:    ECHOCARDIOGRAM:    Results for orders placed during the hospital encounter of 11/08/20    Adult Transthoracic Echo Complete W/ Cont if Necessary Per Protocol    Interpretation Summary  · Estimated left ventricular EF = 65% Left ventricular systolic function is normal.  · Left ventricular diastolic function is consistent with (grade Ia w/high LAP) impaired relaxation.  · The right atrial cavity is mildly dilated.  · Mild aortic valve stenosis is present.  · Mild to moderate LVOT gradient noted    STRESS MYOVIEW:    CARDIAC CATHETERIZATION:    OTHER:         Assessment/Plan       Coronary artery disease involving native coronary artery of native heart without angina pectoris    Nonrheumatic aortic valve stenosis    Essential hypertension    Congestive heart failure (CMS/HCC)    Tobacco abuse    Squamous cell carcinoma of lung, right (CMS/HCC)    Acute-on-chronic respiratory failure (CMS/HCC)    Respiratory distress    Stage 2 skin ulcer of sacral region (CMS/HCC)      Respiratory failure probably multifactorial  Most probably primary reason is due to pulmonary causes  lung CA COPD  Mild diastolic congestive heart failure  Check the echocardiogram  History of AVR stable  History of CAD stable  Cautious diuresis    I discussed the patients findings and my recommendations with patient     Iglesia Springer MD  06/08/21  11:39 EDT

## 2021-06-08 NOTE — CASE MANAGEMENT/SOCIAL WORK
Continued Stay Note  [facility]     Patient Name: Axel Ramirez  MRN: [id number]  Today's Date: 6/8/2021    Admit Date: 6/7/2021    Discharge Plan     Row Name 06/08/21 1504       Plan    Plan Comments  Simple mask 10L. IV antibiotics per ID. PT/OT consult needed for return to rehab.        Chart review only.             Maninder Bal RN

## 2021-06-08 NOTE — PLAN OF CARE
Problem: Adult Inpatient Plan of Care  Goal: Absence of Hospital-Acquired Illness or Injury  Intervention: Identify and Manage Fall Risk  Recent Flowsheet Documentation  Taken 6/8/2021 0530 by Basil Spivey LPN  Safety Promotion/Fall Prevention:   safety round/check completed   room organization consistent   nonskid shoes/slippers when out of bed   muscle strengthening facilitated   mobility aid in reach   lighting adjusted   fall prevention program maintained   assistive device/personal items within reach   activity supervised   clutter free environment maintained  Taken 6/8/2021 0310 by Basil Spivey LPN  Safety Promotion/Fall Prevention:   toileting scheduled   safety round/check completed  Taken 6/8/2021 0110 by Basil Spivey LPN  Safety Promotion/Fall Prevention:   safety round/check completed   toileting scheduled   room organization consistent   nonskid shoes/slippers when out of bed   muscle strengthening facilitated   mobility aid in reach   lighting adjusted  Taken 6/7/2021 2331 by Basil Spivey LPN  Safety Promotion/Fall Prevention:   safety round/check completed   room organization consistent   nonskid shoes/slippers when out of bed   muscle strengthening facilitated   mobility aid in reach   lighting adjusted   fall prevention program maintained   clutter free environment maintained   assistive device/personal items within reach   activity supervised  Taken 6/7/2021 2102 by Basil Spivey LPN  Safety Promotion/Fall Prevention:   safety round/check completed   room organization consistent   nonskid shoes/slippers when out of bed   muscle strengthening facilitated   mobility aid in reach   lighting adjusted  Taken 6/7/2021 1945 by Basil Spivey LPN  Safety Promotion/Fall Prevention:   toileting scheduled   safety round/check completed   room organization consistent   nonskid shoes/slippers when out of bed   muscle strengthening facilitated   mobility aid in  reach   lighting adjusted   fall prevention program maintained   elopement precautions   clutter free environment maintained   assistive device/personal items within reach  Intervention: Prevent Skin Injury  Recent Flowsheet Documentation  Taken 6/8/2021 0530 by Basil Spivey LPN  Body Position: position maintained  Taken 6/8/2021 0110 by Basil Spivey LPN  Body Position: position maintained  Taken 6/7/2021 2331 by Basil Spivey LPN  Body Position: position maintained  Taken 6/7/2021 2102 by Basil Spivey LPN  Body Position: position maintained  Taken 6/7/2021 1945 by Basil Spivey LPN  Body Position:   supine   position maintained  Intervention: Prevent Infection  Recent Flowsheet Documentation  Taken 6/8/2021 0530 by Basil Spivey LPN  Infection Prevention:   visitors restricted/screened   single patient room provided   rest/sleep promoted   personal protective equipment utilized   hand hygiene promoted  Taken 6/8/2021 0310 by Basil Spivey LPN  Infection Prevention:   single patient room provided   rest/sleep promoted   personal protective equipment utilized   hand hygiene promoted  Taken 6/8/2021 0110 by Basil Spivey LPN  Infection Prevention:   single patient room provided   rest/sleep promoted   personal protective equipment utilized   hand hygiene promoted   equipment surfaces disinfected   environmental surveillance performed  Taken 6/7/2021 2331 by Basil Spivey LPN  Infection Prevention:   single patient room provided   rest/sleep promoted   personal protective equipment utilized   hand hygiene promoted  Taken 6/7/2021 2102 by Basil Spivey LPN  Infection Prevention:   single patient room provided   rest/sleep promoted   personal protective equipment utilized   hand hygiene promoted  Taken 6/7/2021 1945 by Basil Spivey LPN  Infection Prevention:   single patient room provided   visitors restricted/screened   rest/sleep  promoted   personal protective equipment utilized   hand hygiene promoted     Problem: Fall Injury Risk  Goal: Absence of Fall and Fall-Related Injury  Intervention: Identify and Manage Contributors to Fall Injury Risk  Recent Flowsheet Documentation  Taken 6/8/2021 0530 by Basil Spivey LPN  Medication Review/Management: medications reviewed  Taken 6/8/2021 0310 by Basil Spivey LPN  Medication Review/Management: medications reviewed  Taken 6/8/2021 0110 by Basil Spivey LPN  Medication Review/Management: medications reviewed  Taken 6/7/2021 2331 by Basil Spivey LPN  Medication Review/Management: medications reviewed  Taken 6/7/2021 2102 by Basil Spivey LPN  Medication Review/Management: medications reviewed  Taken 6/7/2021 1945 by Basil Spivey LPN  Medication Review/Management: medications reviewed  Intervention: Promote Injury-Free Environment  Recent Flowsheet Documentation  Taken 6/8/2021 0530 by Basil Spivey LPN  Safety Promotion/Fall Prevention:   safety round/check completed   room organization consistent   nonskid shoes/slippers when out of bed   muscle strengthening facilitated   mobility aid in reach   lighting adjusted   fall prevention program maintained   assistive device/personal items within reach   activity supervised   clutter free environment maintained  Taken 6/8/2021 0310 by Basil Spivey LPN  Safety Promotion/Fall Prevention:   toileting scheduled   safety round/check completed  Taken 6/8/2021 0110 by Basil Spivey LPN  Safety Promotion/Fall Prevention:   safety round/check completed   toileting scheduled   room organization consistent   nonskid shoes/slippers when out of bed   muscle strengthening facilitated   mobility aid in reach   lighting adjusted  Taken 6/7/2021 2331 by Basil Spivey LPN  Safety Promotion/Fall Prevention:   safety round/check completed   room organization consistent   nonskid shoes/slippers when  out of bed   muscle strengthening facilitated   mobility aid in reach   lighting adjusted   fall prevention program maintained   clutter free environment maintained   assistive device/personal items within reach   activity supervised  Taken 6/7/2021 2102 by Basil Spivey LPN  Safety Promotion/Fall Prevention:   safety round/check completed   room organization consistent   nonskid shoes/slippers when out of bed   muscle strengthening facilitated   mobility aid in reach   lighting adjusted  Taken 6/7/2021 1945 by Basil Spivey LPN  Safety Promotion/Fall Prevention:   toileting scheduled   safety round/check completed   room organization consistent   nonskid shoes/slippers when out of bed   muscle strengthening facilitated   mobility aid in reach   lighting adjusted   fall prevention program maintained   elopement precautions   clutter free environment maintained   assistive device/personal items within reach     Problem: Skin Injury Risk Increased  Goal: Skin Health and Integrity  Intervention: Optimize Skin Protection  Recent Flowsheet Documentation  Taken 6/8/2021 0530 by Basil Spivey LPN  Head of Bed (HOB): HOB at 20-30 degrees  Taken 6/8/2021 0310 by Basil Spivey LPN  Pressure Reduction Techniques: frequent weight shift encouraged  Pressure Reduction Devices: specialty bed utilized  Taken 6/8/2021 0110 by Basil Spivey LPN  Pressure Reduction Techniques: frequent weight shift encouraged  Head of Bed (HOB): HOB at 20-30 degrees  Pressure Reduction Devices: specialty bed utilized  Taken 6/7/2021 2331 by Basil Spivey LPN  Head of Bed (HOB): HOB at 20-30 degrees  Taken 6/7/2021 2102 by Basil Spivey LPN  Head of Bed (HOB): HOB at 20-30 degrees  Taken 6/7/2021 1945 by Basil Spivey LPN  Pressure Reduction Techniques: frequent weight shift encouraged  Head of Bed (HOB): HOB at 30-45 degrees  Pressure Reduction Devices: positioning supports utilized     Problem:  Adjustment to Illness (Heart Failure)  Goal: Optimal Coping  Intervention: Support and Optimize Psychosocial Response to Chronic Illness  Recent Flowsheet Documentation  Taken 6/7/2021 1945 by Basil Spivey LPN  Family/Support System Care: caregiver stress acknowledged     Problem: Functional Ability Impaired (Heart Failure)  Goal: Optimal Functional Ability  Intervention: Optimize Functional Ability  Recent Flowsheet Documentation  Taken 6/8/2021 0530 by Basil Spivey LPN  Activity Management: bedrest  Taken 6/8/2021 0310 by Basil Spivey LPN  Activity Management: bedrest  Taken 6/8/2021 0110 by Basil Spivey LPN  Activity Management: bedrest  Taken 6/7/2021 2331 by Basil Spivey LPN  Activity Management:   bedrest   up in chair  Taken 6/7/2021 2102 by Basil Spivey LPN  Activity Management: activity encouraged  Taken 6/7/2021 1945 by Basil Spivey LPN  Activity Management: activity adjusted per tolerance     Problem: Respiratory Compromise (Heart Failure)  Goal: Effective Oxygenation and Ventilation  Intervention: Promote Airway Secretion Clearance  Recent Flowsheet Documentation  Taken 6/7/2021 2331 by Basil Spivey LPN  Cough And Deep Breathing: done independently per patient  Taken 6/7/2021 2102 by Basil Spivey LPN  Cough And Deep Breathing: done independently per patient  Intervention: Optimize Oxygenation and Ventilation  Recent Flowsheet Documentation  Taken 6/7/2021 1945 by Basil Spivey LPN  Airway/Ventilation Management: calming measures promoted     Problem: Gas Exchange Impaired  Goal: Optimal Gas Exchange  Intervention: Optimize Oxygenation and Ventilation  Recent Flowsheet Documentation  Taken 6/8/2021 0530 by Basil Spivey LPN  Head of Bed (HOB): HOB at 20-30 degrees  Taken 6/8/2021 0110 by Basil Spivey LPN  Head of Bed (HOB): HOB at 20-30 degrees  Taken 6/7/2021 2331 by Basil Spivey LPN  Head of Bed (HOB):  HOB at 20-30 degrees  Taken 6/7/2021 2102 by Basil Spivey LPN  Head of Bed (HOB): HOB at 20-30 degrees  Taken 6/7/2021 1945 by Basil Spivey LPN  Head of Bed (HOB): HOB at 30-45 degrees  Airway/Ventilation Management: calming measures promoted   Goal Outcome Evaluation:

## 2021-06-08 NOTE — CONSULTS
Hematology/Oncology Inpatient Consultation    Patient name: Axel Ramirez  : 1950  MRN: [id number]  Referring Provider: Dr. Allred  Reason for Consultation: Metastatic lung cancer.    Chief complaint: Shortness of air with hypoxia    History of present illness:    70 y.o. male presented to [facility] ER on 2021 with shortness of air and hypoxia.  He had an acute onset on the day of admission of worsening shortness of breath with O2 saturation of 76% on 4 L/min O2.  The [facility] called EMS.  He was treated with nebulizer on the way to the ER.  Chest x-ray showed left lung pneumonia and a right middle lobe lung mass.  White count 10.1, hemoglobin 9.2, .7, platelets 115k.  D-dimer was 2.99 (< 0.6).  Chest CT showed a 9 cm right upper lobe lung mass occluding the right upper lobe bronchus with complete right upper and right middle lobe collapse.  There was no pulmonary emboli.  There was possible aspiration pneumonia and stable mediastinal lymphadenopathy.  A new 12 mm nodule was seen in the left lower lobe.  Adrenal glands were normal.  He was started on steroids and Zosyn.  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  Recently started on second line therapy Taxol/carboplatin/ipilimumab and Nivolumab with Neulasta support on 2021.    21  Hematology/Oncology was consulted for his metastatic squamous cell carcinoma of the right upper lobe lung.  He is an established patient.  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  Initially treated with concomitant weekly chemotherapy (paclitaxel and carboplatin x7) and radiation therapy from 2020. He completed radiation therapy on 2021.  He completed the weekly portion of his chemotherapy on 2021.  He received 1 cycle of  every 21-day carboplatin /Taxol with Neulasta support on 2/9/2021.  He subsequently was admitted to [facility], for hypoxia with respiratory failure requiring intubation.  MRI of the brain showed a left occipital lobe lesion with vasogenic edema.  He received radiation, x1 fraction, on 3/3/2021 to the left occipital lobe. One month later, a brain MRI showed enlargement of the left occipital lobe lesion.  PET scan showed the right upper lobe lung mass to be 10 cm in size with a decrease in the peripheral FDG uptake suggesting interval response to treatment.  There was a decrease in the size and FDG uptake in the mediastinal lymph nodes.  There was no distant metastatic disease.  The lesion in the left occipital lobe was FDG avid.  Right upper lobe lung collapse had improved with aeration.  He was referred to Dr. Tay for evaluation of his brain MRI which was felt to be due to to post SRS changes and not progression.  He was seen in follow-up with plan to start chemotherapy with immunotherapy.  Paclitaxel and carboplatin doses were decreased by 20% due to pancytopenia with prior treatment.  Repeat brain MRI, on 5/13/2021, showed an increase in the size of the left occipital lobe with an increase in surrounding edema.  There were no new masses.  The patient was referred to Dr. Luna for surgical resection versus MRI SPECT/perfusion at [facility].  Dr. Luna felt the lesion was a combination of necrosis and tumor progression and recommended surgical resection.  He started treatment on 5/24/2021 (chemotherapy plus immunotherapy) as his surgical resection was to be done after medical clearance.  He had a return appointment with Dr. Luna on 6/9/2021 following an MRI of his brain.  -MDS-diagnosed January 2021.  He was anemic and was found to have GOGO and malabsorption on oral therapy.  His bone marrow revealed increased iron storage and mild erythroid atypia in January 2021.  Stool heme was negative on 5/13/2021.  He has received IV iron but has never required Retacrit.  Last office visit 5/24/2021, complaining of hypoxia and tachypnea with anxiety.  White count 9.05, hemoglobin 11.34, .8 and platelets 220.  He was starting to gain weight and Megace was continued.  For his cough and hypoxia, he was treated with Augmentin and albuterol nebulizer.  He had oral thrush which was treated with nystatin swish and swallow.  TSH 0.287 (0.282-4.0), iron 62 (), ferritin 4185 (), iron saturation 32 (20-55), TIBC 196 (228-428).    PCP: Sandoval Stevenson FNP    History:  Past Medical History:   Diagnosis Date   • Aortic stenosis    • Cancer (CMS/HCC)     Sickle Cell Carcinoma   • Congestive heart failure (CHF) (CMS/HCC)     DIASTOLIC   • COPD (chronic obstructive pulmonary disease) (CMS/HCC)    • Coronary artery disease    • Hypertension    • Lung cancer (CMS/HCC)    • Myocardial infarction (CMS/HCC)    • Peripheral vascular disease (CMS/HCC)    • Pneumonia 06/07/2021   • Valvular disease    ,   Past Surgical History:   Procedure Laterality Date   • AORTIC VALVE REPAIR/REPLACEMENT  02/26/2018    Dr Maza   • BRONCHOSCOPY N/A 10/24/2020    Procedure: BRONCHOSCOPY with biopsy right upper lobe mass, and bronchoalveolar lavage right upper lobe;  Surgeon: Ángela Bean MD;  Location: [facility] ENDOSCOPY;  Service: Pulmonary;  Laterality: N/A;  post: right upper lobe mass, pneumonia   • BRONCHOSCOPY N/A 11/11/2020    Procedure: BRONCHOSCOPY with bronchial washing;  Surgeon: Ángela Bean MD;  Location: [facility] ENDOSCOPY;  Service: Pulmonary;  Laterality: N/A;  Post: lung mass, pneumonia   • BRONCHOSCOPY N/A 11/11/2020    Procedure: BRONCHOSCOPY RIGID with debulking of right main endobronchial tumor;  Surgeon: Nomi Reyes MD;  Location: [facility] MAIN OR;  Service: Cardiothoracic;  Laterality: N/A;   • BRONCHOSCOPY N/A 11/11/2020    Procedure: BRONCHOSCOPY;  Surgeon: Nomi Reyes MD;  Location: [facility] OR;  Service:  Cardiothoracic;  Laterality: N/A;   • CARDIAC CATHETERIZATION  2017   • CORONARY ARTERY BYPASS GRAFT  2018    Dr Maza   • TIBIA FRACTURE SURGERY     • VENOUS ACCESS DEVICE (PORT) INSERTION Left 10/30/2020    Procedure: MEDIPORT INSERTION UNDER FLUOROSCOPIC GUIDENCE;  Surgeon: Nomi Reyes MD;  Location: [facility] OR;  Service: Cardiothoracic;  Laterality: Left;   ,   Family History   Problem Relation Age of Onset   • Coronary artery disease Mother    • Cancer Father    • Stroke Daughter 35   • Seizures Daughter 35   ,   Social History     Tobacco Use   • Smoking status: Former Smoker     Packs/day: 1.00     Types: Cigarettes     Quit date: 10/2020     Years since quittin.6   • Smokeless tobacco: Never Used   Vaping Use   • Vaping Use: Never used   Substance Use Topics   • Alcohol use: Not Currently   • Drug use: No   ,   Medications Prior to Admission   Medication Sig Dispense Refill Last Dose   • acetaminophen (TYLENOL) 650 MG suppository Insert 1 suppository into the rectum Every 6 (Six) Hours As Needed for Mild Pain , Headache or Fever.      • aspirin (aspirin) 81 MG EC tablet Take 1 tablet by mouth Daily.   2021 at 0900   • atorvastatin (LIPITOR) 20 MG tablet Take 1 tablet by mouth every night at bedtime. 90 tablet 1 2021 at 1900   • bisacodyl (DULCOLAX) 10 MG suppository Insert 1 suppository into the rectum Daily As Needed for Constipation.      • buprenorphine-naloxone (SUBOXONE) 8-2 MG per SL tablet Place 1.5 tablets under the tongue Daily.   2021 at 0900   • docusate sodium 100 MG capsule Take 1 capsule by mouth 2 (Two) Times a Day.   2021 at 1900   • gabapentin (NEURONTIN) 300 MG capsule Take 300 mg by mouth 3 (Three) Times a Day.   2021 at 1900   • guaiFENesin (MUCINEX) 600 MG 12 hr tablet Take 1 tablet by mouth Every 12 (Twelve) Hours.   2021 at 1900   • hydrALAZINE (APRESOLINE) 50 MG tablet Take 1 tablet by mouth Every 12 (Twelve) Hours. 180 tablet 3  6/6/2021 at 1900   • lurasidone (LATUDA) 40 MG tablet tablet Take 40 mg by mouth Daily.   6/6/2021 at 0900   • megestrol (MEGACE) 40 MG/ML suspension Take 200 mg by mouth 3 (Three) Times a Day.   6/6/2021 at 1900   • melatonin 5 MG tablet tablet Take 1 tablet by mouth At Night As Needed (sleep).      • metoprolol tartrate (LOPRESSOR) 25 MG tablet Take 2 tablets by mouth 2 (Two) Times a Day for 360 days. 120 tablet 11 6/6/2021 at 1900   • multivitamin with minerals (multivitamin w/minerals) tablet tablet Take 1 tablet by mouth Daily.   6/6/2021 at 0900   • nystatin (MYCOSTATIN) 099870 UNIT/ML suspension Swish and swallow 500,000 Units 4 (Four) Times a Day.   6/6/2021 at 1900   • pantoprazole (Protonix) 40 MG EC tablet Take 1 tablet by mouth Daily. 30 tablet 1 6/6/2021 at 0900   • polyethylene glycol (MIRALAX) 17 g packet Take 17 g by mouth Daily.   6/6/2021 at 0900   • predniSONE (DELTASONE) 10 MG tablet Take 1 tablet by mouth Daily With Breakfast.   6/6/2021 at 0900   • promethazine (PHENERGAN) 25 MG tablet Take 25 mg by mouth Every 4 (Four) Hours As Needed for Nausea or Vomiting.      • sertraline (ZOLOFT) 50 MG tablet Take 50 mg by mouth Daily.   6/6/2021 at 0900   • spironolactone (Aldactone) 25 MG tablet Take 1 tablet by mouth Daily for 360 days. 90 tablet 3 6/6/2021 at 0900   • torsemide (DEMADEX) 20 MG tablet Take 10 mg by mouth Daily.   6/6/2021 at 0900   • albuterol sulfate  (90 Base) MCG/ACT inhaler Inhale 2 puffs Every 6 (Six) Hours As Needed for Wheezing or Shortness of Air.      , Scheduled Meds:  aspirin, 81 mg, Oral, Daily  buprenorphine-naloxone, 1.5 tablet, Sublingual, Daily  docusate sodium, 100 mg, Oral, BID  enoxaparin, 40 mg, Subcutaneous, Q24H  gabapentin, 300 mg, Oral, TID  guaiFENesin, 600 mg, Oral, Q12H  insulin lispro, 0-7 Units, Subcutaneous, TID With Meals  ipratropium-albuterol, 3 mL, Nebulization, Q4H - RT  lurasidone, 40 mg, Oral, Daily  megestrol acetate, 200 mg, Oral,  TID  methylPREDNISolone sodium succinate, 60 mg, Intravenous, Q6H  multivitamin with minerals, 1 tablet, Oral, Daily  nystatin, 5 mL, Swish & Swallow, 4x Daily  pantoprazole, 40 mg, Oral, Daily  piperacillin-tazobactam, 4.5 g, Intravenous, Q8H  polyethylene glycol, 17 g, Oral, Daily  sertraline, 50 mg, Oral, Daily  spironolactone, 25 mg, Oral, Daily  Zinc Oxide, , Apply externally, BID    , Continuous Infusions:   , PRN Meds:  •  acetaminophen  •  albuterol  •  bisacodyl  •  dextrose  •  dextrose  •  glucagon (human recombinant)  •  insulin lispro  •  ipratropium-albuterol  •  melatonin  •  promethazine  •  [COMPLETED] Insert peripheral IV **AND** sodium chloride  •  sodium chloride   Allergies:  Patient has no known allergies.    ROS:  Review of Systems   Constitutional: Negative for activity change, chills, fatigue, fever and unexpected weight change.   HENT: Negative for congestion, dental problem, hearing loss, mouth sores, nosebleeds, sore throat and trouble swallowing.    Eyes: Negative for photophobia and visual disturbance.   Respiratory: Positive for shortness of breath. Negative for cough and chest tightness.    Cardiovascular: Negative for chest pain, palpitations and leg swelling.   Gastrointestinal: Negative for abdominal distention, abdominal pain, blood in stool, constipation, diarrhea, nausea and vomiting.   Endocrine: Negative for cold intolerance and heat intolerance.   Genitourinary: Negative for decreased urine volume, difficulty urinating, frequency, hematuria and urgency.   Musculoskeletal: Negative for arthralgias and gait problem.   Skin: Negative for rash and wound.   Neurological: Negative for dizziness, tremors, syncope, weakness, light-headedness, numbness and headaches.   Hematological: Negative for adenopathy. Does not bruise/bleed easily.   Psychiatric/Behavioral: Negative for confusion and hallucinations. The patient is not nervous/anxious.    All other systems reviewed and are  "negative.       Objective     Vital Signs:   /71 (BP Location: Right arm, Patient Position: Lying)   Pulse 82   Temp 98.3 °F (36.8 °C) (Oral)   Resp 18   Ht 172.7 cm (68\")   Wt 67.3 kg (148 lb 6.4 oz)   SpO2 98%   BMI 22.56 kg/m²     Physical Exam:  Physical Exam  Vitals and nursing note reviewed.   Constitutional:       General: He is not in acute distress.     Appearance: Normal appearance. He is well-developed and normal weight. He is not diaphoretic.   HENT:      Head: Normocephalic and atraumatic.      Comments: Male pattern baldness.     Right Ear: External ear normal.      Left Ear: External ear normal.      Nose: Nose normal.      Comments: Oxygen per nasal cannula.     Mouth/Throat:      Mouth: Mucous membranes are moist.      Pharynx: Oropharynx is clear. No oropharyngeal exudate or posterior oropharyngeal erythema.   Eyes:      General: No scleral icterus.     Extraocular Movements: Extraocular movements intact.      Conjunctiva/sclera: Conjunctivae normal.      Pupils: Pupils are equal, round, and reactive to light.   Cardiovascular:      Rate and Rhythm: Normal rate and regular rhythm.      Heart sounds: Normal heart sounds. No murmur heard.        Comments: Monitor leads.  Midline vertical chest wall scar.  Pulmonary:      Effort: Pulmonary effort is normal. No respiratory distress.      Breath sounds: Rhonchi (Bilateral.) present. No wheezing or rales.   Abdominal:      General: Abdomen is flat. Bowel sounds are normal. There is no distension.      Palpations: Abdomen is soft. There is no mass.      Tenderness: There is no abdominal tenderness. There is no guarding.   Genitourinary:     Comments: Deferred   Musculoskeletal:         General: No swelling, tenderness or deformity. Normal range of motion.      Cervical back: Normal range of motion and neck supple.      Right lower leg: No edema.      Left lower leg: No edema.      Comments: Left upper extremity IV.   Lymphadenopathy:      " Cervical: No cervical adenopathy.      Upper Body:      Right upper body: No supraclavicular adenopathy.      Left upper body: No supraclavicular adenopathy.   Skin:     General: Skin is warm and dry.      Coloration: Skin is not pale.      Findings: Bruising (On arms.) present. No erythema or rash.   Neurological:      General: No focal deficit present.      Mental Status: He is alert and oriented to person, place, and time.      Coordination: Coordination normal.   Psychiatric:         Mood and Affect: Mood normal.         Behavior: Behavior normal.         Thought Content: Thought content normal.         Judgment: Judgment normal.          Results Review:  Lab Results (last 48 hours)     Procedure Component Value Units Date/Time    Manual Differential [[id number]]  (Abnormal) Collected: 06/08/21 0624    Specimen: Blood Updated: 06/08/21 0742     Neutrophil % 76.0 %      Lymphocyte % 3.0 %      Monocyte % 3.0 %      Bands %  16.0 %      Metamyelocyte % 2.0 %      Neutrophils Absolute 8.10 10*3/mm3      Lymphocytes Absolute 0.26 10*3/mm3      Monocytes Absolute 0.26 10*3/mm3      nRBC 1.0 /100 WBC      Macrocytes Slight/1+     WBC Morphology Normal     Platelet Estimate Decreased    CBC & Differential [[id number]]  (Abnormal) Collected: 06/08/21 0624    Specimen: Blood Updated: 06/08/21 0742    Narrative:      The following orders were created for panel order CBC & Differential.  Procedure                               Abnormality         Status                     ---------                               -----------         ------                     Scan Slide[[id number]]                                       Final result               CBC Auto Differential[[id number]]        Abnormal            Final result                 Please view results for these tests on the individual orders.    Scan Slide [[id number]] Collected: 06/08/21 0624    Specimen: Blood Updated: 06/08/21 0742     Scan Slide --     Comment: See Manual  Differential Results       CBC Auto Differential [[id number]]  (Abnormal) Collected: 06/08/21 0624    Specimen: Blood Updated: 06/08/21 0742     WBC 8.80 10*3/mm3      RBC 1.94 10*6/mm3      Hemoglobin 7.2 g/dL      Comment: Result checked         Hematocrit 21.1 %      .8 fL      MCH 37.0 pg      MCHC 34.0 g/dL      RDW 14.4 %      RDW-SD 55.1 fl      MPV 8.9 fL      Platelets 103 10*3/mm3     Narrative:      The previously reported component NRBC is no longer being reported. Previous result was 0.3 /100 WBC (Reference Range: 0.0-0.2 /100 WBC) on 6/8/2021 at 0713 EDT.    Blood Culture - Blood, Arm, Left [[id number]] Collected: 06/07/21 0705    Specimen: Blood from Arm, Left Updated: 06/08/21 0730     Blood Culture No growth at 24 hours    Blood Culture - Blood, Arm, Right [[id number]] Collected: 06/07/21 0705    Specimen: Blood from Arm, Right Updated: 06/08/21 0730     Blood Culture No growth at 24 hours    Comprehensive Metabolic Panel [[id number]]  (Abnormal) Collected: 06/08/21 0624    Specimen: Blood Updated: 06/08/21 0718     Glucose 127 mg/dL      BUN 32 mg/dL      Creatinine 0.93 mg/dL      Sodium 139 mmol/L      Potassium 4.6 mmol/L      Chloride 100 mmol/L      CO2 30.0 mmol/L      Calcium 9.0 mg/dL      Total Protein 6.4 g/dL      Albumin 2.50 g/dL      ALT (SGPT) 64 U/L      AST (SGOT) 41 U/L      Alkaline Phosphatase 89 U/L      Total Bilirubin 0.6 mg/dL      eGFR Non African Amer 80 mL/min/1.73      Globulin 3.9 gm/dL      A/G Ratio 0.6 g/dL      BUN/Creatinine Ratio 34.4     Anion Gap 9.0 mmol/L     Narrative:      GFR Normal >60  Chronic Kidney Disease <60  Kidney Failure <15      POC Glucose Once [[id number]]  (Abnormal) Collected: 06/08/21 0708    Specimen: Blood Updated: 06/08/21 0709     Glucose 122 mg/dL      Comment: Serial Number: [id number]:  [id number]       MRSA Screen, PCR (Inpatient) - Swab, Nares [[id number]]  (Abnormal) Collected: 06/07/21 1701    Specimen: Swab from Nares  Updated: 06/07/21 1941     MRSA PCR MRSA Detected    POC Glucose Once [[id number]]  (Abnormal) Collected: 06/07/21 1923    Specimen: Blood Updated: 06/07/21 1924     Glucose 203 mg/dL      Comment: Serial Number: [id number]:  [id number]       POC Glucose Once [[id number]]  (Abnormal) Collected: 06/07/21 1711    Specimen: Blood Updated: 06/07/21 1713     Glucose 167 mg/dL      Comment: Serial Number: [id number]:  [id number]       POC Glucose Once [[id number]]  (Abnormal) Collected: 06/07/21 1224    Specimen: Blood Updated: 06/07/21 1226     Glucose 122 mg/dL      Comment: Serial Number: [id number]:  [id number]       D-dimer, Quantitative [[id number]]  (Abnormal) Collected: 06/07/21 1052    Specimen: Blood Updated: 06/07/21 1150     D-Dimer, Quantitative 2.98 mg/L (FEU)     Narrative:      Reference Range  --------------------------------------------------------------------     < 0.50   Negative Predictive Value  0.50-0.59   Indeterminate    >= 0.60   Probable VTE             A very low percentage of patients with DVT may yield D-Dimer results   below the cut-off of 0.50 mg/L FEU.  This is known to be more   prevalent in patients with distal DVT.             Results of this test should always be interpreted in conjunction with   the patient's medical history, clinical presentation and other   findings.  Clinical diagnosis should not be based on the result of   INNOVANCE D-Dimer alone.    Blood Gas, Arterial - [[id number]]  (Abnormal) Collected: 06/07/21 0902    Specimen: Arterial Blood Updated: 06/07/21 0905     Site Right Radial     Brice's Test Positive     pH, Arterial 7.514 pH units      pCO2, Arterial 34.4 mm Hg      pO2, Arterial 49.4 mm Hg      HCO3, Arterial 27.7 mmol/L      Base Excess, Arterial 4.9 mmol/L      Comment: Serial Number: [id number]:  [id number]        O2 Saturation, Arterial 88.5 %      CO2 Content 28.7 mmol/L      Barometric Pressure for Blood Gas --     Comment: N/A        Modality  Cannula     FIO2 45 %      Hemodilution No    POC Glucose Once [[id number]]  (Normal) Collected: 06/07/21 0823    Specimen: Blood Updated: 06/07/21 0825     Glucose 92 mg/dL      Comment: Serial Number: [id number]:  [id number]       Respiratory Panel PCR w/COVID-19(SARS-CoV-2) PARESH/GRACIELA/KATHY/PAD/COR/MAD/CHRIST In-House, NP Swab in UTM/VTM, 3-4 HR TAT - Swab, Nasopharynx [[id number]]  (Normal) Collected: 06/07/21 0620    Specimen: Swab from Nasopharynx Updated: 06/07/21 0726     ADENOVIRUS, PCR Not Detected     Coronavirus 229E Not Detected     Coronavirus HKU1 Not Detected     Coronavirus NL63 Not Detected     Coronavirus OC43 Not Detected     COVID19 Not Detected     Human Metapneumovirus Not Detected     Human Rhinovirus/Enterovirus Not Detected     Influenza A PCR Not Detected     Influenza B PCR Not Detected     Parainfluenza Virus 1 Not Detected     Parainfluenza Virus 2 Not Detected     Parainfluenza Virus 3 Not Detected     Parainfluenza Virus 4 Not Detected     RSV, PCR Not Detected     Bordetella pertussis pcr Not Detected     Bordetella parapertussis PCR Not Detected     Chlamydophila pneumoniae PCR Not Detected     Mycoplasma pneumo by PCR Not Detected    Narrative:      In the setting of a positive respiratory panel with a viral infection PLUS a negative procalcitonin without other underlying concern for bacterial infection, consider observing off antibiotics or discontinuation of antibiotics and continue supportive care. If the respiratory panel is positive for atypical bacterial infection (Bordetella pertussis, Chlamydophila pneumoniae, or Mycoplasma pneumoniae), consider antibiotic de-escalation to target atypical bacterial infection.    POC Lactate [[id number]]  (Normal) Collected: 06/07/21 0706    Specimen: Blood Updated: 06/07/21 0707     Lactate 1.3 mmol/L      Comment: Serial Number: [id number]:  [id number]       Procalcitonin [[id number]]  (Normal) Collected: 06/07/21 0500    Specimen: Blood  "Updated: 06/07/21 0655     Procalcitonin 0.12 ng/mL     Narrative:      As a Marker for Sepsis (Non-Neonates):     1. <0.5 ng/mL represents a low risk of severe sepsis and/or septic shock.  2. >2 ng/mL represents a high risk of severe sepsis and/or septic shock.    As a Marker for Lower Respiratory Tract Infections that require antibiotic therapy:  PCT on Admission     Antibiotic Therapy             6-12 Hrs later  >0.5                          Strongly Recommended            >0.25 - <0.5             Recommended  0.1 - 0.25                  Discouraged                       Remeasure/reassess PCT  <0.1                         Strongly Discouraged         Remeasure/reassess PCT      As 28 day mortality risk marker: \"Change in Procalcitonin Result\" (>80% or <=80%) if Day 0 (or Day 1) and Day 4 values are available. Refer to [email or web address]-[email or web address]    Change in PCT <=80%   A decrease of PCT levels below or equal to 80% defines a positive change in PCT test result representing a higher risk for 28-day all-cause mortality of patients diagnosed with severe sepsis or septic shock.    Change in PCT >80%   A decrease of PCT levels of more than 80% defines a negative change in PCT result representing a lower risk for 28-day all-cause mortality of patients diagnosed with severe sepsis or septic shock.    This test is Prognostic not Diagnostic, if elevated correlate with clinical findings before administering antibiotic treatment.      Results may be falsely decreased if patient taking Biotin.     Manual Differential [[id number]]  (Abnormal) Collected: 06/07/21 0500    Specimen: Blood Updated: 06/07/21 0606     Neutrophil % 73.0 %      Lymphocyte % 10.0 %      Monocyte % 3.0 %      Bands %  12.0 %      Metamyelocyte % 2.0 %      Neutrophils Absolute 8.59 10*3/mm3      Lymphocytes Absolute 1.01 10*3/mm3      Monocytes Absolute 0.30 10*3/mm3      nRBC 2.0 /100 WBC      Anisocytosis Slight/1+     Macrocytes Slight/1+ "     Toxic Granulation Slight/1+     Platelet Morphology Normal    CBC & Differential [[id number]]  (Abnormal) Collected: 06/07/21 0500    Specimen: Blood Updated: 06/07/21 0606    Narrative:      The following orders were created for panel order CBC & Differential.  Procedure                               Abnormality         Status                     ---------                               -----------         ------                     Scan Slide[[id number]]                                       Final result               CBC Auto Differential[[id number]]        Abnormal            Final result                 Please view results for these tests on the individual orders.    Scan Slide [[id number]] Collected: 06/07/21 0500    Specimen: Blood Updated: 06/07/21 0606     Scan Slide --     Comment: See Manual Differential Results       CBC Auto Differential [[id number]]  (Abnormal) Collected: 06/07/21 0500    Specimen: Blood Updated: 06/07/21 0606     WBC 10.10 10*3/mm3      RBC 2.49 10*6/mm3      Hemoglobin 9.2 g/dL      Hematocrit 27.3 %      .7 fL      MCH 37.1 pg      MCHC 33.8 g/dL      RDW 14.8 %      RDW-SD 56.4 fl      MPV 8.6 fL      Platelets 115 10*3/mm3     Narrative:      The previously reported component NRBC is no longer being reported. Previous result was 0.6 /100 WBC (Reference Range: 0.0-0.2 /100 WBC) on 6/7/2021 at 0519 EDT.    Extra Tubes [[id number]] Collected: 06/07/21 0500    Specimen: Blood, Venous Line Updated: 06/07/21 0601    Narrative:      The following orders were created for panel order Extra Tubes.  Procedure                               Abnormality         Status                     ---------                               -----------         ------                     Light Blue Top[[id number]]                                   Final result               Gold Top - SST[[id number]]                                   Final result                 Please view results for these tests on the  individual orders.    Gold Top - SST [[id number]] Collected: 06/07/21 0500    Specimen: Blood Updated: 06/07/21 0601     Extra Tube Hold for add-ons.     Comment: Auto resulted.       Light Blue Top [[id number]] Collected: 06/07/21 0500    Specimen: Blood Updated: 06/07/21 0601     Extra Tube hold for add-on     Comment: Auto resulted       Comprehensive Metabolic Panel [[id number]]  (Abnormal) Collected: 06/07/21 0500    Specimen: Blood Updated: 06/07/21 0541     Glucose 116 mg/dL      BUN 24 mg/dL      Creatinine 0.73 mg/dL      Sodium 134 mmol/L      Potassium 4.1 mmol/L      Chloride 97 mmol/L      CO2 26.0 mmol/L      Calcium 9.2 mg/dL      Total Protein 7.0 g/dL      Albumin 2.50 g/dL      ALT (SGPT) 87 U/L      AST (SGOT) 58 U/L      Alkaline Phosphatase 109 U/L      Total Bilirubin 0.7 mg/dL      eGFR Non African Amer 106 mL/min/1.73      Globulin 4.5 gm/dL      A/G Ratio 0.6 g/dL      BUN/Creatinine Ratio 32.9     Anion Gap 11.0 mmol/L     Narrative:      GFR Normal >60  Chronic Kidney Disease <60  Kidney Failure <15      Troponin [[id number]]  (Normal) Collected: 06/07/21 0500    Specimen: Blood Updated: 06/07/21 0541     Troponin T 0.012 ng/mL     Narrative:      Troponin T Reference Range:  <= 0.03 ng/mL-   Negative for AMI  >0.03 ng/mL-     Abnormal for myocardial necrosis.  Clinicians would have to utilize clinical acumen, EKG, Troponin and serial changes to determine if it is an Acute Myocardial Infarction or myocardial injury due to an underlying chronic condition.       Results may be falsely decreased if patient taking Biotin.      BNP [[id number]]  (Abnormal) Collected: 06/07/21 0500    Specimen: Blood Updated: 06/07/21 0538     proBNP 1,702.0 pg/mL     Narrative:      Among patients with dyspnea, NT-proBNP is highly sensitive for the detection of acute congestive heart failure. In addition NT-proBNP of <300 pg/ml effectively rules out acute congestive heart failure with 99% negative predictive  value.    Results may be falsely decreased if patient taking Biotin.             Pending Results: Echocardiogram, haptoglobin, H/H, CMV/EBV, coags,    Imaging Reviewed:   XR Chest 1 View    Result Date: 6/7/2021  1. Redemonstration of right midlung mass, similar to the prior study. 2. Increased left midlung and left basilar airspace disease that may relate to superimposed pneumonia.  Electronically Signed By-Saleem Ferguson MD On:6/7/2021 7:25 AM This report was finalized on [id number] by  Saleem Ferguson MD.    CT Chest Pulmonary Embolism    Result Date: 6/7/2021  1. Negative for pulmonary embolus. 2. Redemonstration of large 9 cm right upper lobe mass which occludes the right upper lobe bronchus resulting in complete right upper and right middle lobe collapse similar to prior study. 3. Bronchial wall thickening with increased mucous plugging involving lower lobes with patchy areas of airspace disease most concerning for aspiration pneumonia. 4. Interlobular septal thickening and scattered groundglass opacities bilaterally which may relate to pulmonary edema and/or infection superimposed on background of advanced emphysema. 5. Stable mediastinal lymphadenopathy. 6. New 12 mm area of nodularity in left lower lobe may relate to aspiration/infection, recommend attention on follow-up.  Electronically Signed By-Saleem Ferguson MD On:6/7/2021 12:44 PM This report was finalized on [id number] by  Saleem Ferguson MD.      I have reviewed the patient's labs, imaging, reports, and other clinician documentation.         Assessment/Plan       ASSESSMENT  1. 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  PET scan and April 2021 showed he was responding to treatment.  Ipilimumab and nivolumab were added to his current  therapy due to progression in his brain.   Due to hospitalization and brain radiation, he did not receive systemic therapy from 2/9/2021 until 5/24/2021.  Planned to undergo surgical resection of brain metastasis by Dr. Luna.  Ipilimumab and nivolumab are known to cross blood-brain barrier.  2. Anemia/Myelodysplastic syndrome/GOGO with malabsorption/Chemotherapy-induced anemia-he has never required Retacrit as an outpatient.  Recent iron studies showed correction of GOGO.  Acute fall in hemoglobin due to recent chemotherapy.  Macrocytosis due to MDS.  Will treat with Retacrit and check for hemolysis.  3. Acute thrombocytopenia-likely chemotherapy-induced.  Will check coags and viral titers.   4. Respiratory failure /Pneumonia/COPD-on Zosyn and steroids.  Per PMD.  5. Weight loss/loss of appetite-improving as outpatient on Megace.  Now on steroids.  6. Oral thrush-diagnosed as outpatient.  Now on steroids, will continue nystatin.  7. Elevated LFTs-acute and mild.  Stable.    8. Sacral decub-wound care to follow.  9. CHF-echocardiogram pending.     PLAN  1. Follow CBC.   2. Outpatient MRI brain per Dr. Luna.      3. Start Retacrit.   4. Haptoglobin  5. Nystatin.  6. CMV/EBV and coags.  7. Transfuse as needed hemoglobin less than 7.  8. H&H at 1600.    Patient seen and evaluated by Dr. Lewis.  Electronically signed by VINCE Kaplan, 06/08/21, 12:40 PM EDT.    I have personally performed a face-to-face diagnostic evaluation on this patient.  I have discussed the case with Fatoumata Espinosa NP, have edited/reviewed the note, and agree with the care plan.  Patient was hospitalized with increasing shortness of air.  On examination, he has alopecia, midline vertical chest wall scar and bilateral lung rhonchi.  His labs are significant for anemia and thrombocytopenia.  In addition to his lung cancer, he seems to have a pneumonia on chest x-ray and CT.  He has been started on antibiotics.  We will start him on  Retacrit for the anemia and look for other correctable causes.  Early to tell cancer response from immunotherapy.          I discussed the patients findings and my recommendations with patient.    Thank you for this consult.  We will be happy to follow along in the care of this patient.     Electronically signed by Axel Lewis MD, 06/08/21, 1:05 PM EDT.

## 2021-06-09 NOTE — PROGRESS NOTES
Reason for follow-up: Respiratory failure  Acute diastolic congestive heart failure  History of AVR  Lung CA     Patient Care Team:  Sandoval Stevenson FNP as PCP - General  Sandoval Stevenson FNP as PCP - Family Medicine  Axel Lewis MD as Consulting Physician (Hematology and Oncology)  Iglesia Springer MD as Consulting Physician (Cardiology)    Subjective .   Feels slightly better     Review of Systems   Constitutional: Positive for malaise/fatigue. Negative for fever.   HENT: Negative for congestion and hearing loss.    Eyes: Negative for double vision and visual disturbance.   Cardiovascular: Negative for chest pain, claudication, dyspnea on exertion, leg swelling and syncope.   Respiratory: Positive for cough and shortness of breath.    Endocrine: Negative for cold intolerance.   Skin: Negative for color change and rash.   Musculoskeletal: Negative for arthritis and joint pain.   Gastrointestinal: Negative for abdominal pain and heartburn.   Genitourinary: Negative for hematuria.   Neurological: Negative for excessive daytime sleepiness and dizziness.   Psychiatric/Behavioral: Negative for depression. The patient is not nervous/anxious.    All other systems reviewed and are negative.      Patient has no known allergies.    Scheduled Meds:ampicillin-sulbactam, 3 g, Intravenous, Q6H  aspirin, 81 mg, Oral, Daily  buprenorphine-naloxone, 1.5 tablet, Sublingual, Daily  docusate sodium, 100 mg, Oral, BID  enoxaparin, 40 mg, Subcutaneous, Q24H  epoetin asia-epbx, 40,000 Units, Subcutaneous, Weekly  gabapentin, 300 mg, Oral, TID  guaiFENesin, 600 mg, Oral, Q12H  insulin lispro, 0-7 Units, Subcutaneous, TID With Meals  ipratropium-albuterol, 3 mL, Nebulization, Q4H - RT  Shabbir, 1 packet, Oral, BID  lurasidone, 40 mg, Oral, Daily  megestrol acetate, 200 mg, Oral, TID  multivitamin with minerals, 1 tablet, Oral, Daily  nystatin, 5 mL, Swish & Swallow, 4x Daily  pantoprazole, 40 mg, Oral,  "Daily  polyethylene glycol, 17 g, Oral, Daily  [START ON 6/11/2021] predniSONE, 20 mg, Oral, Daily   Followed by  [START ON 6/13/2021] predniSONE, 10 mg, Oral, Daily  sertraline, 50 mg, Oral, Daily  spironolactone, 25 mg, Oral, Daily  vancomycin, 750 mg, Intravenous, Q12H  Zinc Oxide, , Apply externally, BID      Continuous Infusions:Pharmacy to dose vancomycin,       PRN Meds:.•  acetaminophen  •  albuterol  •  bisacodyl  •  calcium carbonate  •  dextrose  •  dextrose  •  glucagon (human recombinant)  •  hydrOXYzine  •  insulin lispro  •  ipratropium-albuterol  •  ketorolac  •  melatonin  •  ondansetron  •  Pharmacy to dose vancomycin  •  promethazine  •  [COMPLETED] Insert peripheral IV **AND** sodium chloride  •  sodium chloride    Objective   Looks comfortable with oxygen    VITAL SIGNS  Vitals:    06/10/21 0416 06/10/21 0640 06/10/21 1107 06/10/21 1135   BP: 132/72   123/72   BP Location: Right arm      Patient Position: Lying      Pulse: 90 92 95 101   Resp: 15 16 16 16   Temp: 98.3 °F (36.8 °C)   98.1 °F (36.7 °C)   TempSrc: Oral   Oral   SpO2: 93% 93% 93% 97%   Weight: 66 kg (145 lb 8.1 oz)      Height:           Flowsheet Rows      First Filed Value   Admission Height  172.7 cm (68\") Documented at 06/07/2021 0451   Admission Weight  67.3 kg (148 lb 6.4 oz) Documented at 06/07/2021 0450           TELEMETRY: Sinus rhythm    Physical Exam:  Constitutional:       Appearance: Well-developed.      Comments: On oxygen   Eyes:      General: No scleral icterus.     Conjunctiva/sclera: Conjunctivae normal.   HENT:      Head: Normocephalic and atraumatic.    Mouth/Throat:      Mouth: No oral lesions.      Pharynx: Uvula midline.   Neck:      Thyroid: No thyromegaly.      Vascular: No carotid bruit or JVD.      Trachea: Trachea normal.   Pulmonary:      Effort: Pulmonary effort is normal.      Breath sounds: Bibasilar Rhonchi present.   Cardiovascular:      Normal rate. Regular rhythm.      No gallop.   Pulses:     " Intact distal pulses.   Abdominal:      General: Bowel sounds are normal.      Palpations: Abdomen is soft.   Musculoskeletal: Normal range of motion.      Cervical back: Neck supple. Skin:     General: Skin is warm. There is no cyanosis.   Neurological:      Mental Status: Alert and oriented to person, place, and time.      Comments: No focal deficits   Psychiatric:         Behavior: Behavior is cooperative.                  LAB RESULTS (LAST 7 DAYS)    CBC  Results from last 7 days   Lab Units 06/09/21  0503 06/08/21  1630 06/08/21  0624 06/07/21  0500   WBC 10*3/mm3 10.90*  --  8.80 10.10   RBC 10*6/mm3 1.95*  --  1.94* 2.49*   HEMOGLOBIN g/dL 7.2* 7.8* 7.2* 9.2*   HEMATOCRIT % 21.3* 23.1* 21.1* 27.3*   MCV fL 109.7*  --  108.8* 109.7*   PLATELETS 10*3/mm3 118*  --  103* 115*       BMP  Results from last 7 days   Lab Units 06/10/21  0636 06/09/21  0320 06/08/21  0624 06/07/21  0500   SODIUM mmol/L  --  140 139 134*   POTASSIUM mmol/L  --  4.2 4.6 4.1   CHLORIDE mmol/L  --  103 100 97*   CO2 mmol/L  --  29.0 30.0* 26.0   BUN mg/dL  --  25* 32* 24*   CREATININE mg/dL 0.64* 0.63* 0.93 0.73*   GLUCOSE mg/dL  --  123* 127* 116*       CMP   Results from last 7 days   Lab Units 06/10/21  0636 06/09/21  0320 06/08/21  0624 06/07/21  0500   SODIUM mmol/L  --  140 139 134*   POTASSIUM mmol/L  --  4.2 4.6 4.1   CHLORIDE mmol/L  --  103 100 97*   CO2 mmol/L  --  29.0 30.0* 26.0   BUN mg/dL  --  25* 32* 24*   CREATININE mg/dL 0.64* 0.63* 0.93 0.73*   GLUCOSE mg/dL  --  123* 127* 116*   ALBUMIN g/dL  --   --  2.50* 2.50*   BILIRUBIN mg/dL  --   --  0.6 0.7   ALK PHOS U/L  --   --  89 109   AST (SGOT) U/L  --   --  41* 58*   ALT (SGPT) U/L  --   --  64* 87*         BNP        TROPONIN  Results from last 7 days   Lab Units 06/07/21  0500   TROPONIN T ng/mL 0.012       CoAg  Results from last 7 days   Lab Units 06/08/21  1630   INR  1.05   APTT seconds 27.7       Creatinine Clearance  Estimated Creatinine Clearance: 80.2 mL/min  (A) (by C-G formula based on SCr of 0.64 mg/dL (L)).    ABG  Results from last 7 days   Lab Units 06/07/21  0902   PH, ARTERIAL pH units 7.514*   PCO2, ARTERIAL mm Hg 34.4*   PO2 ART mm Hg 49.4*   O2 SATURATION ART % 88.5*   BASE EXCESS ART mmol/L 4.9*       Radiology  XR Chest 1 View    Result Date: 6/10/2021  1. Similar appearance of the right-sided perihilar mass likely related to known malignancy. 2. Patchy interstitial opacities throughout the left lung likely representing infection on superimposed chronic interstitial lung disease.  Electronically Signed By-Daryl Hunter MD On:6/10/2021 8:01 AM This report was finalized on [id number] by  Daryl Hunter MD.            EKG        I personally viewed and interpreted the patient's EKG/Telemetry data:    ECHOCARDIOGRAM:    Results for orders placed during the hospital encounter of 06/07/21    Adult Transthoracic Echo Complete W/ Cont if Necessary Per Protocol    Interpretation Summary  · Estimated left ventricular EF = 65% Left ventricular systolic function is normal.  · Left ventricular diastolic dysfunction is noted.  · There is a prosthetic aortic valve present.  · Estimated right ventricular systolic pressure from tricuspid regurgitation is normal (<35 mmHg).    STRESS MYOVIEW:    CARDIAC CATHETERIZATION:    OTHER:         Assessment/Plan       Coronary artery disease involving native coronary artery of native heart without angina pectoris    Nonrheumatic aortic valve stenosis    Essential hypertension    Congestive heart failure (CMS/HCC)    Tobacco abuse    Squamous cell carcinoma of lung, right (CMS/HCC)    Acute-on-chronic respiratory failure (CMS/HCC)    Respiratory distress    Stage 2 skin ulcer of sacral region (CMS/HCC)      Respiratory failure probably multifactorial  Most probably primary reason is due to pulmonary causes lung CA COPD  Mild diastolic congestive heart failure  Echocardiogram reviewed LV function normal,  The prosthetic aortic  valve functioning well  History of AVR stable  History of CAD stable  Cautious diuresis    I discussed the patients findings and my recommendations with patient     Iglesia Springer MD  06/10/21  13:00 EDT

## 2021-06-09 NOTE — PROGRESS NOTES
"PULMONARY CRITICAL CARE Progress  NOTE      PATIENT IDENTIFICATION:  Name: Axel Ramirez  MRN: [id number]  :  1950     Age: 70 y.o.  Sex: male    DATE OF Note:  2021   Referring Physician: Ignacia De La Cruz DO                  Subjective:   Patient poor historian  No complaint of pain  Still requiring 4 L to 6 of oxygen supplement  Still very shallow cough  No nausea or vomiting, no change in bowel habit, no dysuria,  no new  skin rash or itching.      Objective:  tMax 24 hrs: Temp (24hrs), Av.8 °F (36.6 °C), Min:97.4 °F (36.3 °C), Max:98.2 °F (36.8 °C)      Vitals Ranges:   Temp:  [97.4 °F (36.3 °C)-98.2 °F (36.8 °C)] 98.2 °F (36.8 °C)  Heart Rate:  [85-94] 88  Resp:  [16-18] 18  BP: ()/(57-77) 139/77    Intake and Output Last 3 Shifts:   I/O last 3 completed shifts:  In: 1030 [P.O.:480; IV Piggyback:550]  Out: -     Exam:  /77 (BP Location: Right arm, Patient Position: Lying)   Pulse 88   Temp 98.2 °F (36.8 °C) (Oral)   Resp 18   Ht 172.7 cm (68\")   Wt 67.3 kg (148 lb 6.4 oz)   SpO2 95%   BMI 22.56 kg/m²     General Appearance:     HEENT:  Normocephalic, without obvious abnormality, Conjunctiva/corneas clear,.  Normal external ear canals, Nares normal, no drainage     Neck:  Supple, symmetrical, trachea midline. No JVD.  Lungs /Chest wall:   Bilateral basal rhonchi, respirations unlabored symmetrical wall movement.     Heart:  Regular rate and rhythm, systolic murmur PMI left sternal border  Abdomen: Soft, non-tender, no masses, no organomegaly.    Extremities: Trace edema no clubbing or Cyanosis        Medications:    Current Facility-Administered Medications:   •  acetaminophen (TYLENOL) suppository 650 mg, 650 mg, Rectal, Q6H PRN, Ignacia De La Cruz DO  •  albuterol (PROVENTIL) nebulizer solution 0.083% 2.5 mg/3mL, 2.5 mg, Nebulization, Q2H PRN, Ignacia De La Cruz, DO, 2.5 mg at 21 0914  •  ampicillin-sulbactam (UNASYN) 3 g in sodium chloride 0.9 % 100 mL IVPB-MBP, 3 g, " Intravenous, Q6H, Herminia Stanton APRN, Stopped at 06/09/21 0319  •  aspirin EC tablet 81 mg, 81 mg, Oral, Daily, Ignacia De La Cruz DO, 81 mg at 06/08/21 0923  •  bisacodyl (DULCOLAX) suppository 10 mg, 10 mg, Rectal, Daily PRN, Ignacia De La Cruz DO  •  buprenorphine-naloxone (SUBOXONE) 8-2 MG per SL tablet 1.5 tablet, 1.5 tablet, Sublingual, Daily, Ignacia De La Cruz DO, 1.5 tablet at 06/08/21 0922  •  calcium carbonate (TUMS) chewable tablet 500 mg (200 mg elemental), 2 tablet, Oral, BID PRN, Ignacia De La Cruz DO, 2 tablet at 06/08/21 1604  •  dextrose (D50W) 25 g/ 50mL Intravenous Solution 25 g, 25 g, Intravenous, Q15 Min PRN, Ignacia De La Cruz DO  •  dextrose (GLUTOSE) oral gel 15 g, 15 g, Oral, Q15 Min PRN, Ignacia De La Cruz DO  •  docusate sodium (COLACE) capsule 100 mg, 100 mg, Oral, BID, Ignacia De La Cruz DO, 100 mg at 06/08/21 2144  •  enoxaparin (LOVENOX) syringe 40 mg, 40 mg, Subcutaneous, Q24H, Ignacia De La Cruz DO, 40 mg at 06/08/21 1642  •  epoetin asia-epbx (RETACRIT) injection 40,000 Units, 40,000 Units, Subcutaneous, Weekly, Fatoumata Espinosa APRN, 40,000 Units at 06/08/21 1643  •  gabapentin (NEURONTIN) capsule 300 mg, 300 mg, Oral, TID, Ignacia De La Cruz DO, 300 mg at 06/08/21 2144  •  glucagon (human recombinant) (GLUCAGEN DIAGNOSTIC) injection 1 mg, 1 mg, Subcutaneous, Q15 Min PRN, Ignacia De La Cruz DO  •  guaiFENesin (MUCINEX) 12 hr tablet 600 mg, 600 mg, Oral, Q12H, Ignacia De La Cruz DO, 600 mg at 06/08/21 2144  •  insulin lispro (ADMELOG) injection 0-7 Units, 0-7 Units, Subcutaneous, TID With Meals, Ignacia De La Cruz DO, 2 Units at 06/08/21 1244  •  insulin lispro (ADMELOG) injection 0-7 Units, 0-7 Units, Subcutaneous, TID PRN, Ignacia De La Cruz DO  •  ipratropium-albuterol (DUO-NEB) nebulizer solution 3 mL, 3 mL, Nebulization, Q4H - RT, Ignacia De La Cruz DO, 3 mL at 06/09/21 0709  •  ipratropium-albuterol (DUO-NEB) nebulizer solution 3 mL, 3 mL, Nebulization, Q1H PRN, Ignacia De La Cruz DO  •  lurasidone (LATUDA)  tablet 40 mg, 40 mg, Oral, Daily, Ignacia De La Cruz A, DO, 40 mg at 06/08/21 0923  •  megestrol acetate (MEGACE) oral suspension 200 mg, 200 mg, Oral, TID, Ignacia De La Cruz A, DO, 200 mg at 06/07/21 2050  •  melatonin tablet 5 mg, 5 mg, Oral, Nightly PRN, Ghazal De La Cruzn A, DO  •  methylPREDNISolone sodium succinate (SOLU-Medrol) injection 60 mg, 60 mg, Intravenous, Q6H, Ignacia De La Cruz A, DO, 60 mg at 06/09/21 0323  •  multivitamin with minerals 1 tablet, 1 tablet, Oral, Daily, Ignacia De La Cruz A, DO, 1 tablet at 06/08/21 0922  •  nystatin (MYCOSTATIN) 523398 UNIT/ML suspension 500,000 Units, 5 mL, Swish & Swallow, 4x Daily, Ignacia De La Cruz, DO, 500,000 Units at 06/08/21 1244  •  pantoprazole (PROTONIX) EC tablet 40 mg, 40 mg, Oral, Daily, Ignacia De La Cruz A, DO, 40 mg at 06/08/21 0922  •  Pharmacy to dose vancomycin, , Does not apply, Continuous PRN, Herminia Stanton, APRN  •  polyethylene glycol (MIRALAX) packet 17 g, 17 g, Oral, Daily, Ignacia De La Cruz, DO, 17 g at 06/08/21 0921  •  promethazine (PHENERGAN) tablet 25 mg, 25 mg, Oral, Q4H PRN, Ignacia De La Cruz, DO, 25 mg at 06/1950  •  sertraline (ZOLOFT) tablet 50 mg, 50 mg, Oral, Daily, Ignacia De La Cruz A, DO, 50 mg at 06/08/21 0923  •  [COMPLETED] Insert peripheral IV, , , Once **AND** sodium chloride 0.9 % flush 10 mL, 10 mL, Intravenous, PRN, Ghazal De La Cruzn A, DO  •  sodium chloride 0.9 % flush 10 mL, 10 mL, Intravenous, PRN, Ghazal De La Cruzn A, DO  •  spironolactone (ALDACTONE) tablet 25 mg, 25 mg, Oral, Daily, Ghazal De La Cruzn A, DO, 25 mg at 06/08/21 0922  •  vancomycin 750 mg in sodium chloride 0.9 % 250 mL IVPB, 750 mg, Intravenous, Q12H, Herminia Stanton APRN, 750 mg at 06/09/21 0323  •  Zinc Oxide 16 % ointment, , Apply externally, BID, Carian Luevano APRN, Given at 06/08/21 2200    Data Review:  All labs (24hrs):   Recent Results (from the past 24 hour(s))   Adult Transthoracic Echo Complete W/ Cont if Necessary Per Protocol    Collection Time: 06/08/21 11:36 AM   Result Value  Ref Range    BSA 1.8 m^2    RVIDd 2.7 cm    IVSd 1.1 cm    LVIDd 4.2 cm    LVIDs 2.9 cm    LVPWd 0.97 cm    IVS/LVPW 1.1     FS 32.1 %    EDV(Teich) 79.8 ml    ESV(Teich) 31.4 ml    EF(Teich) 60.7 %    EDV(cubed) 75.6 ml    ESV(cubed) 23.6 ml    EF(cubed) 68.8 %    LV mass(C)d 145.9 grams    LV mass(C)dI 81.1 grams/m^2    SV(Teich) 48.4 ml    SI(Teich) 26.9 ml/m^2    SV(cubed) 52.0 ml    SI(cubed) 28.9 ml/m^2    Ao root diam 3.0 cm    Ao root area 7.2 cm^2    ACS 1.8 cm    LVOT diam 1.9 cm    LVOT area 2.7 cm^2    EDV(MOD-sp4) 79.7 ml    Ao root area (BSA corrected) 1.7     LV Rich Vol (BSA corrected) 44.3 ml/m^2    MV E max cary 82.0 cm/sec    MV A max cary 129.3 cm/sec    MV E/A 0.63     MV V2 max 122.5 cm/sec    MV max PG 6.0 mmHg    MV V2 mean 69.0 cm/sec    MV mean PG 2.2 mmHg    MV V2 VTI 23.2 cm    MVA(VTI) 2.1 cm^2    MV dec slope 570.3 cm/sec^2    MV dec time 0.14 sec    Ao pk cary 241.2 cm/sec    Ao max PG 25.3 mmHg    Ao max PG (full) 21.6 mmHg    Ao V2 mean 165.0 cm/sec    Ao mean PG 13.4 mmHg    Ao mean PG (full) 11.7 mmHg    Ao V2 VTI 42.1 cm    WILBERT(I,A) 1.1 cm^2    WILBERT(I,D) 1.1 cm^2    WILBERT(V,A) 1.1 cm^2    WILBERT(V,D) 1.1 cm^2    LV V1 max PG 3.6 mmHg    LV V1 mean PG 1.7 mmHg    LV V1 max 95.3 cm/sec    LV V1 mean 60.9 cm/sec    LV V1 VTI 17.7 cm    SV(Ao) 301.3 ml    SI(Ao) 167.6 ml/m^2    SV(LVOT) 48.0 ml    SI(LVOT) 26.7 ml/m^2    PA V2 max 113.9 cm/sec    PA max PG 5.2 mmHg    PA max PG (full) 2.9 mmHg    PA V2 mean 81.1 cm/sec    PA mean PG 2.9 mmHg    PA mean PG (full) 1.7 mmHg    PA V2 VTI 16.6 cm    RV V1 max PG 2.3 mmHg    RV V1 mean PG 1.2 mmHg    RV V1 max 75.4 cm/sec    RV V1 mean 50.0 cm/sec    RV V1 VTI 10.7 cm    TR max cary 169.3 cm/sec     CV ECHO TIKA - BZI_BMI 22.5 kilograms/m^2     CV ECHO TIKA - BSA(HAYCOCK) 1.8 m^2     CV ECHO TIKA - BZI_METRIC_WEIGHT 67.1 kg     CV ECHO TIKA - BZI_METRIC_HEIGHT 172.7 cm    EF(MOD-bp) 63.0 %    LA dimension(2D) 3.5 cm    Echo EF Estimated 65 %    POC Glucose Once    Collection Time: 06/08/21 11:53 AM    Specimen: Blood   Result Value Ref Range    Glucose 152 (H) 70 - 105 mg/dL   Haptoglobin    Collection Time: 06/08/21  4:30 PM    Specimen: Blood   Result Value Ref Range    Haptoglobin 465 (H) 30 - 200 mg/dL   Hemoglobin & Hematocrit, Blood    Collection Time: 06/08/21  4:30 PM    Specimen: Blood   Result Value Ref Range    Hemoglobin 7.8 (L) 13.0 - 17.7 g/dL    Hematocrit 23.1 (L) 37.5 - 51.0 %   Cytomegalovirus Antibody, IgM    Collection Time: 06/08/21  4:30 PM    Specimen: Blood   Result Value Ref Range    CMV IgM <30.0 0.0 - 29.9 AU/mL   Protime-INR    Collection Time: 06/08/21  4:30 PM    Specimen: Blood   Result Value Ref Range    Protime 11.5 9.6 - 11.7 Seconds    INR 1.05 0.93 - 1.10   aPTT    Collection Time: 06/08/21  4:30 PM    Specimen: Blood   Result Value Ref Range    PTT 27.7 24.0 - 31.0 seconds   POC Glucose Once    Collection Time: 06/08/21  4:30 PM    Specimen: Blood   Result Value Ref Range    Glucose 142 (H) 70 - 105 mg/dL   Gold Top - SST    Collection Time: 06/08/21  4:38 PM   Result Value Ref Range    Extra Tube Hold for add-ons.    Green Top (Gel)    Collection Time: 06/08/21  4:38 PM   Result Value Ref Range    Extra Tube Hold for add-ons.    POC Glucose Once    Collection Time: 06/08/21  7:20 PM    Specimen: Blood   Result Value Ref Range    Glucose 123 (H) 70 - 105 mg/dL   Basic Metabolic Panel    Collection Time: 06/09/21  3:20 AM    Specimen: Blood   Result Value Ref Range    Glucose 123 (H) 65 - 99 mg/dL    BUN 25 (H) 8 - 23 mg/dL    Creatinine 0.63 (L) 0.76 - 1.27 mg/dL    Sodium 140 136 - 145 mmol/L    Potassium 4.2 3.5 - 5.2 mmol/L    Chloride 103 98 - 107 mmol/L    CO2 29.0 22.0 - 29.0 mmol/L    Calcium 8.6 8.6 - 10.5 mg/dL    eGFR Non African Amer 126 >60 mL/min/1.73    BUN/Creatinine Ratio 39.7 (H) 7.0 - 25.0    Anion Gap 8.0 5.0 - 15.0 mmol/L   CBC Auto Differential    Collection Time: 06/09/21  5:03 AM    Specimen:  Blood   Result Value Ref Range    WBC 10.90 (H) 3.40 - 10.80 10*3/mm3    RBC 1.95 (L) 4.14 - 5.80 10*6/mm3    Hemoglobin 7.2 (L) 13.0 - 17.7 g/dL    Hematocrit 21.3 (L) 37.5 - 51.0 %    .7 (H) 79.0 - 97.0 fL    MCH 37.1 (H) 26.6 - 33.0 pg    MCHC 33.9 31.5 - 35.7 g/dL    RDW 14.5 12.3 - 15.4 %    RDW-SD 56.4 (H) 37.0 - 54.0 fl    MPV 8.2 6.0 - 12.0 fL    Platelets 118 (L) 140 - 450 10*3/mm3   Scan Slide    Collection Time: 06/09/21  5:03 AM    Specimen: Blood   Result Value Ref Range    Scan Slide     Manual Differential    Collection Time: 06/09/21  5:03 AM    Specimen: Blood   Result Value Ref Range    Neutrophil % 60.0 42.7 - 76.0 %    Lymphocyte % 5.0 (L) 19.6 - 45.3 %    Monocyte % 1.0 (L) 5.0 - 12.0 %    Bands %  31.0 (H) 0.0 - 5.0 %    Metamyelocyte % 2.0 (H) 0.0 - 0.0 %    Myelocyte % 1.0 (H) 0.0 - 0.0 %    Neutrophils Absolute 9.92 (H) 1.70 - 7.00 10*3/mm3    Lymphocytes Absolute 0.55 (L) 0.70 - 3.10 10*3/mm3    Monocytes Absolute 0.11 0.10 - 0.90 10*3/mm3    nRBC 3.0 (H) 0.0 - 0.2 /100 WBC    Macrocytes Mod/2+ None Seen    Poikilocytes Mod/2+ None Seen    WBC Morphology Normal Normal    Platelet Estimate Decreased Normal   POC Glucose Once    Collection Time: 06/09/21  7:27 AM    Specimen: Blood   Result Value Ref Range    Glucose 133 (H) 70 - 105 mg/dL        Imaging:  Adult Transthoracic Echo Complete W/ Cont if Necessary Per Protocol  · Estimated left ventricular EF = 65% Left ventricular systolic function   is normal.  · Left ventricular diastolic dysfunction is noted.  · There is a prosthetic aortic valve present.  · Estimated right ventricular systolic pressure from tricuspid   regurgitation is normal (<35 mmHg).          ASSESSMENT:  Acute-on-chronic respiratory distress  Pneumonia most likely post obstruction  COPD exacerbation  Metastatic lung cancer squamous cell  CHF  Opioid dependence  Stage 2 skin ulcer of sacral region       PLAN:  Encouraged to use I-S flutter valve discussed with his  wife  Continue  Antibiotics  Bronchodilator  Inhaled corticosteroids  IV steroids will wean it down  IS/Flutter valve  Electrolytes/ glycemic control  DVT and GI prophylaxis.   . Poor long-term prognosis    Total Critical care time in direct medical management (   ) minutes  Lianna Groves MD. D, ABSM.     6/9/2021  08:00 EDT

## 2021-06-09 NOTE — PLAN OF CARE
Problem: Adult Inpatient Plan of Care  Goal: Absence of Hospital-Acquired Illness or Injury  Intervention: Identify and Manage Fall Risk  Recent Flowsheet Documentation  Taken 6/8/2021 1959 by Basil Spivey LPN  Safety Promotion/Fall Prevention:   safety round/check completed   room organization consistent   nonskid shoes/slippers when out of bed   muscle strengthening facilitated   mobility aid in reach   fall prevention program maintained   elopement precautions   clutter free environment maintained   assistive device/personal items within reach   activity supervised  Intervention: Prevent Skin Injury  Recent Flowsheet Documentation  Taken 6/8/2021 1959 by Basil Spivey LPN  Body Position: position maintained  Intervention: Prevent Infection  Recent Flowsheet Documentation  Taken 6/8/2021 1959 by Basil Spivey LPN  Infection Prevention:   visitors restricted/screened   single patient room provided   rest/sleep promoted   hand hygiene promoted   personal protective equipment utilized  Goal: Optimal Comfort and Wellbeing  Intervention: Provide Person-Centered Care  Recent Flowsheet Documentation  Taken 6/8/2021 1959 by Basil Spivey LPN  Trust Relationship/Rapport: care explained     Problem: Fall Injury Risk  Goal: Absence of Fall and Fall-Related Injury  Intervention: Identify and Manage Contributors to Fall Injury Risk  Recent Flowsheet Documentation  Taken 6/8/2021 1959 by Basil Spivey LPN  Medication Review/Management: medications reviewed  Intervention: Promote Injury-Free Environment  Recent Flowsheet Documentation  Taken 6/8/2021 1959 by Basil Spivey LPN  Safety Promotion/Fall Prevention:   safety round/check completed   room organization consistent   nonskid shoes/slippers when out of bed   muscle strengthening facilitated   mobility aid in reach   fall prevention program maintained   elopement precautions   clutter free environment maintained   assistive  device/personal items within reach   activity supervised     Problem: Skin Injury Risk Increased  Goal: Skin Health and Integrity  Intervention: Optimize Skin Protection  Recent Flowsheet Documentation  Taken 6/8/2021 1959 by Basil Spivey LPN  Pressure Reduction Techniques: frequent weight shift encouraged  Head of Bed (HOB): HOB at 20-30 degrees  Pressure Reduction Devices: specialty bed utilized     Problem: Adjustment to Illness (Heart Failure)  Goal: Optimal Coping  Intervention: Support and Optimize Psychosocial Response to Chronic Illness  Recent Flowsheet Documentation  Taken 6/8/2021 1959 by Basil Spivey LPN  Supportive Measures: active listening utilized     Problem: Functional Ability Impaired (Heart Failure)  Goal: Optimal Functional Ability  Intervention: Optimize Functional Ability  Recent Flowsheet Documentation  Taken 6/8/2021 1959 by Basil Spivey LPN  Activity Management: bedrest     Problem: Gas Exchange Impaired  Goal: Optimal Gas Exchange  Intervention: Optimize Oxygenation and Ventilation  Recent Flowsheet Documentation  Taken 6/8/2021 1959 by Basil Spivey LPN  Head of Bed (HOB): HOB at 20-30 degrees   Goal Outcome Evaluation:

## 2021-06-09 NOTE — PROGRESS NOTES
Infectious Diseases Progress Note      LOS: 2 days   Patient Care Team:  Sandoval Stevenson FNP as PCP - General  Sandoval Stevenson FNP as PCP - Family Medicine  Axel Lewis MD as Consulting Physician (Hematology and Oncology)  Iglesia Springer MD as Consulting Physician (Cardiology)    Chief Complaint: Shortness of breath, fatigue    Subjective       The patient has been afebrile for the last 24 hours.  The patient is on 10 L of oxygen by high flow oxygen, hemodynamically stable, and is tolerating antimicrobial therapy.  Patient states that he is feeling better today      Review of Systems:   Review of Systems   Constitutional: Positive for fatigue.   HENT: Negative.    Eyes: Negative.    Respiratory: Positive for shortness of breath.    Cardiovascular: Negative.    Gastrointestinal: Negative.    Endocrine: Negative.    Genitourinary: Negative.    Musculoskeletal: Negative.    Skin: Negative.    Neurological: Negative.    Psychiatric/Behavioral: Negative.    All other systems reviewed and are negative.       Objective     Vital Signs  Temp:  [97.3 °F (36.3 °C)-98.2 °F (36.8 °C)] 97.3 °F (36.3 °C)  Heart Rate:  [85-99] 99  Resp:  [16-22] 22  BP: (116-139)/(64-77) 116/64    Physical Exam:  Physical Exam  Vitals and nursing note reviewed.   Constitutional:       General: He is not in acute distress.     Appearance: He is well-developed. He is ill-appearing. He is not diaphoretic.      Comments: Cachectic   HENT:      Head: Normocephalic and atraumatic.      Mouth/Throat:      Comments: Mild oral thrush  Eyes:      Extraocular Movements: Extraocular movements intact.      Conjunctiva/sclera: Conjunctivae normal.      Pupils: Pupils are equal, round, and reactive to light.   Cardiovascular:      Rate and Rhythm: Normal rate and regular rhythm.      Heart sounds: Normal heart sounds, S1 normal and S2 normal.   Pulmonary:      Effort: Pulmonary effort is normal. No respiratory distress.      Breath sounds:  No stridor. Rales present. No wheezing.      Comments: Diminished throughout  Abdominal:      General: Bowel sounds are normal. There is no distension.      Palpations: Abdomen is soft. There is no mass.      Tenderness: There is no abdominal tenderness. There is no guarding.   Musculoskeletal:         General: No deformity. Normal range of motion.      Cervical back: Neck supple.   Skin:     General: Skin is warm and dry.      Coloration: Skin is not pale.      Findings: No erythema or rash.      Comments: Port in place   Neurological:      Mental Status: He is alert and oriented to person, place, and time.      Cranial Nerves: No cranial nerve deficit.   Psychiatric:         Mood and Affect: Mood normal.          Results Review:    I have reviewed all clinical data, test, lab, and imaging results.     Radiology  No Radiology Exams Resulted Within Past 24 Hours    Cardiology    Laboratory    Results from last 7 days   Lab Units 06/09/21  0503 06/08/21  1630 06/08/21  0624 06/07/21  0500   WBC 10*3/mm3 10.90*  --  8.80 10.10   HEMOGLOBIN g/dL 7.2* 7.8* 7.2* 9.2*   HEMATOCRIT % 21.3* 23.1* 21.1* 27.3*   PLATELETS 10*3/mm3 118*  --  103* 115*     Results from last 7 days   Lab Units 06/09/21  0320 06/08/21  0624 06/07/21  0500   SODIUM mmol/L 140 139 134*   POTASSIUM mmol/L 4.2 4.6 4.1   CHLORIDE mmol/L 103 100 97*   CO2 mmol/L 29.0 30.0* 26.0   BUN mg/dL 25* 32* 24*   CREATININE mg/dL 0.63* 0.93 0.73*   GLUCOSE mg/dL 123* 127* 116*   ALBUMIN g/dL  --  2.50* 2.50*   BILIRUBIN mg/dL  --  0.6 0.7   ALK PHOS U/L  --  89 109   AST (SGOT) U/L  --  41* 58*   ALT (SGPT) U/L  --  64* 87*   CALCIUM mg/dL 8.6 9.0 9.2                 Microbiology   Microbiology Results (last 10 days)     Procedure Component Value - Date/Time    MRSA Screen, PCR (Inpatient) - Swab, Nares [[id number]]  (Abnormal) Collected: 06/07/21 1701    Lab Status: Final result Specimen: Swab from Nares Updated: 06/07/21 1941     MRSA PCR MRSA Detected    Blood  Culture - Blood, Arm, Left [[id number]] Collected: 06/07/21 0705    Lab Status: Preliminary result Specimen: Blood from Arm, Left Updated: 06/09/21 0730     Blood Culture No growth at 2 days    Blood Culture - Blood, Arm, Right [[id number]] Collected: 06/07/21 0705    Lab Status: Preliminary result Specimen: Blood from Arm, Right Updated: 06/09/21 0730     Blood Culture No growth at 2 days    Respiratory Panel PCR w/COVID-19(SARS-CoV-2) PARESH/GRACIELA/KATHY/PAD/COR/MAD/CHRIST In-House, NP Swab in UTM/VTM, 3-4 HR TAT - Swab, Nasopharynx [[id number]]  (Normal) Collected: 06/07/21 0620    Lab Status: Final result Specimen: Swab from Nasopharynx Updated: 06/07/21 0726     ADENOVIRUS, PCR Not Detected     Coronavirus 229E Not Detected     Coronavirus HKU1 Not Detected     Coronavirus NL63 Not Detected     Coronavirus OC43 Not Detected     COVID19 Not Detected     Human Metapneumovirus Not Detected     Human Rhinovirus/Enterovirus Not Detected     Influenza A PCR Not Detected     Influenza B PCR Not Detected     Parainfluenza Virus 1 Not Detected     Parainfluenza Virus 2 Not Detected     Parainfluenza Virus 3 Not Detected     Parainfluenza Virus 4 Not Detected     RSV, PCR Not Detected     Bordetella pertussis pcr Not Detected     Bordetella parapertussis PCR Not Detected     Chlamydophila pneumoniae PCR Not Detected     Mycoplasma pneumo by PCR Not Detected    Narrative:      In the setting of a positive respiratory panel with a viral infection PLUS a negative procalcitonin without other underlying concern for bacterial infection, consider observing off antibiotics or discontinuation of antibiotics and continue supportive care. If the respiratory panel is positive for atypical bacterial infection (Bordetella pertussis, Chlamydophila pneumoniae, or Mycoplasma pneumoniae), consider antibiotic de-escalation to target atypical bacterial infection.          Medication Review:       Schedule Meds  ampicillin-sulbactam, 3 g, Intravenous,  Q6H  aspirin, 81 mg, Oral, Daily  buprenorphine-naloxone, 1.5 tablet, Sublingual, Daily  docusate sodium, 100 mg, Oral, BID  enoxaparin, 40 mg, Subcutaneous, Q24H  epoetin asia-epbx, 40,000 Units, Subcutaneous, Weekly  gabapentin, 300 mg, Oral, TID  guaiFENesin, 600 mg, Oral, Q12H  insulin lispro, 0-7 Units, Subcutaneous, TID With Meals  ipratropium-albuterol, 3 mL, Nebulization, Q4H - RT  lurasidone, 40 mg, Oral, Daily  megestrol acetate, 200 mg, Oral, TID  multivitamin with minerals, 1 tablet, Oral, Daily  nystatin, 5 mL, Swish & Swallow, 4x Daily  pantoprazole, 40 mg, Oral, Daily  polyethylene glycol, 17 g, Oral, Daily  predniSONE, 30 mg, Oral, Daily   Followed by  [START ON 6/11/2021] predniSONE, 20 mg, Oral, Daily   Followed by  [START ON 6/13/2021] predniSONE, 10 mg, Oral, Daily  sertraline, 50 mg, Oral, Daily  spironolactone, 25 mg, Oral, Daily  vancomycin, 750 mg, Intravenous, Q12H  Zinc Oxide, , Apply externally, BID        Infusion Meds  Pharmacy to dose vancomycin,         PRN Meds  •  acetaminophen  •  acetaminophen  •  albuterol  •  bisacodyl  •  calcium carbonate  •  dextrose  •  dextrose  •  glucagon (human recombinant)  •  hydrOXYzine  •  insulin lispro  •  ipratropium-albuterol  •  melatonin  •  ondansetron  •  Pharmacy to dose vancomycin  •  promethazine  •  [COMPLETED] Insert peripheral IV **AND** sodium chloride  •  sodium chloride        Assessment/Plan       Antimicrobial Therapy   1.        day  2.  Unasyn      day  3.  Vancomycin      day  4.      Day  5.      Day      Assessment     Possible postobstructive pneumonia.  Patient has right large lung mass as a result of lung cancer.  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    Pancytopenia-likely related to chemotherapy     Lung CA was on chemotherapy    Oral thrush        Plan     Continue IV Unasyn 3 g every 6 hours  Continue IV vancomycin-asked pharmacy to monitor and dose  Continue nystatin  Depending on clinical course patient may need bronchoscopy  Continue supportive care  A.m. labs  Prognosis is guarded  Case was discussed with him and his wife at the bedside  Encouraged incentive spirometer use    Herminia Stanton, VINCE  06/09/21  13:18 EDT

## 2021-06-09 NOTE — PLAN OF CARE
Goal Outcome Evaluation:              Outcome Summary: Consults are on board. Patient is on 10L. Patient has reported shortness of breath. O2 sats stable. Will continue to observe.

## 2021-06-09 NOTE — PROGRESS NOTES
"     LOS: 2 days   Patient Care Team:  Sandoval Stevenson FNP as PCP - General  Sandoval Stevenson FNP as PCP - Family Medicine  Axel Lewis MD as Consulting Physician (Hematology and Oncology)  Iglesia Springer MD as Consulting Physician (Cardiology)    Chief Complaint: SOA    Subjective    \"I think I'm ok\"    Interval History: Pt c/o increased pain, on Suboxone daily, family reports no BM for 1 week    Patient Complaints: Occ cough  Patient Denies: No chest pain, no nausea or vomiting, no HA, No dizziness, no Abd pain or sore throat  History taken from: patient    Review of Systems:    All systems were reviewed and negative except for: Generalized weakness, positive cough, no hemoptysis, no chest pain fevers chills, positive shortness of breath but improved since admission    Objective   Pt sitting up in bed, alert, family at bedside  Vital Signs  Temp:  [97.3 °F (36.3 °C)-98.2 °F (36.8 °C)] 97.3 °F (36.3 °C)  Heart Rate:  [85-99] 91  Resp:  [16-22] 22  BP: (116-139)/(64-77) 116/64    Physical Exam:     General Appearance:    Alert, cooperative, in no acute distress, frail and chronically ill-appearing, temporal wasting   Head:    Normocephalic, without obvious abnormality, atraumatic   Eyes:            Conjunctivae and sclerae normal, no   icterus   Throat:   No oral lesions, no thrush, oral mucosa moist   Neck:   No adenopathy, supple, trachea midline, no thyromegaly   Lungs:    Coarse rhonchi bilateral bases and some rales at bases.  Poor adventitial flow    Heart:    Regular rhythm and normal rate, normal S1 and S2, no            murmur, no gallop, no rub, no click   Chest Wall:    No abnormalities observed   Abdomen:     Normal bowel sounds, no masses, no organomegaly, soft        non-tender, non-distended, no guarding, no rebound                tenderness, scaphoid   Rectal:     Deferred   Extremities:   Moves all extremities well, no edema, no cyanosis, no             redness   Pulses:   " Pulses equivocal  But present and equal bilaterally   Skin:   No bleeding, bruising or rash   Lymph nodes:   No palpable adenopathy   Neurologic:   Cranial nerves 2 - 12 grossly intact, sensation intact   Radiology:  XR Chest 1 View    Result Date: 6/7/2021  1. Redemonstration of right midlung mass, similar to the prior study. 2. Increased left midlung and left basilar airspace disease that may relate to superimposed pneumonia.  Electronically Signed By-Saleem Ferguson MD On:6/7/2021 7:25 AM This report was finalized on [id number] by  Saleem Ferguson MD.    CT Chest Pulmonary Embolism    Result Date: 6/7/2021  1. Negative for pulmonary embolus. 2. Redemonstration of large 9 cm right upper lobe mass which occludes the right upper lobe bronchus resulting in complete right upper and right middle lobe collapse similar to prior study. 3. Bronchial wall thickening with increased mucous plugging involving lower lobes with patchy areas of airspace disease most concerning for aspiration pneumonia. 4. Interlobular septal thickening and scattered groundglass opacities bilaterally which may relate to pulmonary edema and/or infection superimposed on background of advanced emphysema. 5. Stable mediastinal lymphadenopathy. 6. New 12 mm area of nodularity in left lower lobe may relate to aspiration/infection, recommend attention on follow-up.  Electronically Signed By-Saleem Ferguson MD On:6/7/2021 12:44 PM This report was finalized on [id number] by  Saleem Ferguson MD.         Results Review:     I reviewed the patient's new clinical results.  I reviewed the patient's new imaging results and agree with the interpretation.    Medication Review:   Scheduled Meds:ampicillin-sulbactam, 3 g, Intravenous, Q6H  aspirin, 81 mg, Oral, Daily  buprenorphine-naloxone, 1.5 tablet, Sublingual, Daily  docusate sodium, 100 mg, Oral, BID  enoxaparin, 40 mg, Subcutaneous, Q24H  epoetin asia-epbx, 40,000 Units, Subcutaneous, Weekly  gabapentin, 300 mg,  Oral, TID  guaiFENesin, 600 mg, Oral, Q12H  insulin lispro, 0-7 Units, Subcutaneous, TID With Meals  ipratropium-albuterol, 3 mL, Nebulization, Q4H - RT  Shabbir, 1 packet, Oral, BID  lurasidone, 40 mg, Oral, Daily  megestrol acetate, 200 mg, Oral, TID  multivitamin with minerals, 1 tablet, Oral, Daily  nystatin, 5 mL, Swish & Swallow, 4x Daily  pantoprazole, 40 mg, Oral, Daily  polyethylene glycol, 17 g, Oral, Daily  predniSONE, 30 mg, Oral, Daily   Followed by  [START ON 6/11/2021] predniSONE, 20 mg, Oral, Daily   Followed by  [START ON 6/13/2021] predniSONE, 10 mg, Oral, Daily  sertraline, 50 mg, Oral, Daily  spironolactone, 25 mg, Oral, Daily  vancomycin, 750 mg, Intravenous, Q12H  Zinc Oxide, , Apply externally, BID      Continuous Infusions:Pharmacy to dose vancomycin,       PRN Meds:.  acetaminophen    albuterol    bisacodyl    calcium carbonate    dextrose    dextrose    glucagon (human recombinant)    hydrOXYzine    insulin lispro    ipratropium-albuterol    ketorolac    melatonin    ondansetron    Pharmacy to dose vancomycin    promethazine    [COMPLETED] Insert peripheral IV **AND** sodium chloride    sodium chloride    Labs:  Lab Results (last 24 hours)       Procedure Component Value Units Date/Time    POC Glucose Once [[id number]]  (Abnormal) Collected: 06/09/21 1601    Specimen: Blood Updated: 06/09/21 1603     Glucose 141 mg/dL      Comment: Serial Number: [id number]:  [id number]       Ricarda-Barr Virus VCA, IgM [[id number]] Collected: 06/08/21 1630    Specimen: Blood Updated: 06/09/21 1510     EBV VCA IgM <36.0 U/mL      Comment:                                  Negative        <36.0                                   Equivocal 36.0 - 43.9                                   Positive        >43.9       Narrative:      Performed at:  [address] [place] [address]  [id number]  : Andrew Devine PhD, Phone:  [phone]    POC Glucose Once [[id number]]  (Abnormal)  Collected: 06/09/21 1120    Specimen: Blood Updated: 06/09/21 1121     Glucose 154 mg/dL      Comment: Serial Number: [id number]:  [id number]       Blood Culture - Blood, Arm, Left [[id number]] Collected: 06/07/21 0705    Specimen: Blood from Arm, Left Updated: 06/09/21 0730     Blood Culture No growth at 2 days    Blood Culture - Blood, Arm, Right [[id number]] Collected: 06/07/21 0705    Specimen: Blood from Arm, Right Updated: 06/09/21 0730     Blood Culture No growth at 2 days    POC Glucose Once [[id number]]  (Abnormal) Collected: 06/09/21 0727    Specimen: Blood Updated: 06/09/21 0728     Glucose 133 mg/dL      Comment: Serial Number: [id number]:  [id number]       Cytomegalovirus Antibody, IgM [[id number]] Collected: 06/08/21 1630    Specimen: Blood Updated: 06/09/21 0711     CMV IgM <30.0 AU/mL      Comment:                                 Negative         <30.0                                  Equivocal  30.0 - 34.9                                  Positive         >34.9  A positive result is generally indicative of acute  infection, reactivation or persistent IgM production.       Narrative:      Performed at:  [address] [place] [address]  [id number]  : Andrew Devine PhD, Phone:  [phone]    Manual Differential [[id number]]  (Abnormal) Collected: 06/09/21 0503    Specimen: Blood Updated: 06/09/21 0551     Neutrophil % 60.0 %      Lymphocyte % 5.0 %      Monocyte % 1.0 %      Bands %  31.0 %      Metamyelocyte % 2.0 %      Myelocyte % 1.0 %      Neutrophils Absolute 9.92 10*3/mm3      Lymphocytes Absolute 0.55 10*3/mm3      Monocytes Absolute 0.11 10*3/mm3      nRBC 3.0 /100 WBC      Macrocytes Mod/2+     Poikilocytes Mod/2+     WBC Morphology Normal     Platelet Estimate Decreased    CBC & Differential [[id number]]  (Abnormal) Collected: 06/09/21 0503    Specimen: Blood Updated: 06/09/21 0551    Narrative:      The following orders were created for panel order CBC  & Differential.  Procedure                               Abnormality         Status                     ---------                               -----------         ------                     Scan Slide[[id number]]                                       Final result               CBC Auto Differential[[id number]]        Abnormal            Final result                 Please view results for these tests on the individual orders.    Scan Slide [[id number]] Collected: 06/09/21 0503    Specimen: Blood Updated: 06/09/21 0551     Scan Slide --     Comment: See Manual Differential Results       CBC Auto Differential [[id number]]  (Abnormal) Collected: 06/09/21 0503    Specimen: Blood Updated: 06/09/21 0551     WBC 10.90 10*3/mm3      RBC 1.95 10*6/mm3      Hemoglobin 7.2 g/dL      Hematocrit 21.3 %      .7 fL      MCH 37.1 pg      MCHC 33.9 g/dL      RDW 14.5 %      RDW-SD 56.4 fl      MPV 8.2 fL      Platelets 118 10*3/mm3     Narrative:      The previously reported component NRBC is no longer being reported. Previous result was 0.5 /100 WBC (Reference Range: 0.0-0.2 /100 WBC) on 6/9/2021 at 0524 EDT.    Basic Metabolic Panel [[id number]]  (Abnormal) Collected: 06/09/21 0320    Specimen: Blood Updated: 06/09/21 0445     Glucose 123 mg/dL      BUN 25 mg/dL      Creatinine 0.63 mg/dL      Sodium 140 mmol/L      Potassium 4.2 mmol/L      Chloride 103 mmol/L      CO2 29.0 mmol/L      Calcium 8.6 mg/dL      eGFR Non African Amer 126 mL/min/1.73      BUN/Creatinine Ratio 39.7     Anion Gap 8.0 mmol/L     Narrative:      GFR Normal >60  Chronic Kidney Disease <60  Kidney Failure <15               Assessment/Plan      Acute on chronic respiratory distress  Suspect postobstructive pneumonia  COPD exacerbation  Metastatic lung cancer/squamous cell with new finding of of 12 mm nodular density on x-ray  CHF  Opioid dependence  Stage II skin ulcer sacral region    Coronary artery disease involving native coronary artery of native  heart without angina pectoris    Nonrheumatic aortic valve stenosis    Essential hypertension    Congestive heart failure (CMS/HCC)    Tobacco abuse    Squamous cell carcinoma of lung, right (CMS/HCC)    Acute-on-chronic respiratory failure (CMS/HCC)    Respiratory distress    Stage 2 skin ulcer of sacral region (CMS/HCC)  Constipation    Pulmonology, wound care and cardiology inputs greatly appreciated, thank you.  Patient c/o increased pain all over, currently on Suboxone for presumed opoid dependence, with Dx of mets lung cancer pt will have increased pain and most likely need narcotic medication, however, I am unsure if that can be taken with current Suboxone or  protocol for coming off Suboxone. For now we will increase his Tyl dose and add in Toradol for 5 doses only and investigate this further. We will continue current approach with pulmonary toilet/DVT prophylaxis, parenteral antibiotics and steroids.  We will continue to follow.    Pt has PRN meds for constipation on chart        Eliz Andrews, APRN  06/09/21  19:05 EDT

## 2021-06-09 NOTE — PROGRESS NOTES
Hematology/Oncology Inpatient Progress Note    PATIENT NAME: Axel Ramirez  : 1950  MRN: [id number]    CHIEF COMPLAINT: Metastatic squamous cell carcinoma of the right upper lobe lung and MDS    HISTORY OF PRESENT ILLNESS:  70 y.o. male presented to [facility] ER on 2021 with shortness of air and hypoxia.  He had an acute onset on the day of admission of worsening shortness of breath with O2 saturation of 76% on 4 L/min O2.  The ECF called EMS.  He was treated with nebulizer on the way to the ER.  Chest x-ray showed left lung pneumonia and a right middle lobe lung mass.  White count 10.1, hemoglobin 9.2, .7, platelets 115k.  D-dimer was 2.99 (< 0.6).  Chest CT showed a 9 cm right upper lobe lung mass occluding the right upper lobe bronchus with complete right upper and right middle lobe collapse.  There was no pulmonary emboli.  There was possible aspiration pneumonia and stable mediastinal lymphadenopathy.  A new 12 mm nodule was seen in the left lower lobe.  Adrenal glands were normal.  He was started on steroids and Zosyn.  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  Recently started on second line therapy Taxol/carboplatin/ipilimumab and Nivolumab with Neulasta support on 2021.     21  Hematology/Oncology was consulted for his metastatic squamous cell carcinoma of the right upper lobe lung.  He is an established patient.  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  Initially treated with concomitant weekly chemotherapy (paclitaxel and carboplatin x7) and radiation therapy from 2020. He completed radiation therapy on 2021.  He completed the weekly portion of his chemotherapy on 2021.  He received 1 cycle of every 21-day carboplatin /Taxol with  Neulasta support on 2/9/2021.  He subsequently was admitted to [facility], for hypoxia with respiratory failure requiring intubation.  MRI of the brain showed a left occipital lobe lesion with vasogenic edema.  He received radiation, x1 fraction, on 3/3/2021 to the left occipital lobe. One month later, a brain MRI showed enlargement of the left occipital lobe lesion.  PET scan showed the right upper lobe lung mass to be 10 cm in size with a decrease in the peripheral FDG uptake suggesting interval response to treatment.  There was a decrease in the size and FDG uptake in the mediastinal lymph nodes.  There was no distant metastatic disease.  The lesion in the left occipital lobe was FDG avid.  Right upper lobe lung collapse had improved with aeration.  He was referred to Dr. Tay for evaluation of his brain MRI which was felt to be due to to post SRS changes and not progression.  He was seen in follow-up with plan to start chemotherapy with immunotherapy.  Paclitaxel and carboplatin doses were decreased by 20% due to pancytopenia with prior treatment.  Repeat brain MRI, on 5/13/2021, showed an increase in the size of the left occipital lobe with an increase in surrounding edema.  There were no new masses.  The patient was referred to Dr. Luna for surgical resection versus MRI SPECT/perfusion at [facility].  Dr. Luna felt the lesion was a combination of necrosis and tumor progression and recommended surgical resection.  He started treatment on 5/24/2021 (chemotherapy plus immunotherapy) as his surgical resection was to be done after medical clearance.  He had a return appointment with Dr. Luna on 6/9/2021 following an MRI of his brain.  -MDS-diagnosed January 2021.  He was anemic and was found to have GOGO and malabsorption on oral therapy.  His bone marrow revealed increased iron storage and mild erythroid atypia in January 2021.  Stool heme was negative on 5/13/2021. He has received IV iron but has  never required Retacrit.  Last office visit 5/24/2021, complaining of hypoxia and tachypnea with anxiety.  White count 9.05, hemoglobin 11.34, .8 and platelets 220.  He was starting to gain weight and Megace was continued.  For his cough and hypoxia, he was treated with Augmentin and albuterol nebulizer.  He had oral thrush which was treated with nystatin swish and swallow.  TSH 0.287 (0.282-4.0), iron 62 (), ferritin 4185 (), iron saturation 32 (20-55), TIBC 196 (228-428).     PCP: Sandoval Stevenson FNP     INTERVAL HISTORY:  • 6/8/2021-Retacrit 40,000 units subcu weekly initiated.  Hemoglobin 7.2.  • 6/9/2021-PT 11.5 (9.6-11.7), PTT 27.7 (24-31).  Hemoglobin 7.2, platelets 118,000.  Haptoglobin 465 ().  CMV IgM <30 (<30).  Cardiac ejection fraction 65%.      History of present illness reviewed since last visit and changes noted on 06/09/21.    Subjective   He vomited once yesterday.    ROS:  Review of Systems   Constitutional: Negative for activity change, chills, fatigue, fever and unexpected weight change.   HENT: Negative for congestion, dental problem, hearing loss, mouth sores, nosebleeds, sore throat and trouble swallowing.    Eyes: Negative for photophobia and visual disturbance.   Respiratory: Negative for cough, choking, chest tightness and shortness of breath.    Cardiovascular: Negative for chest pain, palpitations and leg swelling.   Gastrointestinal: Positive for vomiting. Negative for abdominal distention, abdominal pain, blood in stool, constipation, diarrhea and nausea.   Endocrine: Negative for cold intolerance and heat intolerance.   Genitourinary: Negative for decreased urine volume, difficulty urinating, frequency, hematuria and urgency.   Musculoskeletal: Negative for arthralgias and gait problem.   Skin: Negative for rash and wound.   Neurological: Negative for dizziness, tremors, weakness, light-headedness, numbness and headaches.   Hematological: Negative for  "adenopathy. Does not bruise/bleed easily.   Psychiatric/Behavioral: Negative for confusion and hallucinations. The patient is not nervous/anxious.    All other systems reviewed and are negative.       MEDICATIONS:    Scheduled Meds:  ampicillin-sulbactam, 3 g, Intravenous, Q6H  aspirin, 81 mg, Oral, Daily  buprenorphine-naloxone, 1.5 tablet, Sublingual, Daily  docusate sodium, 100 mg, Oral, BID  enoxaparin, 40 mg, Subcutaneous, Q24H  epoetin asia-epbx, 40,000 Units, Subcutaneous, Weekly  gabapentin, 300 mg, Oral, TID  guaiFENesin, 600 mg, Oral, Q12H  insulin lispro, 0-7 Units, Subcutaneous, TID With Meals  ipratropium-albuterol, 3 mL, Nebulization, Q4H - RT  lurasidone, 40 mg, Oral, Daily  megestrol acetate, 200 mg, Oral, TID  multivitamin with minerals, 1 tablet, Oral, Daily  nystatin, 5 mL, Swish & Swallow, 4x Daily  pantoprazole, 40 mg, Oral, Daily  polyethylene glycol, 17 g, Oral, Daily  predniSONE, 30 mg, Oral, Daily   Followed by  [START ON 6/11/2021] predniSONE, 20 mg, Oral, Daily   Followed by  [START ON 6/13/2021] predniSONE, 10 mg, Oral, Daily  sertraline, 50 mg, Oral, Daily  spironolactone, 25 mg, Oral, Daily  vancomycin, 750 mg, Intravenous, Q12H  Zinc Oxide, , Apply externally, BID       Continuous Infusions:  Pharmacy to dose vancomycin,        PRN Meds:  •  acetaminophen  •  albuterol  •  bisacodyl  •  calcium carbonate  •  dextrose  •  dextrose  •  glucagon (human recombinant)  •  insulin lispro  •  ipratropium-albuterol  •  melatonin  •  ondansetron  •  Pharmacy to dose vancomycin  •  promethazine  •  [COMPLETED] Insert peripheral IV **AND** sodium chloride  •  sodium chloride     ALLERGIES:  No Known Allergies    Objective    VITALS:   /64 (BP Location: Right arm, Patient Position: Lying)   Pulse 99   Temp 97.3 °F (36.3 °C) (Oral)   Resp 22   Ht 172.7 cm (68\")   Wt 67.3 kg (148 lb 6.4 oz)   SpO2 98%   BMI 22.56 kg/m²     PHYSICAL EXAM:  Physical Exam  Vitals and nursing note reviewed. "   Constitutional:       General: He is not in acute distress.     Appearance: Normal appearance. He is well-developed and normal weight. He is not diaphoretic.   HENT:      Head: Normocephalic and atraumatic.      Comments: Frontal male pattern baldness.  Oxygen per facemask.     Nose: Nose normal.      Mouth/Throat:      Mouth: Mucous membranes are moist.      Pharynx: Oropharynx is clear. No oropharyngeal exudate or posterior oropharyngeal erythema.      Comments: Edentulous.  Eyes:      General: No scleral icterus.     Extraocular Movements: Extraocular movements intact.      Conjunctiva/sclera: Conjunctivae normal.      Pupils: Pupils are equal, round, and reactive to light.   Cardiovascular:      Rate and Rhythm: Normal rate and regular rhythm.      Heart sounds: Normal heart sounds. No murmur heard.        Comments: Monitor leads.  Midline vertical chest wall scar.  Pulmonary:      Effort: Pulmonary effort is normal. No respiratory distress.      Breath sounds: Normal breath sounds. No stridor. No wheezing or rales.   Abdominal:      General: Abdomen is flat. Bowel sounds are normal. There is no distension.      Palpations: Abdomen is soft. There is no mass.      Tenderness: There is no abdominal tenderness. There is no guarding.   Genitourinary:     Comments: Deferred   Musculoskeletal:         General: No swelling, tenderness or deformity. Normal range of motion.      Cervical back: Normal range of motion and neck supple.      Right lower leg: No edema.      Left lower leg: No edema.   Lymphadenopathy:      Cervical: No cervical adenopathy.      Upper Body:      Right upper body: No supraclavicular adenopathy.      Left upper body: No supraclavicular adenopathy.   Skin:     General: Skin is warm and dry.      Coloration: Skin is not pale.      Findings: Bruising (On arms.) present. No erythema or rash.      Comments: Left chest wall port.   Neurological:      General: No focal deficit present.      Mental  Status: He is alert and oriented to person, place, and time.      Coordination: Coordination normal.   Psychiatric:         Mood and Affect: Mood normal.         Behavior: Behavior normal.         Thought Content: Thought content normal.         Judgment: Judgment normal.           RECENT LABS:  Lab Results (last 24 hours)     Procedure Component Value Units Date/Time    POC Glucose Once [[id number]]  (Abnormal) Collected: 06/09/21 1120    Specimen: Blood Updated: 06/09/21 1121     Glucose 154 mg/dL      Comment: Serial Number: [id number]:  [id number]       Blood Culture - Blood, Arm, Left [[id number]] Collected: 06/07/21 0705    Specimen: Blood from Arm, Left Updated: 06/09/21 0730     Blood Culture No growth at 2 days    Blood Culture - Blood, Arm, Right [[id number]] Collected: 06/07/21 0705    Specimen: Blood from Arm, Right Updated: 06/09/21 0730     Blood Culture No growth at 2 days    POC Glucose Once [[id number]]  (Abnormal) Collected: 06/09/21 0727    Specimen: Blood Updated: 06/09/21 0728     Glucose 133 mg/dL      Comment: Serial Number: [id number]:  [id number]       Cytomegalovirus Antibody, IgM [[id number]] Collected: 06/08/21 1630    Specimen: Blood Updated: 06/09/21 0711     CMV IgM <30.0 AU/mL      Comment:                                 Negative         <30.0                                  Equivocal  30.0 - 34.9                                  Positive         >34.9  A positive result is generally indicative of acute  infection, reactivation or persistent IgM production.       Narrative:      Performed at:  [address] [place] [address]  [id number]  : Andrew Devine PhD, Phone:  [phone]    Manual Differential [[id number]]  (Abnormal) Collected: 06/09/21 0503    Specimen: Blood Updated: 06/09/21 0551     Neutrophil % 60.0 %      Lymphocyte % 5.0 %      Monocyte % 1.0 %      Bands %  31.0 %      Metamyelocyte % 2.0 %      Myelocyte % 1.0 %      Neutrophils  Absolute 9.92 10*3/mm3      Lymphocytes Absolute 0.55 10*3/mm3      Monocytes Absolute 0.11 10*3/mm3      nRBC 3.0 /100 WBC      Macrocytes Mod/2+     Poikilocytes Mod/2+     WBC Morphology Normal     Platelet Estimate Decreased    CBC & Differential [[id number]]  (Abnormal) Collected: 06/09/21 0503    Specimen: Blood Updated: 06/09/21 0551    Narrative:      The following orders were created for panel order CBC & Differential.  Procedure                               Abnormality         Status                     ---------                               -----------         ------                     Scan Slide[[id number]]                                       Final result               CBC Auto Differential[[id number]]        Abnormal            Final result                 Please view results for these tests on the individual orders.    Scan Slide [[id number]] Collected: 06/09/21 0503    Specimen: Blood Updated: 06/09/21 0551     Scan Slide --     Comment: See Manual Differential Results       CBC Auto Differential [[id number]]  (Abnormal) Collected: 06/09/21 0503    Specimen: Blood Updated: 06/09/21 0551     WBC 10.90 10*3/mm3      RBC 1.95 10*6/mm3      Hemoglobin 7.2 g/dL      Hematocrit 21.3 %      .7 fL      MCH 37.1 pg      MCHC 33.9 g/dL      RDW 14.5 %      RDW-SD 56.4 fl      MPV 8.2 fL      Platelets 118 10*3/mm3     Narrative:      The previously reported component NRBC is no longer being reported. Previous result was 0.5 /100 WBC (Reference Range: 0.0-0.2 /100 WBC) on 6/9/2021 at [facility] EDT.    Basic Metabolic Panel [[id number]]  (Abnormal) Collected: 06/09/21 0320    Specimen: Blood Updated: 06/09/21 0445     Glucose 123 mg/dL      BUN 25 mg/dL      Creatinine 0.63 mg/dL      Sodium 140 mmol/L      Potassium 4.2 mmol/L      Chloride 103 mmol/L      CO2 29.0 mmol/L      Calcium 8.6 mg/dL      eGFR Non African Amer 126 mL/min/1.73      BUN/Creatinine Ratio 39.7     Anion Gap 8.0 mmol/L     Narrative:       GFR Normal >60  Chronic Kidney Disease <60  Kidney Failure <15      Haptoglobin [[id number]]  (Abnormal) Collected: 06/08/21 1630    Specimen: Blood Updated: 06/08/21 1946     Haptoglobin 465 mg/dL     POC Glucose Once [[id number]]  (Abnormal) Collected: 06/08/21 1920    Specimen: Blood Updated: 06/08/21 1922     Glucose 123 mg/dL      Comment: Serial Number: [id number]:  [id number]       Extra Tubes [[id number]] Collected: 06/08/21 1638    Specimen: Blood, Venous Line Updated: 06/08/21 1745    Narrative:      The following orders were created for panel order Extra Tubes.  Procedure                               Abnormality         Status                     ---------                               -----------         ------                     Gold Top - SST[[id number]]                                   Final result               Green Top (Gel)[[id number]]                                  Final result                 Please view results for these tests on the individual orders.    Gold Top - SST [[id number]] Collected: 06/08/21 1638    Specimen: Blood Updated: 06/08/21 1745     Extra Tube Hold for add-ons.     Comment: Auto resulted.       Green Top (Gel) [[id number]] Collected: 06/08/21 1638    Specimen: Blood Updated: 06/08/21 1745     Extra Tube Hold for add-ons.     Comment: Auto resulted.       aPTT [[id number]]  (Normal) Collected: 06/08/21 1630    Specimen: Blood Updated: 06/08/21 1732     PTT 27.7 seconds     Protime-INR [[id number]]  (Normal) Collected: 06/08/21 1630    Specimen: Blood Updated: 06/08/21 1732     Protime 11.5 Seconds      INR 1.05    Hemoglobin & Hematocrit, Blood [[id number]]  (Abnormal) Collected: 06/08/21 1630    Specimen: Blood Updated: 06/08/21 1720     Hemoglobin 7.8 g/dL      Hematocrit 23.1 %     Ricarda-Barr Virus VCA, IgM [[id number]] Collected: 06/08/21 1630    Specimen: Blood Updated: 06/08/21 1713          PENDING RESULTS: EBV IgM    IMAGING REVIEWED:  No radiology results for the  last day    I have reviewed the patient's labs, imaging, reports, and other clinician documentation.    Assessment/Plan   ASSESSMENT:  1. 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  PET scan and April 2021 showed he was responding to treatment.  Ipilimumab and nivolumab were added to his current therapy due to progression in his brain.  Due to hospitalization and brain radiation, he did not receive systemic therapy from 2/9/2021 until 5/24/2021.  Planned to undergo surgical resection of brain metastasis by Dr. Luna.  Ipilimumab and nivolumab are known to cross blood-brain barrier.  2. Anemia/Myelodysplastic syndrome/GOGO with malabsorption/Chemotherapy-induced anemia-he has never required Retacrit as an outpatient.  Recent iron studies showed correction of GOGO.  Acute fall in hemoglobin due to recent chemotherapy.  Macrocytosis due to MDS.  Will treat with Retacrit.  Haptoglobin high.  On multivitamin and Protonix.  Stable.   3. Acute thrombocytopenia-likely chemotherapy-induced.  Coags normal and CMV IgM normal.  Stable.   4. Respiratory failure /Pneumonia/COPD-on Unasyn and steroids.  Per ID.    5. Weight loss/loss of appetite-improving as outpatient on Megace.  Now on steroids.  6. Oral thrush-diagnosed as outpatient.  Now on steroids, will continue nystatin.  7. Elevated LFTs-acute and mild.  Stable.    8. Sacral decub-wound care to follow.  On antibiotics.  9. CHF/s/p aortic valve replacement -echocardiogram showed no vegetation and EF 65%.  On aspirin and Lovenox prophylaxis.     PLAN  1. Follow CBC.   2. Outpatient MRI brain per Dr. Luna.      3. Continue weekly Retacrit.   4. Await EBV IgM.    5. Transfuse as needed hemoglobin < 7.     Patient seen and evaluated by   Debbie.  Electronically signed by VINCE Kaplan, 06/09/21, 1:03 PM EDT.        I have personally performed a face-to-face diagnostic evaluation on this patient.  I have discussed the case with Fatoumata Espinosa NP, have edited/reviewed the note, and agree with the care plan.  The patient claims to have vomited last night.  On examination he has left chest wall [id number] and midline vertical chest wall scar.  Labs are significant for anemia and thrombocytopenia.  He has been started on Retacrit for the anemia.  We will plan on resumption of chemotherapy post discharge.          I discussed the patients findings and my recommendations with patient.    Electronically signed by Axel Lewis MD, 06/09/21, 5:33 PM EDT.

## 2021-06-09 NOTE — CONSULTS
Nutrition Services    Patient Name: Axel Ramirez  YOB: 1950  MRN: [id number]  Admission date: 6/7/2021    Moderate chronic malnutrition r/t hypermetabolic state of lung cancer with c/o troubled breathing this admission likely resulting in decreased intake AEB NFPE revealing mild muscle wasting and fat loss.     Boost Plus TID (provides 1080kcal + 42g pro if consumed)  Shabbir BID    PPE Documentation        PPE Worn By Provider Mask, eyewear, gloves   PPE Worn By Patient  none     CLINICAL NUTRITION ASSESSMENT       Reason for Assessment 6/9: Multiple Stage 2+ PI's     H&P:      Past Medical History:   Diagnosis Date   • Aortic stenosis    • Cancer (CMS/HCC)     Sickle Cell Carcinoma   • Congestive heart failure (CHF) (CMS/HCC)     DIASTOLIC   • COPD (chronic obstructive pulmonary disease) (CMS/HCC)    • Coronary artery disease    • Hypertension    • Lung cancer (CMS/HCC)    • Myocardial infarction (CMS/HCC)    • Peripheral vascular disease (CMS/HCC)    • Pneumonia 06/07/2021   • Valvular disease        Past Surgical History:   Procedure Laterality Date   • AORTIC VALVE REPAIR/REPLACEMENT  02/26/2018    Dr Maza   • BRONCHOSCOPY N/A 10/24/2020    Procedure: BRONCHOSCOPY with biopsy right upper lobe mass, and bronchoalveolar lavage right upper lobe;  Surgeon: Ángela Bean MD;  Location: [facility] ENDOSCOPY;  Service: Pulmonary;  Laterality: N/A;  post: right upper lobe mass, pneumonia   • BRONCHOSCOPY N/A 11/11/2020    Procedure: BRONCHOSCOPY with bronchial washing;  Surgeon: Ángela Bean MD;  Location: [facility] ENDOSCOPY;  Service: Pulmonary;  Laterality: N/A;  Post: lung mass, pneumonia   • BRONCHOSCOPY N/A 11/11/2020    Procedure: BRONCHOSCOPY RIGID with debulking of right main endobronchial tumor;  Surgeon: Nomi Reyes MD;  Location: [facility] MAIN OR;  Service: Cardiothoracic;  Laterality: N/A;   • BRONCHOSCOPY N/A 11/11/2020    Procedure: BRONCHOSCOPY;  Surgeon: Nomi Reyes MD;  Location:  "[facility] MAIN OR;  Service: Cardiothoracic;  Laterality: N/A;   • CARDIAC CATHETERIZATION  12/29/2017   • CORONARY ARTERY BYPASS GRAFT  02/26/2018    Dr Maza   • TIBIA FRACTURE SURGERY     • VENOUS ACCESS DEVICE (PORT) INSERTION Left 10/30/2020    Procedure: MEDIPORT INSERTION UNDER FLUOROSCOPIC GUIDENCE;  Surgeon: Nomi Reyes MD;  Location: [facility] MAIN OR;  Service: Cardiothoracic;  Laterality: Left;        Current Problems:   Little data on file:    Per Cardiology note on 6/8:     Coronary artery disease involving native coronary artery of native heart without angina pectoris    Nonrheumatic aortic valve stenosis    Essential hypertension    Congestive heart failure (CMS/HCC)    Tobacco abuse    Squamous cell carcinoma of lung, right - with brain met   (CMS/HCC)    Acute-on-chronic respiratory failure (CMS/HCC)    Respiratory distress    Stage 2 skin ulcer of sacral region (CMS/HCC)       Nutrition/Diet History         Narrative     6/9: Rd visited pt room with family member at bedside. Pt is eating well, 2-3 meals per day, EMR confirms. Pt states he started vomiting yesterday and has not eaten much today but PTA had been eating well. He is having a lot of pain from wounds. Will order Shabbir BID. RN entered room upon end of assessment and suggested Boost while patient is feeling nauseas. Will order Boost TID. NFPE reveals moderate malnutrition, consistent with malnutrition hx in Feb of this year.      Functional Status Dependent for ADL's  -self feed     Food Allergies NKFA     Factors Affecting   Nutritional Intake  Squamous cell carcinoma of lung, right - with brain met   Acute respiratory failure     Anthropometrics        Current Height, Weight Height: 172.7 cm (68\")  Weight: 67.3 kg (148 lb 6.4 oz) (06/07/21 0450)        Admit Height, Weight -    Flowsheet Rows      First Filed Value   Admission Height  172.7 cm (68\") Documented at 06/07/2021 0451   Admission Weight  67.3 kg (148 lb 6.4 oz) Documented at " 06/07/2021 0450             Ideal Body Weight (IBW) 146lbs   % Ideal Body Weight 100%       Usual Body Weight UTD   % Usual Body Weight UTD   Wt Change Observation 6/9:Weight fluctuates, though current weight is consistent with weight 6 months ago. Pt +1L. Note major weight decrease since 2018 (~40lbs)   Weight Hx    Wt Readings from Last 30 Encounters:   06/07/21 0450 67.3 kg (148 lb 6.4 oz)   06/02/21 1229 72.6 kg (160 lb)   05/19/21 0931 72.6 kg (160 lb)   05/18/21 1003 59 kg (130 lb)   04/26/21 1307 56.7 kg (125 lb)   03/09/21 1144 57.2 kg (126 lb)   03/08/21 0306 56.7 kg (125 lb)   03/07/21 0442 58 kg (127 lb 12.8 oz)   03/05/21 0333 58.4 kg (128 lb 12 oz)   03/04/21 0359 65.1 kg (143 lb 8.3 oz)   02/27/21 0559 65 kg (143 lb 4.8 oz)   02/26/21 0547 57.6 kg (126 lb 15.8 oz)   02/25/21 0512 57.6 kg (126 lb 15.8 oz)   02/23/21 0349 66.1 kg (145 lb 11.6 oz)   02/22/21 0418 66 kg (145 lb 8.1 oz)   02/21/21 0517 66.9 kg (147 lb 7.8 oz)   02/20/21 0111 66.8 kg (147 lb 4.3 oz)   02/18/21 1807 68.2 kg (150 lb 5.7 oz)   02/18/21 1048 67.1 kg (148 lb)   02/04/21 0805 66.6 kg (146 lb 12.8 oz)   01/05/21 0918 68.5 kg (151 lb)   12/29/20 0938 68.1 kg (150 lb 3.2 oz)   12/22/20 0913 69.6 kg (153 lb 6.4 oz)   12/15/20 0929 68.6 kg (151 lb 3.2 oz)   12/08/20 0915 68.1 kg (150 lb 3.2 oz)   12/02/20 1257 68.5 kg (151 lb)   12/01/20 0936 68 kg (150 lb)   11/24/20 0941 68.9 kg (152 lb)   11/19/20 0956 66.7 kg (147 lb)   11/09/20 1549 68 kg (150 lb)   11/08/20 2256 68 kg (150 lb)   10/30/20 0354 67.1 kg (147 lb 14.9 oz)   10/29/20 0500 70.1 kg (154 lb 8.7 oz)   10/27/20 0508 69.9 kg (154 lb 1.6 oz)   10/22/20 2059 68 kg (150 lb)   10/22/20 2044 68 kg (150 lb)   12/02/19 1001 77.1 kg (170 lb)   05/31/19 1037 82 kg (180 lb 12 oz)   05/21/18 1237 81.8 kg (180 lb 6.4 oz)        BMI kg/m2 Body mass index is 22.56 kg/m².       Labs/Medications         Pertinent Labs Reviewed   Results from last 7 days   Lab Units 06/09/21  4330  "06/08/21  0624 06/07/21  0500   SODIUM mmol/L 140 139 134*   POTASSIUM mmol/L 4.2 4.6 4.1   CHLORIDE mmol/L 103 100 97*   CO2 mmol/L 29.0 30.0* 26.0   BUN mg/dL 25* 32* 24*   CREATININE mg/dL 0.63* 0.93 0.73*   CALCIUM mg/dL 8.6 9.0 9.2   BILIRUBIN mg/dL  --  0.6 0.7   ALK PHOS U/L  --  89 109   ALT (SGPT) U/L  --  64* 87*   AST (SGOT) U/L  --  41* 58*   GLUCOSE mg/dL 123* 127* 116*     Results from last 7 days   Lab Units 06/09/21  0503   HEMOGLOBIN g/dL 7.2*   HEMATOCRIT % 21.3*     COVID19   Date Value Ref Range Status   06/07/2021 Not Detected Not Detected - Ref. Range Final     No results found for: HGBA1C      Pertinent Medications Unasyn, Suboxone, Colace, Neurontin, MVI, Mycostatin, Protonix, Miralax, Zoloft, Aldactone, vanc 750mg     Physical Findings        Overall Physical   Appearance, MSA 6/9: NFPE - moderate malnutrition - See MSA below   -  Edema  No documented     Gastrointestinal No BM documented this admission, x2 days. Pt on colace and miralax.      Tubes No feeding tubes     Oral/Mouth Cavity WNL     Skin Multiple stage II PI to coccyx       Estimated/Assessed Needs       Energy Requirements    Height for Calculation  Height: 172.7 cm (68\")   Weight for Calculation -   Method for Estimation  -   EST Needs (kcal/day) -       Protein Requirements    Weight for Calculation -   EST Protein Needs (g/kg) -   EST Daily Needs (g/day) -       Fluid Requirements     Estimated Needs (mL/day) -       Fluid Deficit    Current Na Level (mEq/L) -   Desired Na Level (mEq/L) -   Estimated Fluid Deficit (L)  -     Current Nutrition Orders & Evaluation of Received Nutrient/Fluid Intake       Oral Nutrition     Current PO Diet Diet Texture; Pureed Diet   Supplement None   PO Evaluation     % PO Intake Little data on file this admission, pt ate % meals yesterday   -  Enteral Nutrition    Enteral Route -   TF Modular -   TF Delivery Method -   Current Ordered TF  -   Current Ordered H2O flush  -    TF " Observation  -   EN Evaluation     TF Changes -    TF Residual -    TF Tolerance -    Average EN Delivered -       Parenteral Nutrition     TPN Route -   Current Ordered TPN VOL  -   Dextrose (g/kcals)  -   Amino Acid (g/kcals) -   Lipids (mL/Concentration/FREQ)  -   MVI Frequency  -   Trace Element Frequency  -   TPN Observation  -    TPN Evaluation    Total # Days on TPN -   -  Clinical Course    Nutrition Course Details 6/7-current(6/9): Pureed, Regular     Nutritional Risk Screening        NRS-2002 Score   -       Nutrition Diagnosis         Nutrition Dx Problem 1 Moderate chronic malnutrition r/t hypermetabolic state of lung cancer with c/o troubled breathing this admission likely resulting in decreased intake AEB NFPE revealing mild muscle wasting and fat loss.       Nutrition Dx Problem 2 Increased nutrient needs (arginie, protein, kcal) r/t wound healing AEB multiple Stage II PI to coccyx.       Intervention Goal         Intervention Goal(s) Accept ONS  Maintain PO of 75% at least     Nutrition Intervention        RD/Tech Action Shabbir BID  Boost Plus TID       Nutrition Prescription          Diet Prescription Pureed, Regular   Supplement Prescription Shabbir BID  Boost Plus TID   -  Enteral Prescription -       TPN Prescription -     Monitor/Evaluation        Monitor Skin integrity, BM, weight, labs        Malnutrition Severity Assessment      Patient meets criteria for : Moderate (non-severe) Malnutrition     Malnutrition Type (last 8 hours)      Malnutrition Severity Assessment     Row Name 06/09/21 1537       Malnutrition Severity Assessment    Malnutrition Type  Chronic Disease - Related Malnutrition    Row Name 06/09/21 1537       Muscle Loss    Loss of Muscle Mass Findings  Mild    [place]  Moderate - slight depression    Clavicle Bone Region  Moderate - some protrusion in females, visible in males    Acromion Bone Region  Moderate - acromion may slightly protrude    Posterior Calf Region  Moderate  - some roundness, slight firmness    Row Name 06/09/21 1537       Fat Loss    Orbital Region   Moderate -  somewhat hollowness, slightly dark circles    Upper Arm Region  Moderate - some fat tissue, not ample    Thoracic & Lumbar Region  Moderate - ribs visible with mild depressions, iliac crest somewhat prominent    Row Name 06/09/21 1537       Criteria Met (Must meet criteria for severity in at least 2 of these categories: M Wasting, Fat Loss, Fluid, Secondary Signs, Wt. Status, Intake)    Patient meets criteria for   Moderate (non-severe) Malnutrition             Electronically signed by:  Cathi Bustamante RD  06/09/21 11:33 EDT

## 2021-06-10 NOTE — PLAN OF CARE
Goal Outcome Evaluation:  Plan of Care Reviewed With: patient, daughter        Progress: no change  Outcome Summary: Pt. is 71 y/o male admit w/ SOA from rehab, d/o PNA. Pt. w /h/o stave IV lung cancer w/ brain mets. Pt. on 4L O2 at baseline, on 14L hi ross O2 this date. Pt. daughter states that patient as progress walking 40-50 feet at rehab and min-mod A for ADLs. Pt. completes functional transfers this date w/ min A for safety + rolling walker support. Pt. w/ limited standing tolerance 1-2 minutes secondary to fatigue and SOA. Pt. O2 titrated 10L as pt. maintains >94% throughout, nsg notified. Family wishes to d/c home w/ 24 hour care/assist, pt. not safe to d/c home at this time secondary to increased O2 demand. Recommend IP rehab at d/c pending progress.

## 2021-06-10 NOTE — THERAPY EVALUATION
Patient Name: Axel Ramirez  : 1950    MRN: [id number]                              Today's Date: 6/10/2021       Admit Date: 2021    Visit Dx:     ICD-10-CM ICD-9-CM   1. Respiratory distress  R06.03 786.09   2. Bronchospasm  J98.01 519.11   3. Acute congestive heart failure, unspecified heart failure type (CMS/HCC)  I50.9 428.0   4. Malignant neoplasm of right lung, unspecified part of lung (CMS/HCC)  C34.91 162.9     Patient Active Problem List   Diagnosis   • Coronary artery disease involving native coronary artery of native heart without angina pectoris   • Nonrheumatic aortic valve stenosis   • Mixed hyperlipidemia   • Essential hypertension   • Fever   • Presence of aortocoronary bypass graft   • Congestive heart failure (CMS/HCC)   • Hx of aortic valve replacement   • CAP (community acquired pneumonia)   • Tobacco abuse   • Postobstructive pneumonia   • Lung mass   • Squamous cell carcinoma of lung, right (CMS/HCC)   • Diastolic CHF, acute (CMS/HCC)   • Pneumonia of right lung due to infectious organism   • Pneumonia due to infectious organism   • Moderate malnutrition (CMS/HCC)   • Acute on chronic respiratory failure with hypoxia (CMS/HCC)   • COPD with acute exacerbation (CMS/HCC)   • Delirium   • Brain metastasis (CMS/HCC)   • Acute-on-chronic respiratory failure (CMS/HCC)   • Respiratory distress   • Stage 2 skin ulcer of sacral region (CMS/HCC)     Past Medical History:   Diagnosis Date   • Aortic stenosis    • Cancer (CMS/HCC)     Sickle Cell Carcinoma   • Congestive heart failure (CHF) (CMS/HCC)     DIASTOLIC   • COPD (chronic obstructive pulmonary disease) (CMS/HCC)    • Coronary artery disease    • Hypertension    • Lung cancer (CMS/HCC)    • Myocardial infarction (CMS/HCC)    • Peripheral vascular disease (CMS/HCC)    • Pneumonia 2021   • Valvular disease      Past Surgical History:   Procedure Laterality Date   • AORTIC VALVE REPAIR/REPLACEMENT  2018    Dr Maza   •  BRONCHOSCOPY N/A 10/24/2020    Procedure: BRONCHOSCOPY with biopsy right upper lobe mass, and bronchoalveolar lavage right upper lobe;  Surgeon: Ángela Bean MD;  Location: [facility] ENDOSCOPY;  Service: Pulmonary;  Laterality: N/A;  post: right upper lobe mass, pneumonia   • BRONCHOSCOPY N/A 11/11/2020    Procedure: BRONCHOSCOPY with bronchial washing;  Surgeon: Ángela Bean MD;  Location: [facility] ENDOSCOPY;  Service: Pulmonary;  Laterality: N/A;  Post: lung mass, pneumonia   • BRONCHOSCOPY N/A 11/11/2020    Procedure: BRONCHOSCOPY RIGID with debulking of right main endobronchial tumor;  Surgeon: Nomi Reyes MD;  Location: [facility] MAIN OR;  Service: Cardiothoracic;  Laterality: N/A;   • BRONCHOSCOPY N/A 11/11/2020    Procedure: BRONCHOSCOPY;  Surgeon: Nomi Reyes MD;  Location: [facility] MAIN OR;  Service: Cardiothoracic;  Laterality: N/A;   • CARDIAC CATHETERIZATION  12/29/2017   • CORONARY ARTERY BYPASS GRAFT  02/26/2018    Dr Maza   • TIBIA FRACTURE SURGERY     • VENOUS ACCESS DEVICE (PORT) INSERTION Left 10/30/2020    Procedure: MEDIPORT INSERTION UNDER FLUOROSCOPIC GUIDENCE;  Surgeon: Nomi Reyes MD;  Location: [facility] MAIN OR;  Service: Cardiothoracic;  Laterality: Left;     General Information     Row Name 06/10/21 1548          Physical Therapy Time and Intention    Document Type  evaluation  -     Mode of Treatment  physical therapy  -     Row Name 06/10/21 1548          General Information    Patient Profile Reviewed  yes  -     Prior Level of Function  -- pt has been in rehab at [facility] x2 months per daughter, ambulating short distances using RW, 4L O2 at rehab  -     Existing Precautions/Restrictions  oxygen therapy device and L/min;fall  -     Row Name 06/10/21 1548          Living Environment    Lives With  -- Spouse works full time; per daughter pt's niece would be available to assist 24/7 at home; pt has been undergoing rehab at [facility]  -     Row Name 06/10/21 1548           Stairs Within Home, Primary    Number of Stairs, Within Home, Primary  three  -     Row Name 06/10/21 1548          Cognition    Orientation Status (Cognition)  oriented x 3 Oriented to year, person, place  -     Row Name 06/10/21 1548          Safety Issues, Functional Mobility    Safety Issues Affecting Function (Mobility)  safety precaution awareness;insight into deficits/self-awareness;judgment  -     Impairments Affecting Function (Mobility)  postural/trunk control;balance;endurance/activity tolerance;shortness of breath;strength  -       User Key  (r) = Recorded By, (t) = Taken By, (c) = Cosigned By    Initials Name Provider Type     Valerie Yan, PT Physical Therapist        Mobility     Row Name 06/10/21 1550          Bed Mobility    Bed Mobility  supine-sit  -     Supine-Sit [place] (Bed Mobility)  minimum assist (75% patient effort)  -     Row Name 06/10/21 1550          Sit-Stand Transfer    Sit-Stand [place] (Transfers)  minimum assist (75% patient effort)  -     Assistive Device (Sit-Stand Transfers)  walker, front-wheeled  -     Row Name 06/10/21 1550          Gait/Stairs (Locomotion)    [place] Level (Gait)  unable to assess  -       User Key  (r) = Recorded By, (t) = Taken By, (c) = Cosigned By    Initials Name Provider Type     Valerie Yan, PT Physical Therapist        Obj/Interventions     Row Name 06/10/21 1550          Range of Motion Comprehensive    Comment, General Range of Motion  BLEs  WFL  -     Row Name 06/10/21 1550          Strength Comprehensive (MMT)    Comment, General Manual Muscle Testing (MMT) Assessment  BLEs 4/5  -     Row Name 06/10/21 1550          Balance    Balance Assessment  sitting static balance;sitting dynamic balance;standing static balance;standing dynamic balance  -     Static Sitting Balance  WFL  -HC     Dynamic Sitting Balance  mild impairment  -     Static Standing Balance  mild impairment  -      Dynamic Standing Balance  mild impairment  -HC       User Key  (r) = Recorded By, (t) = Taken By, (c) = Cosigned By    Initials Name Provider Type     Valerie Yan, PT Physical Therapist        Goals/Plan     Row Name 06/10/21 1553          Bed Mobility Goal 1 (PT)    Activity/Assistive Device (Bed Mobility Goal 1, PT)  bed mobility activities, all  -HC     [place] Level/Cues Needed (Bed Mobility Goal 1, PT)  standby assist  -HC     Time Frame (Bed Mobility Goal 1, PT)  2 weeks  -HC     Row Name 06/10/21 1553          Transfer Goal 1 (PT)    Activity/Assistive Device (Transfer Goal 1, PT)  transfers, all  -HC     [place] Level/Cues Needed (Transfer Goal 1, PT)  standby assist  -HC     Time Frame (Transfer Goal 1, PT)  2 weeks  -     Row Name 06/10/21 1553          Gait Training Goal 1 (PT)    Activity/Assistive Device (Gait Training Goal 1, PT)  gait (walking locomotion);assistive device use  -HC     [place] Level (Gait Training Goal 1, PT)  standby assist  -HC     Distance (Gait Training Goal 1, PT)  30 ft  -HC     Time Frame (Gait Training Goal 1, PT)  2 weeks  -HC       User Key  (r) = Recorded By, (t) = Taken By, (c) = Cosigned By    Initials Name Provider Type     Valerie Yan, PT Physical Therapist        Clinical Impression     Row Name 06/10/21 2381          Pain    Additional Documentation  Pain Scale: Numbers Pre/Post-Treatment (Group)  -     Row Name 06/10/21 1521          Pain Scale: Numbers Pre/Post-Treatment    Pretreatment Pain Rating  9/10  -     Posttreatment Pain Rating  9/10  -     Pre/Posttreatment Pain Comment  Pt reports pain in gluteal region from wound  -     Pain Intervention(s)  Rest;Repositioned  -     Row Name 06/10/21 3701          Plan of Care Review    Outcome Summary  Pt is 71 yo male admitted for SOA, resp distress from rehab.  Pt d/o acute on chronic CHF.  Pt has stage IV lung caner with brain mets.  -     Row Name 06/10/21 0121           Therapy Assessment/Plan (PT)    Patient/Family Therapy Goals Statement (PT)  daughter wants to take pt home  -HC     Rehab Potential (PT)  fair, will monitor progress closely  -HC     Criteria for Skilled Interventions Met (PT)  yes;skilled treatment is necessary  -HC     Predicted Duration of Therapy Intervention (PT)  until d/c  -HC     Row Name 06/10/21 1551          Vital Signs    Pre SpO2 (%)  96  -HC     O2 Delivery Pre Treatment  supplemental O2 10L  -HC     Intra SpO2 (%)  90  -HC     O2 Delivery Intra Treatment  supplemental O2 10L  -HC     Post SpO2 (%)  98  -HC     O2 Delivery Post Treatment  supplemental O2 10L  -HC     Row Name 06/10/21 1551          Positioning and Restraints    Pre-Treatment Position  in bed  -HC     Post Treatment Position  chair  -HC     In Chair  notified nsg;call light within reach;encouraged to call for assist;exit alarm on daughter present  -       User Key  (r) = Recorded By, (t) = Taken By, (c) = Cosigned By    Initials Name Provider Type    HC Valerie Yan, PT Physical Therapist        Outcome Measures     Row Name 06/10/21 4287          How much help from another person do you currently need...    Turning from your back to your side while in flat bed without using bedrails?  3  -HC     Moving from lying on back to sitting on the side of a flat bed without bedrails?  3  -HC     Moving to and from a bed to a chair (including a wheelchair)?  3  -HC     Standing up from a chair using your arms (e.g., wheelchair, bedside chair)?  3  -HC     Climbing 3-5 steps with a railing?  2  -HC     To walk in hospital room?  2  -HC     AM-PAC 6 Clicks Score (PT)  16  -     Row Name 06/10/21 1554          Modified [place] Scale    Modified [place] Scale  5 - Severe disability.  Bedridden, incontinent, and requiring constant nursing care and attention.  -     Row Name 06/10/21 5654          Functional Assessment    Outcome Measure Options  AM-PAC 6 Clicks Basic Mobility (PT);Modified  [place]  [facility]       User Key  (r) = Recorded By, (t) = Taken By, (c) = Cosigned By    Initials Name Provider Type     Valerie Yan, PT Physical Therapist        Physical Therapy Education                 Title: PT OT SLP Therapies (In Progress)     Topic: Physical Therapy (In Progress)     Point: Mobility training (In Progress)     Learning Progress Summary           Patient Acceptance, E, NR by  at 6/10/2021 1555                   Point: Precautions (In Progress)     Learning Progress Summary           Patient Acceptance, E, NR by  at 6/10/2021 1555                               User Key     Initials Effective Dates Name Provider Type Discipline     04/17/20 -  Valerie Yan, PT Physical Therapist PT              PT Recommendation and Plan  Planned Therapy Interventions (PT): balance training, bed mobility training, gait training, neuromuscular re-education, strengthening, patient/family education, postural re-education, transfer training, ROM (range of motion), home exercise program  Plan of Care Reviewed With: patient, daughter  Outcome Summary: Pt is 69 yo male admitted for SOA, resp distress from rehab.  Pt d/o acute CHF, PNA, AE COPD.  Pt has stage IV lung cancer with brain mets.  Pt presents on 10L hi ross O2 and desats to 90% during transfer to chair.  Pt requiring [place] for bed mobility and [place] for stand pivot transfer to chair.  Pt fatigues quickly after transfer requiring seated rest break for recovery.  Unable to assess gait due to weakness and poor endurance.  PT spoke with daughter who desires to take pt home with assist from niece for 24/7 care.  However pt requiring increased supplemental O2 at this time and demonstrates significant weakness limiting mobility.  At this time PT is recommending return to  rehab to increase strength.  PT will continue to follow and assess d/c recs pending pt progress.  PT will follow 5x/week while at [facility].     Time Calculation:   PT Charges     Row Name  06/10/21 1600             Time Calculation    Start Time  1515  -HC      Stop Time  1541  -HC      Time Calculation (min)  26 min  -HC      PT Received On  06/10/21  -HC      PT - Next Appointment  06/11/21  -      PT Goal Re-Cert Due Date  06/24/21  -HC         Time Calculation- PT    Total Timed Code Minutes- PT  10 minute(s)  -        User Key  (r) = Recorded By, (t) = Taken By, (c) = Cosigned By    Initials Name Provider Type     Valerie Yan, PT Physical Therapist        Therapy Charges for Today     Code Description Service Date Service Provider Modifiers Qty    [id number]  PT EVAL MOD COMPLEXITY 3 6/10/2021 Valerie Yan, PT GP 1    [id number] HC PT THERAPEUTIC ACT EA 15 MIN 6/10/2021 Valerie Yan, PT GP 1          PT G-Codes  Outcome Measure Options: AM-PAC 6 Clicks Basic Mobility (PT), Modified [place]  AM-PAC 6 Clicks Score (PT): 16  Modified [place] Scale: 5 - Severe disability.  Bedridden, incontinent, and requiring constant nursing care and attention.    Valerie Yan, PT  6/10/2021

## 2021-06-10 NOTE — PROGRESS NOTES
Reason for follow-up: Respiratory failure  Acute diastolic congestive heart failure  History of AVR  Lung CA     Patient Care Team:  Sandoval Stevenson FNP as PCP - General  Sandoval Stevenson FNP as PCP - Family Medicine  Axel Lewis MD as Consulting Physician (Hematology and Oncology)  Iglesia Springer MD as Consulting Physician (Cardiology)    Subjective .   Feels slightly better     Review of Systems   Constitutional: Positive for malaise/fatigue. Negative for fever.   HENT: Negative for congestion and hearing loss.    Eyes: Negative for double vision and visual disturbance.   Cardiovascular: Negative for chest pain, claudication, dyspnea on exertion, leg swelling and syncope.   Respiratory: Positive for cough and shortness of breath.    Endocrine: Negative for cold intolerance.   Skin: Negative for color change and rash.   Musculoskeletal: Negative for arthritis and joint pain.   Gastrointestinal: Negative for abdominal pain and heartburn.   Genitourinary: Negative for hematuria.   Neurological: Negative for excessive daytime sleepiness and dizziness.   Psychiatric/Behavioral: Negative for depression. The patient is not nervous/anxious.    All other systems reviewed and are negative.      Patient has no known allergies.    Scheduled Meds:ampicillin-sulbactam, 3 g, Intravenous, Q6H  aspirin, 81 mg, Oral, Daily  buprenorphine-naloxone, 1.5 tablet, Sublingual, Daily  docusate sodium, 100 mg, Oral, BID  enoxaparin, 40 mg, Subcutaneous, Q24H  epoetin asia-epbx, 40,000 Units, Subcutaneous, Weekly  gabapentin, 300 mg, Oral, TID  guaiFENesin, 600 mg, Oral, Q12H  insulin lispro, 0-7 Units, Subcutaneous, TID With Meals  ipratropium-albuterol, 3 mL, Nebulization, Q4H - RT  Shabbir, 1 packet, Oral, BID  lurasidone, 40 mg, Oral, Daily  megestrol acetate, 200 mg, Oral, TID  multivitamin with minerals, 1 tablet, Oral, Daily  nystatin, 5 mL, Swish & Swallow, 4x Daily  pantoprazole, 40 mg, Oral,  "Daily  polyethylene glycol, 17 g, Oral, Daily  [START ON 6/11/2021] predniSONE, 20 mg, Oral, Daily   Followed by  [START ON 6/13/2021] predniSONE, 10 mg, Oral, Daily  sertraline, 50 mg, Oral, Daily  spironolactone, 25 mg, Oral, Daily  vancomycin, 750 mg, Intravenous, Q12H  Zinc Oxide, , Apply externally, BID      Continuous Infusions:Pharmacy to dose vancomycin,       PRN Meds:.•  acetaminophen  •  albuterol  •  bisacodyl  •  calcium carbonate  •  dextrose  •  dextrose  •  glucagon (human recombinant)  •  hydrOXYzine  •  insulin lispro  •  ipratropium-albuterol  •  ketorolac  •  melatonin  •  ondansetron  •  Pharmacy to dose vancomycin  •  promethazine  •  [COMPLETED] Insert peripheral IV **AND** sodium chloride  •  sodium chloride    Objective   Looks comfortable with oxygen    VITAL SIGNS  Vitals:    06/10/21 0416 06/10/21 0640 06/10/21 1107 06/10/21 1135   BP: 132/72   123/72   BP Location: Right arm      Patient Position: Lying      Pulse: 90 92 95 101   Resp: 15 16 16 16   Temp: 98.3 °F (36.8 °C)   98.1 °F (36.7 °C)   TempSrc: Oral   Oral   SpO2: 93% 93% 93% 97%   Weight: 66 kg (145 lb 8.1 oz)      Height:           Flowsheet Rows      First Filed Value   Admission Height  172.7 cm (68\") Documented at 06/07/2021 0451   Admission Weight  67.3 kg (148 lb 6.4 oz) Documented at 06/07/2021 0450           TELEMETRY: Sinus rhythm    Physical Exam:  Constitutional:       Appearance: Well-developed.      Comments: On oxygen   Eyes:      General: No scleral icterus.     Conjunctiva/sclera: Conjunctivae normal.   HENT:      Head: Normocephalic and atraumatic.    Mouth/Throat:      Mouth: No oral lesions.      Pharynx: Uvula midline.   Neck:      Thyroid: No thyromegaly.      Vascular: No carotid bruit or JVD.      Trachea: Trachea normal.   Pulmonary:      Effort: Pulmonary effort is normal.      Breath sounds: Bibasilar Rhonchi present.   Cardiovascular:      Normal rate. Regular rhythm.      No gallop.   Pulses:     " Intact distal pulses.   Abdominal:      General: Bowel sounds are normal.      Palpations: Abdomen is soft.   Musculoskeletal: Normal range of motion.      Cervical back: Neck supple. Skin:     General: Skin is warm. There is no cyanosis.   Neurological:      Mental Status: Alert and oriented to person, place, and time.      Comments: No focal deficits   Psychiatric:         Behavior: Behavior is cooperative.                  LAB RESULTS (LAST 7 DAYS)    CBC  Results from last 7 days   Lab Units 06/09/21  0503 06/08/21  1630 06/08/21  0624 06/07/21  0500   WBC 10*3/mm3 10.90*  --  8.80 10.10   RBC 10*6/mm3 1.95*  --  1.94* 2.49*   HEMOGLOBIN g/dL 7.2* 7.8* 7.2* 9.2*   HEMATOCRIT % 21.3* 23.1* 21.1* 27.3*   MCV fL 109.7*  --  108.8* 109.7*   PLATELETS 10*3/mm3 118*  --  103* 115*       BMP  Results from last 7 days   Lab Units 06/10/21  0636 06/09/21  0320 06/08/21  0624 06/07/21  0500   SODIUM mmol/L  --  140 139 134*   POTASSIUM mmol/L  --  4.2 4.6 4.1   CHLORIDE mmol/L  --  103 100 97*   CO2 mmol/L  --  29.0 30.0* 26.0   BUN mg/dL  --  25* 32* 24*   CREATININE mg/dL 0.64* 0.63* 0.93 0.73*   GLUCOSE mg/dL  --  123* 127* 116*       CMP   Results from last 7 days   Lab Units 06/10/21  0636 06/09/21  0320 06/08/21  0624 06/07/21  0500   SODIUM mmol/L  --  140 139 134*   POTASSIUM mmol/L  --  4.2 4.6 4.1   CHLORIDE mmol/L  --  103 100 97*   CO2 mmol/L  --  29.0 30.0* 26.0   BUN mg/dL  --  25* 32* 24*   CREATININE mg/dL 0.64* 0.63* 0.93 0.73*   GLUCOSE mg/dL  --  123* 127* 116*   ALBUMIN g/dL  --   --  2.50* 2.50*   BILIRUBIN mg/dL  --   --  0.6 0.7   ALK PHOS U/L  --   --  89 109   AST (SGOT) U/L  --   --  41* 58*   ALT (SGPT) U/L  --   --  64* 87*         BNP        TROPONIN  Results from last 7 days   Lab Units 06/07/21  0500   TROPONIN T ng/mL 0.012       CoAg  Results from last 7 days   Lab Units 06/08/21  1630   INR  1.05   APTT seconds 27.7       Creatinine Clearance  Estimated Creatinine Clearance: 80.2 mL/min  (A) (by C-G formula based on SCr of 0.64 mg/dL (L)).    ABG  Results from last 7 days   Lab Units 06/07/21  0902   PH, ARTERIAL pH units 7.514*   PCO2, ARTERIAL mm Hg 34.4*   PO2 ART mm Hg 49.4*   O2 SATURATION ART % 88.5*   BASE EXCESS ART mmol/L 4.9*       Radiology  XR Chest 1 View    Result Date: 6/10/2021  1. Similar appearance of the right-sided perihilar mass likely related to known malignancy. 2. Patchy interstitial opacities throughout the left lung likely representing infection on superimposed chronic interstitial lung disease.  Electronically Signed By-Daryl Hunter MD On:6/10/2021 8:01 AM This report was finalized on [id number] by  Daryl Hunter MD.            EKG        I personally viewed and interpreted the patient's EKG/Telemetry data:    ECHOCARDIOGRAM:    Results for orders placed during the hospital encounter of 06/07/21    Adult Transthoracic Echo Complete W/ Cont if Necessary Per Protocol    Interpretation Summary  · Estimated left ventricular EF = 65% Left ventricular systolic function is normal.  · Left ventricular diastolic dysfunction is noted.  · There is a prosthetic aortic valve present.  · Estimated right ventricular systolic pressure from tricuspid regurgitation is normal (<35 mmHg).    STRESS MYOVIEW:    CARDIAC CATHETERIZATION:    OTHER:         Assessment/Plan       Coronary artery disease involving native coronary artery of native heart without angina pectoris    Nonrheumatic aortic valve stenosis    Essential hypertension    Congestive heart failure (CMS/HCC)    Tobacco abuse    Squamous cell carcinoma of lung, right (CMS/HCC)    Acute-on-chronic respiratory failure (CMS/HCC)    Respiratory distress    Stage 2 skin ulcer of sacral region (CMS/HCC)      Respiratory failure probably multifactorial  Most probably primary reason is due to pulmonary causes lung CA COPD  Mild diastolic congestive heart failure  Echocardiogram reviewed LV function normal,  The prosthetic aortic  valve functioning well  History of AVR stable  History of CAD stable  Cautious diuresis    I discussed the patients findings and my recommendations with patient     Iglesia Springer MD  06/10/21  13:01 EDT

## 2021-06-10 NOTE — PLAN OF CARE
Problem: Adult Inpatient Plan of Care  Goal: Plan of Care Review  Outcome: Ongoing, Progressing  Flowsheets  Taken 6/10/2021 8354  Plan of Care Reviewed With:   patient   daughter  Outcome Summary: Pt is 69 yo male admitted for SOA, resp distress from rehab.  Pt d/o acute CHF, PNA, AE COPD.  Pt has stage IV lung cancer with brain mets.  Pt presents on 10L hi ross O2 and desats to 90% during transfer to chair.  Pt requiring [place] for bed mobility and [place] for stand pivot transfer to chair.  Pt fatigues quickly after transfer requiring seated rest break for recovery.  Unable to assess gait due to weakness and poor endurance.  PT spoke with daughter who desires to take pt home with assist from niece for 24/7 care.  However pt requiring increased supplemental O2 at this time and demonstrates significant weakness limiting mobility.  At this time PT is recommending return to IP rehab to increase strength.  PT will continue to follow and assess d/c recs pending pt progress.  PT will follow 5x/week while at [facility].

## 2021-06-10 NOTE — PLAN OF CARE
Goal Outcome Evaluation:  Plan of Care Reviewed With: patient, spouse        Progress: declining   Patient rested well throughout the night with minimal to no complaints. Will continue to monitor

## 2021-06-10 NOTE — THERAPY EVALUATION
Patient Name: Axel Ramirez  : 1950    MRN: [id number]                              Today's Date: 6/10/2021       Admit Date: 2021    Visit Dx:     ICD-10-CM ICD-9-CM   1. Respiratory distress  R06.03 786.09   2. Bronchospasm  J98.01 519.11   3. Acute congestive heart failure, unspecified heart failure type (CMS/HCC)  I50.9 428.0   4. Malignant neoplasm of right lung, unspecified part of lung (CMS/HCC)  C34.91 162.9     Patient Active Problem List   Diagnosis   • Coronary artery disease involving native coronary artery of native heart without angina pectoris   • Nonrheumatic aortic valve stenosis   • Mixed hyperlipidemia   • Essential hypertension   • Fever   • Presence of aortocoronary bypass graft   • Congestive heart failure (CMS/HCC)   • Hx of aortic valve replacement   • CAP (community acquired pneumonia)   • Tobacco abuse   • Postobstructive pneumonia   • Lung mass   • Squamous cell carcinoma of lung, right (CMS/HCC)   • Diastolic CHF, acute (CMS/HCC)   • Pneumonia of right lung due to infectious organism   • Pneumonia due to infectious organism   • Moderate malnutrition (CMS/HCC)   • Acute on chronic respiratory failure with hypoxia (CMS/HCC)   • COPD with acute exacerbation (CMS/HCC)   • Delirium   • Brain metastasis (CMS/HCC)   • Acute-on-chronic respiratory failure (CMS/HCC)   • Respiratory distress   • Stage 2 skin ulcer of sacral region (CMS/HCC)     Past Medical History:   Diagnosis Date   • Aortic stenosis    • Cancer (CMS/HCC)     Sickle Cell Carcinoma   • Congestive heart failure (CHF) (CMS/HCC)     DIASTOLIC   • COPD (chronic obstructive pulmonary disease) (CMS/HCC)    • Coronary artery disease    • Hypertension    • Lung cancer (CMS/HCC)    • Myocardial infarction (CMS/HCC)    • Peripheral vascular disease (CMS/HCC)    • Pneumonia 2021   • Valvular disease      Past Surgical History:   Procedure Laterality Date   • AORTIC VALVE REPAIR/REPLACEMENT  2018    Dr Maza   •  BRONCHOSCOPY N/A 10/24/2020    Procedure: BRONCHOSCOPY with biopsy right upper lobe mass, and bronchoalveolar lavage right upper lobe;  Surgeon: Ángela Bean MD;  Location: [facility] ENDOSCOPY;  Service: Pulmonary;  Laterality: N/A;  post: right upper lobe mass, pneumonia   • BRONCHOSCOPY N/A 11/11/2020    Procedure: BRONCHOSCOPY with bronchial washing;  Surgeon: Ángela Bean MD;  Location: [facility] ENDOSCOPY;  Service: Pulmonary;  Laterality: N/A;  Post: lung mass, pneumonia   • BRONCHOSCOPY N/A 11/11/2020    Procedure: BRONCHOSCOPY RIGID with debulking of right main endobronchial tumor;  Surgeon: Nomi Reyes MD;  Location: [facility] MAIN OR;  Service: Cardiothoracic;  Laterality: N/A;   • BRONCHOSCOPY N/A 11/11/2020    Procedure: BRONCHOSCOPY;  Surgeon: Nomi Reyes MD;  Location: [facility] MAIN OR;  Service: Cardiothoracic;  Laterality: N/A;   • CARDIAC CATHETERIZATION  12/29/2017   • CORONARY ARTERY BYPASS GRAFT  02/26/2018    Dr Maza   • TIBIA FRACTURE SURGERY     • VENOUS ACCESS DEVICE (PORT) INSERTION Left 10/30/2020    Procedure: MEDIPORT INSERTION UNDER FLUOROSCOPIC GUIDENCE;  Surgeon: Nomi Reyes MD;  Location: [facility] MAIN OR;  Service: Cardiothoracic;  Laterality: Left;     General Information     Row Name 06/10/21 1702          OT Time and Intention    Document Type  evaluation  -MP     Mode of Treatment  occupational therapy  -MP     Row Name 06/10/21 1702          General Information    Patient Profile Reviewed  yes  -MP     Prior Level of Function  min assist:;mod assist:;ADL's  -MP     Existing Precautions/Restrictions  oxygen therapy device and L/min;fall  -MP     Row Name 06/10/21 1702          Living Environment    Lives With  -- pt. admit from [facility], from home w/ spouse  -MP     Row Name 06/10/21 1702          Cognition    Orientation Status (Cognition)  oriented x 3  -MP     Row Name 06/10/21 1702          Safety Issues, Functional Mobility    Safety Issues Affecting Function  (Mobility)  insight into deficits/self-awareness  -     Impairments Affecting Function (Mobility)  postural/trunk control;balance;endurance/activity tolerance;shortness of breath;strength  -       User Key  (r) = Recorded By, (t) = Taken By, (c) = Cosigned By    Initials Name Provider Type     John Gonzalez OT Occupational Therapist          Mobility/ADL's     Row Name 06/10/21 1704          Bed Mobility    Bed Mobility  supine-sit  -     Supine-Sit [place] (Bed Mobility)  minimum assist (75% patient effort)  -MP     Row Name 06/10/21 1704          Transfers    Transfers  sit-stand transfer  -     Sit-Stand [place] (Transfers)  minimum assist (75% patient effort);contact guard  -MP     Row Name 06/10/21 1704          Sit-Stand Transfer    Assistive Device (Sit-Stand Transfers)  walker, front-wheeled  -MP     Row Name 06/10/21 1704          Functional Mobility    Functional Mobility- Ind. Level  minimum assist (75% patient effort)  -     Functional Mobility- Device  rolling walker  -MP     Row Name 06/10/21 1704          Activities of Daily Living    BADL Assessment/Intervention  lower body dressing  -MP     Row Name 06/10/21 1704          Lower Body Dressing Assessment/Training    [place] Level (Lower Body Dressing)  don;doff;socks;maximum assist (25% patient effort)  -       User Key  (r) = Recorded By, (t) = Taken By, (c) = Cosigned By    Initials Name Provider Type     John Gonzalez OT Occupational Therapist        Obj/Interventions     Row Name 06/10/21 1705          Range of Motion Comprehensive    Comment, General Range of Motion  BUE WFL  -[facility] Name 06/10/21 1705          Strength Comprehensive (MMT)    Comment, General Manual Muscle Testing (MMT) Assessment  [facility] 4/5  -MP     Row Name 06/10/21 1705          Balance    Static Sitting Balance  WFL  -     Static Standing Balance  mild impairment;supported;standing  -     Dynamic Standing Balance  mild  impairment;supported;standing  -MP     Balance Interventions  sitting;standing;sit to stand;supported;static;dynamic  -MP       User Key  (r) = Recorded By, (t) = Taken By, (c) = Cosigned By    Initials Name Provider Type    John Lawton OT Occupational Therapist        Goals/Plan     Row Name 06/10/21 1710          Bed Mobility Goal 1 (OT)    Activity/Assistive Device (Bed Mobility Goal 1, OT)  bed mobility activities, all  -MP     [place] Level/Cues Needed (Bed Mobility Goal 1, OT)  contact guard assist  -MP     Time Frame (Bed Mobility Goal 1, OT)  long term goal (LTG);2 weeks  -MP     [facility] Name 06/10/21 1710          Transfer Goal 1 (OT)    Activity/Assistive Device (Transfer Goal 1, OT)  sit-to-stand/stand-to-sit;toilet  -MP     [place] Level/Cues Needed (Transfer Goal 1, OT)  contact guard assist  -MP     Time Frame (Transfer Goal 1, OT)  long term goal (LTG);2 weeks  -MP     Row Name 06/10/21 1710          Dressing Goal 1 (OT)    Activity/Device (Dressing Goal 1, OT)  lower body dressing  -MP     [place]/Cues Needed (Dressing Goal 1, OT)  minimum assist (75% or more patient effort)  -MP     Time Frame (Dressing Goal 1, OT)  long term goal (LTG);2 weeks  -MP       User Key  (r) = Recorded By, (t) = Taken By, (c) = Cosigned By    Initials Name Provider Type    John Lawton OT Occupational Therapist        Clinical Impression     Row Name 06/10/21 1706          Pain Assessment    Additional Documentation  Pain Scale: FACES Pre/Post-Treatment (Group)  -MP     Row Name 06/10/21 1706          Pain Scale: FACES Pre/Post-Treatment    Pain: FACES Scale, Pretreatment  4-->hurts little more  -MP     Posttreatment Pain Rating  4-->hurts little more  -MP     Pain Location - Orientation  posterior  -MP     Pain Location  back  -MP     Row Name 06/10/21 1706          Plan of Care Review    Plan of Care Reviewed With  patient;daughter  -MP     Progress  no change  -MP     Outcome Summary   Pt. is 69 y/o male admit w/ SOA from rehab, d/o PNA. Pt. w /h/o stave IV lung cancer w/ brain mets. Pt. on 4L O2 at baseline, on 14L hi ross O2 this date. Pt. daughter states that patient as progress walking 40-50 feet at rehab and min-mod A for ADLs. Pt. completes functional transfers this date w/ min A for safety + rolling walker support. Pt. w/ limited standing tolerance 1-2 minutes secondary to fatigue and SOA. Pt. O2 titrated 10L as pt. maintains >94% throughout, nsg notified. Family wishes to d/c home w/ 24 hour care/assist, pt. not safe to d/c home at this time secondary to increased O2 demand. Recommend IP rehab at d/c pending progress.  -MP     Row Name 06/10/21 1706          Therapy Assessment/Plan (OT)    Rehab Potential (OT)  good, to achieve stated therapy goals  -MP     Criteria for Skilled Therapeutic Interventions Met (OT)  yes;skilled treatment is necessary  -MP     Therapy Frequency (OT)  3 times/wk  -MP     Row Name 06/10/21 1706          Therapy Plan Review/Discharge Plan (OT)    Anticipated Discharge Disposition (OT)  inpatient rehabilitation facility  -     Row Name 06/10/21 1706          Vital Signs    Pre Patient Position  Supine  -MP     Intra Patient Position  Standing  -MP     Post Patient Position  Supine  -MP     Row Name 06/10/21 1706          Positioning and Restraints    Pre-Treatment Position  in bed  -MP     Post Treatment Position  bed  -MP     In Bed  call light within reach;encouraged to call for assist;exit alarm on;sitting  -MP       User Key  (r) = Recorded By, (t) = Taken By, (c) = Cosigned By    Initials Name Provider Type    John Lawton, ROBERTO Occupational Therapist        Outcome Measures     Row Name 06/10/21 2874          How much help from another person do you currently need...    Turning from your back to your side while in flat bed without using bedrails?  3  -HC     Moving from lying on back to sitting on the side of a flat bed without bedrails?  3  -HC      Moving to and from a bed to a chair (including a wheelchair)?  3  -HC     Standing up from a chair using your arms (e.g., wheelchair, bedside chair)?  3  -HC     Climbing 3-5 steps with a railing?  2  -HC     To walk in hospital room?  2  -HC     AM-PAC 6 Clicks Score (PT)  16  -HC     Row Name 06/10/21 1554          Modified [place] Scale    Modified [place] Scale  5 - Severe disability.  Bedridden, incontinent, and requiring constant nursing care and attention.  -HC     Row Name 06/10/21 1554          Functional Assessment    Outcome Measure Options  AM-PAC 6 Clicks Basic Mobility (PT);Modified [place]  -HC       User Key  (r) = Recorded By, (t) = Taken By, (c) = Cosigned By    Initials Name Provider Type    HC Valerie Yan, PT Physical Therapist        Occupational Therapy Education                 Title: PT OT SLP Therapies (In Progress)     Topic: Occupational Therapy (In Progress)     Point: ADL training (Not Started)     Description:   Instruct learner(s) on proper safety adaptation and remediation techniques during self care or transfers.   Instruct in proper use of assistive devices.              Learner Progress:  Not documented in this visit.          Point: Home exercise program (Not Started)     Description:   Instruct learner(s) on appropriate technique for monitoring, assisting and/or progressing therapeutic exercises/activities.              Learner Progress:  Not documented in this visit.          Point: Precautions (Not Started)     Description:   Instruct learner(s) on prescribed precautions during self-care and functional transfers.              Learner Progress:  Not documented in this visit.          Point: Body mechanics (Done)     Description:   Instruct learner(s) on proper positioning and spine alignment during self-care, functional mobility activities and/or exercises.              Learning Progress Summary           Patient Acceptance, E,TB, VU by MP at 6/10/2021 0601                                User Key     Initials Effective Dates Name Provider Type Discipline     03/01/19 -  John Gonzalez OT Occupational Therapist OT              OT Recommendation and Plan  Therapy Frequency (OT): 3 times/wk  Plan of Care Review  Plan of Care Reviewed With: patient, daughter  Progress: no change  Outcome Summary: Pt. is 71 y/o male admit w/ SOA from rehab, d/o PNA. Pt. w /h/o stave IV lung cancer w/ brain mets. Pt. on 4L O2 at baseline, on 14L hi ross O2 this date. Pt. daughter states that patient as progress walking 40-50 feet at rehab and min-mod A for ADLs. Pt. completes functional transfers this date w/ min A for safety + rolling walker support. Pt. w/ limited standing tolerance 1-2 minutes secondary to fatigue and SOA. Pt. O2 titrated 10L as pt. maintains >94% throughout, nsg notified. Family wishes to d/c home w/ 24 hour care/assist, pt. not safe to d/c home at this time secondary to increased O2 demand. Recommend IP rehab at d/c pending progress.     Time Calculation:   Time Calculation- OT     Row Name 06/10/21 1711             Time Calculation- OT    OT Start Time  1355  -      OT Stop Time  1420  -      OT Time Calculation (min)  25 min  -      Total Timed Code Minutes- OT  10 minute(s)  -      OT Received On  06/10/21  -      OT - Next Appointment  06/11/21  -      OT Goal Re-Cert Due Date  06/24/21  -        User Key  (r) = Recorded By, (t) = Taken By, (c) = Cosigned By    Initials Name Provider Type     John Gonzalez OT Occupational Therapist        Therapy Charges for Today     Code Description Service Date Service Provider Modifiers Qty    [id number]  OT EVAL LOW COMPLEXITY 3 6/10/2021 John Gonzalez OT GO 1    [id number]  OT THERAPEUTIC ACT EA 15 MIN 6/10/2021 John Gonzalez OT GO 1               John Gonzalez OT  6/10/2021

## 2021-06-10 NOTE — PROGRESS NOTES
Infectious Diseases Progress Note      LOS: 3 days   Patient Care Team:  Sandoval Stevenson FNP as PCP - General  Sandoval Stevenson FNP as PCP - Family Medicine  Axel Lewis MD as Consulting Physician (Hematology and Oncology)  Iglesia Springer MD as Consulting Physician (Cardiology)    Chief Complaint: Shortness of breath, fatigue    Subjective        The patient has been afebrile for the last 24 hours.  The patient is on 10 L of oxygen by high flow oxygen, hemodynamically stable, and is tolerating antimicrobial therapy.  Patient states that he is feeling better today although he is still very short of breath      Review of Systems:   Review of Systems   Constitutional: Positive for fatigue.   HENT: Negative.    Eyes: Negative.    Respiratory: Positive for shortness of breath.    Cardiovascular: Negative.    Gastrointestinal: Negative.    Endocrine: Negative.    Genitourinary: Negative.    Musculoskeletal: Negative.    Skin: Negative.    Neurological: Negative.    Psychiatric/Behavioral: Negative.    All other systems reviewed and are negative.       Objective     Vital Signs  Temp:  [98.1 °F (36.7 °C)-98.3 °F (36.8 °C)] 98.1 °F (36.7 °C)  Heart Rate:  [] 101  Resp:  [15-24] 16  BP: (123-138)/(72-75) 123/72    Physical Exam:  Physical Exam  Vitals and nursing note reviewed.   Constitutional:       General: He is not in acute distress.     Appearance: He is well-developed. He is ill-appearing. He is not diaphoretic.      Comments: Cachectic   HENT:      Head: Normocephalic and atraumatic.      Mouth/Throat:      Comments: Mild oral thrush  Eyes:      Extraocular Movements: Extraocular movements intact.      Conjunctiva/sclera: Conjunctivae normal.      Pupils: Pupils are equal, round, and reactive to light.   Cardiovascular:      Rate and Rhythm: Normal rate and regular rhythm.      Heart sounds: Normal heart sounds, S1 normal and S2 normal.   Pulmonary:      Effort: Pulmonary effort is normal.  No respiratory distress.      Breath sounds: No stridor. Rales present. No wheezing.      Comments: Diminished throughout  Abdominal:      General: Bowel sounds are normal. There is no distension.      Palpations: Abdomen is soft. There is no mass.      Tenderness: There is no abdominal tenderness. There is no guarding.   Musculoskeletal:         General: No deformity. Normal range of motion.      Cervical back: Neck supple.   Skin:     General: Skin is warm and dry.      Coloration: Skin is not pale.      Findings: No erythema or rash.      Comments: Port in place   Neurological:      Mental Status: He is alert and oriented to person, place, and time.      Cranial Nerves: No cranial nerve deficit.   Psychiatric:         Mood and Affect: Mood normal.          Results Review:    I have reviewed all clinical data, test, lab, and imaging results.     Radiology  XR Chest 1 View    Result Date: 6/10/2021  EXAMINATION: XR CHEST 1 VW-  DATE OF EXAM: 6/10/2021 6:17 AM  INDICATION: Shortness of breath; R06.03-Acute respiratory distress; J98.01-Acute bronchospasm; I50.9-Heart failure, unspecified; C34.91-Malignant neoplasm of unspecified part of right bronchus or lung.  COMPARISON: Chest radiograph dated 6/7/2021  TECHNIQUE: Portable AP view of the chest was obtained.  FINDINGS: There is a left-sided chest port with tip terminating at the low SVC. The cardiomediastinal silhouette is unchanged. There is a cardiac valve replacement. There is redemonstration of the large right-sided pulmonary mass, similar to the prior examination. There are interstitial opacities throughout the left lung likely related to infection on superimposed chronic interstitial lung disease. There is no pleural effusion or pneumothorax. Median sternotomy wires are present. There are degenerative changes of the thoracic spine.      1. Similar appearance of the right-sided perihilar mass likely related to known malignancy. 2. Patchy interstitial opacities  throughout the left lung likely representing infection on superimposed chronic interstitial lung disease.  Electronically Signed By-Daryl Hunter MD On:6/10/2021 8:01 AM This report was finalized on [id number] by  Daryl Hunter MD.      Cardiology    Laboratory    Results from last 7 days   Lab Units 06/09/21  0503 06/08/21  1630 06/08/21  0624 06/07/21  0500   WBC 10*3/mm3 10.90*  --  8.80 10.10   HEMOGLOBIN g/dL 7.2* 7.8* 7.2* 9.2*   HEMATOCRIT % 21.3* 23.1* 21.1* 27.3*   PLATELETS 10*3/mm3 118*  --  103* 115*     Results from last 7 days   Lab Units 06/10/21  0636 06/09/21  0320 06/08/21  0624 06/07/21  0500   SODIUM mmol/L  --  140 139 134*   POTASSIUM mmol/L  --  4.2 4.6 4.1   CHLORIDE mmol/L  --  103 100 97*   CO2 mmol/L  --  29.0 30.0* 26.0   BUN mg/dL  --  25* 32* 24*   CREATININE mg/dL 0.64* 0.63* 0.93 0.73*   GLUCOSE mg/dL  --  123* 127* 116*   ALBUMIN g/dL  --   --  2.50* 2.50*   BILIRUBIN mg/dL  --   --  0.6 0.7   ALK PHOS U/L  --   --  89 109   AST (SGOT) U/L  --   --  41* 58*   ALT (SGPT) U/L  --   --  64* 87*   CALCIUM mg/dL  --  8.6 9.0 9.2                 Microbiology   Microbiology Results (last 10 days)       Procedure Component Value - Date/Time    MRSA Screen, PCR (Inpatient) - Swab, Nares [[id number]]  (Abnormal) Collected: 06/07/21 1701    Lab Status: Final result Specimen: Swab from Nares Updated: 06/07/21 1941     MRSA PCR MRSA Detected    Blood Culture - Blood, Arm, Left [[id number]] Collected: 06/07/21 0705    Lab Status: Preliminary result Specimen: Blood from Arm, Left Updated: 06/10/21 0730     Blood Culture No growth at 3 days    Blood Culture - Blood, Arm, Right [[id number]] Collected: 06/07/21 0705    Lab Status: Preliminary result Specimen: Blood from Arm, Right Updated: 06/10/21 0730     Blood Culture No growth at 3 days    Respiratory Panel PCR w/COVID-19(SARS-CoV-2) PARESH/RGACIELA/KATHY/PAD/[facility] In-House, NP Swab in UTM/VTM, 3-4 HR TAT - Swab, Nasopharynx [[id number]]   (Normal) Collected: 06/07/21 0620    Lab Status: Final result Specimen: Swab from Nasopharynx Updated: 06/07/21 0726     ADENOVIRUS, PCR Not Detected     Coronavirus 229E Not Detected     Coronavirus HKU1 Not Detected     Coronavirus NL63 Not Detected     Coronavirus OC43 Not Detected     COVID19 Not Detected     Human Metapneumovirus Not Detected     Human Rhinovirus/Enterovirus Not Detected     Influenza A PCR Not Detected     Influenza B PCR Not Detected     Parainfluenza Virus 1 Not Detected     Parainfluenza Virus 2 Not Detected     Parainfluenza Virus 3 Not Detected     Parainfluenza Virus 4 Not Detected     RSV, PCR Not Detected     Bordetella pertussis pcr Not Detected     Bordetella parapertussis PCR Not Detected     Chlamydophila pneumoniae PCR Not Detected     Mycoplasma pneumo by PCR Not Detected    Narrative:      In the setting of a positive respiratory panel with a viral infection PLUS a negative procalcitonin without other underlying concern for bacterial infection, consider observing off antibiotics or discontinuation of antibiotics and continue supportive care. If the respiratory panel is positive for atypical bacterial infection (Bordetella pertussis, Chlamydophila pneumoniae, or Mycoplasma pneumoniae), consider antibiotic de-escalation to target atypical bacterial infection.            Medication Review:       Schedule Meds  ampicillin-sulbactam, 3 g, Intravenous, Q6H  aspirin, 81 mg, Oral, Daily  buprenorphine-naloxone, 1.5 tablet, Sublingual, Daily  docusate sodium, 100 mg, Oral, BID  enoxaparin, 40 mg, Subcutaneous, Q24H  epoetin asia-epbx, 40,000 Units, Subcutaneous, Weekly  gabapentin, 300 mg, Oral, TID  guaiFENesin, 600 mg, Oral, Q12H  insulin lispro, 0-7 Units, Subcutaneous, TID With Meals  ipratropium-albuterol, 3 mL, Nebulization, Q4H - RT  Shabbir, 1 packet, Oral, BID  lurasidone, 40 mg, Oral, Daily  megestrol acetate, 200 mg, Oral, TID  multivitamin with minerals, 1 tablet, Oral,  Daily  nystatin, 5 mL, Swish & Swallow, 4x Daily  pantoprazole, 40 mg, Oral, Daily  polyethylene glycol, 17 g, Oral, Daily  [START ON 6/11/2021] predniSONE, 20 mg, Oral, Daily   Followed by  [START ON 6/13/2021] predniSONE, 10 mg, Oral, Daily  sertraline, 50 mg, Oral, Daily  spironolactone, 25 mg, Oral, Daily  vancomycin, 750 mg, Intravenous, Q12H  Zinc Oxide, , Apply externally, BID        Infusion Meds  Pharmacy to dose vancomycin,         PRN Meds    acetaminophen    albuterol    bisacodyl    calcium carbonate    dextrose    dextrose    glucagon (human recombinant)    hydrOXYzine    insulin lispro    ipratropium-albuterol    ketorolac    melatonin    ondansetron    Pharmacy to dose vancomycin    promethazine    [COMPLETED] Insert peripheral IV **AND** sodium chloride    sodium chloride        Assessment/Plan       Antimicrobial Therapy   1.        day  2.  Unasyn      day  3.  Vancomycin      day  4.      Day  5.      Day      Assessment     Possible postobstructive pneumonia.  Patient has right large lung mass as a result of lung cancer.  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    Pancytopenia-likely related to chemotherapy     Lung CA was on chemotherapy    Oral thrush        Plan     Continue IV Unasyn 3 g every 6 hours  Continue IV vancomycin-asked pharmacy to monitor and dose  Continue nystatin  Highly recommend bronchoscopy therapeutic and diagnostic since patient continued to be on high flow oxygen  Continue supportive care  A.m. labs  Prognosis is guarded  Case was discussed with him and his wife at the bedside  Encouraged incentive spirometer use    Herminia Stanton, APRN  06/10/21  12:14 EDT

## 2021-06-10 NOTE — PROGRESS NOTES
Hematology/Oncology Inpatient Progress Note    PATIENT NAME: Axel Ramirez  : 1950  MRN: [id number]    CHIEF COMPLAINT: Metastatic squamous cell carcinoma of the right upper lobe lung and MDS    HISTORY OF PRESENT ILLNESS:  70 y.o. male presented to [facility] on 2021 with shortness of air and hypoxia.  He had an acute onset on the day of admission of worsening shortness of breath with O2 saturation of 76% on 4 L/min O2.  The E[facility] called EMS.  He was treated with nebulizer on the way to the ER.  Chest x-ray showed left lung pneumonia and a right middle lobe lung mass.  White count 10.1, hemoglobin 9.2, .7, platelets 115k.  D-dimer was 2.99 (< 0.6).  Chest CT showed a 9 cm right upper lobe lung mass occluding the right upper lobe bronchus with complete right upper and right middle lobe collapse.  There was no pulmonary emboli.  There was possible aspiration pneumonia and stable mediastinal lymphadenopathy.  A new 12 mm nodule was seen in the left lower lobe.  Adrenal glands were normal.  He was started on steroids and Zosyn.  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  Recently started on second line therapy Taxol/carboplatin/ipilimumab and Nivolumab with Neulasta support on 2021.     21  Hematology/Oncology was consulted for his metastatic squamous cell carcinoma of the right upper lobe lung.  He is an established patient.  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  Initially treated with concomitant weekly chemotherapy (paclitaxel and carboplatin x7) and radiation therapy from 2020. He completed radiation therapy on 2021.  He completed the weekly portion of his chemotherapy on 2021.  He received 1 cycle of every 21-day carboplatin /Taxol with  Neulasta support on 2/9/2021.  He subsequently was admitted to [facility], for hypoxia with respiratory failure requiring intubation.  MRI of the brain showed a left occipital lobe lesion with vasogenic edema.  He received radiation, x1 fraction, on 3/3/2021 to the left occipital lobe. One month later, a brain MRI showed enlargement of the left occipital lobe lesion.  PET scan showed the right upper lobe lung mass to be 10 cm in size with a decrease in the peripheral FDG uptake suggesting interval response to treatment.  There was a decrease in the size and FDG uptake in the mediastinal lymph nodes.  There was no distant metastatic disease.  The lesion in the left occipital lobe was FDG avid.  Right upper lobe lung collapse had improved with aeration.  He was referred to Dr. Tay for evaluation of his brain MRI which was felt to be due to to post SRS changes and not progression.  He was seen in follow-up with plan to start chemotherapy with immunotherapy.  Paclitaxel and carboplatin doses were decreased by 20% due to pancytopenia with prior treatment.  Repeat brain MRI, on 5/13/2021, showed an increase in the size of the left occipital lobe with an increase in surrounding edema.  There were no new masses.  The patient was referred to Dr. Luna for surgical resection versus MRI SPECT/perfusion at [facility].  Dr. Luna felt the lesion was a combination of necrosis and tumor progression and recommended surgical resection.  He started treatment on 5/24/2021 (chemotherapy plus immunotherapy) as his surgical resection was to be done after medical clearance.  He had a return appointment with Dr. Luna on 6/9/2021 following an MRI of his brain.  -MDS-diagnosed January 2021.  He was anemic and was found to have GOGO and malabsorption on oral therapy.  His bone marrow revealed increased iron storage and mild erythroid atypia in January 2021.  Stool heme was negative on 5/13/2021. He has received IV iron but has  never required Retacrit.  Last office visit 5/24/2021, complaining of hypoxia and tachypnea with anxiety.  White count 9.05, hemoglobin 11.34, .8 and platelets 220.  He was starting to gain weight and Megace was continued.  For his cough and hypoxia, he was treated with Augmentin and albuterol nebulizer.  He had oral thrush which was treated with nystatin swish and swallow.  TSH 0.287 (0.282-4.0), iron 62 (), ferritin 4185 (), iron saturation 32 (20-55), TIBC 196 (228-428).     PCP: Sandoval Stevenson FNP     INTERVAL HISTORY:  • 6/8/2021-Retacrit 40,000 units subcu weekly initiated.  Hemoglobin 7.2.  • 6/9/2021-PT 11.5 (9.6-11.7), PTT 27.7 (24-31).  Hemoglobin 7.2, platelets 118,000.  Haptoglobin 465 ().  CMV IgM <30 (<30).  Cardiac ejection fraction 65%.   • 6/10/2021-EBV VCA IgM less than 36 (less than 36).    History of present illness reviewed since last visit and changes noted on 06/10/21.    Subjective   Vomiting has resolved.  Constipated.  Feels weak.    ROS:  Review of Systems   Constitutional: Positive for fatigue. Negative for activity change, chills, fever and unexpected weight change.   HENT: Negative for congestion, dental problem, hearing loss, mouth sores, nosebleeds, sore throat and trouble swallowing.    Eyes: Negative for photophobia and visual disturbance.   Respiratory: Negative for cough, choking, chest tightness and shortness of breath.    Cardiovascular: Negative for chest pain, palpitations and leg swelling.   Gastrointestinal: Positive for constipation. Negative for abdominal distention, abdominal pain, blood in stool, diarrhea, nausea and vomiting.   Endocrine: Negative for cold intolerance and heat intolerance.   Genitourinary: Negative for decreased urine volume, difficulty urinating, frequency, hematuria and urgency.   Musculoskeletal: Negative for arthralgias and gait problem.   Skin: Negative for rash and wound.   Neurological: Negative for dizziness, tremors,  weakness, light-headedness, numbness and headaches.   Hematological: Negative for adenopathy. Does not bruise/bleed easily.   Psychiatric/Behavioral: Negative for confusion and hallucinations. The patient is not nervous/anxious.    All other systems reviewed and are negative.       MEDICATIONS:    Scheduled Meds:  ampicillin-sulbactam, 3 g, Intravenous, Q6H  aspirin, 81 mg, Oral, Daily  buprenorphine-naloxone, 1.5 tablet, Sublingual, Daily  docusate sodium, 100 mg, Oral, BID  enoxaparin, 40 mg, Subcutaneous, Q24H  epoetin asia-epbx, 40,000 Units, Subcutaneous, Weekly  gabapentin, 300 mg, Oral, TID  guaiFENesin, 600 mg, Oral, Q12H  insulin lispro, 0-7 Units, Subcutaneous, TID With Meals  ipratropium-albuterol, 3 mL, Nebulization, Q4H - RT  Shabbir, 1 packet, Oral, BID  lurasidone, 40 mg, Oral, Daily  megestrol acetate, 200 mg, Oral, TID  multivitamin with minerals, 1 tablet, Oral, Daily  nystatin, 5 mL, Swish & Swallow, 4x Daily  pantoprazole, 40 mg, Oral, Daily  polyethylene glycol, 17 g, Oral, Daily  predniSONE, 30 mg, Oral, Daily   Followed by  [START ON 6/11/2021] predniSONE, 20 mg, Oral, Daily   Followed by  [START ON 6/13/2021] predniSONE, 10 mg, Oral, Daily  sertraline, 50 mg, Oral, Daily  spironolactone, 25 mg, Oral, Daily  vancomycin, 750 mg, Intravenous, Q12H  Zinc Oxide, , Apply externally, BID       Continuous Infusions:  Pharmacy to dose vancomycin,        PRN Meds:  •  acetaminophen  •  albuterol  •  bisacodyl  •  calcium carbonate  •  dextrose  •  dextrose  •  glucagon (human recombinant)  •  hydrOXYzine  •  insulin lispro  •  ipratropium-albuterol  •  ketorolac  •  melatonin  •  ondansetron  •  Pharmacy to dose vancomycin  •  promethazine  •  [COMPLETED] Insert peripheral IV **AND** sodium chloride  •  sodium chloride     ALLERGIES:  No Known Allergies    Objective    VITALS:   /72 (BP Location: Right arm, Patient Position: Lying)   Pulse 92   Temp 98.3 °F (36.8 °C) (Oral)   Resp 16   Ht 172.7  "cm (68\")   Wt 66 kg (145 lb 8.1 oz)   SpO2 93%   BMI 22.12 kg/m²     PHYSICAL EXAM:  Physical Exam  Vitals and nursing note reviewed.   Constitutional:       General: He is not in acute distress.     Appearance: Normal appearance. He is well-developed and normal weight. He is not diaphoretic.   HENT:      Head: Normocephalic and atraumatic.      Comments: Frontal male pattern baldness.       Right Ear: External ear normal.      Left Ear: External ear normal.      Nose: Nose normal.      Comments: O2 by NC     Mouth/Throat:      Mouth: Mucous membranes are moist.      Pharynx: Oropharynx is clear. No oropharyngeal exudate or posterior oropharyngeal erythema.      Comments: Edentulous.  Eyes:      General: No scleral icterus.     Extraocular Movements: Extraocular movements intact.      Conjunctiva/sclera: Conjunctivae normal.      Pupils: Pupils are equal, round, and reactive to light.   Cardiovascular:      Rate and Rhythm: Normal rate and regular rhythm.      Heart sounds: Normal heart sounds. No murmur heard.        Comments: Monitor leads.  Midline vertical chest wall scar.  Left chest wall port.  Pulmonary:      Effort: Pulmonary effort is normal. No respiratory distress.      Breath sounds: Normal breath sounds. No stridor. No wheezing or rales.   Abdominal:      General: Abdomen is flat. Bowel sounds are normal. There is no distension.      Palpations: Abdomen is soft. There is no mass.      Tenderness: There is no abdominal tenderness. There is no guarding.   Genitourinary:     Comments: Deferred   Musculoskeletal:         General: No swelling, tenderness or deformity. Normal range of motion.      Cervical back: Normal range of motion and neck supple.      Right lower leg: No edema.      Left lower leg: No edema.   Lymphadenopathy:      Cervical: No cervical adenopathy.      Upper Body:      Right upper body: No supraclavicular adenopathy.      Left upper body: No supraclavicular adenopathy.   Skin:     " General: Skin is warm and dry.      Coloration: Skin is not pale.      Findings: Bruising (On arms.) present. No erythema or rash.      Comments: Left chest wall port.   Neurological:      General: No focal deficit present.      Mental Status: He is alert and oriented to person, place, and time.      Coordination: Coordination normal.   Psychiatric:         Mood and Affect: Mood normal.         Behavior: Behavior normal.         Thought Content: Thought content normal.         Judgment: Judgment normal.           RECENT LABS:  Lab Results (last 24 hours)     Procedure Component Value Units Date/Time    Blood Culture - Blood, Arm, Left [[id number]] Collected: 06/07/21 0705    Specimen: Blood from Arm, Left Updated: 06/10/21 0730     Blood Culture No growth at 3 days    Blood Culture - Blood, Arm, Right [[id number]] Collected: 06/07/21 0705    Specimen: Blood from Arm, Right Updated: 06/10/21 0730     Blood Culture No growth at 3 days    POC Glucose Once [[id number]]  (Abnormal) Collected: 06/10/21 0701    Specimen: Blood Updated: 06/10/21 0704     Glucose 106 mg/dL      Comment: Serial Number: [id number]:  [id number]       Vancomycin, Trough [[id number]]  (Normal) Collected: 06/09/21 2339    Specimen: Blood Updated: 06/10/21 0208     Vancomycin Trough 15.00 mcg/mL     Narrative:      Therapeutic Ranges for Vancomycin    Vancomycin Random   5.0-40.0 mcg/mL  Vancomycin Trough   5.0-20.0 mcg/mL  Vancomycin Peak     20.0-40.0 mcg/mL    Vancomycin, Peak [[id number]]  (Abnormal) Collected: 06/09/21 2339    Specimen: Blood Updated: 06/10/21 0208     Vancomycin Peak 14.10 mcg/mL     Narrative:      Therapeutic Ranges for Vancomycin    Vancomycin Random   5.0-40.0 mcg/mL  Vancomycin Trough   5.0-20.0 mcg/mL  Vancomycin Peak     20.0-40.0 mcg/mL    POC Glucose Once [[id number]]  (Abnormal) Collected: 06/09/21 2036    Specimen: Blood Updated: 06/09/21 2037     Glucose 163 mg/dL      Comment: Serial Number: [id number]:   [id number]       POC Glucose Once [[id number]]  (Abnormal) Collected: 06/09/21 1601    Specimen: Blood Updated: 06/09/21 1603     Glucose 141 mg/dL      Comment: Serial Number: [id number]:  [id number]       Ricarda-Barr Virus VCA, IgM [[id number]] Collected: 06/08/21 1630    Specimen: Blood Updated: 06/09/21 1510     EBV VCA IgM <36.0 U/mL      Comment:                                  Negative        <36.0                                   Equivocal 36.0 - 43.9                                   Positive        >43.9       Narrative:      Performed at:  [address] [place] [id number]  [id number]  : Andrew Devine PhD, Phone:  [phone]          PENDING RESULTS: EBV IgM    IMAGING REVIEWED:  No radiology results for the last day    I have reviewed the patient's labs, imaging, reports, and other clinician documentation.    Assessment/Plan   ASSESSMENT:  1. Squamous cell carcinoma right upper lobe lung with new brain met-s/p chemoradiation completed 1/2021, SRS to left occipital lobe lesion and now on chemoimmunotherapy (carboplatin/Taxol plus ipilimumab/nivolumab with Neulasta support) dosed 5/24/2021.  PET scan and April 2021 showed he was responding to treatment.  Ipilimumab and nivolumab were added to his current therapy due to progression in his brain.  Due to hospitalization and brain radiation, he did not receive systemic therapy from 2/9/2021 until 5/24/2021.  Planned to undergo surgical resection of brain metastasis by Dr. Luna.  Ipilimumab and nivolumab are known to cross blood-brain barrier.  2. Anemia/Myelodysplastic syndrome/GOGO with malabsorption/Chemotherapy-induced anemia-he has never required Retacrit as an outpatient.  Recent iron studies showed correction of GOGO.  Acute fall in hemoglobin due to recent chemotherapy.  Macrocytosis due to MDS.  Will treat with Retacrit.  Haptoglobin high.  On multivitamin and Protonix.  Stable.   3. Acute thrombocytopenia-likely  chemotherapy-induced.  Coags normal and CMV/EBV IgM normal.  Stable.   4. Respiratory failure /Pneumonia/COPD-on Unasyn and steroids.  Per ID.    5. Weight loss/loss of appetite-improving as outpatient on Megace.  Now on steroids.  6. Oral thrush-diagnosed as outpatient.  Now on steroids, will continue nystatin.  7. Elevated LFTs-acute and mild.  Stable.    8. Sacral decub-wound care to follow.  On antibiotics.  9. CHF/s/p aortic valve replacement -echocardiogram showed no vegetation and EF 65%.  On aspirin and Lovenox prophylaxis.     PLAN  1. Follow CBC.   2. Outpatient MRI brain per Dr. Luna.      3. Continue weekly Retacrit.   4. Pulmonary toilet.    5. Transfuse as needed hemoglobin < 7.  6. Physical therapy.                 I discussed the patients findings and my recommendations with patient.    Electronically signed by Axel Lewis MD, 06/10/21, 8:04 AM EDT.

## 2021-06-10 NOTE — PLAN OF CARE
Goal Outcome Evaluation:  Patient is alert and oriented, able to make needs known to staff. Patient did have complaint of pain, PRN medications given with relief reported. Will continue to monitor.   Problem: Adult Inpatient Plan of Care  Goal: Plan of Care Review  Outcome: Ongoing, Progressing

## 2021-06-10 NOTE — PROGRESS NOTES
"PULMONARY CRITICAL CARE Progress  NOTE      PATIENT IDENTIFICATION:  Name: Axel Ramirez  MRN: [id number]  :  1950     Age: 70 y.o.  Sex: male    DATE OF Note:  6/10/2021   Referring Physician: Ignacia De La Cruz DO                  Subjective:   Still on O2 supplement  No new issue  No nausea or vomiting, no change in bowel habit, no dysuria,  no new  skin rash or itching.      Objective:  tMax 24 hrs: Temp (24hrs), Av.9 °F (36.6 °C), Min:97.3 °F (36.3 °C), Max:98.3 °F (36.8 °C)      Vitals Ranges:   Temp:  [97.3 °F (36.3 °C)-98.3 °F (36.8 °C)] 98.3 °F (36.8 °C)  Heart Rate:  [] 92  Resp:  [15-24] 16  BP: (116-138)/(64-75) 132/72    Intake and Output Last 3 Shifts:   I/O last 3 completed shifts:  In: 450 [IV Piggyback:450]  Out: -     Exam:  /72 (BP Location: Right arm, Patient Position: Lying)   Pulse 92   Temp 98.3 °F (36.8 °C) (Oral)   Resp 16   Ht 172.7 cm (68\")   Wt 66 kg (145 lb 8.1 oz)   SpO2 93%   BMI 22.12 kg/m²     General Appearance:   Alert awake follows simple commands  HEENT:  Normocephalic, without obvious abnormality, Conjunctiva/corneas clear,.  Normal external ear canals, Nares normal, no drainage     Neck:  Supple, symmetrical, trachea midline. No JVD.  Lungs /Chest wall:   Bilateral basal rhonchi, respirations unlabored symmetrical wall movement.     Heart:  Regular rate and rhythm, systolic murmur PMI left sternal border  Abdomen: Soft, non-tender, no masses, no organomegaly.    Extremities: Trace edema no clubbing or Cyanosis        Medications:    Current Facility-Administered Medications:   •  acetaminophen (TYLENOL) tablet 1,000 mg, 1,000 mg, Oral, Q6H PRN, Atkins, Eliz D, APRN  •  albuterol (PROVENTIL) nebulizer solution 0.083% 2.5 mg/3mL, 2.5 mg, Nebulization, Q2H PRN, Ignacia De La Cruz, DO, 2.5 mg at 21 0914  •  ampicillin-sulbactam (UNASYN) 3 g in sodium chloride 0.9 % 100 mL IVPB-MBP, 3 g, Intravenous, Q6H, Herminia Stanton, APRN, Last Rate: 0 " mL/hr at 06/09/21 0319, 3 g at 06/10/21 0116  •  aspirin EC tablet 81 mg, 81 mg, Oral, Daily, Ignacia De La Cruz DO, 81 mg at 06/09/21 0812  •  bisacodyl (DULCOLAX) suppository 10 mg, 10 mg, Rectal, Daily PRN, Ignacia De La Cruz DO  •  buprenorphine-naloxone (SUBOXONE) 8-2 MG per SL tablet 1.5 tablet, 1.5 tablet, Sublingual, Daily, Ignacia De La Cruz DO, 1.5 tablet at 06/09/21 0811  •  calcium carbonate (TUMS) chewable tablet 500 mg (200 mg elemental), 2 tablet, Oral, BID PRN, Ignacia De La Cruz DO, 2 tablet at 06/08/21 1604  •  dextrose (D50W) 25 g/ 50mL Intravenous Solution 25 g, 25 g, Intravenous, Q15 Min PRN, Ignacia De La Cruz DO  •  dextrose (GLUTOSE) oral gel 15 g, 15 g, Oral, Q15 Min PRN, Ignacia De La Cruz DO  •  docusate sodium (COLACE) capsule 100 mg, 100 mg, Oral, BID, Ignacia De La Cruz DO, 100 mg at 06/09/21 2103  •  enoxaparin (LOVENOX) syringe 40 mg, 40 mg, Subcutaneous, Q24H, Ignacia De La Cruz DO, 40 mg at 06/09/21 1618  •  epoetin asia-epbx (RETACRIT) injection 40,000 Units, 40,000 Units, Subcutaneous, Weekly, Fatoumata Espinosa APRN, 40,000 Units at 06/08/21 1643  •  gabapentin (NEURONTIN) capsule 300 mg, 300 mg, Oral, TID, Ignacia De La Cruz DO, 300 mg at 06/09/21 2103  •  glucagon (human recombinant) (GLUCAGEN DIAGNOSTIC) injection 1 mg, 1 mg, Subcutaneous, Q15 Min PRN, Ignacia De La Cruz,   •  guaiFENesin (MUCINEX) 12 hr tablet 600 mg, 600 mg, Oral, Q12H, Ignacia De La Cruz DO, 600 mg at 06/09/21 2103  •  hydrOXYzine (ATARAX) tablet 25 mg, 25 mg, Oral, TID PRN, Atkins, Eliz D, APRN, 25 mg at 06/09/21 1309  •  insulin lispro (ADMELOG) injection 0-7 Units, 0-7 Units, Subcutaneous, TID With Meals, Ignacia De La Cruz DO, 2 Units at 06/09/21 1142  •  insulin lispro (ADMELOG) injection 0-7 Units, 0-7 Units, Subcutaneous, TID PRN, Ignacia De La Cruz DO  •  ipratropium-albuterol (DUO-NEB) nebulizer solution 3 mL, 3 mL, Nebulization, Q4H - RT, Ignacia De La Cruz DO, 3 mL at 06/10/21 0640  •  ipratropium-albuterol (DUO-NEB) nebulizer  solution 3 mL, 3 mL, Nebulization, Q1H PRN, Ignacia De La Cruz DO  •  Shabbir pack 1 packet, 1 packet, Oral, BID, Rachana Zacarias, RD  •  ketorolac (TORADOL) injection 15 mg, 15 mg, Intravenous, Q6H PRN, Eliz Andrews APRN, 15 mg at 06/09/21 1428  •  lurasidone (LATUDA) tablet 40 mg, 40 mg, Oral, Daily, Ignacia De La Cruz DO, 40 mg at 06/09/21 0813  •  megestrol acetate (MEGACE) oral suspension 200 mg, 200 mg, Oral, TID, Ignacia De La Cruz DO, 200 mg at 06/09/21 2103  •  melatonin tablet 5 mg, 5 mg, Oral, Nightly PRN, Ignacia De La Cruz DO  •  multivitamin with minerals 1 tablet, 1 tablet, Oral, Daily, Ignacia De La Cruz DO, 1 tablet at 06/09/21 0811  •  nystatin (MYCOSTATIN) 220289 UNIT/ML suspension 500,000 Units, 5 mL, Swish & Swallow, 4x Daily, Ignacia De La Cruz DO, 500,000 Units at 06/09/21 2103  •  ondansetron (ZOFRAN) injection 4 mg, 4 mg, Intravenous, Q6H PRN, Lianna Groves MD, 4 mg at 06/09/21 1326  •  pantoprazole (PROTONIX) EC tablet 40 mg, 40 mg, Oral, Daily, Ignacia De La Cruz DO, 40 mg at 06/09/21 0813  •  Pharmacy to dose vancomycin, , Does not apply, Continuous PRN, Herminia Stanton, APRN  •  polyethylene glycol (MIRALAX) packet 17 g, 17 g, Oral, Daily, Ignacia De La Cruz DO, 17 g at 06/09/21 0815  •  predniSONE (DELTASONE) tablet 30 mg, 30 mg, Oral, Daily, 30 mg at 06/09/21 1140 **FOLLOWED BY** [START ON 6/11/2021] predniSONE (DELTASONE) tablet 20 mg, 20 mg, Oral, Daily **FOLLOWED BY** [START ON 6/13/2021] predniSONE (DELTASONE) tablet 10 mg, 10 mg, Oral, Daily, Lianna Groves MD  •  promethazine (PHENERGAN) tablet 25 mg, 25 mg, Oral, Q4H PRN, Ignacia De La Cruz DO, 25 mg at 06/1950  •  sertraline (ZOLOFT) tablet 50 mg, 50 mg, Oral, Daily, Ignacia De La Cruz DO, 50 mg at 06/09/21 0812  •  [COMPLETED] Insert peripheral IV, , , Once **AND** sodium chloride 0.9 % flush 10 mL, 10 mL, Intravenous, PRN, Ignacia De La Cruz DO, 10 mL at 06/09/21 2104  •  sodium chloride 0.9 % flush 10 mL, 10 mL, Intravenous, PRN, Jono  Ignacia DESAI DO  •  spironolactone (ALDACTONE) tablet 25 mg, 25 mg, Oral, Daily, JonoIgnacia DO, 25 mg at 06/09/21 0810  •  vancomycin 750 mg in sodium chloride 0.9 % 250 mL IVPB, 750 mg, Intravenous, Q12H, Herminia Stanton APRN, Stopped at 06/10/21 0400  •  Zinc Oxide 16 % ointment, , Apply externally, BID, Carina Luevano APRN, Given at 06/09/21 2104    Data Review:  All labs (24hrs):   Recent Results (from the past 24 hour(s))   POC Glucose Once    Collection Time: 06/09/21 11:20 AM    Specimen: Blood   Result Value Ref Range    Glucose 154 (H) 70 - 105 mg/dL   POC Glucose Once    Collection Time: 06/09/21  4:01 PM    Specimen: Blood   Result Value Ref Range    Glucose 141 (H) 70 - 105 mg/dL   POC Glucose Once    Collection Time: 06/09/21  8:36 PM    Specimen: Blood   Result Value Ref Range    Glucose 163 (H) 70 - 105 mg/dL   Vancomycin, Peak    Collection Time: 06/09/21 11:39 PM    Specimen: Blood   Result Value Ref Range    Vancomycin Peak 14.10 (L) 20.00 - 40.00 mcg/mL   Vancomycin, Trough    Collection Time: 06/09/21 11:39 PM    Specimen: Blood   Result Value Ref Range    Vancomycin Trough 15.00 5.00 - 20.00 mcg/mL   POC Glucose Once    Collection Time: 06/10/21  7:01 AM    Specimen: Blood   Result Value Ref Range    Glucose 106 (H) 70 - 105 mg/dL        Imaging:  Adult Transthoracic Echo Complete W/ Cont if Necessary Per Protocol  · Estimated left ventricular EF = 65% Left ventricular systolic function   is normal.  · Left ventricular diastolic dysfunction is noted.  · There is a prosthetic aortic valve present.  · Estimated right ventricular systolic pressure from tricuspid   regurgitation is normal (<35 mmHg).          ASSESSMENT:  Acute-on-chronic respiratory distress  Pneumonia most likely post obstruction  COPD exacerbation  Metastatic lung cancer squamous cell  CHF  Opioid dependence  Stage 2 skin ulcer of sacral region       PLAN:  PT OT  Continue  Antibiotics  Bronchodilator  Inhaled  corticosteroids  IV steroids will wean it down  IS/Flutter valve  Electrolytes/ glycemic control  DVT and GI prophylaxis.   . Poor long-term prognosis    Total Critical care time in direct medical management (   ) minutes  Lianna Groves MD. D, ABSM.     6/10/2021  07:58 EDT

## 2021-06-10 NOTE — PROGRESS NOTES
"Pharmacy Antimicrobial Dosing Service    Subjective:  Axel Ramirez is a 70 y.o.male admitted with acute-on-chronic respiratory failure. Pharmacy has been consulted to dose Vancomycin for possible PNA.    PMH: CHF, COPD, squamous cell carcinoma of R lung      Assessment/Plan    1. Day #3 Vancomycin: Goal -600 mcg*h/mL. Vancomycin 1250 mg (20 mg/kg) loading dose ordered followed by 750 mg q12h maintenance regimen. Level last night was drawn inappropriately and dose yesterday given late. [employer] predicts a therapeutic AUC of 418 mcg*hr/mL once patient gets to steady state so will continue same dose today. Will obtain repeat level tomorrow and assess for any further accumulation.    2. Day #3 Ampicillin/Sulbactam: 3 g IV q6h for estCrCl > 30 mL/min.    Will continue to monitor drug levels, renal function, culture and sensitivities, and patient clinical status.       Objective:  Relevant clinical data and objective history reviewed:  172.7 cm (68\")   66 kg (145 lb 8.1 oz)   Ideal body weight: 68.4 kg (150 lb 12.7 oz)  Body mass index is 22.12 kg/m².    Results from last 7 days   Lab Units 06/09/21  2339   VANCOMYCIN PK mcg/mL 14.10*   VANCOMYCIN TR mcg/mL 15.00     Results from last 7 days   Lab Units 06/09/21  0320 06/08/21  0624 06/07/21  0500   CREATININE mg/dL 0.63* 0.93 0.73*     Estimated Creatinine Clearance: 80.2 mL/min (A) (by C-G formula based on SCr of 0.63 mg/dL (L)).  I/O last 3 completed shifts:  In: 450 [IV Piggyback:450]  Out: -     Results from last 7 days   Lab Units 06/09/21  0503 06/08/21  0624 06/07/21  0500   WBC 10*3/mm3 10.90* 8.80 10.10     Temperature    06/09/21 1122 06/09/21 2139 06/10/21 0416   Temp: 97.3 °F (36.3 °C) 98.2 °F (36.8 °C) 98.3 °F (36.8 °C)     Baseline culture/source/susceptibility:  Microbiology Results (last 10 days)       Procedure Component Value - Date/Time    MRSA Screen, PCR (Inpatient) - Swab, Nares [[id number]]  (Abnormal) Collected: 06/07/21 1701    Lab " Status: Final result Specimen: Swab from Nares Updated: 06/07/21 1941     MRSA PCR MRSA Detected    Blood Culture - Blood, Arm, Left [[id number]] Collected: 06/07/21 0705    Lab Status: Preliminary result Specimen: Blood from Arm, Left Updated: 06/09/21 0730     Blood Culture No growth at 2 days    Blood Culture - Blood, Arm, Right [[id number]] Collected: 06/07/21 0705    Lab Status: Preliminary result Specimen: Blood from Arm, Right Updated: 06/09/21 0730     Blood Culture No growth at 2 days    Respiratory Panel PCR w/COVID-19(SARS-CoV-2) PARESH/GRACIELA/KATHY/PAD/COR/MAD/CHRIST In-House, NP Swab in UTM/VTM, 3-4 HR TAT - Swab, Nasopharynx [[id number]]  (Normal) Collected: 06/07/21 0620    Lab Status: Final result Specimen: Swab from Nasopharynx Updated: 06/07/21 0726     ADENOVIRUS, PCR Not Detected     Coronavirus 229E Not Detected     Coronavirus HKU1 Not Detected     Coronavirus NL63 Not Detected     Coronavirus OC43 Not Detected     COVID19 Not Detected     Human Metapneumovirus Not Detected     Human Rhinovirus/Enterovirus Not Detected     Influenza A PCR Not Detected     Influenza B PCR Not Detected     Parainfluenza Virus 1 Not Detected     Parainfluenza Virus 2 Not Detected     Parainfluenza Virus 3 Not Detected     Parainfluenza Virus 4 Not Detected     RSV, PCR Not Detected     Bordetella pertussis pcr Not Detected     Bordetella parapertussis PCR Not Detected     Chlamydophila pneumoniae PCR Not Detected     Mycoplasma pneumo by PCR Not Detected    Narrative:      In the setting of a positive respiratory panel with a viral infection PLUS a negative procalcitonin without other underlying concern for bacterial infection, consider observing off antibiotics or discontinuation of antibiotics and continue supportive care. If the respiratory panel is positive for atypical bacterial infection (Bordetella pertussis, Chlamydophila pneumoniae, or Mycoplasma pneumoniae), consider antibiotic de-escalation to target atypical  bacterial infection.              Anti-Infectives (From admission, onward)      Ordered     Dose/Rate Route Frequency Start Stop    06/08/21 1412  vancomycin 750 mg in sodium chloride 0.9 % 250 mL IVPB     Gracie Jennings, PharmD reviewed the order on 06/09/21 1438.   Ordering Provider: Herminia Stanton APRN    750 mg  over 45 Minutes Intravenous Every 12 Hours 06/09/21 0300 06/18/21 0359    06/08/21 1347  ampicillin-sulbactam (UNASYN) 3 g in sodium chloride 0.9 % 100 mL IVPB-MBP     Gracie Jennings, PharmSONU reviewed the order on 06/09/21 1438.   Ordering Provider: Herminia Stanton APRN    3 g Intravenous Every 6 Hours 06/08/21 2000 06/18/21 1959    06/08/21 1412  vancomycin 1250 mg/250 mL 0.9% NS IVPB ([facility])     Ordering Provider: Herminia Stanton APRN    20 mg/kg × 67.3 kg  over 75 Minutes Intravenous Once 06/08/21 1500 06/08/21 2035    06/08/21 1347  Pharmacy to dose vancomycin     Gracie Jennings, Bartolo reviewed the order on 06/09/21 1438.   Ordering Provider: Herminia Stanton APRN     Does not apply Continuous PRN 06/08/21 1346 06/18/21 1345    06/07/21 1059  piperacillin-tazobactam (ZOSYN) IVPB 4.5 g in 100 mL NS (CD)     Ordering Provider: Ignacia De La Cruz DO    4.5 g  over 30 Minutes Intravenous Once 06/07/21 1145 06/07/21 1326    06/07/21 0625  cefTRIAXone (ROCEPHIN) in SWFI 1 gram/10ml IV PUSH syringe     Ordering Provider: Higinio Flores MD    1 g  over 5 Minutes Intravenous Once 06/07/21 0627 06/07/21 0711    06/07/21 0625  azithromycin 500 mg IVPB in 250 mL NS     Ordering Provider: Higinio Flores MD    500 mg  over 60 Minutes Intravenous Once 06/07/21 0626 06/07/21 0806            Gracie Jennings, Bartolo  06/10/21 07:25 EDT

## 2021-06-10 NOTE — PROGRESS NOTES
"     LOS: 3 days   Patient Care Team:  Sandoval Stevenson FNP as PCP - General  Sandoval Stevenson FNP as PCP - Family Medicine  Axel Lewis MD as Consulting Physician (Hematology and Oncology)  Iglesia Springer MD as Consulting Physician (Cardiology)    Chief Complaint: SOA    Subjective    \"I think I'm ok\"    Interval History: Pt c/o increased pain yesterday that has improved except for his bottom hurting from pressure sore- on Suboxone daily-added Torodol yesterday and 100mg Tyl daily    Patient Complaints: Occ cough  Patient Denies: No chest pain, no nausea or vomiting, no HA, No dizziness, no Abd pain or sore throat  History taken from: patient    Review of Systems:    All systems were reviewed and negative except for: Generalized weakness, positive cough, no hemoptysis, no chest pain fevers chills, positive shortness of breath but improved since admission    Objective   Pt sitting up in bed, alert, family at bedside  Vital Signs  Temp:  [97.3 °F (36.3 °C)-98.3 °F (36.8 °C)] 98.3 °F (36.8 °C)  Heart Rate:  [] 90  Resp:  [15-24] 15  BP: (116-138)/(64-75) 132/72    Physical Exam:     General Appearance:    Alert, cooperative, in no acute distress, frail and chronically ill-appearing, temporal wasting   Head:    Normocephalic, without obvious abnormality, atraumatic   Eyes:            Conjunctivae and sclerae normal, no   icterus   Throat:   No oral lesions, no thrush, oral mucosa moist   Neck:   No adenopathy, supple, trachea midline, no thyromegaly   Lungs:    Coarse rhonchi bilateral bases and some rales at bases.  Poor adventitial flow    Heart:    Regular rhythm and normal rate, normal S1 and S2, no            murmur, no gallop, no rub, no click   Chest Wall:    No abnormalities observed   Abdomen:     Normal bowel sounds, no masses, no organomegaly, soft        non-tender, non-distended, no guarding, no rebound                tenderness, scaphoid   Rectal:     Deferred   Extremities:   " Moves all extremities well, no edema, no cyanosis, no             redness   Pulses:   Pulses equivocal  But present and equal bilaterally   Skin:   No bleeding, bruising or rash   Lymph nodes:   No palpable adenopathy   Neurologic:   Cranial nerves 2 - 12 grossly intact, sensation intact   Radiology:  XR Chest 1 View    Result Date: 6/7/2021  1. Redemonstration of right midlung mass, similar to the prior study. 2. Increased left midlung and left basilar airspace disease that may relate to superimposed pneumonia.  Electronically Signed By-Saleem Ferguson MD On:6/7/2021 7:25 AM This report was finalized on [id number] by  Saleem Ferguson MD.    CT Chest Pulmonary Embolism    Result Date: 6/7/2021  1. Negative for pulmonary embolus. 2. Redemonstration of large 9 cm right upper lobe mass which occludes the right upper lobe bronchus resulting in complete right upper and right middle lobe collapse similar to prior study. 3. Bronchial wall thickening with increased mucous plugging involving lower lobes with patchy areas of airspace disease most concerning for aspiration pneumonia. 4. Interlobular septal thickening and scattered groundglass opacities bilaterally which may relate to pulmonary edema and/or infection superimposed on background of advanced emphysema. 5. Stable mediastinal lymphadenopathy. 6. New 12 mm area of nodularity in left lower lobe may relate to aspiration/infection, recommend attention on follow-up.  Electronically Signed By-Saleem Ferguson MD On:6/7/2021 12:44 PM This report was finalized on [id number] by  Saleem Ferguson MD.         Results Review:     I reviewed the patient's new clinical results.  I reviewed the patient's new imaging results and agree with the interpretation.    Medication Review:   Scheduled Meds:ampicillin-sulbactam, 3 g, Intravenous, Q6H  aspirin, 81 mg, Oral, Daily  buprenorphine-naloxone, 1.5 tablet, Sublingual, Daily  docusate sodium, 100 mg, Oral, BID  enoxaparin, 40 mg,  Subcutaneous, Q24H  epoetin asia-epbx, 40,000 Units, Subcutaneous, Weekly  gabapentin, 300 mg, Oral, TID  guaiFENesin, 600 mg, Oral, Q12H  insulin lispro, 0-7 Units, Subcutaneous, TID With Meals  ipratropium-albuterol, 3 mL, Nebulization, Q4H - RT  Shabbir, 1 packet, Oral, BID  lurasidone, 40 mg, Oral, Daily  megestrol acetate, 200 mg, Oral, TID  multivitamin with minerals, 1 tablet, Oral, Daily  nystatin, 5 mL, Swish & Swallow, 4x Daily  pantoprazole, 40 mg, Oral, Daily  polyethylene glycol, 17 g, Oral, Daily  predniSONE, 30 mg, Oral, Daily   Followed by  [START ON 6/11/2021] predniSONE, 20 mg, Oral, Daily   Followed by  [START ON 6/13/2021] predniSONE, 10 mg, Oral, Daily  sertraline, 50 mg, Oral, Daily  spironolactone, 25 mg, Oral, Daily  vancomycin, 750 mg, Intravenous, Q12H  Zinc Oxide, , Apply externally, BID      Continuous Infusions:Pharmacy to dose vancomycin,       PRN Meds:.•  acetaminophen  •  albuterol  •  bisacodyl  •  calcium carbonate  •  dextrose  •  dextrose  •  glucagon (human recombinant)  •  hydrOXYzine  •  insulin lispro  •  ipratropium-albuterol  •  ketorolac  •  melatonin  •  ondansetron  •  Pharmacy to dose vancomycin  •  promethazine  •  [COMPLETED] Insert peripheral IV **AND** sodium chloride  •  sodium chloride    Labs:  Lab Results (last 24 hours)     Procedure Component Value Units Date/Time    Vancomycin, Trough [[id number]]  (Normal) Collected: 06/09/21 2339    Specimen: Blood Updated: 06/10/21 0208     Vancomycin Trough 15.00 mcg/mL     Narrative:      Therapeutic Ranges for Vancomycin    Vancomycin Random   5.0-40.0 mcg/mL  Vancomycin Trough   5.0-20.0 mcg/mL  Vancomycin Peak     20.0-40.0 mcg/mL    Vancomycin, Peak [[id number]]  (Abnormal) Collected: 06/09/21 2339    Specimen: Blood Updated: 06/10/21 0208     Vancomycin Peak 14.10 mcg/mL     Narrative:      Therapeutic Ranges for Vancomycin    Vancomycin Random   5.0-40.0 mcg/mL  Vancomycin Trough   5.0-20.0 mcg/mL  Vancomycin Peak      20.0-40.0 mcg/mL    POC Glucose Once [[id number]]  (Abnormal) Collected: 06/09/21 2036    Specimen: Blood Updated: 06/09/21 2037     Glucose 163 mg/dL      Comment: Serial Number: [id number]:  [id number]       POC Glucose Once [[id number]]  (Abnormal) Collected: 06/09/21 1601    Specimen: Blood Updated: 06/09/21 1603     Glucose 141 mg/dL      Comment: Serial Number: [id number]:  [id number]       Ricarda-Barr Virus VCA, IgM [[id number]] Collected: 06/08/21 1630    Specimen: Blood Updated: 06/09/21 1510     EBV VCA IgM <36.0 U/mL      Comment:                                  Negative        <36.0                                   Equivocal 36.0 - 43.9                                   Positive        >43.9       Narrative:      Performed at:  [address] [place], [place] [id number]  : Andrew Devine PhD, Phone:  [phone]    POC Glucose Once [[id number]]  (Abnormal) Collected: 06/09/21 1120    Specimen: Blood Updated: 06/09/21 1121     Glucose 154 mg/dL      Comment: Serial Number: [id number]:  [id number]              Assessment/Plan      Acute on chronic respiratory distress  Suspect postobstructive pneumonia  COPD exacerbation  Metastatic lung cancer/squamous cell with new finding of of 12 mm nodular density on x-ray  CHF  Opioid dependence  Stage II skin ulcer sacral region    Coronary artery disease involving native coronary artery of native heart without angina pectoris    Nonrheumatic aortic valve stenosis    Essential hypertension    Congestive heart failure (CMS/HCC)    Tobacco abuse    Squamous cell carcinoma of lung, right (CMS/HCC)    Acute-on-chronic respiratory failure (CMS/HCC)    Respiratory distress    Stage 2 skin ulcer of sacral region (CMS/HCC)  Constipation    Pulmonology, wound care and cardiology inputs greatly appreciated, thank you.  Patient c/o increased pain all over, currently on Suboxone for presumed opoid dependence, with Dx of mets lung  cancer pt will have increased pain and most likely need narcotic medication, however, I am unsure if that can be taken with current Suboxone or th protocol for coming off Suboxone. For now we will increase his Tyl dose and add in Toradol for 5 doses only and investigate this further. We will continue current approach with pulmonary toilet/DVT prophylaxis, parenteral antibiotics and steroids.  We will continue to follow.    Remains on 10L high flow O2  Will have PT/Nursing get pt out of bed today    Pt has PRN meds for constipation and pain on chart        Eliz Andrews, VINCE  06/10/21  06:02 EDT

## 2021-06-11 NOTE — PROGRESS NOTES
Reason for follow-up: Respiratory failure  Acute diastolic congestive heart failure  History of AVR  Lung CA     Patient Care Team:  Sandoval Stevenson FNP as PCP - General  Sandoval Stevenson FNP as PCP - Family Medicine  Axel Lewis MD as Consulting Physician (Hematology and Oncology)  Iglesia Springer MD as Consulting Physician (Cardiology)    Subjective .   Feels slightly better     Review of Systems   Constitutional: Positive for malaise/fatigue. Negative for fever.   HENT: Negative for congestion and hearing loss.    Eyes: Negative for double vision and visual disturbance.   Cardiovascular: Negative for chest pain, claudication, dyspnea on exertion, leg swelling and syncope.   Respiratory: Positive for cough and shortness of breath.    Endocrine: Negative for cold intolerance.   Skin: Negative for color change and rash.   Musculoskeletal: Negative for arthritis and joint pain.   Gastrointestinal: Negative for abdominal pain and heartburn.   Genitourinary: Negative for hematuria.   Neurological: Negative for excessive daytime sleepiness and dizziness.   Psychiatric/Behavioral: Negative for depression. The patient is not nervous/anxious.    All other systems reviewed and are negative.      Patient has no known allergies.    Scheduled Meds:acetylcysteine, 2 mL, Nebulization, BID - RT  ampicillin-sulbactam, 3 g, Intravenous, Q6H  aspirin, 81 mg, Oral, Daily  buprenorphine-naloxone, 1.5 tablet, Sublingual, Daily  docusate sodium, 100 mg, Oral, BID  enoxaparin, 40 mg, Subcutaneous, Q24H  epoetin asia-epbx, 40,000 Units, Subcutaneous, Weekly  gabapentin, 300 mg, Oral, TID  guaiFENesin, 600 mg, Oral, Q12H  insulin lispro, 0-7 Units, Subcutaneous, TID With Meals  ipratropium-albuterol, 3 mL, Nebulization, Q4H - RT  Shabbir, 1 packet, Oral, BID  lurasidone, 40 mg, Oral, Daily  megestrol acetate, 200 mg, Oral, TID  multivitamin with minerals, 1 tablet, Oral, Daily  nystatin, 5 mL, Swish & Swallow, 4x  "Daily  pantoprazole, 40 mg, Oral, Daily  polyethylene glycol, 17 g, Oral, Daily  predniSONE, 20 mg, Oral, Daily   Followed by  [START ON 6/13/2021] predniSONE, 10 mg, Oral, Daily  sertraline, 50 mg, Oral, Daily  spironolactone, 25 mg, Oral, Daily  vancomycin, 1,000 mg, Intravenous, Q12H  Zinc Oxide, , Apply externally, BID      Continuous Infusions:Pharmacy to dose vancomycin,       PRN Meds:.•  acetaminophen  •  albuterol  •  bisacodyl  •  calcium carbonate  •  dextrose  •  dextrose  •  glucagon (human recombinant)  •  hydrOXYzine  •  insulin lispro  •  ipratropium-albuterol  •  ketorolac  •  melatonin  •  ondansetron  •  Pharmacy to dose vancomycin  •  promethazine  •  [COMPLETED] Insert peripheral IV **AND** sodium chloride  •  sodium chloride    Objective   Looks comfortable with oxygen    VITAL SIGNS  Vitals:    06/11/21 0945 06/11/21 1025 06/11/21 1108 06/11/21 1257   BP: 140/80 138/79  134/71   BP Location:       Patient Position:       Pulse: 98 94 96 93   Resp: 22 22 20 22   Temp: 97.8 °F (36.6 °C) 97.9 °F (36.6 °C)  97.8 °F (36.6 °C)   TempSrc: Oral Oral  Oral   SpO2: 97% 97% 97% 97%   Weight:       Height:           Flowsheet Rows      First Filed Value   Admission Height  172.7 cm (68\") Documented at 06/07/2021 0451   Admission Weight  67.3 kg (148 lb 6.4 oz) Documented at 06/07/2021 0450           TELEMETRY: Sinus rhythm    Physical Exam:  Constitutional:       Appearance: Well-developed.      Comments: On oxygen   Eyes:      General: No scleral icterus.     Conjunctiva/sclera: Conjunctivae normal.   HENT:      Head: Normocephalic and atraumatic.    Mouth/Throat:      Mouth: No oral lesions.      Pharynx: Uvula midline.   Neck:      Thyroid: No thyromegaly.      Vascular: No carotid bruit or JVD.      Trachea: Trachea normal.   Pulmonary:      Effort: Pulmonary effort is normal.      Breath sounds: Bibasilar Rhonchi present.   Cardiovascular:      Normal rate. Regular rhythm.      No gallop.   Pulses:   "   Intact distal pulses.   Abdominal:      General: Bowel sounds are normal.      Palpations: Abdomen is soft.   Musculoskeletal: Normal range of motion.      Cervical back: Neck supple. Skin:     General: Skin is warm. There is no cyanosis.   Neurological:      Mental Status: Alert and oriented to person, place, and time.      Comments: No focal deficits   Psychiatric:         Behavior: Behavior is cooperative.                  LAB RESULTS (LAST 7 DAYS)    CBC  Results from last 7 days   Lab Units 06/11/21  0303 06/09/21  0503 06/08/21  1630 06/08/21  0624 06/07/21  0500   WBC 10*3/mm3 13.80* 10.90*  --  8.80 10.10   RBC 10*6/mm3 1.90* 1.95*  --  1.94* 2.49*   HEMOGLOBIN g/dL 6.9* 7.2* 7.8* 7.2* 9.2*   HEMATOCRIT % 21.0* 21.3* 23.1* 21.1* 27.3*   MCV fL 110.5* 109.7*  --  108.8* 109.7*   PLATELETS 10*3/mm3 161 118*  --  103* 115*       BMP  Results from last 7 days   Lab Units 06/11/21  0303 06/10/21  0636 06/09/21  0320 06/08/21 0624 06/07/21  0500   SODIUM mmol/L 135*  --  140 139 134*   POTASSIUM mmol/L 4.5  --  4.2 4.6 4.1   CHLORIDE mmol/L 102  --  103 100 97*   CO2 mmol/L 27.0  --  29.0 30.0* 26.0   BUN mg/dL 24*  --  25* 32* 24*   CREATININE mg/dL 0.61* 0.64* 0.63* 0.93 0.73*   GLUCOSE mg/dL 101*  --  123* 127* 116*       CMP   Results from last 7 days   Lab Units 06/11/21  0303 06/10/21  0636 06/09/21  0320 06/08/21  0624 06/07/21  0500   SODIUM mmol/L 135*  --  140 139 134*   POTASSIUM mmol/L 4.5  --  4.2 4.6 4.1   CHLORIDE mmol/L 102  --  103 100 97*   CO2 mmol/L 27.0  --  29.0 30.0* 26.0   BUN mg/dL 24*  --  25* 32* 24*   CREATININE mg/dL 0.61* 0.64* 0.63* 0.93 0.73*   GLUCOSE mg/dL 101*  --  123* 127* 116*   ALBUMIN g/dL  --   --   --  2.50* 2.50*   BILIRUBIN mg/dL  --   --   --  0.6 0.7   ALK PHOS U/L  --   --   --  89 109   AST (SGOT) U/L  --   --   --  41* 58*   ALT (SGPT) U/L  --   --   --  64* 87*         BNP        TROPONIN  Results from last 7 days   Lab Units 06/07/21  0500   TROPONIN T ng/mL  0.012       CoAg  Results from last 7 days   Lab Units 06/08/21  1630   INR  1.05   APTT seconds 27.7       Creatinine Clearance  Estimated Creatinine Clearance: 83.2 mL/min (A) (by C-G formula based on SCr of 0.61 mg/dL (L)).    ABG  Results from last 7 days   Lab Units 06/07/21  0902   PH, ARTERIAL pH units 7.514*   PCO2, ARTERIAL mm Hg 34.4*   PO2 ART mm Hg 49.4*   O2 SATURATION ART % 88.5*   BASE EXCESS ART mmol/L 4.9*       Radiology  XR Chest 1 View    Result Date: 6/11/2021  1. Mild worsening of diffuse interstitial opacities throughout the left lung that may relate to infection or asymmetric pulmonary edema. 2. Redemonstration of 9 cm right upper lobe mass consistent with known malignancy. 3. Stable additional chronic findings above.  Electronically Signed By-Saleem Ferguson MD On:6/11/2021 7:40 AM This report was finalized on [id number] by  Saleem Ferguson MD.    XR Chest 1 View    Result Date: 6/10/2021  1. Similar appearance of the right-sided perihilar mass likely related to known malignancy. 2. Patchy interstitial opacities throughout the left lung likely representing infection on superimposed chronic interstitial lung disease.  Electronically Signed By-Daryl Hunter MD On:6/10/2021 8:01 AM This report was finalized on [id number] by  Daryl Hunter MD.            EKG        I personally viewed and interpreted the patient's EKG/Telemetry data:    ECHOCARDIOGRAM:    Results for orders placed during the hospital encounter of 06/07/21    Adult Transthoracic Echo Complete W/ Cont if Necessary Per Protocol    Interpretation Summary  · Estimated left ventricular EF = 65% Left ventricular systolic function is normal.  · Left ventricular diastolic dysfunction is noted.  · There is a prosthetic aortic valve present.  · Estimated right ventricular systolic pressure from tricuspid regurgitation is normal (<35 mmHg).    STRESS MYOVIEW:    CARDIAC CATHETERIZATION:    OTHER:         Assessment/Plan        Coronary artery disease involving native coronary artery of native heart without angina pectoris    Nonrheumatic aortic valve stenosis    Essential hypertension    Congestive heart failure (CMS/HCC)    Tobacco abuse    Squamous cell carcinoma of lung, right (CMS/HCC)    Acute-on-chronic respiratory failure (CMS/HCC)    Respiratory distress    Stage 2 skin ulcer of sacral region (CMS/HCC)      Respiratory failure probably multifactorial  Most probably primary reason is due to pulmonary causes lung CA COPD  Mild diastolic congestive heart failure  Echocardiogram reviewed LV function normal,  The prosthetic aortic valve functioning well  History of AVR stable  History of CAD stable  Cautious diuresis  Patient had blood transfusion needs diuretics  We will see patient as needed  I discussed the patients findings and my recommendations with patient     Iglesia Springer MD  06/11/21  18:43 EDT

## 2021-06-11 NOTE — PROGRESS NOTES
LOS: 4 days   Patient Care Team:  Sandoval Stevenson FNP as PCP - General  Sandoval Stevenson FNP as PCP - Family Medicine  Axel Lewis MD as Consulting Physician (Hematology and Oncology)  Iglesia Springer MD as Consulting Physician (Cardiology)    Subjective     Interval History:    Patient Complaints:  Weakness , soa - continues with high oxygen demand.     History taken from: patient    Review of Systems   Constitutional: Positive for activity change, appetite change and fatigue.   Respiratory: Positive for shortness of breath. Negative for cough.    Cardiovascular: Negative for chest pain, palpitations and leg swelling.   Gastrointestinal: Negative for abdominal pain and vomiting.   Genitourinary: Negative for difficulty urinating.   Musculoskeletal: Positive for back pain (pain in the tailbone ) and gait problem.   Neurological: Positive for weakness.   Psychiatric/Behavioral: Negative for agitation and confusion.       Pt reports the suboxone does help with pain .     Objective     Vital Signs  Temp:  [97.5 °F (36.4 °C)-97.9 °F (36.6 °C)] 97.9 °F (36.6 °C)  Heart Rate:  [] 96  Resp:  [16-22] 20  BP: (138-149)/(77-80) 138/79    Physical Exam:     General Appearance:    Alert, cooperative, in no acute distress, frail ,with temporal wasting.    Head:    Normocephalic, without obvious abnormality, atraumatic   Eyes:            Lids and lashes normal, conjunctivae and sclerae normal, no   icterus, no pallor, corneas clear, PERRLA   Ears:    Ears appear intact with no abnormalities noted   Throat:   No oral lesions, no thrush, oral mucosa moist   Neck:   No adenopathy, supple, trachea midline, no thyromegaly, no   carotid bruit, no JVD   Lungs:    course rhonchi throughout.     Heart:    Regular rhythm and normal rate, normal S1 and S2, no            murmur, no gallop, no rub, no click   Chest Wall:    No abnormalities observed   Abdomen:     Normal bowel sounds, no masses, no organomegaly,  soft        Non-tender non-distended, no guarding,   Extremities:   Moves all extremities well, no edema, no cyanosis, no             Redness   Pulses:   Pulses palpable and equal bilaterally   Skin:   No bleeding, bruising or rash   Lymph nodes:   No palpable adenopathy   Neurologic:   Cranial nerves 2 - 12 grossly intact, sensation intact, DTR       present and equal bilaterally        Results Review:    Lab Results (last 24 hours)     Procedure Component Value Units Date/Time    POC Glucose Once [[id number]]  (Abnormal) Collected: 06/11/21 1124    Specimen: Blood Updated: 06/11/21 1125     Glucose 114 mg/dL      Comment: Serial Number: [id number]:  [id number]       Ferritin [[id number]]  (Abnormal) Collected: 06/11/21 0949    Specimen: Blood Updated: 06/11/21 1121     Ferritin 3,721.00 ng/mL     Narrative:      Results may be falsely decreased if patient taking Biotin.      Iron Profile [[id number]]  (Abnormal) Collected: 06/11/21 0949    Specimen: Blood Updated: 06/11/21 1055     Iron 42 mcg/dL      Iron Saturation 29 %      Transferrin 98 mg/dL      TIBC 146 mcg/dL     Vancomycin, Trough [[id number]]  (Normal) Collected: 06/11/21 0949    Specimen: Blood Updated: 06/11/21 1055     Vancomycin Trough 16.90 mcg/mL     Narrative:      Therapeutic Ranges for Vancomycin    Vancomycin Random   5.0-40.0 mcg/mL  Vancomycin Trough   5.0-20.0 mcg/mL  Vancomycin Peak     20.0-40.0 mcg/mL    Reticulocytes [[id number]]  (Abnormal) Collected: 06/11/21 0949    Specimen: Blood Updated: 06/11/21 1035     Reticulocyte % 3.44 %      Reticulocyte Absolute 0.0706 10*6/mm3     Vitamin B12 [[id number]] Collected: 06/11/21 0949    Specimen: Blood Updated: 06/11/21 1027    Folate [[id number]] Collected: 06/11/21 0949    Specimen: Blood Updated: 06/11/21 1027    Copper, Serum [[id number]] Collected: 06/11/21 0949    Specimen: Blood Updated: 06/11/21 1027    POC Glucose Once [[id number]]  (Normal) Collected: 06/11/21 0736    Specimen: Blood  Updated: 06/11/21 0736     Glucose 97 mg/dL      Comment: Serial Number: [id number]:  [id number]       Blood Culture - Blood, Arm, Left [[id number]] Collected: 06/07/21 0705    Specimen: Blood from Arm, Left Updated: 06/11/21 0730     Blood Culture No growth at 4 days    Blood Culture - Blood, Arm, Right [[id number]] Collected: 06/07/21 0705    Specimen: Blood from Arm, Right Updated: 06/11/21 0730     Blood Culture No growth at 4 days    Basic Metabolic Panel [[id number]]  (Abnormal) Collected: 06/11/21 0303    Specimen: Blood Updated: 06/11/21 0431     Glucose 101 mg/dL      BUN 24 mg/dL      Creatinine 0.61 mg/dL      Sodium 135 mmol/L      Potassium 4.5 mmol/L      Chloride 102 mmol/L      CO2 27.0 mmol/L      Calcium 8.1 mg/dL      eGFR Non African Amer 131 mL/min/1.73      BUN/Creatinine Ratio 39.3     Anion Gap 6.0 mmol/L     Narrative:      GFR Normal >60  Chronic Kidney Disease <60  Kidney Failure <15      Manual Differential [[id number]]  (Abnormal) Collected: 06/11/21 0303    Specimen: Blood Updated: 06/11/21 0419     Neutrophil % 67.0 %      Lymphocyte % 6.0 %      Monocyte % 7.0 %      Bands %  18.0 %      Metamyelocyte % 1.0 %      Myelocyte % 1.0 %      Neutrophils Absolute 11.73 10*3/mm3      Lymphocytes Absolute 0.83 10*3/mm3      Monocytes Absolute 0.97 10*3/mm3      nRBC 3.0 /100 WBC      Macrocytes Large/3+     Poikilocytes Large/3+     WBC Morphology Normal     Platelet Estimate Adequate    CBC & Differential [[id number]]  (Abnormal) Collected: 06/11/21 0303    Specimen: Blood Updated: 06/11/21 0419    Narrative:      The following orders were created for panel order CBC & Differential.  Procedure                               Abnormality         Status                     ---------                               -----------         ------                     Scan Slide[[id number]]                                       Final result               CBC Auto Differential[[id number]]        Abnormal             Final result                 Please view results for these tests on the individual orders.    Scan Slide [[id number]] Collected: 06/11/21 0303    Specimen: Blood Updated: 06/11/21 0419     Scan Slide --     Comment: See Manual Differential Results       CBC Auto Differential [[id number]]  (Abnormal) Collected: 06/11/21 0303    Specimen: Blood Updated: 06/11/21 0419     WBC 13.80 10*3/mm3      RBC 1.90 10*6/mm3      Hemoglobin 6.9 g/dL      Hematocrit 21.0 %      .5 fL      MCH 36.5 pg      MCHC 33.1 g/dL      RDW 14.8 %      RDW-SD 57.3 fl      MPV 8.1 fL      Platelets 161 10*3/mm3     Narrative:      The previously reported component NRBC is no longer being reported. Previous result was 2.0 /100 WBC (Reference Range: 0.0-0.2 /100 WBC) on 6/11/2021 at 0358 EDT.    POC Glucose Once [[id number]]  (Abnormal) Collected: 06/10/21 1943    Specimen: Blood Updated: 06/10/21 1944     Glucose 155 mg/dL      Comment: Serial Number: [id number]:  [id number]       POC Glucose Once [[id number]]  (Abnormal) Collected: 06/10/21 1704    Specimen: Blood Updated: 06/10/21 1704     Glucose 188 mg/dL      Comment: Serial Number: [id number]:  [id number]              Imaging Results (Last 24 Hours)     Procedure Component Value Units Date/Time    XR Chest 1 View [[id number]] Collected: 06/11/21 0738     Updated: 06/11/21 0742    Narrative:         DATE OF EXAM:   6/11/2021 4:39 AM     PROCEDURE:   XR CHEST 1 VW-     INDICATIONS:   Shortness of breath; R06.03-Acute respiratory distress; J98.01-Acute  bronchospasm; I50.9-Heart failure, unspecified; C34.91-Malignant  neoplasm of unspecified part of right bronchus or lung     COMPARISON:  6/10/2021     TECHNIQUE:   Portable chest radiograph.     FINDINGS:    There is a left-sided port catheter with the tip at the cavoatrial  junction. Sternotomy wires and changes of CABG noted. Heart size normal.  Large right perihilar mass unchanged. There is worsening  interstitial  opacities at the left lung that may relate to pneumonia or asymmetric  pulmonary edema. No pneumothorax. No large effusion.       Impression:      1. Mild worsening of diffuse interstitial opacities throughout the left  lung that may relate to infection or asymmetric pulmonary edema.  2. Redemonstration of 9 cm right upper lobe mass consistent with known  malignancy.  3. Stable additional chronic findings above.     Electronically Signed By-Saleem Ferguson MD On:6/11/2021 7:40 AM  This report was finalized on [id number] by  Saleem Ferguson MD.               I reviewed the patient's new clinical results.    Medication Review:   Scheduled Meds:acetylcysteine, 2 mL, Nebulization, BID - RT  ampicillin-sulbactam, 3 g, Intravenous, Q6H  aspirin, 81 mg, Oral, Daily  buprenorphine-naloxone, 1.5 tablet, Sublingual, Daily  docusate sodium, 100 mg, Oral, BID  enoxaparin, 40 mg, Subcutaneous, Q24H  epoetin asia-epbx, 40,000 Units, Subcutaneous, Weekly  gabapentin, 300 mg, Oral, TID  guaiFENesin, 600 mg, Oral, Q12H  insulin lispro, 0-7 Units, Subcutaneous, TID With Meals  ipratropium-albuterol, 3 mL, Nebulization, Q4H - RT  Shabbir, 1 packet, Oral, BID  lurasidone, 40 mg, Oral, Daily  megestrol acetate, 200 mg, Oral, TID  multivitamin with minerals, 1 tablet, Oral, Daily  nystatin, 5 mL, Swish & Swallow, 4x Daily  pantoprazole, 40 mg, Oral, Daily  polyethylene glycol, 17 g, Oral, Daily  predniSONE, 20 mg, Oral, Daily   Followed by  [START ON 6/13/2021] predniSONE, 10 mg, Oral, Daily  sertraline, 50 mg, Oral, Daily  spironolactone, 25 mg, Oral, Daily  vancomycin, 750 mg, Intravenous, Q12H  Zinc Oxide, , Apply externally, BID      Continuous Infusions:Pharmacy to dose vancomycin,       PRN Meds:.•  acetaminophen  •  albuterol  •  bisacodyl  •  calcium carbonate  •  dextrose  •  dextrose  •  glucagon (human recombinant)  •  hydrOXYzine  •  insulin lispro  •  ipratropium-albuterol  •  ketorolac  •  melatonin  •   ondansetron  •  Pharmacy to dose vancomycin  •  promethazine  •  [COMPLETED] Insert peripheral IV **AND** sodium chloride  •  sodium chloride     Assessment/Plan       Coronary artery disease involving native coronary artery of native heart without angina pectoris    Nonrheumatic aortic valve stenosis    Essential hypertension    Congestive heart failure (CMS/HCC)    Tobacco abuse    Squamous cell carcinoma of lung, right (CMS/HCC)    Acute-on-chronic respiratory failure (CMS/HCC)    Respiratory distress    Stage 2 skin ulcer of sacral region (CMS/HCC)      Coronary artery disease involving native coronary artery of native heart without angina pectoris    Nonrheumatic aortic valve stenosis    Congestive heart failure (CMS/HCC)  Dr Mora has evaluated. Echo with EF 65% left Ventricular dysfunction and prosthetic aortic valve present. R ventricular pressure from tricuspid regurge is normal       Tobacco abuse      Squamous cell carcinoma of lung, right (CMS/HCC)- Dr Lewis following. Patient has brain mets and is planning on resection with Dr Luna.      Anemia - GOGO / Myodysplatic syndrome - Dr Lewis following. Weekly Retacrit - getting 1 u PRBC  today or hgb 6.9      Acute-on-chronic respiratory failure (CMS/HCC)  Dr Groves following - He suggested nursing to NT suction today .     Pneumonia- possible post obstructive. Pt with large r lung mass. He is normally on 4 L o2 at home is on 10 L now. ID following with IV unasyn and IV vanc-       Stage 2 skin ulcer of sacral region (CMS/HCC)  Off loading with turns and skin barrier      Condition is guarded.     Plan for disposition: TBD-     Renetta Medina, VINCE  06/11/21  12:13 EDT

## 2021-06-11 NOTE — PROGRESS NOTES
"PULMONARY CRITICAL CARE Progress  NOTE      PATIENT IDENTIFICATION:  Name: Axel Ramirez  MRN: [id number]  :  1950     Age: 70 y.o.  Sex: male    DATE OF Note:  2021   Referring Physician: Ignacia De La Cruz DO                  Subjective:   Still weak cough    No nausea or vomiting, no change in bowel habit, no dysuria,  no new  skin rash or itching.      Objective:  tMax 24 hrs: Temp (24hrs), Av.7 °F (36.5 °C), Min:97.5 °F (36.4 °C), Max:98.1 °F (36.7 °C)      Vitals Ranges:   Temp:  [97.5 °F (36.4 °C)-98.1 °F (36.7 °C)] 97.5 °F (36.4 °C)  Heart Rate:  [] 88  Resp:  [16-22] 20  BP: (123-149)/(72-78) 149/78    Intake and Output Last 3 Shifts:   I/O last 3 completed shifts:  In: 600 [P.O.:600]  Out: -     Exam:  /78 (BP Location: Left arm, Patient Position: Lying)   Pulse 88   Temp 97.5 °F (36.4 °C) (Oral)   Resp 20   Ht 172.7 cm (68\")   Wt 68.5 kg (151 lb 0.2 oz)   SpO2 92%   BMI 22.96 kg/m²     General Appearance:   Alert awake follows simple commands  HEENT:  Normocephalic, without obvious abnormality, Conjunctiva/corneas clear,.  Normal external ear canals, Nares normal, no drainage     Neck:  Supple, symmetrical, trachea midline. No JVD.  Lungs /Chest wall:   Bilateral basal rhonchi, respirations unlabored symmetrical wall movement.     Heart:  Regular rate and rhythm, systolic murmur PMI left sternal border  Abdomen: Soft, non-tender, no masses, no organomegaly.    Extremities: Trace edema no clubbing or Cyanosis        Medications:    Current Facility-Administered Medications:   •  acetaminophen (TYLENOL) tablet 1,000 mg, 1,000 mg, Oral, Q6H PRN, Atkins, Eliz D, APRN  •  albuterol (PROVENTIL) nebulizer solution 0.083% 2.5 mg/3mL, 2.5 mg, Nebulization, Q2H PRN, Ignacia De La Cruz, , 2.5 mg at 21 0914  •  ampicillin-sulbactam (UNASYN) 3 g in sodium chloride 0.9 % 100 mL IVPB-MBP, 3 g, Intravenous, Q6H, Herminia Stanton, APRN, Last Rate: 0 mL/hr at 21 0319, " 3 g at 06/11/21 0302  •  aspirin EC tablet 81 mg, 81 mg, Oral, Daily, Igancia De La Cruz, , 81 mg at 06/10/21 0849  •  bisacodyl (DULCOLAX) suppository 10 mg, 10 mg, Rectal, Daily PRN, Ignacia De La Cruz, DO  •  buprenorphine-naloxone (SUBOXONE) 8-2 MG per SL tablet 1.5 tablet, 1.5 tablet, Sublingual, Daily, Ignacia De La Cruz, , 1.5 tablet at 06/10/21 0848  •  calcium carbonate (TUMS) chewable tablet 500 mg (200 mg elemental), 2 tablet, Oral, BID PRN, Ignacia De La Cruz DO, 2 tablet at 06/08/21 1604  •  dextrose (D50W) 25 g/ 50mL Intravenous Solution 25 g, 25 g, Intravenous, Q15 Min PRN, Ignacia De La Cruz,   •  dextrose (GLUTOSE) oral gel 15 g, 15 g, Oral, Q15 Min PRN, Ignacia De La Cruz DO  •  docusate sodium (COLACE) capsule 100 mg, 100 mg, Oral, BID, Ignacia De La Cruz, DO, 100 mg at 06/10/21 2147  •  enoxaparin (LOVENOX) syringe 40 mg, 40 mg, Subcutaneous, Q24H, Ignacia De La Cruz, DO, 40 mg at 06/10/21 1705  •  epoetin asia-epbx (RETACRIT) injection 40,000 Units, 40,000 Units, Subcutaneous, Weekly, Fatoumata Espinosa APRN, 40,000 Units at 06/08/21 1643  •  gabapentin (NEURONTIN) capsule 300 mg, 300 mg, Oral, TID, Ignacia De La Cruz, DO, 300 mg at 06/10/21 2147  •  glucagon (human recombinant) (GLUCAGEN DIAGNOSTIC) injection 1 mg, 1 mg, Subcutaneous, Q15 Min PRN, Ignacia De La Cruz, DO  •  guaiFENesin (MUCINEX) 12 hr tablet 600 mg, 600 mg, Oral, Q12H, Ignacia De La Cruz, DO, 600 mg at 06/10/21 2147  •  hydrOXYzine (ATARAX) tablet 25 mg, 25 mg, Oral, TID PRN, Eliz Andrews, APRN, 25 mg at 06/09/21 1309  •  insulin lispro (ADMELOG) injection 0-7 Units, 0-7 Units, Subcutaneous, TID With Meals, Ignacia De La Cruz DO, 2 Units at 06/10/21 1715  •  insulin lispro (ADMELOG) injection 0-7 Units, 0-7 Units, Subcutaneous, TID PRN, Ignacia De La Cruz DO  •  ipratropium-albuterol (DUO-NEB) nebulizer solution 3 mL, 3 mL, Nebulization, Q4H - RT, Ignacia De La Cruz DO, 3 mL at 06/11/21 0632  •  ipratropium-albuterol (DUO-NEB) nebulizer solution 3 mL, 3 mL,  Nebulization, Q1H PRN, Ignacia De La Cruz DO  •  Shabbir pack 1 packet, 1 packet, Oral, BID, Rachana Zacarias RD, 1 packet at 06/10/21 2158  •  ketorolac (TORADOL) injection 15 mg, 15 mg, Intravenous, Q6H PRN, Eliz Andrews APRN, 15 mg at 06/10/21 1251  •  lurasidone (LATUDA) tablet 40 mg, 40 mg, Oral, Daily, Ignacia De La Cruz DO, 40 mg at 06/10/21 0848  •  megestrol acetate (MEGACE) oral suspension 200 mg, 200 mg, Oral, TID, Ignacia De La Cruz DO, 200 mg at 06/10/21 2147  •  melatonin tablet 5 mg, 5 mg, Oral, Nightly PRN, Ignacia De La Cruz DO  •  multivitamin with minerals 1 tablet, 1 tablet, Oral, Daily, Ignacia De La Cruz DO, 1 tablet at 06/10/21 0848  •  nystatin (MYCOSTATIN) 225748 UNIT/ML suspension 500,000 Units, 5 mL, Swish & Swallow, 4x Daily, Ignacia De La Cruz DO, 500,000 Units at 06/10/21 2158  •  ondansetron (ZOFRAN) injection 4 mg, 4 mg, Intravenous, Q6H PRN, Lianna Groves MD, 4 mg at 06/09/21 1326  •  pantoprazole (PROTONIX) EC tablet 40 mg, 40 mg, Oral, Daily, Ignacia De La Cruz DO, 40 mg at 06/10/21 0848  •  Pharmacy to dose vancomycin, , Does not apply, Continuous PRN, Herminia Stanton, APRN  •  polyethylene glycol (MIRALAX) packet 17 g, 17 g, Oral, Daily, Ignacia De La Cruz DO, 17 g at 06/10/21 0847  •  [COMPLETED] predniSONE (DELTASONE) tablet 30 mg, 30 mg, Oral, Daily, 30 mg at 06/10/21 0849 **FOLLOWED BY** predniSONE (DELTASONE) tablet 20 mg, 20 mg, Oral, Daily **FOLLOWED BY** [START ON 6/13/2021] predniSONE (DELTASONE) tablet 10 mg, 10 mg, Oral, Daily, Lianna Groves MD  •  promethazine (PHENERGAN) tablet 25 mg, 25 mg, Oral, Q4H PRN, Ignacia De La Cruz DO, 25 mg at 06/1950  •  sertraline (ZOLOFT) tablet 50 mg, 50 mg, Oral, Daily, Ignacia De La Cruz DO, 50 mg at 06/10/21 0848  •  [COMPLETED] Insert peripheral IV, , , Once **AND** sodium chloride 0.9 % flush 10 mL, 10 mL, Intravenous, PRN, Ignacia De La Cruz DO, 10 mL at 06/09/21 2104  •  sodium chloride 0.9 % flush 10 mL, 10 mL, Intravenous, PRN, Ignacia De La rCuz  A, DO  •  spironolactone (ALDACTONE) tablet 25 mg, 25 mg, Oral, Daily, Ignacia De La Cruz DO, 25 mg at 06/10/21 0849  •  vancomycin 750 mg in sodium chloride 0.9 % 250 mL IVPB, 750 mg, Intravenous, Q12H, Herminia Stanton APRN, 750 mg at 06/11/21 0343  •  Zinc Oxide 16 % ointment, , Apply externally, BID, Carina Luevano APRN, Given at 06/10/21 2959    Data Review:  All labs (24hrs):   Recent Results (from the past 24 hour(s))   POC Glucose Once    Collection Time: 06/10/21 11:31 AM    Specimen: Blood   Result Value Ref Range    Glucose 132 (H) 70 - 105 mg/dL   POC Glucose Once    Collection Time: 06/10/21  5:04 PM    Specimen: Blood   Result Value Ref Range    Glucose 188 (H) 70 - 105 mg/dL   POC Glucose Once    Collection Time: 06/10/21  7:43 PM    Specimen: Blood   Result Value Ref Range    Glucose 155 (H) 70 - 105 mg/dL   Basic Metabolic Panel    Collection Time: 06/11/21  3:03 AM    Specimen: Blood   Result Value Ref Range    Glucose 101 (H) 65 - 99 mg/dL    BUN 24 (H) 8 - 23 mg/dL    Creatinine 0.61 (L) 0.76 - 1.27 mg/dL    Sodium 135 (L) 136 - 145 mmol/L    Potassium 4.5 3.5 - 5.2 mmol/L    Chloride 102 98 - 107 mmol/L    CO2 27.0 22.0 - 29.0 mmol/L    Calcium 8.1 (L) 8.6 - 10.5 mg/dL    eGFR Non African Amer 131 >60 mL/min/1.73    BUN/Creatinine Ratio 39.3 (H) 7.0 - 25.0    Anion Gap 6.0 5.0 - 15.0 mmol/L   CBC Auto Differential    Collection Time: 06/11/21  3:03 AM    Specimen: Blood   Result Value Ref Range    WBC 13.80 (H) 3.40 - 10.80 10*3/mm3    RBC 1.90 (L) 4.14 - 5.80 10*6/mm3    Hemoglobin 6.9 (C) 13.0 - 17.7 g/dL    Hematocrit 21.0 (L) 37.5 - 51.0 %    .5 (H) 79.0 - 97.0 fL    MCH 36.5 (H) 26.6 - 33.0 pg    MCHC 33.1 31.5 - 35.7 g/dL    RDW 14.8 12.3 - 15.4 %    RDW-SD 57.3 (H) 37.0 - 54.0 fl    MPV 8.1 6.0 - 12.0 fL    Platelets 161 140 - 450 10*3/mm3   Scan Slide    Collection Time: 06/11/21  3:03 AM    Specimen: Blood   Result Value Ref Range    Scan Slide     Manual Differential     Collection Time: 06/11/21  3:03 AM    Specimen: Blood   Result Value Ref Range    Neutrophil % 67.0 42.7 - 76.0 %    Lymphocyte % 6.0 (L) 19.6 - 45.3 %    Monocyte % 7.0 5.0 - 12.0 %    Bands %  18.0 (H) 0.0 - 5.0 %    Metamyelocyte % 1.0 (H) 0.0 - 0.0 %    Myelocyte % 1.0 (H) 0.0 - 0.0 %    Neutrophils Absolute 11.73 (H) 1.70 - 7.00 10*3/mm3    Lymphocytes Absolute 0.83 0.70 - 3.10 10*3/mm3    Monocytes Absolute 0.97 (H) 0.10 - 0.90 10*3/mm3    nRBC 3.0 (H) 0.0 - 0.2 /100 WBC    Macrocytes Large/3+ None Seen    Poikilocytes Large/3+ None Seen    WBC Morphology Normal Normal    Platelet Estimate Adequate Normal   Type & Screen    Collection Time: 06/11/21  4:51 AM    Specimen: Blood   Result Value Ref Range    ABO Type O     RH type Positive     Antibody Screen Negative     T&S Expiration Date 6/14/2021 11:59:59 PM    Prepare RBC, 1 Units    Collection Time: 06/11/21  6:37 AM   Result Value Ref Range    Product Code B2618M55     Unit Number [id number]     UNIT  ABO O     UNIT  RH POS     Crossmatch Interpretation Compatible     Dispense Status XM     Blood Expiration Date [id number]     Blood Type Barcode 5100    POC Glucose Once    Collection Time: 06/11/21  7:36 AM    Specimen: Blood   Result Value Ref Range    Glucose 97 70 - 105 mg/dL        Imaging:  XR Chest 1 View  Narrative:    DATE OF EXAM:   6/11/2021 4:39 AM     PROCEDURE:   XR CHEST 1 VW-     INDICATIONS:   Shortness of breath; R06.03-Acute respiratory distress; J98.01-Acute  bronchospasm; I50.9-Heart failure, unspecified; C34.91-Malignant  neoplasm of unspecified part of right bronchus or lung     COMPARISON:  6/10/2021     TECHNIQUE:   Portable chest radiograph.     FINDINGS:    There is a left-sided port catheter with the tip at the cavoatrial  junction. Sternotomy wires and changes of CABG noted. Heart size normal.  Large right perihilar mass unchanged. There is worsening interstitial  opacities at the left lung that may relate to pneumonia or  asymmetric  pulmonary edema. No pneumothorax. No large effusion.     Impression: 1. Mild worsening of diffuse interstitial opacities throughout the left  lung that may relate to infection or asymmetric pulmonary edema.  2. Redemonstration of 9 cm right upper lobe mass consistent with known  malignancy.  3. Stable additional chronic findings above.     Electronically Signed By-Saleem Ferguson MD On:6/11/2021 7:40 AM  This report was finalized on [id number] by  Saleem Ferguson MD.       ASSESSMENT:  Acute-on-chronic respiratory distress  Pneumonia most likely post obstruction  COPD exacerbation  Metastatic lung cancer squamous cell  CHF  Opioid dependence  Stage 2 skin ulcer of sacral region       PLAN:  Chest PT Mucomyst encouraged use I-S flutter valve  Continue  Antibiotics  Bronchodilator  Inhaled corticosteroids  IV steroids will wean it down  IS/Flutter valve  Electrolytes/ glycemic control  DVT and GI prophylaxis.   . Poor long-term prognosis    Total Critical care time in direct medical management (   ) minutes  Lianna Groves MD. D, ABSM.     6/11/2021  08:04 EDT

## 2021-06-11 NOTE — PROGRESS NOTES
Hematology/Oncology Inpatient Progress Note    PATIENT NAME: Axel Ramirez  : 1950  MRN: [id number]    CHIEF COMPLAINT: Metastatic squamous cell carcinoma of the right upper lobe lung and MDS    HISTORY OF PRESENT ILLNESS:  70 y.o. male presented to [facility] ER on 2021 with shortness of air and hypoxia.  He had an acute onset on the day of admission of worsening shortness of breath with O2 saturation of 76% on 4 L/min O2.  The ECF called EMS.  He was treated with nebulizer on the way to the ER.  Chest x-ray showed left lung pneumonia and a right middle lobe lung mass.  White count 10.1, hemoglobin 9.2, .7, platelets 115k.  D-dimer was 2.99 (< 0.6).  Chest CT showed a 9 cm right upper lobe lung mass occluding the right upper lobe bronchus with complete right upper and right middle lobe collapse.  There was no pulmonary emboli.  There was possible aspiration pneumonia and stable mediastinal lymphadenopathy.  A new 12 mm nodule was seen in the left lower lobe.  Adrenal glands were normal.  He was started on steroids and Zosyn.  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  Recently started on second line therapy Taxol/carboplatin/ipilimumab and Nivolumab with Neulasta support on 2021.     21  Hematology/Oncology was consulted for his metastatic squamous cell carcinoma of the right upper lobe lung.  He is an established patient.  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  Initially treated with concomitant weekly chemotherapy (paclitaxel and carboplatin x7) and radiation therapy from 2020. He completed radiation therapy on 2021.  He completed the weekly portion of his chemotherapy on 2021.  He received 1 cycle of every 21-day carboplatin /Taxol with  Neulasta support on 2/9/2021.  He subsequently was admitted to [facility], for hypoxia with respiratory failure requiring intubation.  MRI of the brain showed a left occipital lobe lesion with vasogenic edema.  He received radiation, x1 fraction, on 3/3/2021 to the left occipital lobe. One month later, a brain MRI showed enlargement of the left occipital lobe lesion.  PET scan showed the right upper lobe lung mass to be 10 cm in size with a decrease in the peripheral FDG uptake suggesting interval response to treatment.  There was a decrease in the size and FDG uptake in the mediastinal lymph nodes.  There was no distant metastatic disease.  The lesion in the left occipital lobe was FDG avid.  Right upper lobe lung collapse had improved with aeration.  He was referred to Dr. Tay for evaluation of his brain MRI which was felt to be due to to post SRS changes and not progression.  He was seen in follow-up with plan to start chemotherapy with immunotherapy.  Paclitaxel and carboplatin doses were decreased by 20% due to pancytopenia with prior treatment.  Repeat brain MRI, on 5/13/2021, showed an increase in the size of the left occipital lobe with an increase in surrounding edema.  There were no new masses.  The patient was referred to Dr. Luna for surgical resection versus MRI SPECT/perfusion at [facility].  Dr. Luna felt the lesion was a combination of necrosis and tumor progression and recommended surgical resection.  He started treatment on 5/24/2021 (chemotherapy plus immunotherapy) as his surgical resection was to be done after medical clearance.  He had a return appointment with Dr. Luna on 6/9/2021 following an MRI of his brain.  -MDS-diagnosed January 2021.  He was anemic and was found to have GOGO and malabsorption on oral therapy.  His bone marrow revealed increased iron storage and mild erythroid atypia in January 2021.  Stool heme was negative on 5/13/2021. He has received IV iron but has  never required Retacrit.  Last office visit 5/24/2021, complaining of hypoxia and tachypnea with anxiety.  White count 9.05, hemoglobin 11.34, .8 and platelets 220.  He was starting to gain weight and Megace was continued.  For his cough and hypoxia, he was treated with Augmentin and albuterol nebulizer.  He had oral thrush which was treated with nystatin swish and swallow.  TSH 0.287 (0.282-4.0), iron 62 (), ferritin 4185 (), iron saturation 32 (20-55), TIBC 196 (228-428).     PCP: Sandoval Stevenson, FNP     INTERVAL HISTORY:  • 6/8/2021-Retacrit 40,000 units subcu weekly initiated.  Hemoglobin 7.2.  • 6/9/2021-PT 11.5 (9.6-11.7), PTT 27.7 (24-31).  Hemoglobin 7.2, platelets 118,000.  Haptoglobin 465 ().  CMV IgM <30 (<30).  Cardiac ejection fraction 65%.   • 6/10/2021-EBV VCA IgM less than 36 (less than 36).  • 6/11/2021-hemoglobin 6.9.  1 unit pRBCs given.  WBC 13.8 with 18% bands.    History of present illness reviewed since last visit and changes noted on 06/11/21.    Subjective   Denies problems.  Remains on 10 L of oxygen via nasal cannula.    ROS:  Review of Systems   Constitutional: Positive for fatigue. Negative for activity change, chills, fever and unexpected weight change.   HENT: Negative for congestion, dental problem, hearing loss, mouth sores, nosebleeds, sore throat and trouble swallowing.    Eyes: Negative for photophobia and visual disturbance.   Respiratory: Negative for cough, choking, chest tightness and shortness of breath.    Cardiovascular: Negative for chest pain, palpitations and leg swelling.   Gastrointestinal: Negative for abdominal distention, abdominal pain, blood in stool, constipation, diarrhea, nausea and vomiting.   Endocrine: Negative for cold intolerance and heat intolerance.   Genitourinary: Negative for decreased urine volume, difficulty urinating, frequency, hematuria and urgency.   Musculoskeletal: Negative for arthralgias and gait problem.   Skin:  Negative for rash and wound.   Neurological: Negative for dizziness, tremors, weakness, light-headedness, numbness and headaches.   Hematological: Negative for adenopathy. Does not bruise/bleed easily.   Psychiatric/Behavioral: Negative for confusion and hallucinations. The patient is not nervous/anxious.    All other systems reviewed and are negative.       MEDICATIONS:    Scheduled Meds:  ampicillin-sulbactam, 3 g, Intravenous, Q6H  aspirin, 81 mg, Oral, Daily  buprenorphine-naloxone, 1.5 tablet, Sublingual, Daily  docusate sodium, 100 mg, Oral, BID  enoxaparin, 40 mg, Subcutaneous, Q24H  epoetin asia-epbx, 40,000 Units, Subcutaneous, Weekly  gabapentin, 300 mg, Oral, TID  guaiFENesin, 600 mg, Oral, Q12H  insulin lispro, 0-7 Units, Subcutaneous, TID With Meals  ipratropium-albuterol, 3 mL, Nebulization, Q4H - RT  Shabbir, 1 packet, Oral, BID  lurasidone, 40 mg, Oral, Daily  megestrol acetate, 200 mg, Oral, TID  multivitamin with minerals, 1 tablet, Oral, Daily  nystatin, 5 mL, Swish & Swallow, 4x Daily  pantoprazole, 40 mg, Oral, Daily  polyethylene glycol, 17 g, Oral, Daily  predniSONE, 20 mg, Oral, Daily   Followed by  [START ON 6/13/2021] predniSONE, 10 mg, Oral, Daily  sertraline, 50 mg, Oral, Daily  spironolactone, 25 mg, Oral, Daily  vancomycin, 750 mg, Intravenous, Q12H  Zinc Oxide, , Apply externally, BID       Continuous Infusions:  Pharmacy to dose vancomycin,        PRN Meds:  •  acetaminophen  •  albuterol  •  bisacodyl  •  calcium carbonate  •  dextrose  •  dextrose  •  glucagon (human recombinant)  •  hydrOXYzine  •  insulin lispro  •  ipratropium-albuterol  •  ketorolac  •  melatonin  •  ondansetron  •  Pharmacy to dose vancomycin  •  promethazine  •  [COMPLETED] Insert peripheral IV **AND** sodium chloride  •  sodium chloride     ALLERGIES:  No Known Allergies    Objective    VITALS:   /78 (BP Location: Left arm, Patient Position: Lying)   Pulse 88   Temp 97.5 °F (36.4 °C) (Oral)   Resp 20   " Ht 172.7 cm (68\")   Wt 68.5 kg (151 lb 0.2 oz)   SpO2 92%   BMI 22.96 kg/m²     PHYSICAL EXAM:  Physical Exam  Vitals and nursing note reviewed.   Constitutional:       General: He is not in acute distress.     Appearance: Normal appearance. He is well-developed and normal weight. He is not diaphoretic.   HENT:      Head: Normocephalic and atraumatic.      Comments: Frontal male pattern baldness.       Right Ear: External ear normal.      Left Ear: External ear normal.      Nose: Nose normal.      Comments: O2 by NC     Mouth/Throat:      Mouth: Mucous membranes are moist.      Pharynx: Oropharynx is clear. No oropharyngeal exudate or posterior oropharyngeal erythema.      Comments: Edentulous.  Eyes:      General: No scleral icterus.     Extraocular Movements: Extraocular movements intact.      Conjunctiva/sclera: Conjunctivae normal.      Pupils: Pupils are equal, round, and reactive to light.   Cardiovascular:      Rate and Rhythm: Normal rate and regular rhythm.      Heart sounds: Normal heart sounds. No murmur heard.        Comments: Monitor leads.  Midline vertical chest wall scar.  Left chest wall port.  Pulmonary:      Effort: Pulmonary effort is normal. No respiratory distress.      Breath sounds: No stridor. Rhonchi present. No wheezing or rales.   Abdominal:      General: Abdomen is flat. Bowel sounds are normal. There is no distension.      Palpations: Abdomen is soft. There is no mass.      Tenderness: There is no abdominal tenderness. There is no guarding.   Genitourinary:     Comments: Deferred   Musculoskeletal:         General: No swelling, tenderness or deformity. Normal range of motion.      Cervical back: Normal range of motion and neck supple.      Right lower leg: No edema.      Left lower leg: No edema.   Lymphadenopathy:      Cervical: No cervical adenopathy.      Upper Body:      Right upper body: No supraclavicular adenopathy.      Left upper body: No supraclavicular adenopathy. "   Skin:     General: Skin is warm and dry.      Coloration: Skin is not pale.      Findings: Bruising (On arms.) present. No erythema or rash.      Comments: Left chest wall port.   Neurological:      General: No focal deficit present.      Mental Status: He is alert and oriented to person, place, and time.      Coordination: Coordination normal.   Psychiatric:         Mood and Affect: Mood normal.         Behavior: Behavior normal.         Thought Content: Thought content normal.         Judgment: Judgment normal.           RECENT LABS:  Lab Results (last 24 hours)     Procedure Component Value Units Date/Time    POC Glucose Once [[id number]]  (Normal) Collected: 06/11/21 0736    Specimen: Blood Updated: 06/11/21 0736     Glucose 97 mg/dL      Comment: Serial Number: [id number]:  [id number]       Blood Culture - Blood, Arm, Left [[id number]] Collected: 06/07/21 0705    Specimen: Blood from Arm, Left Updated: 06/11/21 0730     Blood Culture No growth at 4 days    Blood Culture - Blood, Arm, Right [[id number]] Collected: 06/07/21 0705    Specimen: Blood from Arm, Right Updated: 06/11/21 0730     Blood Culture No growth at 4 days    Basic Metabolic Panel [[id number]]  (Abnormal) Collected: 06/11/21 0303    Specimen: Blood Updated: 06/11/21 0431     Glucose 101 mg/dL      BUN 24 mg/dL      Creatinine 0.61 mg/dL      Sodium 135 mmol/L      Potassium 4.5 mmol/L      Chloride 102 mmol/L      CO2 27.0 mmol/L      Calcium 8.1 mg/dL      eGFR Non African Amer 131 mL/min/1.73      BUN/Creatinine Ratio 39.3     Anion Gap 6.0 mmol/L     Narrative:      GFR Normal >60  Chronic Kidney Disease <60  Kidney Failure <15      Manual Differential [[id number]]  (Abnormal) Collected: 06/11/21 0303    Specimen: Blood Updated: 06/11/21 0419     Neutrophil % 67.0 %      Lymphocyte % 6.0 %      Monocyte % 7.0 %      Bands %  18.0 %      Metamyelocyte % 1.0 %      Myelocyte % 1.0 %      Neutrophils Absolute 11.73 10*3/mm3      Lymphocytes  Absolute 0.83 10*3/mm3      Monocytes Absolute 0.97 10*3/mm3      nRBC 3.0 /100 WBC      Macrocytes Large/3+     Poikilocytes Large/3+     WBC Morphology Normal     Platelet Estimate Adequate    CBC & Differential [[id number]]  (Abnormal) Collected: 06/11/21 0303    Specimen: Blood Updated: 06/11/21 0419    Narrative:      The following orders were created for panel order CBC & Differential.  Procedure                               Abnormality         Status                     ---------                               -----------         ------                     Scan Slide[[id number]]                                       Final result               CBC Auto Differential[[id number]]        Abnormal            Final result                 Please view results for these tests on the individual orders.    Scan Slide [[id number]] Collected: 06/11/21 0303    Specimen: Blood Updated: 06/11/21 0419     Scan Slide --     Comment: See Manual Differential Results       CBC Auto Differential [[id number]]  (Abnormal) Collected: 06/11/21 0303    Specimen: Blood Updated: 06/11/21 0419     WBC 13.80 10*3/mm3      RBC 1.90 10*6/mm3      Hemoglobin 6.9 g/dL      Hematocrit 21.0 %      .5 fL      MCH 36.5 pg      MCHC 33.1 g/dL      RDW 14.8 %      RDW-SD 57.3 fl      MPV 8.1 fL      Platelets 161 10*3/mm3     Narrative:      The previously reported component NRBC is no longer being reported. Previous result was 2.0 /100 WBC (Reference Range: 0.0-0.2 /100 WBC) on 6/11/2021 at [facility] EDT.    POC Glucose Once [[id number]]  (Abnormal) Collected: 06/10/21 1943    Specimen: Blood Updated: 06/10/21 1944     Glucose 155 mg/dL      Comment: Serial Number: [id number]:  [id number]       POC Glucose Once [[id number]]  (Abnormal) Collected: 06/10/21 1704    Specimen: Blood Updated: 06/10/21 1704     Glucose 188 mg/dL      Comment: Serial Number: [id number]:  [id number]             PENDING RESULTS: Reticulocyte count, iron profile,  ferritin, B12, folate, copper, stool heme.    IMAGING REVIEWED:  XR Chest 1 View    Result Date: 6/11/2021  1. Mild worsening of diffuse interstitial opacities throughout the left lung that may relate to infection or asymmetric pulmonary edema. 2. Redemonstration of 9 cm right upper lobe mass consistent with known malignancy. 3. Stable additional chronic findings above.  Electronically Signed By-Saleem Ferguson MD On:6/11/2021 7:40 AM This report was finalized on [id number] by  Saleem Ferguson MD.    XR Chest 1 View    Result Date: 6/10/2021  1. Similar appearance of the right-sided perihilar mass likely related to known malignancy. 2. Patchy interstitial opacities throughout the left lung likely representing infection on superimposed chronic interstitial lung disease.  Electronically Signed By-Daryl Hunter MD On:6/10/2021 8:01 AM This report was finalized on [id number] by  Daryl Hunter MD.      I have reviewed the patient's labs, imaging, reports, and other clinician documentation.    Assessment/Plan   ASSESSMENT:  1. Squamous cell carcinoma right upper lobe lung with new brain met-s/p chemoradiation completed 1/2021, SRS to left occipital lobe lesion and now on chemoimmunotherapy (carboplatin/Taxol plus ipilimumab/nivolumab with Neulasta support) dosed 5/24/2021.  PET scan and April 2021 showed he was responding to treatment.  Ipilimumab and nivolumab were added to his current therapy due to progression in his brain.  Due to hospitalization and brain radiation, he did not receive systemic therapy from 2/9/2021 until 5/24/2021.  Planned to undergo surgical resection of brain metastasis by Dr. Luna.  Ipilimumab and nivolumab are known to cross blood-brain barrier.  2. Anemia/Myelodysplastic syndrome/GOGO with malabsorption/Chemotherapy-induced anemia-he has never required Retacrit as an outpatient.  Recent iron studies showed correction of GOGO.  Acute fall in hemoglobin due to recent chemotherapy.   Macrocytosis due to MDS.  Will treat with Retacrit.  Haptoglobin high.  On multivitamin and Protonix.    3. Acute thrombocytopenia-likely chemotherapy-induced.  Coags normal and CMV/EBV IgM normal.  Normalized.   4. Respiratory failure /Pneumonia/COPD-on Vanco, Unasyn and steroids.  Per ID.    5. Weight loss/loss of appetite-improving as outpatient on Megace.  Now on steroids.  6. Oral thrush-diagnosed as outpatient.  Now on steroids, will continue nystatin.  7. Elevated LFTs-acute and mild.  Stable.    8. Sacral decub-wound care to follow.  On antibiotics.  9. CHF/s/p aortic valve replacement -echocardiogram showed no vegetation and EF 65%.  On aspirin and Lovenox prophylaxis.     PLAN  1. Follow CBC.   2. Outpatient MRI brain per Dr. Luna.      3. Continue weekly Retacrit.   4. Pulmonary toilet.    5. Transfuse as needed hemoglobin < 7-1 U today.  6. Further anemia work-up.  7. Physical therapy.                 I discussed the patients findings and my recommendations with patient.    Electronically signed by Axel Lewis MD, 06/11/21, 7:59 AM EDT.

## 2021-06-11 NOTE — PROGRESS NOTES
Nutrition Services    Patient Name: Axel Ramirez  YOB: 1950  MRN: [id number]  Admission date: 6/7/2021    PPE Documentation        PPE Worn By Provider Did not enter patient room during this assessment   PPE Worn By Patient  N/A     PROGRESS NOTE      Encounter Information: Progress note to check on pt intakes. Patient eating well, 75% average full meals on pureed diet. No weight loss this admission. Pt ordered Boost Plus TID due to increased needs from COPD exacerbation. Pt diagnosed malnutrition this admission. Will continue to monitor.         PO Diet: Diet Texture; Pureed Diet   PO Supplements: Boost Plus TID   PO Intake:  75% full meals       Nutrition support orders: -    Nutrition support review: -       Labs (reviewed below): Reviewed        GI Function:  No BM recorded yet this admission (x 4 days) - colace and miralax given this AM       Nutrition Intervention: Continue current diet and ONS, monitor intakes.       Results from last 7 days   Lab Units 06/11/21  0303 06/10/21  0636 06/09/21  0320 06/08/21  0624 06/07/21  0500   SODIUM mmol/L 135*  --  140 139 134*   POTASSIUM mmol/L 4.5  --  4.2 4.6 4.1   CHLORIDE mmol/L 102  --  103 100 97*   CO2 mmol/L 27.0  --  29.0 30.0* 26.0   BUN mg/dL 24*  --  25* 32* 24*   CREATININE mg/dL 0.61* 0.64* 0.63* 0.93 0.73*   CALCIUM mg/dL 8.1*  --  8.6 9.0 9.2   BILIRUBIN mg/dL  --   --   --  0.6 0.7   ALK PHOS U/L  --   --   --  89 109   ALT (SGPT) U/L  --   --   --  64* 87*   AST (SGOT) U/L  --   --   --  41* 58*   GLUCOSE mg/dL 101*  --  123* 127* 116*     Results from last 7 days   Lab Units 06/11/21  0303   HEMOGLOBIN g/dL 6.9*   HEMATOCRIT % 21.0*     COVID19   Date Value Ref Range Status   06/07/2021 Not Detected Not Detected - Ref. Range Final     No results found for: HGBA1C    RD to follow up per protocol.    Electronically signed by:  Cathi Bustamante RD  06/11/21 10:41 EDT

## 2021-06-11 NOTE — PROGRESS NOTES
Infectious Diseases Progress Note      LOS: 4 days   Patient Care Team:  Sandoval Stevenson FNP as PCP - General  Sandoval Stevenson FNP as PCP - Family Medicine  Axel Lewis MD as Consulting Physician (Hematology and Oncology)  Iglesia Springer MD as Consulting Physician (Cardiology)    Chief Complaint: Shortness of breath, fatigue    Subjective        The patient has been afebrile for the last 24 hours.  The patient is on 10 L of oxygen by high flow oxygen, hemodynamically stable, and is tolerating antimicrobial therapy.       Review of Systems:   Review of Systems   Constitutional: Positive for fatigue.   HENT: Negative.    Eyes: Negative.    Respiratory: Positive for shortness of breath.    Cardiovascular: Negative.    Gastrointestinal: Negative.    Endocrine: Negative.    Genitourinary: Negative.    Musculoskeletal: Negative.    Skin: Negative.    Neurological: Negative.    Psychiatric/Behavioral: Negative.    All other systems reviewed and are negative.       Objective     Vital Signs  Temp:  [97.5 °F (36.4 °C)-97.9 °F (36.6 °C)] 97.8 °F (36.6 °C)  Heart Rate:  [83-98] 93  Resp:  [20-22] 22  BP: (134-149)/(71-80) 134/71    Physical Exam:  Physical Exam  Vitals and nursing note reviewed.   Constitutional:       General: He is not in acute distress.     Appearance: He is well-developed. He is ill-appearing. He is not diaphoretic.      Comments: Cachectic   HENT:      Head: Normocephalic and atraumatic.      Mouth/Throat:      Comments: Mild oral thrush  Eyes:      Extraocular Movements: Extraocular movements intact.      Conjunctiva/sclera: Conjunctivae normal.      Pupils: Pupils are equal, round, and reactive to light.   Cardiovascular:      Rate and Rhythm: Normal rate and regular rhythm.      Heart sounds: Normal heart sounds, S1 normal and S2 normal.   Pulmonary:      Effort: Pulmonary effort is normal. No respiratory distress.      Breath sounds: No stridor. Rales present. No wheezing.       Comments: Diminished throughout  Abdominal:      General: Bowel sounds are normal. There is no distension.      Palpations: Abdomen is soft. There is no mass.      Tenderness: There is no abdominal tenderness. There is no guarding.   Musculoskeletal:         General: No deformity. Normal range of motion.      Cervical back: Neck supple.   Skin:     General: Skin is warm and dry.      Coloration: Skin is not pale.      Findings: No erythema or rash.      Comments: Port in place   Neurological:      Mental Status: He is alert and oriented to person, place, and time.      Cranial Nerves: No cranial nerve deficit.   Psychiatric:         Mood and Affect: Mood normal.          Results Review:    I have reviewed all clinical data, test, lab, and imaging results.     Radiology  XR Chest 1 View    Result Date: 6/11/2021   DATE OF EXAM: 6/11/2021 4:39 AM  PROCEDURE: XR CHEST 1 VW-  INDICATIONS: Shortness of breath; R06.03-Acute respiratory distress; J98.01-Acute bronchospasm; I50.9-Heart failure, unspecified; C34.91-Malignant neoplasm of unspecified part of right bronchus or lung  COMPARISON: 6/10/2021  TECHNIQUE: Portable chest radiograph.  FINDINGS:  There is a left-sided port catheter with the tip at the cavoatrial junction. Sternotomy wires and changes of CABG noted. Heart size normal. Large right perihilar mass unchanged. There is worsening interstitial opacities at the left lung that may relate to pneumonia or asymmetric pulmonary edema. No pneumothorax. No large effusion.      1. Mild worsening of diffuse interstitial opacities throughout the left lung that may relate to infection or asymmetric pulmonary edema. 2. Redemonstration of 9 cm right upper lobe mass consistent with known malignancy. 3. Stable additional chronic findings above.  Electronically Signed By-Saleem Ferguson MD On:6/11/2021 7:40 AM This report was finalized on [id number] by  Saleem Ferguson MD.      Cardiology    Laboratory    Results from last 7 days    Lab Units 06/11/21  0303 06/09/21  0503 06/08/21  1630 06/08/21  0624 06/07/21  0500   WBC 10*3/mm3 13.80* 10.90*  --  8.80 10.10   HEMOGLOBIN g/dL 6.9* 7.2* 7.8* 7.2* 9.2*   HEMATOCRIT % 21.0* 21.3* 23.1* 21.1* 27.3*   PLATELETS 10*3/mm3 161 118*  --  103* 115*     Results from last 7 days   Lab Units 06/11/21  0303 06/10/21  0636 06/09/21  0320 06/08/21  0624 06/07/21  0500   SODIUM mmol/L 135*  --  140 139 134*   POTASSIUM mmol/L 4.5  --  4.2 4.6 4.1   CHLORIDE mmol/L 102  --  103 100 97*   CO2 mmol/L 27.0  --  29.0 30.0* 26.0   BUN mg/dL 24*  --  25* 32* 24*   CREATININE mg/dL 0.61* 0.64* 0.63* 0.93 0.73*   GLUCOSE mg/dL 101*  --  123* 127* 116*   ALBUMIN g/dL  --   --   --  2.50* 2.50*   BILIRUBIN mg/dL  --   --   --  0.6 0.7   ALK PHOS U/L  --   --   --  89 109   AST (SGOT) U/L  --   --   --  41* 58*   ALT (SGPT) U/L  --   --   --  64* 87*   CALCIUM mg/dL 8.1*  --  8.6 9.0 9.2                 Microbiology   Microbiology Results (last 10 days)     Procedure Component Value - Date/Time    MRSA Screen, PCR (Inpatient) - Swab, Nares [[id number]]  (Abnormal) Collected: 06/07/21 1701    Lab Status: Final result Specimen: Swab from Nares Updated: 06/07/21 1941     MRSA PCR MRSA Detected    Blood Culture - Blood, Arm, Left [[id number]] Collected: 06/07/21 0705    Lab Status: Preliminary result Specimen: Blood from Arm, Left Updated: 06/11/21 0730     Blood Culture No growth at 4 days    Blood Culture - Blood, Arm, Right [[id number]] Collected: 06/07/21 0705    Lab Status: Preliminary result Specimen: Blood from Arm, Right Updated: 06/11/21 0730     Blood Culture No growth at 4 days    Respiratory Panel PCR w/COVID-19(SARS-CoV-2) [facility]Y/[facility] In-House, NP Swab in UTM/VTM, 3-4 HR TAT - Swab, Nasopharynx [[id number]]  (Normal) Collected: 06/07/21 0620    Lab Status: Final result Specimen: Swab from Nasopharynx Updated: 06/07/21 0726     ADENOVIRUS, PCR Not Detected     Coronavirus 229E Not Detected      Coronavirus HKU1 Not Detected     Coronavirus NL63 Not Detected     Coronavirus OC43 Not Detected     COVID19 Not Detected     Human Metapneumovirus Not Detected     Human Rhinovirus/Enterovirus Not Detected     Influenza A PCR Not Detected     Influenza B PCR Not Detected     Parainfluenza Virus 1 Not Detected     Parainfluenza Virus 2 Not Detected     Parainfluenza Virus 3 Not Detected     Parainfluenza Virus 4 Not Detected     RSV, PCR Not Detected     Bordetella pertussis pcr Not Detected     Bordetella parapertussis PCR Not Detected     Chlamydophila pneumoniae PCR Not Detected     Mycoplasma pneumo by PCR Not Detected    Narrative:      In the setting of a positive respiratory panel with a viral infection PLUS a negative procalcitonin without other underlying concern for bacterial infection, consider observing off antibiotics or discontinuation of antibiotics and continue supportive care. If the respiratory panel is positive for atypical bacterial infection (Bordetella pertussis, Chlamydophila pneumoniae, or Mycoplasma pneumoniae), consider antibiotic de-escalation to target atypical bacterial infection.          Medication Review:       Schedule Meds  acetylcysteine, 2 mL, Nebulization, BID - RT  ampicillin-sulbactam, 3 g, Intravenous, Q6H  aspirin, 81 mg, Oral, Daily  buprenorphine-naloxone, 1.5 tablet, Sublingual, Daily  docusate sodium, 100 mg, Oral, BID  enoxaparin, 40 mg, Subcutaneous, Q24H  epoetin asia-epbx, 40,000 Units, Subcutaneous, Weekly  gabapentin, 300 mg, Oral, TID  guaiFENesin, 600 mg, Oral, Q12H  insulin lispro, 0-7 Units, Subcutaneous, TID With Meals  ipratropium-albuterol, 3 mL, Nebulization, Q4H - RT  Shabbir, 1 packet, Oral, BID  lurasidone, 40 mg, Oral, Daily  megestrol acetate, 200 mg, Oral, TID  multivitamin with minerals, 1 tablet, Oral, Daily  nystatin, 5 mL, Swish & Swallow, 4x Daily  pantoprazole, 40 mg, Oral, Daily  polyethylene glycol, 17 g, Oral, Daily  predniSONE, 20 mg, Oral,  Daily   Followed by  [START ON 6/13/2021] predniSONE, 10 mg, Oral, Daily  sertraline, 50 mg, Oral, Daily  spironolactone, 25 mg, Oral, Daily  vancomycin, 1,000 mg, Intravenous, Q12H  Zinc Oxide, , Apply externally, BID        Infusion Meds  Pharmacy to dose vancomycin,         PRN Meds  •  acetaminophen  •  albuterol  •  bisacodyl  •  calcium carbonate  •  dextrose  •  dextrose  •  glucagon (human recombinant)  •  hydrOXYzine  •  insulin lispro  •  ipratropium-albuterol  •  ketorolac  •  melatonin  •  ondansetron  •  Pharmacy to dose vancomycin  •  promethazine  •  [COMPLETED] Insert peripheral IV **AND** sodium chloride  •  sodium chloride        Assessment/Plan       Antimicrobial Therapy   1.        day  2.  Unasyn      day  3.  Vancomycin      day  4.      Day  5.      Day      Assessment     Possible postobstructive pneumonia.  Patient has right large lung mass as a result of lung cancer.  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    Pancytopenia-likely related to chemotherapy     Lung CA was on chemotherapy    Oral thrush        Plan     Continue IV Unasyn 3 g every 6 hours  Continue IV vancomycin-asked pharmacy to monitor and dose  Continue nystatin  Highly recommend bronchoscopy therapeutic and diagnostic since patient continued to be on high flow oxygen  Continue supportive care  A.m. labs  Prognosis is very guarded and long-term prognosis might be poor  Encouraged incentive spirometer use    Anne Allred MD  06/11/21  16:01 EDT

## 2021-06-11 NOTE — PROGRESS NOTES
"Pharmacy Antimicrobial Dosing Service    Subjective:  Axel Ramirez is a 70 y.o.male admitted with acute-on-chronic respiratory failure. Pharmacy has been consulted to dose Vancomycin for possible PNA.    PMH: CHF, COPD, squamous cell carcinoma of R lung      Assessment/Plan    1. Day #4 Vancomycin: Goal -600 mcg*h/mL. On vancomycin 750 mg IV q12h est AUC is 398 mcg*h/mL. Level drawn early (not true trough, drawn 6 hours after dose). However, can still utilize level for Bayesian calculator to estimate AUC with inappropriately drawn level.     Since estimated AUC is subtherapuetic, will increase dose to vancomycin 1000 mg (~15 mg/kg ABW) IV q12h. Will not order levels at this time, pending ID evaluation today to determine stop date. If patient continues on vancomycin for > 7 days would recommend a repeat trough.    2. Day #4 Ampicillin/Sulbactam: 3 g IV q6h for estCrCl > 30 mL/min    Will continue to monitor drug levels, renal function, culture and sensitivities, and patient clinical status.       Objective:  Relevant clinical data and objective history reviewed:  172.7 cm (68\")   68.5 kg (151 lb 0.2 oz)   Ideal body weight: 68.4 kg (150 lb 12.7 oz)  Adjusted ideal body weight: 68.4 kg (150 lb 14.1 oz)  Body mass index is 22.96 kg/m².    Results from last 7 days   Lab Units 06/11/21  0949 06/09/21  2339   VANCOMYCIN PK mcg/mL  --  14.10*   VANCOMYCIN TR mcg/mL 16.90 15.00     Results from last 7 days   Lab Units 06/11/21  0303 06/10/21  0636 06/09/21  0320   CREATININE mg/dL 0.61* 0.64* 0.63*     Estimated Creatinine Clearance: 83.2 mL/min (A) (by C-G formula based on SCr of 0.61 mg/dL (L)).  I/O last 3 completed shifts:  In: 600 [P.O.:600]  Out: -     Results from last 7 days   Lab Units 06/11/21  0303 06/09/21  0503 06/08/21  0624   WBC 10*3/mm3 13.80* 10.90* 8.80     Temperature    06/11/21 0945 06/11/21 1025 06/11/21 1257   Temp: 97.8 °F (36.6 °C) 97.9 °F (36.6 °C) 97.8 °F (36.6 °C)     Baseline " culture/source/susceptibility:  Microbiology Results (last 10 days)       Procedure Component Value - Date/Time    MRSA Screen, PCR (Inpatient) - Swab, Nares [[id number]]  (Abnormal) Collected: 06/07/21 1701    Lab Status: Final result Specimen: Swab from Nares Updated: 06/07/21 1941     MRSA PCR MRSA Detected    Blood Culture - Blood, Arm, Left [[id number]] Collected: 06/07/21 0705    Lab Status: Preliminary result Specimen: Blood from Arm, Left Updated: 06/11/21 0730     Blood Culture No growth at 4 days    Blood Culture - Blood, Arm, Right [[id number]] Collected: 06/07/21 0705    Lab Status: Preliminary result Specimen: Blood from Arm, Right Updated: 06/11/21 0730     Blood Culture No growth at 4 days    Respiratory Panel PCR w/COVID-19(SARS-CoV-2) [facility]IEL[facility]/KATHY/[facility] In-House, NP Swab in UTM/VTM, 3-4 HR TAT - Swab, Nasopharynx [[id number]]  (Normal) Collected: 06/07/21 0620    Lab Status: Final result Specimen: Swab from Nasopharynx Updated: 06/07/21 0726     ADENOVIRUS, PCR Not Detected     Coronavirus 229E Not Detected     Coronavirus HKU1 Not Detected     Coronavirus NL63 Not Detected     Coronavirus OC43 Not Detected     COVID19 Not Detected     Human Metapneumovirus Not Detected     Human Rhinovirus/Enterovirus Not Detected     Influenza A PCR Not Detected     Influenza B PCR Not Detected     Parainfluenza Virus 1 Not Detected     Parainfluenza Virus 2 Not Detected     Parainfluenza Virus 3 Not Detected     Parainfluenza Virus 4 Not Detected     RSV, PCR Not Detected     Bordetella pertussis pcr Not Detected     Bordetella parapertussis PCR Not Detected     Chlamydophila pneumoniae PCR Not Detected     Mycoplasma pneumo by PCR Not Detected    Narrative:      In the setting of a positive respiratory panel with a viral infection PLUS a negative procalcitonin without other underlying concern for bacterial infection, consider observing off antibiotics or discontinuation of antibiotics and continue  supportive care. If the respiratory panel is positive for atypical bacterial infection (Bordetella pertussis, Chlamydophila pneumoniae, or Mycoplasma pneumoniae), consider antibiotic de-escalation to target atypical bacterial infection.              Anti-Infectives (From admission, onward)      Ordered     Dose/Rate Route Frequency Start Stop    06/08/21 1412  vancomycin 750 mg in sodium chloride 0.9 % 250 mL IVPB     Gracie Jennings, PharmD reviewed the order on 06/09/21 1438.   Ordering Provider: Herminia Stanton APRN    750 mg  over 45 Minutes Intravenous Every 12 Hours 06/09/21 0300 06/18/21 0359    06/08/21 1347  ampicillin-sulbactam (UNASYN) 3 g in sodium chloride 0.9 % 100 mL IVPB-MBP     Gracie Jennings, PharmD reviewed the order on 06/09/21 1438.   Ordering Provider: Herminia Stanton APRN    3 g Intravenous Every 6 Hours 06/08/21 2000 06/18/21 1959    06/08/21 1412  vancomycin 1250 mg/250 mL 0.9% NS IVPB ([facility])     Ordering Provider: Herminia Stanton APRN    20 mg/kg × 67.3 kg  over 75 Minutes Intravenous Once 06/08/21 1500 06/08/21 2035    06/08/21 1347  Pharmacy to dose vancomycin     Gracie Jennings, PharmD reviewed the order on 06/09/21 1438.   Ordering Provider: Herminia Stanton APRN     Does not apply Continuous PRN 06/08/21 1346 06/18/21 1345    06/07/21 1059  piperacillin-tazobactam (ZOSYN) IVPB 4.5 g in 100 mL NS (CD)     Ordering Provider: Ignacia De La Cruz DO    4.5 g  over 30 Minutes Intravenous Once 06/07/21 1145 06/07/21 1326    06/07/21 0625  cefTRIAXone (ROCEPHIN) in SWFI 1 gram/10ml IV PUSH syringe     Ordering Provider: Higinio Flores MD    1 g  over 5 Minutes Intravenous Once 06/07/21 0627 06/07/21 0711    06/07/21 0625  azithromycin 500 mg IVPB in 250 mL NS     Ordering Provider: Higinio Flores MD    500 mg  over 60 Minutes Intravenous Once 06/07/21 0626 06/07/21 0806            Gracie Jennings, PharmD  06/11/21 15:15 EDT

## 2021-06-11 NOTE — PLAN OF CARE
Goal Outcome Evaluation:  Plan of Care Reviewed With: patient, spouse        Progress: no change   Patient rested comfortably in bed throughout the night.  Hgb resulted 6.9, spoke with Tonio CLARKE in Dr. Lewis's office, 1 unit PRBC ordered.  Consent signed and in chart type and screen sent to lab.  No other concerns at this time, will continue to monitor.

## 2021-06-12 NOTE — PROGRESS NOTES
LOS: 5 days   Patient Care Team:  Sandoval Stevenson FNP as PCP - General  Sandoval Stevenson FNP as PCP - Family Medicine  Axel Lewis MD as Consulting Physician (Hematology and Oncology)  Iglesia Springer MD as Consulting Physician (Cardiology)    Subjective     Interval History:    Patient Complaints:  Better today - up in chair- o2 dec to 8 L- pt denies pain     History taken from: patient    Review of Systems   Constitutional: Positive for activity change, appetite change and fatigue. Negative for fever.   HENT: Negative for nosebleeds and sinus pain.    Eyes: Negative for visual disturbance.   Respiratory: Positive for shortness of breath. Negative for cough and chest tightness.    Cardiovascular: Negative for chest pain, palpitations and leg swelling.   Gastrointestinal: Negative for abdominal pain, constipation, diarrhea, nausea and vomiting.   Genitourinary: Negative for difficulty urinating.   Musculoskeletal: Negative for joint swelling.   Skin: Positive for wound (sacral ). Negative for rash.   Neurological: Negative for tremors, speech difficulty and headaches.   Psychiatric/Behavioral: Positive for confusion.           Objective     Vital Signs  Temp:  [97.3 °F (36.3 °C)-98.2 °F (36.8 °C)] 97.4 °F (36.3 °C)  Heart Rate:  [] 95  Resp:  [18-20] 20  BP: (116-176)/(77-84) 116/79    Physical Exam:     General Appearance:    Alert, cooperative, in no acute distress, chronically ill appearing with temporal wasting. Frail    Head:    Normocephalic, without obvious abnormality, atraumatic   Eyes:            Lids and lashes normal, conjunctivae and sclerae normal, no   icterus, no pallor, corneas clear, PERRLA   Ears:    Ears appear intact with no abnormalities noted   Throat:   No oral lesions, no thrush, oral mucosa moist   Neck:   No adenopathy, supple, trachea midline, no thyromegaly, no   carotid bruit, no JVD   Lungs:     Rhonchi throughout , poor inspiratory effort,  respirations  regular, even and unlabored     Heart:    Regular rhythm and normal rate, normal S1 and S2, no            murmur, no gallop, no rub, no click   Chest Wall:    No abnormalities observed   Abdomen:     Normal bowel sounds, no masses, no organomegaly, soft        Non-tender non-distended, no guarding,   Extremities:   Moves all extremities well, no edema, no cyanosis, no             Redness- general weakness    Pulses:   Pulses palpable and equal bilaterally   Skin:   No bleeding, bruising or rash   Lymph nodes:   No palpable adenopathy   Neurologic:   Cranial nerves 2 - 12 grossly intact, sensation intact, DTR       present and equal bilaterally- oriented to person place- not time         Results Review:    Lab Results (last 24 hours)     Procedure Component Value Units Date/Time    POC Glucose Once [[id number]]  (Abnormal) Collected: 06/12/21 1139    Specimen: Blood Updated: 06/12/21 1140     Glucose 106 mg/dL      Comment: Serial Number: [id number]:  [id number]       Blood Culture - Blood, Arm, Left [[id number]] Collected: 06/07/21 0705    Specimen: Blood from Arm, Left Updated: 06/12/21 0730     Blood Culture No growth at 5 days    Blood Culture - Blood, Arm, Right [[id number]] Collected: 06/07/21 0705    Specimen: Blood from Arm, Right Updated: 06/12/21 0730     Blood Culture No growth at 5 days    POC Glucose Once [[id number]]  (Abnormal) Collected: 06/12/21 0719    Specimen: Blood Updated: 06/12/21 0720     Glucose 106 mg/dL      Comment: Serial Number: [id number]:  [id number]       Path Consult Reflex [[id number]] Collected: 06/12/21 0446    Specimen: Blood Updated: 06/12/21 0645     Pathology Review Yes    Manual Differential [[id number]]  (Abnormal) Collected: 06/12/21 0446    Specimen: Blood Updated: 06/12/21 0645     Neutrophil % 73.0 %      Lymphocyte % 1.0 %      Monocyte % 3.0 %      Bands %  8.0 %      Metamyelocyte % 6.0 %      Myelocyte % 6.0 %      Atypical Lymphocyte % 3.0 %      Neutrophils  Absolute 9.80 10*3/mm3      Lymphocytes Absolute 0.48 10*3/mm3      Monocytes Absolute 0.36 10*3/mm3      Anisocytosis Mod/2+     Macrocytes Slight/1+     Poikilocytes Slight/1+     Toxic Granulation Slight/1+     Large Platelets Slight/1+    CBC & Differential [[id number]]  (Abnormal) Collected: 06/12/21 0446    Specimen: Blood Updated: 06/12/21 0645    Narrative:      The following orders were created for panel order CBC & Differential.  Procedure                               Abnormality         Status                     ---------                               -----------         ------                     Scan Slide[[id number]]                                       Final result               CBC Auto Differential[[id number]]        Abnormal            Final result                 Please view results for these tests on the individual orders.    Scan Slide [[id number]] Collected: 06/12/21 0446    Specimen: Blood Updated: 06/12/21 0645     Scan Slide --     Comment: See Manual Differential Results       CBC Auto Differential [[id number]]  (Abnormal) Collected: 06/12/21 0446    Specimen: Blood Updated: 06/12/21 0645     WBC 12.10 10*3/mm3      RBC 2.79 10*6/mm3      Hemoglobin 9.6 g/dL      Hematocrit 28.7 %      .1 fL      Comment: Result checked         MCH 34.6 pg      MCHC 33.6 g/dL      RDW 19.0 %      RDW-SD 67.8 fl      MPV 8.1 fL      Platelets 193 10*3/mm3     Narrative:      The previously reported component NRBC is no longer being reported. Previous result was 2.0 /100 WBC (Reference Range: 0.0-0.2 /100 WBC) on 6/12/2021 at 0559 EDT.    Comprehensive Metabolic Panel [[id number]]  (Abnormal) Collected: 06/12/21 0446    Specimen: Blood Updated: 06/12/21 0617     Glucose 89 mg/dL      BUN 18 mg/dL      Creatinine 0.56 mg/dL      Sodium 135 mmol/L      Potassium 4.0 mmol/L      Chloride 102 mmol/L      CO2 25.0 mmol/L      Calcium 8.6 mg/dL      Total Protein 5.9 g/dL      Albumin 2.30 g/dL      ALT (SGPT)  38 U/L      AST (SGOT) 33 U/L      Alkaline Phosphatase 106 U/L      Total Bilirubin 0.5 mg/dL      eGFR Non African Amer 144 mL/min/1.73      Globulin 3.6 gm/dL      A/G Ratio 0.6 g/dL      BUN/Creatinine Ratio 32.1     Anion Gap 8.0 mmol/L     Narrative:      GFR Normal >60  Chronic Kidney Disease <60  Kidney Failure <15      Hemoglobin & Hematocrit, Blood [[id number]]  (Abnormal) Collected: 06/11/21 2145    Specimen: Blood Updated: 06/11/21 2201     Hemoglobin 9.3 g/dL      Comment: Result checked         Hematocrit 27.9 %      Comment: Result checked        POC Glucose Once [[id number]]  (Abnormal) Collected: 06/11/21 1938    Specimen: Blood Updated: 06/11/21 1939     Glucose 169 mg/dL      Comment: Serial Number: [id number]:  [id number]              Imaging Results (Last 24 Hours)     ** No results found for the last 24 hours. **               I reviewed the patient's new clinical results.    Medication Review:   Scheduled Meds:acetylcysteine, 2 mL, Nebulization, BID - RT  ampicillin-sulbactam, 3 g, Intravenous, Q6H  aspirin, 81 mg, Oral, Daily  buprenorphine-naloxone, 1.5 tablet, Sublingual, Daily  docusate sodium, 100 mg, Oral, BID  enoxaparin, 40 mg, Subcutaneous, Q24H  epoetin asia-epbx, 40,000 Units, Subcutaneous, Weekly  gabapentin, 300 mg, Oral, TID  guaiFENesin, 600 mg, Oral, Q12H  insulin lispro, 0-7 Units, Subcutaneous, TID With Meals  ipratropium-albuterol, 3 mL, Nebulization, Q4H - RT  Shabbir, 1 packet, Oral, BID  lurasidone, 40 mg, Oral, Daily  megestrol acetate, 200 mg, Oral, TID  multivitamin with minerals, 1 tablet, Oral, Daily  pantoprazole, 40 mg, Oral, Daily  polyethylene glycol, 17 g, Oral, Daily  [START ON 6/13/2021] predniSONE, 10 mg, Oral, Daily  sertraline, 50 mg, Oral, Daily  spironolactone, 25 mg, Oral, Daily  vancomycin, 1,000 mg, Intravenous, Q12H  Zinc Oxide, , Apply externally, BID      Continuous Infusions:Pharmacy to dose vancomycin,       PRN Meds:.•  acetaminophen  •   albuterol  •  bisacodyl  •  calcium carbonate  •  dextrose  •  dextrose  •  glucagon (human recombinant)  •  hydrOXYzine  •  insulin lispro  •  ipratropium-albuterol  •  melatonin  •  ondansetron  •  Pharmacy to dose vancomycin  •  promethazine  •  [COMPLETED] Insert peripheral IV **AND** sodium chloride  •  sodium chloride     Assessment/Plan       Coronary artery disease involving native coronary artery of native heart without angina pectoris    Nonrheumatic aortic valve stenosis    Essential hypertension    Congestive heart failure (CMS/HCC)    Tobacco abuse    Squamous cell carcinoma of lung, right (CMS/HCC)    Acute-on-chronic respiratory failure (CMS/HCC)    Respiratory distress    Stage 2 skin ulcer of sacral region (CMS/HCC)     Squamous cell carcinoma of lung, right (CMS/HCC)- Dr Lewis following. Patient has brain mets and is planning on resection with Dr Luna.       Anemia - GOGO / Myodysplatic syndrome - Dr Lewis following. Weekly Retacrit - had  prbc yesterday today hgb at 9.6        Acute-on-chronic respiratory failure (CMS/HCC)  Dr Groves following      Pneumonia- possible post obstructive. Pt with large r lung mass. He is normally on 4 L o2 at home is decreased to 8 L today from 10 L yesterday . ID following with IV unasyn and IV vanc-        Stage 2 skin ulcer of sacral region (CMS/HCC)  Off loading with turns and skin barrier    Plan for disposition: GUADALUPE Medina, VINCE  06/12/21  14:44 EDT

## 2021-06-12 NOTE — PROGRESS NOTES
"PULMONARY CRITICAL CARE Progress  NOTE      PATIENT IDENTIFICATION:  Name: Axel Ramirez  MRN: [id number]  :  1950     Age: 70 y.o.  Sex: male    DATE OF Note:  2021   Referring Physician: Ignacia De La Cruz DO                  Subjective:   Still weak cough    No nausea or vomiting, no change in bowel or bladder   No new issues       Objective:  tMax 24 hrs: Temp (24hrs), Av.6 °F (36.4 °C), Min:97.3 °F (36.3 °C), Max:98.2 °F (36.8 °C)      Vitals Ranges:   Temp:  [97.3 °F (36.3 °C)-98.2 °F (36.8 °C)] 97.4 °F (36.3 °C)  Heart Rate:  [] 95  Resp:  [18-20] 20  BP: (116-176)/(77-84) 116/79    Intake and Output Last 3 Shifts:   I/O last 3 completed shifts:  In:  [P.O.:1680; Blood:395]  Out: -     Exam:  /79 (BP Location: Left arm, Patient Position: Sitting)   Pulse 95   Temp 97.4 °F (36.3 °C) (Oral)   Resp 20   Ht 172.7 cm (68\")   Wt 67.7 kg (149 lb 4 oz)   SpO2 95%   BMI 22.69 kg/m²     General Appearance:   Alert awake follows simple commands  HEENT:  Normocephalic, without obvious abnormality, Conjunctiva/corneas clear,.  Normal external ear canals, Nares normal, no drainage     Neck:  Supple, symmetrical, trachea midline. No JVD.  Lungs /Chest wall:   Bilateral basal rhonchi, respirations unlabored symmetrical wall movement.     Heart:  Regular rate and rhythm, systolic murmur PMI left sternal border  Abdomen: Soft, non-tender, no masses, no organomegaly.    Extremities: Trace edema no clubbing or Cyanosis        Medications:    Current Facility-Administered Medications:   •  acetaminophen (TYLENOL) tablet 1,000 mg, 1,000 mg, Oral, Q6H PRN, Atkins, Eliz D, APRN  •  acetylcysteine (MUCOMYST) 20 % nebulizer solution 2 mL, 2 mL, Nebulization, BID - RT, Ignacia De La Cruz DO, 2 mL at 21 0757  •  albuterol (PROVENTIL) nebulizer solution 0.083% 2.5 mg/3mL, 2.5 mg, Nebulization, Q2H PRN, Ignacia De La Cruz DO, 2.5 mg at 21 0914  •  ampicillin-sulbactam (UNASYN) 3 g in " sodium chloride 0.9 % 100 mL IVPB-MBP, 3 g, Intravenous, Q6H, Herminia Stanton APRN, Last Rate: 0 mL/hr at 06/09/21 0319, 3 g at 06/12/21 0855  •  aspirin EC tablet 81 mg, 81 mg, Oral, Daily, Ignacia De La Cruz DO, 81 mg at 06/12/21 0855  •  bisacodyl (DULCOLAX) suppository 10 mg, 10 mg, Rectal, Daily PRN, Ignacia De La Cruz DO  •  buprenorphine-naloxone (SUBOXONE) 8-2 MG per SL tablet 1.5 tablet, 1.5 tablet, Sublingual, Daily, Ignacia De La Cruz DO, 1.5 tablet at 06/12/21 0854  •  calcium carbonate (TUMS) chewable tablet 500 mg (200 mg elemental), 2 tablet, Oral, BID PRN, Ignacia De La Cruz DO, 2 tablet at 06/08/21 1604  •  dextrose (D50W) 25 g/ 50mL Intravenous Solution 25 g, 25 g, Intravenous, Q15 Min PRN, Ignacia De La Cruz DO  •  dextrose (GLUTOSE) oral gel 15 g, 15 g, Oral, Q15 Min PRN, Ignacia De La Cruz DO  •  docusate sodium (COLACE) capsule 100 mg, 100 mg, Oral, BID, Ignacia De La Cruz DO, 100 mg at 06/12/21 0855  •  enoxaparin (LOVENOX) syringe 40 mg, 40 mg, Subcutaneous, Q24H, Ignacia De La Cruz DO, 40 mg at 06/11/21 1557  •  epoetin asia-epbx (RETACRIT) injection 40,000 Units, 40,000 Units, Subcutaneous, Weekly, Fatoumata Espinosa APRN, 40,000 Units at 06/08/21 1643  •  gabapentin (NEURONTIN) capsule 300 mg, 300 mg, Oral, TID, Ignacia De La Cruz DO, 300 mg at 06/12/21 0854  •  glucagon (human recombinant) (GLUCAGEN DIAGNOSTIC) injection 1 mg, 1 mg, Subcutaneous, Q15 Min PRN, Ignacia De La Cruz DO  •  guaiFENesin (MUCINEX) 12 hr tablet 600 mg, 600 mg, Oral, Q12H, Ignacia De La Cruz, DO, 600 mg at 06/12/21 0855  •  hydrOXYzine (ATARAX) tablet 25 mg, 25 mg, Oral, TID PRN, Eliz Andrews D, APRN, 25 mg at 06/09/21 1309  •  insulin lispro (ADMELOG) injection 0-7 Units, 0-7 Units, Subcutaneous, TID With Meals, Ignacia De La Cruz DO, 2 Units at 06/10/21 1715  •  insulin lispro (ADMELOG) injection 0-7 Units, 0-7 Units, Subcutaneous, TID PRN, Ignacia De La Cruz, DO  •  ipratropium-albuterol (DUO-NEB) nebulizer solution 3 mL, 3 mL, Nebulization,  Q4H - RT, Ignacia De La Cruz DO, 3 mL at 06/12/21 1145  •  ipratropium-albuterol (DUO-NEB) nebulizer solution 3 mL, 3 mL, Nebulization, Q1H PRN, Ignacia De La Cruz DO  •  Shabbir pack 1 packet, 1 packet, Oral, BID, Rachana Zacarias, RD, 1 packet at 06/12/21 0907  •  lurasidone (LATUDA) tablet 40 mg, 40 mg, Oral, Daily, Ignacia De La Cruz DO, 40 mg at 06/12/21 0854  •  megestrol acetate (MEGACE) oral suspension 200 mg, 200 mg, Oral, TID, Ignacia De La Cruz DO, 200 mg at 06/12/21 0856  •  melatonin tablet 5 mg, 5 mg, Oral, Nightly PRN, Ignacia De La Cruz DO  •  multivitamin with minerals 1 tablet, 1 tablet, Oral, Daily, Ignacia De La Cruz DO, 1 tablet at 06/12/21 0854  •  ondansetron (ZOFRAN) injection 4 mg, 4 mg, Intravenous, Q6H PRN, Lianna Groves MD, 4 mg at 06/09/21 1326  •  pantoprazole (PROTONIX) EC tablet 40 mg, 40 mg, Oral, Daily, Ignacia De La Cruz DO, 40 mg at 06/12/21 0855  •  Pharmacy to dose vancomycin, , Does not apply, Continuous PRN, Herminia Stanton, APRN  •  polyethylene glycol (MIRALAX) packet 17 g, 17 g, Oral, Daily, Ignacia De La Cruz DO, 17 g at 06/12/21 0855  •  [COMPLETED] predniSONE (DELTASONE) tablet 30 mg, 30 mg, Oral, Daily, 30 mg at 06/10/21 0849 **FOLLOWED BY** [COMPLETED] predniSONE (DELTASONE) tablet 20 mg, 20 mg, Oral, Daily, 20 mg at 06/12/21 0855 **FOLLOWED BY** [START ON 6/13/2021] predniSONE (DELTASONE) tablet 10 mg, 10 mg, Oral, Daily, Lianna Groves MD  •  promethazine (PHENERGAN) tablet 25 mg, 25 mg, Oral, Q4H PRN, Ignacia De La Cruz DO, 25 mg at 06/1950  •  sertraline (ZOLOFT) tablet 50 mg, 50 mg, Oral, Daily, Ignacia De La Cruz DO, 50 mg at 06/12/21 0855  •  [COMPLETED] Insert peripheral IV, , , Once **AND** sodium chloride 0.9 % flush 10 mL, 10 mL, Intravenous, PRN, Ignacia De La Cruz DO, 10 mL at 06/12/21 0856  •  sodium chloride 0.9 % flush 10 mL, 10 mL, Intravenous, PRN, Ignacia De La Cruz DO  •  spironolactone (ALDACTONE) tablet 25 mg, 25 mg, Oral, Daily, Ignacia De La Cruz DO, 25 mg at 06/12/21  0854  •  vancomycin (VANCOCIN) 1,000 mg in sodium chloride 0.9 % 250 mL IVPB, 1,000 mg, Intravenous, Q12H, Anne Allred MD, 1,000 mg at 06/12/21 0442  •  Zinc Oxide 16 % ointment, , Apply externally, BID, Carina Luevano APRN, Given at 06/12/21 0857    Data Review:  All labs (24hrs):   Recent Results (from the past 24 hour(s))   POC Glucose Once    Collection Time: 06/11/21  4:40 PM    Specimen: Blood   Result Value Ref Range    Glucose 141 (H) 70 - 105 mg/dL   POC Glucose Once    Collection Time: 06/11/21  7:38 PM    Specimen: Blood   Result Value Ref Range    Glucose 169 (H) 70 - 105 mg/dL   Hemoglobin & Hematocrit, Blood    Collection Time: 06/11/21  9:45 PM    Specimen: Blood   Result Value Ref Range    Hemoglobin 9.3 (L) 13.0 - 17.7 g/dL    Hematocrit 27.9 (L) 37.5 - 51.0 %   Prepare RBC, 1 Units    Collection Time: 06/12/21  2:00 AM   Result Value Ref Range    Product Code C1393D71     Unit Number [id number]     UNIT  ABO O     UNIT  RH POS     Crossmatch Interpretation Compatible     Dispense Status PT     Blood Expiration Date [id number]     Blood Type Barcode 5100    Comprehensive Metabolic Panel    Collection Time: 06/12/21  4:46 AM    Specimen: Blood   Result Value Ref Range    Glucose 89 65 - 99 mg/dL    BUN 18 8 - 23 mg/dL    Creatinine 0.56 (L) 0.76 - 1.27 mg/dL    Sodium 135 (L) 136 - 145 mmol/L    Potassium 4.0 3.5 - 5.2 mmol/L    Chloride 102 98 - 107 mmol/L    CO2 25.0 22.0 - 29.0 mmol/L    Calcium 8.6 8.6 - 10.5 mg/dL    Total Protein 5.9 (L) 6.0 - 8.5 g/dL    Albumin 2.30 (L) 3.50 - 5.20 g/dL    ALT (SGPT) 38 1 - 41 U/L    AST (SGOT) 33 1 - 40 U/L    Alkaline Phosphatase 106 39 - 117 U/L    Total Bilirubin 0.5 0.0 - 1.2 mg/dL    eGFR Non African Amer 144 >60 mL/min/1.73    Globulin 3.6 gm/dL    A/G Ratio 0.6 g/dL    BUN/Creatinine Ratio 32.1 (H) 7.0 - 25.0    Anion Gap 8.0 5.0 - 15.0 mmol/L   CBC Auto Differential    Collection Time: 06/12/21  4:46 AM    Specimen: Blood   Result Value Ref  Range    WBC 12.10 (H) 3.40 - 10.80 10*3/mm3    RBC 2.79 (L) 4.14 - 5.80 10*6/mm3    Hemoglobin 9.6 (L) 13.0 - 17.7 g/dL    Hematocrit 28.7 (L) 37.5 - 51.0 %    .1 (H) 79.0 - 97.0 fL    MCH 34.6 (H) 26.6 - 33.0 pg    MCHC 33.6 31.5 - 35.7 g/dL    RDW 19.0 (H) 12.3 - 15.4 %    RDW-SD 67.8 (H) 37.0 - 54.0 fl    MPV 8.1 6.0 - 12.0 fL    Platelets 193 140 - 450 10*3/mm3   Scan Slide    Collection Time: 06/12/21  4:46 AM    Specimen: Blood   Result Value Ref Range    Scan Slide     Manual Differential    Collection Time: 06/12/21  4:46 AM    Specimen: Blood   Result Value Ref Range    Neutrophil % 73.0 42.7 - 76.0 %    Lymphocyte % 1.0 (L) 19.6 - 45.3 %    Monocyte % 3.0 (L) 5.0 - 12.0 %    Bands %  8.0 (H) 0.0 - 5.0 %    Metamyelocyte % 6.0 (H) 0.0 - 0.0 %    Myelocyte % 6.0 (H) 0.0 - 0.0 %    Atypical Lymphocyte % 3.0 0.0 - 5.0 %    Neutrophils Absolute 9.80 (H) 1.70 - 7.00 10*3/mm3    Lymphocytes Absolute 0.48 (L) 0.70 - 3.10 10*3/mm3    Monocytes Absolute 0.36 0.10 - 0.90 10*3/mm3    Anisocytosis Mod/2+ None Seen    Macrocytes Slight/1+ None Seen    Poikilocytes Slight/1+ None Seen    Toxic Granulation Slight/1+ None Seen    Large Platelets Slight/1+ None Seen   Path Consult Reflex    Collection Time: 06/12/21  4:46 AM    Specimen: Blood   Result Value Ref Range    Pathology Review Yes    POC Glucose Once    Collection Time: 06/12/21  7:19 AM    Specimen: Blood   Result Value Ref Range    Glucose 106 (H) 70 - 105 mg/dL   POC Glucose Once    Collection Time: 06/12/21 11:39 AM    Specimen: Blood   Result Value Ref Range    Glucose 106 (H) 70 - 105 mg/dL        Imaging:  XR Chest 1 View  Narrative:    DATE OF EXAM:   6/11/2021 4:39 AM     PROCEDURE:   XR CHEST 1 VW-     INDICATIONS:   Shortness of breath; R06.03-Acute respiratory distress; J98.01-Acute  bronchospasm; I50.9-Heart failure, unspecified; C34.91-Malignant  neoplasm of unspecified part of right bronchus or lung     COMPARISON:  6/10/2021     TECHNIQUE:    Portable chest radiograph.     FINDINGS:    There is a left-sided port catheter with the tip at the cavoatrial  junction. Sternotomy wires and changes of CABG noted. Heart size normal.  Large right perihilar mass unchanged. There is worsening interstitial  opacities at the left lung that may relate to pneumonia or asymmetric  pulmonary edema. No pneumothorax. No large effusion.     Impression: 1. Mild worsening of diffuse interstitial opacities throughout the left  lung that may relate to infection or asymmetric pulmonary edema.  2. Redemonstration of 9 cm right upper lobe mass consistent with known  malignancy.  3. Stable additional chronic findings above.     Electronically Signed By-Saleem Ferguson MD On:6/11/2021 7:40 AM  This report was finalized on [id number] by  Saleem Ferguson MD.       ASSESSMENT:  Acute-on-chronic respiratory distress  Pneumonia most likely post obstruction  COPD exacerbation  Metastatic lung cancer squamous cell  CHF  Opioid dependence  Stage 2 skin ulcer of sacral region       PLAN:  Chest PT   Mucomyst   Encourag use I-S flutter valve  Antibiotics  Bronchodilator  Inhaled corticosteroids  IV steroids will wean it down  IS/Flutter valve  Electrolytes/ glycemic control  DVT and GI prophylaxis.     Poor long-term prognosis    Discussed with Dr Yaneli Dumont, APRN    6/12/2021  15:00 EDT     I personally have examined  and interviewed the patient. I have reviewed the history, data, problems, assessment and plan with our NP.  Critical care time in direct medical management (   ) minutes  Electronically signed by Lianna Groves MD, D,ABSM, 06/12/21, 10:52 PM EDT.

## 2021-06-12 NOTE — PROGRESS NOTES
Hematology/Oncology Inpatient Progress Note    PATIENT NAME: Axel Ramirez  : 1950  MRN: [id number]    CHIEF COMPLAINT: Metastatic squamous cell carcinoma of the right upper lobe lung and MDS    HISTORY OF PRESENT ILLNESS:  70 y.o. male presented to [facility] ER on 2021 with shortness of air and hypoxia.  He had an acute onset on the day of admission of worsening shortness of breath with O2 saturation of 76% on 4 L/min O2.  The ECF called EMS.  He was treated with nebulizer on the way to the ER.  Chest x-ray showed left lung pneumonia and a right middle lobe lung mass.  White count 10.1, hemoglobin 9.2, .7, platelets 115k.  D-dimer was 2.99 (< 0.6).  Chest CT showed a 9 cm right upper lobe lung mass occluding the right upper lobe bronchus with complete right upper and right middle lobe collapse.  There was no pulmonary emboli.  There was possible aspiration pneumonia and stable mediastinal lymphadenopathy.  A new 12 mm nodule was seen in the left lower lobe.  Adrenal glands were normal.  He was started on steroids and Zosyn.  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  Recently started on second line therapy Taxol/carboplatin/ipilimumab and Nivolumab with Neulasta support on 2021.     21  Hematology/Oncology was consulted for his metastatic squamous cell carcinoma of the right upper lobe lung.  He is an established patient.  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  Initially treated with concomitant weekly chemotherapy (paclitaxel and carboplatin x7) and radiation therapy from 2020. He completed radiation therapy on 2021.  He completed the weekly portion of his chemotherapy on 2021.  He received 1 cycle of every 21-day carboplatin /Taxol with  Neulasta support on 2/9/2021.  He subsequently was admitted to [facility], for hypoxia with respiratory failure requiring intubation.  MRI of the brain showed a left occipital lobe lesion with vasogenic edema.  He received radiation, x1 fraction, on 3/3/2021 to the left occipital lobe. One month later, a brain MRI showed enlargement of the left occipital lobe lesion.  PET scan showed the right upper lobe lung mass to be 10 cm in size with a decrease in the peripheral FDG uptake suggesting interval response to treatment.  There was a decrease in the size and FDG uptake in the mediastinal lymph nodes.  There was no distant metastatic disease.  The lesion in the left occipital lobe was FDG avid.  Right upper lobe lung collapse had improved with aeration.  He was referred to Dr. Tay for evaluation of his brain MRI which was felt to be due to to post SRS changes and not progression.  He was seen in follow-up with plan to start chemotherapy with immunotherapy.  Paclitaxel and carboplatin doses were decreased by 20% due to pancytopenia with prior treatment.  Repeat brain MRI, on 5/13/2021, showed an increase in the size of the left occipital lobe with an increase in surrounding edema.  There were no new masses.  The patient was referred to Dr. Luna for surgical resection versus MRI SPECT/perfusion at [facility].  Dr. Luna felt the lesion was a combination of necrosis and tumor progression and recommended surgical resection.  He started treatment on 5/24/2021 (chemotherapy plus immunotherapy) as his surgical resection was to be done after medical clearance.  He had a return appointment with Dr. Luna on 6/9/2021 following an MRI of his brain.  -MDS-diagnosed January 2021.  He was anemic and was found to have GOGO and malabsorption on oral therapy.  His bone marrow revealed increased iron storage and mild erythroid atypia in January 2021.  Stool heme was negative on 5/13/2021. He has received IV iron but has  never required Retacrit.  Last office visit 5/24/2021, complaining of hypoxia and tachypnea with anxiety.  White count 9.05, hemoglobin 11.34, .8 and platelets 220.  He was starting to gain weight and Megace was continued.  For his cough and hypoxia, he was treated with Augmentin and albuterol nebulizer.  He had oral thrush which was treated with nystatin swish and swallow.  TSH 0.287 (0.282-4.0), iron 62 (), ferritin 4185 (), iron saturation 32 (20-55), TIBC 196 (228-428).     PCP: Sandoval Stevenson FNP     INTERVAL HISTORY:  • 6/8/2021-Retacrit 40,000 units subcu weekly initiated.  Hemoglobin 7.2.  • 6/9/2021-PT 11.5 (9.6-11.7), PTT 27.7 (24-31).  Hemoglobin 7.2, platelets 118,000.  Haptoglobin 465 ().  CMV IgM <30 (<30).  Cardiac ejection fraction 65%.   • 6/10/2021-EBV VCA IgM less than 36 (less than 36).  • 6/11/2021-hemoglobin 6.9.  1 unit pRBCs given.  WBC 13.8 with 18% bands.  • 6/12/2021-hemoglobin 9.6, WBC 12.1 with 8% bands.  Folate 13.7 (4.78-24.2), vitamin B12 more than 2000 (211-946).  Creatinine 0.56 (0.76-1.27).  Iron 42 (), TIBC 146 (298-536), iron saturation 29 (20-50), ferritin 3721 (), reticulocyte count 3.44 (0.7-1.9).    History of present illness reviewed since last visit and changes noted on 06/12/21.    Subjective   Claims to be feeling all right.  On 8 L of oxygen via nasal cannula.    ROS:  Review of Systems   Constitutional: Positive for fatigue. Negative for activity change, chills, fever and unexpected weight change.   HENT: Negative for congestion, dental problem, hearing loss, mouth sores, nosebleeds, sore throat and trouble swallowing.    Eyes: Negative for photophobia and visual disturbance.   Respiratory: Negative for cough, choking, chest tightness and shortness of breath.    Cardiovascular: Negative for chest pain, palpitations and leg swelling.   Gastrointestinal: Negative for abdominal distention, abdominal pain, blood in stool, constipation,  diarrhea, nausea and vomiting.   Endocrine: Negative for cold intolerance and heat intolerance.   Genitourinary: Negative for decreased urine volume, difficulty urinating, dysuria, frequency, hematuria and urgency.   Musculoskeletal: Negative for arthralgias and gait problem.   Skin: Negative for rash and wound.   Neurological: Negative for dizziness, tremors, weakness, light-headedness, numbness and headaches.   Hematological: Negative for adenopathy. Does not bruise/bleed easily.   Psychiatric/Behavioral: Negative for confusion and hallucinations. The patient is not nervous/anxious.    All other systems reviewed and are negative.       MEDICATIONS:    Scheduled Meds:  acetylcysteine, 2 mL, Nebulization, BID - RT  ampicillin-sulbactam, 3 g, Intravenous, Q6H  aspirin, 81 mg, Oral, Daily  buprenorphine-naloxone, 1.5 tablet, Sublingual, Daily  docusate sodium, 100 mg, Oral, BID  enoxaparin, 40 mg, Subcutaneous, Q24H  epoetin asia-epbx, 40,000 Units, Subcutaneous, Weekly  gabapentin, 300 mg, Oral, TID  guaiFENesin, 600 mg, Oral, Q12H  insulin lispro, 0-7 Units, Subcutaneous, TID With Meals  ipratropium-albuterol, 3 mL, Nebulization, Q4H - RT  Shabbir, 1 packet, Oral, BID  lurasidone, 40 mg, Oral, Daily  megestrol acetate, 200 mg, Oral, TID  multivitamin with minerals, 1 tablet, Oral, Daily  nystatin, 5 mL, Swish & Swallow, 4x Daily  pantoprazole, 40 mg, Oral, Daily  polyethylene glycol, 17 g, Oral, Daily  [START ON 6/13/2021] predniSONE, 10 mg, Oral, Daily  sertraline, 50 mg, Oral, Daily  spironolactone, 25 mg, Oral, Daily  vancomycin, 1,000 mg, Intravenous, Q12H  Zinc Oxide, , Apply externally, BID       Continuous Infusions:  Pharmacy to dose vancomycin,        PRN Meds:  •  acetaminophen  •  albuterol  •  bisacodyl  •  calcium carbonate  •  dextrose  •  dextrose  •  glucagon (human recombinant)  •  hydrOXYzine  •  insulin lispro  •  ipratropium-albuterol  •  melatonin  •  ondansetron  •  Pharmacy to dose vancomycin  •  " promethazine  •  [COMPLETED] Insert peripheral IV **AND** sodium chloride  •  sodium chloride     ALLERGIES:  No Known Allergies    Objective    VITALS:   /84 (BP Location: Left arm, Patient Position: Lying)   Pulse 100   Temp 98.2 °F (36.8 °C) (Oral)   Resp 20   Ht 172.7 cm (68\")   Wt 67.7 kg (149 lb 4 oz)   SpO2 99%   BMI 22.69 kg/m²     PHYSICAL EXAM:  Physical Exam  Vitals and nursing note reviewed.   Constitutional:       General: He is not in acute distress.     Appearance: Normal appearance. He is well-developed and normal weight. He is not diaphoretic.   HENT:      Head: Normocephalic and atraumatic.      Comments: Frontal male pattern baldness.       Right Ear: External ear normal.      Left Ear: External ear normal.      Nose: Nose normal. No rhinorrhea.      Comments: O2 by NC     Mouth/Throat:      Mouth: Mucous membranes are moist.      Pharynx: Oropharynx is clear. No oropharyngeal exudate or posterior oropharyngeal erythema.      Comments: Edentulous.  Eyes:      General: No scleral icterus.     Extraocular Movements: Extraocular movements intact.      Conjunctiva/sclera: Conjunctivae normal.      Pupils: Pupils are equal, round, and reactive to light.   Cardiovascular:      Rate and Rhythm: Normal rate and regular rhythm.      Heart sounds: Normal heart sounds. No murmur heard.        Comments: Monitor leads.  Midline vertical chest wall scar.  Left chest wall port.  Pulmonary:      Effort: Pulmonary effort is normal. No respiratory distress.      Breath sounds: No stridor. Rhonchi present. No wheezing or rales.   Abdominal:      General: Abdomen is flat. Bowel sounds are normal. There is no distension.      Palpations: Abdomen is soft. There is no mass.      Tenderness: There is no abdominal tenderness. There is no guarding.   Genitourinary:     Comments: Deferred   Musculoskeletal:         General: No swelling, tenderness or deformity. Normal range of motion.      Cervical back: " Normal range of motion and neck supple.      Right lower leg: No edema.      Left lower leg: No edema.   Lymphadenopathy:      Cervical: No cervical adenopathy.      Upper Body:      Right upper body: No supraclavicular adenopathy.      Left upper body: No supraclavicular adenopathy.   Skin:     General: Skin is warm and dry.      Coloration: Skin is not pale.      Findings: Bruising (On arms.) present. No erythema or rash.      Comments: Left chest wall port.   Neurological:      General: No focal deficit present.      Mental Status: He is alert and oriented to person, place, and time.      Coordination: Coordination normal.   Psychiatric:         Mood and Affect: Mood normal.         Behavior: Behavior normal.         Thought Content: Thought content normal.         Judgment: Judgment normal.           RECENT LABS:  Lab Results (last 24 hours)     Procedure Component Value Units Date/Time    Blood Culture - Blood, Arm, Left [[id number]] Collected: 06/07/21 0705    Specimen: Blood from Arm, Left Updated: 06/12/21 0730     Blood Culture No growth at 5 days    Blood Culture - Blood, Arm, Right [[id number]] Collected: 06/07/21 0705    Specimen: Blood from Arm, Right Updated: 06/12/21 0730     Blood Culture No growth at 5 days    POC Glucose Once [[id number]]  (Abnormal) Collected: 06/12/21 0719    Specimen: Blood Updated: 06/12/21 0720     Glucose 106 mg/dL      Comment: Serial Number: [id number]:  [id number]       Path Consult Reflex [[id number]] Collected: 06/12/21 0446    Specimen: Blood Updated: 06/12/21 0645     Pathology Review Yes    Manual Differential [[id number]]  (Abnormal) Collected: 06/12/21 0446    Specimen: Blood Updated: 06/12/21 0645     Neutrophil % 73.0 %      Lymphocyte % 1.0 %      Monocyte % 3.0 %      Bands %  8.0 %      Metamyelocyte % 6.0 %      Myelocyte % 6.0 %      Atypical Lymphocyte % 3.0 %      Neutrophils Absolute 9.80 10*3/mm3      Lymphocytes Absolute 0.48 10*3/mm3      Monocytes  Absolute 0.36 10*3/mm3      Anisocytosis Mod/2+     Macrocytes Slight/1+     Poikilocytes Slight/1+     Toxic Granulation Slight/1+     Large Platelets Slight/1+    CBC & Differential [[id number]]  (Abnormal) Collected: 06/12/21 0446    Specimen: Blood Updated: 06/12/21 0645    Narrative:      The following orders were created for panel order CBC & Differential.  Procedure                               Abnormality         Status                     ---------                               -----------         ------                     Scan Slide[[id number]]                                       Final result               CBC Auto Differential[[id number]]        Abnormal            Final result                 Please view results for these tests on the individual orders.    Scan Slide [[id number]] Collected: 06/12/21 0446    Specimen: Blood Updated: 06/12/21 0645     Scan Slide --     Comment: See Manual Differential Results       CBC Auto Differential [[id number]]  (Abnormal) Collected: 06/12/21 0446    Specimen: Blood Updated: 06/12/21 0645     WBC 12.10 10*3/mm3      RBC 2.79 10*6/mm3      Hemoglobin 9.6 g/dL      Hematocrit 28.7 %      .1 fL      Comment: Result checked         MCH 34.6 pg      MCHC 33.6 g/dL      RDW 19.0 %      RDW-SD 67.8 fl      MPV 8.1 fL      Platelets 193 10*3/mm3     Narrative:      The previously reported component NRBC is no longer being reported. Previous result was 2.0 /100 WBC (Reference Range: 0.0-0.2 /100 WBC) on 6/12/2021 at 0559 EDT.    Comprehensive Metabolic Panel [[id number]]  (Abnormal) Collected: 06/12/21 0446    Specimen: Blood Updated: 06/12/21 0617     Glucose 89 mg/dL      BUN 18 mg/dL      Creatinine 0.56 mg/dL      Sodium 135 mmol/L      Potassium 4.0 mmol/L      Chloride 102 mmol/L      CO2 25.0 mmol/L      Calcium 8.6 mg/dL      Total Protein 5.9 g/dL      Albumin 2.30 g/dL      ALT (SGPT) 38 U/L      AST (SGOT) 33 U/L      Alkaline Phosphatase 106 U/L      Total  Bilirubin 0.5 mg/dL      eGFR Non African Amer 144 mL/min/1.73      Globulin 3.6 gm/dL      A/G Ratio 0.6 g/dL      BUN/Creatinine Ratio 32.1     Anion Gap 8.0 mmol/L     Narrative:      GFR Normal >60  Chronic Kidney Disease <60  Kidney Failure <15      Hemoglobin & Hematocrit, Blood [[id number]]  (Abnormal) Collected: 06/11/21 2145    Specimen: Blood Updated: 06/11/21 2201     Hemoglobin 9.3 g/dL      Comment: Result checked         Hematocrit 27.9 %      Comment: Result checked        POC Glucose Once [[id number]]  (Abnormal) Collected: 06/11/21 1938    Specimen: Blood Updated: 06/11/21 1939     Glucose 169 mg/dL      Comment: Serial Number: [id number]:  [id number]       POC Glucose Once [[id number]]  (Abnormal) Collected: 06/11/21 1640    Specimen: Blood Updated: 06/11/21 1641     Glucose 141 mg/dL      Comment: Serial Number: [id number]:  [id number]       Folate [[id number]]  (Normal) Collected: 06/11/21 0949    Specimen: Blood Updated: 06/11/21 1634     Folate 13.70 ng/mL     Narrative:      Results may be falsely increased if patient taking Biotin.      Vitamin B12 [[id number]]  (Abnormal) Collected: 06/11/21 0949    Specimen: Blood Updated: 06/11/21 1634     Vitamin B-12 >2,000 pg/mL     Narrative:      Results may be falsely increased if patient taking Biotin.            PENDING RESULTS: copper, stool heme.    IMAGING REVIEWED:  XR Chest 1 View    Result Date: 6/11/2021  1. Mild worsening of diffuse interstitial opacities throughout the left lung that may relate to infection or asymmetric pulmonary edema. 2. Redemonstration of 9 cm right upper lobe mass consistent with known malignancy. 3. Stable additional chronic findings above.  Electronically Signed By-Saleem Ferguson MD On:6/11/2021 7:40 AM This report was finalized on [id number] by  Saleem Ferguson MD.      I have reviewed the patient's labs, imaging, reports, and other clinician documentation.    Assessment/Plan   ASSESSMENT:  1. Squamous cell  carcinoma right upper lobe lung with new brain met-s/p chemoradiation completed 1/2021, SRS to left occipital lobe lesion and now on chemoimmunotherapy (carboplatin/Taxol plus ipilimumab/nivolumab with Neulasta support) dosed 5/24/2021.  PET scan and April 2021 showed he was responding to treatment.  Ipilimumab and nivolumab were added to his current therapy due to progression in his brain.  Due to hospitalization and brain radiation, he did not receive systemic therapy from 2/9/2021 until 5/24/2021.  Planned to undergo surgical resection of brain metastasis by Dr. Luna.  Ipilimumab and nivolumab are known to cross blood-brain barrier.  2. Anemia/Myelodysplastic syndrome/GOGO with malabsorption/Chemotherapy-induced anemia-he has never required Retacrit as an outpatient.  Recent iron studies showed correction of GOGO.  Acute fall in hemoglobin due to recent chemotherapy.  Macrocytosis due to MDS.  Will treat with Retacrit.  Haptoglobin high.  On multivitamin and Protonix.    3. Acute thrombocytopenia-likely chemotherapy-induced.  Coags normal and CMV/EBV IgM normal.  Normalized.   4. Respiratory failure /Pneumonia/COPD-on Vanco, Unasyn and steroids.  Per ID.    5. Weight loss/loss of appetite-improving as outpatient on Megace.  Now on steroids.  6. Oral thrush-diagnosed as outpatient.  Now on steroids, will continue nystatin.  7. Elevated LFTs-acute and mild.    Normalized.    8. Sacral decub-wound care to follow.   9. CHF/s/p aortic valve replacement -echocardiogram showed no vegetation and EF 65%.  On aspirin and Lovenox prophylaxis.     PLAN  1. Follow CBC.   2. Outpatient MRI brain per Dr. Luna.      3. Continue weekly Retacrit.   4. Pulmonary toilet.    5. Transfuse as needed hemoglobin < 7.  6. Await copper results and stool Hemoccult.  7. Physical therapy.                 I discussed the patients findings and my recommendations with patient.    Electronically signed by Axel Lewis MD, 06/12/21, 11:22  KELLY COUCHT.

## 2021-06-12 NOTE — PROGRESS NOTES
Infectious Diseases Progress Note      LOS: 5 days   Patient Care Team:  Sandoval Stevenson FNP as PCP - General  Sandoval Stevenson FNP as PCP - Family Medicine  Axel Lewis MD as Consulting Physician (Hematology and Oncology)  Iglesia Springer MD as Consulting Physician (Cardiology)    Chief Complaint: Shortness of breath, fatigue    Subjective        The patient has been afebrile for the last 24 hours.  The patient is on 8 L of oxygen by high flow oxygen, hemodynamically stable, and is tolerating antimicrobial therapy.  He is currently in the chair      Review of Systems:   Review of Systems   Constitutional: Positive for fatigue.   HENT: Negative.    Eyes: Negative.    Respiratory: Positive for shortness of breath.    Cardiovascular: Negative.    Gastrointestinal: Negative.    Endocrine: Negative.    Genitourinary: Negative.    Musculoskeletal: Negative.    Skin: Negative.    Neurological: Negative.    Psychiatric/Behavioral: Negative.    All other systems reviewed and are negative.       Objective     Vital Signs  Temp:  [97.3 °F (36.3 °C)-98.2 °F (36.8 °C)] 97.4 °F (36.3 °C)  Heart Rate:  [] 95  Resp:  [18-20] 20  BP: (116-176)/(77-84) 116/79    Physical Exam:  Physical Exam  Vitals and nursing note reviewed.   Constitutional:       General: He is not in acute distress.     Appearance: He is well-developed. He is ill-appearing. He is not diaphoretic.      Comments: Cachectic   HENT:      Head: Normocephalic and atraumatic.      Mouth/Throat:      Comments: Mild oral thrush  Eyes:      Extraocular Movements: Extraocular movements intact.      Conjunctiva/sclera: Conjunctivae normal.      Pupils: Pupils are equal, round, and reactive to light.   Cardiovascular:      Rate and Rhythm: Normal rate and regular rhythm.      Heart sounds: Normal heart sounds, S1 normal and S2 normal.   Pulmonary:      Effort: Pulmonary effort is normal. No respiratory distress.      Breath sounds: No stridor. Rales  present. No wheezing.      Comments: Diminished throughout  Abdominal:      General: Bowel sounds are normal. There is no distension.      Palpations: Abdomen is soft. There is no mass.      Tenderness: There is no abdominal tenderness. There is no guarding.   Musculoskeletal:         General: No deformity. Normal range of motion.      Cervical back: Neck supple.   Skin:     General: Skin is warm and dry.      Coloration: Skin is not pale.      Findings: No erythema or rash.      Comments: Port in place   Neurological:      Mental Status: He is alert and oriented to person, place, and time.      Cranial Nerves: No cranial nerve deficit.   Psychiatric:         Mood and Affect: Mood normal.          Results Review:    I have reviewed all clinical data, test, lab, and imaging results.     Radiology  No Radiology Exams Resulted Within Past 24 Hours    Cardiology    Laboratory    Results from last 7 days   Lab Units 06/12/21  0446 06/11/21  2145 06/11/21  0303 06/09/21  0503 06/08/21  1630 06/08/21  0624 06/07/21  0500   WBC 10*3/mm3 12.10*  --  13.80* 10.90*  --  8.80 10.10   HEMOGLOBIN g/dL 9.6* 9.3* 6.9* 7.2* 7.8* 7.2* 9.2*   HEMATOCRIT % 28.7* 27.9* 21.0* 21.3* 23.1* 21.1* 27.3*   PLATELETS 10*3/mm3 193  --  161 118*  --  103* 115*     Results from last 7 days   Lab Units 06/12/21  0446 06/11/21  0303 06/10/21  0636 06/09/21  0320 06/08/21  0624 06/07/21  0500   SODIUM mmol/L 135* 135*  --  140 139 134*   POTASSIUM mmol/L 4.0 4.5  --  4.2 4.6 4.1   CHLORIDE mmol/L 102 102  --  103 100 97*   CO2 mmol/L 25.0 27.0  --  29.0 30.0* 26.0   BUN mg/dL 18 24*  --  25* 32* 24*   CREATININE mg/dL 0.56* 0.61* 0.64* 0.63* 0.93 0.73*   GLUCOSE mg/dL 89 101*  --  123* 127* 116*   ALBUMIN g/dL 2.30*  --   --   --  2.50* 2.50*   BILIRUBIN mg/dL 0.5  --   --   --  0.6 0.7   ALK PHOS U/L 106  --   --   --  89 109   AST (SGOT) U/L 33  --   --   --  41* 58*   ALT (SGPT) U/L 38  --   --   --  64* 87*   CALCIUM mg/dL 8.6 8.1*  --  8.6 9.0  9.2                 Microbiology   Microbiology Results (last 10 days)     Procedure Component Value - Date/Time    MRSA Screen, PCR (Inpatient) - Swab, Nares [[id number]]  (Abnormal) Collected: 06/07/21 1701    Lab Status: Final result Specimen: Swab from Nares Updated: 06/07/21 1941     MRSA PCR MRSA Detected    Blood Culture - Blood, Arm, Left [[id number]] Collected: 06/07/21 0705    Lab Status: Final result Specimen: Blood from Arm, Left Updated: 06/12/21 0730     Blood Culture No growth at 5 days    Blood Culture - Blood, Arm, Right [[id number]] Collected: 06/07/21 0705    Lab Status: Final result Specimen: Blood from Arm, Right Updated: 06/12/21 0730     Blood Culture No growth at 5 days    Respiratory Panel PCR w/COVID-19(SARS-CoV-2) PARESH/GRACIELA/KATHY/PAD/COR/MAD/CHRIST In-House, NP Swab in UTM/VTM, 3-4 HR TAT - Swab, Nasopharynx [[id number]]  (Normal) Collected: 06/07/21 0620    Lab Status: Final result Specimen: Swab from Nasopharynx Updated: 06/07/21 0726     ADENOVIRUS, PCR Not Detected     Coronavirus 229E Not Detected     Coronavirus HKU1 Not Detected     Coronavirus NL63 Not Detected     Coronavirus OC43 Not Detected     COVID19 Not Detected     Human Metapneumovirus Not Detected     Human Rhinovirus/Enterovirus Not Detected     Influenza A PCR Not Detected     Influenza B PCR Not Detected     Parainfluenza Virus 1 Not Detected     Parainfluenza Virus 2 Not Detected     Parainfluenza Virus 3 Not Detected     Parainfluenza Virus 4 Not Detected     RSV, PCR Not Detected     Bordetella pertussis pcr Not Detected     Bordetella parapertussis PCR Not Detected     Chlamydophila pneumoniae PCR Not Detected     Mycoplasma pneumo by PCR Not Detected    Narrative:      In the setting of a positive respiratory panel with a viral infection PLUS a negative procalcitonin without other underlying concern for bacterial infection, consider observing off antibiotics or discontinuation of antibiotics and continue supportive care. If  the respiratory panel is positive for atypical bacterial infection (Bordetella pertussis, Chlamydophila pneumoniae, or Mycoplasma pneumoniae), consider antibiotic de-escalation to target atypical bacterial infection.          Medication Review:       Schedule Meds  acetylcysteine, 2 mL, Nebulization, BID - RT  ampicillin-sulbactam, 3 g, Intravenous, Q6H  aspirin, 81 mg, Oral, Daily  buprenorphine-naloxone, 1.5 tablet, Sublingual, Daily  docusate sodium, 100 mg, Oral, BID  enoxaparin, 40 mg, Subcutaneous, Q24H  epoetin asia-epbx, 40,000 Units, Subcutaneous, Weekly  gabapentin, 300 mg, Oral, TID  guaiFENesin, 600 mg, Oral, Q12H  insulin lispro, 0-7 Units, Subcutaneous, TID With Meals  ipratropium-albuterol, 3 mL, Nebulization, Q4H - RT  Shabbir, 1 packet, Oral, BID  lurasidone, 40 mg, Oral, Daily  megestrol acetate, 200 mg, Oral, TID  multivitamin with minerals, 1 tablet, Oral, Daily  pantoprazole, 40 mg, Oral, Daily  polyethylene glycol, 17 g, Oral, Daily  [START ON 6/13/2021] predniSONE, 10 mg, Oral, Daily  sertraline, 50 mg, Oral, Daily  spironolactone, 25 mg, Oral, Daily  vancomycin, 1,000 mg, Intravenous, Q12H  Zinc Oxide, , Apply externally, BID        Infusion Meds  Pharmacy to dose vancomycin,         PRN Meds  •  acetaminophen  •  albuterol  •  bisacodyl  •  calcium carbonate  •  dextrose  •  dextrose  •  glucagon (human recombinant)  •  hydrOXYzine  •  insulin lispro  •  ipratropium-albuterol  •  melatonin  •  ondansetron  •  Pharmacy to dose vancomycin  •  promethazine  •  [COMPLETED] Insert peripheral IV **AND** sodium chloride  •  sodium chloride        Assessment/Plan       Antimicrobial Therapy   1.        day  2.  Unasyn      day  3.  Vancomycin      day  4.      Day  5.      Day      Assessment     Possible postobstructive pneumonia.  Patient has right large lung mass as a result of lung cancer.  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    Pancytopenia-likely related to chemotherapy     Lung CA was on chemotherapy    Oral thrush        Plan     Continue IV Unasyn 3 g every 6 hours  Continue IV vancomycin-asked pharmacy to monitor and dose  Will offer 2 weeks of the above antibiotics  Continue nystatin  Continue supportive care  A.m. labs  Prognosis is very guarded and long-term prognosis might be poor  Encouraged incentive spirometer use    Herminia Stanton, VINCE  06/12/21  14:41 EDT

## 2021-06-12 NOTE — PLAN OF CARE
Subjective: Pt agreeable to therapeutic plan of care. Agreed to get to chair, states he knows he gets very anxious    Objective:     Bed mobility - Min-A  Transfers - Min-A  Ambulation -  feet N/A or Not attempted.    Pain: 0 VAS  Education: Provided education on importance of mobility and skilled verbal / tactile cueing throughout intervention.     Assessment: Axel Ramirez presents with functional mobility impairments which indicate the need for skilled intervention. Tolerating session today without incident. Patient on 7L hi flow. Sats 92% but >134. Req constant vc's to relax and PLB. Takes awhile to recover.Will continue to follow and progress as tolerated.     Plan/Recommendations:   Pt would benefit from Inpatient Rehabilitation placement at discharge from facility and requires no DME at discharge.   Pt desires Inpatient Rehabilitation placement at discharge. Pt cooperative; agreeable to therapeutic recommendations and plan of care.     Basic Mobility 6-click:  Rollin = Total, A lot = 2, A little = 3; 4 = None  Supine>Sit:   1 = Total, A lot = 2, A little = 3; 4 = None   Sit>Stand with arms:  1 = Total, A lot = 2, A little = 3; 4 = None  Bed>Chair:   1 = Total, A lot = 2, A little = 3; 4 = None  Ambulate in room:  1 = Total, A lot = 2, A little = 3; 4 = None  3-5 Steps with railin = Total, A lot = 2, A little = 3; 4 = None  Score: 16    Modified Lottie: 4 = Moderately severe disability (Unable to attend to own bodily needs without assistance, and unable to walk unassisted)     Post-Tx Position: chair, alarm and call light in reach  PPE: gloves, surgical mask, eyewear protection

## 2021-06-12 NOTE — PLAN OF CARE
Goal Outcome Evaluation:  Plan of Care Reviewed With: patient           Outcome Summary: patient currently resting with wife at bedside, patient receiving IV abx overnight, patient maintiaing on 8L O2

## 2021-06-13 NOTE — PLAN OF CARE
Goal Outcome Evaluation:  Plan of Care Reviewed With: patient           Outcome Summary: patient resting and wife at bedside, patient still on 8L O2, patient receiving IV abx, will continue to monitor

## 2021-06-13 NOTE — PROGRESS NOTES
LOS: 6 days   Patient Care Team:  Sandoval Stevenson FNP as PCP - General  Sandoval Stevenson FNP as PCP - Family Medicine  Axel Lewis MD as Consulting Physician (Hematology and Oncology)  Iglesia Springer MD as Consulting Physician (Cardiology)    Subjective     Interval History:    Patient Complaints: cough and constipation     History taken from: patient    Review of Systems   Constitutional: Positive for fatigue. Negative for fever.   HENT: Negative for trouble swallowing.    Respiratory: Positive for cough (starting to cough ) and shortness of breath.    Cardiovascular: Negative for chest pain, palpitations and leg swelling.   Gastrointestinal: Positive for constipation. Negative for abdominal pain, diarrhea, nausea and vomiting.   Genitourinary: Negative for difficulty urinating.   Musculoskeletal: Positive for gait problem.   Neurological: Positive for weakness. Negative for headaches.   Psychiatric/Behavioral: Negative for agitation.           Objective     Vital Signs  Temp:  [97.4 °F (36.3 °C)-97.5 °F (36.4 °C)] 97.4 °F (36.3 °C)  Heart Rate:  [] 96  Resp:  [18-22] 20  BP: (107-151)/(69-77) 151/77    Physical Exam:     General Appearance:    Alert, cooperative, in no acute distress,Chronically ill appearing and frail    Head:    Normocephalic, without obvious abnormality, atraumatic   Eyes:            Lids and lashes normal, conjunctivae and sclerae normal, no   icterus, no pallor, corneas clear, PERRLA   Ears:    Ears appear intact with no abnormalities noted   Throat:   No oral lesions, no thrush, oral mucosa moist   Neck:   No adenopathy, supple, trachea midline, no thyromegaly, no   carotid bruit, no JVD   Lungs:     Rhonchi throughout. Better aeration today .respirations regular, even and  unlabored    Heart:    Regular rhythm and normal rate, normal S1 and S2, no            murmur, no gallop, no rub, no click   Chest Wall:    No abnormalities observed   Abdomen:     Normal bowel  sounds, no masses, no organomegaly, soft        Non-tender non-distended, no guarding,   Extremities:   Generally weak. No edema, no cyanosis, no Redness   Pulses:   Pulses palpable and equal bilaterally   Skin:   No bleeding, bruising or rash   Lymph nodes:   No palpable adenopathy   Neurologic:   Cranial nerves 2 - 12 grossly intact, sensation intact, DTR       present and equal bilaterally        Results Review:    Lab Results (last 24 hours)     Procedure Component Value Units Date/Time    POC Glucose Once [[id number]]  (Abnormal) Collected: 06/13/21 1123    Specimen: Blood Updated: 06/13/21 1124     Glucose 145 mg/dL      Comment: Serial Number: [id number]:  [id number]       POC Glucose Once [[id number]]  (Abnormal) Collected: 06/13/21 0741    Specimen: Blood Updated: 06/13/21 0743     Glucose 114 mg/dL      Comment: Serial Number: [id number]:  [id number]       CBC & Differential [[id number]]  (Abnormal) Collected: 06/13/21 0544    Specimen: Blood Updated: 06/13/21 0700    Narrative:      The following orders were created for panel order CBC & Differential.  Procedure                               Abnormality         Status                     ---------                               -----------         ------                     Scan Slide[[id number]]                                       Final result               CBC Auto Differential[[id number]]        Abnormal            Final result               Path Consult Reflex[[id number]]                              Final result                 Please view results for these tests on the individual orders.    Path Consult Reflex [[id number]] Collected: 06/13/21 0544    Specimen: Blood Updated: 06/13/21 0700     Pathology Review Yes    Manual Differential [[id number]]  (Abnormal) Collected: 06/13/21 0544    Specimen: Blood Updated: 06/13/21 0700     Neutrophil % 71.0 %      Lymphocyte % 2.0 %      Bands %  14.0 %      Metamyelocyte % 6.0 %      Myelocyte % 3.0 %       Promyelocyte % 1.0 %      Atypical Lymphocyte % 1.0 %      Blasts % 2.0 %      Neutrophils Absolute 7.82 10*3/mm3      Lymphocytes Absolute 0.28 10*3/mm3      Anisocytosis Mod/2+     Macrocytes Slight/1+     Toxic Granulation Slight/1+     Platelet Morphology Normal    Scan Slide [[id number]] Collected: 06/13/21 0544    Specimen: Blood Updated: 06/13/21 0700     Scan Slide --     Comment: See Manual Differential Results       CBC Auto Differential [[id number]]  (Abnormal) Collected: 06/13/21 0544    Specimen: Blood Updated: 06/13/21 0700     WBC 9.20 10*3/mm3      RBC 2.55 10*6/mm3      Hemoglobin 9.0 g/dL      Hematocrit 26.5 %      .0 fL      MCH 35.2 pg      MCHC 33.9 g/dL      RDW 18.5 %      RDW-SD 66.1 fl      MPV 8.2 fL      Platelets 180 10*3/mm3     Narrative:      The previously reported component NRBC is no longer being reported. Previous result was 0.8 /100 WBC (Reference Range: 0.0-0.2 /100 WBC) on 6/13/2021 at 06 EDT.    Basic Metabolic Panel [[id number]]  (Abnormal) Collected: 06/13/21 0544    Specimen: Blood Updated: 06/13/21 0629     Glucose 84 mg/dL      BUN 20 mg/dL      Creatinine 0.57 mg/dL      Sodium 135 mmol/L      Potassium 3.9 mmol/L      Chloride 102 mmol/L      CO2 24.0 mmol/L      Calcium 8.4 mg/dL      eGFR Non African Amer 141 mL/min/1.73      BUN/Creatinine Ratio 35.1     Anion Gap 9.0 mmol/L     Narrative:      GFR Normal >60  Chronic Kidney Disease <60  Kidney Failure <15      POC Glucose Once [[id number]]  (Abnormal) Collected: 06/12/21 1920    Specimen: Blood Updated: 06/12/21 1922     Glucose 127 mg/dL      Comment: Serial Number: [id number]:  [id number]       Copper, Serum [[id number]] Collected: 06/11/21 0949    Specimen: Blood Updated: 06/12/21 1808     Copper 122 ug/dL      Comment:                                 Detection Limit = 5       Narrative:      Test(s) 001586-Copper, Serum or Plasma  was developed and its performance characteristics determined  by  [facility]. It has not been cleared or approved by the Food  and Drug Administration.  Performed at:  01 - [facility]  [id number]  : Jody Hernandez MD, Phone:  [phone]    POC Glucose Once [[id number]]  (Abnormal) Collected: 06/12/21 1647    Specimen: Blood Updated: 06/12/21 1648     Glucose 206 mg/dL      Comment: Serial Number: [id number]:  [id number]              Imaging Results (Last 24 Hours)     ** No results found for the last 24 hours. **               I reviewed the patient's new clinical results.    Medication Review:   Scheduled Meds:acetylcysteine, 2 mL, Nebulization, BID - RT  ampicillin-sulbactam, 3 g, Intravenous, Q6H  aspirin, 81 mg, Oral, Daily  buprenorphine-naloxone, 1.5 tablet, Sublingual, Daily  docusate sodium, 100 mg, Oral, BID  enoxaparin, 40 mg, Subcutaneous, Q24H  epoetin asia-epbx, 40,000 Units, Subcutaneous, Weekly  gabapentin, 300 mg, Oral, TID  guaiFENesin, 600 mg, Oral, Q12H  insulin lispro, 0-7 Units, Subcutaneous, TID With Meals  ipratropium-albuterol, 3 mL, Nebulization, Q4H - RT  Shabbir, 1 packet, Oral, BID  lurasidone, 40 mg, Oral, Daily  megestrol acetate, 200 mg, Oral, TID  multivitamin with minerals, 1 tablet, Oral, Daily  pantoprazole, 40 mg, Oral, Daily  polyethylene glycol, 17 g, Oral, Daily  predniSONE, 10 mg, Oral, Daily  sertraline, 50 mg, Oral, Daily  spironolactone, 25 mg, Oral, Daily  vancomycin, 1,000 mg, Intravenous, Q12H  Zinc Oxide, , Apply externally, BID      Continuous Infusions:Pharmacy to dose vancomycin,       PRN Meds:.•  acetaminophen  •  albuterol  •  bisacodyl  •  calcium carbonate  •  dextrose  •  dextrose  •  glucagon (human recombinant)  •  hydrOXYzine  •  insulin lispro  •  ipratropium-albuterol  •  melatonin  •  ondansetron  •  Pharmacy to dose vancomycin  •  promethazine  •  [COMPLETED] Insert peripheral IV **AND** sodium chloride  •  sodium chloride     Assessment/Plan       Coronary artery  disease involving native coronary artery of native heart without angina pectoris    Nonrheumatic aortic valve stenosis    Essential hypertension    Congestive heart failure (CMS/HCC)    Tobacco abuse    Squamous cell carcinoma of lung, right (CMS/HCC)    Acute-on-chronic respiratory failure (CMS/HCC)    Respiratory distress    Stage 2 skin ulcer of sacral region (CMS/HCC)      Squamous cell carcinoma of lung, right (CMS/HCC)- Dr Lewis following. Patient has brain mets and is planning on resection with Dr Luna.       Anemia - GOGO / Myodysplatic syndrome - Dr Lewis following. Weekly Retacrit - had  prbc yesterday today hgb at 9.0       Acute-on-chronic respiratory failure (CMS/HCC)  Dr Groves following . O2 at 8 L nc sats 95-99     Pneumonia- possible post obstructive. Pt with large r lung mass. He is normally on 4 L o2 at home is decreased to 8 L today from 10 L yesterday . ID following with IV unasyn and IV vanc-        Stage 2 skin ulcer of sacral region (CMS/HCC)  Off loading with turns and skin barrier    Constipation- bowel management protocol    Plan for disposition:CHRIS Medina, VINCE  06/13/21  12:40 EDT

## 2021-06-13 NOTE — PROGRESS NOTES
"PULMONARY CRITICAL CARE Progress  NOTE      PATIENT IDENTIFICATION:  Name: Axel Ramirez  MRN: [id number]  :  1950     Age: 70 y.o.  Sex: male    DATE OF Note:  2021   Referring Physician: Ignacia De La Cruz DO                  Subjective:   Feeling better, less SOB    No chest or abd pain, no bowel or bladder issues   No new issues       Objective:  tMax 24 hrs: Temp (24hrs), Av.5 °F (36.4 °C), Min:97.4 °F (36.3 °C), Max:97.7 °F (36.5 °C)      Vitals Ranges:   Temp:  [97.4 °F (36.3 °C)-97.7 °F (36.5 °C)] 97.7 °F (36.5 °C)  Heart Rate:  [] 94  Resp:  [17-22] 20  BP: (107-151)/(67-77) 116/67    Intake and Output Last 3 Shifts:   I/O last 3 completed shifts:  In: 1320 [P.O.:1320]  Out: -     Exam:  /67 (BP Location: Left arm, Patient Position: Lying)   Pulse 94   Temp 97.7 °F (36.5 °C) (Oral)   Resp 20   Ht 172.7 cm (68\")   Wt 67.7 kg (149 lb 4 oz)   SpO2 96%   BMI 22.69 kg/m²     General Appearance:   Alert awake follows simple commands  HEENT:  Normocephalic, without obvious abnormality, Conjunctiva/corneas clear,.  Normal external ear canals, Nares normal, no drainage     Neck:  Supple, symmetrical, trachea midline. No JVD.  Lungs /Chest wall:   Bilateral basal rhonchi, respirations unlabored symmetrical wall movement.     Heart:  Regular rate and rhythm, systolic murmur PMI left sternal border  Abdomen: Soft, non-tender, no masses, no organomegaly.    Extremities: Trace edema no clubbing or Cyanosis        Medications:    Current Facility-Administered Medications:   •  acetaminophen (TYLENOL) tablet 1,000 mg, 1,000 mg, Oral, Q6H PRN, Atkins, Eliz D, APRN  •  acetylcysteine (MUCOMYST) 20 % nebulizer solution 2 mL, 2 mL, Nebulization, BID - RT, Ignacia De La Cruz DO, 4 mL at 21 1819  •  albuterol (PROVENTIL) nebulizer solution 0.083% 2.5 mg/3mL, 2.5 mg, Nebulization, Q2H SUADN, Ignacia De La Cruz DO, 2.5 mg at 21 0914  •  ampicillin-sulbactam (UNASYN) 3 g in sodium " chloride 0.9 % 100 mL IVPB-MBP, 3 g, Intravenous, Q6H, Herminia Stanton, APRN, Last Rate: 0 mL/hr at 06/09/21 0319, 3 g at 06/13/21 1402  •  aspirin EC tablet 81 mg, 81 mg, Oral, Daily, Ignacia De La Cruz, DO, 81 mg at 06/13/21 0814  •  sennosides-docusate (PERICOLACE) 8.6-50 MG per tablet 2 tablet, 2 tablet, Oral, BID **AND** polyethylene glycol (MIRALAX) packet 17 g, 17 g, Oral, Daily PRN **AND** bisacodyl (DULCOLAX) EC tablet 5 mg, 5 mg, Oral, Daily PRN **AND** bisacodyl (DULCOLAX) suppository 10 mg, 10 mg, Rectal, Daily PRN, Renetta Medina, APRN  •  buprenorphine-naloxone (SUBOXONE) 8-2 MG per SL tablet 1.5 tablet, 1.5 tablet, Sublingual, Daily, Ignacia De La Cruz, DO, 1.5 tablet at 06/13/21 0814  •  calcium carbonate (TUMS) chewable tablet 500 mg (200 mg elemental), 2 tablet, Oral, BID PRN, Ignacia De La Cruz DO, 2 tablet at 06/08/21 1604  •  dextrose (D50W) 25 g/ 50mL Intravenous Solution 25 g, 25 g, Intravenous, Q15 Min PRN, Ignacia De La Cruz A, DO  •  dextrose (GLUTOSE) oral gel 15 g, 15 g, Oral, Q15 Min PRN, Ignacia De La Cruz, DO  •  enoxaparin (LOVENOX) syringe 40 mg, 40 mg, Subcutaneous, Q24H, Ignacia De La Cruz DO, 40 mg at 06/13/21 1638  •  epoetin asia-epbx (RETACRIT) injection 40,000 Units, 40,000 Units, Subcutaneous, Weekly, Fatoumata Espinosa APRN, 40,000 Units at 06/08/21 1643  •  gabapentin (NEURONTIN) capsule 300 mg, 300 mg, Oral, TID, Ignacia De La Cruz DO, 300 mg at 06/13/21 1638  •  glucagon (human recombinant) (GLUCAGEN DIAGNOSTIC) injection 1 mg, 1 mg, Subcutaneous, Q15 Min PRN, Ignacia De La Cruz DO  •  guaiFENesin (MUCINEX) 12 hr tablet 600 mg, 600 mg, Oral, Q12H, Ignacia De La Cruz DO, 600 mg at 06/13/21 0813  •  hydrOXYzine (ATARAX) tablet 25 mg, 25 mg, Oral, TID PRN, Eliz Andrews APRN, 25 mg at 06/09/21 1309  •  insulin lispro (ADMELOG) injection 0-7 Units, 0-7 Units, Subcutaneous, TID With Meals, Ignacia De La Cruz DO, 3 Units at 06/12/21 1732  •  insulin lispro (ADMELOG) injection 0-7 Units, 0-7 Units,  Subcutaneous, TID PRN, Ignacia De La Cruz DO  •  ipratropium-albuterol (DUO-NEB) nebulizer solution 3 mL, 3 mL, Nebulization, Q4H - RT, Ignacia De La Cruz DO, 3 mL at 06/13/21 1819  •  ipratropium-albuterol (DUO-NEB) nebulizer solution 3 mL, 3 mL, Nebulization, Q1H PRN, Ignacia De La Cruz DO  •  Shabbir pack 1 packet, 1 packet, Oral, BID, Rachana Zacarias, ADAIR, 1 packet at 06/13/21 0849  •  lurasidone (LATUDA) tablet 40 mg, 40 mg, Oral, Daily, Ignacia De La Cruz DO, 40 mg at 06/13/21 0813  •  megestrol acetate (MEGACE) oral suspension 200 mg, 200 mg, Oral, TID, Ignacia De La Cruz DO, 200 mg at 06/13/21 1638  •  melatonin tablet 5 mg, 5 mg, Oral, Nightly PRN, Ignacia De La Cruz DO  •  multivitamin with minerals 1 tablet, 1 tablet, Oral, Daily, Ignacia De La Cruz DO, 1 tablet at 06/13/21 0813  •  ondansetron (ZOFRAN) injection 4 mg, 4 mg, Intravenous, Q6H PRN, Lianna Groves MD, 4 mg at 06/09/21 1326  •  pantoprazole (PROTONIX) EC tablet 40 mg, 40 mg, Oral, Daily, Ignacia De La Cruz DO, 40 mg at 06/13/21 0813  •  Pharmacy to dose vancomycin, , Does not apply, Continuous PRN, Herminia Stanton, APRN  •  polyethylene glycol (MIRALAX) packet 17 g, 17 g, Oral, Daily, Ignacia De La Cruz DO, 17 g at 06/13/21 0813  •  [COMPLETED] predniSONE (DELTASONE) tablet 30 mg, 30 mg, Oral, Daily, 30 mg at 06/10/21 0849 **FOLLOWED BY** [COMPLETED] predniSONE (DELTASONE) tablet 20 mg, 20 mg, Oral, Daily, 20 mg at 06/12/21 0855 **FOLLOWED BY** predniSONE (DELTASONE) tablet 10 mg, 10 mg, Oral, Daily, Lianna Groves MD, 10 mg at 06/13/21 0813  •  promethazine (PHENERGAN) tablet 25 mg, 25 mg, Oral, Q4H PRN, Ignacia De La Cruz DO, 25 mg at 06/1950  •  sertraline (ZOLOFT) tablet 50 mg, 50 mg, Oral, Daily, Ignacia De La Cruz DO, 50 mg at 06/13/21 0813  •  [COMPLETED] Insert peripheral IV, , , Once **AND** sodium chloride 0.9 % flush 10 mL, 10 mL, Intravenous, PRN, Ignacia De La Cruz DO, 10 mL at 06/13/21 0814  •  sodium chloride 0.9 % flush 10 mL, 10 mL, Intravenous, PRN,  Ignacia De La Cruz DO  •  spironolactone (ALDACTONE) tablet 25 mg, 25 mg, Oral, Daily, Ignacia De La Cruz DO, 25 mg at 06/13/21 0814  •  vancomycin (VANCOCIN) 1,000 mg in sodium chloride 0.9 % 250 mL IVPB, 1,000 mg, Intravenous, Q12H, Anne Allred MD, 1,000 mg at 06/13/21 1638  •  Zinc Oxide 16 % ointment, , Apply externally, BID, Carina Luevano APRN, Given at 06/13/21 0849    Data Review:  All labs (24hrs):   Recent Results (from the past 24 hour(s))   POC Glucose Once    Collection Time: 06/12/21  7:20 PM    Specimen: Blood   Result Value Ref Range    Glucose 127 (H) 70 - 105 mg/dL   Basic Metabolic Panel    Collection Time: 06/13/21  5:44 AM    Specimen: Blood   Result Value Ref Range    Glucose 84 65 - 99 mg/dL    BUN 20 8 - 23 mg/dL    Creatinine 0.57 (L) 0.76 - 1.27 mg/dL    Sodium 135 (L) 136 - 145 mmol/L    Potassium 3.9 3.5 - 5.2 mmol/L    Chloride 102 98 - 107 mmol/L    CO2 24.0 22.0 - 29.0 mmol/L    Calcium 8.4 (L) 8.6 - 10.5 mg/dL    eGFR Non African Amer 141 >60 mL/min/1.73    BUN/Creatinine Ratio 35.1 (H) 7.0 - 25.0    Anion Gap 9.0 5.0 - 15.0 mmol/L   CBC Auto Differential    Collection Time: 06/13/21  5:44 AM    Specimen: Blood   Result Value Ref Range    WBC 9.20 3.40 - 10.80 10*3/mm3    RBC 2.55 (L) 4.14 - 5.80 10*6/mm3    Hemoglobin 9.0 (L) 13.0 - 17.7 g/dL    Hematocrit 26.5 (L) 37.5 - 51.0 %    .0 (H) 79.0 - 97.0 fL    MCH 35.2 (H) 26.6 - 33.0 pg    MCHC 33.9 31.5 - 35.7 g/dL    RDW 18.5 (H) 12.3 - 15.4 %    RDW-SD 66.1 (H) 37.0 - 54.0 fl    MPV 8.2 6.0 - 12.0 fL    Platelets 180 140 - 450 10*3/mm3   Scan Slide    Collection Time: 06/13/21  5:44 AM    Specimen: Blood   Result Value Ref Range    Scan Slide     Manual Differential    Collection Time: 06/13/21  5:44 AM    Specimen: Blood   Result Value Ref Range    Neutrophil % 71.0 42.7 - 76.0 %    Lymphocyte % 2.0 (L) 19.6 - 45.3 %    Bands %  14.0 (H) 0.0 - 5.0 %    Metamyelocyte % 6.0 (H) 0.0 - 0.0 %    Myelocyte % 3.0 (H) 0.0 - 0.0 %     Promyelocyte % 1.0 (H) 0.0 - 0.0 %    Atypical Lymphocyte % 1.0 0.0 - 5.0 %    Blasts % 2.0 (H) 0.0 - 0.0 %    Neutrophils Absolute 7.82 (H) 1.70 - 7.00 10*3/mm3    Lymphocytes Absolute 0.28 (L) 0.70 - 3.10 10*3/mm3    Anisocytosis Mod/2+ None Seen    Macrocytes Slight/1+ None Seen    Toxic Granulation Slight/1+ None Seen    Platelet Morphology Normal Normal   Path Consult Reflex    Collection Time: 06/13/21  5:44 AM    Specimen: Blood   Result Value Ref Range    Pathology Review Yes    POC Glucose Once    Collection Time: 06/13/21  7:41 AM    Specimen: Blood   Result Value Ref Range    Glucose 114 (H) 70 - 105 mg/dL   POC Glucose Once    Collection Time: 06/13/21 11:23 AM    Specimen: Blood   Result Value Ref Range    Glucose 145 (H) 70 - 105 mg/dL   POC Glucose Once    Collection Time: 06/13/21  4:26 PM    Specimen: Blood   Result Value Ref Range    Glucose 139 (H) 70 - 105 mg/dL        Imaging:  XR Chest 1 View  Narrative:    DATE OF EXAM:   6/11/2021 4:39 AM     PROCEDURE:   XR CHEST 1 VW-     INDICATIONS:   Shortness of breath; R06.03-Acute respiratory distress; J98.01-Acute  bronchospasm; I50.9-Heart failure, unspecified; C34.91-Malignant  neoplasm of unspecified part of right bronchus or lung     COMPARISON:  6/10/2021     TECHNIQUE:   Portable chest radiograph.     FINDINGS:    There is a left-sided port catheter with the tip at the cavoatrial  junction. Sternotomy wires and changes of CABG noted. Heart size normal.  Large right perihilar mass unchanged. There is worsening interstitial  opacities at the left lung that may relate to pneumonia or asymmetric  pulmonary edema. No pneumothorax. No large effusion.     Impression: 1. Mild worsening of diffuse interstitial opacities throughout the left  lung that may relate to infection or asymmetric pulmonary edema.  2. Redemonstration of 9 cm right upper lobe mass consistent with known  malignancy.  3. Stable additional chronic findings above.     Electronically  Signed By-Saleem Ferguson MD On:6/11/2021 7:40 AM  This report was finalized on [id number] by  Saleem Ferguson MD.       ASSESSMENT:  Acute-on-chronic respiratory distress  Pneumonia most likely post obstruction  COPD exacerbation  Metastatic lung cancer squamous cell  CHF  Opioid dependence  Stage 2 skin ulcer of sacral region       PLAN:  Continue current plan of care and   Chest PT   Mucomyst   Encourag use I-S flutter valve  Antibiotics  Bronchodilator  Inhaled corticosteroids  IV steroids will wean it down  IS/Flutter valve  Electrolytes/ glycemic control  DVT and GI prophylaxis.     Poor long-term prognosis    Discussed with Dr Yaneli Dumont, APRN    6/13/2021  19:01 EDT     I personally have examined  and interviewed the patient. I have reviewed the history, data, problems, assessment and plan with our NP.  Critical care time in direct medical management (   ) minutes  Electronically signed by Lianna Groves MD, D,ABSM, 06/13/21, 8:06 PM EDT.

## 2021-06-13 NOTE — PROGRESS NOTES
Infectious Diseases Progress Note      LOS: 6 days   Patient Care Team:  Sandoval Stevenson FNP as PCP - General  Sandoval Stevenson FNP as PCP - Family Medicine  Axel Lewis MD as Consulting Physician (Hematology and Oncology)  Iglesia Springer MD as Consulting Physician (Cardiology)    Chief Complaint: Shortness of breath, fatigue    Subjective        The patient has been afebrile for the last 24 hours.  The patient is on 8 L of oxygen by high flow oxygen, hemodynamically stable, and is tolerating antimicrobial therapy.  He is currently in the chair      Review of Systems:   Review of Systems   Constitutional: Positive for fatigue.   HENT: Negative.    Eyes: Negative.    Respiratory: Positive for shortness of breath.    Cardiovascular: Negative.    Gastrointestinal: Negative.    Endocrine: Negative.    Genitourinary: Negative.    Musculoskeletal: Negative.    Skin: Negative.    Neurological: Negative.    Psychiatric/Behavioral: Negative.    All other systems reviewed and are negative.       Objective     Vital Signs  Temp:  [97.4 °F (36.3 °C)-97.5 °F (36.4 °C)] 97.4 °F (36.3 °C)  Heart Rate:  [] 96  Resp:  [18-22] 20  BP: (107-151)/(69-77) 151/77    Physical Exam:  Physical Exam  Vitals and nursing note reviewed.   Constitutional:       General: He is not in acute distress.     Appearance: He is well-developed. He is ill-appearing. He is not diaphoretic.      Comments: Cachectic   HENT:      Head: Normocephalic and atraumatic.      Mouth/Throat:      Comments: Mild oral thrush  Eyes:      Extraocular Movements: Extraocular movements intact.      Conjunctiva/sclera: Conjunctivae normal.      Pupils: Pupils are equal, round, and reactive to light.   Cardiovascular:      Rate and Rhythm: Normal rate and regular rhythm.      Heart sounds: Normal heart sounds, S1 normal and S2 normal.   Pulmonary:      Effort: Pulmonary effort is normal. No respiratory distress.      Breath sounds: No stridor. Rales  present. No wheezing.      Comments: Diminished throughout  Abdominal:      General: Bowel sounds are normal. There is no distension.      Palpations: Abdomen is soft. There is no mass.      Tenderness: There is no abdominal tenderness. There is no guarding.   Musculoskeletal:         General: No deformity. Normal range of motion.      Cervical back: Neck supple.   Skin:     General: Skin is warm and dry.      Coloration: Skin is not pale.      Findings: No erythema or rash.      Comments: Port in place   Neurological:      Mental Status: He is alert and oriented to person, place, and time.      Cranial Nerves: No cranial nerve deficit.   Psychiatric:         Mood and Affect: Mood normal.          Results Review:    I have reviewed all clinical data, test, lab, and imaging results.     Radiology  No Radiology Exams Resulted Within Past 24 Hours    Cardiology    Laboratory    Results from last 7 days   Lab Units 06/13/21  0544 06/12/21  0446 06/11/21  2145 06/11/21  0303 06/09/21  0503 06/08/21  1630 06/08/21  0624 06/07/21  0500   WBC 10*3/mm3 9.20 12.10*  --  13.80* 10.90*  --  8.80 10.10   HEMOGLOBIN g/dL 9.0* 9.6* 9.3* 6.9* 7.2* 7.8* 7.2* 9.2*   HEMATOCRIT % 26.5* 28.7* 27.9* 21.0* 21.3* 23.1* 21.1* 27.3*   PLATELETS 10*3/mm3 180 193  --  161 118*  --  103* 115*     Results from last 7 days   Lab Units 06/13/21  0544 06/12/21  0446 06/11/21  0303 06/10/21  0636 06/09/21  0320 06/08/21  0624 06/07/21  0500   SODIUM mmol/L 135* 135* 135*  --  140 139 134*   POTASSIUM mmol/L 3.9 4.0 4.5  --  4.2 4.6 4.1   CHLORIDE mmol/L 102 102 102  --  103 100 97*   CO2 mmol/L 24.0 25.0 27.0  --  29.0 30.0* 26.0   BUN mg/dL 20 18 24*  --  25* 32* 24*   CREATININE mg/dL 0.57* 0.56* 0.61* 0.64* 0.63* 0.93 0.73*   GLUCOSE mg/dL 84 89 101*  --  123* 127* 116*   ALBUMIN g/dL  --  2.30*  --   --   --  2.50* 2.50*   BILIRUBIN mg/dL  --  0.5  --   --   --  0.6 0.7   ALK PHOS U/L  --  106  --   --   --  89 109   AST (SGOT) U/L  --  33  --    --   --  41* 58*   ALT (SGPT) U/L  --  38  --   --   --  64* 87*   CALCIUM mg/dL 8.4* 8.6 8.1*  --  8.6 9.0 9.2                 Microbiology   Microbiology Results (last 10 days)     Procedure Component Value - Date/Time    MRSA Screen, PCR (Inpatient) - Swab, Nares [[id number]]  (Abnormal) Collected: 06/07/21 1701    Lab Status: Final result Specimen: Swab from Nares Updated: 06/07/21 1941     MRSA PCR MRSA Detected    Blood Culture - Blood, Arm, Left [[id number]] Collected: 06/07/21 0705    Lab Status: Final result Specimen: Blood from Arm, Left Updated: 06/12/21 0730     Blood Culture No growth at 5 days    Blood Culture - Blood, Arm, Right [[id number]] Collected: 06/07/21 0705    Lab Status: Final result Specimen: Blood from Arm, Right Updated: 06/12/21 0730     Blood Culture No growth at 5 days    Respiratory Panel PCR w/COVID-19(SARS-CoV-2) PARESH/GRACIELA/KATHY/PAD/[facility] In-House, NP Swab in UTM/VTM, 3-4 HR TAT - Swab, Nasopharynx [[id number]]  (Normal) Collected: 06/07/21 0620    Lab Status: Final result Specimen: Swab from Nasopharynx Updated: 06/07/21 0726     ADENOVIRUS, PCR Not Detected     Coronavirus 229E Not Detected     Coronavirus HKU1 Not Detected     Coronavirus NL63 Not Detected     Coronavirus OC43 Not Detected     COVID19 Not Detected     Human Metapneumovirus Not Detected     Human Rhinovirus/Enterovirus Not Detected     Influenza A PCR Not Detected     Influenza B PCR Not Detected     Parainfluenza Virus 1 Not Detected     Parainfluenza Virus 2 Not Detected     Parainfluenza Virus 3 Not Detected     Parainfluenza Virus 4 Not Detected     RSV, PCR Not Detected     Bordetella pertussis pcr Not Detected     Bordetella parapertussis PCR Not Detected     Chlamydophila pneumoniae PCR Not Detected     Mycoplasma pneumo by PCR Not Detected    Narrative:      In the setting of a positive respiratory panel with a viral infection PLUS a negative procalcitonin without other underlying concern for bacterial  infection, consider observing off antibiotics or discontinuation of antibiotics and continue supportive care. If the respiratory panel is positive for atypical bacterial infection (Bordetella pertussis, Chlamydophila pneumoniae, or Mycoplasma pneumoniae), consider antibiotic de-escalation to target atypical bacterial infection.          Medication Review:       Schedule Meds  acetylcysteine, 2 mL, Nebulization, BID - RT  ampicillin-sulbactam, 3 g, Intravenous, Q6H  aspirin, 81 mg, Oral, Daily  buprenorphine-naloxone, 1.5 tablet, Sublingual, Daily  enoxaparin, 40 mg, Subcutaneous, Q24H  epoetin asia-epbx, 40,000 Units, Subcutaneous, Weekly  gabapentin, 300 mg, Oral, TID  guaiFENesin, 600 mg, Oral, Q12H  insulin lispro, 0-7 Units, Subcutaneous, TID With Meals  ipratropium-albuterol, 3 mL, Nebulization, Q4H - RT  Shabbir, 1 packet, Oral, BID  lurasidone, 40 mg, Oral, Daily  megestrol acetate, 200 mg, Oral, TID  multivitamin with minerals, 1 tablet, Oral, Daily  pantoprazole, 40 mg, Oral, Daily  polyethylene glycol, 17 g, Oral, Daily  predniSONE, 10 mg, Oral, Daily  senna-docusate sodium, 2 tablet, Oral, BID  sertraline, 50 mg, Oral, Daily  spironolactone, 25 mg, Oral, Daily  vancomycin, 1,000 mg, Intravenous, Q12H  Zinc Oxide, , Apply externally, BID        Infusion Meds  Pharmacy to dose vancomycin,         PRN Meds  •  acetaminophen  •  albuterol  •  senna-docusate sodium **AND** polyethylene glycol **AND** bisacodyl **AND** bisacodyl  •  calcium carbonate  •  dextrose  •  dextrose  •  glucagon (human recombinant)  •  hydrOXYzine  •  insulin lispro  •  ipratropium-albuterol  •  melatonin  •  ondansetron  •  Pharmacy to dose vancomycin  •  promethazine  •  [COMPLETED] Insert peripheral IV **AND** sodium chloride  •  sodium chloride        Assessment/Plan       Antimicrobial Therapy   1.        day  2.  Unasyn      day  3.  Vancomycin      day  4.      Day  5.      Day      Assessment     Possible postobstructive  pneumonia.  Patient has right large lung mass as a result of lung cancer.  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    Pancytopenia-likely related to chemotherapy     Lung CA was on chemotherapy    Oral thrush        Plan     Continue IV Unasyn 3 g every 6 hours  Continue IV vancomycin-asked pharmacy to monitor and dose  Will offer 2 weeks of the above antibiotics  Continue nystatin  Continue supportive care  A.m. labs  Prognosis is very guarded and long-term prognosis might be poor  Case was discussed with the patient's wife at the bedside    Anne Allred MD  06/13/21  14:25 EDT     Note is dictated utilizing voice recognition software/Dragon

## 2021-06-13 NOTE — PROGRESS NOTES
Hematology/Oncology Inpatient Progress Note    PATIENT NAME: Axel Ramirez  : 1950  MRN: [id number]    CHIEF COMPLAINT: Metastatic squamous cell carcinoma of the right upper lobe lung and MDS    HISTORY OF PRESENT ILLNESS:  70 y.o. male presented to [facility] ER on 2021 with shortness of air and hypoxia.  He had an acute onset on the day of admission of worsening shortness of breath with O2 saturation of 76% on 4 L/min O2.  The ECF called EMS.  He was treated with nebulizer on the way to the ER.  Chest x-ray showed left lung pneumonia and a right middle lobe lung mass.  White count 10.1, hemoglobin 9.2, .7, platelets 115k.  D-dimer was 2.99 (< 0.6).  Chest CT showed a 9 cm right upper lobe lung mass occluding the right upper lobe bronchus with complete right upper and right middle lobe collapse.  There was no pulmonary emboli.  There was possible aspiration pneumonia and stable mediastinal lymphadenopathy.  A new 12 mm nodule was seen in the left lower lobe.  Adrenal glands were normal.  He was started on steroids and Zosyn.  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  Recently started on second line therapy Taxol/carboplatin/ipilimumab and Nivolumab with Neulasta support on 2021.     21  Hematology/Oncology was consulted for his metastatic squamous cell carcinoma of the right upper lobe lung.  He is an established patient.  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  Initially treated with concomitant weekly chemotherapy (paclitaxel and carboplatin x7) and radiation therapy from 2020. He completed radiation therapy on 2021.  He completed the weekly portion of his chemotherapy on 2021.  He received 1 cycle of every 21-day carboplatin /Taxol with  Neulasta support on 2/9/2021.  He subsequently was admitted to [facility], for hypoxia with respiratory failure requiring intubation.  MRI of the brain showed a left occipital lobe lesion with vasogenic edema.  He received radiation, x1 fraction, on 3/3/2021 to the left occipital lobe. One month later, a brain MRI showed enlargement of the left occipital lobe lesion.  PET scan showed the right upper lobe lung mass to be 10 cm in size with a decrease in the peripheral FDG uptake suggesting interval response to treatment.  There was a decrease in the size and FDG uptake in the mediastinal lymph nodes.  There was no distant metastatic disease.  The lesion in the left occipital lobe was FDG avid.  Right upper lobe lung collapse had improved with aeration.  He was referred to Dr. Tay for evaluation of his brain MRI which was felt to be due to to post SRS changes and not progression.  He was seen in follow-up with plan to start chemotherapy with immunotherapy.  Paclitaxel and carboplatin doses were decreased by 20% due to pancytopenia with prior treatment.  Repeat brain MRI, on 5/13/2021, showed an increase in the size of the left occipital lobe with an increase in surrounding edema.  There were no new masses.  The patient was referred to Dr. Luna for surgical resection versus MRI SPECT/perfusion at [facility].  Dr. Luna felt the lesion was a combination of necrosis and tumor progression and recommended surgical resection.  He started treatment on 5/24/2021 (chemotherapy plus immunotherapy) as his surgical resection was to be done after medical clearance.  He had a return appointment with Dr. Luna on 6/9/2021 following an MRI of his brain.  -MDS-diagnosed January 2021.  He was anemic and was found to have GOGO and malabsorption on oral therapy.  His bone marrow revealed increased iron storage and mild erythroid atypia in January 2021.  Stool heme was negative on 5/13/2021. He has received IV iron but has  never required Retacrit.  Last office visit 5/24/2021, complaining of hypoxia and tachypnea with anxiety.  White count 9.05, hemoglobin 11.34, .8 and platelets 220.  He was starting to gain weight and Megace was continued.  For his cough and hypoxia, he was treated with Augmentin and albuterol nebulizer.  He had oral thrush which was treated with nystatin swish and swallow.  TSH 0.287 (0.282-4.0), iron 62 (), ferritin 4185 (), iron saturation 32 (20-55), TIBC 196 (228-428).     PCP: Sandoval Stevenson FNP     INTERVAL HISTORY:  • 6/8/2021-Retacrit 40,000 units subcu weekly initiated.  Hemoglobin 7.2.  • 6/9/2021-PT 11.5 (9.6-11.7), PTT 27.7 (24-31).  Hemoglobin 7.2, platelets 118,000.  Haptoglobin 465 ().  CMV IgM <30 (<30).  Cardiac ejection fraction 65%.   • 6/10/2021-EBV VCA IgM less than 36 (less than 36).  • 6/11/2021-hemoglobin 6.9.  1 unit pRBCs given.  WBC 13.8 with 18% bands.  • 6/12/2021-hemoglobin 9.6, WBC 12.1 with 8% bands.  Folate 13.7 (4.78-24.2), vitamin B12 more than 2000 (211-946).  Creatinine 0.56 (0.76-1.27).  Iron 42 (), TIBC 146 (298-536), iron saturation 29 (20-50), ferritin 3721 (), reticulocyte count 3.44 (0.7-1.9).  • 6/13/2021- hemoglobin 9.0, .0, white blood count 9.2. Copper 122 ()    History of present illness reviewed since last visit and changes noted on 06/13/21.    Subjective   Has cough and constipation.    ROS:  Review of Systems   Constitutional: Negative for activity change, chills, fatigue, fever and unexpected weight change.   HENT: Negative for congestion, dental problem, hearing loss, mouth sores, nosebleeds, sore throat and trouble swallowing.    Eyes: Negative for photophobia and visual disturbance.   Respiratory: Positive for cough. Negative for chest tightness and shortness of breath.    Cardiovascular: Negative for chest pain, palpitations and leg swelling.   Gastrointestinal: Positive for constipation. Negative for  abdominal distention, abdominal pain, blood in stool, diarrhea, nausea and vomiting.   Endocrine: Negative for cold intolerance and heat intolerance.   Genitourinary: Negative for decreased urine volume, difficulty urinating, dysuria, frequency, hematuria and urgency.   Musculoskeletal: Negative for arthralgias and gait problem.   Skin: Negative for rash and wound.   Neurological: Negative for dizziness, tremors, weakness, light-headedness, numbness and headaches.   Hematological: Negative for adenopathy. Does not bruise/bleed easily.   Psychiatric/Behavioral: Negative for confusion and hallucinations. The patient is not nervous/anxious.    All other systems reviewed and are negative.     MEDICATIONS:    Scheduled Meds:  acetylcysteine, 2 mL, Nebulization, BID - RT  ampicillin-sulbactam, 3 g, Intravenous, Q6H  aspirin, 81 mg, Oral, Daily  buprenorphine-naloxone, 1.5 tablet, Sublingual, Daily  docusate sodium, 100 mg, Oral, BID  enoxaparin, 40 mg, Subcutaneous, Q24H  epoetin asia-epbx, 40,000 Units, Subcutaneous, Weekly  gabapentin, 300 mg, Oral, TID  guaiFENesin, 600 mg, Oral, Q12H  insulin lispro, 0-7 Units, Subcutaneous, TID With Meals  ipratropium-albuterol, 3 mL, Nebulization, Q4H - RT  Shabbir, 1 packet, Oral, BID  lurasidone, 40 mg, Oral, Daily  megestrol acetate, 200 mg, Oral, TID  multivitamin with minerals, 1 tablet, Oral, Daily  pantoprazole, 40 mg, Oral, Daily  polyethylene glycol, 17 g, Oral, Daily  predniSONE, 10 mg, Oral, Daily  sertraline, 50 mg, Oral, Daily  spironolactone, 25 mg, Oral, Daily  vancomycin, 1,000 mg, Intravenous, Q12H  Zinc Oxide, , Apply externally, BID       Continuous Infusions:  Pharmacy to dose vancomycin,        PRN Meds:  •  acetaminophen  •  albuterol  •  bisacodyl  •  calcium carbonate  •  dextrose  •  dextrose  •  glucagon (human recombinant)  •  hydrOXYzine  •  insulin lispro  •  ipratropium-albuterol  •  melatonin  •  ondansetron  •  Pharmacy to dose vancomycin  •   "promethazine  •  [COMPLETED] Insert peripheral IV **AND** sodium chloride  •  sodium chloride     ALLERGIES:  No Known Allergies    Objective    VITALS:   /77 (BP Location: Left arm, Patient Position: Lying)   Pulse 96   Temp 97.4 °F (36.3 °C) (Oral)   Resp 20   Ht 172.7 cm (68\")   Wt 67.7 kg (149 lb 4 oz)   SpO2 99%   BMI 22.69 kg/m²     PHYSICAL EXAM:  Physical Exam  Vitals and nursing note reviewed.   Constitutional:       General: He is not in acute distress.     Appearance: Normal appearance. He is well-developed and normal weight. He is not diaphoretic.      Interventions: Nasal cannula in place.   HENT:      Head: Normocephalic and atraumatic.      Comments: Frontal male pattern baldness.       Right Ear: External ear normal.      Left Ear: External ear normal.      Nose: Nose normal. No rhinorrhea.      Comments: Oxygen via nasal cannula.     Mouth/Throat:      Mouth: Mucous membranes are moist.      Pharynx: Oropharynx is clear. No oropharyngeal exudate or posterior oropharyngeal erythema.      Comments: Edentulous.  Eyes:      General: No scleral icterus.     Extraocular Movements: Extraocular movements intact.      Conjunctiva/sclera: Conjunctivae normal.      Pupils: Pupils are equal, round, and reactive to light.   Cardiovascular:      Rate and Rhythm: Normal rate and regular rhythm.      Heart sounds: Normal heart sounds. No murmur heard.        Comments: Cardiac monitor leads.   Pulmonary:      Effort: Pulmonary effort is normal. No respiratory distress.      Breath sounds: No stridor. Decreased breath sounds present. No wheezing, rhonchi or rales.   Chest:      Comments: Midline vertical chest wall scar.    Left chest wall port.  Abdominal:      General: Abdomen is flat. Bowel sounds are normal. There is no distension.      Palpations: Abdomen is soft. There is no mass.      Tenderness: There is no abdominal tenderness. There is no guarding.   Genitourinary:     Comments: Deferred "   Musculoskeletal:         General: No swelling, tenderness or deformity. Normal range of motion.      Cervical back: Normal range of motion and neck supple.      Right lower leg: No edema.      Left lower leg: No edema.   Lymphadenopathy:      Cervical: No cervical adenopathy.      Upper Body:      Right upper body: No supraclavicular adenopathy.      Left upper body: No supraclavicular adenopathy.   Skin:     General: Skin is warm and dry.      Coloration: Skin is not jaundiced or pale.      Findings: Ecchymosis ( Bilateral upper extremities) present. No erythema or rash.   Neurological:      General: No focal deficit present.      Mental Status: He is alert and oriented to person, place, and time.      Coordination: Coordination normal.   Psychiatric:         Mood and Affect: Mood normal.         Behavior: Behavior normal.         Thought Content: Thought content normal.         Judgment: Judgment normal.           RECENT LABS:  Lab Results (last 24 hours)     Procedure Component Value Units Date/Time    POC Glucose Once [[id number]]  (Abnormal) Collected: 06/13/21 1123    Specimen: Blood Updated: 06/13/21 1124     Glucose 145 mg/dL      Comment: Serial Number: [id number]:  [id number]       POC Glucose Once [[id number]]  (Abnormal) Collected: 06/13/21 0741    Specimen: Blood Updated: 06/13/21 0743     Glucose 114 mg/dL      Comment: Serial Number: [id number]:  [id number]       CBC & Differential [[id number]]  (Abnormal) Collected: 06/13/21 0544    Specimen: Blood Updated: 06/13/21 0700    Narrative:      The following orders were created for panel order CBC & Differential.  Procedure                               Abnormality         Status                     ---------                               -----------         ------                     Scan Slide[[id number]]                                       Final result               CBC Auto Differential[[id number]]        Abnormal            Final result                Path Consult Reflex[[id number]]                              Final result                 Please view results for these tests on the individual orders.    Path Consult Reflex [[id number]] Collected: 06/13/21 0544    Specimen: Blood Updated: 06/13/21 0700     Pathology Review Yes    Manual Differential [[id number]]  (Abnormal) Collected: 06/13/21 0544    Specimen: Blood Updated: 06/13/21 0700     Neutrophil % 71.0 %      Lymphocyte % 2.0 %      Bands %  14.0 %      Metamyelocyte % 6.0 %      Myelocyte % 3.0 %      Promyelocyte % 1.0 %      Atypical Lymphocyte % 1.0 %      Blasts % 2.0 %      Neutrophils Absolute 7.82 10*3/mm3      Lymphocytes Absolute 0.28 10*3/mm3      Anisocytosis Mod/2+     Macrocytes Slight/1+     Toxic Granulation Slight/1+     Platelet Morphology Normal    Scan Slide [[id number]] Collected: 06/13/21 0544    Specimen: Blood Updated: 06/13/21 0700     Scan Slide --     Comment: See Manual Differential Results       CBC Auto Differential [[id number]]  (Abnormal) Collected: 06/13/21 0544    Specimen: Blood Updated: 06/13/21 0700     WBC 9.20 10*3/mm3      RBC 2.55 10*6/mm3      Hemoglobin 9.0 g/dL      Hematocrit 26.5 %      .0 fL      MCH 35.2 pg      MCHC 33.9 g/dL      RDW 18.5 %      RDW-SD 66.1 fl      MPV 8.2 fL      Platelets 180 10*3/mm3     Narrative:      The previously reported component NRBC is no longer being reported. Previous result was 0.8 /100 WBC (Reference Range: 0.0-0.2 /100 WBC) on 6/13/2021 at 06 EDT.    Basic Metabolic Panel [[id number]]  (Abnormal) Collected: 06/13/21 0544    Specimen: Blood Updated: 06/13/21 0629     Glucose 84 mg/dL      BUN 20 mg/dL      Creatinine 0.57 mg/dL      Sodium 135 mmol/L      Potassium 3.9 mmol/L      Chloride 102 mmol/L      CO2 24.0 mmol/L      Calcium 8.4 mg/dL      eGFR Non African Amer 141 mL/min/1.73      BUN/Creatinine Ratio 35.1     Anion Gap 9.0 mmol/L     Narrative:      GFR Normal >60  Chronic Kidney Disease <60  Kidney  Failure <15      POC Glucose Once [[id number]]  (Abnormal) Collected: 06/12/21 1920    Specimen: Blood Updated: 06/12/21 1922     Glucose 127 mg/dL      Comment: Serial Number: [id number]:  [id number]       Copper, Serum [[id number]] Collected: 06/11/21 0949    Specimen: Blood Updated: 06/12/21 1808     Copper 122 ug/dL      Comment:                                 Detection Limit = 5       Narrative:      Test(s) 001586-Copper, Serum or Plasma  was developed and its performance characteristics determined  by [facility]. It has not been cleared or approved by the Food  and Drug Administration.  Performed at:  [address] [facility]  [id number]  : Jody Hernandez MD, Phone:  [phone]    POC Glucose Once [[id number]]  (Abnormal) Collected: 06/12/21 1647    Specimen: Blood Updated: 06/12/21 1648     Glucose 206 mg/dL      Comment: Serial Number: [id number]:  [id number]             PENDING RESULTS:  stool heme.    IMAGING REVIEWED:  No radiology results for the last day    I have reviewed the patient's labs, imaging, reports, and other clinician documentation.    Assessment/Plan   ASSESSMENT:  1. Squamous cell carcinoma right upper lobe lung with new brain met-s/p chemoradiation completed 1/2021, SRS to left occipital lobe lesion and now on chemoimmunotherapy (carboplatin/Taxol plus ipilimumab/nivolumab with Neulasta support) dosed 5/24/2021.  PET/CT scan and April 2021 showed he was responding to treatment.  Ipilimumab and nivolumab were added to his current therapy due to progression in his brain.  Due to hospitalization and brain radiation, he did not receive systemic therapy from 2/9/2021 until 5/24/2021.  Planned to undergo surgical resection of brain metastasis by Dr. Luna.  Ipilimumab and nivolumab are known to cross blood-brain barrier.  2. Anemia/Myelodysplastic syndrome/GOGO with malabsorption/Chemotherapy-induced anemia-he has never required Retacrit as an  outpatient.  Recent iron studies showed correction of GOGO.  Acute fall in hemoglobin due to recent chemotherapy.  Macrocytosis due to MDS.  Will treat with Retacrit.  Haptoglobin high. Normal copper level.  On multivitamin and Protonix.    3. Acute thrombocytopenia-likely chemotherapy-induced.  Coags normal and CMV/EBV IgM normal.  Normalized.   4. Respiratory failure /Pneumonia (possibly postobstructive)/COPD-pulmonary following.  On vancomycin, Unasyn and steroids with management by infectious disease.  5. Weight loss/Loss of appetite-improving as outpatient on Megace.  Now on steroids.  6. Oral thrush-diagnosed as outpatient.  Now on steroids, will continue nystatin.  7. Elevated LFTs-acute and mild.    Normalized.    8. Sacral decub-wound care to follow.   9. CHF/s/p aortic valve replacement -echocardiogram showed no vegetation and EF 65%.  On aspirin and Lovenox prophylaxis.     PLAN  1. Follow CBC.   2. Transfuse 1 unit pRBC prn Hgb <7.0.  3. Await stool Hemoccult.  4. Continue weekly Retacrit, next due 6/15/2021.  5. Outpatient MRI brain per Dr. Luna.         Electronically signed by VINCE Abarca, 06/13/21, 12:25 PM EDT.      I have personally performed a face-to-face diagnostic evaluation on this patient.  I have discussed the case with Yoana Elizalde NP, have edited/reviewed the note, and agree with the care plan.  The patient is complaining of a cough and constipation.  On examination he is edentulous with left chest wall port and decreased breath sounds.  Labs are significant for an anemia for which he is receiving Retacrit injections.  Plan is to resume his chemotherapy for lung cancer post discharge.            I discussed the patients findings and my recommendations with patient.    Electronically signed by Axel Lewis MD, 06/13/21, 12:38 PM EDT.

## 2021-06-13 NOTE — PLAN OF CARE
Problem: Adult Inpatient Plan of Care  Goal: Absence of Hospital-Acquired Illness or Injury  Intervention: Identify and Manage Fall Risk  Intervention: Prevent Infection  Goal: Optimal Comfort and Wellbeing  Intervention: Provide Person-Centered Care   Goal Outcome Evaluation: Pt rested during most of shift. No c/o pain. IV abx given. Wife at bedside.

## 2021-06-14 NOTE — SIGNIFICANT NOTE
06/14/21 1400   Coping/Psychosocial   Observed Emotional State other (see comments)  (pt sleeping)   Family/Support Persons spouse   Involvement in Care at bedside;attentive to patient;participating in care;supportive of patient   Family/Support System Care caregiver stress acknowledged;support provided  (spouse tearful)   Diversional Activities television   Additional Documentation Spiritual Care (Group)   Spiritual Care   Spiritual Care Visit Type initial   Spiritual Care Source family request;patient request (describe)  (consult)   Receptivity to Spiritual Care visit welcomed   Spiritual Care Request coping/stress of illness support;family support;mood/anxiety support;prayer support;spiritual/moral support   Spiritual Care Interventions prayer support provided;supportive conversation provided   Response to Spiritual Care emotion expressed;engaged in conversation;receptive of support;thanks expressed;visit helpful  (pt sleeping, spouse thankful for support)   Use of Spiritual Resources prayer;spirituality for coping, indicated strong use of   Spiritual Care Follow-Up follow-up planned regularly for general support   Coping/Psychosocial Interventions   Supportive Measures active listening utilized;verbalization of feelings encouraged     Pt sleeping, spouse welcomed  into room. Spouse was tearful and processed with  what is going on with the patient and what's going on at home. Not only is spouse taking care of pt, she had to take a week off of work to do it. At home she has grown children staying in the same house who are needing her attention, too. Spouse is feeling overwhelmed with taking care of others and laments not having anyone to take care of her. She asked  to pray for all that is going on. Spouse relies heavily on prayer and still feels the weight of all the needs within her family. She was thankful for prayers and appreciated the visit.  will continue to follow up and  tend to her, and pt's, needs as they arise. No other needs at this time.

## 2021-06-14 NOTE — PROGRESS NOTES
LOS: 7 days   Patient Care Team:  Sandoval Stevenson FNP as PCP - General  Sandoval Stevenson FNP as PCP - Family Medicine  Axel Lewis MD as Consulting Physician (Hematology and Oncology)  Iglesia Springer MD as Consulting Physician (Cardiology)    Chief Complaint: Per patient's  at bedside, he had a very rough night with his breathing.  Patient still on 10 L high flow via nasal cannula.  Audible wheezing and coarse upper airway sounds    Subjective Eyes are open not really answering questions    Interval History:     Patient Complaints: Unable to obtain  Patient Denies: Unable to obtain  History taken from: patient    Review of Systems:    All systems were reviewed and negative except for: Unable to obtain    Objective     Vital Signs  Temp:  [97.7 °F (36.5 °C)-98.3 °F (36.8 °C)] 98.3 °F (36.8 °C)  Heart Rate:  [] 78  Resp:  [17-21] 18  BP: (116-137)/(67-77) 137/77    Physical Exam:     General Appearance:    Alert, not really verbal, chronically ill-appearing   Head:    Normocephalic, without obvious abnormality, atraumatic   Eyes:            Conjunctivae and sclerae normal, no   icterus, no pallor, corneas clear, PERRLA   Throat:   No oral lesions, no thrush, oral mucosa moist   Neck:   No adenopathy, supple, trachea midline, no thyromegaly, no   carotid bruit, no JVD   Lungs:    Course bilateral lung bases and upper airway sounds are very coarse    Heart:   Distant, regular rhythm and normal rate, normal S1 and S2, no            murmur, no gallop, no rub, no click   Chest Wall:    No abnormalities observed   Abdomen:     Normal bowel sounds, no masses, no organomegaly, soft        non-tender, non-distended, no guarding, no rebound                tenderness, scaphoid   Rectal:     Deferred   Extremities:  Extremities are elevated, nonpitting edema to just pretibial area bilaterally   Pulses:   Pulses palpable and equal bilaterally   Skin:   No bleeding, bruising or rash   Lymph  nodes:   No palpable adenopathy   Neurologic:  Generalized debility, patient not really following commands this morning   Radiology:  XR Chest 1 View    Result Date: 6/14/2021  Unchanged chest. Slot 66 Electronically signed by:  Alcides Frazier D.O.  6/14/2021 12:49 AM    XR Chest 1 View    Result Date: 6/11/2021  1. Mild worsening of diffuse interstitial opacities throughout the left lung that may relate to infection or asymmetric pulmonary edema. 2. Redemonstration of 9 cm right upper lobe mass consistent with known malignancy. 3. Stable additional chronic findings above.  Electronically Signed By-Saleem Ferguson MD On:6/11/2021 7:40 AM This report was finalized on [id number] by  Saleem Ferguson MD.    XR Chest 1 View    Result Date: 6/10/2021  1. Similar appearance of the right-sided perihilar mass likely related to known malignancy. 2. Patchy interstitial opacities throughout the left lung likely representing infection on superimposed chronic interstitial lung disease.  Electronically Signed By-Daryl Hunter MD On:6/10/2021 8:01 AM This report was finalized on [id number] by  Daryl Hunter MD.    XR Chest 1 View    Result Date: 6/7/2021  1. Redemonstration of right midlung mass, similar to the prior study. 2. Increased left midlung and left basilar airspace disease that may relate to superimposed pneumonia.  Electronically Signed By-Saleem Ferguson MD On:6/7/2021 7:25 AM This report was finalized on [id number] by  Saleem Ferguson MD.    CT Chest Pulmonary Embolism    Result Date: 6/7/2021  1. Negative for pulmonary embolus. 2. Redemonstration of large 9 cm right upper lobe mass which occludes the right upper lobe bronchus resulting in complete right upper and right middle lobe collapse similar to prior study. 3. Bronchial wall thickening with increased mucous plugging involving lower lobes with patchy areas of airspace disease most concerning for aspiration pneumonia. 4. Interlobular septal thickening and  scattered groundglass opacities bilaterally which may relate to pulmonary edema and/or infection superimposed on background of advanced emphysema. 5. Stable mediastinal lymphadenopathy. 6. New 12 mm area of nodularity in left lower lobe may relate to aspiration/infection, recommend attention on follow-up.  Electronically Signed By-Saleem Ferguson MD On:6/7/2021 12:44 PM This report was finalized on [id number] by  Saleem Ferguson MD.         Results Review:     I reviewed the patient's new clinical results.  I reviewed the patient's new imaging results and agree with the interpretation.    Medication Review:   Scheduled Meds:acetylcysteine, 2 mL, Nebulization, BID - RT  ampicillin-sulbactam, 3 g, Intravenous, Q6H  aspirin, 81 mg, Oral, Daily  buprenorphine-naloxone, 1.5 tablet, Sublingual, Daily  enoxaparin, 40 mg, Subcutaneous, Q24H  epoetin asia-epbx, 40,000 Units, Subcutaneous, Weekly  gabapentin, 300 mg, Oral, TID  guaiFENesin, 600 mg, Oral, Q12H  insulin lispro, 0-7 Units, Subcutaneous, TID With Meals  ipratropium-albuterol, 3 mL, Nebulization, Q4H - RT  Shabbir, 1 packet, Oral, BID  lurasidone, 40 mg, Oral, Daily  megestrol acetate, 200 mg, Oral, TID  multivitamin with minerals, 1 tablet, Oral, Daily  pantoprazole, 40 mg, Oral, Daily  polyethylene glycol, 17 g, Oral, Daily  senna-docusate sodium, 2 tablet, Oral, BID  sertraline, 50 mg, Oral, Daily  spironolactone, 25 mg, Oral, Daily  vancomycin, 1,000 mg, Intravenous, Q12H  Zinc Oxide, , Apply externally, BID      Continuous Infusions:Pharmacy to dose vancomycin,       PRN Meds:.•  acetaminophen  •  albuterol  •  senna-docusate sodium **AND** polyethylene glycol **AND** bisacodyl **AND** bisacodyl  •  calcium carbonate  •  dextrose  •  dextrose  •  glucagon (human recombinant)  •  hydrOXYzine  •  insulin lispro  •  ipratropium-albuterol  •  melatonin  •  ondansetron  •  Pharmacy to dose vancomycin  •  promethazine  •  [COMPLETED] Insert peripheral IV **AND** sodium  chloride  •  sodium chloride    Labs:  Lab Results (last 24 hours)     Procedure Component Value Units Date/Time    Pathology Consultation [[id number]] Collected: 06/13/21 0544    Specimen: Blood, Venous Line Updated: 06/14/21 0823    Manual Differential [[id number]]  (Abnormal) Collected: 06/14/21 0617    Specimen: Blood Updated: 06/14/21 0724     Neutrophil % 78.0 %      Lymphocyte % 14.0 %      Monocyte % 3.0 %      Bands %  1.0 %      Promyelocyte % 2.0 %      Blasts % 2.0 %      Neutrophils Absolute 7.58 10*3/mm3      Lymphocytes Absolute 1.34 10*3/mm3      Monocytes Absolute 0.29 10*3/mm3      nRBC 1.0 /100 WBC      Anisocytosis Slight/1+     Macrocytes Slight/1+     Poikilocytes Slight/1+     Toxic Granulation Slight/1+     Platelet Morphology Normal    Narrative:      Reviewed by Pathologist within the past 30 days on [id number] .      CBC & Differential [[id number]]  (Abnormal) Collected: 06/14/21 0617    Specimen: Blood Updated: 06/14/21 0724    Narrative:      The following orders were created for panel order CBC & Differential.  Procedure                               Abnormality         Status                     ---------                               -----------         ------                     Scan Slide[[id number]]                                       Final result               CBC Auto Differential[[id number]]        Abnormal            Final result                 Please view results for these tests on the individual orders.    Scan Slide [[id number]] Collected: 06/14/21 0617    Specimen: Blood Updated: 06/14/21 0724     Scan Slide --     Comment: See Manual Differential Results       CBC Auto Differential [[id number]]  (Abnormal) Collected: 06/14/21 0617    Specimen: Blood Updated: 06/14/21 0724     WBC 9.60 10*3/mm3      RBC 2.68 10*6/mm3      Hemoglobin 9.3 g/dL      Hematocrit 28.2 %      .0 fL      MCH 34.7 pg      MCHC 33.0 g/dL      RDW 18.6 %      RDW-SD 66.1 fl      MPV 8.4 fL       Platelets 218 10*3/mm3     Narrative:      The previously reported component NRBC is no longer being reported. Previous result was 0.6 /100 WBC (Reference Range: 0.0-0.2 /100 WBC) on 6/14/2021 at 0707 EDT.    Comprehensive Metabolic Panel [[id number]]  (Abnormal) Collected: 06/14/21 0617    Specimen: Blood Updated: 06/14/21 0704     Glucose 79 mg/dL      BUN 17 mg/dL      Creatinine 0.60 mg/dL      Sodium 132 mmol/L      Potassium 4.0 mmol/L      Chloride 100 mmol/L      CO2 24.0 mmol/L      Calcium 8.6 mg/dL      Total Protein 5.9 g/dL      Albumin 2.40 g/dL      ALT (SGPT) 37 U/L      AST (SGOT) 32 U/L      Alkaline Phosphatase 104 U/L      Total Bilirubin 0.5 mg/dL      eGFR Non African Amer 133 mL/min/1.73      Globulin 3.5 gm/dL      A/G Ratio 0.7 g/dL      BUN/Creatinine Ratio 28.3     Anion Gap 8.0 mmol/L     Narrative:      GFR Normal >60  Chronic Kidney Disease <60  Kidney Failure <15      POC Glucose Once [[id number]]  (Normal) Collected: 06/14/21 0658    Specimen: Blood Updated: 06/14/21 0659     Glucose 82 mg/dL      Comment: Serial Number: [id number]:  [id number]       Blood Gas, Arterial - [[id number]]  (Abnormal) Collected: 06/14/21 0243    Specimen: Arterial Blood Updated: 06/14/21 0246     Site Right Radial     Brice's Test Positive     pH, Arterial 7.425 pH units      pCO2, Arterial 34.1 mm Hg      pO2, Arterial 77.1 mm Hg      HCO3, Arterial 22.3 mmol/L      Base Excess, Arterial -1.7 mmol/L      Comment: Serial Number: [id number]:  [id number]        O2 Saturation, Arterial 95.7 %      CO2 Content 23.4 mmol/L      Barometric Pressure for Blood Gas --     Comment: N/A        Modality HFNC     FIO2 60 %      Hemodilution No    POC Glucose Once [[id number]]  (Abnormal) Collected: 06/13/21 1947    Specimen: Blood Updated: 06/13/21 1949     Glucose 143 mg/dL      Comment: Serial Number: [id number]:  [id number]       POC Glucose Once [[id number]]  (Abnormal) Collected: 06/13/21 7279    Specimen:  Blood Updated: 06/13/21 1627     Glucose 139 mg/dL      Comment: Serial Number: [id number]:  [id number]              Assessment/Plan       Coronary artery disease involving native coronary artery of native heart without angina pectoris    Nonrheumatic aortic valve stenosis    Essential hypertension    Congestive heart failure (CMS/HCC)    Tobacco abuse    Squamous cell carcinoma of lung, right (CMS/HCC)    Acute-on-chronic respiratory failure (CMS/HCC)    Respiratory distress    Stage 2 skin ulcer of sacral region (CMS/HCC)    Family is wondering if patient is going to go for bronchoscopy at some point to help with his breathing problems.  Family very anxious and concerned about this.  I told him this would be something to discuss with pulmonology when they evaluate the patient this morning.    Continue rest of current approach.  Agree poor long-term prognosis.      I had a long discussion with the family member at bedside who understandably is quite anxious about the patient's very poor prognosis.  After some discussion, she does agree to speak to palliative care about ongoing care parameters.  Positive consult placed.        Ignacia De La Cruz DO  06/14/21  09:55 EDT

## 2021-06-14 NOTE — CONSULTS
Palliative Care Consultation    Patient Name: Axel Ramirez  : 1950  MRN: [id number]  Allergies: Patient has no known allergies.    Requesting clinician:  Jono  Reason for consult: Consultation for clarification of goals of care and code status.      Patient Code Status:   Code Status and Medical Interventions:   Ordered at: 21 0956     Level Of Support Discussed With:    Health Care Surrogate     Code Status:    No CPR     Medical Interventions (Level of Support Prior to Arrest):    Comfort Measures           Advanced Care Planning    Advanced Directives: Patient does not have advance directive  Health Care Directive on file: No  Health Care Surrogate:      Palliative Performance Scale Score:    Comments:         Chief Complaint:    Shortness of breath    History of Present Illness    Axel Ramirez is a 70 y.o. male who [facility] to [facility] from assisted living on  with reports of shortness of breath. EMS was called after patient received a mini neb treatment at the facility but his breathing became more severe, he had a reported saturation of 76% on 4 L at his extended care facility.  Brought to the ER and was noted with AE COPD and Healthcare acquired pneumonia and admitted for further treatment.     Chest x-ray showed left lung pneumonia and a right middle lobe lung mass.  White count 10.1, hemoglobin 9.2, .7, platelets 115k.  D-dimer was 2.99 (< 0.6).      Chest CT showed a 9 cm right upper lobe lung mass occluding the right upper lobe bronchus with complete right upper and right middle lobe collapse.  There was no pulmonary emboli.  There was possible aspiration pneumonia and stable mediastinal lymphadenopathy.  A new 12 mm nodule was seen in the left lower lobe.  Adrenal glands were normal.  He was started on steroids and Zosyn.       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  Recently started on second line therapy Taxol/carboplatin/ipilimumab and Nivolumab with Neulasta support on 5/24/2021.    Pulmonary was consulted. Started on inhaled corticosteroids and antibiotics.     Cardiology for consulted for elevated BNP.     6/14 Palliative care consult for goals of care discussion.This patient is known to our service. We have seen him on previous admissions. He had previously decided to pursue hospice services but changed his mind prior to discharge and decided to continue with aggressive measures including chemotherapy and surgical interventions. We completed a POST form which confirmed patient would want to be a DNR with limited medical interventions.         VITAL SIGNS:   Temp:  [97.7 °F (36.5 °C)-98.3 °F (36.8 °C)] 98.3 °F (36.8 °C)  Heart Rate:  [] 78  Resp:  [17-21] 18  BP: (116-137)/(67-77) 137/77       PMH: Lung ca, CHF, COPD, CAD, HTN, MI, PVD    Past Surgical History:   Procedure Laterality Date   • AORTIC VALVE REPAIR/REPLACEMENT  02/26/2018    Dr Maza   • BRONCHOSCOPY N/A 10/24/2020    Procedure: BRONCHOSCOPY with biopsy right upper lobe mass, and bronchoalveolar lavage right upper lobe;  Surgeon: Ángela Bean MD;  Location: [facility] ENDOSCOPY;  Service: Pulmonary;  Laterality: N/A;  post: right upper lobe mass, pneumonia   • BRONCHOSCOPY N/A 11/11/2020    Procedure: BRONCHOSCOPY with bronchial washing;  Surgeon: Ángela Bean MD;  Location: [facility] ENDOSCOPY;  Service: Pulmonary;  Laterality: N/A;  Post: lung mass, pneumonia   • BRONCHOSCOPY N/A 11/11/2020    Procedure: BRONCHOSCOPY RIGID with debulking of right main endobronchial tumor;  Surgeon: Nomi Reyes MD;  Location: [facility] MAIN OR;  Service: Cardiothoracic;  Laterality: N/A;   • BRONCHOSCOPY N/A 11/11/2020    Procedure: BRONCHOSCOPY;  Surgeon: Nomi Reyes MD;  Location: [facility] MAIN OR;  Service: Cardiothoracic;  Laterality: N/A;   • CARDIAC CATHETERIZATION  12/29/2017   • CORONARY  ARTERY BYPASS GRAFT  2018    Dr Maza   • TIBIA FRACTURE SURGERY     • VENOUS ACCESS DEVICE (PORT) INSERTION Left 10/30/2020    Procedure: MEDIPORT INSERTION UNDER FLUOROSCOPIC GUIDENCE;  Surgeon: Nomi Reyes MD;  Location: [facility] OR;  Service: Cardiothoracic;  Laterality: Left;       Family History   Problem Relation Age of Onset   • Coronary artery disease Mother    • Cancer Father    • Stroke Daughter 35   • Seizures Daughter 35       Social History     Tobacco Use   • Smoking status: Former Smoker     Packs/day: 1.00     Types: Cigarettes     Quit date: 10/2020     Years since quittin.7   • Smokeless tobacco: Never Used   Vaping Use   • Vaping Use: Never used   Substance Use Topics   • Alcohol use: Not Currently   • Drug use: No           LABS:    Results from last 7 days   Lab Units 21  0617   WBC 10*3/mm3 9.60   HEMOGLOBIN g/dL 9.3*   HEMATOCRIT % 28.2*   PLATELETS 10*3/mm3 218     Results from last 7 days   Lab Units 21  0617   SODIUM mmol/L 132*   POTASSIUM mmol/L 4.0   CHLORIDE mmol/L 100   CO2 mmol/L 24.0   BUN mg/dL 17   CREATININE mg/dL 0.60*   GLUCOSE mg/dL 79   CALCIUM mg/dL 8.6     Results from last 7 days   Lab Units 21  0617   SODIUM mmol/L 132*   POTASSIUM mmol/L 4.0   CHLORIDE mmol/L 100   CO2 mmol/L 24.0   BUN mg/dL 17   CREATININE mg/dL 0.60*   CALCIUM mg/dL 8.6   BILIRUBIN mg/dL 0.5   ALK PHOS U/L 104   ALT (SGPT) U/L 37   AST (SGOT) U/L 32   GLUCOSE mg/dL 79         IMAGING STUDIES:  XR Chest 1 View    Result Date: 2021  Unchanged chest. Slot 66 Electronically signed by:  Alcides Frazier D.O.  2021 12:49 AM        I reviewed the patient's new clinical results including labs, imaging, and vitals.        Scheduled Meds:  acetylcysteine, 2 mL, Nebulization, BID - RT  ampicillin-sulbactam, 3 g, Intravenous, Q6H  aspirin, 81 mg, Oral, Daily  buprenorphine-naloxone, 1.5 tablet, Sublingual, Daily  enoxaparin, 40 mg, Subcutaneous, Q24H  epoetin  asia-epbx, 40,000 Units, Subcutaneous, Weekly  gabapentin, 300 mg, Oral, TID  guaiFENesin, 600 mg, Oral, Q12H  insulin lispro, 0-7 Units, Subcutaneous, TID With Meals  ipratropium-albuterol, 3 mL, Nebulization, Q4H - RT  Shabbir, 1 packet, Oral, BID  lurasidone, 40 mg, Oral, Daily  megestrol acetate, 200 mg, Oral, TID  multivitamin with minerals, 1 tablet, Oral, Daily  pantoprazole, 40 mg, Oral, Daily  polyethylene glycol, 17 g, Oral, Daily  senna-docusate sodium, 2 tablet, Oral, BID  sertraline, 50 mg, Oral, Daily  spironolactone, 25 mg, Oral, Daily  vancomycin, 1,000 mg, Intravenous, Q12H  Zinc Oxide, , Apply externally, BID      Continuous Infusions:  Pharmacy to dose vancomycin,           Review of Systems   Constitutional: Positive for fatigue.   Respiratory: Positive for shortness of breath.    All other systems reviewed and are negative.        Physical Exam  Vitals and nursing note reviewed.   Constitutional:       Appearance: He is ill-appearing.      Comments: Alopecia, frail   HENT:      Head: Normocephalic and atraumatic.      Nose: Nose normal.      Mouth/Throat:      Mouth: Mucous membranes are dry.      Pharynx: Oropharynx is clear.   Eyes:      Extraocular Movements: Extraocular movements intact.      Conjunctiva/sclera: Conjunctivae normal.      Pupils: Pupils are equal, round, and reactive to light.   Cardiovascular:      Rate and Rhythm: Normal rate.      Pulses: Normal pulses.   Pulmonary:      Effort: Pulmonary effort is normal.   Abdominal:      General: Abdomen is flat.   Musculoskeletal:         General: Normal range of motion.      Cervical back: Normal range of motion.   Skin:     General: Skin is warm and dry.   Neurological:      General: No focal deficit present.      Mental Status: He is oriented to person, place, and time. Mental status is at baseline.             PROBLEM LIST:    Coronary artery disease involving native coronary artery of native heart without angina pectoris     Nonrheumatic aortic valve stenosis    Essential hypertension    Congestive heart failure (CMS/HCC)    Tobacco abuse    Squamous cell carcinoma of lung, right (CMS/HCC)    Acute-on-chronic respiratory failure (CMS/HCC)    Respiratory distress    Stage 2 skin ulcer of sacral region (CMS/HCC)          ASSESSMENT/PLAN:    Goals of care: Met with patient this afternoon. He was sleeping, his wife was not present. Per nursing, patient asks that his wife is present to assist in decision making. And from previous visits we had with patient, he prefers to have his wife present. Patient has completed POST form confirming DNR/DNI with limited interventions. He was discharged with rehab at previous visit. He was currently undergoing chemotherapy with Dr. Lewis. We will follow up tomorrow. According to staff, patients wife and daughter had medical emergency prior to our visit.   Post obstructive pneumonia: ID is following. Patient on IV antibiotics.   CHF: Cardiology is consulted. Echocardiogram showed no vegetation and EF 65%.  On aspirin and Lovenox prophylaxis.  Lung Ca: Follows with Dr. Lewis. Had  Recent chemotherapy on 5/4. Plans for surgical resection of brain mets per Dr. Stroud in near future. Hem Onc consulted.   Sacral wound: wound care following.   Anemia: Started on retacrit per Hem Onc.         Decisional Capacity: yes  Patient's understanding of illness: adequate  Patient goals of care:  DNR/limited intervention      Thank you for this consult and allowing us to participate in patient's plan of care. Palliative Care Team will continue to follow patient.     Time spent:55 minutes spent reviewing medical and medication records, assessing and examining patient, discussing with family, answering questions, providing some guidance about a plan and documentation of care, and coordinating care with other healthcare members, with > 50% time spent face to face.         Radha Darden, APRN  6/14/2021

## 2021-06-14 NOTE — PROGRESS NOTES
Hematology/Oncology Inpatient Progress Note    PATIENT NAME: Axel Ramirez  : 1950  MRN: [id number]    CHIEF COMPLAINT: Metastatic squamous cell carcinoma of the right upper lobe lung and MDS    HISTORY OF PRESENT ILLNESS:  70 y.o. male presented to [facility] on 2021 with shortness of air and hypoxia.  He had an acute onset on the day of admission of worsening shortness of breath with O2 saturation of 76% on 4 L/min O2.  The ECF called EMS.  He was treated with nebulizer on the way to the ER.  Chest x-ray showed left lung pneumonia and a right middle lobe lung mass.  White count 10.1, hemoglobin 9.2, .7, platelets 115k.  D-dimer was 2.99 (< 0.6).  Chest CT showed a 9 cm right upper lobe lung mass occluding the right upper lobe bronchus with complete right upper and right middle lobe collapse.  There was no pulmonary emboli.  There was possible aspiration pneumonia and stable mediastinal lymphadenopathy.  A new 12 mm nodule was seen in the left lower lobe.  Adrenal glands were normal.  He was started on steroids and Zosyn.  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  Recently started on second line therapy Taxol/carboplatin/ipilimumab and Nivolumab with Neulasta support on 2021.     21  Hematology/Oncology was consulted for his metastatic squamous cell carcinoma of the right upper lobe lung.  He is an established patient.  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  Initially treated with concomitant weekly chemotherapy (paclitaxel and carboplatin x7) and radiation therapy from 2020. He completed radiation therapy on 2021.  He completed the weekly portion of his chemotherapy on 2021.  He received 1 cycle of every 21-day carboplatin /Taxol with  Neulasta support on 2/9/2021.  He subsequently was admitted to [facility], for hypoxia with respiratory failure requiring intubation.  MRI of the brain showed a left occipital lobe lesion with vasogenic edema.  He received radiation, x1 fraction, on 3/3/2021 to the left occipital lobe. One month later, a brain MRI showed enlargement of the left occipital lobe lesion.  PET scan showed the right upper lobe lung mass to be 10 cm in size with a decrease in the peripheral FDG uptake suggesting interval response to treatment.  There was a decrease in the size and FDG uptake in the mediastinal lymph nodes.  There was no distant metastatic disease.  The lesion in the left occipital lobe was FDG avid.  Right upper lobe lung collapse had improved with aeration.  He was referred to Dr. Tay for evaluation of his brain MRI which was felt to be due to to post SRS changes and not progression.  He was seen in follow-up with plan to start chemotherapy with immunotherapy.  Paclitaxel and carboplatin doses were decreased by 20% due to pancytopenia with prior treatment.  Repeat brain MRI, on 5/13/2021, showed an increase in the size of the left occipital lobe with an increase in surrounding edema.  There were no new masses.  The patient was referred to Dr. Luna for surgical resection versus MRI SPECT/perfusion at [facility].  Dr. Luna felt the lesion was a combination of necrosis and tumor progression and recommended surgical resection.  He started treatment on 5/24/2021 (chemotherapy plus immunotherapy) as his surgical resection was to be done after medical clearance.  He had a return appointment with Dr. Luna on 6/9/2021 following an MRI of his brain.  -MDS-diagnosed January 2021.  He was anemic and was found to have GOGO and malabsorption on oral therapy.  His bone marrow revealed increased iron storage and mild erythroid atypia in January 2021.  Stool heme was negative on 5/13/2021. He has received IV iron but has  never required Retacrit.  Last office visit 5/24/2021, complaining of hypoxia and tachypnea with anxiety.  White count 9.05, hemoglobin 11.34, .8 and platelets 220.  He was starting to gain weight and Megace was continued.  For his cough and hypoxia, he was treated with Augmentin and albuterol nebulizer.  He had oral thrush which was treated with nystatin swish and swallow.  TSH 0.287 (0.282-4.0), iron 62 (), ferritin 4185 (), iron saturation 32 (20-55), TIBC 196 (228-428).     PCP: Sandoval Stevenson FNP     INTERVAL HISTORY:  • 6/8/2021-Retacrit 40,000 units subcu weekly initiated.  Hemoglobin 7.2.  • 6/9/2021-PT 11.5 (9.6-11.7), PTT 27.7 (24-31).  Hemoglobin 7.2, platelets 118,000.  Haptoglobin 465 ().  CMV IgM <30 (<30).  Cardiac ejection fraction 65%.   • 6/10/2021-EBV VCA IgM less than 36 (less than 36).  • 6/11/2021-hemoglobin 6.9.  1 unit pRBCs given.  WBC 13.8 with 18% bands.  • 6/12/2021-hemoglobin 9.6, WBC 12.1 with 8% bands.  Folate 13.7 (4.78-24.2), vitamin B12 more than 2000 (211-946).  Creatinine 0.56 (0.76-1.27).  Iron 42 (), TIBC 146 (298-536), iron saturation 29 (20-50), ferritin 3721 (), reticulocyte count 3.44 (0.7-1.9).  • 6/13/2021- hemoglobin 9.0, .0, white blood count 9.2. Copper 122 ()    History of present illness reviewed since last visit and changes noted on 06/14/21.    Subjective   Shortness of air last night.    ROS:  Review of Systems   Constitutional: Negative for activity change, chills, fatigue, fever and unexpected weight change.   HENT: Negative for congestion, dental problem, hearing loss, mouth sores, nosebleeds, sore throat and trouble swallowing.    Eyes: Negative for photophobia and visual disturbance.   Respiratory: Positive for cough and shortness of breath. Negative for chest tightness.    Cardiovascular: Negative for chest pain, palpitations and leg swelling.   Gastrointestinal: Positive for constipation. Negative for  abdominal distention, abdominal pain, blood in stool, diarrhea, nausea and vomiting.   Endocrine: Negative for cold intolerance and heat intolerance.   Genitourinary: Negative for decreased urine volume, difficulty urinating, dysuria, frequency, hematuria and urgency.   Musculoskeletal: Negative for arthralgias and gait problem.   Skin: Negative for rash and wound.   Neurological: Negative for dizziness, tremors, weakness, light-headedness, numbness and headaches.   Hematological: Negative for adenopathy. Does not bruise/bleed easily.   Psychiatric/Behavioral: Negative for confusion and hallucinations. The patient is not nervous/anxious.    All other systems reviewed and are negative.     MEDICATIONS:    Scheduled Meds:  acetylcysteine, 2 mL, Nebulization, BID - RT  ampicillin-sulbactam, 3 g, Intravenous, Q6H  aspirin, 81 mg, Oral, Daily  buprenorphine-naloxone, 1.5 tablet, Sublingual, Daily  enoxaparin, 40 mg, Subcutaneous, Q24H  epoetin asia-epbx, 40,000 Units, Subcutaneous, Weekly  gabapentin, 300 mg, Oral, TID  guaiFENesin, 600 mg, Oral, Q12H  insulin lispro, 0-7 Units, Subcutaneous, TID With Meals  ipratropium-albuterol, 3 mL, Nebulization, Q4H - RT  Shabbir, 1 packet, Oral, BID  lurasidone, 40 mg, Oral, Daily  megestrol acetate, 200 mg, Oral, TID  multivitamin with minerals, 1 tablet, Oral, Daily  pantoprazole, 40 mg, Oral, Daily  polyethylene glycol, 17 g, Oral, Daily  predniSONE, 10 mg, Oral, Daily  senna-docusate sodium, 2 tablet, Oral, BID  sertraline, 50 mg, Oral, Daily  spironolactone, 25 mg, Oral, Daily  vancomycin, 1,000 mg, Intravenous, Q12H  Zinc Oxide, , Apply externally, BID       Continuous Infusions:  Pharmacy to dose vancomycin,        PRN Meds:  •  acetaminophen  •  albuterol  •  senna-docusate sodium **AND** polyethylene glycol **AND** bisacodyl **AND** bisacodyl  •  calcium carbonate  •  dextrose  •  dextrose  •  glucagon (human recombinant)  •  hydrOXYzine  •  insulin lispro  •   "ipratropium-albuterol  •  melatonin  •  ondansetron  •  Pharmacy to dose vancomycin  •  promethazine  •  [COMPLETED] Insert peripheral IV **AND** sodium chloride  •  sodium chloride     ALLERGIES:  No Known Allergies    Objective    VITALS:   /77 (BP Location: Left arm, Patient Position: Lying)   Pulse 81   Temp 98.3 °F (36.8 °C) (Oral)   Resp 19   Ht 172.7 cm (68\")   Wt 68.3 kg (150 lb 9.2 oz)   SpO2 91%   BMI 22.89 kg/m²     PHYSICAL EXAM:  Physical Exam  Vitals and nursing note reviewed.   Constitutional:       General: He is not in acute distress.     Appearance: Normal appearance. He is well-developed and normal weight. He is not diaphoretic.      Interventions: Nasal cannula in place.   HENT:      Head: Normocephalic and atraumatic.      Comments: Frontal male pattern baldness.       Right Ear: External ear normal.      Left Ear: External ear normal.      Nose: Nose normal. No rhinorrhea.      Comments: Oxygen via nasal cannula.     Mouth/Throat:      Mouth: Mucous membranes are moist.      Pharynx: Oropharynx is clear. No oropharyngeal exudate or posterior oropharyngeal erythema.      Comments: Edentulous.  Eyes:      General: No scleral icterus.     Extraocular Movements: Extraocular movements intact.      Conjunctiva/sclera: Conjunctivae normal.      Pupils: Pupils are equal, round, and reactive to light.   Cardiovascular:      Rate and Rhythm: Normal rate and regular rhythm.      Heart sounds: Normal heart sounds. No murmur heard.        Comments: Cardiac monitor leads.   Pulmonary:      Effort: Pulmonary effort is normal. No respiratory distress.      Breath sounds: No stridor. Decreased breath sounds present. No wheezing, rhonchi or rales.   Chest:      Comments: Midline vertical chest wall scar.    Left chest wall port.  Abdominal:      General: Abdomen is flat. Bowel sounds are normal. There is no distension.      Palpations: Abdomen is soft. There is no mass.      Tenderness: There is no " abdominal tenderness. There is no guarding.   Genitourinary:     Comments: Deferred   Musculoskeletal:         General: No swelling, tenderness or deformity. Normal range of motion.      Cervical back: Normal range of motion and neck supple. No tenderness.      Right lower leg: No edema.      Left lower leg: No edema.   Lymphadenopathy:      Cervical: No cervical adenopathy.      Upper Body:      Right upper body: No supraclavicular adenopathy.      Left upper body: No supraclavicular adenopathy.   Skin:     General: Skin is warm and dry.      Coloration: Skin is not jaundiced or pale.      Findings: Ecchymosis ( Bilateral upper extremities) present. No erythema or rash.   Neurological:      General: No focal deficit present.      Mental Status: He is alert and oriented to person, place, and time.      Coordination: Coordination normal.   Psychiatric:         Mood and Affect: Mood normal.         Behavior: Behavior normal.         Thought Content: Thought content normal.         Judgment: Judgment normal.           RECENT LABS:  Lab Results (last 24 hours)     Procedure Component Value Units Date/Time    Manual Differential [[id number]]  (Abnormal) Collected: 06/14/21 0617    Specimen: Blood Updated: 06/14/21 0724     Neutrophil % 78.0 %      Lymphocyte % 14.0 %      Monocyte % 3.0 %      Bands %  1.0 %      Promyelocyte % 2.0 %      Blasts % 2.0 %      Neutrophils Absolute 7.58 10*3/mm3      Lymphocytes Absolute 1.34 10*3/mm3      Monocytes Absolute 0.29 10*3/mm3      nRBC 1.0 /100 WBC      Anisocytosis Slight/1+     Macrocytes Slight/1+     Poikilocytes Slight/1+     Toxic Granulation Slight/1+     Platelet Morphology Normal    Narrative:      Reviewed by Pathologist within the past 30 days on [id number] .      CBC & Differential [[id number]]  (Abnormal) Collected: 06/14/21 0617    Specimen: Blood Updated: 06/14/21 0724    Narrative:      The following orders were created for panel order CBC & Differential.  Procedure                                Abnormality         Status                     ---------                               -----------         ------                     Scan Slide[[id number]]                                       Final result               CBC Auto Differential[[id number]]        Abnormal            Final result                 Please view results for these tests on the individual orders.    Scan Slide [[id number]] Collected: 06/14/21 0617    Specimen: Blood Updated: 06/14/21 0724     Scan Slide --     Comment: See Manual Differential Results       CBC Auto Differential [[id number]]  (Abnormal) Collected: 06/14/21 0617    Specimen: Blood Updated: 06/14/21 0724     WBC 9.60 10*3/mm3      RBC 2.68 10*6/mm3      Hemoglobin 9.3 g/dL      Hematocrit 28.2 %      .0 fL      MCH 34.7 pg      MCHC 33.0 g/dL      RDW 18.6 %      RDW-SD 66.1 fl      MPV 8.4 fL      Platelets 218 10*3/mm3     Narrative:      The previously reported component NRBC is no longer being reported. Previous result was 0.6 /100 WBC (Reference Range: 0.0-0.2 /100 WBC) on 6/14/2021 at 07 EDT.    Comprehensive Metabolic Panel [[id number]]  (Abnormal) Collected: 06/14/21 0617    Specimen: Blood Updated: 06/14/21 0704     Glucose 79 mg/dL      BUN 17 mg/dL      Creatinine 0.60 mg/dL      Sodium 132 mmol/L      Potassium 4.0 mmol/L      Chloride 100 mmol/L      CO2 24.0 mmol/L      Calcium 8.6 mg/dL      Total Protein 5.9 g/dL      Albumin 2.40 g/dL      ALT (SGPT) 37 U/L      AST (SGOT) 32 U/L      Alkaline Phosphatase 104 U/L      Total Bilirubin 0.5 mg/dL      eGFR Non African Amer 133 mL/min/1.73      Globulin 3.5 gm/dL      A/G Ratio 0.7 g/dL      BUN/Creatinine Ratio 28.3     Anion Gap 8.0 mmol/L     Narrative:      GFR Normal >60  Chronic Kidney Disease <60  Kidney Failure <15      POC Glucose Once [[id number]]  (Normal) Collected: 06/14/21 0658    Specimen: Blood Updated: 06/14/21 0659     Glucose 82 mg/dL      Comment: Serial Number:  [id number]:  [id number]       Blood Gas, Arterial - [[id number]]  (Abnormal) Collected: 06/14/21 0243    Specimen: Arterial Blood Updated: 06/14/21 0246     Site Right Radial     Brice's Test Positive     pH, Arterial 7.425 pH units      pCO2, Arterial 34.1 mm Hg      pO2, Arterial 77.1 mm Hg      HCO3, Arterial 22.3 mmol/L      Base Excess, Arterial -1.7 mmol/L      Comment: Serial Number: [id number]:  [id number]        O2 Saturation, Arterial 95.7 %      CO2 Content 23.4 mmol/L      Barometric Pressure for Blood Gas --     Comment: N/A        Modality HFNC     FIO2 60 %      Hemodilution No    POC Glucose Once [[id number]]  (Abnormal) Collected: 06/13/21 1947    Specimen: Blood Updated: 06/13/21 1949     Glucose 143 mg/dL      Comment: Serial Number: [id number]:  [id number]       POC Glucose Once [[id number]]  (Abnormal) Collected: 06/13/21 1626    Specimen: Blood Updated: 06/13/21 1627     Glucose 139 mg/dL      Comment: Serial Number: [id number]:  [id number]             PENDING RESULTS:  stool heme.    IMAGING REVIEWED:  XR Chest 1 View    Result Date: 6/14/2021  Unchanged chest. Slot 66 Electronically signed by:  Alcides Frazier D.O.  6/14/2021 12:49 AM      I have reviewed the patient's labs, imaging, reports, and other clinician documentation.    Assessment/Plan   ASSESSMENT:  1. Squamous cell carcinoma right upper lobe lung with new brain met-s/p chemoradiation completed 1/2021, SRS to left occipital lobe lesion and now on chemoimmunotherapy (carboplatin/Taxol plus ipilimumab/nivolumab with Neulasta support) dosed 5/24/2021.  PET/CT scan and April 2021 showed he was responding to treatment.  Ipilimumab and nivolumab were added to his current therapy due to progression in his brain.  Due to hospitalization and brain radiation, he did not receive systemic therapy from 2/9/2021 until 5/24/2021.  Planned to undergo surgical resection of brain metastasis by Dr. Luna.  Ipilimumab and nivolumab are  known to cross blood-brain barrier.  2. Anemia/Myelodysplastic syndrome/GOGO with malabsorption/Chemotherapy-induced anemia-he has never required Retacrit as an outpatient.  Recent iron studies showed correction of GOGO.  Acute fall in hemoglobin due to recent chemotherapy.  Macrocytosis due to MDS.    On treatment with Retacrit.  Haptoglobin high. Normal copper level.  On multivitamin and Protonix.  Stable.  3. Acute thrombocytopenia-likely chemotherapy-induced.  Coags normal and CMV/EBV IgM normal.  Normalized.   4. Respiratory failure /Pneumonia (possibly postobstructive)/COPD-pulmonary following.  On vancomycin, Unasyn and steroids with management by infectious disease.  5. Weight loss/Loss of appetite-improving as outpatient on Megace.  Now on steroids.  6. Oral thrush-diagnosed as outpatient.  Now on steroids, will continue nystatin.  7. Elevated LFTs-acute and mild.    Normalized.    8. Sacral decub-wound care to follow.   9. CHF/s/p aortic valve replacement -echocardiogram showed no vegetation and EF 65%.  On aspirin and Lovenox prophylaxis.     PLAN  1. Follow CBC.   2. Transfuse 1 unit pRBC prn Hgb <7.0.  3. Await stool Hemoccult.  4. Continue weekly Retacrit, next due 6/15/2021.  5. Outpatient MRI brain per Dr. Luna.                         I discussed the patients findings and my recommendations with patient and spouse.    Electronically signed by Axel Lewis MD, 06/14/21, 7:49 AM EDT.

## 2021-06-14 NOTE — PLAN OF CARE
Goal Outcome Evaluation:    Patient up in chair and daughter having medical issues, too worried about daughter, did not want to participate in Physical Therapy.

## 2021-06-14 NOTE — PLAN OF CARE
Goal Outcome Evaluation:  Plan of Care Reviewed With: patient           Outcome Summary: patient currently resting, earlier in the shift patient had been complaining of increased shortness of breath and difficulty breathing however patient's oxygen levels are good, a cxr and abgs were completed with no acute changes in the results, will continue to monitor patient

## 2021-06-14 NOTE — PROGRESS NOTES
Infectious Diseases Progress Note      LOS: 7 days   Patient Care Team:  Sandoval Stevenson FNP as PCP - General  Sandoval Stevenson FNP as PCP - Family Medicine  Axel Lewis MD as Consulting Physician (Hematology and Oncology)  Iglesia Springre MD as Consulting Physician (Cardiology)    Chief Complaint: Shortness of breath, fatigue    Subjective        The patient has been afebrile for the last 24 hours.  The patient is on 10 L of oxygen via nasal cannula.      Review of Systems:   Review of Systems   Constitutional: Positive for fatigue.   HENT: Negative.    Eyes: Negative.    Respiratory: Positive for shortness of breath.    Cardiovascular: Negative.    Gastrointestinal: Negative.    Endocrine: Negative.    Genitourinary: Negative.    Musculoskeletal: Negative.    Skin: Negative.    Neurological: Negative.    Psychiatric/Behavioral: Negative.    All other systems reviewed and are negative.       Objective     Vital Signs  Temp:  [98.1 °F (36.7 °C)-98.3 °F (36.8 °C)] 98.2 °F (36.8 °C)  Heart Rate:  [] 110  Resp:  [18-21] 20  BP: (122-137)/(70-77) 127/75    Physical Exam:  Physical Exam  Vitals and nursing note reviewed.   Constitutional:       General: He is not in acute distress.     Appearance: He is well-developed. He is ill-appearing. He is not diaphoretic.      Comments: Cachectic   HENT:      Head: Normocephalic and atraumatic.      Mouth/Throat:      Comments: Mild oral thrush  Eyes:      Extraocular Movements: Extraocular movements intact.      Conjunctiva/sclera: Conjunctivae normal.      Pupils: Pupils are equal, round, and reactive to light.   Cardiovascular:      Rate and Rhythm: Normal rate and regular rhythm.      Heart sounds: Normal heart sounds, S1 normal and S2 normal.   Pulmonary:      Effort: Pulmonary effort is normal. No respiratory distress.      Breath sounds: No stridor. Rales present. No wheezing.      Comments: Diminished throughout  Abdominal:      General: Bowel  sounds are normal. There is no distension.      Palpations: Abdomen is soft. There is no mass.      Tenderness: There is no abdominal tenderness. There is no guarding.   Musculoskeletal:         General: No deformity. Normal range of motion.      Cervical back: Neck supple.   Skin:     General: Skin is warm and dry.      Coloration: Skin is not pale.      Findings: No erythema or rash.      Comments: Port in place   Neurological:      Mental Status: He is alert and oriented to person, place, and time.      Cranial Nerves: No cranial nerve deficit.   Psychiatric:         Mood and Affect: Mood normal.          Results Review:    I have reviewed all clinical data, test, lab, and imaging results.     Radiology  XR Chest 1 View    Result Date: 6/14/2021  PROCEDURE:   XR CHEST 1 VW HISTORY:   Change in respiratory status COMPARISONS: 6/11/2021. 3/9/2021. FINDINGS: Stable Port-A-Cath. Redemonstration of sternotomy wires and cardiac valve prosthesis. Unchanged right midlung mass. Similar scant pulmonary markings superior to the mass when compared to 3/9/2021. Unchanged bilateral interstitial prominence, left greater than right. Stable cardiac mediastinal silhouette.     Unchanged chest. Slot 66 Electronically signed by:  Alcides Frazier D.O.  6/14/2021 12:49 AM      Cardiology    Laboratory    Results from last 7 days   Lab Units 06/14/21  0617 06/13/21  0544 06/12/21  0446 06/11/21  2145 06/11/21  0303 06/09/21  0503 06/08/21  1630 06/08/21  0624   WBC 10*3/mm3 9.60 9.20 12.10*  --  13.80* 10.90*  --  8.80   HEMOGLOBIN g/dL 9.3* 9.0* 9.6* 9.3* 6.9* 7.2* 7.8* 7.2*   HEMATOCRIT % 28.2* 26.5* 28.7* 27.9* 21.0* 21.3* 23.1* 21.1*   PLATELETS 10*3/mm3 218 180 193  --  161 118*  --  103*     Results from last 7 days   Lab Units 06/14/21  0617 06/13/21  0544 06/12/21  0446 06/11/21  0303 06/10/21  0636 06/09/21  0320 06/08/21  0624   SODIUM mmol/L 132* 135* 135* 135*  --  140 139   POTASSIUM mmol/L 4.0 3.9 4.0 4.5  --  4.2 4.6    CHLORIDE mmol/L 100 102 102 102  --  103 100   CO2 mmol/L 24.0 24.0 25.0 27.0  --  29.0 30.0*   BUN mg/dL 17 20 18 24*  --  25* 32*   CREATININE mg/dL 0.60* 0.57* 0.56* 0.61* 0.64* 0.63* 0.93   GLUCOSE mg/dL 79 84 89 101*  --  123* 127*   ALBUMIN g/dL 2.40*  --  2.30*  --   --   --  2.50*   BILIRUBIN mg/dL 0.5  --  0.5  --   --   --  0.6   ALK PHOS U/L 104  --  106  --   --   --  89   AST (SGOT) U/L 32  --  33  --   --   --  41*   ALT (SGPT) U/L 37  --  38  --   --   --  64*   CALCIUM mg/dL 8.6 8.4* 8.6 8.1*  --  8.6 9.0                 Microbiology   Microbiology Results (last 10 days)     Procedure Component Value - Date/Time    MRSA Screen, PCR (Inpatient) - Swab, Nares [[id number]]  (Abnormal) Collected: 06/07/21 1701    Lab Status: Final result Specimen: Swab from Nares Updated: 06/07/21 1941     MRSA PCR MRSA Detected    Blood Culture - Blood, Arm, Left [[id number]] Collected: 06/07/21 0705    Lab Status: Final result Specimen: Blood from Arm, Left Updated: 06/12/21 0730     Blood Culture No growth at 5 days    Blood Culture - Blood, Arm, Right [[id number]] Collected: 06/07/21 0705    Lab Status: Final result Specimen: Blood from Arm, Right Updated: 06/12/21 0730     Blood Culture No growth at 5 days    Respiratory Panel PCR w/COVID-19(SARS-CoV-2) PARESH/GRACIELA/KATHY/PAD/COR/MAD/CHRIST In-House, NP Swab in [facility], 3-4 HR TAT - Swab, Nasopharynx [[id number]]  (Normal) Collected: 06/07/21 0620    Lab Status: Final result Specimen: Swab from Nasopharynx Updated: 06/07/21 0726     ADENOVIRUS, PCR Not Detected     Coronavirus 229E Not Detected     Coronavirus HKU1 Not Detected     Coronavirus NL63 Not Detected     Coronavirus OC43 Not Detected     COVID19 Not Detected     Human Metapneumovirus Not Detected     Human Rhinovirus/Enterovirus Not Detected     Influenza A PCR Not Detected     Influenza B PCR Not Detected     Parainfluenza Virus 1 Not Detected     Parainfluenza Virus 2 Not Detected     Parainfluenza Virus 3 Not  Detected     Parainfluenza Virus 4 Not Detected     RSV, PCR Not Detected     Bordetella pertussis pcr Not Detected     Bordetella parapertussis PCR Not Detected     Chlamydophila pneumoniae PCR Not Detected     Mycoplasma pneumo by PCR Not Detected    Narrative:      In the setting of a positive respiratory panel with a viral infection PLUS a negative procalcitonin without other underlying concern for bacterial infection, consider observing off antibiotics or discontinuation of antibiotics and continue supportive care. If the respiratory panel is positive for atypical bacterial infection (Bordetella pertussis, Chlamydophila pneumoniae, or Mycoplasma pneumoniae), consider antibiotic de-escalation to target atypical bacterial infection.          Medication Review:       Schedule Meds  acetylcysteine, 2 mL, Nebulization, BID - RT  ampicillin-sulbactam, 3 g, Intravenous, Q6H  aspirin, 81 mg, Oral, Daily  buprenorphine-naloxone, 1.5 tablet, Sublingual, Daily  enoxaparin, 40 mg, Subcutaneous, Q24H  epoetin asia-epbx, 40,000 Units, Subcutaneous, Weekly  gabapentin, 300 mg, Oral, TID  guaiFENesin, 600 mg, Oral, Q12H  insulin lispro, 0-7 Units, Subcutaneous, TID With Meals  ipratropium-albuterol, 3 mL, Nebulization, Q4H - RT  Shabbir, 1 packet, Oral, BID  lurasidone, 40 mg, Oral, Daily  megestrol acetate, 200 mg, Oral, TID  multivitamin with minerals, 1 tablet, Oral, Daily  pantoprazole, 40 mg, Oral, Daily  polyethylene glycol, 17 g, Oral, Daily  senna-docusate sodium, 2 tablet, Oral, BID  sertraline, 50 mg, Oral, Daily  spironolactone, 25 mg, Oral, Daily  vancomycin, 1,000 mg, Intravenous, Q12H  Zinc Oxide, , Apply externally, BID        Infusion Meds  Pharmacy to dose vancomycin,         PRN Meds  •  acetaminophen  •  albuterol  •  senna-docusate sodium **AND** polyethylene glycol **AND** bisacodyl **AND** bisacodyl  •  calcium carbonate  •  dextrose  •  dextrose  •  glucagon (human recombinant)  •  hydrOXYzine  •  insulin  lispro  •  ipratropium-albuterol  •  melatonin  •  ondansetron  •  Pharmacy to dose vancomycin  •  promethazine  •  [COMPLETED] Insert peripheral IV **AND** sodium chloride  •  sodium chloride        Assessment/Plan       Antimicrobial Therapy   1.        day  2.  Unasyn      day  3.  Vancomycin      day  4.      Day  5.      Day      Assessment     Possible postobstructive pneumonia.  Patient has right large lung mass as a result of lung cancer.  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    Pancytopenia-likely related to chemotherapy     Lung CA was on chemotherapy    Oral thrush, patient received 7 days of nystatin        Plan     Continue IV Unasyn 3 g every 6 hours  Continue IV vancomycin-asked pharmacy to monitor and dose  Will offer 2 weeks of the above antibiotics  Continue supportive care  A.m. labs  Prognosis is very guarded and long-term prognosis might be poor  Case was discussed with the patient's wife at the bedside    Anne Allred MD  06/14/21  16:03 EDT     Note is dictated utilizing voice recognition software/Dragon

## 2021-06-14 NOTE — PLAN OF CARE
Goal Outcome Evaluation:               Patient is resting at this time, no complaints of pain, patient is sitting up in chair at this time, will continue to assess

## 2021-06-14 NOTE — PROGRESS NOTES
Daily Progress Note    Coronary artery disease involving native coronary artery of native heart without angina pectoris    Nonrheumatic aortic valve stenosis    Essential hypertension    Congestive heart failure (CMS/HCC)    Tobacco abuse    Squamous cell carcinoma of lung, right (CMS/HCC)    Acute-on-chronic respiratory failure (CMS/HCC)    Respiratory distress    Stage 2 skin ulcer of sacral region (CMS/HCC)    Assessment    Acute-on-chronic respiratory distress  Pneumonia most likely post obstruction  COPD exacerbation  Metastatic lung cancer squamous cell Stage III   CHF  Opioid dependence  Stage 2 skin ulcer of sacral region      COVID-19 and respiratory viral DNA panel is negative    Plan    Antibiotics per ID unasyn and vancomycin  Bronchodilator/Inhaled corticosteroids  Steroids completed  Mucinex  IS  DNR considering comfort measures       LOS: 7 days       Subjective         Objective     Vital signs for last 24 hours:  Vitals:    06/14/21 1115 06/14/21 1125 06/14/21 1530 06/14/21 1533   BP:  127/75     BP Location:       Patient Position:       Pulse: 95 105 103 110   Resp: 18 20 20 20   Temp:  98.2 °F (36.8 °C)     TempSrc:  Axillary     SpO2: 99% 100% 96% 98%   Weight:       Height:           Intake/Output last 3 shifts:  I/O last 3 completed shifts:  In: 1200 [P.O.:1200]  Out: -   Intake/Output this shift:  I/O this shift:  In: 240 [P.O.:240]  Out: -       Radiology  Imaging Results (Last 24 Hours)     Procedure Component Value Units Date/Time    XR Chest 1 View [[id number]] Collected: 06/14/21 0246     Updated: 06/14/21 0250    Narrative:      PROCEDURE:   XR CHEST 1 VW     HISTORY:   Change in respiratory status    COMPARISONS: 6/11/2021. 3/9/2021.    FINDINGS:   Stable Port-A-Cath. Redemonstration of sternotomy wires and cardiac valve prosthesis.    Unchanged right midlung mass. Similar scant pulmonary markings superior to the mass when compared to 3/9/2021.    Unchanged bilateral interstitial  prominence, left greater than right.    Stable cardiac mediastinal silhouette.        Impression:      Unchanged chest.      Slot 66    Electronically signed by:  Alcides Frazier D.O.    6/14/2021 12:49 AM          Labs:  Results from last 7 days   Lab Units 06/14/21  0617   WBC 10*3/mm3 9.60   HEMOGLOBIN g/dL 9.3*   HEMATOCRIT % 28.2*   PLATELETS 10*3/mm3 218     Results from last 7 days   Lab Units 06/14/21  0617   SODIUM mmol/L 132*   POTASSIUM mmol/L 4.0   CHLORIDE mmol/L 100   CO2 mmol/L 24.0   BUN mg/dL 17   CREATININE mg/dL 0.60*   CALCIUM mg/dL 8.6   BILIRUBIN mg/dL 0.5   ALK PHOS U/L 104   ALT (SGPT) U/L 37   AST (SGOT) U/L 32   GLUCOSE mg/dL 79     Results from last 7 days   Lab Units 06/14/21  0243   PH, ARTERIAL pH units 7.425   PO2 ART mm Hg 77.1*   PCO2, ARTERIAL mm Hg 34.1*   HCO3 ART mmol/L 22.3     Results from last 7 days   Lab Units 06/14/21  0617 06/12/21  0446 06/08/21  0624   ALBUMIN g/dL 2.40* 2.30* 2.50*                 Results from last 7 days   Lab Units 06/08/21  1630   INR  1.05   APTT seconds 27.7               Meds:   SCHEDULE  acetylcysteine, 2 mL, Nebulization, BID - RT  ampicillin-sulbactam, 3 g, Intravenous, Q6H  aspirin, 81 mg, Oral, Daily  buprenorphine-naloxone, 1.5 tablet, Sublingual, Daily  enoxaparin, 40 mg, Subcutaneous, Q24H  epoetin asia-epbx, 40,000 Units, Subcutaneous, Weekly  gabapentin, 300 mg, Oral, TID  guaiFENesin, 600 mg, Oral, Q12H  insulin lispro, 0-7 Units, Subcutaneous, TID With Meals  ipratropium-albuterol, 3 mL, Nebulization, Q4H - RT  Shabbir, 1 packet, Oral, BID  lurasidone, 40 mg, Oral, Daily  megestrol acetate, 200 mg, Oral, TID  multivitamin with minerals, 1 tablet, Oral, Daily  pantoprazole, 40 mg, Oral, Daily  polyethylene glycol, 17 g, Oral, Daily  senna-docusate sodium, 2 tablet, Oral, BID  sertraline, 50 mg, Oral, Daily  spironolactone, 25 mg, Oral, Daily  vancomycin, 1,000 mg, Intravenous, Q12H  Zinc Oxide, , Apply externally,  BID      Infusions  Pharmacy to dose vancomycin,       PRNs  •  acetaminophen  •  albuterol  •  senna-docusate sodium **AND** polyethylene glycol **AND** bisacodyl **AND** bisacodyl  •  calcium carbonate  •  dextrose  •  dextrose  •  glucagon (human recombinant)  •  hydrOXYzine  •  insulin lispro  •  ipratropium-albuterol  •  melatonin  •  ondansetron  •  Pharmacy to dose vancomycin  •  promethazine  •  [COMPLETED] Insert peripheral IV **AND** sodium chloride  •  sodium chloride    Physical Exam:  Physical Exam  Vitals reviewed.   Constitutional:       Appearance: He is ill-appearing.   Pulmonary:      Breath sounds: Rhonchi present.   Skin:     General: Skin is warm and dry.      Coloration: Skin is pale.   Neurological:      Mental Status: He is alert and oriented to person, place, and time.         ROS  Review of Systems   Unable to perform ROS: Other       I reviewed the patient's new clinical results.      Electronically signed by VINCE Muñoz, 06/14/21 at 16:39 EDT.

## 2021-06-14 NOTE — CASE MANAGEMENT/SOCIAL WORK
Continued Stay Note  H[facility] Wills     Patient Name: Axel Ramirez  MRN: [id number]  Today's Date: 6/14/2021    Admit Date: 6/7/2021    Discharge Plan     Row Name 06/14/21 1745       Plan    Plan  DC Plan: Return to [facility], will require precert, no PASRR required.    Plan Comments  Mask at 10L. IV antibiotics continue.        Chart review only.             Maninder Bal RN

## 2021-06-14 NOTE — PROGRESS NOTES
"Pharmacy Antimicrobial Dosing Service    Subjective:  Axel Ramirez is a 70 y.o.male admitted with acute-on-chronic respiratory failure. Pharmacy has been consulted to dose Vancomycin for possible PNA.    PMH: CHF, COPD, squamous cell carcinoma of R lung      Assessment/Plan    1. Day #7 Vancomycin: Goal -600 mcg*h/mL. Current dose 1000 mg (~15 mg/kg ABW) IV q12h. Trough 6/14 at 1511= 18.8 mcg/ml. [facility] predicts AUC= 533 mcg*h/mL and trough= 16.6 mcg/ml. Will continue current dose. Recommend ordering follow up trough in 2-3 days to monitor for further accumulation.     2. Day #7 Ampicillin/Sulbactam: 3 g IV q6h for estCrCl > 30 mL/min    Will continue to monitor drug levels, renal function, culture and sensitivities, and patient clinical status.       Objective:  Relevant clinical data and objective history reviewed:  172.7 cm (68\")   68.3 kg (150 lb 9.2 oz)   Ideal body weight: 68.4 kg (150 lb 12.7 oz)  Body mass index is 22.89 kg/m².    Results from last 7 days   Lab Units 06/14/21  1511 06/11/21  0949 06/09/21  2339   VANCOMYCIN PK mcg/mL  --   --  14.10*   VANCOMYCIN TR mcg/mL 18.80 16.90 15.00     Results from last 7 days   Lab Units 06/14/21  0617 06/13/21  0544 06/12/21  0446   CREATININE mg/dL 0.60* 0.57* 0.56*     Estimated Creatinine Clearance: 83 mL/min (A) (by C-G formula based on SCr of 0.6 mg/dL (L)).  I/O last 3 completed shifts:  In: 1200 [P.O.:1200]  Out: -     Results from last 7 days   Lab Units 06/14/21  0617 06/13/21  0544 06/12/21  0446   WBC 10*3/mm3 9.60 9.20 12.10*     Temperature    06/13/21 1933 06/14/21 0316 06/14/21 1125   Temp: 98.1 °F (36.7 °C) 98.3 °F (36.8 °C) 98.2 °F (36.8 °C)     Baseline culture/source/susceptibility:  Microbiology Results (last 10 days)       Procedure Component Value - Date/Time    MRSA Screen, PCR (Inpatient) - Swab, Nares [[id number]]  (Abnormal) Collected: 06/07/21 1701    Lab Status: Final result Specimen: Swab from Nares Updated: 06/07/21 " 1941     MRSA PCR MRSA Detected    Blood Culture - Blood, Arm, Left [[id number]] Collected: 06/07/21 0705    Lab Status: Final result Specimen: Blood from Arm, Left Updated: 06/12/21 0730     Blood Culture No growth at 5 days    Blood Culture - Blood, Arm, Right [[id number]] Collected: 06/07/21 0705    Lab Status: Final result Specimen: Blood from Arm, Right Updated: 06/12/21 0730     Blood Culture No growth at 5 days    Respiratory Panel PCR w/COVID-19(SARS-CoV-2) PARESH/GRACIELA/KATHY/PAD/COR/MAD/CHRIST In-House, NP Swab in UTM/VTM, 3-4 HR TAT - Swab, Nasopharynx [[id number]]  (Normal) Collected: 06/07/21 0620    Lab Status: Final result Specimen: Swab from Nasopharynx Updated: 06/07/21 0726     ADENOVIRUS, PCR Not Detected     Coronavirus 229E Not Detected     Coronavirus HKU1 Not Detected     Coronavirus NL63 Not Detected     Coronavirus OC43 Not Detected     COVID19 Not Detected     Human Metapneumovirus Not Detected     Human Rhinovirus/Enterovirus Not Detected     Influenza A PCR Not Detected     Influenza B PCR Not Detected     Parainfluenza Virus 1 Not Detected     Parainfluenza Virus 2 Not Detected     Parainfluenza Virus 3 Not Detected     Parainfluenza Virus 4 Not Detected     RSV, PCR Not Detected     Bordetella pertussis pcr Not Detected     Bordetella parapertussis PCR Not Detected     Chlamydophila pneumoniae PCR Not Detected     Mycoplasma pneumo by PCR Not Detected    Narrative:      In the setting of a positive respiratory panel with a viral infection PLUS a negative procalcitonin without other underlying concern for bacterial infection, consider observing off antibiotics or discontinuation of antibiotics and continue supportive care. If the respiratory panel is positive for atypical bacterial infection (Bordetella pertussis, Chlamydophila pneumoniae, or Mycoplasma pneumoniae), consider antibiotic de-escalation to target atypical bacterial infection.              Anti-Infectives (From admission, onward)       Ordered     Dose/Rate Route Frequency Start Stop    06/11/21 1521  vancomycin (VANCOCIN) 1,000 mg in sodium chloride 0.9 % 250 mL IVPB     Ordering Provider: Anne Allred MD    1,000 mg Intravenous Every 12 Hours 06/11/21 1600 06/18/21 0359    06/08/21 1347  ampicillin-sulbactam (UNASYN) 3 g in sodium chloride 0.9 % 100 mL IVPB-MBP     Luciana Amos [facility] reviewed the order on 06/13/21 1026.   Ordering Provider: Herminia Stanton APRN    3 g Intravenous Every 6 Hours 06/08/21 2000 06/22/21 1959    06/08/21 1412  vancomycin 1250 mg/250 mL 0.9% NS IVPB ([facility])     Ordering Provider: Herminia Stanton APRN    20 mg/kg × 67.3 kg  over 75 Minutes Intravenous Once 06/08/21 1500 06/08/21 2035    06/08/21 1347  Pharmacy to dose vancomycin     Luciana Amos [facility] reviewed the order on 06/13/21 1027.   Ordering Provider: Herminia Stanton APRN     Does not apply Continuous PRN 06/08/21 1346 06/22/21 1345    06/07/21 1059  piperacillin-tazobactam (ZOSYN) IVPB 4.5 g in 100 mL NS (CD)     Ordering Provider: Ignacia De La Cruz DO    4.5 g  over 30 Minutes Intravenous Once 06/07/21 1145 06/07/21 1326    06/07/21 0625  cefTRIAXone (ROCEPHIN) in SWFI 1 gram/10ml IV PUSH syringe     Ordering Provider: Higinio Flores MD    1 g  over 5 Minutes Intravenous Once 06/07/21 0627 06/07/21 0711    06/07/21 0625  azithromycin 500 mg IVPB in 250 mL NS     Ordering Provider: Higinio Flores MD    500 mg  over 60 Minutes Intravenous Once 06/07/21 0626 06/07/21 0806            Carmen Ac, PharmD  06/14/21 16:12 EDT

## 2021-06-15 NOTE — PROGRESS NOTES
Daily Progress Note    Coronary artery disease involving native coronary artery of native heart without angina pectoris    Nonrheumatic aortic valve stenosis    Essential hypertension    Congestive heart failure (CMS/HCC)    Tobacco abuse    Squamous cell carcinoma of lung, right (CMS/HCC)    Acute-on-chronic respiratory failure (CMS/HCC)    Respiratory distress    Stage 2 skin ulcer of sacral region (CMS/HCC)    Assessment    Acute-on-chronic respiratory distress  Pneumonia most likely post obstruction  COPD exacerbation  Metastatic lung cancer squamous cell Stage III   CHF  Opioid dependence  Stage 2 skin ulcer of sacral region      COVID-19 and respiratory viral DNA panel is negative    Plan    Antibiotics per ID unasyn and vancomycin  Bronchodilator as needed  Steroids completed  Mucinex  IS  DNR considering comfort measures       LOS: 8 days       Subjective         Objective     Vital signs for last 24 hours:  Vitals:    06/15/21 0705 06/15/21 0906 06/15/21 1100 06/15/21 1106   BP:  130/68     BP Location:  Right arm     Patient Position:  Lying     Pulse: 82 88 88 88   Resp: 18 17 16 16   Temp:  97.4 °F (36.3 °C)     TempSrc:  Oral     SpO2:  97% 97%    Weight:       Height:           Intake/Output last 3 shifts:  I/O last 3 completed shifts:  In: 600 [P.O.:600]  Out: -   Intake/Output this shift:  I/O this shift:  In: 240 [P.O.:240]  Out: 800 [Urine:800]      Radiology  Imaging Results (Last 24 Hours)     ** No results found for the last 24 hours. **          Labs:  Results from last 7 days   Lab Units 06/15/21  0547   WBC 10*3/mm3 8.20   HEMOGLOBIN g/dL 8.7*   HEMATOCRIT % 25.9*   PLATELETS 10*3/mm3 226     Results from last 7 days   Lab Units 06/15/21  0547   SODIUM mmol/L 137   POTASSIUM mmol/L 4.7   CHLORIDE mmol/L 105   CO2 mmol/L 24.0   BUN mg/dL 14   CREATININE mg/dL 0.64*   CALCIUM mg/dL 8.4*   BILIRUBIN mg/dL 0.3   ALK PHOS U/L 93   ALT (SGPT) U/L 36   AST (SGOT) U/L 38   GLUCOSE mg/dL 87      Results from last 7 days   Lab Units 06/14/21  0243   PH, ARTERIAL pH units 7.425   PO2 ART mm Hg 77.1*   PCO2, ARTERIAL mm Hg 34.1*   HCO3 ART mmol/L 22.3     Results from last 7 days   Lab Units 06/15/21  0547 06/14/21  0617 06/12/21  0446   ALBUMIN g/dL 2.30* 2.40* 2.30*                 Results from last 7 days   Lab Units 06/08/21  1630   INR  1.05   APTT seconds 27.7               Meds:   SCHEDULE  ampicillin-sulbactam, 3 g, Intravenous, Q6H  aspirin, 81 mg, Oral, Daily  buprenorphine-naloxone, 1.5 tablet, Sublingual, Daily  enoxaparin, 40 mg, Subcutaneous, Q24H  epoetin asia-epbx, 40,000 Units, Subcutaneous, Weekly  gabapentin, 300 mg, Oral, TID  guaiFENesin, 600 mg, Oral, Q12H  insulin lispro, 0-7 Units, Subcutaneous, TID With Meals  ipratropium-albuterol, 3 mL, Nebulization, Q4H - RT  Shabbir, 1 packet, Oral, BID  lurasidone, 40 mg, Oral, Daily  megestrol acetate, 200 mg, Oral, TID  multivitamin with minerals, 1 tablet, Oral, Daily  pantoprazole, 40 mg, Oral, Daily  polyethylene glycol, 17 g, Oral, Daily  senna-docusate sodium, 2 tablet, Oral, BID  sertraline, 50 mg, Oral, Daily  spironolactone, 25 mg, Oral, Daily  vancomycin, 1,000 mg, Intravenous, Q12H  Zinc Oxide, , Apply externally, BID      Infusions  Pharmacy to dose vancomycin,       PRNs  •  acetaminophen  •  albuterol  •  senna-docusate sodium **AND** polyethylene glycol **AND** bisacodyl **AND** bisacodyl  •  calcium carbonate  •  dextrose  •  dextrose  •  glucagon (human recombinant)  •  hydrOXYzine  •  insulin lispro  •  ipratropium-albuterol  •  melatonin  •  ondansetron  •  Pharmacy to dose vancomycin  •  promethazine  •  [COMPLETED] Insert peripheral IV **AND** sodium chloride  •  sodium chloride    Physical Exam:  Physical Exam  Vitals reviewed.   Constitutional:       General: He is sleeping.      Appearance: He is ill-appearing.   Pulmonary:      Breath sounds: Rhonchi present.   Skin:     General: Skin is warm and dry.      Coloration:  Skin is pale.   Neurological:      Mental Status: He is oriented to person, place, and time.         ROS  Review of Systems   Unable to perform ROS: Other       I reviewed the patient's new clinical results.      Electronically signed by VINCE Muñoz, 06/15/21 at 13:09 EDT.

## 2021-06-15 NOTE — PLAN OF CARE
Goal Outcome Evaluation:           Progress: no change  Outcome Summary: Patient rested comfortably all night with no concerns. Wife remains at bedside

## 2021-06-15 NOTE — PLAN OF CARE
Goal Outcome Evaluation:      Assessment: Axel Ramirez presents with functional mobility impairments which indicate the need for skilled intervention. Tolerating session today without incident. Will continue to follow and progress as tolerated. Tolerated LE ex in supine and while sitting, no inc in work of breathing noted and no SOA reported. Noted weaker RLE. Pt tolerated sitting EOB for 3 minutes.

## 2021-06-15 NOTE — NURSING NOTE
Pt and pts wife are very anxious and concerned about pts care after being d/c. Pt and wife stated they want pt to go home with home health. Pts wife states she wants pt to get stronger so he can come home upon d/c and get better. CM notified. Pall notified.

## 2021-06-15 NOTE — THERAPY TREATMENT NOTE
Subjective: Pt agreeable to therapeutic plan of care.    Objective:     Bed Mobility: Min-A  Functional Transfers: Min-A  Functional Ambulation: N/A or Not attempted.    Pt stood for >15 seconds before returning to seated position.  He reports SOA.  O2 sats went from 91% before standing to 94% after standing.  He refused transfer to chair.      Pain: 9 VAS  Education: Provided education on importance of mobility and skilled verbal / tactile cueing throughout intervention.     Assessment: Axel Ramirez presents with ADL impairments below baseline abilities which indicate the need for continued skilled intervention while inpatient.  Continues to have low activity tolerance. Tolerating session today without incident. Will continue to follow and progress as tolerated.     Plan/Recommendations:   Pt would benefit from Inpatient Rehabilitation placement at discharge from facility.   Pt desires Home with Home Health and Home with family assist at discharge. Pt cooperative; agreeable to therapeutic recommendations and plan of care.     Modified [place]: 5 = Severe disability (Requires constant nursing care and attention, bedridden, incontinent)    Post-Tx Position: Supine with HOB Elevated, Alarms activated and Call light and personal items within reach  PPE: gloves, surgical mask, eyewear protection

## 2021-06-15 NOTE — PROGRESS NOTES
LOS: 8 days   Patient Care Team:  Sandoval Stevenson FNP as PCP - General  Sandoval Stevenson FNP as PCP - Family Medicine  Axel Lewis MD as Consulting Physician (Hematology and Oncology)  Iglesia Springer MD as Consulting Physician (Cardiology)    Chief Complaint: Much more alert, conversant says he is feeling so much better.  Tolerating his diet.  Unfortunately still on 10 L via nasal cannula.    Subjective Alert, comfortable appearing but chronically ill-appearing    Interval History:     Patient Complaints: Minimal cough today, no chest pain fevers chills.  Is still quite debilitated and weak  Patient Denies: No chest pain or hemoptysis, no nausea vomiting or diarrhea  History taken from: patient    Review of Systems:    All systems were reviewed and negative except for: See interval history    Objective     Vital Signs  Temp:  [97.5 °F (36.4 °C)-98.2 °F (36.8 °C)] 97.7 °F (36.5 °C)  Heart Rate:  [] 82  Resp:  [18-24] 18  BP: (127-135)/(69-76) 135/69    Physical Exam:     General Appearance:    Alert, oriented, answers questions appropriately, chronically ill-appearing   Head:    Normocephalic, without obvious abnormality, atraumatic   Eyes:            Conjunctivae and sclerae normal, no   icterus, no pallor, corneas clear, PERRLA   Throat:   No oral lesions, no thrush, oral mucosa moist   Neck:   No adenopathy, supple, trachea midline, no thyromegaly, no   carotid bruit, no JVD   Lungs:    Course bilateral lung bases and upper airway sounds are very coarse    Heart:   Distant, regular rhythm and normal rate, normal S1 and S2, no            murmur, no gallop, no rub, no click   Chest Wall:    No abnormalities observed   Abdomen:     Normal bowel sounds, no masses, no organomegaly, soft        non-tender, non-distended, no guarding, no rebound                tenderness,    Rectal:     Deferred   Extremities:  Extremities are elevated, nonpitting edema to just pretibial area bilaterally    Pulses:   Pulses palpable and equal bilaterally   Skin:   No bleeding, bruising or rash   Lymph nodes:   No palpable adenopathy   Neurologic:  Generalized debility, patient not really following commands this morning   Radiology:  XR Chest 1 View    Result Date: 6/14/2021  Unchanged chest. Slot 66 Electronically signed by:  Alcides Frazier D.O.  6/14/2021 12:49 AM    XR Chest 1 View    Result Date: 6/11/2021  1. Mild worsening of diffuse interstitial opacities throughout the left lung that may relate to infection or asymmetric pulmonary edema. 2. Redemonstration of 9 cm right upper lobe mass consistent with known malignancy. 3. Stable additional chronic findings above.  Electronically Signed By-Saleem Ferguson MD On:6/11/2021 7:40 AM This report was finalized on [id number] by  Saleem Ferguson MD.    XR Chest 1 View    Result Date: 6/10/2021  1. Similar appearance of the right-sided perihilar mass likely related to known malignancy. 2. Patchy interstitial opacities throughout the left lung likely representing infection on superimposed chronic interstitial lung disease.  Electronically Signed By-Daryl Hunter MD On:6/10/2021 8:01 AM This report was finalized on [id number] by  Daryl Hunter MD.    CT Chest Pulmonary Embolism    Result Date: 6/7/2021  1. Negative for pulmonary embolus. 2. Redemonstration of large 9 cm right upper lobe mass which occludes the right upper lobe bronchus resulting in complete right upper and right middle lobe collapse similar to prior study. 3. Bronchial wall thickening with increased mucous plugging involving lower lobes with patchy areas of airspace disease most concerning for aspiration pneumonia. 4. Interlobular septal thickening and scattered groundglass opacities bilaterally which may relate to pulmonary edema and/or infection superimposed on background of advanced emphysema. 5. Stable mediastinal lymphadenopathy. 6. New 12 mm area of nodularity in left lower lobe may relate  to aspiration/infection, recommend attention on follow-up.  Electronically Signed By-Saleem Ferguson MD On:6/7/2021 12:44 PM This report was finalized on [id number] by  Saleem Ferguson MD.         Results Review:     I reviewed the patient's new clinical results.  I reviewed the patient's new imaging results and agree with the interpretation.    Medication Review:   Scheduled Meds:ampicillin-sulbactam, 3 g, Intravenous, Q6H  aspirin, 81 mg, Oral, Daily  buprenorphine-naloxone, 1.5 tablet, Sublingual, Daily  enoxaparin, 40 mg, Subcutaneous, Q24H  epoetin asia-epbx, 40,000 Units, Subcutaneous, Weekly  gabapentin, 300 mg, Oral, TID  guaiFENesin, 600 mg, Oral, Q12H  insulin lispro, 0-7 Units, Subcutaneous, TID With Meals  ipratropium-albuterol, 3 mL, Nebulization, Q4H - RT  Shabbir, 1 packet, Oral, BID  lurasidone, 40 mg, Oral, Daily  megestrol acetate, 200 mg, Oral, TID  multivitamin with minerals, 1 tablet, Oral, Daily  pantoprazole, 40 mg, Oral, Daily  polyethylene glycol, 17 g, Oral, Daily  senna-docusate sodium, 2 tablet, Oral, BID  sertraline, 50 mg, Oral, Daily  spironolactone, 25 mg, Oral, Daily  vancomycin, 1,000 mg, Intravenous, Q12H  Zinc Oxide, , Apply externally, BID      Continuous Infusions:Pharmacy to dose vancomycin,       PRN Meds:.•  acetaminophen  •  albuterol  •  senna-docusate sodium **AND** polyethylene glycol **AND** bisacodyl **AND** bisacodyl  •  calcium carbonate  •  dextrose  •  dextrose  •  glucagon (human recombinant)  •  hydrOXYzine  •  insulin lispro  •  ipratropium-albuterol  •  melatonin  •  ondansetron  •  Pharmacy to dose vancomycin  •  promethazine  •  [COMPLETED] Insert peripheral IV **AND** sodium chloride  •  sodium chloride    Labs:  Lab Results (last 24 hours)     Procedure Component Value Units Date/Time    Manual Differential [[id number]]  (Abnormal) Collected: 06/15/21 1559    Specimen: Blood Updated: 06/15/21 7282     Neutrophil % 74.0 %      Lymphocyte % 9.0 %      Monocyte %  3.0 %      Eosinophil % 2.0 %      Basophil % 1.0 %      Bands %  8.0 %      Metamyelocyte % 1.0 %      Promyelocyte % 1.0 %      Blasts % 1.0 %      Neutrophils Absolute 6.72 10*3/mm3      Lymphocytes Absolute 0.74 10*3/mm3      Monocytes Absolute 0.25 10*3/mm3      Eosinophils Absolute 0.16 10*3/mm3      Basophils Absolute 0.08 10*3/mm3      Anisocytosis Slight/1+     Macrocytes Slight/1+     Toxic Granulation Slight/1+     Platelet Morphology Normal    Narrative:      Reviewed by Pathologist within the past 30 days on [id number] .      CBC & Differential [[id number]]  (Abnormal) Collected: 06/15/21 0547    Specimen: Blood Updated: 06/15/21 0729    Narrative:      The following orders were created for panel order CBC & Differential.  Procedure                               Abnormality         Status                     ---------                               -----------         ------                     Scan Slide[[id number]]                                       Final result               CBC Auto Differential[[id number]]        Abnormal            Final result                 Please view results for these tests on the individual orders.    Scan Slide [[id number]] Collected: 06/15/21 0547    Specimen: Blood Updated: 06/15/21 0729     Scan Slide --     Comment: See Manual Differential Results       CBC Auto Differential [[id number]]  (Abnormal) Collected: 06/15/21 0547    Specimen: Blood Updated: 06/15/21 0729     WBC 8.20 10*3/mm3      RBC 2.44 10*6/mm3      Hemoglobin 8.7 g/dL      Hematocrit 25.9 %      .8 fL      MCH 35.7 pg      MCHC 33.8 g/dL      RDW 18.7 %      RDW-SD 67.4 fl      MPV 8.2 fL      Platelets 226 10*3/mm3     Narrative:      The previously reported component NRBC is no longer being reported. Previous result was 0.4 /100 WBC (Reference Range: 0.0-0.2 /100 WBC) on 6/15/2021 at 0648 EDT.    Comprehensive Metabolic Panel [[id number]]  (Abnormal) Collected: 06/15/21 0547    Specimen: Blood Updated:  06/15/21 0713     Glucose 87 mg/dL      BUN 14 mg/dL      Creatinine 0.64 mg/dL      Sodium 137 mmol/L      Potassium 4.7 mmol/L      Comment: Slight hemolysis detected by analyzer. Results may be affected.        Chloride 105 mmol/L      CO2 24.0 mmol/L      Calcium 8.4 mg/dL      Total Protein 5.8 g/dL      Albumin 2.30 g/dL      ALT (SGPT) 36 U/L      AST (SGOT) 38 U/L      Comment: Slight hemolysis detected by analyzer. Results may be affected.        Alkaline Phosphatase 93 U/L      Total Bilirubin 0.3 mg/dL      eGFR Non African Amer 124 mL/min/1.73      Globulin 3.5 gm/dL      A/G Ratio 0.7 g/dL      BUN/Creatinine Ratio 21.9     Anion Gap 8.0 mmol/L     Narrative:      GFR Normal >60  Chronic Kidney Disease <60  Kidney Failure <15      POC Glucose Once [[id number]]  (Abnormal) Collected: 06/14/21 2119    Specimen: Blood Updated: 06/14/21 2121     Glucose 128 mg/dL      Comment: Serial Number: [id number]:  [id number]       POC Glucose Once [[id number]]  (Abnormal) Collected: 06/14/21 1648    Specimen: Blood Updated: 06/14/21 1648     Glucose 147 mg/dL      Comment: Serial Number: [id number]:  [id number]       [place] Draw [[id number]] Collected: 06/14/21 1511    Specimen: Blood Updated: 06/14/21 1615    Narrative:      The following orders were created for panel order [place] Draw.  Procedure                               Abnormality         Status                     ---------                               -----------         ------                     Light Blue Top[[id number]]                                                              Green Top (Gel)[[id number]]                                  Final result               Lavender Top[[id number]]                                     Final result               Gold Top - SST[[id number]]                                                                Please view results for these tests on the individual orders.    Green Top (Gel) [[id number]] Collected:  06/14/21 1511    Specimen: Blood Updated: 06/14/21 1615     Extra Tube Hold for add-ons.     Comment: Auto resulted.       Lavender Top [[id number]] Collected: 06/14/21 1511    Specimen: Blood Updated: 06/14/21 1615     Extra Tube hold for add-on     Comment: Auto resulted       Vancomycin, Trough [[id number]]  (Normal) Collected: 06/14/21 1511    Specimen: Blood Updated: 06/14/21 1553     Vancomycin Trough 18.80 mcg/mL     Narrative:      Therapeutic Ranges for Vancomycin    Vancomycin Random   5.0-40.0 mcg/mL  Vancomycin Trough   5.0-20.0 mcg/mL  Vancomycin Peak     20.0-40.0 mcg/mL           Assessment/Plan     Acute-on-chronic respiratory failure (CMS/HCC)  Respiratory distress  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, worsening  Probable postobstructive pneumonia    Coronary artery disease involving native coronary artery of native heart without angina pectoris    Nonrheumatic aortic valve stenosis    Essential hypertension    Congestive heart failure (CMS/HCC)    Tobacco abuse    Squamous cell carcinoma of lung, right (CMS/HCC)      Stage 2 skin ulcer of sacral region (CMS/HCC)    Family is wondering if patient is going to go for bronchoscopy at some point to help with his breathing problems.  Family very anxious and concerned about this.  I told him this would be something to discuss with pulmonology when they evaluate the patient this morning.    Infectious disease, pulmonology and palliative care inputs greatly appreciated, thank you.  Also reviewed hematology oncology's notes as well, thank you for your kind input.    Continue rest of current approach.  Agree poor long-term prognosis.      I had a long discussion with the family member at bedside who understandably is quite anxious about the patient's very poor prognosis.  After some discussion, she does agree to speak to palliative care about ongoing care parameters.      Barrier to discharge back to SNF is patient still on very high flow oxygen at  [address],   06/15/21  08:52 EDT

## 2021-06-15 NOTE — PROGRESS NOTES
Hematology/Oncology Inpatient Progress Note    PATIENT NAME: Axel Ramirez  : 1950  MRN: [id number]    CHIEF COMPLAINT: Metastatic squamous cell carcinoma of the right upper lobe lung and MDS    HISTORY OF PRESENT ILLNESS:  70 y.o. male presented to [facility] ER on 2021 with shortness of air and hypoxia.  He had an acute onset on the day of admission of worsening shortness of breath with O2 saturation of 76% on 4 L/min O2.  The ECF called EMS.  He was treated with nebulizer on the way to the ER.  Chest x-ray showed left lung pneumonia and a right middle lobe lung mass.  White count 10.1, hemoglobin 9.2, .7, platelets 115k.  D-dimer was 2.99 (< 0.6).  Chest CT showed a 9 cm right upper lobe lung mass occluding the right upper lobe bronchus with complete right upper and right middle lobe collapse.  There was no pulmonary emboli.  There was possible aspiration pneumonia and stable mediastinal lymphadenopathy.  A new 12 mm nodule was seen in the left lower lobe.  Adrenal glands were normal.  He was started on steroids and Zosyn.  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  Recently started on second line therapy Taxol/carboplatin/ipilimumab and Nivolumab with Neulasta support on 2021.     21  Hematology/Oncology was consulted for his metastatic squamous cell carcinoma of the right upper lobe lung.  He is an established patient.  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  Initially treated with concomitant weekly chemotherapy (paclitaxel and carboplatin x7) and radiation therapy from 2020. He completed radiation therapy on 2021.  He completed the weekly portion of his chemotherapy on 2021.  He received 1 cycle of every 21-day carboplatin /Taxol with  Neulasta support on 2/9/2021.  He subsequently was admitted to [facility], for hypoxia with respiratory failure requiring intubation.  MRI of the brain showed a left occipital lobe lesion with vasogenic edema.  He received radiation, x1 fraction, on 3/3/2021 to the left occipital lobe. One month later, a brain MRI showed enlargement of the left occipital lobe lesion.  PET scan showed the right upper lobe lung mass to be 10 cm in size with a decrease in the peripheral FDG uptake suggesting interval response to treatment.  There was a decrease in the size and FDG uptake in the mediastinal lymph nodes.  There was no distant metastatic disease.  The lesion in the left occipital lobe was FDG avid.  Right upper lobe lung collapse had improved with aeration.  He was referred to Dr. Tay for evaluation of his brain MRI which was felt to be due to to post SRS changes and not progression.  He was seen in follow-up with plan to start chemotherapy with immunotherapy.  Paclitaxel and carboplatin doses were decreased by 20% due to pancytopenia with prior treatment.  Repeat brain MRI, on 5/13/2021, showed an increase in the size of the left occipital lobe with an increase in surrounding edema.  There were no new masses.  The patient was referred to Dr. Luna for surgical resection versus MRI SPECT/perfusion at [facility].  Dr. Luna felt the lesion was a combination of necrosis and tumor progression and recommended surgical resection.  He started treatment on 5/24/2021 (chemotherapy plus immunotherapy) as his surgical resection was to be done after medical clearance.  He had a return appointment with Dr. Luna on 6/9/2021 following an MRI of his brain.  -MDS-diagnosed January 2021.  He was anemic and was found to have GOGO and malabsorption on oral therapy.  His bone marrow revealed increased iron storage and mild erythroid atypia in January 2021.  Stool heme was negative on 5/13/2021. He has received IV iron but has  never required Retacrit.  Last office visit 5/24/2021, complaining of hypoxia and tachypnea with anxiety.  White count 9.05, hemoglobin 11.34, .8 and platelets 220.  He was starting to gain weight and Megace was continued.  For his cough and hypoxia, he was treated with Augmentin and albuterol nebulizer.  He had oral thrush which was treated with nystatin swish and swallow.  TSH 0.287 (0.282-4.0), iron 62 (), ferritin 4185 (), iron saturation 32 (20-55), TIBC 196 (228-428).     PCP: Sandoval Stevenson FNP     INTERVAL HISTORY:  • 6/8/2021-Retacrit 40,000 units subcu weekly initiated.  Hemoglobin 7.2.  • 6/9/2021-PT 11.5 (9.6-11.7), PTT 27.7 (24-31).  Hemoglobin 7.2, platelets 118,000.  Haptoglobin 465 ().  CMV IgM <30 (<30).  Cardiac ejection fraction 65%.   • 6/10/2021-EBV VCA IgM less than 36 (less than 36).  • 6/11/2021-hemoglobin 6.9.  1 unit pRBCs given.  WBC 13.8 with 18% bands.  • 6/12/2021-hemoglobin 9.6, WBC 12.1 with 8% bands.  Folate 13.7 (4.78-24.2), vitamin B12 more than 2000 (211-946).  Creatinine 0.56 (0.76-1.27).  Iron 42 (), TIBC 146 (298-536), iron saturation 29 (20-50), ferritin 3721 (), reticulocyte count 3.44 (0.7-1.9).  • 6/13/2021- hemoglobin 9.0, .0, white blood count 9.2. Copper 122 ().  Chest x-ray unchanged.    History of present illness reviewed since last visit and changes noted on 06/15/21.    Subjective   Feels better today.    ROS:  Review of Systems   Constitutional: Positive for fatigue. Negative for activity change, chills, fever and unexpected weight change.   HENT: Negative for congestion, dental problem, hearing loss, mouth sores, nosebleeds, sore throat and trouble swallowing.    Eyes: Negative for photophobia and visual disturbance.   Respiratory: Positive for cough and shortness of breath. Negative for chest tightness.    Cardiovascular: Negative for chest pain, palpitations and leg swelling.   Gastrointestinal: Negative for  abdominal distention, abdominal pain, blood in stool, constipation, diarrhea, nausea and vomiting.   Endocrine: Negative for cold intolerance and heat intolerance.   Genitourinary: Negative for decreased urine volume, difficulty urinating, dysuria, frequency, hematuria and urgency.   Musculoskeletal: Negative for arthralgias and gait problem.   Skin: Negative for rash and wound.   Neurological: Negative for dizziness, tremors, weakness, light-headedness, numbness and headaches.   Hematological: Negative for adenopathy. Does not bruise/bleed easily.   Psychiatric/Behavioral: Negative for confusion and hallucinations. The patient is not nervous/anxious.    All other systems reviewed and are negative.     MEDICATIONS:    Scheduled Meds:  ampicillin-sulbactam, 3 g, Intravenous, Q6H  aspirin, 81 mg, Oral, Daily  buprenorphine-naloxone, 1.5 tablet, Sublingual, Daily  enoxaparin, 40 mg, Subcutaneous, Q24H  epoetin asia-epbx, 40,000 Units, Subcutaneous, Weekly  gabapentin, 300 mg, Oral, TID  guaiFENesin, 600 mg, Oral, Q12H  insulin lispro, 0-7 Units, Subcutaneous, TID With Meals  ipratropium-albuterol, 3 mL, Nebulization, Q4H - RT  Shabbir, 1 packet, Oral, BID  lurasidone, 40 mg, Oral, Daily  megestrol acetate, 200 mg, Oral, TID  multivitamin with minerals, 1 tablet, Oral, Daily  pantoprazole, 40 mg, Oral, Daily  polyethylene glycol, 17 g, Oral, Daily  senna-docusate sodium, 2 tablet, Oral, BID  sertraline, 50 mg, Oral, Daily  spironolactone, 25 mg, Oral, Daily  vancomycin, 1,000 mg, Intravenous, Q12H  Zinc Oxide, , Apply externally, BID       Continuous Infusions:  Pharmacy to dose vancomycin,        PRN Meds:  •  acetaminophen  •  albuterol  •  senna-docusate sodium **AND** polyethylene glycol **AND** bisacodyl **AND** bisacodyl  •  calcium carbonate  •  dextrose  •  dextrose  •  glucagon (human recombinant)  •  hydrOXYzine  •  insulin lispro  •  ipratropium-albuterol  •  melatonin  •  ondansetron  •  Pharmacy to dose  "vancomycin  •  promethazine  •  [COMPLETED] Insert peripheral IV **AND** sodium chloride  •  sodium chloride     ALLERGIES:  No Known Allergies    Objective    VITALS:   /69 (BP Location: Left arm, Patient Position: Lying)   Pulse 82   Temp 97.7 °F (36.5 °C) (Oral)   Resp 18   Ht 172.7 cm (68\")   Wt 68.3 kg (150 lb 9.2 oz)   SpO2 98%   BMI 22.89 kg/m²     PHYSICAL EXAM:  Physical Exam  Vitals and nursing note reviewed.   Constitutional:       General: He is not in acute distress.     Appearance: Normal appearance. He is well-developed and normal weight. He is not diaphoretic.      Interventions: Nasal cannula in place.   HENT:      Head: Normocephalic and atraumatic.      Comments: Frontal male pattern baldness.       Right Ear: External ear normal.      Left Ear: External ear normal.      Nose: Nose normal. No rhinorrhea.      Comments: Oxygen via nasal cannula.     Mouth/Throat:      Mouth: Mucous membranes are moist.      Pharynx: Oropharynx is clear. No oropharyngeal exudate or posterior oropharyngeal erythema.      Comments: Edentulous.  Eyes:      General: No scleral icterus.     Extraocular Movements: Extraocular movements intact.      Conjunctiva/sclera: Conjunctivae normal.      Pupils: Pupils are equal, round, and reactive to light.   Cardiovascular:      Rate and Rhythm: Normal rate and regular rhythm.      Heart sounds: Normal heart sounds. No murmur heard.        Comments: Cardiac monitor leads.   Pulmonary:      Effort: Pulmonary effort is normal. No respiratory distress.      Breath sounds: No stridor. Decreased breath sounds present. No wheezing, rhonchi or rales.   Chest:      Comments: Midline vertical chest wall scar.    Left chest wall port.  Abdominal:      General: Abdomen is flat. Bowel sounds are normal. There is no distension.      Palpations: Abdomen is soft. There is no mass.      Tenderness: There is no abdominal tenderness. There is no guarding or rebound.   Genitourinary:   "   Comments: Deferred   Musculoskeletal:         General: No swelling, tenderness or deformity. Normal range of motion.      Cervical back: Normal range of motion and neck supple. No tenderness.      Right lower leg: No edema.      Left lower leg: No edema.   Lymphadenopathy:      Cervical: No cervical adenopathy.      Upper Body:      Right upper body: No supraclavicular adenopathy.      Left upper body: No supraclavicular adenopathy.   Skin:     General: Skin is warm and dry.      Coloration: Skin is not jaundiced or pale.      Findings: Ecchymosis ( Bilateral upper extremities) present. No erythema or rash.   Neurological:      General: No focal deficit present.      Mental Status: He is alert and oriented to person, place, and time.      Coordination: Coordination normal.   Psychiatric:         Mood and Affect: Mood normal.         Behavior: Behavior normal.         Thought Content: Thought content normal.         Judgment: Judgment normal.           RECENT LABS:  Lab Results (last 24 hours)     Procedure Component Value Units Date/Time    Manual Differential [[id number]]  (Abnormal) Collected: 06/15/21 0547    Specimen: Blood Updated: 06/15/21 0730     Neutrophil % 74.0 %      Lymphocyte % 9.0 %      Monocyte % 3.0 %      Eosinophil % 2.0 %      Basophil % 1.0 %      Bands %  8.0 %      Metamyelocyte % 1.0 %      Promyelocyte % 1.0 %      Blasts % 1.0 %      Neutrophils Absolute 6.72 10*3/mm3      Lymphocytes Absolute 0.74 10*3/mm3      Monocytes Absolute 0.25 10*3/mm3      Eosinophils Absolute 0.16 10*3/mm3      Basophils Absolute 0.08 10*3/mm3      Anisocytosis Slight/1+     Macrocytes Slight/1+     Toxic Granulation Slight/1+     Platelet Morphology Normal    Narrative:      Reviewed by Pathologist within the past 30 days on [id number] .      CBC & Differential [[id number]]  (Abnormal) Collected: 06/15/21 0547    Specimen: Blood Updated: 06/15/21 0729    Narrative:      The following orders were created for panel  order CBC & Differential.  Procedure                               Abnormality         Status                     ---------                               -----------         ------                     Scan Slide[[id number]]                                       Final result               CBC Auto Differential[[id number]]        Abnormal            Final result                 Please view results for these tests on the individual orders.    Scan Slide [[id number]] Collected: 06/15/21 0547    Specimen: Blood Updated: 06/15/21 0729     Scan Slide --     Comment: See Manual Differential Results       CBC Auto Differential [[id number]]  (Abnormal) Collected: 06/15/21 0547    Specimen: Blood Updated: 06/15/21 0729     WBC 8.20 10*3/mm3      RBC 2.44 10*6/mm3      Hemoglobin 8.7 g/dL      Hematocrit 25.9 %      .8 fL      MCH 35.7 pg      MCHC 33.8 g/dL      RDW 18.7 %      RDW-SD 67.4 fl      MPV 8.2 fL      Platelets 226 10*3/mm3     Narrative:      The previously reported component NRBC is no longer being reported. Previous result was 0.4 /100 WBC (Reference Range: 0.0-0.2 /100 WBC) on 6/15/2021 at 0648 EDT.    Comprehensive Metabolic Panel [[id number]]  (Abnormal) Collected: 06/15/21 0547    Specimen: Blood Updated: 06/15/21 0713     Glucose 87 mg/dL      BUN 14 mg/dL      Creatinine 0.64 mg/dL      Sodium 137 mmol/L      Potassium 4.7 mmol/L      Comment: Slight hemolysis detected by analyzer. Results may be affected.        Chloride 105 mmol/L      CO2 24.0 mmol/L      Calcium 8.4 mg/dL      Total Protein 5.8 g/dL      Albumin 2.30 g/dL      ALT (SGPT) 36 U/L      AST (SGOT) 38 U/L      Comment: Slight hemolysis detected by analyzer. Results may be affected.        Alkaline Phosphatase 93 U/L      Total Bilirubin 0.3 mg/dL      eGFR Non African Amer 124 mL/min/1.73      Globulin 3.5 gm/dL      A/G Ratio 0.7 g/dL      BUN/Creatinine Ratio 21.9     Anion Gap 8.0 mmol/L     Narrative:      GFR Normal >60  Chronic  Kidney Disease <60  Kidney Failure <15      POC Glucose Once [[id number]]  (Abnormal) Collected: 06/14/21 2119    Specimen: Blood Updated: 06/14/21 2121     Glucose 128 mg/dL      Comment: Serial Number: [id number]:  [id number]       POC Glucose Once [[id number]]  (Abnormal) Collected: 06/14/21 1648    Specimen: Blood Updated: 06/14/21 1648     Glucose 147 mg/dL      Comment: Serial Number: [id number]:  [id number]       [place] Draw [[id number]] Collected: 06/14/21 1511    Specimen: Blood Updated: 06/14/21 1615    Narrative:      The following orders were created for panel order [place] Draw.  Procedure                               Abnormality         Status                     ---------                               -----------         ------                     Light Blue Top[[id number]]                                                              Green Top (Gel)[[id number]]                                  Final result               Lavender Top[[id number]]                                     Final result               Gold Top - SST[[id number]]                                                                Please view results for these tests on the individual orders.    Green Top (Gel) [[id number]] Collected: 06/14/21 1511    Specimen: Blood Updated: 06/14/21 1615     Extra Tube Hold for add-ons.     Comment: Auto resulted.       Lavender Top [[id number]] Collected: 06/14/21 1511    Specimen: Blood Updated: 06/14/21 1615     Extra Tube hold for add-on     Comment: Auto resulted       Vancomycin, Trough [[id number]]  (Normal) Collected: 06/14/21 1511    Specimen: Blood Updated: 06/14/21 1553     Vancomycin Trough 18.80 mcg/mL     Narrative:      Therapeutic Ranges for Vancomycin    Vancomycin Random   5.0-40.0 mcg/mL  Vancomycin Trough   5.0-20.0 mcg/mL  Vancomycin Peak     20.0-40.0 mcg/mL    Pathology Consultation [[id number]] Collected: 06/13/21 0596    Specimen: Blood, Venous Line Updated: 06/14/21 1231      Final Diagnosis --     Left shift with rare circulating blast  Macrocytic anemia         Case Report --     Surgical Pathology Report                         Case: [id number]                                  Authorizing Provider:  Herminia Stanton APRN Collected:           06/13/2021 05:44 AM          Ordering Location:     [facility] 2B    Received:            06/14/2021 08:23 AM                                 MEDICAL INPATIENT                                                            Pathologist:           Miguel Wilson MD                                                             Specimen:    Blood, Venous Line                                                                               PENDING RESULTS:  stool heme.    IMAGING REVIEWED:  XR Chest 1 View    Result Date: 6/14/2021  Unchanged chest. Slot 66 Electronically signed by:  Alcides Frazier D.O.  6/14/2021 12:49 AM      I have reviewed the patient's labs, imaging, reports, and other clinician documentation.    Assessment/Plan   ASSESSMENT:  1. Squamous cell carcinoma right upper lobe lung with new brain met-s/p chemoradiation completed 1/2021, SRS to left occipital lobe lesion and now on chemoimmunotherapy (carboplatin/Taxol plus ipilimumab/nivolumab with Neulasta support) dosed 5/24/2021.  PET/CT scan and April 2021 showed he was responding to treatment.  Ipilimumab and nivolumab were added to his current therapy due to progression in his brain.  Due to hospitalization and brain radiation, he did not receive systemic therapy from 2/9/2021 until 5/24/2021.  Planned to undergo surgical resection of brain metastasis by Dr. Luna.  Ipilimumab and nivolumab are known to cross blood-brain barrier.  Agree overall prognosis poor and palliative care appropriate.  2. Anemia/Myelodysplastic syndrome/GOGO with malabsorption/Chemotherapy-induced anemia-he has never required Retacrit as an outpatient.  Recent iron studies showed correction of GOGO.   Acute fall in hemoglobin due to recent chemotherapy.  Macrocytosis due to MDS.    On treatment with Retacrit.  Haptoglobin high. Normal copper level.  On multivitamin and Protonix.  Stable.  3. Acute thrombocytopenia-likely chemotherapy-induced.  Coags normal and CMV/EBV IgM normal.  Normalized.   4. Respiratory failure /Pneumonia (possibly postobstructive)/COPD-pulmonary following.  On vancomycin, Unasyn with management by infectious disease.  5. Weight loss/Loss of appetite-was improving on Megace.    6. Oral thrush-diagnosed as outpatient.  Now on steroids, will continue nystatin.  7. Elevated LFTs-acute and mild.    Normalized.    8. Sacral decub-wound care to follow.   9. CHF/s/p aortic valve replacement -echocardiogram showed no vegetation and EF 65%.  On aspirin and Lovenox prophylaxis.     PLAN  1. Follow CBC.   2. Transfuse 1 unit pRBC prn Hgb <7.0.  3. Await stool Hemoccult.  4. Continue weekly Retacrit, next due 6/15/2021.  5. Outpatient MRI brain per Dr. Luna.    6. Palliative care consult.                       I discussed the patients findings and my recommendations with patient and spouse.    Electronically signed by Axel Lewis MD, 06/15/21, 7:53 AM EDT.

## 2021-06-15 NOTE — THERAPY TREATMENT NOTE
Subjective: Pt agreeable to therapeutic plan of care. Patient agreed to work with therapy.    Objective:     Bed mobility - CGA and Min-A for supine to sit with verbal cues. Spouse assisted patient with sit to supine with LE  Transfers - N/A or Not attempted.     Thera ex supine and seated LE ex 10 reps    Pain: 0 VAS  Education: Provided education on importance of mobility and skilled verbal / tactile cueing throughout intervention.     Assessment: Axel Ramirez presents with functional mobility impairments which indicate the need for skilled intervention. Tolerating session today without incident. Will continue to follow and progress as tolerated. Tolerated LE ex in supine and while sitting, no inc in work of breathing noted and no SOA reported. Noted weaker RLE. Pt tolerated sitting EOB for 3 minutes.     Plan/Recommendations:   Pt would benefit from Inpatient Rehabilitation placement at discharge from facility and requires rolling walker at discharge.   Pt desires Home with family assist at discharge. Pt cooperative; agreeable to therapeutic recommendations and plan of care.     Basic Mobility 6-click:  Rollin = Total, A lot = 2, A little = 3; 4 = None  Supine>Sit:   1 = Total, A lot = 2, A little = 3; 4 = None   Sit>Stand with arms:  1 = Total, A lot = 2, A little = 3; 4 = None  Bed>Chair:   1 = Total, A lot = 2, A little = 3; 4 = None  Ambulate in room:  1 = Total, A lot = 2, A little = 3; 4 = None  3-5 Steps with railin = Total, A lot = 2, A little = 3; 4 = None  Score: 12    Post-Tx Position: Supine with HOB Elevated and Call light and personal items within reach  PPE: gloves, surgical mask, eyewear protection

## 2021-06-15 NOTE — PLAN OF CARE
Goal Outcome Evaluation:          Assessment: Axel Ramirez presents with ADL impairments below baseline abilities which indicate the need for continued skilled intervention while inpatient.  Continues to have low activity tolerance. Tolerating session today without incident. Will continue to follow and progress as tolerated.     Plan/Recommendations:   Pt would benefit from Inpatient Rehabilitation placement at discharge from facility.   Pt desires Home with Home Health and Home with family assist at discharge. Pt cooperative; agreeable to therapeutic recommendations and plan of care.

## 2021-06-15 NOTE — PLAN OF CARE
Goal Outcome Evaluation:  Plan of Care Reviewed With: patient           Outcome Summary: pt has been resting in bed today. pt has not wanted to get up out of bed to sit in chair or work with PT. pt has had no complaints today. pt still requiring O2. will continue to monitor

## 2021-06-15 NOTE — CONSULTS
Nutrition Services    Patient Name: Axel Ramirez  YOB: 1950  MRN: [id number]  Admission date: 6/7/2021        PPE Documentation        PPE Worn By Provider Mask, eyewear, gloves   PPE Worn By Patient  none     CLINICAL NUTRITION ASSESSMENT       Reason for Assessment 6/15: Follow up protocol    6/9: Multiple Stage 2+ PI's     H&P:      Past Medical History:   Diagnosis Date   • Aortic stenosis    • Cancer (CMS/HCC)     Sickle Cell Carcinoma   • Congestive heart failure (CHF) (CMS/HCC)     DIASTOLIC   • COPD (chronic obstructive pulmonary disease) (CMS/HCC)    • Coronary artery disease    • Hypertension    • Lung cancer (CMS/HCC)    • Myocardial infarction (CMS/HCC)    • Peripheral vascular disease (CMS/HCC)    • Pneumonia 06/07/2021   • Valvular disease        Past Surgical History:   Procedure Laterality Date   • AORTIC VALVE REPAIR/REPLACEMENT  02/26/2018    Dr Maza   • BRONCHOSCOPY N/A 10/24/2020    Procedure: BRONCHOSCOPY with biopsy right upper lobe mass, and bronchoalveolar lavage right upper lobe;  Surgeon: Ángela Bean MD;  Location: [facility] ENDOSCOPY;  Service: Pulmonary;  Laterality: N/A;  post: right upper lobe mass, pneumonia   • BRONCHOSCOPY N/A 11/11/2020    Procedure: BRONCHOSCOPY with bronchial washing;  Surgeon: Ángela Bean MD;  Location: [facility] ENDOSCOPY;  Service: Pulmonary;  Laterality: N/A;  Post: lung mass, pneumonia   • BRONCHOSCOPY N/A 11/11/2020    Procedure: BRONCHOSCOPY RIGID with debulking of right main endobronchial tumor;  Surgeon: Nomi Reyes MD;  Location: [facility] MAIN OR;  Service: Cardiothoracic;  Laterality: N/A;   • BRONCHOSCOPY N/A 11/11/2020    Procedure: BRONCHOSCOPY;  Surgeon: Nomi Reyes MD;  Location: [facility] MAIN OR;  Service: Cardiothoracic;  Laterality: N/A;   • CARDIAC CATHETERIZATION  12/29/2017   • CORONARY ARTERY BYPASS GRAFT  02/26/2018    Dr Maza   • TIBIA FRACTURE SURGERY     • VENOUS ACCESS DEVICE (PORT) INSERTION Left 10/30/2020     "Procedure: MEDIPORT INSERTION UNDER FLUOROSCOPIC GUIDENCE;  Surgeon: Nomi Reyes MD;  Location: [facility] MAIN OR;  Service: Cardiothoracic;  Laterality: Left;        Current Problems:   Acute-on-chronic respiratory distress  Pneumonia most likely post obstruction  COPD exacerbation  Metastatic lung cancer squamous cell Stage III   CHF  Opioid dependence  Stage 2 skin ulcer of sacral region       COVID-19 and respiratory viral DNA panel is negative     Nutrition/Diet History         Narrative     6/15: Upon assessment Rd noticed comfort measure listed as Interventions in patient chart. Discussed bowel regimen with RN as pt has not had bowel movement charted since admission (8 days) pt is receiving mirilax and pericolace this AM (per RN) though pt may benefit from enema or suppository to avoid discomfort. Information related to RN. RD went to visit pt room and other discipline providing care. Per palliative note, pt considering comfort measures. Will continue to monitor for GOC.    6/9: Rd visited pt room with family member at bedside. Pt is eating well, 2-3 meals per day, EMR confirms. Pt states he started vomiting yesterday and has not eaten much today but PTA had been eating well. He is having a lot of pain from wounds. Will order Shabbir BID. RN entered room upon end of assessment and suggested Boost while patient is feeling nauseas. Will order Boost TID. NFPE reveals moderate malnutrition, consistent with malnutrition hx in Feb of this year.      Functional Status Dependent for ADL's  -self feed     Food Allergies NKFA     Factors Affecting   Nutritional Intake  Squamous cell carcinoma of lung, right - with brain met   Acute respiratory failure     Anthropometrics        Current Height, Weight Height: 172.7 cm (68\")  Weight: 68.3 kg (150 lb 9.2 oz) (06/14/21 0316)        Admit Height, Weight -    Flowsheet Rows      First Filed Value   Admission Height  172.7 cm (68\") Documented at 06/07/2021 045   Admission " Weight  67.3 kg (148 lb 6.4 oz) Documented at 06/07/2021 0450             Ideal Body Weight (IBW) 146lbs   % Ideal Body Weight 100%       Usual Body Weight UTD   % Usual Body Weight UTD   Wt Change Observation 6/15: Weight is stabilizing this admission.     6/9:Weight fluctuates, though current weight is consistent with weight 6 months ago. Pt +1L. Note major weight decrease since 2018 (~40lbs)   Weight Hx    Wt Readings from Last 30 Encounters:   06/14/21 0316 68.3 kg (150 lb 9.2 oz)   06/12/21 0427 67.7 kg (149 lb 4 oz)   06/11/21 0320 68.5 kg (151 lb 0.2 oz)   06/10/21 0416 66 kg (145 lb 8.1 oz)   06/07/21 0450 67.3 kg (148 lb 6.4 oz)   06/02/21 1229 72.6 kg (160 lb)   05/19/21 0931 72.6 kg (160 lb)   05/18/21 1003 59 kg (130 lb)   04/26/21 1307 56.7 kg (125 lb)   03/09/21 1144 57.2 kg (126 lb)   03/08/21 0306 56.7 kg (125 lb)   03/07/21 0442 58 kg (127 lb 12.8 oz)   03/05/21 0333 58.4 kg (128 lb 12 oz)   03/04/21 0359 65.1 kg (143 lb 8.3 oz)   02/27/21 0559 65 kg (143 lb 4.8 oz)   02/26/21 0547 57.6 kg (126 lb 15.8 oz)   02/25/21 0512 57.6 kg (126 lb 15.8 oz)   02/23/21 0349 66.1 kg (145 lb 11.6 oz)   02/22/21 0418 66 kg (145 lb 8.1 oz)   02/21/21 0517 66.9 kg (147 lb 7.8 oz)   02/20/21 0111 66.8 kg (147 lb 4.3 oz)   02/18/21 1807 68.2 kg (150 lb 5.7 oz)   02/18/21 1048 67.1 kg (148 lb)   02/04/21 0805 66.6 kg (146 lb 12.8 oz)   01/05/21 0918 68.5 kg (151 lb)   12/29/20 0938 68.1 kg (150 lb 3.2 oz)   12/22/20 0913 69.6 kg (153 lb 6.4 oz)   12/15/20 0929 68.6 kg (151 lb 3.2 oz)   12/08/20 0915 68.1 kg (150 lb 3.2 oz)   12/02/20 1257 68.5 kg (151 lb)   12/01/20 0936 68 kg (150 lb)   11/24/20 0941 68.9 kg (152 lb)   11/19/20 0956 66.7 kg (147 lb)   11/09/20 1549 68 kg (150 lb)   11/08/20 2256 68 kg (150 lb)   10/30/20 0354 67.1 kg (147 lb 14.9 oz)   10/29/20 0500 70.1 kg (154 lb 8.7 oz)   10/27/20 0508 69.9 kg (154 lb 1.6 oz)   10/22/20 2059 68 kg (150 lb)   10/22/20 2044 68 kg (150 lb)   12/02/19 1001 77.1 kg (170  "lb)   05/31/19 1037 82 kg (180 lb 12 oz)   05/21/18 1237 81.8 kg (180 lb 6.4 oz)        BMI kg/m2 Body mass index is 22.89 kg/m².       Labs/Medications         Pertinent Labs Reviewed   Results from last 7 days   Lab Units 06/15/21  0547 06/14/21  0617 06/13/21  0544 06/12/21  0446   SODIUM mmol/L 137 132* 135* 135*   POTASSIUM mmol/L 4.7 4.0 3.9 4.0   CHLORIDE mmol/L 105 100 102 102   CO2 mmol/L 24.0 24.0 24.0 25.0   BUN mg/dL 14 17 20 18   CREATININE mg/dL 0.64* 0.60* 0.57* 0.56*   CALCIUM mg/dL 8.4* 8.6 8.4* 8.6   BILIRUBIN mg/dL 0.3 0.5  --  0.5   ALK PHOS U/L 93 104  --  106   ALT (SGPT) U/L 36 37  --  38   AST (SGOT) U/L 38 32  --  33   GLUCOSE mg/dL 87 79 84 89     Results from last 7 days   Lab Units 06/15/21  0547   HEMOGLOBIN g/dL 8.7*   HEMATOCRIT % 25.9*     COVID19   Date Value Ref Range Status   06/07/2021 Not Detected Not Detected - Ref. Range Final     No results found for: HGBA1C      Pertinent Medications Unasyn, Vancosin     Physical Findings        Overall Physical   Appearance, MSA 6/5: Did not enter room during this assessment    6/9: NFPE - moderate malnutrition.   -  Edema  No documented     Gastrointestinal Constipation noted in chart, no BM x 8 days. Secure message sent to RN to confirm and discuss bowel regimen.      Tubes No feeding tubes     Oral/Mouth Cavity WNL     Skin Multiple stage II PI to coccyx       Estimated/Assessed Needs       Energy Requirements    Height for Calculation  Height: 172.7 cm (68\")   Weight for Calculation -   Method for Estimation  -   EST Needs (kcal/day) -       Protein Requirements    Weight for Calculation -   EST Protein Needs (g/kg) -   EST Daily Needs (g/day) -       Fluid Requirements     Estimated Needs (mL/day) -       Fluid Deficit    Current Na Level (mEq/L) -   Desired Na Level (mEq/L) -   Estimated Fluid Deficit (L)  -     Current Nutrition Orders & Evaluation of Received Nutrient/Fluid Intake       Oral Nutrition     Current PO Diet Diet " Texture; Pureed Diet   Supplement Boost Plus TID   PO Evaluation     % PO Intake  % meals    -  Enteral Nutrition    Enteral Route -   TF Modular -   TF Delivery Method -   Current Ordered TF  -   Current Ordered H2O flush  -    TF Observation  -   EN Evaluation     TF Changes -    TF Residual -    TF Tolerance -    Average EN Delivered -       Parenteral Nutrition     TPN Route -   Current Ordered TPN VOL  -   Dextrose (g/kcals)  -   Amino Acid (g/kcals) -   Lipids (mL/Concentration/FREQ)  -   MVI Frequency  -   Trace Element Frequency  -   TPN Observation  -    TPN Evaluation    Total # Days on TPN -   -  Clinical Course    Nutrition Course Details 6/7-current(6/15): Pureed, Regular     Nutritional Risk Screening        NRS-2002 Score   -       Nutrition Diagnosis         Nutrition Dx Problem 1 Moderate chronic malnutrition r/t hypermetabolic state of lung cancer with c/o troubled breathing this admission likely resulting in decreased intake AEB NFPE revealing mild muscle wasting and fat loss.       Nutrition Dx Problem 2 Increased nutrient needs (arginie, protein, kcal) r/t wound healing AEB multiple Stage II PI to coccyx.       Intervention Goal         Intervention Goal(s) Accept ONS  Maintain PO 75% or greater     Nutrition Intervention        RD/Tech Action Continue Boost Plus TID + Shabbir BID     Nutrition Prescription          Diet Prescription Pureed, Regular   Supplement Prescription Boost TID    -  Enteral Prescription -       TPN Prescription -     Monitor/Evaluation        Monitor GOC, BM, weights, intake, labs.        Malnutrition Severity Assessment      Patient meets criteria for : Moderate (non-severe) Malnutrition         Electronically signed by:  Cathi Bustamante RD  06/15/21 09:07 EDT

## 2021-06-15 NOTE — PROGRESS NOTES
Palliative Care Daily Progress Note     C/C: shortness of breath    S: Axel Ramirez is a 70 y.o. male who presented to [facility] from assisted living on 6/7 with reports of shortness of breath. EMS was called after patient received a mini neb treatment at the facility but his breathing became more severe, he had a reported saturation of 76% on 4 L at his extended care facility.  Brought to the ER and was noted with AE COPD and Healthcare acquired pneumonia and admitted for further treatment.      Chest x-ray showed left lung pneumonia and a right middle lobe lung mass.  White count 10.1, hemoglobin 9.2, .7, platelets 115k.  D-dimer was 2.99 (< 0.6).       Chest CT showed a 9 cm right upper lobe lung mass occluding the right upper lobe bronchus with complete right upper and right middle lobe collapse.  There was no pulmonary emboli.  There was possible aspiration pneumonia and stable mediastinal lymphadenopathy.  A new 12 mm nodule was seen in the left lower lobe.  Adrenal glands were normal.  He was started on steroids and Zosyn.         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  Recently started on second line therapy Taxol/carboplatin/ipilimumab and Nivolumab with Neulasta support on 5/24/2021.     Pulmonary was consulted. Started on inhaled corticosteroids and antibiotics.      Cardiology for consulted for elevated BNP.      6/14 Palliative care consult for goals of care discussion.This patient is known to our service. We have seen him on previous admissions. He had previously decided to pursue hospice services but changed his mind prior to discharge and decided to continue with aggressive measures including chemotherapy and surgical interventions. We completed a POST form which confirmed patient would want to be a DNR with limited medical interventions.     6/15 No acute events overnight.  "Patient remains on high flow oxygen.       O: Code Status:   Code Status and Medical Interventions:   Ordered at: 06/07/21 0956     Level Of Support Discussed With:    Health Care Surrogate     Code Status:    No CPR     Medical Interventions (Level of Support Prior to Arrest):    Comfort Measures      Advanced Directives: Advance Directive Status: Patient does not have advance directive   Goals of Care: Ongoing.   Palliative Performance Scale Score:       /68 (BP Location: Right arm, Patient Position: Lying)   Pulse 88   Temp 97.4 °F (36.3 °C) (Oral)   Resp 17   Ht 172.7 cm (68\")   Wt 68.3 kg (150 lb 9.2 oz)   SpO2 97%   BMI 22.89 kg/m²     Intake/Output Summary (Last 24 hours) at 6/15/2021 0929  Last data filed at 6/15/2021 0850  Gross per 24 hour   Intake 480 ml   Output 400 ml   Net 80 ml         Review of Systems   Constitutional: Positive for fatigue.   Respiratory: Positive for shortness of breath.    All other systems reviewed and are negative.        Physical Exam  Vitals and nursing note reviewed.   Constitutional:       Appearance: He is ill-appearing.   HENT:      Head: Normocephalic and atraumatic.      Nose: Nose normal.      Mouth/Throat:      Mouth: Mucous membranes are dry.      Pharynx: Oropharynx is clear.   Eyes:      Extraocular Movements: Extraocular movements intact.      Conjunctiva/sclera: Conjunctivae normal.      Pupils: Pupils are equal, round, and reactive to light.   Cardiovascular:      Rate and Rhythm: Normal rate.      Pulses: Normal pulses.   Pulmonary:      Effort: Pulmonary effort is normal.   Abdominal:      General: Abdomen is flat.   Musculoskeletal:         General: Normal range of motion.      Comments: weakness   Neurological:      General: No focal deficit present.      Mental Status: He is alert and oriented to person, place, and time. Mental status is at baseline.         Labs:   Results from last 7 days   Lab Units 06/15/21  0547   WBC 10*3/mm3 8.20 "   HEMOGLOBIN g/dL 8.7*   HEMATOCRIT % 25.9*   PLATELETS 10*3/mm3 226     Results from last 7 days   Lab Units 06/15/21  0547   SODIUM mmol/L 137   POTASSIUM mmol/L 4.7   CHLORIDE mmol/L 105   CO2 mmol/L 24.0   BUN mg/dL 14   CREATININE mg/dL 0.64*   GLUCOSE mg/dL 87   CALCIUM mg/dL 8.4*     Results from last 7 days   Lab Units 06/15/21  0547   SODIUM mmol/L 137   POTASSIUM mmol/L 4.7   CHLORIDE mmol/L 105   CO2 mmol/L 24.0   BUN mg/dL 14   CREATININE mg/dL 0.64*   CALCIUM mg/dL 8.4*   BILIRUBIN mg/dL 0.3   ALK PHOS U/L 93   ALT (SGPT) U/L 36   AST (SGOT) U/L 38   GLUCOSE mg/dL 87         Diagnostics: Reviewed    A:   Patient Active Problem List   Diagnosis   • Coronary artery disease involving native coronary artery of native heart without angina pectoris   • Nonrheumatic aortic valve stenosis   • Mixed hyperlipidemia   • Essential hypertension   • Fever   • Presence of aortocoronary bypass graft   • Congestive heart failure (CMS/HCC)   • Hx of aortic valve replacement   • CAP (community acquired pneumonia)   • Tobacco abuse   • Postobstructive pneumonia   • Lung mass   • Squamous cell carcinoma of lung, right (CMS/HCC)   • Diastolic CHF, acute (CMS/HCC)   • Pneumonia of right lung due to infectious organism   • Pneumonia due to infectious organism   • Moderate malnutrition (CMS/HCC)   • Acute on chronic respiratory failure with hypoxia (CMS/HCC)   • COPD with acute exacerbation (CMS/HCC)   • Delirium   • Brain metastasis (CMS/HCC)   • Acute-on-chronic respiratory failure (CMS/HCC)   • Respiratory distress   • Stage 2 skin ulcer of sacral region (CMS/HCC)       S/Sx:    Goals of care:  6/14 Met with patient this afternoon. He is sleeping, wife is not present.  Per nursing, patient asks that his wife is present to assist in decision making. And from previous visits we had with patient, he prefers to have his wife present. Patient has completed POST form confirming DNR/DNI with limited interventions. He was discharged  "with rehab at previous visit. He was currently undergoing chemotherapy with Dr. Lewis. We will follow up tomorrow. According to staff, patients wife and daughter had medical emergency prior to our visit.   6/15 Met with wife and patient this afternoon to discuss long term goals. Wife is very anxious and is asking about treatment options and what next steps would be to get him home. She wants to take him home but also wants to continue with aggressive treatment in terms of rehab. We discussed poor prognosis and comfort measures. Wife asks to speak with Dr. Lewis and NP prior to making any decisions. Patient does say that he just \"wants to go home and be with his family.\" And that all of this \"is scary.\" I will reach out to Hem Onc NP to see if conversation can be facilitated.  Post obstructive pneumonia: ID is following. Patient on IV antibiotics.   CHF: Cardiology is consulted. Echocardiogram showed no vegetation and EF 65%.  On aspirin and Lovenox prophylaxis.  Lung Ca: Follows with Dr. Lewis. Had  Recent chemotherapy on 5/4. Plans for surgical resection of brain mets per Dr. Stroud in near future. Hem Onc consulted.   Sacral wound: wound care following.   Anemia: Started on retacrit per Hem Onc.     P:   Palliative Care Team will continue to follow patient.     Radha Darden, APRN  6/15/2021  Time spent: 52 min  "

## 2021-06-15 NOTE — PROGRESS NOTES
Infectious Diseases Progress Note      LOS: 8 days   Patient Care Team:  Sandoval Stevenson FNP as PCP - General  Sandoval Stevenson FNP as PCP - Family Medicine  Axel Lewis MD as Consulting Physician (Hematology and Oncology)  Iglesia Springer MD as Consulting Physician (Cardiology)    Chief Complaint: Shortness of breath, fatigue    Subjective        The patient has been afebrile for the last 24 hours.  The patient is on 10 L of oxygen via nasal cannula.  Has had no change in symptoms      Review of Systems:   Review of Systems   Constitutional: Positive for fatigue.   HENT: Negative.    Eyes: Negative.    Respiratory: Positive for shortness of breath.    Cardiovascular: Negative.    Gastrointestinal: Negative.    Endocrine: Negative.    Genitourinary: Negative.    Musculoskeletal: Negative.    Skin: Negative.    Neurological: Negative.    Psychiatric/Behavioral: Negative.    All other systems reviewed and are negative.       Objective     Vital Signs  Temp:  [97.4 °F (36.3 °C)-97.7 °F (36.5 °C)] 97.4 °F (36.3 °C)  Heart Rate:  [] 88  Resp:  [16-24] 16  BP: (130-135)/(68-76) 130/68    Physical Exam:  Physical Exam  Vitals and nursing note reviewed.   Constitutional:       General: He is not in acute distress.     Appearance: He is well-developed. He is ill-appearing. He is not diaphoretic.      Comments: Cachectic   HENT:      Head: Normocephalic and atraumatic.      Mouth/Throat:      Comments: Mild oral thrush  Eyes:      Extraocular Movements: Extraocular movements intact.      Conjunctiva/sclera: Conjunctivae normal.      Pupils: Pupils are equal, round, and reactive to light.   Cardiovascular:      Rate and Rhythm: Normal rate and regular rhythm.      Heart sounds: Normal heart sounds, S1 normal and S2 normal.   Pulmonary:      Effort: Pulmonary effort is normal. No respiratory distress.      Breath sounds: No stridor. Rales present. No wheezing.      Comments: Diminished  throughout  Abdominal:      General: Bowel sounds are normal. There is no distension.      Palpations: Abdomen is soft. There is no mass.      Tenderness: There is no abdominal tenderness. There is no guarding.   Musculoskeletal:         General: No deformity. Normal range of motion.      Cervical back: Neck supple.   Skin:     General: Skin is warm and dry.      Coloration: Skin is not pale.      Findings: No erythema or rash.      Comments: Port in place   Neurological:      Mental Status: He is alert and oriented to person, place, and time.      Cranial Nerves: No cranial nerve deficit.   Psychiatric:         Mood and Affect: Mood normal.          Results Review:    I have reviewed all clinical data, test, lab, and imaging results.     Radiology  No Radiology Exams Resulted Within Past 24 Hours    Cardiology    Laboratory    Results from last 7 days   Lab Units 06/15/21  0547 06/14/21  0617 06/13/21  0544 06/12/21  0446 06/11/21  2145 06/11/21  0303 06/09/21  0503   WBC 10*3/mm3 8.20 9.60 9.20 12.10*  --  13.80* 10.90*   HEMOGLOBIN g/dL 8.7* 9.3* 9.0* 9.6* 9.3* 6.9* 7.2*   HEMATOCRIT % 25.9* 28.2* 26.5* 28.7* 27.9* 21.0* 21.3*   PLATELETS 10*3/mm3 226 218 180 193  --  161 118*     Results from last 7 days   Lab Units 06/15/21  0547 06/14/21  0617 06/13/21  0544 06/12/21  0446 06/11/21  0303 06/10/21  0636 06/09/21  0320   SODIUM mmol/L 137 132* 135* 135* 135*  --  140   POTASSIUM mmol/L 4.7 4.0 3.9 4.0 4.5  --  4.2   CHLORIDE mmol/L 105 100 102 102 102  --  103   CO2 mmol/L 24.0 24.0 24.0 25.0 27.0  --  29.0   BUN mg/dL 14 17 20 18 24*  --  25*   CREATININE mg/dL 0.64* 0.60* 0.57* 0.56* 0.61* 0.64* 0.63*   GLUCOSE mg/dL 87 79 84 89 101*  --  123*   ALBUMIN g/dL 2.30* 2.40*  --  2.30*  --   --   --    BILIRUBIN mg/dL 0.3 0.5  --  0.5  --   --   --    ALK PHOS U/L 93 104  --  106  --   --   --    AST (SGOT) U/L 38 32  --  33  --   --   --    ALT (SGPT) U/L 36 37  --  38  --   --   --    CALCIUM mg/dL 8.4* 8.6 8.4*  8.6 8.1*  --  8.6                 Microbiology   Microbiology Results (last 10 days)     Procedure Component Value - Date/Time    MRSA Screen, PCR (Inpatient) - Swab, Nares [[id number]]  (Abnormal) Collected: 06/07/21 1701    Lab Status: Final result Specimen: Swab from Nares Updated: 06/07/21 1941     MRSA PCR MRSA Detected    Blood Culture - Blood, Arm, Left [[id number]] Collected: 06/07/21 0705    Lab Status: Final result Specimen: Blood from Arm, Left Updated: 06/12/21 0730     Blood Culture No growth at 5 days    Blood Culture - Blood, Arm, Right [[id number]] Collected: 06/07/21 0705    Lab Status: Final result Specimen: Blood from Arm, Right Updated: 06/12/21 0730     Blood Culture No growth at 5 days    Respiratory Panel PCR w/COVID-19(SARS-CoV-2) PARESH/GRACIELA/KATHY/PAD/COR/MAD/CHRIST In-House, NP Swab in UTM/VTM, 3-4 HR TAT - Swab, Nasopharynx [[id number]]  (Normal) Collected: 06/07/21 0620    Lab Status: Final result Specimen: Swab from Nasopharynx Updated: 06/07/21 0726     ADENOVIRUS, PCR Not Detected     Coronavirus 229E Not Detected     Coronavirus HKU1 Not Detected     Coronavirus NL63 Not Detected     Coronavirus OC43 Not Detected     COVID19 Not Detected     Human Metapneumovirus Not Detected     Human Rhinovirus/Enterovirus Not Detected     Influenza A PCR Not Detected     Influenza B PCR Not Detected     Parainfluenza Virus 1 Not Detected     Parainfluenza Virus 2 Not Detected     Parainfluenza Virus 3 Not Detected     Parainfluenza Virus 4 Not Detected     RSV, PCR Not Detected     Bordetella pertussis pcr Not Detected     Bordetella parapertussis PCR Not Detected     Chlamydophila pneumoniae PCR Not Detected     Mycoplasma pneumo by PCR Not Detected    Narrative:      In the setting of a positive respiratory panel with a viral infection PLUS a negative procalcitonin without other underlying concern for bacterial infection, consider observing off antibiotics or discontinuation of antibiotics and continue  supportive care. If the respiratory panel is positive for atypical bacterial infection (Bordetella pertussis, Chlamydophila pneumoniae, or Mycoplasma pneumoniae), consider antibiotic de-escalation to target atypical bacterial infection.          Medication Review:       Schedule Meds  ampicillin-sulbactam, 3 g, Intravenous, Q6H  aspirin, 81 mg, Oral, Daily  buprenorphine-naloxone, 1.5 tablet, Sublingual, Daily  enoxaparin, 40 mg, Subcutaneous, Q24H  epoetin asia-epbx, 40,000 Units, Subcutaneous, Weekly  gabapentin, 300 mg, Oral, TID  guaiFENesin, 600 mg, Oral, Q12H  insulin lispro, 0-7 Units, Subcutaneous, TID With Meals  Shabbir, 1 packet, Oral, BID  lurasidone, 40 mg, Oral, Daily  megestrol acetate, 200 mg, Oral, TID  multivitamin with minerals, 1 tablet, Oral, Daily  pantoprazole, 40 mg, Oral, Daily  polyethylene glycol, 17 g, Oral, Daily  senna-docusate sodium, 2 tablet, Oral, BID  sertraline, 50 mg, Oral, Daily  spironolactone, 25 mg, Oral, Daily  vancomycin, 1,000 mg, Intravenous, Q12H  Zinc Oxide, , Apply externally, BID        Infusion Meds  Pharmacy to dose vancomycin,         PRN Meds  •  acetaminophen  •  albuterol  •  senna-docusate sodium **AND** polyethylene glycol **AND** bisacodyl **AND** bisacodyl  •  calcium carbonate  •  dextrose  •  dextrose  •  glucagon (human recombinant)  •  hydrOXYzine  •  insulin lispro  •  ipratropium-albuterol  •  melatonin  •  ondansetron  •  Pharmacy to dose vancomycin  •  promethazine  •  [COMPLETED] Insert peripheral IV **AND** sodium chloride  •  sodium chloride        Assessment/Plan       Antimicrobial Therapy   1.        day  2.  Unasyn      day  3.  Vancomycin      day  4.      Day  5.      Day      Assessment     Possible postobstructive pneumonia.  Patient has right large lung mass as a result of lung cancer.  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    Pancytopenia-likely related to chemotherapy     Lung CA was on chemotherapy    Oral thrush, patient received 7 days of nystatin        Plan     Continue IV Unasyn 3 g every 6 hours  Continue IV vancomycin-asked pharmacy to monitor and dose  Will offer 2 weeks of the above antibiotics  Continue supportive care  A.m. labs  Prognosis is very guarded and long-term prognosis might be poor  Case was discussed with the patient's wife at the bedside -patient is now DNR  Case discussed with the patient's nurse and     Herminia Stanton, VINCE  06/15/21  15:03 EDT     Note is dictated utilizing voice recognition software/Dragon

## 2021-06-16 NOTE — PROGRESS NOTES
LOS: 9 days   Patient Care Team:  Sandoval Stevenson FNP as PCP - General  Sandoval Stevenson FNP as PCP - Family Medicine  Axel Lewis MD as Consulting Physician (Hematology and Oncology)  Iglesia Springer MD as Consulting Physician (Cardiology)    Chief Complaint:  alert, conversant says he is feeling a little better.  Tolerating his diet.  Unfortunately still on 10 L via nasal cannula.    Subjective Alert, comfortable appearing but chronically ill-appearing    Interval History:     Patient Complaints: somewhat worse cough today, no chest pain fevers chills.  Is still quite debilitated and weak.  Non-productive cough  Patient Denies: No chest pain or hemoptysis, no nausea vomiting or diarrhea  History taken from: patient    Review of Systems:    All systems were reviewed and negative except for: See interval history    Objective     Vital Signs  Temp:  [97.7 °F (36.5 °C)-98.2 °F (36.8 °C)] 97.9 °F (36.6 °C)  Heart Rate:  [] 109  Resp:  [16-20] 17  BP: (122-147)/(76-79) 147/78    Physical Exam:       General Appearance:    Alert, oriented, answers questions appropriately, chronically ill-appearing   Head:    Normocephalic, without obvious abnormality, atraumatic   Eyes:            Conjunctivae and sclerae normal, no   icterus, no pallor, corneas clear, PERRLA   Throat:   No oral lesions, no thrush, oral mucosa moist   Neck:   No adenopathy, supple, trachea midline, no thyromegaly, no   carotid bruit, no JVD   Lungs:    Course bilateral lung bases and upper airway sounds are very coarse    Heart:   Distant, regular rhythm and normal rate, normal S1 and S2, no            murmur, no gallop, no rub, no click   Chest Wall:    No abnormalities observed   Abdomen:     Normal bowel sounds, no masses, no organomegaly, soft        non-tender, non-distended, no guarding, no rebound                tenderness,    Rectal:     Deferred   Extremities:  Extremities are elevated, nonpitting edema to just  pretibial area bilaterally   Pulses:   Pulses palpable and equal bilaterally   Skin:   No bleeding, bruising or rash   Lymph nodes:   No palpable adenopathy   Neurologic:  Generalized debility, patient will follow very simple commands this morning   Radiology:  XR Chest 1 View    Result Date: 6/14/2021  Unchanged chest. Slot 66 Electronically signed by:  Alcides Frazier D.O.  6/14/2021 12:49 AM    XR Chest 1 View    Result Date: 6/11/2021  1. Mild worsening of diffuse interstitial opacities throughout the left lung that may relate to infection or asymmetric pulmonary edema. 2. Redemonstration of 9 cm right upper lobe mass consistent with known malignancy. 3. Stable additional chronic findings above.  Electronically Signed By-Saleem Ferguson MD On:6/11/2021 7:40 AM This report was finalized on [id number] by  Saleem Ferguson MD.    XR Chest 1 View    Result Date: 6/10/2021  1. Similar appearance of the right-sided perihilar mass likely related to known malignancy. 2. Patchy interstitial opacities throughout the left lung likely representing infection on superimposed chronic interstitial lung disease.  Electronically Signed By-Daryl Hunter MD On:6/10/2021 8:01 AM This report was finalized on [id number] by  Daryl Hunter MD.         Results Review:     I reviewed the patient's new clinical results.  I reviewed the patient's new imaging results and agree with the interpretation.    Medication Review:   Scheduled Meds:ampicillin-sulbactam, 3 g, Intravenous, Q6H  aspirin, 81 mg, Oral, Daily  buprenorphine-naloxone, 1.5 tablet, Sublingual, Daily  enoxaparin, 40 mg, Subcutaneous, Q24H  epoetin asia-epbx, 40,000 Units, Subcutaneous, Weekly  gabapentin, 300 mg, Oral, TID  guaiFENesin, 600 mg, Oral, Q12H  insulin lispro, 0-7 Units, Subcutaneous, TID With Meals  Shabbir, 1 packet, Oral, BID  lurasidone, 40 mg, Oral, Daily  megestrol acetate, 200 mg, Oral, TID  multivitamin with minerals, 1 tablet, Oral,  Daily  pantoprazole, 40 mg, Oral, Daily  polyethylene glycol, 17 g, Oral, Daily  senna-docusate sodium, 2 tablet, Oral, BID  sertraline, 50 mg, Oral, Daily  spironolactone, 25 mg, Oral, Daily  vancomycin, 1,000 mg, Intravenous, Q12H  Zinc Oxide, , Apply externally, BID      Continuous Infusions:Pharmacy to dose vancomycin,       PRN Meds:.•  acetaminophen  •  albuterol  •  senna-docusate sodium **AND** polyethylene glycol **AND** bisacodyl **AND** bisacodyl  •  calcium carbonate  •  dextrose  •  dextrose  •  glucagon (human recombinant)  •  hydrOXYzine  •  insulin lispro  •  ipratropium-albuterol  •  melatonin  •  ondansetron  •  Pharmacy to dose vancomycin  •  promethazine  •  [COMPLETED] Insert peripheral IV **AND** sodium chloride  •  sodium chloride    Labs:  Lab Results (last 24 hours)     Procedure Component Value Units Date/Time    POC Glucose Once [[id number]]  (Normal) Collected: 06/16/21 0724    Specimen: Blood Updated: 06/16/21 0725     Glucose 85 mg/dL      Comment: Serial Number: [id number]:  [id number]       Manual Differential [[id number]]  (Abnormal) Collected: 06/16/21 0341    Specimen: Blood Updated: 06/16/21 0623     Neutrophil % 54.0 %      Lymphocyte % 4.0 %      Monocyte % 7.0 %      Basophil % 1.0 %      Bands %  19.0 %      Metamyelocyte % 9.0 %      Myelocyte % 6.0 %      Neutrophils Absolute 5.55 10*3/mm3      Lymphocytes Absolute 0.30 10*3/mm3      Monocytes Absolute 0.53 10*3/mm3      Basophils Absolute 0.08 10*3/mm3      Anisocytosis Mod/2+     Macrocytes Slight/1+     Poikilocytes Slight/1+     Polychromasia Slight/1+     WBC Morphology Normal     Platelet Morphology Normal    Narrative:      Reviewed by Pathologist within the past 30 days on [id number] .      CBC & Differential [[id number]]  (Abnormal) Collected: 06/16/21 0341    Specimen: Blood Updated: 06/16/21 0623    Narrative:      The following orders were created for panel order CBC & Differential.  Procedure                                Abnormality         Status                     ---------                               -----------         ------                     Scan Slide[[id number]]                                       Final result               CBC Auto Differential[[id number]]        Abnormal            Final result                 Please view results for these tests on the individual orders.    Scan Slide [[id number]] Collected: 06/16/21 0341    Specimen: Blood Updated: 06/16/21 0623     Scan Slide --     Comment: See Manual Differential Results       CBC Auto Differential [[id number]]  (Abnormal) Collected: 06/16/21 0341    Specimen: Blood Updated: 06/16/21 0623     WBC 7.60 10*3/mm3      RBC 2.40 10*6/mm3      Hemoglobin 8.5 g/dL      Hematocrit 25.2 %      .4 fL      MCH 35.3 pg      MCHC 33.5 g/dL      RDW 18.7 %      RDW-SD 68.3 fl      MPV 8.1 fL      Platelets 243 10*3/mm3     Narrative:      The previously reported component NRBC is no longer being reported. Previous result was 0.5 /100 WBC (Reference Range: 0.0-0.2 /100 WBC) on 6/16/2021 at 0524 EDT.    Basic Metabolic Panel [[id number]]  (Abnormal) Collected: 06/16/21 0341    Specimen: Blood Updated: 06/16/21 0528     Glucose 100 mg/dL      BUN 15 mg/dL      Creatinine 0.53 mg/dL      Sodium 131 mmol/L      Potassium 4.3 mmol/L      Chloride 99 mmol/L      CO2 26.0 mmol/L      Calcium 8.2 mg/dL      eGFR Non African Amer >150 mL/min/1.73      BUN/Creatinine Ratio 28.3     Anion Gap 6.0 mmol/L     Narrative:      GFR Normal >60  Chronic Kidney Disease <60  Kidney Failure <15      POC Glucose Once [[id number]]  (Abnormal) Collected: 06/15/21 1627    Specimen: Blood Updated: 06/15/21 1628     Glucose 127 mg/dL      Comment: Serial Number: [id number]:  [id number]              Assessment/Plan     Acute-on-chronic respiratory failure (CMS/HCC)  Respiratory distress  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, worsening  Probable postobstructive pneumonia     Coronary artery disease involving native coronary artery of native heart without angina pectoris    Nonrheumatic aortic valve stenosis    Essential hypertension    Congestive heart failure (CMS/HCC)    Tobacco abuse    Squamous cell carcinoma of lung, right (CMS/HCC)      Stage 2 skin ulcer of sacral region (CMS/HCC)    Family is wondering if patient is going to go for bronchoscopy at some point to help with his breathing problems.  Family very anxious and concerned about this.  I told him this would be something to discuss with pulmonology when they evaluate the patient this morning.    Infectious disease, pulmonology and palliative care inputs greatly appreciated, thank you.  Also reviewed hematology oncology's notes as well, thank you for your kind input.    Continue rest of current approach.  Agree poor long-term prognosis.      I had a long discussion with the family member at bedside who understandably is quite anxious about the patient's very poor prognosis.  After some discussion, she does agree to speak to palliative care about ongoing care parameters.  Actual hospice/DNR status family is still trying to come to a decision on this.  I had a very mal discussion with the patient and the family about his poor long-term prognosis now that he has metastatic disease.  Wife was under the impression that she could bring him home with hospice that he can still get chemotherapy.  I advised her this is not the case as it will not be paid for him to have a hospice care and chemotherapy at the same time.  At this juncture, he still very poor candidate for rehab.  He is already done 3 months of rehabilitation and is still quite debilitated and still on high oxygen requirements.    Barrier to discharge back to SNF is patient still on very high flow oxygen at 10 L.      Ignacia De La Cruz,   06/16/21  09:33 EDT

## 2021-06-16 NOTE — PLAN OF CARE
Problem: Adult Inpatient Plan of Care  Goal: Absence of Hospital-Acquired Illness or Injury  Intervention: Identify and Manage Fall Risk  Recent Flowsheet Documentation  Taken 6/16/2021 0342 by Lynne Patten RN  Safety Promotion/Fall Prevention:   assistive device/personal items within reach   clutter free environment maintained   fall prevention program maintained   lighting adjusted   nonskid shoes/slippers when out of bed   room organization consistent   safety round/check completed  Taken 6/16/2021 0130 by Lynne Patten RN  Safety Promotion/Fall Prevention:   assistive device/personal items within reach   clutter free environment maintained   fall prevention program maintained   lighting adjusted   nonskid shoes/slippers when out of bed   room organization consistent   safety round/check completed  Taken 6/15/2021 2348 by Lynne Patten RN  Safety Promotion/Fall Prevention:   assistive device/personal items within reach   clutter free environment maintained   fall prevention program maintained   lighting adjusted   nonskid shoes/slippers when out of bed   room organization consistent   safety round/check completed  Taken 6/15/2021 2136 by Lynne Patten RN  Safety Promotion/Fall Prevention:   assistive device/personal items within reach   clutter free environment maintained   fall prevention program maintained   lighting adjusted   nonskid shoes/slippers when out of bed   room organization consistent   safety round/check completed  Taken 6/15/2021 1901 by Lynne Patten RN  Safety Promotion/Fall Prevention:   assistive device/personal items within reach   clutter free environment maintained   fall prevention program maintained   lighting adjusted   nonskid shoes/slippers when out of bed   room organization consistent   safety round/check completed  Intervention: Prevent Skin Injury  Recent Flowsheet Documentation  Taken 6/16/2021 0342 by Lynne Patten RN  Body Position: position  maintained  Taken 6/16/2021 0130 by Lynne Patten RN  Body Position: turned  Taken 6/15/2021 2348 by Lynne Patten RN  Body Position: position maintained  Taken 6/15/2021 2136 by Lynne Patten RN  Body Position: turned  Taken 6/15/2021 1901 by Lynne Patten RN  Body Position: position maintained  Intervention: Prevent Infection  Recent Flowsheet Documentation  Taken 6/16/2021 0342 by Lynne Patten RN  Infection Prevention:   personal protective equipment utilized   rest/sleep promoted  Taken 6/16/2021 0130 by Lynne Patten RN  Infection Prevention:   personal protective equipment utilized   rest/sleep promoted  Taken 6/15/2021 2348 by Lynne Patten RN  Infection Prevention:   hand hygiene promoted   personal protective equipment utilized  Taken 6/15/2021 2136 by Lynne Patten RN  Infection Prevention:   personal protective equipment utilized   rest/sleep promoted  Taken 6/15/2021 1901 by Lynne Patten RN  Infection Prevention:   hand hygiene promoted   personal protective equipment utilized  Goal: Optimal Comfort and Wellbeing  Intervention: Provide Person-Centered Care  Recent Flowsheet Documentation  Taken 6/15/2021 1901 by Lynne Patten RN  Trust Relationship/Rapport: care explained     Problem: Fall Injury Risk  Goal: Absence of Fall and Fall-Related Injury  Intervention: Identify and Manage Contributors to Fall Injury Risk  Recent Flowsheet Documentation  Taken 6/16/2021 0342 by Lynne Patten RN  Medication Review/Management: medications reviewed  Taken 6/16/2021 0130 by Lynne Patten RN  Medication Review/Management: medications reviewed  Taken 6/15/2021 2136 by Lynne Patten RN  Medication Review/Management: medications reviewed  Taken 6/15/2021 1901 by Lynne Patten RN  Medication Review/Management: medications reviewed  Intervention: Promote Injury-Free Environment  Recent Flowsheet Documentation  Taken 6/16/2021 0342 by Earline  INGRID Batista  Safety Promotion/Fall Prevention:   assistive device/personal items within reach   clutter free environment maintained   fall prevention program maintained   lighting adjusted   nonskid shoes/slippers when out of bed   room organization consistent   safety round/check completed  Taken 6/16/2021 0130 by Lynne Patten RN  Safety Promotion/Fall Prevention:   assistive device/personal items within reach   clutter free environment maintained   fall prevention program maintained   lighting adjusted   nonskid shoes/slippers when out of bed   room organization consistent   safety round/check completed  Taken 6/15/2021 2348 by Lynne Patten RN  Safety Promotion/Fall Prevention:   assistive device/personal items within reach   clutter free environment maintained   fall prevention program maintained   lighting adjusted   nonskid shoes/slippers when out of bed   room organization consistent   safety round/check completed  Taken 6/15/2021 2136 by Lynne Patten RN  Safety Promotion/Fall Prevention:   assistive device/personal items within reach   clutter free environment maintained   fall prevention program maintained   lighting adjusted   nonskid shoes/slippers when out of bed   room organization consistent   safety round/check completed  Taken 6/15/2021 1901 by Lynne Patten RN  Safety Promotion/Fall Prevention:   assistive device/personal items within reach   clutter free environment maintained   fall prevention program maintained   lighting adjusted   nonskid shoes/slippers when out of bed   room organization consistent   safety round/check completed     Problem: Skin Injury Risk Increased  Goal: Skin Health and Integrity  Intervention: Optimize Skin Protection  Recent Flowsheet Documentation  Taken 6/16/2021 0342 by Lynne Patten, RN  Head of Bed (HOB): HOB elevated  Taken 6/16/2021 0130 by Lynne Patten RN  Head of Bed (HOB): HOB elevated  Taken 6/15/2021 2348 by Lynne Patten,  RN  Head of Bed (HOB): HOB elevated  Taken 6/15/2021 2136 by Lynne Patten RN  Head of Bed (HOB): HOB elevated  Taken 6/15/2021 1901 by Lynne Patten RN  Pressure Reduction Techniques: weight shift assistance provided  Head of Bed (HOB): HOB elevated  Pressure Reduction Devices: pressure-redistributing mattress utilized     Problem: Adjustment to Illness (Heart Failure)  Goal: Optimal Coping  Intervention: Support and Optimize Psychosocial Response to Chronic Illness  Recent Flowsheet Documentation  Taken 6/15/2021 1901 by Lynne Patten RN  Family/Support System Care: support provided     Problem: Functional Ability Impaired (Heart Failure)  Goal: Optimal Functional Ability  Intervention: Optimize Functional Ability  Recent Flowsheet Documentation  Taken 6/16/2021 0342 by Lynne Patten RN  Activity Management:   activity adjusted per tolerance   activity encouraged  Taken 6/16/2021 0130 by Lynne Patten RN  Activity Management:   activity adjusted per tolerance   activity encouraged  Taken 6/15/2021 2348 by Lynne Patten RN  Activity Management:   activity adjusted per tolerance   activity encouraged  Taken 6/15/2021 2136 by Lynne Patten RN  Activity Management:   activity adjusted per tolerance   activity encouraged  Taken 6/15/2021 1901 by Lynne Patten RN  Activity Management:   activity adjusted per tolerance   activity encouraged     Problem: Respiratory Compromise (Heart Failure)  Goal: Effective Oxygenation and Ventilation  Intervention: Promote Airway Secretion Clearance  Recent Flowsheet Documentation  Taken 6/15/2021 1901 by Lynne Patten RN  Cough And Deep Breathing: done with encouragement     Problem: Gas Exchange Impaired  Goal: Optimal Gas Exchange  Intervention: Optimize Oxygenation and Ventilation  Recent Flowsheet Documentation  Taken 6/16/2021 0342 by Lynne Patten RN  Head of Bed (HOB): HOB elevated  Taken 6/16/2021 0130 by Lynne Patten  RN  Head of Bed (HOB): HOB elevated  Taken 6/15/2021 2348 by Lynne Patten RN  Head of Bed (HOB): HOB elevated  Taken 6/15/2021 2136 by Lynne Patten RN  Head of Bed (HOB): HOB elevated  Taken 6/15/2021 1901 by Lynne Patten RN  Head of Bed (HOB): HOB elevated   Goal Outcome Evaluation:

## 2021-06-16 NOTE — NURSING NOTE
When speaking with pt and wife in room, wife states she is unsure of what to do regarding further care for pt. When wife had to step out of room, Nurse spoke with pt on what the pt wants to do moving forward. Pt stated that he does not want to continue with treatment (chemo, abx..etc), and wants to be comfortable moving forward. Chat sent to Palliative.

## 2021-06-16 NOTE — CASE MANAGEMENT/SOCIAL WORK
Continued Stay Note  [facility]     Patient Name: Axel Ramirez  MRN: [id number]  Today's Date: 6/16/2021    Admit Date: 6/7/2021    Discharge Plan     Row Name 06/16/21 1250       Plan    Plan  DC Plan: Return to [facility], [place] approved until the 20th, no PASRR required. [facility] [place] can take liter flow up to 12L.    Plan Comments  Remains on O2 hi flow 10 L, IV antibiotic.        Chart review only.             Maninder Bal RN

## 2021-06-16 NOTE — DISCHARGE PLACEMENT REQUEST
"[facility] referred per request of wife and patient to evaluate [facility] hospice vs home with hospice.      Maxi Ramirez (70 y.o. Male)     Date of Birth Social Security Number Address Home Phone MRN    [id number]  [id number] [place] [address] [phone] [id number]    [employer] Marital Status          Tenriism        Admission Date Admission Type Admitting Provider Attending Provider Department, Room/Bed    6/7/21 Emergency Ignacia De La Cruz DO Norris, Lynn A, DO [facility] 2B MEDICAL INPATIENT, 233/1    Discharge Date Discharge Disposition Discharge Destination                       Attending Provider: Ignacia De La Cruz DO    Allergies: No Known Allergies    Isolation: None   Infection: MRSA No Isolation this Admit (06/08/21)   Code Status: No CPR    Ht: 172.7 cm (68\")   Wt: 66.1 kg (145 lb 11.6 oz)    Admission Cmt: None   Principal Problem: None                Active Insurance as of 6/7/2021     Primary Coverage     Payor Plan Insurance Group Employer/Plan Group    [facility] MEDICARE REPLACEMENT [facility] MEDICARE REPLACEMENT [id number]     Payor Plan Address Payor Plan Phone Number Payor Plan Fax Number Effective Dates    [address] [address]   6/1/2021 - None Entered    [facility] [id number]       Subscriber Name Subscriber Birth Date Member ID       MAXI RAMIREZ 1950 [id number]           Secondary Coverage     Payor Plan Insurance Group Employer/Plan Group    [place]      Payor Plan Address Payor Plan Phone Number Payor Plan Fax Number Effective Dates    [address]   1/1/2019 - None Entered    [place] [address]       Subscriber Name Subscriber Birth Date Member ID       MAXI RAMIREZ 1950 [id number]                 Emergency Contacts      (Rel.) Home Phone Work Phone Mobile Phone    Rachana Ramirez (Spouse) [phone] -- [phone]               History & Physical      Ignacia De La Cruz DO at 06/07/21 0957 "                  History and Physical      Patient Care Team:  Sandoval Stevenson FNP as PCP - General  Sandoval Stevenson FNP as PCP - Family Medicine  Axel Lewis MD as Consulting Physician (Hematology and Oncology)  Iglesia Springer MD as Consulting Physician (Cardiology)    Chief complaint worsening shortness of breath/respiratory distress    Subjective     Patient is a 70 y.o. male presents with sudden onset shortness of breath.  He lives in assisted living nursing home type setting.  EMS was called after patient received a mini neb treatment at the facility but his breathing became more severe, he had a reported saturation of 76% on 4 L at his extended care facility.  He subsequently was sent to the emergency department.  I evaluated him there and he was quite somnolent but would at least tell me that he was feeling a little better.  However, by the time he arrived to the floor his sats were worse he was sitting up having a respiratory distress and so I reevaluated him.  We have started at this juncture aggressive pulmonary toilet including albuterol and Atrovent nebulizers 4 times a day, I gave him an extra dose of Lasix because I was told that his systolic blood pressure was about 150, course he was placed on oxygen, ABG was ordered and he is now on a nonrebreather.  He was also found through the ER to have significant elevation of his BNP and received Lasix also in the ER as well.  He was less somnolent when I saw him in his room and said that the nebulizer treatments were helping a little bit.  We have also started him on aggressive methylprednisolone therapy.    Infectious disease, pulmonology and cardiology has been consulted.  Review of Systems   All systems were reviewed and negative except for: Respiratory distress, hypoxia and loose cough.    History  Past Medical History:   Diagnosis Date   • Aortic stenosis    • Cancer (CMS/HCC)     Sickle Cell Carcinoma   • Congestive heart failure  (CHF) (CMS/HCC)     DIASTOLIC   • COPD (chronic obstructive pulmonary disease) (CMS/HCC)    • Coronary artery disease    • Hypertension    • Lung cancer (CMS/HCC)    • Myocardial infarction (CMS/HCC)    • Peripheral vascular disease (CMS/HCC)    • Pneumonia 2021   • Valvular disease      Past Surgical History:   Procedure Laterality Date   • AORTIC VALVE REPAIR/REPLACEMENT  2018    Dr Maza   • BRONCHOSCOPY N/A 10/24/2020    Procedure: BRONCHOSCOPY with biopsy right upper lobe mass, and bronchoalveolar lavage right upper lobe;  Surgeon: Ángela Bean MD;  Location: [facility] ENDOSCOPY;  Service: Pulmonary;  Laterality: N/A;  post: right upper lobe mass, pneumonia   • BRONCHOSCOPY N/A 2020    Procedure: BRONCHOSCOPY with bronchial washing;  Surgeon: Ángela Bean MD;  Location: [facility] ENDOSCOPY;  Service: Pulmonary;  Laterality: N/A;  Post: lung mass, pneumonia   • BRONCHOSCOPY N/A 2020    Procedure: BRONCHOSCOPY RIGID with debulking of right main endobronchial tumor;  Surgeon: Nomi Reyes MD;  Location: [facility] MAIN OR;  Service: Cardiothoracic;  Laterality: N/A;   • BRONCHOSCOPY N/A 2020    Procedure: BRONCHOSCOPY;  Surgeon: Nomi Reyes MD;  Location: [facility] MAIN OR;  Service: Cardiothoracic;  Laterality: N/A;   • CARDIAC CATHETERIZATION  2017   • CORONARY ARTERY BYPASS GRAFT  2018    Dr Maza   • TIBIA FRACTURE SURGERY     • VENOUS ACCESS DEVICE (PORT) INSERTION Left 10/30/2020    Procedure: MEDIPORT INSERTION UNDER FLUOROSCOPIC GUIDENCE;  Surgeon: Nomi Reyes MD;  Location: [facility] MAIN OR;  Service: Cardiothoracic;  Laterality: Left;     Family History   Problem Relation Age of Onset   • Coronary artery disease Mother    • Cancer Father    • Stroke Daughter 35   • Seizures Daughter 35     Social History     Tobacco Use   • Smoking status: Former Smoker     Packs/day: 1.00     Types: Cigarettes     Quit date: 10/2020     Years since quittin.6   • Smokeless  tobacco: Never Used   Vaping Use   • Vaping Use: Never used   Substance Use Topics   • Alcohol use: Not Currently   • Drug use: No     Allergies:  Patient has no known allergies.    Objective     Vital Signs  Temp:  [98 °F (36.7 °C)-98.1 °F (36.7 °C)] 98.1 °F (36.7 °C)  Heart Rate:  [] 110  Resp:  [18-31] 22  BP: (114-122)/(68) 114/68      Physical Exam:      General Appearance:    Alert, cooperative, moderate distress   Head:    Normocephalic, without obvious abnormality, atraumatic   Eyes:           Conjunctivae and sclerae normal, no   icterus, no pallor, corneas clear, PERRLA   Throat:   No oral lesions, no thrush, oral mucosa moist   Neck:   No adenopathy, supple, trachea midline, no thyromegaly, no   carotid bruit, no JVD   Lungs:    Distant, poor adventitial flow, coarse rales bilateral bases    Heart:   Tachycardic   Chest Wall:    No abnormalities observedm, equal excursions   Abdomen:     Normal bowel sounds, no masses, no organomegaly, soft        non-tender, non-distended, no guarding, no rebound                tenderness, scaphoid   Rectal:     Deferred   Extremities:   Moves all extremities , slight pretibial edema, no cyanosis, no             redness   Pulses:   Pulses equivocal but equal bilaterally   Skin:   No bleeding, bruising or rash   Lymph nodes:   No palpable adenopathy   Neurologic:   Cranial nerves 2 - 12 grossly intact, sensation intact, DTR       present and equal bilaterally       Labs:  Lab Results (last 24 hours)     Procedure Component Value Units Date/Time    Blood Gas, Arterial - [[id number]]  (Abnormal) Collected: 06/07/21 0902    Specimen: Arterial Blood Updated: 06/07/21 0905     Site Right Radial     Brice's Test Positive     pH, Arterial 7.514 pH units      pCO2, Arterial 34.4 mm Hg      pO2, Arterial 49.4 mm Hg      HCO3, Arterial 27.7 mmol/L      Base Excess, Arterial 4.9 mmol/L      Comment: Serial Number: [id number]:  [id number]        O2 Saturation, Arterial 88.5 %       CO2 Content 28.7 mmol/L      Barometric Pressure for Blood Gas --     Comment: N/A        Modality Cannula     FIO2 45 %      Hemodilution No    POC Glucose Once [[id number]]  (Normal) Collected: 06/07/21 0823    Specimen: Blood Updated: 06/07/21 0825     Glucose 92 mg/dL      Comment: Serial Number: [id number]:  [id number]       Respiratory Panel PCR w/COVID-19(SARS-CoV-2) PARESH/GRACIELA/KATHY/PAD/COR/MAD/CHRIST In-House, NP Swab in UTM/VTM, 3-4 HR TAT - Swab, Nasopharynx [[id number]]  (Normal) Collected: 06/07/21 0620    Specimen: Swab from Nasopharynx Updated: 06/07/21 0726     ADENOVIRUS, PCR Not Detected     Coronavirus 229E Not Detected     Coronavirus HKU1 Not Detected     Coronavirus NL63 Not Detected     Coronavirus OC43 Not Detected     COVID19 Not Detected     Human Metapneumovirus Not Detected     Human Rhinovirus/Enterovirus Not Detected     Influenza A PCR Not Detected     Influenza B PCR Not Detected     Parainfluenza Virus 1 Not Detected     Parainfluenza Virus 2 Not Detected     Parainfluenza Virus 3 Not Detected     Parainfluenza Virus 4 Not Detected     RSV, PCR Not Detected     Bordetella pertussis pcr Not Detected     Bordetella parapertussis PCR Not Detected     Chlamydophila pneumoniae PCR Not Detected     Mycoplasma pneumo by PCR Not Detected    Narrative:      In the setting of a positive respiratory panel with a viral infection PLUS a negative procalcitonin without other underlying concern for bacterial infection, consider observing off antibiotics or discontinuation of antibiotics and continue supportive care. If the respiratory panel is positive for atypical bacterial infection (Bordetella pertussis, Chlamydophila pneumoniae, or Mycoplasma pneumoniae), consider antibiotic de-escalation to target atypical bacterial infection.    Blood Culture - Blood, Arm, Left [[id number]] Collected: 06/07/21 0705    Specimen: Blood from Arm, Left Updated: 06/07/21 0719    Blood Culture - Blood, Arm, Right  "[[id number]] Collected: 06/07/21 0705    Specimen: Blood from Arm, Right Updated: 06/07/21 0719    POC Lactate [[id number]]  (Normal) Collected: 06/07/21 0706    Specimen: Blood Updated: 06/07/21 0707     Lactate 1.3 mmol/L      Comment: Serial Number: [id number]:  [id number]       Procalcitonin [[id number]]  (Normal) Collected: 06/07/21 0500    Specimen: Blood Updated: 06/07/21 0655     Procalcitonin 0.12 ng/mL     Narrative:      As a Marker for Sepsis (Non-Neonates):     1. <0.5 ng/mL represents a low risk of severe sepsis and/or septic shock.  2. >2 ng/mL represents a high risk of severe sepsis and/or septic shock.    As a Marker for Lower Respiratory Tract Infections that require antibiotic therapy:  PCT on Admission     Antibiotic Therapy             6-12 Hrs later  >0.5                          Strongly Recommended            >0.25 - <0.5             Recommended  0.1 - 0.25                  Discouraged                       Remeasure/reassess PCT  <0.1                         Strongly Discouraged         Remeasure/reassess PCT      As 28 day mortality risk marker: \"Change in Procalcitonin Result\" (>80% or <=80%) if Day 0 (or Day 1) and Day 4 values are available. Refer to http://www.[facility]-pct-calculator.com    Change in PCT <=80%   A decrease of PCT levels below or equal to 80% defines a positive change in PCT test result representing a higher risk for 28-day all-cause mortality of patients diagnosed with severe sepsis or septic shock.    Change in PCT >80%   A decrease of PCT levels of more than 80% defines a negative change in PCT result representing a lower risk for 28-day all-cause mortality of patients diagnosed with severe sepsis or septic shock.    This test is Prognostic not Diagnostic, if elevated correlate with clinical findings before administering antibiotic treatment.      Results may be falsely decreased if patient taking Biotin.     Manual Differential [[id number]]  (Abnormal) Collected: " 06/07/21 0500    Specimen: Blood Updated: 06/07/21 0606     Neutrophil % 73.0 %      Lymphocyte % 10.0 %      Monocyte % 3.0 %      Bands %  12.0 %      Metamyelocyte % 2.0 %      Neutrophils Absolute 8.59 10*3/mm3      Lymphocytes Absolute 1.01 10*3/mm3      Monocytes Absolute 0.30 10*3/mm3      nRBC 2.0 /100 WBC      Anisocytosis Slight/1+     Macrocytes Slight/1+     Toxic Granulation Slight/1+     Platelet Morphology Normal    CBC & Differential [[id number]]  (Abnormal) Collected: 06/07/21 0500    Specimen: Blood Updated: 06/07/21 0606    Narrative:      The following orders were created for panel order CBC & Differential.  Procedure                               Abnormality         Status                     ---------                               -----------         ------                     Scan Slide[[id number]]                                       Final result               CBC Auto Differential[[id number]]        Abnormal            Final result                 Please view results for these tests on the individual orders.    Scan Slide [[id number]] Collected: 06/07/21 0500    Specimen: Blood Updated: 06/07/21 0606     Scan Slide --     Comment: See Manual Differential Results       CBC Auto Differential [[id number]]  (Abnormal) Collected: 06/07/21 0500    Specimen: Blood Updated: 06/07/21 0606     WBC 10.10 10*3/mm3      RBC 2.49 10*6/mm3      Hemoglobin 9.2 g/dL      Hematocrit 27.3 %      .7 fL      MCH 37.1 pg      MCHC 33.8 g/dL      RDW 14.8 %      RDW-SD 56.4 fl      MPV 8.6 fL      Platelets 115 10*3/mm3     Narrative:      The previously reported component NRBC is no longer being reported. Previous result was 0.6 /100 WBC (Reference Range: 0.0-0.2 /100 WBC) on 6/7/2021 at 0519 EDT.    Extra Tubes [[id number]] Collected: 06/07/21 0500    Specimen: Blood, Venous Line Updated: 06/07/21 0601    Narrative:      The following orders were created for panel order Extra Tubes.  Procedure                                Abnormality         Status                     ---------                               -----------         ------                     Light Blue Top[[id number]]                                   Final result               Gold Top - [facility][[id number]]                                   Final result                 Please view results for these tests on the individual orders.    [facility] - [facility] [[id number]] Collected: 06/07/21 0500    Specimen: Blood Updated: 06/07/21 0601     Extra Tube Hold for add-ons.     Comment: Auto resulted.       Light Blue Top [[id number]] Collected: 06/07/21 0500    Specimen: Blood Updated: 06/07/21 0601     Extra Tube hold for add-on     Comment: Auto resulted       Comprehensive Metabolic Panel [[id number]]  (Abnormal) Collected: 06/07/21 0500    Specimen: Blood Updated: 06/07/21 0541     Glucose 116 mg/dL      BUN 24 mg/dL      Creatinine 0.73 mg/dL      Sodium 134 mmol/L      Potassium 4.1 mmol/L      Chloride 97 mmol/L      CO2 26.0 mmol/L      Calcium 9.2 mg/dL      Total Protein 7.0 g/dL      Albumin 2.50 g/dL      ALT (SGPT) 87 U/L      AST (SGOT) 58 U/L      Alkaline Phosphatase 109 U/L      Total Bilirubin 0.7 mg/dL      eGFR Non African Amer 106 mL/min/1.73      Globulin 4.5 gm/dL      A/G Ratio 0.6 g/dL      BUN/Creatinine Ratio 32.9     Anion Gap 11.0 mmol/L     Narrative:      GFR Normal >60  Chronic Kidney Disease <60  Kidney Failure <15      Troponin [[id number]]  (Normal) Collected: 06/07/21 0500    Specimen: Blood Updated: 06/07/21 0541     Troponin T 0.012 ng/mL     Narrative:      Troponin T Reference Range:  <= 0.03 ng/mL-   Negative for AMI  >0.03 ng/mL-     Abnormal for myocardial necrosis.  Clinicians would have to utilize clinical acumen, EKG, Troponin and serial changes to determine if it is an Acute Myocardial Infarction or myocardial injury due to an underlying chronic condition.       Results may be falsely decreased if patient taking Biotin.      BNP  [[id number]]  (Abnormal) Collected: 06/07/21 0500    Specimen: Blood Updated: 06/07/21 0538     proBNP 1,702.0 pg/mL     Narrative:      Among patients with dyspnea, NT-proBNP is highly sensitive for the detection of acute congestive heart failure. In addition NT-proBNP of <300 pg/ml effectively rules out acute congestive heart failure with 99% negative predictive value.    Results may be falsely decreased if patient taking Biotin.            Radiology:  XR Chest 1 View    Result Date: 6/7/2021  1. Redemonstration of right midlung mass, similar to the prior study. 2. Increased left midlung and left basilar airspace disease that may relate to superimposed pneumonia.  Electronically Signed By-Saleem Ferguson MD On:6/7/2021 7:25 AM This report was finalized on [id number] by  Saleem Ferguson MD.        Results Review:    I reviewed the patient's new clinical results.  I reviewed the patient's new imaging results and agree with the interpretation.    Assessment/Plan       Acute-on-chronic respiratory failure (CMS/HCC)  Probable underlying CHF  Metastatic lung cancer  Healthcare facility acquired pneumonia  Acute exacerbation COPD  Opioid dependence    Patient has been admitted, we can start him on aggressive pulmonary toilet, start DVT prophylaxis, parenteral steroids and antibiotic therapy.  Consulted infectious disease, pulmonology and the patient's usual cardiologist.    I noticed now that he has a systolic blood pressure less than 120s ; so will  hold his antihypertensives for now to see how he responds to therapy.    We will also start steroid coverage for hyperglycemia.    Expected Length of Stay 3-5 days    Patient is a do not really resuscitate per family and facility records.    I discussed the patients findings and my recommendations with patient and nursing staff.     Ignacia De La Cruz DO  06/07/21  09:57 EDT          Electronically signed by Ignacia De La Cruz DO at 06/07/21 1005       {Outbreak/Travel/Exposure  Documentation......;  Question Available Choices Patient Response   COVID-19 Outbreak Screen:  Do you currently have a new onset of the following symptoms?        Fever/Chills, Cough, Shortness of air, Loss of taste or smell, No, Unknown  (!) Shortness of Air (06/07/21 0920)   COVID-19 Outbreak Screen: In the last 14 days, have you had contact with anyone who is ill, has show any of the symptoms listed above and/or has been diagnosis with the 2019 Novel Coronavirus? This includes any immediate household members but excludes any patients with whom you have been in contact within your normal work duties wearing proper PPE, if you are a healthcare worker.  Yes, No, Unknown              No (06/07/21 0920)   COVID-19 Outbreak Screen: Who was notified? Free text (not recorded)   Ebola Screening Outbreak Screen: Have you traveled to the [place] or [place] within the past 21 days?  Yes, No, Unknown No (06/07/21 0920)   Ebola Screening Outbreak Screen: Do you have ANY of the following symptoms: Fever/Chills, Vomiting, Diarrhea, Fatigue, Headache, Muscle pain, Unexplained bleeding, Abdominal (stomach) pain, No, Unknown (not recorded)   Ebola Screening Outbreak Screen: Name of Person notified Free text (not recorded)   Travel Screen: Have you traveled in the last month? If so, to what country have you traveled? If [place] what state? Yes, No, Unknown  List of all countries  List of all States No (06/07/21 0920)  (not recorded)  (not recorded)   Infection Risk: Do you currently have the following symptoms?  (If cough is selected, the Tuberculosis Screen is performed.) Cough, Fever, Rash, No No (06/07/21 0920)   Tuberculosis Screen: Do you have any of the following Tuberculosis Risks?  · Have you lived or spent time with anyone who had or may have TB?  · Have you lived in or visited any of the following areas for more than one month: [place], [place], [place], [place] or [place], the [place] or [place]?  · Do you have HIV/AIDS?  · Have you lived in or worked in a nursing home, homeless shelter, correctional facility, or substance abuse treatment facility?   · No    If Yes do you have any of the following symptoms? Yes responses display to the right    If Yes, symptoms listed are:  Cough greater than or equal to 3 weeks, Loss of appetite, Unexplained weight loss, Night sweats, Bloody sputum or hemoptysis, Hoarseness, Fever, Fatigue, Chest pain, No (not recorded)  (not recorded)   Exposure Screen: Have you been exposed to any of these contagious diseases in the last month? Measles, Chickenpox, Meningitis, Pertussis, Whooping Cough, No No (06/07/21 0920)       Current Facility-Administered Medications   Medication Dose Route Frequency Provider Last Rate Last Admin   • acetaminophen (TYLENOL) tablet 1,000 mg  1,000 mg Oral Q6H PRN Eliz Andrews APRN   1,000 mg at 06/14/21 2154   • albuterol (PROVENTIL) nebulizer solution 0.083% 2.5 mg/3mL  2.5 mg Nebulization Q2H PRN Ignacia De La Cruz DO   2.5 mg at 06/07/21 0914   • ampicillin-sulbactam (UNASYN) 3 g in sodium chloride 0.9 % 100 mL IVPB-MBP  3 g Intravenous Q6H Herminia Stanton APRN 0 mL/hr at 06/16/21 1031 3 g at 06/16/21 1458   • aspirin EC tablet 81 mg  81 mg Oral Daily Ignacia De La Cruz DO   81 mg at 06/16/21 0935   • sennosides-docusate (PERICOLACE) 8.6-50 MG per tablet 2 tablet  2 tablet Oral BID Renetta Medina APRN   2 tablet at 06/16/21 0935    And   • polyethylene glycol (MIRALAX) packet 17 g  17 g Oral Daily PRN Renetta Medina APRN        And   • bisacodyl (DULCOLAX) EC tablet 5 mg  5 mg Oral Daily PRN Renetta Medina APRN        And   • bisacodyl (DULCOLAX) suppository 10 mg  10 mg Rectal Daily PRN Renetta Medina APRN       • buprenorphine-naloxone (SUBOXONE) 8-2 MG per SL tablet 1.5 tablet  1.5 tablet Sublingual Daily Ignacia De La Cruz DO   1.5 tablet at 06/16/21 3918   • calcium carbonate (TUMS) chewable tablet 500 mg (200 mg  elemental)  2 tablet Oral BID PRN Ignacia De La Cruz DO   2 tablet at 06/08/21 1604   • dextrose (D50W) 25 g/ 50mL Intravenous Solution 25 g  25 g Intravenous Q15 Min PRN Ignacia De La Cruz DO       • dextrose (GLUTOSE) oral gel 15 g  15 g Oral Q15 Min PRN Ignacia De La Cruz DO       • enoxaparin (LOVENOX) syringe 40 mg  40 mg Subcutaneous Q24H Ignacia De La Cruz DO   40 mg at 06/16/21 1502   • epoetin asia-epbx (RETACRIT) injection 40,000 Units  40,000 Units Subcutaneous Weekly Fatoumata Espinosa APRN   40,000 Units at 06/15/21 1038   • gabapentin (NEURONTIN) capsule 300 mg  300 mg Oral TID Ignacia De La Cruz DO   300 mg at 06/16/21 1501   • glucagon (human recombinant) (GLUCAGEN DIAGNOSTIC) injection 1 mg  1 mg Subcutaneous Q15 Min PRN Ignacia De La Cruz DO       • guaiFENesin (MUCINEX) 12 hr tablet 600 mg  600 mg Oral Q12H Ignacia De La Cruz DO   600 mg at 06/16/21 0936   • hydrOXYzine (ATARAX) tablet 25 mg  25 mg Oral TID PRN Eliz Andrews APRN   25 mg at 06/16/21 0935   • insulin lispro (ADMELOG) injection 0-7 Units  0-7 Units Subcutaneous TID With Meals Ignacia De La Cruz DO   Stopped at 06/14/21 1130   • insulin lispro (ADMELOG) injection 0-7 Units  0-7 Units Subcutaneous TID PRN Ignacia De La Cruz DO       • ipratropium-albuterol (DUO-NEB) nebulizer solution 3 mL  3 mL Nebulization Q4H PRN Raysa Alatorre APRN   3 mL at 06/16/21 1114   • Shabbir pack 1 packet  1 packet Oral BID Rachana Zacarias, RD   1 packet at 06/16/21 0937   • lurasidone (LATUDA) tablet 40 mg  40 mg Oral Daily Ignacia De La Cruz DO   40 mg at 06/16/21 0935   • megestrol acetate (MEGACE) oral suspension 200 mg  200 mg Oral TID Ignacia De La Cruz DO   200 mg at 06/16/21 1502   • melatonin tablet 5 mg  5 mg Oral Nightly PRN Ignacia De La Cruz DO   5 mg at 06/15/21 2046   • multivitamin with minerals 1 tablet  1 tablet Oral Daily Ignacia De La Cruz DO   1 tablet at 06/16/21 0935   • ondansetron (ZOFRAN) injection 4 mg  4 mg Intravenous Q6H PRN Lianna Groves MD   4 mg at 06/14/21  2154   • pantoprazole (PROTONIX) EC tablet 40 mg  40 mg Oral Daily Ghazal De La Cruzn LLOYD, DO   40 mg at 06/16/21 0935   • Pharmacy to dose vancomycin   Does not apply Continuous PRN Herminia Stanton APRN       • polyethylene glycol (MIRALAX) packet 17 g  17 g Oral Daily De La CruzIgnacia bullock, DO   17 g at 06/16/21 0935   • promethazine (PHENERGAN) tablet 25 mg  25 mg Oral Q4H PRN Ignacia De La Cruz, DO   25 mg at 06/1950   • sertraline (ZOLOFT) tablet 50 mg  50 mg Oral Daily De La Cruz Ignacia A, DO   50 mg at 06/16/21 0936   • sodium chloride 0.9 % flush 10 mL  10 mL Intravenous PRN De La CruzIgnacia bullock, DO   10 mL at 06/16/21 0936   • sodium chloride 0.9 % flush 10 mL  10 mL Intravenous PRN Ignacia De La Cruz, DO       • spironolactone (ALDACTONE) tablet 25 mg  25 mg Oral Daily De La CruzIgnacia bullock, DO   25 mg at 06/16/21 0936   • vancomycin (VANCOCIN) 1,000 mg in sodium chloride 0.9 % 250 mL IVPB  1,000 mg Intravenous Q12H Anne Allred MD   1,000 mg at 06/16/21 0341   • Zinc Oxide 16 % ointment   Apply externally BID Carina Luevano APRN   Given at 06/16/21 0937        Physician Progress Notes (most recent note)      Herminia Stanton APRN at 06/16/21 1530          Infectious Diseases Progress Note      LOS: 9 days   Patient Care Team:  Sandoval Stevenson FNP as PCP - General  Sandoval Stevenson FNP as PCP - Family Medicine  Axel Lewis MD as Consulting Physician (Hematology and Oncology)  Iglesia Springer MD as Consulting Physician (Cardiology)    Chief Complaint: Shortness of breath, fatigue    Subjective        The patient has been afebrile for the last 24 hours.  The patient is on 10 L of oxygen via nasal cannula.  Has had no change in symptoms      Review of Systems:   Review of Systems   Constitutional: Positive for fatigue.   HENT: Negative.    Eyes: Negative.    Respiratory: Positive for shortness of breath.    Cardiovascular: Negative.    Gastrointestinal: Negative.    Endocrine: Negative.    Genitourinary:  Negative.    Musculoskeletal: Negative.    Skin: Negative.    Neurological: Negative.    Psychiatric/Behavioral: Negative.    All other systems reviewed and are negative.       Objective     Vital Signs  Temp:  [97.4 °F (36.3 °C)-98.2 °F (36.8 °C)] 97.4 °F (36.3 °C)  Heart Rate:  [] 104  Resp:  [16-22] 20  BP: (122-147)/(76-79) 129/76    Physical Exam:  Physical Exam  Vitals and nursing note reviewed.   Constitutional:       General: He is not in acute distress.     Appearance: He is well-developed. He is ill-appearing. He is not diaphoretic.      Comments: Cachectic   HENT:      Head: Normocephalic and atraumatic.      Mouth/Throat:      Comments: Mild oral thrush  Eyes:      Extraocular Movements: Extraocular movements intact.      Conjunctiva/sclera: Conjunctivae normal.      Pupils: Pupils are equal, round, and reactive to light.   Cardiovascular:      Rate and Rhythm: Normal rate and regular rhythm.      Heart sounds: Normal heart sounds, S1 normal and S2 normal.   Pulmonary:      Effort: Pulmonary effort is normal. No respiratory distress.      Breath sounds: No stridor. Rales present. No wheezing.      Comments: Diminished throughout  Abdominal:      General: Bowel sounds are normal. There is no distension.      Palpations: Abdomen is soft. There is no mass.      Tenderness: There is no abdominal tenderness. There is no guarding.   Musculoskeletal:         General: No deformity. Normal range of motion.      Cervical back: Neck supple.   Skin:     General: Skin is warm and dry.      Coloration: Skin is not pale.      Findings: No erythema or rash.      Comments: Port in place   Neurological:      Mental Status: He is alert and oriented to person, place, and time.      Cranial Nerves: No cranial nerve deficit.   Psychiatric:         Mood and Affect: Mood normal.          Results Review:    I have reviewed all clinical data, test, lab, and imaging results.     Radiology  No Radiology Exams Resulted Within  Past 24 Hours    Cardiology    Laboratory    Results from last 7 days   Lab Units 06/16/21  0341 06/15/21  0547 06/14/21  0617 06/13/21  0544 06/12/21 0446 06/11/21  2145 06/11/21  0303   WBC 10*3/mm3 7.60 8.20 9.60 9.20 12.10*  --  13.80*   HEMOGLOBIN g/dL 8.5* 8.7* 9.3* 9.0* 9.6* 9.3* 6.9*   HEMATOCRIT % 25.2* 25.9* 28.2* 26.5* 28.7* 27.9* 21.0*   PLATELETS 10*3/mm3 243 226 218 180 193  --  161     Results from last 7 days   Lab Units 06/16/21  0341 06/15/21  0547 06/14/21  0617 06/13/21  0544 06/12/21 0446 06/11/21  0303 06/10/21  0636   SODIUM mmol/L 131* 137 132* 135* 135* 135*  --    POTASSIUM mmol/L 4.3 4.7 4.0 3.9 4.0 4.5  --    CHLORIDE mmol/L 99 105 100 102 102 102  --    CO2 mmol/L 26.0 24.0 24.0 24.0 25.0 27.0  --    BUN mg/dL 15 14 17 20 18 24*  --    CREATININE mg/dL 0.53* 0.64* 0.60* 0.57* 0.56* 0.61* 0.64*   GLUCOSE mg/dL 100* 87 79 84 89 101*  --    ALBUMIN g/dL  --  2.30* 2.40*  --  2.30*  --   --    BILIRUBIN mg/dL  --  0.3 0.5  --  0.5  --   --    ALK PHOS U/L  --  93 104  --  106  --   --    AST (SGOT) U/L  --  38 32  --  33  --   --    ALT (SGPT) U/L  --  36 37  --  38  --   --    CALCIUM mg/dL 8.2* 8.4* 8.6 8.4* 8.6 8.1*  --                  Microbiology   Microbiology Results (last 10 days)     Procedure Component Value - Date/Time    MRSA Screen, PCR (Inpatient) - Swab, Nares [[id number]]  (Abnormal) Collected: 06/07/21 1701    Lab Status: Final result Specimen: Swab from Nares Updated: 06/07/21 1941     MRSA PCR MRSA Detected    Blood Culture - Blood, Arm, Left [[id number]] Collected: 06/07/21 0705    Lab Status: Final result Specimen: Blood from Arm, Left Updated: 06/12/21 0730     Blood Culture No growth at 5 days    Blood Culture - Blood, Arm, Right [[id number]] Collected: 06/07/21 0705    Lab Status: Final result Specimen: Blood from Arm, Right Updated: 06/12/21 0730     Blood Culture No growth at 5 days    Respiratory Panel PCR w/COVID-19(SARS-CoV-2) PARESH/GRACIELA/KATHY/PAD/COR/MAD/CHRIST  In-House, NP Swab in [facility], 3-4 HR TAT - Swab, Nasopharynx [[id number]]  (Normal) Collected: 06/07/21 0620    Lab Status: Final result Specimen: Swab from Nasopharynx Updated: 06/07/21 0726     ADENOVIRUS, PCR Not Detected     Coronavirus 229E Not Detected     Coronavirus HKU1 Not Detected     Coronavirus NL63 Not Detected     Coronavirus OC43 Not Detected     COVID19 Not Detected     Human Metapneumovirus Not Detected     Human Rhinovirus/Enterovirus Not Detected     Influenza A PCR Not Detected     Influenza B PCR Not Detected     Parainfluenza Virus 1 Not Detected     Parainfluenza Virus 2 Not Detected     Parainfluenza Virus 3 Not Detected     Parainfluenza Virus 4 Not Detected     RSV, PCR Not Detected     Bordetella pertussis pcr Not Detected     Bordetella parapertussis PCR Not Detected     Chlamydophila pneumoniae PCR Not Detected     Mycoplasma pneumo by PCR Not Detected    Narrative:      In the setting of a positive respiratory panel with a viral infection PLUS a negative procalcitonin without other underlying concern for bacterial infection, consider observing off antibiotics or discontinuation of antibiotics and continue supportive care. If the respiratory panel is positive for atypical bacterial infection (Bordetella pertussis, Chlamydophila pneumoniae, or Mycoplasma pneumoniae), consider antibiotic de-escalation to target atypical bacterial infection.          Medication Review:       Schedule Meds  ampicillin-sulbactam, 3 g, Intravenous, Q6H  aspirin, 81 mg, Oral, Daily  buprenorphine-naloxone, 1.5 tablet, Sublingual, Daily  enoxaparin, 40 mg, Subcutaneous, Q24H  epoetin asia-epbx, 40,000 Units, Subcutaneous, Weekly  gabapentin, 300 mg, Oral, TID  guaiFENesin, 600 mg, Oral, Q12H  insulin lispro, 0-7 Units, Subcutaneous, TID With Meals  Shabbir, 1 packet, Oral, BID  lurasidone, 40 mg, Oral, Daily  megestrol acetate, 200 mg, Oral, TID  multivitamin with minerals, 1 tablet, Oral, Daily  pantoprazole, 40  mg, Oral, Daily  polyethylene glycol, 17 g, Oral, Daily  senna-docusate sodium, 2 tablet, Oral, BID  sertraline, 50 mg, Oral, Daily  spironolactone, 25 mg, Oral, Daily  vancomycin, 1,000 mg, Intravenous, Q12H  Zinc Oxide, , Apply externally, BID        Infusion Meds  Pharmacy to dose vancomycin,         PRN Meds  •  acetaminophen  •  albuterol  •  senna-docusate sodium **AND** polyethylene glycol **AND** bisacodyl **AND** bisacodyl  •  calcium carbonate  •  dextrose  •  dextrose  •  glucagon (human recombinant)  •  hydrOXYzine  •  insulin lispro  •  ipratropium-albuterol  •  melatonin  •  ondansetron  •  Pharmacy to dose vancomycin  •  promethazine  •  [COMPLETED] Insert peripheral IV **AND** sodium chloride  •  sodium chloride        Assessment/Plan       Antimicrobial Therapy   1.        day  2.  Unasyn      day  3.  Vancomycin      day  4.      Day  5.      Day      Assessment     Possible postobstructive pneumonia.  Patient has right large lung mass as a result of lung cancer.  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    Pancytopenia-likely related to chemotherapy     Lung CA was on chemotherapy    Oral thrush, patient received 7 days of nystatin        Plan     Continue IV Unasyn 3 g every 6 hours  Continue IV vancomycin-asked pharmacy to monitor and dose  Will offer 2 weeks of the above antibiotics  Continue supportive care  A.m. labs  Prognosis is very guarded and long-term prognosis might be poor  Case was discussed with the patient's wife at the bedside -patient is now DNR  Case discussed with the patient's nurse    Family is considering hospice    VINCE Soto  21  15:30 EDT     Note is dictated utilizing voice recognition software/Dragon    Electronically signed by Herminia Stanton APRN at 21 1530          Consult Notes (most recent note)      Radha Darden APRN at 21 1042      Consult Orders    1. Inpatient Palliative Care Nurse Consult [[id number]] ordered by Ignacia De La Cruz DO at 21 1000               Palliative Care Consultation    Patient Name: Axel Ramirez  : 1950  MRN: [id number]  Allergies: Patient has no known allergies.    Requesting clinician:  Jono  Reason for consult: Consultation for clarification of goals of care and code status.      Patient Code Status:   Code Status and Medical Interventions:   Ordered at: 21 0956     Level Of Support Discussed With:    Health Care Surrogate     Code Status:    No CPR     Medical Interventions (Level of Support Prior to Arrest):    Comfort Measures           Advanced Care Planning    Advanced Directives: Patient does not have advance directive  Health Care Directive on file: No  Health Care Surrogate:      Palliative Performance Scale Score:    Comments:         Chief Complaint:    Shortness of breath    History of Present Illness    Axel Ramirez is a 70 y.o. male who presented to [facility] from assisted living on  with reports of shortness of breath. EMS was called after patient received a mini neb treatment at the facility but his breathing became more severe, he had a reported saturation of 76% on 4 L at his extended care facility.  Brought to the ER and was noted with AE COPD and Healthcare acquired pneumonia and admitted for further treatment.     Chest x-ray showed left lung pneumonia and a right middle lobe lung mass.  White count 10.1, hemoglobin 9.2, .7, platelets 115k.  D-dimer was 2.99 (< 0.6).      Chest CT showed a 9 cm right upper lobe lung mass occluding the right upper lobe bronchus  with complete right upper and right middle lobe collapse.  There was no pulmonary emboli.  There was possible aspiration pneumonia and stable mediastinal lymphadenopathy.  A new 12 mm nodule was seen in the left lower lobe.  Adrenal glands were normal.  He was started on steroids and Zosyn.       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  Recently started on second line therapy Taxol/carboplatin/ipilimumab and Nivolumab with Neulasta support on 5/24/2021.    Pulmonary was consulted. Started on inhaled corticosteroids and antibiotics.     Cardiology for consulted for elevated BNP.     6/14 Palliative care consult for goals of care discussion.This patient is known to our service. We have seen him on previous admissions. He had previously decided to pursue hospice services but changed his mind prior to discharge and decided to continue with aggressive measures including chemotherapy and surgical interventions. We completed a POST form which confirmed patient would want to be a DNR with limited medical interventions.         VITAL SIGNS:   Temp:  [97.7 °F (36.5 °C)-98.3 °F (36.8 °C)] 98.3 °F (36.8 °C)  Heart Rate:  [] 78  Resp:  [17-21] 18  BP: (116-137)/(67-77) 137/77       PMH: Lung ca, CHF, COPD, CAD, HTN, MI, PVD    Past Surgical History:   Procedure Laterality Date   • AORTIC VALVE REPAIR/REPLACEMENT  02/26/2018    Dr Maza   • BRONCHOSCOPY N/A 10/24/2020    Procedure: BRONCHOSCOPY with biopsy right upper lobe mass, and bronchoalveolar lavage right upper lobe;  Surgeon: Ángela Bean MD;  Location: [facility] ENDOSCOPY;  Service: Pulmonary;  Laterality: N/A;  post: right upper lobe mass, pneumonia   • BRONCHOSCOPY N/A 11/11/2020    Procedure: BRONCHOSCOPY with bronchial washing;  Surgeon: Ángela Bean MD;  Location: [facility] ENDOSCOPY;  Service: Pulmonary;  Laterality: N/A;  Post: lung mass, pneumonia    • BRONCHOSCOPY N/A 2020    Procedure: BRONCHOSCOPY RIGID with debulking of right main endobronchial tumor;  Surgeon: Nomi Reyes MD;  Location: [facility] MAIN OR;  Service: Cardiothoracic;  Laterality: N/A;   • BRONCHOSCOPY N/A 2020    Procedure: BRONCHOSCOPY;  Surgeon: Nomi Reyes MD;  Location: [facility] MAIN OR;  Service: Cardiothoracic;  Laterality: N/A;   • CARDIAC CATHETERIZATION  2017   • CORONARY ARTERY BYPASS GRAFT  2018    Dr Maza   • TIBIA FRACTURE SURGERY     • VENOUS ACCESS DEVICE (PORT) INSERTION Left 10/30/2020    Procedure: MEDIPORT INSERTION UNDER FLUOROSCOPIC GUIDENCE;  Surgeon: Nomi Reyes MD;  Location: [facility] MAIN OR;  Service: Cardiothoracic;  Laterality: Left;       Family History   Problem Relation Age of Onset   • Coronary artery disease Mother    • Cancer Father    • Stroke Daughter 35   • Seizures Daughter 35       Social History     Tobacco Use   • Smoking status: Former Smoker     Packs/day: 1.00     Types: Cigarettes     Quit date: 10/2020     Years since quittin.7   • Smokeless tobacco: Never Used   Vaping Use   • Vaping Use: Never used   Substance Use Topics   • Alcohol use: Not Currently   • Drug use: No           LABS:    Results from last 7 days   Lab Units 21  0617   WBC 10*3/mm3 9.60   HEMOGLOBIN g/dL 9.3*   HEMATOCRIT % 28.2*   PLATELETS 10*3/mm3 218     Results from last 7 days   Lab Units 21  0617   SODIUM mmol/L 132*   POTASSIUM mmol/L 4.0   CHLORIDE mmol/L 100   CO2 mmol/L 24.0   BUN mg/dL 17   CREATININE mg/dL 0.60*   GLUCOSE mg/dL 79   CALCIUM mg/dL 8.6     Results from last 7 days   Lab Units 21  0617   SODIUM mmol/L 132*   POTASSIUM mmol/L 4.0   CHLORIDE mmol/L 100   CO2 mmol/L 24.0   BUN mg/dL 17   CREATININE mg/dL 0.60*   CALCIUM mg/dL 8.6   BILIRUBIN mg/dL 0.5   ALK PHOS U/L 104   ALT (SGPT) U/L 37   AST (SGOT) U/L 32   GLUCOSE mg/dL 79         IMAGING STUDIES:  XR Chest 1 View    Result Date:  6/14/2021  Unchanged chest. Slot 66 Electronically signed by:  Alcides Frazier D.O.  6/14/2021 12:49 AM        I reviewed the patient's new clinical results including labs, imaging, and vitals.        Scheduled Meds:  acetylcysteine, 2 mL, Nebulization, BID - RT  ampicillin-sulbactam, 3 g, Intravenous, Q6H  aspirin, 81 mg, Oral, Daily  buprenorphine-naloxone, 1.5 tablet, Sublingual, Daily  enoxaparin, 40 mg, Subcutaneous, Q24H  epoetin asia-epbx, 40,000 Units, Subcutaneous, Weekly  gabapentin, 300 mg, Oral, TID  guaiFENesin, 600 mg, Oral, Q12H  insulin lispro, 0-7 Units, Subcutaneous, TID With Meals  ipratropium-albuterol, 3 mL, Nebulization, Q4H - RT  Shabbir, 1 packet, Oral, BID  lurasidone, 40 mg, Oral, Daily  megestrol acetate, 200 mg, Oral, TID  multivitamin with minerals, 1 tablet, Oral, Daily  pantoprazole, 40 mg, Oral, Daily  polyethylene glycol, 17 g, Oral, Daily  senna-docusate sodium, 2 tablet, Oral, BID  sertraline, 50 mg, Oral, Daily  spironolactone, 25 mg, Oral, Daily  vancomycin, 1,000 mg, Intravenous, Q12H  Zinc Oxide, , Apply externally, BID      Continuous Infusions:  Pharmacy to dose vancomycin,           Review of Systems   Constitutional: Positive for fatigue.   Respiratory: Positive for shortness of breath.    All other systems reviewed and are negative.        Physical Exam  Vitals and nursing note reviewed.   Constitutional:       Appearance: He is ill-appearing.      Comments: Alopecia, frail   HENT:      Head: Normocephalic and atraumatic.      Nose: Nose normal.      Mouth/Throat:      Mouth: Mucous membranes are dry.      Pharynx: Oropharynx is clear.   Eyes:      Extraocular Movements: Extraocular movements intact.      Conjunctiva/sclera: Conjunctivae normal.      Pupils: Pupils are equal, round, and reactive to light.   Cardiovascular:      Rate and Rhythm: Normal rate.      Pulses: Normal pulses.   Pulmonary:      Effort: Pulmonary effort is normal.   Abdominal:      General: Abdomen  is flat.   Musculoskeletal:         General: Normal range of motion.      Cervical back: Normal range of motion.   Skin:     General: Skin is warm and dry.   Neurological:      General: No focal deficit present.      Mental Status: He is oriented to person, place, and time. Mental status is at baseline.             PROBLEM LIST:    Coronary artery disease involving native coronary artery of native heart without angina pectoris    Nonrheumatic aortic valve stenosis    Essential hypertension    Congestive heart failure (CMS/HCC)    Tobacco abuse    Squamous cell carcinoma of lung, right (CMS/HCC)    Acute-on-chronic respiratory failure (CMS/HCC)    Respiratory distress    Stage 2 skin ulcer of sacral region (CMS/HCC)          ASSESSMENT/PLAN:    Goals of care: Met with patient this afternoon. He was sleeping, his wife was not present. Per nursing, patient asks that his wife is present to assist in decision making. And from previous visits we had with patient, he prefers to have his wife present. Patient has completed POST form confirming DNR/DNI with limited interventions. He was discharged with rehab at previous visit. He was currently undergoing chemotherapy with Dr. Lewis. We will follow up tomorrow. According to staff, patients wife and daughter had medical emergency prior to our visit.   Post obstructive pneumonia: ID is following. Patient on IV antibiotics.   CHF: Cardiology is consulted. Echocardiogram showed no vegetation and EF 65%.  On aspirin and Lovenox prophylaxis.  Lung Ca: Follows with Dr. Lewis. Had  Recent chemotherapy on 5/4. Plans for surgical resection of brain mets per Dr. Stroud in near future. Hem Onc consulted.   Sacral wound: wound care following.   Anemia: Started on retacrit per Hem Onc.         Decisional Capacity: yes  Patient's understanding of illness: adequate  Patient goals of care:  DNR/limited intervention      Thank you for this consult and allowing us to participate in  patient's plan of care. Palliative Care Team will continue to follow patient.     Time spent:55 minutes spent reviewing medical and medication records, assessing and examining patient, discussing with family, answering questions, providing some guidance about a plan and documentation of care, and coordinating care with other healthcare members, with > 50% time spent face to face.         VINCE Benjamin  6/14/2021    Electronically signed by Radha Darden APRN at 06/14/21 7507

## 2021-06-16 NOTE — PROGRESS NOTES
Infectious Diseases Progress Note      LOS: 9 days   Patient Care Team:  Sandoval Stevenson FNP as PCP - General  Sandoval Stevenson FNP as PCP - Family Medicine  Axel Lewis MD as Consulting Physician (Hematology and Oncology)  Iglesia Springer MD as Consulting Physician (Cardiology)    Chief Complaint: Shortness of breath, fatigue    Subjective        The patient has been afebrile for the last 24 hours.  The patient is on 10 L of oxygen via nasal cannula.  Has had no change in symptoms      Review of Systems:   Review of Systems   Constitutional: Positive for fatigue.   HENT: Negative.    Eyes: Negative.    Respiratory: Positive for shortness of breath.    Cardiovascular: Negative.    Gastrointestinal: Negative.    Endocrine: Negative.    Genitourinary: Negative.    Musculoskeletal: Negative.    Skin: Negative.    Neurological: Negative.    Psychiatric/Behavioral: Negative.    All other systems reviewed and are negative.       Objective     Vital Signs  Temp:  [97.4 °F (36.3 °C)-98.2 °F (36.8 °C)] 97.4 °F (36.3 °C)  Heart Rate:  [] 104  Resp:  [16-22] 20  BP: (122-147)/(76-79) 129/76    Physical Exam:  Physical Exam  Vitals and nursing note reviewed.   Constitutional:       General: He is not in acute distress.     Appearance: He is well-developed. He is ill-appearing. He is not diaphoretic.      Comments: Cachectic   HENT:      Head: Normocephalic and atraumatic.      Mouth/Throat:      Comments: Mild oral thrush  Eyes:      Extraocular Movements: Extraocular movements intact.      Conjunctiva/sclera: Conjunctivae normal.      Pupils: Pupils are equal, round, and reactive to light.   Cardiovascular:      Rate and Rhythm: Normal rate and regular rhythm.      Heart sounds: Normal heart sounds, S1 normal and S2 normal.   Pulmonary:      Effort: Pulmonary effort is normal. No respiratory distress.      Breath sounds: No stridor. Rales present. No wheezing.      Comments: Diminished  throughout  Abdominal:      General: Bowel sounds are normal. There is no distension.      Palpations: Abdomen is soft. There is no mass.      Tenderness: There is no abdominal tenderness. There is no guarding.   Musculoskeletal:         General: No deformity. Normal range of motion.      Cervical back: Neck supple.   Skin:     General: Skin is warm and dry.      Coloration: Skin is not pale.      Findings: No erythema or rash.      Comments: Port in place   Neurological:      Mental Status: He is alert and oriented to person, place, and time.      Cranial Nerves: No cranial nerve deficit.   Psychiatric:         Mood and Affect: Mood normal.          Results Review:    I have reviewed all clinical data, test, lab, and imaging results.     Radiology  No Radiology Exams Resulted Within Past 24 Hours    Cardiology    Laboratory    Results from last 7 days   Lab Units 06/16/21  0341 06/15/21  0547 06/14/21  0617 06/13/21  0544 06/12/21  0446 06/11/21  2145 06/11/21  0303   WBC 10*3/mm3 7.60 8.20 9.60 9.20 12.10*  --  13.80*   HEMOGLOBIN g/dL 8.5* 8.7* 9.3* 9.0* 9.6* 9.3* 6.9*   HEMATOCRIT % 25.2* 25.9* 28.2* 26.5* 28.7* 27.9* 21.0*   PLATELETS 10*3/mm3 243 226 218 180 193  --  161     Results from last 7 days   Lab Units 06/16/21  0341 06/15/21  0547 06/14/21  0617 06/13/21  0544 06/12/21  0446 06/11/21  0303 06/10/21  0636   SODIUM mmol/L 131* 137 132* 135* 135* 135*  --    POTASSIUM mmol/L 4.3 4.7 4.0 3.9 4.0 4.5  --    CHLORIDE mmol/L 99 105 100 102 102 102  --    CO2 mmol/L 26.0 24.0 24.0 24.0 25.0 27.0  --    BUN mg/dL 15 14 17 20 18 24*  --    CREATININE mg/dL 0.53* 0.64* 0.60* 0.57* 0.56* 0.61* 0.64*   GLUCOSE mg/dL 100* 87 79 84 89 101*  --    ALBUMIN g/dL  --  2.30* 2.40*  --  2.30*  --   --    BILIRUBIN mg/dL  --  0.3 0.5  --  0.5  --   --    ALK PHOS U/L  --  93 104  --  106  --   --    AST (SGOT) U/L  --  38 32  --  33  --   --    ALT (SGPT) U/L  --  36 37  --  38  --   --    CALCIUM mg/dL 8.2* 8.4* 8.6 8.4*  8.6 8.1*  --                  Microbiology   Microbiology Results (last 10 days)     Procedure Component Value - Date/Time    MRSA Screen, PCR (Inpatient) - Swab, Nares [[id number]]  (Abnormal) Collected: 06/07/21 1701    Lab Status: Final result Specimen: Swab from Nares Updated: 06/07/21 1941     MRSA PCR MRSA Detected    Blood Culture - Blood, Arm, Left [[id number]] Collected: 06/07/21 0705    Lab Status: Final result Specimen: Blood from Arm, Left Updated: 06/12/21 0730     Blood Culture No growth at 5 days    Blood Culture - Blood, Arm, Right [[id number]] Collected: 06/07/21 0705    Lab Status: Final result Specimen: Blood from Arm, Right Updated: 06/12/21 0730     Blood Culture No growth at 5 days    Respiratory Panel PCR w/COVID-19(SARS-CoV-2) PARESH/GRACIELA/KATHY/PAD/COR/MAD/CHRIST In-House, NP Swab in UTM/VTM, 3-4 HR TAT - Swab, Nasopharynx [[id number]]  (Normal) Collected: 06/07/21 0620    Lab Status: Final result Specimen: Swab from Nasopharynx Updated: 06/07/21 0726     ADENOVIRUS, PCR Not Detected     Coronavirus 229E Not Detected     Coronavirus HKU1 Not Detected     Coronavirus NL63 Not Detected     Coronavirus OC43 Not Detected     COVID19 Not Detected     Human Metapneumovirus Not Detected     Human Rhinovirus/Enterovirus Not Detected     Influenza A PCR Not Detected     Influenza B PCR Not Detected     Parainfluenza Virus 1 Not Detected     Parainfluenza Virus 2 Not Detected     Parainfluenza Virus 3 Not Detected     Parainfluenza Virus 4 Not Detected     RSV, PCR Not Detected     Bordetella pertussis pcr Not Detected     Bordetella parapertussis PCR Not Detected     Chlamydophila pneumoniae PCR Not Detected     Mycoplasma pneumo by PCR Not Detected    Narrative:      In the setting of a positive respiratory panel with a viral infection PLUS a negative procalcitonin without other underlying concern for bacterial infection, consider observing off antibiotics or discontinuation of antibiotics and continue supportive  care. If the respiratory panel is positive for atypical bacterial infection (Bordetella pertussis, Chlamydophila pneumoniae, or Mycoplasma pneumoniae), consider antibiotic de-escalation to target atypical bacterial infection.          Medication Review:       Schedule Meds  ampicillin-sulbactam, 3 g, Intravenous, Q6H  aspirin, 81 mg, Oral, Daily  buprenorphine-naloxone, 1.5 tablet, Sublingual, Daily  enoxaparin, 40 mg, Subcutaneous, Q24H  epoetin asia-epbx, 40,000 Units, Subcutaneous, Weekly  gabapentin, 300 mg, Oral, TID  guaiFENesin, 600 mg, Oral, Q12H  insulin lispro, 0-7 Units, Subcutaneous, TID With Meals  Shabbir, 1 packet, Oral, BID  lurasidone, 40 mg, Oral, Daily  megestrol acetate, 200 mg, Oral, TID  multivitamin with minerals, 1 tablet, Oral, Daily  pantoprazole, 40 mg, Oral, Daily  polyethylene glycol, 17 g, Oral, Daily  senna-docusate sodium, 2 tablet, Oral, BID  sertraline, 50 mg, Oral, Daily  spironolactone, 25 mg, Oral, Daily  vancomycin, 1,000 mg, Intravenous, Q12H  Zinc Oxide, , Apply externally, BID        Infusion Meds  Pharmacy to dose vancomycin,         PRN Meds  •  acetaminophen  •  albuterol  •  senna-docusate sodium **AND** polyethylene glycol **AND** bisacodyl **AND** bisacodyl  •  calcium carbonate  •  dextrose  •  dextrose  •  glucagon (human recombinant)  •  hydrOXYzine  •  insulin lispro  •  ipratropium-albuterol  •  melatonin  •  ondansetron  •  Pharmacy to dose vancomycin  •  promethazine  •  [COMPLETED] Insert peripheral IV **AND** sodium chloride  •  sodium chloride        Assessment/Plan       Antimicrobial Therapy   1.        day  2.  Unasyn      day  3.  Vancomycin      day  4.      Day  5.      Day      Assessment     Possible postobstructive pneumonia.  Patient has right large lung mass as a result of lung cancer.  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    Pancytopenia-likely related to chemotherapy     Lung CA was on chemotherapy    Oral thrush, patient received 7 days of nystatin        Plan     Continue IV Unasyn 3 g every 6 hours  Continue IV vancomycin-asked pharmacy to monitor and dose  Will offer 2 weeks of the above antibiotics  Continue supportive care  A.m. labs  Prognosis is very guarded and long-term prognosis might be poor  Case was discussed with the patient's wife at the bedside -patient is now DNR  Case discussed with the patient's nurse   Family is considering hospice    Herminia Stanton, VINCE  06/16/21  15:30 EDT     Note is dictated utilizing voice recognition software/Dragon

## 2021-06-16 NOTE — CASE MANAGEMENT/SOCIAL WORK
Continued Stay Note  HUA Wills     Patient Name: Axel Ramirez  MRN: [id number]  Today's Date: 6/16/2021    Admit Date: 6/7/2021    Discharge Plan     Row Name 06/16/21 1629       Plan    Plan  JANN Plan: Return to [facility], santiagoert approved until the 20th, no PASRR required. [facility] can take liter flow up to 12L. Referral is pending for [facility].     Phone communication or documentation only - no physical contact with patient or family.    JANNETTE Vera, [facility]    Office: [phone]  Cell: [phone]  Fax: [phone]  E-mail: [email or web address]

## 2021-06-16 NOTE — NURSING NOTE
Patient continually saying he feels short of air. Oxygen level is normal. I called respiratory for PRN breathing tx. He had anxiety meds about an hour ago. I believe his SOA is related to this. Also informed nurse Niesha.

## 2021-06-16 NOTE — PROGRESS NOTES
Palliative Care Daily Progress Note     C/C: shortness of breath    S: Axel Ramirez is a 70 y.o. male who presented to [facility] from assisted living on 6/7 with reports of shortness of breath. EMS was called after patient received a mini neb treatment at the facility but his breathing became more severe, he had a reported saturation of 76% on 4 L at his extended care facility.  Brought to the ER and was noted with AE COPD and Healthcare acquired pneumonia and admitted for further treatment.      Chest x-ray showed left lung pneumonia and a right middle lobe lung mass.  White count 10.1, hemoglobin 9.2, .7, platelets 115k.  D-dimer was 2.99 (< 0.6).       Chest CT showed a 9 cm right upper lobe lung mass occluding the right upper lobe bronchus with complete right upper and right middle lobe collapse.  There was no pulmonary emboli.  There was possible aspiration pneumonia and stable mediastinal lymphadenopathy.  A new 12 mm nodule was seen in the left lower lobe.  Adrenal glands were normal.  He was started on steroids and Zosyn.         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  Recently started on second line therapy Taxol/carboplatin/ipilimumab and Nivolumab with Neulasta support on 5/24/2021.     Pulmonary was consulted. Started on inhaled corticosteroids and antibiotics.      Cardiology for consulted for elevated BNP.      6/14 Palliative care consult for goals of care discussion.This patient is known to our service. We have seen him on previous admissions. He had previously decided to pursue hospice services but changed his mind prior to discharge and decided to continue with aggressive measures including chemotherapy and surgical interventions. We completed a POST form which confirmed patient would want to be a DNR with limited medical interventions.      6/15 No acute events  "overnight. Patient remains on high flow oxygen.      6/16 Continues to require high flow oxygen at 10L.       O: Code Status:   Code Status and Medical Interventions:   Ordered at: 06/07/21 0956     Level Of Support Discussed With:    Health Care Surrogate     Code Status:    No CPR     Medical Interventions (Level of Support Prior to Arrest):    Comfort Measures      Advanced Directives: Advance Directive Status: Patient does not have advance directive   Goals of Care: Ongoing.   Palliative Performance Scale Score:       /78 (BP Location: Right arm, Patient Position: Lying)   Pulse 109   Temp 97.9 °F (36.6 °C) (Oral)   Resp 17   Ht 172.7 cm (68\")   Wt 66.1 kg (145 lb 11.6 oz)   SpO2 97%   BMI 22.16 kg/m²     Intake/Output Summary (Last 24 hours) at 6/16/2021 0923  Last data filed at 6/16/2021 0836  Gross per 24 hour   Intake 1410 ml   Output 700 ml   Net 710 ml         Review of Systems   Constitutional: Positive for fatigue and unexpected weight change.   Respiratory: Positive for shortness of breath.    Psychiatric/Behavioral: The patient is nervous/anxious.    All other systems reviewed and are negative.        Physical Exam  Vitals and nursing note reviewed.   Constitutional:       Appearance: He is ill-appearing.      Comments: Alopecia, endentuous   HENT:      Head: Normocephalic and atraumatic.      Nose: Nose normal.      Mouth/Throat:      Mouth: Mucous membranes are dry.      Pharynx: Oropharynx is clear.   Eyes:      Extraocular Movements: Extraocular movements intact.      Conjunctiva/sclera: Conjunctivae normal.      Pupils: Pupils are equal, round, and reactive to light.   Cardiovascular:      Rate and Rhythm: Normal rate.      Pulses: Normal pulses.   Pulmonary:      Effort: Pulmonary effort is normal.   Abdominal:      General: Abdomen is flat.   Musculoskeletal:         General: Normal range of motion.      Cervical back: Normal range of motion.      Comments: weakness   Skin:     " General: Skin is warm and dry.   Neurological:      General: No focal deficit present.      Mental Status: He is alert and oriented to person, place, and time. Mental status is at baseline.         Labs:   Results from last 7 days   Lab Units 06/16/21  0341   WBC 10*3/mm3 7.60   HEMOGLOBIN g/dL 8.5*   HEMATOCRIT % 25.2*   PLATELETS 10*3/mm3 243     Results from last 7 days   Lab Units 06/16/21  0341   SODIUM mmol/L 131*   POTASSIUM mmol/L 4.3   CHLORIDE mmol/L 99   CO2 mmol/L 26.0   BUN mg/dL 15   CREATININE mg/dL 0.53*   GLUCOSE mg/dL 100*   CALCIUM mg/dL 8.2*     Results from last 7 days   Lab Units 06/16/21  0341 06/15/21  0547   SODIUM mmol/L 131* 137   POTASSIUM mmol/L 4.3 4.7   CHLORIDE mmol/L 99 105   CO2 mmol/L 26.0 24.0   BUN mg/dL 15 14   CREATININE mg/dL 0.53* 0.64*   CALCIUM mg/dL 8.2* 8.4*   BILIRUBIN mg/dL  --  0.3   ALK PHOS U/L  --  93   ALT (SGPT) U/L  --  36   AST (SGOT) U/L  --  38   GLUCOSE mg/dL 100* 87         Diagnostics: Reviewed    A:   Patient Active Problem List   Diagnosis   • Coronary artery disease involving native coronary artery of native heart without angina pectoris   • Nonrheumatic aortic valve stenosis   • Mixed hyperlipidemia   • Essential hypertension   • Fever   • Presence of aortocoronary bypass graft   • Congestive heart failure (CMS/HCC)   • Hx of aortic valve replacement   • CAP (community acquired pneumonia)   • Tobacco abuse   • Postobstructive pneumonia   • Lung mass   • Squamous cell carcinoma of lung, right (CMS/HCC)   • Diastolic CHF, acute (CMS/HCC)   • Pneumonia of right lung due to infectious organism   • Pneumonia due to infectious organism   • Moderate malnutrition (CMS/HCC)   • Acute on chronic respiratory failure with hypoxia (CMS/HCC)   • COPD with acute exacerbation (CMS/HCC)   • Delirium   • Brain metastasis (CMS/HCC)   • Acute-on-chronic respiratory failure (CMS/HCC)   • Respiratory distress   • Stage 2 skin ulcer of sacral region (CMS/HCC)  "      S/Sx:    Goals of care:  6/14 Met with patient this afternoon. He is sleeping, wife is not present.  Per nursing, patient asks that his wife is present to assist in decision making. And from previous visits we had with patient, he prefers to have his wife present. Patient has completed POST form confirming DNR/DNI with limited interventions. He was discharged with rehab at previous visit. He was currently undergoing chemotherapy with Dr. Lewis. We will follow up tomorrow. According to staff, patients wife and daughter had medical emergency prior to our visit.   6/15 Met with wife and patient this afternoon to discuss long term goals. Wife is very anxious and is asking about treatment options and what next steps would be to get him home. She wants to take him home but also wants to continue with aggressive treatment in terms of rehab. We discussed poor prognosis and comfort measures. Wife asks to speak with Dr. Lewis and NP prior to making any decisions. Patient does say that he just \"wants to go home and be with his family.\" And that all of this \"is scary.\" I will reach out to Hem Onc NP to see if conversation can be facilitated.  6/16 Patient and wife met with oncology this am. Per wife, they want to continue to pursue any and all treatment options. They understand that the patient is too weak for treatment at this time. We discussed how even if patient has chemotherapy options, his overall functional status may prevent him from getting it. He has been in rehab for months with little improvement in mobility. We talked about hospice vs aggressive measures. Wife is unsure if hospice is appropriate. She asks \"why cant we do chemotherapy and hospice.\" We again discussed how poor his clinical status was and the likelihood of being able to get chemotherapy being low. Per oncology, even with chemotherapy, may only get a few additional months. Patient says \"It seems like hospice is my only option.\" He asks for " wife to make the decision for him. She is reluctant, again asking to take him home. Patient says that he doesn't want to go home and he wants to be comfortable. We will place a [facility] to [facility] to discuss options for wife. Discussed with Hem/Onc.   Post obstructive pneumonia: ID is following. Patient on IV antibiotics.   CHF: Cardiology is consulted. Echocardiogram showed no vegetation and EF 65%.  On aspirin and Lovenox prophylaxis.  Lung Ca: Follows with Dr. Lewis. Had  Recent chemotherapy on 5/4. Plans for surgical resection of brain mets per Dr. Stroud in near future. Hem Onc consulted.   Sacral wound: wound care following.   Anemia: Started on retacrit per Hem Onc.     P:   Palliative Care Team will continue to follow patient.     Radha Darden, APRN  6/16/2021  Time spent: 39 min

## 2021-06-16 NOTE — SIGNIFICANT NOTE
06/16/21 1300   Coping/Psychosocial   Family/Support Persons spouse   Involvement in Care at bedside;attentive to patient;participating in care;supportive of patient   Additional Documentation Spiritual Care (Group)   Spiritual Care   Spiritual Care Visit Type follow-up   Spiritual Care Source  initiative   Receptivity to Spiritual Care unresponsive  (pt sleeping, spouse in bed next to him)   Spiritual Care Request coping/stress of illness support;mood/anxiety support;prayer support;spiritual/moral support   Spiritual Care Interventions prayer support provided;supportive conversation provided   Response to Spiritual Care emotion expressed;engaged in conversation;receptive of support;thanks expressed;visit helpful  (pt sleeping, spouse engaged with pt. )   Use of Spiritual Resources prayer;spirituality for coping, indicated strong use of   Spiritual Care Follow-Up follow-up planned regularly for general support   Coping/Psychosocial Interventions   Supportive Measures active listening utilized      received message that pt is anxious and sad, could he visit. Chapian arrived and pt was sleeping; spouse in bed next to him. Spouse was sad that pt may have to go hospice since his condition does not seem to be getting better. Nurse and spouse stated that pt was anxious; pt asleep, could not determine reason. Spouse was tearful and asked  to pray.  asked spouse the reason for the pt's anxiety and she said it was him having to go home. He felt safe in the hospital and would feel less so at home. She asked  to pray, which he did. He said he'd be back tomorrow to visit and hopes to talk to pt about his anxiety. She was thankful for the visit. No other needs.  will visit tomorrow with the hoes of the pt being awake to explore his emotions concerning this recent decision about hospice that has to be made. Nurse was OK and had no needs.  stepped away.

## 2021-06-16 NOTE — PROGRESS NOTES
Daily Progress Note    Coronary artery disease involving native coronary artery of native heart without angina pectoris    Nonrheumatic aortic valve stenosis    Essential hypertension    Congestive heart failure (CMS/HCC)    Tobacco abuse    Squamous cell carcinoma of lung, right (CMS/HCC)    Acute-on-chronic respiratory failure (CMS/HCC)    Respiratory distress    Stage 2 skin ulcer of sacral region (CMS/HCC)    Assessment    Acute-on-chronic respiratory distress  Pneumonia most likely post obstruction  COPD exacerbation  Metastatic lung cancer squamous cell Stage III   CHF  Opioid dependence  Stage 2 skin ulcer of sacral region      COVID-19 and respiratory viral DNA panel is negative    Plan      Bronchodilator as needed  Steroids completed  Mucinex  IS  DNR considering comfort measures  Antibiotics per ID unasyn and vancomycin       LOS: 9 days       Subjective         Objective     Vital signs for last 24 hours:  Vitals:    06/15/21 1106 06/15/21 2118 06/16/21 0332 06/16/21 0916   BP:  146/79 122/76 147/78   BP Location:  Right arm Right arm Right arm   Patient Position:  Lying Lying Lying   Pulse: 88 104 86 109   Resp: 16 20 16 17   Temp:  97.7 °F (36.5 °C) 98.2 °F (36.8 °C) 97.9 °F (36.6 °C)   TempSrc:  Oral Axillary Oral   SpO2:  97% 98% 97%   Weight:   66.1 kg (145 lb 11.6 oz)    Height:           Intake/Output last 3 shifts:  I/O last 3 completed shifts:  In: 1410 [P.O.:960; IV Piggyback:450]  Out: 1100 [Urine:1100]  Intake/Output this shift:  I/O this shift:  In: 240 [P.O.:240]  Out: -       Radiology  Imaging Results (Last 24 Hours)     ** No results found for the last 24 hours. **          Labs:  Results from last 7 days   Lab Units 06/16/21  0341   WBC 10*3/mm3 7.60   HEMOGLOBIN g/dL 8.5*   HEMATOCRIT % 25.2*   PLATELETS 10*3/mm3 243     Results from last 7 days   Lab Units 06/16/21  0341 06/15/21  0547   SODIUM mmol/L 131* 137   POTASSIUM mmol/L 4.3 4.7   CHLORIDE mmol/L 99 105   CO2 mmol/L 26.0 24.0    BUN mg/dL 15 14   CREATININE mg/dL 0.53* 0.64*   CALCIUM mg/dL 8.2* 8.4*   BILIRUBIN mg/dL  --  0.3   ALK PHOS U/L  --  93   ALT (SGPT) U/L  --  36   AST (SGOT) U/L  --  38   GLUCOSE mg/dL 100* 87     Results from last 7 days   Lab Units 06/14/21  0243   PH, ARTERIAL pH units 7.425   PO2 ART mm Hg 77.1*   PCO2, ARTERIAL mm Hg 34.1*   HCO3 ART mmol/L 22.3     Results from last 7 days   Lab Units 06/15/21  0547 06/14/21  0617 06/12/21  0446   ALBUMIN g/dL 2.30* 2.40* 2.30*                               Meds:   SCHEDULE  ampicillin-sulbactam, 3 g, Intravenous, Q6H  aspirin, 81 mg, Oral, Daily  buprenorphine-naloxone, 1.5 tablet, Sublingual, Daily  enoxaparin, 40 mg, Subcutaneous, Q24H  epoetin asia-epbx, 40,000 Units, Subcutaneous, Weekly  gabapentin, 300 mg, Oral, TID  guaiFENesin, 600 mg, Oral, Q12H  insulin lispro, 0-7 Units, Subcutaneous, TID With Meals  Shabbir, 1 packet, Oral, BID  lurasidone, 40 mg, Oral, Daily  megestrol acetate, 200 mg, Oral, TID  multivitamin with minerals, 1 tablet, Oral, Daily  pantoprazole, 40 mg, Oral, Daily  polyethylene glycol, 17 g, Oral, Daily  senna-docusate sodium, 2 tablet, Oral, BID  sertraline, 50 mg, Oral, Daily  spironolactone, 25 mg, Oral, Daily  vancomycin, 1,000 mg, Intravenous, Q12H  Zinc Oxide, , Apply externally, BID      Infusions  Pharmacy to dose vancomycin,       PRNs  •  acetaminophen  •  albuterol  •  senna-docusate sodium **AND** polyethylene glycol **AND** bisacodyl **AND** bisacodyl  •  calcium carbonate  •  dextrose  •  dextrose  •  glucagon (human recombinant)  •  hydrOXYzine  •  insulin lispro  •  ipratropium-albuterol  •  melatonin  •  ondansetron  •  Pharmacy to dose vancomycin  •  promethazine  •  [COMPLETED] Insert peripheral IV **AND** sodium chloride  •  sodium chloride    Physical Exam:  Physical Exam  Vitals reviewed.   Constitutional:       General: He is sleeping.      Appearance: He is ill-appearing.   Pulmonary:      Breath sounds: Rhonchi present.    Skin:     General: Skin is warm and dry.      Coloration: Skin is pale.   Neurological:      Mental Status: He is oriented to person, place, and time.         ROS  Review of Systems   Unable to perform ROS: Other       I reviewed the patient's new clinical results.      Electronically signed by VINCE Muñoz, 06/16/21 at 10:19 EDT.

## 2021-06-16 NOTE — PLAN OF CARE
Goal Outcome Evaluation:  Plan of Care Reviewed With: patient           Outcome Summary: pt has been resting well in bed throughout day. pt still very anxious about plan of care going forward. will continue to monitor

## 2021-06-16 NOTE — PROGRESS NOTES
Hematology/Oncology Inpatient Progress Note    PATIENT NAME: Axel Ramirez  : 1950  MRN: [id number]    CHIEF COMPLAINT: Metastatic squamous cell carcinoma of the right upper lobe lung and MDS    HISTORY OF PRESENT ILLNESS:  70 y.o. male presented to [facility] ER on 2021 with shortness of air and hypoxia.  He had an acute onset on the day of admission of worsening shortness of breath with O2 saturation of 76% on 4 L/min O2.  The ECF called EMS.  He was treated with nebulizer on the way to the ER.  Chest x-ray showed left lung pneumonia and a right middle lobe lung mass.  White count 10.1, hemoglobin 9.2, .7, platelets 115k.  D-dimer was 2.99 (< 0.6).  Chest CT showed a 9 cm right upper lobe lung mass occluding the right upper lobe bronchus with complete right upper and right middle lobe collapse.  There was no pulmonary emboli.  There was possible aspiration pneumonia and stable mediastinal lymphadenopathy.  A new 12 mm nodule was seen in the left lower lobe.  Adrenal glands were normal.  He was started on steroids and Zosyn.  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  Recently started on second line therapy Taxol/carboplatin/ipilimumab and Nivolumab with Neulasta support on 2021.     21  Hematology/Oncology was consulted for his metastatic squamous cell carcinoma of the right upper lobe lung.  He is an established patient.  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  Initially treated with concomitant weekly chemotherapy (paclitaxel and carboplatin x7) and radiation therapy from 2020. He completed radiation therapy on 2021.  He completed the weekly portion of his chemotherapy on 2021.  He received 1 cycle of every 21-day carboplatin /Taxol with  Neulasta support on 2/9/2021.  He subsequently was admitted to [facility], for hypoxia with respiratory failure requiring intubation.  MRI of the brain showed a left occipital lobe lesion with vasogenic edema.  He received radiation, x1 fraction, on 3/3/2021 to the left occipital lobe. One month later, a brain MRI showed enlargement of the left occipital lobe lesion.  PET scan showed the right upper lobe lung mass to be 10 cm in size with a decrease in the peripheral FDG uptake suggesting interval response to treatment.  There was a decrease in the size and FDG uptake in the mediastinal lymph nodes.  There was no distant metastatic disease.  The lesion in the left occipital lobe was FDG avid.  Right upper lobe lung collapse had improved with aeration.  He was referred to Dr. Tay for evaluation of his brain MRI which was felt to be due to to post SRS changes and not progression.  He was seen in follow-up with plan to start chemotherapy with immunotherapy.  Paclitaxel and carboplatin doses were decreased by 20% due to pancytopenia with prior treatment.  Repeat brain MRI, on 5/13/2021, showed an increase in the size of the left occipital lobe with an increase in surrounding edema.  There were no new masses.  The patient was referred to Dr. Luna for surgical resection versus MRI SPECT/perfusion at [facility].  Dr. Luna felt the lesion was a combination of necrosis and tumor progression and recommended surgical resection.  He started treatment on 5/24/2021 (chemotherapy plus immunotherapy) as his surgical resection was to be done after medical clearance.  He had a return appointment with Dr. Luna on 6/9/2021 following an MRI of his brain.  -MDS-diagnosed January 2021.  He was anemic and was found to have GOGO and malabsorption on oral therapy.  His bone marrow revealed increased iron storage and mild erythroid atypia in January 2021.  Stool heme was negative on 5/13/2021. He has received IV iron but has  never required Retacrit.  Last office visit 5/24/2021, complaining of hypoxia and tachypnea with anxiety.  White count 9.05, hemoglobin 11.34, .8 and platelets 220.  He was starting to gain weight and Megace was continued.  For his cough and hypoxia, he was treated with Augmentin and albuterol nebulizer.  He had oral thrush which was treated with nystatin swish and swallow.  TSH 0.287 (0.282-4.0), iron 62 (), ferritin 4185 (), iron saturation 32 (20-55), TIBC 196 (228-428).     PCP: Sandoval Stevenson FNP     INTERVAL HISTORY:  • 6/8/2021-Retacrit 40,000 units subcu weekly initiated.  Hemoglobin 7.2.  • 6/9/2021-PT 11.5 (9.6-11.7), PTT 27.7 (24-31).  Hemoglobin 7.2, platelets 118,000.  Haptoglobin 465 ().  CMV IgM <30 (<30).  Cardiac ejection fraction 65%.   • 6/10/2021-EBV VCA IgM less than 36 (less than 36).  • 6/11/2021-hemoglobin 6.9.  1 unit pRBCs given.  WBC 13.8 with 18% bands.  • 6/12/2021-hemoglobin 9.6, WBC 12.1 with 8% bands.  Folate 13.7 (4.78-24.2), vitamin B12 more than 2000 (211-946).  Creatinine 0.56 (0.76-1.27).  Iron 42 (), TIBC 146 (298-536), iron saturation 29 (20-50), ferritin 3721 (), reticulocyte count 3.44 (0.7-1.9).  • 6/13/2021- hemoglobin 9.0, .0, white blood count 9.2. Copper 122 ().  Chest x-ray unchanged.  • 6/14/2021-palliative care consultation.  • 6/16/2021-[facility] approved and can take high flow O2 up to 12 L.  Patient is a DNR.    History of present illness reviewed since last visit and changes noted on 06/16/21.    Subjective   Very sleepy.  Wife is just trying to decide about hospice.    ROS:  Review of Systems   Unable to perform ROS: Acuity of condition      MEDICATIONS:    Scheduled Meds:  ampicillin-sulbactam, 3 g, Intravenous, Q6H  aspirin, 81 mg, Oral, Daily  buprenorphine-naloxone, 1.5 tablet, Sublingual, Daily  enoxaparin, 40 mg, Subcutaneous, Q24H  epoetin asia-epbx, 40,000 Units, Subcutaneous,  "Weekly  gabapentin, 300 mg, Oral, TID  guaiFENesin, 600 mg, Oral, Q12H  insulin lispro, 0-7 Units, Subcutaneous, TID With Meals  Shabbir, 1 packet, Oral, BID  lurasidone, 40 mg, Oral, Daily  megestrol acetate, 200 mg, Oral, TID  multivitamin with minerals, 1 tablet, Oral, Daily  pantoprazole, 40 mg, Oral, Daily  polyethylene glycol, 17 g, Oral, Daily  senna-docusate sodium, 2 tablet, Oral, BID  sertraline, 50 mg, Oral, Daily  spironolactone, 25 mg, Oral, Daily  vancomycin, 1,000 mg, Intravenous, Q12H  Zinc Oxide, , Apply externally, BID       Continuous Infusions:  Pharmacy to dose vancomycin,        PRN Meds:  •  acetaminophen  •  albuterol  •  senna-docusate sodium **AND** polyethylene glycol **AND** bisacodyl **AND** bisacodyl  •  calcium carbonate  •  dextrose  •  dextrose  •  glucagon (human recombinant)  •  hydrOXYzine  •  insulin lispro  •  ipratropium-albuterol  •  melatonin  •  ondansetron  •  Pharmacy to dose vancomycin  •  promethazine  •  [COMPLETED] Insert peripheral IV **AND** sodium chloride  •  sodium chloride     ALLERGIES:  No Known Allergies    Objective    VITALS:   /76 (BP Location: Right arm, Patient Position: Lying)   Pulse 104   Temp 97.4 °F (36.3 °C) (Oral)   Resp 20   Ht 172.7 cm (68\")   Wt 66.1 kg (145 lb 11.6 oz)   SpO2 97%   BMI 22.16 kg/m²     PHYSICAL EXAM:  Physical Exam  Vitals and nursing note reviewed.   Constitutional:       General: He is not in acute distress.     Appearance: Normal appearance. He is well-developed and normal weight. He is not diaphoretic.      Interventions: Nasal cannula in place.   HENT:      Head: Normocephalic and atraumatic.      Comments: Frontal male pattern baldness.       Right Ear: External ear normal.      Left Ear: External ear normal.      Nose: Nose normal. No rhinorrhea.      Comments: Oxygen via nasal cannula.     Mouth/Throat:      Mouth: Mucous membranes are moist.      Pharynx: Oropharynx is clear. No oropharyngeal exudate or " posterior oropharyngeal erythema.      Comments: Edentulous.  Eyes:      General: No scleral icterus.     Extraocular Movements: Extraocular movements intact.      Conjunctiva/sclera: Conjunctivae normal.      Pupils: Pupils are equal, round, and reactive to light.   Cardiovascular:      Rate and Rhythm: Regular rhythm. Tachycardia present.      Heart sounds: Normal heart sounds. No murmur heard.        Comments: Cardiac monitor leads.   Pulmonary:      Effort: Pulmonary effort is normal. No respiratory distress.      Breath sounds: No stridor. Rhonchi (Bilateral and scattered.) present. No wheezing or rales.   Chest:      Comments: Midline vertical chest wall scar.    Left chest wall port.  Abdominal:      General: Abdomen is flat. Bowel sounds are normal. There is no distension.      Palpations: Abdomen is soft. There is no mass.      Tenderness: There is no abdominal tenderness. There is no guarding or rebound.   Genitourinary:     Comments: Deferred   Musculoskeletal:         General: No swelling, tenderness or deformity. Normal range of motion.      Cervical back: Normal range of motion and neck supple. No tenderness.      Right lower leg: No edema.      Left lower leg: No edema.   Lymphadenopathy:      Cervical: No cervical adenopathy.      Upper Body:      Right upper body: No supraclavicular adenopathy.      Left upper body: No supraclavicular adenopathy.   Skin:     General: Skin is warm and dry.      Coloration: Skin is not jaundiced or pale.      Findings: Ecchymosis ( Bilateral upper extremities) present. No bruising, erythema or rash.   Neurological:      General: No focal deficit present.      Mental Status: He is alert and oriented to person, place, and time.      Coordination: Coordination normal.   Psychiatric:         Mood and Affect: Mood normal.         Behavior: Behavior normal.         Thought Content: Thought content normal.         Judgment: Judgment normal.           RECENT LABS:  Lab  Results (last 24 hours)     Procedure Component Value Units Date/Time    POC Glucose Once [[id number]]  (Normal) Collected: 06/16/21 1125    Specimen: Blood Updated: 06/16/21 1127     Glucose 104 mg/dL      Comment: Serial Number: [id number]:  [id number]       POC Glucose Once [[id number]]  (Normal) Collected: 06/16/21 0724    Specimen: Blood Updated: 06/16/21 0725     Glucose 85 mg/dL      Comment: Serial Number: [id number]:  [id number]       Manual Differential [[id number]]  (Abnormal) Collected: 06/16/21 0341    Specimen: Blood Updated: 06/16/21 0623     Neutrophil % 54.0 %      Lymphocyte % 4.0 %      Monocyte % 7.0 %      Basophil % 1.0 %      Bands %  19.0 %      Metamyelocyte % 9.0 %      Myelocyte % 6.0 %      Neutrophils Absolute 5.55 10*3/mm3      Lymphocytes Absolute 0.30 10*3/mm3      Monocytes Absolute 0.53 10*3/mm3      Basophils Absolute 0.08 10*3/mm3      Anisocytosis Mod/2+     Macrocytes Slight/1+     Poikilocytes Slight/1+     Polychromasia Slight/1+     WBC Morphology Normal     Platelet Morphology Normal    Narrative:      Reviewed by Pathologist within the past 30 days on [id number] .      CBC & Differential [[id number]]  (Abnormal) Collected: 06/16/21 0341    Specimen: Blood Updated: 06/16/21 0623    Narrative:      The following orders were created for panel order CBC & Differential.  Procedure                               Abnormality         Status                     ---------                               -----------         ------                     Scan Slide[[id number]]                                       Final result               CBC Auto Differential[[id number]]        Abnormal            Final result                 Please view results for these tests on the individual orders.    Scan Slide [[id number]] Collected: 06/16/21 0341    Specimen: Blood Updated: 06/16/21 0623     Scan Slide --     Comment: See Manual Differential Results       CBC Auto Differential [[id number]]   (Abnormal) Collected: 06/16/21 0341    Specimen: Blood Updated: 06/16/21 0623     WBC 7.60 10*3/mm3      RBC 2.40 10*6/mm3      Hemoglobin 8.5 g/dL      Hematocrit 25.2 %      .4 fL      MCH 35.3 pg      MCHC 33.5 g/dL      RDW 18.7 %      RDW-SD 68.3 fl      MPV 8.1 fL      Platelets 243 10*3/mm3     Narrative:      The previously reported component NRBC is no longer being reported. Previous result was 0.5 /100 WBC (Reference Range: 0.0-0.2 /100 WBC) on 6/16/2021 at 0524 EDT.    Basic Metabolic Panel [[id number]]  (Abnormal) Collected: 06/16/21 0341    Specimen: Blood Updated: 06/16/21 0528     Glucose 100 mg/dL      BUN 15 mg/dL      Creatinine 0.53 mg/dL      Sodium 131 mmol/L      Potassium 4.3 mmol/L      Chloride 99 mmol/L      CO2 26.0 mmol/L      Calcium 8.2 mg/dL      eGFR Non African Amer >150 mL/min/1.73      BUN/Creatinine Ratio 28.3     Anion Gap 6.0 mmol/L     Narrative:      GFR Normal >60  Chronic Kidney Disease <60  Kidney Failure <15            PENDING RESULTS:  stool heme.    IMAGING REVIEWED:  No radiology results for the last day    I have reviewed the patient's labs, imaging, reports, and other clinician documentation.    Assessment/Plan   ASSESSMENT:  1. Squamous cell carcinoma right upper lobe lung with new brain met-s/p chemoradiation completed 1/2021, SRS to left occipital lobe lesion and now on chemoimmunotherapy (carboplatin/Taxol plus ipilimumab/nivolumab with Neulasta support) dosed 5/24/2021.  PET/CT scan and April 2021 showed he was responding to treatment.  Ipilimumab and nivolumab were added to his current therapy due to progression in his brain.  Due to hospitalization and brain radiation, he did not receive systemic therapy from 2/9/2021 until 5/24/2021.  Planned to undergo surgical resection of brain metastasis by Dr. Luna.  Ipilimumab and nivolumab are known to cross blood-brain barrier.  Agree overall prognosis poor and palliative care appropriate.  Discussed with wife  that hospice care was an appropriate plan.  Chemotherapy was an option that may prolong his survival but only by months and only if his performance status improves.  His cancer was not curable.  2. Anemia/Myelodysplastic syndrome/GOGO with malabsorption/Chemotherapy-induced anemia-he has never required Retacrit as an outpatient.  Recent iron studies showed correction of GOGO.  Acute fall in hemoglobin due to recent chemotherapy.  Macrocytosis due to MDS.  On treatment with Retacrit.  Haptoglobin high. Normal copper level.  On multivitamin and Protonix.  Stable.  3. Acute thrombocytopenia-likely chemotherapy-induced.  Coags normal and CMV/EBV IgM normal.  Normalized.   4. Respiratory failure /Pneumonia (possibly postobstructive)/COPD-pulmonary following.  On vancomycin, Unasyn with management by infectious disease.  5. Weight loss/Loss of appetite-on Megace with continued weight loss.      6. Oral thrush-diagnosed as outpatient.  Now on steroids, will continue nystatin.  7. Sacral decub-wound care to follow.   8. CHF/s/p aortic valve replacement -echocardiogram showed no vegetation and EF 65%.  On aspirin and Lovenox prophylaxis.     PLAN  1. Follow CBC.   2. Transfuse 1 unit pRBC prn Hgb <7.0.  3. Await stool Hemoccult.  4. Continue weekly Retacrit, next due 6/22/2021.  5. Outpatient MRI brain per Dr. Luna.   6. Continue discussions with patient and family about goals of care.    Patient seen and evaluated by Dr. Lewis.  Electronically signed by VINCE Kaplan, 06/16/21, 3:18 PM EDT.        I have personally performed a face-to-face diagnostic evaluation on this patient.  I have discussed the case with Fatoumata Espinosa NP, have edited/reviewed the note, and agree with the care plan.  Patient is a sleepy post medication this morning and not able to provide review of systems.  Wife is present in the room and trying to make decisions about his care.  On examination, he has bilateral lung rhonchi.  Labs are  significant for evidence of anemia.  He is on Procrit for the anemia.  Have discussed goals of care with wife and have made her aware that palliative care only at present would be appropriate.          I discussed the patients findings and my recommendations with patient and spouse.    Electronically signed by Axel Lewis MD, 06/16/21, 4:12 PM EDT.

## 2021-06-16 NOTE — CASE MANAGEMENT/SOCIAL WORK
Continued Stay Note  [facility]     Patient Name: Axel Ramirez  MRN: [id number]  Today's Date: 6/16/2021    Admit Date: 6/7/2021    Discharge Plan     Row Name 06/16/21 1623       Plan    Plan Comments  [facility] referral per Palliative Team.        Chart review only.             Maninder Bal RN

## 2021-06-17 NOTE — THERAPY TREATMENT NOTE
Subjective: Pt agreeable to therapeutic plan of care.  Cognition: oriented to Person    Objective:     Bed Mobility: Min-A  Functional Transfers: Min-A  Functional Ambulation: N/A or Not attempted.    Toileting: Dependent  ADL Position: supported sitting and supported standing  ADL Comments: Pt incontinent of bowels during transfer       Pain: 0 VAS  Education: Provided education on importance of mobility and skilled verbal / tactile cueing throughout intervention.     Assessment: Axel Ramirez presents with ADL impairments below baseline abilities which indicate the need for continued skilled intervention while inpatient.  Pt becomes very SOA and panicked during transfer.  O2 88% and pt was provided with PLB technique to recover.   Tolerating session today without incident. Will continue to follow and progress as tolerated.     Plan/Recommendations:   Pt would benefit from Inpatient Rehabilitation placement at discharge from facility.   Pt desires Inpatient Rehabilitation placement at discharge. Pt cooperative; agreeable to therapeutic recommendations and plan of care.     Modified Lottie: 5 = Severe disability (Requires constant nursing care and attention, bedridden, incontinent)    Post-Tx Position: Up in Chair, Alarms activated and Call light and personal items within reach  PPE: gloves, surgical mask, eyewear protection

## 2021-06-17 NOTE — PROGRESS NOTES
Infectious Diseases Progress Note      LOS: 10 days   Patient Care Team:  Sandoval Stevenson FNP as PCP - General  Sandoval Stevenson FNP as PCP - Family Medicine  Axel Lewis MD as Consulting Physician (Hematology and Oncology)  Iglesia Springer MD as Consulting Physician (Cardiology)    Chief Complaint: Shortness of breath, fatigue    Subjective        The patient has been afebrile for the last 24 hours.  The patient is on 10 L of oxygen via nasal cannula.  Has had no change in symptoms      Review of Systems:   Review of Systems   Constitutional: Positive for fatigue.   HENT: Negative.    Eyes: Negative.    Respiratory: Positive for shortness of breath.    Cardiovascular: Negative.    Gastrointestinal: Negative.    Endocrine: Negative.    Genitourinary: Negative.    Musculoskeletal: Negative.    Skin: Negative.    Neurological: Negative.    Psychiatric/Behavioral: Negative.    All other systems reviewed and are negative.       Objective     Vital Signs  Temp:  [97.3 °F (36.3 °C)-98.4 °F (36.9 °C)] 97.5 °F (36.4 °C)  Heart Rate:  [] 105  Resp:  [18-19] 18  BP: (120-150)/(66-80) 150/80    Physical Exam:  Physical Exam  Vitals and nursing note reviewed.   Constitutional:       General: He is not in acute distress.     Appearance: He is well-developed. He is ill-appearing. He is not diaphoretic.      Comments: Cachectic   HENT:      Head: Normocephalic and atraumatic.      Mouth/Throat:      Comments: Mild oral thrush  Eyes:      Extraocular Movements: Extraocular movements intact.      Conjunctiva/sclera: Conjunctivae normal.      Pupils: Pupils are equal, round, and reactive to light.   Cardiovascular:      Rate and Rhythm: Normal rate and regular rhythm.      Heart sounds: Normal heart sounds, S1 normal and S2 normal.   Pulmonary:      Effort: Pulmonary effort is normal. No respiratory distress.      Breath sounds: No stridor. Rales present. No wheezing.      Comments: Diminished  throughout  Abdominal:      General: Bowel sounds are normal. There is no distension.      Palpations: Abdomen is soft. There is no mass.      Tenderness: There is no abdominal tenderness. There is no guarding.   Musculoskeletal:         General: No deformity. Normal range of motion.      Cervical back: Neck supple.   Skin:     General: Skin is warm and dry.      Coloration: Skin is not pale.      Findings: No erythema or rash.      Comments: Port in place   Neurological:      Mental Status: He is alert and oriented to person, place, and time.      Cranial Nerves: No cranial nerve deficit.   Psychiatric:         Mood and Affect: Mood normal.          Results Review:    I have reviewed all clinical data, test, lab, and imaging results.     Radiology  No Radiology Exams Resulted Within Past 24 Hours    Cardiology    Laboratory    Results from last 7 days   Lab Units 06/17/21  0330 06/16/21  0341 06/15/21  0547 06/14/21  0617 06/13/21  0544 06/12/21  0446 06/11/21  2145 06/11/21  0303   WBC 10*3/mm3 7.70 7.60 8.20 9.60 9.20 12.10*  --  13.80*   HEMOGLOBIN g/dL 8.9* 8.5* 8.7* 9.3* 9.0* 9.6* 9.3* 6.9*   HEMATOCRIT % 26.7* 25.2* 25.9* 28.2* 26.5* 28.7* 27.9* 21.0*   PLATELETS 10*3/mm3 286 243 226 218 180 193  --  161     Results from last 7 days   Lab Units 06/17/21  0330 06/16/21  0341 06/15/21  0547 06/14/21  0617 06/13/21  0544 06/12/21  0446 06/11/21  0303   SODIUM mmol/L 134* 131* 137 132* 135* 135* 135*   POTASSIUM mmol/L 4.2 4.3 4.7 4.0 3.9 4.0 4.5   CHLORIDE mmol/L 99 99 105 100 102 102 102   CO2 mmol/L 24.0 26.0 24.0 24.0 24.0 25.0 27.0   BUN mg/dL 13 15 14 17 20 18 24*   CREATININE mg/dL 0.57* 0.53* 0.64* 0.60* 0.57* 0.56* 0.61*   GLUCOSE mg/dL 73 100* 87 79 84 89 101*   ALBUMIN g/dL  --   --  2.30* 2.40*  --  2.30*  --    BILIRUBIN mg/dL  --   --  0.3 0.5  --  0.5  --    ALK PHOS U/L  --   --  93 104  --  106  --    AST (SGOT) U/L  --   --  38 32  --  33  --    ALT (SGPT) U/L  --   --  36 37  --  38  --     CALCIUM mg/dL 8.4* 8.2* 8.4* 8.6 8.4* 8.6 8.1*                 Microbiology   Microbiology Results (last 10 days)     Procedure Component Value - Date/Time    MRSA Screen, PCR (Inpatient) - Swab, Nares [[id number]]  (Abnormal) Collected: 06/07/21 1701    Lab Status: Final result Specimen: Swab from Nares Updated: 06/07/21 1941     MRSA PCR MRSA Detected          Medication Review:       Schedule Meds  ampicillin-sulbactam, 3 g, Intravenous, Q6H  aspirin, 81 mg, Oral, Daily  buprenorphine-naloxone, 1.5 tablet, Sublingual, Daily  enoxaparin, 40 mg, Subcutaneous, Q24H  epoetin asia-epbx, 40,000 Units, Subcutaneous, Weekly  gabapentin, 300 mg, Oral, TID  guaiFENesin, 600 mg, Oral, Q12H  insulin lispro, 0-7 Units, Subcutaneous, TID With Meals  Shabbir, 1 packet, Oral, BID  lurasidone, 40 mg, Oral, Daily  megestrol acetate, 200 mg, Oral, TID  multivitamin with minerals, 1 tablet, Oral, Daily  pantoprazole, 40 mg, Oral, Daily  polyethylene glycol, 17 g, Oral, Daily  senna-docusate sodium, 2 tablet, Oral, BID  sertraline, 50 mg, Oral, Daily  spironolactone, 25 mg, Oral, Daily  vancomycin, 1,000 mg, Intravenous, Q12H  Zinc Oxide, , Apply externally, BID        Infusion Meds  Pharmacy to dose vancomycin,         PRN Meds  •  acetaminophen  •  albuterol  •  senna-docusate sodium **AND** polyethylene glycol **AND** bisacodyl **AND** bisacodyl  •  calcium carbonate  •  dextrose  •  dextrose  •  glucagon (human recombinant)  •  hydrOXYzine  •  insulin lispro  •  ipratropium-albuterol  •  melatonin  •  ondansetron  •  Pharmacy to dose vancomycin  •  promethazine  •  [COMPLETED] Insert peripheral IV **AND** sodium chloride  •  sodium chloride        Assessment/Plan       Antimicrobial Therapy   1.        day  2.  Unasyn      day  3.  Vancomycin      day  4.      Day  5.      Day      Assessment     Possible postobstructive pneumonia.  Patient has right large lung mass as a result of lung cancer.  There is no signs of complete  collapse of the right upper and right middle lobes  -Patient is currently on 10 L of oxygen by mask  -Normally on 4 L of oxygen at home  -MRSA the nares screen is positive  -COVID-19 and respiratory viral DNA panel is negative  -Apparently there is a complete obstruction is not amenable to bronchoscopy or stent placement-Case discussed with pulmonology    Stage III squamous lung cancer with brain metastasis.    Pancytopenia-likely related to chemotherapy     Lung CA was on chemotherapy    Oral thrush, patient received 7 days of nystatin        Plan     Continue IV Unasyn 3 g every 6 hours  Continue IV vancomycin-asked pharmacy to monitor and dose  Will offer 2 weeks of the above antibiotics  Continue supportive care  A.m. labs  Prognosis is very guarded and long-term prognosis might be poor  Family is considering hospice    Anne Allred MD  06/17/21  16:35 EDT     Note is dictated utilizing voice recognition software/Dragon

## 2021-06-17 NOTE — THERAPY TREATMENT NOTE
Patient Name: Axel Ramirez  : 1950    MRN: [id number]                              Today's Date: 2021       Admit Date: 2021    Visit Dx:     ICD-10-CM ICD-9-CM   1. Respiratory distress  R06.03 786.09   2. Bronchospasm  J98.01 519.11   3. Acute congestive heart failure, unspecified heart failure type (CMS/HCC)  I50.9 428.0   4. Malignant neoplasm of right lung, unspecified part of lung (CMS/HCC)  C34.91 162.9     Patient Active Problem List   Diagnosis   • Coronary artery disease involving native coronary artery of native heart without angina pectoris   • Nonrheumatic aortic valve stenosis   • Mixed hyperlipidemia   • Essential hypertension   • Fever   • Presence of aortocoronary bypass graft   • Congestive heart failure (CMS/HCC)   • Hx of aortic valve replacement   • CAP (community acquired pneumonia)   • Tobacco abuse   • Postobstructive pneumonia   • Lung mass   • Squamous cell carcinoma of lung, right (CMS/HCC)   • Diastolic CHF, acute (CMS/HCC)   • Pneumonia of right lung due to infectious organism   • Pneumonia due to infectious organism   • Moderate malnutrition (CMS/HCC)   • Acute on chronic respiratory failure with hypoxia (CMS/HCC)   • COPD with acute exacerbation (CMS/HCC)   • Delirium   • Brain metastasis (CMS/HCC)   • Acute-on-chronic respiratory failure (CMS/HCC)   • Respiratory distress   • Stage 2 skin ulcer of sacral region (CMS/HCC)     Past Medical History:   Diagnosis Date   • Aortic stenosis    • Cancer (CMS/HCC)     Sickle Cell Carcinoma   • Congestive heart failure (CHF) (CMS/HCC)     DIASTOLIC   • COPD (chronic obstructive pulmonary disease) (CMS/HCC)    • Coronary artery disease    • Hypertension    • Lung cancer (CMS/HCC)    • Myocardial infarction (CMS/HCC)    • Peripheral vascular disease (CMS/HCC)    • Pneumonia 2021   • Valvular disease      Past Surgical History:   Procedure Laterality Date   • AORTIC VALVE REPAIR/REPLACEMENT  2018    Dr Maza   •  BRONCHOSCOPY N/A 10/24/2020    Procedure: BRONCHOSCOPY with biopsy right upper lobe mass, and bronchoalveolar lavage right upper lobe;  Surgeon: Ángela Bean MD;  Location: [facility] ENDOSCOPY;  Service: Pulmonary;  Laterality: N/A;  post: right upper lobe mass, pneumonia   • BRONCHOSCOPY N/A 11/11/2020    Procedure: BRONCHOSCOPY with bronchial washing;  Surgeon: Ángela Bean MD;  Location: [facility] ENDOSCOPY;  Service: Pulmonary;  Laterality: N/A;  Post: lung mass, pneumonia   • BRONCHOSCOPY N/A 11/11/2020    Procedure: BRONCHOSCOPY RIGID with debulking of right main endobronchial tumor;  Surgeon: Nomi Reyes MD;  Location: [facility] MAIN OR;  Service: Cardiothoracic;  Laterality: N/A;   • BRONCHOSCOPY N/A 11/11/2020    Procedure: BRONCHOSCOPY;  Surgeon: Nomi Reyes MD;  Location: [facility] MAIN OR;  Service: Cardiothoracic;  Laterality: N/A;   • CARDIAC CATHETERIZATION  12/29/2017   • CORONARY ARTERY BYPASS GRAFT  02/26/2018    Dr Maza   • TIBIA FRACTURE SURGERY     • VENOUS ACCESS DEVICE (PORT) INSERTION Left 10/30/2020    Procedure: MEDIPORT INSERTION UNDER FLUOROSCOPIC GUIDENCE;  Surgeon: Nomi Reyes MD;  Location: [facility] MAIN OR;  Service: Cardiothoracic;  Laterality: Left;     General Information    No documentation.         Mobility/ADL's    No documentation.       Obj/Interventions    No documentation.       Goals/Plan    No documentation.       Clinical Impression    No documentation.       Outcome Measures    No documentation.       Occupational Therapy Education                 Title: PT OT SLP Therapies (Done)     Topic: Occupational Therapy (Done)     Point: ADL training (Done)     Description:   Instruct learner(s) on proper safety adaptation and remediation techniques during self care or transfers.   Instruct in proper use of assistive devices.              Learning Progress Summary           Patient Acceptance, E,TB, VU by PG at 6/16/2021 1650    Acceptance, E,TB, VU by PG at 6/15/2021 1626     Acceptance, E,TB, VU by SS at 6/14/2021 0412    Acceptance, E,TB, VU by SS at 6/13/2021 0313    Acceptance, E,TB, VU by SS at 6/12/2021 0525   Family Acceptance, E,TB, VU by SS at 6/12/2021 0525   Significant Other Acceptance, E,TB, VU by SS at 6/14/2021 0412    Acceptance, E,TB, VU by SS at 6/13/2021 0313                   Point: Home exercise program (Done)     Description:   Instruct learner(s) on appropriate technique for monitoring, assisting and/or progressing therapeutic exercises/activities.              Learning Progress Summary           Patient Acceptance, E,TB, VU by PG at 6/16/2021 1650    Acceptance, E,TB, VU by PG at 6/15/2021 1623    Acceptance, E,TB, VU by SS at 6/14/2021 0412    Acceptance, E,TB, VU by SS at 6/13/2021 0313    Acceptance, E,TB, VU by SS at 6/12/2021 0525   Family Acceptance, E,TB, VU by SS at 6/12/2021 0525   Significant Other Acceptance, E,TB, VU by SS at 6/14/2021 0412    Acceptance, E,TB, VU by SS at 6/13/2021 0313                   Point: Precautions (Done)     Description:   Instruct learner(s) on prescribed precautions during self-care and functional transfers.              Learning Progress Summary           Patient Acceptance, E,TB, VU by PG at 6/16/2021 1650    Acceptance, E,TB, VU by PG at 6/15/2021 1623    Acceptance, E,TB, VU by SS at 6/14/2021 0412    Acceptance, E,TB, VU by SS at 6/13/2021 0313    Acceptance, E,TB, VU by SS at 6/12/2021 0525   Family Acceptance, E,TB, VU by SS at 6/12/2021 0525   Significant Other Acceptance, E,TB, VU by SS at 6/14/2021 0412    Acceptance, E,TB, VU by SS at 6/13/2021 0313                   Point: Body mechanics (Done)     Description:   Instruct learner(s) on proper positioning and spine alignment during self-care, functional mobility activities and/or exercises.              Learning Progress Summary           Patient Acceptance, E,TB, VU by PG at 6/16/2021 1650    Acceptance, E,TB, VU by PG at 6/15/2021 1623    Acceptance, E,TB,  VU by SS at 6/14/2021 0412    Acceptance, E,TB, VU by  at 6/13/2021 0313    Acceptance, E,TB, VU by  at 6/12/2021 0525    Acceptance, E,TB, VU by  at 6/10/2021 1711   Family Acceptance, E,TB, VU by  at 6/12/2021 0525   Significant Other Acceptance, E,TB, VU by  at 6/14/2021 0412    Acceptance, E,TB, VU by  at 6/13/2021 0313                               User Key     Initials Effective Dates Name Provider Type Discipline    MP 03/01/19 - 06/15/21 John Gonzalez OT Occupational Therapist OT    SS 06/24/19 - 06/15/21 Erna Higginbotham LPN Licensed Nurse Nurse    PG 03/02/20 - 06/15/21 Niesha Espino LPN Licensed Nurse Nurse    PG 06/16/21 -  Niesha Espino LPN Licensed Nurse Nurse              OT Recommendation and Plan           Time Calculation:   Time Calculation- OT     Row Name 06/17/21 1542             Time Calculation- OT    OT Start Time  1002  -SR      OT Stop Time  1037  -SR      OT Time Calculation (min)  35 min  -SR      Total Timed Code Minutes- OT  35 minute(s)  -SR      OT Received On  06/17/21  -SR      OT - Next Appointment  06/21/21  -SR        User Key  (r) = Recorded By, (t) = Taken By, (c) = Cosigned By    Initials Name Provider Type    SR Adrianne Thomas OT Occupational Therapist        Therapy Charges for Today     Code Description Service Date Service Provider Modifiers Qty    80082631697 HC OT THERAPEUTIC ACT EA 15 MIN 6/17/2021 Adrianne Thomas, OT GO 1    87628962964 HC OT SELF CARE/MGMT/TRAIN EA 15 MIN 6/17/2021 Adrianne Thomas OT GO 1               Adrianne Thomas OT  6/17/2021

## 2021-06-17 NOTE — PROGRESS NOTES
Hematology/Oncology Inpatient Progress Note    PATIENT NAME: Axel Ramirez  : 1950  MRN: [id number]    CHIEF COMPLAINT: Metastatic squamous cell carcinoma of the right upper lobe lung and MDS    HISTORY OF PRESENT ILLNESS:  70 y.o. male presented to [facility] ER on 2021 with shortness of air and hypoxia.  He had an acute onset on the day of admission of worsening shortness of breath with O2 saturation of 76% on 4 L/min O2.  The ECF called EMS.  He was treated with nebulizer on the way to the ER.  Chest x-ray showed left lung pneumonia and a right middle lobe lung mass.  White count 10.1, hemoglobin 9.2, .7, platelets 115k.  D-dimer was 2.99 (< 0.6).  Chest CT showed a 9 cm right upper lobe lung mass occluding the right upper lobe bronchus with complete right upper and right middle lobe collapse.  There was no pulmonary emboli.  There was possible aspiration pneumonia and stable mediastinal lymphadenopathy.  A new 12 mm nodule was seen in the left lower lobe.  Adrenal glands were normal.  He was started on steroids and Zosyn.  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  Recently started on second line therapy Taxol/carboplatin/ipilimumab and Nivolumab with Neulasta support on 2021.     21  Hematology/Oncology was consulted for his metastatic squamous cell carcinoma of the right upper lobe lung.  He is an established patient.  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  Initially treated with concomitant weekly chemotherapy (paclitaxel and carboplatin x7) and radiation therapy from 2020. He completed radiation therapy on 2021.  He completed the weekly portion of his chemotherapy on 2021.  He received 1 cycle of every 21-day carboplatin /Taxol with  Neulasta support on 2/9/2021.  He subsequently was admitted to [facility], for hypoxia with respiratory failure requiring intubation.  MRI of the brain showed a left occipital lobe lesion with vasogenic edema.  He received radiation, x1 fraction, on 3/3/2021 to the left occipital lobe. One month later, a brain MRI showed enlargement of the left occipital lobe lesion.  PET scan showed the right upper lobe lung mass to be 10 cm in size with a decrease in the peripheral FDG uptake suggesting interval response to treatment.  There was a decrease in the size and FDG uptake in the mediastinal lymph nodes.  There was no distant metastatic disease.  The lesion in the left occipital lobe was FDG avid.  Right upper lobe lung collapse had improved with aeration.  He was referred to Dr. Tay for evaluation of his brain MRI which was felt to be due to to post SRS changes and not progression.  He was seen in follow-up with plan to start chemotherapy with immunotherapy.  Paclitaxel and carboplatin doses were decreased by 20% due to pancytopenia with prior treatment.  Repeat brain MRI, on 5/13/2021, showed an increase in the size of the left occipital lobe with an increase in surrounding edema.  There were no new masses.  The patient was referred to Dr. Luna for surgical resection versus MRI SPECT/perfusion at [facility].  Dr. Luna felt the lesion was a combination of necrosis and tumor progression and recommended surgical resection.  He started treatment on 5/24/2021 (chemotherapy plus immunotherapy) as his surgical resection was to be done after medical clearance.  He had a return appointment with Dr. Luna on 6/9/2021 following an MRI of his brain.  -MDS-diagnosed January 2021.  He was anemic and was found to have GOGO and malabsorption on oral therapy.  His bone marrow revealed increased iron storage and mild erythroid atypia in January 2021.  Stool heme was negative on 5/13/2021. He has received IV iron but has  never required Retacrit.  Last office visit 5/24/2021, complaining of hypoxia and tachypnea with anxiety.  White count 9.05, hemoglobin 11.34, .8 and platelets 220.  He was starting to gain weight and Megace was continued.  For his cough and hypoxia, he was treated with Augmentin and albuterol nebulizer.  He had oral thrush which was treated with nystatin swish and swallow.  TSH 0.287 (0.282-4.0), iron 62 (), ferritin 4185 (), iron saturation 32 (20-55), TIBC 196 (228-428).     PCP: Sandoval Stevenson FNP     INTERVAL HISTORY:  • 6/8/2021-Retacrit 40,000 units subcu weekly initiated.  Hemoglobin 7.2.  • 6/9/2021-PT 11.5 (9.6-11.7), PTT 27.7 (24-31).  Hemoglobin 7.2, platelets 118,000.  Haptoglobin 465 ().  CMV IgM <30 (<30).  Cardiac ejection fraction 65%.   • 6/10/2021-EBV VCA IgM less than 36 (less than 36).  • 6/11/2021-hemoglobin 6.9.  1 unit pRBCs given.  WBC 13.8 with 18% bands.  • 6/12/2021-hemoglobin 9.6, WBC 12.1 with 8% bands.  Folate 13.7 (4.78-24.2), vitamin B12 more than 2000 (211-946).  Creatinine 0.56 (0.76-1.27).  Iron 42 (), TIBC 146 (298-536), iron saturation 29 (20-50), ferritin 3721 (), reticulocyte count 3.44 (0.7-1.9).  • 6/13/2021- hemoglobin 9.0, .0, white blood count 9.2. Copper 122 ().  Chest x-ray unchanged.  • 6/14/2021-palliative care consultation.  • 6/16/2021-[facility] approved and can take high flow O2 up to 12 L.  Patient is a DNR.    History of present illness reviewed since last visit and changes noted on 06/17/21.    Subjective   Awake and wanting to get out of bed.  Some shortness of air.    ROS:  Review of Systems   Constitutional: Positive for fatigue. Negative for activity change, chills, fever and unexpected weight change.   HENT: Negative for congestion, dental problem, hearing loss, mouth sores, nosebleeds, sore throat and trouble swallowing.    Eyes: Negative for photophobia and visual disturbance.   Respiratory:  Positive for shortness of breath. Negative for cough and chest tightness.    Cardiovascular: Negative for chest pain, palpitations and leg swelling.   Gastrointestinal: Negative for abdominal distention, abdominal pain, blood in stool, constipation, diarrhea, nausea and vomiting.   Endocrine: Negative for cold intolerance and heat intolerance.   Genitourinary: Negative for decreased urine volume, difficulty urinating, frequency, hematuria and urgency.   Musculoskeletal: Negative for arthralgias and gait problem.   Skin: Negative for rash and wound.   Neurological: Negative for dizziness, tremors, weakness, light-headedness, numbness and headaches.   Hematological: Negative for adenopathy. Does not bruise/bleed easily.   Psychiatric/Behavioral: Negative for confusion and hallucinations. The patient is not nervous/anxious.    All other systems reviewed and are negative.     MEDICATIONS:    Scheduled Meds:  ampicillin-sulbactam, 3 g, Intravenous, Q6H  aspirin, 81 mg, Oral, Daily  buprenorphine-naloxone, 1.5 tablet, Sublingual, Daily  enoxaparin, 40 mg, Subcutaneous, Q24H  epoetin asia-epbx, 40,000 Units, Subcutaneous, Weekly  gabapentin, 300 mg, Oral, TID  guaiFENesin, 600 mg, Oral, Q12H  insulin lispro, 0-7 Units, Subcutaneous, TID With Meals  Shabbir, 1 packet, Oral, BID  lurasidone, 40 mg, Oral, Daily  megestrol acetate, 200 mg, Oral, TID  multivitamin with minerals, 1 tablet, Oral, Daily  pantoprazole, 40 mg, Oral, Daily  polyethylene glycol, 17 g, Oral, Daily  senna-docusate sodium, 2 tablet, Oral, BID  sertraline, 50 mg, Oral, Daily  spironolactone, 25 mg, Oral, Daily  vancomycin, 1,000 mg, Intravenous, Q12H  Zinc Oxide, , Apply externally, BID       Continuous Infusions:  Pharmacy to dose vancomycin,        PRN Meds:  •  acetaminophen  •  albuterol  •  senna-docusate sodium **AND** polyethylene glycol **AND** bisacodyl **AND** bisacodyl  •  calcium carbonate  •  dextrose  •  dextrose  •  glucagon (human  "recombinant)  •  hydrOXYzine  •  insulin lispro  •  ipratropium-albuterol  •  melatonin  •  ondansetron  •  Pharmacy to dose vancomycin  •  promethazine  •  [COMPLETED] Insert peripheral IV **AND** sodium chloride  •  sodium chloride     ALLERGIES:  No Known Allergies    Objective    VITALS:   /80 (BP Location: Right arm, Patient Position: Lying)   Pulse 98   Temp 97.5 °F (36.4 °C) (Oral)   Resp 18   Ht 172.7 cm (68\")   Wt 65.9 kg (145 lb 4.5 oz)   SpO2 97%   BMI 22.09 kg/m²     PHYSICAL EXAM:  Physical Exam  Vitals and nursing note reviewed.   Constitutional:       General: He is not in acute distress.     Appearance: Normal appearance. He is well-developed and normal weight. He is ill-appearing. He is not diaphoretic.      Interventions: Nasal cannula in place.   HENT:      Head: Normocephalic and atraumatic.      Comments: Frontal male pattern baldness.       Right Ear: External ear normal.      Left Ear: External ear normal.      Nose: Nose normal. No rhinorrhea.      Comments: Oxygen via nasal cannula.     Mouth/Throat:      Mouth: Mucous membranes are moist.      Pharynx: Oropharynx is clear. No oropharyngeal exudate or posterior oropharyngeal erythema.      Comments: Edentulous.  Eyes:      General: No scleral icterus.     Extraocular Movements: Extraocular movements intact.      Conjunctiva/sclera: Conjunctivae normal.      Pupils: Pupils are equal, round, and reactive to light.   Cardiovascular:      Rate and Rhythm: Regular rhythm. Tachycardia present.      Heart sounds: Normal heart sounds. No murmur heard.        Comments: Cardiac monitor leads.   Pulmonary:      Effort: Pulmonary effort is normal. No respiratory distress.      Breath sounds: No stridor. Rhonchi (Bilateral and scattered.) present. No wheezing or rales.   Chest:      Comments: Midline vertical chest wall scar.    Left chest wall port.  Abdominal:      General: Abdomen is flat. Bowel sounds are normal. There is no distension. "      Palpations: Abdomen is soft. There is no mass.      Tenderness: There is no abdominal tenderness. There is no guarding or rebound.   Genitourinary:     Comments: Deferred   Musculoskeletal:         General: No swelling, tenderness or deformity. Normal range of motion.      Cervical back: Normal range of motion and neck supple. No tenderness.      Right lower leg: No edema.      Left lower leg: No edema.   Lymphadenopathy:      Cervical: No cervical adenopathy.      Upper Body:      Right upper body: No supraclavicular adenopathy.      Left upper body: No supraclavicular adenopathy.   Skin:     General: Skin is warm and dry.      Coloration: Skin is not jaundiced or pale.      Findings: Ecchymosis ( Bilateral upper extremities) present. No bruising, erythema or rash.   Neurological:      General: No focal deficit present.      Mental Status: He is alert and oriented to person, place, and time.      Coordination: Coordination normal.   Psychiatric:         Mood and Affect: Mood normal.         Behavior: Behavior normal.         Thought Content: Thought content normal.         Judgment: Judgment normal.           RECENT LABS:  Lab Results (last 24 hours)     Procedure Component Value Units Date/Time    POC Glucose Once [[id number]]  (Normal) Collected: 06/17/21 0710    Specimen: Blood Updated: 06/17/21 0711     Glucose 84 mg/dL      Comment: Serial Number: [id number]:  [id number]       Manual Differential [[id number]]  (Abnormal) Collected: 06/17/21 0330    Specimen: Blood Updated: 06/17/21 0635     Neutrophil % 50.0 %      Lymphocyte % 2.0 %      Monocyte % 8.0 %      Eosinophil % 1.0 %      Basophil % 1.0 %      Bands %  26.0 %      Metamyelocyte % 5.0 %      Myelocyte % 4.0 %      Blasts % 2.0 %      Plasma Cells % 1.0 %      Neutrophils Absolute 5.85 10*3/mm3      Lymphocytes Absolute 0.15 10*3/mm3      Monocytes Absolute 0.62 10*3/mm3      Eosinophils Absolute 0.08 10*3/mm3      Basophils Absolute 0.08  10*3/mm3      Anisocytosis Mod/2+     Macrocytes Slight/1+     Polychromasia Slight/1+     Toxic Granulation Slight/1+     Platelet Morphology Normal    Narrative:      Reviewed by Pathologist within the past 30 days on [id number] .      CBC & Differential [[id number]]  (Abnormal) Collected: 06/17/21 0330    Specimen: Blood Updated: 06/17/21 0635    Narrative:      The following orders were created for panel order CBC & Differential.  Procedure                               Abnormality         Status                     ---------                               -----------         ------                     Scan Slide[[id number]]                                       Final result               CBC Auto Differential[[id number]]        Abnormal            Final result                 Please view results for these tests on the individual orders.    Scan Slide [[id number]] Collected: 06/17/21 0330    Specimen: Blood Updated: 06/17/21 0635     Scan Slide --     Comment: See Manual Differential Results       CBC Auto Differential [[id number]]  (Abnormal) Collected: 06/17/21 0330    Specimen: Blood Updated: 06/17/21 0635     WBC 7.70 10*3/mm3      RBC 2.57 10*6/mm3      Hemoglobin 8.9 g/dL      Hematocrit 26.7 %      .2 fL      MCH 34.6 pg      MCHC 33.2 g/dL      RDW 19.0 %      RDW-SD 68.3 fl      MPV 7.9 fL      Platelets 286 10*3/mm3     Narrative:      The previously reported component NRBC is no longer being reported. Previous result was 0.5 /100 WBC (Reference Range: 0.0-0.2 /100 WBC) on 6/17/2021 at 0532 EDT.    Basic Metabolic Panel [[id number]]  (Abnormal) Collected: 06/17/21 0330    Specimen: Blood Updated: 06/17/21 0528     Glucose 73 mg/dL      BUN 13 mg/dL      Creatinine 0.57 mg/dL      Sodium 134 mmol/L      Potassium 4.2 mmol/L      Chloride 99 mmol/L      CO2 24.0 mmol/L      Calcium 8.4 mg/dL      eGFR Non African Amer 141 mL/min/1.73      BUN/Creatinine Ratio 22.8     Anion Gap 11.0 mmol/L     Narrative:       GFR Normal >60  Chronic Kidney Disease <60  Kidney Failure <15      Occult Blood, Fecal By Immunoassay - Stool, Per Rectum [[id number]]  (Normal) Collected: 06/16/21 2112    Specimen: Stool from Per Rectum Updated: 06/16/21 2126     Occult Blood, Fecal by Immunoassay Negative    POC Glucose Once [[id number]]  (Abnormal) Collected: 06/16/21 2042    Specimen: Blood Updated: 06/16/21 2043     Glucose 132 mg/dL      Comment: Serial Number: [id number]:  [id number]       POC Glucose Once [[id number]]  (Abnormal) Collected: 06/16/21 1601    Specimen: Blood Updated: 06/16/21 2035     Glucose 131 mg/dL      Comment: Serial Number: [id number]:  [id number]       POC Glucose Once [[id number]]  (Abnormal) Collected: 06/15/21 2113    Specimen: Blood Updated: 06/16/21 2035     Glucose 131 mg/dL      Comment: Serial Number: [id number]:  [id number]             PENDING RESULTS:  stool heme.    IMAGING REVIEWED:  No radiology results for the last day    I have reviewed the patient's labs, imaging, reports, and other clinician documentation.    Assessment/Plan   ASSESSMENT:  1. Squamous cell carcinoma right upper lobe lung with new brain met-s/p chemoradiation completed 1/2021, SRS to left occipital lobe lesion and now on chemoimmunotherapy (carboplatin/Taxol plus ipilimumab/nivolumab with Neulasta support) dosed 5/24/2021.  PET/CT scan and April 2021 showed he was responding to treatment.  Ipilimumab and nivolumab were added to his current therapy due to progression in his brain.  Due to hospitalization and brain radiation, he did not receive systemic therapy from 2/9/2021 until 5/24/2021.  Planned to undergo surgical resection of brain metastasis by Dr. Luna.  Ipilimumab and nivolumab are known to cross blood-brain barrier.  Agree overall prognosis poor and palliative care appropriate.  Discussed with wife that hospice care was an appropriate plan.  Chemotherapy was an option that may prolong his survival but only  by months and only if his performance status improves.  His cancer is not curable.  2. Anemia/Myelodysplastic syndrome/GOGO with malabsorption/Chemotherapy-induced anemia-he has never required Retacrit as an outpatient.  Recent iron studies showed correction of GOGO.  Acute fall in hemoglobin due to recent chemotherapy.  Macrocytosis due to MDS.  On treatment with Retacrit.  Haptoglobin high. Normal copper level.  On multivitamin and Protonix.  Stable.  3. Acute thrombocytopenia-likely chemotherapy-induced.  Coags normal and CMV/EBV IgM normal.  Normalized.   4. Respiratory failure /Pneumonia (possibly postobstructive)/COPD-pulmonary following.  On vancomycin, Unasyn with management by infectious disease.  5. Weight loss/Loss of appetite-on Megace with continued weight loss.      6. Oral thrush-diagnosed as outpatient.  Now on steroids, will continue nystatin.  7. Sacral decub-wound care to follow.   8. CHF/s/p aortic valve replacement -echocardiogram showed no vegetation and EF 65%.  On aspirin and Lovenox prophylaxis.     PLAN  1. Follow CBC.   2. Transfuse 1 unit pRBC prn Hgb <7.0.  3. Await stool Hemoccult.  4. Continue weekly Retacrit, next due 6/22/2021.  5. Outpatient MRI brain per Dr. Luna.   6. Continue discussions with patient and family about goals of care.                I discussed the patients findings and my recommendations with patient and spouse.    Electronically signed by Axel Lewis MD, 06/17/21, 1:19 PM EDT.

## 2021-06-17 NOTE — PROGRESS NOTES
Daily Progress Note    Coronary artery disease involving native coronary artery of native heart without angina pectoris    Nonrheumatic aortic valve stenosis    Essential hypertension    Congestive heart failure (CMS/HCC)    Tobacco abuse    Squamous cell carcinoma of lung, right (CMS/HCC)    Acute-on-chronic respiratory failure (CMS/HCC)    Respiratory distress    Stage 2 skin ulcer of sacral region (CMS/HCC)    Assessment    Acute-on-chronic respiratory distress  Pneumonia most likely post obstruction  COPD exacerbation  Metastatic lung cancer squamous cell Stage III   CHF  Opioid dependence  Stage 2 skin ulcer of sacral region      COVID-19 and respiratory viral DNA panel is negative    Plan    Continues on 10 liter oximizer  Bronchodilator as needed  Steroids completed  Mucinex  IS  DNR considering comfort measures  Antibiotics per ID unasyn and vancomycin       LOS: 10 days       Subjective         Objective     Vital signs for last 24 hours:  Vitals:    06/16/21 1116 06/16/21 1900 06/17/21 0340 06/17/21 0914   BP:  120/66 134/74 136/80   BP Location:  Right arm Right arm Right arm   Patient Position:  Lying Lying Lying   Pulse: 104 92 86 98   Resp: 20 19 19 18   Temp:  97.3 °F (36.3 °C) 98.4 °F (36.9 °C) 97.5 °F (36.4 °C)   TempSrc:  Axillary Oral Oral   SpO2:  98% 97% 97%   Weight:   65.9 kg (145 lb 4.5 oz)    Height:           Intake/Output last 3 shifts:  I/O last 3 completed shifts:  In: 1840 [P.O.:1040; IV Piggyback:800]  Out: 1800 [Urine:1800]  Intake/Output this shift:  I/O this shift:  In: 240 [P.O.:240]  Out: -       Radiology  Imaging Results (Last 24 Hours)     ** No results found for the last 24 hours. **          Labs:  Results from last 7 days   Lab Units 06/17/21  0330   WBC 10*3/mm3 7.70   HEMOGLOBIN g/dL 8.9*   HEMATOCRIT % 26.7*   PLATELETS 10*3/mm3 286     Results from last 7 days   Lab Units 06/17/21  0330 06/15/21  0547   SODIUM mmol/L 134* 137   POTASSIUM mmol/L 4.2 4.7   CHLORIDE mmol/L  99 105   CO2 mmol/L 24.0 24.0   BUN mg/dL 13 14   CREATININE mg/dL 0.57* 0.64*   CALCIUM mg/dL 8.4* 8.4*   BILIRUBIN mg/dL  --  0.3   ALK PHOS U/L  --  93   ALT (SGPT) U/L  --  36   AST (SGOT) U/L  --  38   GLUCOSE mg/dL 73 87     Results from last 7 days   Lab Units 06/14/21  0243   PH, ARTERIAL pH units 7.425   PO2 ART mm Hg 77.1*   PCO2, ARTERIAL mm Hg 34.1*   HCO3 ART mmol/L 22.3     Results from last 7 days   Lab Units 06/15/21  0547 06/14/21  0617 06/12/21  0446   ALBUMIN g/dL 2.30* 2.40* 2.30*                               Meds:   SCHEDULE  ampicillin-sulbactam, 3 g, Intravenous, Q6H  aspirin, 81 mg, Oral, Daily  buprenorphine-naloxone, 1.5 tablet, Sublingual, Daily  enoxaparin, 40 mg, Subcutaneous, Q24H  epoetin asia-epbx, 40,000 Units, Subcutaneous, Weekly  gabapentin, 300 mg, Oral, TID  guaiFENesin, 600 mg, Oral, Q12H  insulin lispro, 0-7 Units, Subcutaneous, TID With Meals  Shabbir, 1 packet, Oral, BID  lurasidone, 40 mg, Oral, Daily  megestrol acetate, 200 mg, Oral, TID  multivitamin with minerals, 1 tablet, Oral, Daily  pantoprazole, 40 mg, Oral, Daily  polyethylene glycol, 17 g, Oral, Daily  senna-docusate sodium, 2 tablet, Oral, BID  sertraline, 50 mg, Oral, Daily  spironolactone, 25 mg, Oral, Daily  vancomycin, 1,000 mg, Intravenous, Q12H  Zinc Oxide, , Apply externally, BID      Infusions  Pharmacy to dose vancomycin,       PRNs  •  acetaminophen  •  albuterol  •  senna-docusate sodium **AND** polyethylene glycol **AND** bisacodyl **AND** bisacodyl  •  calcium carbonate  •  dextrose  •  dextrose  •  glucagon (human recombinant)  •  hydrOXYzine  •  insulin lispro  •  ipratropium-albuterol  •  melatonin  •  ondansetron  •  Pharmacy to dose vancomycin  •  promethazine  •  [COMPLETED] Insert peripheral IV **AND** sodium chloride  •  sodium chloride    Physical Exam:  Physical Exam  Vitals reviewed.   Constitutional:       General: He is sleeping.      Appearance: He is ill-appearing.   Pulmonary:       Breath sounds: Wheezing present.   Skin:     General: Skin is warm and dry.      Coloration: Skin is pale.   Neurological:      Mental Status: He is oriented to person, place, and time.         ROS  Review of Systems   Unable to perform ROS: Other       I reviewed the patient's new clinical results.      Electronically signed by VINCE Muñoz, 06/17/21 at 10:11 EDT.

## 2021-06-17 NOTE — PLAN OF CARE
.  Assessment: Axel Ramirez presents with ADL impairments below baseline abilities which indicate the need for continued skilled intervention while inpatient.  Pt becomes very SOA and panicked during transfer.  O2 88% and pt was provided with PLB technique to recover.   Tolerating session today without incident. Will continue to follow and progress as tolerated.     Plan/Recommendations:   Pt would benefit from Inpatient Rehabilitation placement at discharge from facility.   Pt desires Inpatient Rehabilitation placement at discharge. Pt cooperative; agreeable to therapeutic recommendations and plan of care.

## 2021-06-17 NOTE — PROGRESS NOTES
Nutrition Services    Patient Name: Axel Ramirez  YOB: 1950  MRN: [id number]  Admission date: 6/7/2021    PPE Documentation        PPE Worn By Provider Did not enter room during progress note   PPE Worn By Patient  N/A     PROGRESS NOTE      Encounter Information: RD to check on patient goals of care. Pt and wife seem to be having a hard time choosing hospice care, palliative is following closely. Pt is eating 75% pureed diet, SLP not consulted. Sent secure message to RN to discuss possibility of mech soft, and to recommend mechanical soft if DRspeech okay.  Would be nice for patient to have a mech soft diet if tolerated. Will continue to monitor. Pt is listed as comfort measures in EMR.          PO Diet: Diet Texture; Pureed Diet   PO Supplements: Boost Plus TID   PO Intake:  75%       Nutrition support orders: -    Nutrition support review: -       Labs (reviewed below): Reviewed        GI Function:  BM 6/17       Nutrition Intervention: Continue to monitor for GOC and PO       Results from last 7 days   Lab Units 06/17/21  0330 06/16/21  0341 06/15/21  0547 06/14/21  0617 06/12/21  0446   SODIUM mmol/L 134* 131* 137 132* 135*   POTASSIUM mmol/L 4.2 4.3 4.7 4.0 4.0   CHLORIDE mmol/L 99 99 105 100 102   CO2 mmol/L 24.0 26.0 24.0 24.0 25.0   BUN mg/dL 13 15 14 17 18   CREATININE mg/dL 0.57* 0.53* 0.64* 0.60* 0.56*   CALCIUM mg/dL 8.4* 8.2* 8.4* 8.6 8.6   BILIRUBIN mg/dL  --   --  0.3 0.5 0.5   ALK PHOS U/L  --   --  93 104 106   ALT (SGPT) U/L  --   --  36 37 38   AST (SGOT) U/L  --   --  38 32 33   GLUCOSE mg/dL 73 100* 87 79 89     Results from last 7 days   Lab Units 06/17/21  0330   HEMOGLOBIN g/dL 8.9*   HEMATOCRIT % 26.7*     COVID19   Date Value Ref Range Status   06/07/2021 Not Detected Not Detected - Ref. Range Final     No results found for: HGBA1C    RD to follow up per protocol.    Electronically signed by:  Cathi Bustamante RD  06/17/21 11:57 EDT

## 2021-06-17 NOTE — PROGRESS NOTES
LOS: 10 days   Patient Care Team:  Sandoval Stevenson FNP as PCP - General  Sandoval Stevenson FNP as PCP - Family Medicine  Axel Lewis MD as Consulting Physician (Hematology and Oncology)  Iglesia Springer MD as Consulting Physician (Cardiology)    Chief Complaint:  alert, conversant says he is feeling a little better.  Tolerating his diet.  10 L via nasal cannula.  Is hoping to get out of bed and sit up in the chair today    Subjective Alert, comfortable appearing but chronically ill-appearing    Interval History:     Patient Complaints: Vague complaint of cough no chest pain fevers chills.  Is still quite debilitated and weak.  Non-productive cough  Patient Denies: No chest pain or hemoptysis, no nausea vomiting or diarrhea  History taken from: patient    Review of Systems:    All systems were reviewed and negative except for: See interval history    Objective     Vital Signs  Temp:  [97.3 °F (36.3 °C)-98.4 °F (36.9 °C)] 97.5 °F (36.4 °C)  Heart Rate:  [] 98  Resp:  [18-20] 18  BP: (120-136)/(66-80) 136/80    Physical Exam:       General Appearance:    Alert, oriented, answers questions appropriately, chronically ill-appearing   Head:    Normocephalic, without obvious abnormality, atraumatic   Eyes:            Conjunctivae and sclerae normal, no   icterus, no pallor, corneas clear, PERRLA   Throat:   No oral lesions, no thrush, oral mucosa moist   Neck:   No adenopathy, supple, trachea midline, no thyromegaly, no   carotid bruit, no JVD   Lungs:    Course bilateral lung bases and upper airway sounds are very coarse, worse on left lung field compared to right this morning    Heart:   Distant, regular rhythm and normal rate, normal S1 and S2, no            murmur, no gallop, no rub, no click   Chest Wall:    No abnormalities observed   Abdomen:     Normal bowel sounds, no masses, no organomegaly, soft        non-tender, non-distended, no guarding, no rebound                tenderness,     Rectal:     Deferred   Extremities:  Extremities are elevated, nonpitting edema to just pretibial area bilaterally   Pulses:   Pulses palpable and equal bilaterally   Skin:   No bleeding, bruising or rash   Lymph nodes:   No palpable adenopathy   Neurologic:  Generalized debility, patient will follow very simple commands this morning   Radiology:  XR Chest 1 View    Result Date: 6/14/2021  Unchanged chest. Slot 66 Electronically signed by:  Alcides Frazier D.O.  6/14/2021 12:49 AM    XR Chest 1 View    Result Date: 6/11/2021  1. Mild worsening of diffuse interstitial opacities throughout the left lung that may relate to infection or asymmetric pulmonary edema. 2. Redemonstration of 9 cm right upper lobe mass consistent with known malignancy. 3. Stable additional chronic findings above.  Electronically Signed By-Saleem Ferguson MD On:6/11/2021 7:40 AM This report was finalized on [id number] by  Saleem Ferguson MD.    XR Chest 1 View    Result Date: 6/10/2021  1. Similar appearance of the right-sided perihilar mass likely related to known malignancy. 2. Patchy interstitial opacities throughout the left lung likely representing infection on superimposed chronic interstitial lung disease.  Electronically Signed By-Daryl Hunter MD On:6/10/2021 8:01 AM This report was finalized on [id number] by  Daryl Hunter MD.         Results Review:     I reviewed the patient's new clinical results.  I reviewed the patient's new imaging results and agree with the interpretation.    Medication Review:   Scheduled Meds:ampicillin-sulbactam, 3 g, Intravenous, Q6H  aspirin, 81 mg, Oral, Daily  buprenorphine-naloxone, 1.5 tablet, Sublingual, Daily  enoxaparin, 40 mg, Subcutaneous, Q24H  epoetin asia-epbx, 40,000 Units, Subcutaneous, Weekly  gabapentin, 300 mg, Oral, TID  guaiFENesin, 600 mg, Oral, Q12H  insulin lispro, 0-7 Units, Subcutaneous, TID With Meals  Shabbir, 1 packet, Oral, BID  lurasidone, 40 mg, Oral, Daily  megestrol  acetate, 200 mg, Oral, TID  multivitamin with minerals, 1 tablet, Oral, Daily  pantoprazole, 40 mg, Oral, Daily  polyethylene glycol, 17 g, Oral, Daily  senna-docusate sodium, 2 tablet, Oral, BID  sertraline, 50 mg, Oral, Daily  spironolactone, 25 mg, Oral, Daily  vancomycin, 1,000 mg, Intravenous, Q12H  Zinc Oxide, , Apply externally, BID      Continuous Infusions:Pharmacy to dose vancomycin,       PRN Meds:.•  acetaminophen  •  albuterol  •  senna-docusate sodium **AND** polyethylene glycol **AND** bisacodyl **AND** bisacodyl  •  calcium carbonate  •  dextrose  •  dextrose  •  glucagon (human recombinant)  •  hydrOXYzine  •  insulin lispro  •  ipratropium-albuterol  •  melatonin  •  ondansetron  •  Pharmacy to dose vancomycin  •  promethazine  •  [COMPLETED] Insert peripheral IV **AND** sodium chloride  •  sodium chloride    Labs:  Lab Results (last 24 hours)     Procedure Component Value Units Date/Time    POC Glucose Once [[id number]]  (Normal) Collected: 06/17/21 0710    Specimen: Blood Updated: 06/17/21 0711     Glucose 84 mg/dL      Comment: Serial Number: [id number]:  [id number]       Manual Differential [[id number]]  (Abnormal) Collected: 06/17/21 0330    Specimen: Blood Updated: 06/17/21 0635     Neutrophil % 50.0 %      Lymphocyte % 2.0 %      Monocyte % 8.0 %      Eosinophil % 1.0 %      Basophil % 1.0 %      Bands %  26.0 %      Metamyelocyte % 5.0 %      Myelocyte % 4.0 %      Blasts % 2.0 %      Plasma Cells % 1.0 %      Neutrophils Absolute 5.85 10*3/mm3      Lymphocytes Absolute 0.15 10*3/mm3      Monocytes Absolute 0.62 10*3/mm3      Eosinophils Absolute 0.08 10*3/mm3      Basophils Absolute 0.08 10*3/mm3      Anisocytosis Mod/2+     Macrocytes Slight/1+     Polychromasia Slight/1+     Toxic Granulation Slight/1+     Platelet Morphology Normal    Narrative:      Reviewed by Pathologist within the past 30 days on [id number] .      CBC & Differential [[id number]]  (Abnormal) Collected: 06/17/21 0330     Specimen: Blood Updated: 06/17/21 0635    Narrative:      The following orders were created for panel order CBC & Differential.  Procedure                               Abnormality         Status                     ---------                               -----------         ------                     Scan Slide[[id number]]                                       Final result               CBC Auto Differential[[id number]]        Abnormal            Final result                 Please view results for these tests on the individual orders.    Scan Slide [[id number]] Collected: 06/17/21 0330    Specimen: Blood Updated: 06/17/21 0635     Scan Slide --     Comment: See Manual Differential Results       CBC Auto Differential [[id number]]  (Abnormal) Collected: 06/17/21 0330    Specimen: Blood Updated: 06/17/21 0635     WBC 7.70 10*3/mm3      RBC 2.57 10*6/mm3      Hemoglobin 8.9 g/dL      Hematocrit 26.7 %      .2 fL      MCH 34.6 pg      MCHC 33.2 g/dL      RDW 19.0 %      RDW-SD 68.3 fl      MPV 7.9 fL      Platelets 286 10*3/mm3     Narrative:      The previously reported component NRBC is no longer being reported. Previous result was 0.5 /100 WBC (Reference Range: 0.0-0.2 /100 WBC) on 6/17/2021 at 0532 EDT.    Basic Metabolic Panel [[id number]]  (Abnormal) Collected: 06/17/21 0330    Specimen: Blood Updated: 06/17/21 0528     Glucose 73 mg/dL      BUN 13 mg/dL      Creatinine 0.57 mg/dL      Sodium 134 mmol/L      Potassium 4.2 mmol/L      Chloride 99 mmol/L      CO2 24.0 mmol/L      Calcium 8.4 mg/dL      eGFR Non African Amer 141 mL/min/1.73      BUN/Creatinine Ratio 22.8     Anion Gap 11.0 mmol/L     Narrative:      GFR Normal >60  Chronic Kidney Disease <60  Kidney Failure <15      Occult Blood, Fecal By Immunoassay - Stool, Per Rectum [[id number]]  (Normal) Collected: 06/16/21 2112    Specimen: Stool from Per Rectum Updated: 06/16/21 2126     Occult Blood, Fecal by Immunoassay Negative    POC Glucose Once  [[id number]]  (Abnormal) Collected: 06/16/21 2042    Specimen: Blood Updated: 06/16/21 2043     Glucose 132 mg/dL      Comment: Serial Number: [id number]:  [id number]       POC Glucose Once [[id number]]  (Abnormal) Collected: 06/16/21 1601    Specimen: Blood Updated: 06/16/21 2035     Glucose 131 mg/dL      Comment: Serial Number: [id number]:  [id number]       POC Glucose Once [[id number]]  (Abnormal) Collected: 06/15/21 2113    Specimen: Blood Updated: 06/16/21 2035     Glucose 131 mg/dL      Comment: Serial Number: [id number]:  [id number]              Assessment/Plan     Acute-on-chronic respiratory failure (CMS/HCC)  Respiratory distress  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, worsening  Probable postobstructive pneumonia    Coronary artery disease involving native coronary artery of native heart without angina pectoris    Nonrheumatic aortic valve stenosis    Essential hypertension    Congestive heart failure (CMS/HCC)    Tobacco abuse    Squamous cell carcinoma of lung, right (CMS/HCC)      Stage 2 skin ulcer of sacral region (CMS/HCC)      Infectious disease, pulmonology and palliative care inputs greatly appreciated, thank you.  Also reviewed hematology oncology's notes as well, thank you for your kind input.    Continue rest of current approach.  Agree poor long-term prognosis.     Family and patient have now agreed that he wants to pursue hospice care with return to [facility].  Apparently they can take him back on 12 L or less of oxygen for comfort care.  Await placement.          Ignacia De La Cruz DO  06/17/21  10:47 EDT

## 2021-06-17 NOTE — PROGRESS NOTES
"Pharmacy Antimicrobial Dosing Service    Subjective:  Axel Ramirez is a 70 y.o.male admitted with acute-on-chronic respiratory failure. Pharmacy has been consulted to dose Vancomycin for possible PNA.    PMH: CHF, COPD, squamous cell carcinoma of R lung      Assessment/Plan    1. Day #10 Vancomycin: Goal -600 mcg*h/mL. Current dose 1000 mg (~15 mg/kg ABW) IV q12h. Repeat trough today (6/17) at 1633= 14.4 mcg/ml. [facility] predicts therapeutic AUC= 507 mcg*h/mL and true trough= 15.2 mcg/ml. Will continue current dose. Recommend ordering follow up trough in ~3 days to monitor for further accumulation.     2. Day #10 Ampicillin/Sulbactam: 3 g IV q6h for estCrCl > 30 mL/min    Will continue to monitor drug levels, renal function, culture and sensitivities, and patient clinical status.       Objective:  Relevant clinical data and objective history reviewed:  172.7 cm (68\")   65.9 kg (145 lb 4.5 oz)   Ideal body weight: 68.4 kg (150 lb 12.7 oz)  Body mass index is 22.09 kg/m².    Results from last 7 days   Lab Units 06/17/21  1633 06/14/21  1511 06/11/21  0949   VANCOMYCIN TR mcg/mL 14.40 18.80 16.90     Results from last 7 days   Lab Units 06/17/21  0330 06/16/21  0341 06/15/21  0547   CREATININE mg/dL 0.57* 0.53* 0.64*     Estimated Creatinine Clearance: 80.1 mL/min (A) (by C-G formula based on SCr of 0.57 mg/dL (L)).  I/O last 3 completed shifts:  In: 1150 [P.O.:800; IV Piggyback:350]  Out: 1800 [Urine:1800]    Results from last 7 days   Lab Units 06/17/21  0330 06/16/21  0341 06/15/21  0547   WBC 10*3/mm3 7.70 7.60 8.20     Temperature    06/17/21 0340 06/17/21 0914 06/17/21 1126   Temp: 98.4 °F (36.9 °C) 97.5 °F (36.4 °C) 97.5 °F (36.4 °C)     Baseline culture/source/susceptibility:  Microbiology Results (last 10 days)       ** No results found for the last 240 hours. **              Anti-Infectives (From admission, onward)      Ordered     Dose/Rate Route Frequency Start Stop    06/11/21 1521  " vancomycin (VANCOCIN) 1,000 mg in sodium chloride 0.9 % 250 mL IVPB     Tyra Burnham, PharmD reviewed the order on 06/16/21 0854.   Ordering Provider: Anne Allred MD    1,000 mg Intravenous Every 12 Hours 06/11/21 1600 06/22/21 0359    06/08/21 1347  ampicillin-sulbactam (UNASYN) 3 g in sodium chloride 0.9 % 100 mL IVPB-[facility]     Luciana Amos [facility] reviewed the order on 06/13/21 1026.   Ordering Provider: Herminia Stanton APRN    3 g Intravenous Every 6 Hours 06/08/21 2000 06/22/21 1959    06/08/21 1412  vancomycin 1250 mg/250 mL 0.9% NS IVPB ([facility])     Ordering Provider: Herminia Stanton APRN    20 mg/kg × 67.3 kg  over 75 Minutes Intravenous Once 06/08/21 1500 06/08/21 2035    06/08/21 1347  Pharmacy to dose vancomycin     Luciana Amos [facility] reviewed the order on 06/13/21 1027.   Ordering Provider: Herminia Stanton APRN     Does not apply Continuous PRN 06/08/21 1346 06/22/21 1345    06/07/21 1059  piperacillin-tazobactam (ZOSYN) IVPB 4.5 g in 100 mL NS (CD)     Ordering Provider: Ignacia De La Cruz DO    4.5 g  over 30 Minutes Intravenous Once 06/07/21 1145 06/07/21 1326    06/07/21 0625  cefTRIAXone (ROCEPHIN) in SWFI 1 gram/10ml IV PUSH syringe     Ordering Provider: Higinio Flores MD    1 g  over 5 Minutes Intravenous Once 06/07/21 0627 06/07/21 0711    06/07/21 0625  azithromycin 500 mg IVPB in 250 mL NS     Ordering Provider: Higinio Flores MD    500 mg  over 60 Minutes Intravenous Once 06/07/21 0626 06/07/21 0806            Kimberly Richards [facility]  06/17/21 19:13 EDT

## 2021-06-17 NOTE — PROGRESS NOTES
Palliative Care Daily Progress Note     C/C: shortness of breath    S: Axel Ramirez is a 70 y.o. male who presented to [facility] from assisted living on 6/7 with reports of shortness of breath. EMS was called after patient received a mini neb treatment at the facility but his breathing became more severe, he had a reported saturation of 76% on 4 L at his extended care facility.  Brought to the ER and was noted with AE COPD and Healthcare acquired pneumonia and admitted for further treatment.      Chest x-ray showed left lung pneumonia and a right middle lobe lung mass.  White count 10.1, hemoglobin 9.2, .7, platelets 115k.  D-dimer was 2.99 (< 0.6).       Chest CT showed a 9 cm right upper lobe lung mass occluding the right upper lobe bronchus with complete right upper and right middle lobe collapse.  There was no pulmonary emboli.  There was possible aspiration pneumonia and stable mediastinal lymphadenopathy.  A new 12 mm nodule was seen in the left lower lobe.  Adrenal glands were normal.  He was started on steroids and Zosyn.         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  Recently started on second line therapy Taxol/carboplatin/ipilimumab and Nivolumab with Neulasta support on 5/24/2021.     Pulmonary was consulted. Started on inhaled corticosteroids and antibiotics.      Cardiology for consulted for elevated BNP.      6/14 Palliative care consult for goals of care discussion.This patient is known to our service. We have seen him on previous admissions. He had previously decided to pursue hospice services but changed his mind prior to discharge and decided to continue with aggressive measures including chemotherapy and surgical interventions. We completed a POST form which confirmed patient would want to be a DNR with limited medical interventions.      6/15 No acute events  "overnight. Patient remains on high flow oxygen.      6/16 Continues to require high flow oxygen at 10L.     6/17 No acute events overnight. No complaints of pain.         O: Code Status:   Code Status and Medical Interventions:   Ordered at: 06/07/21 0956     Level Of Support Discussed With:    Health Care Surrogate     Code Status:    No CPR     Medical Interventions (Level of Support Prior to Arrest):    Comfort Measures      Advanced Directives: Advance Directive Status: Patient does not have advance directive   Goals of Care: Ongoing.   Palliative Performance Scale Score:       /80 (BP Location: Right arm, Patient Position: Lying)   Pulse 98   Temp 97.5 °F (36.4 °C) (Oral)   Resp 18   Ht 172.7 cm (68\")   Wt 65.9 kg (145 lb 4.5 oz)   SpO2 97%   BMI 22.09 kg/m²     Intake/Output Summary (Last 24 hours) at 6/17/2021 1024  Last data filed at 6/17/2021 0913  Gross per 24 hour   Intake 910 ml   Output 1800 ml   Net -890 ml         Review of Systems   Constitutional: Positive for fatigue.        Generalized paain   Respiratory: Positive for shortness of breath.    Neurological: Positive for weakness.   Psychiatric/Behavioral: The patient is nervous/anxious.    All other systems reviewed and are negative.        Physical Exam  Vitals and nursing note reviewed.   Constitutional:       Appearance: He is ill-appearing.   HENT:      Head: Normocephalic and atraumatic.      Nose: Nose normal.      Comments: Nasal canula      Mouth/Throat:      Mouth: Mucous membranes are dry.      Pharynx: Oropharynx is clear.   Eyes:      Extraocular Movements: Extraocular movements intact.      Conjunctiva/sclera: Conjunctivae normal.      Pupils: Pupils are equal, round, and reactive to light.   Cardiovascular:      Rate and Rhythm: Normal rate.      Pulses: Normal pulses.   Pulmonary:      Effort: Pulmonary effort is normal.   Abdominal:      General: Abdomen is flat.   Musculoskeletal:         General: Normal range of " motion.      Cervical back: Normal range of motion.   Skin:     General: Skin is warm and dry.   Neurological:      General: No focal deficit present.      Mental Status: He is alert and oriented to person, place, and time. Mental status is at baseline.   Psychiatric:      Comments: anxious         Labs:   Results from last 7 days   Lab Units 06/17/21  0330   WBC 10*3/mm3 7.70   HEMOGLOBIN g/dL 8.9*   HEMATOCRIT % 26.7*   PLATELETS 10*3/mm3 286     Results from last 7 days   Lab Units 06/17/21  0330   SODIUM mmol/L 134*   POTASSIUM mmol/L 4.2   CHLORIDE mmol/L 99   CO2 mmol/L 24.0   BUN mg/dL 13   CREATININE mg/dL 0.57*   GLUCOSE mg/dL 73   CALCIUM mg/dL 8.4*     Results from last 7 days   Lab Units 06/17/21  0330 06/15/21  0547   SODIUM mmol/L 134* 137   POTASSIUM mmol/L 4.2 4.7   CHLORIDE mmol/L 99 105   CO2 mmol/L 24.0 24.0   BUN mg/dL 13 14   CREATININE mg/dL 0.57* 0.64*   CALCIUM mg/dL 8.4* 8.4*   BILIRUBIN mg/dL  --  0.3   ALK PHOS U/L  --  93   ALT (SGPT) U/L  --  36   AST (SGOT) U/L  --  38   GLUCOSE mg/dL 73 87         Diagnostics: Reviewed    A:   Patient Active Problem List   Diagnosis   • Coronary artery disease involving native coronary artery of native heart without angina pectoris   • Nonrheumatic aortic valve stenosis   • Mixed hyperlipidemia   • Essential hypertension   • Fever   • Presence of aortocoronary bypass graft   • Congestive heart failure (CMS/HCC)   • Hx of aortic valve replacement   • CAP (community acquired pneumonia)   • Tobacco abuse   • Postobstructive pneumonia   • Lung mass   • Squamous cell carcinoma of lung, right (CMS/HCC)   • Diastolic CHF, acute (CMS/HCC)   • Pneumonia of right lung due to infectious organism   • Pneumonia due to infectious organism   • Moderate malnutrition (CMS/HCC)   • Acute on chronic respiratory failure with hypoxia (CMS/HCC)   • COPD with acute exacerbation (CMS/HCC)   • Delirium   • Brain metastasis (CMS/HCC)   • Acute-on-chronic respiratory failure  "(CMS/[facility])   • Respiratory distress   • Stage 2 skin ulcer of sacral region (CMS/[facility])       S/Sx:    6/14 Met with patient this afternoon. He is sleeping, wife is not present.  Per nursing, patient asks that his wife is present to assist in decision making. And from previous visits we had with patient, he prefers to have his wife present. Patient has completed POST form confirming DNR/DNI with limited interventions. He was discharged with rehab at previous visit. He was currently undergoing chemotherapy with Dr. Lewis. We will follow up tomorrow. According to staff, patients wife and daughter had medical emergency prior to our visit.   6/15 Met with wife and patient this afternoon to discuss long term goals. Wife is very anxious and is asking about treatment options and what next steps would be to get him home. She wants to take him home but also wants to continue with aggressive treatment in terms of rehab. We discussed poor prognosis and comfort measures. Wife asks to speak with Dr. Lewis and NP prior to making any decisions. Patient does say that he just \"wants to go home and be with his family.\" And that all of this \"is scary.\" I will reach out to Hem Onc NP to see if conversation can be facilitated.  6/16 Patient and wife met with oncology this am. Per wife, they want to continue to pursue any and all treatment options. They understand that the patient is too weak for treatment at this time. We discussed how even if patient has chemotherapy options, his overall functional status may prevent him from getting it. He has been in rehab for months with little improvement in mobility. We talked about hospice vs aggressive measures. Wife is unsure if hospice is appropriate. She asks \"why cant we do chemotherapy and hospice.\" We again discussed how poor his clinical status was and the likelihood of being able to get chemotherapy being low. Per oncology, even with chemotherapy, may only get a few additional months. " "Patient says \"It seems like hospice is my only option.\" He asks for wife to make the decision for him. She is reluctant, again asking to take him home. Patient says that he doesn't want to go home and he wants to be comfortable. We will place a referral to [facility] to discuss options for wife. Discussed with Hem/Onc.   6/17 Met with wife and patient this afternoon. She states that [facility] never reached out to them. We did follow up with [facility] liason. They plan to meet with patient and wife this afternoon. We provided emotional support. Patient states he is uncomfortable today and not feeling better.   Post obstructive pneumonia: ID is following. Patient on IV antibiotics.   CHF: Cardiology is consulted. Echocardiogram showed no vegetation and EF 65%.  On aspirin and Lovenox prophylaxis.  Lung Ca: Follows with Dr. Lewis. Had  Recent chemotherapy on 5/4. Plans for surgical resection of brain mets per Dr. Stroud in near future. Hem Onc consulted.   Sacral wound: wound care following.   Anemia: Started on retacrit per Hem Onc.       P:   Palliative Care Team will continue to follow patient.     Radha Darden, APRN  6/17/2021  Time spent: 37 min  "

## 2021-06-17 NOTE — PLAN OF CARE
Goal Outcome Evaluation:  Plan of Care Reviewed With: patient        Progress: no change  Outcome Summary: Pt is resting in bed. Pt shows no s/s of distress and vitals are stable. Pt voiced no concerns. Pt is on 10L high flow NC. Central line dressing change was completed this shift. Bed alarm on and call light within reach. Will continue to monitor.

## 2021-06-18 NOTE — SIGNIFICANT NOTE
06/17/21 1400   Coping/Psychosocial   Observed Emotional State calm;cooperative;quiet   Verbalized Emotional State other (see comments)  (quiet, said a few words)   Trust Relationship/Rapport empathic listening provided   Family/Support Persons spouse   Involvement in Care at bedside;attentive to patient;interacting with patient;participating in care;supportive of patient   Family/Support System Care caregiver stress acknowledged;support provided   Diversional Activities television   Additional Documentation Spiritual Care (Group)   Spiritual Care   Spiritual Care Visit Type follow-up   Spiritual Care Source  initiative   Receptivity to Spiritual Care visit welcomed   Spiritual Care Request coping/stress of illness support;family support;hospitality support;mood/anxiety support;prayer support;spiritual/moral support   Spiritual Care Interventions prayer support provided;supportive conversation provided   Response to Spiritual Care receptive of support;thanks expressed;visit helpful   Use of Spiritual Resources prayer;spirituality for coping, indicated strong use of   Spiritual Care Follow-Up follow-up planned regularly for general support   Coping/Psychosocial Interventions   Supportive Measures active listening utilized;positive reinforcement provided;verbalization of feelings encouraged      informed that pt experiencing anxiety concerning death. Pt in chair with spouse by his side helping him eat. Pt did not engage  concerning anxiety and was content with a prayer.  will continue to follow up and engage pt about his fear/anxiety concerning death.  will continue to support spouse who has been more tearful lately and has wondered, in passing, how she'll go on without him. She'll quickly change that thought and ask for prayer. Their johnny is continuing to sustain them, but hope seems to be running out.

## 2021-06-18 NOTE — PROGRESS NOTES
The following information has been reviewed: renal function, pertinent labs, cultures, dosing body weight and patient clinical status. Will continue same antimicrobial regimen today.    1. Day #10 Vancomycin: 1000mg IV q12h  removed level for 6/20/21 and will continue to monitor. Current plan is for last dose on 6/22/21.      2. Day #10 Ampicillin/Sulbactam: 3 g IV q6h for estCrCl > 30 mL/min    Luciana Amos, [facility]  06/18/21 14:07 EDT

## 2021-06-18 NOTE — NURSING NOTE
Nurse came on shift pt c/o SOB and oxygen sats in the 80s on 10L HF nasal cannula. Pt was put on non- rebreather and sats quickly returned to the 90s.

## 2021-06-18 NOTE — CASE MANAGEMENT/SOCIAL WORK
Continued Stay Note  [facility]     Patient Name: Axel Ramirez  MRN: [id number]  Today's Date: 6/18/2021    Admit Date: 6/7/2021    Discharge Plan     Row Name 06/18/21 1624       Plan    Plan  DC Plan: Return to [facility], quan approved until the 20th, no PASRR required. [facility] can take liter flow up to 12L [facility] pending plan for DC.        Chart review only.             Maninder Bal RN

## 2021-06-18 NOTE — PROGRESS NOTES
LOS: 11 days   Patient Care Team:  Sandoval Stevenson FNP as PCP - General  Sandoval Stevenson FNP as PCP - Family Medicine  Axel Lewis MD as Consulting Physician (Hematology and Oncology)  Iglesia Springer MD as Consulting Physician (Cardiology)    Chief Complaint:  poorly controlled pain and generalized pain over the last shift,  Subjective Alert, ill appearing but at least sitting up in chair, very limited historian today    Interval History:     Patient Complaints: Vague complaint of just not feeling well, achy usual pain medicine not really seeming to keep him comfortable.  Patient Denies: No chest pain or hemoptysis, no nausea vomiting or diarrhea  History taken from: patient but very limited    Review of Systems:    All systems were reviewed and negative except for: See interval history    Objective     Vital Signs  Temp:  [97.4 °F (36.3 °C)-97.7 °F (36.5 °C)] 97.7 °F (36.5 °C)  Heart Rate:  [] 93  Resp:  [18-24] 20  BP: (112-150)/(73-80) 124/80    Physical Exam:       General Appearance:   Appears awake, very limited historian today, is mumbling a lot and really cannot make himself understood.  To my eye he appears terminally ill   Head:    Normocephalic, without obvious abnormality, atraumatic   Eyes:            Conjunctivae and sclerae normal, no   icterus, no pallor, corneas clear, PERRLA   Throat:   No oral lesions, no thrush, oral mucosa moist   Neck:   No adenopathy, supple, trachea midline, no thyromegaly, no   carotid bruit, no JVD   Lungs:    Course bilateral lung bases and upper airway sounds are very coarse, worse on left lung field compared to right this morning    Heart:   Distant, regular rhythm and normal rate, normal S1 and S2, no            murmur, no gallop, no rub, no click   Chest Wall:    No abnormalities observed   Abdomen:     Normal bowel sounds, no masses, no organomegaly, soft        non-tender, non-distended, no guarding, no rebound                 tenderness,    Rectal:     Deferred   Extremities:  Extremities are elevated, nonpitting edema to just pretibial area bilaterally   Pulses:   Pulses palpable and equal bilaterally   Skin:   No bleeding, bruising or rash   Lymph nodes:   No palpable adenopathy   Neurologic:  Generalized debility, patient will follow very simple commands this morning   Radiology:  XR Chest 1 View    Result Date: 6/14/2021  Unchanged chest. Slot 66 Electronically signed by:  Alcides Frazier D.O.  6/14/2021 12:49 AM    XR Chest 1 View    Result Date: 6/11/2021  1. Mild worsening of diffuse interstitial opacities throughout the left lung that may relate to infection or asymmetric pulmonary edema. 2. Redemonstration of 9 cm right upper lobe mass consistent with known malignancy. 3. Stable additional chronic findings above.  Electronically Signed By-Saleem Ferguson MD On:6/11/2021 7:40 AM This report was finalized on [id number] by  Saleem Ferguson MD.         Results Review:     I reviewed the patient's new clinical results.  I reviewed the patient's new imaging results and agree with the interpretation.    Medication Review:   Scheduled Meds:ampicillin-sulbactam, 3 g, Intravenous, Q6H  aspirin, 81 mg, Oral, Daily  buprenorphine-naloxone, 1.5 tablet, Sublingual, Daily  enoxaparin, 40 mg, Subcutaneous, Q24H  epoetin asia-epbx, 40,000 Units, Subcutaneous, Weekly  gabapentin, 300 mg, Oral, TID  guaiFENesin, 600 mg, Oral, Q12H  insulin lispro, 0-7 Units, Subcutaneous, TID With Meals  Shabbir, 1 packet, Oral, BID  lurasidone, 40 mg, Oral, Daily  megestrol acetate, 200 mg, Oral, TID  multivitamin with minerals, 1 tablet, Oral, Daily  pantoprazole, 40 mg, Oral, Daily  polyethylene glycol, 17 g, Oral, Daily  senna-docusate sodium, 2 tablet, Oral, BID  sertraline, 50 mg, Oral, Daily  spironolactone, 25 mg, Oral, Daily  vancomycin, 1,000 mg, Intravenous, Q12H  Zinc Oxide, , Apply externally, BID      Continuous Infusions:Pharmacy to dose vancomycin,        PRN Meds:.•  acetaminophen  •  albuterol  •  senna-docusate sodium **AND** polyethylene glycol **AND** bisacodyl **AND** bisacodyl  •  calcium carbonate  •  dextrose  •  dextrose  •  glucagon (human recombinant)  •  hydrOXYzine  •  insulin lispro  •  ipratropium-albuterol  •  melatonin  •  ondansetron  •  Pharmacy to dose vancomycin  •  promethazine  •  [COMPLETED] Insert peripheral IV **AND** sodium chloride  •  sodium chloride    Labs:  Lab Results (last 24 hours)     Procedure Component Value Units Date/Time    POC Glucose Once [[id number]]  (Normal) Collected: 06/18/21 0730    Specimen: Blood Updated: 06/18/21 0731     Glucose 71 mg/dL      Comment: Serial Number: [id number]:  [id number]       Manual Differential [[id number]]  (Abnormal) Collected: 06/18/21 0508    Specimen: Blood Updated: 06/18/21 0700     Neutrophil % 67.0 %      Lymphocyte % 7.0 %      Monocyte % 2.0 %      Bands %  16.0 %      Myelocyte % 4.0 %      Promyelocyte % 3.0 %      Blasts % 1.0 %      Neutrophils Absolute 5.15 10*3/mm3      Lymphocytes Absolute 0.43 10*3/mm3      Monocytes Absolute 0.12 10*3/mm3      Anisocytosis Slight/1+     Polychromasia Slight/1+     Toxic Granulation Slight/1+     Platelet Morphology Normal    Narrative:      Reviewed by Pathologist within the past 30 days on 06.14.2021.      CBC & Differential [[id number]]  (Abnormal) Collected: 06/18/21 0508    Specimen: Blood Updated: 06/18/21 0700    Narrative:      The following orders were created for panel order CBC & Differential.  Procedure                               Abnormality         Status                     ---------                               -----------         ------                     Scan Slide[[id number]]                                       Final result               CBC Auto Differential[[id number]]        Abnormal            Final result                 Please view results for these tests on the individual orders.    Scan Slide  [[id number]] Collected: 06/18/21 0508    Specimen: Blood Updated: 06/18/21 0700     Scan Slide --     Comment: See Manual Differential Results       CBC Auto Differential [[id number]]  (Abnormal) Collected: 06/18/21 0508    Specimen: Blood Updated: 06/18/21 0700     WBC 6.20 10*3/mm3      RBC 2.72 10*6/mm3      Hemoglobin 9.4 g/dL      Hematocrit 28.4 %      .5 fL      MCH 34.5 pg      MCHC 33.0 g/dL      RDW 19.3 %      RDW-SD 69.6 fl      MPV 7.4 fL      Platelets 335 10*3/mm3     Narrative:      The previously reported component NRBC is no longer being reported. Previous result was 0.8 /100 WBC (Reference Range: 0.0-0.2 /100 WBC) on 6/18/2021 at 0621 EDT.    Comprehensive Metabolic Panel [[id number]]  (Abnormal) Collected: 06/18/21 0508    Specimen: Blood Updated: 06/18/21 0649     Glucose 137 mg/dL      BUN 16 mg/dL      Creatinine 0.64 mg/dL      Sodium 135 mmol/L      Potassium 4.3 mmol/L      Chloride 100 mmol/L      CO2 25.0 mmol/L      Calcium 8.3 mg/dL      Total Protein 6.4 g/dL      Albumin 2.40 g/dL      ALT (SGPT) 45 U/L      AST (SGOT) 33 U/L      Alkaline Phosphatase 94 U/L      Total Bilirubin 0.3 mg/dL      eGFR Non African Amer 124 mL/min/1.73      Globulin 4.0 gm/dL      A/G Ratio 0.6 g/dL      BUN/Creatinine Ratio 25.0     Anion Gap 10.0 mmol/L     Narrative:      GFR Normal >60  Chronic Kidney Disease <60  Kidney Failure <15      Blood Gas, Arterial - [[id number]]  (Abnormal) Collected: 06/17/21 2148    Specimen: Arterial Blood Updated: 06/17/21 2158     Site Right Radial     Brice's Test Positive     pH, Arterial 7.416 pH units      pCO2, Arterial 36.0 mm Hg      pO2, Arterial 153.5 mm Hg      HCO3, Arterial 23.1 mmol/L      Base Excess, Arterial -1.2 mmol/L      Comment: Serial Number: [id number]:  [id number]        O2 Saturation, Arterial 99.4 %      CO2 Content 24.2 mmol/L      Barometric Pressure for Blood Gas --     Comment: N/A        Modality NRB     FIO2 100 %      Hemodilution No     POC Glucose Once [[id number]]  (Abnormal) Collected: 06/17/21 1944    Specimen: Blood Updated: 06/17/21 1944     Glucose 152 mg/dL      Comment: Serial Number: [id number]:  [id number]       POC Glucose Once [[id number]]  (Abnormal) Collected: 06/17/21 1738    Specimen: Blood Updated: 06/17/21 1739     Glucose 160 mg/dL      Comment: Serial Number: [id number]:  [id number]       Vancomycin, Trough [[id number]]  (Normal) Collected: 06/17/21 1633    Specimen: Blood Updated: 06/17/21 1735     Vancomycin Trough 14.40 mcg/mL     Narrative:      Therapeutic Ranges for Vancomycin    Vancomycin Random   5.0-40.0 mcg/mL  Vancomycin Trough   5.0-20.0 mcg/mL  Vancomycin Peak     20.0-40.0 mcg/mL           Assessment/Plan     Acute-on-chronic respiratory failure (CMS/HCC)  Respiratory distress  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, worsening  Probable postobstructive pneumonia    Coronary artery disease involving native coronary artery of native heart without angina pectoris    Nonrheumatic aortic valve stenosis    Essential hypertension    Congestive heart failure (CMS/HCC)    Tobacco abuse    Squamous cell carcinoma of lung, right (CMS/HCC)      Stage 2 skin ulcer of sacral region (CMS/HCC)      Infectious disease, pulmonology and palliative care inputs greatly appreciated, thank you.  Also reviewed hematology oncology's notes as well, thank you for your kind input.    Patient now DNR.  Is to meet with hospice according to palliative care note and this meeting will be set up for today.    After some discussion with pharmacy, thank you for your kind input, we will add a low-dose short acting opioid to the patient's current Suboxone dose.  We will start him on hydrocodone 5/325 every 4 hours as needed.    Continue rest of current approach.  Agree poor long-term prognosis.               Ignacia De La Cruz DO  06/18/21  10:05 EDT

## 2021-06-18 NOTE — PLAN OF CARE
Goal Outcome Evaluation:         Patient has really bad anxiety. [facility] came in and some decisions were made concerning care. Patient is scared to go home and feels safe here.  Jono added some anxiety medication and morphine PRN.

## 2021-06-18 NOTE — PROGRESS NOTES
Daily Progress Note    Coronary artery disease involving native coronary artery of native heart without angina pectoris    Nonrheumatic aortic valve stenosis    Essential hypertension    Congestive heart failure (CMS/HCC)    Tobacco abuse    Squamous cell carcinoma of lung, right (CMS/HCC)    Acute-on-chronic respiratory failure (CMS/HCC)    Respiratory distress    Stage 2 skin ulcer of sacral region (CMS/HCC)    Assessment    Acute-on-chronic respiratory distress  Pneumonia most likely post obstruction  COPD exacerbation  Metastatic lung cancer squamous cell Stage III   CHF  Opioid dependence  Stage 2 skin ulcer of sacral region      COVID-19 and respiratory viral DNA panel is negative    Plan    DNR possible hospice continues on 10 liter oximizer  Bronchodilator as needed  Steroids completed  Mucinex  IS    Antibiotics per ID unasyn and vancomycin       LOS: 11 days       Subjective         Objective     Vital signs for last 24 hours:  Vitals:    06/17/21 1937 06/18/21 0405 06/18/21 0936 06/18/21 1124   BP: 112/73 120/78 124/80 126/79   BP Location: Left arm Left arm Left arm Left arm   Patient Position: Sitting Sitting Sitting Sitting   Pulse: (!) 125 87 93 81   Resp: 23 20 20 20   Temp: 97.6 °F (36.4 °C) 97.4 °F (36.3 °C) 97.7 °F (36.5 °C) 97.6 °F (36.4 °C)   TempSrc: Axillary Oral Oral Oral   SpO2: 99% 97% 100% 100%   Weight:       Height:           Intake/Output last 3 shifts:  I/O last 3 completed shifts:  In: 950 [P.O.:600; IV Piggyback:350]  Out: 1400 [Urine:1400]  Intake/Output this shift:  No intake/output data recorded.      Radiology  Imaging Results (Last 24 Hours)     ** No results found for the last 24 hours. **          Labs:  Results from last 7 days   Lab Units 06/18/21  0508   WBC 10*3/mm3 6.20   HEMOGLOBIN g/dL 9.4*   HEMATOCRIT % 28.4*   PLATELETS 10*3/mm3 335     Results from last 7 days   Lab Units 06/18/21  0508   SODIUM mmol/L 135*   POTASSIUM mmol/L 4.3   CHLORIDE mmol/L 100   CO2 mmol/L  25.0   BUN mg/dL 16   CREATININE mg/dL 0.64*   CALCIUM mg/dL 8.3*   BILIRUBIN mg/dL 0.3   ALK PHOS U/L 94   ALT (SGPT) U/L 45*   AST (SGOT) U/L 33   GLUCOSE mg/dL 137*     Results from last 7 days   Lab Units 06/17/21  2148   PH, ARTERIAL pH units 7.416   PO2 ART mm Hg 153.5*   PCO2, ARTERIAL mm Hg 36.0   HCO3 ART mmol/L 23.1     Results from last 7 days   Lab Units 06/18/21  0508 06/15/21  0547 06/14/21  0617   ALBUMIN g/dL 2.40* 2.30* 2.40*                               Meds:   SCHEDULE  ampicillin-sulbactam, 3 g, Intravenous, Q6H  aspirin, 81 mg, Oral, Daily  buprenorphine-naloxone, 1.5 tablet, Sublingual, Daily  enoxaparin, 40 mg, Subcutaneous, Q24H  epoetin asia-epbx, 40,000 Units, Subcutaneous, Weekly  gabapentin, 300 mg, Oral, TID  guaiFENesin, 600 mg, Oral, Q12H  insulin lispro, 0-7 Units, Subcutaneous, TID With Meals  Shabbir, 1 packet, Oral, BID  lurasidone, 40 mg, Oral, Daily  megestrol acetate, 200 mg, Oral, TID  multivitamin with minerals, 1 tablet, Oral, Daily  pantoprazole, 40 mg, Oral, Daily  polyethylene glycol, 17 g, Oral, Daily  senna-docusate sodium, 2 tablet, Oral, BID  sertraline, 50 mg, Oral, Daily  spironolactone, 25 mg, Oral, Daily  vancomycin, 1,000 mg, Intravenous, Q12H  Zinc Oxide, , Apply externally, BID      Infusions  Pharmacy to dose vancomycin,       PRNs  •  acetaminophen  •  albuterol  •  senna-docusate sodium **AND** polyethylene glycol **AND** bisacodyl **AND** bisacodyl  •  calcium carbonate  •  dextrose  •  dextrose  •  glucagon (human recombinant)  •  HYDROcodone-acetaminophen  •  hydrOXYzine  •  insulin lispro  •  ipratropium-albuterol  •  melatonin  •  ondansetron  •  Pharmacy to dose vancomycin  •  promethazine  •  [COMPLETED] Insert peripheral IV **AND** sodium chloride  •  sodium chloride    Physical Exam:  Physical Exam  Vitals reviewed.   Constitutional:       General: He is sleeping.      Appearance: He is ill-appearing.   Cardiovascular:      Heart sounds: Normal heart  sounds.   Pulmonary:      Breath sounds: Wheezing present.   Skin:     General: Skin is warm and dry.      Coloration: Skin is pale.   Neurological:      Mental Status: He is oriented to person, place, and time.         ROS  Review of Systems   Unable to perform ROS: Other       I reviewed the patient's new clinical results.      Electronically signed by Ángela Bean MD, 06/18/21 at 15:36 EDT.

## 2021-06-18 NOTE — PROGRESS NOTES
Hematology/Oncology Inpatient Progress Note    PATIENT NAME: Axel Ramirez  : 1950  MRN: [id number]    CHIEF COMPLAINT: Metastatic squamous cell carcinoma of the right upper lobe lung and MDS    HISTORY OF PRESENT ILLNESS:  70 y.o. male presented to [facility] ER on 2021 with shortness of air and hypoxia.  He had an acute onset on the day of admission of worsening shortness of breath with O2 saturation of 76% on 4 L/min O2.  The ECF called EMS.  He was treated with nebulizer on the way to the ER.  Chest x-ray showed left lung pneumonia and a right middle lobe lung mass.  White count 10.1, hemoglobin 9.2, .7, platelets 115k.  D-dimer was 2.99 (< 0.6).  Chest CT showed a 9 cm right upper lobe lung mass occluding the right upper lobe bronchus with complete right upper and right middle lobe collapse.  There was no pulmonary emboli.  There was possible aspiration pneumonia and stable mediastinal lymphadenopathy.  A new 12 mm nodule was seen in the left lower lobe.  Adrenal glands were normal.  He was started on steroids and Zosyn.  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  Recently started on second line therapy Taxol/carboplatin/ipilimumab and Nivolumab with Neulasta support on 2021.     21  Hematology/Oncology was consulted for his metastatic squamous cell carcinoma of the right upper lobe lung.  He is an established patient.  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  Initially treated with concomitant weekly chemotherapy (paclitaxel and carboplatin x7) and radiation therapy from 2020. He completed radiation therapy on 2021.  He completed the weekly portion of his chemotherapy on 2021.  He received 1 cycle of every 21-day carboplatin /Taxol with  Neulasta support on 2/9/2021.  He subsequently was admitted to [facility], for hypoxia with respiratory failure requiring intubation.  MRI of the brain showed a left occipital lobe lesion with vasogenic edema.  He received radiation, x1 fraction, on 3/3/2021 to the left occipital lobe. One month later, a brain MRI showed enlargement of the left occipital lobe lesion.  PET scan showed the right upper lobe lung mass to be 10 cm in size with a decrease in the peripheral FDG uptake suggesting interval response to treatment.  There was a decrease in the size and FDG uptake in the mediastinal lymph nodes.  There was no distant metastatic disease.  The lesion in the left occipital lobe was FDG avid.  Right upper lobe lung collapse had improved with aeration.  He was referred to Dr. Tay for evaluation of his brain MRI which was felt to be due to to post SRS changes and not progression.  He was seen in follow-up with plan to start chemotherapy with immunotherapy.  Paclitaxel and carboplatin doses were decreased by 20% due to pancytopenia with prior treatment.  Repeat brain MRI, on 5/13/2021, showed an increase in the size of the left occipital lobe with an increase in surrounding edema.  There were no new masses.  The patient was referred to Dr. Luna for surgical resection versus MRI SPECT/perfusion at [facility].  Dr. Luna felt the lesion was a combination of necrosis and tumor progression and recommended surgical resection.  He started treatment on 5/24/2021 (chemotherapy plus immunotherapy) as his surgical resection was to be done after medical clearance.  He had a return appointment with Dr. Luna on 6/9/2021 following an MRI of his brain.  -MDS-diagnosed January 2021.  He was anemic and was found to have GOGO and malabsorption on oral therapy.  His bone marrow revealed increased iron storage and mild erythroid atypia in January 2021.  Stool heme was negative on 5/13/2021. He has received IV iron but has  never required Retacrit.  Last office visit 5/24/2021, complaining of hypoxia and tachypnea with anxiety.  White count 9.05, hemoglobin 11.34, .8 and platelets 220.  He was starting to gain weight and Megace was continued.  For his cough and hypoxia, he was treated with Augmentin and albuterol nebulizer.  He had oral thrush which was treated with nystatin swish and swallow.  TSH 0.287 (0.282-4.0), iron 62 (), ferritin 4185 (), iron saturation 32 (20-55), TIBC 196 (228-428).     PCP: Sandoval Stevenson FNP     INTERVAL HISTORY:  • 6/8/2021-Retacrit 40,000 units subcu weekly initiated.  Hemoglobin 7.2.  • 6/9/2021-PT 11.5 (9.6-11.7), PTT 27.7 (24-31).  Hemoglobin 7.2, platelets 118,000.  Haptoglobin 465 ().  CMV IgM <30 (<30).  Cardiac ejection fraction 65%.   • 6/10/2021-EBV VCA IgM less than 36 (less than 36).  • 6/11/2021-hemoglobin 6.9.  1 unit pRBCs given.  WBC 13.8 with 18% bands.  • 6/12/2021-hemoglobin 9.6, WBC 12.1 with 8% bands.  Folate 13.7 (4.78-24.2), vitamin B12 more than 2000 (211-946).  Creatinine 0.56 (0.76-1.27).  Iron 42 (), TIBC 146 (298-536), iron saturation 29 (20-50), ferritin 3721 (), reticulocyte count 3.44 (0.7-1.9).  • 6/13/2021- hemoglobin 9.0, .0, white blood count 9.2. Copper 122 ().  Chest x-ray unchanged.  • 6/14/2021-palliative care consultation.  • [facility] up to 12 L. Patient is a DNR.  • 6/18/21 stool heme negative.  Patient and family leaning towards hospice care. Hemoglobin 9.4.    History of present illness reviewed since last visit and changes noted on 06/18/21.    Subjective   Reports some nausea more shortness of breath.    ROS:  Review of Systems   Constitutional: Negative for activity change, chills, fatigue, fever and unexpected weight change.   HENT: Negative for congestion, dental problem, hearing loss, mouth sores, nosebleeds, sore throat and trouble swallowing.     Eyes: Negative for photophobia and visual disturbance.   Respiratory: Positive for shortness of breath. Negative for cough and chest tightness.    Cardiovascular: Negative for chest pain, palpitations and leg swelling.   Gastrointestinal: Positive for nausea. Negative for abdominal distention, abdominal pain, blood in stool, constipation, diarrhea and vomiting.   Endocrine: Negative for cold intolerance and heat intolerance.   Genitourinary: Negative for decreased urine volume, difficulty urinating, dysuria, frequency, hematuria and urgency.   Musculoskeletal: Negative for arthralgias and gait problem.   Skin: Negative for rash and wound.   Neurological: Negative for dizziness, tremors, weakness, light-headedness, numbness and headaches.   Hematological: Negative for adenopathy. Does not bruise/bleed easily.   Psychiatric/Behavioral: Negative for confusion and hallucinations. The patient is not nervous/anxious.    All other systems reviewed and are negative.     MEDICATIONS:    Scheduled Meds:  ampicillin-sulbactam, 3 g, Intravenous, Q6H  aspirin, 81 mg, Oral, Daily  buprenorphine-naloxone, 1.5 tablet, Sublingual, Daily  enoxaparin, 40 mg, Subcutaneous, Q24H  epoetin asia-epbx, 40,000 Units, Subcutaneous, Weekly  gabapentin, 300 mg, Oral, TID  guaiFENesin, 600 mg, Oral, Q12H  insulin lispro, 0-7 Units, Subcutaneous, TID With Meals  Shabbir, 1 packet, Oral, BID  lurasidone, 40 mg, Oral, Daily  megestrol acetate, 200 mg, Oral, TID  multivitamin with minerals, 1 tablet, Oral, Daily  pantoprazole, 40 mg, Oral, Daily  polyethylene glycol, 17 g, Oral, Daily  senna-docusate sodium, 2 tablet, Oral, BID  sertraline, 50 mg, Oral, Daily  spironolactone, 25 mg, Oral, Daily  vancomycin, 1,000 mg, Intravenous, Q12H  Zinc Oxide, , Apply externally, BID       Continuous Infusions:  Pharmacy to dose vancomycin,        PRN Meds:  •  acetaminophen  •  albuterol  •  senna-docusate sodium **AND** polyethylene glycol **AND** bisacodyl  "**AND** bisacodyl  •  calcium carbonate  •  dextrose  •  dextrose  •  glucagon (human recombinant)  •  HYDROcodone-acetaminophen  •  hydrOXYzine  •  insulin lispro  •  ipratropium-albuterol  •  melatonin  •  ondansetron  •  Pharmacy to dose vancomycin  •  promethazine  •  [COMPLETED] Insert peripheral IV **AND** sodium chloride  •  sodium chloride     ALLERGIES:  No Known Allergies    Objective    VITALS:   /79 (BP Location: Left arm, Patient Position: Sitting)   Pulse 81   Temp 97.6 °F (36.4 °C) (Oral)   Resp 20   Ht 172.7 cm (68\")   Wt 65.9 kg (145 lb 4.5 oz)   SpO2 100%   BMI 22.09 kg/m²     PHYSICAL EXAM:  Physical Exam  Vitals and nursing note reviewed.   Constitutional:       General: He is not in acute distress.     Appearance: Normal appearance. He is well-developed and normal weight. He is ill-appearing. He is not diaphoretic.      Interventions: Nasal cannula in place.   HENT:      Head: Normocephalic and atraumatic.      Comments: Frontal male pattern baldness.       Right Ear: External ear normal.      Left Ear: External ear normal.      Nose: Nose normal. No rhinorrhea.      Comments: Oxygen via nasal cannula.     Mouth/Throat:      Mouth: Mucous membranes are moist.      Pharynx: Oropharynx is clear. No oropharyngeal exudate or posterior oropharyngeal erythema.      Comments: Edentulous.  Eyes:      General: No scleral icterus.     Extraocular Movements: Extraocular movements intact.      Conjunctiva/sclera: Conjunctivae normal.      Pupils: Pupils are equal, round, and reactive to light.   Cardiovascular:      Rate and Rhythm: Regular rhythm. Tachycardia present.      Heart sounds: Normal heart sounds. No murmur heard.        Comments: Cardiac monitor leads.   Pulmonary:      Effort: Pulmonary effort is normal. No respiratory distress.      Breath sounds: No stridor. Rhonchi (Bilateral and scattered.) present. No wheezing or rales.   Chest:      Comments: Midline vertical chest wall scar. "    Left chest wall port.  Abdominal:      General: Abdomen is flat. Bowel sounds are normal. There is no distension.      Palpations: Abdomen is soft. There is no mass.      Tenderness: There is no abdominal tenderness. There is no guarding or rebound.   Genitourinary:     Comments: External urinary catheter.  Musculoskeletal:         General: No swelling, tenderness or deformity. Normal range of motion.      Cervical back: Normal range of motion and neck supple. No tenderness.      Right lower leg: No edema.      Left lower leg: No edema.   Lymphadenopathy:      Cervical: No cervical adenopathy.      Upper Body:      Right upper body: No supraclavicular adenopathy.      Left upper body: No supraclavicular adenopathy.   Skin:     General: Skin is warm and dry.      Coloration: Skin is not jaundiced or pale.      Findings: Bruising (Ecchymosis on arms.) and ecchymosis ( Bilateral upper extremities) present. No erythema or rash.   Neurological:      General: No focal deficit present.      Mental Status: He is alert and oriented to person, place, and time.      Coordination: Coordination normal.   Psychiatric:         Mood and Affect: Mood normal.         Behavior: Behavior normal.         Thought Content: Thought content normal.         Judgment: Judgment normal.           RECENT LABS:  Lab Results (last 24 hours)     Procedure Component Value Units Date/Time    POC Glucose Once [[id number]]  (Abnormal) Collected: 06/18/21 1127    Specimen: Blood Updated: 06/18/21 1129     Glucose 137 mg/dL      Comment: Serial Number: [id number]:  [id number]       POC Glucose Once [[id number]]  (Normal) Collected: 06/18/21 0730    Specimen: Blood Updated: 06/18/21 0731     Glucose 71 mg/dL      Comment: Serial Number: [id number]:  [id number]       Manual Differential [[id number]]  (Abnormal) Collected: 06/18/21 0508    Specimen: Blood Updated: 06/18/21 0700     Neutrophil % 67.0 %      Lymphocyte % 7.0 %      Monocyte % 2.0  %      Bands %  16.0 %      Myelocyte % 4.0 %      Promyelocyte % 3.0 %      Blasts % 1.0 %      Neutrophils Absolute 5.15 10*3/mm3      Lymphocytes Absolute 0.43 10*3/mm3      Monocytes Absolute 0.12 10*3/mm3      Anisocytosis Slight/1+     Polychromasia Slight/1+     Toxic Granulation Slight/1+     Platelet Morphology Normal    Narrative:      Reviewed by Pathologist within the past 30 days on 06.14.2021.      CBC & Differential [[id number]]  (Abnormal) Collected: 06/18/21 0508    Specimen: Blood Updated: 06/18/21 0700    Narrative:      The following orders were created for panel order CBC & Differential.  Procedure                               Abnormality         Status                     ---------                               -----------         ------                     Scan Slide[[id number]]                                       Final result               CBC Auto Differential[[id number]]        Abnormal            Final result                 Please view results for these tests on the individual orders.    Scan Slide [[id number]] Collected: 06/18/21 0508    Specimen: Blood Updated: 06/18/21 0700     Scan Slide --     Comment: See Manual Differential Results       CBC Auto Differential [[id number]]  (Abnormal) Collected: 06/18/21 0508    Specimen: Blood Updated: 06/18/21 0700     WBC 6.20 10*3/mm3      RBC 2.72 10*6/mm3      Hemoglobin 9.4 g/dL      Hematocrit 28.4 %      .5 fL      MCH 34.5 pg      MCHC 33.0 g/dL      RDW 19.3 %      RDW-SD 69.6 fl      MPV 7.4 fL      Platelets 335 10*3/mm3     Narrative:      The previously reported component NRBC is no longer being reported. Previous result was 0.8 /100 WBC (Reference Range: 0.0-0.2 /100 WBC) on 6/18/2021 at 0621 EDT.    Comprehensive Metabolic Panel [[id number]]  (Abnormal) Collected: 06/18/21 0508    Specimen: Blood Updated: 06/18/21 0649     Glucose 137 mg/dL      BUN 16 mg/dL      Creatinine 0.64 mg/dL      Sodium 135 mmol/L      Potassium 4.3  mmol/L      Chloride 100 mmol/L      CO2 25.0 mmol/L      Calcium 8.3 mg/dL      Total Protein 6.4 g/dL      Albumin 2.40 g/dL      ALT (SGPT) 45 U/L      AST (SGOT) 33 U/L      Alkaline Phosphatase 94 U/L      Total Bilirubin 0.3 mg/dL      eGFR Non African Amer 124 mL/min/1.73      Globulin 4.0 gm/dL      A/G Ratio 0.6 g/dL      BUN/Creatinine Ratio 25.0     Anion Gap 10.0 mmol/L     Narrative:      GFR Normal >60  Chronic Kidney Disease <60  Kidney Failure <15      Blood Gas, Arterial - [[id number]]  (Abnormal) Collected: 06/17/21 2148    Specimen: Arterial Blood Updated: 06/17/21 2158     Site Right Radial     Brice's Test Positive     pH, Arterial 7.416 pH units      pCO2, Arterial 36.0 mm Hg      pO2, Arterial 153.5 mm Hg      HCO3, Arterial 23.1 mmol/L      Base Excess, Arterial -1.2 mmol/L      Comment: Serial Number: [id number]:  [id number]        O2 Saturation, Arterial 99.4 %      CO2 Content 24.2 mmol/L      Barometric Pressure for Blood Gas --     Comment: N/A        Modality NRB     FIO2 100 %      Hemodilution No    POC Glucose Once [[id number]]  (Abnormal) Collected: 06/17/21 1944    Specimen: Blood Updated: 06/17/21 1944     Glucose 152 mg/dL      Comment: Serial Number: [id number]:  [id number]       POC Glucose Once [[id number]]  (Abnormal) Collected: 06/17/21 1738    Specimen: Blood Updated: 06/17/21 1739     Glucose 160 mg/dL      Comment: Serial Number: [id number]:  [id number]       Vancomycin, Trough [[id number]]  (Normal) Collected: 06/17/21 1633    Specimen: Blood Updated: 06/17/21 1735     Vancomycin Trough 14.40 mcg/mL     Narrative:      Therapeutic Ranges for Vancomycin    Vancomycin Random   5.0-40.0 mcg/mL  Vancomycin Trough   5.0-20.0 mcg/mL  Vancomycin Peak     20.0-40.0 mcg/mL          PENDING RESULTS: N/A    IMAGING REVIEWED:  No radiology results for the last day    I have reviewed the patient's labs, imaging, reports, and other clinician documentation.    Assessment/Plan    ASSESSMENT:  1. Squamous cell carcinoma right upper lobe lung with new brain met-s/p chemoradiation completed 1/2021, SRS to left occipital lobe lesion and now on chemoimmunotherapy (carboplatin/Taxol plus ipilimumab/nivolumab with Neulasta support) dosed 5/24/2021.  PET/CT scan and April 2021 showed he was responding to treatment.  Ipilimumab and nivolumab were added to his current therapy due to progression in his brain.  Due to hospitalization and brain radiation, he did not receive systemic therapy from 2/9/2021 until 5/24/2021.  Planned to undergo surgical resection of brain metastasis by Dr. Luna.  Ipilimumab and nivolumab are known to cross blood-brain barrier.  Agree overall prognosis poor and palliative care appropriate.  Discussed with wife that hospice care was an appropriate plan.  Chemotherapy was an option that may prolong his survival but only by months and only if his performance status improves.  His cancer is not curable.  Hospice has been consulted by palliative care.  2. Anemia/Myelodysplastic syndrome/GOGO with malabsorption/Chemotherapy-induced anemia-he has never required Retacrit as an outpatient.  Recent iron studies showed correction of GOGO.  Acute fall in hemoglobin due to recent chemotherapy.  Macrocytosis due to MDS.  On treatment with Retacrit.  Haptoglobin high. Normal copper level.  On multivitamin and Protonix.  Stool heme negative.  Improving.   3. Acute thrombocytopenia-likely chemotherapy-induced.  Coags normal and CMV/EBV IgM normal.  Normalized.   4. Respiratory failure /Pneumonia (possibly postobstructive)/COPD-pulmonary following.  On vancomycin, Unasyn with management by infectious disease.  5. Weight loss/Loss of appetite-on Megace with continued weight loss.      6. Oral thrush-diagnosed as outpatient.  Now on steroids, will continue nystatin.  7. Sacral decub-wound care to follow.   8. CHF/s/p aortic valve replacement -echocardiogram showed no vegetation and EF 65%.  On  aspirin and Lovenox prophylaxis.     PLAN  1. Follow CBC.   2. Continue weekly Retacrit, next due 6/22/2021.    Agree with plan to pursue hospice care.    Patient seen and evaluated by Dr. Lewis.  Electronically signed by VINCE Kaplan, 06/18/21, 12:56 PM EDT.        I have personally performed a face-to-face diagnostic evaluation on this patient.  I have discussed the case with Fatoumata Espinosa NP, have edited/reviewed the note, and agree with the care plan.  Complaining of shortness of air this morning and nausea last night.  On examination, he has left chest wall [id number] and an external urinary catheter present.  Labs are significant for hemoglobin 9.4.  He has still not decided on type of care, but ultimately palliative care is the best option.              I discussed the patients findings and my recommendations with patient and spouse.    Electronically signed by Axel Lewis MD, 06/18/21, 2:08 PM EDT.

## 2021-06-18 NOTE — PROGRESS NOTES
Palliative Care Daily Progress Note     C/C: shortness of breath    S:  Axel Ramirez is a 70 y.o. male who presented to [facility] from assisted living on 6/7 with reports of shortness of breath. EMS was called after patient received a mini neb treatment at the facility but his breathing became more severe, he had a reported saturation of 76% on 4 L at his extended care facility.  Brought to the ER and was noted with AE COPD and Healthcare acquired pneumonia and admitted for further treatment.      Chest x-ray showed left lung pneumonia and a right middle lobe lung mass.  White count 10.1, hemoglobin 9.2, .7, platelets 115k.  D-dimer was 2.99 (< 0.6).       Chest CT showed a 9 cm right upper lobe lung mass occluding the right upper lobe bronchus with complete right upper and right middle lobe collapse.  There was no pulmonary emboli.  There was possible aspiration pneumonia and stable mediastinal lymphadenopathy.  A new 12 mm nodule was seen in the left lower lobe.  Adrenal glands were normal.  He was started on steroids and Zosyn.         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  Recently started on second line therapy Taxol/carboplatin/ipilimumab and Nivolumab with Neulasta support on 5/24/2021.     Pulmonary was consulted. Started on inhaled corticosteroids and antibiotics.      Cardiology for consulted for elevated BNP.      6/14 Palliative care consult for goals of care discussion.This patient is known to our service. We have seen him on previous admissions. He had previously decided to pursue hospice services but changed his mind prior to discharge and decided to continue with aggressive measures including chemotherapy and surgical interventions. We completed a POST form which confirmed patient would want to be a DNR with limited medical interventions.      6/15 No acute events  "overnight. Patient remains on high flow oxygen.      6/16 Continues to require high flow oxygen at 10L.      6/17 No acute events overnight. No complaints of pain.     6/18 No acute events overnight. Patient on 10L 02.       O: Code Status:   Code Status and Medical Interventions:   Ordered at: 06/07/21 0956     Level Of Support Discussed With:    Health Care Surrogate     Code Status:    No CPR     Medical Interventions (Level of Support Prior to Arrest):    Comfort Measures      Advanced Directives: Advance Directive Status: Patient does not have advance directive   Goals of Care: Ongoing.   Palliative Performance Scale Score:       /80 (BP Location: Left arm, Patient Position: Sitting)   Pulse 93   Temp 97.7 °F (36.5 °C) (Oral)   Resp 20   Ht 172.7 cm (68\")   Wt 65.9 kg (145 lb 4.5 oz)   SpO2 100%   BMI 22.09 kg/m²     Intake/Output Summary (Last 24 hours) at 6/18/2021 1045  Last data filed at 6/18/2021 0405  Gross per 24 hour   Intake 360 ml   Output 500 ml   Net -140 ml         Review of Systems   Constitutional: Positive for fatigue.   Respiratory: Positive for shortness of breath.    Psychiatric/Behavioral: The patient is nervous/anxious.    All other systems reviewed and are negative.        Physical Exam  Vitals and nursing note reviewed.   Constitutional:       Appearance: He is ill-appearing.   HENT:      Nose: Nose normal.      Mouth/Throat:      Mouth: Mucous membranes are dry.      Pharynx: Oropharynx is clear.   Eyes:      Extraocular Movements: Extraocular movements intact.      Conjunctiva/sclera: Conjunctivae normal.      Pupils: Pupils are equal, round, and reactive to light.   Cardiovascular:      Rate and Rhythm: Normal rate.      Pulses: Normal pulses.   Pulmonary:      Effort: Pulmonary effort is normal.   Abdominal:      General: Abdomen is flat.   Musculoskeletal:         General: Normal range of motion.   Skin:     General: Skin is warm and dry.   Neurological:      General: No " focal deficit present.      Mental Status: He is alert and oriented to person, place, and time. Mental status is at baseline.         Labs:   Results from last 7 days   Lab Units 06/18/21  0508   WBC 10*3/mm3 6.20   HEMOGLOBIN g/dL 9.4*   HEMATOCRIT % 28.4*   PLATELETS 10*3/mm3 335     Results from last 7 days   Lab Units 06/18/21  0508   SODIUM mmol/L 135*   POTASSIUM mmol/L 4.3   CHLORIDE mmol/L 100   CO2 mmol/L 25.0   BUN mg/dL 16   CREATININE mg/dL 0.64*   GLUCOSE mg/dL 137*   CALCIUM mg/dL 8.3*     Results from last 7 days   Lab Units 06/18/21  0508   SODIUM mmol/L 135*   POTASSIUM mmol/L 4.3   CHLORIDE mmol/L 100   CO2 mmol/L 25.0   BUN mg/dL 16   CREATININE mg/dL 0.64*   CALCIUM mg/dL 8.3*   BILIRUBIN mg/dL 0.3   ALK PHOS U/L 94   ALT (SGPT) U/L 45*   AST (SGOT) U/L 33   GLUCOSE mg/dL 137*         Diagnostics: Reviewed    A:   Patient Active Problem List   Diagnosis   • Coronary artery disease involving native coronary artery of native heart without angina pectoris   • Nonrheumatic aortic valve stenosis   • Mixed hyperlipidemia   • Essential hypertension   • Fever   • Presence of aortocoronary bypass graft   • Congestive heart failure (CMS/HCC)   • Hx of aortic valve replacement   • CAP (community acquired pneumonia)   • Tobacco abuse   • Postobstructive pneumonia   • Lung mass   • Squamous cell carcinoma of lung, right (CMS/HCC)   • Diastolic CHF, acute (CMS/HCC)   • Pneumonia of right lung due to infectious organism   • Pneumonia due to infectious organism   • Moderate malnutrition (CMS/HCC)   • Acute on chronic respiratory failure with hypoxia (CMS/HCC)   • COPD with acute exacerbation (CMS/HCC)   • Delirium   • Brain metastasis (CMS/HCC)   • Acute-on-chronic respiratory failure (CMS/HCC)   • Respiratory distress   • Stage 2 skin ulcer of sacral region (CMS/HCC)       S/Sx:  Goals of care:  6/14 Met with patient this afternoon. He is sleeping, wife is not present.  Per nursing, patient asks that his wife  "is present to assist in decision making. And from previous visits we had with patient, he prefers to have his wife present. Patient has completed POST form confirming DNR/DNI with limited interventions. He was discharged with rehab at previous visit. He was currently undergoing chemotherapy with Dr. Lewis. We will follow up tomorrow. According to staff, patients wife and daughter had medical emergency prior to our visit.   6/15 Met with wife and patient this afternoon to discuss long term goals. Wife is very anxious and is asking about treatment options and what next steps would be to get him home. She wants to take him home but also wants to continue with aggressive treatment in terms of rehab. We discussed poor prognosis and comfort measures. Wife asks to speak with Dr. Lewis and NP prior to making any decisions. Patient does say that he just \"wants to go home and be with his family.\" And that all of this \"is scary.\" I will reach out to Hem Onc NP to see if conversation can be facilitated.  6/16 Patient and wife met with oncology this am. Per wife, they want to continue to pursue any and all treatment options. They understand that the patient is too weak for treatment at this time. We discussed how even if patient has chemotherapy options, his overall functional status may prevent him from getting it. He has been in rehab for months with little improvement in mobility. We talked about hospice vs aggressive measures. Wife is unsure if hospice is appropriate. She asks \"why cant we do chemotherapy and hospice.\" We again discussed how poor his clinical status was and the likelihood of being able to get chemotherapy being low. Per oncology, even with chemotherapy, may only get a few additional months. Patient says \"It seems like hospice is my only option.\" He asks for wife to make the decision for him. She is reluctant, again asking to take him home. Patient says that he doesn't want to go home and he wants to be " comfortable. We will place a referral to hospitals to discuss options for wife. Discussed with Hem/Onc.   6/17 Met with wife and patient this afternoon. She states that [facility] never reached out to them. We did follow up with hospitals liason. They plan to meet with patient and wife this afternoon. We provided emotional support. Patient states he is uncomfortable today and not feeling better.   6/18 Follow up meeting with [facility] today regarding plan of care.   Post obstructive pneumonia: ID is following. Patient on IV antibiotics.   CHF: Cardiology is consulted. Echocardiogram showed no vegetation and EF 65%.  On aspirin and Lovenox prophylaxis.  Lung Ca: Follows with Dr. Lewis. Had  Recent chemotherapy on 5/4. Plans for surgical resection of brain mets per Dr. Stroud in near future. Hem Onc consulted.   Sacral wound: wound care following.   Anemia: Started on retacrit per Hem Onc.          P:   Palliative Care Team will continue to follow patient.     Radha Dardne, APRN  6/18/2021  Time spent:52 min

## 2021-06-18 NOTE — PROGRESS NOTES
Infectious Diseases Progress Note      LOS: 11 days   Patient Care Team:  Sandoval Stevenson FNP as PCP - General  Sandoval Stevenson FNP as PCP - Family Medicine  Axel Lewis MD as Consulting Physician (Hematology and Oncology)  Iglesia Springer MD as Consulting Physician (Cardiology)    Chief Complaint: Shortness of breath, fatigue    Subjective        The patient has been afebrile for the last 24 hours.  The patient is on 10 L of oxygen via nasal cannula.  Has had no change in symptoms      Review of Systems:   Review of Systems   Constitutional: Positive for fatigue.   HENT: Negative.    Eyes: Negative.    Respiratory: Positive for shortness of breath.    Cardiovascular: Negative.    Gastrointestinal: Negative.    Endocrine: Negative.    Genitourinary: Negative.    Musculoskeletal: Negative.    Skin: Negative.    Neurological: Negative.    Psychiatric/Behavioral: Negative.    All other systems reviewed and are negative.       Objective     Vital Signs  Temp:  [97.4 °F (36.3 °C)-97.7 °F (36.5 °C)] 97.6 °F (36.4 °C)  Heart Rate:  [] 81  Resp:  [20-24] 20  BP: (112-126)/(73-80) 126/79    Physical Exam:  Physical Exam  Vitals and nursing note reviewed.   Constitutional:       General: He is not in acute distress.     Appearance: He is well-developed. He is ill-appearing. He is not diaphoretic.      Comments: Cachectic   HENT:      Head: Normocephalic and atraumatic.      Mouth/Throat:      Comments: Mild oral thrush  Eyes:      Extraocular Movements: Extraocular movements intact.      Conjunctiva/sclera: Conjunctivae normal.      Pupils: Pupils are equal, round, and reactive to light.   Cardiovascular:      Rate and Rhythm: Normal rate and regular rhythm.      Heart sounds: Normal heart sounds, S1 normal and S2 normal.   Pulmonary:      Effort: Pulmonary effort is normal. No respiratory distress.      Breath sounds: No stridor. Rales present. No wheezing.      Comments: Diminished  throughout  Abdominal:      General: Bowel sounds are normal. There is no distension.      Palpations: Abdomen is soft. There is no mass.      Tenderness: There is no abdominal tenderness. There is no guarding.   Musculoskeletal:         General: No deformity. Normal range of motion.      Cervical back: Neck supple.   Skin:     General: Skin is warm and dry.      Coloration: Skin is not pale.      Findings: No erythema or rash.      Comments: Port in place   Neurological:      Mental Status: He is alert and oriented to person, place, and time.      Cranial Nerves: No cranial nerve deficit.   Psychiatric:         Mood and Affect: Mood normal.          Results Review:    I have reviewed all clinical data, test, lab, and imaging results.     Radiology  No Radiology Exams Resulted Within Past 24 Hours    Cardiology    Laboratory    Results from last 7 days   Lab Units 06/18/21  0508 06/17/21  0330 06/16/21  0341 06/15/21  0547 06/14/21  0617 06/13/21  0544 06/12/21  0446   WBC 10*3/mm3 6.20 7.70 7.60 8.20 9.60 9.20 12.10*   HEMOGLOBIN g/dL 9.4* 8.9* 8.5* 8.7* 9.3* 9.0* 9.6*   HEMATOCRIT % 28.4* 26.7* 25.2* 25.9* 28.2* 26.5* 28.7*   PLATELETS 10*3/mm3 335 286 243 226 218 180 193     Results from last 7 days   Lab Units 06/18/21  0508 06/17/21  0330 06/16/21  0341 06/15/21  0547 06/14/21  0617 06/13/21  0544 06/12/21  0446   SODIUM mmol/L 135* 134* 131* 137 132* 135* 135*   POTASSIUM mmol/L 4.3 4.2 4.3 4.7 4.0 3.9 4.0   CHLORIDE mmol/L 100 99 99 105 100 102 102   CO2 mmol/L 25.0 24.0 26.0 24.0 24.0 24.0 25.0   BUN mg/dL 16 13 15 14 17 20 18   CREATININE mg/dL 0.64* 0.57* 0.53* 0.64* 0.60* 0.57* 0.56*   GLUCOSE mg/dL 137* 73 100* 87 79 84 89   ALBUMIN g/dL 2.40*  --   --  2.30* 2.40*  --  2.30*   BILIRUBIN mg/dL 0.3  --   --  0.3 0.5  --  0.5   ALK PHOS U/L 94  --   --  93 104  --  106   AST (SGOT) U/L 33  --   --  38 32  --  33   ALT (SGPT) U/L 45*  --   --  36 37  --  38   CALCIUM mg/dL 8.3* 8.4* 8.2* 8.4* 8.6 8.4* 8.6                  Microbiology   Microbiology Results (last 10 days)     ** No results found for the last 240 hours. **          Medication Review:       Schedule Meds  ampicillin-sulbactam, 3 g, Intravenous, Q6H  aspirin, 81 mg, Oral, Daily  buprenorphine-naloxone, 1.5 tablet, Sublingual, Daily  enoxaparin, 40 mg, Subcutaneous, Q24H  epoetin asia-epbx, 40,000 Units, Subcutaneous, Weekly  gabapentin, 300 mg, Oral, TID  guaiFENesin, 600 mg, Oral, Q12H  insulin lispro, 0-7 Units, Subcutaneous, TID With Meals  Shabbir, 1 packet, Oral, BID  lurasidone, 40 mg, Oral, Daily  megestrol acetate, 200 mg, Oral, TID  multivitamin with minerals, 1 tablet, Oral, Daily  pantoprazole, 40 mg, Oral, Daily  polyethylene glycol, 17 g, Oral, Daily  senna-docusate sodium, 2 tablet, Oral, BID  sertraline, 50 mg, Oral, Daily  spironolactone, 25 mg, Oral, Daily  vancomycin, 1,000 mg, Intravenous, Q12H  Zinc Oxide, , Apply externally, BID        Infusion Meds  Pharmacy to dose vancomycin,         PRN Meds  •  acetaminophen  •  albuterol  •  senna-docusate sodium **AND** polyethylene glycol **AND** bisacodyl **AND** bisacodyl  •  calcium carbonate  •  dextrose  •  dextrose  •  glucagon (human recombinant)  •  HYDROcodone-acetaminophen  •  hydrOXYzine  •  insulin lispro  •  ipratropium-albuterol  •  LORazepam  •  melatonin  •  ondansetron  •  Pharmacy to dose vancomycin  •  promethazine  •  [COMPLETED] Insert peripheral IV **AND** sodium chloride  •  sodium chloride        Assessment/Plan       Antimicrobial Therapy   1.        day  2.  Unasyn      day  3.  Vancomycin      day  4.      Day  5.      Day      Assessment     Possible postobstructive pneumonia.  Patient has right large lung mass as a result of lung cancer.  There is no signs of complete collapse of the right upper and right middle lobes  -Patient is currently on 10 L of oxygen by mask  -Normally on 4 L of oxygen at home  -MRSA the nares screen is positive  -COVID-19 and respiratory  viral DNA panel is negative  -Apparently there is a complete obstruction is not amenable to bronchoscopy or stent placement-Case discussed with pulmonology    Stage III squamous lung cancer with brain metastasis.    Pancytopenia-likely related to chemotherapy     Lung CA was on chemotherapy    Oral thrush, patient received 7 days of nystatin        Plan     Continue IV Unasyn 3 g every 6 hours  Continue IV vancomycin-asked pharmacy to monitor and dose  Will offer 2 weeks of the above antibiotics  Continue supportive care  A.m. labs  Prognosis is very guarded and long-term prognosis might be poor  Family is considering hospice    Herminia Stanton, APRN  06/18/21  16:34 EDT     Note is dictated utilizing voice recognition software/Dragon

## 2021-06-18 NOTE — SIGNIFICANT NOTE
06/18/21 1000   Spiritual Care   Spiritual Care Visit Type follow-up   Spiritual Care Source  initiative  (met pt's spouse outside in parking lot)   Receptivity to Spiritual Care visit welcomed   Spiritual Care Request coping/stress of illness support;end-of-life issue(s) support;hospitality support;mood/anxiety support;prayer support;spiritual/moral support   Spiritual Care Interventions prayer support provided;supportive conversation provided   Response to Spiritual Care receptive of support;thanks expressed;visit helpful   Use of Spiritual Resources prayer;spirituality for coping, indicated strong use of   Spiritual Care Follow-Up follow-up planned regularly for general support   Coping/Psychosocial Interventions   Supportive Measures active listening utilized     Pt's spouse in parking lot feeling sad and unsure about pt's next step. She is wondering if Home Health or Hospice is the best course of action for her  (pt). She asked for prayer for clarity of action and  prayed. She asked him to visit the pt later and  said he would. She was thankful for the support. No other needs.

## 2021-06-18 NOTE — NURSING NOTE
Nurse placed 2 phone calls to Dr. Bean to update on patient condition with no return call. Nurse placed a call to Dr. Rome and orders were given See Mar.

## 2021-06-18 NOTE — NURSING NOTE
Pt now back to wearing 10L HF nasal canula and SATs are in the 90s. Resting comfortably, no signs of distress noted.

## 2021-06-19 NOTE — PROGRESS NOTES
Hematology/Oncology Inpatient Progress Note    PATIENT NAME: Axel Ramirez  : 1950  MRN: [id number]    CHIEF COMPLAINT: Metastatic squamous cell carcinoma of the right upper lobe lung and MDS    HISTORY OF PRESENT ILLNESS:  70 y.o. male presented to [facility] ER on 2021 with shortness of air and hypoxia.  He had an acute onset on the day of admission of worsening shortness of breath with O2 saturation of 76% on 4 L/min O2.  The ECF called EMS.  He was treated with nebulizer on the way to the ER.  Chest x-ray showed left lung pneumonia and a right middle lobe lung mass.  White count 10.1, hemoglobin 9.2, .7, platelets 115k.  D-dimer was 2.99 (< 0.6).  Chest CT showed a 9 cm right upper lobe lung mass occluding the right upper lobe bronchus with complete right upper and right middle lobe collapse.  There was no pulmonary emboli.  There was possible aspiration pneumonia and stable mediastinal lymphadenopathy.  A new 12 mm nodule was seen in the left lower lobe.  Adrenal glands were normal.  He was started on steroids and Zosyn.  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  Recently started on second line therapy Taxol/carboplatin/ipilimumab and Nivolumab with Neulasta support on 2021.     21  Hematology/Oncology was consulted for his metastatic squamous cell carcinoma of the right upper lobe lung.  He is an established patient.  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  Initially treated with concomitant weekly chemotherapy (paclitaxel and carboplatin x7) and radiation therapy from 2020. He completed radiation therapy on 2021.  He completed the weekly portion of his chemotherapy on 2021.  He received 1 cycle of every 21-day carboplatin /Taxol with  Neulasta support on 2/9/2021.  He subsequently was admitted to [facility], for hypoxia with respiratory failure requiring intubation.  MRI of the brain showed a left occipital lobe lesion with vasogenic edema.  He received radiation, x1 fraction, on 3/3/2021 to the left occipital lobe. One month later, a brain MRI showed enlargement of the left occipital lobe lesion.  PET scan showed the right upper lobe lung mass to be 10 cm in size with a decrease in the peripheral FDG uptake suggesting interval response to treatment.  There was a decrease in the size and FDG uptake in the mediastinal lymph nodes.  There was no distant metastatic disease.  The lesion in the left occipital lobe was FDG avid.  Right upper lobe lung collapse had improved with aeration.  He was referred to Dr. Tay for evaluation of his brain MRI which was felt to be due to to post SRS changes and not progression.  He was seen in follow-up with plan to start chemotherapy with immunotherapy.  Paclitaxel and carboplatin doses were decreased by 20% due to pancytopenia with prior treatment.  Repeat brain MRI, on 5/13/2021, showed an increase in the size of the left occipital lobe with an increase in surrounding edema.  There were no new masses.  The patient was referred to Dr. Luna for surgical resection versus MRI SPECT/perfusion at [facility].  Dr. Luna felt the lesion was a combination of necrosis and tumor progression and recommended surgical resection.  He started treatment on 5/24/2021 (chemotherapy plus immunotherapy) as his surgical resection was to be done after medical clearance.  He had a return appointment with Dr. Luna on 6/9/2021 following an MRI of his brain.  -MDS-diagnosed January 2021.  He was anemic and was found to have GOGO and malabsorption on oral therapy.  His bone marrow revealed increased iron storage and mild erythroid atypia in January 2021.  Stool heme was negative on 5/13/2021. He has received IV iron but has  never required Retacrit.  Last office visit 5/24/2021, complaining of hypoxia and tachypnea with anxiety.  White count 9.05, hemoglobin 11.34, .8 and platelets 220.  He was starting to gain weight and Megace was continued.  For his cough and hypoxia, he was treated with Augmentin and albuterol nebulizer.  He had oral thrush which was treated with nystatin swish and swallow.  TSH 0.287 (0.282-4.0), iron 62 (), ferritin 4185 (), iron saturation 32 (20-55), TIBC 196 (228-428).     PCP: Sandoval Stevenson FNP     INTERVAL HISTORY:  • 6/8/2021-Retacrit 40,000 units subcu weekly initiated.  Hemoglobin 7.2.  • 6/9/2021-PT 11.5 (9.6-11.7), PTT 27.7 (24-31).  Hemoglobin 7.2, platelets 118,000.  Haptoglobin 465 ().  CMV IgM <30 (<30).  Cardiac ejection fraction 65%.   • 6/10/2021-EBV VCA IgM less than 36 (less than 36).  • 6/11/2021-hemoglobin 6.9.  1 unit pRBCs given.  WBC 13.8 with 18% bands.  • 6/12/2021-hemoglobin 9.6, WBC 12.1 with 8% bands.  Folate 13.7 (4.78-24.2), vitamin B12 more than 2000 (211-946).  Creatinine 0.56 (0.76-1.27).  Iron 42 (), TIBC 146 (298-536), iron saturation 29 (20-50), ferritin 3721 (), reticulocyte count 3.44 (0.7-1.9).  • 6/13/2021- hemoglobin 9.0, .0, white blood count 9.2. Copper 122 ().  Chest x-ray unchanged.  • 6/14/2021-palliative care consultation.  • [facility] up to 12 L. Patient is a DNR.  • 6/18/21 stool heme negative.  Patient and family leaning towards hospice care. Hemoglobin 9.4.    History of present illness reviewed since last visit and changes noted on 06/18/21.    Subjective   Reports some nausea more shortness of breath.    ROS:  Review of Systems   Constitutional: Negative for activity change, chills, fatigue, fever and unexpected weight change.   HENT: Negative for congestion, dental problem, hearing loss, mouth sores, nosebleeds, sore throat and trouble swallowing.     Eyes: Negative for photophobia and visual disturbance.   Respiratory: Positive for shortness of breath. Negative for cough and chest tightness.    Cardiovascular: Negative for chest pain, palpitations and leg swelling.   Gastrointestinal: Positive for nausea. Negative for abdominal distention, abdominal pain, blood in stool, constipation, diarrhea and vomiting.   Endocrine: Negative for cold intolerance and heat intolerance.   Genitourinary: Negative for decreased urine volume, difficulty urinating, dysuria, frequency, hematuria and urgency.   Musculoskeletal: Negative for arthralgias and gait problem.   Skin: Negative for rash and wound.   Neurological: Negative for dizziness, tremors, weakness, light-headedness, numbness and headaches.   Hematological: Negative for adenopathy. Does not bruise/bleed easily.   Psychiatric/Behavioral: Negative for confusion and hallucinations. The patient is not nervous/anxious.    All other systems reviewed and are negative.     MEDICATIONS:    Scheduled Meds:  acetylcysteine, 2 mL, Nebulization, TID - RT  ampicillin-sulbactam, 3 g, Intravenous, Q6H  aspirin, 81 mg, Oral, Daily  buprenorphine-naloxone, 1.5 tablet, Sublingual, Daily  enoxaparin, 40 mg, Subcutaneous, Q24H  epoetin asia-epbx, 40,000 Units, Subcutaneous, Weekly  gabapentin, 300 mg, Oral, TID  guaiFENesin, 600 mg, Oral, Q12H  insulin lispro, 0-7 Units, Subcutaneous, TID With Meals  Shabbir, 1 packet, Oral, BID  lurasidone, 40 mg, Oral, Daily  megestrol acetate, 200 mg, Oral, TID  multivitamin with minerals, 1 tablet, Oral, Daily  pantoprazole, 40 mg, Oral, Daily  polyethylene glycol, 17 g, Oral, Daily  senna-docusate sodium, 2 tablet, Oral, BID  sertraline, 50 mg, Oral, Daily  spironolactone, 25 mg, Oral, Daily  vancomycin, 1,000 mg, Intravenous, Q12H  Zinc Oxide, , Apply externally, BID       Continuous Infusions:  Pharmacy to dose vancomycin,        PRN Meds:  •  acetaminophen  •  albuterol  •  senna-docusate sodium  "**AND** polyethylene glycol **AND** bisacodyl **AND** bisacodyl  •  calcium carbonate  •  dextrose  •  dextrose  •  glucagon (human recombinant)  •  HYDROcodone-acetaminophen  •  hydrOXYzine  •  insulin lispro  •  ipratropium-albuterol  •  LORazepam  •  melatonin  •  morphine sulfate (concentrate)  •  ondansetron  •  Pharmacy to dose vancomycin  •  promethazine  •  [COMPLETED] Insert peripheral IV **AND** sodium chloride  •  sodium chloride     ALLERGIES:  No Known Allergies    Objective    VITALS:   /83 (BP Location: Left arm, Patient Position: Sitting)   Pulse 62   Temp 98.1 °F (36.7 °C) (Oral)   Resp 16   Ht 172.7 cm (68\")   Wt 65.9 kg (145 lb 4.5 oz)   SpO2 100%   BMI 22.09 kg/m²     PHYSICAL EXAM:  Physical Exam  Vitals and nursing note reviewed.   Constitutional:       General: He is not in acute distress.     Appearance: Normal appearance. He is well-developed and normal weight. He is ill-appearing. He is not diaphoretic.      Interventions: Nasal cannula in place.   HENT:      Head: Normocephalic and atraumatic.      Comments: Frontal male pattern baldness.       Right Ear: External ear normal.      Left Ear: External ear normal.      Nose: Nose normal. No rhinorrhea.      Comments: Oxygen via nasal cannula.     Mouth/Throat:      Mouth: Mucous membranes are moist.      Pharynx: Oropharynx is clear. No oropharyngeal exudate or posterior oropharyngeal erythema.      Comments: Edentulous.  Eyes:      General: No scleral icterus.     Extraocular Movements: Extraocular movements intact.      Conjunctiva/sclera: Conjunctivae normal.      Pupils: Pupils are equal, round, and reactive to light.   Cardiovascular:      Rate and Rhythm: Regular rhythm. Tachycardia present.      Heart sounds: Normal heart sounds. No murmur heard.        Comments: Cardiac monitor leads.   Pulmonary:      Effort: Pulmonary effort is normal. No respiratory distress.      Breath sounds: No stridor. Rhonchi (Bilateral and " scattered.) present. No wheezing or rales.   Chest:      Comments: Midline vertical chest wall scar.    Left chest wall port.  Abdominal:      General: Abdomen is flat. Bowel sounds are normal. There is no distension.      Palpations: Abdomen is soft. There is no mass.      Tenderness: There is no abdominal tenderness. There is no guarding or rebound.   Genitourinary:     Comments: External urinary catheter.  Musculoskeletal:         General: No swelling, tenderness or deformity. Normal range of motion.      Cervical back: Normal range of motion and neck supple. No tenderness.      Right lower leg: No edema.      Left lower leg: No edema.   Lymphadenopathy:      Cervical: No cervical adenopathy.      Upper Body:      Right upper body: No supraclavicular adenopathy.      Left upper body: No supraclavicular adenopathy.   Skin:     General: Skin is warm and dry.      Coloration: Skin is not jaundiced or pale.      Findings: Bruising (Ecchymosis on arms.) and ecchymosis ( Bilateral upper extremities) present. No erythema or rash.   Neurological:      General: No focal deficit present.      Mental Status: He is alert and oriented to person, place, and time.      Coordination: Coordination normal.   Psychiatric:         Mood and Affect: Mood normal.         Behavior: Behavior normal.         Thought Content: Thought content normal.         Judgment: Judgment normal.           RECENT LABS:  Lab Results (last 24 hours)     Procedure Component Value Units Date/Time    POC Glucose Once [[id number]]  (Normal) Collected: 06/19/21 0715    Specimen: Blood Updated: 06/19/21 0716     Glucose 96 mg/dL      Comment: Serial Number: [id number]:  [id number]       Manual Differential [[id number]]  (Abnormal) Collected: 06/19/21 0312    Specimen: Blood Updated: 06/19/21 0452     Neutrophil % 71.0 %      Lymphocyte % 7.0 %      Monocyte % 3.0 %      Eosinophil % 1.0 %      Bands %  16.0 %      Myelocyte % 2.0 %      Neutrophils  Absolute 5.48 10*3/mm3      Lymphocytes Absolute 0.44 10*3/mm3      Monocytes Absolute 0.19 10*3/mm3      Eosinophils Absolute 0.06 10*3/mm3      nRBC 1.0 /100 WBC      Anisocytosis Slight/1+     Polychromasia Slight/1+     Toxic Granulation Slight/1+     Platelet Morphology Normal    CBC & Differential [[id number]]  (Abnormal) Collected: 06/19/21 0312    Specimen: Blood Updated: 06/19/21 0452    Narrative:      The following orders were created for panel order CBC & Differential.  Procedure                               Abnormality         Status                     ---------                               -----------         ------                     Scan Slide[[id number]]                                       Final result               CBC Auto Differential[[id number]]        Abnormal            Final result                 Please view results for these tests on the individual orders.    Scan Slide [[id number]] Collected: 06/19/21 0312    Specimen: Blood Updated: 06/19/21 0452     Scan Slide --     Comment: See Manual Differential Results       CBC Auto Differential [[id number]]  (Abnormal) Collected: 06/19/21 0312    Specimen: Blood Updated: 06/19/21 0452     WBC 6.30 10*3/mm3      RBC 2.59 10*6/mm3      Hemoglobin 9.0 g/dL      Hematocrit 27.1 %      .8 fL      MCH 34.8 pg      MCHC 33.2 g/dL      RDW 19.1 %      RDW-SD 69.1 fl      MPV 7.6 fL      Platelets 346 10*3/mm3     Narrative:      The previously reported component NRBC is no longer being reported. Previous result was 0.7 /100 WBC (Reference Range: 0.0-0.2 /100 WBC) on 6/19/2021 at 0359 EDT.    Basic Metabolic Panel [[id number]]  (Abnormal) Collected: 06/19/21 0312    Specimen: Blood Updated: 06/19/21 0436     Glucose 116 mg/dL      BUN 18 mg/dL      Creatinine 0.61 mg/dL      Sodium 140 mmol/L      Potassium 4.3 mmol/L      Chloride 105 mmol/L      CO2 25.0 mmol/L      Calcium 8.5 mg/dL      eGFR Non African Amer 131 mL/min/1.73      BUN/Creatinine  Ratio 29.5     Anion Gap 10.0 mmol/L     Narrative:      GFR Normal >60  Chronic Kidney Disease <60  Kidney Failure <15      POC Glucose Once [[id number]]  (Abnormal) Collected: 06/18/21 2015    Specimen: Blood Updated: 06/18/21 2016     Glucose 151 mg/dL      Comment: Serial Number: [id number]:  [id number]       POC Glucose Once [[id number]]  (Abnormal) Collected: 06/18/21 1626    Specimen: Blood Updated: 06/18/21 1628     Glucose 152 mg/dL      Comment: Serial Number: [id number]:  [id number]             PENDING RESULTS: N/A    IMAGING REVIEWED:  No radiology results for the last day    I have reviewed the patient's labs, imaging, reports, and other clinician documentation.    Assessment/Plan   ASSESSMENT:  1. Squamous cell carcinoma right upper lobe lung with new brain met-s/p chemoradiation completed 1/2021, SRS to left occipital lobe lesion and now on chemoimmunotherapy (carboplatin/Taxol plus ipilimumab/nivolumab with Neulasta support) dosed 5/24/2021.  PET/CT scan and April 2021 showed he was responding to treatment.  Ipilimumab and nivolumab were added to his current therapy due to progression in his brain.  Due to hospitalization and brain radiation, he did not receive systemic therapy from 2/9/2021 until 5/24/2021.  Planned to undergo surgical resection of brain metastasis by Dr. Luna.  Ipilimumab and nivolumab are known to cross blood-brain barrier.  Agree overall prognosis poor and palliative care appropriate.  Discussed with wife that hospice care was an appropriate plan.  Chemotherapy was an option that may prolong his survival but only by months and only if his performance status improves.  His cancer is not curable.  Hospice has been consulted by palliative care.  2. Anemia/Myelodysplastic syndrome/GOGO with malabsorption/Chemotherapy-induced anemia-he has never required Retacrit as an outpatient.  Recent iron studies showed correction of GOGO.  Acute fall in hemoglobin due to recent  chemotherapy.  Macrocytosis due to MDS.  On treatment with Retacrit.  Haptoglobin high. Normal copper level.  On multivitamin and Protonix.  Stool heme negative.  Improving.   3. Acute thrombocytopenia-likely chemotherapy-induced.  Coags normal and CMV/EBV IgM normal.  Normalized.   4. Respiratory failure /Pneumonia (possibly postobstructive)/COPD-pulmonary following.  On vancomycin, Unasyn with management by infectious disease.  5. Weight loss/Loss of appetite-on Megace with continued weight loss.      6. Oral thrush-diagnosed as outpatient.  Now on steroids, will continue nystatin.  7. Sacral decub-wound care to follow.   8. CHF/s/p aortic valve replacement -echocardiogram showed no vegetation and EF 65%.  On aspirin and Lovenox prophylaxis.  9. Hg 9g     PLAN  1. Follow CBC.   2. Continue weekly Retacrit, next due 6/22/2021.  3. Hospice care per family    Agree with plan to pursue hospice care.              I discussed the patients findings and my recommendations with spouse.    Electronically signed by Efrain Youngblood MD, 06/19/21, 9:55AM EDT.                   abdominal pain

## 2021-06-19 NOTE — PLAN OF CARE
Subjective: Pt agreeable to therapeutic plan of care. Nursing stated they are having probs getting patient awake and mobile.    Objective:     Bed mobility - Max-A and Assist x 2  Transfers - N/A or Not attempted.  Ambulation -  feet N/A or Not attempted.    Pain: kept pointing to his stomach but could not rate  Education: Provided education on importance of mobility and skilled verbal / tactile cueing throughout intervention.     Assessment: Axel Ramirez presents with functional mobility impairments which indicate the need for skilled intervention. Tolerating session today without incident. Pt on 12 L hf. sats 97% and . Tolerated EOB x 10 mins with min assist to maintain. Still very lethargic and congested.Will continue to follow and progress as tolerated.     Plan/Recommendations:   Pt would benefit from Inpatient Rehabilitation placement at discharge from facility and requires no DME at discharge.   Pt desires Home with family assist at discharge. Pt cooperative; agreeable to therapeutic recommendations and plan of care.     Basic Mobility 6-click:  Rollin = Total, A lot = 2, A little = 3; 4 = None  Supine>Sit:   1 = Total, A lot = 2, A little = 3; 4 = None   Sit>Stand with arms:  1 = Total, A lot = 2, A little = 3; 4 = None  Bed>Chair:   1 = Total, A lot = 2, A little = 3; 4 = None  Ambulate in room:  1 = Total, A lot = 2, A little = 3; 4 = None  3-5 Steps with railin = Total, A lot = 2, A little = 3; 4 = None  Score: 8    Modified [place]: 5 = Severe disability (Requires constant nursing care and attention, bedridden, incontinent)    Post-Tx Position: Supine with HOB Elevated, Alarms activated and Call light and personal items within reach  PPE: gloves, surgical mask, eyewear protection

## 2021-06-19 NOTE — PLAN OF CARE
Goal Outcome Evaluation:  Plan of Care Reviewed With: patient           Outcome Summary: Pt continues to want to stay in chair. Resting well throughout shift with no complaints. Still on 10L HF. Will continue to monitor.

## 2021-06-19 NOTE — PROGRESS NOTES
LOS: 12 days   Patient Care Team:  Sandoval Stevenson FNP as PCP - General  Sandoval Stevenson FNP as PCP - Family Medicine  Axel Lewis MD as Consulting Physician (Hematology and Oncology)  Iglesia Springer MD as Consulting Physician (Cardiology)    Chief Complaint: This is back pain and generalized pain and breathing under better control with addition of benzodiazepines and opiates as recommended by the palliative care nurse.  Was able to sleep through the night in the chair.    :Subjective Alert, ill appearing but at least sitting up in chair, very limited historian today    Interval History:     Patient Complaints: Still very frail and debilitated.  Wife at bedside.,  No complaints today of uncontrolled pain or dyspnea  Patient Denies: No chest pain or hemoptysis, no nausea vomiting or diarrhea  History taken from: patient but very limited    Review of Systems:    All systems were reviewed and negative except for: See interval history    Objective     Vital Signs  Temp:  [97.7 °F (36.5 °C)-98.1 °F (36.7 °C)] 98.1 °F (36.7 °C)  Heart Rate:  [62-92] 62  Resp:  [16-20] 16  BP: (119-137)/(72-83) 137/83    Physical Exam:       General Appearance:   I am able to question him and he denies any pain and says overall his pain is much better and denies any significant shortness of breath or air hunger.   Head:    Normocephalic, without obvious abnormality, atraumatic   Eyes:            Conjunctivae and sclerae normal, no   icterus, no pallor, corneas clear, PERRLA   Throat:   No oral lesions, no thrush, oral mucosa moist   Neck:   No adenopathy, supple, trachea midline, no thyromegaly, no   carotid bruit, no JVD   Lungs:    Course bilateral lung bases and upper airway sounds are very coarse, worse on left lung field compared to right this morning    Heart:   Distant, regular rhythm and normal rate, normal S1 and S2, no            murmur, no gallop, no rub, no click   Chest Wall:    No abnormalities  observed   Abdomen:     Normal bowel sounds, no masses, no organomegaly, soft        non-tender, non-distended, no guarding, no rebound                tenderness,    Rectal:     Deferred   Extremities:  Extremities are elevated, nonpitting edema to just pretibial area bilaterally   Pulses:   Pulses palpable and equal bilaterally   Skin:   No bleeding, bruising or rash   Lymph nodes:   No palpable adenopathy   Neurologic:  Generalized debility, patient will follow very simple commands this morning   Radiology:  XR Chest 1 View    Result Date: 6/14/2021  Unchanged chest. Slot 66 Electronically signed by:  Alcides Frazier D.O.  6/14/2021 12:49 AM         Results Review:     I reviewed the patient's new clinical results.  I reviewed the patient's new imaging results and agree with the interpretation.    Medication Review:   Scheduled Meds:acetylcysteine, 2 mL, Nebulization, BID - RT  ampicillin-sulbactam, 3 g, Intravenous, Q6H  aspirin, 81 mg, Oral, Daily  buprenorphine-naloxone, 1.5 tablet, Sublingual, Daily  enoxaparin, 40 mg, Subcutaneous, Q24H  epoetin asia-epbx, 40,000 Units, Subcutaneous, Weekly  gabapentin, 300 mg, Oral, TID  guaiFENesin, 600 mg, Oral, Q12H  insulin lispro, 0-7 Units, Subcutaneous, TID With Meals  Shabbir, 1 packet, Oral, BID  lurasidone, 40 mg, Oral, Daily  megestrol acetate, 200 mg, Oral, TID  multivitamin with minerals, 1 tablet, Oral, Daily  pantoprazole, 40 mg, Oral, Daily  polyethylene glycol, 17 g, Oral, Daily  senna-docusate sodium, 2 tablet, Oral, BID  sertraline, 50 mg, Oral, Daily  spironolactone, 25 mg, Oral, Daily  vancomycin, 1,000 mg, Intravenous, Q12H  Zinc Oxide, , Apply externally, BID      Continuous Infusions:Pharmacy to dose vancomycin,       PRN Meds:.•  acetaminophen  •  albuterol  •  senna-docusate sodium **AND** polyethylene glycol **AND** bisacodyl **AND** bisacodyl  •  calcium carbonate  •  dextrose  •  dextrose  •  glucagon (human recombinant)  •   HYDROcodone-acetaminophen  •  hydrOXYzine  •  insulin lispro  •  ipratropium-albuterol  •  LORazepam  •  melatonin  •  morphine sulfate (concentrate)  •  ondansetron  •  Pharmacy to dose vancomycin  •  promethazine  •  [COMPLETED] Insert peripheral IV **AND** sodium chloride  •  sodium chloride    Labs:  Lab Results (last 24 hours)     Procedure Component Value Units Date/Time    POC Glucose Once [[id number]]  (Normal) Collected: 06/19/21 1127    Specimen: Blood Updated: 06/19/21 1128     Glucose 87 mg/dL      Comment: Serial Number: [id number]:  [id number]       POC Glucose Once [[id number]]  (Normal) Collected: 06/19/21 0715    Specimen: Blood Updated: 06/19/21 0716     Glucose 96 mg/dL      Comment: Serial Number: [id number]:  [id number]       Manual Differential [[id number]]  (Abnormal) Collected: 06/19/21 0312    Specimen: Blood Updated: 06/19/21 0452     Neutrophil % 71.0 %      Lymphocyte % 7.0 %      Monocyte % 3.0 %      Eosinophil % 1.0 %      Bands %  16.0 %      Myelocyte % 2.0 %      Neutrophils Absolute 5.48 10*3/mm3      Lymphocytes Absolute 0.44 10*3/mm3      Monocytes Absolute 0.19 10*3/mm3      Eosinophils Absolute 0.06 10*3/mm3      nRBC 1.0 /100 WBC      Anisocytosis Slight/1+     Polychromasia Slight/1+     Toxic Granulation Slight/1+     Platelet Morphology Normal    CBC & Differential [[id number]]  (Abnormal) Collected: 06/19/21 0312    Specimen: Blood Updated: 06/19/21 0452    Narrative:      The following orders were created for panel order CBC & Differential.  Procedure                               Abnormality         Status                     ---------                               -----------         ------                     Scan Slide[[id number]]                                       Final result               CBC Auto Differential[[id number]]        Abnormal            Final result                 Please view results for these tests on the individual orders.    Scan Slide  [[id number]] Collected: 06/19/21 0312    Specimen: Blood Updated: 06/19/21 0452     Scan Slide --     Comment: See Manual Differential Results       CBC Auto Differential [[id number]]  (Abnormal) Collected: 06/19/21 0312    Specimen: Blood Updated: 06/19/21 0452     WBC 6.30 10*3/mm3      RBC 2.59 10*6/mm3      Hemoglobin 9.0 g/dL      Hematocrit 27.1 %      .8 fL      MCH 34.8 pg      MCHC 33.2 g/dL      RDW 19.1 %      RDW-SD 69.1 fl      MPV 7.6 fL      Platelets 346 10*3/mm3     Narrative:      The previously reported component NRBC is no longer being reported. Previous result was 0.7 /100 WBC (Reference Range: 0.0-0.2 /100 WBC) on 6/19/2021 at 0359 EDT.    Basic Metabolic Panel [[id number]]  (Abnormal) Collected: 06/19/21 0312    Specimen: Blood Updated: 06/19/21 0436     Glucose 116 mg/dL      BUN 18 mg/dL      Creatinine 0.61 mg/dL      Sodium 140 mmol/L      Potassium 4.3 mmol/L      Chloride 105 mmol/L      CO2 25.0 mmol/L      Calcium 8.5 mg/dL      eGFR Non African Amer 131 mL/min/1.73      BUN/Creatinine Ratio 29.5     Anion Gap 10.0 mmol/L     Narrative:      GFR Normal >60  Chronic Kidney Disease <60  Kidney Failure <15      POC Glucose Once [[id number]]  (Abnormal) Collected: 06/18/21 2015    Specimen: Blood Updated: 06/18/21 2016     Glucose 151 mg/dL      Comment: Serial Number: [id number]:  [id number]       POC Glucose Once [[id number]]  (Abnormal) Collected: 06/18/21 1626    Specimen: Blood Updated: 06/18/21 1628     Glucose 152 mg/dL      Comment: Serial Number: [id number]:  [id number]              Assessment/Plan     Acute-on-chronic respiratory failure (CMS/HCC)  Respiratory distress  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, worsening  Probable postobstructive pneumonia    Coronary artery disease involving native coronary artery of native heart without angina pectoris    Nonrheumatic aortic valve stenosis    Essential hypertension    Congestive heart failure (CMS/HCC)     Tobacco abuse    Squamous cell carcinoma of lung, right (CMS/HCC)      Stage 2 skin ulcer of sacral region (CMS/HCC)      Infectious disease, pulmonology and palliative care inputs greatly appreciated, thank you.  Also reviewed hematology oncology's notes as well, thank you for your kind input.    Patient now DNR.  Is to meet with hospice according to palliative care note and this meeting will be set up for today.    After some discussion with pharmacy, thank you for your kind input, we will add a low-dose short acting opioid to the patient's current Suboxone dose.  We will start him on hydrocodone 5/325 every 4 hours as needed.  Continue these as these appear to be helping the patient quite a bit.    Hopefully home soon with [facility] to finish antibiotic course and continue with hospice care.    Continue rest of current approach.  Agree poor long-term prognosis.               Ignacia De La Cruz DO  06/19/21  12:09 EDT

## 2021-06-19 NOTE — THERAPY TREATMENT NOTE
Subjective: Pt agreeable to therapeutic plan of care. Nursing stated they are having probs getting patient awake and mobile.    Objective:     Bed mobility - Max-A and Assist x 2  Transfers - N/A or Not attempted.  Ambulation -  feet N/A or Not attempted.    Pain: kept pointing to his stomach but could not rate  Education: Provided education on importance of mobility and skilled verbal / tactile cueing throughout intervention.     Assessment: Axel Ramirez presents with functional mobility impairments which indicate the need for skilled intervention. Tolerating session today without incident. Pt on 12 L hf. sats 97% and . Tolerated EOB x 10 mins with min assist to maintain. Still very lethargic and congested.Will continue to follow and progress as tolerated.     Plan/Recommendations:   Pt would benefit from Inpatient Rehabilitation placement at discharge from facility and requires no DME at discharge.   Pt desires Home with family assist at discharge. Pt cooperative; agreeable to therapeutic recommendations and plan of care.     Basic Mobility 6-click:  Rollin = Total, A lot = 2, A little = 3; 4 = None  Supine>Sit:   1 = Total, A lot = 2, A little = 3; 4 = None   Sit>Stand with arms:  1 = Total, A lot = 2, A little = 3; 4 = None  Bed>Chair:   1 = Total, A lot = 2, A little = 3; 4 = None  Ambulate in room:  1 = Total, A lot = 2, A little = 3; 4 = None  3-5 Steps with railin = Total, A lot = 2, A little = 3; 4 = None  Score: 8    Modified Graves: 5 = Severe disability (Requires constant nursing care and attention, bedridden, incontinent)    Post-Tx Position: Supine with HOB Elevated, Alarms activated and Call light and personal items within reach  PPE: gloves, surgical mask, eyewear protection

## 2021-06-19 NOTE — PROGRESS NOTES
Daily Progress Note    Coronary artery disease involving native coronary artery of native heart without angina pectoris    Nonrheumatic aortic valve stenosis    Essential hypertension    Congestive heart failure (CMS/HCC)    Tobacco abuse    Squamous cell carcinoma of lung, right (CMS/HCC)    Acute-on-chronic respiratory failure (CMS/HCC)    Respiratory distress    Stage 2 skin ulcer of sacral region (CMS/HCC)    Assessment    Acute-on-chronic respiratory distress  Pneumonia most likely post obstruction  COPD exacerbation  Metastatic lung cancer squamous cell Stage III   CHF  Opioid dependence  Stage 2 skin ulcer of sacral region      COVID-19 and respiratory viral DNA panel is negative    Plan    Continues on 10 liter oximizer  Bronchodilator as needed  Steroids completed  Mucinex add mucomyst nebs   IS    Antibiotics per ID unasyn and vancomycin       LOS: 12 days       Subjective         Objective     Vital signs for last 24 hours:  Vitals:    06/18/21 1124 06/18/21 1859 06/19/21 0314 06/19/21 0811   BP: 126/79 119/72 128/80 137/83   BP Location: Left arm Left arm Left arm Left arm   Patient Position: Sitting Sitting Sitting Sitting   Pulse: 81 91 92 62   Resp: 20 20 16 16   Temp: 97.6 °F (36.4 °C) 97.8 °F (36.6 °C) 97.7 °F (36.5 °C) 98.1 °F (36.7 °C)   TempSrc: Oral Axillary Axillary Oral   SpO2: 100% 99% 92% 100%   Weight:       Height:           Intake/Output last 3 shifts:  I/O last 3 completed shifts:  In: 360 [P.O.:360]  Out: 500 [Urine:500]  Intake/Output this shift:  No intake/output data recorded.      Radiology  Imaging Results (Last 24 Hours)     ** No results found for the last 24 hours. **          Labs:  Results from last 7 days   Lab Units 06/19/21  0312   WBC 10*3/mm3 6.30   HEMOGLOBIN g/dL 9.0*   HEMATOCRIT % 27.1*   PLATELETS 10*3/mm3 346     Results from last 7 days   Lab Units 06/19/21  0312 06/18/21  0508   SODIUM mmol/L 140 135*   POTASSIUM mmol/L 4.3 4.3   CHLORIDE mmol/L 105 100   CO2  mmol/L 25.0 25.0   BUN mg/dL 18 16   CREATININE mg/dL 0.61* 0.64*   CALCIUM mg/dL 8.5* 8.3*   BILIRUBIN mg/dL  --  0.3   ALK PHOS U/L  --  94   ALT (SGPT) U/L  --  45*   AST (SGOT) U/L  --  33   GLUCOSE mg/dL 116* 137*     Results from last 7 days   Lab Units 06/17/21  2148   PH, ARTERIAL pH units 7.416   PO2 ART mm Hg 153.5*   PCO2, ARTERIAL mm Hg 36.0   HCO3 ART mmol/L 23.1     Results from last 7 days   Lab Units 06/18/21  0508 06/15/21  0547 06/14/21  0617   ALBUMIN g/dL 2.40* 2.30* 2.40*                               Meds:   SCHEDULE  ampicillin-sulbactam, 3 g, Intravenous, Q6H  aspirin, 81 mg, Oral, Daily  buprenorphine-naloxone, 1.5 tablet, Sublingual, Daily  enoxaparin, 40 mg, Subcutaneous, Q24H  epoetin asia-epbx, 40,000 Units, Subcutaneous, Weekly  gabapentin, 300 mg, Oral, TID  guaiFENesin, 600 mg, Oral, Q12H  insulin lispro, 0-7 Units, Subcutaneous, TID With Meals  Shabbir, 1 packet, Oral, BID  lurasidone, 40 mg, Oral, Daily  megestrol acetate, 200 mg, Oral, TID  multivitamin with minerals, 1 tablet, Oral, Daily  pantoprazole, 40 mg, Oral, Daily  polyethylene glycol, 17 g, Oral, Daily  senna-docusate sodium, 2 tablet, Oral, BID  sertraline, 50 mg, Oral, Daily  spironolactone, 25 mg, Oral, Daily  vancomycin, 1,000 mg, Intravenous, Q12H  Zinc Oxide, , Apply externally, BID      Infusions  Pharmacy to dose vancomycin,       PRNs  •  acetaminophen  •  albuterol  •  senna-docusate sodium **AND** polyethylene glycol **AND** bisacodyl **AND** bisacodyl  •  calcium carbonate  •  dextrose  •  dextrose  •  glucagon (human recombinant)  •  HYDROcodone-acetaminophen  •  hydrOXYzine  •  insulin lispro  •  ipratropium-albuterol  •  LORazepam  •  melatonin  •  morphine sulfate (concentrate)  •  ondansetron  •  Pharmacy to dose vancomycin  •  promethazine  •  [COMPLETED] Insert peripheral IV **AND** sodium chloride  •  sodium chloride    Physical Exam:  Physical Exam  Vitals reviewed.   Constitutional:       General: He  is sleeping.      Appearance: He is ill-appearing.   Cardiovascular:      Heart sounds: Normal heart sounds.   Pulmonary:      Breath sounds: Rhonchi present.   Skin:     General: Skin is warm and dry.      Coloration: Skin is pale.   Neurological:      Mental Status: He is oriented to person, place, and time.         ROS  Review of Systems   Respiratory: Positive for cough and shortness of breath.        I reviewed the patient's new clinical results.      Electronically signed by VINCE Muñoz, 06/19/21 at 09:00 EDT.

## 2021-06-19 NOTE — PLAN OF CARE
Goal Outcome Evaluation:      Gave patient morphine and ativan today, one dose. Rested really well. Slept through lunch, woke up for dinner. Plan is to go home Monday. Does not transfer well at all without getting really short of breath and getting very anxious. External cath was not on when I came on shift this morning. Patient was soaked. External cath was placed again. Bowels have been moving quite frequently. Needs turned as much as possible.

## 2021-06-19 NOTE — PROGRESS NOTES
Infectious Diseases Progress Note      LOS: 12 days   Patient Care Team:  Sandoval Stevenson FNP as PCP - General  Sandoval Stevenson FNP as PCP - Family Medicine  Axel Lewis MD as Consulting Physician (Hematology and Oncology)  Iglesia Springer MD as Consulting Physician (Cardiology)    Chief Complaint: Shortness of breath, fatigue    Subjective        The patient has been afebrile for the last 24 hours.  The patient is on 10 L of oxygen via nasal cannula.  Has had no change in symptoms      Review of Systems:   Review of Systems   Constitutional: Positive for fatigue.   HENT: Negative.    Eyes: Negative.    Respiratory: Positive for shortness of breath.    Cardiovascular: Negative.    Gastrointestinal: Negative.    Endocrine: Negative.    Genitourinary: Negative.    Musculoskeletal: Negative.    Skin: Negative.    Neurological: Negative.    Psychiatric/Behavioral: Negative.    All other systems reviewed and are negative.       Objective     Vital Signs  Temp:  [97.5 °F (36.4 °C)-98.1 °F (36.7 °C)] 97.5 °F (36.4 °C)  Heart Rate:  [] 126  Resp:  [16-26] 26  BP: (119-137)/(72-83) 137/78    Physical Exam:  Physical Exam  Vitals and nursing note reviewed.   Constitutional:       General: He is not in acute distress.     Appearance: He is well-developed. He is ill-appearing. He is not diaphoretic.      Comments: Cachectic   HENT:      Head: Normocephalic and atraumatic.      Mouth/Throat:      Comments: Mild oral thrush  Eyes:      Extraocular Movements: Extraocular movements intact.      Conjunctiva/sclera: Conjunctivae normal.      Pupils: Pupils are equal, round, and reactive to light.   Cardiovascular:      Rate and Rhythm: Normal rate and regular rhythm.      Heart sounds: Normal heart sounds, S1 normal and S2 normal.   Pulmonary:      Effort: Pulmonary effort is normal. No respiratory distress.      Breath sounds: No stridor. Rales present. No wheezing.      Comments: Diminished  throughout  Abdominal:      General: Bowel sounds are normal. There is no distension.      Palpations: Abdomen is soft. There is no mass.      Tenderness: There is no abdominal tenderness. There is no guarding.   Musculoskeletal:         General: No deformity. Normal range of motion.      Cervical back: Neck supple.   Skin:     General: Skin is warm and dry.      Coloration: Skin is not pale.      Findings: No erythema or rash.      Comments: Port in place   Neurological:      Mental Status: He is alert and oriented to person, place, and time.      Cranial Nerves: No cranial nerve deficit.   Psychiatric:         Mood and Affect: Mood normal.          Results Review:    I have reviewed all clinical data, test, lab, and imaging results.     Radiology  No Radiology Exams Resulted Within Past 24 Hours    Cardiology    Laboratory    Results from last 7 days   Lab Units 06/19/21  0312 06/18/21  0508 06/17/21  0330 06/16/21  0341 06/15/21  0547 06/14/21  0617 06/13/21  0544   WBC 10*3/mm3 6.30 6.20 7.70 7.60 8.20 9.60 9.20   HEMOGLOBIN g/dL 9.0* 9.4* 8.9* 8.5* 8.7* 9.3* 9.0*   HEMATOCRIT % 27.1* 28.4* 26.7* 25.2* 25.9* 28.2* 26.5*   PLATELETS 10*3/mm3 346 335 286 243 226 218 180     Results from last 7 days   Lab Units 06/19/21  0312 06/18/21  0508 06/17/21  0330 06/16/21  0341 06/15/21  0547 06/14/21  0617 06/13/21  0544   SODIUM mmol/L 140 135* 134* 131* 137 132* 135*   POTASSIUM mmol/L 4.3 4.3 4.2 4.3 4.7 4.0 3.9   CHLORIDE mmol/L 105 100 99 99 105 100 102   CO2 mmol/L 25.0 25.0 24.0 26.0 24.0 24.0 24.0   BUN mg/dL 18 16 13 15 14 17 20   CREATININE mg/dL 0.61* 0.64* 0.57* 0.53* 0.64* 0.60* 0.57*   GLUCOSE mg/dL 116* 137* 73 100* 87 79 84   ALBUMIN g/dL  --  2.40*  --   --  2.30* 2.40*  --    BILIRUBIN mg/dL  --  0.3  --   --  0.3 0.5  --    ALK PHOS U/L  --  94  --   --  93 104  --    AST (SGOT) U/L  --  33  --   --  38 32  --    ALT (SGPT) U/L  --  45*  --   --  36 37  --    CALCIUM mg/dL 8.5* 8.3* 8.4* 8.2* 8.4* 8.6  8.4*                 Microbiology   Microbiology Results (last 10 days)     ** No results found for the last 240 hours. **          Medication Review:       Schedule Meds  acetylcysteine, 2 mL, Nebulization, BID - RT  ampicillin-sulbactam, 3 g, Intravenous, Q6H  aspirin, 81 mg, Oral, Daily  buprenorphine-naloxone, 1.5 tablet, Sublingual, Daily  enoxaparin, 40 mg, Subcutaneous, Q24H  epoetin asia-epbx, 40,000 Units, Subcutaneous, Weekly  gabapentin, 300 mg, Oral, TID  guaiFENesin, 600 mg, Oral, Q12H  insulin lispro, 0-7 Units, Subcutaneous, TID With Meals  Shabbir, 1 packet, Oral, BID  lurasidone, 40 mg, Oral, Daily  megestrol acetate, 200 mg, Oral, TID  multivitamin with minerals, 1 tablet, Oral, Daily  pantoprazole, 40 mg, Oral, Daily  polyethylene glycol, 17 g, Oral, Daily  senna-docusate sodium, 2 tablet, Oral, BID  sertraline, 50 mg, Oral, Daily  spironolactone, 25 mg, Oral, Daily  vancomycin, 1,000 mg, Intravenous, Q12H  Zinc Oxide, , Apply externally, BID        Infusion Meds  Pharmacy to dose vancomycin,         PRN Meds  •  acetaminophen  •  albuterol  •  senna-docusate sodium **AND** polyethylene glycol **AND** bisacodyl **AND** bisacodyl  •  calcium carbonate  •  dextrose  •  dextrose  •  glucagon (human recombinant)  •  HYDROcodone-acetaminophen  •  hydrOXYzine  •  insulin lispro  •  ipratropium-albuterol  •  LORazepam  •  melatonin  •  morphine sulfate (concentrate)  •  ondansetron  •  Pharmacy to dose vancomycin  •  promethazine  •  [COMPLETED] Insert peripheral IV **AND** sodium chloride  •  sodium chloride        Assessment/Plan       Antimicrobial Therapy   1.        day  2.  Unasyn      day  3.  Vancomycin      day  4.      Day  5.      Day      Assessment     Possible postobstructive pneumonia.  Patient has right large lung mass as a result of lung cancer.  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    Pancytopenia-likely related to chemotherapy     Lung CA was on chemotherapy    Oral thrush, patient received 7 days of nystatin        Plan     Continue IV Unasyn 3 g every 6 hours  Continue IV vancomycin-asked pharmacy to monitor and dose  Will offer 2 weeks of the above antibiotics  Continue supportive care  A.m. labs  Prognosis is very guarded and long-term prognosis might be poor  Probable going home under hospice care in the next day or so    Anne Allred MD  06/19/21  16:00 EDT     Note is dictated utilizing voice recognition software/Dragon

## 2021-06-20 NOTE — PROGRESS NOTES
Pharmacy Antimicrobial Dosing Service     Subjective:  Axel Ramirez is a 70 y.o.male admitted with acute-on-chronic respiratory failure. Pharmacy has been consulted to dose Vancomycin for PNA.    The following information has been reviewed: renal function, pertinent labs, cultures, dosing body weight and patient clinical status. Will continue same antimicrobial regimen today.    1. Day #12 of 14 Vancomycin: 1000mg IV q12h  No levels ordered.  Current plan is for last dose on 6/22/21.      2. Day #12 of 14 Ampicillin/Sulbactam: 3 g IV q6h for estCrCl > 30 mL/min    Luciana Amos, [facility]  06/19/21 15:59 EDT

## 2021-06-20 NOTE — PROGRESS NOTES
Daily Progress Note    Coronary artery disease involving native coronary artery of native heart without angina pectoris    Nonrheumatic aortic valve stenosis    Essential hypertension    Congestive heart failure (CMS/HCC)    Tobacco abuse    Squamous cell carcinoma of lung, right (CMS/HCC)    Acute-on-chronic respiratory failure (CMS/HCC)    Respiratory distress    Stage 2 skin ulcer of sacral region (CMS/HCC)    Assessment    Worsening left upper lobe interstitial infiltrates   Differential diagnosis possible lymphangitic spread, less likely infection since patient is already on broad-spectrum antibiotics including Unasyn and vancomycin unless gram-negative's is suspected     acute-on-chronic hypoxic respiratory failure  Pneumonia most likely post obstruction  COPD exacerbation  Metastatic lung cancer squamous cell Stage III   CHF  Opioid dependence  Stage 2 skin ulcer of sacral region      COVID-19 and respiratory viral DNA panel is negative    Plan    Start IV Solu-Medrol 40 mg every 8 for possible lymphangitic spread  We will consider bronchoscopy to rule out gram-negative's pneumonia if family want to proceed with aggressive care     continues on 10 liter oximizer  Bronchodilator as needed  Steroids completed  Mucinex add mucomyst nebs   IS    Antibiotics per ID unasyn and vancomycin       LOS: 13 days       Subjective         Objective     Vital signs for last 24 hours:  Vitals:    06/19/21 1954 06/20/21 0347 06/20/21 0745 06/20/21 0750   BP:  138/84     BP Location:  Right arm     Patient Position:  Lying     Pulse: 109 95 101 99   Resp: 18 18 24 22   Temp:  97.4 °F (36.3 °C)     TempSrc:  Oral     SpO2: 96% 99% 95%    Weight:       Height:           Intake/Output last 3 shifts:  I/O last 3 completed shifts:  In: 720 [P.O.:720]  Out: 800 [Urine:800]  Intake/Output this shift:  No intake/output data recorded.      Radiology  Imaging Results (Last 24 Hours)     Procedure Component Value Units Date/Time    XR  Chest 1 View [[id number]] Collected: 06/20/21 0857     Updated: 06/20/21 0901    Narrative:      DATE OF EXAM:  6/20/2021 8:35 AM     PROCEDURE:  XR CHEST 1 VW-     INDICATIONS:  worsening dyspnea; R06.03-Acute respiratory distress; J98.01-Acute  bronchospasm; I50.9-Heart failure, unspecified; C34.91-Malignant  neoplasm of unspecified part of right bronchus or lung     COMPARISON:  AP chest x-ray 06/14/2021, CT chest 06/07/2021.     TECHNIQUE:   Single radiographic AP view of the chest was obtained.     FINDINGS:  Right perihilar/upper lobe mass appears stable. No pneumothorax is seen.  There are increasing interstitial and airspace opacities in the right  lower lung and throughout the left lung. Cardiomediastinal contours are  stable, including fracture of the 2 inferior sternal wires. Tip of the  left subclavian central venous catheter terminates near the cavoatrial  junction.        Impression:      1.Increasing interstitial and airspace opacities throughout the left  lung and in the right lower lung, which may be due to pulmonary edema  and/or infection.  2.Stable large right perihilar/upper lobe mass.     Electronically Signed By-Nori Carter MD On:6/20/2021 8:59 AM  This report was finalized on [id number] by  Nori aCrter MD.          Labs:  Results from last 7 days   Lab Units 06/19/21  0312   WBC 10*3/mm3 6.30   HEMOGLOBIN g/dL 9.0*   HEMATOCRIT % 27.1*   PLATELETS 10*3/mm3 346     Results from last 7 days   Lab Units 06/20/21  0305 06/19/21  0312 06/18/21  0508   SODIUM mmol/L  --  140 135*   POTASSIUM mmol/L  --  4.3 4.3   CHLORIDE mmol/L  --  105 100   CO2 mmol/L  --  25.0 25.0   BUN mg/dL  --  18 16   CREATININE mg/dL 0.57* 0.61* 0.64*   CALCIUM mg/dL  --  8.5* 8.3*   BILIRUBIN mg/dL  --   --  0.3   ALK PHOS U/L  --   --  94   ALT (SGPT) U/L  --   --  45*   AST (SGOT) U/L  --   --  33   GLUCOSE mg/dL  --  116* 137*     Results from last 7 days   Lab Units 06/17/21  8640   PH, ARTERIAL pH units  7.416   PO2 ART mm Hg 153.5*   PCO2, ARTERIAL mm Hg 36.0   HCO3 ART mmol/L 23.1     Results from last 7 days   Lab Units 06/18/21  0508 06/15/21  0547 06/14/21  0617   ALBUMIN g/dL 2.40* 2.30* 2.40*                               Meds:   SCHEDULE  acetylcysteine, 2 mL, Nebulization, BID - RT  ampicillin-sulbactam, 3 g, Intravenous, Q6H  aspirin, 81 mg, Oral, Daily  buprenorphine-naloxone, 1.5 tablet, Sublingual, Daily  enoxaparin, 40 mg, Subcutaneous, Q24H  epoetin asia-epbx, 40,000 Units, Subcutaneous, Weekly  gabapentin, 300 mg, Oral, TID  guaiFENesin, 600 mg, Oral, Q12H  insulin lispro, 0-7 Units, Subcutaneous, TID With Meals  Shabbir, 1 packet, Oral, BID  lurasidone, 40 mg, Oral, Daily  megestrol acetate, 200 mg, Oral, TID  multivitamin with minerals, 1 tablet, Oral, Daily  pantoprazole, 40 mg, Oral, Daily  polyethylene glycol, 17 g, Oral, Daily  senna-docusate sodium, 2 tablet, Oral, BID  sertraline, 50 mg, Oral, Daily  spironolactone, 25 mg, Oral, Daily  vancomycin, 1,000 mg, Intravenous, Q12H  Zinc Oxide, , Apply externally, BID      Infusions  Pharmacy to dose vancomycin,       PRNs  •  acetaminophen  •  albuterol  •  senna-docusate sodium **AND** polyethylene glycol **AND** bisacodyl **AND** bisacodyl  •  calcium carbonate  •  dextrose  •  dextrose  •  glucagon (human recombinant)  •  HYDROcodone-acetaminophen  •  hydrOXYzine  •  insulin lispro  •  ipratropium-albuterol  •  LORazepam  •  melatonin  •  morphine sulfate (concentrate)  •  ondansetron  •  Pharmacy to dose vancomycin  •  promethazine  •  [COMPLETED] Insert peripheral IV **AND** sodium chloride  •  sodium chloride    Physical Exam:  Physical Exam  Vitals reviewed.   Constitutional:       General: He is sleeping.      Appearance: He is ill-appearing.   Cardiovascular:      Heart sounds: Normal heart sounds.   Pulmonary:      Breath sounds: Rhonchi present.   Skin:     General: Skin is warm and dry.      Coloration: Skin is pale.   Neurological:       Mental Status: He is oriented to person, place, and time.         ROS  Review of Systems   Respiratory: Positive for cough and shortness of breath.        I reviewed the patient's new clinical results.      Electronically signed by VINCE Muñoz, 06/20/21 at 11:47 EDT.

## 2021-06-20 NOTE — PROGRESS NOTES
Infectious Diseases Progress Note      LOS: 13 days   Patient Care Team:  Sandoval Stevenson FNP as PCP - General  Sandoval Stevenson FNP as PCP - Family Medicine  Axel Lewis MD as Consulting Physician (Hematology and Oncology)  Iglesia Springer MD as Consulting Physician (Cardiology)    Chief Complaint: Shortness of breath, fatigue    Subjective        The patient has been afebrile for the last 24 hours.  The patient is on 12 L of oxygen via nasal cannula.  Has had no change in symptoms      Review of Systems:   Review of Systems   Constitutional: Positive for fatigue.   HENT: Negative.    Eyes: Negative.    Respiratory: Positive for shortness of breath.    Cardiovascular: Negative.    Gastrointestinal: Negative.    Endocrine: Negative.    Genitourinary: Negative.    Musculoskeletal: Negative.    Skin: Negative.    Neurological: Negative.    Psychiatric/Behavioral: Negative.    All other systems reviewed and are negative.       Objective     Vital Signs  Temp:  [97.4 °F (36.3 °C)-99.3 °F (37.4 °C)] 99.3 °F (37.4 °C)  Heart Rate:  [] 115  Resp:  [18-24] 22  BP: (102-138)/(69-84) 102/69    Physical Exam:  Physical Exam  Vitals and nursing note reviewed.   Constitutional:       General: He is not in acute distress.     Appearance: He is well-developed. He is ill-appearing. He is not diaphoretic.      Comments: Cachectic   HENT:      Head: Normocephalic and atraumatic.      Mouth/Throat:      Comments: Mild oral thrush  Eyes:      Extraocular Movements: Extraocular movements intact.      Conjunctiva/sclera: Conjunctivae normal.      Pupils: Pupils are equal, round, and reactive to light.   Cardiovascular:      Rate and Rhythm: Normal rate and regular rhythm.      Heart sounds: Normal heart sounds, S1 normal and S2 normal.   Pulmonary:      Effort: Pulmonary effort is normal. No respiratory distress.      Breath sounds: No stridor. Rales present. No wheezing.      Comments: Diminished  throughout  Abdominal:      General: Bowel sounds are normal. There is no distension.      Palpations: Abdomen is soft. There is no mass.      Tenderness: There is no abdominal tenderness. There is no guarding.   Musculoskeletal:         General: No deformity. Normal range of motion.      Cervical back: Neck supple.   Skin:     General: Skin is warm and dry.      Coloration: Skin is not pale.      Findings: No erythema or rash.      Comments: Port in place   Neurological:      Mental Status: He is alert and oriented to person, place, and time.      Cranial Nerves: No cranial nerve deficit.   Psychiatric:         Mood and Affect: Mood normal.          Results Review:    I have reviewed all clinical data, test, lab, and imaging results.     Radiology  XR Chest 1 View    Result Date: 6/20/2021  DATE OF EXAM: 6/20/2021 8:35 AM  PROCEDURE: XR CHEST 1 VW-  INDICATIONS: worsening dyspnea; R06.03-Acute respiratory distress; J98.01-Acute bronchospasm; I50.9-Heart failure, unspecified; C34.91-Malignant neoplasm of unspecified part of right bronchus or lung  COMPARISON: AP chest x-ray 06/14/2021, CT chest 06/07/2021.  TECHNIQUE: Single radiographic AP view of the chest was obtained.  FINDINGS: Right perihilar/upper lobe mass appears stable. No pneumothorax is seen. There are increasing interstitial and airspace opacities in the right lower lung and throughout the left lung. Cardiomediastinal contours are stable, including fracture of the 2 inferior sternal wires. Tip of the left subclavian central venous catheter terminates near the cavoatrial junction.      1.Increasing interstitial and airspace opacities throughout the left lung and in the right lower lung, which may be due to pulmonary edema and/or infection. 2.Stable large right perihilar/upper lobe mass.  Electronically Signed By-Nori Carter MD On:6/20/2021 8:59 AM This report was finalized on [id number] by  Nori Carter  MD.      Cardiology    Laboratory    Results from last 7 days   Lab Units 06/19/21 0312 06/18/21  0508 06/17/21  0330 06/16/21  0341 06/15/21  0547 06/14/21  0617   WBC 10*3/mm3 6.30 6.20 7.70 7.60 8.20 9.60   HEMOGLOBIN g/dL 9.0* 9.4* 8.9* 8.5* 8.7* 9.3*   HEMATOCRIT % 27.1* 28.4* 26.7* 25.2* 25.9* 28.2*   PLATELETS 10*3/mm3 346 335 286 243 226 218     Results from last 7 days   Lab Units 06/20/21  0305 06/19/21  0312 06/18/21  0508 06/17/21  0330 06/16/21  0341 06/15/21  0547 06/14/21  0617   SODIUM mmol/L  --  140 135* 134* 131* 137 132*   POTASSIUM mmol/L  --  4.3 4.3 4.2 4.3 4.7 4.0   CHLORIDE mmol/L  --  105 100 99 99 105 100   CO2 mmol/L  --  25.0 25.0 24.0 26.0 24.0 24.0   BUN mg/dL  --  18 16 13 15 14 17   CREATININE mg/dL 0.57* 0.61* 0.64* 0.57* 0.53* 0.64* 0.60*   GLUCOSE mg/dL  --  116* 137* 73 100* 87 79   ALBUMIN g/dL  --   --  2.40*  --   --  2.30* 2.40*   BILIRUBIN mg/dL  --   --  0.3  --   --  0.3 0.5   ALK PHOS U/L  --   --  94  --   --  93 104   AST (SGOT) U/L  --   --  33  --   --  38 32   ALT (SGPT) U/L  --   --  45*  --   --  36 37   CALCIUM mg/dL  --  8.5* 8.3* 8.4* 8.2* 8.4* 8.6                 Microbiology   Microbiology Results (last 10 days)     ** No results found for the last 240 hours. **          Medication Review:       Schedule Meds  acetylcysteine, 2 mL, Nebulization, BID - RT  ampicillin-sulbactam, 3 g, Intravenous, Q6H  aspirin, 81 mg, Oral, Daily  buprenorphine-naloxone, 1.5 tablet, Sublingual, Daily  enoxaparin, 40 mg, Subcutaneous, Q24H  epoetin asia-epbx, 40,000 Units, Subcutaneous, Weekly  gabapentin, 300 mg, Oral, TID  guaiFENesin, 600 mg, Oral, Q12H  insulin lispro, 0-7 Units, Subcutaneous, TID With Meals  Shabbir, 1 packet, Oral, BID  lurasidone, 40 mg, Oral, Daily  megestrol acetate, 200 mg, Oral, TID  methylPREDNISolone sodium succinate, 40 mg, Intravenous, Q8H  multivitamin with minerals, 1 tablet, Oral, Daily  pantoprazole, 40 mg, Oral, Daily  polyethylene glycol, 17 g,  Oral, Daily  senna-docusate sodium, 2 tablet, Oral, BID  sertraline, 50 mg, Oral, Daily  spironolactone, 25 mg, Oral, Daily  vancomycin, 1,000 mg, Intravenous, Q12H  Zinc Oxide, , Apply externally, BID        Infusion Meds  Pharmacy to dose vancomycin,         PRN Meds  •  acetaminophen  •  albuterol  •  senna-docusate sodium **AND** polyethylene glycol **AND** bisacodyl **AND** bisacodyl  •  calcium carbonate  •  dextrose  •  dextrose  •  glucagon (human recombinant)  •  HYDROcodone-acetaminophen  •  hydrOXYzine  •  insulin lispro  •  ipratropium-albuterol  •  LORazepam  •  melatonin  •  morphine sulfate (concentrate)  •  ondansetron  •  Pharmacy to dose vancomycin  •  promethazine  •  [COMPLETED] Insert peripheral IV **AND** sodium chloride  •  sodium chloride        Assessment/Plan       Antimicrobial Therapy   1.        day  2.  Unasyn      day  3.  Vancomycin      day  4.      Day  5.      Day      Assessment     Possible postobstructive pneumonia.  Patient has right large lung mass as a result of lung cancer.  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    Pancytopenia-likely related to chemotherapy     Lung CA was on chemotherapy    Oral thrush, patient received 7 days of nystatin        Plan     Continue IV Unasyn 3 g every 6 hours  Continue IV vancomycin-asked pharmacy to monitor and dose  Will offer 2 weeks of the above antibiotics-antibiotics should be finishing tomorrow  Continue supportive care  A.m. labs  Prognosis is very guarded and long-term prognosis might be poor  Case discussed with wife at  bedside  Probable going home under hospice care in the next day or so-apparently there is a gas leak in the patient's house so discharge has been delayed    Herminia Stanton, APRN  06/20/21  19:01 EDT     Note is dictated utilizing voice recognition software/Dragon

## 2021-06-20 NOTE — PLAN OF CARE
Goal Outcome Evaluation:  Plan of Care Reviewed With: patient           Outcome Summary: Pt currently resting in bed with wife at bedside. Pt treated for anxiety per MAR. Plan to discharge with [facility]. Will continue to monitor.

## 2021-06-20 NOTE — PROGRESS NOTES
Hematology/Oncology Inpatient Progress Note    PATIENT NAME: Axel Ramirez  : 1950  MRN: [id number]    CHIEF COMPLAINT: Metastatic squamous cell carcinoma of the right upper lobe lung and MDS    HISTORY OF PRESENT ILLNESS:  70 y.o. male presented to [facility] ER on 2021 with shortness of air and hypoxia.  He had an acute onset on the day of admission of worsening shortness of breath with O2 saturation of 76% on 4 L/min O2.  The ECF called EMS.  He was treated with nebulizer on the way to the ER.  Chest x-ray showed left lung pneumonia and a right middle lobe lung mass.  White count 10.1, hemoglobin 9.2, .7, platelets 115k.  D-dimer was 2.99 (< 0.6).  Chest CT showed a 9 cm right upper lobe lung mass occluding the right upper lobe bronchus with complete right upper and right middle lobe collapse.  There was no pulmonary emboli.  There was possible aspiration pneumonia and stable mediastinal lymphadenopathy.  A new 12 mm nodule was seen in the left lower lobe.  Adrenal glands were normal.  He was started on steroids and Zosyn.  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  Recently started on second line therapy Taxol/carboplatin/ipilimumab and Nivolumab with Neulasta support on 2021.     21  Hematology/Oncology was consulted for his metastatic squamous cell carcinoma of the right upper lobe lung.  He is an established patient.  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  Initially treated with concomitant weekly chemotherapy (paclitaxel and carboplatin x7) and radiation therapy from 2020. He completed radiation therapy on 2021.  He completed the weekly portion of his chemotherapy on 2021.  He received 1 cycle of every 21-day carboplatin /Taxol with  Neulasta support on 2/9/2021.  He subsequently was admitted to [facility], for hypoxia with respiratory failure requiring intubation.  MRI of the brain showed a left occipital lobe lesion with vasogenic edema.  He received radiation, x1 fraction, on 3/3/2021 to the left occipital lobe. One month later, a brain MRI showed enlargement of the left occipital lobe lesion.  PET scan showed the right upper lobe lung mass to be 10 cm in size with a decrease in the peripheral FDG uptake suggesting interval response to treatment.  There was a decrease in the size and FDG uptake in the mediastinal lymph nodes.  There was no distant metastatic disease.  The lesion in the left occipital lobe was FDG avid.  Right upper lobe lung collapse had improved with aeration.  He was referred to Dr. Tay for evaluation of his brain MRI which was felt to be due to to post SRS changes and not progression.  He was seen in follow-up with plan to start chemotherapy with immunotherapy.  Paclitaxel and carboplatin doses were decreased by 20% due to pancytopenia with prior treatment.  Repeat brain MRI, on 5/13/2021, showed an increase in the size of the left occipital lobe with an increase in surrounding edema.  There were no new masses.  The patient was referred to Dr. Luna for surgical resection versus MRI SPECT/perfusion at [facility].  Dr. Luna felt the lesion was a combination of necrosis and tumor progression and recommended surgical resection.  He started treatment on 5/24/2021 (chemotherapy plus immunotherapy) as his surgical resection was to be done after medical clearance.  He had a return appointment with Dr. Luna on 6/9/2021 following an MRI of his brain.  -MDS-diagnosed January 2021.  He was anemic and was found to have GOGO and malabsorption on oral therapy.  His bone marrow revealed increased iron storage and mild erythroid atypia in January 2021.  Stool heme was negative on 5/13/2021. He has received IV iron but has  never required Retacrit.  Last office visit 5/24/2021, complaining of hypoxia and tachypnea with anxiety.  White count 9.05, hemoglobin 11.34, .8 and platelets 220.  He was starting to gain weight and Megace was continued.  For his cough and hypoxia, he was treated with Augmentin and albuterol nebulizer.  He had oral thrush which was treated with nystatin swish and swallow.  TSH 0.287 (0.282-4.0), iron 62 (), ferritin 4185 (), iron saturation 32 (20-55), TIBC 196 (228-428).     PCP: Sandoval Stevenson FNP     INTERVAL HISTORY:  • 6/8/2021-Retacrit 40,000 units subcu weekly initiated.  Hemoglobin 7.2.  • 6/9/2021-PT 11.5 (9.6-11.7), PTT 27.7 (24-31).  Hemoglobin 7.2, platelets 118,000.  Haptoglobin 465 ().  CMV IgM <30 (<30).  Cardiac ejection fraction 65%.   • 6/10/2021-EBV VCA IgM less than 36 (less than 36).  • 6/11/2021-hemoglobin 6.9.  1 unit pRBCs given.  WBC 13.8 with 18% bands.  • 6/12/2021-hemoglobin 9.6, WBC 12.1 with 8% bands.  Folate 13.7 (4.78-24.2), vitamin B12 more than 2000 (211-946).  Creatinine 0.56 (0.76-1.27).  Iron 42 (), TIBC 146 (298-536), iron saturation 29 (20-50), ferritin 3721 (), reticulocyte count 3.44 (0.7-1.9).  • 6/13/2021- hemoglobin 9.0, .0, white blood count 9.2. Copper 122 ().  Chest x-ray unchanged.  • 6/14/2021-palliative care consultation.  • [facility] to 12 L. Patient is a DNR.  • 6/18/21 stool heme negative.  Patient and family leaning towards hospice care. Hemoglobin 9.4.    History of present illness reviewed since last visit and changes noted on 06/18/21.    Subjective   Reports some nausea more shortness of breath.    ROS:  Review of Systems   Constitutional: Negative for activity change, chills, fatigue, fever and unexpected weight change.   HENT: Negative for congestion, dental problem, hearing loss, mouth sores, nosebleeds, sore throat and trouble swallowing.     Eyes: Negative for photophobia and visual disturbance.   Respiratory: Positive for shortness of breath. Negative for cough and chest tightness.    Cardiovascular: Negative for chest pain, palpitations and leg swelling.   Gastrointestinal: Positive for nausea. Negative for abdominal distention, abdominal pain, blood in stool, constipation, diarrhea and vomiting.   Endocrine: Negative for cold intolerance and heat intolerance.   Genitourinary: Negative for decreased urine volume, difficulty urinating, dysuria, frequency, hematuria and urgency.   Musculoskeletal: Negative for arthralgias and gait problem.   Skin: Negative for rash and wound.   Neurological: Negative for dizziness, tremors, weakness, light-headedness, numbness and headaches.   Hematological: Negative for adenopathy. Does not bruise/bleed easily.   Psychiatric/Behavioral: Negative for confusion and hallucinations. The patient is not nervous/anxious.    All other systems reviewed and are negative.     MEDICATIONS:    Scheduled Meds:  acetylcysteine, 2 mL, Nebulization, BID - RT  ampicillin-sulbactam, 3 g, Intravenous, Q6H  aspirin, 81 mg, Oral, Daily  buprenorphine-naloxone, 1.5 tablet, Sublingual, Daily  enoxaparin, 40 mg, Subcutaneous, Q24H  epoetin asia-epbx, 40,000 Units, Subcutaneous, Weekly  gabapentin, 300 mg, Oral, TID  guaiFENesin, 600 mg, Oral, Q12H  insulin lispro, 0-7 Units, Subcutaneous, TID With Meals  Shabbir, 1 packet, Oral, BID  lurasidone, 40 mg, Oral, Daily  megestrol acetate, 200 mg, Oral, TID  multivitamin with minerals, 1 tablet, Oral, Daily  pantoprazole, 40 mg, Oral, Daily  polyethylene glycol, 17 g, Oral, Daily  senna-docusate sodium, 2 tablet, Oral, BID  sertraline, 50 mg, Oral, Daily  spironolactone, 25 mg, Oral, Daily  vancomycin, 1,000 mg, Intravenous, Q12H  Zinc Oxide, , Apply externally, BID       Continuous Infusions:  Pharmacy to dose vancomycin,        PRN Meds:  •  acetaminophen  •  albuterol  •  senna-docusate sodium  "**AND** polyethylene glycol **AND** bisacodyl **AND** bisacodyl  •  calcium carbonate  •  dextrose  •  dextrose  •  glucagon (human recombinant)  •  HYDROcodone-acetaminophen  •  hydrOXYzine  •  insulin lispro  •  ipratropium-albuterol  •  LORazepam  •  melatonin  •  morphine sulfate (concentrate)  •  ondansetron  •  Pharmacy to dose vancomycin  •  promethazine  •  [COMPLETED] Insert peripheral IV **AND** sodium chloride  •  sodium chloride     ALLERGIES:  No Known Allergies    Objective    VITALS:   /84 (BP Location: Right arm, Patient Position: Lying)   Pulse 99   Temp 97.4 °F (36.3 °C) (Oral)   Resp 22   Ht 172.7 cm (68\")   Wt 65.9 kg (145 lb 4.5 oz)   SpO2 95%   BMI 22.09 kg/m²     PHYSICAL EXAM:  Physical Exam  Vitals and nursing note reviewed.   Constitutional:       General: He is not in acute distress.     Appearance: Normal appearance. He is well-developed and normal weight. He is ill-appearing. He is not diaphoretic.      Interventions: Nasal cannula in place.   HENT:      Head: Normocephalic and atraumatic.      Comments: Frontal male pattern baldness.       Right Ear: External ear normal.      Left Ear: External ear normal.      Nose: Nose normal. No rhinorrhea.      Comments: Oxygen via nasal cannula.     Mouth/Throat:      Mouth: Mucous membranes are moist.      Pharynx: Oropharynx is clear. No oropharyngeal exudate or posterior oropharyngeal erythema.      Comments: Edentulous.  Eyes:      General: No scleral icterus.     Extraocular Movements: Extraocular movements intact.      Conjunctiva/sclera: Conjunctivae normal.      Pupils: Pupils are equal, round, and reactive to light.   Cardiovascular:      Rate and Rhythm: Regular rhythm. Tachycardia present.      Heart sounds: Normal heart sounds. No murmur heard.        Comments: Cardiac monitor leads.   Pulmonary:      Effort: Pulmonary effort is normal. No respiratory distress.      Breath sounds: No stridor. Rhonchi (Bilateral and " scattered.) present. No wheezing or rales.   Chest:      Comments: Midline vertical chest wall scar.    Left chest wall port.  Abdominal:      General: Abdomen is flat. Bowel sounds are normal. There is no distension.      Palpations: Abdomen is soft. There is no mass.      Tenderness: There is no abdominal tenderness. There is no guarding or rebound.   Genitourinary:     Comments: External urinary catheter.  Musculoskeletal:         General: No swelling, tenderness or deformity. Normal range of motion.      Cervical back: Normal range of motion and neck supple. No tenderness.      Right lower leg: No edema.      Left lower leg: No edema.   Lymphadenopathy:      Cervical: No cervical adenopathy.      Upper Body:      Right upper body: No supraclavicular adenopathy.      Left upper body: No supraclavicular adenopathy.   Skin:     General: Skin is warm and dry.      Coloration: Skin is not jaundiced or pale.      Findings: Bruising (Ecchymosis on arms.) and ecchymosis ( Bilateral upper extremities) present. No erythema or rash.   Neurological:      General: No focal deficit present.      Mental Status: He is alert and oriented to person, place, and time.      Coordination: Coordination normal.   Psychiatric:         Mood and Affect: Mood normal.         Behavior: Behavior normal.         Thought Content: Thought content normal.         Judgment: Judgment normal.           RECENT LABS:  Lab Results (last 24 hours)     Procedure Component Value Units Date/Time    POC Glucose Once [[id number]]  (Normal) Collected: 06/20/21 1105    Specimen: Blood Updated: 06/20/21 1106     Glucose 86 mg/dL      Comment: Serial Number: [id number]:  [id number]       POC Glucose Once [[id number]]  (Normal) Collected: 06/20/21 0802    Specimen: Blood Updated: 06/20/21 0803     Glucose 75 mg/dL      Comment: Serial Number: [id number]:  [id number]       Creatinine, Serum [[id number]]  (Abnormal) Collected: 06/20/21 0305    Specimen:  Blood Updated: 06/20/21 0417     Creatinine 0.57 mg/dL      eGFR Non African Amer 141 mL/min/1.73     Narrative:      GFR Normal >60  Chronic Kidney Disease <60  Kidney Failure <15      POC Glucose Once [[id number]]  (Abnormal) Collected: 06/19/21 1919    Specimen: Blood Updated: 06/19/21 1919     Glucose 109 mg/dL      Comment: Serial Number: [id number]:  [id number]       POC Glucose Once [[id number]]  (Normal) Collected: 06/19/21 1639    Specimen: Blood Updated: 06/19/21 1640     Glucose 93 mg/dL      Comment: Serial Number: [id number]:  [id number]             PENDING RESULTS: N/A    IMAGING REVIEWED:  XR Chest 1 View    Result Date: 6/20/2021  1.Increasing interstitial and airspace opacities throughout the left lung and in the right lower lung, which may be due to pulmonary edema and/or infection. 2.Stable large right perihilar/upper lobe mass.  Electronically Signed By-Nori Carter MD On:6/20/2021 8:59 AM This report was finalized on [id number] by  Nori Carter MD.      I have reviewed the patient's labs, imaging, reports, and other clinician documentation.    Assessment/Plan   ASSESSMENT:  1. Squamous cell carcinoma right upper lobe lung with new brain met-s/p chemoradiation completed 1/2021, SRS to left occipital lobe lesion and now on chemoimmunotherapy (carboplatin/Taxol plus ipilimumab/nivolumab with Neulasta support) dosed 5/24/2021.  PET/CT scan and April 2021 showed he was responding to treatment.  Ipilimumab and nivolumab were added to his current therapy due to progression in his brain.  Due to hospitalization and brain radiation, he did not receive systemic therapy from 2/9/2021 until 5/24/2021.  Planned to undergo surgical resection of brain metastasis by Dr. Luna.  Ipilimumab and nivolumab are known to cross blood-brain barrier.  Agree overall prognosis poor and palliative care appropriate.  Discussed with wife that hospice care was an appropriate plan.  Chemotherapy was an  option that may prolong his survival but only by months and only if his performance status improves.  His cancer is not curable.  Hospice has been consulted by palliative care.  2. Anemia/Myelodysplastic syndrome/GOGO with malabsorption/Chemotherapy-induced anemia-he has never required Retacrit as an outpatient.  Recent iron studies showed correction of GOGO.  Acute fall in hemoglobin due to recent chemotherapy.  Macrocytosis due to MDS.  On treatment with Retacrit.  Haptoglobin high. Normal copper level.  On multivitamin and Protonix.  Stool heme negative.  Improving.   3. Acute thrombocytopenia-likely chemotherapy-induced.  Coags normal and CMV/EBV IgM normal.  Normalized.   4. Respiratory failure /Pneumonia (possibly postobstructive)/COPD-pulmonary following.  On vancomycin, Unasyn with management by infectious disease.  5. Weight loss/Loss of appetite-on Megace with continued weight loss.      6. Oral thrush-diagnosed as outpatient.  Now on steroids, will continue nystatin.  7. Sacral decub-wound care to follow.   8. CHF/s/p aortic valve replacement -echocardiogram showed no vegetation and EF 65%.  On aspirin and Lovenox prophylaxis.     PLAN  1. Hg stable at 9g  2. Continue weekly Retacrit, next due 6/22/2021.  3. Hospice care per family    Agree with plan to pursue hospice care.        I discussed the patients findings and my recommendations with spouse.    Electronically signed by Efrain Youngblood MD, 06/20/21, 11:23 AM EDT.

## 2021-06-20 NOTE — PROGRESS NOTES
LOS: 13 days   Patient Care Team:  Sandoval Stevenson FNP as PCP - General  Sandoval Stevenson FNP as PCP - Family Medicine  Axel Lewis MD as Consulting Physician (Hematology and Oncology)  Iglesia Springer MD as Consulting Physician (Cardiology)    Chief Complaint: Very sedated this morning, appears comfortable but very hard to arouse.    :Subjective  Terminally ill-appearing    Interval History: Patient was to go home with home hospice but there is a gas leak in the home and he and his wife and family cannot return there at this juncture until the gas leak is addressed    Patient Complaints: Still very frail and debilitated.  Wife at bedside.,  No complaints today of uncontrolled pain or dyspnea  Patient Denies: No chest pain or hemoptysis, no nausea vomiting or diarrhea  History taken from: patient but very limited    Review of Systems:    All systems were reviewed and negative except for: See interval history    Objective     Vital Signs  Temp:  [97.4 °F (36.3 °C)-98.5 °F (36.9 °C)] 97.4 °F (36.3 °C)  Heart Rate:  [] 99  Resp:  [18-26] 22  BP: (128-138)/(76-84) 138/84    Physical Exam:       General Appearance:   I am able to question him and he denies any pain and says overall his pain is much better and denies any significant shortness of breath or air hunger.  Seems a little more anxious today and I think this is because of the delay in going home.   Head:    Normocephalic, without obvious abnormality, atraumatic   Eyes:            Conjunctivae and sclerae normal, no   icterus, no pallor, corneas clear, PERRLA   Throat:   No oral lesions, no thrush, oral mucosa moist   Neck:   No adenopathy, supple, trachea midline, no thyromegaly, no   carotid bruit, no JVD   Lungs:    Course bilateral lung bases and upper airway sounds are very coarse, worse on left lung field compared to right this morning    Heart:   Distant, regular rhythm and normal rate, normal S1 and S2, no            murmur,  no gallop, no rub, no click   Chest Wall:    No abnormalities observed   Abdomen:     Normal bowel sounds, no masses, no organomegaly, soft        non-tender, non-distended, no guarding, no rebound                tenderness,    Rectal:     Deferred   Extremities:  Extremities are elevated, nonpitting edema to just pretibial area bilaterally   Pulses:   Pulses palpable and equal bilaterally   Skin:   No bleeding, bruising or rash   Lymph nodes:   No palpable adenopathy   Neurologic:  Generalized debility, patient will follow very simple commands this morning   Radiology:  XR Chest 1 View    Result Date: 6/20/2021  1.Increasing interstitial and airspace opacities throughout the left lung and in the right lower lung, which may be due to pulmonary edema and/or infection. 2.Stable large right perihilar/upper lobe mass.  Electronically Signed By-Nori Carter MD On:6/20/2021 8:59 AM This report was finalized on [id number] by  Nori Carter MD.    XR Chest 1 View    Result Date: 6/14/2021  Unchanged chest. Slot 66 Electronically signed by:  Alcides Frazier D.O.  6/14/2021 12:49 AM         Results Review:     I reviewed the patient's new clinical results.  I reviewed the patient's new imaging results and agree with the interpretation.    Medication Review:   Scheduled Meds:acetylcysteine, 2 mL, Nebulization, BID - RT  ampicillin-sulbactam, 3 g, Intravenous, Q6H  aspirin, 81 mg, Oral, Daily  buprenorphine-naloxone, 1.5 tablet, Sublingual, Daily  enoxaparin, 40 mg, Subcutaneous, Q24H  epoetin asia-epbx, 40,000 Units, Subcutaneous, Weekly  gabapentin, 300 mg, Oral, TID  guaiFENesin, 600 mg, Oral, Q12H  insulin lispro, 0-7 Units, Subcutaneous, TID With Meals  Shabbir, 1 packet, Oral, BID  lurasidone, 40 mg, Oral, Daily  megestrol acetate, 200 mg, Oral, TID  methylPREDNISolone sodium succinate, 40 mg, Intravenous, Q8H  multivitamin with minerals, 1 tablet, Oral, Daily  pantoprazole, 40 mg, Oral, Daily  polyethylene glycol,  17 g, Oral, Daily  senna-docusate sodium, 2 tablet, Oral, BID  sertraline, 50 mg, Oral, Daily  spironolactone, 25 mg, Oral, Daily  vancomycin, 1,000 mg, Intravenous, Q12H  Zinc Oxide, , Apply externally, BID      Continuous Infusions:Pharmacy to dose vancomycin,       PRN Meds:.•  acetaminophen  •  albuterol  •  senna-docusate sodium **AND** polyethylene glycol **AND** bisacodyl **AND** bisacodyl  •  calcium carbonate  •  dextrose  •  dextrose  •  glucagon (human recombinant)  •  HYDROcodone-acetaminophen  •  hydrOXYzine  •  insulin lispro  •  ipratropium-albuterol  •  LORazepam  •  melatonin  •  morphine sulfate (concentrate)  •  ondansetron  •  Pharmacy to dose vancomycin  •  promethazine  •  [COMPLETED] Insert peripheral IV **AND** sodium chloride  •  sodium chloride    Labs:  Lab Results (last 24 hours)     Procedure Component Value Units Date/Time    POC Glucose Once [[id number]]  (Normal) Collected: 06/20/21 1105    Specimen: Blood Updated: 06/20/21 1106     Glucose 86 mg/dL      Comment: Serial Number: [id number]:  [id number]       POC Glucose Once [[id number]]  (Normal) Collected: 06/20/21 0802    Specimen: Blood Updated: 06/20/21 0803     Glucose 75 mg/dL      Comment: Serial Number: [id number]:  [id number]       Creatinine, Serum [[id number]]  (Abnormal) Collected: 06/20/21 0305    Specimen: Blood Updated: 06/20/21 0417     Creatinine 0.57 mg/dL      eGFR Non African Amer 141 mL/min/1.73     Narrative:      GFR Normal >60  Chronic Kidney Disease <60  Kidney Failure <15      POC Glucose Once [[id number]]  (Abnormal) Collected: 06/19/21 1919    Specimen: Blood Updated: 06/19/21 1919     Glucose 109 mg/dL      Comment: Serial Number: [id number]:  [id number]       POC Glucose Once [[id number]]  (Normal) Collected: 06/19/21 1639    Specimen: Blood Updated: 06/19/21 1640     Glucose 93 mg/dL      Comment: Serial Number: [id number]:  [id number]              Assessment/Plan     Acute-on-chronic  respiratory failure (CMS/HCC)  Respiratory distress  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, worsening  Probable postobstructive pneumonia    Coronary artery disease involving native coronary artery of native heart without angina pectoris    Nonrheumatic aortic valve stenosis    Essential hypertension    Congestive heart failure (CMS/HCC)    Tobacco abuse    Squamous cell carcinoma of lung, right (CMS/HCC)      Stage 2 skin ulcer of sacral region (CMS/HCC)      Infectious disease, pulmonology and palliative care inputs greatly appreciated, thank you.  Also reviewed hematology oncology's notes as well, thank you for your kind input.    Pulmonology will be adding steroids to help limit lymphangitic spread of cancer.    Patient now DNR.  Is to meet with hospice according to palliative care note and this meeting will be set up for today.    After some discussion with pharmacy, thank you for your kind input, we will add a low-dose short acting opioid to the patient's current Suboxone dose.  We will start him on hydrocodone 5/325 every 4 hours as needed.  Continue these as these appear to be helping the patient quite a bit.    Hopefully home soon with [facility] to finish antibiotic course and continue with hospice care.  Once again, a gas leak at the home must be addressed and fixed before patient and family can return home and of course before hospice can attend him at home    Continue rest of current approach.  Agree poor long-term prognosis.               Ignacia De La Cruz DO  06/20/21  12:15 EDT

## 2021-06-20 NOTE — PLAN OF CARE
Goal Outcome Evaluation:    Patient was very anxious this morning, Dr. De La Cruz witnessed and suggested giving ativan Q6 hours. Patient has been resting since lunch time. Spouse at bedside. Spouse was very concerned about  patient taking suboxone and morphine both, educated on withdrawal possibility.

## 2021-06-21 NOTE — PLAN OF CARE
Problem: Adult Inpatient Plan of Care  Goal: Absence of Hospital-Acquired Illness or Injury  Intervention: Identify and Manage Fall Risk  Recent Flowsheet Documentation  Taken 6/21/2021 0310 by Basil Spivey LPN  Safety Promotion/Fall Prevention:   safety round/check completed   room organization consistent   nonskid shoes/slippers when out of bed   muscle strengthening facilitated   mobility aid in reach   lighting adjusted   fall prevention program maintained   elopement precautions   clutter free environment maintained   assistive device/personal items within reach   activity supervised  Taken 6/21/2021 0102 by Basil Spivey LPN  Safety Promotion/Fall Prevention:   safety round/check completed   room organization consistent   nonskid shoes/slippers when out of bed   muscle strengthening facilitated   mobility aid in reach   lighting adjusted   fall prevention program maintained   clutter free environment maintained   activity supervised   assistive device/personal items within reach  Taken 6/21/2021 0100 by Basil Spivey LPN  Safety Promotion/Fall Prevention:   safety round/check completed   room organization consistent   nonskid shoes/slippers when out of bed   mobility aid in reach   muscle strengthening facilitated   lighting adjusted   fall prevention program maintained   activity supervised   assistive device/personal items within reach   clutter free environment maintained  Taken 6/20/2021 2301 by Basil Spivey LPN  Safety Promotion/Fall Prevention:   safety round/check completed   room organization consistent   nonskid shoes/slippers when out of bed   muscle strengthening facilitated   mobility aid in reach   lighting adjusted   fall prevention program maintained   assistive device/personal items within reach   clutter free environment maintained   activity supervised  Taken 6/20/2021 2102 by Basil Spivey LPN  Safety Promotion/Fall Prevention:   safety round/check  completed   room organization consistent   nonskid shoes/slippers when out of bed   muscle strengthening facilitated   mobility aid in reach   lighting adjusted   activity supervised   assistive device/personal items within reach   clutter free environment maintained   elopement precautions  Taken 6/20/2021 2032 by Basil Spivey LPN  Safety Promotion/Fall Prevention:   safety round/check completed   room organization consistent   nonskid shoes/slippers when out of bed   muscle strengthening facilitated   mobility aid in reach   lighting adjusted   fall prevention program maintained   clutter free environment maintained   assistive device/personal items within reach   activity supervised  Intervention: Prevent Skin Injury  Recent Flowsheet Documentation  Taken 6/21/2021 0310 by Basil Spivey LPN  Body Position: position maintained  Taken 6/21/2021 0102 by Basil Spivey LPN  Body Position: position maintained  Taken 6/21/2021 0100 by Basil Spivey LPN  Body Position: position changed independently  Taken 6/20/2021 2301 by Basil Spivey LPN  Body Position: position maintained  Taken 6/20/2021 2102 by Basil Spivey LPN  Body Position:   position maintained   turned   side-lying, right  Taken 6/20/2021 2032 by Basil Spivey LPN  Body Position:   position maintained   turned   tilted, left  Intervention: Prevent Infection  Recent Flowsheet Documentation  Taken 6/21/2021 0310 by Basil Spivey LPN  Infection Prevention:   visitors restricted/screened   single patient room provided   rest/sleep promoted   personal protective equipment utilized   hand hygiene promoted  Taken 6/21/2021 0102 by Basil Spivey LPN  Infection Prevention:   visitors restricted/screened   single patient room provided   rest/sleep promoted   personal protective equipment utilized   hand hygiene promoted  Taken 6/21/2021 0100 by Basil Spivey LPN  Infection Prevention:   visitors  restricted/screened   single patient room provided   rest/sleep promoted   personal protective equipment utilized   hand hygiene promoted  Taken 6/20/2021 2301 by Basil Spivey LPN  Infection Prevention:   visitors restricted/screened   single patient room provided   rest/sleep promoted   personal protective equipment utilized   hand hygiene promoted  Taken 6/20/2021 2102 by Basil Spivey LPN  Infection Prevention:   visitors restricted/screened   single patient room provided   rest/sleep promoted   personal protective equipment utilized   hand hygiene promoted  Taken 6/20/2021 2032 by Basil Spivey LPN  Infection Prevention:   visitors restricted/screened   single patient room provided   rest/sleep promoted   personal protective equipment utilized   hand hygiene promoted  Goal: Optimal Comfort and Wellbeing  Intervention: Provide Person-Centered Care  Recent Flowsheet Documentation  Taken 6/20/2021 2032 by Basil Spivey LPN  Trust Relationship/Rapport: care explained     Problem: Fall Injury Risk  Goal: Absence of Fall and Fall-Related Injury  Intervention: Identify and Manage Contributors to Fall Injury Risk  Recent Flowsheet Documentation  Taken 6/21/2021 0310 by Basil Spivey LPN  Medication Review/Management: medications reviewed  Taken 6/21/2021 0102 by Basil Spivey LPN  Medication Review/Management: medications reviewed  Taken 6/21/2021 0100 by Basil Spivey LPN  Medication Review/Management: medications reviewed  Taken 6/20/2021 2301 by Basil Spivey LPN  Medication Review/Management: medications reviewed  Taken 6/20/2021 2102 by Basil Spivey LPN  Medication Review/Management: medications reviewed  Taken 6/20/2021 2032 by Basil Spivey LPN  Medication Review/Management: medications reviewed  Intervention: Promote Injury-Free Environment  Recent Flowsheet Documentation  Taken 6/21/2021 0310 by Basil Spivey LPN  Safety  Promotion/Fall Prevention:   safety round/check completed   room organization consistent   nonskid shoes/slippers when out of bed   muscle strengthening facilitated   mobility aid in reach   lighting adjusted   fall prevention program maintained   elopement precautions   clutter free environment maintained   assistive device/personal items within reach   activity supervised  Taken 6/21/2021 0102 by Basil Spivey LPN  Safety Promotion/Fall Prevention:   safety round/check completed   room organization consistent   nonskid shoes/slippers when out of bed   muscle strengthening facilitated   mobility aid in reach   lighting adjusted   fall prevention program maintained   clutter free environment maintained   activity supervised   assistive device/personal items within reach  Taken 6/21/2021 0100 by Basil Spivey LPN  Safety Promotion/Fall Prevention:   safety round/check completed   room organization consistent   nonskid shoes/slippers when out of bed   mobility aid in reach   muscle strengthening facilitated   lighting adjusted   fall prevention program maintained   activity supervised   assistive device/personal items within reach   clutter free environment maintained  Taken 6/20/2021 2301 by Basil Spivey LPN  Safety Promotion/Fall Prevention:   safety round/check completed   room organization consistent   nonskid shoes/slippers when out of bed   muscle strengthening facilitated   mobility aid in reach   lighting adjusted   fall prevention program maintained   assistive device/personal items within reach   clutter free environment maintained   activity supervised  Taken 6/20/2021 2102 by Basil Spivey LPN  Safety Promotion/Fall Prevention:   safety round/check completed   room organization consistent   nonskid shoes/slippers when out of bed   muscle strengthening facilitated   mobility aid in reach   lighting adjusted   activity supervised   assistive device/personal items within reach    clutter free environment maintained   elopement precautions  Taken 6/20/2021 2032 by Basil Spivey LPN  Safety Promotion/Fall Prevention:   safety round/check completed   room organization consistent   nonskid shoes/slippers when out of bed   muscle strengthening facilitated   mobility aid in reach   lighting adjusted   fall prevention program maintained   clutter free environment maintained   assistive device/personal items within reach   activity supervised     Problem: Skin Injury Risk Increased  Goal: Skin Health and Integrity  Intervention: Optimize Skin Protection  Recent Flowsheet Documentation  Taken 6/21/2021 0310 by Basil Spivey LPN  Head of Bed (HOB): HOB at 20-30 degrees  Taken 6/21/2021 0100 by Basil Spivey LPN  Head of Bed (HOB): HOB at 20-30 degrees  Taken 6/20/2021 2301 by Basil Spivey LPN  Head of Bed (HOB): HOB at 20-30 degrees  Taken 6/20/2021 2102 by Basli Spivey LPN  Head of Bed (HOB): HOB at 20-30 degrees  Taken 6/20/2021 2032 by Basil Spivey LPN  Head of Bed (HOB): HOB at 20-30 degrees     Problem: Adjustment to Illness (Heart Failure)  Goal: Optimal Coping  Intervention: Support and Optimize Psychosocial Response to Chronic Illness  Recent Flowsheet Documentation  Taken 6/20/2021 2032 by Patric, Basil L, LPN  Life Transition/Adjustment: palliative care discussed  Supportive Measures: active listening utilized  Family/Support System Care: caregiver stress acknowledged     Problem: Cardiac Output Decreased (Heart Failure)  Goal: Optimal Cardiac Output  Intervention: Optimize Cardiac Output  Recent Flowsheet Documentation  Taken 6/20/2021 2032 by Basil Spivey LPN  Environmental Support: calm environment promoted     Problem: Fluid Imbalance (Heart Failure)  Goal: Fluid Balance  Intervention: Monitor and Manage Fluid Balance  Recent Flowsheet Documentation  Taken 6/20/2021 2301 by Basil Spivey LPN  Fluid/Electrolyte Management:  fluids provided     Problem: Functional Ability Impaired (Heart Failure)  Goal: Optimal Functional Ability  Intervention: Optimize Functional Ability  Recent Flowsheet Documentation  Taken 6/21/2021 0310 by Basil Spivey LPN  Activity Management: activity encouraged  Taken 6/21/2021 0102 by Basil Spivey LPN  Activity Management: activity encouraged  Taken 6/21/2021 0100 by Basil Spivey LPN  Activity Management: activity encouraged  Taken 6/20/2021 2301 by Basil Spivey LPN  Activity Management:   activity adjusted per tolerance   bedrest  Taken 6/20/2021 2102 by Basil Spivey LPN  Activity Management: activity encouraged  Taken 6/20/2021 2032 by Basil Spivey LPN  Activity Management: activity encouraged     Problem: Respiratory Compromise (Heart Failure)  Goal: Effective Oxygenation and Ventilation  Intervention: Promote Airway Secretion Clearance  Recent Flowsheet Documentation  Taken 6/20/2021 2032 by Basil Spivey LPN  Cough And Deep Breathing: done with encouragement  Intervention: Optimize Oxygenation and Ventilation  Recent Flowsheet Documentation  Taken 6/20/2021 2032 by Basil Spivey LPN  Airway/Ventilation Management: calming measures promoted     Problem: Gas Exchange Impaired  Goal: Optimal Gas Exchange  Intervention: Optimize Oxygenation and Ventilation  Recent Flowsheet Documentation  Taken 6/21/2021 0310 by Basil Spivey LPN  Head of Bed (HOB): HOB at 20-30 degrees  Taken 6/21/2021 0100 by Basil Spivey LPN  Head of Bed (HOB): HOB at 20-30 degrees  Taken 6/20/2021 2301 by Basil Spivey LPN  Head of Bed (HOB): HOB at 20-30 degrees  Taken 6/20/2021 2102 by Basil Spivey LPN  Head of Bed (HOB): HOB at 20-30 degrees  Taken 6/20/2021 2032 by Basil Spivey LPN  Head of Bed (HOB): HOB at 20-30 degrees  Airway/Ventilation Management: calming measures promoted   Goal Outcome Evaluation:

## 2021-06-21 NOTE — CONSULTS
Nutrition Services    Patient Name: Axel Ramirez  YOB: 1950  MRN: [id number]  Admission date: 6/7/2021    Comment:     Patient is now comfort care with possible plans to dc home with hospice. Family wishes to continue with oral nutritional supplements as ordered.       PPE Documentation        PPE Worn By Provider Mask, eye protection    PPE Worn By Patient  none     CLINICAL NUTRITION ASSESSMENT       Reason for Assessment Follow up protocol    6/9: Multiple Stage 2+ PI's     H&P:      Past Medical History:   Diagnosis Date   • Aortic stenosis    • Cancer (CMS/HCC)     Sickle Cell Carcinoma   • Congestive heart failure (CHF) (CMS/HCC)     DIASTOLIC   • COPD (chronic obstructive pulmonary disease) (CMS/HCC)    • Coronary artery disease    • Hypertension    • Lung cancer (CMS/HCC)    • Myocardial infarction (CMS/HCC)    • Peripheral vascular disease (CMS/HCC)    • Pneumonia 06/07/2021   • Valvular disease        Past Surgical History:   Procedure Laterality Date   • AORTIC VALVE REPAIR/REPLACEMENT  02/26/2018    Dr Maza   • BRONCHOSCOPY N/A 10/24/2020    Procedure: BRONCHOSCOPY with biopsy right upper lobe mass, and bronchoalveolar lavage right upper lobe;  Surgeon: Ángela Bean MD;  Location: [facility] ENDOSCOPY;  Service: Pulmonary;  Laterality: N/A;  post: right upper lobe mass, pneumonia   • BRONCHOSCOPY N/A 11/11/2020    Procedure: BRONCHOSCOPY with bronchial washing;  Surgeon: Ángela Bean MD;  Location: [facility] ENDOSCOPY;  Service: Pulmonary;  Laterality: N/A;  Post: lung mass, pneumonia   • BRONCHOSCOPY N/A 11/11/2020    Procedure: BRONCHOSCOPY RIGID with debulking of right main endobronchial tumor;  Surgeon: Nomi Reyes MD;  Location: [facility] MAIN OR;  Service: Cardiothoracic;  Laterality: N/A;   • BRONCHOSCOPY N/A 11/11/2020    Procedure: BRONCHOSCOPY;  Surgeon: Nomi Reyes MD;  Location: [facility] MAIN OR;  Service: Cardiothoracic;  Laterality: N/A;   • CARDIAC CATHETERIZATION   "12/29/2017   • CORONARY ARTERY BYPASS GRAFT  02/26/2018    Dr Maza   • TIBIA FRACTURE SURGERY     • VENOUS ACCESS DEVICE (PORT) INSERTION Left 10/30/2020    Procedure: MEDIPORT INSERTION UNDER FLUOROSCOPIC GUIDENCE;  Surgeon: Nomi Reyes MD;  Location: [facility] OR;  Service: Cardiothoracic;  Laterality: Left;        Current Problems:   Acute-on-chronic respiratory failure  - Pulmonology Following     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, worsening  - Oncology Following     Probable postobstructive pneumonia    Coronary artery disease involving native coronary artery of native heart without angina pectoris    Nonrheumatic aortic valve stenosis    Essential hypertension    Congestive heart failure    Tobacco abuse         Nutrition/Diet History         Narrative     6/21: Spoke with RN today who reports patient is comfort measures with plans to return home hospice however is not \"official\" yet. Palliative care team following. Courteous visit paid to the family today who reports patient does like the magic cups and boost plus & they wish to continue these interventions.     6/15: Upon assessment Rd noticed comfort measure listed as Interventions in patient chart. Discussed bowel regimen with RN as pt has not had bowel movement charted since admission (8 days) pt is receiving mirilax and pericolace this AM (per RN) though pt may benefit from enema or suppository to avoid discomfort. Information related to RN. RD went to visit pt room and other discipline providing care. Per palliative note, pt considering comfort measures. Will continue to monitor for GOC.    6/9: Rd visited pt room with family member at bedside. Pt is eating well, 2-3 meals per day, EMR confirms. Pt states he started vomiting yesterday and has not eaten much today but PTA had been eating well. He is having a lot of pain from wounds. Will order Shabbir BID. RN entered room upon end of assessment and suggested Boost while patient is " "feeling nauseas. Will order Boost TID. NFPE reveals moderate malnutrition, consistent with malnutrition hx in Feb of this year.      Functional Status Assistance required at meal times.      Food Allergies NKFA     Anthropometrics        Current Height, Weight Height: 172.7 cm (68\")  Weight: 65.9 kg (145 lb 4.5 oz) (06/17/21 0340)        Admit Height, Weight -    Flowsheet Rows      First Filed Value   Admission Height  172.7 cm (68\") Documented at 06/07/2021 0451   Admission Weight  67.3 kg (148 lb 6.4 oz) Documented at 06/07/2021 0450             Ideal Body Weight (IBW) 146lbs   % Ideal Body Weight 99%       Usual Body Weight UTD   % Usual Body Weight UTD   Wt Change Observation 6/21: Weight slightly down 2% since admission     6/15: Weight is stabilizing this admission.     6/9:Weight fluctuates, though current weight is consistent with weight 6 months ago. Pt +1L. Note major weight decrease since 2018 (~40lbs)   Weight Hx    Wt Readings from Last 30 Encounters:   06/17/21 0340 65.9 kg (145 lb 4.5 oz)   06/16/21 0332 66.1 kg (145 lb 11.6 oz)   06/14/21 0316 68.3 kg (150 lb 9.2 oz)   06/12/21 0427 67.7 kg (149 lb 4 oz)   06/11/21 0320 68.5 kg (151 lb 0.2 oz)   06/10/21 0416 66 kg (145 lb 8.1 oz)   06/07/21 0450 67.3 kg (148 lb 6.4 oz)   06/02/21 1229 72.6 kg (160 lb)   05/19/21 0931 72.6 kg (160 lb)   05/18/21 1003 59 kg (130 lb)   04/26/21 1307 56.7 kg (125 lb)   03/09/21 1144 57.2 kg (126 lb)   03/08/21 0306 56.7 kg (125 lb)   03/07/21 0442 58 kg (127 lb 12.8 oz)   03/05/21 0333 58.4 kg (128 lb 12 oz)   03/04/21 0359 65.1 kg (143 lb 8.3 oz)   02/27/21 0559 65 kg (143 lb 4.8 oz)   02/26/21 0547 57.6 kg (126 lb 15.8 oz)   02/25/21 0512 57.6 kg (126 lb 15.8 oz)   02/23/21 0349 66.1 kg (145 lb 11.6 oz)   02/22/21 0418 66 kg (145 lb 8.1 oz)   02/21/21 0517 66.9 kg (147 lb 7.8 oz)   02/20/21 0111 66.8 kg (147 lb 4.3 oz)   02/18/21 1807 68.2 kg (150 lb 5.7 oz)   02/18/21 1048 67.1 kg (148 lb)   02/04/21 0805 66.6 kg " (146 lb 12.8 oz)   01/05/21 0918 68.5 kg (151 lb)   12/29/20 0938 68.1 kg (150 lb 3.2 oz)   12/22/20 0913 69.6 kg (153 lb 6.4 oz)   12/15/20 0929 68.6 kg (151 lb 3.2 oz)   12/08/20 0915 68.1 kg (150 lb 3.2 oz)   12/02/20 1257 68.5 kg (151 lb)   12/01/20 0936 68 kg (150 lb)   11/24/20 0941 68.9 kg (152 lb)   11/19/20 0956 66.7 kg (147 lb)   11/09/20 1549 68 kg (150 lb)   11/08/20 2256 68 kg (150 lb)   10/30/20 0354 67.1 kg (147 lb 14.9 oz)   10/29/20 0500 70.1 kg (154 lb 8.7 oz)   10/27/20 0508 69.9 kg (154 lb 1.6 oz)   10/22/20 2059 68 kg (150 lb)   10/22/20 2044 68 kg (150 lb)   12/02/19 1001 77.1 kg (170 lb)   05/31/19 1037 82 kg (180 lb 12 oz)   05/21/18 1237 81.8 kg (180 lb 6.4 oz)        BMI kg/m2 Body mass index is 22.09 kg/m².       Labs/Medications         Pertinent Labs    Results from last 7 days   Lab Units 06/21/21  0649 06/20/21  0305 06/19/21  0312 06/18/21  0508 06/17/21  0330 06/15/21  0547   SODIUM mmol/L  --   --  140 135* 134* 137   POTASSIUM mmol/L  --   --  4.3 4.3 4.2 4.7   CHLORIDE mmol/L  --   --  105 100 99 105   CO2 mmol/L  --   --  25.0 25.0 24.0 24.0   BUN mg/dL  --   --  18 16 13 14   CREATININE mg/dL 0.58* 0.57* 0.61* 0.64* 0.57* 0.64*   CALCIUM mg/dL  --   --  8.5* 8.3* 8.4* 8.4*   BILIRUBIN mg/dL  --   --   --  0.3  --  0.3   ALK PHOS U/L  --   --   --  94  --  93   ALT (SGPT) U/L  --   --   --  45*  --  36   AST (SGOT) U/L  --   --   --  33  --  38   GLUCOSE mg/dL  --   --  116* 137* 73 87     Results from last 7 days   Lab Units 06/19/21  0312   HEMOGLOBIN g/dL 9.0*   HEMATOCRIT % 27.1*     COVID19   Date Value Ref Range Status   06/07/2021 Not Detected Not Detected - Ref. Range Final     No results found for: HGBA1C      Pertinent Medications Unasyn, ASA, suboxone, retacrit neurontin, mucinex, admelog, latuda, megace, solu-medrol, MVI with minerals, Protonix, Miralax, Pericolace, Zoloft, Aldactone, Vancomycin,      Physical Findings        Overall Physical   Appearance, [facility] 6/21:  "Pt visually appears consistent with NFPE completed on 6/9.     6/5: Did not enter room during this assessment    6/9: NFPE - moderate malnutrition.   -  Edema  No documented     Gastrointestinal + BM 6/20      Tubes No feeding tubes     Oral/Mouth Cavity WNL     Skin Per wound care RN on 6/7/21:  Several stg II pressure injuries to sacrum        Estimated/Assessed Needs       Energy Requirements    Height for Calculation  Height: 172.7 cm (68\")   Weight for Calculation -   Method for Estimation  -   EST Needs (kcal/day) -       Protein Requirements    Weight for Calculation -   EST Protein Needs (g/kg) -   EST Daily Needs (g/day) -       Fluid Requirements     Estimated Needs (mL/day) -       Fluid Deficit    Current Na Level (mEq/L) -   Desired Na Level (mEq/L) -   Estimated Fluid Deficit (L)  -     Current Nutrition Orders & Evaluation of Received Nutrient/Fluid Intake       Oral Nutrition     Current PO Diet Diet Texture; Mechanical Ground   Supplement Boost Plus TID  Magic Cups L/D  Shabbir BID    PO Evaluation     % PO Intake 6/21: 65% meal average since last full review     6/15: % meals     6/9: Little data on file this admission, pt ate % meals yesterday   -  Enteral Nutrition    Enteral Route -   TF Modular -   TF Delivery Method -   Current Ordered TF  -   Current Ordered H2O flush  -    TF Observation  -   EN Evaluation     TF Changes -    TF Residual -    TF Tolerance -    Average EN Delivered -       Parenteral Nutrition     TPN Route -   Current Ordered TPN VOL  -   Dextrose (g/kcals)  -   Amino Acid (g/kcals) -   Lipids (mL/Concentration/FREQ)  -   MVI Frequency  -   Trace Element Frequency  -   TPN Observation  -    TPN Evaluation    Total # Days on TPN -   -  Clinical Course    Nutrition Course Details 6/7-6/16 Pureed  6/17 to present (6/21) [facility] soft      Nutritional Risk Screening        NRS-2002 Score   -       Nutrition Diagnosis         Nutrition Dx Problem 1 Moderate chronic " malnutrition r/t hypermetabolic state of lung cancer with c/o troubled breathing this admission likely resulting in decreased intake AEB NFPE revealing mild muscle wasting and fat loss.       Nutrition Dx Problem 2 Increased nutrient needs (arginie, protein, kcal) r/t wound healing AEB multiple Stage II PI to coccyx.      Nutrition Dx Problem 3 Predicted inadequate energy intakes r/t end of life care AEB patients PO intakes trending down & now comfort measures        Intervention Goal         Intervention Goal(s) To maintain nutritional status to the degree practicable noting disease process      Nutrition Intervention        RD/Tech Action Continue with ONS as ordered per family preference      Nutrition Prescription          Diet Prescription Mech Soft    Supplement Prescription Boost TID   Magic cups L/D   Shabbir BID    -  Enteral Prescription -       TPN Prescription -     Monitor/Evaluation        Monitor N/A          Electronically signed by:  Cheri Diaz RD  06/21/21 13:54 EDT

## 2021-06-21 NOTE — PROGRESS NOTES
LOS: 14 days   Patient Care Team:  Sandoval Stevenson FNP as PCP - General  Sandoval Stevenson FNP as PCP - Family Medicine  Axel Lewis MD as Consulting Physician (Hematology and Oncology)  Iglesia Springer MD as Consulting Physician (Cardiology)    Chief Complaint: Very sedated this morning, appears comfortable but very hard to arouse.  Wife at bedside and very anxious her self    :Subjective  Terminally ill-appearing, somnolent    Interval History: Patient was to go home with home hospice but there is a gas leak in the home and he and his wife and family cannot return there at this juncture until the gas leak is addressed    Patient Complaints: Unobtainable   patient Denies: Unobtainable  History taken from: RN says patient's been very somnolent pretty much the entire last shift, very hard to arouse this morning.    Review of Systems:    All systems were reviewed and negative except for: See interval history    Objective     Vital Signs  Temp:  [99 °F (37.2 °C)-99.5 °F (37.5 °C)] 99.5 °F (37.5 °C)  Heart Rate:  [] 79  Resp:  [20-22] 20  BP: (102-122)/(69-79) 122/77    Physical Exam:       General Appearance:   Very somnolent, unable to give any history today.   Head:    Normocephalic, without obvious abnormality, atraumatic   Eyes:            Conjunctivae and sclerae normal, no   icterus, no pallor, corneas clear, PERRLA   Throat:   No oral lesions, no thrush, oral mucosa moist   Neck:   No adenopathy, supple, trachea midline, no thyromegaly, no   carotid bruit, no JVD   Lungs:    Course bilateral lung bases and upper airway sounds are very coarse, worse on left lung field compared to right this morning    Heart:   Distant, regular rhythm and normal rate, normal S1 and S2, no            murmur, no gallop, no rub, no click   Chest Wall:    No abnormalities observed   Abdomen:     Normal bowel sounds, no masses, no organomegaly, soft        non-tender, non-distended, no guarding, no rebound                 tenderness,    Rectal:     Deferred   Extremities:  Extremities are elevated, nonpitting edema to just pretibial area bilaterally   Pulses:   Pulses palpable and equal bilaterally   Skin:   No bleeding, bruising or rash   Lymph nodes:   No palpable adenopathy   Neurologic:  Generalized debility, patient will follow very simple commands this morning   Radiology:  XR Chest 1 View    Result Date: 6/20/2021  1.Increasing interstitial and airspace opacities throughout the left lung and in the right lower lung, which may be due to pulmonary edema and/or infection. 2.Stable large right perihilar/upper lobe mass.  Electronically Signed By-Nori Carter MD On:6/20/2021 8:59 AM This report was finalized on [id number] by  Nori Carter MD.    XR Chest 1 View    Result Date: 6/14/2021  Unchanged chest. Slot [id number] Electronically signed by:  Alcides Frazier D.O.  6/14/2021 12:49 AM         Results Review:     I reviewed the patient's new clinical results.  I reviewed the patient's new imaging results and agree with the interpretation.    Medication Review:   Scheduled Meds:acetylcysteine, 2 mL, Nebulization, BID - RT  ampicillin-sulbactam, 3 g, Intravenous, Q6H  aspirin, 81 mg, Oral, Daily  buprenorphine-naloxone, 1.5 tablet, Sublingual, Daily  enoxaparin, 40 mg, Subcutaneous, Q24H  epoetin asia-epbx, 40,000 Units, Subcutaneous, Weekly  gabapentin, 300 mg, Oral, TID  guaiFENesin, 600 mg, Oral, Q12H  insulin lispro, 0-7 Units, Subcutaneous, TID With Meals  Shabbir, 1 packet, Oral, BID  lurasidone, 40 mg, Oral, Daily  megestrol acetate, 200 mg, Oral, TID  methylPREDNISolone sodium succinate, 40 mg, Intravenous, Q8H  multivitamin with minerals, 1 tablet, Oral, Daily  pantoprazole, 40 mg, Oral, Daily  polyethylene glycol, 17 g, Oral, Daily  senna-docusate sodium, 2 tablet, Oral, BID  sertraline, 50 mg, Oral, Daily  spironolactone, 25 mg, Oral, Daily  vancomycin, 1,000 mg, Intravenous, Q12H  Zinc Oxide, , Apply  externally, BID      Continuous Infusions:Pharmacy to dose vancomycin,       PRN Meds:.•  acetaminophen  •  albuterol  •  senna-docusate sodium **AND** polyethylene glycol **AND** bisacodyl **AND** bisacodyl  •  calcium carbonate  •  dextrose  •  dextrose  •  glucagon (human recombinant)  •  HYDROcodone-acetaminophen  •  hydrOXYzine  •  insulin lispro  •  ipratropium-albuterol  •  LORazepam  •  melatonin  •  morphine sulfate (concentrate)  •  ondansetron  •  Pharmacy to dose vancomycin  •  promethazine  •  [COMPLETED] Insert peripheral IV **AND** sodium chloride  •  sodium chloride    Labs:  Lab Results (last 24 hours)     Procedure Component Value Units Date/Time    Creatinine, Serum [[id number]]  (Abnormal) Collected: 06/21/21 0649    Specimen: Blood Updated: 06/21/21 0750     Creatinine 0.58 mg/dL      eGFR Non African Amer 139 mL/min/1.73     Narrative:      GFR Normal >60  Chronic Kidney Disease <60  Kidney Failure <15      POC Glucose Once [[id number]]  (Abnormal) Collected: 06/21/21 0707    Specimen: Blood Updated: 06/21/21 0708     Glucose 133 mg/dL      Comment: Serial Number: [id number]:  [id number]       POC Glucose Once [[id number]]  (Abnormal) Collected: 06/20/21 2001    Specimen: Blood Updated: 06/20/21 2002     Glucose 134 mg/dL      Comment: Serial Number: [id number]:  [id number]       POC Glucose Once [[id number]]  (Abnormal) Collected: 06/20/21 1645    Specimen: Blood Updated: 06/20/21 1646     Glucose 122 mg/dL      Comment: Serial Number: [id number]:  [id number]       Vancomycin, Trough [[id number]]  (Abnormal) Collected: 06/20/21 1502    Specimen: Blood Updated: 06/20/21 1611     Vancomycin Trough 20.90 mcg/mL     Narrative:      Therapeutic Ranges for Vancomycin    Vancomycin Random   5.0-40.0 mcg/mL  Vancomycin Trough   5.0-20.0 mcg/mL  Vancomycin Peak     20.0-40.0 mcg/mL           Assessment/Plan     Acute-on-chronic respiratory failure (CMS/HCC)  Respiratory  distress  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, worsening  Probable postobstructive pneumonia    Coronary artery disease involving native coronary artery of native heart without angina pectoris    Nonrheumatic aortic valve stenosis    Essential hypertension    Congestive heart failure (CMS/HCC)    Tobacco abuse    Squamous cell carcinoma of lung, right (CMS/HCC)      Stage 2 skin ulcer of sacral region (CMS/HCC)      Infectious disease, pulmonology and palliative care inputs greatly appreciated, thank you.  Also reviewed hematology oncology's notes as well, thank you for your kind input.    Pulmonology will be adding steroids to help limit lymphangitic spread of cancer.    Patient now DNR.  Wife to contact hospice today.  She feels she cannot take care of the patient at home.  Is wanting to know what her other options are.  Is unclear to me if this suppose a gas leak in the house is fixed at this juncture.  But in either event, wife is not willing to take him home in his current condition.  I did discuss with wife originally she wanted him off the Suboxone but now she is change her mind and would like this continued.  Will consider decreasing the dosage of lorazepam and see if this helps with his underlying somnolence.    After some discussion with pharmacy, thank you for your kind input, we will add a low-dose short acting opioid to the patient's current Suboxone dose.  We will start him on hydrocodone 5/325 every 4 hours as needed.  Continue these as these appear to be helping the patient quite a bit.    At this juncture, patient is to finish the antibiotic course and then will enter into hospice care in some form or other.  Wife currently refusing to take him home as she does not feel that she can take care of him at home even with hospice help.      Continue rest of current approach.  Agree poor long-term prognosis.               Ignacia De La Cruz,   06/21/21  09:37 EDT

## 2021-06-21 NOTE — PROGRESS NOTES
"PULMONARY CRITICAL CARE Progress  NOTE      PATIENT IDENTIFICATION:  Name: Axel Ramirez  MRN: [id number]  :  1950     Age: 70 y.o.  Sex: male    DATE OF Note:  2021   Referring Physician: Ignacia De La Cruz DO                  Subjective:   Sleepy arousable not in distress  No report of  SOB no chest pain, no nausea or vomiting, no change in bowel habit, no dysuria,  no new  skin rash or itching.      Objective:  tMax 24 hrs: Temp (24hrs), Av.3 °F (37.4 °C), Min:99 °F (37.2 °C), Max:99.5 °F (37.5 °C)      Vitals Ranges:   Temp:  [99 °F (37.2 °C)-99.5 °F (37.5 °C)] 99.5 °F (37.5 °C)  Heart Rate:  [] 79  Resp:  [20-22] 20  BP: (102-122)/(69-79) 122/77    Intake and Output Last 3 Shifts:   I/O last 3 completed shifts:  In: 810 [P.O.:360; IV Piggyback:450]  Out: 1150 [Urine:1150]    Exam:  /77 (BP Location: Left arm, Patient Position: Lying)   Pulse 79   Temp 99.5 °F (37.5 °C) (Oral)   Resp 20   Ht 172.7 cm (68\")   Wt 65.9 kg (145 lb 4.5 oz)   SpO2 100%   BMI 22.09 kg/m²     General Appearance:     HEENT:  Normocephalic, without obvious abnormality, Conjunctiva/corneas clear,.  Normal external ear canals, Nares normal, no drainage     Neck:  Supple, symmetrical, trachea midline. No JVD.  Lungs /Chest wall:   Bilateral basal rhonchi, respirations unlabored symmetrical wall movement.     Heart:  Regular rate and rhythm, systolic murmur PMI left sternal border  Abdomen: Soft, non-tender, no masses, no organomegaly.    Extremities: Trace edema no clubbing or Cyanosis        Medications:    Current Facility-Administered Medications:   •  acetaminophen (TYLENOL) tablet 1,000 mg, 1,000 mg, Oral, Q4H PRN, Ignacia De La Cruz DO  •  acetylcysteine (MUCOMYST) 20 % nebulizer solution 2 mL, 2 mL, Nebulization, BID - RT, DrawÁngela MD, 2 mL at 21 0723  •  albuterol (PROVENTIL) nebulizer solution 0.083% 2.5 mg/3mL, 2.5 mg, Nebulization, Q2H PRN, Ignacia De La Cruz DO, 2.5 mg at 21 0914  •  " ampicillin-sulbactam (UNASYN) 3 g in sodium chloride 0.9 % 100 mL IVPB-MBP, 3 g, Intravenous, Q6H, Herminia Stanton, VINCE, Stopped at 06/21/21 0330  •  aspirin EC tablet 81 mg, 81 mg, Oral, Daily, Ignacia De La Cruz DO, 81 mg at 06/20/21 0931  •  sennosides-docusate (PERICOLACE) 8.6-50 MG per tablet 2 tablet, 2 tablet, Oral, BID, 2 tablet at 06/16/21 2205 **AND** polyethylene glycol (MIRALAX) packet 17 g, 17 g, Oral, Daily PRN **AND** bisacodyl (DULCOLAX) EC tablet 5 mg, 5 mg, Oral, Daily PRN **AND** bisacodyl (DULCOLAX) suppository 10 mg, 10 mg, Rectal, Daily PRN, Renetta Medina, APRN  •  buprenorphine-naloxone (SUBOXONE) 8-2 MG per SL tablet 1.5 tablet, 1.5 tablet, Sublingual, Daily, Ignacia De La Cruz DO, 1.5 tablet at 06/20/21 1223  •  calcium carbonate (TUMS) chewable tablet 500 mg (200 mg elemental), 2 tablet, Oral, BID PRN, Ignacia De La Cruz DO, 2 tablet at 06/08/21 1604  •  dextrose (D50W) 25 g/ 50mL Intravenous Solution 25 g, 25 g, Intravenous, Q15 Min PRN, Ignacia De La Cruz DO  •  dextrose (GLUTOSE) oral gel 15 g, 15 g, Oral, Q15 Min PRN, Ignacia De La Cruz DO  •  enoxaparin (LOVENOX) syringe 40 mg, 40 mg, Subcutaneous, Q24H, Ignacia De La Cruz DO, 40 mg at 06/20/21 1606  •  epoetin asia-epbx (RETACRIT) injection 40,000 Units, 40,000 Units, Subcutaneous, Weekly, Fatoumata Espinosa APRN, 40,000 Units at 06/15/21 1038  •  gabapentin (NEURONTIN) capsule 300 mg, 300 mg, Oral, TID, Ignacia De La Cruz DO, 300 mg at 06/20/21 1606  •  glucagon (human recombinant) (GLUCAGEN DIAGNOSTIC) injection 1 mg, 1 mg, Subcutaneous, Q15 Min PRN, Ignacia De La Cruz DO  •  guaiFENesin (MUCINEX) 12 hr tablet 600 mg, 600 mg, Oral, Q12H, Ignacia De La Cruz DO, 600 mg at 06/20/21 0931  •  HYDROcodone-acetaminophen (NORCO) 5-325 MG per tablet 1 tablet, 1 tablet, Oral, Q6H PRN, Ignacia De La Cruz DO, 1 tablet at 06/18/21 1547  •  hydrOXYzine (ATARAX) tablet 25 mg, 25 mg, Oral, TID PRN, Eliz Andrews APRN, 25 mg at 06/17/21 2027  •  insulin lispro  (ADMELOG) injection 0-7 Units, 0-7 Units, Subcutaneous, TID With Meals, Ignacia De La Cruz, DO, 2 Units at 06/18/21 1759  •  insulin lispro (ADMELOG) injection 0-7 Units, 0-7 Units, Subcutaneous, TID PRN, Ghazal De La Cruzn A, DO  •  ipratropium-albuterol (DUO-NEB) nebulizer solution 3 mL, 3 mL, Nebulization, Q4H PRN, Raysa Alatorre APRN, 3 mL at 06/21/21 0723  •  Shabbir pack 1 packet, 1 packet, Oral, BID, Rachana Zacarias, RD, 1 packet at 06/18/21 0955  •  LORazepam (ATIVAN) injection 1 mg, 1 mg, Intravenous, Q4H PRN, Ignacia De La Cruz A, DO, 1 mg at 06/21/21 0543  •  lurasidone (LATUDA) tablet 40 mg, 40 mg, Oral, Daily, Ignacia De La Cruz A, DO, 40 mg at 06/20/21 0931  •  megestrol acetate (MEGACE) oral suspension 200 mg, 200 mg, Oral, TID, Ghazal De La Cruzn A, DO, 200 mg at 06/20/21 1606  •  melatonin tablet 5 mg, 5 mg, Oral, Nightly PRN, Ignacia De La Cruz A, DO, 5 mg at 06/15/21 2046  •  methylPREDNISolone sodium succinate (SOLU-Medrol) injection 40 mg, 40 mg, Intravenous, Q8H, Ángela Bean MD, 40 mg at 06/21/21 0516  •  morphine sulfate (concentrate) 10 MG/0.5ML oral solution 20 mg, 20 mg, Oral, Q3H PRN, Ghazal De La Cruzn A, DO, 20 mg at 06/20/21 0833  •  multivitamin with minerals 1 tablet, 1 tablet, Oral, Daily, Ignacia De La Cruz A, DO, 1 tablet at 06/20/21 0931  •  ondansetron (ZOFRAN) injection 4 mg, 4 mg, Intravenous, Q6H PRN, Lianna Groves MD, 4 mg at 06/14/21 2154  •  pantoprazole (PROTONIX) EC tablet 40 mg, 40 mg, Oral, Daily, Ignacia De La Cruz, DO, 40 mg at 06/20/21 0931  •  Pharmacy to dose vancomycin, , Does not apply, Continuous PRN, Herminia Stanton, APRN  •  polyethylene glycol (MIRALAX) packet 17 g, 17 g, Oral, Daily, Ignacia De La Cruz, DO, 17 g at 06/20/21 0933  •  promethazine (PHENERGAN) tablet 25 mg, 25 mg, Oral, Q4H PRN, Ignacia De La Cruz DO, 25 mg at 06/1950  •  sertraline (ZOLOFT) tablet 50 mg, 50 mg, Oral, Daily, Ignacia De La Cruz A, DO, 50 mg at 06/20/21 0931  •  [COMPLETED] Insert peripheral IV, , , Once **AND** sodium chloride  0.9 % flush 10 mL, 10 mL, Intravenous, PRN, De La Cruz, Ignacia A, DO, 10 mL at 06/18/21 0947  •  sodium chloride 0.9 % flush 10 mL, 10 mL, Intravenous, PRN, Jono, Ignacia A, DO, 10 mL at 06/21/21 0516  •  spironolactone (ALDACTONE) tablet 25 mg, 25 mg, Oral, Daily, De La CruzIgnacia bullock A, DO, 25 mg at 06/20/21 0931  •  vancomycin (VANCOCIN) 1,000 mg in sodium chloride 0.9 % 250 mL IVPB, 1,000 mg, Intravenous, Q12H, Anne Allred MD, 1,000 mg at 06/21/21 0516  •  Zinc Oxide 16 % ointment, , Apply externally, BID, Carina Luevano APRN, Given at 06/20/21 0934    Data Review:  All labs (24hrs):   Recent Results (from the past 24 hour(s))   POC Glucose Once    Collection Time: 06/20/21 11:05 AM    Specimen: Blood   Result Value Ref Range    Glucose 86 70 - 105 mg/dL   Vancomycin, Trough    Collection Time: 06/20/21  3:02 PM    Specimen: Blood   Result Value Ref Range    Vancomycin Trough 20.90 (H) 5.00 - 20.00 mcg/mL   POC Glucose Once    Collection Time: 06/20/21  4:45 PM    Specimen: Blood   Result Value Ref Range    Glucose 122 (H) 70 - 105 mg/dL   POC Glucose Once    Collection Time: 06/20/21  8:01 PM    Specimen: Blood   Result Value Ref Range    Glucose 134 (H) 70 - 105 mg/dL   Creatinine, Serum    Collection Time: 06/21/21  6:49 AM    Specimen: Blood   Result Value Ref Range    Creatinine 0.58 (L) 0.76 - 1.27 mg/dL    eGFR Non African Amer 139 >60 mL/min/1.73   POC Glucose Once    Collection Time: 06/21/21  7:07 AM    Specimen: Blood   Result Value Ref Range    Glucose 133 (H) 70 - 105 mg/dL        Imaging:  XR Chest 1 View  Narrative: DATE OF EXAM:  6/20/2021 8:35 AM     PROCEDURE:  XR CHEST 1 VW-     INDICATIONS:  worsening dyspnea; R06.03-Acute respiratory distress; J98.01-Acute  bronchospasm; I50.9-Heart failure, unspecified; C34.91-Malignant  neoplasm of unspecified part of right bronchus or lung     COMPARISON:  AP chest x-ray 06/14/2021, CT chest 06/07/2021.     TECHNIQUE:   Single radiographic AP view of the chest was  obtained.     FINDINGS:  Right perihilar/upper lobe mass appears stable. No pneumothorax is seen.  There are increasing interstitial and airspace opacities in the right  lower lung and throughout the left lung. Cardiomediastinal contours are  stable, including fracture of the 2 inferior sternal wires. Tip of the  left subclavian central venous catheter terminates near the cavoatrial  junction.      Impression: 1.Increasing interstitial and airspace opacities throughout the left  lung and in the right lower lung, which may be due to pulmonary edema  and/or infection.  2.Stable large right perihilar/upper lobe mass.     Electronically Signed By-Nori Carter MD On:6/20/2021 8:59 AM  This report was finalized on [id number] by  Nori Carter MD.       ASSESSMENT:  Worsening left upper lobe interstitial infiltrates   Differential diagnosis possible lymphangitic spread, less likely infection since patient is already on broad-spectrum antibiotics including Unasyn and vancomycin unless gram-negative's is suspected      acute-on-chronic hypoxic respiratory failure  Pneumonia most likely post obstruction  COPD exacerbation  Metastatic lung cancer squamous cell Stage III   CHF  Opioid dependence  Stage 2 skin ulcer of sacral region       COVID-19 and respiratory viral DNA panel is negative     Plan  Cussed with wife in detail about the poor long-term prognosis  In the meanwhile continue with the current management     Bronchodilator as needed     Mucinex add mucomyst nebs   IS  Bronchodilator  Inhaled corticosteroids  Electrolytes/ glycemic control  DVT and GI prophylaxis.    Total Critical care time in direct medical management (   ) minutes  Lianna Groves MD. D, ABSM.     6/21/2021  08:18 EDT

## 2021-06-21 NOTE — NURSING NOTE
Noted pt too lethargic  to take oral medication last pm , several attempt med, remove dinner tray at beginning of shirt noted to be > 50 eaten per spouse didn't not consume meal   Will monitor

## 2021-06-21 NOTE — PLAN OF CARE
Per OT, Adrianne Thomas RN refused earlier as pt is extremely anxious. Pt is also anticipated to d/c with Hospice care. Not appropriate at this time. Will check on next date.

## 2021-06-21 NOTE — NURSING NOTE
Patient only complained of pain once and I gave lorazepam once. Patient will dc with ham in patient care unit in the AM. Was not as drowsy today but was having trouble swallowing pills so I crushed.

## 2021-06-21 NOTE — PROGRESS NOTES
Hematology/Oncology Inpatient Progress Note    PATIENT NAME: Axel Ramirez  : 1950  MRN: [id number]    CHIEF COMPLAINT: Metastatic squamous cell carcinoma of the right upper lobe lung and MDS    HISTORY OF PRESENT ILLNESS:  70 y.o. male presented to [facility] ER on 2021 with shortness of air and hypoxia.  He had an acute onset on the day of admission of worsening shortness of breath with O2 saturation of 76% on 4 L/min O2.  The ECF called EMS.  He was treated with nebulizer on the way to the ER.  Chest x-ray showed left lung pneumonia and a right middle lobe lung mass.  White count 10.1, hemoglobin 9.2, .7, platelets 115k.  D-dimer was 2.99 (< 0.6).  Chest CT showed a 9 cm right upper lobe lung mass occluding the right upper lobe bronchus with complete right upper and right middle lobe collapse.  There was no pulmonary emboli.  There was possible aspiration pneumonia and stable mediastinal lymphadenopathy.  A new 12 mm nodule was seen in the left lower lobe.  Adrenal glands were normal.  He was started on steroids and Zosyn.  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  Recently started on second line therapy Taxol/carboplatin/ipilimumab and Nivolumab with Neulasta support on 2021.     21  Hematology/Oncology was consulted for his metastatic squamous cell carcinoma of the right upper lobe lung.  He is an established patient.  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  Initially treated with concomitant weekly chemotherapy (paclitaxel and carboplatin x7) and radiation therapy from 2020. He completed radiation therapy on 2021.  He completed the weekly portion of his chemotherapy on 2021.  He received 1 cycle of every 21-day carboplatin /Taxol with  Neulasta support on 2/9/2021.  He subsequently was admitted to [facility], for hypoxia with respiratory failure requiring intubation.  MRI of the brain showed a left occipital lobe lesion with vasogenic edema.  He received radiation, x1 fraction, on 3/3/2021 to the left occipital lobe. One month later, a brain MRI showed enlargement of the left occipital lobe lesion.  PET scan showed the right upper lobe lung mass to be 10 cm in size with a decrease in the peripheral FDG uptake suggesting interval response to treatment.  There was a decrease in the size and FDG uptake in the mediastinal lymph nodes.  There was no distant metastatic disease.  The lesion in the left occipital lobe was FDG avid.  Right upper lobe lung collapse had improved with aeration.  He was referred to Dr. Tay for evaluation of his brain MRI which was felt to be due to to post SRS changes and not progression.  He was seen in follow-up with plan to start chemotherapy with immunotherapy.  Paclitaxel and carboplatin doses were decreased by 20% due to pancytopenia with prior treatment.  Repeat brain MRI, on 5/13/2021, showed an increase in the size of the left occipital lobe with an increase in surrounding edema.  There were no new masses.  The patient was referred to Dr. Luna for surgical resection versus MRI SPECT/perfusion at [facility].  Dr. Luna felt the lesion was a combination of necrosis and tumor progression and recommended surgical resection.  He started treatment on 5/24/2021 (chemotherapy plus immunotherapy) as his surgical resection was to be done after medical clearance.  He had a return appointment with Dr. Luna on 6/9/2021 following an MRI of his brain.  -MDS-diagnosed January 2021.  He was anemic and was found to have GOGO and malabsorption on oral therapy.  His bone marrow revealed increased iron storage and mild erythroid atypia in January 2021.  Stool heme was negative on 5/13/2021. He has received IV iron but has  never required Retacrit.  Last office visit 5/24/2021, complaining of hypoxia and tachypnea with anxiety.  White count 9.05, hemoglobin 11.34, .8 and platelets 220.  He was starting to gain weight and Megace was continued.  For his cough and hypoxia, he was treated with Augmentin and albuterol nebulizer.  He had oral thrush which was treated with nystatin swish and swallow.  TSH 0.287 (0.282-4.0), iron 62 (), ferritin 4185 (), iron saturation 32 (20-55), TIBC 196 (228-428).     PCP: Sandoval Stevenson FNP     INTERVAL HISTORY:  • 6/8/2021-Retacrit 40,000 units subcu weekly initiated.  Hemoglobin 7.2.  • 6/9/2021-PT 11.5 (9.6-11.7), PTT 27.7 (24-31).  Hemoglobin 7.2, platelets 118,000.  Haptoglobin 465 ().  CMV IgM <30 (<30).  Cardiac ejection fraction 65%.   • 6/10/2021-EBV VCA IgM less than 36 (less than 36).  • 6/11/2021-hemoglobin 6.9.  1 unit pRBCs given.  WBC 13.8 with 18% bands.  • 6/12/2021-hemoglobin 9.6, WBC 12.1 with 8% bands.  Folate 13.7 (4.78-24.2), vitamin B12 more than 2000 (211-946).  Creatinine 0.56 (0.76-1.27).  Iron 42 (), TIBC 146 (298-536), iron saturation 29 (20-50), ferritin 3721 (), reticulocyte count 3.44 (0.7-1.9).  • 6/13/2021- hemoglobin 9.0, .0, white blood count 9.2. Copper 122 ().  Chest x-ray unchanged.  • 6/14/2021-palliative care consultation.  • 6/16/2021-[facility] approved and can take high flow O2 up to 12 L. Patient is a DNR.  • 6/18/21 stool heme negative.  Patient and family leaning towards hospice care. Hemoglobin 9.4.    History of present illness reviewed since last visit and changes noted on 06/18/21.    Subjective   Reports some nausea more shortness of breath.    ROS:  Review of Systems   Unable to perform ROS: Patient nonverbal   Constitutional: Negative for activity change, chills, fatigue, fever and unexpected weight change.   HENT: Negative for congestion, dental problem, hearing loss, mouth sores,  nosebleeds, sore throat and trouble swallowing.    Eyes: Negative for photophobia and visual disturbance.   Respiratory: Negative for cough, chest tightness and shortness of breath.    Cardiovascular: Negative for chest pain, palpitations and leg swelling.   Gastrointestinal: Negative for abdominal distention, abdominal pain, blood in stool, constipation, diarrhea and vomiting.   Endocrine: Negative for cold intolerance and heat intolerance.   Genitourinary: Negative for decreased urine volume, difficulty urinating, dysuria, frequency, hematuria and urgency.   Musculoskeletal: Negative for arthralgias and gait problem.   Skin: Negative for rash and wound.   Neurological: Negative for dizziness, tremors, weakness, light-headedness, numbness and headaches.   Hematological: Negative for adenopathy. Does not bruise/bleed easily.   Psychiatric/Behavioral: Negative for confusion and hallucinations. The patient is not nervous/anxious.    All other systems reviewed and are negative.     MEDICATIONS:    Scheduled Meds:  acetylcysteine, 2 mL, Nebulization, BID - RT  ampicillin-sulbactam, 3 g, Intravenous, Q6H  aspirin, 81 mg, Oral, Daily  buprenorphine-naloxone, 1.5 tablet, Sublingual, Daily  enoxaparin, 40 mg, Subcutaneous, Q24H  epoetin asia-epbx, 40,000 Units, Subcutaneous, Weekly  gabapentin, 300 mg, Oral, TID  guaiFENesin, 600 mg, Oral, Q12H  insulin lispro, 0-7 Units, Subcutaneous, TID With Meals  Shabbir, 1 packet, Oral, BID  lurasidone, 40 mg, Oral, Daily  megestrol acetate, 200 mg, Oral, TID  methylPREDNISolone sodium succinate, 40 mg, Intravenous, Q8H  multivitamin with minerals, 1 tablet, Oral, Daily  pantoprazole, 40 mg, Oral, Daily  polyethylene glycol, 17 g, Oral, Daily  senna-docusate sodium, 2 tablet, Oral, BID  sertraline, 50 mg, Oral, Daily  spironolactone, 25 mg, Oral, Daily  vancomycin, 1,000 mg, Intravenous, Q12H  Zinc Oxide, , Apply externally, BID       Continuous Infusions:  Pharmacy to dose vancomycin,  "       PRN Meds:  •  acetaminophen  •  albuterol  •  senna-docusate sodium **AND** polyethylene glycol **AND** bisacodyl **AND** bisacodyl  •  calcium carbonate  •  dextrose  •  dextrose  •  glucagon (human recombinant)  •  HYDROcodone-acetaminophen  •  hydrOXYzine  •  insulin lispro  •  ipratropium-albuterol  •  LORazepam  •  melatonin  •  morphine sulfate (concentrate)  •  ondansetron  •  Pharmacy to dose vancomycin  •  promethazine  •  [COMPLETED] Insert peripheral IV **AND** sodium chloride  •  sodium chloride     ALLERGIES:  No Known Allergies    Objective    VITALS:   /77 (BP Location: Left arm, Patient Position: Lying)   Pulse 79   Temp 99.5 °F (37.5 °C) (Oral)   Resp 20   Ht 172.7 cm (68\")   Wt 65.9 kg (145 lb 4.5 oz)   SpO2 100%   BMI 22.09 kg/m²     PHYSICAL EXAM:  Physical Exam  Vitals and nursing note reviewed.   Constitutional:       General: He is not in acute distress.     Appearance: Normal appearance. He is well-developed and normal weight. He is ill-appearing. He is not diaphoretic.      Interventions: Nasal cannula in place.   HENT:      Head: Normocephalic and atraumatic.      Comments: Frontal male pattern baldness.       Right Ear: External ear normal.      Left Ear: External ear normal.      Nose: Nose normal. No rhinorrhea.      Comments: Oxygen via nasal cannula.     Mouth/Throat:      Mouth: Mucous membranes are moist.      Pharynx: Oropharynx is clear. No oropharyngeal exudate or posterior oropharyngeal erythema.      Comments: Edentulous.  Eyes:      General: No scleral icterus.     Extraocular Movements: Extraocular movements intact.      Conjunctiva/sclera: Conjunctivae normal.      Pupils: Pupils are equal, round, and reactive to light.   Cardiovascular:      Rate and Rhythm: Regular rhythm. Tachycardia present.      Heart sounds: Normal heart sounds. No murmur heard.        Comments: Cardiac monitor leads.   Pulmonary:      Effort: Pulmonary effort is normal. No " respiratory distress.      Breath sounds: No stridor. Rhonchi (Bilateral and scattered.) present. No wheezing or rales.   Chest:      Comments: Midline vertical chest wall scar.    Left chest wall port.  Abdominal:      General: Abdomen is flat. Bowel sounds are normal. There is no distension.      Palpations: Abdomen is soft. There is no mass.      Tenderness: There is no abdominal tenderness. There is no guarding or rebound.   Genitourinary:     Comments: External urinary catheter.  Musculoskeletal:         General: No swelling, tenderness or deformity. Normal range of motion.      Cervical back: Normal range of motion and neck supple. No tenderness.      Right lower leg: No edema.      Left lower leg: No edema.   Lymphadenopathy:      Cervical: No cervical adenopathy.      Upper Body:      Right upper body: No supraclavicular adenopathy.      Left upper body: No supraclavicular adenopathy.   Skin:     General: Skin is warm and dry.      Coloration: Skin is not jaundiced or pale.      Findings: Bruising (Ecchymosis on arms.) and ecchymosis ( Bilateral upper extremities) present. No erythema or rash.   Neurological:      General: No focal deficit present.      Mental Status: He is alert and oriented to person, place, and time.      Coordination: Coordination normal.   Psychiatric:         Mood and Affect: Mood normal.         Behavior: Behavior normal.         Thought Content: Thought content normal.         Judgment: Judgment normal.           RECENT LABS:  Lab Results (last 24 hours)     Procedure Component Value Units Date/Time    Creatinine, Serum [[id number]]  (Abnormal) Collected: 06/21/21 0649    Specimen: Blood Updated: 06/21/21 0750     Creatinine 0.58 mg/dL      eGFR Non African Amer 139 mL/min/1.73     Narrative:      GFR Normal >60  Chronic Kidney Disease <60  Kidney Failure <15      POC Glucose Once [[id number]]  (Abnormal) Collected: 06/21/21 0707    Specimen: Blood Updated: 06/21/21 0708     Glucose  133 mg/dL      Comment: Serial Number: [id number]:  [id number]       POC Glucose Once [[id number]]  (Abnormal) Collected: 06/20/21 2001    Specimen: Blood Updated: 06/20/21 2002     Glucose 134 mg/dL      Comment: Serial Number: [id number]:  [id number]       POC Glucose Once [[id number]]  (Abnormal) Collected: 06/20/21 1645    Specimen: Blood Updated: 06/20/21 1646     Glucose 122 mg/dL      Comment: Serial Number: [id number]:  [id number]       Vancomycin, Trough [[id number]]  (Abnormal) Collected: 06/20/21 1502    Specimen: Blood Updated: 06/20/21 1611     Vancomycin Trough 20.90 mcg/mL     Narrative:      Therapeutic Ranges for Vancomycin    Vancomycin Random   5.0-40.0 mcg/mL  Vancomycin Trough   5.0-20.0 mcg/mL  Vancomycin Peak     20.0-40.0 mcg/mL          PENDING RESULTS: N/A    IMAGING REVIEWED:  XR Chest 1 View    Result Date: 6/20/2021  1.Increasing interstitial and airspace opacities throughout the left lung and in the right lower lung, which may be due to pulmonary edema and/or infection. 2.Stable large right perihilar/upper lobe mass.  Electronically Signed By-Nori Carter MD On:6/20/2021 8:59 AM This report was finalized on [id number] by  Nori Carter MD.      I have reviewed the patient's labs, imaging, reports, and other clinician documentation.    Assessment/Plan   ASSESSMENT:  1. Squamous cell carcinoma right upper lobe lung with new brain met-s/p chemoradiation completed 1/2021, SRS to left occipital lobe lesion and now on chemoimmunotherapy (carboplatin/Taxol plus ipilimumab/nivolumab with Neulasta support) dosed 5/24/2021.  PET/CT scan and April 2021 showed he was responding to treatment.  Ipilimumab and nivolumab were added to his current therapy due to progression in his brain.  Due to hospitalization and brain radiation, he did not receive systemic therapy from 2/9/2021 until 5/24/2021.  Planned to undergo surgical resection of brain metastasis by Dr. Luna.  Ipilimumab  and nivolumab are known to cross blood-brain barrier.  Agree overall prognosis poor and palliative care appropriate.  Discussed with wife that hospice care was an appropriate plan.  Chemotherapy was an option that may prolong his survival but only by months and only if his performance status improves.  His cancer is not curable.  Hospice has been consulted by palliative care.  2. Anemia/Myelodysplastic syndrome/GOGO with malabsorption/Chemotherapy-induced anemia-he has never required Retacrit as an outpatient.  Recent iron studies showed correction of GOGO.  Acute fall in hemoglobin due to recent chemotherapy.  Macrocytosis due to MDS.  On treatment with Retacrit.  Haptoglobin high. Normal copper level.  On multivitamin and Protonix.  Stool heme negative.  Improving.   3. Acute thrombocytopenia-likely chemotherapy-induced.  Coags normal and CMV/EBV IgM normal.  Normalized.   4. Respiratory failure /Pneumonia (possibly postobstructive)/COPD-pulmonary following.  On vancomycin, Unasyn with management by infectious disease.  5. Weight loss/Loss of appetite-on Megace with continued weight loss.      6. Oral thrush-diagnosed as outpatient.  Now on steroids, will continue nystatin.  7. Sacral decub-wound care to follow.   8. CHF/s/p aortic valve replacement -echocardiogram showed no vegetation and EF 65%.  On aspirin and Lovenox prophylaxis.     PLAN  1. Hg stable at 9g  2. Continue weekly Retacrit, next due 6/22/2021.  3. No change in status  4. Hospice care per family awaiting placement    Agree with plan to pursue hospice care.        I discussed the patients findings and my recommendations with spouse.    Electronically signed by Efrain Youngblood MD, 06/21/21, 8:46 AM EDT.

## 2021-06-21 NOTE — PROGRESS NOTES
"Palliative Care Social Work Progress Note    Code Status:do not resuscitate    Goals of Care: Comfort Measures    Narrative:  Palliative care  met with pt and wife to assess for goals of care.  Pt reports that she wants to go home with hospice.  RN had mentioned that the family had some sort of gas leak at home that needed to be fixed, and that she knew the wife had called to have it repaired today.  Pt affirmed that while things are \"complicated at home\", that is where she wants to take the pt.  [facility] liaison messaged, and acknowledged that she will follow up with family.  Emotional support provided.    Plan:  Will continue to follow peripherally.          DINA Santos    "

## 2021-06-21 NOTE — PROGRESS NOTES
Infectious Diseases Progress Note      LOS: 14 days   Patient Care Team:  Sandoval Stevenson FNP as PCP - General  Sandoval Stevenson FNP as PCP - Family Medicine  Axel Lewis MD as Consulting Physician (Hematology and Oncology)  Iglesia Springer MD as Consulting Physician (Cardiology)    Chief Complaint: Shortness of breath, fatigue    Subjective        The patient has been afebrile for the last 24 hours.  The patient is on 12 L of oxygen via nasal cannula.  Has had no change in symptoms      Review of Systems:   Review of Systems   Constitutional: Positive for fatigue.   HENT: Negative.    Eyes: Negative.    Respiratory: Positive for shortness of breath.    Cardiovascular: Negative.    Gastrointestinal: Negative.    Endocrine: Negative.    Genitourinary: Negative.    Musculoskeletal: Negative.    Skin: Negative.    Neurological: Negative.    Psychiatric/Behavioral: Negative.    All other systems reviewed and are negative.       Objective     Vital Signs  Temp:  [97.1 °F (36.2 °C)-99.5 °F (37.5 °C)] 97.1 °F (36.2 °C)  Heart Rate:  [] 111  Resp:  [18-22] 18  BP: (102-142)/(69-85) 142/85    Physical Exam:  Physical Exam  Vitals and nursing note reviewed.   Constitutional:       General: He is not in acute distress.     Appearance: He is well-developed. He is ill-appearing. He is not diaphoretic.      Comments: Cachectic   HENT:      Head: Normocephalic and atraumatic.      Mouth/Throat:      Comments: Mild oral thrush  Eyes:      Extraocular Movements: Extraocular movements intact.      Conjunctiva/sclera: Conjunctivae normal.      Pupils: Pupils are equal, round, and reactive to light.   Cardiovascular:      Rate and Rhythm: Normal rate and regular rhythm.      Heart sounds: Normal heart sounds, S1 normal and S2 normal.   Pulmonary:      Effort: Pulmonary effort is normal. No respiratory distress.      Breath sounds: No stridor. Rales present. No wheezing.      Comments: Diminished  throughout  Abdominal:      General: Bowel sounds are normal. There is no distension.      Palpations: Abdomen is soft. There is no mass.      Tenderness: There is no abdominal tenderness. There is no guarding.   Musculoskeletal:         General: No deformity. Normal range of motion.      Cervical back: Neck supple.   Skin:     General: Skin is warm and dry.      Coloration: Skin is not pale.      Findings: No erythema or rash.      Comments: Port in place   Neurological:      Mental Status: He is alert and oriented to person, place, and time.      Cranial Nerves: No cranial nerve deficit.   Psychiatric:         Mood and Affect: Mood normal.          Results Review:    I have reviewed all clinical data, test, lab, and imaging results.     Radiology  No Radiology Exams Resulted Within Past 24 Hours    Cardiology    Laboratory    Results from last 7 days   Lab Units 06/19/21  0312 06/18/21  0508 06/17/21  0330 06/16/21  0341 06/15/21  0547   WBC 10*3/mm3 6.30 6.20 7.70 7.60 8.20   HEMOGLOBIN g/dL 9.0* 9.4* 8.9* 8.5* 8.7*   HEMATOCRIT % 27.1* 28.4* 26.7* 25.2* 25.9*   PLATELETS 10*3/mm3 346 335 286 243 226     Results from last 7 days   Lab Units 06/21/21  0649 06/20/21  0305 06/19/21  0312 06/18/21  0508 06/17/21  0330 06/16/21  0341 06/15/21  0547   SODIUM mmol/L  --   --  140 135* 134* 131* 137   POTASSIUM mmol/L  --   --  4.3 4.3 4.2 4.3 4.7   CHLORIDE mmol/L  --   --  105 100 99 99 105   CO2 mmol/L  --   --  25.0 25.0 24.0 26.0 24.0   BUN mg/dL  --   --  18 16 13 15 14   CREATININE mg/dL 0.58* 0.57* 0.61* 0.64* 0.57* 0.53* 0.64*   GLUCOSE mg/dL  --   --  116* 137* 73 100* 87   ALBUMIN g/dL  --   --   --  2.40*  --   --  2.30*   BILIRUBIN mg/dL  --   --   --  0.3  --   --  0.3   ALK PHOS U/L  --   --   --  94  --   --  93   AST (SGOT) U/L  --   --   --  33  --   --  38   ALT (SGPT) U/L  --   --   --  45*  --   --  36   CALCIUM mg/dL  --   --  8.5* 8.3* 8.4* 8.2* 8.4*                 Microbiology   Microbiology  Results (last 10 days)     ** No results found for the last 240 hours. **          Medication Review:       Schedule Meds  acetylcysteine, 2 mL, Nebulization, BID - RT  ampicillin-sulbactam, 3 g, Intravenous, Q6H  aspirin, 81 mg, Oral, Daily  buprenorphine-naloxone, 1.5 tablet, Sublingual, Daily  enoxaparin, 40 mg, Subcutaneous, Q24H  epoetin asia-epbx, 40,000 Units, Subcutaneous, Weekly  gabapentin, 300 mg, Oral, TID  guaiFENesin, 600 mg, Oral, Q12H  insulin lispro, 0-7 Units, Subcutaneous, TID With Meals  Shabbir, 1 packet, Oral, BID  lurasidone, 40 mg, Oral, Daily  megestrol acetate, 200 mg, Oral, TID  methylPREDNISolone sodium succinate, 40 mg, Intravenous, Q8H  multivitamin with minerals, 1 tablet, Oral, Daily  pantoprazole, 40 mg, Oral, Daily  polyethylene glycol, 17 g, Oral, Daily  senna-docusate sodium, 2 tablet, Oral, BID  sertraline, 50 mg, Oral, Daily  spironolactone, 25 mg, Oral, Daily  vancomycin, 1,000 mg, Intravenous, Q12H  Zinc Oxide, , Apply externally, BID        Infusion Meds  Pharmacy to dose vancomycin,         PRN Meds  •  acetaminophen  •  albuterol  •  senna-docusate sodium **AND** polyethylene glycol **AND** bisacodyl **AND** bisacodyl  •  calcium carbonate  •  dextrose  •  dextrose  •  glucagon (human recombinant)  •  HYDROcodone-acetaminophen  •  hydrOXYzine  •  insulin lispro  •  ipratropium-albuterol  •  LORazepam  •  melatonin  •  morphine sulfate (concentrate)  •  ondansetron  •  Pharmacy to dose vancomycin  •  promethazine  •  [COMPLETED] Insert peripheral IV **AND** sodium chloride  •  sodium chloride        Assessment/Plan       Antimicrobial Therapy   1.        day  2.  Unasyn      day  3.  Vancomycin      day  4.      Day  5.      Day      Assessment     Possible postobstructive pneumonia.  Patient has right large lung mass as a result of lung cancer.  There is no signs of complete collapse of the right upper and right middle lobes  -Patient is currently on 12 L of oxygen by high  flow nasal cannula  -Normally on 4 L of oxygen at home  -MRSA the nares screen is positive  -COVID-19 and respiratory viral DNA panel is negative  -Apparently there is a complete obstruction is not amenable to bronchoscopy or stent placement-Case discussed with pulmonology    Stage III squamous lung cancer with brain metastasis.    Pancytopenia-likely related to chemotherapy     Lung CA was on chemotherapy    Oral thrush, patient received 7 days of nystatin        Plan     Continue IV Unasyn 3 g every 6 hours-will finish tomorrow morning  Continue IV vancomycin-will finish today  Continue supportive care  A.m. labs  Prognosis is very guarded and long-term prognosis might be poor  Case discussed with wife at bedside  Probable going home under hospice care in the next day or so-apparently there is a gas leak in the patient's house so discharge has been delayed  Case discussed with primary care provider    Hermniia Stanton, VINCE  06/21/21  13:10 EDT     Note is dictated utilizing voice recognition software/Dragon

## 2021-06-21 NOTE — PROGRESS NOTES
"Pharmacy Antimicrobial Dosing Service    Subjective:  Axel Ramirez is a 70 y.o.male admitted with acute-on-chronic respiratory failure. Pharmacy has been consulted to dose Vancomycin for possible PNA. ID following.     PMH: CHF, COPD, squamous cell carcinoma of R lung      Assessment/Plan    1. Day #14 Vancomycin: Goal -600 mcg*h/mL. Current dose 1000 mg (~15 mg/kg ABW) IV q12h. Repeat trough yesterday = 20.9 mcg/mL (~10 hrs post dose). True trough <20 mcg/mL. Calculated AUC ~540. Last day of therapy today - continue same dose with no additional levels ordered.     2. Day #14 Ampicillin/Sulbactam: 3 g IV q6h for estCrCl > 30 mL/min    Will continue to monitor drug levels, renal function, culture and sensitivities, and patient clinical status.       Objective:  Relevant clinical data and objective history reviewed:  172.7 cm (68\")   65.9 kg (145 lb 4.5 oz)   Ideal body weight: 68.4 kg (150 lb 12.7 oz)  Body mass index is 22.09 kg/m².    Results from last 7 days   Lab Units 06/20/21  1502 06/17/21  1633 06/14/21  1511   VANCOMYCIN TR mcg/mL 20.90* 14.40 18.80     Results from last 7 days   Lab Units 06/20/21  0305 06/19/21  0312 06/18/21  0508   CREATININE mg/dL 0.57* 0.61* 0.64*     Estimated Creatinine Clearance: 80.1 mL/min (A) (by C-G formula based on SCr of 0.57 mg/dL (L)).  I/O last 3 completed shifts:  In: 810 [P.O.:360; IV Piggyback:450]  Out: 1150 [Urine:1150]    Results from last 7 days   Lab Units 06/19/21  0312 06/18/21  0508 06/17/21  0330   WBC 10*3/mm3 6.30 6.20 7.70     Temperature    06/20/21 1710 06/20/21 1943 06/21/21 0421   Temp: 99.3 °F (37.4 °C) 99 °F (37.2 °C) 99.5 °F (37.5 °C)     Baseline culture/source/susceptibility:  Microbiology Results (last 10 days)       ** No results found for the last 240 hours. **              Anti-Infectives (From admission, onward)      Ordered     Dose/Rate Route Frequency Start Stop    06/11/21 1521  vancomycin (VANCOCIN) 1,000 mg in sodium chloride " 0.9 % 250 mL IVPB     Tyra Burnham, Bartolo let the order  on 21 0854.   Ordering Provider: Anne Allred MD    1,000 mg Intravenous Every 12 Hours 21 1600 21 0559    21 1347  ampicillin-sulbactam (UNASYN) 3 g in sodium chloride 0.9 % 100 mL IVPB-MBP     [facility] let the order  on 21 1026.   Ordering Provider: Herminia Stanton APRN    3 g Intravenous Every 6 Hours 21 19521 1412  vancomycin 1250 mg/250 mL 0.9% NS IVPB ([facility])     Ordering Provider: Herminia Stanton APRN    20 mg/kg × 67.3 kg  over 75 Minutes Intravenous Once 21 1500 21 2035    21 1347  Pharmacy to dose vancomycin     [facility] reviewed the order on 21 1027.   Ordering Provider: Herminia Stanton APRN     Does not apply Continuous PRN 21 1346 21 1345    21 1059  piperacillin-tazobactam (ZOSYN) IVPB 4.5 g in 100 mL NS (CD)     Ordering Provider: Ignacia De La Cruz DO    4.5 g  over 30 Minutes Intravenous Once 21 1145 21 1326    21 0625  cefTRIAXone (ROCEPHIN) in SWFI 1 gram/10ml IV PUSH syringe     Ordering Provider: Higinio Flores MD    1 g  over 5 Minutes Intravenous Once 21 0627 21 0711    21 0625  azithromycin 500 mg IVPB in 250 mL NS     Ordering Provider: Higinio Flores MD    500 mg  over 60 Minutes Intravenous Once 21 0626 21 0806            Tyra Burnham, Bartolo  21 07:36 EDT

## 2021-06-22 NOTE — PLAN OF CARE
Per Chart review, pt is to d/c to [facility] unit. No further PT needs at this time.

## 2021-06-22 NOTE — PROGRESS NOTES
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 male presented to [facility] ER on 2021 with shortness of air and hypoxia.  He had an acute onset on the day of admission of worsening shortness of breath with O2 saturation of 76% on 4 L/min O2.  The ECF called EMS.  He was treated with nebulizer on the way to the ER.  Chest x-ray showed left lung pneumonia and a right middle lobe lung mass.  White count 10.1, hemoglobin 9.2, .7, platelets 115k.  D-dimer was 2.99 (< 0.6).  Chest CT showed a 9 cm right upper lobe lung mass occluding the right upper lobe bronchus with complete right upper and right middle lobe collapse.  There was no pulmonary emboli.  There was possible aspiration pneumonia and stable mediastinal lymphadenopathy.  A new 12 mm nodule was seen in the left lower lobe.  Adrenal glands were normal.  He was started on steroids and Zosyn.  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  Recently started on second line therapy Taxol/carboplatin/ipilimumab and Nivolumab with Neulasta support on 2021.     21  Hematology/Oncology was consulted for his metastatic squamous cell carcinoma of the right upper lobe lung.  He is an established patient.  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  Initially treated with concomitant weekly chemotherapy (paclitaxel and carboplatin x7) and radiation therapy from 2020. He completed radiation therapy on 2021.  He completed the weekly portion of his chemotherapy on 2021.  He received 1 cycle of every 21-day carboplatin /Taxol with  Neulasta support on 2/9/2021.  He subsequently was admitted to [facility], for hypoxia with respiratory failure requiring intubation.  MRI of the brain showed a left occipital lobe lesion with vasogenic edema.  He received radiation, x1 fraction, on 3/3/2021 to the left occipital lobe. One month later, a brain MRI showed enlargement of the left occipital lobe lesion.  PET scan showed the right upper lobe lung mass to be 10 cm in size with a decrease in the peripheral FDG uptake suggesting interval response to treatment.  There was a decrease in the size and FDG uptake in the mediastinal lymph nodes.  There was no distant metastatic disease.  The lesion in the left occipital lobe was FDG avid.  Right upper lobe lung collapse had improved with aeration.  He was referred to Dr. Tay for evaluation of his brain MRI which was felt to be due to to post SRS changes and not progression.  He was seen in follow-up with plan to start chemotherapy with immunotherapy.  Paclitaxel and carboplatin doses were decreased by 20% due to pancytopenia with prior treatment.  Repeat brain MRI, on 5/13/2021, showed an increase in the size of the left occipital lobe with an increase in surrounding edema.  There were no new masses.  The patient was referred to Dr. Luna for surgical resection versus MRI SPECT/perfusion at [facility].  Dr. Luna felt the lesion was a combination of necrosis and tumor progression and recommended surgical resection.  He started treatment on 5/24/2021 (chemotherapy plus immunotherapy) as his surgical resection was to be done after medical clearance.  He had a return appointment with Dr. Luna on 6/9/2021 following an MRI of his brain.  -MDS-diagnosed January 2021.  He was anemic and was found to have GOGO and malabsorption on oral therapy.  His bone marrow revealed increased iron storage and mild erythroid atypia in January 2021.  Stool heme was negative on 5/13/2021. He has received IV iron but has  never required Retacrit.  Last office visit 5/24/2021, complaining of hypoxia and tachypnea with anxiety.  White count 9.05, hemoglobin 11.34, .8 and platelets 220.  He was starting to gain weight and Megace was continued.  For his cough and hypoxia, he was treated with Augmentin and albuterol nebulizer.  He had oral thrush which was treated with nystatin swish and swallow.  TSH 0.287 (0.282-4.0), iron 62 (), ferritin 4185 (), iron saturation 32 (20-55), TIBC 196 (228-428).     PCP: Sandoval Stevenson FNP     INTERVAL HISTORY:  • 6/8/2021-Retacrit 40,000 units subcu weekly initiated.  Hemoglobin 7.2.  • 6/9/2021-PT 11.5 (9.6-11.7), PTT 27.7 (24-31).  Hemoglobin 7.2, platelets 118,000.  Haptoglobin 465 ().  CMV IgM <30 (<30).  Cardiac ejection fraction 65%.   • 6/10/2021-EBV VCA IgM less than 36 (less than 36).  • 6/11/2021-hemoglobin 6.9.  1 unit pRBCs given.  WBC 13.8 with 18% bands.  • 6/12/2021-hemoglobin 9.6, WBC 12.1 with 8% bands.  Folate 13.7 (4.78-24.2), vitamin B12 more than 2000 (211-946).  Creatinine 0.56 (0.76-1.27).  Iron 42 (), TIBC 146 (298-536), iron saturation 29 (20-50), ferritin 3721 (), reticulocyte count 3.44 (0.7-1.9).  • 6/13/2021- hemoglobin 9.0, .0, white blood count 9.2. Copper 122 ().  Chest x-ray unchanged.  • 6/14/2021-palliative care consultation.  • [facility] up to 12 L. Patient is a DNR.  • 6/18/21 stool heme negative.  Patient and family leaning towards hospice care. Hemoglobin 9.4.    History of present illness reviewed since last visit and changes noted on 06/18/21.    Subjective   Reports some nausea more shortness of breath.    ROS:  Review of Systems   Unable to perform ROS: Patient nonverbal   Constitutional: Negative for activity change, chills, fatigue, fever and unexpected weight change.   HENT: Negative for congestion, dental problem, hearing loss, mouth sores,  nosebleeds, sore throat and trouble swallowing.    Eyes: Negative for photophobia and visual disturbance.   Respiratory: Negative for cough, chest tightness and shortness of breath.    Cardiovascular: Negative for chest pain, palpitations and leg swelling.   Gastrointestinal: Negative for abdominal distention, abdominal pain, blood in stool, constipation, diarrhea and vomiting.   Endocrine: Negative for cold intolerance and heat intolerance.   Genitourinary: Negative for decreased urine volume, difficulty urinating, dysuria, frequency, hematuria and urgency.   Musculoskeletal: Negative for arthralgias and gait problem.   Skin: Negative for rash and wound.   Neurological: Negative for dizziness, tremors, weakness, light-headedness, numbness and headaches.   Hematological: Negative for adenopathy. Does not bruise/bleed easily.   Psychiatric/Behavioral: Negative for confusion and hallucinations. The patient is not nervous/anxious.    All other systems reviewed and are negative.     MEDICATIONS:    Scheduled Meds:  ampicillin-sulbactam, 3 g, Intravenous, Q6H  aspirin, 81 mg, Oral, Daily  buprenorphine-naloxone, 1.5 tablet, Sublingual, Daily  enoxaparin, 40 mg, Subcutaneous, Q24H  epoetin asia-epbx, 40,000 Units, Subcutaneous, Weekly  gabapentin, 300 mg, Oral, TID  guaiFENesin, 600 mg, Oral, Q12H  insulin lispro, 0-7 Units, Subcutaneous, TID With Meals  Shabbir, 1 packet, Oral, BID  lurasidone, 40 mg, Oral, Daily  megestrol acetate, 200 mg, Oral, TID  methylPREDNISolone sodium succinate, 40 mg, Intravenous, Q8H  multivitamin with minerals, 1 tablet, Oral, Daily  pantoprazole, 40 mg, Oral, Daily  polyethylene glycol, 17 g, Oral, Daily  senna-docusate sodium, 2 tablet, Oral, BID  sertraline, 50 mg, Oral, Daily  spironolactone, 25 mg, Oral, Daily  Zinc Oxide, , Apply externally, BID       Continuous Infusions:      PRN Meds:  •  acetaminophen  •  albuterol  •  senna-docusate sodium **AND** polyethylene glycol **AND**  "bisacodyl **AND** bisacodyl  •  calcium carbonate  •  dextrose  •  dextrose  •  glucagon (human recombinant)  •  HYDROcodone-acetaminophen  •  hydrOXYzine  •  insulin lispro  •  ipratropium-albuterol  •  LORazepam  •  melatonin  •  morphine sulfate (concentrate)  •  ondansetron  •  promethazine  •  [COMPLETED] Insert peripheral IV **AND** sodium chloride  •  sodium chloride     ALLERGIES:  No Known Allergies    Objective    VITALS:   /76 (BP Location: Right arm, Patient Position: Lying)   Pulse 115   Temp 97.9 °F (36.6 °C) (Oral)   Resp 23   Ht 172.7 cm (68\")   Wt 72 kg (158 lb 11.7 oz)   SpO2 92%   BMI 24.14 kg/m²     PHYSICAL EXAM:  Physical Exam  Vitals and nursing note reviewed.   Constitutional:       General: He is not in acute distress.     Appearance: Normal appearance. He is well-developed and normal weight. He is ill-appearing. He is not diaphoretic.      Interventions: Nasal cannula in place.   HENT:      Head: Normocephalic and atraumatic.      Comments: Frontal male pattern baldness.       Right Ear: External ear normal.      Left Ear: External ear normal.      Nose: Nose normal. No rhinorrhea.      Comments: Oxygen via nasal cannula.     Mouth/Throat:      Mouth: Mucous membranes are moist.      Pharynx: Oropharynx is clear. No oropharyngeal exudate or posterior oropharyngeal erythema.      Comments: Edentulous.  Eyes:      General: No scleral icterus.     Extraocular Movements: Extraocular movements intact.      Conjunctiva/sclera: Conjunctivae normal.      Pupils: Pupils are equal, round, and reactive to light.   Cardiovascular:      Rate and Rhythm: Regular rhythm. Tachycardia present.      Heart sounds: Normal heart sounds. No murmur heard.        Comments: Cardiac monitor leads.   Pulmonary:      Effort: Pulmonary effort is normal. No respiratory distress.      Breath sounds: No stridor. Rhonchi (Bilateral and scattered.) present. No wheezing or rales.   Chest:      Comments: Midline " vertical chest wall scar.    Left chest wall port.  Abdominal:      General: Abdomen is flat. Bowel sounds are normal. There is no distension.      Palpations: Abdomen is soft. There is no mass.      Tenderness: There is no abdominal tenderness. There is no guarding or rebound.   Genitourinary:     Comments: External urinary catheter.  Musculoskeletal:         General: No swelling, tenderness or deformity. Normal range of motion.      Cervical back: Normal range of motion and neck supple. No tenderness.      Right lower leg: No edema.      Left lower leg: No edema.   Lymphadenopathy:      Cervical: No cervical adenopathy.      Upper Body:      Right upper body: No supraclavicular adenopathy.      Left upper body: No supraclavicular adenopathy.   Skin:     General: Skin is warm and dry.      Coloration: Skin is not jaundiced or pale.      Findings: Bruising (Ecchymosis on arms.) and ecchymosis ( Bilateral upper extremities) present. No erythema or rash.   Neurological:      General: No focal deficit present.      Mental Status: He is alert and oriented to person, place, and time.      Coordination: Coordination normal.   Psychiatric:         Mood and Affect: Mood normal.         Behavior: Behavior normal.         Thought Content: Thought content normal.         Judgment: Judgment normal.           RECENT LABS:  Lab Results (last 24 hours)     Procedure Component Value Units Date/Time    Creatinine, Serum [[id number]]  (Abnormal) Collected: 06/22/21 0335    Specimen: Blood Updated: 06/22/21 0432     Creatinine 0.72 mg/dL      eGFR Non African Amer 108 mL/min/1.73     Narrative:      GFR Normal >60  Chronic Kidney Disease <60  Kidney Failure <15      POC Glucose Once [[id number]]  (Abnormal) Collected: 06/21/21 2137    Specimen: Blood Updated: 06/21/21 2138     Glucose 229 mg/dL      Comment: Serial Number: [id number]:  [id number]       POC Glucose Once [[id number]]  (Abnormal) Collected: 06/21/21 1746    Specimen:  Blood Updated: 06/21/21 1746     Glucose 174 mg/dL      Comment: Serial Number: [id number]:  [id number]             PENDING RESULTS: N/A    IMAGING REVIEWED:  No radiology results for the last day    I have reviewed the patient's labs, imaging, reports, and other clinician documentation.    Assessment/Plan   ASSESSMENT:  1. Squamous cell carcinoma right upper lobe lung with new brain met-s/p chemoradiation completed 1/2021, SRS to left occipital lobe lesion and now on chemoimmunotherapy (carboplatin/Taxol plus ipilimumab/nivolumab with Neulasta support) dosed 5/24/2021.  PET/CT scan and April 2021 showed he was responding to treatment.  Ipilimumab and nivolumab were added to his current therapy due to progression in his brain.  Due to hospitalization and brain radiation, he did not receive systemic therapy from 2/9/2021 until 5/24/2021.  Planned to undergo surgical resection of brain metastasis by Dr. Luna.  Ipilimumab and nivolumab are known to cross blood-brain barrier.  Agree overall prognosis poor and palliative care appropriate.  Discussed with wife that hospice care was an appropriate plan.  Chemotherapy was an option that may prolong his survival but only by months and only if his performance status improves.  His cancer is not curable.  Hospice has been consulted by palliative care.  2. Anemia/Myelodysplastic syndrome/GOGO with malabsorption/Chemotherapy-induced anemia-he has never required Retacrit as an outpatient.  Recent iron studies showed correction of GOGO.  Acute fall in hemoglobin due to recent chemotherapy.  Macrocytosis due to MDS.  On treatment with Retacrit.  Haptoglobin high. Normal copper level.  On multivitamin and Protonix.  Stool heme negative.  Improving.   3. Acute thrombocytopenia-likely chemotherapy-induced.  Coags normal and CMV/EBV IgM normal.  Normalized.   4. Respiratory failure /Pneumonia (possibly postobstructive)/COPD-pulmonary following.  On vancomycin, Unasyn with  management by infectious disease.  5. Weight loss/Loss of appetite-on Megace with continued weight loss.      6. Oral thrush-diagnosed as outpatient.  Now on steroids, will continue nystatin.  7. Sacral decub-wound care to follow.   8. CHF/s/p aortic valve replacement -echocardiogram showed no vegetation and EF 65%.  On aspirin and Lovenox prophylaxis.     PLAN  1. CBC today  2. Continue weekly Retacrit, next due 6/22/2021.  3. No change in status  4. Hospice care per family awaiting placement    Agree with plan to pursue hospice care.        I discussed the patients findings and my recommendations with spouse.    Electronically signed by Efrain Youngblood MD, 06/22/21, 9:46 AM EDT.

## 2021-06-22 NOTE — NURSING NOTE
[facility] and gave report. She is to call me back with a time that they will be coming to get him.

## 2021-06-22 NOTE — PROGRESS NOTES
Infectious Diseases Progress Note      LOS: 15 days   Patient Care Team:  Sandoval Stevenson FNP as PCP - General  Sandoval Stevenson FNP as PCP - Family Medicine  Axel Lewis MD as Consulting Physician (Hematology and Oncology)  Iglesia Springer MD as Consulting Physician (Cardiology)    Chief Complaint: Shortness of breath, fatigue    Subjective        The patient has been afebrile for the last 24 hours.  The patient is on 12 L of oxygen via nasal cannula.  Has had no change in symptoms other than increased fatigue      Review of Systems:   Review of Systems   Constitutional: Positive for fatigue.   HENT: Negative.    Eyes: Negative.    Respiratory: Positive for shortness of breath.    Cardiovascular: Negative.    Gastrointestinal: Negative.    Endocrine: Negative.    Genitourinary: Negative.    Musculoskeletal: Negative.    Skin: Negative.    Neurological: Negative.    Psychiatric/Behavioral: Negative.    All other systems reviewed and are negative.       Objective     Vital Signs  Temp:  [97.8 °F (36.6 °C)-98.5 °F (36.9 °C)] 98.5 °F (36.9 °C)  Heart Rate:  [] 84  Resp:  [18-27] 18  BP: (130-173)/(71-76) 173/71    Physical Exam:  Physical Exam  Vitals and nursing note reviewed.   Constitutional:       General: He is not in acute distress.     Appearance: He is well-developed. He is ill-appearing. He is not diaphoretic.      Comments: Cachectic   HENT:      Head: Normocephalic and atraumatic.      Mouth/Throat:      Comments: Mild oral thrush  Eyes:      Extraocular Movements: Extraocular movements intact.      Conjunctiva/sclera: Conjunctivae normal.      Pupils: Pupils are equal, round, and reactive to light.   Cardiovascular:      Rate and Rhythm: Normal rate and regular rhythm.      Heart sounds: Normal heart sounds, S1 normal and S2 normal.   Pulmonary:      Effort: Pulmonary effort is normal. No respiratory distress.      Breath sounds: No stridor. Rales present. No wheezing.       Comments: Diminished throughout  Abdominal:      General: Bowel sounds are normal. There is no distension.      Palpations: Abdomen is soft. There is no mass.      Tenderness: There is no abdominal tenderness. There is no guarding.   Musculoskeletal:         General: No deformity. Normal range of motion.      Cervical back: Neck supple.   Skin:     General: Skin is warm and dry.      Coloration: Skin is not pale.      Findings: No erythema or rash.      Comments: Port in place   Neurological:      Mental Status: He is alert and oriented to person, place, and time.      Cranial Nerves: No cranial nerve deficit.   Psychiatric:         Mood and Affect: Mood normal.          Results Review:    I have reviewed all clinical data, test, lab, and imaging results.     Radiology  No Radiology Exams Resulted Within Past 24 Hours    Cardiology    Laboratory    Results from last 7 days   Lab Units 06/19/21  0312 06/18/21  0508 06/17/21  0330 06/16/21  0341   WBC 10*3/mm3 6.30 6.20 7.70 7.60   HEMOGLOBIN g/dL 9.0* 9.4* 8.9* 8.5*   HEMATOCRIT % 27.1* 28.4* 26.7* 25.2*   PLATELETS 10*3/mm3 346 335 286 243     Results from last 7 days   Lab Units 06/22/21  0335 06/21/21  0649 06/20/21  0305 06/19/21  0312 06/18/21  0508 06/17/21  0330 06/16/21  0341   SODIUM mmol/L  --   --   --  140 135* 134* 131*   POTASSIUM mmol/L  --   --   --  4.3 4.3 4.2 4.3   CHLORIDE mmol/L  --   --   --  105 100 99 99   CO2 mmol/L  --   --   --  25.0 25.0 24.0 26.0   BUN mg/dL  --   --   --  18 16 13 15   CREATININE mg/dL 0.72* 0.58* 0.57* 0.61* 0.64* 0.57* 0.53*   GLUCOSE mg/dL  --   --   --  116* 137* 73 100*   ALBUMIN g/dL  --   --   --   --  2.40*  --   --    BILIRUBIN mg/dL  --   --   --   --  0.3  --   --    ALK PHOS U/L  --   --   --   --  94  --   --    AST (SGOT) U/L  --   --   --   --  33  --   --    ALT (SGPT) U/L  --   --   --   --  45*  --   --    CALCIUM mg/dL  --   --   --  8.5* 8.3* 8.4* 8.2*                 Microbiology   Microbiology  Results (last 10 days)     ** No results found for the last 240 hours. **          Medication Review:       Schedule Meds  alteplase 2 mg vial + sterile water for injection, 2 mg, Intracatheter, Once  ampicillin-sulbactam, 3 g, Intravenous, Q6H  aspirin, 81 mg, Oral, Daily  buprenorphine-naloxone, 1.5 tablet, Sublingual, Daily  enoxaparin, 40 mg, Subcutaneous, Q24H  epoetin asia-epbx, 40,000 Units, Subcutaneous, Weekly  gabapentin, 300 mg, Oral, TID  guaiFENesin, 600 mg, Oral, Q12H  insulin lispro, 0-7 Units, Subcutaneous, TID With Meals  Shabbir, 1 packet, Oral, BID  lurasidone, 40 mg, Oral, Daily  megestrol acetate, 200 mg, Oral, TID  methylPREDNISolone sodium succinate, 40 mg, Intravenous, Q8H  multivitamin with minerals, 1 tablet, Oral, Daily  pantoprazole, 40 mg, Oral, Daily  polyethylene glycol, 17 g, Oral, Daily  senna-docusate sodium, 2 tablet, Oral, BID  sertraline, 50 mg, Oral, Daily  spironolactone, 25 mg, Oral, Daily  Zinc Oxide, , Apply externally, BID        Infusion Meds       PRN Meds  •  acetaminophen  •  albuterol  •  senna-docusate sodium **AND** polyethylene glycol **AND** bisacodyl **AND** bisacodyl  •  calcium carbonate  •  dextrose  •  dextrose  •  glucagon (human recombinant)  •  HYDROcodone-acetaminophen  •  hydrOXYzine  •  insulin lispro  •  ipratropium-albuterol  •  LORazepam  •  melatonin  •  morphine sulfate (concentrate)  •  ondansetron  •  promethazine  •  [COMPLETED] Insert peripheral IV **AND** sodium chloride  •  sodium chloride        Assessment/Plan       Antimicrobial Therapy   1.        day  2.  Unasyn      day  3.        day  4.      Day  5.      Day      Assessment     Possible postobstructive pneumonia.  Patient has right large lung mass as a result of lung cancer.  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    Pancytopenia-likely related to chemotherapy     Lung CA was on chemotherapy    Oral thrush, patient received 7 days of nystatin        Plan     Discontinue IV Unasyn -patient completed his 2-week treatment course this morning  Continue supportive care  Patient being discharged to [facility] hospice inpatient unit  Case discussed with wife and RN at bedside  Not much more to add from infectious disease standpoint-we will sign off at this time-please call with any questions.    Herminia Stanton, APRN  06/22/21  13:15 EDT     Note is dictated utilizing voice recognition software/Dragon

## 2021-06-22 NOTE — DISCHARGE SUMMARY
Date of Discharge:  6/22/2021    Discharge Diagnosis: Acute-on-chronic respiratory failure (CMS/HCC)  Respiratory distress  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, worsening  Probable postobstructive pneumonia    Coronary artery disease involving native coronary artery of native heart without angina pectoris    Nonrheumatic aortic valve stenosis    Essential hypertension    Congestive heart failure (CMS/HCC)    Tobacco abuse    Squamous cell carcinoma of lung, right (CMS/HCC)       Stage 2 skin ulcer of sacral region (CMS/HCC)    Presenting Problem/History of Present Illness  Active Hospital Problems    Diagnosis  POA   • Acute-on-chronic respiratory failure (CMS/HCC) [J96.20]  Yes   • Respiratory distress [R06.03]  Yes   • Stage 2 skin ulcer of sacral region (CMS/HCC) [L98.429]  Yes   • Squamous cell carcinoma of lung, right (CMS/HCC) [C34.91]  Yes   • Tobacco abuse [Z72.0]  Yes   • Coronary artery disease involving native coronary artery of native heart without angina pectoris [I25.10]  Yes   • Nonrheumatic aortic valve stenosis [I35.0]  Yes   • Congestive heart failure (CMS/HCC) [I50.9]  Yes   • Essential hypertension [I10]  Yes      Resolved Hospital Problems   No resolved problems to display.        Disposition to hospice care.  Probably to the [facility] versus home, patient family still needs to make this decision as I was told by the patient's family she was he was going to go home with the wife with hospice care RN is under the impression that he is going to go to a [place] hospice inpatient facility.    Hospital Course  Patient is a 70 y.o. male presented from nursing home setting with sudden onset of shortness of breath.  Patient received a mini neb treatment at the facility but his breathing became more sleepy severe, he had oxygen saturation dropped to 76% on 4 L.  He was sent to the emergency department and subsequently admitted.    During his hospital stay, it was found that his cancer  was apparently worsening.  He had a post obstructive pneumonia with worsening right lung tumor and possibly tumor appearing in the left lung.  He was given aggressive pulmonary toilet, parenteral steroid therapy and long course of parenteral antibiotics.  Patient continued essentially to deteriorate.  It was obvious that he was too weak to restart his chemotherapy.  We have also found that his port is unable to be accessed for blood draws so we need hematology oncology and hospice input and what they want to do about this before the patient gets discharged.  There is also some question as to whether patient is going to go to an inpatient hospice care facility versus home.  The wife was quite confused about this this morning but in either event, he has completed all acute care antibiotics and other syrup therapy and does not seem to be making any clinical progress and actually appears worse.    Patient will be maintained on his Suboxone, morphine and lorazepam per hospice recommendations.    Once final disposition has been decided between hospice and the patient's wife and also whether something needs to be done about the port, patient will be discharged to hospice care.    Procedures Performed         Consults:   Consults     Date and Time Order Name Status Description    6/8/2021 12:34 AM Hematology & Oncology Inpatient Consult Completed     6/7/2021  9:56 AM Inpatient Infectious Diseases Consult Completed     6/7/2021  8:56 AM Inpatient Pulmonology Consult Completed     6/7/2021  7:37 AM Inpatient Cardiology Consult      6/7/2021  6:11 AM Family Medicine Consult Completed           Pertinent Test Results:XR Chest 1 View    Result Date: 6/20/2021  1.Increasing interstitial and airspace opacities throughout the left lung and in the right lower lung, which may be due to pulmonary edema and/or infection. 2.Stable large right perihilar/upper lobe mass.  Electronically Signed By-Nori Carter MD On:6/20/2021 8:59 AM  This report was finalized on [id number] by  Nori Carter MD.      Imaging Results (Last 7 Days)     Procedure Component Value Units Date/Time    XR Chest 1 View [[id number]] Collected: 06/20/21 0857     Updated: 06/20/21 0901    Narrative:      DATE OF EXAM:  6/20/2021 8:35 AM     PROCEDURE:  XR CHEST 1 VW-     INDICATIONS:  worsening dyspnea; R06.03-Acute respiratory distress; J98.01-Acute  bronchospasm; I50.9-Heart failure, unspecified; C34.91-Malignant  neoplasm of unspecified part of right bronchus or lung     COMPARISON:  AP chest x-ray 06/14/2021, CT chest 06/07/2021.     TECHNIQUE:   Single radiographic AP view of the chest was obtained.     FINDINGS:  Right perihilar/upper lobe mass appears stable. No pneumothorax is seen.  There are increasing interstitial and airspace opacities in the right  lower lung and throughout the left lung. Cardiomediastinal contours are  stable, including fracture of the 2 inferior sternal wires. Tip of the  left subclavian central venous catheter terminates near the cavoatrial  junction.        Impression:      1.Increasing interstitial and airspace opacities throughout the left  lung and in the right lower lung, which may be due to pulmonary edema  and/or infection.  2.Stable large right perihilar/upper lobe mass.     Electronically Signed By-Nori Carter MD On:6/20/2021 8:59 AM  This report was finalized on [id number] by  Nori Carter MD.              Condition on Discharge:  Fair    Vital Signs  Temp:  [97.1 °F (36.2 °C)-98.3 °F (36.8 °C)] 97.9 °F (36.6 °C)  Heart Rate:  [] 115  Resp:  [18-27] 23  BP: (122-142)/(72-85) 138/76    Physical Exam:     General Appearance:   Somewhat sedated but comfortable appearing this morning, appears to my eye to be actively dying   Head:    Normocephalic, without obvious abnormality, atraumatic   Eyes:           Conjunctivae and sclerae normal, no   icterus, no pallor, corneas clear, PERRLA   Throat:   No oral lesions, no  thrush, oral mucosa moist   Neck:   No adenopathy, supple, trachea midline, no thyromegaly, no   carotid bruit, no JVD   Lungs:    Course upper airway sounds and especially at right lung field, left lung field mostly at the base with a few very mild crackles and wheezes.  Adequate adventitial flow.    Heart:   Tachycardic   Chest Wall:    No abnormalities observed   Abdomen:     Normal bowel sounds, no masses, no organomegaly, soft        non-tender, non-distended, no guarding, no rebound                tenderness   Rectal:     Deferred   Extremities:  Will move all extremities occasionally, once again patient appears to be quite sedated, there is no edema today   Pulses:   Pulses palpable and equal bilaterally   Skin:   No bleeding, bruising or rash does have an unstageable pressure wound on the sacral area that is dressed   Lymph nodes:   No palpable adenopathy   Neurologic:  Patient rather sedated today, unable to follow commands or assess neurological status.         Discharge Disposition: Hospice care, family will need to discuss with hospice nurse as the impression we have is that patient was going to be admitted to the [facility] facility.  Patient's significant other today was under the impression that she was can be able to take him home.  Of note, patient is a full assist with x3 I doubt the wife will be able to manage him at home on her own.      Discharge Medications     Discharge Medications      ASK your doctor about these medications      Instructions Start Date   acetaminophen 650 MG suppository  Commonly known as: TYLENOL   650 mg, Rectal, Every 6 Hours PRN      albuterol sulfate  (90 Base) MCG/ACT inhaler  Commonly known as: PROVENTIL HFA;VENTOLIN HFA;PROAIR HFA   2 puffs, Inhalation, Every 6 Hours PRN      aspirin 81 MG EC tablet   81 mg, Oral, Every 24 Hours      atorvastatin 20 MG tablet  Commonly known as: LIPITOR   20 mg, Oral, Every Night at Bedtime      bisacodyl 10 MG  suppository  Commonly known as: DULCOLAX   10 mg, Rectal, Daily PRN      buprenorphine-naloxone 8-2 MG per SL tablet  Commonly known as: SUBOXONE   1.5 tablets, Sublingual, Daily      docusate sodium 100 MG capsule   100 mg, Oral, 2 Times Daily      gabapentin 300 MG capsule  Commonly known as: NEURONTIN   300 mg, Oral, 3 Times Daily      guaiFENesin 600 MG 12 hr tablet  Commonly known as: MUCINEX   600 mg, Oral, Every 12 Hours Scheduled      hydrALAZINE 50 MG tablet  Commonly known as: APRESOLINE   50 mg, Oral, Every 12 Hours      lurasidone 40 MG tablet tablet  Commonly known as: LATUDA   40 mg, Oral, Daily      megestrol 40 MG/ML suspension  Commonly known as: MEGACE   200 mg, Oral, 3 Times Daily      melatonin 5 MG tablet tablet   5 mg, Oral, Nightly PRN      metoprolol tartrate 25 MG tablet  Commonly known as: LOPRESSOR   50 mg, Oral, 2 Times Daily      multivitamin w/minerals tablet tablet   1 tablet, Oral, Daily      nystatin 176168 UNIT/ML suspension  Commonly known as: MYCOSTATIN   500,000 Units, Swish & Swallow, 4 Times Daily      pantoprazole 40 MG EC tablet  Commonly known as: Protonix   40 mg, Oral, Daily      polyethylene glycol 17 g packet  Commonly known as: MIRALAX   17 g, Oral, Daily      predniSONE 10 MG tablet  Commonly known as: DELTASONE   10 mg, Oral, Daily With Breakfast      promethazine 25 MG tablet  Commonly known as: PHENERGAN   25 mg, Oral, Every 4 Hours PRN      sertraline 50 MG tablet  Commonly known as: ZOLOFT   50 mg, Oral, Daily      spironolactone 25 MG tablet  Commonly known as: Aldactone   25 mg, Oral, Daily      torsemide 20 MG tablet  Commonly known as: DEMADEX   10 mg, Oral, Daily             Discharge Diet:     Activity at Discharge:     Follow-up Appointments  Future Appointments   Date Time Provider Department Center   12/3/2021 11:40 AM Iglesia Springer MD [facility]         Test Results Pending at Discharge: None other than needing input from  hematology oncology and hospice nurse if the nonfunctioning port will need to be accessed for lab draws or for infusion of parenteral narcotics, etc. for comfort care.       Ignacia De La Cruz DO  06/22/21  09:35 EDT

## 2021-06-22 NOTE — PLAN OF CARE
Pt plans to dc to [facility].  No further OT needs at this time.  Will discharge from caseload.   Adrianne Thomas, OTR/L

## 2021-06-22 NOTE — PLAN OF CARE
Problem: Adult Inpatient Plan of Care  Goal: Absence of Hospital-Acquired Illness or Injury  Intervention: Identify and Manage Fall Risk  Recent Flowsheet Documentation  Taken 6/22/2021 0102 by Basil Spivey LPN  Safety Promotion/Fall Prevention:   toileting scheduled   safety round/check completed   room organization consistent   nonskid shoes/slippers when out of bed   mobility aid in reach   muscle strengthening facilitated   lighting adjusted   activity supervised   assistive device/personal items within reach   clutter free environment maintained  Taken 6/21/2021 2310 by Basil Spivey LPN  Safety Promotion/Fall Prevention:   toileting scheduled   safety round/check completed   room organization consistent   muscle strengthening facilitated   nonskid shoes/slippers when out of bed   mobility aid in reach   lighting adjusted   fall prevention program maintained   clutter free environment maintained   assistive device/personal items within reach   activity supervised  Taken 6/21/2021 2100 by Basil Spivey LPN  Safety Promotion/Fall Prevention:   safety round/check completed   room organization consistent   nonskid shoes/slippers when out of bed   muscle strengthening facilitated   mobility aid in reach   activity supervised   assistive device/personal items within reach   clutter free environment maintained   fall prevention program maintained   lighting adjusted  Taken 6/21/2021 1951 by Basil Spivey LPN  Safety Promotion/Fall Prevention:   safety round/check completed   room organization consistent   nonskid shoes/slippers when out of bed   muscle strengthening facilitated   mobility aid in reach   lighting adjusted   activity supervised   assistive device/personal items within reach   elopement precautions   fall prevention program maintained   clutter free environment maintained  Intervention: Prevent Skin Injury  Recent Flowsheet Documentation  Taken 6/22/2021 0102 by Patric  Basil CENTENO LPN  Body Position: position maintained  Taken 6/21/2021 2310 by Basil Spivey LPN  Body Position: position maintained  Taken 6/21/2021 2100 by Basil Spivey LPN  Body Position: position maintained  Intervention: Prevent Infection  Recent Flowsheet Documentation  Taken 6/22/2021 0102 by Basil Spivey LPN  Infection Prevention:   visitors restricted/screened   single patient room provided   rest/sleep promoted   personal protective equipment utilized   hand hygiene promoted  Taken 6/21/2021 2310 by Basil Spivey LPN  Infection Prevention:   visitors restricted/screened   single patient room provided   rest/sleep promoted  Taken 6/21/2021 2100 by Basil Spivey LPN  Infection Prevention:   visitors restricted/screened   single patient room provided   rest/sleep promoted   personal protective equipment utilized   hand hygiene promoted   equipment surfaces disinfected  Taken 6/21/2021 1951 by Basil Spivey LPN  Infection Prevention:   visitors restricted/screened   single patient room provided   rest/sleep promoted   personal protective equipment utilized   hand hygiene promoted     Problem: Fall Injury Risk  Goal: Absence of Fall and Fall-Related Injury  Intervention: Identify and Manage Contributors to Fall Injury Risk  Recent Flowsheet Documentation  Taken 6/22/2021 0102 by Basil Spivey LPN  Medication Review/Management: medications reviewed  Taken 6/21/2021 2310 by Basil Spivey LPN  Medication Review/Management: medications reviewed  Taken 6/21/2021 2100 by Basil Spivey LPN  Medication Review/Management: medications reviewed  Taken 6/21/2021 1951 by Basil Spivey LPN  Medication Review/Management: medications reviewed  Intervention: Promote Injury-Free Environment  Recent Flowsheet Documentation  Taken 6/22/2021 0102 by Basil Spivey LPN  Safety Promotion/Fall Prevention:   toileting scheduled   safety round/check completed    room organization consistent   nonskid shoes/slippers when out of bed   mobility aid in reach   muscle strengthening facilitated   lighting adjusted   activity supervised   assistive device/personal items within reach   clutter free environment maintained  Taken 6/21/2021 2310 by Basil Spivey LPN  Safety Promotion/Fall Prevention:   toileting scheduled   safety round/check completed   room organization consistent   muscle strengthening facilitated   nonskid shoes/slippers when out of bed   mobility aid in reach   lighting adjusted   fall prevention program maintained   clutter free environment maintained   assistive device/personal items within reach   activity supervised  Taken 6/21/2021 2100 by Basil Spivey LPN  Safety Promotion/Fall Prevention:   safety round/check completed   room organization consistent   nonskid shoes/slippers when out of bed   muscle strengthening facilitated   mobility aid in reach   activity supervised   assistive device/personal items within reach   clutter free environment maintained   fall prevention program maintained   lighting adjusted  Taken 6/21/2021 1951 by Basil Spivey LPN  Safety Promotion/Fall Prevention:   safety round/check completed   room organization consistent   nonskid shoes/slippers when out of bed   muscle strengthening facilitated   mobility aid in reach   lighting adjusted   activity supervised   assistive device/personal items within reach   elopement precautions   fall prevention program maintained   clutter free environment maintained     Problem: Skin Injury Risk Increased  Goal: Skin Health and Integrity  Intervention: Optimize Skin Protection  Recent Flowsheet Documentation  Taken 6/22/2021 0102 by Basil Spivey LPN  Head of Bed (HOB): HOB at 20-30 degrees  Taken 6/21/2021 2310 by Basil Spivey LPN  Head of Bed (HOB): HOB at 20-30 degrees  Taken 6/21/2021 2100 by Basil Spivey LPN  Head of Bed (HOB): HOB at 20-30  degrees  Taken 6/21/2021 1951 by Basil Spivey LPN  Pressure Reduction Techniques: frequent weight shift encouraged  Pressure Reduction Devices: specialty bed utilized     Problem: Fluid Imbalance (Heart Failure)  Goal: Fluid Balance  Intervention: Monitor and Manage Fluid Balance  Recent Flowsheet Documentation  Taken 6/21/2021 1951 by Basil Spivey LPN  Fluid/Electrolyte Management: fluids provided     Problem: Adjustment to Illness (Heart Failure)  Goal: Optimal Coping  Intervention: Support and Optimize Psychosocial Response to Chronic Illness  Recent Flowsheet Documentation  Taken 6/21/2021 1951 by Basil Spivey LPN  Family/Support System Care: caregiver stress acknowledged     Problem: Functional Ability Impaired (Heart Failure)  Goal: Optimal Functional Ability  Intervention: Optimize Functional Ability  Recent Flowsheet Documentation  Taken 6/22/2021 0102 by Basil Spivey LPN  Activity Management: activity encouraged  Taken 6/21/2021 2310 by Basil Spivey LPN  Activity Management:   activity encouraged   bedrest  Taken 6/21/2021 2100 by Basil Spivey LPN  Activity Management: activity encouraged     Problem: Respiratory Compromise (Heart Failure)  Goal: Effective Oxygenation and Ventilation  Intervention: Optimize Oxygenation and Ventilation  Recent Flowsheet Documentation  Taken 6/21/2021 1951 by Basil Spivey LPN  Airway/Ventilation Management: calming measures promoted     Problem: Gas Exchange Impaired  Goal: Optimal Gas Exchange  Intervention: Optimize Oxygenation and Ventilation  Recent Flowsheet Documentation  Taken 6/22/2021 0102 by Basil Spivey LPN  Head of Bed (HOB): HOB at 20-30 degrees  Taken 6/21/2021 2310 by Basil Spivey LPN  Head of Bed (HOB): HOB at 20-30 degrees  Taken 6/21/2021 2100 by Basil Spivey LPN  Head of Bed (HOB): HOB at 20-30 degrees  Taken 6/21/2021 1951 by Basil Spivey LPN  Airway/Ventilation  Management: calming measures promoted   Goal Outcome Evaluation:

## 2021-06-22 NOTE — SIGNIFICANT NOTE
06/22/21 1300   Coping/Psychosocial   Family/Support Persons spouse   Involvement in Care at bedside;attentive to patient;participating in care;supportive of patient   Family/Support System Care caregiver stress acknowledged;self-care encouraged;support provided   Diversional Activities television   Additional Documentation Spiritual Care (Group)   Spiritual Care   Spiritual Care Visit Type follow-up   Spiritual Care Source  initiative   Receptivity to Spiritual Care unresponsive;visit welcomed  (pt sleeping, spouse welcomed )   Spiritual Care Request coping/stress of illness support;end-of-life issue(s) support;family support;hospitality support;mood/anxiety support;prayer support;spiritual/moral support   Spiritual Care Interventions prayer support provided;supportive conversation provided;theological discussion provided   Response to Spiritual Care emotion expressed;engaged in conversation;receptive of support;thanks expressed;visit helpful  (pt sleeping, spouse thankful)   Use of Spiritual Resources prayer;spirituality for coping, indicated strong use of   Spiritual Care Follow-Up follow-up planned regularly for general support   Coping/Psychosocial Interventions   Supportive Measures active listening utilized     Pt sleeping, spouse welcomed  visit. Spouse sad that pt moving to [place]. She does not have car support to visit  and once at facility, will have to stay for the duration. Spouse hopes son can come and visit father (pt) before he passes, but spouse doesn't believe son can handle it. Spouse sure of 's salvation ([employer]) and that gives her hope that he will be free from suffering soon. She is tearful and wonders what going forward without him looks like. She asked  to pray for comfort and peace. She may not see  again and thanked him for all of his time and ministry to her and her family. No other needs.

## 2021-06-22 NOTE — PROGRESS NOTES
"PULMONARY CRITICAL CARE [facility]  NOTE      PATIENT IDENTIFICATION:  Name: Axel Ramirez  MRN: [id number]  :  1950     Age: 70 y.o.  Sex: male    DATE OF Note:  2021   Referring Physician: Ignacia De La Cruz DO                  Subjective:   Sleepy arousable more weak cough  Still requiring oxygen to 5 L in response to SOB no chest pain, no nausea or vomiting, no change in bowel habit, no dysuria,  no new  skin rash or itching.      Objective:  tMax 24 hrs: Temp (24hrs), Av.1 °F (36.7 °C), Min:97.8 °F (36.6 °C), Max:98.5 °F (36.9 °C)      Vitals Ranges:   Temp:  [97.8 °F (36.6 °C)-98.5 °F (36.9 °C)] 98.5 °F (36.9 °C)  Heart Rate:  [] 84  Resp:  [18-27] 18  BP: (130-173)/(71-76) 173/71    Intake and Output Last 3 Shifts:   I/O last 3 completed shifts:  In: 1250 [P.O.:600; IV Piggyback:650]  Out: 850 [Urine:850]    Exam:  /71   Pulse 84   Temp 98.5 °F (36.9 °C) (Axillary)   Resp 18   Ht 172.7 cm (68\")   Wt 72 kg (158 lb 11.7 oz)   SpO2 100%   BMI 24.14 kg/m²     General Appearance: Well cachectic  HEENT:  Normocephalic, without obvious abnormality, Conjunctiva/corneas clear,.  Normal external ear canals, Nares normal, no drainage     Neck:  Supple, symmetrical, trachea midline. No JVD.  Lungs /Chest wall:   Bilateral basal rhonchi, respirations unlabored symmetrical wall movement.     Heart:  Regular rate and rhythm, systolic murmur PMI left sternal border  Abdomen: Soft, non-tender, no masses, no organomegaly.    Extremities: Trace edema no clubbing or Cyanosis        Medications:    Current Facility-Administered Medications:   •  acetaminophen (TYLENOL) tablet 1,000 mg, 1,000 mg, Oral, Q4H PRN, Ignacia De La Cruz DO  •  albuterol (PROVENTIL) nebulizer solution 0.083% 2.5 mg/3mL, 2.5 mg, Nebulization, Q2H PRN, Ignacia De La Cruz, , 2.5 mg at 21 0914  •  ampicillin-sulbactam (UNASYN) 3 g in sodium chloride 0.9 % 100 mL IVPB-MBP, 3 g, Intravenous, Q6H, Herminia Stanton, APRN, " Last Rate: 0 mL/hr at 06/22/21 0317, 3 g at 06/22/21 1035  •  aspirin EC tablet 81 mg, 81 mg, Oral, Daily, Ignacia De La Cruz DO, 81 mg at 06/22/21 1002  •  sennosides-docusate (PERICOLACE) 8.6-50 MG per tablet 2 tablet, 2 tablet, Oral, BID, 2 tablet at 06/21/21 2112 **AND** polyethylene glycol (MIRALAX) packet 17 g, 17 g, Oral, Daily PRN **AND** bisacodyl (DULCOLAX) EC tablet 5 mg, 5 mg, Oral, Daily PRN **AND** bisacodyl (DULCOLAX) suppository 10 mg, 10 mg, Rectal, Daily PRN, Renetta Medina APRN  •  buprenorphine-naloxone (SUBOXONE) 8-2 MG per SL tablet 1.5 tablet, 1.5 tablet, Sublingual, Daily, Ignacia De La Cruz DO, 1.5 tablet at 06/21/21 1004  •  calcium carbonate (TUMS) chewable tablet 500 mg (200 mg elemental), 2 tablet, Oral, BID PRN, Ignacia De La Cruz DO, 2 tablet at 06/08/21 1604  •  dextrose (D50W) 25 g/ 50mL Intravenous Solution 25 g, 25 g, Intravenous, Q15 Min PRN, Ignacia De La Cruz DO  •  dextrose (GLUTOSE) oral gel 15 g, 15 g, Oral, Q15 Min PRN, Ignacia De La Cruz DO  •  enoxaparin (LOVENOX) syringe 40 mg, 40 mg, Subcutaneous, Q24H, Ignacia De La Cruz DO, 40 mg at 06/21/21 1651  •  epoetin asia-epbx (RETACRIT) injection 40,000 Units, 40,000 Units, Subcutaneous, Weekly, Fatoumata Espinosa APRN, 40,000 Units at 06/22/21 1151  •  gabapentin (NEURONTIN) capsule 300 mg, 300 mg, Oral, TID, Ignacia De La Cruz DO, 300 mg at 06/22/21 1003  •  glucagon (human recombinant) (GLUCAGEN DIAGNOSTIC) injection 1 mg, 1 mg, Subcutaneous, Q15 Min PRN, Ignacia De La Cruz DO  •  guaiFENesin (MUCINEX) 12 hr tablet 600 mg, 600 mg, Oral, Q12H, Ignacia De La Cruz DO, 600 mg at 06/22/21 1003  •  HYDROcodone-acetaminophen (NORCO) 5-325 MG per tablet 1 tablet, 1 tablet, Oral, Q6H PRN, Ignacia De La Cruz DO, 1 tablet at 06/18/21 1547  •  hydrOXYzine (ATARAX) tablet 25 mg, 25 mg, Oral, TID PRN, Eliz Andrews, APRN, 25 mg at 06/17/21 2027  •  insulin lispro (ADMELOG) injection 0-7 Units, 0-7 Units, Subcutaneous, TID With Meals, Ignacia De La Cruz DO, 2 Units  at 06/21/21 1831  •  insulin lispro (ADMELOG) injection 0-7 Units, 0-7 Units, Subcutaneous, TID PRN, Ignacia De La Cruz DO  •  ipratropium-albuterol (DUO-NEB) nebulizer solution 3 mL, 3 mL, Nebulization, Q4H PRN, Raysa Alatorre, APRN, 3 mL at 06/21/21 1902  •  Shabbir pack 1 packet, 1 packet, Oral, BID, Rachana Zacarias RD, 1 packet at 06/21/21 2111  •  LORazepam (ATIVAN) injection 0.5 mg, 0.5 mg, Intravenous, Q4H PRN, Ignacia De La Cruz, DO, 0.5 mg at 06/22/21 1003  •  lurasidone (LATUDA) tablet 40 mg, 40 mg, Oral, Daily, Ignacia De La Cruz A, DO, 40 mg at 06/22/21 1002  •  megestrol acetate (MEGACE) oral suspension 200 mg, 200 mg, Oral, TID, Ignacia De La Cruz, DO, 200 mg at 06/22/21 1002  •  melatonin tablet 5 mg, 5 mg, Oral, Nightly PRN, Ignacia De La Cruz A, DO, 5 mg at 06/15/21 2046  •  methylPREDNISolone sodium succinate (SOLU-Medrol) injection 40 mg, 40 mg, Intravenous, Q8H, DrawÁngela MD, 40 mg at 06/22/21 1256  •  morphine sulfate (concentrate) 10 MG/0.5ML oral solution 20 mg, 20 mg, Oral, Q3H PRN, Ignacia De La Cruz, DO, 20 mg at 06/22/21 1003  •  multivitamin with minerals 1 tablet, 1 tablet, Oral, Daily, Ignacia De La Cruz, DO, 1 tablet at 06/22/21 1002  •  ondansetron (ZOFRAN) injection 4 mg, 4 mg, Intravenous, Q6H PRN, Lianna Groves MD, 4 mg at 06/22/21 0351  •  pantoprazole (PROTONIX) EC tablet 40 mg, 40 mg, Oral, Daily, Ignacia De La Cruz A, DO, 40 mg at 06/22/21 1002  •  polyethylene glycol (MIRALAX) packet 17 g, 17 g, Oral, Daily, Jono, Ignacia A, DO, 17 g at 06/20/21 0933  •  promethazine (PHENERGAN) tablet 25 mg, 25 mg, Oral, Q4H PRN, De La Cruz, Ignacia A, DO, 25 mg at 06/1950  •  sertraline (ZOLOFT) tablet 50 mg, 50 mg, Oral, Daily, De La Cruz, Ignacia A, DO, 50 mg at 06/22/21 1002  •  [COMPLETED] Insert peripheral IV, , , Once **AND** sodium chloride 0.9 % flush 10 mL, 10 mL, Intravenous, PRN, De La Cruz, Ignacia A, DO, 10 mL at 06/22/21 1004  •  sodium chloride 0.9 % flush 10 mL, 10 mL, Intravenous, PRN, De La Cruz, Ignacia A, DO, 10 mL at 06/21/21  0516  •  spironolactone (ALDACTONE) tablet 25 mg, 25 mg, Oral, Daily, De La CruzIgnacia, , 25 mg at 06/22/21 1002  •  Zinc Oxide 16 % ointment, , Apply externally, BID, Carina Luevano APRN, Given at 06/22/21 1005    Data Review:  All labs (24hrs):   Recent Results (from the past 24 hour(s))   POC Glucose Once    Collection Time: 06/21/21  5:46 PM    Specimen: Blood   Result Value Ref Range    Glucose 174 (H) 70 - 105 mg/dL   POC Glucose Once    Collection Time: 06/21/21  9:37 PM    Specimen: Blood   Result Value Ref Range    Glucose 229 (H) 70 - 105 mg/dL   Creatinine, Serum    Collection Time: 06/22/21  3:35 AM    Specimen: Blood   Result Value Ref Range    Creatinine 0.72 (L) 0.76 - 1.27 mg/dL    eGFR Non African Amer 108 >60 mL/min/1.73   POC Glucose Once    Collection Time: 06/22/21  7:14 AM    Specimen: Blood   Result Value Ref Range    Glucose 116 (H) 70 - 105 mg/dL   POC Glucose Once    Collection Time: 06/22/21 11:11 AM    Specimen: Blood   Result Value Ref Range    Glucose 125 (H) 70 - 105 mg/dL        Imaging:  XR Chest 1 View  Narrative: DATE OF EXAM:  6/20/2021 8:35 AM     PROCEDURE:  XR CHEST 1 VW-     INDICATIONS:  worsening dyspnea; R06.03-Acute respiratory distress; J98.01-Acute  bronchospasm; I50.9-Heart failure, unspecified; C34.91-Malignant  neoplasm of unspecified part of right bronchus or lung     COMPARISON:  AP chest x-ray 06/14/2021, CT chest 06/07/2021.     TECHNIQUE:   Single radiographic AP view of the chest was obtained.     FINDINGS:  Right perihilar/upper lobe mass appears stable. No pneumothorax is seen.  There are increasing interstitial and airspace opacities in the right  lower lung and throughout the left lung. Cardiomediastinal contours are  stable, including fracture of the 2 inferior sternal wires. Tip of the  left subclavian central venous catheter terminates near the cavoatrial  junction.      Impression: 1.Increasing interstitial and airspace opacities throughout the  left  lung and in the right lower lung, which may be due to pulmonary edema  and/or infection.  2.Stable large right perihilar/upper lobe mass.     Electronically Signed By-Nori Carter MD On:6/20/2021 8:59 AM  This report was finalized on [id number] by  Nori Carter MD.       ASSESSMENT:  Worsening left upper lobe interstitial infiltrates   Differential diagnosis possible lymphangitic spread, less likely infection since patient is already on broad-spectrum antibiotics including Unasyn and vancomycin unless gram-negative's is suspected      acute-on-chronic hypoxic respiratory failure  Pneumonia most likely post obstruction  COPD exacerbation  Metastatic lung cancer squamous cell Stage III   CHF  Opioid dependence  Stage 2 skin ulcer of sacral region       COVID-19 and respiratory viral DNA panel is negative     Plan  Oxygen support  Pain management  Bronchodilator as needed     Mucinex add mucomyst nebs   IS  Bronchodilator  Inhaled corticosteroids  Electrolytes/ glycemic control  DVT and GI prophylaxis.  Poor long-term prognosis    Total Critical care time in direct medical management (   ) minutes  Lianna Groves MD. D, ABSM.     6/22/2021  15:08 EDT

## 2021-06-22 NOTE — PLAN OF CARE
Goal Outcome Evaluation:     Patient was very anxious around 1000, morphine and ativan were given. After that patient rested most of the day. Woke up to eat. Patient is transferring to [facility] in [place].

## 2021-06-23 NOTE — PAYOR COMM NOTE
"RE: [id number]  MAXI RAMIREZ  --------------------------------------    UTILIZATION REVIEW  RETURN CONTACT:  ALEJANDRO MCGILL RN [facility]  PH: [phone]  FAX: [phone]  [facility]  NPI# [id number]  TAX ID # [id number]    DC NOTIFICATION   PLEASE ADVISE IF ALL DAYS ARE COVERED  DC DATE 6/22/21  DC SUMMARY ATTACHED      Maxi Ramirez (70 y.o. Male)     Date of Birth Social Security Number Address Home Phone MRN    [id number]  [id number] [place] [address] [phone] [id number]    Jain Marital Status          Congregation        Admission Date Admission Type Admitting Provider Attending Provider Department, Room/Bed    6/7/21 Emergency Ignacia De La Cruz DO  [facility] 2B MEDICAL INPATIENT, 233/1    Discharge Date Discharge Disposition Discharge Destination        6/22/2021 Hospice/Medical Facility (DC - External)              Attending Provider: (none)   Allergies: No Known Allergies    Isolation: None   Infection: MRSA No Isolation this Admit (06/08/21)   Code Status: Prior    Ht: 172.7 cm (68\")   Wt: 72 kg (158 lb 11.7 oz)    Admission Cmt: None   Principal Problem: None                Active Insurance as of 6/7/2021     Primary Coverage     Payor Plan Insurance Group Employer/Plan Group    [facility] MEDICARE REPLACEMENT [facility] MEDICARE REPLACEMENT [id number]     Payor Plan Address Payor Plan Phone Number Payor Plan Fax Number Effective Dates    [address] [address]   6/1/2021 - None Entered    [facility] [id number]       Subscriber Name Subscriber Birth Date Member ID       MAXI RAMIREZ 1950 [id number]           Secondary Coverage     Payor Plan Insurance Group Employer/Plan Group    [place]      Payor Plan Address Payor Plan Phone Number Payor Plan Fax Number Effective Dates    [address]   1/1/2019 - None Entered    [place] [address]       Subscriber Name Subscriber Birth Date Member ID       MAXI RAMIREZ " 1950 [id number]                 Emergency Contacts      (Rel.) Home Phone Work Phone Mobile Phone    Rachana Ramirez (Spouse) [phone] -- [phone]               Discharge Summary      Ignacia De La CruzDO at 06/22/21 0935            Date of Discharge:  6/22/2021    Discharge Diagnosis: Acute-on-chronic respiratory failure (CMS/HCC)  Respiratory distress  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, worsening  Probable postobstructive pneumonia    Coronary artery disease involving native coronary artery of native heart without angina pectoris    Nonrheumatic aortic valve stenosis    Essential hypertension    Congestive heart failure (CMS/HCC)    Tobacco abuse    Squamous cell carcinoma of lung, right (CMS/HCC)       Stage 2 skin ulcer of sacral region (CMS/HCC)    Presenting Problem/History of Present Illness  Active Hospital Problems    Diagnosis  POA   • Acute-on-chronic respiratory failure (CMS/HCC) [J96.20]  Yes   • Respiratory distress [R06.03]  Yes   • Stage 2 skin ulcer of sacral region (CMS/HCC) [L98.429]  Yes   • Squamous cell carcinoma of lung, right (CMS/HCC) [C34.91]  Yes   • Tobacco abuse [Z72.0]  Yes   • Coronary artery disease involving native coronary artery of native heart without angina pectoris [I25.10]  Yes   • Nonrheumatic aortic valve stenosis [I35.0]  Yes   • Congestive heart failure (CMS/HCC) [I50.9]  Yes   • Essential hypertension [I10]  Yes      Resolved Hospital Problems   No resolved problems to display.        Disposition to hospice care.  Probably to the [facility] versus home, patient family still needs to make this decision as I was told by the patient's family she was he was going to go home with the wife with hospice care RN is under the impression that he is going to go to a [place] hospice inpatient facility.    Hospital Course  Patient is a 70 y.o. male presented from nursing home setting with sudden onset of shortness of breath.  Patient received a  mini neb treatment at the facility but his breathing became more sleepy severe, he had oxygen saturation dropped to 76% on 4 L.  He was sent to the emergency department and subsequently admitted.    During his hospital stay, it was found that his cancer was apparently worsening.  He had a post obstructive pneumonia with worsening right lung tumor and possibly tumor appearing in the left lung.  He was given aggressive pulmonary toilet, parenteral steroid therapy and long course of parenteral antibiotics.  Patient continued essentially to deteriorate.  It was obvious that he was too weak to restart his chemotherapy.  We have also found that his port is unable to be accessed for blood draws so we need hematology oncology and hospice input and what they want to do about this before the patient gets discharged.  There is also some question as to whether patient is going to go to an inpatient hospice care facility versus home.  The wife was quite confused about this this morning but in either event, he has completed all acute care antibiotics and other syrup therapy and does not seem to be making any clinical progress and actually appears worse.    Patient will be maintained on his Suboxone, morphine and lorazepam per hospice recommendations.    Once final disposition has been decided between hospice and the patient's wife and also whether something needs to be done about the port, patient will be discharged to hospice care.    Procedures Performed         Consults:   Consults     Date and Time Order Name Status Description    6/8/2021 12:34 AM Hematology & Oncology Inpatient Consult Completed     6/7/2021  9:56 AM Inpatient Infectious Diseases Consult Completed     6/7/2021  8:56 AM Inpatient Pulmonology Consult Completed     6/7/2021  7:37 AM Inpatient Cardiology Consult      6/7/2021  6:11 AM Family Medicine Consult Completed           Pertinent Test Results:XR Chest 1 View    Result Date: 6/20/2021  1.Increasing  interstitial and airspace opacities throughout the left lung and in the right lower lung, which may be due to pulmonary edema and/or infection. 2.Stable large right perihilar/upper lobe mass.  Electronically Signed By-Nori Carter MD On:6/20/2021 8:59 AM This report was finalized on [id number] by  Nori Carter MD.      Imaging Results (Last 7 Days)     Procedure Component Value Units Date/Time    XR Chest 1 View [[id number]] Collected: 06/20/21 0857     Updated: 06/20/21 0901    Narrative:      DATE OF EXAM:  6/20/2021 8:35 AM     PROCEDURE:  XR CHEST 1 VW-     INDICATIONS:  worsening dyspnea; R06.03-Acute respiratory distress; J98.01-Acute  bronchospasm; I50.9-Heart failure, unspecified; C34.91-Malignant  neoplasm of unspecified part of right bronchus or lung     COMPARISON:  AP chest x-ray 06/14/2021, CT chest 06/07/2021.     TECHNIQUE:   Single radiographic AP view of the chest was obtained.     FINDINGS:  Right perihilar/upper lobe mass appears stable. No pneumothorax is seen.  There are increasing interstitial and airspace opacities in the right  lower lung and throughout the left lung. Cardiomediastinal contours are  stable, including fracture of the 2 inferior sternal wires. Tip of the  left subclavian central venous catheter terminates near the cavoatrial  junction.        Impression:      1.Increasing interstitial and airspace opacities throughout the left  lung and in the right lower lung, which may be due to pulmonary edema  and/or infection.  2.Stable large right perihilar/upper lobe mass.     Electronically Signed By-Nori Carter MD On:6/20/2021 8:59 AM  This report was finalized on [id number] by  Nori Carter MD.              Condition on Discharge:  Fair    Vital Signs  Temp:  [97.1 °F (36.2 °C)-98.3 °F (36.8 °C)] 97.9 °F (36.6 °C)  Heart Rate:  [] 115  Resp:  [18-27] 23  BP: (122-142)/(72-85) 138/76    Physical Exam:     General Appearance:   Somewhat sedated but comfortable  appearing this morning, appears to my eye to be actively dying   Head:    Normocephalic, without obvious abnormality, atraumatic   Eyes:           Conjunctivae and sclerae normal, no   icterus, no pallor, corneas clear, PERRLA   Throat:   No oral lesions, no thrush, oral mucosa moist   Neck:   No adenopathy, supple, trachea midline, no thyromegaly, no   carotid bruit, no JVD   Lungs:    Course upper airway sounds and especially at right lung field, left lung field mostly at the base with a few very mild crackles and wheezes.  Adequate adventitial flow.    Heart:   Tachycardic   Chest Wall:    No abnormalities observed   Abdomen:     Normal bowel sounds, no masses, no organomegaly, soft        non-tender, non-distended, no guarding, no rebound                tenderness   Rectal:     Deferred   Extremities:  Will move all extremities occasionally, once again patient appears to be quite sedated, there is no edema today   Pulses:   Pulses palpable and equal bilaterally   Skin:   No bleeding, bruising or rash does have an unstageable pressure wound on the sacral area that is dressed   Lymph nodes:   No palpable adenopathy   Neurologic:  Patient rather sedated today, unable to follow commands or assess neurological status.         Discharge Disposition: Hospice care, family will need to discuss with hospice nurse as the impression we have is that patient was going to be admitted to the [facility] facility.  Patient's significant other today was under the impression that she was can be able to take him home.  Of note, patient is a full assist with x3 I doubt the wife will be able to manage him at home on her own.      Discharge Medications     Discharge Medications      ASK your doctor about these medications      Instructions Start Date   acetaminophen 650 MG suppository  Commonly known as: TYLENOL   650 mg, Rectal, Every 6 Hours PRN      albuterol sulfate  (90 Base) MCG/ACT inhaler  Commonly known as:  PROVENTIL HFA;VENTOLIN HFA;PROAIR HFA   2 puffs, Inhalation, Every 6 Hours PRN      aspirin 81 MG EC tablet   81 mg, Oral, Every 24 Hours      atorvastatin 20 MG tablet  Commonly known as: LIPITOR   20 mg, Oral, Every Night at Bedtime      bisacodyl 10 MG suppository  Commonly known as: DULCOLAX   10 mg, Rectal, Daily PRN      buprenorphine-naloxone 8-2 MG per SL tablet  Commonly known as: SUBOXONE   1.5 tablets, Sublingual, Daily      docusate sodium 100 MG capsule   100 mg, Oral, 2 Times Daily      gabapentin 300 MG capsule  Commonly known as: NEURONTIN   300 mg, Oral, 3 Times Daily      guaiFENesin 600 MG 12 hr tablet  Commonly known as: MUCINEX   600 mg, Oral, Every 12 Hours Scheduled      hydrALAZINE 50 MG tablet  Commonly known as: APRESOLINE   50 mg, Oral, Every 12 Hours      lurasidone 40 MG tablet tablet  Commonly known as: LATUDA   40 mg, Oral, Daily      megestrol 40 MG/ML suspension  Commonly known as: MEGACE   200 mg, Oral, 3 Times Daily      melatonin 5 MG tablet tablet   5 mg, Oral, Nightly PRN      metoprolol tartrate 25 MG tablet  Commonly known as: LOPRESSOR   50 mg, Oral, 2 Times Daily      multivitamin w/minerals tablet tablet   1 tablet, Oral, Daily      nystatin 102043 UNIT/ML suspension  Commonly known as: MYCOSTATIN   500,000 Units, Swish & Swallow, 4 Times Daily      pantoprazole 40 MG EC tablet  Commonly known as: Protonix   40 mg, Oral, Daily      polyethylene glycol 17 g packet  Commonly known as: MIRALAX   17 g, Oral, Daily      predniSONE 10 MG tablet  Commonly known as: DELTASONE   10 mg, Oral, Daily With Breakfast      promethazine 25 MG tablet  Commonly known as: PHENERGAN   25 mg, Oral, Every 4 Hours PRN      sertraline 50 MG tablet  Commonly known as: ZOLOFT   50 mg, Oral, Daily      spironolactone 25 MG tablet  Commonly known as: Aldactone   25 mg, Oral, Daily      torsemide 20 MG tablet  Commonly known as: DEMADEX   10 mg, Oral, Daily             Discharge Diet:     Activity at  Discharge:     Follow-up Appointments  Future Appointments   Date Time Provider Department Center   12/3/2021 11:40 AM Iglesia Springer MD [facility]         Test Results Pending at Discharge: None other than needing input from hematology oncology and hospice nurse if the nonfunctioning port will need to be accessed for lab draws or for infusion of parenteral narcotics, etc. for comfort care.       Ignacia De La Cruz DO  06/22/21  09:35 EDT                Electronically signed by Ignacia De La Cruz DO at 06/22/21 0955

## 2021-06-23 NOTE — CASE MANAGEMENT/SOCIAL WORK
Case Management Discharge Note      Final Note: [facility]         Selected Continued Care - Discharged on 6/22/2021 Admission date: 6/7/2021 - Discharge disposition: Hospice/Medical Facility (DC - External)                                   Final Discharge Disposition Code: 51 - hospice medical facility

## 2022-04-12 NOTE — PLAN OF CARE
Axel Ramirez presents with ADL impairments below baseline abilities which indicate the need for continued skilled intervention while inpatient. Tolerating session today without incident. Pt has difficulty initiating & completing tasks due to cognitive decline but is motivated & walks short distance w/ min (A) & RW. Will continue to follow and progress as tolerated. Recommend IP rehab at d/c. 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 Radiation will target the small lesions. Found to have pressure injury. Placed waffle cushion in chair.    Libtayo Pregnancy And Lactation Text: This medication is contraindicated in pregnancy and when breast feeding.

## 2022-04-28 NOTE — TELEPHONE ENCOUNTER
NEEDS REFILL ON HYDRALAZINE 50MG. NIGHAT IN [place].    Detail Level: Generalized Detail Level: Simple

## 2023-12-22 NOTE — PLAN OF CARE
Goal Outcome Evaluation:  Plan of Care Reviewed With: patient        Progress: no change  Outcome Summary: Pt rested well through the night with no complaints. On 10L highflow NC. Vitals stable at this time, will continue to monitor.   Attending Attestation (For Attendings USE Only)...

## (undated) DEVICE — SOL IRRIG H2O 1000ML STRL

## (undated) DEVICE — NDL HYPO PRECISIONGLIDE/REG 18G 1IN PNK

## (undated) DEVICE — DRSNG WND BORDR/ADHS NONADHR/GZ LF 4X4IN STRL

## (undated) DEVICE — DRAPE SHEET ULTRAGARD: Brand: MEDLINE

## (undated) DEVICE — BAPTIST FLOYD BRONCHOSCOPY: Brand: MEDLINE INDUSTRIES, INC.

## (undated) DEVICE — C-ARM: Brand: UNBRANDED

## (undated) DEVICE — DRAPE,CHEST,FENES,15X10,STERIL: Brand: MEDLINE

## (undated) DEVICE — ADHS LIQ MASTISOL 2/3ML

## (undated) DEVICE — KT SURG TURNOVER 050

## (undated) DEVICE — CODMAN® SURGICAL PATTIES 3/4" X 3/4" (1.91CM X 1.91CM): Brand: CODMAN®

## (undated) DEVICE — GAUZE,SPONGE,4"X4",16PLY,XRAY,STRL,LF: Brand: MEDLINE

## (undated) DEVICE — TOWEL,OR,DSP,ST,NATURAL,DLX,4/PK,20PK/CS: Brand: MEDLINE

## (undated) DEVICE — ADAPT SWVL FIBROPTIC BRONCH

## (undated) DEVICE — DRSNG TELFA PAD NONADH STR 1S 3X4IN

## (undated) DEVICE — UNDYED BRAIDED (POLYGLACTIN 910), SYNTHETIC ABSORBABLE SUTURE: Brand: COATED VICRYL

## (undated) DEVICE — SYR LL TP 10ML STRL

## (undated) DEVICE — SOL IRRIG NACL 9PCT 1000ML BTL

## (undated) DEVICE — 3M™ STERI-STRIP™ REINFORCED ADHESIVE SKIN CLOSURES, R1547, 1/2 IN X 4 IN (12 MM X 100 MM), 6 STRIPS/ENVELOPE: Brand: 3M™ STERI-STRIP™

## (undated) DEVICE — PK MINOR LAPAROTOMY 50

## (undated) DEVICE — GLV SURG SIGNATURE ESSENTIAL PF LTX SZ8.5

## (undated) DEVICE — DISPOSABLE BIOPSY FORCEPS: Brand: DISPOSABLE BIOPSY FORCEPS

## (undated) DEVICE — VAGINAL PACKING: Brand: DEROYAL

## (undated) DEVICE — Device

## (undated) DEVICE — SUT VIC 3/0 SH 27IN J416H

## (undated) DEVICE — CUFF SCD HEMOFORCE SEQ CALF STD MD